# Patient Record
Sex: MALE | Race: WHITE | NOT HISPANIC OR LATINO
[De-identification: names, ages, dates, MRNs, and addresses within clinical notes are randomized per-mention and may not be internally consistent; named-entity substitution may affect disease eponyms.]

---

## 2017-03-09 ENCOUNTER — APPOINTMENT (OUTPATIENT)
Dept: UROLOGY | Facility: CLINIC | Age: 62
End: 2017-03-09

## 2017-03-27 ENCOUNTER — APPOINTMENT (OUTPATIENT)
Dept: UROLOGY | Facility: HOSPITAL | Age: 62
End: 2017-03-27

## 2017-03-30 ENCOUNTER — APPOINTMENT (OUTPATIENT)
Dept: UROLOGY | Facility: CLINIC | Age: 62
End: 2017-03-30

## 2017-04-06 ENCOUNTER — APPOINTMENT (OUTPATIENT)
Dept: UROLOGY | Facility: CLINIC | Age: 62
End: 2017-04-06

## 2017-04-24 ENCOUNTER — APPOINTMENT (OUTPATIENT)
Dept: UROLOGY | Facility: HOSPITAL | Age: 62
End: 2017-04-24

## 2017-04-27 ENCOUNTER — APPOINTMENT (OUTPATIENT)
Dept: UROLOGY | Facility: CLINIC | Age: 62
End: 2017-04-27

## 2017-04-27 VITALS
SYSTOLIC BLOOD PRESSURE: 115 MMHG | DIASTOLIC BLOOD PRESSURE: 76 MMHG | HEIGHT: 72 IN | WEIGHT: 210 LBS | HEART RATE: 79 BPM | BODY MASS INDEX: 28.44 KG/M2

## 2017-04-27 DIAGNOSIS — Z86.59 PERSONAL HISTORY OF OTHER MENTAL AND BEHAVIORAL DISORDERS: ICD-10-CM

## 2017-04-27 DIAGNOSIS — Z82.49 FAMILY HISTORY OF ISCHEMIC HEART DISEASE AND OTHER DISEASES OF THE CIRCULATORY SYSTEM: ICD-10-CM

## 2017-04-27 DIAGNOSIS — Z78.9 OTHER SPECIFIED HEALTH STATUS: ICD-10-CM

## 2017-04-27 DIAGNOSIS — Z83.3 FAMILY HISTORY OF DIABETES MELLITUS: ICD-10-CM

## 2017-04-27 DIAGNOSIS — Z87.442 PERSONAL HISTORY OF URINARY CALCULI: ICD-10-CM

## 2017-04-27 DIAGNOSIS — Z82.5 FAMILY HISTORY OF ASTHMA AND OTHER CHRONIC LOWER RESPIRATORY DISEASES: ICD-10-CM

## 2017-04-27 RX ORDER — FLUPHENAZINE HCL 10 MG
10 TABLET ORAL
Refills: 0 | Status: ACTIVE | COMMUNITY

## 2017-04-27 RX ORDER — FENOFIBRATE 145 MG/1
2 TABLET ORAL
Refills: 0 | Status: ACTIVE | COMMUNITY

## 2017-05-11 ENCOUNTER — APPOINTMENT (OUTPATIENT)
Dept: UROLOGY | Facility: CLINIC | Age: 62
End: 2017-05-11

## 2017-05-11 VITALS — DIASTOLIC BLOOD PRESSURE: 72 MMHG | SYSTOLIC BLOOD PRESSURE: 102 MMHG | HEART RATE: 70 BPM

## 2017-05-22 ENCOUNTER — APPOINTMENT (OUTPATIENT)
Dept: UROLOGY | Facility: HOSPITAL | Age: 62
End: 2017-05-22

## 2017-06-12 ENCOUNTER — APPOINTMENT (OUTPATIENT)
Dept: UROLOGY | Facility: HOSPITAL | Age: 62
End: 2017-06-12

## 2017-06-13 ENCOUNTER — APPOINTMENT (OUTPATIENT)
Dept: UROLOGY | Facility: CLINIC | Age: 62
End: 2017-06-13

## 2017-06-13 ENCOUNTER — OUTPATIENT (OUTPATIENT)
Dept: OUTPATIENT SERVICES | Facility: HOSPITAL | Age: 62
LOS: 1 days | Discharge: HOME | End: 2017-06-13

## 2017-06-13 VITALS — SYSTOLIC BLOOD PRESSURE: 113 MMHG | DIASTOLIC BLOOD PRESSURE: 69 MMHG | HEART RATE: 77 BPM

## 2017-06-13 DIAGNOSIS — N39.0 URINARY TRACT INFECTION, SITE NOT SPECIFIED: ICD-10-CM

## 2017-06-13 DIAGNOSIS — N23 UNSPECIFIED RENAL COLIC: ICD-10-CM

## 2017-06-13 DIAGNOSIS — F20.9 SCHIZOPHRENIA, UNSPECIFIED: ICD-10-CM

## 2017-06-13 DIAGNOSIS — N20.0 CALCULUS OF KIDNEY: ICD-10-CM

## 2017-06-13 DIAGNOSIS — N20.1 CALCULUS OF URETER: ICD-10-CM

## 2017-06-13 LAB
BILIRUB UR QL STRIP: NORMAL
CLARITY UR: CLEAR
COLLECTION METHOD: NORMAL
GLUCOSE UR-MCNC: NORMAL
HCG UR QL: NORMAL EU/DL
HGB UR QL STRIP.AUTO: NORMAL
KETONES UR-MCNC: NORMAL
LEUKOCYTE ESTERASE UR QL STRIP: 500
NITRITE UR QL STRIP: NORMAL
PH UR STRIP: 5
PROT UR STRIP-MCNC: 100
SP GR UR STRIP: 1.01

## 2017-06-13 RX ORDER — FLUPHENAZINE HYDROCHLORIDE 5 MG/1
5 TABLET, FILM COATED ORAL
Qty: 30 | Refills: 0 | Status: ACTIVE | COMMUNITY
Start: 2016-12-23

## 2017-06-22 ENCOUNTER — INPATIENT (INPATIENT)
Facility: HOSPITAL | Age: 62
LOS: 5 days | Discharge: HOME | End: 2017-06-28
Attending: UROLOGY | Admitting: INTERNAL MEDICINE

## 2017-06-22 DIAGNOSIS — N20.0 CALCULUS OF KIDNEY: ICD-10-CM

## 2017-06-22 DIAGNOSIS — F20.9 SCHIZOPHRENIA, UNSPECIFIED: ICD-10-CM

## 2017-06-22 DIAGNOSIS — N23 UNSPECIFIED RENAL COLIC: ICD-10-CM

## 2017-06-22 DIAGNOSIS — N39.0 URINARY TRACT INFECTION, SITE NOT SPECIFIED: ICD-10-CM

## 2017-06-27 ENCOUNTER — APPOINTMENT (OUTPATIENT)
Dept: UROLOGY | Facility: CLINIC | Age: 62
End: 2017-06-27

## 2017-06-28 DIAGNOSIS — N20.0 CALCULUS OF KIDNEY: ICD-10-CM

## 2017-07-03 DIAGNOSIS — I25.10 ATHEROSCLEROTIC HEART DISEASE OF NATIVE CORONARY ARTERY WITHOUT ANGINA PECTORIS: ICD-10-CM

## 2017-07-03 DIAGNOSIS — Z87.442 PERSONAL HISTORY OF URINARY CALCULI: ICD-10-CM

## 2017-07-03 DIAGNOSIS — F20.0 PARANOID SCHIZOPHRENIA: ICD-10-CM

## 2017-07-03 DIAGNOSIS — N13.2 HYDRONEPHROSIS WITH RENAL AND URETERAL CALCULOUS OBSTRUCTION: ICD-10-CM

## 2017-07-03 DIAGNOSIS — N17.9 ACUTE KIDNEY FAILURE, UNSPECIFIED: ICD-10-CM

## 2017-07-03 DIAGNOSIS — N18.9 CHRONIC KIDNEY DISEASE, UNSPECIFIED: ICD-10-CM

## 2017-07-03 DIAGNOSIS — I12.9 HYPERTENSIVE CHRONIC KIDNEY DISEASE WITH STAGE 1 THROUGH STAGE 4 CHRONIC KIDNEY DISEASE, OR UNSPECIFIED CHRONIC KIDNEY DISEASE: ICD-10-CM

## 2017-07-03 DIAGNOSIS — C85.90 NON-HODGKIN LYMPHOMA, UNSPECIFIED, UNSPECIFIED SITE: ICD-10-CM

## 2017-07-05 DIAGNOSIS — R79.89 OTHER SPECIFIED ABNORMAL FINDINGS OF BLOOD CHEMISTRY: ICD-10-CM

## 2017-07-05 DIAGNOSIS — Z85.72 PERSONAL HISTORY OF NON-HODGKIN LYMPHOMAS: ICD-10-CM

## 2017-07-26 ENCOUNTER — EMERGENCY (EMERGENCY)
Facility: HOSPITAL | Age: 62
LOS: 0 days | Discharge: HOME | End: 2017-07-26

## 2017-07-26 DIAGNOSIS — R33.9 RETENTION OF URINE, UNSPECIFIED: ICD-10-CM

## 2017-07-26 DIAGNOSIS — N23 UNSPECIFIED RENAL COLIC: ICD-10-CM

## 2017-07-26 DIAGNOSIS — R34 ANURIA AND OLIGURIA: ICD-10-CM

## 2017-07-26 DIAGNOSIS — N39.0 URINARY TRACT INFECTION, SITE NOT SPECIFIED: ICD-10-CM

## 2017-07-26 DIAGNOSIS — F20.9 SCHIZOPHRENIA, UNSPECIFIED: ICD-10-CM

## 2017-07-26 DIAGNOSIS — N17.9 ACUTE KIDNEY FAILURE, UNSPECIFIED: ICD-10-CM

## 2017-07-26 DIAGNOSIS — N20.0 CALCULUS OF KIDNEY: ICD-10-CM

## 2017-07-26 DIAGNOSIS — L53.8 OTHER SPECIFIED ERYTHEMATOUS CONDITIONS: ICD-10-CM

## 2017-07-26 DIAGNOSIS — Z79.899 OTHER LONG TERM (CURRENT) DRUG THERAPY: ICD-10-CM

## 2017-07-26 DIAGNOSIS — Z87.442 PERSONAL HISTORY OF URINARY CALCULI: ICD-10-CM

## 2017-07-26 DIAGNOSIS — Z90.5 ACQUIRED ABSENCE OF KIDNEY: ICD-10-CM

## 2017-07-28 ENCOUNTER — APPOINTMENT (OUTPATIENT)
Dept: UROLOGY | Facility: CLINIC | Age: 62
End: 2017-07-28
Payer: MEDICAID

## 2017-07-28 VITALS
DIASTOLIC BLOOD PRESSURE: 71 MMHG | WEIGHT: 210 LBS | SYSTOLIC BLOOD PRESSURE: 118 MMHG | HEIGHT: 72 IN | BODY MASS INDEX: 28.44 KG/M2 | HEART RATE: 81 BPM

## 2017-07-28 DIAGNOSIS — R79.89 OTHER SPECIFIED ABNORMAL FINDINGS OF BLOOD CHEMISTRY: ICD-10-CM

## 2017-07-28 PROCEDURE — 99214 OFFICE O/P EST MOD 30 MIN: CPT

## 2017-08-22 ENCOUNTER — APPOINTMENT (OUTPATIENT)
Dept: UROLOGY | Facility: CLINIC | Age: 62
End: 2017-08-22
Payer: MEDICAID

## 2017-08-22 VITALS
DIASTOLIC BLOOD PRESSURE: 75 MMHG | BODY MASS INDEX: 27.09 KG/M2 | WEIGHT: 200 LBS | HEIGHT: 72 IN | SYSTOLIC BLOOD PRESSURE: 108 MMHG | HEART RATE: 62 BPM

## 2017-08-22 DIAGNOSIS — N13.5 CROSSING VESSEL AND STRICTURE OF URETER W/OUT HYDRONEPHROSIS: ICD-10-CM

## 2017-08-22 DIAGNOSIS — N23 UNSPECIFIED RENAL COLIC: ICD-10-CM

## 2017-08-22 PROCEDURE — 99212 OFFICE O/P EST SF 10 MIN: CPT

## 2017-09-27 ENCOUNTER — INPATIENT (INPATIENT)
Facility: HOSPITAL | Age: 62
LOS: 5 days | Discharge: HOME | End: 2017-10-03
Attending: INTERNAL MEDICINE | Admitting: INTERNAL MEDICINE

## 2017-09-27 DIAGNOSIS — N39.0 URINARY TRACT INFECTION, SITE NOT SPECIFIED: ICD-10-CM

## 2017-09-27 DIAGNOSIS — F20.9 SCHIZOPHRENIA, UNSPECIFIED: ICD-10-CM

## 2017-09-27 DIAGNOSIS — N17.9 ACUTE KIDNEY FAILURE, UNSPECIFIED: ICD-10-CM

## 2017-09-27 DIAGNOSIS — N20.0 CALCULUS OF KIDNEY: ICD-10-CM

## 2017-09-27 DIAGNOSIS — N23 UNSPECIFIED RENAL COLIC: ICD-10-CM

## 2017-10-05 DIAGNOSIS — C85.10 UNSPECIFIED B-CELL LYMPHOMA, UNSPECIFIED SITE: ICD-10-CM

## 2017-10-05 DIAGNOSIS — N17.9 ACUTE KIDNEY FAILURE, UNSPECIFIED: ICD-10-CM

## 2017-10-05 DIAGNOSIS — E87.2 ACIDOSIS: ICD-10-CM

## 2017-10-05 DIAGNOSIS — N13.30 UNSPECIFIED HYDRONEPHROSIS: ICD-10-CM

## 2017-10-05 DIAGNOSIS — F20.9 SCHIZOPHRENIA, UNSPECIFIED: ICD-10-CM

## 2017-10-05 DIAGNOSIS — R11.2 NAUSEA WITH VOMITING, UNSPECIFIED: ICD-10-CM

## 2017-10-05 DIAGNOSIS — N26.1 ATROPHY OF KIDNEY (TERMINAL): ICD-10-CM

## 2017-10-05 DIAGNOSIS — N18.9 CHRONIC KIDNEY DISEASE, UNSPECIFIED: ICD-10-CM

## 2017-10-05 DIAGNOSIS — R35.8 OTHER POLYURIA: ICD-10-CM

## 2017-10-05 DIAGNOSIS — N18.4 CHRONIC KIDNEY DISEASE, STAGE 4 (SEVERE): ICD-10-CM

## 2017-10-05 DIAGNOSIS — Z98.890 OTHER SPECIFIED POSTPROCEDURAL STATES: ICD-10-CM

## 2017-10-05 DIAGNOSIS — N13.1 HYDRONEPHROSIS WITH URETERAL STRICTURE, NOT ELSEWHERE CLASSIFIED: ICD-10-CM

## 2017-10-06 ENCOUNTER — INPATIENT (INPATIENT)
Facility: HOSPITAL | Age: 62
LOS: 3 days | Discharge: HOME | End: 2017-10-10
Attending: INTERNAL MEDICINE | Admitting: INTERNAL MEDICINE

## 2017-10-06 DIAGNOSIS — N23 UNSPECIFIED RENAL COLIC: ICD-10-CM

## 2017-10-06 DIAGNOSIS — N39.0 URINARY TRACT INFECTION, SITE NOT SPECIFIED: ICD-10-CM

## 2017-10-06 DIAGNOSIS — N17.9 ACUTE KIDNEY FAILURE, UNSPECIFIED: ICD-10-CM

## 2017-10-06 DIAGNOSIS — N20.0 CALCULUS OF KIDNEY: ICD-10-CM

## 2017-10-06 DIAGNOSIS — F20.9 SCHIZOPHRENIA, UNSPECIFIED: ICD-10-CM

## 2017-10-12 DIAGNOSIS — D72.829 ELEVATED WHITE BLOOD CELL COUNT, UNSPECIFIED: ICD-10-CM

## 2017-10-12 DIAGNOSIS — N17.9 ACUTE KIDNEY FAILURE, UNSPECIFIED: ICD-10-CM

## 2017-10-12 DIAGNOSIS — B96.5 PSEUDOMONAS (AERUGINOSA) (MALLEI) (PSEUDOMALLEI) AS THE CAUSE OF DISEASES CLASSIFIED ELSEWHERE: ICD-10-CM

## 2017-10-12 DIAGNOSIS — N26.1 ATROPHY OF KIDNEY (TERMINAL): ICD-10-CM

## 2017-10-12 DIAGNOSIS — F20.0 PARANOID SCHIZOPHRENIA: ICD-10-CM

## 2017-10-12 DIAGNOSIS — E83.42 HYPOMAGNESEMIA: ICD-10-CM

## 2017-10-12 DIAGNOSIS — N41.9 INFLAMMATORY DISEASE OF PROSTATE, UNSPECIFIED: ICD-10-CM

## 2017-10-12 DIAGNOSIS — N18.9 CHRONIC KIDNEY DISEASE, UNSPECIFIED: ICD-10-CM

## 2017-10-12 DIAGNOSIS — Z93.6 OTHER ARTIFICIAL OPENINGS OF URINARY TRACT STATUS: ICD-10-CM

## 2017-10-12 DIAGNOSIS — D64.9 ANEMIA, UNSPECIFIED: ICD-10-CM

## 2017-10-12 DIAGNOSIS — Z16.23 RESISTANCE TO QUINOLONES AND FLUOROQUINOLONES: ICD-10-CM

## 2017-10-12 DIAGNOSIS — N13.9 OBSTRUCTIVE AND REFLUX UROPATHY, UNSPECIFIED: ICD-10-CM

## 2017-10-12 DIAGNOSIS — C85.10 UNSPECIFIED B-CELL LYMPHOMA, UNSPECIFIED SITE: ICD-10-CM

## 2017-10-12 DIAGNOSIS — A41.9 SEPSIS, UNSPECIFIED ORGANISM: ICD-10-CM

## 2017-10-19 DIAGNOSIS — N41.0 ACUTE PROSTATITIS: ICD-10-CM

## 2017-10-19 DIAGNOSIS — A41.9 SEPSIS, UNSPECIFIED ORGANISM: ICD-10-CM

## 2017-10-23 DIAGNOSIS — D50.0 IRON DEFICIENCY ANEMIA SECONDARY TO BLOOD LOSS (CHRONIC): ICD-10-CM

## 2017-10-23 DIAGNOSIS — N30.80 OTHER CYSTITIS WITHOUT HEMATURIA: ICD-10-CM

## 2017-10-23 DIAGNOSIS — N39.0 URINARY TRACT INFECTION, SITE NOT SPECIFIED: ICD-10-CM

## 2017-11-23 ENCOUNTER — INPATIENT (INPATIENT)
Facility: HOSPITAL | Age: 62
LOS: 5 days | Discharge: HOME | End: 2017-11-29
Attending: INTERNAL MEDICINE | Admitting: INTERNAL MEDICINE

## 2017-11-23 DIAGNOSIS — N20.0 CALCULUS OF KIDNEY: ICD-10-CM

## 2017-11-23 DIAGNOSIS — N17.9 ACUTE KIDNEY FAILURE, UNSPECIFIED: ICD-10-CM

## 2017-11-23 DIAGNOSIS — N23 UNSPECIFIED RENAL COLIC: ICD-10-CM

## 2017-11-23 DIAGNOSIS — F20.9 SCHIZOPHRENIA, UNSPECIFIED: ICD-10-CM

## 2017-11-23 DIAGNOSIS — N39.0 URINARY TRACT INFECTION, SITE NOT SPECIFIED: ICD-10-CM

## 2017-11-30 DIAGNOSIS — N17.9 ACUTE KIDNEY FAILURE, UNSPECIFIED: ICD-10-CM

## 2017-11-30 DIAGNOSIS — N26.1 ATROPHY OF KIDNEY (TERMINAL): ICD-10-CM

## 2017-11-30 DIAGNOSIS — B96.20 UNSPECIFIED ESCHERICHIA COLI [E. COLI] AS THE CAUSE OF DISEASES CLASSIFIED ELSEWHERE: ICD-10-CM

## 2017-11-30 DIAGNOSIS — A41.9 SEPSIS, UNSPECIFIED ORGANISM: ICD-10-CM

## 2017-11-30 DIAGNOSIS — T83.512A INFECTION AND INFLAMMATORY REACTION DUE TO NEPHROSTOMY CATHETER, INITIAL ENCOUNTER: ICD-10-CM

## 2017-11-30 DIAGNOSIS — T83.092A OTHER MECHANICAL COMPLICATION OF NEPHROSTOMY CATHETER, INITIAL ENCOUNTER: ICD-10-CM
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
## 2017-11-30 DIAGNOSIS — N39.0 URINARY TRACT INFECTION, SITE NOT SPECIFIED: ICD-10-CM

## 2017-11-30 DIAGNOSIS — N13.6 PYONEPHROSIS: ICD-10-CM

## 2017-11-30 DIAGNOSIS — F20.9 SCHIZOPHRENIA, UNSPECIFIED: ICD-10-CM

## 2017-11-30 DIAGNOSIS — E87.5 HYPERKALEMIA: ICD-10-CM

## 2017-11-30 DIAGNOSIS — A41.51 SEPSIS DUE TO ESCHERICHIA COLI [E. COLI]: ICD-10-CM

## 2017-11-30 DIAGNOSIS — D63.8 ANEMIA IN OTHER CHRONIC DISEASES CLASSIFIED ELSEWHERE: ICD-10-CM

## 2017-11-30 DIAGNOSIS — Z87.442 PERSONAL HISTORY OF URINARY CALCULI: ICD-10-CM

## 2017-11-30 DIAGNOSIS — N18.4 CHRONIC KIDNEY DISEASE, STAGE 4 (SEVERE): ICD-10-CM

## 2017-11-30 DIAGNOSIS — Z85.72 PERSONAL HISTORY OF NON-HODGKIN LYMPHOMAS: ICD-10-CM

## 2018-02-01 ENCOUNTER — OUTPATIENT (OUTPATIENT)
Dept: OUTPATIENT SERVICES | Facility: HOSPITAL | Age: 63
LOS: 1 days | Discharge: HOME | End: 2018-02-01

## 2018-02-01 ENCOUNTER — RESULT REVIEW (OUTPATIENT)
Age: 63
End: 2018-02-01

## 2018-02-01 ENCOUNTER — APPOINTMENT (OUTPATIENT)
Dept: HEMATOLOGY ONCOLOGY | Facility: CLINIC | Age: 63
End: 2018-02-01

## 2018-02-01 VITALS
HEIGHT: 72 IN | HEART RATE: 72 BPM | BODY MASS INDEX: 28.71 KG/M2 | TEMPERATURE: 96.3 F | SYSTOLIC BLOOD PRESSURE: 113 MMHG | RESPIRATION RATE: 12 BRPM | DIASTOLIC BLOOD PRESSURE: 55 MMHG | WEIGHT: 212 LBS

## 2018-02-02 DIAGNOSIS — C85.90 NON-HODGKIN LYMPHOMA, UNSPECIFIED, UNSPECIFIED SITE: ICD-10-CM

## 2018-02-02 LAB
ALBUMIN SERPL-MCNC: 4.2 G/DL
ALBUMIN/GLOB SERPL: 2
ALP SERPL-CCNC: 139 IU/L
ALT SERPL-CCNC: 12 IU/L
ANION GAP SERPL CALC-SCNC: 13 MEQ/L
AST SERPL-CCNC: 16 IU/L
BILIRUB SERPL-MCNC: 0.6 MG/DL
BUN SERPL-MCNC: 30 MG/DL
BUN/CREAT SERPL: 12.3 %
CALCIUM SERPL-MCNC: 9.2 MG/DL
CHLORIDE SERPL-SCNC: 112 MEQ/L
CO2 SERPL-SCNC: 18 MEQ/L
CREAT SERPL-MCNC: 2.43 MG/DL
GFR SERPL CREATININE-BSD FRML MDRD: 27
GLUCOSE SERPL-MCNC: 68 MG/DL
IGG FLD-MCNC: 910 MG/DL
LACTATE DEHYDROGENASE (NORTH): 256 IU/L
POTASSIUM SERPL-SCNC: 4.7 MMOL/L
PROT SERPL-MCNC: 6.3 G/DL
SODIUM SERPL-SCNC: 143 MEQ/L

## 2019-01-21 ENCOUNTER — EMERGENCY (EMERGENCY)
Facility: HOSPITAL | Age: 64
LOS: 0 days | Discharge: HOME | End: 2019-01-21
Attending: EMERGENCY MEDICINE | Admitting: EMERGENCY MEDICINE

## 2019-01-21 VITALS
DIASTOLIC BLOOD PRESSURE: 86 MMHG | OXYGEN SATURATION: 100 % | RESPIRATION RATE: 18 BRPM | SYSTOLIC BLOOD PRESSURE: 141 MMHG | TEMPERATURE: 98 F | HEART RATE: 97 BPM

## 2019-01-21 DIAGNOSIS — M25.461 EFFUSION, RIGHT KNEE: ICD-10-CM

## 2019-01-21 DIAGNOSIS — I10 ESSENTIAL (PRIMARY) HYPERTENSION: ICD-10-CM

## 2019-01-21 DIAGNOSIS — M25.561 PAIN IN RIGHT KNEE: ICD-10-CM

## 2019-01-21 LAB
B PERT IGG+IGM PNL SER: ABNORMAL
COLOR FLD: YELLOW — SIGNIFICANT CHANGE UP
FLUID INTAKE SUBSTANCE CLASS: SIGNIFICANT CHANGE UP
FLUID SEGMENTED GRANULOCYTES: SIGNIFICANT CHANGE UP %
LYMPHOCYTES # FLD: SIGNIFICANT CHANGE UP
RCV VOL RI: 8000 /UL — HIGH (ref 0–5)
TOTAL NUCLEATED CELL COUNT, BODY FLUID: HIGH /UL (ref 0–5)
TUBE TYPE: SIGNIFICANT CHANGE UP

## 2019-01-21 NOTE — ED PROVIDER NOTE - PROGRESS NOTE DETAILS
Patient requesting joint aspiration for crystals. Lengthy discussion with patient concerning test best done by ortho as they can f/u the results for continuity of care, as our aspirate will be a send out and does not come back today. Patient also does not want to take meds for symptomatic relief of possible gout, due to comorbidities. Patient was provided ortho f/u but he states he has medicaid and is unsure if he will be able to be seen. Large effusion on XR and patient in considerable pain, without reasonable alternatives for pain control.

## 2019-01-21 NOTE — ED PROVIDER NOTE - CARE PROVIDERS DIRECT ADDRESSES
,[email or web address]

## 2019-01-21 NOTE — ED PROVIDER NOTE - NS ED ROS FT
CONST: No fever, chills or bodyaches  CARD: No chest pain, palpitations  RESP: No SOB, cough  MS: see HPI  SKIN: No rashes  NEURO: No paresthesias

## 2019-01-21 NOTE — ED PROVIDER NOTE - ATTENDING CONTRIBUTION TO CARE
64 yo M hx of htn, ckd, schizophrenia, now with 2wks R knee swelling. hx of swelling to multiple joints. f/u with PMD dr Escudero. elev uric acid. can't take meds for gout due to ckd. has not followed with rheum as instructed. no fever. no redness.   nontoxic, well appearing. nl gait, + swelling right knee, no erythema, no warmth, no LAD.dec ROM due to swelling.  xray + effusion. will tap

## 2019-01-21 NOTE — ED PROVIDER NOTE - OBJECTIVE STATEMENT
62yo male with PMHx paranoid schizophrenia, CKD, multiple renal stones with partial ureteralectomy, migratory joint pain and swelling, seen by Rheum previously but did not follow up, was told by PMD he has "elevated uric acid," although did not f/u as well presents c/o R knee pain with moderate pain/swelling  x 2 weeks, pain worse with flexion and weight bearing, better with elevation. Patient has not tried any OTCs due to his renal issues and has not tried RICE. He denies trauma, twisting injury.

## 2019-01-21 NOTE — ED PROVIDER NOTE - PHYSICAL EXAMINATION
CONST: Well appearing in NAD  CARD: Normal S1 S2; Normal rate and rhythm  MS: Suprapatellar R effusion, ballotment of patella with moderate effusion. Pain elicited upon flexion. Can fully extend  SKIN: Warm, dry, no acute rashes. Good turgor  NEURO: A&Ox3, No focal deficits. Strength 5/5 with no sensory deficits. Steady gait

## 2019-01-21 NOTE — ED PROVIDER NOTE - CARE PROVIDER_API CALL
Nathen Escudero), Internal Medicine  [address] [place] [address]  Phone: [phone]  Fax: [phone]    Malachi Ornelas), Orthopaedic Surgery  [address] [place] [address]  Phone: [phone]  Fax: [phone]

## 2019-01-22 LAB
GLUCOSE FLD-MCNC: 80 MG/DL — SIGNIFICANT CHANGE UP
GRAM STN FLD: SIGNIFICANT CHANGE UP
PROT FLD-MCNC: 4.4 G/DL — SIGNIFICANT CHANGE UP
SPECIMEN SOURCE: SIGNIFICANT CHANGE UP
SYNOVIAL CRYSTALS CLARITY: ABNORMAL
SYNOVIAL CRYSTALS COLOR: YELLOW
SYNOVIAL CRYSTALS ID: ABNORMAL
SYNOVIAL CRYSTALS TUBE: SIGNIFICANT CHANGE UP

## 2019-02-04 LAB
CULTURE RESULTS: SIGNIFICANT CHANGE UP
SPECIMEN SOURCE: SIGNIFICANT CHANGE UP

## 2019-03-01 ENCOUNTER — OUTPATIENT (OUTPATIENT)
Dept: OUTPATIENT SERVICES | Facility: HOSPITAL | Age: 64
LOS: 1 days | End: 2019-03-01
Payer: MEDICAID

## 2019-03-01 PROCEDURE — G9001: CPT

## 2019-03-18 DIAGNOSIS — Z71.89 OTHER SPECIFIED COUNSELING: ICD-10-CM

## 2019-06-17 ENCOUNTER — APPOINTMENT (OUTPATIENT)
Dept: HEMATOLOGY ONCOLOGY | Facility: CLINIC | Age: 64
End: 2019-06-17

## 2019-07-08 ENCOUNTER — APPOINTMENT (OUTPATIENT)
Dept: HEMATOLOGY ONCOLOGY | Facility: CLINIC | Age: 64
End: 2019-07-08
Payer: MEDICAID

## 2019-07-08 ENCOUNTER — LABORATORY RESULT (OUTPATIENT)
Age: 64
End: 2019-07-08

## 2019-07-08 VITALS
RESPIRATION RATE: 13 BRPM | TEMPERATURE: 96.3 F | HEART RATE: 87 BPM | WEIGHT: 210 LBS | BODY MASS INDEX: 28.44 KG/M2 | HEIGHT: 72 IN | SYSTOLIC BLOOD PRESSURE: 121 MMHG | DIASTOLIC BLOOD PRESSURE: 68 MMHG

## 2019-07-08 PROCEDURE — 99214 OFFICE O/P EST MOD 30 MIN: CPT

## 2019-07-09 LAB
ALBUMIN SERPL ELPH-MCNC: 3.9 G/DL
ALP BLD-CCNC: 166 U/L
ALT SERPL-CCNC: 9 U/L
ANION GAP SERPL CALC-SCNC: 15 MMOL/L
AST SERPL-CCNC: 9 U/L
BILIRUB SERPL-MCNC: 0.3 MG/DL
BUN SERPL-MCNC: 42 MG/DL
CALCIUM SERPL-MCNC: 11.7 MG/DL
CHLORIDE SERPL-SCNC: 107 MMOL/L
CO2 SERPL-SCNC: 20 MMOL/L
CREAT SERPL-MCNC: 2.9 MG/DL
GLUCOSE SERPL-MCNC: 99 MG/DL
HCT VFR BLD CALC: 34.5 %
HGB BLD-MCNC: 11.4 G/DL
LDH SERPL-CCNC: 129 U/L
MCHC RBC-ENTMCNC: 28.6 PG
MCHC RBC-ENTMCNC: 33 G/DL
MCV RBC AUTO: 86.7 FL
PLATELET # BLD AUTO: 221 K/UL
PMV BLD: 9.4 FL
POTASSIUM SERPL-SCNC: 4.2 MMOL/L
PROT SERPL-MCNC: 6.5 G/DL
RBC # BLD: 3.98 M/UL
RBC # FLD: 14.8 %
SODIUM SERPL-SCNC: 142 MMOL/L
WBC # FLD AUTO: 8.29 K/UL

## 2019-07-09 NOTE — ASSESSMENT
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 However, his last radiological evaluation was about 2 years ago, as far as the lymphoma is concerned.\par \par We will repeat the CBC, CMP, LDH.\par Further recommendations after those results are available.\par Apparently the patient is due already for another radiological study of the abdomen and pelvis next month as recommended by his urologist. If only a sonogram, we will try to get a PET scan for reevaluation of his lymphoma status. The patient should not be given CT scan IV dye given his limited kidney function.\par \par Further recommendations after the above results are available.\par The patient should continue to be seen on a regular basis. Next follow up should be in 6 months.\par \par All questions answered.

## 2019-07-09 NOTE — HISTORY OF PRESENT ILLNESS
[de-identified] : The patient is coming for his regularly scheduled follow up for his lymphoma after a hiatus of over a year.\par During that period of time, his major issues have been related to his kidney stones. His most recent US of the kidneys has shown cortical cysts, bilateral, diffuse left renal cortical thinning with diffuse cortical echotexture unchanged from previous. Splenomegaly and periportal/peripancreatic adenopathy were also present with the spleen measuring 15 cm (in 2017 the report stated 18 cm) and the lymph node measuring 4.5 cm.\par The patient is denying any fever. No new pains or aches. No cough or shortness of breath. The appetite has been stable. No significant abnormal weight loss. No major problems with bowel movements. He has been treated for several months for UTI secondary to his nephrolithiases.\par  [Disease:__________________________] : Disease: [unfilled] [de-identified] : IV [de-identified] : Marginal zone

## 2019-07-09 NOTE — PHYSICAL EXAM
[Restricted in physically strenuous activity but ambulatory and able to carry out work of a light or sedentary nature] : Status 1- Restricted in physically strenuous activity but ambulatory and able to carry out work of a light or sedentary nature, e.g., light house work, office work [Normal] : grossly intact [de-identified] : Questionable, small, movable axillary lymph nodes present. [de-identified] : Despite knowing that the patient's spleen is enlarged at 15 cm, I did not feel it.

## 2019-09-01 ENCOUNTER — OUTPATIENT (OUTPATIENT)
Dept: OUTPATIENT SERVICES | Facility: HOSPITAL | Age: 64
LOS: 1 days | End: 2019-09-01

## 2019-09-06 ENCOUNTER — OUTPATIENT (OUTPATIENT)
Dept: OUTPATIENT SERVICES | Facility: HOSPITAL | Age: 64
LOS: 1 days | Discharge: HOME | End: 2019-09-06

## 2019-09-09 DIAGNOSIS — Z13.820 ENCOUNTER FOR SCREENING FOR OSTEOPOROSIS: ICD-10-CM

## 2019-09-09 DIAGNOSIS — M89.9 DISORDER OF BONE, UNSPECIFIED: ICD-10-CM

## 2019-09-09 DIAGNOSIS — E21.0 PRIMARY HYPERPARATHYROIDISM: ICD-10-CM

## 2019-09-26 ENCOUNTER — INPATIENT (INPATIENT)
Facility: HOSPITAL | Age: 64
LOS: 3 days | Discharge: HOME | End: 2019-09-30
Attending: INTERNAL MEDICINE | Admitting: INTERNAL MEDICINE
Payer: MEDICAID

## 2019-09-26 VITALS
TEMPERATURE: 96 F | HEART RATE: 88 BPM | OXYGEN SATURATION: 98 % | RESPIRATION RATE: 18 BRPM | DIASTOLIC BLOOD PRESSURE: 60 MMHG | SYSTOLIC BLOOD PRESSURE: 108 MMHG

## 2019-09-26 LAB
ALBUMIN SERPL ELPH-MCNC: 4 G/DL — SIGNIFICANT CHANGE UP (ref 3.5–5.2)
ALP SERPL-CCNC: 146 U/L — HIGH (ref 30–115)
ALT FLD-CCNC: 11 U/L — SIGNIFICANT CHANGE UP (ref 0–41)
ANION GAP SERPL CALC-SCNC: 12 MMOL/L — SIGNIFICANT CHANGE UP (ref 7–14)
APPEARANCE UR: CLEAR — SIGNIFICANT CHANGE UP
AST SERPL-CCNC: 26 U/L — SIGNIFICANT CHANGE UP (ref 0–41)
BACTERIA # UR AUTO: ABNORMAL
BASOPHILS # BLD AUTO: 0.04 K/UL — SIGNIFICANT CHANGE UP (ref 0–0.2)
BASOPHILS NFR BLD AUTO: 0.5 % — SIGNIFICANT CHANGE UP (ref 0–1)
BILIRUB SERPL-MCNC: 0.3 MG/DL — SIGNIFICANT CHANGE UP (ref 0.2–1.2)
BILIRUB UR-MCNC: NEGATIVE — SIGNIFICANT CHANGE UP
BUN SERPL-MCNC: 47 MG/DL — HIGH (ref 10–20)
CALCIUM SERPL-MCNC: 12.4 MG/DL — HIGH (ref 8.5–10.1)
CHLORIDE SERPL-SCNC: 115 MMOL/L — HIGH (ref 98–110)
CO2 SERPL-SCNC: 18 MMOL/L — SIGNIFICANT CHANGE UP (ref 17–32)
COLOR SPEC: SIGNIFICANT CHANGE UP
CREAT SERPL-MCNC: 3.4 MG/DL — HIGH (ref 0.7–1.5)
DIFF PNL FLD: SIGNIFICANT CHANGE UP
EOSINOPHIL # BLD AUTO: 1.57 K/UL — HIGH (ref 0–0.7)
EOSINOPHIL NFR BLD AUTO: 19 % — HIGH (ref 0–8)
EPI CELLS # UR: 0 /HPF — SIGNIFICANT CHANGE UP (ref 0–5)
GLUCOSE SERPL-MCNC: 105 MG/DL — HIGH (ref 70–99)
GLUCOSE UR QL: NEGATIVE — SIGNIFICANT CHANGE UP
HCT VFR BLD CALC: 32.9 % — LOW (ref 42–52)
HGB BLD-MCNC: 10.8 G/DL — LOW (ref 14–18)
HYALINE CASTS # UR AUTO: 2 /LPF — SIGNIFICANT CHANGE UP (ref 0–7)
IMM GRANULOCYTES NFR BLD AUTO: 0.4 % — HIGH (ref 0.1–0.3)
KETONES UR-MCNC: NEGATIVE — SIGNIFICANT CHANGE UP
LACTATE SERPL-SCNC: 1.3 MMOL/L — SIGNIFICANT CHANGE UP (ref 0.5–2.2)
LEUKOCYTE ESTERASE UR-ACNC: ABNORMAL
LYMPHOCYTES # BLD AUTO: 1.05 K/UL — LOW (ref 1.2–3.4)
LYMPHOCYTES # BLD AUTO: 12.7 % — LOW (ref 20.5–51.1)
MCHC RBC-ENTMCNC: 28.2 PG — SIGNIFICANT CHANGE UP (ref 27–31)
MCHC RBC-ENTMCNC: 32.8 G/DL — SIGNIFICANT CHANGE UP (ref 32–37)
MCV RBC AUTO: 85.9 FL — SIGNIFICANT CHANGE UP (ref 80–94)
MONOCYTES # BLD AUTO: 1.01 K/UL — HIGH (ref 0.1–0.6)
MONOCYTES NFR BLD AUTO: 12.2 % — HIGH (ref 1.7–9.3)
NEUTROPHILS # BLD AUTO: 4.57 K/UL — SIGNIFICANT CHANGE UP (ref 1.4–6.5)
NEUTROPHILS NFR BLD AUTO: 55.2 % — SIGNIFICANT CHANGE UP (ref 42.2–75.2)
NITRITE UR-MCNC: POSITIVE
NRBC # BLD: 0 /100 WBCS — SIGNIFICANT CHANGE UP (ref 0–0)
PH UR: 6 — SIGNIFICANT CHANGE UP (ref 5–8)
PLATELET # BLD AUTO: 212 K/UL — SIGNIFICANT CHANGE UP (ref 130–400)
POTASSIUM SERPL-MCNC: 5.2 MMOL/L — HIGH (ref 3.5–5)
POTASSIUM SERPL-SCNC: 5.2 MMOL/L — HIGH (ref 3.5–5)
PROT SERPL-MCNC: 6.3 G/DL — SIGNIFICANT CHANGE UP (ref 6–8)
PROT UR-MCNC: ABNORMAL
RBC # BLD: 3.83 M/UL — LOW (ref 4.7–6.1)
RBC # FLD: 14.8 % — HIGH (ref 11.5–14.5)
RBC CASTS # UR COMP ASSIST: 2 /HPF — SIGNIFICANT CHANGE UP (ref 0–4)
SODIUM SERPL-SCNC: 145 MMOL/L — SIGNIFICANT CHANGE UP (ref 135–146)
SP GR SPEC: 1.01 — SIGNIFICANT CHANGE UP (ref 1.01–1.02)
UROBILINOGEN FLD QL: SIGNIFICANT CHANGE UP
WBC # BLD: 8.27 K/UL — SIGNIFICANT CHANGE UP (ref 4.8–10.8)
WBC # FLD AUTO: 8.27 K/UL — SIGNIFICANT CHANGE UP (ref 4.8–10.8)
WBC UR QL: 114 /HPF — HIGH (ref 0–5)

## 2019-09-26 PROCEDURE — 99285 EMERGENCY DEPT VISIT HI MDM: CPT

## 2019-09-26 RX ORDER — SODIUM CHLORIDE 9 MG/ML
1000 INJECTION INTRAMUSCULAR; INTRAVENOUS; SUBCUTANEOUS ONCE
Refills: 0 | Status: DISCONTINUED | OUTPATIENT
Start: 2019-09-26 | End: 2019-09-27

## 2019-09-26 RX ORDER — CEFTRIAXONE 500 MG/1
1000 INJECTION, POWDER, FOR SOLUTION INTRAMUSCULAR; INTRAVENOUS ONCE
Refills: 0 | Status: COMPLETED | OUTPATIENT
Start: 2019-09-26 | End: 2019-09-26

## 2019-09-26 RX ORDER — SODIUM CHLORIDE 9 MG/ML
1000 INJECTION INTRAMUSCULAR; INTRAVENOUS; SUBCUTANEOUS ONCE
Refills: 0 | Status: COMPLETED | OUTPATIENT
Start: 2019-09-26 | End: 2019-09-26

## 2019-09-26 RX ADMIN — SODIUM CHLORIDE 1000 MILLILITER(S): 9 INJECTION INTRAMUSCULAR; INTRAVENOUS; SUBCUTANEOUS at 22:15

## 2019-09-26 RX ADMIN — CEFTRIAXONE 100 MILLIGRAM(S): 500 INJECTION, POWDER, FOR SOLUTION INTRAMUSCULAR; INTRAVENOUS at 22:15

## 2019-09-26 NOTE — ED PROVIDER NOTE - CLINICAL SUMMARY MEDICAL DECISION MAKING FREE TEXT BOX
63 y.o. male, PMH of B-cell lymphoma not on chemo- being followed by oncologist (Dr. Granger), CKD, sent by his nephrologist Dr. Jones for Ca of 12.8 and Cr of 3.3 (from baseline of 2.5). Pt denies any complains except for being tired. No CP/SOB, abdominal pain, n/v/c/d, urinary symptoms or back pain. On exam, pt in NAD, AAOx3, head NC/AT, CN II-XII intact, lungs CTA B/L, CV S1S2 regular, abdomen soft/NT/ND/(+)BS, ext (-) edema, ambulating with steady gait. Labs repeated. Will admit pt for hypercalcemia, ASHLEY, dehydration.

## 2019-09-26 NOTE — ED PROVIDER NOTE - CARE PLAN
Principal Discharge DX:	Hypercalcemia  Secondary Diagnosis:	ASHLEY (acute kidney injury)  Secondary Diagnosis:	History of lymphoma

## 2019-09-26 NOTE — ED PROVIDER NOTE - PROGRESS NOTE DETAILS
Spoke with Dr. Jones. Recommend IV hydration, admission, and will see patient. Signed out to MAR. Will admit patient 2/2 hypercalcemia and ASHLEY. Given IVF. Hemodynamically stable.

## 2019-09-26 NOTE — ED PROVIDER NOTE - PHYSICAL EXAMINATION
PHYSICAL EXAM: I have reviewed current vital signs.  GENERAL: NAD, well-nourished; well-developed.  HEAD:  Normocephalic, atraumatic.  EYES: Conjunctiva and sclera clear.  ENT: MMM, no erythema/exudates.  NECK: Supple, full ROM.  CHEST/LUNG: Clear to auscultation bilaterally; no wheezes, rales, or rhonchi.  HEART: Regular rate and rhythm, normal S1 and S2; no murmurs, rubs, or gallops.  ABDOMEN: Soft, nontender, nondistended.  EXTREMITIES:  2+ peripheral pulses; FROM.  PSYCH: Cooperative, appropriate, normal mood and affect.  NEUROLOGY: A&O x 3. Motor 5/5. No focal neurological deficits.  SKIN: Warm and dry.

## 2019-09-26 NOTE — ED PROVIDER NOTE - NS ED ROS FT
Constitutional:  No fevers or chills.  Eyes:  No visual changes, eye pain, or discharge.  ENT:  No hearing changes. No sore throat.  Neck:  No neck pain or stiffness.  Cardiac:  No CP or edema.  Resp:  No cough or SOB.   GI:  No nausea, vomiting, diarrhea, or abdominal pain.  :  No dysuria, frequency, or hematuria.  MSK:  No myalgias or joint pain/swelling.  Neuro:  No headache or dizziness.  Skin:  No skin rash.

## 2019-09-26 NOTE — ED PROVIDER NOTE - OBJECTIVE STATEMENT
62yo M with PMH of B cell lymphoma (not on any current tx/chemo, oncologist- Dr. Granger), CKD, paranoid schizophrenia, pseudogout, and multiple kidney stones sent to ED by nephro Dr. Jones due to elevated Ca on outpatient labs (12.8) and ASHLEY (Cr 3.3, baseline around 2.5). Patient denies any f/c, CP, SOB, cough, abd pain, n/v/d, back pain,  complaints, urinary sxs, or injuries/traumas/falls. Nonsmoker, no alcohol/drug use.

## 2019-09-26 NOTE — ED PROVIDER NOTE - ATTENDING CONTRIBUTION TO CARE
63 y.o. male, PMH of B-cell lymphoma not on chemo- being followed by oncologist (Dr. Granger), CKD, sent by his nephrologist Dr. Jones for Ca of 12.8 and Cr of 3.3 (from baseline of 2.5). Pt denies any complains except for being tired. No CP/SOB, abdominal pain, n/v/c/d, urinary symptoms or back pain. On exam, pt in NAD, AAOx3, head NC/AT, CN II-XII intact, lungs CTA B/L, CV S1S2 regular, abdomen soft/NT/ND/(+)BS, ext (-) edema, ambulating with steady gait. Will repeat labs and reevaluate.

## 2019-09-26 NOTE — ED ADULT NURSE NOTE - OBJECTIVE STATEMENT
pt presents to ed with c/o abnormal labs. increased calcium and creatine. pt sent in by Dr. Jones for evaluation. Patient denies any f/c, CP, SOB, cough, abd pain, n/v/d, back pain,  complaints, urinary sxs, or injuries/traumas/falls.

## 2019-09-27 DIAGNOSIS — Z96.0 PRESENCE OF UROGENITAL IMPLANTS: Chronic | ICD-10-CM

## 2019-09-27 LAB
24R-OH-CALCIDIOL SERPL-MCNC: 32 NG/ML — SIGNIFICANT CHANGE UP (ref 30–80)
ANION GAP SERPL CALC-SCNC: 8 MMOL/L — SIGNIFICANT CHANGE UP (ref 7–14)
ANION GAP SERPL CALC-SCNC: 9 MMOL/L — SIGNIFICANT CHANGE UP (ref 7–14)
BUN SERPL-MCNC: 40 MG/DL — HIGH (ref 10–20)
BUN SERPL-MCNC: 45 MG/DL — HIGH (ref 10–20)
CALCIUM SERPL-MCNC: 11 MG/DL — HIGH (ref 8.5–10.1)
CALCIUM SERPL-MCNC: 11.2 MG/DL — HIGH (ref 8.5–10.1)
CALCIUM UR-MCNC: 21 MG/DL — SIGNIFICANT CHANGE UP
CHLORIDE SERPL-SCNC: 111 MMOL/L — HIGH (ref 98–110)
CHLORIDE SERPL-SCNC: 115 MMOL/L — HIGH (ref 98–110)
CO2 SERPL-SCNC: 18 MMOL/L — SIGNIFICANT CHANGE UP (ref 17–32)
CO2 SERPL-SCNC: 20 MMOL/L — SIGNIFICANT CHANGE UP (ref 17–32)
CREAT ?TM UR-MCNC: 66 MG/DL — SIGNIFICANT CHANGE UP
CREAT SERPL-MCNC: 3.2 MG/DL — HIGH (ref 0.7–1.5)
CREAT SERPL-MCNC: 3.2 MG/DL — HIGH (ref 0.7–1.5)
CREATININE, URINE RESULT: 65 MG/DL — SIGNIFICANT CHANGE UP
GLUCOSE SERPL-MCNC: 117 MG/DL — HIGH (ref 70–99)
GLUCOSE SERPL-MCNC: 117 MG/DL — HIGH (ref 70–99)
HCT VFR BLD CALC: 31 % — LOW (ref 42–52)
HGB BLD-MCNC: 9.8 G/DL — LOW (ref 14–18)
MAGNESIUM SERPL-MCNC: 2.3 MG/DL — SIGNIFICANT CHANGE UP (ref 1.8–2.4)
MCHC RBC-ENTMCNC: 27.8 PG — SIGNIFICANT CHANGE UP (ref 27–31)
MCHC RBC-ENTMCNC: 31.6 G/DL — LOW (ref 32–37)
MCV RBC AUTO: 87.8 FL — SIGNIFICANT CHANGE UP (ref 80–94)
NRBC # BLD: 0 /100 WBCS — SIGNIFICANT CHANGE UP (ref 0–0)
OSMOLALITY UR: 414 MOS/KG — SIGNIFICANT CHANGE UP (ref 50–1400)
PH UR STRIP.AUTO: 5.5 — SIGNIFICANT CHANGE UP (ref 5–8)
PHOSPHATE SERPL-MCNC: 4.5 MG/DL — SIGNIFICANT CHANGE UP (ref 2.1–4.9)
PLATELET # BLD AUTO: 180 K/UL — SIGNIFICANT CHANGE UP (ref 130–400)
POTASSIUM SERPL-MCNC: 4.1 MMOL/L — SIGNIFICANT CHANGE UP (ref 3.5–5)
POTASSIUM SERPL-MCNC: 4.1 MMOL/L — SIGNIFICANT CHANGE UP (ref 3.5–5)
POTASSIUM SERPL-SCNC: 4.1 MMOL/L — SIGNIFICANT CHANGE UP (ref 3.5–5)
POTASSIUM SERPL-SCNC: 4.1 MMOL/L — SIGNIFICANT CHANGE UP (ref 3.5–5)
POTASSIUM UR-SCNC: 16 MMOL/L — SIGNIFICANT CHANGE UP
PROT ?TM UR-MCNC: 37 MG/DLG/24H — SIGNIFICANT CHANGE UP
PROT ?TM UR-MCNC: 44 MG/DL — HIGH (ref 0–12)
PROT SERPL-MCNC: 5.2 G/DL — LOW (ref 6–8.3)
PROT SERPL-MCNC: 5.2 G/DL — LOW (ref 6–8.3)
PROT/CREAT UR-RTO: 0.6 RATIO — HIGH (ref 0–0.2)
RBC # BLD: 3.53 M/UL — LOW (ref 4.7–6.1)
RBC # FLD: 15 % — HIGH (ref 11.5–14.5)
SODIUM SERPL-SCNC: 139 MMOL/L — SIGNIFICANT CHANGE UP (ref 135–146)
SODIUM SERPL-SCNC: 142 MMOL/L — SIGNIFICANT CHANGE UP (ref 135–146)
SODIUM UR-SCNC: 106 MMOL/L — SIGNIFICANT CHANGE UP
WBC # BLD: 7.99 K/UL — SIGNIFICANT CHANGE UP (ref 4.8–10.8)
WBC # FLD AUTO: 7.99 K/UL — SIGNIFICANT CHANGE UP (ref 4.8–10.8)

## 2019-09-27 PROCEDURE — 76775 US EXAM ABDO BACK WALL LIM: CPT | Mod: 26

## 2019-09-27 PROCEDURE — 93010 ELECTROCARDIOGRAM REPORT: CPT

## 2019-09-27 PROCEDURE — 99223 1ST HOSP IP/OBS HIGH 75: CPT

## 2019-09-27 PROCEDURE — 76536 US EXAM OF HEAD AND NECK: CPT | Mod: 26

## 2019-09-27 PROCEDURE — 74176 CT ABD & PELVIS W/O CONTRAST: CPT | Mod: 26

## 2019-09-27 RX ORDER — CHLORHEXIDINE GLUCONATE 213 G/1000ML
1 SOLUTION TOPICAL
Refills: 0 | Status: DISCONTINUED | OUTPATIENT
Start: 2019-09-27 | End: 2019-09-30

## 2019-09-27 RX ORDER — IOHEXOL 300 MG/ML
30 INJECTION, SOLUTION INTRAVENOUS ONCE
Refills: 0 | Status: COMPLETED | OUTPATIENT
Start: 2019-09-27 | End: 2019-09-27

## 2019-09-27 RX ORDER — FLUPHENAZINE HYDROCHLORIDE 1 MG/1
10 TABLET, FILM COATED ORAL DAILY
Refills: 0 | Status: DISCONTINUED | OUTPATIENT
Start: 2019-09-27 | End: 2019-09-30

## 2019-09-27 RX ORDER — HEPARIN SODIUM 5000 [USP'U]/ML
5000 INJECTION INTRAVENOUS; SUBCUTANEOUS EVERY 8 HOURS
Refills: 0 | Status: DISCONTINUED | OUTPATIENT
Start: 2019-09-27 | End: 2019-09-30

## 2019-09-27 RX ORDER — INFLUENZA VIRUS VACCINE 15; 15; 15; 15 UG/.5ML; UG/.5ML; UG/.5ML; UG/.5ML
0.5 SUSPENSION INTRAMUSCULAR ONCE
Refills: 0 | Status: COMPLETED | OUTPATIENT
Start: 2019-09-27 | End: 2019-09-27

## 2019-09-27 RX ORDER — SODIUM CHLORIDE 9 MG/ML
1000 INJECTION INTRAMUSCULAR; INTRAVENOUS; SUBCUTANEOUS
Refills: 0 | Status: DISCONTINUED | OUTPATIENT
Start: 2019-09-27 | End: 2019-09-28

## 2019-09-27 RX ORDER — PAMIDRONATE DISODIUM 9 MG/ML
60 INJECTION, SOLUTION INTRAVENOUS ONCE
Refills: 0 | Status: COMPLETED | OUTPATIENT
Start: 2019-09-27 | End: 2019-09-27

## 2019-09-27 RX ORDER — BENZTROPINE MESYLATE 1 MG
2 TABLET ORAL DAILY
Refills: 0 | Status: DISCONTINUED | OUTPATIENT
Start: 2019-09-27 | End: 2019-09-30

## 2019-09-27 RX ADMIN — Medication 2 MILLIGRAM(S): at 17:18

## 2019-09-27 RX ADMIN — PAMIDRONATE DISODIUM 128.34 MILLIGRAM(S): 9 INJECTION, SOLUTION INTRAVENOUS at 15:11

## 2019-09-27 RX ADMIN — SODIUM CHLORIDE 200 MILLILITER(S): 9 INJECTION INTRAMUSCULAR; INTRAVENOUS; SUBCUTANEOUS at 08:53

## 2019-09-27 RX ADMIN — HEPARIN SODIUM 5000 UNIT(S): 5000 INJECTION INTRAVENOUS; SUBCUTANEOUS at 13:38

## 2019-09-27 RX ADMIN — SODIUM CHLORIDE 200 MILLILITER(S): 9 INJECTION INTRAMUSCULAR; INTRAVENOUS; SUBCUTANEOUS at 13:37

## 2019-09-27 RX ADMIN — FLUPHENAZINE HYDROCHLORIDE 10 MILLIGRAM(S): 1 TABLET, FILM COATED ORAL at 17:18

## 2019-09-27 RX ADMIN — IOHEXOL 30 MILLILITER(S): 300 INJECTION, SOLUTION INTRAVENOUS at 17:19

## 2019-09-27 RX ADMIN — HEPARIN SODIUM 5000 UNIT(S): 5000 INJECTION INTRAVENOUS; SUBCUTANEOUS at 06:50

## 2019-09-27 RX ADMIN — SODIUM CHLORIDE 200 MILLILITER(S): 9 INJECTION INTRAMUSCULAR; INTRAVENOUS; SUBCUTANEOUS at 04:43

## 2019-09-27 RX ADMIN — HEPARIN SODIUM 5000 UNIT(S): 5000 INJECTION INTRAVENOUS; SUBCUTANEOUS at 21:17

## 2019-09-27 NOTE — H&P ADULT - NSHPPHYSICALEXAM_GEN_ALL_CORE
GENERAL: NAD, lying in bed comfortably  CHEST/LUNG: Clear to auscultation bilaterally; No rales, rhonchi, wheezing, or rubs. Unlabored respirations  HEART: Regular rate and rhythm; No murmurs, rubs, or gallops  ABDOMEN: Bowel sounds present; Soft, Nontender, Nondistended. No hepatomegaly  EXTREMITIES:  2+ Peripheral Pulses, brisk capillary refill. No clubbing, cyanosis, or edema  NERVOUS SYSTEM:  Alert & Oriented X3, speech clear. No deficits   MSK: FROM all 4 extremities, full and equal strength

## 2019-09-27 NOTE — H&P ADULT - NSHPLABSRESULTS_GEN_ALL_CORE
Complete Blood Count + Automated Diff (09.26.19 @ 21:00)    WBC Count: 8.27 K/uL    RBC Count: 3.83 M/uL    Hemoglobin: 10.8 g/dL    Hematocrit: 32.9 %    Mean Cell Volume: 85.9 fL    Mean Cell Hemoglobin: 28.2 pg    Mean Cell Hemoglobin Conc: 32.8 g/dL    Red Cell Distrib Width: 14.8 %    Platelet Count - Automated: 212 K/uL    Auto Neutrophil #: 4.57 K/uL    Auto Lymphocyte #: 1.05 K/uL    Auto Monocyte #: 1.01 K/uL    Auto Eosinophil #: 1.57 K/uL    Auto Basophil #: 0.04 K/uL    Auto Neutrophil %: 55.2: Differential percentages must be correlated with absolute numbers for  clinical significance. %    Auto Lymphocyte %: 12.7 %    Auto Monocyte %: 12.2 %    Auto Eosinophil %: 19.0 %    Auto Basophil %: 0.5 %    Auto Immature Granulocyte %: 0.4 %    Nucleated RBC: 0 /100 WBCs    Comprehensive Metabolic Panel (09.26.19 @ 21:00)    Sodium, Serum: 145 mmol/L    Potassium, Serum: 5.2: Hemolyzed. Interpret with caution mmol/L    Chloride, Serum: 115 mmol/L    Carbon Dioxide, Serum: 18 mmol/L    Anion Gap, Serum: 12 mmol/L    Blood Urea Nitrogen, Serum: 47 mg/dL    Creatinine, Serum: 3.4 mg/dL    Glucose, Serum: 105 mg/dL    Calcium, Total Serum: 12.4 mg/dL    Protein Total, Serum: 6.3 g/dL    Albumin, Serum: 4.0 g/dL    Bilirubin Total, Serum: 0.3 mg/dL    Alkaline Phosphatase, Serum: 146: Hemolyzed. Interpret with caution U/L    Aspartate Aminotransferase (AST/SGOT): 26: Hemolyzed. Interpret with caution U/L    Alanine Aminotransferase (ALT/SGPT): 11: Hemolyzed. Interpret with caution U/L    eGFR if Non : 18: Interpretative comment  The units for eGFR are mL/min/1.73M2 (normalized body surface area). The  eGFR is calculated from a serum creatinine using the CKD-EPI equation.  Other variables required for calculation are race, age and sex. Among  patients with chronic kidney disease (CKD), the eGFR is useful in  determining the stage of disease according to KDOQI CKD classification.  All eGFR results are reported numerically with the following  interpretation.          GFR                    With                 Without     (ml/min/1.73 m2)    Kidney Damage       Kidney Damage        >= 90                    Stage 1                     Normal        60-89                    Stage 2                     Decreased GFR        30-59     Stage 3                     Stage 3        15-29                    Stage 4                     Stage 4        < 15                      Stage 5                     Stage 5  Each stage of CKD assumes that the associated GFR level has been in  effect for at least 3 months. Determination of stages one and two (with  eGFR > 59 ml/min/m2) requires estimation of kidney damage for at least 3  months as defined by structural or functional abnormalities.  Limitations: All estimates of GFR will be less accurate for patients at  extremes of muscle mass (including but not limited to frail elderly,  critically ill, or cancer patients), those with unusual diets, and those  with conditions associated with reduced secretion or extrarenal  elimination of creatinine. The eGFR equation is not recommended for use  in patients with unstable creatinine levels. mL/min/1.73M2    eGFR if African American: 21 mL/min/1.73M2    Urine Microscopic-Add On (NC) (09.26.19 @ 21:00)    Red Blood Cell - Urine: 2 /HPF    White Blood Cell - Urine: 114 /HPF    Hyaline Casts: 2 /LPF    Bacteria: Many    Epithelial Cells: 0 /HPF

## 2019-09-27 NOTE — CONSULT NOTE ADULT - ASSESSMENT
62 yo M with PMH of B cell lymphoma (not on any current treatment, oncologist- Dr. Granger), CKD, paranoid schizophrenia and history of rt urethral stent with multiple kidney stones sent to ED by nephro Dr. Jones due to elevated Ca on outpatient labs (12.8) and ASHLEY.    #Acute hypercalcemia  -Ca 12.8 outpt; On admission 12.4-->11.0; Albumin 4.0  -DDx include primary hyperparathyroidism, hypercalcemia of malignancy, tertiary hyperparathyroidism due to CKD, Vit D intoxication, medication induced.  -C/w IVF NS @200cc/hr 62 yo M with PMH of B cell lymphoma (not on any current treatment, oncologist- Dr. Granger), CKD, paranoid schizophrenia and history of rt urethral stent with multiple kidney stones sent to ED by nephro Dr. Jones due to elevated Ca on outpatient labs (12.8) and ASHLEY.    *ATTENDING RECS TO FOLLOW**    #Acute moderate hypercalcemia with hx of marginal zone lymphoma Stage IV  -Ca 12.8 outpt; On admission 12.4-->11.0; Albumin 4.0; Baseline Ca around 8.7  -DDx include primary hyperparathyroidism, hypercalcemia of malignancy (could be related to PTHrP vs osteolytic mets vs increased calcitriol given hx of lymphoma) , tertiary hyperparathyroidism due to CKD, Vit D intoxication, medication induced.  C/w volume expansion with isotonic saline at initial rate of 200-300cc/hr then adjusted to maintain urine output of 100-150cc/hr  -Give 1 dose of IV pamidronate 60mg over 2 hours(Would prefer ZA but not available)  -If Ca levels continue to trend up, start calcitonin.   -Monitor lytes and kidney function daily and Ca levels q6hrly  -Check PTH, PTHrP, Urine Ca, phosphorus, 1,25 hydroxy vit D, 25 hydroxy Vit D, TSH  -Can consider US parathyroid +thyroid  -Consider CT scan A/P w/o contrast. Will need outpatient PET scan as last imaging ( CT A/P) was in 2017  -Avoid Ca containing food and Vit D supplements 64 yo M with PMH of B cell lymphoma (not on any current treatment, oncologist- Dr. Granger), CKD, paranoid schizophrenia and history of rt urethral stent with multiple kidney stones sent to ED by nephro Dr. Jones due to elevated Ca on outpatient labs (12.8) and ASHLEY.    *ATTENDING RECS TO FOLLOW**    #Acute symptomatic moderate hypercalcemia with hx of marginal zone lymphoma Stage IV  -Ca 12.8 outpt; On admission 12.4-->11.0; Albumin 4.0; Baseline Ca around 8.7  -DDx include primary hyperparathyroidism, hypercalcemia of malignancy (could be related to PTHrP vs osteolytic mets vs increased calcitriol given hx of lymphoma) , tertiary hyperparathyroidism due to CKD, Vit D intoxication, medication induced.  C/w volume expansion with isotonic saline at initial rate of 200-300cc/hr then adjusted to maintain urine output of 100-150cc/hr  -Give 1 dose of IV pamidronate 60mg over 2 hours(Would prefer ZA but not available)  -If Ca levels continue to trend up, start calcitonin.   -Monitor lytes and kidney function daily and Ca levels q6hrly  -Check PTH, PTHrP, Urine Ca, phosphorus, 1,25 hydroxy vit D, 25 hydroxy Vit D, TSH (As per the pt, 2 weeks ago , PTH was normal checked outpt -obtain records)  -Can consider US parathyroid +thyroid (Pt had outpt imaging- please obtain results)  -Consider CT scan A/P w/o contrast. Will need outpatient PET scan as last imaging ( CT A/P) was in 2017  -Avoid Ca containing food and Vit D supplements 64 yo M with PMH of B cell lymphoma (not on any current treatment, oncologist- Dr. Granger), CKD, paranoid schizophrenia and history of rt urethral stent with multiple kidney stones sent to ED by nephro Dr. Jones due to elevated Ca on outpatient labs (12.8) and ASHLEY.    #Chronic asymptomatic moderate hypercalcemia with hx of marginal zone lymphoma Stage IV  -Ca 12.8 outpt; On admission 12.4-->11.0; Ca in 7/8/2019 was 11.7 with albumin of 3.9; Baseline Ca around 8.7  -DDx include primary hyperparathyroidism, hypercalcemia of malignancy (could be related to PTHrP vs osteolytic mets vs increased calcitriol given hx of lymphoma) , tertiary hyperparathyroidism due to CKD, Vit D intoxication, medication induced.  C/w volume expansion with isotonic saline at initial rate of 200-300cc/hr then adjusted to maintain urine output of 100-150cc/hr  -If Ca does not downtrend, can give 1 dose of IV pamidronate (Dose renally- can do 30-45mg IV pamidronate over 4 hours)  -Check PTH, PTHrP, Urine Ca, phosphorus, 1,25 hydroxy vit D, 25 hydroxy Vit D, TSH (As per the pt, 2 weeks ago , PTH was normal checked outpt -obtain records)  -Check CT scan Chest/Abdomen/Pelvis w/o contrast  -Pt had outpatient US thyroid+ parathyroid 2 weeks ago- please obtain results.   -Avoid Ca containing food and Vit D supplements  -If no obvious cause of hypercalcemia found, would treat for lymphoma with 4 weekly doses of Rituximab. Will need to check Hep B, C and HIV prior to starting treatment. 62 yo M with PMH of B cell lymphoma (not on any current treatment, oncologist- Dr. Granger), CKD, paranoid schizophrenia and history of rt urethral stent with multiple kidney stones sent to ED by nephro Dr. Jones due to elevated Ca on outpatient labs (12.8) and ASHLEY.    #Chronic asymptomatic moderate hypercalcemia with hx of marginal zone lymphoma Stage IV  -Ca 12.8 outpt; On admission 12.4-->11.0; Ca in 7/8/2019 was 11.7 with albumin of 3.9; Baseline Ca around 8.7  -DDx include primary hyperparathyroidism, hypercalcemia of malignancy (could be related to PTHrP vs osteolytic mets vs increased calcitriol given hx of lymphoma) , tertiary hyperparathyroidism due to CKD, Vit D intoxication, medication induced.  C/w volume expansion with isotonic saline at initial rate of 200-300cc/hr then adjusted to maintain urine output of 100-150cc/hr  -If Ca does not downtrend, can give 1 dose of IV pamidronate (Dose renally- can do 30-45mg IV pamidronate over 4 hours)  -Check PTH, PTHrP, Urine Ca, phosphorus, 1,25 hydroxy vit D, 25 hydroxy Vit D, TSH (As per the pt, 2 weeks ago , PTH was normal checked outpt -obtain records)  -Check CT scan Chest/Abdomen/Pelvis w/o contrast  -Pt had outpatient US thyroid+ parathyroid 2 weeks ago- please obtain results.   -Avoid Ca containing food and Vit D supplements  -If no obvious cause of hypercalcemia found, would treat for lymphoma with 4 weekly doses of Rituximab. Will need to check Hep B, C and HIV prior to starting treatment.     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   -Monitor CBC daily.

## 2019-09-27 NOTE — H&P ADULT - ASSESSMENT
64 yo M with PMH of B cell lymphoma (not on any current treatment, oncologist- Dr. Granger), CKD, paranoid schizophrenia and history of rt urethral stent with multiple kidney stones sent to ED by nephro Dr. Jones due to elevated Ca on outpatient labs (12.8) and ASHLEY     # Acute mild hypercalcemia:    # ASHLEY on CKD:  - Cr 3.3, baseline around 2.5  - c/w with NS at 100 cc / hr 62 yo M with PMH of B cell lymphoma (not on any current treatment, oncologist- Dr. Granger), CKD, paranoid schizophrenia and history of rt urethral stent with multiple kidney stones sent to ED by nephro Dr. Jones due to elevated Ca on outpatient labs (12.8) and ASHLEY     # Acute hypercalcemia most likely secondary to malignancy:   - total calcium is 12.4 with albumin of 4  - nephrology (Dr. Jones) and hem/onc consult (Dr. Granger)   - start NS at 200 cc / hr  - insert [place] with target urine output 100-150 cc / hr  - will get PTH, PTHrP, Vit D, TSH    # ASHLEY on CKD:  - Cr 3.3, baseline around 2.5  - c/w with NS at 100 cc / hr   - will get urine studies, and renal US     # Asymptomatic bacteuria - no need for ABx  # Hyperkalemia 5.2: - sample is hemolyzed , repeat in the AM     # DVT ppx: heparin SC  # GI ppx: not indicated  # Regular diet  # from home 62 yo M with PMH of B cell lymphoma (not on any current treatment, oncologist- Dr. Granger), CKD, paranoid schizophrenia and history of rt urethral stent with multiple kidney stones sent to ED by nephro Dr. Jones due to elevated Ca on outpatient labs (12.8) and ASHLEY     # Acute hypercalcemia most likely secondary to malignancy:   - total calcium is 12.4 with albumin of 4  - nephrology (Dr. Jones) and hem/onc consult (Dr. Granger)   - start NS at 200 cc / hr  - insert Park with target urine output 100-150 cc / hr  - will get PTH, PTHrP, Vit D, TSH, Po4, serum and urine electrophoresis     # ASHLEY on CKD:  - Cr 3.3, baseline around 2.5  - c/w with NS at 100 cc / hr   - will get urine studies, and renal US     # Schizophrenia: - c/w home meds   # Asymptomatic bacteuria - no need for ABx  # Hyperkalemia 5.2: - sample is hemolyzed , repeat in the AM     # DVT ppx: heparin SC  # GI ppx: not indicated  # Regular diet  # from home

## 2019-09-27 NOTE — CONSULT NOTE ADULT - ATTENDING COMMENTS
#Chronic Hypercalcemia his calcium was 11.8 in July  -Check PTH, PTHrP, Urine Ca, phosphorus, 1,25 hydroxy vit D, 25 hydroxy Vit D, TSH   - may be due to his marginal zone lymphoma but other cause need to be excluded  -C/w IV fluids calcium is coming down. Would hold off Pamidronate for now unless calcium goes up  -if all cause of hypercalcemia are excluded than we can consider treatment for his Lymphoma    #Stage IV marginal zone lymphoma on observation  -would check repeat CT C/A/P  oral contrast only if progression and since has high calcium would consider treatment  -check LDH was normal in July  -no peripheral adenopathy on exam  -may need outpatient PET/CT  depending on LDH and CT results   -treatment may be as outpatient depending on calcium and CT scans  -would check HIV, Hep C and B serology    #Anemia is chronic and stable possible due to CKD and or lymphoma    Dispo: Depending on calcium levels and CT results

## 2019-09-27 NOTE — CONSULT NOTE ADULT - SUBJECTIVE AND OBJECTIVE BOX
Patient is a 63y old  Male who presents with a chief complaint of hypercalcemia / ASHLEY (27 Sep 2019 11:27)      HPI:  62 yo M with PMH of B cell lymphoma (not on any current treatment, oncologist- Dr. Granger), CKD, paranoid schizophrenia and history of rt urethral stent with multiple kidney stones sent to ED by nephro Dr. Jones due to elevated Ca on outpatient labs (12.8) and ASHLEY (Cr 3.3, baseline around 2.5). pt reports he has been feeling fatigued and endorses intentional weight loss of 10 lbs over the past month. he denies fever, chills, SOB, chest pain, palpitations, constipation abdominal pain or change in bowel and urinary bowels.     in the ED, pt is hemodynamically stable.   initial blood work with calcium of 12.4 and creatinine of 3.4  pt received 2 L of LR (27 Sep 2019 00:22)    ONCOLOGY HISTORY:        PAST MEDICAL & SURGICAL HISTORY:  Lymphoma  Schizophrenia  Kidney stones  Retained urethral stent    SOCIAL HISTORY:    FAMILY HISTORY:    Allergies  No Known Allergies    ROS:  Negative except for:    Height (cm): 182.9 (19 @ 05:42)  Weight (kg): 96.9 (19 @ 05:42)  BMI (kg/m2): 29 (19 @ 05:42)  BSA (m2): 2.19 (19 @ 05:42)      HOME MEDICATIONS:  benztropine 2 mg oral tablet: 1 tab(s) orally once a day (at bedtime) (27 Sep 2019 00:54)  fluPHENAZine 10 mg oral tablet: 1 tab(s) orally once a day (27 Sep 2019 00:54)    MEDICATIONS  (STANDING):  benztropine 2 milliGRAM(s) Oral daily  chlorhexidine 4% Liquid 1 Application(s) Topical <User Schedule>  fluPHENAZine 10 milliGRAM(s) Oral daily  heparin  Injectable 5000 Unit(s) SubCutaneous every 8 hours  influenza   Vaccine 0.5 milliLiter(s) IntraMuscular once  sodium chloride 0.9%. 1000 milliLiter(s) (200 mL/Hr) IV Continuous <Continuous>      Vital Signs Last 24 Hrs  T(C): 36.4 (27 Sep 2019 05:42), Max: 36.4 (27 Sep 2019 05:42)  T(F): 97.5 (27 Sep 2019 05:42), Max: 97.5 (27 Sep 2019 05:42)  HR: 66 (27 Sep 2019 05:42) (66 - 88)  BP: 104/59 (27 Sep 2019 05:42) (104/59 - 113/64)  BP(mean): --  RR: 18 (27 Sep 2019 05:42) (18 - 18)  SpO2: 98% (27 Sep 2019 11:37) (98% - 100%)    PHYSICAL EXAM  General: adult in NAD  HEENT: clear oropharynx, anicteric sclera, pink conjunctiva  Neck: supple  CV: normal S1/S2 with no murmur rubs or gallops  Lungs: positive air movement b/l ant lungs,clear to auscultation, no wheezes, no rales  Abdomen: soft non-tender non-distended, no hepatosplenomegaly  Ext: no clubbing cyanosis or edema  Skin: no rashes and no petechiae  Neuro: alert and oriented X 4, no focal deficits      LABS:                          9.8    7.99  )-----------( 180      ( 27 Sep 2019 08:35 )             31.0     Mean Cell Volume : 87.8 fL  Mean Cell Hemoglobin : 27.8 pg  Mean Cell Hemoglobin Concentration : 31.6 g/dL  Auto Neutrophil # : x  Auto Lymphocyte # : x  Auto Monocyte # : x  Auto Eosinophil # : x  Auto Basophil # : x  Auto Neutrophil % : x  Auto Lymphocyte % : x  Auto Monocyte % : x  Auto Eosinophil % : x  Auto Basophil % : x      Serial CBC's   @ 08:35  Hct-31.0 / Hgb-9.8 / Plat-180 / RBC-3.53 / WBC-7.99  Serial CBC's   @ 21:00  Hct-32.9 / Hgb-10.8 / Plat-212 / RBC-3.83 / WBC-8.27        142  |  115<H>  |  45<H>  ----------------------------<  117<H>  4.1   |  18  |  3.2<H>    Ca    11.0<H>      27 Sep 2019 08:35  Phos  4.5       Mg     2.3         TPro  6.3  /  Alb  4.0  /  TBili  0.3  /  DBili  x   /  AST  26  /  ALT  11  /  AlkPhos  146<H>      Urinalysis Basic - ( 26 Sep 2019 21:00 )  Color: Light Yellow / Appearance: Clear / S.015 / pH: x  Gluc: x / Ketone: Negative  / Bili: Negative / Urobili: <2 mg/dL   Blood: x / Protein: 30 mg/dL / Nitrite: Positive   Leuk Esterase: Large / RBC: 2 /HPF /  /HPF   Sq Epi: x / Non Sq Epi: 0 /HPF / Bacteria: Many    Calcium, Random Urine: 21      RADIOLOGY & ADDITIONAL STUDIES:  < from: Xray Dexa Axial Study (19 @ 08:27) >  Impression: The patient has low bone mass, with the lowest   measured bone density in the Left Femoral Neck. The patient has   an estimated ten-year risk of hip fracture of 0.6% and an   estimated ten-year risk of major fracture of 5.7%, based on the   WHO FRAX algorithm. Patient is a 63y old  Male who presents with a chief complaint of hypercalcemia / ASHLEY (27 Sep 2019 11:27)      HPI:  62 yo M with PMH of B cell lymphoma (not on any current treatment, oncologist- Dr. Granger), CKD, paranoid schizophrenia and history of rt urethral stent with multiple kidney stones sent to ED by nephro Dr. Jones due to elevated Ca on outpatient labs (12.8) and ASHLEY (Cr 3.3, baseline around 2.5). pt reports he has been feeling fatigued and endorses intentional weight loss of 10 lbs over the past month. he denies fever, chills, SOB, chest pain, palpitations, constipation abdominal pain or change in bowel and urinary bowels.     In the ED, pt is hemodynamically stable.   initial blood work with calcium of 12.4 and creatinine of 3.4  pt received 2 L of LR (27 Sep 2019 00:22)    ONCOLOGY HISTORY: Pt has known history of Marginal zone lymphoma (AJCC stage IV) s/p right axillary lymph node biopsy and left chest lesion and BM biopsy also positive in the past, with last radiological evaluation done in .   Last CT abdomen pelvis wo cont was done in 2017 which showed enlarged spleen at 18cm with prominent mesenteric and retroperitoneal lymph nodes. Also seen stable left atrophic kidney and right nephrostomy tube placement.   No repeat imaging available.     PAST MEDICAL & SURGICAL HISTORY:  Lymphoma  Schizophrenia  Kidney stones  Retained urethral stent    SOCIAL HISTORY: No smoking, alcohol or IVDU history    FAMILY HISTORY: No significant family hx.    Allergies  No Known Allergies    ROS:  Negative except for:    Height (cm): 182.9 (19 @ 05:42)  Weight (kg): 96.9 (19 @ 05:42)  BMI (kg/m2): 29 (19 @ 05:42)  BSA (m2): 2.19 (19 @ 05:42)      HOME MEDICATIONS:  benztropine 2 mg oral tablet: 1 tab(s) orally once a day (at bedtime) (27 Sep 2019 00:54)  fluPHENAZine 10 mg oral tablet: 1 tab(s) orally once a day (27 Sep 2019 00:54)    MEDICATIONS  (STANDING):  benztropine 2 milliGRAM(s) Oral daily  chlorhexidine 4% Liquid 1 Application(s) Topical <User Schedule>  fluPHENAZine 10 milliGRAM(s) Oral daily  heparin  Injectable 5000 Unit(s) SubCutaneous every 8 hours  influenza   Vaccine 0.5 milliLiter(s) IntraMuscular once  sodium chloride 0.9%. 1000 milliLiter(s) (200 mL/Hr) IV Continuous <Continuous>      Vital Signs Last 24 Hrs  T(C): 36.4 (27 Sep 2019 05:42), Max: 36.4 (27 Sep 2019 05:42)  T(F): 97.5 (27 Sep 2019 05:42), Max: 97.5 (27 Sep 2019 05:42)  HR: 66 (27 Sep 2019 05:42) (66 - 88)  BP: 104/59 (27 Sep 2019 05:42) (104/59 - 113/64)  BP(mean): --  RR: 18 (27 Sep 2019 05:42) (18 - 18)  SpO2: 98% (27 Sep 2019 11:37) (98% - 100%)    PHYSICAL EXAM  General: adult in NAD  HEENT: clear oropharynx, anicteric sclera, pink conjunctiva  Neck: supple  CV: normal S1/S2 with no murmur rubs or gallops  Lungs: positive air movement b/l ant lungs,clear to auscultation, no wheezes, no rales  Abdomen: soft non-tender non-distended, no hepatosplenomegaly  Ext: no clubbing cyanosis or edema  Skin: no rashes and no petechiae  Neuro: alert and oriented X 4, no focal deficits      LABS:                          9.8    7.99  )-----------( 180      ( 27 Sep 2019 08:35 )             31.0     Mean Cell Volume : 87.8 fL  Mean Cell Hemoglobin : 27.8 pg  Mean Cell Hemoglobin Concentration : 31.6 g/dL  Auto Neutrophil # : x  Auto Lymphocyte # : x  Auto Monocyte # : x  Auto Eosinophil # : x  Auto Basophil # : x  Auto Neutrophil % : x  Auto Lymphocyte % : x  Auto Monocyte % : x  Auto Eosinophil % : x  Auto Basophil % : x      Serial CBC's   @ 08:35  Hct-31.0 / Hgb-9.8 / Plat-180 / RBC-3.53 / WBC-7.99  Serial CBC's   @ 21:00  Hct-32.9 / Hgb-10.8 / Plat-212 / RBC-3.83 / WBC-8.27        142  |  115<H>  |  45<H>  ----------------------------<  117<H>  4.1   |  18  |  3.2<H>    Ca    11.0<H>      27 Sep 2019 08:35  Phos  4.5       Mg     2.3         TPro  6.3  /  Alb  4.0  /  TBili  0.3  /  DBili  x   /  AST  26  /  ALT  11  /  AlkPhos  146<H>      Urinalysis Basic - ( 26 Sep 2019 21:00 )  Color: Light Yellow / Appearance: Clear / S.015 / pH: x  Gluc: x / Ketone: Negative  / Bili: Negative / Urobili: <2 mg/dL   Blood: x / Protein: 30 mg/dL / Nitrite: Positive   Leuk Esterase: Large / RBC: 2 /HPF /  /HPF   Sq Epi: x / Non Sq Epi: 0 /HPF / Bacteria: Many    Calcium, Random Urine: 21      RADIOLOGY & ADDITIONAL STUDIES:  < from: Xray Dexa Axial Study (19 @ 08:27) >  Impression: The patient has low bone mass, with the lowest   measured bone density in the Left Femoral Neck. The patient has   an estimated ten-year risk of hip fracture of 0.6% and an   estimated ten-year risk of major fracture of 5.7%, based on the   WHO FRAX algorithm. Patient is a 63y old  Male who presents with a chief complaint of hypercalcemia / ASHLEY (27 Sep 2019 11:27)      HPI:  62 yo M with PMH of B cell lymphoma (not on any current treatment, oncologist- Dr. Granger), CKD, paranoid schizophrenia and history of rt urethral stent with multiple kidney stones sent to ED by nephro Dr. Jones due to elevated Ca on outpatient labs (12.8) and ASHLEY (Cr 3.3, baseline around 2.5). pt reports he has been feeling fatigued and endorses intentional weight loss of 10 lbs over the past month. he denies fever, chills, SOB, chest pain, palpitations, constipation abdominal pain or change in bowel and urinary bowels.     In the ED, pt is hemodynamically stable.   initial blood work with calcium of 12.4 and creatinine of 3.4  pt received 2 L of LR (27 Sep 2019 00:22)    ONCOLOGY HISTORY: Pt has known history of Marginal zone lymphoma (AJCC stage IV) s/p right axillary lymph node biopsy and left chest lesion and BM biopsy also positive in the past, with last radiological evaluation done in .   Last CT abdomen pelvis wo cont was done in 2017 which showed enlarged spleen at 18cm with prominent mesenteric and retroperitoneal lymph nodes. Also seen stable left atrophic kidney and right nephrostomy tube placement.   No repeat imaging available.     PAST MEDICAL & SURGICAL HISTORY:  Lymphoma  Schizophrenia  Kidney stones  Retained urethral stent    SOCIAL HISTORY: No smoking, alcohol or IVDU history    FAMILY HISTORY: No significant family hx.    Allergies  No Known Allergies    ROS:  Polyuria, polydipsia [ ]  Chronic renal insufficiency [x]  Nephrolithiasis [x]  Anorexia, nausea, vomiting [ ]  Muscle weakness [ ]  Bone pain [ ]  Decreased concentration [ ]  Confusion [ ]  Fatigue [ ]  Shortening of QT interval [ ]  Bradycardia [ ]  HTN [ ]    Height (cm): 182.9 (19 @ 05:42)  Weight (kg): 96.9 (19 @ 05:42)  BMI (kg/m2): 29 (19 @ 05:42)  BSA (m2): 2.19 (19 @ 05:42)      HOME MEDICATIONS:  benztropine 2 mg oral tablet: 1 tab(s) orally once a day (at bedtime) (27 Sep 2019 00:54)  fluPHENAZine 10 mg oral tablet: 1 tab(s) orally once a day (27 Sep 2019 00:54)    MEDICATIONS  (STANDING):  benztropine 2 milliGRAM(s) Oral daily  chlorhexidine 4% Liquid 1 Application(s) Topical <User Schedule>  fluPHENAZine 10 milliGRAM(s) Oral daily  heparin  Injectable 5000 Unit(s) SubCutaneous every 8 hours  influenza   Vaccine 0.5 milliLiter(s) IntraMuscular once  sodium chloride 0.9%. 1000 milliLiter(s) (200 mL/Hr) IV Continuous <Continuous>      Vital Signs Last 24 Hrs  T(C): 36.4 (27 Sep 2019 05:42), Max: 36.4 (27 Sep 2019 05:42)  T(F): 97.5 (27 Sep 2019 05:42), Max: 97.5 (27 Sep 2019 05:42)  HR: 66 (27 Sep 2019 05:42) (66 - 88)  BP: 104/59 (27 Sep 2019 05:42) (104/59 - 113/64)  BP(mean): --  RR: 18 (27 Sep 2019 05:42) (18 - 18)  SpO2: 98% (27 Sep 2019 11:37) (98% - 100%)    PHYSICAL EXAM  General: adult in NAD  HEENT: clear oropharynx, anicteric sclera, pink conjunctiva  Neck: supple  CV: normal S1/S2 with no murmur rubs or gallops  Lungs: positive air movement b/l ant lungs,clear to auscultation, no wheezes, no rales  Abdomen: soft non-tender non-distended, no hepatosplenomegaly  Ext: no clubbing cyanosis or edema  Skin: no rashes and no petechiae  Neuro: alert and oriented X 4, no focal deficits      LABS:                          9.8    7.99  )-----------( 180      ( 27 Sep 2019 08:35 )             31.0     Mean Cell Volume : 87.8 fL  Mean Cell Hemoglobin : 27.8 pg  Mean Cell Hemoglobin Concentration : 31.6 g/dL  Auto Neutrophil # : x  Auto Lymphocyte # : x  Auto Monocyte # : x  Auto Eosinophil # : x  Auto Basophil # : x  Auto Neutrophil % : x  Auto Lymphocyte % : x  Auto Monocyte % : x  Auto Eosinophil % : x  Auto Basophil % : x      Serial CBC's   @ 08:35  Hct-31.0 / Hgb-9.8 / Plat-180 / RBC-3.53 / WBC-7.99  Serial CBC's   @ 21:00  Hct-32.9 / Hgb-10.8 / Plat-212 / RBC-3.83 / WBC-8.27        142  |  115<H>  |  45<H>  ----------------------------<  117<H>  4.1   |  18  |  3.2<H>    Ca    11.0<H>      27 Sep 2019 08:35  Phos  4.5       Mg     2.3         TPro  6.3  /  Alb  4.0  /  TBili  0.3  /  DBili  x   /  AST  26  /  ALT  11  /  AlkPhos  146<H>      Urinalysis Basic - ( 26 Sep 2019 21:00 )  Color: Light Yellow / Appearance: Clear / S.015 / pH: x  Gluc: x / Ketone: Negative  / Bili: Negative / Urobili: <2 mg/dL   Blood: x / Protein: 30 mg/dL / Nitrite: Positive   Leuk Esterase: Large / RBC: 2 /HPF /  /HPF   Sq Epi: x / Non Sq Epi: 0 /HPF / Bacteria: Many    Calcium, Random Urine: 21      RADIOLOGY & ADDITIONAL STUDIES:  < from: Xray Dexa Axial Study (19 @ 08:27) >  Impression: The patient has low bone mass, with the lowest   measured bone density in the Left Femoral Neck. The patient has   an estimated ten-year risk of hip fracture of 0.6% and an   estimated ten-year risk of major fracture of 5.7%, based on the   WHO FRAX algorithm. Patient is a 63y old  Male who presents with a chief complaint of hypercalcemia / ASHLEY (27 Sep 2019 11:27)      HPI:  62 yo M with PMH of B cell lymphoma (not on any current treatment, oncologist- Dr. Granger), CKD, paranoid schizophrenia and history of rt urethral stent with multiple kidney stones sent to ED by nephro Dr. Jones due to elevated Ca on outpatient labs (12.8) and ASHLEY (Cr 3.3, baseline around 2.5). pt reports he has been feeling fatigued and endorses intentional weight loss of 10 lbs over the past month. he denies fever, chills, SOB, chest pain, palpitations, constipation abdominal pain or change in bowel and urinary bowels.     In the ED, pt is hemodynamically stable.   initial blood work with calcium of 12.4 and creatinine of 3.4  pt received 2 L of LR (27 Sep 2019 00:22)    ONCOLOGY HISTORY: Pt has known history of Marginal zone lymphoma (AJCC stage IV) s/p right axillary lymph node biopsy and left chest lesion and BM biopsy also positive in the past, with last radiological evaluation done in .   Last CT abdomen pelvis wo cont was done in 2017 which showed enlarged spleen at 18cm with prominent mesenteric and retroperitoneal lymph nodes. Also seen stable left atrophic kidney and right nephrostomy tube placement.   No repeat imaging available.     PAST MEDICAL & SURGICAL HISTORY:  Lymphoma  Schizophrenia  Kidney stones  Retained urethral stent    SOCIAL HISTORY: No smoking, alcohol or IVDU history    FAMILY HISTORY: No significant family hx.    Allergies  No Known Allergies    ROS:  Polyuria, polydipsia [x ]  Chronic renal insufficiency [x]  Nephrolithiasis [x]  Anorexia, nausea, vomiting [ ]  Muscle weakness [x ]  Bone pain [ ]  Decreased concentration [ ]  Confusion [ ]  Fatigue [x ]  Shortening of QT interval [ ]  Bradycardia [ ]  HTN [ ]    Height (cm): 182.9 (19 @ 05:42)  Weight (kg): 96.9 (19 @ 05:42)  BMI (kg/m2): 29 (19 @ 05:42)  BSA (m2): 2.19 (19 @ 05:42)      HOME MEDICATIONS:  benztropine 2 mg oral tablet: 1 tab(s) orally once a day (at bedtime) (27 Sep 2019 00:54)  fluPHENAZine 10 mg oral tablet: 1 tab(s) orally once a day (27 Sep 2019 00:54)    MEDICATIONS  (STANDING):  benztropine 2 milliGRAM(s) Oral daily  chlorhexidine 4% Liquid 1 Application(s) Topical <User Schedule>  fluPHENAZine 10 milliGRAM(s) Oral daily  heparin  Injectable 5000 Unit(s) SubCutaneous every 8 hours  influenza   Vaccine 0.5 milliLiter(s) IntraMuscular once  sodium chloride 0.9%. 1000 milliLiter(s) (200 mL/Hr) IV Continuous <Continuous>      Vital Signs Last 24 Hrs  T(C): 36.4 (27 Sep 2019 05:42), Max: 36.4 (27 Sep 2019 05:42)  T(F): 97.5 (27 Sep 2019 05:42), Max: 97.5 (27 Sep 2019 05:42)  HR: 66 (27 Sep 2019 05:42) (66 - 88)  BP: 104/59 (27 Sep 2019 05:42) (104/59 - 113/64)  BP(mean): --  RR: 18 (27 Sep 2019 05:42) (18 - 18)  SpO2: 98% (27 Sep 2019 11:37) (98% - 100%)    PHYSICAL EXAM  General: adult in NAD  HEENT: clear oropharynx, anicteric sclera, pink conjunctiva  Neck: supple  CV: normal S1/S2 with no murmur rubs or gallops  Lungs: positive air movement b/l ant lungs,clear to auscultation, no wheezes, no rales  Abdomen: soft non-tender non-distended, no hepatosplenomegaly  Ext: no clubbing cyanosis or edema  Skin: no rashes and no petechiae  Neuro: alert and oriented X 4, no focal deficits      LABS:                          9.8    7.99  )-----------( 180      ( 27 Sep 2019 08:35 )             31.0     Mean Cell Volume : 87.8 fL  Mean Cell Hemoglobin : 27.8 pg  Mean Cell Hemoglobin Concentration : 31.6 g/dL  Auto Neutrophil # : x  Auto Lymphocyte # : x  Auto Monocyte # : x  Auto Eosinophil # : x  Auto Basophil # : x  Auto Neutrophil % : x  Auto Lymphocyte % : x  Auto Monocyte % : x  Auto Eosinophil % : x  Auto Basophil % : x      Serial CBC's   @ 08:35  Hct-31.0 / Hgb-9.8 / Plat-180 / RBC-3.53 / WBC-7.99  Serial CBC's   @ 21:00  Hct-32.9 / Hgb-10.8 / Plat-212 / RBC-3.83 / WBC-8.27        142  |  115<H>  |  45<H>  ----------------------------<  117<H>  4.1   |  18  |  3.2<H>    Ca    11.0<H>      27 Sep 2019 08:35  Phos  4.5       Mg     2.3         TPro  6.3  /  Alb  4.0  /  TBili  0.3  /  DBili  x   /  AST  26  /  ALT  11  /  AlkPhos  146<H>      Urinalysis Basic - ( 26 Sep 2019 21:00 )  Color: Light Yellow / Appearance: Clear / S.015 / pH: x  Gluc: x / Ketone: Negative  / Bili: Negative / Urobili: <2 mg/dL   Blood: x / Protein: 30 mg/dL / Nitrite: Positive   Leuk Esterase: Large / RBC: 2 /HPF /  /HPF   Sq Epi: x / Non Sq Epi: 0 /HPF / Bacteria: Many    Calcium, Random Urine: 21      RADIOLOGY & ADDITIONAL STUDIES:  < from: Xray Dexa Axial Study (19 @ 08:27) >  Impression: The patient has low bone mass, with the lowest   measured bone density in the Left Femoral Neck. The patient has   an estimated ten-year risk of hip fracture of 0.6% and an   estimated ten-year risk of major fracture of 5.7%, based on the   WHO FRAX algorithm. Patient is a 63y old  Male who presents with a chief complaint of hypercalcemia / ASHLEY (27 Sep 2019 11:27)      HPI:  62 yo M with PMH of B cell lymphoma (not on any current treatment, oncologist- Dr. Granger), CKD, paranoid schizophrenia and history of rt urethral stent with multiple kidney stones sent to ED by nephro Dr. Jones due to elevated Ca on outpatient labs (12.8) and ASHLEY (Cr 3.3, baseline around 2.5). pt reports he has been feeling fatigued and endorses intentional weight loss of 10 lbs over the past month. he denies fever, chills, SOB, chest pain, palpitations, constipation abdominal pain or change in bowel and urinary bowels.     In the ED, pt is hemodynamically stable.   initial blood work with calcium of 12.4 and creatinine of 3.4  pt received 2 L of LR (27 Sep 2019 00:22)    ONCOLOGY HISTORY: Pt has known history of Marginal zone lymphoma (AJCC stage IV) s/p right axillary lymph node biopsy and left chest lesion and BM biopsy also positive in the past, with last radiological evaluation done in .   Last CT abdomen pelvis wo cont was done in 2017 which showed enlarged spleen at 18cm with prominent mesenteric and retroperitoneal lymph nodes. Also seen stable left atrophic kidney and right nephrostomy tube placement.   No repeat imaging available.     PAST MEDICAL & SURGICAL HISTORY:  Lymphoma  Schizophrenia  Kidney stones  Retained urethral stent    SOCIAL HISTORY: No smoking, alcohol or IVDU history    FAMILY HISTORY: No significant family hx.    Allergies  No Known Allergies    ROS:  Polyuria, polydipsia [x ]  Chronic renal insufficiency [x]  Nephrolithiasis [x]  Anorexia, nausea, vomiting [ ]  Muscle weakness [x ]  Bone pain [ ]  Decreased concentration [ ]  Confusion [ ]  Fatigue [x ]  Shortening of QT interval [ ]  Bradycardia [ ]  HTN [ ]    Height (cm): 182.9 (19 @ 05:42)  Weight (kg): 96.9 (19 @ 05:42)  BMI (kg/m2): 29 (19 @ 05:42)  BSA (m2): 2.19 (19 @ 05:42)      HOME MEDICATIONS:  benztropine 2 mg oral tablet: 1 tab(s) orally once a day (at bedtime) (27 Sep 2019 00:54)  fluPHENAZine 10 mg oral tablet: 1 tab(s) orally once a day (27 Sep 2019 00:54)    MEDICATIONS  (STANDING):  benztropine 2 milliGRAM(s) Oral daily  chlorhexidine 4% Liquid 1 Application(s) Topical <User Schedule>  fluPHENAZine 10 milliGRAM(s) Oral daily  heparin  Injectable 5000 Unit(s) SubCutaneous every 8 hours  influenza   Vaccine 0.5 milliLiter(s) IntraMuscular once  sodium chloride 0.9%. 1000 milliLiter(s) (200 mL/Hr) IV Continuous <Continuous>      Vital Signs Last 24 Hrs  T(C): 36.4 (27 Sep 2019 05:42), Max: 36.4 (27 Sep 2019 05:42)  T(F): 97.5 (27 Sep 2019 05:42), Max: 97.5 (27 Sep 2019 05:42)  HR: 66 (27 Sep 2019 05:42) (66 - 88)  BP: 104/59 (27 Sep 2019 05:42) (104/59 - 113/64)  RR: 18 (27 Sep 2019 05:42) (18 - 18)  SpO2: 98% (27 Sep 2019 11:37) (98% - 100%)    PHYSICAL EXAM  General: adult in NAD  HEENT: clear oropharynx, anicteric sclera, pink conjunctiva  Neck: supple  CV: normal S1/S2 with no murmur rubs or gallops  Lungs: positive air movement b/l ant lungs, no wheezes, no rales  Abdomen: soft non-tender non-distended, +splenomegaly  Ext: no clubbing cyanosis or edema  Skin: no rashes and no petechiae  Neuro: alert and oriented X 3, no focal deficits      LABS:                          9.8    7.99  )-----------( 180      ( 27 Sep 2019 08:35 )             31.0     Mean Cell Volume : 87.8 fL  Mean Cell Hemoglobin : 27.8 pg  Mean Cell Hemoglobin Concentration : 31.6 g/dL  Auto Neutrophil # : x  Auto Lymphocyte # : x  Auto Monocyte # : x  Auto Eosinophil # : x  Auto Basophil # : x  Auto Neutrophil % : x  Auto Lymphocyte % : x  Auto Monocyte % : x  Auto Eosinophil % : x  Auto Basophil % : x      Serial CBC's   @ 08:35  Hct-31.0 / Hgb-9.8 / Plat-180 / RBC-3.53 / WBC-7.99  Serial CBC's   @ 21:00  Hct-32.9 / Hgb-10.8 / Plat-212 / RBC-3.83 / WBC-8.27        142  |  115<H>  |  45<H>  ----------------------------<  117<H>  4.1   |  18  |  3.2<H>    Ca    11.0<H>      27 Sep 2019 08:35  Phos  4.5       Mg     2.3         TPro  6.3  /  Alb  4.0  /  TBili  0.3  /  DBili  x   /  AST  26  /  ALT  11  /  AlkPhos  146<H>      Urinalysis Basic - ( 26 Sep 2019 21:00 )  Color: Light Yellow / Appearance: Clear / S.015 / pH: x  Gluc: x / Ketone: Negative  / Bili: Negative / Urobili: <2 mg/dL   Blood: x / Protein: 30 mg/dL / Nitrite: Positive   Leuk Esterase: Large / RBC: 2 /HPF /  /HPF   Sq Epi: x / Non Sq Epi: 0 /HPF / Bacteria: Many    Calcium, Random Urine: 21      RADIOLOGY & ADDITIONAL STUDIES:  < from: Xray Dexa Axial Study (19 @ 08:27) >  Impression: The patient has low bone mass, with the lowest   measured bone density in the Left Femoral Neck. The patient has   an estimated ten-year risk of hip fracture of 0.6% and an   estimated ten-year risk of major fracture of 5.7%, based on the   WHO FRAX algorithm. Patient is a 63y old  Male who presents with a chief complaint of hypercalcemia / ASHLEY (27 Sep 2019 11:27)      HPI:  64 yo M with PMH of B cell lymphoma (not on any current treatment, oncologist- Dr. Gong), CKD, paranoid schizophrenia and history of rt urethral stent with multiple kidney stones sent to ED by nephro Dr. Jones due to elevated Ca on outpatient labs (12.8) and ASHLEY (Cr 3.3, baseline around 2.5). pt reports he has been feeling fatigued and endorses intentional weight loss of 10 lbs over the past month. he denies fever, chills, SOB, chest pain, palpitations, constipation abdominal pain or change in bowel and urinary bowels.     In the ED, pt is hemodynamically stable.   initial blood work with calcium of 12.4 and creatinine of 3.4  pt received 2 L of LR (27 Sep 2019 00:22)    ONCOLOGY HISTORY: Pt has known history of Marginal zone lymphoma (AJCC stage IV) s/p right axillary lymph node biopsy and left chest lesion and BM biopsy also positive in the past, with last radiological evaluation done in .   Last CT abdomen pelvis wo cont was done in 2017 which showed enlarged spleen at 18cm with prominent mesenteric and retroperitoneal lymph nodes. Also seen stable left atrophic kidney and right nephrostomy tube placement.   No repeat imaging available.     PAST MEDICAL & SURGICAL HISTORY:  Lymphoma  Schizophrenia  Kidney stones  Retained urethral stent    SOCIAL HISTORY: No smoking, alcohol or IVDU history    FAMILY HISTORY: No significant family hx.    Allergies  No Known Allergies    ROS:  Polyuria, polydipsia [x ]  Chronic renal insufficiency [x]  Nephrolithiasis [x]  Anorexia, nausea, vomiting [ ]  Muscle weakness [x ]  Bone pain [ ]  Decreased concentration [ ]  Confusion [ ]  Fatigue [x ]  Shortening of QT interval [ ]  Bradycardia [ ]  HTN [ ]    Height (cm): 182.9 (19 @ 05:42)  Weight (kg): 96.9 (19 @ 05:42)  BMI (kg/m2): 29 (19 @ 05:42)  BSA (m2): 2.19 (19 @ 05:42)      HOME MEDICATIONS:  benztropine 2 mg oral tablet: 1 tab(s) orally once a day (at bedtime) (27 Sep 2019 00:54)  fluPHENAZine 10 mg oral tablet: 1 tab(s) orally once a day (27 Sep 2019 00:54)    MEDICATIONS  (STANDING):  benztropine 2 milliGRAM(s) Oral daily  chlorhexidine 4% Liquid 1 Application(s) Topical <User Schedule>  fluPHENAZine 10 milliGRAM(s) Oral daily  heparin  Injectable 5000 Unit(s) SubCutaneous every 8 hours  influenza   Vaccine 0.5 milliLiter(s) IntraMuscular once  sodium chloride 0.9%. 1000 milliLiter(s) (200 mL/Hr) IV Continuous <Continuous>      Vital Signs Last 24 Hrs  T(C): 36.4 (27 Sep 2019 05:42), Max: 36.4 (27 Sep 2019 05:42)  T(F): 97.5 (27 Sep 2019 05:42), Max: 97.5 (27 Sep 2019 05:42)  HR: 66 (27 Sep 2019 05:42) (66 - 88)  BP: 104/59 (27 Sep 2019 05:42) (104/59 - 113/64)  RR: 18 (27 Sep 2019 05:42) (18 - 18)  SpO2: 98% (27 Sep 2019 11:37) (98% - 100%)    PHYSICAL EXAM  General: adult in NAD  HEENT: clear oropharynx, anicteric sclera, pink conjunctiva  Neck: supple  CV: normal S1/S2 with no murmur rubs or gallops  Lungs: positive air movement b/l ant lungs, no wheezes, no rales  Abdomen: soft non-tender non-distended, +splenomegaly  Ext: no clubbing cyanosis or edema  Skin: no rashes and no petechiae  Neuro: alert and oriented X 3, no focal deficits      LABS:                          9.8    7.99  )-----------( 180      ( 27 Sep 2019 08:35 )             31.0     Mean Cell Volume : 87.8 fL  Mean Cell Hemoglobin : 27.8 pg  Mean Cell Hemoglobin Concentration : 31.6 g/dL  Auto Neutrophil # : x  Auto Lymphocyte # : x  Auto Monocyte # : x  Auto Eosinophil # : x  Auto Basophil # : x  Auto Neutrophil % : x  Auto Lymphocyte % : x  Auto Monocyte % : x  Auto Eosinophil % : x  Auto Basophil % : x      Serial CBC's   @ 08:35  Hct-31.0 / Hgb-9.8 / Plat-180 / RBC-3.53 / WBC-7.99  Serial CBC's   @ 21:00  Hct-32.9 / Hgb-10.8 / Plat-212 / RBC-3.83 / WBC-8.27        142  |  115<H>  |  45<H>  ----------------------------<  117<H>  4.1   |  18  |  3.2<H>    Ca    11.0<H>      27 Sep 2019 08:35  Phos  4.5       Mg     2.3         TPro  6.3  /  Alb  4.0  /  TBili  0.3  /  DBili  x   /  AST  26  /  ALT  11  /  AlkPhos  146<H>      Urinalysis Basic - ( 26 Sep 2019 21:00 )  Color: Light Yellow / Appearance: Clear / S.015 / pH: x  Gluc: x / Ketone: Negative  / Bili: Negative / Urobili: <2 mg/dL   Blood: x / Protein: 30 mg/dL / Nitrite: Positive   Leuk Esterase: Large / RBC: 2 /HPF /  /HPF   Sq Epi: x / Non Sq Epi: 0 /HPF / Bacteria: Many    Calcium, Random Urine: 21      RADIOLOGY & ADDITIONAL STUDIES:  < from: Xray Dexa Axial Study (19 @ 08:27) >  Impression: The patient has low bone mass, with the lowest   measured bone density in the Left Femoral Neck. The patient has   an estimated ten-year risk of hip fracture of 0.6% and an   estimated ten-year risk of major fracture of 5.7%, based on the   WHO FRAX algorithm. Patient is a 63y old  Male who presents with a chief complaint of hypercalcemia / ASHLEY (27 Sep 2019 11:27)      HPI:  62 yo M with PMH of B cell lymphoma (not on any current treatment, oncologist- Dr. Gong), CKD, paranoid schizophrenia and history of rt urethral stent with multiple kidney stones sent to ED by nephro Dr. Jones due to elevated Ca on outpatient labs (12.8) and ASHLEY (Cr 3.3, baseline around 2.5). pt reports he has been feeling fatigued and endorses intentional weight loss of 10 lbs over the past month. he denies fever, chills, SOB, chest pain, palpitations, constipation abdominal pain or change in bowel and urinary bowels.     In the ED, pt is hemodynamically stable.   initial blood work with calcium of 12.4 and creatinine of 3.4  pt received 2 L of LR (27 Sep 2019 00:22)    ONCOLOGY HISTORY: Pt has known history of Marginal zone lymphoma (AJCC stage IV) s/p right axillary lymph node biopsy and left chest lesion and BM biopsy also positive in the past, with last radiological evaluation done in .   Last CT abdomen pelvis wo cont was done in 2017 which showed enlarged spleen at 18cm with prominent mesenteric and retroperitoneal lymph nodes. Also seen stable left atrophic kidney and right nephrostomy tube placement.   No repeat imaging available.     PAST MEDICAL & SURGICAL HISTORY:  Lymphoma  Schizophrenia  Kidney stones  Retained urethral stent    SOCIAL HISTORY: No smoking, alcohol or IVDU history    FAMILY HISTORY: No significant family hx.    Allergies  No Known Allergies    ROS:  Polyuria, polydipsia [x ]  Chronic renal insufficiency [x]  Nephrolithiasis [x]  Anorexia, nausea, vomiting [ ]  Muscle weakness [x ]  Bone pain [ ]  Decreased concentration [ ]  Confusion [ ]  Fatigue [x ]  Shortening of QT interval [ ]  Bradycardia [ ]  HTN [ ]    Height (cm): 182.9 (19 @ 05:42)  Weight (kg): 96.9 (19 @ 05:42)  BMI (kg/m2): 29 (19 @ 05:42)  BSA (m2): 2.19 (19 @ 05:42)      HOME MEDICATIONS:  benztropine 2 mg oral tablet: 1 tab(s) orally once a day (at bedtime) (27 Sep 2019 00:54)  fluPHENAZine 10 mg oral tablet: 1 tab(s) orally once a day (27 Sep 2019 00:54)    MEDICATIONS  (STANDING):  benztropine 2 milliGRAM(s) Oral daily  chlorhexidine 4% Liquid 1 Application(s) Topical <User Schedule>  fluPHENAZine 10 milliGRAM(s) Oral daily  heparin  Injectable 5000 Unit(s) SubCutaneous every 8 hours  influenza   Vaccine 0.5 milliLiter(s) IntraMuscular once  sodium chloride 0.9%. 1000 milliLiter(s) (200 mL/Hr) IV Continuous <Continuous>      Vital Signs Last 24 Hrs  T(C): 36.4 (27 Sep 2019 05:42), Max: 36.4 (27 Sep 2019 05:42)  T(F): 97.5 (27 Sep 2019 05:42), Max: 97.5 (27 Sep 2019 05:42)  HR: 66 (27 Sep 2019 05:42) (66 - 88)  BP: 104/59 (27 Sep 2019 05:42) (104/59 - 113/64)  RR: 18 (27 Sep 2019 05:42) (18 - 18)  SpO2: 98% (27 Sep 2019 11:37) (98% - 100%)    PHYSICAL EXAM  General: adult in NAD  HEENT: clear oropharynx, anicteric sclera, pink conjunctiva  Neck: supple  CV: normal S1/S2 with no murmur rubs or gallops  Lungs: positive air movement b/l ant lungs, no wheezes, no rales  Abdomen: soft non-tender non-distended, +splenomegaly  Ext: no clubbing cyanosis or edema  Skin: no rashes and no petechiae  Neuro: alert and oriented X 3, no focal deficits      LABS:                          9.8    7.99  )-----------( 180      ( 27 Sep 2019 08:35 )             31.0     Mean Cell Volume : 87.8 fL  Mean Cell Hemoglobin : 27.8 pg  Mean Cell Hemoglobin Concentration : 31.6 g/dL  Auto Neutrophil # : x  Auto Lymphocyte # : x  Auto Monocyte # : x  Auto Eosinophil # : x  Auto Basophil # : x  Auto Neutrophil % : x  Auto Lymphocyte % : x  Auto Monocyte % : x  Auto Eosinophil % : x  Auto Basophil % : x      Serial CBC's   @ 08:35  Hct-31.0 / Hgb-9.8 / Plat-180 / RBC-3.53 / WBC-7.99  Serial CBC's   @ 21:00  Hct-32.9 / Hgb-10.8 / Plat-212 / RBC-3.83 / WBC-8.27        142  |  115<H>  |  45<H>  ----------------------------<  117<H>  4.1   |  18  |  3.2<H>    Ca    11.0<H>      27 Sep 2019 08:35  Phos  4.5       Mg     2.3         TPro  6.3  /  Alb  4.0  /  TBili  0.3  /  DBili  x   /  AST  26  /  ALT  11  /  AlkPhos  146<H>      Urinalysis Basic - ( 26 Sep 2019 21:00 )  Color: Light Yellow / Appearance: Clear / S.015 / pH: x  Gluc: x / Ketone: Negative  / Bili: Negative / Urobili: <2 mg/dL   Blood: x / Protein: 30 mg/dL / Nitrite: Positive   Leuk Esterase: Large / RBC: 2 /HPF /  /HPF   Sq Epi: x / Non Sq Epi: 0 /HPF / Bacteria: Many    Calcium, Random Urine: 21      RADIOLOGY & ADDITIONAL STUDIES:  < from: Xray Dexa Axial Study (19 @ 08:27) >  Impression: The patient has low bone mass, with the lowest   measured bone density in the Left Femoral Neck. The patient has   an estimated ten-year risk of hip fracture of 0.6% and an   estimated ten-year risk of major fracture of 5.7%, based on the   WHO FRAX algorithm.

## 2019-09-27 NOTE — H&P ADULT - ATTENDING COMMENTS
64 yo M with PMH of B cell lymphoma (not on any current treatment, oncologist- Dr. Granger), CKD, paranoid schizophrenia and history of rt urethral stent with multiple kidney stones sent to ED by nephro Dr. Jones due to elevated Ca on outpatient labs (12.8) and ASHLEY   Acute hypercalcemia    ASHLEY on CKD    hypercalcemia work up   hem/onc eval  renal eval   IVF   monitor kidney function   dvt ppx  decub prevention as per nursing protocol

## 2019-09-27 NOTE — H&P ADULT - HISTORY OF PRESENT ILLNESS
64 yo M with PMH of B cell lymphoma (not on any current treatment, oncologist- Dr. Granger), CKD, paranoid schizophrenia and history of rt urethral stent with multiple kidney stones sent to ED by nephro Dr. Jones due to elevated Ca on outpatient labs (12.8) and ASHLEY (Cr 3.3, baseline around 2.5). pt reports he has been feeling fatigued and endorses intentional weight loss of 10 lbs over the past month. he denies fever, chills, SOB, chest pain, palpitations, constipation abdominal pain or change in bowel and urinary bowels.     in the ED, pt is hemodynamically stable.   initial blood work with calcium of 12.4 and creatinine of 3.4  pt received 2 L of LR

## 2019-09-27 NOTE — PROGRESS NOTE ADULT - ASSESSMENT
64 yo M with PMH of B cell lymphoma (not on any current treatment, oncologist- Dr. Granger), CKD, paranoid schizophrenia and history of rt urethral stent with multiple kidney stones sent to ED by nephro Dr. Jones due to elevated Ca on outpatient labs (12.8) and ASHLEY     # Acute hypercalcemia most likely secondary to malignancy:   - total calcium is 12.4 with albumin of 4  - Nephrology (Dr. Jones):   and hem/onc consult (Dr. Granger)   - start NS at 200 cc / hr  - insert Park with target urine output 100-150 cc / hr  - will get PTH, PTHrP, Vit D, TSH, Po4, serum and urine electrophoresis     # ASHLEY on CKD:  - Cr 3.3 (baseline ~ 2.5)  - c/w with NS at 100 cc / hr   - will get urine studies, and renal US     # Schizophrenia: - c/w home meds   # Asymptomatic bacteuria - no need for ABx  # Hyperkalemia 5.2: - sample is hemolyzed , repeat in the AM     # DVT ppx: heparin SC  # GI ppx: not indicated  # Regular diet  # from home             ASHLEY/CKD - baseline creatinine 2.5 mg/dL  atrophic left kidney and right ureteral surgical repair  hypercalcemia - out pt labs with vit D 1,25 of 89 in setting of known lymphoma - this is likely source - calcium improved with ivf  anemia    1) suggest to repeat hypercalcemia evaluation: ionized calcium, PTH, PTH related protein, ACE level, vit D 25, vit D 1,25  2) suggest hemonc consult - know to Dr Granger of [facility]  3) ok with IVF - after 3rd liter can observe with am labs  4) am labs  5) renal sono 64 yo M with PMH of B cell lymphoma (not on any current treatment, oncologist- Dr. Granger), CKD, paranoid schizophrenia and history of rt urethral stent with multiple kidney stones sent to ED by nephro Dr. Jones due to elevated Ca on outpatient labs (12.8) and ASHLEY     # Acute hypercalcemia most likely secondary to malignancy:   - total calcium is 12.4 with albumin of 4  - Nephrology (Dr. Jones):  - hem/onc consult (Dr. Granger) c/s with service:    - C/w NS at 200 cc / hr  - Park with target urine output 100-150 cc / hr (350 in bag this am)  - F/u PTH, PTHrP, Vit D 25, vit D 1,25, TSH, Po4, serum and urine electrophoresis  ACE level,     # ASHLEY on CKD:  - Cr 3.3 (baseline ~ 2.5)  - c/w with NS at 200 cc / hr   - will get urine studies, and renal US     # Schizophrenia: - c/w home meds   # Asymptomatic bacteuria - no need for ABx  # Hyperkalemia 5.2: - sample is hemolyzed , repeat in the AM     # DVT ppx: heparin SC  # GI ppx: not indicated  # Regular diet  # from home             ASHLEY/CKD - baseline creatinine 2.5 mg/dL  atrophic left kidney and right ureteral surgical repair  hypercalcemia - out pt labs with vit D 1,25 of 89 in setting of known lymphoma - this is likely source - calcium improved with ivf  anemia    1) suggest to repeat hypercalcemia evaluation: ionized calcium, 2) suggest hemonc consult - know to Dr Granger of [facility]  3) ok with IVF - after 3rd liter can observe with am labs  4) am labs  5) renal sono 62 yo M with PMH of B cell lymphoma (not on any current treatment, oncologist- Dr. Granger), CKD, paranoid schizophrenia and history of rt urethral stent with multiple kidney stones sent to ED by nephro Dr. Jones due to elevated Ca on outpatient labs (12.8) and ASHLEY     # Acute hypercalcemia most likely secondary to malignancy  - Outpt labs: vit D 1,25 of 89 in setting of known lymphoma - this is likely source of hypercalcemia  - Ca 11. <- 12.4 with albumin of 4 on admission  - EKG: NSR  - Noted Nephrology (Dr. Jones) rec  - Hem/onc consult (Dr. Granger) c/s with service:    - C/w NS at 200 cc / hr  - Park with target urine output 100-150 cc / hr (350 in bag this am)  - F/u PTH, PTHrP, Vit D 25, vit D 1,25, TSH, Po4, serum and urine electrophoresis  ACE level,   - F/u CT abd/pelvis w/o contrast    - F/u Thyroid US  - F/u Renal US    # ASHLEY on CKD:  - Cr 3.2 (baseline ~ 2.5)  - C/w with NS at 200 cc / hr   - Urine studies: no significant proteinuria    # Schizophrenia: - c/w home meds   # Asymptomatic bacteuria - no need for Abx    # DVT ppx: heparin SC  # GI ppx: not indicated  # Regular diet  # from [facility] 62 yo M with PMH of B cell lymphoma (not on any current treatment, oncologist- Dr. Granger), CKD, paranoid schizophrenia and history of rt urethral stent with multiple kidney stones sent to ED by nephro Dr. Jones due to elevated Ca on outpatient labs (12.8) and ASHLEY     # Acute hypercalcemia most likely secondary to malignancy  - Outpt labs: vit D 1,25 of 89 in setting of known lymphoma - this is likely source of hypercalcemia  - Ca 11. <- 12.4 with albumin of 4 on admission  - EKG: NSR  - Noted Nephrology (Dr. Jones) rec  - Noted Hem/onc rec: will obtain rec labs and records     - C/w NS at 200 cc / hr  - Park with target urine output 100-150 cc / hr (350 in bag this am)  - F/u PTH, PTHrP, Vit D 25, vit D 1,25, TSH, Po4, serum and urine electrophoresis  ACE level  - F/u CT abd/pelvis w/o contrast    - F/u Thyroid US  - F/u Renal US    # ASHLEY on CKD:  - Cr 3.2 (baseline ~ 2.5)  - C/w with NS at 200 cc / hr   - Urine studies: no significant proteinuria    # Schizophrenia: - c/w home meds   # Asymptomatic bacteuria - no need for Abx    # DVT ppx: heparin SC  # GI ppx: not indicated  # Regular diet  # from [facility]

## 2019-09-27 NOTE — CONSULT NOTE ADULT - SUBJECTIVE AND OBJECTIVE BOX
NEPHROLOGY CONSULTATION NOTE    Patient is a 64 yo M with PMH of B cell lymphoma (not on any current treatment, oncologist- Dr. Granger), CKD (baseline about 2.5 mg/dL), paranoid schizophrenia, left atrophic kidney and right uretehral revision surgery and kidney stones.  He sent to ED due to elevated Ca on outpatient labs (12.8) and ASHLEY (Cr 3.3, baseline around 2.5). pt reports he has been feeling fatigued and endorses intentional weight loss of 10 lbs over the past month. he denies fever, chills, SOB, chest pain, palpitations, constipation abdominal pain or change in bowel and urinary bowels.     in the ED, pt is hemodynamically stable.   initial blood work with calcium of 12.4 and creatinine of 3.4  pt received 2 L of LR    PAST MEDICAL & SURGICAL HISTORY:  Lymphoma  Schizophrenia  Kidney stones  Retained urethral stent    Allergies:  No Known Allergies    Home Medications Reviewed  Hospital Medications:   MEDICATIONS  (STANDING):  benztropine 2 milliGRAM(s) Oral daily  chlorhexidine 4% Liquid 1 Application(s) Topical <User Schedule>  fluPHENAZine 10 milliGRAM(s) Oral daily  heparin  Injectable 5000 Unit(s) SubCutaneous every 8 hours  influenza   Vaccine 0.5 milliLiter(s) IntraMuscular once  sodium chloride 0.9%. 1000 milliLiter(s) (200 mL/Hr) IV Continuous <Continuous>      SOCIAL HISTORY:  Denies ETOH,Smoking,   FAMILY HISTORY:        REVIEW OF SYSTEMS:  CONSTITUTIONAL: No weakness, fevers or chills  EYES/ENT: No visual changes;  No vertigo or throat pain   NECK: No pain or stiffness  RESPIRATORY: No cough, wheezing, hemoptysis; No shortness of breath  CARDIOVASCULAR: No chest pain or palpitations.  GASTROINTESTINAL: No abdominal or epigastric pain. No nausea, vomiting, or hematemesis; No diarrhea or constipation. No melena or hematochezia.  GENITOURINARY: No dysuria, frequency, foamy urine, urinary urgency, incontinence or hematuria  NEUROLOGICAL: No numbness or weakness  SKIN: No itching, burning, rashes, or lesions   VASCULAR: No bilateral lower extremity edema.   All other review of systems is negative unless indicated above.    VITALS:  T(F): 97.5 (19 @ 05:42), Max: 97.5 (19 @ 05:42)  HR: 66 (19 05:42)  BP: 104/59 (19 05:42)  RR: 18 (19 05:42)  SpO2: 98% (19 05:42)     07:01  -   07:00  --------------------------------------------------------  IN: 0 mL / OUT: 1300 mL / NET: -1300 mL      Height (cm): 182.9 (:42)  Weight (kg): 96.9 (:42)  BMI (kg/m2): 29 (:42)  BSA (m2): 2.19 (:42)    19 @ 07:01  -  19 @ 07:00  --------------------------------------------------------  IN: 0 mL / OUT: 1300 mL / NET: -1300 mL      I&O's Detail    26 Sep 2019 07:01  -  27 Sep 2019 07:00  --------------------------------------------------------  IN:  Total IN: 0 mL    OUT:    Indwelling Catheter - Urethral: 1300 mL  Total OUT: 1300 mL    Total NET: -1300 mL            PHYSICAL EXAM:  Constitutional: NAD  HEENT: anicteric sclera, oropharynx clear, MMM  Neck: No JVD  Respiratory: CTAB, no wheezes, rales or rhonchi  Cardiovascular: S1, S2, RRR  Gastrointestinal: BS+, soft, NT/ND  Extremities: No cyanosis or clubbing. No peripheral edema  Neurological: A/O x 3, no focal deficits  Psychiatric: Normal mood, normal affect  : No CVA tenderness. No zafar.   Skin: No rashes  Vascular Access:    LABS:      142  |  115<H>  |  45<H>  ----------------------------<  117<H>  4.1   |  18  |  3.2<H>    Ca    11.0<H>      27 Sep 2019 08:35  Phos  4.5       Mg     2.3         TPro  6.3  /  Alb  4.0  /  TBili  0.3  /  DBili      /  AST  26  /  ALT  11  /  AlkPhos  146<H>      Creatinine Trend: 3.2 <--, 3.4 <--                        9.8    7.99  )-----------( 180      ( 27 Sep 2019 08:35 )             31.0     Urine Studies:  Urinalysis Basic - ( 26 Sep 2019 21:00 )    Color: Light Yellow / Appearance: Clear / S.015 / pH:   Gluc:  / Ketone: Negative  / Bili: Negative / Urobili: <2 mg/dL   Blood:  / Protein: 30 mg/dL / Nitrite: Positive   Leuk Esterase: Large / RBC: 2 /HPF /  /HPF   Sq Epi:  / Non Sq Epi: 0 /HPF / Bacteria: Many      Protein/Creatinine Ratio Calculation: 0.6 Ratio ( @ 01:35)  Creatinine, Random Urine: 66 mg/dL ( @ 01:35)  Calcium, Random Urine: 21 mg/dL ( @ 01:35)  Sodium, Random Urine: 106.0 mmoL/L ( @ 01:35)  Osmolality, Random Urine: 414 mos/kg ( @ 01:35)  Potassium, Random Urine: 16 mmol/L ( @ 01:35)            RADIOLOGY & ADDITIONAL STUDIES: NEPHROLOGY CONSULTATION NOTE    Patient is a 62 yo M with PMH of B cell lymphoma (not on any current treatment, oncologist- Dr. Granger), CKD (baseline about 2.5 mg/dL), paranoid schizophrenia, left atrophic kidney and right uretehral revision surgery and kidney stones.  He sent to ED due to elevated Ca on outpatient labs (12.8) and ASHLEY (Cr 3.3, baseline around 2.5). pt reports he has been feeling fatigued and endorses intentional weight loss of 10 lbs over the past month. he denies fever, chills, SOB, chest pain, palpitations, constipation abdominal pain or change in bowel and urinary bowels.     in the ED, pt is hemodynamically stable.   initial blood work with calcium of 12.4 and creatinine of 3.4  pt received 2 L of LR    PAST MEDICAL & SURGICAL HISTORY:  Lymphoma  Schizophrenia  Kidney stones  Retained urethral stent    Allergies:  No Known Allergies    Home Medications Reviewed  Hospital Medications:   MEDICATIONS  (STANDING):  benztropine 2 milliGRAM(s) Oral daily  chlorhexidine 4% Liquid 1 Application(s) Topical <User Schedule>  fluPHENAZine 10 milliGRAM(s) Oral daily  heparin  Injectable 5000 Unit(s) SubCutaneous every 8 hours  influenza   Vaccine 0.5 milliLiter(s) IntraMuscular once  sodium chloride 0.9%. 1000 milliLiter(s) (200 mL/Hr) IV Continuous <Continuous>      SOCIAL HISTORY:  Denies ETOH,Smoking,   FAMILY HISTORY:        REVIEW OF SYSTEMS:  CONSTITUTIONAL: No weakness, fevers or chills  EYES/ENT: No visual changes;  No vertigo or throat pain   NECK: No pain or stiffness  RESPIRATORY: No cough, wheezing, hemoptysis; No shortness of breath  CARDIOVASCULAR: No chest pain or palpitations.  GASTROINTESTINAL: No abdominal or epigastric pain. No nausea, vomiting, or hematemesis; No diarrhea or constipation. No melena or hematochezia.  GENITOURINARY: No dysuria, frequency, foamy urine, urinary urgency, incontinence or hematuria  NEUROLOGICAL: No numbness or weakness  SKIN: No itching, burning, rashes, or lesions   VASCULAR: No bilateral lower extremity edema.   All other review of systems is negative unless indicated above.    VITALS:  T(F): 97.5 (19 @ 05:42), Max: 97.5 (19 @ 05:42)  HR: 66 (19 05:42)  BP: 104/59 (19 05:42)  RR: 18 (19 05:42)  SpO2: 98% (19 05:42)     07:01  -   07:00  --------------------------------------------------------  IN: 0 mL / OUT: 1300 mL / NET: -1300 mL      Height (cm): 182.9 (:42)  Weight (kg): 96.9 (:42)  BMI (kg/m2): 29 (:42)  BSA (m2): 2.19 (:42)    19 @ 07:01  -  19 @ 07:00  --------------------------------------------------------  IN: 0 mL / OUT: 1300 mL / NET: -1300 mL      I&O's Detail    26 Sep 2019 07:01  -  27 Sep 2019 07:00  --------------------------------------------------------  IN:  Total IN: 0 mL    OUT:    Indwelling Catheter - Urethral: 1300 mL  Total OUT: 1300 mL    Total NET: -1300 mL            PHYSICAL EXAM:  Constitutional: NAD  HEENT: anicteric sclera, oropharynx clear, MMM  Neck: No JVD  Respiratory: CTAB, no wheezes, rales or rhonchi  Cardiovascular: S1, S2, RRR  Gastrointestinal: BS+, soft, NT/ND  Extremities: No cyanosis or clubbing. No peripheral edema  Neurological: A/O x 3, no focal deficits  Psychiatric: Normal mood, normal affect  : No CVA tenderness. No azfar.   Skin: No rashes  Vascular Access:    LABS:      142  |  115<H>  |  45<H>  ----------------------------<  117<H>  4.1   |  18  |  3.2<H>    Ca    11.0<H>      27 Sep 2019 08:35  Phos  4.5       Mg     2.3         TPro  6.3  /  Alb  4.0  /  TBili  0.3  /  DBili      /  AST  26  /  ALT  11  /  AlkPhos  146<H>      Creatinine Trend: 3.2 <--, 3.4 <--                        9.8    7.99  )-----------( 180      ( 27 Sep 2019 08:35 )             31.0     Urine Studies:  Urinalysis Basic - ( 26 Sep 2019 21:00 )    Color: Light Yellow / Appearance: Clear / S.015 / pH:   Gluc:  / Ketone: Negative  / Bili: Negative / Urobili: <2 mg/dL   Blood:  / Protein: 30 mg/dL / Nitrite: Positive   Leuk Esterase: Large / RBC: 2 /HPF /  /HPF   Sq Epi:  / Non Sq Epi: 0 /HPF / Bacteria: Many      Protein/Creatinine Ratio Calculation: 0.6 Ratio ( @ 01:35)  Creatinine, Random Urine: 66 mg/dL ( @ 01:35)  Calcium, Random Urine: 21 mg/dL ( @ 01:35)  Sodium, Random Urine: 106.0 mmoL/L ( @ 01:35)  Osmolality, Random Urine: 414 mos/kg ( @ 01:35)  Potassium, Random Urine: 16 mmol/L ( @ 01:35)        ·	ASHLEY/CKD - baseline creatinine 2.5 mg/dL  ·	atrophic left kidney and right ureteral surgical repair  ·	hypercalcemia - out pt labs with vit D 1,25 of 89 in setting of known lymphoma - this is likely source - calcium improved with ivf  ·	anemia    1) suggest to repeat hypercalcemia evaluation: ionized calcium, PTH, PTH related protein, ACE level, vit D 25, vit D 1,25  2) suggest hemonc consult - know to Dr Granger of [facility]  3) ok with IVF - after 3rd liter can observe with am labs  4) am labs  5) renal sono

## 2019-09-27 NOTE — PROGRESS NOTE ADULT - SUBJECTIVE AND OBJECTIVE BOX
Hospital Day:  1d    Subjective:    Patient is a 63y old  Male who presents with a chief complaint of hypercalcemia / ASHLEY (27 Sep 2019 11:48)      Past Medical Hx:   Lymphoma  Schizophrenia  Kidney stones    Past Sx:  Retained urethral stent    Allergies:  No Known Allergies    Current Meds:   Standng Meds:  benztropine 2 milliGRAM(s) Oral daily  chlorhexidine 4% Liquid 1 Application(s) Topical <User Schedule>  fluPHENAZine 10 milliGRAM(s) Oral daily  heparin  Injectable 5000 Unit(s) SubCutaneous every 8 hours  pamidronate IVPB 60 milliGRAM(s) IV Intermittent once  sodium chloride 0.9%. 1000 milliLiter(s) (200 mL/Hr) IV Continuous <Continuous>    PRN Meds:    HOME MEDICATIONS:  benztropine 2 mg oral tablet: 1 tab(s) orally once a day (at bedtime)  fluPHENAZine 10 mg oral tablet: 1 tab(s) orally once a day      Vital Signs:   T(F): 97.3 (19 @ 12:31), Max: 97.5 (19 @ 05:42)  HR: 73 (19 @ 12:31) (66 - 88)  BP: 101/58 (19 @ 12:31) (101/58 - 113/64)  RR: 16 (19 @ 12:31) (16 - 18)  SpO2: 98% (19 @ 11:37) (98% - 100%)      19 @ 07:01  -  19 @ 07:00  --------------------------------------------------------  IN: 0 mL / OUT: 1300 mL / NET: -1300 mL        Physical Exam:   GENERAL: NAD  HEENT: NCAT  CHEST/LUNG: CTAB  HEART: Regular rate and rhythm; s1 s2 appreciated, No murmurs, rubs, or gallops  ABDOMEN: Soft, Nontender, Nondistended; Bowel sounds present  EXTREMITIES: No LE edema b/l  NERVOUS SYSTEM:  Alert & Oriented X3        Labs:                         9.8    7.99  )-----------( 180      ( 27 Sep 2019 08:35 )             31.0     Neutophil% 55.2, Lymphocyte% 12.7, Monocyte% 12.2, Bands% 0.4 19 @ 21:00    27 Sep 2019 08:35    142    |  115    |  45     ----------------------------<  117    4.1     |  18     |  3.2      Ca    11.0       27 Sep 2019 08:35  Phos  4.5       27 Sep 2019 08:35  Mg     2.3       27 Sep 2019 08:35    TPro  6.3    /  Alb  4.0    /  TBili  0.3    /  DBili  x      /  AST  26     /  ALT  11     /  AlkPhos  146    26 Sep 2019 21:00                    Urinalysis Basic - ( 26 Sep 2019 21:00 )    Color: Light Yellow / Appearance: Clear / S.015 / pH: x  Gluc: x / Ketone: Negative  / Bili: Negative / Urobili: <2 mg/dL   Blood: x / Protein: 30 mg/dL / Nitrite: Positive   Leuk Esterase: Large / RBC: 2 /HPF /  /HPF   Sq Epi: x / Non Sq Epi: 0 /HPF / Bacteria: Many Hospital Day:  1d    Subjective:    Patient is a 63y old  Male who presents with a chief complaint of hypercalcemia / ASHLEY. No acute events since admission to floors, feeling much better.       Past Medical Hx:   Lymphoma  Schizophrenia  Kidney stones    Past Sx:  Retained urethral stent    Allergies:  No Known Allergies    Current Meds:   Standng Meds:  benztropine 2 milliGRAM(s) Oral daily  chlorhexidine 4% Liquid 1 Application(s) Topical <User Schedule>  fluPHENAZine 10 milliGRAM(s) Oral daily  heparin  Injectable 5000 Unit(s) SubCutaneous every 8 hours  pamidronate IVPB 60 milliGRAM(s) IV Intermittent once  sodium chloride 0.9%. 1000 milliLiter(s) (200 mL/Hr) IV Continuous <Continuous>    PRN Meds:    HOME MEDICATIONS:  benztropine 2 mg oral tablet: 1 tab(s) orally once a day (at bedtime)  fluPHENAZine 10 mg oral tablet: 1 tab(s) orally once a day      Vital Signs:   T(F): 97.3 (19 @ 12:31), Max: 97.5 (19 @ 05:42)  HR: 73 (19 @ 12:31) (66 - 88)  BP: 101/58 (19 @ 12:31) (101/58 - 113/64)  RR: 16 (19 @ 12:31) (16 - 18)  SpO2: 98% (19 @ 11:37) (98% - 100%)      19 @ 07:01  -  19 @ 07:00  --------------------------------------------------------  IN: 0 mL / OUT: 1300 mL / NET: -1300 mL        Physical Exam:   GENERAL: NAD  HEENT: NCAT  CHEST/LUNG: CTAB  HEART: Regular rate and rhythm; s1 s2 appreciated, No murmurs, rubs, or gallops  ABDOMEN: Soft, Nontender, Nondistended; Bowel sounds present  EXTREMITIES: No LE edema b/l  NERVOUS SYSTEM:  Alert & Oriented X3        Labs:                         9.8    7.99  )-----------( 180      ( 27 Sep 2019 08:35 )             31.0     Neutophil% 55.2, Lymphocyte% 12.7, Monocyte% 12.2, Bands% 0.4 19 @ 21:00    27 Sep 2019 08:35    142    |  115    |  45     ----------------------------<  117    4.1     |  18     |  3.2      Ca    11.0       27 Sep 2019 08:35  Phos  4.5       27 Sep 2019 08:35  Mg     2.3       27 Sep 2019 08:35    TPro  6.3    /  Alb  4.0    /  TBili  0.3    /  DBili  x      /  AST       /  ALT  11     /  AlkPhos  146    26 Sep 2019 21:00                    Urinalysis Basic - ( 26 Sep 2019 21:00 )    Color: Light Yellow / Appearance: Clear / S.015 / pH: x  Gluc: x / Ketone: Negative  / Bili: Negative / Urobili: <2 mg/dL   Blood: x / Protein: 30 mg/dL / Nitrite: Positive   Leuk Esterase: Large / RBC: 2 /HPF /  /HPF   Sq Epi: x / Non Sq Epi: 0 /HPF / Bacteria: Many Hospital Day:  1d    Subjective:    Patient is a 63y old  Male who presents with a chief complaint of hypercalcemia / ASHLEY. No acute events since admission to floors, "feeling much better'.       Past Medical Hx:   Lymphoma  Schizophrenia  Kidney stones    Past Sx:  Retained urethral stent    Allergies:  No Known Allergies    Current Meds:   Standng Meds:  benztropine 2 milliGRAM(s) Oral daily  chlorhexidine 4% Liquid 1 Application(s) Topical <User Schedule>  fluPHENAZine 10 milliGRAM(s) Oral daily  heparin  Injectable 5000 Unit(s) SubCutaneous every 8 hours  pamidronate IVPB 60 milliGRAM(s) IV Intermittent once  sodium chloride 0.9%. 1000 milliLiter(s) (200 mL/Hr) IV Continuous <Continuous>    PRN Meds:    HOME MEDICATIONS:  benztropine 2 mg oral tablet: 1 tab(s) orally once a day (at bedtime)  fluPHENAZine 10 mg oral tablet: 1 tab(s) orally once a day      Vital Signs:   T(F): 97.3 (19 @ 12:31), Max: 97.5 (19 @ 05:42)  HR: 73 (19 @ 12:31) (66 - 88)  BP: 101/58 (19 @ 12:31) (101/58 - 113/64)  RR: 16 (19 @ 12:31) (16 - 18)  SpO2: 98% (19 @ 11:37) (98% - 100%)      19 @ 07:01  -  19 @ 07:00  --------------------------------------------------------  IN: 0 mL / OUT: 1300 mL / NET: -1300 mL        Physical Exam:   GENERAL: NAD  HEENT: NCAT  CHEST/LUNG: CTAB  HEART: Regular rate and rhythm; s1 s2 appreciated, No murmurs, rubs, or gallops  ABDOMEN: Soft, Nontender, Nondistended; Bowel sounds present  EXTREMITIES: No LE edema b/l  NERVOUS SYSTEM:  Alert & Oriented X3        Labs:                         9.8    7.99  )-----------( 180      ( 27 Sep 2019 08:35 )             31.0     Neutophil% 55.2, Lymphocyte% 12.7, Monocyte% 12.2, Bands% 0.4 19 @ 21:00    27 Sep 2019 08:35    142    |  115    |  45     ----------------------------<  117    4.1     |  18     |  3.2      Ca    11.0       27 Sep 2019 08:35  Phos  4.5       27 Sep 2019 08:35  Mg     2.3       27 Sep 2019 08:35    TPro  6.3    /  Alb  4.0    /  TBili  0.3    /  DBili  x      /  AST       /  ALT  11     /  AlkPhos  146    26 Sep 2019 21:00                    Urinalysis Basic - ( 26 Sep 2019 21:00 )    Color: Light Yellow / Appearance: Clear / S.015 / pH: x  Gluc: x / Ketone: Negative  / Bili: Negative / Urobili: <2 mg/dL   Blood: x / Protein: 30 mg/dL / Nitrite: Positive   Leuk Esterase: Large / RBC: 2 /HPF /  /HPF   Sq Epi: x / Non Sq Epi: 0 /HPF / Bacteria: Many

## 2019-09-28 LAB
ANION GAP SERPL CALC-SCNC: 6 MMOL/L — LOW (ref 7–14)
BASOPHILS # BLD AUTO: 0.03 K/UL — SIGNIFICANT CHANGE UP (ref 0–0.2)
BASOPHILS NFR BLD AUTO: 0.5 % — SIGNIFICANT CHANGE UP (ref 0–1)
BUN SERPL-MCNC: 38 MG/DL — HIGH (ref 10–20)
CALCIUM SERPL-MCNC: 10.8 MG/DL — HIGH (ref 8.5–10.1)
CALCIUM UR-MCNC: 20.7 MG/DL — SIGNIFICANT CHANGE UP
CHLORIDE SERPL-SCNC: 117 MMOL/L — HIGH (ref 98–110)
CO2 SERPL-SCNC: 18 MMOL/L — SIGNIFICANT CHANGE UP (ref 17–32)
CREAT SERPL-MCNC: 3.1 MG/DL — HIGH (ref 0.7–1.5)
EOSINOPHIL # BLD AUTO: 1.13 K/UL — HIGH (ref 0–0.7)
EOSINOPHIL NFR BLD AUTO: 19.7 % — HIGH (ref 0–8)
GLUCOSE SERPL-MCNC: 90 MG/DL — SIGNIFICANT CHANGE UP (ref 70–99)
HCT VFR BLD CALC: 29.3 % — LOW (ref 42–52)
HCV AB S/CO SERPL IA: 0.11 S/CO — SIGNIFICANT CHANGE UP (ref 0–0.99)
HCV AB SERPL-IMP: SIGNIFICANT CHANGE UP
HGB BLD-MCNC: 9.3 G/DL — LOW (ref 14–18)
IMM GRANULOCYTES NFR BLD AUTO: 0.3 % — SIGNIFICANT CHANGE UP (ref 0.1–0.3)
LYMPHOCYTES # BLD AUTO: 0.75 K/UL — LOW (ref 1.2–3.4)
LYMPHOCYTES # BLD AUTO: 13.1 % — LOW (ref 20.5–51.1)
MAGNESIUM SERPL-MCNC: 2.1 MG/DL — SIGNIFICANT CHANGE UP (ref 1.8–2.4)
MCHC RBC-ENTMCNC: 28.3 PG — SIGNIFICANT CHANGE UP (ref 27–31)
MCHC RBC-ENTMCNC: 31.7 G/DL — LOW (ref 32–37)
MCV RBC AUTO: 89.1 FL — SIGNIFICANT CHANGE UP (ref 80–94)
MONOCYTES # BLD AUTO: 0.61 K/UL — HIGH (ref 0.1–0.6)
MONOCYTES NFR BLD AUTO: 10.6 % — HIGH (ref 1.7–9.3)
NEUTROPHILS # BLD AUTO: 3.2 K/UL — SIGNIFICANT CHANGE UP (ref 1.4–6.5)
NEUTROPHILS NFR BLD AUTO: 55.8 % — SIGNIFICANT CHANGE UP (ref 42.2–75.2)
NRBC # BLD: 0 /100 WBCS — SIGNIFICANT CHANGE UP (ref 0–0)
PHOSPHATE 24H UR-MCNC: 55.4 MG/DL — SIGNIFICANT CHANGE UP
PLATELET # BLD AUTO: 151 K/UL — SIGNIFICANT CHANGE UP (ref 130–400)
POTASSIUM SERPL-MCNC: 4.5 MMOL/L — SIGNIFICANT CHANGE UP (ref 3.5–5)
POTASSIUM SERPL-SCNC: 4.5 MMOL/L — SIGNIFICANT CHANGE UP (ref 3.5–5)
RBC # BLD: 3.29 M/UL — LOW (ref 4.7–6.1)
RBC # FLD: 15 % — HIGH (ref 11.5–14.5)
SODIUM SERPL-SCNC: 141 MMOL/L — SIGNIFICANT CHANGE UP (ref 135–146)
TSH SERPL-MCNC: 3.49 UIU/ML — SIGNIFICANT CHANGE UP (ref 0.27–4.2)
VIT D25+D1,25 OH+D1,25 PNL SERPL-MCNC: 67.5 PG/ML — SIGNIFICANT CHANGE UP (ref 19.9–79.3)
WBC # BLD: 5.74 K/UL — SIGNIFICANT CHANGE UP (ref 4.8–10.8)
WBC # FLD AUTO: 5.74 K/UL — SIGNIFICANT CHANGE UP (ref 4.8–10.8)

## 2019-09-28 RX ORDER — SODIUM CHLORIDE 9 MG/ML
1000 INJECTION INTRAMUSCULAR; INTRAVENOUS; SUBCUTANEOUS
Refills: 0 | Status: DISCONTINUED | OUTPATIENT
Start: 2019-09-28 | End: 2019-09-29

## 2019-09-28 RX ADMIN — Medication 2 MILLIGRAM(S): at 11:23

## 2019-09-28 RX ADMIN — SODIUM CHLORIDE 150 MILLILITER(S): 9 INJECTION INTRAMUSCULAR; INTRAVENOUS; SUBCUTANEOUS at 22:28

## 2019-09-28 RX ADMIN — HEPARIN SODIUM 5000 UNIT(S): 5000 INJECTION INTRAVENOUS; SUBCUTANEOUS at 14:56

## 2019-09-28 RX ADMIN — HEPARIN SODIUM 5000 UNIT(S): 5000 INJECTION INTRAVENOUS; SUBCUTANEOUS at 05:02

## 2019-09-28 RX ADMIN — HEPARIN SODIUM 5000 UNIT(S): 5000 INJECTION INTRAVENOUS; SUBCUTANEOUS at 21:11

## 2019-09-28 RX ADMIN — FLUPHENAZINE HYDROCHLORIDE 10 MILLIGRAM(S): 1 TABLET, FILM COATED ORAL at 11:23

## 2019-09-28 RX ADMIN — SODIUM CHLORIDE 150 MILLILITER(S): 9 INJECTION INTRAMUSCULAR; INTRAVENOUS; SUBCUTANEOUS at 11:25

## 2019-09-28 RX ADMIN — SODIUM CHLORIDE 150 MILLILITER(S): 9 INJECTION INTRAMUSCULAR; INTRAVENOUS; SUBCUTANEOUS at 15:45

## 2019-09-28 RX ADMIN — SODIUM CHLORIDE 200 MILLILITER(S): 9 INJECTION INTRAMUSCULAR; INTRAVENOUS; SUBCUTANEOUS at 09:55

## 2019-09-28 NOTE — PROGRESS NOTE ADULT - ASSESSMENT
62 yo M with PMH of B cell lymphoma (not on any current treatment, oncologist- Dr. Granger), CKD, paranoid schizophrenia and history of rt urethral stent with multiple kidney stones sent to ED by nephro Dr. Jones due to elevated Ca on outpatient labs (12.8) and ASHLEY     # Acute hypercalcemia most likely secondary to malignancy  - Outpt labs: vit D 1,25 of 89 in setting of known lymphoma - this is likely source of hypercalcemia  - Ca 11.2 <-11 <- 12.4 with albumin of 4 on admission  - S/p Pamidronate 60mg IV stat dose  - EKG: NSR  - Noted Nephrology (Dr. Jones) rec  - Noted Hem/onc rec: will obtain rec labs and records     - C/w NS at 200 cc / hr  - Park with target urine output 100-150 cc / hr (350 in bag this am)  - Vit D 25 and Vit D 1,25 wnl  - F/u PTH, PTHrP, TSH, Po4, serum and urine electrophoresis  ACE level  - CT abd/pelvis w/o contrast: Extensive intra-abdominal lymphadenopathy, in keeping with relapse of B-cell lymphoma;  Hepatosplenomegaly; Small pleural effusions.  - Thyroid US: No suspicious thyroid nodules; B/l lymphadenopathy in the neck compatible w/ known h/o lymphoma.  - F/u Renal US reading    # ASHLEY on CKD:  - Cr ____<- 3.2 (baseline ~ 2.5)  - C/w with NS at 200 cc / hr   - Urine studies: no significant proteinuria    # Schizophrenia: - c/w home meds   # Asymptomatic bacteuria - no need for Abx    # DVT ppx: heparin SC  # GI ppx: not indicated  # Regular diet  # from [place] 62 yo M with PMH of B cell lymphoma (not on any current treatment, oncologist- Dr. Granger), CKD, paranoid schizophrenia and history of rt urethral stent with multiple kidney stones sent to ED by nephro Dr. Jones due to elevated Ca on outpatient labs (12.8) and ASHLEY     # Acute hypercalcemia most likely secondary to malignancy  - Outpt labs: vit D 1,25 of 89 in setting of known lymphoma - this is likely source of hypercalcemia  - Ca trend: 10.8 <-11.2 <-11 <- 12.4  - S/p Pamidronate 60mg IV stat dose  - EKG: NSR  - Noted Nephrology (Dr. Jones) rec  - Noted Hem/onc rec: will obtain rec labs and records     - C/w NS at 200 cc / hr  - Park with target urine output 100-150 cc / hr (350 in bag this am)  - Vit D 25 and Vit D 1,25 wnl  - F/u PTH, PTHrP, TSH, Po4, serum and urine electrophoresis  ACE level  - CT abd/pelvis w/o contrast: Extensive intra-abdominal lymphadenopathy, in keeping with relapse of B-cell lymphoma;  Hepatosplenomegaly; Small pleural effusions.  - Thyroid US: No suspicious thyroid nodules; B/l lymphadenopathy in the neck compatible w/ known h/o lymphoma.  - F/u Renal US reading    # ASHLEY on CKD:  - Cr ____<- 3.2 (baseline ~ 2.5)  - C/w with NS at 200 cc / hr   - Urine studies: no significant proteinuria    # Schizophrenia: - c/w home meds   # Asymptomatic bacteuria - no need for Abx    # DVT ppx: heparin SC  # GI ppx: not indicated  # Regular diet  # from [place] 62 yo M with PMH of B cell lymphoma (not on any current treatment, oncologist- Dr. Granger), CKD, paranoid schizophrenia and history of rt urethral stent with multiple kidney stones sent to ED by nephro Dr. Jones due to elevated Ca on outpatient labs (12.8) and ASHLEY     # Acute hypercalcemia most likely secondary to malignancy  - Outpt labs: vit D 1,25 of 89 in setting of known lymphoma - this is likely source of hypercalcemia  - Ca trend: 10.8 <-11.2 <-11 <- 12.4  - S/p Pamidronate 60mg IV stat dose  - EKG: NSR  - Noted Nephrology (Dr. Jones) rec  - Noted Hem/onc rec: will obtain rec labs and records     - C/w NS at 200 cc / hr  - Park with target urine output 100-150 cc / hr (350 in bag this am)  - Vit D 25, Vit D 1,25, Phosphorous wnl  - F/u PTH, PTHrP, TSH, serum and urine electrophoresis  ACE level  - CT abd/pelvis w/o contrast: Extensive intra-abdominal lymphadenopathy, in keeping with relapse of B-cell lymphoma; Hepatosplenomegaly; Small pleural effusions.  - Thyroid US: No suspicious thyroid nodules; B/l lymphadenopathy in the neck compatible w/ known h/o lymphoma.  - F/u Renal US reading    # ASHLEY on CKD:  - Cr 3.1 <- 3.2 (baseline ~ 2.5)  - C/w with NS at 200 cc / hr   - Urine studies: no significant proteinuria    # Schizophrenia: - c/w home meds   # Asymptomatic bacteuria - no need for Abx    # DVT ppx: heparin SC  # GI ppx: not indicated  # Regular diet  # from [place] 64 yo M with PMH of B cell lymphoma (not on any current treatment, oncologist- Dr. Granger), CKD, paranoid schizophrenia and history of rt urethral stent with multiple kidney stones sent to ED by nephro Dr. Jones due to elevated Ca on outpatient labs (12.8) and ASHLEY     # Acute hypercalcemia most likely secondary to malignancy  - Outpt labs: vit D 1,25 of 89 in setting of known lymphoma - this is likely source of hypercalcemia  - Ca trend: 10.8 <-11.2 <-11 <- 12.4  - S/p Pamidronate 60mg IV stat dose  - EKG: NSR    - C/w NS at 150 cc / hr  - Monitor I&O  - Vit D 25, Vit D 1,25, Phosphorous wnl  - CT abd/pelvis w/o contrast: Extensive intra-abdominal lymphadenopathy, in keeping with relapse of B-cell lymphoma; Hepatosplenomegaly; Small pleural effusions.  - Thyroid US: No suspicious thyroid nodules; B/l lymphadenopathy in the neck compatible w/ known h/o lymphoma.  - Nephrology following: Dr. Jones knows the pt very well  - Hem/onc following  - F/u Renal US reading  - F/u PTH, PTHrP, TSH, serum and urine electrophoresis  ACE level    # ASHLEY on CKD:  - Cr 3.1 <- 3.2 (baseline ~ 2.5)  - C/w with NS at 150 cc / hr   - Urine studies: no significant proteinuria  - D/c andrade    # Schizophrenia: - c/w home meds   # Asymptomatic bacteuria - no need for Abx    # DVT ppx: heparin SC  # GI ppx: not indicated  # Regular diet  # from home

## 2019-09-28 NOTE — PROGRESS NOTE ADULT - SUBJECTIVE AND OBJECTIVE BOX
[facility] FOLLOW UP NOTE  --------------------------------------------------------------------------------  Chief Complaint/24 hour events/subjective:    feel better    PAST HISTORY  --------------------------------------------------------------------------------  No significant changes to PMH, PSH, FHx, SHx, unless otherwise noted    ALLERGIES & MEDICATIONS  --------------------------------------------------------------------------------  Allergies    No Known Allergies    Intolerances      Standing Inpatient Medications  benztropine 2 milliGRAM(s) Oral daily  chlorhexidine 4% Liquid 1 Application(s) Topical <User Schedule>  fluPHENAZine 10 milliGRAM(s) Oral daily  heparin  Injectable 5000 Unit(s) SubCutaneous every 8 hours  sodium chloride 0.9%. 1000 milliLiter(s) IV Continuous <Continuous>    PRN Inpatient Medications      REVIEW OF SYSTEMS  --------------------------------------------------------------------------------    All other systems were reviewed and are negative, except as noted.    VITALS/PHYSICAL EXAM  --------------------------------------------------------------------------------  T(C): 36.7 (09-28-19 @ 05:52), Max: 36.7 (09-27-19 @ 21:22)  HR: 72 (09-28-19 @ 05:52) (72 - 86)  BP: 101/59 (09-28-19 @ 05:52) (101/59 - 108/64)  RR: 18 (09-28-19 @ 05:52) (18 - 18)  SpO2: --  Wt(kg): --  Height (cm): 182.9 (09-27-19 @ 05:42)  Weight (kg): 96.9 (09-27-19 @ 05:42)  BMI (kg/m2): 29 (09-27-19 @ 05:42)  BSA (m2): 2.19 (09-27-19 @ 05:42)      09-27-19 @ 07:01  -  09-28-19 @ 07:00  --------------------------------------------------------  IN: 2450 mL / OUT: 900 mL / NET: 1550 mL    09-28-19 @ 07:01  -  09-28-19 @ 16:05  --------------------------------------------------------  IN: 200 mL / OUT: 0 mL / NET: 200 mL      Physical Exam:    	Gen: no distress   	Pulm: CTA B/L  	CV: S1S2; no rub  	Abd: +BS, soft, nontender/nondistended  	LE:  no  edema      LABS/STUDIES  --------------------------------------------------------------------------------              9.3    5.74  >-----------<  151      [09-28-19 @ 08:03]              29.3     141  |  117  |  38  ----------------------------<  90      [09-28-19 @ 08:03]  4.5   |  18  |  3.1        Ca     10.8     [09-28-19 @ 08:03]      Mg     2.1     [09-28-19 @ 08:03]      Phos  4.5     [09-27-19 @ 08:35]    TPro  5.2  /  Alb  x   /  TBili  x   /  DBili  x   /  AST  x   /  ALT  x   /  AlkPhos  x   [09-27-19 @ 08:35]          Creatinine Trend:  SCr 3.1 [09-28 @ 08:03]  SCr 3.2 [09-27 @ 22:31]  SCr 3.2 [09-27 @ 08:35]  SCr 3.4 [09-26 @ 21:00]    Urinalysis - [09-26-19 @ 21:00]      Color Light Yellow / Appearance Clear / SG 1.015 / pH 6.0      Gluc Negative / Ketone Negative  / Bili Negative / Urobili <2 mg/dL       Blood Trace / Protein 30 mg/dL / Leuk Est Large / Nitrite Positive      RBC 2 /  / Hyaline 2 / Gran  / Sq Epi  / Non Sq Epi 0 / Bacteria Many    Urine Creatinine 66      [09-27-19 @ 01:35]  Urine Protein 44      [09-27-19 @ 01:35]  Urine Sodium 106.0      [09-27-19 @ 01:35]  Urine Potassium 16      [09-27-19 @ 01:35]  Urine Phosphorus 55.4      [09-27-19 @ 14:01]  Urine Osmolality 414      [09-27-19 @ 01:35]    Vitamin D (25OH) 32      [09-27-19 @ 08:35]    HCV 0.11, Nonreact      [09-27-19 @ 08:35]

## 2019-09-28 NOTE — PROGRESS NOTE ADULT - ASSESSMENT
·	ASHLEY/CKD - baseline creatinine 2.5 mg/dL, slowly improving cr  ·	atrophic left kidney and right ureteral surgical repair  ·	hypercalcemia - out pt labs with vit D 1,25 of 89 in setting of known lymphoma - this is likely source - calcium improved with ivf  ·	anemia    1) f/u ionized calcium, PTH, PTH related protein, ACE level, vit D 25, vit D 1,25  2)  hemonc consult - know to Dr Granger of [facility]  3)  recommend to decrease  cc per hour  4) f/u daily BMP

## 2019-09-28 NOTE — PROGRESS NOTE ADULT - SUBJECTIVE AND OBJECTIVE BOX
Hospital Day:  2d    Subjective:    Patient is a 63y old  Male who presents with a chief complaint of hypercalcemia / ASHLEY (27 Sep 2019 14:31)      Past Medical Hx:   Lymphoma  Schizophrenia  Kidney stones    Past Sx:  Retained urethral stent    Allergies:  No Known Allergies    Current Meds:   Standng Meds:  benztropine 2 milliGRAM(s) Oral daily  chlorhexidine 4% Liquid 1 Application(s) Topical <User Schedule>  fluPHENAZine 10 milliGRAM(s) Oral daily  heparin  Injectable 5000 Unit(s) SubCutaneous every 8 hours  sodium chloride 0.9%. 1000 milliLiter(s) (200 mL/Hr) IV Continuous <Continuous>    PRN Meds:    HOME MEDICATIONS:  benztropine 2 mg oral tablet: 1 tab(s) orally once a day (at bedtime)  fluPHENAZine 10 mg oral tablet: 1 tab(s) orally once a day      Vital Signs:   T(F): 98.1 (19 @ 05:52), Max: 98.1 (19 @ 05:52)  HR: 72 (19 @ 05:52) (72 - 86)  BP: 101/59 (19 @ 05:52) (101/58 - 108/64)  RR: 18 (19 @ 05:52) (16 - 18)  SpO2: 98% (19 @ 09:00) (98% - 98%)      19 @ 07:01  -  19 @ 07:00  --------------------------------------------------------  IN: 2450 mL / OUT: 900 mL / NET: 1550 mL        Physical Exam:   GENERAL: NAD  HEENT: NCAT  CHEST/LUNG: CTAB  HEART: Regular rate and rhythm; s1 s2 appreciated, No murmurs, rubs, or gallops  ABDOMEN: Soft, Nontender, Nondistended; Bowel sounds present  EXTREMITIES: No LE edema b/l  NERVOUS SYSTEM:  Alert & Oriented X3        Labs:                         9.8    7.99  )-----------( 180      ( 27 Sep 2019 08:35 )             31.0       27 Sep 2019 22:31    139    |  111    |  40     ----------------------------<  117    4.1     |  20     |  3.2      Ca    11.2       27 Sep 2019 22:31  Phos  4.5       27 Sep 2019 08:35  Mg     2.3       27 Sep 2019 08:35    TPro  5.2    /  Alb  x      /  TBili  x      /  DBili  x      /  AST  x      /  ALT  x      /  AlkPhos  x      27 Sep 2019 08:35                    Urinalysis Basic - ( 26 Sep 2019 21:00 )    Color: Light Yellow / Appearance: Clear / S.015 / pH: x  Gluc: x / Ketone: Negative  / Bili: Negative / Urobili: <2 mg/dL   Blood: x / Protein: 30 mg/dL / Nitrite: Positive   Leuk Esterase: Large / RBC: 2 /HPF /  /HPF   Sq Epi: x / Non Sq Epi: 0 /HPF / Bacteria: Many Hospital Day:  2d    Subjective:    Patient is a 63y old  Male who presents with a chief complaint of hypercalcemia / ASHLEY. No acute events overnight and in no distress this am, eating breakfast this am.       Past Medical Hx:   Lymphoma  Schizophrenia  Kidney stones    Past Sx:  Retained urethral stent    Allergies:  No Known Allergies    Current Meds:   Standng Meds:  benztropine 2 milliGRAM(s) Oral daily  chlorhexidine 4% Liquid 1 Application(s) Topical <User Schedule>  fluPHENAZine 10 milliGRAM(s) Oral daily  heparin  Injectable 5000 Unit(s) SubCutaneous every 8 hours  sodium chloride 0.9%. 1000 milliLiter(s) (200 mL/Hr) IV Continuous <Continuous>    PRN Meds:    HOME MEDICATIONS:  benztropine 2 mg oral tablet: 1 tab(s) orally once a day (at bedtime)  fluPHENAZine 10 mg oral tablet: 1 tab(s) orally once a day      Vital Signs:   T(F): 98.1 (19 @ 05:52), Max: 98.1 (19 @ 05:52)  HR: 72 (19 @ 05:52) (72 - 86)  BP: 101/59 (19 @ 05:52) (101/58 - 108/64)  RR: 18 (19 @ 05:52) (16 - 18)  SpO2: 98% (19 @ 09:00) (98% - 98%)      19 @ 07:01  -  19 @ 07:00  --------------------------------------------------------  IN: 2450 mL / OUT: 900 mL / NET: 1550 mL        Physical Exam:   GENERAL: NAD  HEENT: NCAT  CHEST/LUNG: CTAB  HEART: Regular rate and rhythm; s1 s2 appreciated, No murmurs, rubs, or gallops  ABDOMEN: Soft, Nontender, Nondistended; Bowel sounds present  EXTREMITIES: No LE edema b/l  NERVOUS SYSTEM:  Alert & Oriented X3        Labs:                         9.8    7.99  )-----------( 180      ( 27 Sep 2019 08:35 )             31.0       27 Sep 2019 22:31    139    |  111    |  40     ----------------------------<  117    4.1     |  20     |  3.2      Ca    11.2       27 Sep 2019 22:31  Phos  4.5       27 Sep 2019 08:35  Mg     2.3       27 Sep 2019 08:35    TPro  5.2    /  Alb  x      /  TBili  x      /  DBili  x      /  AST  x      /  ALT  x      /  AlkPhos  x      27 Sep 2019 08:35                    Urinalysis Basic - ( 26 Sep 2019 21:00 )    Color: Light Yellow / Appearance: Clear / S.015 / pH: x  Gluc: x / Ketone: Negative  / Bili: Negative / Urobili: <2 mg/dL   Blood: x / Protein: 30 mg/dL / Nitrite: Positive   Leuk Esterase: Large / RBC: 2 /HPF /  /HPF   Sq Epi: x / Non Sq Epi: 0 /HPF / Bacteria: Many Hospital Day:  2d    Subjective:    Patient is a 63y old  Male who presents with a chief complaint of hypercalcemia / ASHLEY. No acute events overnight and in no distress this am, eating breakfast       Past Medical Hx:   Lymphoma  Schizophrenia  Kidney stones    Past Sx:  Retained urethral stent    Allergies:  No Known Allergies    Current Meds:   Standng Meds:  benztropine 2 milliGRAM(s) Oral daily  chlorhexidine 4% Liquid 1 Application(s) Topical <User Schedule>  fluPHENAZine 10 milliGRAM(s) Oral daily  heparin  Injectable 5000 Unit(s) SubCutaneous every 8 hours  sodium chloride 0.9%. 1000 milliLiter(s) (200 mL/Hr) IV Continuous <Continuous>    PRN Meds:    HOME MEDICATIONS:  benztropine 2 mg oral tablet: 1 tab(s) orally once a day (at bedtime)  fluPHENAZine 10 mg oral tablet: 1 tab(s) orally once a day      Vital Signs:   T(F): 98.1 (19 @ 05:52), Max: 98.1 (19 @ 05:52)  HR: 72 (19 @ 05:52) (72 - 86)  BP: 101/59 (19 @ 05:52) (101/58 - 108/64)  RR: 18 (19 @ 05:52) (16 - 18)  SpO2: 98% (19 @ 09:00) (98% - 98%)      19 @ 07:01  -  19 @ 07:00  --------------------------------------------------------  IN: 2450 mL / OUT: 900 mL / NET: 1550 mL        Physical Exam:   GENERAL: NAD  HEENT: NCAT  CHEST/LUNG: CTAB  HEART: Regular rate and rhythm; s1 s2 appreciated, No murmurs, rubs, or gallops  ABDOMEN: Soft, Nontender, Nondistended; Bowel sounds present  EXTREMITIES: No LE edema b/l  NERVOUS SYSTEM:  Alert & Oriented X3        Labs:                         9.8    7.99  )-----------( 180      ( 27 Sep 2019 08:35 )             31.0       27 Sep 2019 22:31    139    |  111    |  40     ----------------------------<  117    4.1     |  20     |  3.2      Ca    11.2       27 Sep 2019 22:31  Phos  4.5       27 Sep 2019 08:35  Mg     2.3       27 Sep 2019 08:35    TPro  5.2    /  Alb  x      /  TBili  x      /  DBili  x      /  AST  x      /  ALT  x      /  AlkPhos  x      27 Sep 2019 08:35                    Urinalysis Basic - ( 26 Sep 2019 21:00 )    Color: Light Yellow / Appearance: Clear / S.015 / pH: x  Gluc: x / Ketone: Negative  / Bili: Negative / Urobili: <2 mg/dL   Blood: x / Protein: 30 mg/dL / Nitrite: Positive   Leuk Esterase: Large / RBC: 2 /HPF /  /HPF   Sq Epi: x / Non Sq Epi: 0 /HPF / Bacteria: Many

## 2019-09-28 NOTE — PROGRESS NOTE ADULT - SUBJECTIVE AND OBJECTIVE BOX
Patient was seen and examined. Spoke with RN. Chart reviewed.  No events overnight.  Vital Signs Last 24 Hrs  T(F): 98.1 (28 Sep 2019 05:52), Max: 98.1 (28 Sep 2019 05:52)  HR: 72 (28 Sep 2019 05:52) (72 - 86)  BP: 101/59 (28 Sep 2019 05:52) (101/58 - 108/64)  SpO2: --  MEDICATIONS  (STANDING):  benztropine 2 milliGRAM(s) Oral daily  chlorhexidine 4% Liquid 1 Application(s) Topical <User Schedule>  fluPHENAZine 10 milliGRAM(s) Oral daily  heparin  Injectable 5000 Unit(s) SubCutaneous every 8 hours  sodium chloride 0.9%. 1000 milliLiter(s) (200 mL/Hr) IV Continuous <Continuous>    MEDICATIONS  (PRN):    Labs:                        9.3    5.74  )-----------( 151      ( 28 Sep 2019 08:03 )             29.3                         9.8    7.99  )-----------( 180      ( 27 Sep 2019 08:35 )             31.0     28 Sep 2019 08:03    141    |  117    |  38     ----------------------------<  90     4.5     |  18     |  3.1    27 Sep 2019 22:31    139    |  111    |  40     ----------------------------<  117    4.1     |  20     |  3.2      Ca    10.8       28 Sep 2019 08:03  Ca    11.2       27 Sep 2019 22:31  Phos  4.5       27 Sep 2019 08:35  Mg     2.1       28 Sep 2019 08:03  Mg     2.3       27 Sep 2019 08:35    TPro  5.2    /  Alb  x      /  TBili  x      /  DBili  x      /  AST  x      /  ALT  x      /  AlkPhos  x      27 Sep 2019 08:35  TPro  6.3    /  Alb  4.0    /  TBili  0.3    /  DBili  x      /  AST  26     /  ALT  11     /  AlkPhos  146    26 Sep 2019 21:00      Urinalysis Basic - ( 26 Sep 2019 21:00 )    Color: Light Yellow / Appearance: Clear / S.015 / pH: x  Gluc: x / Ketone: Negative  / Bili: Negative / Urobili: <2 mg/dL   Blood: x / Protein: 30 mg/dL / Nitrite: Positive   Leuk Esterase: Large / RBC: 2 /HPF /  /HPF   Sq Epi: x / Non Sq Epi: 0 /HPF / Bacteria: Many        General: comfortable, NAD  Neurology: A&Ox3, nonfocal  Head:  Normocephalic, atraumatic  ENT:  Mucosa moist, no ulcerations  Neck:  Supple, no JVD,   Skin: no breakdowns (as per RN)  Resp: CTA B/L  CV: RRR, S1S2,   GI: Soft, NT, bowel sounds  MS: No edema, + peripheral pulses, FROM all 4 extremity      A/P:  62 yo M with PMH of B cell lymphoma (not on any current treatment, oncologist- Dr. Granger), CKD, paranoid schizophrenia and history of rt urethral stent with multiple kidney stones sent to ED by nephro Dr. Jones due to elevated Ca on outpatient labs (12.8) and ASHLEY   Acute hypercalcemia    ASHLEY on CKD    - daily labs   - monitor renal function  - IVf as per renal    - renal f/u   - heme/onc f/u   - DVT prophylaxis  - Decubitus prevention- all measures as per RN protocol  - Please call or text me with any questions or updates

## 2019-09-29 LAB
-  AMIKACIN: SIGNIFICANT CHANGE UP
-  AMPICILLIN/SULBACTAM: SIGNIFICANT CHANGE UP
-  AMPICILLIN: SIGNIFICANT CHANGE UP
-  AZTREONAM: SIGNIFICANT CHANGE UP
-  CEFAZOLIN: SIGNIFICANT CHANGE UP
-  CEFEPIME: SIGNIFICANT CHANGE UP
-  CEFOXITIN: SIGNIFICANT CHANGE UP
-  CEFTRIAXONE: SIGNIFICANT CHANGE UP
-  CIPROFLOXACIN: SIGNIFICANT CHANGE UP
-  GENTAMICIN: SIGNIFICANT CHANGE UP
-  IMIPENEM: SIGNIFICANT CHANGE UP
-  LEVOFLOXACIN: SIGNIFICANT CHANGE UP
-  MEROPENEM: SIGNIFICANT CHANGE UP
-  NITROFURANTOIN: SIGNIFICANT CHANGE UP
-  PIPERACILLIN/TAZOBACTAM: SIGNIFICANT CHANGE UP
-  TIGECYCLINE: SIGNIFICANT CHANGE UP
-  TOBRAMYCIN: SIGNIFICANT CHANGE UP
-  TRIMETHOPRIM/SULFAMETHOXAZOLE: SIGNIFICANT CHANGE UP
ALBUMIN SERPL ELPH-MCNC: 3.3 G/DL — LOW (ref 3.5–5.2)
ANION GAP SERPL CALC-SCNC: 7 MMOL/L — SIGNIFICANT CHANGE UP (ref 7–14)
BASOPHILS # BLD AUTO: 0.03 K/UL — SIGNIFICANT CHANGE UP (ref 0–0.2)
BASOPHILS NFR BLD AUTO: 0.5 % — SIGNIFICANT CHANGE UP (ref 0–1)
BUN SERPL-MCNC: 35 MG/DL — HIGH (ref 10–20)
CALCIUM SERPL-MCNC: 11 MG/DL — HIGH (ref 8.4–10.5)
CALCIUM SERPL-MCNC: 9.9 MG/DL — SIGNIFICANT CHANGE UP (ref 8.5–10.1)
CHLORIDE SERPL-SCNC: 116 MMOL/L — HIGH (ref 98–110)
CO2 SERPL-SCNC: 18 MMOL/L — SIGNIFICANT CHANGE UP (ref 17–32)
CREAT SERPL-MCNC: 2.8 MG/DL — HIGH (ref 0.7–1.5)
CULTURE RESULTS: SIGNIFICANT CHANGE UP
EOSINOPHIL # BLD AUTO: 0.99 K/UL — HIGH (ref 0–0.7)
EOSINOPHIL NFR BLD AUTO: 15 % — HIGH (ref 0–8)
GLUCOSE SERPL-MCNC: 90 MG/DL — SIGNIFICANT CHANGE UP (ref 70–99)
HCT VFR BLD CALC: 30.3 % — LOW (ref 42–52)
HGB BLD-MCNC: 9.6 G/DL — LOW (ref 14–18)
IMM GRANULOCYTES NFR BLD AUTO: 0.2 % — SIGNIFICANT CHANGE UP (ref 0.1–0.3)
LDH SERPL L TO P-CCNC: 226 U/L — SIGNIFICANT CHANGE UP (ref 50–242)
LYMPHOCYTES # BLD AUTO: 0.79 K/UL — LOW (ref 1.2–3.4)
LYMPHOCYTES # BLD AUTO: 12 % — LOW (ref 20.5–51.1)
MAGNESIUM SERPL-MCNC: 2 MG/DL — SIGNIFICANT CHANGE UP (ref 1.8–2.4)
MCHC RBC-ENTMCNC: 28.1 PG — SIGNIFICANT CHANGE UP (ref 27–31)
MCHC RBC-ENTMCNC: 31.7 G/DL — LOW (ref 32–37)
MCV RBC AUTO: 88.6 FL — SIGNIFICANT CHANGE UP (ref 80–94)
METHOD TYPE: SIGNIFICANT CHANGE UP
MONOCYTES # BLD AUTO: 0.83 K/UL — HIGH (ref 0.1–0.6)
MONOCYTES NFR BLD AUTO: 12.6 % — HIGH (ref 1.7–9.3)
NEUTROPHILS # BLD AUTO: 3.95 K/UL — SIGNIFICANT CHANGE UP (ref 1.4–6.5)
NEUTROPHILS NFR BLD AUTO: 59.7 % — SIGNIFICANT CHANGE UP (ref 42.2–75.2)
NRBC # BLD: 0 /100 WBCS — SIGNIFICANT CHANGE UP (ref 0–0)
ORGANISM # SPEC MICROSCOPIC CNT: SIGNIFICANT CHANGE UP
ORGANISM # SPEC MICROSCOPIC CNT: SIGNIFICANT CHANGE UP
PLATELET # BLD AUTO: 157 K/UL — SIGNIFICANT CHANGE UP (ref 130–400)
POTASSIUM SERPL-MCNC: 4.3 MMOL/L — SIGNIFICANT CHANGE UP (ref 3.5–5)
POTASSIUM SERPL-SCNC: 4.3 MMOL/L — SIGNIFICANT CHANGE UP (ref 3.5–5)
PTH-INTACT FLD-MCNC: 7 PG/ML — LOW (ref 15–65)
RBC # BLD: 3.42 M/UL — LOW (ref 4.7–6.1)
RBC # FLD: 14.8 % — HIGH (ref 11.5–14.5)
SODIUM SERPL-SCNC: 141 MMOL/L — SIGNIFICANT CHANGE UP (ref 135–146)
SPECIMEN SOURCE: SIGNIFICANT CHANGE UP
WBC # BLD: 6.6 K/UL — SIGNIFICANT CHANGE UP (ref 4.8–10.8)
WBC # FLD AUTO: 6.6 K/UL — SIGNIFICANT CHANGE UP (ref 4.8–10.8)

## 2019-09-29 RX ORDER — SODIUM CHLORIDE 9 MG/ML
1000 INJECTION INTRAMUSCULAR; INTRAVENOUS; SUBCUTANEOUS
Refills: 0 | Status: DISCONTINUED | OUTPATIENT
Start: 2019-09-29 | End: 2019-09-30

## 2019-09-29 RX ADMIN — HEPARIN SODIUM 5000 UNIT(S): 5000 INJECTION INTRAVENOUS; SUBCUTANEOUS at 21:09

## 2019-09-29 RX ADMIN — HEPARIN SODIUM 5000 UNIT(S): 5000 INJECTION INTRAVENOUS; SUBCUTANEOUS at 05:19

## 2019-09-29 RX ADMIN — SODIUM CHLORIDE 150 MILLILITER(S): 9 INJECTION INTRAMUSCULAR; INTRAVENOUS; SUBCUTANEOUS at 05:19

## 2019-09-29 RX ADMIN — Medication 2 MILLIGRAM(S): at 11:47

## 2019-09-29 RX ADMIN — CHLORHEXIDINE GLUCONATE 1 APPLICATION(S): 213 SOLUTION TOPICAL at 05:19

## 2019-09-29 RX ADMIN — FLUPHENAZINE HYDROCHLORIDE 10 MILLIGRAM(S): 1 TABLET, FILM COATED ORAL at 11:47

## 2019-09-29 RX ADMIN — SODIUM CHLORIDE 100 MILLILITER(S): 9 INJECTION INTRAMUSCULAR; INTRAVENOUS; SUBCUTANEOUS at 17:11

## 2019-09-29 NOTE — PROGRESS NOTE ADULT - ASSESSMENT
62 yo M with PMH of B cell lymphoma (not on any current treatment, oncologist- Dr. Granger), CKD, paranoid schizophrenia and history of rt urethral stent with multiple kidney stones sent to ED by nephro Dr. Jones due to elevated Ca on outpatient labs (12.8) and ASHLEY     # Acute hypercalcemia most likely secondary to malignancy  - Outpt labs: vit D 1,25 of 89 in setting of known lymphoma - this is likely source of hypercalcemia  - Ca trend: 9.9<-10.8 <-11.2 <-11 <- 12.4  - S/p Pamidronate 60mg IV stat dose  - EKG: NSR    -decrease NS to 100 cc / hr  - Monitor I&O  - Vit D 25, Vit D 1,25, Phosphorous wnl  - CT abd/pelvis w/o contrast: Extensive intra-abdominal lymphadenopathy, in keeping with relapse of B-cell lymphoma; Hepatosplenomegaly; Small pleural effusions.  - Thyroid US: No suspicious thyroid nodules; B/l lymphadenopathy in the neck compatible w/ known h/o lymphoma.  - Nephrology following: Dr. Jones knows the pt very well  - Hem/onc following, awaiting f/u per medical attending prior to discharge  - F/u Renal US -Mild right hydronephrosis, decreased compared with remote prior, Small bilateral renal cysts, Left renal atrophy.  - F/u PTH- 7, PTHrP pending, TSH WNL, serum -low 5.2 and urine elevated 44 protein electrophoresis ,  ACE level pending, hep c and hep b negative.    # ASHLEY on CKD :  - Cr 3.1 <- 3.2 (baseline ~ 2.5)  - decrease NS to 100 cc / hr -calcum improved with Iv fluids  - Urine studies: no significant proteinuria  - D/c zafar    # Schizophrenia: - c/w home meds     # Asymptomatic bacteruria - no need for Abx    # DVT ppx: heparin SC  # GI ppx: not indicated  # Regular diet  # from home  Dispo: pending hemonc f/u, IV fluids at 100 cc per medical attending pending nephrology instructions to change.

## 2019-09-29 NOTE — PROGRESS NOTE ADULT - ASSESSMENT
·	ASHLEY/CKD - baseline creatinine 2.5 mg/dL, Cr continue to improve  ·	atrophic left kidney and right ureteral surgical repair  ·	hypercalcemia - resolved  ·	anemia      1)  recommend to decrease  cc per hour  2) f/u daily BMP  3) from renal point of view, can DC home with outpatient f/u

## 2019-09-29 NOTE — PROGRESS NOTE ADULT - SUBJECTIVE AND OBJECTIVE BOX
Patient was seen and examined. Spoke with RN. Chart reviewed.  No events overnight.  Vital Signs Last 24 Hrs  T(F): 98 (29 Sep 2019 05:00), Max: 98.5 (28 Sep 2019 21:20)  HR: 84 (29 Sep 2019 05:00) (78 - 84)  BP: 97/55 (29 Sep 2019 05:00) (97/55 - 120/57)  SpO2: --  MEDICATIONS  (STANDING):  benztropine 2 milliGRAM(s) Oral daily  chlorhexidine 4% Liquid 1 Application(s) Topical <User Schedule>  fluPHENAZine 10 milliGRAM(s) Oral daily  heparin  Injectable 5000 Unit(s) SubCutaneous every 8 hours  sodium chloride 0.9%. 1000 milliLiter(s) (150 mL/Hr) IV Continuous <Continuous>    MEDICATIONS  (PRN):    Labs:                        9.6    6.60  )-----------( 157      ( 29 Sep 2019 07:41 )             30.3                         9.3    5.74  )-----------( 151      ( 28 Sep 2019 08:03 )             29.3     29 Sep 2019 07:41    141    |  116    |  35     ----------------------------<  90     4.3     |  18     |  2.8    28 Sep 2019 08:03    141    |  117    |  38     ----------------------------<  90     4.5     |  18     |  3.1      Ca    9.9        29 Sep 2019 07:41  Ca    10.8       28 Sep 2019 08:03  Mg     2.0       29 Sep 2019 07:41  Mg     2.1       28 Sep 2019 08:03    TPro  x      /  Alb  3.3    /  TBili  x      /  DBili  x      /  AST  x      /  ALT  x      /  AlkPhos  x      29 Sep 2019 07:41          Culture - Urine (collected 26 Sep 2019 21:00)  Source: .Urine Clean Catch (Midstream)  Preliminary Report (28 Sep 2019 15:45):    >100,000 CFU/ml Gram Negative Rods      General: comfortable, NAD  Neurology: A&Ox3, nonfocal  Head:  Normocephalic, atraumatic  ENT:  Mucosa moist, no ulcerations  Neck:  Supple, no JVD,   Skin: no breakdowns (as per RN)  Resp: CTA B/L  CV: RRR, S1S2,   GI: Soft, NT, bowel sounds  MS: No edema, + peripheral pulses, FROM all 4 extremity      A/P:  62 yo M with PMH of B cell lymphoma (not on any current treatment, oncologist- Dr. Granger), CKD, paranoid schizophrenia and history of rt urethral stent with multiple kidney stones sent to ED by nephro Dr. Jones due to elevated Ca on outpatient labs (12.8) and ASHLEY   Acute hypercalcemia    ASHLEY on CKD    - Ca improved, renal improved   - daily labs   - monitor renal function  - IVf as per renal reduce to 100cc/h     - renal f/u   - heme/onc f/u   - DVT prophylaxis  - Decubitus prevention- all measures as per RN protocol  - Please call or text me with any questions or updates

## 2019-09-29 NOTE — PROGRESS NOTE ADULT - SUBJECTIVE AND OBJECTIVE BOX
[facility] FOLLOW UP NOTE  --------------------------------------------------------------------------------  Chief Complaint/24 hour events/subjective:    no c/o    PAST HISTORY  --------------------------------------------------------------------------------  No significant changes to PMH, PSH, FHx, SHx, unless otherwise noted    ALLERGIES & MEDICATIONS  --------------------------------------------------------------------------------  Allergies    No Known Allergies    Intolerances      Standing Inpatient Medications  benztropine 2 milliGRAM(s) Oral daily  chlorhexidine 4% Liquid 1 Application(s) Topical <User Schedule>  fluPHENAZine 10 milliGRAM(s) Oral daily  heparin  Injectable 5000 Unit(s) SubCutaneous every 8 hours  sodium chloride 0.9%. 1000 milliLiter(s) IV Continuous <Continuous>    PRN Inpatient Medications      REVIEW OF SYSTEMS  --------------------------------------------------------------------------------    All other systems were reviewed and are negative, except as noted.    VITALS/PHYSICAL EXAM  --------------------------------------------------------------------------------  T(C): 36.7 (09-29-19 @ 05:00), Max: 36.9 (09-28-19 @ 21:20)  HR: 84 (09-29-19 @ 05:00) (78 - 84)  BP: 97/55 (09-29-19 @ 05:00) (97/55 - 120/57)  RR: 18 (09-29-19 @ 05:00) (18 - 18)  SpO2: --  Wt(kg): --        09-28-19 @ 07:01  -  09-29-19 @ 07:00  --------------------------------------------------------  IN: 2710 mL / OUT: 3075 mL / NET: -365 mL      Physical Exam:    	Gen: no distress   	Pulm: CTA B/L  	CV: S1S2; no rub  	Abd: +BS, soft, nontender/nondistended  	LE:  no  edema      LABS/STUDIES  --------------------------------------------------------------------------------              9.6    6.60  >-----------<  157      [09-29-19 @ 07:41]              30.3     141  |  116  |  35  ----------------------------<  90      [09-29-19 @ 07:41]  4.3   |  18  |  2.8        Ca     9.9     [09-29-19 @ 07:41]      Mg     2.0     [09-29-19 @ 07:41]    TPro  x   /  Alb  3.3  /  TBili  x   /  DBili  x   /  AST  x   /  ALT  x   /  AlkPhos  x   [09-29-19 @ 07:41]          Creatinine Trend:  SCr 2.8 [09-29 @ 07:41]  SCr 3.1 [09-28 @ 08:03]  SCr 3.2 [09-27 @ 22:31]  SCr 3.2 [09-27 @ 08:35]  SCr 3.4 [09-26 @ 21:00]    Urinalysis - [09-26-19 @ 21:00]      Color Light Yellow / Appearance Clear / SG 1.015 / pH 6.0      Gluc Negative / Ketone Negative  / Bili Negative / Urobili <2 mg/dL       Blood Trace / Protein 30 mg/dL / Leuk Est Large / Nitrite Positive      RBC 2 /  / Hyaline 2 / Gran  / Sq Epi  / Non Sq Epi 0 / Bacteria Many    Urine Creatinine 66      [09-27-19 @ 01:35]  Urine Protein 44      [09-27-19 @ 01:35]  Urine Sodium 106.0      [09-27-19 @ 01:35]  Urine Potassium 16      [09-27-19 @ 01:35]  Urine Phosphorus 55.4      [09-27-19 @ 14:01]  Urine Osmolality 414      [09-27-19 @ 01:35]    PTH -- (Ca 11.0)      [09-27-19 @ 08:35]   7  Vitamin D (25OH) 32      [09-27-19 @ 08:35]  TSH 3.49      [09-28-19 @ 08:03]    HCV 0.11, Nonreact      [09-27-19 @ 08:35]

## 2019-09-29 NOTE — PROGRESS NOTE ADULT - SUBJECTIVE AND OBJECTIVE BOX
SUBJECTIVE:    there were no acute events overnight. the patient has no new complaints this am and wanted to know when he could go home. explained that attending wants patient to be seen by hematology/oncology again prior to discharge.     PAST MEDICAL & SURGICAL HISTORY  Lymphoma  Schizophrenia  Kidney stones  Retained urethral stent    SOCIAL HISTORY:  Negative for smoking/alcohol/drug use.     ALLERGIES:  No Known Allergies    MEDICATIONS:  STANDING MEDICATIONS  benztropine 2 milliGRAM(s) Oral daily  chlorhexidine 4% Liquid 1 Application(s) Topical <User Schedule>  fluPHENAZine 10 milliGRAM(s) Oral daily  heparin  Injectable 5000 Unit(s) SubCutaneous every 8 hours  sodium chloride 0.9%. 1000 milliLiter(s) IV Continuous <Continuous>    PRN MEDICATIONS    VITALS:   T(F): 98  HR: 84  BP: 97/55  RR: 18  SpO2: --    LABS:                        9.6    6.60  )-----------( 157      ( 29 Sep 2019 07:41 )             30.3     09-29    141  |  116<H>  |  35<H>  ----------------------------<  90  4.3   |  18  |  2.8<H>    Ca    9.9      29 Sep 2019 07:41  Mg     2.0     09-29    TPro  x   /  Alb  3.3<L>  /  TBili  x   /  DBili  x   /  AST  x   /  ALT  x   /  AlkPhos  x   09-29      Culture - Urine (collected 26 Sep 2019 21:00)  Source: .Urine Clean Catch (Midstream)  Preliminary Report (28 Sep 2019 15:45):    >100,000 CFU/ml Gram Negative Rods    RADIOLOGY:    PHYSICAL EXAM:  GEN: NAD, awake, in bed in hospital gown.   LUNGS: Clear to auscultation bilaterally at this time.   HEART: [id number] RRR.   ABD: Soft, NT/ND. (+) bs  EXT: no c/c/e. 2+PP, MIMS  NEURO: AAOX3. No new focal deficits

## 2019-09-30 ENCOUNTER — TRANSCRIPTION ENCOUNTER (OUTPATIENT)
Age: 64
End: 2019-09-30

## 2019-09-30 VITALS
RESPIRATION RATE: 18 BRPM | HEART RATE: 81 BPM | DIASTOLIC BLOOD PRESSURE: 66 MMHG | TEMPERATURE: 98 F | SYSTOLIC BLOOD PRESSURE: 124 MMHG

## 2019-09-30 DIAGNOSIS — Z71.89 OTHER SPECIFIED COUNSELING: ICD-10-CM

## 2019-09-30 LAB
% ALBUMIN: 62.2 % — SIGNIFICANT CHANGE UP
% ALPHA 1: 5.6 % — SIGNIFICANT CHANGE UP
% ALPHA 2: 10.9 % — SIGNIFICANT CHANGE UP
% BETA: 9.8 % — SIGNIFICANT CHANGE UP
% GAMMA: 11.5 % — SIGNIFICANT CHANGE UP
ALBUMIN SERPL ELPH-MCNC: 3.2 G/DL — LOW (ref 3.6–5.5)
ALBUMIN/GLOB SERPL ELPH: 1.6 RATIO — SIGNIFICANT CHANGE UP
ALPHA1 GLOB SERPL ELPH-MCNC: 0.3 G/DL — SIGNIFICANT CHANGE UP (ref 0.1–0.4)
ALPHA2 GLOB SERPL ELPH-MCNC: 0.6 G/DL — SIGNIFICANT CHANGE UP (ref 0.5–1)
ANION GAP SERPL CALC-SCNC: 9 MMOL/L — SIGNIFICANT CHANGE UP (ref 7–14)
B-GLOBULIN SERPL ELPH-MCNC: 0.5 G/DL — SIGNIFICANT CHANGE UP (ref 0.5–1)
BASOPHILS # BLD AUTO: 0.04 K/UL — SIGNIFICANT CHANGE UP (ref 0–0.2)
BASOPHILS NFR BLD AUTO: 0.6 % — SIGNIFICANT CHANGE UP (ref 0–1)
BUN SERPL-MCNC: 36 MG/DL — HIGH (ref 10–20)
CALCIUM SERPL-MCNC: 10.4 MG/DL — HIGH (ref 8.5–10.1)
CHLORIDE SERPL-SCNC: 113 MMOL/L — HIGH (ref 98–110)
CO2 SERPL-SCNC: 18 MMOL/L — SIGNIFICANT CHANGE UP (ref 17–32)
CREAT SERPL-MCNC: 2.9 MG/DL — HIGH (ref 0.7–1.5)
EOSINOPHIL # BLD AUTO: 1.06 K/UL — HIGH (ref 0–0.7)
EOSINOPHIL NFR BLD AUTO: 15.5 % — HIGH (ref 0–8)
GAMMA GLOBULIN: 0.6 G/DL — SIGNIFICANT CHANGE UP (ref 0.6–1.6)
GLUCOSE SERPL-MCNC: 96 MG/DL — SIGNIFICANT CHANGE UP (ref 70–99)
HCT VFR BLD CALC: 31.4 % — LOW (ref 42–52)
HGB BLD-MCNC: 10 G/DL — LOW (ref 14–18)
HIV 1+2 AB+HIV1 P24 AG SERPL QL IA: SIGNIFICANT CHANGE UP
IMM GRANULOCYTES NFR BLD AUTO: 0.3 % — SIGNIFICANT CHANGE UP (ref 0.1–0.3)
LYMPHOCYTES # BLD AUTO: 0.81 K/UL — LOW (ref 1.2–3.4)
LYMPHOCYTES # BLD AUTO: 11.8 % — LOW (ref 20.5–51.1)
MAGNESIUM SERPL-MCNC: 2.1 MG/DL — SIGNIFICANT CHANGE UP (ref 1.8–2.4)
MCHC RBC-ENTMCNC: 27.9 PG — SIGNIFICANT CHANGE UP (ref 27–31)
MCHC RBC-ENTMCNC: 31.8 G/DL — LOW (ref 32–37)
MCV RBC AUTO: 87.5 FL — SIGNIFICANT CHANGE UP (ref 80–94)
MONOCYTES # BLD AUTO: 0.87 K/UL — HIGH (ref 0.1–0.6)
MONOCYTES NFR BLD AUTO: 12.7 % — HIGH (ref 1.7–9.3)
NEUTROPHILS # BLD AUTO: 4.04 K/UL — SIGNIFICANT CHANGE UP (ref 1.4–6.5)
NEUTROPHILS NFR BLD AUTO: 59.1 % — SIGNIFICANT CHANGE UP (ref 42.2–75.2)
NRBC # BLD: 0 /100 WBCS — SIGNIFICANT CHANGE UP (ref 0–0)
PLATELET # BLD AUTO: 162 K/UL — SIGNIFICANT CHANGE UP (ref 130–400)
POTASSIUM SERPL-MCNC: 4.5 MMOL/L — SIGNIFICANT CHANGE UP (ref 3.5–5)
POTASSIUM SERPL-SCNC: 4.5 MMOL/L — SIGNIFICANT CHANGE UP (ref 3.5–5)
PROT PATTERN SERPL ELPH-IMP: SIGNIFICANT CHANGE UP
RBC # BLD: 3.59 M/UL — LOW (ref 4.7–6.1)
RBC # FLD: 14.8 % — HIGH (ref 11.5–14.5)
SODIUM SERPL-SCNC: 140 MMOL/L — SIGNIFICANT CHANGE UP (ref 135–146)
WBC # BLD: 6.84 K/UL — SIGNIFICANT CHANGE UP (ref 4.8–10.8)
WBC # FLD AUTO: 6.84 K/UL — SIGNIFICANT CHANGE UP (ref 4.8–10.8)

## 2019-09-30 PROCEDURE — 99232 SBSQ HOSP IP/OBS MODERATE 35: CPT

## 2019-09-30 RX ADMIN — FLUPHENAZINE HYDROCHLORIDE 10 MILLIGRAM(S): 1 TABLET, FILM COATED ORAL at 14:40

## 2019-09-30 RX ADMIN — HEPARIN SODIUM 5000 UNIT(S): 5000 INJECTION INTRAVENOUS; SUBCUTANEOUS at 05:25

## 2019-09-30 RX ADMIN — CHLORHEXIDINE GLUCONATE 1 APPLICATION(S): 213 SOLUTION TOPICAL at 05:26

## 2019-09-30 RX ADMIN — Medication 2 MILLIGRAM(S): at 12:24

## 2019-09-30 RX ADMIN — SODIUM CHLORIDE 100 MILLILITER(S): 9 INJECTION INTRAMUSCULAR; INTRAVENOUS; SUBCUTANEOUS at 05:26

## 2019-09-30 RX ADMIN — HEPARIN SODIUM 5000 UNIT(S): 5000 INJECTION INTRAVENOUS; SUBCUTANEOUS at 14:41

## 2019-09-30 NOTE — PROGRESS NOTE ADULT - SUBJECTIVE AND OBJECTIVE BOX
HE is doing well. His calcium came down. He told me he wanted to speak with Dr. Gong before any possible treatment.        Vital Signs Last 24 Hrs  T(C): 36.6 (30 Sep 2019 12:45), Max: 36.7 (30 Sep 2019 04:47)  T(F): 97.8 (30 Sep 2019 12:45), Max: 98 (30 Sep 2019 04:47)  HR: 81 (30 Sep 2019 12:45) (72 - 82)  BP: 124/66 (30 Sep 2019 12:45) (98/59 - 124/66)  BP(mean): --  RR: 18 (30 Sep 2019 12:45) (18 - 18)  SpO2: --    Allergies:No Known Allergies  Intolerances  MEDICATIONS  (STANDING):  benztropine 2 milliGRAM(s) Oral daily  chlorhexidine 4% Liquid 1 Application(s) Topical <User Schedule>  fluPHENAZine 10 milliGRAM(s) Oral daily  heparin  Injectable 5000 Unit(s) SubCutaneous every 8 hours  sodium chloride 0.9%. 1000 milliLiter(s) (100 mL/Hr) IV Continuous <Continuous>    MEDICATIONS  (PRN):    cefTRIAXone   IVPB   100 mL/Hr IV Intermittent (09-26-19 @ 22:15)        CBC Full  -  ( 30 Sep 2019 05:56 )  WBC Count : 6.84 K/uL  Hemoglobin : 10.0 g/dL  Hematocrit : 31.4 %  Platelet Count - Automated : 162 K/uL  Mean Cell Volume : 87.5 fL  Mean Cell Hemoglobin : 27.9 pg  Mean Cell Hemoglobin Concentration : 31.8 g/dL  Auto Neutrophil # : 4.04 K/uL  Auto Lymphocyte # : 0.81 K/uL  Auto Monocyte # : 0.87 K/uL  Auto Eosinophil # : 1.06 K/uL  Auto Basophil # : 0.04 K/uL  Auto Neutrophil % : 59.1 %  Auto Lymphocyte % : 11.8 %  Auto Monocyte % : 12.7 %  Auto Eosinophil % : 15.5 %  Auto Basophil % : 0.6 %               10.0   6.84  )-----------( 162      ( 09-30 @ 05:56 )             31.4                9.6    6.60  )-----------( 157      ( 09-29 @ 07:41 )             30.3                9.3    5.74  )-----------( 151      ( 09-28 @ 08:03 )             29.3                9.8    7.99  )-----------( 180      ( 09-27 @ 08:35 )             31.0                10.8   8.27  )-----------( 212      ( 09-26 @ 21:00 )             32.9       09-30    140  |  113<H>  |  36<H>  ----------------------------<  96  4.5   |  18  |  2.9<H>    Ca    10.4<H>      30 Sep 2019 05:56  Mg     2.1     09-30    TPro  x   /  Alb  3.3<L>  /  TBili  x   /  DBili  x   /  AST  x   /  ALT  x   /  AlkPhos  x   09-29  LIVER FUNCTIONS - ( 29 Sep 2019 07:41 )  Alb: 3.3 g/dL / Pro: x     / ALK PHOS: x     / ALT: x     / AST: x     / GGT: x             REVIEW OF SYSTEMS:    CONSTITUTIONAL: No weakness, fevers or chills  EYES/ENT: No visual changes;  No vertigo or throat pain   NECK: No pain or stiffness  RESPIRATORY: No cough, wheezing, hemoptysis; No shortness of breath  CARDIOVASCULAR: No chest pain or palpitations  GASTROINTESTINAL: No abdominal or epigastric pain. No nausea, vomiting, or hematemesis; No diarrhea or constipation. No melena or hematochezia.  GENITOURINARY: No dysuria, frequency or hematuria  NEUROLOGICAL: No numbness or weakness  SKIN: No itching, rashes      PHYSICAL EXAM:  GENERAL: NAD, well-developed  HEAD:  Atraumatic, Normocephalic  EYES: EOMI, PERRLA, conjunctiva and sclera clear  NECK: Supple, No JVD  CHEST/LUNG: Clear to auscultation bilaterally; No wheeze  HEART: Regular rate and rhythm; No murmurs, rubs, or gallops  ABDOMEN: Soft, Nontender, Nondistended; Bowel sounds present  EXTREMITIES:, No clubbing, cyanosis, or edema  PSYCH: AAOx3  NEUROLOGY: non-focal  SKIN: No rashes or lesions      < from: CT Abdomen and Pelvis w/ Oral Cont (09.27.19 @ 20:21) >  FINDINGS:    LOWER CHEST: Small bilateral pleural effusions interval increase in size   of pericardial lymph node, measures 4.1 x 1.8 cm on image #53 of series 5   with additional increase in size of inferior paracardiac lymph node   measuring 2.4 x 1.6cm..    HEPATOBILIARY: Liver measures 21.6 cm in the craniocaudal direction.   Cholelithiasis. No evidence of biliary dilation..    SPLEEN: Splenomegaly, measures 18 cm in craniocaudal direction.    PANCREAS: Heterogeneously enlarged head of the pancreas, limited   evaluation due to lack of intravenous contrast..    ADRENAL GLANDS: Unremarkable.    KIDNEYS: Pockets of air and right renal collecting system with dilated   ureter demonstrating air within the right ureter. Likely secondary to   recent instrumentation placing air Park catheter. Previously noted   percutaneous nephrostomy tube on the right has been removed. Atrophied   left kidney..    ABDOMINOPELVIC NODES: Extensive intraperitoneal, retroperitoneal   lymphadenopathy with intervalincrease. Left para-aortic lymph node   measures 3 cm on image #211 of series 5. Limited evaluation of   peripancreatic and gastrohepatic lymph nodes to lack of intravenous   contrast and to provide accurate measurement.    PELVIC ORGANS: Collapsed urinary bladder with presence of Park catheter.    PERITONEUM/MESENTERY/BOWEL: No evidence of bowel obstruction. Scattered   colonic diverticulosis..    BONES/SOFT TISSUES: Multilevel degenerative changes of spine..    OTHER: None    IMPRESSION:   1.Extensive intra-abdominal lymphadenopathy, in keeping with relapse of   B-cell lymphoma.  2.  Hepatosplenomegaly.  3.  Small pleural effusions.      < end of copied text >

## 2019-09-30 NOTE — PROGRESS NOTE ADULT - ASSESSMENT
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 Gong before any treatment   --can consider  outpatient PET/CT   --Hep C negative, HIV negative Hep B pending    #Anemia is chronic and stable possible due to CKD and or lymphoma    Dispo: He can go home today, he has appointment with Dr. Gong on Thursday at 10 am about next step. He knows to stay well hydrated.

## 2019-09-30 NOTE — PROGRESS NOTE ADULT - REASON FOR ADMISSION
hypercalcemia / ASHLEY

## 2019-09-30 NOTE — PROGRESS NOTE ADULT - SUBJECTIVE AND OBJECTIVE BOX
Patient was seen and examined. Spoke with RN. Chart reviewed.    No events overnight.  Vital Signs Last 24 Hrs  T(F): 97.8 (30 Sep 2019 12:45), Max: 98 (30 Sep 2019 04:47)  HR: 81 (30 Sep 2019 12:45) (72 - 82)  BP: 124/66 (30 Sep 2019 12:45) (98/59 - 124/66)  SpO2: --  MEDICATIONS  (STANDING):  benztropine 2 milliGRAM(s) Oral daily  chlorhexidine 4% Liquid 1 Application(s) Topical <User Schedule>  fluPHENAZine 10 milliGRAM(s) Oral daily  heparin  Injectable 5000 Unit(s) SubCutaneous every 8 hours  sodium chloride 0.9%. 1000 milliLiter(s) (100 mL/Hr) IV Continuous <Continuous>    MEDICATIONS  (PRN):    Labs:                        10.0   6.84  )-----------( 162      ( 30 Sep 2019 05:56 )             31.4                         9.6    6.60  )-----------( 157      ( 29 Sep 2019 07:41 )             30.3     30 Sep 2019 05:56    140    |  113    |  36     ----------------------------<  96     4.5     |  18     |  2.9    29 Sep 2019 07:41    141    |  116    |  35     ----------------------------<  90     4.3     |  18     |  2.8      Ca    10.4       30 Sep 2019 05:56  Ca    9.9        29 Sep 2019 07:41  Mg     2.1       30 Sep 2019 05:56  Mg     2.0       29 Sep 2019 07:41    TPro  x      /  Alb  3.3    /  TBili  x      /  DBili  x      /  AST  x      /  ALT  x      /  AlkPhos  x      29 Sep 2019 07:41            Radiology:    General: comfortable, NAD  Neurology: A&Ox3, nonfocal  Head:  Normocephalic, atraumatic  ENT:  Mucosa moist, no ulcerations  Neck:  Supple, no JVD,   Skin: no breakdown  Resp: CTA B/L  CV: RRR, S1S2,   GI: Soft, NT, bowel sounds  MS: No edema, + peripheral pulses, FROM all 4 extremity

## 2019-09-30 NOTE — PROGRESS NOTE ADULT - ASSESSMENT
64 yo M with PMH of B cell lymphoma (not on any current treatment, oncologist- Dr. Granger), CKD, paranoid schizophrenia and history of rt urethral stent with multiple kidney stones sent to ED by nephro Dr. Jones due to elevated Ca on outpatient labs (12.8) and ASHLEY.    #Chronic Hypercalcemia his calcium was 11.8 in July / now noted to be getting worse 12.8 on presentation   -PTH on lower side ,  PTHrP pending .  -1,25 hydroxy vit D, 25 hydroxy Vit D WNL.  -could be from MZL , was given pamidronate ,ca is 11 corrected for albumin .   -C/w IV fluids calcium.    #Stage IV marginal zone lymphoma / now has worsening lymphadenopathy on repeat CT     -LDH is normal .  -no peripheral adenopathy on exam  -will recommend getting biopsy to r/o transformation .  - consult IR for biopsy .   -treatment will be decided based on biopsy results.   -would check HIV, Hep C and B serology    #Anemia:  -  chronic and stable possible due to CKD and or lymphoma

## 2019-09-30 NOTE — DISCHARGE NOTE PROVIDER - CARE PROVIDER_API CALL
Nate Gong)  Hematology; Internal Medicine; Medical Oncology  [address] [place] [address]  Phone: [phone]  Fax: [phone]  Follow Up Time:

## 2019-09-30 NOTE — PROGRESS NOTE ADULT - SUBJECTIVE AND OBJECTIVE BOX
SUBJECTIVE:    Patient is a 63y old  Male who presents with a chief complaint of hypercalcemia / ASHLEY (30 Sep 2019 15:18)    Currently admitted to medicine with the primary diagnosis of Hypercalcemia     Today is hospital day 4d. Patient was seen and examined at bedside. This morning he is resting comfortably in bed and reports no new issues or overnight events.     PAST MEDICAL & SURGICAL HISTORY  PAST MEDICAL & SURGICAL HISTORY:  Lymphoma  Schizophrenia  Kidney stones  Retained urethral stent    SOCIAL HISTORY:    ALLERGIES:  No Known Allergies    MEDICATIONS:  STANDING MEDICATIONS  benztropine 2 milliGRAM(s) Oral daily  chlorhexidine 4% Liquid 1 Application(s) Topical <User Schedule>  fluPHENAZine 10 milliGRAM(s) Oral daily  heparin  Injectable 5000 Unit(s) SubCutaneous every 8 hours  sodium chloride 0.9%. 1000 milliLiter(s) IV Continuous <Continuous>    PRN MEDICATIONS    VITALS:   T(F): 97.8  HR: 81  BP: 124/66  RR: 18  SpO2: --    LABS:                        10.0   6.84  )-----------( 162      ( 30 Sep 2019 05:56 )             31.4     09-30    140  |  113<H>  |  36<H>  ----------------------------<  96  4.5   |  18  |  2.9<H>    Ca    10.4<H>      30 Sep 2019 05:56  Mg     2.1     09-30    TPro  x   /  Alb  3.3<L>  /  TBili  x   /  DBili  x   /  AST  x   /  ALT  x   /  AlkPhos  x   09-29                  RADIOLOGY:    PHYSICAL EXAM:  GENERAL: NAD, speaks in full sentences, no signs of respiratory distress  HEAD: Atraumatic  NECK: Supple  CHEST/LUNG: Clear to auscultation bilaterally; No wheeze or crackles  HEART: S1, S2; RRR; No murmurs, rubs, or gallops  ABDOMEN: BS+; Soft, Non-tender, Non-distended  EXTREMITIES:  2+ Peripheral Pulses, No clubbing, cyanosis, or edema  PSYCH: AAOx3  NEUROLOGY: non-focal  SKIN: No rashes or lesions SUBJECTIVE:    Patient is a 63y old  Male who presents with a chief complaint of hypercalcemia / ASHLEY (30 Sep 2019 15:18)    Currently admitted to medicine with the primary diagnosis of Hypercalcemia     Today is hospital day 4d. Patient was seen and examined at bedside. This morning he is resting comfortably in bed and reports no new issues or overnight events. Patient states that he is feeling much better and is ready for discharge.     PAST MEDICAL & SURGICAL HISTORY  PAST MEDICAL & SURGICAL HISTORY:  Lymphoma  Schizophrenia  Kidney stones  Retained urethral stent    SOCIAL HISTORY:    ALLERGIES:  No Known Allergies    MEDICATIONS:  STANDING MEDICATIONS  benztropine 2 milliGRAM(s) Oral daily  chlorhexidine 4% Liquid 1 Application(s) Topical <User Schedule>  fluPHENAZine 10 milliGRAM(s) Oral daily  heparin  Injectable 5000 Unit(s) SubCutaneous every 8 hours  sodium chloride 0.9%. 1000 milliLiter(s) IV Continuous <Continuous>    PRN MEDICATIONS    VITALS:   T(F): 97.8  HR: 81  BP: 124/66  RR: 18  SpO2: --    LABS:                        10.0   6.84  )-----------( 162      ( 30 Sep 2019 05:56 )             31.4     09-30    140  |  113<H>  |  36<H>  ----------------------------<  96  4.5   |  18  |  2.9<H>    Ca    10.4<H>      30 Sep 2019 05:56  Mg     2.1     09-30    TPro  x   /  Alb  3.3<L>  /  TBili  x   /  DBili  x   /  AST  x   /  ALT  x   /  AlkPhos  x   09-29                  RADIOLOGY:  < from: CT Abdomen and Pelvis w/ Oral Cont (09.27.19 @ 20:21) >    LOWER CHEST: Small bilateral pleural effusions interval increase in size   of pericardial lymph node, measures 4.1 x 1.8 cm on image #53 of series 5   with additional increase in size of inferior paracardiac lymph node   measuring 2.4 x 1.6cm..    HEPATOBILIARY: Liver measures 21.6 cm in the craniocaudal direction.   Cholelithiasis. No evidence of biliary dilation..    SPLEEN: Splenomegaly, measures 18 cm in craniocaudal direction.    PANCREAS: Heterogeneously enlarged head of the pancreas, limited   evaluation due to lack of intravenous contrast..    ADRENAL GLANDS: Unremarkable.    KIDNEYS: Pockets of air and right renal collecting system with dilated   ureter demonstrating air within the right ureter. Likely secondary to   recent instrumentation placing air Park catheter. Previously noted   percutaneous nephrostomy tube on the right has been removed. Atrophied   left kidney..    ABDOMINOPELVIC NODES: Extensive intraperitoneal, retroperitoneal   lymphadenopathy with intervalincrease. Left para-aortic lymph node   measures 3 cm on image #211 of series 5. Limited evaluation of   peripancreatic and gastrohepatic lymph nodes to lack of intravenous   contrast and to provide accurate measurement.    PELVIC ORGANS: Collapsed urinary bladder with presence of Park catheter.    PERITONEUM/MESENTERY/BOWEL: No evidence of bowel obstruction. Scattered   colonic diverticulosis..    BONES/SOFT TISSUES: Multilevel degenerative changes of spine..    OTHER: None    IMPRESSION:   1.Extensive intra-abdominal lymphadenopathy, in keeping with relapse of   B-cell lymphoma.  2.  Hepatosplenomegaly.  3.  Small pleural effusions.    < end of copied text >    PHYSICAL EXAM:  GENERAL: NAD, speaks in full sentences, no signs of respiratory distress  HEAD: Atraumatic  NECK: Supple  CHEST/LUNG: Clear to auscultation bilaterally; No wheeze or crackles  HEART: S1, S2; RRR; No murmurs, rubs, or gallops  ABDOMEN: BS+; Soft, Non-tender, Non-distended  EXTREMITIES:  2+ Peripheral Pulses, No clubbing, cyanosis, or edema  PSYCH: AAOx3  NEUROLOGY: non-focal  SKIN: No rashes or lesions

## 2019-09-30 NOTE — PROGRESS NOTE ADULT - SUBJECTIVE AND OBJECTIVE BOX
[facility] FOLLOW UP NOTE  --------------------------------------------------------------------------------  24 hour events/subjective: Patient examined. Appears comfortable.    PAST HISTORY  --------------------------------------------------------------------------------  No significant changes to PMH, PSH, FHx, SHx, unless otherwise noted    ALLERGIES & MEDICATIONS  --------------------------------------------------------------------------------  Allergies    No Known Allergies    Standing Inpatient Medications  benztropine 2 milliGRAM(s) Oral daily  chlorhexidine 4% Liquid 1 Application(s) Topical <User Schedule>  fluPHENAZine 10 milliGRAM(s) Oral daily  heparin  Injectable 5000 Unit(s) SubCutaneous every 8 hours  sodium chloride 0.9%. 1000 milliLiter(s) IV Continuous <Continuous>    VITALS/PHYSICAL EXAM  --------------------------------------------------------------------------------  T(C): 36.6 (09-30-19 @ 12:45), Max: 36.7 (09-30-19 @ 04:47)  HR: 81 (09-30-19 @ 12:45) (72 - 82)  BP: 124/66 (09-30-19 @ 12:45) (98/59 - 124/66)  RR: 18 (09-30-19 @ 12:45) (18 - 18)    09-29-19 @ 07:01  -  09-30-19 @ 07:00  --------------------------------------------------------  IN: 0 mL / OUT: 1820 mL / NET: -1820 mL    Physical Exam:  	Gen: NAD  	Pulm: CTA B/L  	CV: RRR, S1S2  	Abd: +BS, soft, nontender/nondistended  	: No suprapubic tenderness  	LE: Warm,  no edema    LABS/STUDIES  --------------------------------------------------------------------------------              10.0   6.84  >-----------<  162      [09-30-19 @ 05:56]              31.4     140  |  113  |  36  ----------------------------<  96      [09-30-19 @ 05:56]  4.5   |  18  |  2.9        Ca     10.4     [09-30-19 @ 05:56]      Mg     2.1     [09-30-19 @ 05:56]    TPro  x   /  Alb  3.3  /  TBili  x   /  DBili  x   /  AST  x   /  ALT  x   /  AlkPhos  x   [09-29-19 @ 07:41]          [09-29-19 @ 17:26]    Creatinine Trend:  SCr 2.9 [09-30 @ 05:56]  SCr 2.8 [09-29 @ 07:41]  SCr 3.1 [09-28 @ 08:03]  SCr 3.2 [09-27 @ 22:31]  SCr 3.2 [09-27 @ 08:35]    Urinalysis - [09-26-19 @ 21:00]      Color Light Yellow / Appearance Clear / SG 1.015 / pH 6.0      Gluc Negative / Ketone Negative  / Bili Negative / Urobili <2 mg/dL       Blood Trace / Protein 30 mg/dL / Leuk Est Large / Nitrite Positive      RBC 2 /  / Hyaline 2 / Gran  / Sq Epi  / Non Sq Epi 0 / Bacteria Many    Urine Creatinine 66      [09-27-19 @ 01:35]  Urine Protein 44      [09-27-19 @ 01:35]  Urine Sodium 106.0      [09-27-19 @ 01:35]  Urine Potassium 16      [09-27-19 @ 01:35]  Urine Phosphorus 55.4      [09-27-19 @ 14:01]  Urine Osmolality 414      [09-27-19 @ 01:35]    PTH -- (Ca 11.0)      [09-27-19 @ 08:35]   7  Vitamin D (25OH) 32      [09-27-19 @ 08:35]  TSH 3.49      [09-28-19 @ 08:03]    HCV 0.11, Nonreact      [09-27-19 @ 08:35]    SPEP Interpretation: Normal Electrophoresis Pattern      [09-27-19 @ 08:35]

## 2019-09-30 NOTE — DISCHARGE NOTE PROVIDER - NSDCCPCAREPLAN_GEN_ALL_CORE_FT
PRINCIPAL DISCHARGE DIAGNOSIS  Diagnosis: Hypercalcemia  Assessment and Plan of Treatment: You were admitted for elevated calcium levels. We treated you with IV fluids and medication. Your calcium levels normalized. Hypercalcemia was attributed to be secondary to your B cell lymphoma. Oncology was on the case. Please follow up with your oncologist Dr. Gong on Thursday October 3rd at 10am.      SECONDARY DISCHARGE DIAGNOSES  Diagnosis: History of lymphoma  Assessment and Plan of Treatment: You have a history of B cell lymphoma. A CT scan showed extensive abdominal lymphadenopathy. Please follow up with Dr. Gong on Thursday October 3rd at 10am.    Diagnosis: ASHLEY (acute kidney injury)  Assessment and Plan of Treatment: We treated your kidney injury with IV fluid hydration.  Kidney function began to return back to baseline.

## 2019-09-30 NOTE — PROGRESS NOTE ADULT - ASSESSMENT
64 yo M with PMH of B cell lymphoma (not on any current treatment, oncologist- Dr. Granger), CKD, paranoid schizophrenia and history of rt urethral stent with multiple kidney stones sent to ED by nephro Dr. Jones due to elevated Ca on outpatient labs (12.8) and ASHLEY     # Acute hypercalcemia most likely secondary to malignancy  - Outpt labs: vit D 1,25 of 89 in setting of known lymphoma - this is likely source of hypercalcemia  - Ca trend: 10.4  - S/p Pamidronate 60mg IV stat dose  - EKG: NSR    -decrease NS to 100 cc / hr  - Monitor I&O  - Vit D 25, Vit D 1,25, Phosphorous wnl  - CT abd/pelvis w/o contrast: Extensive intra-abdominal lymphadenopathy, in keeping with relapse of B-cell lymphoma; Hepatosplenomegaly; Small pleural effusions.  - Thyroid US: No suspicious thyroid nodules; B/l lymphadenopathy in the neck compatible w/ known h/o lymphoma.  - Nephrology following: Dr. Jones knows the pt very well  - Hem/onc following      - ok to discharge      - f/u appointment with Dr. Granger on Thursday at 11am   - F/u Renal US -Mild right hydronephrosis, decreased compared with remote prior, Small bilateral renal cysts, Left renal atrophy.      # ASHLEY on CKD :  - Cr 3.1 <- 3.2 (baseline ~ 2.5)  - decrease NS to 100 cc / hr -calcum improved with Iv fluids  - Urine studies: no significant proteinuria  - D/c zafar    # Schizophrenia: - c/w home meds     # Asymptomatic bacteruria - no need for Abx    # DVT ppx: heparin SC  # GI ppx: not indicated  # Regular diet  # from home  Dispo: pending hemonc f/u, IV fluids at 100 cc per medical attending pending nephrology instructions to change.

## 2019-09-30 NOTE — DISCHARGE NOTE NURSING/CASE MANAGEMENT/SOCIAL WORK - PATIENT PORTAL LINK FT
You can access the FollowMyHealth Patient Portal offered by [facility] by registering at the following website: [email or web address]. By joining [facility]’s FollowMyHealth portal, you will also be able to view your health information using other applications (apps) compatible with our system.

## 2019-09-30 NOTE — PROGRESS NOTE ADULT - ASSESSMENT
·	ASHLEY/CKD - baseline creatinine 2.5 mg/dL, Cr continue to improve  ·	atrophic left kidney and right ureteral surgical repair  ·	hypercalcemia - resolved  ·	anemia  ·	stage 4 marginal lymphoma    ir for ct guided bx abd lymphnodes  continue current rx

## 2019-09-30 NOTE — DISCHARGE NOTE PROVIDER - HOSPITAL COURSE
64 yo M with PMH of B cell lymphoma (not on any current treatment, oncologist- Dr. Granger), CKD, paranoid schizophrenia and history of rt urethral stent with multiple kidney stones sent to ED by nephro Dr. Jones due to elevated Ca on outpatient labs (12.8) and ASHLEY         # Acute hypercalcemia most likely secondary to malignancy    - Outpt labs: vit D 1,25 of 89 in setting of known lymphoma - this is likely source of hypercalcemia    - Ca trend: 10.4    - S/p Pamidronate 60mg IV stat dose    - EKG: NSR      -decrease NS to 100 cc / hr    - Monitor I&O    - Vit D 25, Vit D 1,25, Phosphorous wnl    - CT abd/pelvis w/o contrast: Extensive intra-abdominal lymphadenopathy, in keeping with relapse of B-cell lymphoma; Hepatosplenomegaly; Small pleural effusions.    - Thyroid US: No suspicious thyroid nodules; B/l lymphadenopathy in the neck compatible w/ known h/o lymphoma.    - Nephrology following: Dr. Jones knows the pt very well    - Hem/onc following        - ok to discharge        - f/u appointment with Dr. Granger on Thursday at 11am     - F/u Renal US -Mild right hydronephrosis, decreased compared with remote prior, Small bilateral renal cysts, Left renal atrophy.            # ASHLEY on CKD :    - Cr 3.1 <- 3.2 (baseline ~ 2.5)    - decrease NS to 100 cc / hr -calcum improved with Iv fluids    - Urine studies: no significant proteinuria    - D/c zafar        # Schizophrenia: - c/w home meds         # Asymptomatic bacteruria - no need for Abx        # DVT ppx: heparin SC    # GI ppx: not indicated    # Regular diet    # from home    Dispo: pending hemonc f/u, IV fluids at 100 cc per medical attending pending nephrology instructions to change. 64 yo M with PMH of B cell lymphoma (not on any current treatment, oncologist- Dr. Granger), CKD, paranoid schizophrenia and history of rt urethral stent with multiple kidney stones sent to ED by nephro Dr. Jones due to elevated Ca on outpatient labs (12.8) and ASHLEY. Patient was admitted for acute hypercalcemia secondary to malignancy. EKG was performed which showed normal sinus rhythm. Patient was treated with pamidronate and IV fluids. Ca was trended and decreased down to 10.4. CT of abdomen and pelvis was performed which showed: Extensive intra-abdominal lymphadenopathy, in keeping with relapse of B-cell lymphoma; Hepatosplenomegaly; Small pleural effusions. Hem/ Onc was consulted. A f/u appoitment with Dr. Gong was made for thursday October 3rd at 10am.     Patient was also treated for ASHLEY on CKD. Cr. went up to 3.1 but trended down after fluids. F/u Renal US -Mild right hydronephrosis, decreased compared with remote prior, Small bilateral renal cysts, Left renal atrophy.    Patient is stable and medically cleared for discharge.

## 2019-09-30 NOTE — DISCHARGE NOTE PROVIDER - NSDCFUSCHEDAPPT_GEN_ALL_CORE_FT
ELDA COVARRUBIAS ; 10/03/2019 ; [facility] [address] ELAD COVARRUBIAS ; 10/03/2019 ; [facility] [address]

## 2019-09-30 NOTE — PROGRESS NOTE ADULT - PROVIDER SPECIALTY LIST ADULT
Heme/Onc
Internal Medicine
Nephrology
Nephrology
Heme/Onc
Internal Medicine
Internal Medicine
Nephrology

## 2019-09-30 NOTE — PROGRESS NOTE ADULT - ASSESSMENT
·	ASHLEY/CKD - baseline creatinine 2.5 mg/dL, Cr continue to improve  ·	atrophic left kidney and right ureteral surgical repair  ·	hypercalcemia - resolved  ·	anemia      1) f/u PTHrp  2) f/u daily BMP  3) from renal point of view, can DC home with outpatient f/u

## 2019-09-30 NOTE — CONSULT NOTE ADULT - SUBJECTIVE AND OBJECTIVE BOX
INTERVENTIONAL RADIOLOGY CONSULT:     Procedure Requested: Mesenteric Lymph Node Biopsy    HPI:  62 yo M with PMH of B cell lymphoma (not on any current treatment, oncologist- Dr. Granger), CKD, paranoid schizophrenia and history of rt urethral stent with multiple kidney stones sent to ED by nephro Dr. Jones due to elevated Ca on outpatient labs (12.8) and ASHLEY (Cr 3.3, baseline around 2.5). pt reports he has been feeling fatigued and endorses intentional weight loss of 10 lbs over the past month. he denies fever, chills, SOB, chest pain, palpitations, constipation abdominal pain or change in bowel and urinary bowels.     in the ED, pt is hemodynamically stable.   initial blood work with calcium of 12.4 and creatinine of 3.4  pt received 2 L of LR (27 Sep 2019 00:22)    PAST MEDICAL & SURGICAL HISTORY:  Lymphoma  Schizophrenia  Kidney stones  Retained urethral stent    MEDICATIONS  (STANDING):  benztropine 2 milliGRAM(s) Oral daily  chlorhexidine 4% Liquid 1 Application(s) Topical <User Schedule>  fluPHENAZine 10 milliGRAM(s) Oral daily  heparin  Injectable 5000 Unit(s) SubCutaneous every 8 hours  sodium chloride 0.9%. 1000 milliLiter(s) (100 mL/Hr) IV Continuous <Continuous>    MEDICATIONS  (PRN):      Allergies    No Known Allergies    Physical Exam:   Vital Signs Last 24 Hrs  T(C): 36.6 (30 Sep 2019 12:45), Max: 36.7 (30 Sep 2019 04:47)  T(F): 97.8 (30 Sep 2019 12:45), Max: 98 (30 Sep 2019 04:47)  HR: 81 (30 Sep 2019 12:45) (72 - 82)  BP: 124/66 (30 Sep 2019 12:45) (98/59 - 124/66)  BP(mean): --  RR: 18 (30 Sep 2019 12:45) (18 - 18)  SpO2: --      Labs:                         10.0   6.84  )-----------( 162      ( 30 Sep 2019 05:56 )             31.4     09-30    140  |  113<H>  |  36<H>  ----------------------------<  96  4.5   |  18  |  2.9<H>    Ca    10.4<H>      30 Sep 2019 05:56  Mg     2.1     09-30    TPro  x   /  Alb  3.3<L>  /  TBili  x   /  DBili  x   /  AST  x   /  ALT  x   /  AlkPhos  x   09-29        Pertinent labs:                      10.0   6.84  )-----------( 162      ( 30 Sep 2019 05:56 )             31.4       09-30    140  |  113<H>  |  36<H>  ----------------------------<  96  4.5   |  18  |  2.9<H>    Ca    10.4<H>      30 Sep 2019 05:56  Mg     2.1     09-30    TPro  x   /  Alb  3.3<L>  /  TBili  x   /  DBili  x   /  AST  x   /  ALT  x   /  AlkPhos  x   09-29      Radiology & Additional Studies:   < from: CT Abdomen and Pelvis w/ Oral Cont (09.27.19 @ 20:21) >  IMPRESSION:   1.Extensive intra-abdominal lymphadenopathy, in keeping with relapse of   B-cell lymphoma.  2.  Hepatosplenomegaly.  3.  Small pleural effusions.    < end of copied text >    Radiology imaging reviewed.       ASSESSMENT/ PLAN:     62 yo M with PMH of B cell lymphoma (not on any current treatment, oncologist- Dr. Granger), CKD, paranoid schizophrenia and history of rt urethral stent with multiple kidney stones sent to ED by nephro Dr. Jones due to elevated Ca on outpatient labs (12.8) and ASHLEY (Cr 3.3, baseline around 2.5). pt reports he has been feeling fatigued and endorses intentional weight loss of 10 lbs over the past month. On CT A/P, patient was found to have extensive intra-abdominal lymphadenopathy.  - IR was consulted for mesenteric lymph node biopsy  - CT A/P images reviewed - enlarged lymph nodes are central, with no ideal percutaneous window   - Recommend GI evaluation for possible EUS-guided biopsy  - No IR procedure planned at this time.  - IR to sign off  - Discussed with the medical team at [phone]    Risks, benefits, and alternatives to treatment discussed. All questions answered with understanding.    Thank you for the courtesy of this consult, please call [phone] with any further questions.

## 2019-09-30 NOTE — PROGRESS NOTE ADULT - SUBJECTIVE AND OBJECTIVE BOX
Patient is a 63y old  Male who presents with a chief complaint of hypercalcemia / ASHLEY (29 Sep 2019 11:49)      Subjective: Pt seen and examined , reports feeling fine . Has no new complaints .       Vital Signs Last 24 Hrs  T(C): 36.7 (30 Sep 2019 04:47), Max: 36.7 (30 Sep 2019 04:47)  T(F): 98 (30 Sep 2019 04:47), Max: 98 (30 Sep 2019 04:47)  HR: 82 (30 Sep 2019 04:47) (69 - 82)  BP: 98/59 (30 Sep 2019 04:47) (98/59 - 111/63)  BP(mean): --  RR: 18 (30 Sep 2019 04:47) (18 - 19)  SpO2: --    PHYSICAL EXAM  General: nad   HEENT: clear oropharynx, anicteric scleraCV: normal S1/S2 with no murmur rubs or gallops  Lungs: positive air movement b/l ant lungs,clear to auscultation, no wheezes, no rales  Abdomen: soft non-tender non-distended, no hepatosplenomegaly  Ext: no clubbing cyanosis or edema  Skin: no rashes and no petechiae  Neuro: alert and oriented X 4, no focal deficits    MEDICATIONS  (STANDING):  benztropine 2 milliGRAM(s) Oral daily  chlorhexidine 4% Liquid 1 Application(s) Topical <User Schedule>  fluPHENAZine 10 milliGRAM(s) Oral daily  heparin  Injectable 5000 Unit(s) SubCutaneous every 8 hours  sodium chloride 0.9%. 1000 milliLiter(s) (100 mL/Hr) IV Continuous <Continuous>    MEDICATIONS  (PRN):      LABS:                          10.0   6.84  )-----------( 162      ( 30 Sep 2019 05:56 )             31.4         Mean Cell Volume : 87.5 fL  Mean Cell Hemoglobin : 27.9 pg  Mean Cell Hemoglobin Concentration : 31.8 g/dL  Auto Neutrophil # : 4.04 K/uL  Auto Lymphocyte # : 0.81 K/uL  Auto Monocyte # : 0.87 K/uL  Auto Eosinophil # : 1.06 K/uL  Auto Basophil # : 0.04 K/uL  Auto Neutrophil % : 59.1 %  Auto Lymphocyte % : 11.8 %  Auto Monocyte % : 12.7 %  Auto Eosinophil % : 15.5 %  Auto Basophil % : 0.6 %      Serial CBC's  09-30 @ 05:56  Hct-31.4 / Hgb-10.0 / Plat-162 / RBC-3.59 / WBC-6.84  Serial CBC's  09-29 @ 07:41  Hct-30.3 / Hgb-9.6 / Plat-157 / RBC-3.42 / WBC-6.60  Serial CBC's  09-28 @ 08:03  Hct-29.3 / Hgb-9.3 / Plat-151 / RBC-3.29 / WBC-5.74  Serial CBC's  09-27 @ 08:35  Hct-31.0 / Hgb-9.8 / Plat-180 / RBC-3.53 / WBC-7.99  Serial CBC's  09-26 @ 21:00  Hct-32.9 / Hgb-10.8 / Plat-212 / RBC-3.83 / WBC-8.27      09-30    140  |  113<H>  |  36<H>  ----------------------------<  96  4.5   |  18  |  2.9<H>    Ca    10.4<H>      30 Sep 2019 05:56  Mg     2.1     09-30    TPro  x   /  Alb  3.3<L>  /  TBili  x   /  DBili  x   /  AST  x   /  ALT  x   /  AlkPhos  x   09-29                                  BLOOD SMEAR INTERPRETATION:       RADIOLOGY & ADDITIONAL STUDIES:

## 2019-10-01 ENCOUNTER — INBOUND DOCUMENT (OUTPATIENT)
Age: 64
End: 2019-10-01

## 2019-10-01 ENCOUNTER — OUTPATIENT (OUTPATIENT)
Dept: OUTPATIENT SERVICES | Facility: HOSPITAL | Age: 64
LOS: 1 days | End: 2019-10-01

## 2019-10-01 DIAGNOSIS — Z96.0 PRESENCE OF UROGENITAL IMPLANTS: Chronic | ICD-10-CM

## 2019-10-01 PROBLEM — F20.9 SCHIZOPHRENIA, UNSPECIFIED: Chronic | Status: ACTIVE | Noted: 2019-09-26

## 2019-10-01 PROBLEM — C85.90 NON-HODGKIN LYMPHOMA, UNSPECIFIED, UNSPECIFIED SITE: Chronic | Status: ACTIVE | Noted: 2019-09-26

## 2019-10-01 PROBLEM — N20.0 CALCULUS OF KIDNEY: Chronic | Status: ACTIVE | Noted: 2019-09-26

## 2019-10-01 LAB
% GAMMA, URINE: 12.7 % — SIGNIFICANT CHANGE UP
ALBUMIN 24H MFR UR ELPH: 37.1 % — SIGNIFICANT CHANGE UP
ALPHA1 GLOB 24H MFR UR ELPH: 21 % — SIGNIFICANT CHANGE UP
ALPHA2 GLOB 24H MFR UR ELPH: 11.6 % — SIGNIFICANT CHANGE UP
B-GLOBULIN 24H MFR UR ELPH: 17.6 % — SIGNIFICANT CHANGE UP
INTERPRETATION 24H UR IFE-IMP: SIGNIFICANT CHANGE UP
INTERPRETATION 24H UR IFE-IMP: SIGNIFICANT CHANGE UP
M PROTEIN 24H UR ELPH-MRATE: SIGNIFICANT CHANGE UP
PROT ?TM UR-MCNC: 44 MG/DL — HIGH (ref 0–12)
PROT PATTERN 24H UR ELPH-IMP: SIGNIFICANT CHANGE UP
TOTAL VOLUME - 24 HOUR: SIGNIFICANT CHANGE UP ML
URINE CREATININE CALCULATION: SIGNIFICANT CHANGE UP G/24 H (ref 1–2)

## 2019-10-02 LAB — ACE SERPL-CCNC: 53 U/L — SIGNIFICANT CHANGE UP (ref 14–82)

## 2019-10-03 ENCOUNTER — OUTPATIENT (OUTPATIENT)
Dept: OUTPATIENT SERVICES | Facility: HOSPITAL | Age: 64
LOS: 1 days | Discharge: HOME | End: 2019-10-03

## 2019-10-03 ENCOUNTER — LABORATORY RESULT (OUTPATIENT)
Age: 64
End: 2019-10-03

## 2019-10-03 ENCOUNTER — APPOINTMENT (OUTPATIENT)
Dept: HEMATOLOGY ONCOLOGY | Facility: CLINIC | Age: 64
End: 2019-10-03
Payer: MEDICAID

## 2019-10-03 VITALS
BODY MASS INDEX: 28.44 KG/M2 | RESPIRATION RATE: 16 BRPM | WEIGHT: 210 LBS | HEART RATE: 77 BPM | DIASTOLIC BLOOD PRESSURE: 66 MMHG | TEMPERATURE: 97.9 F | HEIGHT: 72 IN | SYSTOLIC BLOOD PRESSURE: 90 MMHG

## 2019-10-03 DIAGNOSIS — N13.30 UNSPECIFIED HYDRONEPHROSIS: ICD-10-CM

## 2019-10-03 DIAGNOSIS — E83.52 HYPERCALCEMIA: ICD-10-CM

## 2019-10-03 DIAGNOSIS — D63.0 ANEMIA IN NEOPLASTIC DISEASE: ICD-10-CM

## 2019-10-03 DIAGNOSIS — C85.80 OTHER SPECIFIED TYPES OF NON-HODGKIN LYMPHOMA, UNSPECIFIED SITE: ICD-10-CM

## 2019-10-03 DIAGNOSIS — D63.1 ANEMIA IN CHRONIC KIDNEY DISEASE: ICD-10-CM

## 2019-10-03 DIAGNOSIS — M11.20 OTHER CHONDROCALCINOSIS, UNSPECIFIED SITE: ICD-10-CM

## 2019-10-03 DIAGNOSIS — E86.0 DEHYDRATION: ICD-10-CM

## 2019-10-03 DIAGNOSIS — N28.1 CYST OF KIDNEY, ACQUIRED: ICD-10-CM

## 2019-10-03 DIAGNOSIS — N18.9 CHRONIC KIDNEY DISEASE, UNSPECIFIED: ICD-10-CM

## 2019-10-03 DIAGNOSIS — N17.9 ACUTE KIDNEY FAILURE, UNSPECIFIED: ICD-10-CM

## 2019-10-03 DIAGNOSIS — Z85.72 PERSONAL HISTORY OF NON-HODGKIN LYMPHOMAS: ICD-10-CM

## 2019-10-03 DIAGNOSIS — R16.2 HEPATOMEGALY WITH SPLENOMEGALY, NOT ELSEWHERE CLASSIFIED: ICD-10-CM

## 2019-10-03 DIAGNOSIS — R59.9 ENLARGED LYMPH NODES, UNSPECIFIED: ICD-10-CM

## 2019-10-03 DIAGNOSIS — N26.1 ATROPHY OF KIDNEY (TERMINAL): ICD-10-CM

## 2019-10-03 DIAGNOSIS — F20.0 PARANOID SCHIZOPHRENIA: ICD-10-CM

## 2019-10-03 DIAGNOSIS — E87.5 HYPERKALEMIA: ICD-10-CM

## 2019-10-03 DIAGNOSIS — C85.10 UNSPECIFIED B-CELL LYMPHOMA, UNSPECIFIED SITE: ICD-10-CM

## 2019-10-03 DIAGNOSIS — Z71.89 OTHER SPECIFIED COUNSELING: ICD-10-CM

## 2019-10-03 DIAGNOSIS — Z96.0 PRESENCE OF UROGENITAL IMPLANTS: Chronic | ICD-10-CM

## 2019-10-03 LAB
ALBUMIN SERPL ELPH-MCNC: 4.3 G/DL
ALP BLD-CCNC: 164 U/L
ALT SERPL-CCNC: 10 U/L
ANION GAP SERPL CALC-SCNC: 11 MMOL/L
APTT BLD: 34.4 SEC
AST SERPL-CCNC: 10 U/L
BILIRUB SERPL-MCNC: 0.3 MG/DL
BUN SERPL-MCNC: 38 MG/DL
CALCIUM SERPL-MCNC: 10.5 MG/DL
CHLORIDE SERPL-SCNC: 109 MMOL/L
CO2 SERPL-SCNC: 20 MMOL/L
CREAT SERPL-MCNC: 3 MG/DL
GLUCOSE SERPL-MCNC: 95 MG/DL
HCT VFR BLD CALC: 34.5 %
HGB BLD-MCNC: 11.1 G/DL
INR PPP: 1.12 RATIO
LDH SERPL-CCNC: 140 U/L
MCHC RBC-ENTMCNC: 28.1 PG
MCHC RBC-ENTMCNC: 32.2 G/DL
MCV RBC AUTO: 87.3 FL
PLATELET # BLD AUTO: 162 K/UL
PMV BLD: 8.9 FL
POTASSIUM SERPL-SCNC: 4.5 MMOL/L
PROT SERPL-MCNC: 6.2 G/DL
PT BLD: 12.9 SEC
RBC # BLD: 3.95 M/UL
RBC # FLD: 15.4 %
SODIUM SERPL-SCNC: 140 MMOL/L
WBC # FLD AUTO: 7.5 K/UL

## 2019-10-03 PROCEDURE — 99214 OFFICE O/P EST MOD 30 MIN: CPT

## 2019-10-04 LAB — PTH RELATED PROT SERPL-MCNC: <2 PMOL/L — SIGNIFICANT CHANGE UP

## 2019-10-04 NOTE — HISTORY OF PRESENT ILLNESS
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 At that time, reevaluation of his disease status showed evidence of further progression of his lymphadenopathy which was stable for several years.\par At present, the patient is denying any fever or night sweats. The appetite is stable. He is not feeling inordinately thirsty and has not noticed any polyuria.\par He has no particular problems with urine or bowel movements.\par He is on no new medications. [de-identified] : IV until proven otherwise. [de-identified] : Marginal zone

## 2019-10-04 NOTE — PHYSICAL EXAM
[Restricted in physically strenuous activity but ambulatory and able to carry out work of a light or sedentary nature] : Status 1- Restricted in physically strenuous activity but ambulatory and able to carry out work of a light or sedentary nature, e.g., light house work, office work [Normal] : normal appearance, no rash, nodules, vesicles, ulcers, erythema [de-identified] : Left lower neck lymphadenopathy felt [de-identified] : Soft. Even knowing that he has some degree of splenomegaly, I could not feel any significant enlargement of the spleen. Questionable inguinal adenopathy. [de-identified] : See above.

## 2019-10-04 NOTE — ASSESSMENT
[FreeTextEntry1] : Marginal zone lymphoma, clinically progressing given the recent episode of hypercalcemia and the slight progression of the lymphadenopathy noted on the CT scan.\par The situation was discussed with the patient. At this time, it is important to rule out transformation of his lymphoma into a more aggressive type. For that reason, will try to arrange a biopsy by IR as the least aggressive way for the moment to establish a firm diagnosis.\par We will also draw CBC, CMP, LDH, INR/PTT. Further recommendations after the above completed.\par Whether his lymphoma is transformed or not, he will require treatment at this point given his hypercalcemia and, to a lesser degree, the progression of his disease.\par All questions answered.\par Further recommendations after the above completed.

## 2019-10-04 NOTE — REVIEW OF SYSTEMS
[Fatigue] : fatigue [Joint Pain] : joint pain [Negative] : Heme/Lymph [de-identified] : History of paranoid schizophrenia

## 2019-10-07 DIAGNOSIS — E83.52 HYPERCALCEMIA: ICD-10-CM

## 2019-10-08 ENCOUNTER — FORM ENCOUNTER (OUTPATIENT)
Age: 64
End: 2019-10-08

## 2019-10-09 ENCOUNTER — RESULT REVIEW (OUTPATIENT)
Age: 64
End: 2019-10-09

## 2019-10-09 ENCOUNTER — OUTPATIENT (OUTPATIENT)
Dept: OUTPATIENT SERVICES | Facility: HOSPITAL | Age: 64
LOS: 1 days | Discharge: HOME | End: 2019-10-09
Payer: MEDICAID

## 2019-10-09 DIAGNOSIS — Z96.0 PRESENCE OF UROGENITAL IMPLANTS: Chronic | ICD-10-CM

## 2019-10-09 PROCEDURE — 88341 IMHCHEM/IMCYTCHM EA ADD ANTB: CPT | Mod: 26,59

## 2019-10-09 PROCEDURE — 88360 TUMOR IMMUNOHISTOCHEM/MANUAL: CPT | Mod: 26

## 2019-10-09 PROCEDURE — 88307 TISSUE EXAM BY PATHOLOGIST: CPT | Mod: 26

## 2019-10-09 PROCEDURE — 76942 ECHO GUIDE FOR BIOPSY: CPT | Mod: 26

## 2019-10-09 PROCEDURE — 88173 CYTOPATH EVAL FNA REPORT: CPT | Mod: 26

## 2019-10-09 PROCEDURE — 88172 CYTP DX EVAL FNA 1ST EA SITE: CPT | Mod: 26

## 2019-10-09 PROCEDURE — 38505 NEEDLE BIOPSY LYMPH NODES: CPT | Mod: LT

## 2019-10-09 PROCEDURE — 88188 FLOWCYTOMETRY/READ 9-15: CPT

## 2019-10-09 PROCEDURE — 88342 IMHCHEM/IMCYTCHM 1ST ANTB: CPT | Mod: 26,59

## 2019-10-09 NOTE — PROGRESS NOTE ADULT - SUBJECTIVE AND OBJECTIVE BOX
Interventional Radiology Brief- Operative Note    Procedure: left groin lymph node biopsy with ultrasound guidance    Pre-Op Diagnosis: B-cell lymphoma- possible relapse    Post-Op Diagnosis: same    Attending:   Dae    Resident:   none    Anesthesia (type):  [ ] General Anesthesia  [ ] Sedation  [ ] Spinal Anesthesia  [ x] Local/Regional    Contrast: none    Estimated Blood Loss:  < 5ml    Condition:   [ ] Critical  [ ] Serious  [ ] Fair   [x ] Good    Findings/Follow up Plan of Care:  -f/u results in five days  Specimens Removed:  7 18G cores  Implants:  none  Complications:  none  Condition/Disposition:  Dc home    Please call Interventional Radiology [phone] with any questions, concerns, or issues.

## 2019-10-11 LAB
NON-GYNECOLOGICAL CYTOLOGY STUDY: SIGNIFICANT CHANGE UP
TM INTERPRETATION: SIGNIFICANT CHANGE UP

## 2019-10-15 DIAGNOSIS — C85.10 UNSPECIFIED B-CELL LYMPHOMA, UNSPECIFIED SITE: ICD-10-CM

## 2019-10-15 DIAGNOSIS — C85.15 UNSPECIFIED B-CELL LYMPHOMA, LYMPH NODES OF INGUINAL REGION AND LOWER LIMB: ICD-10-CM

## 2019-10-15 LAB
CHROM ANALY OVERALL INTERP SPEC-IMP: SIGNIFICANT CHANGE UP
SURGICAL PATHOLOGY STUDY: SIGNIFICANT CHANGE UP

## 2019-10-16 ENCOUNTER — FORM ENCOUNTER (OUTPATIENT)
Age: 64
End: 2019-10-16

## 2019-10-17 ENCOUNTER — OUTPATIENT (OUTPATIENT)
Dept: OUTPATIENT SERVICES | Facility: HOSPITAL | Age: 64
LOS: 1 days | Discharge: HOME | End: 2019-10-17
Payer: MEDICAID

## 2019-10-17 DIAGNOSIS — C85.80 OTHER SPECIFIED TYPES OF NON-HODGKIN LYMPHOMA, UNSPECIFIED SITE: ICD-10-CM

## 2019-10-17 DIAGNOSIS — Z96.0 PRESENCE OF UROGENITAL IMPLANTS: Chronic | ICD-10-CM

## 2019-10-17 LAB — GLUCOSE BLDC GLUCOMTR-MCNC: 91 MG/DL — SIGNIFICANT CHANGE UP (ref 70–99)

## 2019-10-17 PROCEDURE — 78815 PET IMAGE W/CT SKULL-THIGH: CPT | Mod: 26,PS

## 2019-11-04 ENCOUNTER — LABORATORY RESULT (OUTPATIENT)
Age: 64
End: 2019-11-04

## 2019-11-04 ENCOUNTER — APPOINTMENT (OUTPATIENT)
Dept: HEMATOLOGY ONCOLOGY | Facility: CLINIC | Age: 64
End: 2019-11-04
Payer: MEDICAID

## 2019-11-04 VITALS
RESPIRATION RATE: 14 BRPM | HEIGHT: 72 IN | DIASTOLIC BLOOD PRESSURE: 60 MMHG | BODY MASS INDEX: 28.17 KG/M2 | TEMPERATURE: 96.8 F | SYSTOLIC BLOOD PRESSURE: 113 MMHG | WEIGHT: 208 LBS

## 2019-11-04 PROCEDURE — 99214 OFFICE O/P EST MOD 30 MIN: CPT

## 2019-11-05 LAB
ALBUMIN SERPL ELPH-MCNC: 4.4 G/DL
ALP BLD-CCNC: 165 U/L
ALT SERPL-CCNC: 9 U/L
ANION GAP SERPL CALC-SCNC: 13 MMOL/L
AST SERPL-CCNC: 11 U/L
BILIRUB SERPL-MCNC: 0.2 MG/DL
BUN SERPL-MCNC: 42 MG/DL
CALCIUM SERPL-MCNC: 10.3 MG/DL
CALCIUM SERPL-MCNC: 10.5 MG/DL
CHLORIDE SERPL-SCNC: 112 MMOL/L
CO2 SERPL-SCNC: 22 MMOL/L
CREAT SERPL-MCNC: 2.6 MG/DL
GLUCOSE SERPL-MCNC: 85 MG/DL
HCT VFR BLD CALC: 35.6 %
HGB BLD-MCNC: 11.7 G/DL
MCHC RBC-ENTMCNC: 28.5 PG
MCHC RBC-ENTMCNC: 32.9 G/DL
MCV RBC AUTO: 86.8 FL
PARATHYROID HORMONE INTACT: 8 PG/ML
PLATELET # BLD AUTO: 176 K/UL
PMV BLD: 9.2 FL
POTASSIUM SERPL-SCNC: 4.2 MMOL/L
PROT SERPL-MCNC: 6.6 G/DL
RBC # BLD: 4.1 M/UL
RBC # FLD: 15.5 %
SODIUM SERPL-SCNC: 147 MMOL/L
URATE SERPL-MCNC: 8.6 MG/DL
WBC # FLD AUTO: 7.48 K/UL

## 2019-11-12 NOTE — HISTORY OF PRESENT ILLNESS
[de-identified] : T [de-identified] : The patient is coming for his regularly scheduled follow up for his marginal zone lymphoma and discuss his recent PET scan results.\par The scan has shown significant progression in multiple areas (please, see report separately).\par The results were communicated to the patient.\par He was told that at this time, given the significant progression of his disease, he would definitely be candidate for chemotherapy.\par I presented the combination of Rituxan with bendamustine. Risks, benefits, side effects, schedule described. A printout about the drugs was given. The need of Neulasta was also discussed.\par In addition, he was told that he should start allopurinol 300 mg PO daily this week to be ready for chemotherapy next week. The risk of tumor lysis explained especially that he has history of kidney stones and kidney disease with creatinine between 2 and 3.\par We will draw a baseline CBC, CMP, LDH and intact parathyroid hormone level (has history of hypercalcemia). \par Further recommendations after the above results are available.\par All questions answered.

## 2019-11-25 ENCOUNTER — APPOINTMENT (OUTPATIENT)
Dept: INFUSION THERAPY | Facility: CLINIC | Age: 64
End: 2019-11-25

## 2019-11-25 ENCOUNTER — RX RENEWAL (OUTPATIENT)
Age: 64
End: 2019-11-25

## 2019-11-25 ENCOUNTER — LABORATORY RESULT (OUTPATIENT)
Age: 64
End: 2019-11-25

## 2019-11-25 LAB
ALBUMIN SERPL ELPH-MCNC: 4.3 G/DL
ALP BLD-CCNC: 155 U/L
ALT SERPL-CCNC: 7 U/L
ANION GAP SERPL CALC-SCNC: 15 MMOL/L
AST SERPL-CCNC: 10 U/L
BILIRUB SERPL-MCNC: 0.3 MG/DL
BUN SERPL-MCNC: 42 MG/DL
CALCIUM SERPL-MCNC: 10 MG/DL
CHLORIDE SERPL-SCNC: 107 MMOL/L
CO2 SERPL-SCNC: 19 MMOL/L
CREAT SERPL-MCNC: 2.8 MG/DL
GLUCOSE SERPL-MCNC: 91 MG/DL
HCT VFR BLD CALC: 35 %
HGB BLD-MCNC: 11.8 G/DL
MCHC RBC-ENTMCNC: 29.7 PG
MCHC RBC-ENTMCNC: 33.7 G/DL
MCV RBC AUTO: 88.2 FL
PLATELET # BLD AUTO: 164 K/UL
PMV BLD: 9.4 FL
POTASSIUM SERPL-SCNC: 4.5 MMOL/L
PROT SERPL-MCNC: 6.3 G/DL
RBC # BLD: 3.97 M/UL
RBC # FLD: 15.1 %
SODIUM SERPL-SCNC: 141 MMOL/L
WBC # FLD AUTO: 7.96 K/UL

## 2019-11-25 RX ORDER — BENDAMUSTINE HYDROCHLORIDE 100 MG/20ML
195 INJECTION, POWDER, LYOPHILIZED, FOR SOLUTION INTRAVENOUS ONCE
Refills: 0 | Status: COMPLETED | OUTPATIENT
Start: 2019-11-25 | End: 2019-11-25

## 2019-11-25 RX ORDER — HYDROCORTISONE 20 MG
100 TABLET ORAL ONCE
Refills: 0 | Status: COMPLETED | OUTPATIENT
Start: 2019-11-25 | End: 2019-11-25

## 2019-11-25 RX ORDER — DEXAMETHASONE 0.5 MG/5ML
12 ELIXIR ORAL ONCE
Refills: 0 | Status: COMPLETED | OUTPATIENT
Start: 2019-11-25 | End: 2019-11-25

## 2019-11-25 RX ORDER — ACETAMINOPHEN 500 MG
650 TABLET ORAL ONCE
Refills: 0 | Status: COMPLETED | OUTPATIENT
Start: 2019-11-25 | End: 2019-11-25

## 2019-11-25 RX ORDER — DIPHENHYDRAMINE HCL 50 MG
50 CAPSULE ORAL ONCE
Refills: 0 | Status: COMPLETED | OUTPATIENT
Start: 2019-11-25 | End: 2019-11-25

## 2019-11-25 RX ORDER — RITUXIMAB 10 MG/ML
810 INJECTION, SOLUTION INTRAVENOUS ONCE
Refills: 0 | Status: COMPLETED | OUTPATIENT
Start: 2019-11-25 | End: 2019-11-25

## 2019-11-25 RX ORDER — DIPHENHYDRAMINE HCL 50 MG
25 CAPSULE ORAL ONCE
Refills: 0 | Status: COMPLETED | OUTPATIENT
Start: 2019-11-25 | End: 2019-11-25

## 2019-11-25 RX ADMIN — Medication 50 MILLIGRAM(S): at 10:16

## 2019-11-25 RX ADMIN — BENDAMUSTINE HYDROCHLORIDE 346.8 MILLIGRAM(S): 100 INJECTION, POWDER, LYOPHILIZED, FOR SOLUTION INTRAVENOUS at 11:17

## 2019-11-25 RX ADMIN — Medication 650 MILLIGRAM(S): at 10:16

## 2019-11-25 RX ADMIN — Medication 122 MILLIGRAM(S): at 10:16

## 2019-11-25 RX ADMIN — Medication 102 MILLIGRAM(S): at 10:16

## 2019-11-25 RX ADMIN — Medication 650 MILLIGRAM(S): at 10:15

## 2019-11-25 RX ADMIN — Medication 100 MILLIGRAM(S): at 12:15

## 2019-11-25 RX ADMIN — RITUXIMAB 202.5 MILLIGRAM(S): 10 INJECTION, SOLUTION INTRAVENOUS at 11:18

## 2019-11-25 RX ADMIN — Medication 25 MILLIGRAM(S): at 10:30

## 2019-11-26 ENCOUNTER — APPOINTMENT (OUTPATIENT)
Dept: INFUSION THERAPY | Facility: CLINIC | Age: 64
End: 2019-11-26

## 2019-11-26 LAB
HAV IGM SER QL: NONREACTIVE
HBV SURFACE AB SER QL: NONREACTIVE
HBV SURFACE AG SER QL: NONREACTIVE
HEPATITIS A IGG ANTIBODY: NONREACTIVE

## 2019-11-26 RX ORDER — DEXAMETHASONE 0.5 MG/5ML
12 ELIXIR ORAL ONCE
Refills: 0 | Status: COMPLETED | OUTPATIENT
Start: 2019-11-26 | End: 2019-11-26

## 2019-11-26 RX ORDER — PEGFILGRASTIM-CBQV 6 MG/.6ML
6 INJECTION, SOLUTION SUBCUTANEOUS ONCE
Refills: 0 | Status: COMPLETED | OUTPATIENT
Start: 2019-11-26 | End: 2019-11-26

## 2019-11-26 RX ORDER — BENDAMUSTINE HYDROCHLORIDE 100 MG/20ML
195 INJECTION, POWDER, LYOPHILIZED, FOR SOLUTION INTRAVENOUS ONCE
Refills: 0 | Status: COMPLETED | OUTPATIENT
Start: 2019-11-26 | End: 2019-11-26

## 2019-11-26 RX ORDER — DIPHENHYDRAMINE HCL 50 MG
50 CAPSULE ORAL ONCE
Refills: 0 | Status: COMPLETED | OUTPATIENT
Start: 2019-11-26 | End: 2019-11-26

## 2019-11-26 RX ORDER — ACETAMINOPHEN 500 MG
650 TABLET ORAL ONCE
Refills: 0 | Status: COMPLETED | OUTPATIENT
Start: 2019-11-26 | End: 2019-11-26

## 2019-11-26 RX ADMIN — Medication 12 MILLIGRAM(S): at 09:15

## 2019-11-26 RX ADMIN — Medication 650 MILLIGRAM(S): at 08:54

## 2019-11-26 RX ADMIN — Medication 650 MILLIGRAM(S): at 08:55

## 2019-11-26 RX ADMIN — BENDAMUSTINE HYDROCHLORIDE 231.2 MILLIGRAM(S): 100 INJECTION, POWDER, LYOPHILIZED, FOR SOLUTION INTRAVENOUS at 09:51

## 2019-11-26 RX ADMIN — PEGFILGRASTIM-CBQV 6 MILLIGRAM(S): 6 INJECTION, SOLUTION SUBCUTANEOUS at 10:34

## 2019-11-26 RX ADMIN — Medication 102 MILLIGRAM(S): at 08:54

## 2019-11-26 RX ADMIN — Medication 122 MILLIGRAM(S): at 08:54

## 2019-11-26 RX ADMIN — Medication 50 MILLIGRAM(S): at 09:30

## 2019-11-26 RX ADMIN — BENDAMUSTINE HYDROCHLORIDE 195 MILLIGRAM(S): 100 INJECTION, POWDER, LYOPHILIZED, FOR SOLUTION INTRAVENOUS at 10:25

## 2019-12-23 ENCOUNTER — APPOINTMENT (OUTPATIENT)
Dept: HEMATOLOGY ONCOLOGY | Facility: CLINIC | Age: 64
End: 2019-12-23
Payer: MEDICAID

## 2019-12-23 ENCOUNTER — LABORATORY RESULT (OUTPATIENT)
Age: 64
End: 2019-12-23

## 2019-12-23 VITALS
DIASTOLIC BLOOD PRESSURE: 75 MMHG | HEART RATE: 100 BPM | TEMPERATURE: 96.7 F | SYSTOLIC BLOOD PRESSURE: 95 MMHG | HEIGHT: 72 IN | RESPIRATION RATE: 14 BRPM | WEIGHT: 200 LBS | BODY MASS INDEX: 27.09 KG/M2

## 2019-12-23 DIAGNOSIS — M10.9 GOUT, UNSPECIFIED: ICD-10-CM

## 2019-12-23 DIAGNOSIS — R21 RASH AND OTHER NONSPECIFIC SKIN ERUPTION: ICD-10-CM

## 2019-12-23 DIAGNOSIS — Z00.00 ENCOUNTER FOR GENERAL ADULT MEDICAL EXAMINATION W/OUT ABNORMAL FINDINGS: ICD-10-CM

## 2019-12-23 LAB
HCT VFR BLD CALC: 39.1 %
HGB BLD-MCNC: 13.3 G/DL
MCHC RBC-ENTMCNC: 29.9 PG
MCHC RBC-ENTMCNC: 34 G/DL
MCV RBC AUTO: 87.9 FL
PLATELET # BLD AUTO: 207 K/UL
PMV BLD: 9.3 FL
RBC # BLD: 4.45 M/UL
RBC # FLD: 15.1 %
WBC # FLD AUTO: 9.9 K/UL

## 2019-12-23 PROCEDURE — 99214 OFFICE O/P EST MOD 30 MIN: CPT

## 2019-12-23 NOTE — PHYSICAL EXAM
[Restricted in physically strenuous activity but ambulatory and able to carry out work of a light or sedentary nature] : Status 1- Restricted in physically strenuous activity but ambulatory and able to carry out work of a light or sedentary nature, e.g., light house work, office work [Normal] : grossly intact [de-identified] : M

## 2019-12-23 NOTE — HISTORY OF PRESENT ILLNESS
[Disease: _____________________] : Disease: [unfilled] [AJCC Stage: ____] : AJCC Stage: [unfilled] [de-identified] : The patient is coming for his regularly scheduled follow up for his marginal zone lymphoma and continuation of his chemotherapy with bendamustine-Rituxan.\par He has tolerated the chemotherapy rather well without any significant toxicity except for a rash, mildly pruritic, erythematous, macular, almost diffuse which apparently comes and goes and usually responds to Benadryl. He has recently changed his laundry detergent. It's not clear what the exact precipitating factor may be.\par In addition, after the first cycle of the chemotherapy, he had gout attacks despite being on allopurinol. Also, he had diarrhea for a couple of days, self-limited, unclear as far as the etiology was concerned.\par He is on no new medications. He did not have any fever, N/V, cough, urinary problems after the first cycle. Overall, he is feeling better, if it weren't for the rash, with more energy and better appetite.\par  [de-identified] : Marginal zone

## 2019-12-23 NOTE — ASSESSMENT
[FreeTextEntry1] : 1. Marginal zone lymphoma, stage IV, S/P 1 cycle of bendamustine and Rituxan with most likely good response since his Hgb has improved from 11.1 to 13.3 today and symptomatically feeling better.\par 2. Rash, looks allergic. Not clear if secondary to environmental factors (he also has a dog that he walks), food or medications (such as allopurinol).The rash has been intermittent.\par \par The CBC being well within acceptable limits will proceed with a second cycle of chemotherapy with the same agents (bendamustine and Rituxan). He was also recommended to continue with Benadryl PRN since it seems to be working.\par After 3 cycles, the patient's disease status will be reevaluated.\par \par He will be seen again in a month for follow up.

## 2019-12-24 LAB
ALBUMIN SERPL ELPH-MCNC: 4 G/DL
ALP BLD-CCNC: 123 U/L
ALT SERPL-CCNC: 13 U/L
ANION GAP SERPL CALC-SCNC: 19 MMOL/L
AST SERPL-CCNC: 15 U/L
BILIRUB SERPL-MCNC: 0.4 MG/DL
BUN SERPL-MCNC: 32 MG/DL
CALCIUM SERPL-MCNC: 9.2 MG/DL
CHLORIDE SERPL-SCNC: 108 MMOL/L
CO2 SERPL-SCNC: 12 MMOL/L
CREAT SERPL-MCNC: 2.4 MG/DL
GLUCOSE SERPL-MCNC: 118 MG/DL
LDH SERPL-CCNC: 418 U/L
POTASSIUM SERPL-SCNC: 4.8 MMOL/L
PROT SERPL-MCNC: 6.4 G/DL
SODIUM SERPL-SCNC: 139 MMOL/L

## 2019-12-26 ENCOUNTER — APPOINTMENT (OUTPATIENT)
Dept: INFUSION THERAPY | Facility: CLINIC | Age: 64
End: 2019-12-26

## 2019-12-26 LAB — MAGNESIUM SERPL-MCNC: 2 MG/DL

## 2019-12-26 RX ORDER — DEXAMETHASONE 0.5 MG/5ML
12 ELIXIR ORAL ONCE
Refills: 0 | Status: COMPLETED | OUTPATIENT
Start: 2019-12-26 | End: 2019-12-26

## 2019-12-26 RX ORDER — DEXAMETHASONE 0.5 MG/5ML
12 ELIXIR ORAL ONCE
Refills: 0 | Status: DISCONTINUED | OUTPATIENT
Start: 2019-12-26 | End: 2019-12-26

## 2019-12-26 RX ORDER — ACETAMINOPHEN 500 MG
650 TABLET ORAL ONCE
Refills: 0 | Status: COMPLETED | OUTPATIENT
Start: 2019-12-26 | End: 2019-12-26

## 2019-12-26 RX ORDER — RITUXIMAB 10 MG/ML
810 INJECTION, SOLUTION INTRAVENOUS ONCE
Refills: 0 | Status: COMPLETED | OUTPATIENT
Start: 2019-12-26 | End: 2019-12-26

## 2019-12-26 RX ORDER — DIPHENHYDRAMINE HCL 50 MG
50 CAPSULE ORAL ONCE
Refills: 0 | Status: COMPLETED | OUTPATIENT
Start: 2019-12-26 | End: 2019-12-26

## 2019-12-26 RX ORDER — BENDAMUSTINE HYDROCHLORIDE 100 MG/20ML
195 INJECTION, POWDER, LYOPHILIZED, FOR SOLUTION INTRAVENOUS ONCE
Refills: 0 | Status: COMPLETED | OUTPATIENT
Start: 2019-12-26 | End: 2019-12-26

## 2019-12-26 RX ADMIN — BENDAMUSTINE HYDROCHLORIDE 195 MILLIGRAM(S): 100 INJECTION, POWDER, LYOPHILIZED, FOR SOLUTION INTRAVENOUS at 12:25

## 2019-12-26 RX ADMIN — Medication 102 MILLIGRAM(S): at 11:36

## 2019-12-26 RX ADMIN — RITUXIMAB 202.5 MILLIGRAM(S): 10 INJECTION, SOLUTION INTRAVENOUS at 12:30

## 2019-12-26 RX ADMIN — Medication 12 MILLIGRAM(S): at 11:36

## 2019-12-26 RX ADMIN — Medication 183 MILLIGRAM(S): at 11:06

## 2019-12-26 RX ADMIN — Medication 650 MILLIGRAM(S): at 11:10

## 2019-12-26 RX ADMIN — BENDAMUSTINE HYDROCHLORIDE 231.2 MILLIGRAM(S): 100 INJECTION, POWDER, LYOPHILIZED, FOR SOLUTION INTRAVENOUS at 12:10

## 2019-12-26 RX ADMIN — Medication 650 MILLIGRAM(S): at 11:07

## 2019-12-26 RX ADMIN — Medication 50 MILLIGRAM(S): at 11:56

## 2019-12-27 ENCOUNTER — APPOINTMENT (OUTPATIENT)
Dept: INFUSION THERAPY | Facility: CLINIC | Age: 64
End: 2019-12-27

## 2019-12-27 RX ORDER — BENDAMUSTINE HYDROCHLORIDE 100 MG/20ML
195 INJECTION, POWDER, LYOPHILIZED, FOR SOLUTION INTRAVENOUS ONCE
Refills: 0 | Status: COMPLETED | OUTPATIENT
Start: 2019-12-27 | End: 2019-12-27

## 2019-12-27 RX ORDER — PEGFILGRASTIM-CBQV 6 MG/.6ML
6 INJECTION, SOLUTION SUBCUTANEOUS ONCE
Refills: 0 | Status: COMPLETED | OUTPATIENT
Start: 2019-12-27 | End: 2019-12-27

## 2019-12-27 RX ORDER — DEXAMETHASONE 0.5 MG/5ML
12 ELIXIR ORAL ONCE
Refills: 0 | Status: COMPLETED | OUTPATIENT
Start: 2019-12-27 | End: 2019-12-27

## 2019-12-27 RX ORDER — DIPHENHYDRAMINE HCL 50 MG
50 CAPSULE ORAL ONCE
Refills: 0 | Status: COMPLETED | OUTPATIENT
Start: 2019-12-27 | End: 2019-12-27

## 2019-12-27 RX ORDER — ACETAMINOPHEN 500 MG
650 TABLET ORAL ONCE
Refills: 0 | Status: COMPLETED | OUTPATIENT
Start: 2019-12-27 | End: 2019-12-27

## 2019-12-27 RX ADMIN — Medication 122 MILLIGRAM(S): at 10:30

## 2019-12-27 RX ADMIN — Medication 650 MILLIGRAM(S): at 10:25

## 2019-12-27 RX ADMIN — Medication 12 MILLIGRAM(S): at 10:45

## 2019-12-27 RX ADMIN — BENDAMUSTINE HYDROCHLORIDE 195 MILLIGRAM(S): 100 INJECTION, POWDER, LYOPHILIZED, FOR SOLUTION INTRAVENOUS at 11:30

## 2019-12-27 RX ADMIN — PEGFILGRASTIM-CBQV 6 MILLIGRAM(S): 6 INJECTION, SOLUTION SUBCUTANEOUS at 11:15

## 2019-12-27 RX ADMIN — Medication 50 MILLIGRAM(S): at 11:10

## 2019-12-27 RX ADMIN — Medication 102 MILLIGRAM(S): at 10:55

## 2019-12-27 RX ADMIN — Medication 650 MILLIGRAM(S): at 10:24

## 2019-12-27 RX ADMIN — BENDAMUSTINE HYDROCHLORIDE 231.2 MILLIGRAM(S): 100 INJECTION, POWDER, LYOPHILIZED, FOR SOLUTION INTRAVENOUS at 11:15

## 2020-01-14 ENCOUNTER — INPATIENT (INPATIENT)
Facility: HOSPITAL | Age: 65
LOS: 8 days | Discharge: HOME | End: 2020-01-23
Attending: INTERNAL MEDICINE | Admitting: INTERNAL MEDICINE
Payer: MEDICAID

## 2020-01-14 VITALS
DIASTOLIC BLOOD PRESSURE: 72 MMHG | SYSTOLIC BLOOD PRESSURE: 105 MMHG | TEMPERATURE: 98 F | RESPIRATION RATE: 19 BRPM | HEART RATE: 119 BPM | OXYGEN SATURATION: 98 %

## 2020-01-14 DIAGNOSIS — Z96.0 PRESENCE OF UROGENITAL IMPLANTS: Chronic | ICD-10-CM

## 2020-01-14 LAB
ALBUMIN SERPL ELPH-MCNC: 4.1 G/DL — SIGNIFICANT CHANGE UP (ref 3.5–5.2)
ALP SERPL-CCNC: 110 U/L — SIGNIFICANT CHANGE UP (ref 30–115)
ALT FLD-CCNC: 11 U/L — SIGNIFICANT CHANGE UP (ref 0–41)
ANION GAP SERPL CALC-SCNC: 16 MMOL/L — HIGH (ref 7–14)
AST SERPL-CCNC: 13 U/L — SIGNIFICANT CHANGE UP (ref 0–41)
BASOPHILS # BLD AUTO: 0.03 K/UL — SIGNIFICANT CHANGE UP (ref 0–0.2)
BASOPHILS NFR BLD AUTO: 0.4 % — SIGNIFICANT CHANGE UP (ref 0–1)
BILIRUB SERPL-MCNC: 0.6 MG/DL — SIGNIFICANT CHANGE UP (ref 0.2–1.2)
BUN SERPL-MCNC: 46 MG/DL — HIGH (ref 10–20)
CALCIUM SERPL-MCNC: 9.4 MG/DL — SIGNIFICANT CHANGE UP (ref 8.5–10.1)
CHLORIDE SERPL-SCNC: 99 MMOL/L — SIGNIFICANT CHANGE UP (ref 98–110)
CO2 SERPL-SCNC: 15 MMOL/L — LOW (ref 17–32)
CREAT SERPL-MCNC: 2.3 MG/DL — HIGH (ref 0.7–1.5)
EOSINOPHIL # BLD AUTO: 0.01 K/UL — SIGNIFICANT CHANGE UP (ref 0–0.7)
EOSINOPHIL NFR BLD AUTO: 0.1 % — SIGNIFICANT CHANGE UP (ref 0–8)
GLUCOSE SERPL-MCNC: 127 MG/DL — HIGH (ref 70–99)
HCT VFR BLD CALC: 39.5 % — LOW (ref 42–52)
HGB BLD-MCNC: 13.5 G/DL — LOW (ref 14–18)
IMM GRANULOCYTES NFR BLD AUTO: 0.8 % — HIGH (ref 0.1–0.3)
LACTATE SERPL-SCNC: 1.8 MMOL/L — SIGNIFICANT CHANGE UP (ref 0.7–2)
LIDOCAIN IGE QN: 28 U/L — SIGNIFICANT CHANGE UP (ref 7–60)
LYMPHOCYTES # BLD AUTO: 0.6 K/UL — LOW (ref 1.2–3.4)
LYMPHOCYTES # BLD AUTO: 8.2 % — LOW (ref 20.5–51.1)
MAGNESIUM SERPL-MCNC: 2.1 MG/DL — SIGNIFICANT CHANGE UP (ref 1.8–2.4)
MCHC RBC-ENTMCNC: 29.7 PG — SIGNIFICANT CHANGE UP (ref 27–31)
MCHC RBC-ENTMCNC: 34.2 G/DL — SIGNIFICANT CHANGE UP (ref 32–37)
MCV RBC AUTO: 87 FL — SIGNIFICANT CHANGE UP (ref 80–94)
MONOCYTES # BLD AUTO: 1.3 K/UL — HIGH (ref 0.1–0.6)
MONOCYTES NFR BLD AUTO: 17.8 % — HIGH (ref 1.7–9.3)
NEUTROPHILS # BLD AUTO: 5.3 K/UL — SIGNIFICANT CHANGE UP (ref 1.4–6.5)
NEUTROPHILS NFR BLD AUTO: 72.7 % — SIGNIFICANT CHANGE UP (ref 42.2–75.2)
NRBC # BLD: 0 /100 WBCS — SIGNIFICANT CHANGE UP (ref 0–0)
PLATELET # BLD AUTO: 148 K/UL — SIGNIFICANT CHANGE UP (ref 130–400)
POTASSIUM SERPL-MCNC: 4.6 MMOL/L — SIGNIFICANT CHANGE UP (ref 3.5–5)
POTASSIUM SERPL-SCNC: 4.6 MMOL/L — SIGNIFICANT CHANGE UP (ref 3.5–5)
PROT SERPL-MCNC: 5.8 G/DL — LOW (ref 6–8)
RBC # BLD: 4.54 M/UL — LOW (ref 4.7–6.1)
RBC # FLD: 16.5 % — HIGH (ref 11.5–14.5)
SODIUM SERPL-SCNC: 130 MMOL/L — LOW (ref 135–146)
WBC # BLD: 7.3 K/UL — SIGNIFICANT CHANGE UP (ref 4.8–10.8)
WBC # FLD AUTO: 7.3 K/UL — SIGNIFICANT CHANGE UP (ref 4.8–10.8)

## 2020-01-14 PROCEDURE — 99285 EMERGENCY DEPT VISIT HI MDM: CPT

## 2020-01-14 RX ORDER — CEPHALEXIN 500 MG
500 CAPSULE ORAL ONCE
Refills: 0 | Status: COMPLETED | OUTPATIENT
Start: 2020-01-14 | End: 2020-01-14

## 2020-01-14 RX ORDER — ONDANSETRON 8 MG/1
4 TABLET, FILM COATED ORAL ONCE
Refills: 0 | Status: COMPLETED | OUTPATIENT
Start: 2020-01-14 | End: 2020-01-14

## 2020-01-14 RX ORDER — SODIUM CHLORIDE 9 MG/ML
1000 INJECTION, SOLUTION INTRAVENOUS ONCE
Refills: 0 | Status: COMPLETED | OUTPATIENT
Start: 2020-01-14 | End: 2020-01-14

## 2020-01-14 RX ORDER — ONDANSETRON 8 MG/1
1 TABLET, FILM COATED ORAL
Qty: 12 | Refills: 0
Start: 2020-01-14 | End: 2020-01-17

## 2020-01-14 RX ORDER — CEPHALEXIN 500 MG
1 CAPSULE ORAL
Qty: 40 | Refills: 0
Start: 2020-01-14 | End: 2020-01-23

## 2020-01-14 RX ORDER — AZTREONAM 2 G
1 VIAL (EA) INJECTION
Qty: 20 | Refills: 0
Start: 2020-01-14 | End: 2020-01-23

## 2020-01-14 RX ADMIN — SODIUM CHLORIDE 1000 MILLILITER(S): 9 INJECTION, SOLUTION INTRAVENOUS at 20:05

## 2020-01-14 RX ADMIN — Medication 500 MILLIGRAM(S): at 23:01

## 2020-01-14 RX ADMIN — ONDANSETRON 4 MILLIGRAM(S): 8 TABLET, FILM COATED ORAL at 23:05

## 2020-01-14 RX ADMIN — Medication 1 TABLET(S): at 23:01

## 2020-01-14 RX ADMIN — SODIUM CHLORIDE 1000 MILLILITER(S): 9 INJECTION, SOLUTION INTRAVENOUS at 23:05

## 2020-01-14 NOTE — ED PROVIDER NOTE - PROGRESS NOTE DETAILS
pt now profusely vomiting, unable to tolerate po - will admit for further mgmt s/w hem/onc fellow Dr. Griffin, rec admission to medicine and will follow received cultures results from [facility] - gram + cocci in pairs; medical admitting resident and intern [phone] notified; pt still admitted;

## 2020-01-14 NOTE — ED PROVIDER NOTE - CARE PROVIDER_API CALL
Nathen Escudero)  Internal Medicine  [address] [place] [address]  Phone: [phone]  Fax: [phone]  Established Patient  Follow Up Time: 1-3 Days    Nate Gong)  Hematology; Internal Medicine; Medical Oncology  [address] [place] [address]  Phone: [phone]  Fax: [phone]  Established Patient  Follow Up Time: 1-3 Days

## 2020-01-14 NOTE — ED PROVIDER NOTE - PROVIDER TOKENS
PROVIDER:[TOKEN:[[id number]],FOLLOWUP:[1-3 Days],ESTABLISHEDPATIENT:[T]],PROVIDER:[TOKEN:[[id number]],FOLLOWUP:[1-3 Days],ESTABLISHEDPATIENT:[T]]

## 2020-01-14 NOTE — ED PROVIDER NOTE - CARE PROVIDERS DIRECT ADDRESSES
[email or web address]

## 2020-01-14 NOTE — ED PROVIDER NOTE - PHYSICAL EXAMINATION
VITAL SIGNS: I have reviewed nursing notes and confirm.  CONSTITUTIONAL: nad  SKIN: diffuse erythematous dry rash to face & trunk, extensive crusted shingles rash to R thoracic flank with surrounding erythema & extensive weeping  HEAD: Normocephalic; atraumatic.  EYES: PERRL, EOM intact; conjunctiva and sclera clear.  ENT: No nasal discharge; airway clear.  NECK: Supple; non tender.  CARD: S1, S2 normal; no murmurs, gallops, or rubs. tachy 110s reg rhythm.  RESP: No wheezes, rales or rhonchi.  ABD: Normal bowel sounds; soft; non-distended; non-tender; no hepatosplenomegaly; no rgr.  BACK: non-tender, no CVAT  EXT: Normal ROM. No clubbing, cyanosis or edema. DPI.  NEURO: Alert, oriented. Grossly unremarkable. No focal deficits.  PSYCH: Cooperative, appropriate.

## 2020-01-14 NOTE — ED PROVIDER NOTE - OBJECTIVE STATEMENT
64y m h/o lymphoma on chemo last 3 wks ago, ckd w/atrophic L kidney, schizophrenia p/w nvd. Rpts 1 week of loose stools and 3 days of nbnb vomiting, unable to tolerate po. Also rpts recent R thoracic shingles rash 5d ago, started on valtrex 1g ONCE daily due to his ckd. No f/c, cp/sob, abd pain, flank pain, urinary sx. Has had diffuse erythematous, dry, pruritic rash to face & trunk x sev weeks. PMD Kena - Renal Blush - HemOnc Farideh.

## 2020-01-14 NOTE — ED ADULT NURSE NOTE - PMH
Kidney stones    Lymphoma    Schizophrenia    Shingles Atrophic kidney    Kidney stones    Lymphoma    Schizophrenia    Shingles

## 2020-01-14 NOTE — ED PROVIDER NOTE - PATIENT PORTAL LINK FT
You can access the FollowMyHealth Patient Portal offered by [facility] by registering at the following website: [email or web address]. By joining [facility]’s FollowMyHealth portal, you will also be able to view your health information using other applications (apps) compatible with our system.

## 2020-01-14 NOTE — ED PROVIDER NOTE - NSFOLLOWUPINSTRUCTIONS_ED_ALL_ED_FT
Nausea / Vomiting    Nausea is the feeling that you have an upset stomach or have to vomit. As nausea gets worse, it can lead to vomiting. Vomiting occurs when stomach contents are thrown up and out of the mouth which puts you at an increased risk for dehydration. Older adults and people with other diseases or a weak immune system are at higher risk for dehydration. Drink clear fluids in small but frequent amounts as tolerated. Eat bland, easy-to-digest foods in small amounts as tolerated.     SEEK IMMEDIATE MEDICAL CARE IF YOU HAVE THE FOLLOWING SYMPTOMS: fever, inability to keep fluids down, black or bloody vomitus, black or bloody stools, lightheadedness/dizziness, chest pain, severe headache, rash, shortness of breath, cold or clammy skin, confusion, pain with urination, or any signs of dehydration.     Diarrhea    Diarrhea is frequent loose and watery bowel movements that has many causes. Diarrhea can make you feel weak and cause you to become dehydrated. Diarrhea typically lasts 3-5 days. However, it can last longer if it is a sign of something more serious. Drink clear fluids to prevent dehydration. Eat bland, easy-to-digest foods as tolerated.     SEEK IMMEDIATE MEDICAL CARE IF YOU HAVE THE FOLLOWING SYMPTOMS: fever, lightheadedness/dizziness, chest pain, black or bloody stools, shortness of breath, severe abdominal or back pain, or any signs of dehydration.    SHINGLES    Shingles, which is also known as herpes zoster, is an infection that causes a painful skin rash and fluid-filled blisters. Shingles is not related to genital herpes, which is a sexually transmitted infection.     Shingles only develops in people who:    Have had chickenpox.  Have received the chickenpox vaccine. (This is rare.)    CAUSES  Shingles is caused by varicella-zoster virus (VZV). This is the same virus that causes chickenpox. After exposure to VZV, the virus stays in the body in an inactive (dormant) state. Shingles develops if the virus reactivates. This can happen many years after the initial exposure to VZV. It is not known what causes this virus to reactivate.    RISK FACTORS  People who have had chickenpox or received the chickenpox vaccine are at risk for shingles. Infection is more common in people who:    Are older than age 50.  Have a weakened defense (immune) system, such as those with HIV, AIDS, or cancer.  Are taking medicines that weaken the immune system, such as transplant medicines.  Are under great stress.    SYMPTOMS  Early symptoms of this condition include itching, tingling, and pain in an area on your skin. Pain may be described as burning, stabbing, or throbbing.    A few days or weeks after symptoms start, a painful red rash appears, usually on one side of the body in a bandlike or beltlike pattern. The rash eventually turns into fluid-filled blisters that break open, scab over, and dry up in about 2–3 weeks.    At any time during the infection, you may also develop:    A fever.  Chills.  A headache.  An upset stomach.    DIAGNOSIS  This condition is diagnosed with a skin exam. Sometimes, skin or fluid samples are taken from the blisters before a diagnosis is made. These samples are examined under a microscope or sent to a lab for testing.    TREATMENT  There is no specific cure for this condition. Your health care provider will probably prescribe medicines to help you manage pain, recover more quickly, and avoid long-term problems. Medicines may include:    Antiviral drugs.  Anti-inflammatory drugs.  Pain medicines.    If the area involved is on your face, you may be referred to a specialist, such as an eye doctor (ophthalmologist) or an ear, nose, and throat (ENT) doctor to help you avoid eye problems, chronic pain, or disability.    HOME CARE INSTRUCTIONS  Medicines    Take medicines only as directed by your health care provider.  Apply an anti-itch or numbing cream to the affected area as directed by your health care provider.    Blister and Rash Care    Take a cool bath or apply cool compresses to the area of the rash or blisters as directed by your health care provider. This may help with pain and itching.  Keep your rash covered with a loose bandage (dressing). Wear loose-fitting clothing to help ease the pain of material rubbing against the rash.  Keep your rash and blisters clean with mild soap and cool water or as directed by your health care provider.  Check your rash every day for signs of infection. These include redness, swelling, and pain that lasts or increases.  Do not pick your blisters.  Do not scratch your rash.    General Instructions    Rest as directed by your health care provider.  Keep all follow-up visits as directed by your health care provider. This is important.  Until your blisters scab over, your infection can cause chickenpox in people who have never had it or been vaccinated against it. To prevent this from happening, avoid contact with other people, especially:  Babies.  Pregnant women.  Children who have eczema.  Elderly people who have transplants.  People who have chronic illnesses, such as leukemia or AIDS.    SEEK MEDICAL CARE IF:  Your pain is not relieved with prescribed medicines.  Your pain does not get better after the rash heals.  Your rash looks infected. Signs of infection include redness, swelling, and pain that lasts or increases.    SEEK IMMEDIATE MEDICAL CARE IF:  The rash is on your face or nose.  You have facial pain, pain around your eye area, or loss of feeling on one side of your face.  You have ear pain or you have ringing in your ear.  You have loss of taste.  Your condition gets worse.    ADDITIONAL NOTES AND INSTRUCTIONS    Please follow up with your Primary MD in 24-48 hr.  Seek immediate medical care for any new/worsening signs or symptoms.     Cellulitis    Cellulitis is a skin infection caused by bacteria. This condition occurs most often in the arms and lower legs but can occur anywhere over the body. Symptoms include redness, swelling, warm skin, tenderness, and chills/fever. If you were prescribed an antibiotic medicine, take it as told by your health care provider. Do not stop taking the antibiotic even if you start to feel better.    SEEK IMMEDIATE MEDICAL CARE IF YOU HAVE THE FOLLOWING SYMPTOMS: worsening fever, red streaks coming from affected area, vomiting or diarrhea, or dizziness/lightheadedness.

## 2020-01-14 NOTE — ED PROVIDER NOTE - CARE PLAN
Principal Discharge DX:	Vomiting and diarrhea  Secondary Diagnosis:	Lymphoma  Secondary Diagnosis:	Shingles  Secondary Diagnosis:	Cellulitis

## 2020-01-14 NOTE — ED PROVIDER NOTE - CLINICAL SUMMARY MEDICAL DECISION MAKING FREE TEXT BOX
nvd, on chemo, activel shingles w/poss surrounding cellulitis - labs wnl, pt initially tolerated po however upon attempt to discharge vomited profusely - ivf, anti-emetics given, abx for poss superimosed cellulitis of shingles - d/w hem/onc fellow who rec admission to medicine and will follow

## 2020-01-14 NOTE — ED ADULT TRIAGE NOTE - CHIEF COMPLAINT QUOTE
pt c/o of diarrhea x1 week, n/v x3 days, poor appetite, unable to keep fluids down, 10 lb weight loss over 2 weeks,  hx lymphoma, last chemo 3 weeks ago.

## 2020-01-14 NOTE — ED PROVIDER NOTE - NS ED ROS FT
Constitutional: No fever, chills, unintended weight loss.  Eyes:  No visual changes, eye pain or discharge.  ENMT:  No hearing changes, pain, no sore throat or runny nose, no difficulty swallowing  Cardiac:  No chest pain, SOB or edema. No chest pain with exertion.  Respiratory:  No cough or respiratory distress. No hemoptysis. No history of asthma or RAD.  GI:  see hpi  :  No dysuria, frequency or burning.  MS:  No myalgia, muscle weakness, joint pain or back pain.  Neuro:  No headache or weakness.  No LOC.  Skin:  see hpi  Endocrine: No history of thyroid disease or diabetes.

## 2020-01-15 ENCOUNTER — TRANSCRIPTION ENCOUNTER (OUTPATIENT)
Age: 65
End: 2020-01-15

## 2020-01-15 LAB
ALBUMIN SERPL ELPH-MCNC: 3.8 G/DL — SIGNIFICANT CHANGE UP (ref 3.5–5.2)
ALP SERPL-CCNC: 97 U/L — SIGNIFICANT CHANGE UP (ref 30–115)
ALT FLD-CCNC: 11 U/L — SIGNIFICANT CHANGE UP (ref 0–41)
ANION GAP SERPL CALC-SCNC: 14 MMOL/L — SIGNIFICANT CHANGE UP (ref 7–14)
AST SERPL-CCNC: 15 U/L — SIGNIFICANT CHANGE UP (ref 0–41)
BASOPHILS # BLD AUTO: 0.03 K/UL — SIGNIFICANT CHANGE UP (ref 0–0.2)
BASOPHILS NFR BLD AUTO: 0.4 % — SIGNIFICANT CHANGE UP (ref 0–1)
BILIRUB SERPL-MCNC: 0.5 MG/DL — SIGNIFICANT CHANGE UP (ref 0.2–1.2)
BUN SERPL-MCNC: 50 MG/DL — HIGH (ref 10–20)
C DIFF BY PCR RESULT: NEGATIVE — SIGNIFICANT CHANGE UP
C DIFF TOX GENS STL QL NAA+PROBE: SIGNIFICANT CHANGE UP
CALCIUM SERPL-MCNC: 8.7 MG/DL — SIGNIFICANT CHANGE UP (ref 8.5–10.1)
CHLORIDE SERPL-SCNC: 97 MMOL/L — LOW (ref 98–110)
CO2 SERPL-SCNC: 18 MMOL/L — SIGNIFICANT CHANGE UP (ref 17–32)
CREAT SERPL-MCNC: 2.4 MG/DL — HIGH (ref 0.7–1.5)
EOSINOPHIL # BLD AUTO: 0.03 K/UL — SIGNIFICANT CHANGE UP (ref 0–0.7)
EOSINOPHIL NFR BLD AUTO: 0.4 % — SIGNIFICANT CHANGE UP (ref 0–8)
GLUCOSE SERPL-MCNC: 111 MG/DL — HIGH (ref 70–99)
HCT VFR BLD CALC: 37.8 % — LOW (ref 42–52)
HGB BLD-MCNC: 12.7 G/DL — LOW (ref 14–18)
IMM GRANULOCYTES NFR BLD AUTO: 0.6 % — HIGH (ref 0.1–0.3)
LACTATE SERPL-SCNC: 1.6 MMOL/L — SIGNIFICANT CHANGE UP (ref 0.7–2)
LYMPHOCYTES # BLD AUTO: 0.67 K/UL — LOW (ref 1.2–3.4)
LYMPHOCYTES # BLD AUTO: 9.9 % — LOW (ref 20.5–51.1)
MAGNESIUM SERPL-MCNC: 2 MG/DL — SIGNIFICANT CHANGE UP (ref 1.8–2.4)
MCHC RBC-ENTMCNC: 29.7 PG — SIGNIFICANT CHANGE UP (ref 27–31)
MCHC RBC-ENTMCNC: 33.6 G/DL — SIGNIFICANT CHANGE UP (ref 32–37)
MCV RBC AUTO: 88.5 FL — SIGNIFICANT CHANGE UP (ref 80–94)
MONOCYTES # BLD AUTO: 1.05 K/UL — HIGH (ref 0.1–0.6)
MONOCYTES NFR BLD AUTO: 15.5 % — HIGH (ref 1.7–9.3)
NEUTROPHILS # BLD AUTO: 4.97 K/UL — SIGNIFICANT CHANGE UP (ref 1.4–6.5)
NEUTROPHILS NFR BLD AUTO: 73.2 % — SIGNIFICANT CHANGE UP (ref 42.2–75.2)
NRBC # BLD: 0 /100 WBCS — SIGNIFICANT CHANGE UP (ref 0–0)
PHOSPHATE SERPL-MCNC: 3.4 MG/DL — SIGNIFICANT CHANGE UP (ref 2.1–4.9)
PLATELET # BLD AUTO: 163 K/UL — SIGNIFICANT CHANGE UP (ref 130–400)
POTASSIUM SERPL-MCNC: 4.3 MMOL/L — SIGNIFICANT CHANGE UP (ref 3.5–5)
POTASSIUM SERPL-SCNC: 4.3 MMOL/L — SIGNIFICANT CHANGE UP (ref 3.5–5)
PROT SERPL-MCNC: 5.3 G/DL — LOW (ref 6–8)
RBC # BLD: 4.27 M/UL — LOW (ref 4.7–6.1)
RBC # FLD: 16.8 % — HIGH (ref 11.5–14.5)
SODIUM SERPL-SCNC: 129 MMOL/L — LOW (ref 135–146)
WBC # BLD: 6.79 K/UL — SIGNIFICANT CHANGE UP (ref 4.8–10.8)
WBC # FLD AUTO: 6.79 K/UL — SIGNIFICANT CHANGE UP (ref 4.8–10.8)

## 2020-01-15 RX ORDER — CEFTRIAXONE 500 MG/1
INJECTION, POWDER, FOR SOLUTION INTRAMUSCULAR; INTRAVENOUS
Refills: 0 | Status: DISCONTINUED | OUTPATIENT
Start: 2020-01-15 | End: 2020-01-15

## 2020-01-15 RX ORDER — BENZTROPINE MESYLATE 1 MG
2 TABLET ORAL AT BEDTIME
Refills: 0 | Status: DISCONTINUED | OUTPATIENT
Start: 2020-01-15 | End: 2020-01-23

## 2020-01-15 RX ORDER — VALACYCLOVIR 500 MG/1
1 TABLET, FILM COATED ORAL
Qty: 12 | Refills: 0
Start: 2020-01-15 | End: 2020-01-20

## 2020-01-15 RX ORDER — FLUPHENAZINE HYDROCHLORIDE 1 MG/1
1 TABLET, FILM COATED ORAL
Qty: 0 | Refills: 0 | DISCHARGE

## 2020-01-15 RX ORDER — VALACYCLOVIR 500 MG/1
1000 TABLET, FILM COATED ORAL EVERY 12 HOURS
Refills: 0 | Status: DISCONTINUED | OUTPATIENT
Start: 2020-01-15 | End: 2020-01-16

## 2020-01-15 RX ORDER — ALLOPURINOL 300 MG
300 TABLET ORAL DAILY
Refills: 0 | Status: DISCONTINUED | OUTPATIENT
Start: 2020-01-15 | End: 2020-01-23

## 2020-01-15 RX ORDER — CEPHALEXIN 500 MG
500 CAPSULE ORAL
Refills: 0 | Status: DISCONTINUED | OUTPATIENT
Start: 2020-01-15 | End: 2020-01-15

## 2020-01-15 RX ORDER — HEPARIN SODIUM 5000 [USP'U]/ML
5000 INJECTION INTRAVENOUS; SUBCUTANEOUS EVERY 12 HOURS
Refills: 0 | Status: DISCONTINUED | OUTPATIENT
Start: 2020-01-15 | End: 2020-01-23

## 2020-01-15 RX ORDER — FLUPHENAZINE HYDROCHLORIDE 1 MG/1
10 TABLET, FILM COATED ORAL DAILY
Refills: 0 | Status: DISCONTINUED | OUTPATIENT
Start: 2020-01-15 | End: 2020-01-23

## 2020-01-15 RX ORDER — CEFTRIAXONE 500 MG/1
1000 INJECTION, POWDER, FOR SOLUTION INTRAMUSCULAR; INTRAVENOUS ONCE
Refills: 0 | Status: COMPLETED | OUTPATIENT
Start: 2020-01-15 | End: 2020-01-15

## 2020-01-15 RX ORDER — ONDANSETRON 8 MG/1
4 TABLET, FILM COATED ORAL EVERY 6 HOURS
Refills: 0 | Status: DISCONTINUED | OUTPATIENT
Start: 2020-01-15 | End: 2020-01-23

## 2020-01-15 RX ORDER — TAMSULOSIN HYDROCHLORIDE 0.4 MG/1
0.4 CAPSULE ORAL AT BEDTIME
Refills: 0 | Status: DISCONTINUED | OUTPATIENT
Start: 2020-01-15 | End: 2020-01-23

## 2020-01-15 RX ORDER — SODIUM CHLORIDE 9 MG/ML
1000 INJECTION, SOLUTION INTRAVENOUS
Refills: 0 | Status: DISCONTINUED | OUTPATIENT
Start: 2020-01-15 | End: 2020-01-16

## 2020-01-15 RX ORDER — ONDANSETRON 8 MG/1
1 TABLET, FILM COATED ORAL
Qty: 10 | Refills: 0
Start: 2020-01-15 | End: 2020-01-19

## 2020-01-15 RX ADMIN — CEFTRIAXONE 100 MILLIGRAM(S): 500 INJECTION, POWDER, FOR SOLUTION INTRAMUSCULAR; INTRAVENOUS at 03:10

## 2020-01-15 RX ADMIN — Medication 300 MILLIGRAM(S): at 11:20

## 2020-01-15 RX ADMIN — FLUPHENAZINE HYDROCHLORIDE 10 MILLIGRAM(S): 1 TABLET, FILM COATED ORAL at 11:20

## 2020-01-15 RX ADMIN — Medication 500 MILLIGRAM(S): at 11:20

## 2020-01-15 RX ADMIN — SODIUM CHLORIDE 100 MILLILITER(S): 9 INJECTION, SOLUTION INTRAVENOUS at 06:41

## 2020-01-15 RX ADMIN — VALACYCLOVIR 1000 MILLIGRAM(S): 500 TABLET, FILM COATED ORAL at 06:42

## 2020-01-15 RX ADMIN — VALACYCLOVIR 1000 MILLIGRAM(S): 500 TABLET, FILM COATED ORAL at 17:27

## 2020-01-15 NOTE — H&P ADULT - NSICDXPASTMEDICALHX_GEN_ALL_CORE_FT
PAST MEDICAL HISTORY:  Atrophic kidney     Kidney stones     Lymphoma     Schizophrenia     Shingles

## 2020-01-15 NOTE — CONSULT NOTE ADULT - ASSESSMENT
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 All final recommendations to follow pending discussion with ID Attending ASSESSMENT  64yMale with a PMH of marginal zone lymphoma s/p chemotherapy 3 weeks ago presenting with 1.5 weeks n/v/d + rash consistent with herpes zoster    PROBLEM  Marginal Zone Lymphoma  N/V/D  Herpes Zoster  Cellulitis  Schizophrenia  Atrophic Kidney/Kidney stones  CKD      RECOMMENDATIONS  - C/W Valtrex 1g q12h for total 10 days  - D/C Keflex  - N/V/D resolving    This is a pended note. All final recommendations to follow pending discussion with ID Attending ASSESSMENT  64yMale with a PMH of marginal zone lymphoma s/p chemotherapy 3 weeks ago presenting with 1.5 weeks n/v/d + rash consistent with herpes zoster    PROBLEM  Marginal Zone Lymphoma  N/V/D  Herpes Zoster  Cellulitis  Schizophrenia  Atrophic Kidney/Kidney stones  CKD      RECOMMENDATIONS  - C/W Valtrex 1g q12h for total 10 days  - D/C Keflex- cellulitis unlikely   - N/V/D resolving    This is a pended note. All final recommendations to follow pending discussion with ID Attending ASSESSMENT  64yMale with a PMH of marginal zone lymphoma s/p chemotherapy 3 weeks ago presenting with 1.5 weeks n/v/d + rash consistent with herpes zoster    PROBLEM  Marginal Zone Lymphoma  N/V/D  Herpes Zoster  Cellulitis  Schizophrenia  Atrophic Kidney/Kidney stones  CKD      RECOMMENDATIONS  - Valtrex 1g q12h PO for CrCl >30 for total 10 days including home dosing. If CrCl <29 1g daily   - D/C Keflex- cellulitis unlikely   - N/V/D resolving  - if admitted contact/airborne as immunocompromised until all lesions crusted

## 2020-01-15 NOTE — H&P ADULT - NSHPREVIEWOFSYSTEMS_GEN_ALL_CORE
REVIEW OF SYSTEMS:  CONSTITUTIONAL: No weakness, fevers or chills  EYES/ENT: No visual changes;  No vertigo or throat pain   NECK: No pain or stiffness  RESPIRATORY: No cough, wheezing, hemoptysis; No shortness of breath  CARDIOVASCULAR: No chest pain or palpitations  GASTROINTESTINAL: No abdominal or epigastric pain. Admits to nausea, vomiting; Admits to diarrhea. No melena or hematochezia.  GENITOURINARY: No dysuria, frequency or hematuria  NEUROLOGICAL: No numbness or weakness  SKIN: Rash in right upper chest

## 2020-01-15 NOTE — DISCHARGE NOTE PROVIDER - NSDCMRMEDTOKEN_GEN_ALL_CORE_FT
allopurinol 300 mg oral tablet: 1 tab(s) orally once a day  benztropine 2 mg oral tablet: 1 tab(s) orally once a day (at bedtime)  fluPHENAZine 10 mg oral tablet: 1 tab(s) orally once a day  phenazopyridine 100 mg oral tablet: 1 tab(s) orally once a day (at bedtime), As Needed  tamsulosin 0.4 mg oral capsule: 1 cap(s) orally once a day allopurinol 300 mg oral tablet: 1 tab(s) orally once a day  benztropine 2 mg oral tablet: 1 tab(s) orally once a day (at bedtime)  fluPHENAZine 10 mg oral tablet: 1 tab(s) orally once a day  ondansetron 4 mg oral tablet: 1 tab(s) orally 2 times a day, As Needed   phenazopyridine 100 mg oral tablet: 1 tab(s) orally once a day (at bedtime), As Needed  tamsulosin 0.4 mg oral capsule: 1 cap(s) orally once a day  valACYclovir 1 g oral tablet: 1 tab(s) orally every 12 hours allopurinol 300 mg oral tablet: 1 tab(s) orally once a day  benztropine 2 mg oral tablet: 1 tab(s) orally once a day (at bedtime)  fluPHENAZine 10 mg oral tablet: 1 tab(s) orally once a day  ondansetron 4 mg oral tablet: 1 tab(s) orally 2 times a day, As Needed   phenazopyridine 100 mg oral tablet: 1 tab(s) orally once a day (at bedtime), As Needed  tamsulosin 0.4 mg oral capsule: 1 cap(s) orally once a day  valACYclovir 1 g oral tablet: 1 tab(s) orally 3 times a day , End date will be 1/25/2020

## 2020-01-15 NOTE — H&P ADULT - HISTORY OF PRESENT ILLNESS
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 As per pt, he has been feeling lethargic since 3 weeks ago s/p chemotherapy. His sxs has progress this week and therefore he presents to the hospital. Pt states vomit and diarrhea is non bloody. He denies fever or chill. Pt also started developing blisters and sharp pain in upper chest. Pt presented to his PCP and was diagnosed w/ herpes zoster. Pt states his rash has improved. Otherwise, pt denies SOB, CP, palpitation, abdominal pain, LE edema, sick contacts or recent travel. 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 As per pt, he has been feeling lethargic since 3 weeks ago s/p chemotherapy. His sxs has progress this week and therefore he presents to the hospital. Pt states vomit and diarrhea is non bloody. He denies fever or chill. Pt also started developing blisters and sharp pain in upper chest. Pt presented to his PCP and was diagnosed w/ herpes zoster. Pt states his rash has improved. Otherwise, pt denies SOB, CP, palpitation, abdominal pain, LE edema, sick contacts or recent travel.     Vital Signs Last 24 Hrs  T(C): 37.2 (15 Ernesto 2020 01:05), Max: 37.2 (15 Ernesto 2020 01:05)  T(F): 98.9 (15 Ernesto 2020 01:05), Max: 98.9 (15 Ernesto 2020 01:05)  HR: 64 (15 Ernesto 2020 01:05) (64 - 119)  BP: 106/56 (15 Ernesto 2020 01:05) (105/72 - 110/57)  BP(mean): --  RR: 18 (15 Ernesto 2020 01:05) (18 - 19)  SpO2: 96% (15 Ernesto 2020 01:05) (96% - 98%)

## 2020-01-15 NOTE — PROGRESS NOTE ADULT - SUBJECTIVE AND OBJECTIVE BOX
Patient was seen and examined in ED Chart reviewed.  No new events overnight.  Vital Signs Last 24 Hrs  T(F): 97 (15 Ernesto 2020 08:14), Max: 98.9 (15 Ernesto 2020 01:05)  HR: 66 (15 Ernesto 2020 08:14) (64 - 119)  BP: 108/57 (15 Ernesto 2020 08:14) (105/72 - 110/57)  SpO2: 96% (15 Ernesto 2020 08:14) (96% - 98%)  MEDICATIONS  (STANDING):  allopurinol 300 milliGRAM(s) Oral daily  benztropine 2 milliGRAM(s) Oral at bedtime  cefTRIAXone   IVPB      fluPHENAZine 10 milliGRAM(s) Oral daily  heparin  Injectable 5000 Unit(s) SubCutaneous every 12 hours  lactated ringers. 1000 milliLiter(s) (100 mL/Hr) IV Continuous <Continuous>  tamsulosin 0.4 milliGRAM(s) Oral at bedtime  valACYclovir 1000 milliGRAM(s) Oral every 12 hours    MEDICATIONS  (PRN):  ondansetron Injectable 4 milliGRAM(s) IV Push every 6 hours PRN Nausea    Labs:                        13.5   7.30  )-----------( 148      ( 14 Jan 2020 19:50 )             39.5     14 Jan 2020 19:50    130    |  99     |  46     ----------------------------<  127    4.6     |  15     |  2.3      Ca    9.4        14 Jan 2020 19:50  Mg     2.1       14 Jan 2020 19:50    TPro  5.8    /  Alb  4.1    /  TBili  0.6    /  DBili  x      /  AST  13     /  ALT  11     /  AlkPhos  110    14 Jan 2020 19:50          General: comfortable, NAD  Neurology: A&Ox3, nonfocal  Head:  Normocephalic, atraumatic  ENT:  Mucosa moist, no ulcerations  Neck:  Supple, no JVD,   Skin: no breakdowns (as per RN)  Resp: CTA B/L  CV: RRR, S1S2,   GI: Soft, NT, bowel sounds  MS: No edema, + peripheral pulses, FROM all 4 extremity      A/P:  64 yo M w/ PMH of Marginal zone lymphoma s/p 1 cycle chemotherapy w/ Bendamustine and rituxan 3 weeks ago, CKD w/ history of rt urethral stent with multiple kidney stones, paranoid and schizophrenia; with recent zoster- presents to ED for nausea, vomiting and diarrhea, but now feeling much better      IV fluids    zofran    PO keflex for 10 days if not allergic to PCN    diet as tolerated    ambulate    if tolerating PO intake can discharge later today .   DVT prophylaxis  Decubitus prevention- all measures as per RN protocol  Please call or text me with any questions or updates

## 2020-01-15 NOTE — H&P ADULT - ATTENDING COMMENTS
64 yo M w/ PMH of Marginal zone lymphoma s/p 1 cycle chemotherapy w/ Bendamustine and rituxan 3 weeks ago, CKD w/ history of rt urethral stent with multiple kidney stones, paranoid and schizophrenia; with recent zoster- presents to ED for nausea, vomiting and diarrhea, but now feeling much better    Pt seen and examined in ED- comfortable; says he is feeling better with no vomiting or diarrhea    chart reviewed- agree with above    IV fluids    repeat labs    hem/onc and ID consults called    diet as tolerated    ambulate    if tolerating PO intake can discharge later today

## 2020-01-15 NOTE — DISCHARGE NOTE PROVIDER - NSDCFUSCHEDAPPT_GEN_ALL_CORE_FT
ELDA COVARRUBIAS ; 01/20/2020 ; [facility] HemOnc [facility] ELDA Arredondo ; 01/27/2020 ; [facility] Chemo & Infus [facility] ELDA Zurita ; 01/28/2020 ; [facility] Chemo [facility] Inf[facility] [address] [facility] ELDA COVARRUBIAS ; 01/27/2020 ; [facility] Chemo [facility] Inf[facility] ELDA Zurita ; 01/28/2020 ; [facility] Chemo [facility] Infus [facility]

## 2020-01-15 NOTE — H&P ADULT - NSHPPHYSICALEXAM_GEN_ALL_CORE
PHYSICAL EXAM:  GENERAL: NAD, AAO x 4, 64y M  HEAD:  Atraumatic, Normocephalic  EYES: EOMI, conjunctiva clear and sclera white  NECK: Supple, No JVD  CHEST/LUNG: Clear to auscultation bilaterally; No wheeze; No crackles; No accessory muscles used  HEART: Regular rate and rhythm; No murmurs;   ABDOMEN: Soft, Nontender, Nondistended; Bowel sounds present; No guarding  EXTREMITIES:  2+ Peripheral Pulses, No cyanosis or edema  NEUROLOGY: non-focal  Skin: Rt upper chest shingles noted w/ surrounding erythema; no purulent fluid noted

## 2020-01-15 NOTE — DISCHARGE NOTE NURSING/CASE MANAGEMENT/SOCIAL WORK - PATIENT PORTAL LINK FT
You can access the FollowMyHealth Patient Portal offered by [facility] by registering at the following website: [email or web address]. By joining [facility]’s FollowMyHealth portal, you will also be able to view your health information using other applications (apps) compatible with our system.

## 2020-01-15 NOTE — DISCHARGE NOTE PROVIDER - NSDCCPCAREPLAN_GEN_ALL_CORE_FT
PRINCIPAL DISCHARGE DIAGNOSIS  Diagnosis: Vomiting and diarrhea  Assessment and Plan of Treatment: You were admitted for vomiting and diarrhea that resolved in the hospital. You were given fluids and you tolerated diet. You will need to take valtrex to complete the course and if you have nausea you can take ondansetron as needed.      SECONDARY DISCHARGE DIAGNOSES  Diagnosis: Cellulitis  Assessment and Plan of Treatment:     Diagnosis: Shingles  Assessment and Plan of Treatment: You will need to take valtrex for 6 more days to complete the course of 10 days    Diagnosis: Lymphoma  Assessment and Plan of Treatment: PRINCIPAL DISCHARGE DIAGNOSIS  Diagnosis: Vomiting and diarrhea  Assessment and Plan of Treatment: You were admitted for vomiting and diarrhea that resolved in the hospital. You were given fluids and you tolerated diet. You will need to take valtrex to complete the course and if you have nausea you can take ondansetron as needed.      SECONDARY DISCHARGE DIAGNOSES  Diagnosis: Shingles  Assessment and Plan of Treatment: You will need to take valtrex for 3-4 more days to complete the course of 10 days    Diagnosis: Lymphoma  Assessment and Plan of Treatment: Follow up with Dr. Gong for next chemo round PRINCIPAL DISCHARGE DIAGNOSIS  Diagnosis: Vomiting and diarrhea  Assessment and Plan of Treatment: You were admitted for vomiting and diarrhea that resolved in the hospital. You were given fluids and you tolerated diet. You will need to take valtrex to complete the course and if you have nausea you can take ondansetron as needed. Please follow up with Dr. Keller for sigmoidoscopic surgical biopsy results.      SECONDARY DISCHARGE DIAGNOSES  Diagnosis: Shingles  Assessment and Plan of Treatment: You will need to take valtrex for 3-4 more days to complete the course of 10 days    Diagnosis: Lymphoma  Assessment and Plan of Treatment: Follow up with Dr. Gong for next chemo round

## 2020-01-15 NOTE — DISCHARGE NOTE PROVIDER - NSDCFUADDAPPT_GEN_ALL_CORE_FT
Please visit PCP and get recheck your electrolytes in 1 week. Please visit PCP and get recheck your electrolytes in 1 week and follow up with Dr. Keller for your sigmoidoscopy biopsy

## 2020-01-15 NOTE — H&P ADULT - ASSESSMENT
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     # Vomiting, nausea and diarrhea  - likely secondary to chemotherapy  - c/w IVF  - PO diet as tolerated  - give zofran PRN    # Shingles w/ surrounding erythema   - r/o cellulitis  - ID consult  - treat w/ rocephin 1g q24h for now  - blood culture  - trend WBC    # CKD   - at baseline creatinine   - trend BMP  - c/w IVF     # Schizophrenia and paranoia  - c/w home meds    # Marginal zone lymphoma s/p 1 cycle chemotherapy  - f/u oncology OP    Diet: Regular diet  GI ppx: N/A  DVT ppx: Heparin 5000 q12h  Activity: Increase as tolerated  Code status: Full Code ( X ) / DNR (  ) / DNI (  )  Disposition: from home 62 yo M w/ PMH of Marginal zone lymphoma s/p 1 cycle chemotherapy w/ Bendamustine and rituxan 3 weeks ago, CKD w/ history of rt urethral stent with multiple kidney stones, paranoid and schizophrenia presents to ED for nausea, vomiting and diarrhea.     # Vomiting, nausea and diarrhea  - likely secondary to chemotherapy  - c/w IVF  - PO diet as tolerated  - give zofran PRN    # Shingles w/ surrounding erythema   - r/o cellulitis  - ID consult  - c/w valtrex 1 g q12h   - treat w/ rocephin 1g q24h for now  - blood culture  - trend WBC    # CKD   - at baseline creatinine   - trend BMP  - c/w IVF     # Schizophrenia and paranoia  - c/w home meds    # Marginal zone lymphoma s/p 1 cycle chemotherapy  - f/u oncology OP    Diet: Regular diet  GI ppx: N/A  DVT ppx: Heparin 5000 q12h  Activity: Increase as tolerated  Code status: Full Code ( X ) / DNR (  ) / DNI (  )  Disposition: from home

## 2020-01-15 NOTE — DISCHARGE NOTE PROVIDER - CARE PROVIDER_API CALL
Nathen Escudero (MD)  Internal Medicine  [address] [place] [address]  Phone: [phone]  Fax: [phone]  Follow Up Time: Nathen Escudero)  Internal Medicine  [address] [place] [address]  Phone: [phone]  Fax: [phone]  Follow Up Time:     Nate Gong)  Hematology; Internal Medicine; Medical Oncology  [address] [place] [id number]  Phone: [phone]  Fax: [phone]  Follow Up Time: 1 week Nathen Escudero)  Internal Medicine  [address] [place] [address]  Phone: [phone]  Fax: [phone]  Follow Up Time:     Nate Gong)  Hematology; Internal Medicine; Medical Oncology  [address] [place] [address]  Phone: [phone]  Fax: [phone]  Follow Up Time: 1 week    Dennys Keller)  Gastroenterology; Internal Medicine  [address] [place] [address]  Phone: [phone]  Fax: [phone]  Follow Up Time: 1 week

## 2020-01-15 NOTE — CONSULT NOTE ADULT - SUBJECTIVE AND OBJECTIVE BOX
ELDA COVARRUBIAS  64y, Male  Allergy: No Known Allergies      CHIEF COMPLAINT: nausea, vomiting and diarrhea (15 Ernesto 2020 08:34)      HPI:  64 yo M w/ PMH of Marginal zone lymphoma s/p 1 cycle chemotherapy w/ Bendamustine and rituxan 3 weeks ago, CKD w/ history of rt urethral stent with multiple kidney stones, paranoid and schizophrenia presents to ED for nausea, vomiting and diarrhea. As per pt, he has been feeling lethargic since 3 weeks ago s/p chemotherapy. PT states nbnb vomiting and diarrhea began a week and a half ago. He denies fever or chill. Pt also started developing blisters and sharp pain in upper chest 4-5 days ago. Pt presented to his PCP and was diagnosed w/ herpes zoster. Pt states his rash has improved. Otherwise, pt denies SOB, CP, palpitation, abdominal pain, LE edema, sick contacts or recent travel.     FAMILY HISTORY: No relevant history    PAST MEDICAL & SURGICAL HISTORY:  Atrophic kidney  Shingles  Lymphoma  Schizophrenia  Kidney stones  Retained urethral stent      SOCIAL HISTORY    Substance Use (  ) never used  (  ) IVDU (  ) Other:  Tobacco Usage:  (   ) never smoked   (   ) former smoker   (   ) current smoker   Alcohol Usage: (   ) social  (   ) daily use (   ) denies  Sexual History:       ROS  General: Denies fever/chills/nightsweats  HEENT: Denies headache, rhinorrhea, sore throat, eye pain  CV: Denies CP, palpitations  PULM: Denies wheezing, hemoptysis/cough  GI: NBNB vomiting, non-bloody diarrhea  : Denies discharge, hematuria  MSK: Denies arthralgias, myalgias  SKIN: Rash upper back and chest  NEURO: Denies paresthesias, weakness      VITALS:  T(F): 97, Max: 98.9 (01-15-20 @ 01:05)  HR: 66  BP: 108/57  RR: 18Vital Signs Last 24 Hrs  T(C): 36.1 (15 Ernesto 2020 08:14), Max: 37.2 (15 Ernesto 2020 01:05)  T(F): 97 (15 Ernesto 2020 08:14), Max: 98.9 (15 Ernesto 2020 01:05)  HR: 66 (15 Ernesto 2020 08:14) (64 - 119)  BP: 108/57 (15 Ernesto 2020 08:14) (105/72 - 110/57)  BP(mean): --  RR: 18 (15 Ernesto 2020 08:14) (18 - 19)  SpO2: 96% (15 Ernesto 2020 08:14) (96% - 98%)    PHYSICAL EXAM:  Gen: NAD, resting in bed  HEENT: Normocephalic, atraumatic  Neck: supple, no lymphadenopathy  CV: Regular rate & regular rhythm  Lungs: CTAB  Abdomen: Soft, BS present  Ext: Warm, well perfused  Neuro: non focal, awake  Skin: erythematous macular rash with superficial ulceration R. thoracic area approx T5 dermatome     TESTS & MEASUREMENTS:                        12.7   6.79  )-----------( 163      ( 15 Ernesto 2020 09:00 )             37.8     01-15    129<L>  |  97<L>  |  50<H>  ----------------------------<  111<H>  4.3   |  18  |  2.4<H>    Ca    8.7      15 Ernesto 2020 09:00  Phos  3.4     01-15  Mg     2.0     01-15    TPro  5.3<L>  /  Alb  3.8  /  TBili  0.5  /  DBili  x   /  AST  15  /  ALT  11  /  AlkPhos  97  01-15    eGFR if Non African American: 28 mL/min/1.73M2 (01-15-20 @ 09:00)  eGFR if : 32 mL/min/1.73M2 (01-15-20 @ 09:00)  eGFR if Non African American: 29 mL/min/1.73M2 (01-14-20 @ 19:50)  eGFR if African American: 34 mL/min/1.73M2 (01-14-20 @ 19:50)    LIVER FUNCTIONS - ( 15 Ernesto 2020 09:00 )  Alb: 3.8 g/dL / Pro: 5.3 g/dL / ALK PHOS: 97 U/L / ALT: 11 U/L / AST: 15 U/L / GGT: x                 Lactate, Blood: 1.6 mmol/L (01-15-20 @ 09:00)  Lactate, Blood: 1.8 mmol/L (01-14-20 @ 19:50)      INFECTIOUS DISEASES TESTING  HIV-1/2 Combo Result: Nonreact (09-29-19 @ 17:24)              All available historical records have been reviewed    MEDICATIONS  allopurinol 300  benztropine 2  cefTRIAXone   IVPB   fluPHENAZine 10  heparin  Injectable 5000  lactated ringers. 1000  tamsulosin 0.4  valACYclovir 1000      ANTIBIOTICS:  cefTRIAXone   IVPB      valACYclovir 1000 milliGRAM(s) Oral every 12 hours      All available historical data has been reviewed ELDA COVARRUBIAS  64y, Male  Allergy: No Known Allergies      CHIEF COMPLAINT: nausea, vomiting and diarrhea (15 Ernesto 2020 08:34)      HPI:  62 yo M w/ PMH of Marginal zone lymphoma s/p 1 cycle chemotherapy w/ Bendamustine and rituxan 3 weeks ago, CKD w/ history of rt urethral stent with multiple kidney stones, paranoid and schizophrenia presents to ED for nausea, vomiting and diarrhea. As per pt, he has been feeling lethargic since 3 weeks ago s/p chemotherapy. PT states nbnb vomiting and diarrhea began a week and a half ago. He denies fever or chill. Pt also started developing blisters and sharp pain in upper chest 4-5 days ago. Pt presented to his PCP and was diagnosed w/ herpes zoster. Pt states his rash has improved. Otherwise, pt denies SOB, CP, palpitation, abdominal pain, LE edema, sick contacts or recent travel.     FAMILY HISTORY: No relevant history    PAST MEDICAL & SURGICAL HISTORY:  Atrophic kidney  Shingles  Lymphoma  Schizophrenia  Kidney stones  Retained urethral stent      SOCIAL HISTORY  Pt denies any current heavy ETOH use, IVDU. No recent travel outside the [place].     ROS  General: Denies fever/chills/nightsweats  HEENT: Denies headache, rhinorrhea, sore throat, eye pain  CV: Denies CP, palpitations  PULM: Denies wheezing, hemoptysis/cough  GI: NBNB vomiting, non-bloody diarrhea  : Denies discharge, hematuria  MSK: Denies arthralgias, myalgias  SKIN: Rash upper back and chest  NEURO: Denies paresthesias, weakness      VITALS:  T(F): 97, Max: 98.9 (01-15-20 @ 01:05)  HR: 66  BP: 108/57  RR: 18Vital Signs Last 24 Hrs  T(C): 36.1 (15 Ernesto 2020 08:14), Max: 37.2 (15 Ernesto 2020 01:05)  T(F): 97 (15 Ernesto 2020 08:14), Max: 98.9 (15 Ernesto 2020 01:05)  HR: 66 (15 Ernesto 2020 08:14) (64 - 119)  BP: 108/57 (15 Ernesto 2020 08:14) (105/72 - 110/57)  BP(mean): --  RR: 18 (15 Ernesto 2020 08:14) (18 - 19)  SpO2: 96% (15 Ernesto 2020 08:14) (96% - 98%)    PHYSICAL EXAM:  Gen: NAD, resting in bed  HEENT: Normocephalic, atraumatic  Neck: supple, no lymphadenopathy  CV: Regular rate & regular rhythm  Lungs: CTAB  Abdomen: Soft, BS present  Ext: Warm, well perfused  Neuro: non focal, awake  Skin: erythematous crusting vesicular rash with superficial ulceration R. thoracic area approx T5 dermatome     TESTS & MEASUREMENTS:                        12.7   6.79  )-----------( 163      ( 15 Ernesto 2020 09:00 )             37.8     01-15    129<L>  |  97<L>  |  50<H>  ----------------------------<  111<H>  4.3   |  18  |  2.4<H>    Ca    8.7      15 Ernesto 2020 09:00  Phos  3.4     01-15  Mg     2.0     01-15    TPro  5.3<L>  /  Alb  3.8  /  TBili  0.5  /  DBili  x   /  AST  15  /  ALT  11  /  AlkPhos  97  01-15    eGFR if Non African American: 28 mL/min/1.73M2 (01-15-20 @ 09:00)  eGFR if : 32 mL/min/1.73M2 (01-15-20 @ 09:00)  eGFR if Non African American: 29 mL/min/1.73M2 (01-14-20 @ 19:50)  eGFR if African American: 34 mL/min/1.73M2 (01-14-20 @ 19:50)    LIVER FUNCTIONS - ( 15 Ernesto 2020 09:00 )  Alb: 3.8 g/dL / Pro: 5.3 g/dL / ALK PHOS: 97 U/L / ALT: 11 U/L / AST: 15 U/L / GGT: x                 Lactate, Blood: 1.6 mmol/L (01-15-20 @ 09:00)  Lactate, Blood: 1.8 mmol/L (01-14-20 @ 19:50)      INFECTIOUS DISEASES TESTING  HIV-1/2 Combo Result: Nonreact (09-29-19 @ 17:24)              All available historical records have been reviewed    MEDICATIONS  allopurinol 300  benztropine 2  cefTRIAXone   IVPB   fluPHENAZine 10  heparin  Injectable 5000  lactated ringers. 1000  tamsulosin 0.4  valACYclovir 1000      ANTIBIOTICS:  cefTRIAXone   IVPB      valACYclovir 1000 milliGRAM(s) Oral every 12 hours      All available historical data has been reviewed

## 2020-01-15 NOTE — DISCHARGE NOTE PROVIDER - PROVIDER TOKENS
PROVIDER:[TOKEN:[[id number]]] PROVIDER:[TOKEN:[[id number]]],PROVIDER:[TOKEN:[[id number]],FOLLOWUP:[1 week]] PROVIDER:[TOKEN:[[id number]]],PROVIDER:[TOKEN:[[id number]],FOLLOWUP:[1 week]],PROVIDER:[TOKEN:[[id number]],FOLLOWUP:[1 week]]

## 2020-01-15 NOTE — H&P ADULT - NSHPLABSRESULTS_GEN_ALL_CORE
LABS:                          13.5   7.30  )-----------( 148      ( 14 Jan 2020 19:50 )             39.5     01-14    130<L>  |  99  |  46<H>  ----------------------------<  127<H>  4.6   |  15<L>  |  2.3<H>    Ca    9.4      14 Jan 2020 19:50  Mg     2.1     01-14    TPro  5.8<L>  /  Alb  4.1  /  TBili  0.6  /  DBili  x   /  AST  13  /  ALT  11  /  AlkPhos  110  01-14          Lactate Trend  01-14 @ 19:50 Lactate:1.8         CAPILLARY BLOOD GLUCOSE            RADIOLOGY:          EKGS:

## 2020-01-15 NOTE — DISCHARGE NOTE PROVIDER - CARE PROVIDERS DIRECT ADDRESSES
,[email or web address]

## 2020-01-15 NOTE — DISCHARGE NOTE PROVIDER - HOSPITAL COURSE
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 As per pt, he has been feeling lethargic since 3 weeks ago s/p chemotherapy. His sxs has progress this week and therefore he presents to the hospital. Pt states vomit and diarrhea is non bloody. He denies fever or chill. Pt also started developing blisters and sharp pain in upper chest. Pt presented to his PCP and was diagnosed w/ herpes zoster. Pt states his rash has improved. Otherwise, pt denies SOB, CP, palpitation, abdominal pain, LE edema, sick contacts or recent travel. 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 As per pt, he has been feeling lethargic since 3 weeks ago s/p chemotherapy. His sxs has progress this week and therefore he presents to the hospital. Pt states vomit and diarrhea is non bloody. He denies fever or chill. Pt also started developing blisters and sharp pain in upper chest. Pt presented to his PCP and was diagnosed w/ herpes zoster. Pt states his rash has improved. Otherwise, pt denies SOB, CP, palpitation, abdominal pain, LE edema, sick contacts or recent travel.         Hospital course:    Patient was not complaining of any abdominal pain, nausea or vomiting. He tolerated diet with no issues. He will be discharged on Valtrex, no need for keflex per ID

## 2020-01-15 NOTE — DISCHARGE NOTE NURSING/CASE MANAGEMENT/SOCIAL WORK - NSDCFUADDAPPT_GEN_ALL_CORE_FT
Please visit PCP and get recheck your electrolytes in 1 week and follow up with Dr. Keller for your sigmoidoscopy biopsy

## 2020-01-16 LAB
-  COAGULASE NEGATIVE STAPHYLOCOCCUS: SIGNIFICANT CHANGE UP
GRAM STN FLD: SIGNIFICANT CHANGE UP
METHOD TYPE: SIGNIFICANT CHANGE UP

## 2020-01-16 RX ORDER — SODIUM CHLORIDE 9 MG/ML
500 INJECTION INTRAMUSCULAR; INTRAVENOUS; SUBCUTANEOUS ONCE
Refills: 0 | Status: COMPLETED | OUTPATIENT
Start: 2020-01-16 | End: 2020-01-16

## 2020-01-16 RX ORDER — SODIUM CHLORIDE 9 MG/ML
1000 INJECTION INTRAMUSCULAR; INTRAVENOUS; SUBCUTANEOUS ONCE
Refills: 0 | Status: COMPLETED | OUTPATIENT
Start: 2020-01-16 | End: 2020-01-16

## 2020-01-16 RX ORDER — SODIUM CHLORIDE 9 MG/ML
1000 INJECTION INTRAMUSCULAR; INTRAVENOUS; SUBCUTANEOUS
Refills: 0 | Status: DISCONTINUED | OUTPATIENT
Start: 2020-01-16 | End: 2020-01-22

## 2020-01-16 RX ORDER — ACYCLOVIR SODIUM 500 MG
1000 VIAL (EA) INTRAVENOUS EVERY 8 HOURS
Refills: 0 | Status: DISCONTINUED | OUTPATIENT
Start: 2020-01-16 | End: 2020-01-17

## 2020-01-16 RX ADMIN — Medication 2 MILLIGRAM(S): at 00:16

## 2020-01-16 RX ADMIN — SODIUM CHLORIDE 1000 MILLILITER(S): 9 INJECTION INTRAMUSCULAR; INTRAVENOUS; SUBCUTANEOUS at 09:49

## 2020-01-16 RX ADMIN — Medication 120 MILLIGRAM(S): at 15:06

## 2020-01-16 RX ADMIN — HEPARIN SODIUM 5000 UNIT(S): 5000 INJECTION INTRAVENOUS; SUBCUTANEOUS at 05:31

## 2020-01-16 RX ADMIN — TAMSULOSIN HYDROCHLORIDE 0.4 MILLIGRAM(S): 0.4 CAPSULE ORAL at 22:42

## 2020-01-16 RX ADMIN — SODIUM CHLORIDE 2000 MILLILITER(S): 9 INJECTION INTRAMUSCULAR; INTRAVENOUS; SUBCUTANEOUS at 11:29

## 2020-01-16 RX ADMIN — Medication 300 MILLIGRAM(S): at 11:36

## 2020-01-16 RX ADMIN — SODIUM CHLORIDE 1000 MILLILITER(S): 9 INJECTION INTRAMUSCULAR; INTRAVENOUS; SUBCUTANEOUS at 07:43

## 2020-01-16 RX ADMIN — TAMSULOSIN HYDROCHLORIDE 0.4 MILLIGRAM(S): 0.4 CAPSULE ORAL at 00:17

## 2020-01-16 RX ADMIN — Medication 270 MILLIGRAM(S): at 23:19

## 2020-01-16 RX ADMIN — SODIUM CHLORIDE 100 MILLILITER(S): 9 INJECTION, SOLUTION INTRAVENOUS at 00:17

## 2020-01-16 RX ADMIN — Medication 2 MILLIGRAM(S): at 23:20

## 2020-01-16 RX ADMIN — FLUPHENAZINE HYDROCHLORIDE 10 MILLIGRAM(S): 1 TABLET, FILM COATED ORAL at 11:36

## 2020-01-16 RX ADMIN — VALACYCLOVIR 1000 MILLIGRAM(S): 500 TABLET, FILM COATED ORAL at 05:31

## 2020-01-16 NOTE — PROGRESS NOTE ADULT - ASSESSMENT
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     # Continuous diarrhea causing hypovolemia and hypotension:  - THis morning SBP in the 70s, perked up to 100 after 1.5 L NS, will keep bolusing  - DDx includes zoster induced diarrhea or CMV colitis ( patient immunosuppressed and had zoster, probably has something else too causing diarrhea)  - c/w NS at 100 cc/hr as maintenance + boluses as needed  - PO diet as tolerated, downgraded to clear.  - give zofran PRN    # Shingles - disseminated per ID:  - Per ID consult, will switch to acyclovir at 10 mg /kg per dose every 8 hours IV ( 1000 mg q8h IV)  - C diff negative. 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

## 2020-01-16 NOTE — PROGRESS NOTE ADULT - SUBJECTIVE AND OBJECTIVE BOX
SUBJECTIVE:    Patient is a 64y old Male who presents with a chief complaint of nausea, vomiting and diarrhea (16 Jan 2020 09:01)    Currently admitted to medicine with the primary diagnosis of Vomiting and diarrhea     Today is hospital day 2d. HE said that he had 4 L of diarrhea overnight. He is hypotensive this morning    PAST MEDICAL & SURGICAL HISTORY  Atrophic kidney  Shingles  Lymphoma  Schizophrenia  Kidney stones  Retained urethral stent    SOCIAL HISTORY:  Negative for smoking/alcohol/drug use.     ALLERGIES:  No Known Allergies    MEDICATIONS:  STANDING MEDICATIONS  allopurinol 300 milliGRAM(s) Oral daily  benztropine 2 milliGRAM(s) Oral at bedtime  fluPHENAZine 10 milliGRAM(s) Oral daily  heparin  Injectable 5000 Unit(s) SubCutaneous every 12 hours  sodium chloride 0.9% Bolus 1000 milliLiter(s) IV Bolus once  sodium chloride 0.9%. 1000 milliLiter(s) IV Continuous <Continuous>  tamsulosin 0.4 milliGRAM(s) Oral at bedtime  valACYclovir 1000 milliGRAM(s) Oral every 12 hours    PRN MEDICATIONS  ondansetron Injectable 4 milliGRAM(s) IV Push every 6 hours PRN    VITALS:   T(F): 97.8  HR: 82  BP: 83/49  RR: 18  SpO2: 98%    LABS:                        12.7   6.79  )-----------( 163      ( 15 Ernesto 2020 09:00 )             37.8     01-15    129<L>  |  97<L>  |  50<H>  ----------------------------<  111<H>  4.3   |  18  |  2.4<H>    Ca    8.7      15 Ernesto 2020 09:00  Phos  3.4     01-15  Mg     2.0     01-15    TPro  5.3<L>  /  Alb  3.8  /  TBili  0.5  /  DBili  x   /  AST  15  /  ALT  11  /  AlkPhos  97  01-15                  RADIOLOGY:    PHYSICAL EXAM:  GEN: No acute distress  LUNGS: Clear to auscultation bilaterally   HEART: S1/S2 present. RRR.   ABD: Soft, non-tender, non-distended. Bowel sounds present  EXT: NC/NC/NE/2+PP/MIMS/Skin Intact.   NEURO: AAOX3    Intravenous access:   NG tube:   Park Catheter:

## 2020-01-16 NOTE — PROGRESS NOTE ADULT - SUBJECTIVE AND OBJECTIVE BOX
ELDA COVARRUBIAS  64y, Male  Allergy: No Known Allergies      CHIEF COMPLAINT: nausea, vomiting and diarrhea (15 Ernesto 2020 15:59)      INTERVAL EVENTS/HPI  - No acute events overnight, vesicular lesions also noted L1, S3, T5/6  - continued copious diarrhea per pt, CDiff neg  - T(F): , Max: 98 (01-15-20 @ 22:30)  - Denies any worsening symptoms  - Tolerating medication  - WBC Count: 6.79 (01-15-20 @ 09:00)  WBC Count: 7.30 (01-14-20 @ 19:50)  - Creatinine, Serum: 2.4 (01-15-20 @ 09:00)  Creatinine, Serum: 2.3 (01-14-20 @ 19:50)       ROS  General: Denies rigors, nightsweats  HEENT: Denies headache, rhinorrhea, sore throat, eye pain  CV: Denies CP, palpitations  PULM: Denies wheezing, hemoptysis  GI: Denies hematemesis, hematochezia, melena  : Denies discharge, hematuria  MSK: Denies arthralgias, myalgias  SKIN: +RASH  NEURO: Denies paresthesias, weakness  PSYCH: Denies depression, anxiety    VITALS:  T(F): 97.8, Max: 98 (01-15-20 @ 22:30)  HR: 84  BP: 108/56  RR: 18Vital Signs Last 24 Hrs  T(C): 36.6 (16 Jan 2020 08:24), Max: 36.7 (15 Ernesto 2020 22:30)  T(F): 97.8 (16 Jan 2020 08:24), Max: 98 (15 Ernesto 2020 22:30)  HR: 84 (16 Jan 2020 08:24) (66 - 84)  BP: 108/56 (16 Jan 2020 08:24) (80/49 - 112/62)  BP(mean): --  RR: 18 (16 Jan 2020 07:59) (16 - 18)  SpO2: 98% (16 Jan 2020 07:59) (97% - 99%)    PHYSICAL EXAM:  Gen: NAD, resting in bed  HEENT: Normocephalic, atraumatic  Neck: supple, no lymphadenopathy  CV: Regular rate & regular rhythm  Lungs: decreased BS at bases, no fremitus  Abdomen: Soft, BS present  Ext: Warm, well perfused  Neuro: non focal, awake  Skin: Crusted vesicular lesions T5-6, also vesicular lesions noted L1, S3, crusted lesions LE and UE as well  Lines: no phlebitis    FH: Non-contributory  Social Hx: Non-contributory    TESTS & MEASUREMENTS:                        12.7   6.79  )-----------( 163      ( 15 Ernesto 2020 09:00 )             37.8     01-15    129<L>  |  97<L>  |  50<H>  ----------------------------<  111<H>  4.3   |  18  |  2.4<H>    Ca    8.7      15 Ernesto 2020 09:00  Phos  3.4     01-15  Mg     2.0     01-15    TPro  5.3<L>  /  Alb  3.8  /  TBili  0.5  /  DBili  x   /  AST  15  /  ALT  11  /  AlkPhos  97  01-15      LIVER FUNCTIONS - ( 15 Ernesto 2020 09:00 )  Alb: 3.8 g/dL / Pro: 5.3 g/dL / ALK PHOS: 97 U/L / ALT: 11 U/L / AST: 15 U/L / GGT: x                 Lactate, Blood: 1.6 mmol/L (01-15-20 @ 09:00)  Lactate, Blood: 1.8 mmol/L (01-14-20 @ 19:50)      INFECTIOUS DISEASES TESTING  HIV-1/2 Combo Result: Nonreact (09-29-19 @ 17:24)      RADIOLOGY & ADDITIONAL TESTS:  I have personally reviewed the last Chest xray  CXR      CT      CARDIOLOGY TESTING      MEDICATIONS  allopurinol 300  benztropine 2  fluPHENAZine 10  heparin  Injectable 5000  lactated ringers. 1000  tamsulosin 0.4  valACYclovir 1000      ANTIBIOTICS:  valACYclovir 1000 milliGRAM(s) Oral every 12 hours      All available historical records have been reviewed

## 2020-01-16 NOTE — PROGRESS NOTE ADULT - SUBJECTIVE AND OBJECTIVE BOX
reviewed chart,   this patient has disseminated varicella, i cannot visit pt as i have no titres despite vaccination  d/w id/ho  dr rincon to see pt in am

## 2020-01-16 NOTE — PROGRESS NOTE ADULT - ASSESSMENT
ASSESSMENT  64yM with a PMH of marginal zone lymphoma s/p 1 cycle chemotherapy w/ Bendamustine and rituxan 3 weeks ago presenting with 1.5 weeks n/v/d + rash consistent with herpes zoster    PROBLEM  #Disseminated Zoster in an immunocompromised host  #ASHLEY  #Diarrhea    Cdiff neg    RECOMMENDATIONS  - Acyclovir 10mg/kg q12h IV, IVF, monitor Cr  - Check GI PCR  - Cannot r/o zoster-related colitis, rare case reports. If diarrhea continues, would consult GI for flex sig  - Contact/airborne     Spectra [phone]

## 2020-01-17 LAB
ANION GAP SERPL CALC-SCNC: 10 MMOL/L — SIGNIFICANT CHANGE UP (ref 7–14)
BASOPHILS # BLD AUTO: 0.01 K/UL — SIGNIFICANT CHANGE UP (ref 0–0.2)
BASOPHILS # BLD AUTO: 0.02 K/UL — SIGNIFICANT CHANGE UP (ref 0–0.2)
BASOPHILS NFR BLD AUTO: 0.2 % — SIGNIFICANT CHANGE UP (ref 0–1)
BASOPHILS NFR BLD AUTO: 0.4 % — SIGNIFICANT CHANGE UP (ref 0–1)
BUN SERPL-MCNC: 47 MG/DL — HIGH (ref 10–20)
CALCIUM SERPL-MCNC: 7.7 MG/DL — LOW (ref 8.5–10.1)
CHLORIDE SERPL-SCNC: 109 MMOL/L — SIGNIFICANT CHANGE UP (ref 98–110)
CO2 SERPL-SCNC: 14 MMOL/L — LOW (ref 17–32)
CREAT SERPL-MCNC: 2.4 MG/DL — HIGH (ref 0.7–1.5)
CULTURE RESULTS: SIGNIFICANT CHANGE UP
EOSINOPHIL # BLD AUTO: 0.05 K/UL — SIGNIFICANT CHANGE UP (ref 0–0.7)
EOSINOPHIL # BLD AUTO: 0.07 K/UL — SIGNIFICANT CHANGE UP (ref 0–0.7)
EOSINOPHIL NFR BLD AUTO: 1 % — SIGNIFICANT CHANGE UP (ref 0–8)
EOSINOPHIL NFR BLD AUTO: 1.4 % — SIGNIFICANT CHANGE UP (ref 0–8)
GLUCOSE SERPL-MCNC: 111 MG/DL — HIGH (ref 70–99)
HCT VFR BLD CALC: 27.3 % — LOW (ref 42–52)
HCT VFR BLD CALC: 28.4 % — LOW (ref 42–52)
HGB BLD-MCNC: 9.5 G/DL — LOW (ref 14–18)
HGB BLD-MCNC: 9.7 G/DL — LOW (ref 14–18)
IMM GRANULOCYTES NFR BLD AUTO: 0.4 % — HIGH (ref 0.1–0.3)
IMM GRANULOCYTES NFR BLD AUTO: 0.6 % — HIGH (ref 0.1–0.3)
LYMPHOCYTES # BLD AUTO: 0.4 K/UL — LOW (ref 1.2–3.4)
LYMPHOCYTES # BLD AUTO: 0.42 K/UL — LOW (ref 1.2–3.4)
LYMPHOCYTES # BLD AUTO: 8 % — LOW (ref 20.5–51.1)
LYMPHOCYTES # BLD AUTO: 8 % — LOW (ref 20.5–51.1)
MAGNESIUM SERPL-MCNC: 2 MG/DL — SIGNIFICANT CHANGE UP (ref 1.8–2.4)
MCHC RBC-ENTMCNC: 30.5 PG — SIGNIFICANT CHANGE UP (ref 27–31)
MCHC RBC-ENTMCNC: 31.1 PG — HIGH (ref 27–31)
MCHC RBC-ENTMCNC: 34.2 G/DL — SIGNIFICANT CHANGE UP (ref 32–37)
MCHC RBC-ENTMCNC: 34.8 G/DL — SIGNIFICANT CHANGE UP (ref 32–37)
MCV RBC AUTO: 89.3 FL — SIGNIFICANT CHANGE UP (ref 80–94)
MCV RBC AUTO: 89.5 FL — SIGNIFICANT CHANGE UP (ref 80–94)
MONOCYTES # BLD AUTO: 0.51 K/UL — SIGNIFICANT CHANGE UP (ref 0.1–0.6)
MONOCYTES # BLD AUTO: 0.65 K/UL — HIGH (ref 0.1–0.6)
MONOCYTES NFR BLD AUTO: 10.2 % — HIGH (ref 1.7–9.3)
MONOCYTES NFR BLD AUTO: 12.4 % — HIGH (ref 1.7–9.3)
NEUTROPHILS # BLD AUTO: 3.96 K/UL — SIGNIFICANT CHANGE UP (ref 1.4–6.5)
NEUTROPHILS # BLD AUTO: 4.1 K/UL — SIGNIFICANT CHANGE UP (ref 1.4–6.5)
NEUTROPHILS NFR BLD AUTO: 77.8 % — HIGH (ref 42.2–75.2)
NEUTROPHILS NFR BLD AUTO: 79.6 % — HIGH (ref 42.2–75.2)
NRBC # BLD: 0 /100 WBCS — SIGNIFICANT CHANGE UP (ref 0–0)
NRBC # BLD: 0 /100 WBCS — SIGNIFICANT CHANGE UP (ref 0–0)
ORGANISM # SPEC MICROSCOPIC CNT: SIGNIFICANT CHANGE UP
ORGANISM # SPEC MICROSCOPIC CNT: SIGNIFICANT CHANGE UP
PHOSPHATE SERPL-MCNC: 3.3 MG/DL — SIGNIFICANT CHANGE UP (ref 2.1–4.9)
PLATELET # BLD AUTO: 92 K/UL — LOW (ref 130–400)
PLATELET # BLD AUTO: 96 K/UL — LOW (ref 130–400)
POTASSIUM SERPL-MCNC: 3.9 MMOL/L — SIGNIFICANT CHANGE UP (ref 3.5–5)
POTASSIUM SERPL-SCNC: 3.9 MMOL/L — SIGNIFICANT CHANGE UP (ref 3.5–5)
RBC # BLD: 3.05 M/UL — LOW (ref 4.7–6.1)
RBC # BLD: 3.18 M/UL — LOW (ref 4.7–6.1)
RBC # FLD: 16.4 % — HIGH (ref 11.5–14.5)
RBC # FLD: 16.4 % — HIGH (ref 11.5–14.5)
SODIUM SERPL-SCNC: 133 MMOL/L — LOW (ref 135–146)
SPECIMEN SOURCE: SIGNIFICANT CHANGE UP
WBC # BLD: 4.98 K/UL — SIGNIFICANT CHANGE UP (ref 4.8–10.8)
WBC # BLD: 5.26 K/UL — SIGNIFICANT CHANGE UP (ref 4.8–10.8)
WBC # FLD AUTO: 4.98 K/UL — SIGNIFICANT CHANGE UP (ref 4.8–10.8)
WBC # FLD AUTO: 5.26 K/UL — SIGNIFICANT CHANGE UP (ref 4.8–10.8)

## 2020-01-17 RX ORDER — VANCOMYCIN HCL 1 G
1000 VIAL (EA) INTRAVENOUS EVERY 24 HOURS
Refills: 0 | Status: DISCONTINUED | OUTPATIENT
Start: 2020-01-17 | End: 2020-01-17

## 2020-01-17 RX ORDER — ACYCLOVIR SODIUM 500 MG
1000 VIAL (EA) INTRAVENOUS EVERY 12 HOURS
Refills: 0 | Status: DISCONTINUED | OUTPATIENT
Start: 2020-01-17 | End: 2020-01-21

## 2020-01-17 RX ORDER — CEFEPIME 1 G/1
1000 INJECTION, POWDER, FOR SOLUTION INTRAMUSCULAR; INTRAVENOUS EVERY 24 HOURS
Refills: 0 | Status: DISCONTINUED | OUTPATIENT
Start: 2020-01-17 | End: 2020-01-17

## 2020-01-17 RX ADMIN — CEFEPIME 100 MILLIGRAM(S): 1 INJECTION, POWDER, FOR SOLUTION INTRAMUSCULAR; INTRAVENOUS at 06:51

## 2020-01-17 RX ADMIN — SODIUM CHLORIDE 100 MILLILITER(S): 9 INJECTION INTRAMUSCULAR; INTRAVENOUS; SUBCUTANEOUS at 01:49

## 2020-01-17 RX ADMIN — Medication 300 MILLIGRAM(S): at 13:13

## 2020-01-17 RX ADMIN — Medication 270 MILLIGRAM(S): at 18:17

## 2020-01-17 RX ADMIN — Medication 270 MILLIGRAM(S): at 06:10

## 2020-01-17 RX ADMIN — FLUPHENAZINE HYDROCHLORIDE 10 MILLIGRAM(S): 1 TABLET, FILM COATED ORAL at 13:13

## 2020-01-17 RX ADMIN — Medication 250 MILLIGRAM(S): at 06:51

## 2020-01-17 NOTE — PROGRESS NOTE ADULT - ASSESSMENT
ASSESSMENT  64yM with a PMH of marginal zone lymphoma s/p 1 cycle chemotherapy w/ Bendamustine and rituxan 3 weeks ago presenting with 1.5 weeks n/v/d + rash consistent with herpes zoster    PROBLEM  #Disseminated Zoster in an immunocompromised host  #ASHLEY, crcl 45  #Diarrhea    Cdiff neg, GI PCR neg  #CoNS in bcx is a contaminant   #Immunocompromised on chemo    RECOMMENDATIONS  - DECREASE to Acyclovir 10mg/kg q12h IV, IVF, monitor Cr. crcl 45, if increases to >50 can increase to q8h  - Cannot r/o zoster-related colitis, rare case reports. Recommend Consult GI for flex sig  - repeat BCX with next labs  - Contact/airborne as immunocompromised and disseminated    Spectra [phone]

## 2020-01-17 NOTE — PROGRESS NOTE ADULT - SUBJECTIVE AND OBJECTIVE BOX
ELDA COVARRUBIAS  64y, Male  Allergy: No Known Allergies      CHIEF COMPLAINT: nausea, vomiting and diarrhea (16 Jan 2020 14:08)      INTERVAL EVENTS/HPI  - No acute events overnight, reports continued diarrhea- multiple episodes overnight, no change since yesterday  - T(F): , Max: 97.9 (01-16-20 @ 11:45)  - Denies any worsening symptoms  - Tolerating medication  - WBC Count: 6.79 (01-15-20 @ 09:00)  WBC Count: 7.30 (01-14-20 @ 19:50)  - Creatinine, Serum: 2.4 (01-17-20 @ 06:35)  Creatinine, Serum: 2.4 (01-15-20 @ 09:00)       ROS  General: Denies rigors, nightsweats  HEENT: Denies headache, rhinorrhea, sore throat, eye pain  CV: Denies CP, palpitations  PULM: Denies wheezing, hemoptysis  GI: as noted above   : Denies discharge, hematuria  MSK: Denies arthralgias, myalgias  SKIN: Denies rash, lesions  NEURO: Denies paresthesias, weakness  PSYCH: Denies depression, anxiety    VITALS:  T(F): 97.2, Max: 97.9 (01-16-20 @ 11:45)  HR: 96  BP: 95/60  RR: 18Vital Signs Last 24 Hrs  T(C): 36.2 (17 Jan 2020 07:30), Max: 36.6 (16 Jan 2020 08:24)  T(F): 97.2 (17 Jan 2020 07:30), Max: 97.9 (16 Jan 2020 11:45)  HR: 96 (17 Jan 2020 07:30) (74 - 96)  BP: 95/60 (17 Jan 2020 07:30) (83/49 - 108/56)  BP(mean): --  RR: 18 (17 Jan 2020 07:30) (16 - 18)  SpO2: 98% (17 Jan 2020 07:30) (98% - 98%)    PHYSICAL EXAM:  Gen: NAD, resting in bed  HEENT: Normocephalic, atraumatic  Neck: supple, no lymphadenopathy  CV: Regular rate & regular rhythm  Lungs: decreased BS at bases, no fremitus  Abdomen: Soft, BS present  Ext: Warm, well perfused  Neuro: non focal, awake  Skin: Crusted vesicular lesions T5-6 however still non-crusted lesions, also vesicular lesions noted L1, S3 (in groin area and on tip of penis), crusted lesions LE and UE as well  Lines: no phlebitis  FH: Non-contributory  Social Hx: Non-contributory    TESTS & MEASUREMENTS:                        12.7   6.79  )-----------( 163      ( 15 Ernesto 2020 09:00 )             37.8     01-17    133<L>  |  109  |  47<H>  ----------------------------<  111<H>  3.9   |  14<L>  |  2.4<H>    Ca    7.7<L>      17 Jan 2020 06:35  Phos  3.3     01-17  Mg     2.0     01-17    TPro  5.3<L>  /  Alb  3.8  /  TBili  0.5  /  DBili  x   /  AST  15  /  ALT  11  /  AlkPhos  97  01-15    eGFR if Non African American: 28 mL/min/1.73M2 (01-17-20 @ 06:35)  eGFR if African American: 32 mL/min/1.73M2 (01-17-20 @ 06:35)    LIVER FUNCTIONS - ( 15 Ernesto 2020 09:00 )  Alb: 3.8 g/dL / Pro: 5.3 g/dL / ALK PHOS: 97 U/L / ALT: 11 U/L / AST: 15 U/L / GGT: x               GI PCR Panel, Stool (collected 01-16-20 @ 12:58)  Source: .Stool Feces  Final Report (01-17-20 @ 03:55):    GI PCR Results: NOT detected    *******Please Note:*******    GI panel PCR evaluates for:    Campylobacter, Plesiomonas shigelloides, Salmonella,    Vibrio, Yersinia enterocolitica, Enteroaggregative    Escherichia coli (EAEC), Enteropathogenic E.coli (EPEC),    Enterotoxigenic E. coli (ETEC) lt/st, Shiga-like    toxin-producing E. coli (STEC) stx1/stx2,    Shigella/ Enteroinvasive E. coli (EIEC), Cryptosporidium,    Cyclospora cayetanensis, Entamoeba histolytica,    Giardia lamblia, Adenovirus F 40/41, Astrovirus,    Norovirus GI/GII, Rotavirus A, Sapovirus    GI PCR Panel, Stool (collected 01-16-20 @ 11:59)  Source: .Stool Feces  Final Report (01-17-20 @ 02:47):    GI PCR Results: NOT detected    *******Please Note:*******    GI panel PCR evaluates for:    Campylobacter, Plesiomonas shigelloides, Salmonella,    Vibrio, Yersinia enterocolitica, Enteroaggregative    Escherichia coli (EAEC), Enteropathogenic E.coli (EPEC),    Enterotoxigenic E. coli (ETEC) lt/st, Shiga-like    toxin-producing E. coli (STEC) stx1/stx2,    Shigella/ Enteroinvasive E. coli (EIEC), Cryptosporidium,    Cyclospora cayetanensis, Entamoeba histolytica,    Giardia lamblia, Adenovirus F 40/41, Astrovirus,    Norovirus GI/GII, Rotavirus A, Sapovirus    Culture - Blood (collected 01-15-20 @ 09:00)  Source: .Blood None  Gram Stain (01-16-20 @ 21:16):    Growth in aerobic bottle: Gram positive cocci in pairs  Preliminary Report (01-16-20 @ 21:16):    Growth in aerobic bottle: Gram positive cocci in pairs    ***Blood Panel PCR results on this specimen are available    approximately 3 hours after the Gram stain result.***    Gram stain, PCR, and/or culture results may not always    correspond due to difference in methodologies.    ************************************************************    This PCR assay was performed using [employer].    The following targets are tested for: Enterococcus,    vancomycin resistant enterococci, Listeria monocytogenes,    coagulase negative staphylococci, S. aureus,    methicillin resistant S. aureus, Streptococcus agalactiae    (Group B), S. pneumoniae, S. pyogenes (Group A),    Acinetobacter baumannii, Enterobacter cloacae, E. coli,    Klebsiella oxytoca, K. pneumoniae, Proteus sp.,    Serratia marcescens, Haemophilus influenzae,    Neisseria meningitidis, Pseudomonas aeruginosa, Candida    albicans, C. glabrata, C krusei, C parapsilosis,    C. tropicalis and the KPC resistance gene.  Organism: Blood Culture PCR (01-16-20 @ 22:23)  Organism: Blood Culture PCR (01-16-20 @ 22:23)      -  Coagulase negative Staphylococcus: Detec      Method Type: PCR        Lactate, Blood: 1.6 mmol/L (01-15-20 @ 09:00)  Lactate, Blood: 1.8 mmol/L (01-14-20 @ 19:50)      INFECTIOUS DISEASES TESTING  HIV-1/2 Combo Result: Nonreact (09-29-19 @ 17:24)      RADIOLOGY & ADDITIONAL TESTS:  I have personally reviewed the last Chest xray  CXR      CT      CARDIOLOGY TESTING      MEDICATIONS  acyclovir IVPB 1000  allopurinol 300  benztropine 2  fluPHENAZine 10  heparin  Injectable 5000  sodium chloride 0.9%. 1000  tamsulosin 0.4      ANTIBIOTICS:  acyclovir IVPB 1000 milliGRAM(s) IV Intermittent every 8 hours      All available historical records have been reviewed

## 2020-01-17 NOTE — CONSULT NOTE ADULT - ATTENDING COMMENTS
marginal zone lymphoma on Chemo  n/v/d  GI PCR and Cdiff negative  - if no  improvement with conservative managemant, flex sig on Monday marginal zone lymphoma on Chemo  n/v/d  GI PCR and Cdiff negative  - if no  improvement with conservative managemant, flex sig on Monday    Patient seen by attending on 1/18/2020

## 2020-01-17 NOTE — CONSULT NOTE ADULT - ASSESSMENT
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 As per pt, he has been feeling lethargic since 3 weeks ago s/p chemotherapy. Patient have non bloody diarrhea , watery and non bloody vomiting. 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 As per pt, he has been feeling lethargic since 3 weeks ago s/p chemotherapy. Patient have non bloody diarrhea , watery and non bloody vomiting. 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 As per pt, he has been feeling lethargic since 3 weeks ago s/p chemotherapy. Patient have non bloody diarrhea , watery and non bloody vomiting. Patient was diagnosed with disseminated herpes     #non bloody diarrhea with vomiting and nausea   c.diff negative , GI PCR is negative   likely chemotherapy induced diarrhea ( up to 40 % with Bendamustine) and 20 % with  Rituxan , less likely other differential ( herpes colitis )   REC:   IV hydration  Zofran PRN   lactose free, low residue diet   if patient does not improve with conservative measures , will aim for flexible sigmoidoscopy on Monday to r/o possible other causes in immunosuppressed patient

## 2020-01-17 NOTE — CONSULT NOTE ADULT - SUBJECTIVE AND OBJECTIVE BOX
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 As per pt, he has been feeling lethargic since 3 weeks ago s/p chemotherapy. His sxs has progress this week and therefore he presents to the hospital. Pt states vomit and diarrhea is non bloody. He denies fever or chill. Pt also started developing blisters and sharp pain in upper chest. Pt presented to his PCP and was diagnosed w/ herpes zoster. Pt states his rash has improved. Otherwise, pt denies SOB, CP, palpitation, abdominal pain, LE edema, sick contacts or recent travel.     Vital Signs Last 24 Hrs  T(C): 37.2 (15 Ernesto 2020 01:05), Max: 37.2 (15 Ernesto 2020 01:05)  T(F): 98.9 (15 Ernesto 2020 01:05), Max: 98.9 (15 Ernesto 2020 01:05)  HR: 64 (15 Ernesto 2020 01:05) (64 - 119)  BP: 106/56 (15 Ernesto 2020 01:05) (105/72 - 110/57)  BP(mean): --  RR: 18 (15 Ernesto 2020 01:05) (18 - 19)  SpO2: 96% (15 Ernesto 2020 01:05) (96% - 98%) (15 Ernesto 2020 01:24)      Prior records Reviewed (Y/N):  History obtained from person other than patient (Y/N):    Prior EGD: none in chart   Prior Colonoscopy: none in chart       PAST MEDICAL & SURGICAL HISTORY:  Atrophic kidney  Shingles  Lymphoma  Schizophrenia  Kidney stones  Retained urethral stent      FAMILY HISTORY: No pertinent family history       Social History:  Tobacco: negative   Alcohol: negative   Drugs: negative     Home Medications:  allopurinol 300 mg oral tablet: 1 tab(s) orally once a day (15 Ernesto 2020 00:56)  benztropine 2 mg oral tablet: 1 tab(s) orally once a day (at bedtime) (15 Ernesto 2020 00:56)  fluPHENAZine 10 mg oral tablet: 1 tab(s) orally once a day (15 Enresto 2020 00:56)  phenazopyridine 100 mg oral tablet: 1 tab(s) orally once a day (at bedtime), As Needed (15 Ernesto 2020 00:56)  tamsulosin 0.4 mg oral capsule: 1 cap(s) orally once a day (15 Ernesto 2020 00:56)    MEDICATIONS  (STANDING):  acyclovir IVPB 1000 milliGRAM(s) IV Intermittent every 12 hours  allopurinol 300 milliGRAM(s) Oral daily  benztropine 2 milliGRAM(s) Oral at bedtime  fluPHENAZine 10 milliGRAM(s) Oral daily  heparin  Injectable 5000 Unit(s) SubCutaneous every 12 hours  sodium chloride 0.9%. 1000 milliLiter(s) (100 mL/Hr) IV Continuous <Continuous>  tamsulosin 0.4 milliGRAM(s) Oral at bedtime    MEDICATIONS  (PRN):  ondansetron Injectable 4 milliGRAM(s) IV Push every 6 hours PRN Nausea      Allergies  No Known Allergies      Review of Systems:   Constitutional:  No Fever, No Chills  ENT/Mouth:  No Hearing Changes,  No Difficulty Swallowing  Eyes:  No Eye Pain, No Vision Changes  Cardiovascular:  + Chest Pain, No Palpitations  Respiratory:  No Cough, No Dyspnea  Gastrointestinal:  As described in HPI  Musculoskeletal:  No Joint Swelling, No Back Pain  Skin:  No Skin Lesions, No Jaundice  Neuro:  No Syncope, No Dizziness  Heme/Lymph:  No Bruising, No Bleeding.          Physical Examination:  T(C): 36.4 (01-17-20 @ 15:47), Max: 36.6 (01-17-20 @ 00:09)  HR: 79 (01-17-20 @ 15:47) (78 - 96)  BP: 89/55 (01-17-20 @ 15:47) (89/55 - 95/60)  RR: 18 (01-17-20 @ 15:47) (17 - 18)  SpO2: 96% (01-17-20 @ 15:47) (96% - 98%)        Constitutional: No acute distress.  Eyes:. Conjunctivae are clear, Sclera is non-icteric.  Ears Nose and Throat: The external ears are normal appearing,  Oral mucosa is pink and moist.  Respiratory:  No signs of respiratory distress. Lung sounds are clear bilaterally.  Cardiovascular:  S1 S2, Regular rate and rhythm.  GI: Abdomen is soft, symmetric, and non-tender without distention.  Bowel sounds are present and normoactive in all four quadrants. No masses, hepatomegaly, or splenomegaly are noted.   Neuro: No Tremor, No involuntary movements  Skin: No rashes, No Jaundice.          Data: (reviewed by attending)                        9.7    4.98  )-----------( 92       ( 17 Jan 2020 16:00 )             28.4     Hgb Trend:  9.7  01-17-20 @ 16:00  9.5  01-17-20 @ 06:35  12.7  01-15-20 @ 09:00  13.5  01-14-20 @ 19:50      01-17    133<L>  |  109  |  47<H>  ----------------------------<  111<H>  3.9   |  14<L>  |  2.4<H>    Ca    7.7<L>      17 Jan 2020 06:35  Phos  3.3     01-17  Mg     2.0     01-17      Liver panel trend:  TBili 0.5   /   AST 15   /   ALT 11   /   AlkP 97   /   Tptn 5.3   /   Alb 3.8    /   DBili --      01-15  TBili 0.6   /   AST 13   /   ALT 11   /   AlkP 110   /   Tptn 5.8   /   Alb 4.1    /   DBili --      01-14          GI PCR Panel, Stool (collected 16 Jan 2020 12:58)  Source: .Stool Feces  Final Report (17 Jan 2020 03:55):    GI PCR Results: NOT detected    *******Please Note:*******    GI panel PCR evaluates for:    Campylobacter, Plesiomonas shigelloides, Salmonella,    Vibrio, Yersinia enterocolitica, Enteroaggregative    Escherichia coli (EAEC), Enteropathogenic E.coli (EPEC),    Enterotoxigenic E. coli (ETEC) lt/st, Shiga-like    toxin-producing E. coli (STEC) stx1/stx2,    Shigella/ Enteroinvasive E. coli (EIEC), Cryptosporidium,    Cyclospora cayetanensis, Entamoeba histolytica,    Giardia lamblia, Adenovirus F 40/41, Astrovirus,    Norovirus GI/GII, Rotavirus A, Sapovirus    GI PCR Panel, Stool (collected 16 Jan 2020 11:59)  Source: .Stool Feces  Final Report (17 Jan 2020 02:47):    GI PCR Results: NOT detected    *******Please Note:*******    GI panel PCR evaluates for:    Campylobacter, Plesiomonas shigelloides, Salmonella,    Vibrio, Yersinia enterocolitica, Enteroaggregative    Escherichia coli (EAEC), Enteropathogenic E.coli (EPEC),    Enterotoxigenic E. coli (ETEC) lt/st, Shiga-like    toxin-producing E. coli (STEC) stx1/stx2,    Shigella/ Enteroinvasive E. coli (EIEC), Cryptosporidium,    Cyclospora cayetanensis, Entamoeba histolytica,    Giardia lamblia, Adenovirus F 40/41, Astrovirus,    Norovirus GI/GII, Rotavirus A, Sapovirus    Culture - Blood (collected 15 Ernesto 2020 09:00)  Source: .Blood None  Gram Stain (16 Jan 2020 21:16):    Growth in aerobic bottle: Gram positive cocci in pairs  Final Report (17 Jan 2020 17:59):    Growth in aerobic bottle: Coag Negative Staphylococcus    Single set isolate, possible contaminant. Contact    Microbiology if susceptibility testing clinically    indicated.    ***Blood Panel PCR results on this specimen are available    approximately 3 hours after the Gram stain result.***    Gram stain, PCR, and/or culture results may not always    correspond due to difference in methodologies.    ************************************************************    This PCR assay was performed using [employer].    The following targets are tested for: Enterococcus,    vancomycin resistant enterococci, Listeria monocytogenes,    coagulase negative staphylococci, S. aureus,    methicillin resistant S. aureus, Streptococcus agalactiae    (Group B), S. pneumoniae, S.pyogenes (Group A),    Acinetobacter baumannii, Enterobacter cloacae, E. coli,    Klebsiella oxytoca, K. pneumoniae, Proteus sp.,    Serratia marcescens, Haemophilus influenzae,    Neisseria meningitidis, Pseudomonas aeruginosa, Candida    albicans, C. glabrata, C krusei, C parapsilosis,    C. tropicalis and the KPC resistance gene.  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 As per pt, he has been feeling lethargic since 3 weeks ago s/p chemotherapy. His sxs has progress this week and therefore he presents to the hospital. Pt states vomit and diarrhea is non bloody. He denies fever or chill. Pt also started developing blisters and sharp pain in upper chest. Pt presented to his PCP and was diagnosed w/ herpes zoster. Pt states his rash has improved. Otherwise, pt denies SOB, CP, palpitation, abdominal pain, LE edema, sick contacts or recent travel.     Patient still has mild nausea and can tolerate some food  No further vomiting  He reports as many as 20 loose stools since his symptoms began.      Vital Signs Last 24 Hrs  T(C): 37.2 (15 Ernesto 2020 01:05), Max: 37.2 (15 Ernesto 2020 01:05)  T(F): 98.9 (15 Ernesto 2020 01:05), Max: 98.9 (15 Ernesto 2020 01:05)  HR: 64 (15 Ernesto 2020 01:05) (64 - 119)  BP: 106/56 (15 Ernesto 2020 01:05) (105/72 - 110/57)  BP(mean): --  RR: 18 (15 Ernesto 2020 01:05) (18 - 19)  SpO2: 96% (15 Ernesto 2020 01:05) (96% - 98%) (15 Ernesto 2020 01:24)      Prior records Reviewed (Y/N): N  History obtained from person other than patient (Y/N): Sister at bedside    Prior EGD: none in chart   Prior Colonoscopy: none in chart       PAST MEDICAL & SURGICAL HISTORY:  Atrophic kidney  Shingles  Lymphoma  Schizophrenia  Kidney stones  Retained urethral stent      FAMILY HISTORY: No pertinent family history       Social History:  Tobacco: negative   Alcohol: negative   Drugs: negative     Home Medications:  allopurinol 300 mg oral tablet: 1 tab(s) orally once a day (15 Ernesto 2020 00:56)  benztropine 2 mg oral tablet: 1 tab(s) orally once a day (at bedtime) (15 Ernesto 2020 00:56)  fluPHENAZine 10 mg oral tablet: 1 tab(s) orally once a day (15 Ernesto 2020 00:56)  phenazopyridine 100 mg oral tablet: 1 tab(s) orally once a day (at bedtime), As Needed (15 Ernesto 2020 00:56)  tamsulosin 0.4 mg oral capsule: 1 cap(s) orally once a day (15 Ernesto 2020 00:56)    MEDICATIONS  (STANDING):  acyclovir IVPB 1000 milliGRAM(s) IV Intermittent every 12 hours  allopurinol 300 milliGRAM(s) Oral daily  benztropine 2 milliGRAM(s) Oral at bedtime  fluPHENAZine 10 milliGRAM(s) Oral daily  heparin  Injectable 5000 Unit(s) SubCutaneous every 12 hours  sodium chloride 0.9%. 1000 milliLiter(s) (100 mL/Hr) IV Continuous <Continuous>  tamsulosin 0.4 milliGRAM(s) Oral at bedtime    MEDICATIONS  (PRN):  ondansetron Injectable 4 milliGRAM(s) IV Push every 6 hours PRN Nausea      Allergies  No Known Allergies      Review of Systems:   Constitutional:  No Fever, No Chills  ENT/Mouth:  No Hearing Changes,  No Difficulty Swallowing  Eyes:  No Eye Pain, No Vision Changes  Cardiovascular:  + Chest Pain, No Palpitations  Respiratory:  No Cough, No Dyspnea  Gastrointestinal:  As described in HPI  Musculoskeletal:  No Joint Swelling, No Back Pain  Skin:  No Skin Lesions, No Jaundice  Neuro:  No Syncope, No Dizziness  Heme/Lymph:  No Bruising, No Bleeding.          Physical Examination:  T(C): 36.4 (01-17-20 @ 15:47), Max: 36.6 (01-17-20 @ 00:09)  HR: 79 (01-17-20 @ 15:47) (78 - 96)  BP: 89/55 (01-17-20 @ 15:47) (89/55 - 95/60)  RR: 18 (01-17-20 @ 15:47) (17 - 18)  SpO2: 96% (01-17-20 @ 15:47) (96% - 98%)        Constitutional: No acute distress.  Eyes:. Conjunctivae are clear, Sclera is non-icteric.  Ears Nose and Throat: The external ears are normal appearing,  Oral mucosa is pink and moist.  Respiratory:  No signs of respiratory distress. Lung sounds are clear bilaterally.  Cardiovascular:  S1 S2, Regular rate and rhythm.  GI: Abdomen is soft, symmetric, and non-tender without distention.  Bowel sounds are present and normoactive in all four quadrants. No masses, hepatomegaly, or splenomegaly are noted.   Neuro: No Tremor, No involuntary movements  Skin: No rashes, No Jaundice.          Data: (reviewed by attending)                        9.7    4.98  )-----------( 92       ( 17 Jan 2020 16:00 )             28.4     Hgb Trend:  9.7  01-17-20 @ 16:00  9.5  01-17-20 @ 06:35  12.7  01-15-20 @ 09:00  13.5  01-14-20 @ 19:50      01-17    133<L>  |  109  |  47<H>  ----------------------------<  111<H>  3.9   |  14<L>  |  2.4<H>    Ca    7.7<L>      17 Jan 2020 06:35  Phos  3.3     01-17  Mg     2.0     01-17      Liver panel trend:  TBili 0.5   /   AST 15   /   ALT 11   /   AlkP 97   /   Tptn 5.3   /   Alb 3.8    /   DBili --      01-15  TBili 0.6   /   AST 13   /   ALT 11   /   AlkP 110   /   Tptn 5.8   /   Alb 4.1    /   DBili --      01-14          GI PCR Panel, Stool (collected 16 Jan 2020 12:58)  Source: .Stool Feces  Final Report (17 Jan 2020 03:55):    GI PCR Results: NOT detected    *******Please Note:*******    GI panel PCR evaluates for:    Campylobacter, Plesiomonas shigelloides, Salmonella,    Vibrio, Yersinia enterocolitica, Enteroaggregative    Escherichia coli (EAEC), Enteropathogenic E.coli (EPEC),    Enterotoxigenic E. coli (ETEC) lt/st, Shiga-like    toxin-producing E. coli (STEC) stx1/stx2,    Shigella/ Enteroinvasive E. coli (EIEC), Cryptosporidium,    Cyclospora cayetanensis, Entamoeba histolytica,    Giardia lamblia, Adenovirus F 40/41, Astrovirus,    Norovirus GI/GII, Rotavirus A, Sapovirus    GI PCR Panel, Stool (collected 16 Jan 2020 11:59)  Source: .Stool Feces  Final Report (17 Jan 2020 02:47):    GI PCR Results: NOT detected    *******Please Note:*******    GI panel PCR evaluates for:    Campylobacter, Plesiomonas shigelloides, Salmonella,    Vibrio, Yersinia enterocolitica, Enteroaggregative    Escherichia coli (EAEC), Enteropathogenic E.coli (EPEC),    Enterotoxigenic E. coli (ETEC) lt/st, Shiga-like    toxin-producing E. coli (STEC) stx1/stx2,    Shigella/ Enteroinvasive E. coli (EIEC), Cryptosporidium,    Cyclospora cayetanensis, Entamoeba histolytica,    Giardia lamblia, Adenovirus F 40/41, Astrovirus,    Norovirus GI/GII, Rotavirus A, Sapovirus    Culture - Blood (collected 15 Ernesto 2020 09:00)  Source: .Blood None  Gram Stain (16 Jan 2020 21:16):    Growth in aerobic bottle: Gram positive cocci in pairs  Final Report (17 Jan 2020 17:59):    Growth in aerobic bottle: Coag Negative Staphylococcus    Single set isolate, possible contaminant. Contact    Microbiology if susceptibility testing clinically    indicated.    ***Blood Panel PCR results on this specimen are available    approximately 3 hours after the Gram stain result.***    Gram stain, PCR, and/or culture results may not always    correspond due to difference in methodologies.    ************************************************************    This PCR assay was performed using [employer].    The following targets are tested for: Enterococcus,    vancomycin resistant enterococci, Listeria monocytogenes,    coagulase negative staphylococci, S. aureus,    methicillin resistant S. aureus, Streptococcus agalactiae    (Group B), S. pneumoniae, S.pyogenes (Group A),    Acinetobacter baumannii, Enterobacter cloacae, E. coli,    Klebsiella oxytoca, K. pneumoniae, Proteus sp.,    Serratia marcescens, Haemophilus influenzae,    Neisseria meningitidis, Pseudomonas aeruginosa, Candida    albicans, C. glabrata, C krusei, C parapsilosis,    C. tropicalis and the KPC resistance gene.  Organism: Blood Culture PCR (17 Jan 2020 17:59)  Organism: Blood Culture PCR (17 Jan 2020 17:59)

## 2020-01-17 NOTE — PROGRESS NOTE ADULT - SUBJECTIVE AND OBJECTIVE BOX
SUBJECTIVE:    Patient is a 64y old Male who presents with a chief complaint of nausea, vomiting and diarrhea (17 Jan 2020 08:56)    Currently admitted to medicine with the primary diagnosis of Vomiting and diarrhea     Today is hospital day 3d. This morning he is resting comfortably in bed and reports no new issues or overnight events.   Patient still having diarrhea    PAST MEDICAL & SURGICAL HISTORY  Atrophic kidney  Shingles  Lymphoma  Schizophrenia  Kidney stones  Retained urethral stent    SOCIAL HISTORY:  Negative for smoking/alcohol/drug use.     ALLERGIES:  No Known Allergies    MEDICATIONS:  STANDING MEDICATIONS  acyclovir IVPB 1000 milliGRAM(s) IV Intermittent every 12 hours  allopurinol 300 milliGRAM(s) Oral daily  benztropine 2 milliGRAM(s) Oral at bedtime  fluPHENAZine 10 milliGRAM(s) Oral daily  heparin  Injectable 5000 Unit(s) SubCutaneous every 12 hours  sodium chloride 0.9%. 1000 milliLiter(s) IV Continuous <Continuous>  tamsulosin 0.4 milliGRAM(s) Oral at bedtime    PRN MEDICATIONS  ondansetron Injectable 4 milliGRAM(s) IV Push every 6 hours PRN    VITALS:   T(F): 97.2  HR: 96  BP: 95/60  RR: 18  SpO2: 98%    LABS:                        9.5    5.26  )-----------( 96       ( 17 Jan 2020 06:35 )             27.3     01-17    133<L>  |  109  |  47<H>  ----------------------------<  111<H>  3.9   |  14<L>  |  2.4<H>    Ca    7.7<L>      17 Jan 2020 06:35  Phos  3.3     01-17  Mg     2.0     01-17                GI PCR Panel, Stool (collected 16 Jan 2020 12:58)  Source: .Stool Feces  Final Report (17 Jan 2020 03:55):    GI PCR Results: NOT detected    *******Please Note:*******    GI panel PCR evaluates for:    Campylobacter, Plesiomonas shigelloides, Salmonella,    Vibrio, Yersinia enterocolitica, Enteroaggregative    Escherichia coli (EAEC), Enteropathogenic E.coli (EPEC),    Enterotoxigenic E. coli (ETEC) lt/st, Shiga-like    toxin-producing E. coli (STEC) stx1/stx2,    Shigella/ Enteroinvasive E. coli (EIEC), Cryptosporidium,    Cyclospora cayetanensis, Entamoeba histolytica,    Giardia lamblia, Adenovirus F 40/41, Astrovirus,    Norovirus GI/GII, Rotavirus A, Sapovirus    GI PCR Panel, Stool (collected 16 Jan 2020 11:59)  Source: .Stool Feces  Final Report (17 Jan 2020 02:47):    GI PCR Results: NOT detected    *******Please Note:*******    GI panel PCR evaluates for:    Campylobacter, Plesiomonas shigelloides, Salmonella,    Vibrio, Yersinia enterocolitica, Enteroaggregative    Escherichia coli (EAEC), Enteropathogenic E.coli (EPEC),    Enterotoxigenic E. coli (ETEC) lt/st, Shiga-like    toxin-producing E. coli (STEC) stx1/stx2,    Shigella/ Enteroinvasive E. coli (EIEC), Cryptosporidium,    Cyclospora cayetanensis, Entamoeba histolytica,    Giardia lamblia, Adenovirus F 40/41, Astrovirus,    Norovirus GI/GII, Rotavirus A, Sapovirus    Culture - Blood (collected 15 Ernesto 2020 09:00)  Source: .Blood None  Gram Stain (16 Jan 2020 21:16):    Growth in aerobic bottle: Gram positive cocci in pairs  Preliminary Report (16 Jan 2020 21:16):    Growth in aerobic bottle: Gram positive cocci in pairs    ***Blood Panel PCR results on this specimen are available    approximately 3 hours after the Gram stain result.***    Gram stain, PCR, and/or culture results may not always    correspond due to difference in methodologies.    ************************************************************    This PCR assay was performed using [employer].    The following targets are tested for: Enterococcus,    vancomycin resistant enterococci, Listeria monocytogenes,    coagulase negative staphylococci, S. aureus,    methicillin resistant S. aureus, Streptococcus agalactiae    (Group B), S. pneumoniae, S. pyogenes (Group A),    Acinetobacter baumannii, Enterobacter cloacae, E. coli,    Klebsiella oxytoca, K. pneumoniae, Proteus sp.,    Serratia marcescens, Haemophilus influenzae,    Neisseria meningitidis, Pseudomonas aeruginosa, Candida    albicans, C. glabrata, C krusei, C parapsilosis,    C. tropicalis and the KPC resistance gene.  Organism: Blood Culture PCR (16 Jan 2020 22:23)  Organism: Blood Culture PCR (16 Jan 2020 22:23)    PHYSICAL EXAM:  GEN: No acute distress, NC/AT, anicteric  LUNGS: Clear to auscultation bilaterally, no wheezes  HEART: S1/S2 present. RRR.   ABD: Soft, non-tender, non-distended. Bowel sounds present  SKIN: Vesicles on chest and groin  NEURO: AAOX3, moves all extremities, no focal deficits    Intravenous access: Peripheral access  NG tube: None  Park Catheter: None

## 2020-01-17 NOTE — PROGRESS NOTE ADULT - ASSESSMENT
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

## 2020-01-17 NOTE — PROGRESS NOTE ADULT - SUBJECTIVE AND OBJECTIVE BOX
Patient was seen and examined in ER CC7. Spoke with RN. Chart reviewed.  No new events overnight- pt states he is feeling better than yesterday  Vital Signs Last 24 Hrs  T(F): 97.2 (17 Jan 2020 07:30), Max: 97.9 (16 Jan 2020 11:45)  HR: 96 (17 Jan 2020 07:30) (74 - 96)  BP: 95/60 (17 Jan 2020 07:30) (83/49 - 103/55)  SpO2: 98% (17 Jan 2020 07:30) (98% - 98%)  MEDICATIONS  (STANDING):  acyclovir IVPB 1000 milliGRAM(s) IV Intermittent every 12 hours  allopurinol 300 milliGRAM(s) Oral daily  benztropine 2 milliGRAM(s) Oral at bedtime  fluPHENAZine 10 milliGRAM(s) Oral daily  heparin  Injectable 5000 Unit(s) SubCutaneous every 12 hours  sodium chloride 0.9%. 1000 milliLiter(s) (100 mL/Hr) IV Continuous <Continuous>  tamsulosin 0.4 milliGRAM(s) Oral at bedtime    MEDICATIONS  (PRN):  ondansetron Injectable 4 milliGRAM(s) IV Push every 6 hours PRN Nausea    Labs:                        12.7   6.79  )-----------( 163      ( 15 Ernesto 2020 09:00 )             37.8     17 Jan 2020 06:35    133    |  109    |  47     ----------------------------<  111    3.9     |  14     |  2.4    15 Ernesto 2020 09:00    129    |  97     |  50     ----------------------------<  111    4.3     |  18     |  2.4      Ca    7.7        17 Jan 2020 06:35  Ca    8.7        15 Ernesto 2020 09:00  Phos  3.3       17 Jan 2020 06:35  Phos  3.4       15 Ernesto 2020 09:00  Mg     2.0       17 Jan 2020 06:35  Mg     2.0       15 Ernesto 2020 09:00    TPro  5.3    /  Alb  3.8    /  TBili  0.5    /  DBili  x      /  AST  15     /  ALT  11     /  AlkPhos  97     15 Ernesto 2020 09:00          GI PCR Panel, Stool (collected 16 Jan 2020 12:58)  Source: .Stool Feces  Final Report (17 Jan 2020 03:55):    GI PCR Results: NOT detected    *******Please Note:*******    GI panel PCR evaluates for:    Campylobacter, Plesiomonas shigelloides, Salmonella,    Vibrio, Yersinia enterocolitica, Enteroaggregative    Escherichia coli (EAEC), Enteropathogenic E.coli (EPEC),    Enterotoxigenic E. coli (ETEC) lt/st, Shiga-like    toxin-producing E. coli (STEC) stx1/stx2,    Shigella/ Enteroinvasive E. coli (EIEC), Cryptosporidium,    Cyclospora cayetanensis, Entamoeba histolytica,    Giardia lamblia, Adenovirus F 40/41, Astrovirus,    Norovirus GI/GII, Rotavirus A, Sapovirus    GI PCR Panel, Stool (collected 16 Jan 2020 11:59)  Source: .Stool Feces  Final Report (17 Jan 2020 02:47):    GI PCR Results: NOT detected    *******Please Note:*******    GI panel PCR evaluates for:    Campylobacter, Plesiomonas shigelloides, Salmonella,    Vibrio, Yersinia enterocolitica, Enteroaggregative    Escherichia coli (EAEC), Enteropathogenic E.coli (EPEC),    Enterotoxigenic E. coli (ETEC) lt/st, Shiga-like    toxin-producing E. coli (STEC) stx1/stx2,    Shigella/ Enteroinvasive E. coli (EIEC), Cryptosporidium,    Cyclospora cayetanensis, Entamoeba histolytica,    Giardia lamblia, Adenovirus F 40/41, Astrovirus,    Norovirus GI/GII, Rotavirus A, Sapovirus    Culture - Blood (collected 15 Ernesto 2020 09:00)  Source: .Blood None  Gram Stain (16 Jan 2020 21:16):    Growth in aerobic bottle: Gram positive cocci in pairs  Preliminary Report (16 Jan 2020 21:16):    Growth in aerobic bottle: Gram positive cocci in pairs    ***Blood Panel PCR results on this specimen are available    approximately 3 hours after the Gram stain result.***    Gram stain, PCR, and/or culture results may not always    correspond due to difference in methodologies.    ************************************************************    This PCR assay was performed using [employer].    The following targets are tested for: Enterococcus,    vancomycin resistant enterococci, Listeria monocytogenes,    coagulase negative staphylococci, S. aureus,    methicillin resistant S. aureus, Streptococcus agalactiae    (Group B), S. pneumoniae, S. pyogenes (Group A),    Acinetobacter baumannii, Enterobacter cloacae, E. coli,    Klebsiella oxytoca, K. pneumoniae, Proteus sp.,    Serratia marcescens, Haemophilus influenzae,    Neisseria meningitidis, Pseudomonas aeruginosa, Candida    albicans, C. glabrata, C krusei, C parapsilosis,    C. tropicalis and the KPC resistance gene.  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 crcl 45, if increases to >50 can increase to q8h  - Cannot r/o zoster-related colitis, consult GI for flex sig as per ID   - f/u repeat BCX ; received vanco already  - Contact/airborne precautions as immunocompromised and disseminated  -monitor Cr- stable     OOB, ambulate    DVT prophylaxis  Decubitus prevention- all measures as per RN protocol  Please call or text me with any questions or updates

## 2020-01-18 LAB
ALBUMIN SERPL ELPH-MCNC: 2.8 G/DL — LOW (ref 3.5–5.2)
ALP SERPL-CCNC: 64 U/L — SIGNIFICANT CHANGE UP (ref 30–115)
ALT FLD-CCNC: 28 U/L — SIGNIFICANT CHANGE UP (ref 0–41)
ANION GAP SERPL CALC-SCNC: 10 MMOL/L — SIGNIFICANT CHANGE UP (ref 7–14)
AST SERPL-CCNC: 61 U/L — HIGH (ref 0–41)
BASOPHILS # BLD AUTO: 0.01 K/UL — SIGNIFICANT CHANGE UP (ref 0–0.2)
BASOPHILS NFR BLD AUTO: 0.2 % — SIGNIFICANT CHANGE UP (ref 0–1)
BILIRUB SERPL-MCNC: 0.3 MG/DL — SIGNIFICANT CHANGE UP (ref 0.2–1.2)
BUN SERPL-MCNC: 33 MG/DL — HIGH (ref 10–20)
CALCIUM SERPL-MCNC: 7.9 MG/DL — LOW (ref 8.5–10.1)
CHLORIDE SERPL-SCNC: 112 MMOL/L — HIGH (ref 98–110)
CO2 SERPL-SCNC: 14 MMOL/L — LOW (ref 17–32)
CREAT SERPL-MCNC: 2 MG/DL — HIGH (ref 0.7–1.5)
EOSINOPHIL # BLD AUTO: 0.11 K/UL — SIGNIFICANT CHANGE UP (ref 0–0.7)
EOSINOPHIL NFR BLD AUTO: 2.7 % — SIGNIFICANT CHANGE UP (ref 0–8)
GLUCOSE SERPL-MCNC: 108 MG/DL — HIGH (ref 70–99)
HCT VFR BLD CALC: 27.6 % — LOW (ref 42–52)
HGB BLD-MCNC: 9.6 G/DL — LOW (ref 14–18)
IMM GRANULOCYTES NFR BLD AUTO: 0.7 % — HIGH (ref 0.1–0.3)
LYMPHOCYTES # BLD AUTO: 0.27 K/UL — LOW (ref 1.2–3.4)
LYMPHOCYTES # BLD AUTO: 6.5 % — LOW (ref 20.5–51.1)
MAGNESIUM SERPL-MCNC: 2 MG/DL — SIGNIFICANT CHANGE UP (ref 1.8–2.4)
MCHC RBC-ENTMCNC: 31.4 PG — HIGH (ref 27–31)
MCHC RBC-ENTMCNC: 34.8 G/DL — SIGNIFICANT CHANGE UP (ref 32–37)
MCV RBC AUTO: 90.2 FL — SIGNIFICANT CHANGE UP (ref 80–94)
MONOCYTES # BLD AUTO: 0.48 K/UL — SIGNIFICANT CHANGE UP (ref 0.1–0.6)
MONOCYTES NFR BLD AUTO: 11.6 % — HIGH (ref 1.7–9.3)
NEUTROPHILS # BLD AUTO: 3.24 K/UL — SIGNIFICANT CHANGE UP (ref 1.4–6.5)
NEUTROPHILS NFR BLD AUTO: 78.3 % — HIGH (ref 42.2–75.2)
NRBC # BLD: 0 /100 WBCS — SIGNIFICANT CHANGE UP (ref 0–0)
PLATELET # BLD AUTO: 92 K/UL — LOW (ref 130–400)
POTASSIUM SERPL-MCNC: 3.3 MMOL/L — LOW (ref 3.5–5)
POTASSIUM SERPL-SCNC: 3.3 MMOL/L — LOW (ref 3.5–5)
PROT SERPL-MCNC: 4.1 G/DL — LOW (ref 6–8)
RBC # BLD: 3.06 M/UL — LOW (ref 4.7–6.1)
RBC # FLD: 16.3 % — HIGH (ref 11.5–14.5)
SODIUM SERPL-SCNC: 136 MMOL/L — SIGNIFICANT CHANGE UP (ref 135–146)
WBC # BLD: 4.14 K/UL — LOW (ref 4.8–10.8)
WBC # FLD AUTO: 4.14 K/UL — LOW (ref 4.8–10.8)

## 2020-01-18 PROCEDURE — 99222 1ST HOSP IP/OBS MODERATE 55: CPT

## 2020-01-18 RX ORDER — POTASSIUM CHLORIDE 20 MEQ
40 PACKET (EA) ORAL ONCE
Refills: 0 | Status: COMPLETED | OUTPATIENT
Start: 2020-01-18 | End: 2020-01-18

## 2020-01-18 RX ADMIN — Medication 270 MILLIGRAM(S): at 18:47

## 2020-01-18 RX ADMIN — Medication 270 MILLIGRAM(S): at 06:26

## 2020-01-18 RX ADMIN — Medication 2 MILLIGRAM(S): at 22:30

## 2020-01-18 RX ADMIN — FLUPHENAZINE HYDROCHLORIDE 10 MILLIGRAM(S): 1 TABLET, FILM COATED ORAL at 12:06

## 2020-01-18 RX ADMIN — TAMSULOSIN HYDROCHLORIDE 0.4 MILLIGRAM(S): 0.4 CAPSULE ORAL at 22:30

## 2020-01-18 RX ADMIN — Medication 40 MILLIEQUIVALENT(S): at 12:06

## 2020-01-18 RX ADMIN — Medication 300 MILLIGRAM(S): at 12:06

## 2020-01-18 NOTE — PROGRESS NOTE ADULT - PROVIDER SPECIALTY LIST ADULT
Internal Medicine Telephone Encounter by Liset Moss at 07/10/18 12:27 PM     Author:  Liset Moss Service:  (none) Author Type:  Patient      Filed:  07/10/18 12:27 PM Encounter Date:  7/9/2018 Status:  Signed     :  Liset Moss (Patient )            Mom picked up forms[DL1.1M]       Revision History        User Key Date/Time User Provider Type Action    > DL1.1 07/10/18 12:27 PM Liset Moss Patient  Sign    M - Manual

## 2020-01-18 NOTE — PROGRESS NOTE ADULT - SUBJECTIVE AND OBJECTIVE BOX
SUBJECTIVE:    Patient is a 64y old Male who presents with a chief complaint of nausea, vomiting and diarrhea (17 Jan 2020 19:04)    Currently admitted to medicine with the primary diagnosis of Vomiting and diarrhea     Today is hospital day 4d.   feeling well  diarrhea improved   vomiting resolved     PAST MEDICAL & SURGICAL HISTORY  Atrophic kidney  Shingles  Lymphoma  Schizophrenia  Kidney stones  Retained urethral stent    SOCIAL HISTORY:  Negative for smoking/alcohol/drug use.     ALLERGIES:  No Known Allergies    MEDICATIONS:  STANDING MEDICATIONS  acyclovir IVPB 1000 milliGRAM(s) IV Intermittent every 12 hours  allopurinol 300 milliGRAM(s) Oral daily  benztropine 2 milliGRAM(s) Oral at bedtime  fluPHENAZine 10 milliGRAM(s) Oral daily  heparin  Injectable 5000 Unit(s) SubCutaneous every 12 hours  potassium chloride   Powder 40 milliEquivalent(s) Oral once  sodium chloride 0.9%. 1000 milliLiter(s) IV Continuous <Continuous>  tamsulosin 0.4 milliGRAM(s) Oral at bedtime    PRN MEDICATIONS  ondansetron Injectable 4 milliGRAM(s) IV Push every 6 hours PRN    VITALS:   T(F): 97.5  HR: 71  BP: 101/57  RR: 18  SpO2: 100%    LABS:                        9.6    4.14  )-----------( 92       ( 18 Jan 2020 05:34 )             27.6     01-18    136  |  112<H>  |  33<H>  ----------------------------<  108<H>  3.3<L>   |  14<L>  |  2.0<H>    Ca    7.9<L>      18 Jan 2020 05:34  Phos  3.3     01-17  Mg     2.0     01-18    TPro  4.1<L>  /  Alb  2.8<L>  /  TBili  0.3  /  DBili  x   /  AST  61<H>  /  ALT  28  /  AlkPhos  64  01-18              GI PCR Panel, Stool (collected 16 Jan 2020 12:58)  Source: .Stool Feces  Final Report (17 Jan 2020 03:55):    GI PCR Results: NOT detected    *******Please Note:*******    GI panel PCR evaluates for:    Campylobacter, Plesiomonas shigelloides, Salmonella,    Vibrio, Yersinia enterocolitica, Enteroaggregative    Escherichia coli (EAEC), Enteropathogenic E.coli (EPEC),    Enterotoxigenic E. coli (ETEC) lt/st, Shiga-like    toxin-producing E. coli (STEC) stx1/stx2,    Shigella/ Enteroinvasive E. coli (EIEC), Cryptosporidium,    Cyclospora cayetanensis, Entamoeba histolytica,    Giardia lamblia, Adenovirus F 40/41, Astrovirus,    Norovirus GI/GII, Rotavirus A, Sapovirus    GI PCR Panel, Stool (collected 16 Jan 2020 11:59)  Source: .Stool Feces  Final Report (17 Jan 2020 02:47):    GI PCR Results: NOT detected    *******Please Note:*******    GI panel PCR evaluates for:    Campylobacter, Plesiomonas shigelloides, Salmonella,    Vibrio, Yersinia enterocolitica, Enteroaggregative    Escherichia coli (EAEC), Enteropathogenic E.coli (EPEC),    Enterotoxigenic E. coli (ETEC) lt/st, Shiga-like    toxin-producing E. coli (STEC) stx1/stx2,    Shigella/ Enteroinvasive E. coli (EIEC), Cryptosporidium,    Cyclospora cayetanensis, Entamoeba histolytica,    Giardia lamblia, Adenovirus F 40/41, Astrovirus,    Norovirus GI/GII, Rotavirus A, Sapovirus    Culture - Blood (collected 15 Ernesto 2020 09:00)  Source: .Blood None  Gram Stain (16 Jan 2020 21:16):    Growth in aerobic bottle: Gram positive cocci in pairs  Final Report (17 Jan 2020 17:59):    Growth in aerobic bottle: Coag Negative Staphylococcus    Single set isolate, possible contaminant. Contact    Microbiology if susceptibility testing clinically    indicated.    ***Blood Panel PCR results on this specimen are available    approximately 3 hours after the Gram stain result.***    Gram stain, PCR, and/or culture results may not always    correspond due to difference in methodologies.    ************************************************************    This PCR assay was performed using [employer].    The following targets are tested for: Enterococcus,    vancomycin resistant enterococci, Listeria monocytogenes,    coagulase negative staphylococci, S. aureus,    methicillin resistant S. aureus, Streptococcus agalactiae    (Group B), S. pneumoniae, S.pyogenes (Group A),    Acinetobacter baumannii, Enterobacter cloacae, E. coli,    Klebsiella oxytoca, K. pneumoniae, Proteus sp.,    Serratia marcescens, Haemophilus influenzae,    Neisseria meningitidis, Pseudomonas aeruginosa, Candida    albicans, C. glabrata, C krusei, C parapsilosis,    C. tropicalis and the KPC resistance gene.  Organism: Blood Culture PCR (17 Jan 2020 17:59)  Organism: Blood Culture PCR (17 Jan 2020 17:59)          RADIOLOGY:    PHYSICAL EXAM:  GEN: No acute distress  LUNGS: Clear to auscultation bilaterally   HEART: Regular  ABD: Soft, non-tender, non-distended.  EXT: NC/NC/NE/2+PP/MIMS/Skin Intact.   NEURO: AAOX3    Intravenous access:   NG tube:   Park Catheter:

## 2020-01-18 NOTE — PROGRESS NOTE ADULT - ASSESSMENT
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     non bloody diarrhea w vomiting improving--- chemo induced less likely herpes colitis   c diff negative  GI inputs noted and appreciated  continue IVF  advanced diet -lactulose free diet  if patient not improved GI will do Flex sigmoidoscopy     lymphoma / herpes zoster on Iv acyclovir  hypokalemia -- replace K  increase creatinine continue IVF  chronic kidney history      schizophrenia continue psych meds stable now       progress hands off    other  test result  if no improvement GI will do flex sigmoid   disposition still on IV acyclovir and have diarrhea   d/w patient           [id number]  [id number]

## 2020-01-18 NOTE — PROGRESS NOTE ADULT - SUBJECTIVE AND OBJECTIVE BOX
ELDA COVARRUBIAS 64y Male  MRN#: [id number]       SUBJECTIVE  63 year old male with PMH of Marginal zone lymphoma s/p 1 cycle chemotherapy w/ Bendamustine and rituxan 3 weeks ago, CKD w/ history of rt urethral stent with multiple kidney stones, paranoid and schizophrenia presents to ED for nausea, vomiting and diarrhea.   Today, he says he feels better, would like to eat something solid.     OBJECTIVE  PAST MEDICAL & SURGICAL HISTORY  Atrophic kidney  Shingles  Lymphoma  Schizophrenia  Kidney stones  Retained urethral stent    ALLERGIES:  No Known Allergies    MEDICATIONS:  STANDING MEDICATIONS  acyclovir IVPB 1000 milliGRAM(s) IV Intermittent every 12 hours  allopurinol 300 milliGRAM(s) Oral daily  benztropine 2 milliGRAM(s) Oral at bedtime  fluPHENAZine 10 milliGRAM(s) Oral daily  heparin  Injectable 5000 Unit(s) SubCutaneous every 12 hours  potassium chloride   Powder 40 milliEquivalent(s) Oral once  sodium chloride 0.9%. 1000 milliLiter(s) IV Continuous <Continuous>  tamsulosin 0.4 milliGRAM(s) Oral at bedtime    PRN MEDICATIONS  ondansetron Injectable 4 milliGRAM(s) IV Push every 6 hours PRN      VITAL SIGNS: Last 24 Hours  T(C): 36.4 (18 Jan 2020 08:00), Max: 36.4 (17 Jan 2020 15:47)  T(F): 97.5 (18 Jan 2020 08:00), Max: 97.5 (17 Jan 2020 15:47)  HR: 71 (18 Jan 2020 08:00) (71 - 79)  BP: 101/57 (18 Jan 2020 08:00) (89/55 - 101/57)  BP(mean): --  RR: 18 (18 Jan 2020 08:00) (18 - 18)  SpO2: 100% (18 Jan 2020 08:00) (96% - 100%)    LABS:                        9.6    4.14  )-----------( 92       ( 18 Jan 2020 05:34 )             27.6     01-18    136  |  112<H>  |  33<H>  ----------------------------<  108<H>  3.3<L>   |  14<L>  |  2.0<H>    Ca    7.9<L>      18 Jan 2020 05:34  Phos  3.3     01-17  Mg     2.0     01-18    TPro  4.1<L>  /  Alb  2.8<L>  /  TBili  0.3  /  DBili  x   /  AST  61<H>  /  ALT  28  /  AlkPhos  64  01-18              GI PCR Panel, Stool (collected 16 Jan 2020 12:58)  Source: .Stool Feces  Final Report (17 Jan 2020 03:55):    GI PCR Results: NOT detected    *******Please Note:*******    GI panel PCR evaluates for:    Campylobacter, Plesiomonas shigelloides, Salmonella,    Vibrio, Yersinia enterocolitica, Enteroaggregative    Escherichia coli (EAEC), Enteropathogenic E.coli (EPEC),    Enterotoxigenic E. coli (ETEC) lt/st, Shiga-like    toxin-producing E. coli (STEC) stx1/stx2,    Shigella/ Enteroinvasive E. coli (EIEC), Cryptosporidium,    Cyclospora cayetanensis, Entamoeba histolytica,    Giardia lamblia, Adenovirus F 40/41, Astrovirus,    Norovirus GI/GII, Rotavirus A, Sapovirus    GI PCR Panel, Stool (collected 16 Jan 2020 11:59)  Source: .Stool Feces  Final Report (17 Jan 2020 02:47):    GI PCR Results: NOT detected    *******Please Note:*******    GI panel PCR evaluates for:    Campylobacter, Plesiomonas shigelloides, Salmonella,    Vibrio, Yersinia enterocolitica, Enteroaggregative    Escherichia coli (EAEC), Enteropathogenic E.coli (EPEC),    Enterotoxigenic E. coli (ETEC) lt/st, Shiga-like    toxin-producing E. coli (STEC) stx1/stx2,    Shigella/ Enteroinvasive E. coli (EIEC), Cryptosporidium,    Cyclospora cayetanensis, Entamoeba histolytica,    Giardia lamblia, Adenovirus F 40/41, Astrovirus,    Norovirus GI/GII, Rotavirus A, Sapovirus      PHYSICAL EXAM:  GENERAL: NAD, AAOx3  HEENT:  Atraumatic, Normocephalic.  PULMONARY: Clear to auscultation bilaterally  CARDIOVASCULAR: Regular rate and rhythm  GASTROINTESTINAL: Soft, Nontender  MUSCULOSKELETAL:  2+ Peripheral Pulses, No edema  NEUROLOGY: non-focal  SKIN: No rashes or lesions      ADMISSION SUMMARY  Patient is a 64y old Male who presents with a chief complaint of nausea, vomiting and diarrhea (18 Jan 2020 08:34)    ASSESSMENT & PLAN    # Continuous diarrhea causing hypovolemia and hypotension:  - BP Slightly improved- c/w IVF at 100 cc/hr  - Some case reports showed zoster induced colitis  - c/w NS at 100 cc/hr as maintenance + boluses as needed  - Advanced to pureed diet today. Monitor  - give zofran PRN    # Shingles - disseminated per ID:  - C/w acyclovir at 10 mg /kg per dose every 12 hours IV ( 1000 mg q12h IV) renally dosed  - C diff and GI PCR negative. GI following-Might go for flex sig next week.   - Off antibiotics    # CKD IV:  - at baseline creatinine   - trend BMP    # Schizophrenia and paranoia  - c/w home meds    # Marginal zone lymphoma s/p 1 cycle chemotherapy  - f/u oncology OP    # DVT prophylaxis  -On heparin SQ  -Monitor Platelets.

## 2020-01-19 LAB
ALBUMIN SERPL ELPH-MCNC: 3.1 G/DL — LOW (ref 3.5–5.2)
ALP SERPL-CCNC: 71 U/L — SIGNIFICANT CHANGE UP (ref 30–115)
ALT FLD-CCNC: 28 U/L — SIGNIFICANT CHANGE UP (ref 0–41)
ANION GAP SERPL CALC-SCNC: 11 MMOL/L — SIGNIFICANT CHANGE UP (ref 7–14)
AST SERPL-CCNC: 48 U/L — HIGH (ref 0–41)
BASOPHILS # BLD AUTO: 0.01 K/UL — SIGNIFICANT CHANGE UP (ref 0–0.2)
BASOPHILS NFR BLD AUTO: 0.2 % — SIGNIFICANT CHANGE UP (ref 0–1)
BILIRUB SERPL-MCNC: 0.3 MG/DL — SIGNIFICANT CHANGE UP (ref 0.2–1.2)
BUN SERPL-MCNC: 23 MG/DL — HIGH (ref 10–20)
CALCIUM SERPL-MCNC: 8 MG/DL — LOW (ref 8.5–10.1)
CHLORIDE SERPL-SCNC: 117 MMOL/L — HIGH (ref 98–110)
CO2 SERPL-SCNC: 13 MMOL/L — LOW (ref 17–32)
CREAT SERPL-MCNC: 1.7 MG/DL — HIGH (ref 0.7–1.5)
EOSINOPHIL # BLD AUTO: 0.2 K/UL — SIGNIFICANT CHANGE UP (ref 0–0.7)
EOSINOPHIL NFR BLD AUTO: 3.9 % — SIGNIFICANT CHANGE UP (ref 0–8)
GLUCOSE SERPL-MCNC: 94 MG/DL — SIGNIFICANT CHANGE UP (ref 70–99)
HCT VFR BLD CALC: 28.3 % — LOW (ref 42–52)
HGB BLD-MCNC: 9.5 G/DL — LOW (ref 14–18)
IMM GRANULOCYTES NFR BLD AUTO: 0.4 % — HIGH (ref 0.1–0.3)
LYMPHOCYTES # BLD AUTO: 0.3 K/UL — LOW (ref 1.2–3.4)
LYMPHOCYTES # BLD AUTO: 5.8 % — LOW (ref 20.5–51.1)
MCHC RBC-ENTMCNC: 30.1 PG — SIGNIFICANT CHANGE UP (ref 27–31)
MCHC RBC-ENTMCNC: 33.6 G/DL — SIGNIFICANT CHANGE UP (ref 32–37)
MCV RBC AUTO: 89.6 FL — SIGNIFICANT CHANGE UP (ref 80–94)
MONOCYTES # BLD AUTO: 0.64 K/UL — HIGH (ref 0.1–0.6)
MONOCYTES NFR BLD AUTO: 12.4 % — HIGH (ref 1.7–9.3)
NEUTROPHILS # BLD AUTO: 4.01 K/UL — SIGNIFICANT CHANGE UP (ref 1.4–6.5)
NEUTROPHILS NFR BLD AUTO: 77.3 % — HIGH (ref 42.2–75.2)
NRBC # BLD: 0 /100 WBCS — SIGNIFICANT CHANGE UP (ref 0–0)
PLATELET # BLD AUTO: 116 K/UL — LOW (ref 130–400)
POTASSIUM SERPL-MCNC: 3.9 MMOL/L — SIGNIFICANT CHANGE UP (ref 3.5–5)
POTASSIUM SERPL-SCNC: 3.9 MMOL/L — SIGNIFICANT CHANGE UP (ref 3.5–5)
PROT SERPL-MCNC: 4.4 G/DL — LOW (ref 6–8)
RBC # BLD: 3.16 M/UL — LOW (ref 4.7–6.1)
RBC # FLD: 16.7 % — HIGH (ref 11.5–14.5)
SODIUM SERPL-SCNC: 141 MMOL/L — SIGNIFICANT CHANGE UP (ref 135–146)
WBC # BLD: 5.18 K/UL — SIGNIFICANT CHANGE UP (ref 4.8–10.8)
WBC # FLD AUTO: 5.18 K/UL — SIGNIFICANT CHANGE UP (ref 4.8–10.8)

## 2020-01-19 PROCEDURE — 99232 SBSQ HOSP IP/OBS MODERATE 35: CPT

## 2020-01-19 RX ADMIN — Medication 2 MILLIGRAM(S): at 21:33

## 2020-01-19 RX ADMIN — FLUPHENAZINE HYDROCHLORIDE 10 MILLIGRAM(S): 1 TABLET, FILM COATED ORAL at 13:44

## 2020-01-19 RX ADMIN — Medication 300 MILLIGRAM(S): at 13:44

## 2020-01-19 RX ADMIN — HEPARIN SODIUM 5000 UNIT(S): 5000 INJECTION INTRAVENOUS; SUBCUTANEOUS at 06:17

## 2020-01-19 RX ADMIN — TAMSULOSIN HYDROCHLORIDE 0.4 MILLIGRAM(S): 0.4 CAPSULE ORAL at 21:33

## 2020-01-19 RX ADMIN — Medication 270 MILLIGRAM(S): at 18:14

## 2020-01-19 RX ADMIN — Medication 270 MILLIGRAM(S): at 06:18

## 2020-01-19 NOTE — PROGRESS NOTE ADULT - SUBJECTIVE AND OBJECTIVE BOX
Patient was seen and examined- still in ER CC7. Spoke with RN. Chart reviewed.  No new events overnight- still with loose stools  Vital Signs Last 24 Hrs  T(F): 97.3 (18 Jan 2020 16:54), Max: 97.4 (18 Jan 2020 15:24)  HR: 78 (19 Jan 2020 06:14) (72 - 82)  BP: 118/71 (19 Jan 2020 06:14) (101/59 - 133/68)  SpO2: 98% (19 Jan 2020 06:14) (98% - 99%)  MEDICATIONS  (STANDING):  acyclovir IVPB 1000 milliGRAM(s) IV Intermittent every 12 hours  allopurinol 300 milliGRAM(s) Oral daily  benztropine 2 milliGRAM(s) Oral at bedtime  fluPHENAZine 10 milliGRAM(s) Oral daily  heparin  Injectable 5000 Unit(s) SubCutaneous every 12 hours  sodium chloride 0.9%. 1000 milliLiter(s) (100 mL/Hr) IV Continuous <Continuous>  tamsulosin 0.4 milliGRAM(s) Oral at bedtime    MEDICATIONS  (PRN):  ondansetron Injectable 4 milliGRAM(s) IV Push every 6 hours PRN Nausea    Labs:                        9.5    5.18  )-----------( 116      ( 19 Jan 2020 06:44 )             28.3                         9.6    4.14  )-----------( 92       ( 18 Jan 2020 05:34 )             27.6     19 Jan 2020 06:44    141    |  117    |  23     ----------------------------<  94     3.9     |  13     |  1.7    18 Jan 2020 05:34    136    |  112    |  33     ----------------------------<  108    3.3     |  14     |  2.0      Ca    8.0        19 Jan 2020 06:44  Ca    7.9        18 Jan 2020 05:34  Mg     2.0       18 Jan 2020 05:34    TPro  4.4    /  Alb  3.1    /  TBili  0.3    /  DBili  x      /  AST  48     /  ALT  28     /  AlkPhos  71     19 Jan 2020 06:44  TPro  4.1    /  Alb  2.8    /  TBili  0.3    /  DBili  x      /  AST  61     /  ALT  28     /  AlkPhos  64     18 Jan 2020 05:34          GI PCR Panel, Stool (collected 16 Jan 2020 12:58)  Source: .Stool Feces  Final Report (17 Jan 2020 03:55):    GI PCR Results: NOT detected    *******Please Note:*******    GI panel PCR evaluates for:    Campylobacter, Plesiomonas shigelloides, Salmonella,    Vibrio, Yersinia enterocolitica, Enteroaggregative    Escherichia coli (EAEC), Enteropathogenic E.coli (EPEC),    Enterotoxigenic E. coli (ETEC) lt/st, Shiga-like    toxin-producing E. coli (STEC) stx1/stx2,    Shigella/ Enteroinvasive E. coli (EIEC), Cryptosporidium,    Cyclospora cayetanensis, Entamoeba histolytica,    Giardia lamblia, Adenovirus F 40/41, Astrovirus,    Norovirus GI/GII, Rotavirus A, Sapovirus    GI PCR Panel, Stool (collected 16 Jan 2020 11:59)  Source: .Stool Feces  Final Report (17 Jan 2020 02:47):    GI PCR Results: NOT detected    *******Please Note:*******    GI panel PCR evaluates for:    Campylobacter, Plesiomonas shigelloides, Salmonella,    Vibrio, Yersinia enterocolitica, Enteroaggregative    Escherichia coli (EAEC), Enteropathogenic E.coli (EPEC),    Enterotoxigenic E. coli (ETEC) lt/st, Shiga-like    toxin-producing E. coli (STEC) stx1/stx2,    Shigella/ Enteroinvasive E. 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

## 2020-01-19 NOTE — PROGRESS NOTE ADULT - ASSESSMENT
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     # Continuous diarrhea causing hypovolemia and hypotension - now patient's BP is wnl:  - c/w IVF at 100 cc/hr  - Some case reports showed zoster induced colitis. NPO after MN for flex sig tomorrow with GI  - give zofran PRN  - Diarrhea improved    # Shingles - disseminated per ID:  - C/w acyclovir at 10 mg /kg per dose every 12 hours IV ( 1000 mg q12h IV) renally dosed  - C diff and GI PCR negative   - Off antibiotics    # ASHLEY on CKD - improving:  - Creatinine is 1.7 today, trending down. 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

## 2020-01-19 NOTE — PROGRESS NOTE ADULT - SUBJECTIVE AND OBJECTIVE BOX
Gastroenterology progress note:     Patient is a 64y old  Male who presents with a chief complaint of nausea, vomiting and diarrhea (19 Jan 2020 10:07)       Admitted on: 01-14-20    We are following the patient for: diarrhea      Interval History: Patient is still complaining of watery , non bloody , non mucoid diarrhea , no vomiting , patient denies abdominal pain , fever or chills.     Patient's medical problems are not improving    Prior records reviewed (Y/N): Y   History obtained from someone other than patient (Y/N): N      PAST MEDICAL & SURGICAL HISTORY:  Atrophic kidney  Shingles  Lymphoma  Schizophrenia  Kidney stones  Retained urethral stent      MEDICATIONS  (STANDING):  acyclovir IVPB 1000 milliGRAM(s) IV Intermittent every 12 hours  allopurinol 300 milliGRAM(s) Oral daily  benztropine 2 milliGRAM(s) Oral at bedtime  fluPHENAZine 10 milliGRAM(s) Oral daily  heparin  Injectable 5000 Unit(s) SubCutaneous every 12 hours  sodium chloride 0.9%. 1000 milliLiter(s) (100 mL/Hr) IV Continuous <Continuous>  tamsulosin 0.4 milliGRAM(s) Oral at bedtime    MEDICATIONS  (PRN):  ondansetron Injectable 4 milliGRAM(s) IV Push every 6 hours PRN Nausea      Allergies  No Known Allergies      Review of Systems:   Cardiovascular:  No Chest Pain, No Palpitations  Respiratory:  No Cough, No Dyspnea  Gastrointestinal:  As described in HPI    Physical Examination:  T(C): 36.4 (01-19-20 @ 09:37), Max: 36.4 (01-19-20 @ 09:37)  HR: 80 (01-19-20 @ 09:37) (72 - 82)  BP: 117/68 (01-19-20 @ 09:37) (101/59 - 133/68)  RR: 18 (01-19-20 @ 09:37) (17 - 18)  SpO2: 98% (01-19-20 @ 09:37) (98% - 99%)      Constitutional: No acute distress.  Respiratory:  No signs of respiratory distress. Lung sounds are clear bilaterally.  Cardiovascular:  S1 S2, Regular rate and rhythm.  Abdominal: Abdomen is soft, symmetric, and non-tender without distention. Bowel sounds are present and normoactive in all four quadrants. No masses, hepatomegaly, or splenomegaly are noted.   Skin: No rashes, No Jaundice.        Data: (reviewed by attending)                        9.5    5.18  )-----------( 116      ( 19 Jan 2020 06:44 )             28.3     Hgb trend:  9.5  01-19-20 @ 06:44  9.6  01-18-20 @ 05:34  9.7  01-17-20 @ 16:00  9.5  01-17-20 @ 06:35      01-19    141  |  117<H>  |  23<H>  ----------------------------<  94  3.9   |  13<L>  |  1.7<H>    Ca    8.0<L>      19 Jan 2020 06:44  Mg     2.0     01-18    TPro  4.4<L>  /  Alb  3.1<L>  /  TBili  0.3  /  DBili  x   /  AST  48<H>  /  ALT  28  /  AlkPhos  71  01-19    Liver panel trend:  TBili 0.3   /   AST 48   /   ALT 28   /   AlkP 71   /   Tptn 4.4   /   Alb 3.1    /   DBili --      01-19  TBili 0.3   /   AST 61   /   ALT 28   /   AlkP 64   /   Tptn 4.1   /   Alb 2.8    /   DBili --      01-18  TBili 0.5   /   AST 15   /   ALT 11   /   AlkP 97   /   Tptn 5.3   /   Alb 3.8    /   DBili --      01-15  TBili 0.6   /   AST 13   /   ALT 11   /   AlkP 110   /   Tptn 5.8   /   Alb 4.1    /   DBili --      01-14          GI PCR Panel, Stool (collected 16 Jan 2020 12:58)  Source: .Stool Feces  Final Report (17 Jan 2020 03:55):    GI PCR Results: NOT detected    *******Please Note:*******    GI panel PCR evaluates for:    Campylobacter, Plesiomonas shigelloides, Salmonella,    Vibrio, Yersinia enterocolitica, Enteroaggregative    Escherichia coli (EAEC), Enteropathogenic E.coli (EPEC),    Enterotoxigenic E. coli (ETEC) lt/st, Shiga-like    toxin-producing E. coli (STEC) stx1/stx2,    Shigella/ Enteroinvasive E. coli (EIEC), Cryptosporidium,    Cyclospora cayetanensis, Entamoeba histolytica,    Giardia lamblia, Adenovirus F 40/41, Astrovirus,    Norovirus GI/GII, Rotavirus A, Sapovirus    GI PCR Panel, Stool (collected 16 Jan 2020 11:59)  Source: .Stool Feces  Final Report (17 Jan 2020 02:47):    GI PCR Results: NOT detected    *******Please Note:*******    GI panel PCR evaluates for:    Campylobacter, Plesiomonas shigelloides, Salmonella,    Vibrio, Yersinia enterocolitica, Enteroaggregative    Escherichia coli (EAEC), Enteropathogenic E.coli (EPEC),    Enterotoxigenic E. coli (ETEC) lt/st, Shiga-like    toxin-producing E. coli (STEC) stx1/stx2,    Shigella/ Enteroinvasive E. coli (EIEC), Cryptosporidium,    Cyclospora cayetanensis, Entamoeba histolytica,    Giardia lamblia, Adenovirus F 40/41, Astrovirus,    Norovirus GI/GII, Rotavirus A, Sapovirus         Radiology: (reviewed by attending) Gastroenterology progress note:     Patient is a 64y old  Male who presents with a chief complaint of nausea, vomiting and diarrhea (19 Jan 2020 10:07)       Admitted on: 01-14-20    We are following the patient for: diarrhea      Interval History: Patient is still complaining of watery , non bloody , non mucoid diarrhea , no vomiting , patient denies abdominal pain , fever or chills , tolerating puree diet    Patient's medical problems are not improving    Prior records reviewed (Y/N): Y   History obtained from someone other than patient (Y/N): N      PAST MEDICAL & SURGICAL HISTORY:  Atrophic kidney  Shingles  Lymphoma  Schizophrenia  Kidney stones  Retained urethral stent      MEDICATIONS  (STANDING):  acyclovir IVPB 1000 milliGRAM(s) IV Intermittent every 12 hours  allopurinol 300 milliGRAM(s) Oral daily  benztropine 2 milliGRAM(s) Oral at bedtime  fluPHENAZine 10 milliGRAM(s) Oral daily  heparin  Injectable 5000 Unit(s) SubCutaneous every 12 hours  sodium chloride 0.9%. 1000 milliLiter(s) (100 mL/Hr) IV Continuous <Continuous>  tamsulosin 0.4 milliGRAM(s) Oral at bedtime    MEDICATIONS  (PRN):  ondansetron Injectable 4 milliGRAM(s) IV Push every 6 hours PRN Nausea      Allergies  No Known Allergies      Review of Systems:   Cardiovascular:  No Chest Pain, No Palpitations  Respiratory:  No Cough, No Dyspnea  Gastrointestinal:  As described in HPI    Physical Examination:  T(C): 36.4 (01-19-20 @ 09:37), Max: 36.4 (01-19-20 @ 09:37)  HR: 80 (01-19-20 @ 09:37) (72 - 82)  BP: 117/68 (01-19-20 @ 09:37) (101/59 - 133/68)  RR: 18 (01-19-20 @ 09:37) (17 - 18)  SpO2: 98% (01-19-20 @ 09:37) (98% - 99%)      Constitutional: No acute distress.  Respiratory:  No signs of respiratory distress. Lung sounds are clear bilaterally.  Cardiovascular:  S1 S2, Regular rate and rhythm.  Abdominal: Abdomen is soft, symmetric, and non-tender without distention. Bowel sounds are present and normoactive in all four quadrants. No masses, hepatomegaly, or splenomegaly are noted.   Skin: No rashes, No Jaundice.        Data: (reviewed by attending)                        9.5    5.18  )-----------( 116      ( 19 Jan 2020 06:44 )             28.3     Hgb trend:  9.5  01-19-20 @ 06:44  9.6  01-18-20 @ 05:34  9.7  01-17-20 @ 16:00  9.5  01-17-20 @ 06:35      01-19    141  |  117<H>  |  23<H>  ----------------------------<  94  3.9   |  13<L>  |  1.7<H>    Ca    8.0<L>      19 Jan 2020 06:44  Mg     2.0     01-18    TPro  4.4<L>  /  Alb  3.1<L>  /  TBili  0.3  /  DBili  x   /  AST  48<H>  /  ALT  28  /  AlkPhos  71  01-19    Liver panel trend:  TBili 0.3   /   AST 48   /   ALT 28   /   AlkP 71   /   Tptn 4.4   /   Alb 3.1    /   DBili --      01-19  TBili 0.3   /   AST 61   /   ALT 28   /   AlkP 64   /   Tptn 4.1   /   Alb 2.8    /   DBili --      01-18  TBili 0.5   /   AST 15   /   ALT 11   /   AlkP 97   /   Tptn 5.3   /   Alb 3.8    /   DBili --      01-15  TBili 0.6   /   AST 13   /   ALT 11   /   AlkP 110   /   Tptn 5.8   /   Alb 4.1    /   DBili --      01-14          GI PCR Panel, Stool (collected 16 Jan 2020 12:58)  Source: .Stool Feces  Final Report (17 Jan 2020 03:55):    GI PCR Results: NOT detected    *******Please Note:*******    GI panel PCR evaluates for:    Campylobacter, Plesiomonas shigelloides, Salmonella,    Vibrio, Yersinia enterocolitica, Enteroaggregative    Escherichia coli (EAEC), Enteropathogenic E.coli (EPEC),    Enterotoxigenic E. coli (ETEC) lt/st, Shiga-like    toxin-producing E. coli (STEC) stx1/stx2,    Shigella/ Enteroinvasive E. coli (EIEC), Cryptosporidium,    Cyclospora cayetanensis, Entamoeba histolytica,    Giardia lamblia, Adenovirus F 40/41, Astrovirus,    Norovirus GI/GII, Rotavirus A, Sapovirus    GI PCR Panel, Stool (collected 16 Jan 2020 11:59)  Source: .Stool Feces  Final Report (17 Jan 2020 02:47):    GI PCR Results: NOT detected    *******Please Note:*******    GI panel PCR evaluates for:    Campylobacter, Plesiomonas shigelloides, Salmonella,    Vibrio, Yersinia enterocolitica, Enteroaggregative    Escherichia coli (EAEC), Enteropathogenic E.coli (EPEC),    Enterotoxigenic E. coli (ETEC) lt/st, Shiga-like    toxin-producing E. coli (STEC) stx1/stx2,    Shigella/ Enteroinvasive E. coli (EIEC), Cryptosporidium,    Cyclospora cayetanensis, Entamoeba histolytica,    Giardia lamblia, Adenovirus F 40/41, Astrovirus,    Norovirus GI/GII, Rotavirus A, Sapovirus         Radiology: (reviewed by attending) Gastroenterology progress note:     Patient is a 64y old  Male who presents with a chief complaint of nausea, vomiting and diarrhea (19 Jan 2020 10:07)       Admitted on: 01-14-20    We are following the patient for: diarrhea      Interval History: Patient is still complaining of watery , non bloody , non mucoid diarrhea , no vomiting , patient denies abdominal pain , fever or chills , tolerating puree diet    Patient's medical problems are not improving    Prior records reviewed (Y/N): Y   History obtained from someone other than patient (Y/N): N      PAST MEDICAL & SURGICAL HISTORY:  Atrophic kidney  Shingles  Lymphoma  Schizophrenia  Kidney stones  Retained urethral stent      MEDICATIONS  (STANDING):  acyclovir IVPB 1000 milliGRAM(s) IV Intermittent every 12 hours  allopurinol 300 milliGRAM(s) Oral daily  benztropine 2 milliGRAM(s) Oral at bedtime  fluPHENAZine 10 milliGRAM(s) Oral daily  heparin  Injectable 5000 Unit(s) SubCutaneous every 12 hours  sodium chloride 0.9%. 1000 milliLiter(s) (100 mL/Hr) IV Continuous <Continuous>  tamsulosin 0.4 milliGRAM(s) Oral at bedtime    MEDICATIONS  (PRN):  ondansetron Injectable 4 milliGRAM(s) IV Push every 6 hours PRN Nausea      Allergies  No Known Allergies      Review of Systems:   Cardiovascular:  No Chest Pain, No Palpitations  Respiratory:  No Cough, No Dyspnea  Gastrointestinal:  As described in HPI    Physical Examination:  T(C): 36.4 (01-19-20 @ 09:37), Max: 36.4 (01-19-20 @ 09:37)  HR: 80 (01-19-20 @ 09:37) (72 - 82)  BP: 117/68 (01-19-20 @ 09:37) (101/59 - 133/68)  RR: 18 (01-19-20 @ 09:37) (17 - 18)  SpO2: 98% (01-19-20 @ 09:37) (98% - 99%)      Constitutional: No acute distress.  Respiratory:  No signs of respiratory distress. Lung sounds are clear bilaterally.  Cardiovascular:  S1 S2, Regular rate and rhythm.  Abdominal: Abdomen is soft, symmetric, and non-tender without distention. Bowel sounds are present and normoactive in all four quadrants. No masses, hepatomegaly, or splenomegaly are noted.   Skin: No rashes, No Jaundice.        Data: (reviewed by attending)                        9.5    5.18  )-----------( 116      ( 19 Jan 2020 06:44 )             28.3     Hgb trend:  9.5  01-19-20 @ 06:44  9.6  01-18-20 @ 05:34  9.7  01-17-20 @ 16:00  9.5  01-17-20 @ 06:35      01-19    141  |  117<H>  |  23<H>  ----------------------------<  94  3.9   |  13<L>  |  1.7<H>    Ca    8.0<L>      19 Jan 2020 06:44  Mg     2.0     01-18    TPro  4.4<L>  /  Alb  3.1<L>  /  TBili  0.3  /  DBili  x   /  AST  48<H>  /  ALT  28  /  AlkPhos  71  01-19    Liver panel trend:  TBili 0.3   /   AST 48   /   ALT 28   /   AlkP 71   /   Tptn 4.4   /   Alb 3.1    /   DBili --      01-19  TBili 0.3   /   AST 61   /   ALT 28   /   AlkP 64   /   Tptn 4.1   /   Alb 2.8    /   DBili --      01-18  TBili 0.5   /   AST 15   /   ALT 11   /   AlkP 97   /   Tptn 5.3   /   Alb 3.8    /   DBili --      01-15  TBili 0.6   /   AST 13   /   ALT 11   /   AlkP 110   /   Tptn 5.8   /   Alb 4.1    /   DBili --      01-14          GI PCR Panel, Stool (collected 16 Jan 2020 12:58)  Source: .Stool Feces  Final Report (17 Jan 2020 03:55):    GI PCR Results: NOT detected    *******Please Note:*******    GI panel PCR evaluates for:    Campylobacter, Plesiomonas shigelloides, Salmonella,    Vibrio, Yersinia enterocolitica, Enteroaggregative    Escherichia coli (EAEC), Enteropathogenic E.coli (EPEC),    Enterotoxigenic E. coli (ETEC) lt/st, Shiga-like    toxin-producing E. coli (STEC) stx1/stx2,    Shigella/ Enteroinvasive E. coli (EIEC), Cryptosporidium,    Cyclospora cayetanensis, Entamoeba histolytica,    Giardia lamblia, Adenovirus F 40/41, Astrovirus,    Norovirus GI/GII, Rotavirus A, Sapovirus    GI PCR Panel, Stool (collected 16 Jan 2020 11:59)  Source: .Stool Feces  Final Report (17 Jan 2020 02:47):    GI PCR Results: NOT detected    *******Please Note:*******    GI panel PCR evaluates for:    Campylobacter, Plesiomonas shigelloides, Salmonella,    Vibrio, Yersinia enterocolitica, Enteroaggregative    Escherichia coli (EAEC), Enteropathogenic E.coli (EPEC),    Enterotoxigenic E. coli (ETEC) lt/st, Shiga-like    toxin-producing E. coli (STEC) stx1/stx2,    Shigella/ Enteroinvasive E. coli (EIEC), Cryptosporidium,    Cyclospora cayetanensis, Entamoeba histolytica,    Giardia lamblia, Adenovirus F 40/41, Astrovirus,    Norovirus GI/GII, Rotavirus A, Sapovirus

## 2020-01-19 NOTE — PROGRESS NOTE ADULT - SUBJECTIVE AND OBJECTIVE BOX
SUBJECTIVE:    Patient is a 64y old Male who presents with a chief complaint of nausea, vomiting and diarrhea (19 Jan 2020 09:28)    Currently admitted to medicine with the primary diagnosis of Vomiting and diarrhea     Today is hospital day 5d. This morning he is resting comfortably in bed and reports no new issues or overnight events.     PAST MEDICAL & SURGICAL HISTORY  Atrophic kidney  Shingles  Lymphoma  Schizophrenia  Kidney stones  Retained urethral stent    SOCIAL HISTORY:  Negative for smoking/alcohol/drug use.     ALLERGIES:  No Known Allergies    MEDICATIONS:  STANDING MEDICATIONS  acyclovir IVPB 1000 milliGRAM(s) IV Intermittent every 12 hours  allopurinol 300 milliGRAM(s) Oral daily  benztropine 2 milliGRAM(s) Oral at bedtime  fluPHENAZine 10 milliGRAM(s) Oral daily  heparin  Injectable 5000 Unit(s) SubCutaneous every 12 hours  sodium chloride 0.9%. 1000 milliLiter(s) IV Continuous <Continuous>  tamsulosin 0.4 milliGRAM(s) Oral at bedtime    PRN MEDICATIONS  ondansetron Injectable 4 milliGRAM(s) IV Push every 6 hours PRN    VITALS:   T(F): 97.5  HR: 80  BP: 117/68  RR: 18  SpO2: 98%    LABS:                        9.5    5.18  )-----------( 116      ( 19 Jan 2020 06:44 )             28.3     01-19    141  |  117<H>  |  23<H>  ----------------------------<  94  3.9   |  13<L>  |  1.7<H>    Ca    8.0<L>      19 Jan 2020 06:44  Mg     2.0     01-18    TPro  4.4<L>  /  Alb  3.1<L>  /  TBili  0.3  /  DBili  x   /  AST  48<H>  /  ALT  28  /  AlkPhos  71  01-19              GI PCR Panel, Stool (collected 16 Jan 2020 12:58)  Source: .Stool Feces  Final Report (17 Jan 2020 03:55):    GI PCR Results: NOT detected    *******Please Note:*******    GI panel PCR evaluates for:    Campylobacter, Plesiomonas shigelloides, Salmonella,    Vibrio, Yersinia enterocolitica, Enteroaggregative    Escherichia coli (EAEC), Enteropathogenic E.coli (EPEC),    Enterotoxigenic E. coli (ETEC) lt/st, Shiga-like    toxin-producing E. coli (STEC) stx1/stx2,    Shigella/ Enteroinvasive E. coli (EIEC), Cryptosporidium,    Cyclospora cayetanensis, Entamoeba histolytica,    Giardia lamblia, Adenovirus F 40/41, Astrovirus,    Norovirus GI/GII, Rotavirus A, Sapovirus    GI PCR Panel, Stool (collected 16 Jan 2020 11:59)  Source: .Stool Feces  Final Report (17 Jan 2020 02:47):    GI PCR Results: NOT detected    *******Please Note:*******    GI panel PCR evaluates for:    Campylobacter, Plesiomonas shigelloides, Salmonella,    Vibrio, Yersinia enterocolitica, Enteroaggregative    Escherichia coli (EAEC), Enteropathogenic E.coli (EPEC),    Enterotoxigenic E. coli (ETEC) lt/st, Shiga-like    toxin-producing E. coli (STEC) stx1/stx2,    Shigella/ Enteroinvasive E. coli (EIEC), Cryptosporidium,    Cyclospora cayetanensis, Entamoeba histolytica,    Giardia lamblia, Adenovirus F 40/41, Astrovirus,    Norovirus GI/GII, Rotavirus A, Sapovirus    PHYSICAL EXAM:  GEN: No acute distress, NC/AT, anicteric  LUNGS: Clear to auscultation bilaterally, no wheezes  HEART: S1/S2 present. RRR.   ABD: Soft, non-tender, non-distended. Bowel sounds present  SKIN: Vesicles on chest and groin  NEURO: AAOX3, moves all extremities, no focal deficits    Intravenous access: Peripheral access  NG tube: None  Park Catheter: None

## 2020-01-20 ENCOUNTER — TRANSCRIPTION ENCOUNTER (OUTPATIENT)
Age: 65
End: 2020-01-20

## 2020-01-20 ENCOUNTER — RESULT REVIEW (OUTPATIENT)
Age: 65
End: 2020-01-20

## 2020-01-20 ENCOUNTER — APPOINTMENT (OUTPATIENT)
Dept: HEMATOLOGY ONCOLOGY | Facility: CLINIC | Age: 65
End: 2020-01-20

## 2020-01-20 LAB
ALBUMIN SERPL ELPH-MCNC: 3.5 G/DL — SIGNIFICANT CHANGE UP (ref 3.5–5.2)
ALP SERPL-CCNC: 88 U/L — SIGNIFICANT CHANGE UP (ref 30–115)
ALT FLD-CCNC: 29 U/L — SIGNIFICANT CHANGE UP (ref 0–41)
ANION GAP SERPL CALC-SCNC: 12 MMOL/L — SIGNIFICANT CHANGE UP (ref 7–14)
AST SERPL-CCNC: 34 U/L — SIGNIFICANT CHANGE UP (ref 0–41)
BASOPHILS # BLD AUTO: 0.02 K/UL — SIGNIFICANT CHANGE UP (ref 0–0.2)
BASOPHILS NFR BLD AUTO: 0.4 % — SIGNIFICANT CHANGE UP (ref 0–1)
BILIRUB SERPL-MCNC: 0.3 MG/DL — SIGNIFICANT CHANGE UP (ref 0.2–1.2)
BUN SERPL-MCNC: 15 MG/DL — SIGNIFICANT CHANGE UP (ref 10–20)
CALCIUM SERPL-MCNC: 8.5 MG/DL — SIGNIFICANT CHANGE UP (ref 8.5–10.1)
CHLORIDE SERPL-SCNC: 112 MMOL/L — HIGH (ref 98–110)
CO2 SERPL-SCNC: 16 MMOL/L — LOW (ref 17–32)
CREAT SERPL-MCNC: 1.6 MG/DL — HIGH (ref 0.7–1.5)
EOSINOPHIL # BLD AUTO: 0.14 K/UL — SIGNIFICANT CHANGE UP (ref 0–0.7)
EOSINOPHIL NFR BLD AUTO: 2.5 % — SIGNIFICANT CHANGE UP (ref 0–8)
GLUCOSE SERPL-MCNC: 113 MG/DL — HIGH (ref 70–99)
HCT VFR BLD CALC: 31.8 % — LOW (ref 42–52)
HGB BLD-MCNC: 10.6 G/DL — LOW (ref 14–18)
IMM GRANULOCYTES NFR BLD AUTO: 0.5 % — HIGH (ref 0.1–0.3)
LYMPHOCYTES # BLD AUTO: 0.27 K/UL — LOW (ref 1.2–3.4)
LYMPHOCYTES # BLD AUTO: 4.9 % — LOW (ref 20.5–51.1)
MCHC RBC-ENTMCNC: 30.7 PG — SIGNIFICANT CHANGE UP (ref 27–31)
MCHC RBC-ENTMCNC: 33.3 G/DL — SIGNIFICANT CHANGE UP (ref 32–37)
MCV RBC AUTO: 92.2 FL — SIGNIFICANT CHANGE UP (ref 80–94)
MONOCYTES # BLD AUTO: 0.51 K/UL — SIGNIFICANT CHANGE UP (ref 0.1–0.6)
MONOCYTES NFR BLD AUTO: 9.2 % — SIGNIFICANT CHANGE UP (ref 1.7–9.3)
NEUTROPHILS # BLD AUTO: 4.57 K/UL — SIGNIFICANT CHANGE UP (ref 1.4–6.5)
NEUTROPHILS NFR BLD AUTO: 82.5 % — HIGH (ref 42.2–75.2)
NRBC # BLD: 0 /100 WBCS — SIGNIFICANT CHANGE UP (ref 0–0)
PLATELET # BLD AUTO: 154 K/UL — SIGNIFICANT CHANGE UP (ref 130–400)
POTASSIUM SERPL-MCNC: 4.3 MMOL/L — SIGNIFICANT CHANGE UP (ref 3.5–5)
POTASSIUM SERPL-SCNC: 4.3 MMOL/L — SIGNIFICANT CHANGE UP (ref 3.5–5)
PROT SERPL-MCNC: 5 G/DL — LOW (ref 6–8)
RBC # BLD: 3.45 M/UL — LOW (ref 4.7–6.1)
RBC # FLD: 16.4 % — HIGH (ref 11.5–14.5)
SODIUM SERPL-SCNC: 140 MMOL/L — SIGNIFICANT CHANGE UP (ref 135–146)
WBC # BLD: 5.54 K/UL — SIGNIFICANT CHANGE UP (ref 4.8–10.8)
WBC # FLD AUTO: 5.54 K/UL — SIGNIFICANT CHANGE UP (ref 4.8–10.8)

## 2020-01-20 PROCEDURE — 88305 TISSUE EXAM BY PATHOLOGIST: CPT | Mod: 26

## 2020-01-20 PROCEDURE — 45331 SIGMOIDOSCOPY AND BIOPSY: CPT

## 2020-01-20 RX ADMIN — Medication 270 MILLIGRAM(S): at 17:46

## 2020-01-20 RX ADMIN — Medication 300 MILLIGRAM(S): at 14:09

## 2020-01-20 RX ADMIN — Medication 270 MILLIGRAM(S): at 05:38

## 2020-01-20 RX ADMIN — Medication 2 MILLIGRAM(S): at 20:59

## 2020-01-20 RX ADMIN — SODIUM CHLORIDE 100 MILLILITER(S): 9 INJECTION INTRAMUSCULAR; INTRAVENOUS; SUBCUTANEOUS at 17:47

## 2020-01-20 RX ADMIN — FLUPHENAZINE HYDROCHLORIDE 10 MILLIGRAM(S): 1 TABLET, FILM COATED ORAL at 14:09

## 2020-01-20 RX ADMIN — TAMSULOSIN HYDROCHLORIDE 0.4 MILLIGRAM(S): 0.4 CAPSULE ORAL at 20:59

## 2020-01-20 RX ADMIN — SODIUM CHLORIDE 100 MILLILITER(S): 9 INJECTION INTRAMUSCULAR; INTRAVENOUS; SUBCUTANEOUS at 04:37

## 2020-01-20 NOTE — PROGRESS NOTE ADULT - SUBJECTIVE AND OBJECTIVE BOX
ELDA COVARRUBIAS  64y, Male  Allergy: No Known Allergies      CHIEF COMPLAINT: nausea, vomiting and diarrhea (20 Jan 2020 10:31)      INTERVAL EVENTS/HPI  - No acute events overnight, still diarrhea  - T(F): , Max: 98.6 (01-20-20 @ 11:03)  - Denies any worsening symptoms  - Tolerating medication  - WBC Count: 5.18 (01-19-20 @ 06:44)  WBC Count: 4.14 (01-18-20 @ 05:34)  - Creatinine, Serum: 1.7 (01-19-20 @ 06:44)       ROS  General: Denies rigors, nightsweats  HEENT: Denies headache, rhinorrhea, sore throat, eye pain  CV: Denies CP, palpitations  PULM: Denies wheezing, hemoptysis  GI: Denies hematemesis, hematochezia, melena  : Denies discharge, hematuria  MSK: Denies arthralgias, myalgias  SKIN: Denies rash, lesions  NEURO: Denies paresthesias, weakness  PSYCH: Denies depression, anxiety    VITALS:  T(F): 98.6, Max: 98.6 (01-20-20 @ 11:03)  HR: 70  BP: 123/68  RR: 18Vital Signs Last 24 Hrs  T(C): 37 (20 Jan 2020 11:14), Max: 37 (20 Jan 2020 11:03)  T(F): 98.6 (20 Jan 2020 11:03), Max: 98.6 (20 Jan 2020 11:03)  HR: 70 (20 Jan 2020 11:14) (70 - 83)  BP: 123/68 (20 Jan 2020 11:14) (111/64 - 133/70)  BP(mean): --  RR: 18 (20 Jan 2020 11:14) (17 - 20)  SpO2: 100% (20 Jan 2020 11:14) (100% - 100%)    PHYSICAL EXAM:  Gen: NAD, resting in bed  HEENT: Normocephalic, atraumatic  Neck: supple, no lymphadenopathy  CV: Regular rate & regular rhythm  Lungs: decreased BS at bases, no fremitus  Abdomen: Soft, BS present  Ext: Warm, well perfused  Neuro: non focal, awake  Skin: Crusted vesicular lesions T5-6 however still non-crusted lesions, also vesicular lesions noted L1, S3 (in groin area and on tip of penis), crusted lesions LE and UE as well  Lines: no phlebitis    FH: Non-contributory  Social Hx: Non-contributory    TESTS & MEASUREMENTS:                        9.5    5.18  )-----------( 116      ( 19 Jan 2020 06:44 )             28.3     01-19    141  |  117<H>  |  23<H>  ----------------------------<  94  3.9   |  13<L>  |  1.7<H>    Ca    8.0<L>      19 Jan 2020 06:44    TPro  4.4<L>  /  Alb  3.1<L>  /  TBili  0.3  /  DBili  x   /  AST  48<H>  /  ALT  28  /  AlkPhos  71  01-19      LIVER FUNCTIONS - ( 19 Jan 2020 06:44 )  Alb: 3.1 g/dL / Pro: 4.4 g/dL / ALK PHOS: 71 U/L / ALT: 28 U/L / AST: 48 U/L / GGT: x               GI PCR Panel, Stool (collected 01-16-20 @ 12:58)  Source: .Stool Feces  Final Report (01-17-20 @ 03:55):    GI PCR Results: NOT detected    *******Please Note:*******    GI panel PCR evaluates for:    Campylobacter, Plesiomonas shigelloides, Salmonella,    Vibrio, Yersinia enterocolitica, Enteroaggregative    Escherichia coli (EAEC), Enteropathogenic E.coli (EPEC),    Enterotoxigenic E. coli (ETEC) lt/st, Shiga-like    toxin-producing E. coli (STEC) stx1/stx2,    Shigella/ Enteroinvasive E. coli (EIEC), Cryptosporidium,    Cyclospora cayetanensis, Entamoeba histolytica,    Giardia lamblia, Adenovirus F 40/41, Astrovirus,    Norovirus GI/GII, Rotavirus A, Sapovirus    GI PCR Panel, Stool (collected 01-16-20 @ 11:59)  Source: .Stool Feces  Final Report (01-17-20 @ 02:47):    GI PCR Results: NOT detected    *******Please Note:*******    GI panel PCR evaluates for:    Campylobacter, Plesiomonas shigelloides, Salmonella,    Vibrio, Yersinia enterocolitica, Enteroaggregative    Escherichia coli (EAEC), Enteropathogenic E.coli (EPEC),    Enterotoxigenic E. coli (ETEC) lt/st, Shiga-like    toxin-producing E. coli (STEC) stx1/stx2,    Shigella/ Enteroinvasive E. coli (EIEC), Cryptosporidium,    Cyclospora cayetanensis, Entamoeba histolytica,    Giardia lamblia, Adenovirus F 40/41, Astrovirus,    Norovirus GI/GII, Rotavirus A, Sapovirus    Culture - Blood (collected 01-15-20 @ 09:00)  Source: .Blood None  Gram Stain (01-16-20 @ 21:16):    Growth in aerobic bottle: Gram positive cocci in pairs  Final Report (01-17-20 @ 17:59):    Growth in aerobic bottle: Coag Negative Staphylococcus    Single set isolate, possible contaminant. Contact    Microbiology if susceptibility testing clinically    indicated.    ***Blood Panel PCR results on this specimen are available    approximately 3 hours after the Gram stain result.***    Gram stain, PCR, and/or culture results may not always    correspond due to difference in methodologies.    ************************************************************    This PCR assay was performed using [employer].    The following targets are tested for: Enterococcus,    vancomycin resistant enterococci, Listeria monocytogenes,    coagulase negative staphylococci, S. aureus,    methicillin resistant S. aureus, Streptococcus agalactiae    (Group B), S. pneumoniae, S.pyogenes (Group A),    Acinetobacter baumannii, Enterobacter cloacae, E. coli,    Klebsiella oxytoca, K. pneumoniae, Proteus sp.,    Serratia marcescens, Haemophilus influenzae,    Neisseria meningitidis, Pseudomonas aeruginosa, Candida    albicans, C. glabrata, C krusei, C parapsilosis,    C. tropicalis and the KPC resistance gene.  Organism: Blood Culture PCR (01-17-20 @ 17:59)  Organism: Blood Culture PCR (01-17-20 @ 17:59)      -  Coagulase negative Staphylococcus: Detec      Method Type: PCR            INFECTIOUS DISEASES TESTING  HIV-1/2 Combo Result: Nonreact (09-29-19 @ 17:24)      RADIOLOGY & ADDITIONAL TESTS:  I have personally reviewed the last Chest xray  CXR      CT      CARDIOLOGY TESTING      MEDICATIONS  acyclovir IVPB 1000  allopurinol 300  benztropine 2  fluPHENAZine 10  heparin  Injectable 5000  sodium chloride 0.9%. 1000  tamsulosin 0.4      ANTIBIOTICS:  acyclovir IVPB 1000 milliGRAM(s) IV Intermittent every 12 hours      All available historical records have been reviewed

## 2020-01-20 NOTE — PROGRESS NOTE ADULT - ASSESSMENT
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 crcl 45, if increases to >50 can increase to q8h  - Cannot r/o zoster-related colitis, rare case reports. Recommend  flex sig, GI following  - Contact/airborne as immunocompromised and disseminated    Spectra [id number]

## 2020-01-20 NOTE — PROGRESS NOTE ADULT - ASSESSMENT
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     # Continuous diarrhea causing hypovolemia and hypotension - now patient's BP is wnl:  - C.diff negative and GI PCR is negative   - likely chemotherapy induced diarrhea ( up to 40 % with Bendamustine) and 20 % with Rituxan  - c/w IVF at 100 cc/hr  - give zofran PRN  - Diarrhea improved    # Shingles - disseminated:  - Some case reports showed zoster induced colitis. Flex sig today with GI, was NPO and had 2 tap water enemas  - C/w acyclovir at 10 mg /kg per dose every 12 hours IV ( 1000 mg q12h IV) renally dosed for the whole hospital course and will change to PO when discharge for total 14 days of antivirals.  - C diff and GI PCR negative   - Please calculate CrCl today and if >50 change it to q8    # ASHLEY on CKD - improving:  - Creatinine was 1.7, trending down. 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

## 2020-01-20 NOTE — PRE-OP CHECKLIST - ISOLATION PRECAUTIONS
[address]  (office) [phone]  (fax) [phone]        NAME: Stanford Ventura is a 8 y o  female  : 2009    MRN: [id number]  DATE: 2019  TIME: 10:17 AM    Assessment and Plan   Strep pharyngitis [J02 0]  1  Strep pharyngitis  POCT rapid strepA    amoxicillin (AMOXIL) 400 MG/5ML suspension       Patient Instructions   Rapid strep positive  I have prescribed an antibiotic for the infection  Please take the antibiotic as prescribed and finish the entire prescription  I recommend that the patient takes an over the counter probiotic or eats yogurt with live cultures in it [place]) to keep good bacteria in the gut and help prevent diarrhea  Wash hands frequently to prevent the spread of infection  Can use over the counter cough and cold medications to help with symptoms  Ibuprofen and/or tylenol as needed for pain or fever  If not improving over the next 3-5 days, follow up with PCP  To present to the ER if symptoms worsen  Chief Complaint     Chief Complaint   Patient presents with    Sore Throat     x 2 days    Vomiting    Fever         History of Present Illness   Lorena Burris presents to the clinic with father c/o    Sore Throat   This is a new problem  The current episode started yesterday  The problem occurs constantly  The problem has been unchanged  Associated symptoms include a fever and a sore throat  Pertinent negatives include no abdominal pain, arthralgias, chest pain, chills, congestion, coughing, diaphoresis, fatigue, headaches, joint swelling, myalgias, nausea, neck pain, numbness, rash, vomiting or weakness  Nothing aggravates the symptoms  She has tried NSAIDs for the symptoms  The treatment provided mild relief  Fever   Associated symptoms include a fever and a sore throat   Pertinent negatives include no abdominal pain, arthralgias, chest pain, chills, congestion, coughing, diaphoresis, fatigue, headaches, joint swelling, myalgias, nausea, neck pain, numbness, rash, vomiting or weakness  Review of Systems   Review of Systems   Constitutional: Positive for fever  Negative for chills, diaphoresis, fatigue and irritability  HENT: Positive for sore throat  Negative for congestion, ear discharge, ear pain, facial swelling, hearing loss, nosebleeds, postnasal drip, rhinorrhea, sinus pressure, sinus pain and sneezing  Eyes: Negative for photophobia, pain, discharge, redness, itching and visual disturbance  Respiratory: Negative for apnea, cough, shortness of breath, wheezing and stridor  Cardiovascular: Negative for chest pain and palpitations  Gastrointestinal: Negative for abdominal distention, abdominal pain, anal bleeding, blood in stool, diarrhea, nausea and vomiting  Endocrine: Negative for cold intolerance and heat intolerance  Genitourinary: Negative for dysuria, flank pain, frequency, hematuria and urgency  Musculoskeletal: Negative for arthralgias, back pain, gait problem, joint swelling, myalgias, neck pain and neck stiffness  Skin: Negative for color change, pallor, rash and wound  Allergic/Immunologic: Negative for immunocompromised state  Neurological: Negative for dizziness, tremors, seizures, syncope, weakness, numbness and headaches  Hematological: Negative for adenopathy  Does not bruise/bleed easily  Psychiatric/Behavioral: Negative for agitation, confusion and decreased concentration  Current Medications     Long-Term Medications   Medication Sig Dispense Refill    amphetamine-dextroamphetamine (ADDERALL XR) 10 MG 24 hr capsule TAKE ONE CAPSULE BY MOUTH EVERY MORNING      OPEN CAPSULES AND SPRINKLE ON APPLESAUCE OR YOGURT   0       Current Allergies     Allergies as of 11/08/2019    (No Known Allergies)            The following portions of the patient's history were reviewed and updated as appropriate: allergies, current medications, past family history, past medical history, past social history, past surgical history and problem list   Past Medical History:   Diagnosis Date    ADHD (attention deficit hyperactivity disorder)      History reviewed  No pertinent surgical history  Social History     Socioeconomic History    Marital status: Single     Spouse name: Not on file    Number of children: Not on file    Years of education: Not on file    Highest education level: Not on file   Occupational History    Not on file   Social Needs    Financial resource strain: Not on file    Food insecurity:     Worry: Not on file     Inability: Not on file    Transportation needs:     Medical: Not on file     Non-medical: Not on file   Tobacco Use    Smoking status: Not on file   Substance and Sexual Activity    Alcohol use: Not on file    Drug use: Not on file    Sexual activity: Not on file   Lifestyle    Physical activity:     Days per week: Not on file     Minutes per session: Not on file    Stress: Not on file   Relationships    Social connections:     Talks on phone: Not on file     Gets together: Not on file     Attends [employer] service: Not on file     Active member of club or organization: Not on file     Attends meetings of clubs or organizations: Not on file     Relationship status: Not on file    Intimate partner violence:     Fear of current or ex partner: Not on file     Emotionally abused: Not on file     Physically abused: Not on file     Forced sexual activity: Not on file   Other Topics Concern    Not on file   Social History Narrative    Not on file       Objective   /66 (BP Location: Right arm, Patient Position: Sitting, Cuff Size: Child)   Pulse (!) 105   Temp 97 6 °F (36 4 °C) (Tympanic)   Resp 20   Ht 4' 3 5" (1 308 m)   Wt 27 2 kg (60 lb)   SpO2 99%   BMI 15 91 kg/m²      Physical Exam     Physical Exam   Constitutional: She appears well-developed and well-nourished  No distress  HENT:   Head: Atraumatic     Right Ear: Tympanic membrane and external ear normal    Left Ear: Tympanic membrane and external ear normal    Nose: No nasal discharge or congestion  Mouth/Throat: Mucous membranes are moist  Pharynx erythema present  No oropharyngeal exudate  No tonsillar exudate  Pharynx is normal    Eyes: Pupils are equal, round, and reactive to light  Conjunctivae are normal  Right eye exhibits no discharge  Left eye exhibits no discharge  Neck: Normal range of motion  Neck supple  Neck adenopathy present  No neck rigidity  Cardiovascular: Normal rate, regular rhythm, S1 normal and S2 normal  Pulses are palpable  No murmur heard  Pulmonary/Chest: Effort normal and breath sounds normal  There is normal air entry  No stridor  No respiratory distress  Air movement is not decreased  No transmitted upper airway sounds  She has no decreased breath sounds  She has no wheezes  She has no rhonchi  She has no rales  She exhibits no retraction  Abdominal: Soft  Bowel sounds are normal  She exhibits no distension and no mass  There is no hepatosplenomegaly  There is no tenderness  There is no rebound and no guarding  No hernia  Musculoskeletal: Normal range of motion  She exhibits no tenderness, deformity or signs of injury  Lymphadenopathy: Anterior cervical adenopathy present  She has cervical adenopathy  Neurological: She is alert  Coordination normal    Skin: Skin is warm  No purpura and no rash noted  She is not diaphoretic  No cyanosis  No jaundice  Nursing note and vitals reviewed        Grant Mckinney PA-C [employer]

## 2020-01-20 NOTE — PROGRESS NOTE ADULT - SUBJECTIVE AND OBJECTIVE BOX
LENGTH OF HOSPITAL STAY: 6d    CHIEF COMPLAINT:   Patient is a 64y old  Male who presents with a chief complaint of nausea, vomiting and diarrhea (20 Jan 2020 07:47)      Overnight events:    No acute events overnight    ALLERGIES:  No Known Allergies    MEDICATIONS:  STANDING MEDICATIONS  acyclovir IVPB 1000 milliGRAM(s) IV Intermittent every 12 hours  allopurinol 300 milliGRAM(s) Oral daily  benztropine 2 milliGRAM(s) Oral at bedtime  fluPHENAZine 10 milliGRAM(s) Oral daily  heparin  Injectable 5000 Unit(s) SubCutaneous every 12 hours  sodium chloride 0.9%. 1000 milliLiter(s) IV Continuous <Continuous>  tamsulosin 0.4 milliGRAM(s) Oral at bedtime    PRN MEDICATIONS  ondansetron Injectable 4 milliGRAM(s) IV Push every 6 hours PRN    VITALS:   T(F): 96  HR: 76  BP: 120/65  RR: 20  SpO2: 100%    LABS:                        9.5    5.18  )-----------( 116      ( 19 Jan 2020 06:44 )             28.3     01-19    141  |  117<H>  |  23<H>  ----------------------------<  94  3.9   |  13<L>  |  1.7<H>    Ca    8.0<L>      19 Jan 2020 06:44    TPro  4.4<L>  /  Alb  3.1<L>  /  TBili  0.3  /  DBili  x   /  AST  48<H>  /  ALT  28  /  AlkPhos  71  01-19                    Cultures:      RADIOLOGY:    PHYSICAL EXAM:  GEN: No acute distress, NC/AT, anicteric  LUNGS: Clear to auscultation bilaterally, no wheezes  HEART: S1/S2 present. RRR.   ABD: Soft, non-tender, non-distended. Bowel sounds present  SKIN: Vesicles on chest and groin  NEURO: AAOX3, moves all extremities, no focal deficits    Intravenous access: Peripheral access  NG tube: None  Park Catheter: None

## 2020-01-20 NOTE — PROGRESS NOTE ADULT - SUBJECTIVE AND OBJECTIVE BOX
Patient was seen and examined in CEU. Spoke with RN. Chart reviewed.  No events overnight. BM's improved as per pt  Vital Signs Last 24 Hrs  T(F): 96 (20 Jan 2020 05:55), Max: 98.1 (19 Jan 2020 15:47)  HR: 76 (20 Jan 2020 04:37) (76 - 83)  BP: 120/65 (20 Jan 2020 05:55) (111/64 - 133/70)  SpO2: 100% (20 Jan 2020 05:55) (98% - 100%)  MEDICATIONS  (STANDING):  acyclovir IVPB 1000 milliGRAM(s) IV Intermittent every 12 hours  allopurinol 300 milliGRAM(s) Oral daily  benztropine 2 milliGRAM(s) Oral at bedtime  fluPHENAZine 10 milliGRAM(s) Oral daily  heparin  Injectable 5000 Unit(s) SubCutaneous every 12 hours  sodium chloride 0.9%. 1000 milliLiter(s) (100 mL/Hr) IV Continuous <Continuous>  tamsulosin 0.4 milliGRAM(s) Oral at bedtime    MEDICATIONS  (PRN):  ondansetron Injectable 4 milliGRAM(s) IV Push every 6 hours PRN Nausea    Labs:                        9.5    5.18  )-----------( 116      ( 19 Jan 2020 06:44 )             28.3     19 Jan 2020 06:44    141    |  117    |  23     ----------------------------<  94     3.9     |  13     |  1.7      Ca    8.0        19 Jan 2020 06:44    TPro  4.4    /  Alb  3.1    /  TBili  0.3    /  DBili  x      /  AST  48     /  ALT  28     /  AlkPhos  71     19 Jan 2020 06:44          General: comfortable, NAD  Neurology: A&Ox3, nonfocal  Head:  Normocephalic, atraumatic  ENT:  Mucosa moist, no ulcerations  Neck:  Supple, no JVD,   Resp: CTA B/L  CV: RRR, S1S2,   GI: Soft, NT, bowel sounds        A/P:  64yM with a PMH of marginal zone lymphoma s/p 1 cycle chemotherapy w/ Bendamustine and rituxan 3 weeks ago presenting with 1.5 weeks n/v/d + rash consistent with herpes zoster  admitted with disseminated Zoster in an immunocompromised host    - Acyclovir as per ID  - GI for flex sig today - r/o zoster-related colitis  - f/u repeat BCX  - Contact/airborne precautions as immunocompromised and disseminated  - monitor Cr- stable   -monitor CBC; Hg has been stable for several days    ID f/u after flexsig    OOB, ambulate  PT/rehab eval    DVT prophylaxis  Decubitus prevention- all measures as per RN protocol  Please call or text me with any questions or updates

## 2020-01-21 LAB
ANION GAP SERPL CALC-SCNC: 9 MMOL/L — SIGNIFICANT CHANGE UP (ref 7–14)
ANION GAP SERPL CALC-SCNC: 9 MMOL/L — SIGNIFICANT CHANGE UP (ref 7–14)
BASOPHILS # BLD AUTO: 0.01 K/UL — SIGNIFICANT CHANGE UP (ref 0–0.2)
BASOPHILS # BLD AUTO: 0.02 K/UL — SIGNIFICANT CHANGE UP (ref 0–0.2)
BASOPHILS NFR BLD AUTO: 0.2 % — SIGNIFICANT CHANGE UP (ref 0–1)
BASOPHILS NFR BLD AUTO: 0.4 % — SIGNIFICANT CHANGE UP (ref 0–1)
BUN SERPL-MCNC: 14 MG/DL — SIGNIFICANT CHANGE UP (ref 10–20)
BUN SERPL-MCNC: 15 MG/DL — SIGNIFICANT CHANGE UP (ref 10–20)
CALCIUM SERPL-MCNC: 8.3 MG/DL — LOW (ref 8.5–10.1)
CALCIUM SERPL-MCNC: 8.4 MG/DL — LOW (ref 8.5–10.1)
CHLORIDE SERPL-SCNC: 112 MMOL/L — HIGH (ref 98–110)
CHLORIDE SERPL-SCNC: 112 MMOL/L — HIGH (ref 98–110)
CO2 SERPL-SCNC: 18 MMOL/L — SIGNIFICANT CHANGE UP (ref 17–32)
CO2 SERPL-SCNC: 18 MMOL/L — SIGNIFICANT CHANGE UP (ref 17–32)
CREAT SERPL-MCNC: 1.6 MG/DL — HIGH (ref 0.7–1.5)
CREAT SERPL-MCNC: 1.6 MG/DL — HIGH (ref 0.7–1.5)
EOSINOPHIL # BLD AUTO: 0.17 K/UL — SIGNIFICANT CHANGE UP (ref 0–0.7)
EOSINOPHIL # BLD AUTO: 0.23 K/UL — SIGNIFICANT CHANGE UP (ref 0–0.7)
EOSINOPHIL NFR BLD AUTO: 4 % — SIGNIFICANT CHANGE UP (ref 0–8)
EOSINOPHIL NFR BLD AUTO: 4.2 % — SIGNIFICANT CHANGE UP (ref 0–8)
GLUCOSE SERPL-MCNC: 103 MG/DL — HIGH (ref 70–99)
GLUCOSE SERPL-MCNC: 97 MG/DL — SIGNIFICANT CHANGE UP (ref 70–99)
HCT VFR BLD CALC: 27.6 % — LOW (ref 42–52)
HCT VFR BLD CALC: 28.9 % — LOW (ref 42–52)
HGB BLD-MCNC: 9.4 G/DL — LOW (ref 14–18)
HGB BLD-MCNC: 9.8 G/DL — LOW (ref 14–18)
IMM GRANULOCYTES NFR BLD AUTO: 0.4 % — HIGH (ref 0.1–0.3)
IMM GRANULOCYTES NFR BLD AUTO: 0.5 % — HIGH (ref 0.1–0.3)
LYMPHOCYTES # BLD AUTO: 0.2 K/UL — LOW (ref 1.2–3.4)
LYMPHOCYTES # BLD AUTO: 0.24 K/UL — LOW (ref 1.2–3.4)
LYMPHOCYTES # BLD AUTO: 4.4 % — LOW (ref 20.5–51.1)
LYMPHOCYTES # BLD AUTO: 4.7 % — LOW (ref 20.5–51.1)
MAGNESIUM SERPL-MCNC: 1.8 MG/DL — SIGNIFICANT CHANGE UP (ref 1.8–2.4)
MCHC RBC-ENTMCNC: 30.3 PG — SIGNIFICANT CHANGE UP (ref 27–31)
MCHC RBC-ENTMCNC: 30.8 PG — SIGNIFICANT CHANGE UP (ref 27–31)
MCHC RBC-ENTMCNC: 33.9 G/DL — SIGNIFICANT CHANGE UP (ref 32–37)
MCHC RBC-ENTMCNC: 34.1 G/DL — SIGNIFICANT CHANGE UP (ref 32–37)
MCV RBC AUTO: 89 FL — SIGNIFICANT CHANGE UP (ref 80–94)
MCV RBC AUTO: 90.9 FL — SIGNIFICANT CHANGE UP (ref 80–94)
MONOCYTES # BLD AUTO: 0.53 K/UL — SIGNIFICANT CHANGE UP (ref 0.1–0.6)
MONOCYTES # BLD AUTO: 0.59 K/UL — SIGNIFICANT CHANGE UP (ref 0.1–0.6)
MONOCYTES NFR BLD AUTO: 10.8 % — HIGH (ref 1.7–9.3)
MONOCYTES NFR BLD AUTO: 12.4 % — HIGH (ref 1.7–9.3)
NEUTROPHILS # BLD AUTO: 3.36 K/UL — SIGNIFICANT CHANGE UP (ref 1.4–6.5)
NEUTROPHILS # BLD AUTO: 4.35 K/UL — SIGNIFICANT CHANGE UP (ref 1.4–6.5)
NEUTROPHILS NFR BLD AUTO: 78.2 % — HIGH (ref 42.2–75.2)
NEUTROPHILS NFR BLD AUTO: 79.8 % — HIGH (ref 42.2–75.2)
NRBC # BLD: 0 /100 WBCS — SIGNIFICANT CHANGE UP (ref 0–0)
NRBC # BLD: 0 /100 WBCS — SIGNIFICANT CHANGE UP (ref 0–0)
PHOSPHATE SERPL-MCNC: 1.9 MG/DL — LOW (ref 2.1–4.9)
PLATELET # BLD AUTO: 150 K/UL — SIGNIFICANT CHANGE UP (ref 130–400)
PLATELET # BLD AUTO: 151 K/UL — SIGNIFICANT CHANGE UP (ref 130–400)
POTASSIUM SERPL-MCNC: 3.9 MMOL/L — SIGNIFICANT CHANGE UP (ref 3.5–5)
POTASSIUM SERPL-MCNC: 4.3 MMOL/L — SIGNIFICANT CHANGE UP (ref 3.5–5)
POTASSIUM SERPL-SCNC: 3.9 MMOL/L — SIGNIFICANT CHANGE UP (ref 3.5–5)
POTASSIUM SERPL-SCNC: 4.3 MMOL/L — SIGNIFICANT CHANGE UP (ref 3.5–5)
RBC # BLD: 3.1 M/UL — LOW (ref 4.7–6.1)
RBC # BLD: 3.18 M/UL — LOW (ref 4.7–6.1)
RBC # FLD: 15.9 % — HIGH (ref 11.5–14.5)
RBC # FLD: 16.2 % — HIGH (ref 11.5–14.5)
SODIUM SERPL-SCNC: 139 MMOL/L — SIGNIFICANT CHANGE UP (ref 135–146)
SODIUM SERPL-SCNC: 139 MMOL/L — SIGNIFICANT CHANGE UP (ref 135–146)
WBC # BLD: 4.29 K/UL — LOW (ref 4.8–10.8)
WBC # BLD: 5.45 K/UL — SIGNIFICANT CHANGE UP (ref 4.8–10.8)
WBC # FLD AUTO: 4.29 K/UL — LOW (ref 4.8–10.8)
WBC # FLD AUTO: 5.45 K/UL — SIGNIFICANT CHANGE UP (ref 4.8–10.8)

## 2020-01-21 RX ORDER — POTASSIUM PHOSPHATE, MONOBASIC POTASSIUM PHOSPHATE, DIBASIC 236; 224 MG/ML; MG/ML
30 INJECTION, SOLUTION INTRAVENOUS ONCE
Refills: 0 | Status: COMPLETED | OUTPATIENT
Start: 2020-01-21 | End: 2020-01-21

## 2020-01-21 RX ORDER — ACYCLOVIR SODIUM 500 MG
900 VIAL (EA) INTRAVENOUS EVERY 8 HOURS
Refills: 0 | Status: DISCONTINUED | OUTPATIENT
Start: 2020-01-21 | End: 2020-01-23

## 2020-01-21 RX ADMIN — Medication 268 MILLIGRAM(S): at 21:17

## 2020-01-21 RX ADMIN — POTASSIUM PHOSPHATE, MONOBASIC POTASSIUM PHOSPHATE, DIBASIC 83.33 MILLIMOLE(S): 236; 224 INJECTION, SOLUTION INTRAVENOUS at 13:34

## 2020-01-21 RX ADMIN — Medication 300 MILLIGRAM(S): at 11:29

## 2020-01-21 RX ADMIN — Medication 2 MILLIGRAM(S): at 21:17

## 2020-01-21 RX ADMIN — Medication 270 MILLIGRAM(S): at 06:56

## 2020-01-21 RX ADMIN — SODIUM CHLORIDE 100 MILLILITER(S): 9 INJECTION INTRAMUSCULAR; INTRAVENOUS; SUBCUTANEOUS at 06:56

## 2020-01-21 RX ADMIN — HEPARIN SODIUM 5000 UNIT(S): 5000 INJECTION INTRAVENOUS; SUBCUTANEOUS at 19:06

## 2020-01-21 RX ADMIN — TAMSULOSIN HYDROCHLORIDE 0.4 MILLIGRAM(S): 0.4 CAPSULE ORAL at 21:17

## 2020-01-21 RX ADMIN — FLUPHENAZINE HYDROCHLORIDE 10 MILLIGRAM(S): 1 TABLET, FILM COATED ORAL at 11:29

## 2020-01-21 RX ADMIN — Medication 268 MILLIGRAM(S): at 16:13

## 2020-01-21 RX ADMIN — SODIUM CHLORIDE 100 MILLILITER(S): 9 INJECTION INTRAMUSCULAR; INTRAVENOUS; SUBCUTANEOUS at 21:17

## 2020-01-21 NOTE — PROGRESS NOTE ADULT - SUBJECTIVE AND OBJECTIVE BOX
LENGTH OF HOSPITAL STAY: 7d    CHIEF COMPLAINT:   Patient is a 64y old  Male who presents with a chief complaint of nausea, vomiting and diarrhea (20 Jan 2020 11:57)      Overnight events:    No acute events overnight    ALLERGIES:  No Known Allergies    MEDICATIONS:  STANDING MEDICATIONS  acyclovir IVPB 1000 milliGRAM(s) IV Intermittent every 12 hours  allopurinol 300 milliGRAM(s) Oral daily  benztropine 2 milliGRAM(s) Oral at bedtime  fluPHENAZine 10 milliGRAM(s) Oral daily  heparin  Injectable 5000 Unit(s) SubCutaneous every 12 hours  sodium chloride 0.9%. 1000 milliLiter(s) IV Continuous <Continuous>  tamsulosin 0.4 milliGRAM(s) Oral at bedtime    PRN MEDICATIONS  ondansetron Injectable 4 milliGRAM(s) IV Push every 6 hours PRN    VITALS:   T(F): 98.3  HR: 75  BP: 110/62  RR: 18  SpO2: 99%    LABS:                        9.8    5.45  )-----------( 151      ( 21 Jan 2020 01:17 )             28.9     01-21    139  |  112<H>  |  15  ----------------------------<  97  4.3   |  18  |  1.6<H>    Ca    8.4<L>      21 Jan 2020 01:17    TPro  5.0<L>  /  Alb  3.5  /  TBili  0.3  /  DBili  x   /  AST  34  /  ALT  29  /  AlkPhos  88  01-20                    Cultures:      RADIOLOGY:    PHYSICAL EXAM:  GEN: No acute distress  HEENT:   LUNGS: Clear to auscultation bilaterally   HEART: S1/S2 present. RRR.   ABD: Soft, non-tender, non-distended. Bowel sounds present  EXT:  NEURO: AAOX3 LENGTH OF HOSPITAL STAY: 7d    CHIEF COMPLAINT:   Patient is a 64y old  Male who presents with a chief complaint of nausea, vomiting and diarrhea (20 Jan 2020 11:57)      Overnight events:    No acute events overnight, 4 episodes of diarrhea.    ALLERGIES:  No Known Allergies    MEDICATIONS:  STANDING MEDICATIONS  acyclovir IVPB 1000 milliGRAM(s) IV Intermittent every 12 hours  allopurinol 300 milliGRAM(s) Oral daily  benztropine 2 milliGRAM(s) Oral at bedtime  fluPHENAZine 10 milliGRAM(s) Oral daily  heparin  Injectable 5000 Unit(s) SubCutaneous every 12 hours  sodium chloride 0.9%. 1000 milliLiter(s) IV Continuous <Continuous>  tamsulosin 0.4 milliGRAM(s) Oral at bedtime    PRN MEDICATIONS  ondansetron Injectable 4 milliGRAM(s) IV Push every 6 hours PRN    VITALS:   T(F): 98.3  HR: 75  BP: 110/62  RR: 18  SpO2: 99%    LABS:                        9.8    5.45  )-----------( 151      ( 21 Jan 2020 01:17 )             28.9     01-21    139  |  112<H>  |  15  ----------------------------<  97  4.3   |  18  |  1.6<H>    Ca    8.4<L>      21 Jan 2020 01:17    TPro  5.0<L>  /  Alb  3.5  /  TBili  0.3  /  DBili  x   /  AST  34  /  ALT  29  /  AlkPhos  88  01-20                    Cultures:      RADIOLOGY:    PHYSICAL EXAM:  GEN: No acute distress  HEENT: JERSON  LUNGS: Clear to auscultation bilaterally   HEART: S1/S2 present. RRR.   ABD: Soft, non-tender, non-distended. Bowel sounds present  EXT: non edematous, non erythematous, some diffuse blisters/rash  NEURO: AAOX3 LENGTH OF HOSPITAL STAY: 7d    CHIEF COMPLAINT:   Patient is a 64y old  Male who presents with a chief complaint of nausea, vomiting and diarrhea (20 Jan 2020 11:57)      Overnight events:    No acute events overnight, 4 episodes of diarrhea.    ALLERGIES:  No Known Allergies    MEDICATIONS:  STANDING MEDICATIONS  acyclovir IVPB 1000 milliGRAM(s) IV Intermittent every 12 hours  allopurinol 300 milliGRAM(s) Oral daily  benztropine 2 milliGRAM(s) Oral at bedtime  fluPHENAZine 10 milliGRAM(s) Oral daily  heparin  Injectable 5000 Unit(s) SubCutaneous every 12 hours  sodium chloride 0.9%. 1000 milliLiter(s) IV Continuous <Continuous>  tamsulosin 0.4 milliGRAM(s) Oral at bedtime    PRN MEDICATIONS  ondansetron Injectable 4 milliGRAM(s) IV Push every 6 hours PRN    VITALS:   T(F): 98.3  HR: 75  BP: 110/62  RR: 18  SpO2: 99%    LABS:                        9.8    5.45  )-----------( 151      ( 21 Jan 2020 01:17 )             28.9     01-21    139  |  112<H>  |  15  ----------------------------<  97  4.3   |  18  |  1.6<H>    Ca    8.4<L>      21 Jan 2020 01:17    TPro  5.0<L>  /  Alb  3.5  /  TBili  0.3  /  DBili  x   /  AST  34  /  ALT  29  /  AlkPhos  88  01-20                    Cultures:      RADIOLOGY:    PHYSICAL EXAM:  GEN: No acute distress  HEENT: JERSON  LUNGS: Clear to auscultation bilaterally   HEART: S1/S2 present. RRR.   ABD: Soft, non-tender, non-distended. Bowel sounds present  EXT: non edematous, non erythematous, some diffuse blisters/rash  NEURO: AAOX3  SKIN: diffuse rash all over body + 1 Zoster rash on R upper trunk in deramtomal range

## 2020-01-21 NOTE — DIETITIAN INITIAL EVALUATION ADULT. - CONTINUE CURRENT NUTRITION CARE PLAN
1. Advance diet as tolerated to Low fiber, low fat diet to optimize tolerance. 2. Monitor serum electrolytes daily and replete PRN. 3. Continue imodium daily. 4. Consider daily multivitamin.

## 2020-01-21 NOTE — PROGRESS NOTE ADULT - SUBJECTIVE AND OBJECTIVE BOX
ELDA COVARRUBIAS  64y, Male  Allergy: No Known Allergies      CHIEF COMPLAINT: nausea, vomiting and diarrhea (21 Jan 2020 08:54)      INTERVAL EVENTS/HPI  - No acute events overnight, s/p scope + colitis, no ulceration  - T(F): , Max: 98.3 (01-21-20 @ 06:43)  - Denies any worsening symptoms  - Tolerating medication  - WBC Count: 4.29 (01-21-20 @ 07:30)  WBC Count: 5.45 (01-21-20 @ 01:17)  - Creatinine, Serum: 1.6 (01-21-20 @ 07:30)  Creatinine, Serum: 1.6 (01-21-20 @ 01:17)       ROS  General: Denies rigors, nightsweats  HEENT: Denies headache, rhinorrhea, sore throat, eye pain  CV: Denies CP, palpitations  PULM: Denies wheezing, hemoptysis  GI: Denies hematemesis, hematochezia, melena  : Denies discharge, hematuria  MSK: Denies arthralgias, myalgias  SKIN: Denies rash, lesions  NEURO: Denies paresthesias, weakness  PSYCH: Denies depression, anxiety    VITALS:  T(F): 98.3, Max: 98.3 (01-21-20 @ 06:43)  HR: 75  BP: 110/62  RR: 18Vital Signs Last 24 Hrs  T(C): 36.8 (21 Jan 2020 06:43), Max: 36.8 (21 Jan 2020 06:43)  T(F): 98.3 (21 Jan 2020 06:43), Max: 98.3 (21 Jan 2020 06:43)  HR: 75 (21 Jan 2020 06:43) (67 - 84)  BP: 110/62 (21 Jan 2020 06:43) (103/52 - 123/69)  BP(mean): --  RR: 18 (21 Jan 2020 06:43) (16 - 20)  SpO2: 99% (21 Jan 2020 06:43) (95% - 99%)    PHYSICAL EXAM:  Gen: NAD, resting in bed  HEENT: Normocephalic, atraumatic  Neck: supple, no lymphadenopathy  CV: Regular rate & regular rhythm  Lungs: decreased BS at bases, no fremitus  Abdomen: Soft, BS present  Ext: Warm, well perfused  Neuro: non focal, awake  Skin: Crusted vesicular lesions T5-6 however still non-crusted lesions, also vesicular lesions noted L1, S3 (in groin area and on tip of penis), crusted lesions LE and UE as well  Lines: no phlebitis      FH: Non-contributory  Social Hx: Non-contributory    TESTS & MEASUREMENTS:                        9.4    4.29  )-----------( 150      ( 21 Jan 2020 07:30 )             27.6     01-21    139  |  112<H>  |  14  ----------------------------<  103<H>  3.9   |  18  |  1.6<H>    Ca    8.3<L>      21 Jan 2020 07:30  Phos  1.9     01-21  Mg     1.8     01-21    TPro  5.0<L>  /  Alb  3.5  /  TBili  0.3  /  DBili  x   /  AST  34  /  ALT  29  /  AlkPhos  88  01-20    eGFR if Non African American: 46 mL/min/1.73M2 (01-21-20 @ 07:30)  eGFR if African American: 53 mL/min/1.73M2 (01-21-20 @ 07:30)  eGFR if Non African American: 46 mL/min/1.73M2 (01-21-20 @ 01:17)  eGFR if African American: 53 mL/min/1.73M2 (01-21-20 @ 01:17)    LIVER FUNCTIONS - ( 20 Jan 2020 11:00 )  Alb: 3.5 g/dL / Pro: 5.0 g/dL / ALK PHOS: 88 U/L / ALT: 29 U/L / AST: 34 U/L / GGT: x               GI PCR Panel, Stool (collected 01-16-20 @ 12:58)  Source: .Stool Feces  Final Report (01-17-20 @ 03:55):    GI PCR Results: NOT detected    *******Please Note:*******    GI panel PCR evaluates for:    Campylobacter, Plesiomonas shigelloides, Salmonella,    Vibrio, Yersinia enterocolitica, Enteroaggregative    Escherichia coli (EAEC), Enteropathogenic E.coli (EPEC),    Enterotoxigenic E. coli (ETEC) lt/st, Shiga-like    toxin-producing E. coli (STEC) stx1/stx2,    Shigella/ Enteroinvasive E. coli (EIEC), Cryptosporidium,    Cyclospora cayetanensis, Entamoeba histolytica,    Giardia lamblia, Adenovirus F 40/41, Astrovirus,    Norovirus GI/GII, Rotavirus A, Sapovirus    GI PCR Panel, Stool (collected 01-16-20 @ 11:59)  Source: .Stool Feces  Final Report (01-17-20 @ 02:47):    GI PCR Results: NOT detected    *******Please Note:*******    GI panel PCR evaluates for:    Campylobacter, Plesiomonas shigelloides, Salmonella,    Vibrio, Yersinia enterocolitica, Enteroaggregative    Escherichia coli (EAEC), Enteropathogenic E.coli (EPEC),    Enterotoxigenic E. coli (ETEC) lt/st, Shiga-like    toxin-producing E. coli (STEC) stx1/stx2,    Shigella/ Enteroinvasive E. coli (EIEC), Cryptosporidium,    Cyclospora cayetanensis, Entamoeba histolytica,    Giardia lamblia, Adenovirus F 40/41, Astrovirus,    Norovirus GI/GII, Rotavirus A, Sapovirus    Culture - Blood (collected 01-15-20 @ 09:00)  Source: .Blood None  Gram Stain (01-16-20 @ 21:16):    Growth in aerobic bottle: Gram positive cocci in pairs  Final Report (01-17-20 @ 17:59):    Growth in aerobic bottle: Coag Negative Staphylococcus    Single set isolate, possible contaminant. Contact    Microbiology if susceptibility testing clinically    indicated.    ***Blood Panel PCR results on this specimen are available    approximately 3 hours after the Gram stain result.***    Gram stain, PCR, and/or culture results may not always    correspond due to difference in methodologies.    ************************************************************    This PCR assay was performed using [employer].    The following targets are tested for: Enterococcus,    vancomycin resistant enterococci, Listeria monocytogenes,    coagulase negative staphylococci, S. aureus,    methicillin resistant S. aureus, Streptococcus agalactiae    (Group B), S. pneumoniae, S.pyogenes (Group A),    Acinetobacter baumannii, Enterobacter cloacae, E. coli,    Klebsiella oxytoca, K. pneumoniae, Proteus sp.,    Serratia marcescens, Haemophilus influenzae,    Neisseria meningitidis, Pseudomonas aeruginosa, Candida    albicans, C. glabrata, C krusei, C parapsilosis,    C. tropicalis and the KPC resistance gene.  Organism: Blood Culture PCR (01-17-20 @ 17:59)  Organism: Blood Culture PCR (01-17-20 @ 17:59)      -  Coagulase negative Staphylococcus: Detec      Method Type: PCR            INFECTIOUS DISEASES TESTING  HIV-1/2 Combo Result: Nonreact (09-29-19 @ 17:24)      RADIOLOGY & ADDITIONAL TESTS:  I have personally reviewed the last Chest xray  CXR      CT      CARDIOLOGY TESTING      MEDICATIONS  acyclovir IVPB 1000  allopurinol 300  benztropine 2  fluPHENAZine 10  heparin  Injectable 5000  potassium phosphate IVPB 30  sodium chloride 0.9%. 1000  tamsulosin 0.4      ANTIBIOTICS:  acyclovir IVPB 1000 milliGRAM(s) IV Intermittent every 12 hours      All available historical records have been reviewed

## 2020-01-21 NOTE — DIETITIAN INITIAL EVALUATION ADULT. - PHYSICAL APPEARANCE
BMI 27.1 (200#, 72in). denies recent wt changes. no significant muscle wasting or fat loss observed. skin intact, no edema.

## 2020-01-21 NOTE — DIETITIAN INITIAL EVALUATION ADULT. - FEEDING SKILL
Pt providing limited nutrition hx. Reports appetite was fair and adequate intake PTA but did not specify extent of intake. Denies taste changes a/w chemo. NKFA noted. Denies use of nutrition supplements stating he does not like Ensure.

## 2020-01-21 NOTE — PROGRESS NOTE ADULT - SUBJECTIVE AND OBJECTIVE BOX
Patient was seen and examined. Spoke with RN. Chart reviewed.  No events overnight.  Vital Signs Last 24 Hrs  T(F): 98.3 (21 Jan 2020 06:43), Max: 98.3 (21 Jan 2020 06:43)  HR: 75 (21 Jan 2020 06:43) (72 - 75)  BP: 110/62 (21 Jan 2020 06:43) (110/62 - 123/69)  SpO2: 99% (21 Jan 2020 06:43) (99% - 99%)  MEDICATIONS  (STANDING):  acyclovir IVPB 900 milliGRAM(s) IV Intermittent every 8 hours  allopurinol 300 milliGRAM(s) Oral daily  benztropine 2 milliGRAM(s) Oral at bedtime  fluPHENAZine 10 milliGRAM(s) Oral daily  heparin  Injectable 5000 Unit(s) SubCutaneous every 12 hours  sodium chloride 0.9%. 1000 milliLiter(s) (100 mL/Hr) IV Continuous <Continuous>  tamsulosin 0.4 milliGRAM(s) Oral at bedtime    MEDICATIONS  (PRN):  ondansetron Injectable 4 milliGRAM(s) IV Push every 6 hours PRN Nausea    Labs:                        9.4    4.29  )-----------( 150      ( 21 Jan 2020 07:30 )             27.6                         9.8    5.45  )-----------( 151      ( 21 Jan 2020 01:17 )             28.9     21 Jan 2020 07:30    139    |  112    |  14     ----------------------------<  103    3.9     |  18     |  1.6    21 Jan 2020 01:17    139    |  112    |  15     ----------------------------<  97     4.3     |  18     |  1.6      Ca    8.3        21 Jan 2020 07:30  Ca    8.4        21 Jan 2020 01:17  Phos  1.9       21 Jan 2020 07:30  Mg     1.8       21 Jan 2020 07:30    TPro  5.0    /  Alb  3.5    /  TBili  0.3    /  DBili  x      /  AST  34     /  ALT  29     /  AlkPhos  88     20 Jan 2020 11:00          General: comfortable, NAD  Neurology: A&Ox3, nonfocal  Head:  Normocephalic, atraumatic  ENT:  Mucosa moist, no ulcerations  Neck:  Supple, no JVD,   Resp: CTA B/L  CV: RRR, S1S2,   GI: Soft, NT, bowel sounds  skin: crusted vesicular lesions T5-6 vesicular lesion lower back and sacrum as well us le/ue extremities       A/P:  64yM with a 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

## 2020-01-21 NOTE — CHART NOTE - NSCHARTNOTEFT_GEN_A_CORE
patient is s/p flexible sigmoidoscopy showing erythematous/ edematous mucosa with no ulcers . Biopsies taken   patient diarrhea is better in terms of frequency , denies abdominal pain , no nausea or vomiting   REC:   supportive therapy for now   await pathology results

## 2020-01-21 NOTE — DIETITIAN INITIAL EVALUATION ADULT. - ENERGY INTAKE
Pt has been on/off PO diet vs NPO/clear since admit. Today diet adv to full liquids. Pt reports fair tolerance but suboptimal appetite. MD reports pt can adv diet today, recs d/w LIP. Pt denied nutrition supplements at this time.

## 2020-01-21 NOTE — PROGRESS NOTE ADULT - ASSESSMENT
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     # Continuous diarrhea causing hypovolemia and hypotension - now patient's BP is wnl:  - C.diff negative and GI PCR is negative   - likely chemotherapy induced diarrhea ( up to 40 % with Bendamustine) and 20 % with Rituxan  - c/w IVF at 100 cc/hr  - give zofran PRN  - Diarrhea improved    # Shingles - disseminated:  - Some case reports showed zoster induced colitis. Flex sig today with GI, was NPO and had 2 tap water enemas  - C/w acyclovir at 10 mg /kg per dose every 12 hours IV ( 1000 mg q12h IV) renally dosed for the whole hospital course and will change to PO when discharge for total 14 days of antivirals.  - C diff and GI PCR negative   - Please calculate CrCl today and if >50 change it to q8    # ASHLEY on CKD - improving:  - Creatinine was 1.7, trending down. 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 64 yo M w/ PMH of Marginal zone lymphoma s/p 2 cycle chemotherapy w/ Bendamustine and rituxan last one was 3 weeks ago, CKD w/ history of rt urethral stent with multiple kidney stones, paranoid and schizophrenia presents to ED for nausea, vomiting and diarrhea.     # Continuous diarrhea causing hypovolemia and hypotension - 4 episodes of watery diarrhea in last 24 hours  - C.diff negative and GI PCR is negative   - likely chemotherapy induced diarrhea ( up to 40 % with Bendamustine) and 20 % with Rituxan Vs disseminated Zoster colitis in immunocompromised case  - S/p flexible sigmoidoscopy (1/20/2020): no ulcerations was seen but congested colon, took multiple biopsies   - c/w c/w IVF at 100 cc/hr and zofran PRN  - Electrolytes: Na/K/Mag are WNL, Phosphorus is low - repleted with potassium phosphate 30 mmol IV once    # Shingles - disseminated Zoster:  - Some case reports showed zoster induced colitis.  - C/w acyclovir at 10 mg /kg per dose every 12 hours IV ( 1000 mg q12h IV) renally dosed for the whole hospital course and will change to PO when discharge for total 14 days of antivirals meds.  - Please calculate CrCl today and if >50 change it to q8    # ASHLEY on CKD - improved  - Creatinine was 1.7 (today 1.6), trending down. 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 64 yo M w/ PMH of Marginal zone lymphoma s/p 2 cycle chemotherapy w/ Bendamustine and rituxan last one was 3 weeks ago, CKD w/ history of rt urethral stent with multiple kidney stones, paranoid and schizophrenia presents to ED for nausea, vomiting and diarrhea.     # Continuous diarrhea causing hypovolemia and hypotension - 4 episodes of watery diarrhea in last 24 hours  - C.diff negative and GI PCR is negative   - likely chemotherapy induced diarrhea ( up to 40 % with Bendamustine) and 20 % with Rituxan Vs disseminated Zoster colitis in immunocompromised case  - S/p flexible sigmoidoscopy (1/20/2020): no ulcerations was seen but congested colon, took multiple biopsies   - c/w c/w IVF at 100 cc/hr and zofran PRN  - Electrolytes: Na/K/Mag are WNL, Phosphorus is low - repleted with potassium phosphate 30 mmol IV once    # Shingles - disseminated Zoster:  - Some case reports showed zoster induced colitis.  - C/w acyclovir at 10 mg /kg per dose every 12 hours IV ( 1000 mg q12h IV) renally dosed for the whole hospital course and will change to PO when discharge for total 14 days of antivirals meds.  - Please calculate CrCl today and if >50 change it to q8    # ASHLEY on CKD - improved  - Creatinine was 1.7 (today 1.6), trending down. 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     # Continuous diarrhea causing hypovolemia and hypotension - 4 episodes of watery diarrhea in last 24 hours  - C.diff negative and GI PCR is negative   - likely chemotherapy induced diarrhea ( up to 40 % with Bendamustine) and 20 % with Rituxan Vs disseminated Zoster colitis in immunocompromised case  - S/p flexible sigmoidoscopy (1/20/2020): no ulcerations was seen but erythematous and edematous colon, took multiple biopsies   - c/w c/w IVF at 100 cc/hr and zofran PRN  - Electrolytes: Na/K/Mag are WNL, Phosphorus is low - repleted with potassium phosphate 30 mmol IV once    # Shingles - disseminated Zoster:  - Some case reports showed zoster induced colitis.  - C/w acyclovir at 10 mg /kg per dose every 12 hours IV ( 1000 mg q12h IV) renally dosed for the whole hospital course and will change to PO when discharge for total 14 days of antivirals meds.  - Please calculate CrCl today and if >50 change it to q8    # ASHLEY on CKD - improved  - Creatinine was 1.7 (today 1.6), trending down. 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

## 2020-01-21 NOTE — DIETITIAN INITIAL EVALUATION ADULT. - OTHER INFO
Pt p/w worsening weakness and vomiting/diarrhea s/p chemo. Continuous diarrhea likely chemotherapy induced vs disseminated Zoster colitis in immunocompromised case. C.diff and GI PCR negative. s/p flex sigmoidoscopy 1/20: congested colon, multiple biopsies taken. Low phosphorus, repleted with potassium phosphate. Shingles, disseminated Zoster, ID following. ASHLEY on CKD, improved. New onset thrombocytopenia, improved.  Marginal zone lymphoma s/p 1 cycle chemotherapy x3 weeks PTA, oncology f/u outpt. h/o paranoid schizophrenia.

## 2020-01-21 NOTE — PROGRESS NOTE ADULT - ASSESSMENT
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 crcl 45, if increases to >50 can increase to q8h. Today is D6, when stable d/c on PO valtrex 1g TID to complete 10 days end 1/25/20  - f/u GI path   - Contact/airborne as immunocompromised and disseminated    Spectra [id number]

## 2020-01-21 NOTE — DIETITIAN INITIAL EVALUATION ADULT. - FACTORS AFF FOOD INTAKE
chemo induced diarrhea, frequency decreasing since admit. x4 BMs today as per MD. pt denies n/v or abd discomfort. no difficulty chewing/swallowing noted.

## 2020-01-22 LAB
ANION GAP SERPL CALC-SCNC: 10 MMOL/L — SIGNIFICANT CHANGE UP (ref 7–14)
BASOPHILS # BLD AUTO: 0.01 K/UL — SIGNIFICANT CHANGE UP (ref 0–0.2)
BASOPHILS NFR BLD AUTO: 0.2 % — SIGNIFICANT CHANGE UP (ref 0–1)
BUN SERPL-MCNC: 15 MG/DL — SIGNIFICANT CHANGE UP (ref 10–20)
CALCIUM SERPL-MCNC: 8.4 MG/DL — LOW (ref 8.5–10.1)
CHLORIDE SERPL-SCNC: 111 MMOL/L — HIGH (ref 98–110)
CO2 SERPL-SCNC: 18 MMOL/L — SIGNIFICANT CHANGE UP (ref 17–32)
CREAT SERPL-MCNC: 1.6 MG/DL — HIGH (ref 0.7–1.5)
EOSINOPHIL # BLD AUTO: 0.22 K/UL — SIGNIFICANT CHANGE UP (ref 0–0.7)
EOSINOPHIL NFR BLD AUTO: 3.6 % — SIGNIFICANT CHANGE UP (ref 0–8)
GLUCOSE SERPL-MCNC: 114 MG/DL — HIGH (ref 70–99)
HCT VFR BLD CALC: 27.1 % — LOW (ref 42–52)
HGB BLD-MCNC: 9.3 G/DL — LOW (ref 14–18)
IMM GRANULOCYTES NFR BLD AUTO: 0.7 % — HIGH (ref 0.1–0.3)
LYMPHOCYTES # BLD AUTO: 0.26 K/UL — LOW (ref 1.2–3.4)
LYMPHOCYTES # BLD AUTO: 4.3 % — LOW (ref 20.5–51.1)
MAGNESIUM SERPL-MCNC: 1.6 MG/DL — LOW (ref 1.8–2.4)
MCHC RBC-ENTMCNC: 30.8 PG — SIGNIFICANT CHANGE UP (ref 27–31)
MCHC RBC-ENTMCNC: 34.3 G/DL — SIGNIFICANT CHANGE UP (ref 32–37)
MCV RBC AUTO: 89.7 FL — SIGNIFICANT CHANGE UP (ref 80–94)
MONOCYTES # BLD AUTO: 0.71 K/UL — HIGH (ref 0.1–0.6)
MONOCYTES NFR BLD AUTO: 11.7 % — HIGH (ref 1.7–9.3)
NEUTROPHILS # BLD AUTO: 4.81 K/UL — SIGNIFICANT CHANGE UP (ref 1.4–6.5)
NEUTROPHILS NFR BLD AUTO: 79.5 % — HIGH (ref 42.2–75.2)
NRBC # BLD: 0 /100 WBCS — SIGNIFICANT CHANGE UP (ref 0–0)
PHOSPHATE SERPL-MCNC: 2.4 MG/DL — SIGNIFICANT CHANGE UP (ref 2.1–4.9)
PLATELET # BLD AUTO: 172 K/UL — SIGNIFICANT CHANGE UP (ref 130–400)
POTASSIUM SERPL-MCNC: 4.1 MMOL/L — SIGNIFICANT CHANGE UP (ref 3.5–5)
POTASSIUM SERPL-SCNC: 4.1 MMOL/L — SIGNIFICANT CHANGE UP (ref 3.5–5)
RBC # BLD: 3.02 M/UL — LOW (ref 4.7–6.1)
RBC # FLD: 15.9 % — HIGH (ref 11.5–14.5)
SODIUM SERPL-SCNC: 139 MMOL/L — SIGNIFICANT CHANGE UP (ref 135–146)
WBC # BLD: 6.05 K/UL — SIGNIFICANT CHANGE UP (ref 4.8–10.8)
WBC # FLD AUTO: 6.05 K/UL — SIGNIFICANT CHANGE UP (ref 4.8–10.8)

## 2020-01-22 PROCEDURE — 99232 SBSQ HOSP IP/OBS MODERATE 35: CPT

## 2020-01-22 RX ORDER — MAGNESIUM SULFATE 500 MG/ML
2 VIAL (ML) INJECTION ONCE
Refills: 0 | Status: COMPLETED | OUTPATIENT
Start: 2020-01-22 | End: 2020-01-22

## 2020-01-22 RX ORDER — ACETAMINOPHEN 500 MG
650 TABLET ORAL ONCE
Refills: 0 | Status: COMPLETED | OUTPATIENT
Start: 2020-01-22 | End: 2020-01-22

## 2020-01-22 RX ORDER — VALACYCLOVIR 500 MG/1
1 TABLET, FILM COATED ORAL
Qty: 12 | Refills: 0
Start: 2020-01-22 | End: 2020-01-25

## 2020-01-22 RX ADMIN — SODIUM CHLORIDE 100 MILLILITER(S): 9 INJECTION INTRAMUSCULAR; INTRAVENOUS; SUBCUTANEOUS at 06:18

## 2020-01-22 RX ADMIN — Medication 268 MILLIGRAM(S): at 21:13

## 2020-01-22 RX ADMIN — Medication 650 MILLIGRAM(S): at 11:29

## 2020-01-22 RX ADMIN — Medication 268 MILLIGRAM(S): at 06:18

## 2020-01-22 RX ADMIN — FLUPHENAZINE HYDROCHLORIDE 10 MILLIGRAM(S): 1 TABLET, FILM COATED ORAL at 11:29

## 2020-01-22 RX ADMIN — Medication 268 MILLIGRAM(S): at 15:13

## 2020-01-22 RX ADMIN — Medication 300 MILLIGRAM(S): at 11:29

## 2020-01-22 RX ADMIN — TAMSULOSIN HYDROCHLORIDE 0.4 MILLIGRAM(S): 0.4 CAPSULE ORAL at 21:14

## 2020-01-22 RX ADMIN — Medication 2 MILLIGRAM(S): at 21:14

## 2020-01-22 RX ADMIN — Medication 25 GRAM(S): at 11:25

## 2020-01-22 NOTE — PROGRESS NOTE ADULT - SUBJECTIVE AND OBJECTIVE BOX
LENGTH OF HOSPITAL STAY: 8d    CHIEF COMPLAINT:   Patient is a 64y old  Male who presents with a chief complaint of nausea, vomiting and diarrhea (21 Jan 2020 14:15)      Overnight events:    No acute events overnight    ALLERGIES:  heparin (Urticaria)    MEDICATIONS:  STANDING MEDICATIONS  acyclovir IVPB 900 milliGRAM(s) IV Intermittent every 8 hours  allopurinol 300 milliGRAM(s) Oral daily  benztropine 2 milliGRAM(s) Oral at bedtime  fluPHENAZine 10 milliGRAM(s) Oral daily  heparin  Injectable 5000 Unit(s) SubCutaneous every 12 hours  sodium chloride 0.9%. 1000 milliLiter(s) IV Continuous <Continuous>  tamsulosin 0.4 milliGRAM(s) Oral at bedtime    PRN MEDICATIONS  ondansetron Injectable 4 milliGRAM(s) IV Push every 6 hours PRN    VITALS:   T(F): 97.9  HR: 69  BP: 100/50  RR: 18  SpO2: 97%    LABS:                        9.3    6.05  )-----------( 172      ( 22 Jan 2020 07:34 )             27.1     01-22    139  |  111<H>  |  15  ----------------------------<  114<H>  4.1   |  18  |  1.6<H>    Ca    8.4<L>      22 Jan 2020 07:34  Phos  2.4     01-22  Mg     1.6     01-22                Culture - Blood (collected 20 Jan 2020 11:00)  Source: .Blood Blood-Peripheral  Preliminary Report (22 Jan 2020 01:02):    No growth to date.            Cultures:    Culture - Blood (collected 20 Jan 2020 11:00)  Source: .Blood Blood-Peripheral  Preliminary Report (22 Jan 2020 01:02):    No growth to date.        RADIOLOGY:    PHYSICAL EXAM:  GEN: No acute distress, NC/AT, anicteric  LUNGS: Clear to auscultation bilaterally, no wheezes  HEART: S1/S2 present. RRR.   ABD: Soft, non-tender, non-distended. Bowel sounds present  SKIN: Vesicles on chest and groin  NEURO: AAOX3, moves all extremities, no focal deficits    Intravenous access: Peripheral access  NG tube: None  Park Catheter: None

## 2020-01-22 NOTE — PROGRESS NOTE ADULT - ATTENDING COMMENTS
I personally interviewed and examined the patient.  Patient still has diarrhea. To try sandostatin
diarrhea on chemotherapy  not improving  - flex sig tomorrow

## 2020-01-22 NOTE — PROGRESS NOTE ADULT - SUBJECTIVE AND OBJECTIVE BOX
Patient was seen and examined. Spoke with RN. Chart reviewed.  No events overnight.  Vital Signs Last 24 Hrs  T(F): 97.9 (22 Jan 2020 06:00), Max: 98.6 (21 Jan 2020 15:20)  HR: 69 (22 Jan 2020 06:00) (67 - 96)  BP: 100/50 (22 Jan 2020 06:00) (88/53 - 107/55)  SpO2: 97% (22 Jan 2020 06:00) (97% - 100%)  MEDICATIONS  (STANDING):  acyclovir IVPB 900 milliGRAM(s) IV Intermittent every 8 hours  allopurinol 300 milliGRAM(s) Oral daily  benztropine 2 milliGRAM(s) Oral at bedtime  fluPHENAZine 10 milliGRAM(s) Oral daily  heparin  Injectable 5000 Unit(s) SubCutaneous every 12 hours  sodium chloride 0.9%. 1000 milliLiter(s) (100 mL/Hr) IV Continuous <Continuous>  tamsulosin 0.4 milliGRAM(s) Oral at bedtime    MEDICATIONS  (PRN):  ondansetron Injectable 4 milliGRAM(s) IV Push every 6 hours PRN Nausea    Labs:                        9.3    6.05  )-----------( 172      ( 22 Jan 2020 07:34 )             27.1                         9.4    4.29  )-----------( 150      ( 21 Jan 2020 07:30 )             27.6     22 Jan 2020 07:34    139    |  111    |  15     ----------------------------<  114    4.1     |  18     |  1.6    21 Jan 2020 07:30    139    |  112    |  14     ----------------------------<  103    3.9     |  18     |  1.6      Ca    8.4        22 Jan 2020 07:34  Ca    8.3        21 Jan 2020 07:30  Phos  2.4       22 Jan 2020 07:34  Phos  1.9       21 Jan 2020 07:30  Mg     1.6       22 Jan 2020 07:34  Mg     1.8       21 Jan 2020 07:30            Culture - Blood (collected 20 Jan 2020 11:00)  Source: .Blood Blood-Peripheral  Preliminary Report (22 Jan 2020 01:02):    No growth to date.      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   ID f/u   OOB, ambulate  PT/rehab eval  d/c when cleared by ID and diarrhea resolved   d/w resident physician   DVT prophylaxis  Decubitus prevention- all measures as per RN protocol  Please call or text me with any questions or updates

## 2020-01-22 NOTE — PROGRESS NOTE ADULT - ASSESSMENT
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 As per pt, he has been feeling lethargic since 3 weeks ago s/p chemotherapy. Patient have non bloody diarrhea , watery and non bloody vomiting. Patient was diagnosed with disseminated herpes     #non bloody diarrhea  ( resolving)   c.diff negative , GI PCR is negative   s/p Flexible sigmoidoscopy showing edematous/ erythematous colon without ulcers, S/P biopsies   likely chemotherapy induced diarrhea ( up to 40 % with Bendamustine) and 20 % with  Rituxan   REC:   IV hydration  Zofran PRN   lactose free, low residue diet   await pathology results 64 yo M w/ PMH of Marginal zone lymphoma s/p 1 cycle chemotherapy w/ Bendamustine and rituxan 3 weeks ago, CKD w/ history of rt urethral stent with multiple kidney stones, paranoid and schizophrenia presents to ED for nausea, vomiting and diarrhea. As per pt, he has been feeling lethargic since 3 weeks ago s/p chemotherapy. Patient have non bloody diarrhea , watery and non bloody vomiting. Patient was diagnosed with disseminated herpes     #non bloody diarrhea  ( resolving)   c.diff negative , GI PCR is negative   s/p Flexible sigmoidoscopy showing edematous/ erythematous colon without ulcers, S/P biopsies   likely chemotherapy induced diarrhea ( up to 40 % with Bendamustine) and 20 % with  Rituxan   REC:   IV hydration  Zofran PRN   lactose free, low residue diet   await pathology results   somatostatin 100 mcg s/c q 8 hrs

## 2020-01-22 NOTE — PROGRESS NOTE ADULT - ASSESSMENT
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     # Diarrhea causing hypovolemia and hypotension - improved (no episode from last night)  - C.diff negative and GI PCR is negative - Repeat blood cultures are negative  - likely chemotherapy induced diarrhea ( up to 40 % with Bendamustine) and 20 % with Rituxan Vs disseminated Zoster colitis in immunocompromised case  - S/p flexible sigmoidoscopy (1/20/2020): no ulcerations was seen but erythematous and edematous colon, took multiple biopsies, will follow surgical report  - c/w IVF at 100 cc/hr and zofran PRN  - Electrolytes: Na/K/phos are WNL, Mag repleted     # Shingles - disseminated Zoster:  - Some case reports showed zoster induced colitis.  - C/w acyclovir at 10 mg /kg per dose every 8 hours IV ( 1000 mg q12h IV) renally dosed for the whole hospital course and will change to PO when discharge for total 14 days of antivirals meds.  - Today is D7, when stable d/c on PO valtrex 1g TID to complete 10 days end 1/25/20  - f/u GI path for surgical pathology with Dr. Keller  - Contact/airborne as immunocompromised and disseminated    # ASHLEY on CKD - improved  - Creatinine was 1.7 (today 1.6), trending down. 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

## 2020-01-22 NOTE — PROGRESS NOTE ADULT - SUBJECTIVE AND OBJECTIVE BOX
Gastroenterology progress note:     Patient is a 64y old  Male who presents with a chief complaint of nausea, vomiting and diarrhea (22 Jan 2020 12:43)       Admitted on: 01-14-20    We are following the patient for: diarrhea      Interval History: patient generally feels better , denies any diarrhea since last night , patient denies abdominal pain , nausea , or vomiting. Patient is tolerating diet. No fever or chills    Patient's medical problems are improving      PAST MEDICAL & SURGICAL HISTORY:  Atrophic kidney  Shingles  Lymphoma  Schizophrenia  Kidney stones  Retained urethral stent      MEDICATIONS  (STANDING):  acyclovir IVPB 900 milliGRAM(s) IV Intermittent every 8 hours  allopurinol 300 milliGRAM(s) Oral daily  benztropine 2 milliGRAM(s) Oral at bedtime  fluPHENAZine 10 milliGRAM(s) Oral daily  heparin  Injectable 5000 Unit(s) SubCutaneous every 12 hours  sodium chloride 0.9%. 1000 milliLiter(s) (100 mL/Hr) IV Continuous <Continuous>  tamsulosin 0.4 milliGRAM(s) Oral at bedtime    MEDICATIONS  (PRN):  ondansetron Injectable 4 milliGRAM(s) IV Push every 6 hours PRN Nausea      Allergies  heparin (Urticaria)      Review of Systems:   Cardiovascular:  No Chest Pain, No Palpitations  Respiratory:  No Cough, No Dyspnea  Gastrointestinal:  As described in HPI    Physical Examination:  T(C): 36.6 (01-22-20 @ 06:00), Max: 37 (01-21-20 @ 15:20)  HR: 69 (01-22-20 @ 06:00) (67 - 96)  BP: 100/50 (01-22-20 @ 06:00) (88/53 - 107/55)  RR: 18 (01-22-20 @ 06:00) (18 - 20)  SpO2: 97% (01-22-20 @ 06:00) (97% - 100%)      01-21-20 @ 07:01  -  01-22-20 @ 07:00  --------------------------------------------------------  IN: 1100 mL / OUT: 0 mL / NET: 1100 mL      Constitutional: No acute distress.  Respiratory:  No signs of respiratory distress. Lung sounds are clear bilaterally.  Cardiovascular:  S1 S2, Regular rate and rhythm.  Abdominal: Abdomen is soft, symmetric, and non-tender without distention.  Bowel sounds are present and normoactive in all four quadrants. No masses, hepatomegaly, or splenomegaly are noted.   Skin: No rashes, No Jaundice.        Data: (reviewed by attending)                        9.3    6.05  )-----------( 172      ( 22 Jan 2020 07:34 )             27.1     Hgb trend:  9.3  01-22-20 @ 07:34  9.4  01-21-20 @ 07:30  9.8  01-21-20 @ 01:17  10.6  01-20-20 @ 11:00        01-22    139  |  111<H>  |  15  ----------------------------<  114<H>  4.1   |  18  |  1.6<H>    Ca    8.4<L>      22 Jan 2020 07:34  Phos  2.4     01-22  Mg     1.6     01-22      Liver panel trend:  TBili 0.3   /   AST 34   /   ALT 29   /   AlkP 88   /   Tptn 5.0   /   Alb 3.5    /   DBili --      01-20  TBili 0.3   /   AST 48   /   ALT 28   /   AlkP 71   /   Tptn 4.4   /   Alb 3.1    /   DBili --      01-19  TBili 0.3   /   AST 61   /   ALT 28   /   AlkP 64   /   Tptn 4.1   /   Alb 2.8    /   DBili --      01-18  TBili 0.5   /   AST 15   /   ALT 11   /   AlkP 97   /   Tptn 5.3   /   Alb 3.8    /   DBili --      01-15  TBili 0.6   /   AST 13   /   ALT 11   /   AlkP 110   /   Tptn 5.8   /   Alb 4.1    /   DBili --      01-14          Culture - Blood (collected 20 Jan 2020 11:00)  Source: .Blood Blood-Peripheral  Preliminary Report (22 Jan 2020 01:02):    No growth to date.         Radiology: (reviewed by attending)

## 2020-01-23 VITALS
DIASTOLIC BLOOD PRESSURE: 62 MMHG | HEART RATE: 87 BPM | RESPIRATION RATE: 18 BRPM | TEMPERATURE: 97 F | SYSTOLIC BLOOD PRESSURE: 103 MMHG

## 2020-01-23 RX ORDER — DEXTROSE 10 % IN WATER 10 %
1000 INTRAVENOUS SOLUTION INTRAVENOUS
Refills: 0 | Status: DISCONTINUED | OUTPATIENT
Start: 2020-01-23 | End: 2020-01-23

## 2020-01-23 RX ORDER — GLUCAGON INJECTION, SOLUTION 0.5 MG/.1ML
1 INJECTION, SOLUTION SUBCUTANEOUS ONCE
Refills: 0 | Status: DISCONTINUED | OUTPATIENT
Start: 2020-01-23 | End: 2020-01-23

## 2020-01-23 RX ADMIN — Medication 268 MILLIGRAM(S): at 06:15

## 2020-01-23 RX ADMIN — Medication 268 MILLIGRAM(S): at 13:24

## 2020-01-23 RX ADMIN — FLUPHENAZINE HYDROCHLORIDE 10 MILLIGRAM(S): 1 TABLET, FILM COATED ORAL at 11:18

## 2020-01-23 RX ADMIN — Medication 300 MILLIGRAM(S): at 11:18

## 2020-01-23 RX ADMIN — Medication 650 MILLIGRAM(S): at 00:07

## 2020-01-23 RX ADMIN — Medication 650 MILLIGRAM(S): at 01:11

## 2020-01-23 NOTE — PROGRESS NOTE ADULT - SUBJECTIVE AND OBJECTIVE BOX
LENGTH OF HOSPITAL STAY: 9d    CHIEF COMPLAINT:   Patient is a 64y old  Male who presents with a chief complaint of nausea, vomiting and diarrhea (22 Jan 2020 13:33)      Overnight events:    No acute events overnight    ALLERGIES:  heparin (Urticaria)    MEDICATIONS:  STANDING MEDICATIONS  acyclovir IVPB 900 milliGRAM(s) IV Intermittent every 8 hours  allopurinol 300 milliGRAM(s) Oral daily  benztropine 2 milliGRAM(s) Oral at bedtime  fluPHENAZine 10 milliGRAM(s) Oral daily  heparin  Injectable 5000 Unit(s) SubCutaneous every 12 hours  tamsulosin 0.4 milliGRAM(s) Oral at bedtime    PRN MEDICATIONS  ondansetron Injectable 4 milliGRAM(s) IV Push every 6 hours PRN    VITALS:   T(F): 97.6  HR: 80  BP: 107/59  RR: 18  SpO2: --    LABS:                        9.3    6.05  )-----------( 172      ( 22 Jan 2020 07:34 )             27.1     01-22    139  |  111<H>  |  15  ----------------------------<  114<H>  4.1   |  18  |  1.6<H>    Ca    8.4<L>      22 Jan 2020 07:34  Phos  2.4     01-22  Mg     1.6     01-22                Culture - Blood (collected 20 Jan 2020 11:00)  Source: .Blood Blood-Peripheral  Preliminary Report (22 Jan 2020 01:02):    No growth to date.            Cultures:    Culture - Blood (collected 20 Jan 2020 11:00)  Source: .Blood Blood-Peripheral  Preliminary Report (22 Jan 2020 01:02):    No growth to date.        RADIOLOGY:    PHYSICAL EXAM:  GEN: No acute distress  HEENT: JERSON  LUNGS: Clear to auscultation bilaterally   HEART: S1/S2 present. RRR.   ABD: Soft, non-tender, non-distended. Bowel sounds present  EXT:   NEURO: AAOX3

## 2020-01-23 NOTE — PROGRESS NOTE ADULT - REASON FOR ADMISSION
nausea, vomiting and diarrhea

## 2020-01-23 NOTE — PROGRESS NOTE ADULT - ASSESSMENT
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     # Diarrhea causing hypovolemia and hypotension - improved (no episode from last night)  - C.diff negative and GI PCR is negative - Repeat blood cultures are negative  - likely chemotherapy induced diarrhea ( up to 40 % with Bendamustine) and 20 % with Rituxan Vs disseminated Zoster colitis in immunocompromised case  - S/p flexible sigmoidoscopy (1/20/2020): no ulcerations was seen but erythematous and edematous colon, took multiple biopsies, will follow surgical report  - c/w IVF at 100 cc/hr and zofran PRN  - Electrolytes: Na/K/phos are WNL,     # Shingles - disseminated Zoster:  - Some case reports showed zoster induced colitis.  - C/w acyclovir at 10 mg /kg per dose every 8 hours IV ( 1000 mg q12h IV) renally dosed for the whole hospital course and will change to PO when discharge for total 14 days of antivirals meds.  - Today is D7, when stable d/c on PO valtrex 1g TID to complete 10 days end 1/25/20  - f/u GI path for surgical pathology with Dr. Keller    # ASHLEY on CKD - improved  - Creatinine was 1.7 (today 1.6), trending down. 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

## 2020-01-23 NOTE — PROGRESS NOTE ADULT - SUBJECTIVE AND OBJECTIVE BOX
Patient was seen and examined. Spoke with RN. Chart reviewed.  No events overnight.  Vital Signs Last 24 Hrs  T(F): 97.6 (23 Jan 2020 05:39), Max: 97.6 (23 Jan 2020 05:39)  HR: 80 (23 Jan 2020 05:39) (74 - 80)  BP: 107/59 (23 Jan 2020 05:39) (94/50 - 107/59)  SpO2: --  MEDICATIONS  (STANDING):  acyclovir IVPB 900 milliGRAM(s) IV Intermittent every 8 hours  allopurinol 300 milliGRAM(s) Oral daily  benztropine 2 milliGRAM(s) Oral at bedtime  fluPHENAZine 10 milliGRAM(s) Oral daily  heparin  Injectable 5000 Unit(s) SubCutaneous every 12 hours  tamsulosin 0.4 milliGRAM(s) Oral at bedtime    MEDICATIONS  (PRN):  ondansetron Injectable 4 milliGRAM(s) IV Push every 6 hours PRN Nausea    Labs:                        9.3    6.05  )-----------( 172      ( 22 Jan 2020 07:34 )             27.1     22 Jan 2020 07:34    139    |  111    |  15     ----------------------------<  114    4.1     |  18     |  1.6      Ca    8.4        22 Jan 2020 07:34  Phos  2.4       22 Jan 2020 07:34  Mg     1.6       22 Jan 2020 07:34            Culture - Blood (collected 20 Jan 2020 11:00)  Source: .Blood Blood-Peripheral  Preliminary Report (22 Jan 2020 01:02):    No growth to date.      General: comfortable, NAD  Neurology: A&Ox3, nonfocal  Head:  Normocephalic, atraumatic  ENT:  Mucosa moist, no ulcerations  Neck:  Supple, no JVD,   Resp: CTA B/L  CV: RRR, S1S2,   GI: Soft, NT, bowel sounds  skin: lesions thoracic, lower back and sacrum as well us le/ue extremities       A/P:  64yM with a PMH of marginal zone lymphoma s/p 1 cycle chemotherapy w/ Bendamustine and rituxan 3 weeks ago presenting with 1.5 weeks n/v/d + rash consistent with herpes zoster  admitted with disseminated Zoster in an immunocompromised host    diarrhea resoled   Acyclovir as per ID  s/p GI for flex sig f/u biopsy +colitis, no ulcerations   GI o/p f/u for biopsy   monitor electrolytes correct as needed  ID f/u   OOB, ambulate  PT/rehab eval  d/c when cleared by ID   DVT prophylaxis  Decubitus prevention- all measures as per RN protocol  Please call or text me with any questions or updates

## 2020-01-24 LAB — SURGICAL PATHOLOGY STUDY: SIGNIFICANT CHANGE UP

## 2020-01-26 LAB
CULTURE RESULTS: SIGNIFICANT CHANGE UP
SPECIMEN SOURCE: SIGNIFICANT CHANGE UP

## 2020-01-27 ENCOUNTER — APPOINTMENT (OUTPATIENT)
Dept: INFUSION THERAPY | Facility: CLINIC | Age: 65
End: 2020-01-27

## 2020-01-27 ENCOUNTER — LABORATORY RESULT (OUTPATIENT)
Age: 65
End: 2020-01-27

## 2020-01-27 PROBLEM — B02.9 ZOSTER WITHOUT COMPLICATIONS: Chronic | Status: ACTIVE | Noted: 2020-01-14

## 2020-01-27 PROBLEM — N26.1 ATROPHY OF KIDNEY (TERMINAL): Chronic | Status: ACTIVE | Noted: 2020-01-14

## 2020-01-27 RX ORDER — DEXAMETHASONE 0.5 MG/5ML
12 ELIXIR ORAL ONCE
Refills: 0 | Status: DISCONTINUED | OUTPATIENT
Start: 2020-01-27 | End: 2020-01-27

## 2020-01-27 RX ORDER — RITUXIMAB 10 MG/ML
810 INJECTION, SOLUTION INTRAVENOUS ONCE
Refills: 0 | Status: COMPLETED | OUTPATIENT
Start: 2020-01-27 | End: 2020-01-27

## 2020-01-27 RX ORDER — DEXAMETHASONE 0.5 MG/5ML
12 ELIXIR ORAL ONCE
Refills: 0 | Status: COMPLETED | OUTPATIENT
Start: 2020-01-27 | End: 2020-01-27

## 2020-01-27 RX ORDER — BENDAMUSTINE HYDROCHLORIDE 100 MG/20ML
195 INJECTION, POWDER, LYOPHILIZED, FOR SOLUTION INTRAVENOUS ONCE
Refills: 0 | Status: COMPLETED | OUTPATIENT
Start: 2020-01-27 | End: 2020-01-27

## 2020-01-27 RX ORDER — DIPHENHYDRAMINE HCL 50 MG
50 CAPSULE ORAL ONCE
Refills: 0 | Status: COMPLETED | OUTPATIENT
Start: 2020-01-27 | End: 2020-01-27

## 2020-01-27 RX ORDER — ACETAMINOPHEN 500 MG
650 TABLET ORAL ONCE
Refills: 0 | Status: COMPLETED | OUTPATIENT
Start: 2020-01-27 | End: 2020-01-27

## 2020-01-27 RX ADMIN — Medication 122 MILLIGRAM(S): at 09:37

## 2020-01-27 RX ADMIN — Medication 50 MILLIGRAM(S): at 10:20

## 2020-01-27 RX ADMIN — Medication 650 MILLIGRAM(S): at 09:37

## 2020-01-27 RX ADMIN — BENDAMUSTINE HYDROCHLORIDE 346.8 MILLIGRAM(S): 100 INJECTION, POWDER, LYOPHILIZED, FOR SOLUTION INTRAVENOUS at 10:36

## 2020-01-27 RX ADMIN — Medication 102 MILLIGRAM(S): at 10:00

## 2020-01-27 RX ADMIN — BENDAMUSTINE HYDROCHLORIDE 195 MILLIGRAM(S): 100 INJECTION, POWDER, LYOPHILIZED, FOR SOLUTION INTRAVENOUS at 10:50

## 2020-01-27 RX ADMIN — Medication 12 MILLIGRAM(S): at 09:57

## 2020-01-27 RX ADMIN — RITUXIMAB 202.5 MILLIGRAM(S): 10 INJECTION, SOLUTION INTRAVENOUS at 11:00

## 2020-01-27 RX ADMIN — Medication 650 MILLIGRAM(S): at 11:51

## 2020-01-27 RX ADMIN — RITUXIMAB 810 MILLIGRAM(S): 10 INJECTION, SOLUTION INTRAVENOUS at 14:15

## 2020-01-28 ENCOUNTER — OUTPATIENT (OUTPATIENT)
Dept: OUTPATIENT SERVICES | Facility: HOSPITAL | Age: 65
LOS: 1 days | Discharge: HOME | End: 2020-01-28

## 2020-01-28 ENCOUNTER — APPOINTMENT (OUTPATIENT)
Dept: INFUSION THERAPY | Facility: CLINIC | Age: 65
End: 2020-01-28

## 2020-01-28 DIAGNOSIS — Z96.0 PRESENCE OF UROGENITAL IMPLANTS: Chronic | ICD-10-CM

## 2020-01-28 DIAGNOSIS — Z51.11 ENCOUNTER FOR ANTINEOPLASTIC CHEMOTHERAPY: ICD-10-CM

## 2020-01-28 DIAGNOSIS — N18.9 CHRONIC KIDNEY DISEASE, UNSPECIFIED: ICD-10-CM

## 2020-01-28 DIAGNOSIS — C85.80 OTHER SPECIFIED TYPES OF NON-HODGKIN LYMPHOMA, UNSPECIFIED SITE: ICD-10-CM

## 2020-01-28 DIAGNOSIS — N20.0 CALCULUS OF KIDNEY: ICD-10-CM

## 2020-01-28 RX ORDER — BENDAMUSTINE HYDROCHLORIDE 100 MG/20ML
195 INJECTION, POWDER, LYOPHILIZED, FOR SOLUTION INTRAVENOUS ONCE
Refills: 0 | Status: DISCONTINUED | OUTPATIENT
Start: 2020-01-28 | End: 2020-05-03

## 2020-01-28 RX ORDER — PEGFILGRASTIM-CBQV 6 MG/.6ML
6 INJECTION, SOLUTION SUBCUTANEOUS ONCE
Refills: 0 | Status: DISCONTINUED | OUTPATIENT
Start: 2020-01-28 | End: 2020-05-03

## 2020-01-28 RX ORDER — DEXAMETHASONE 0.5 MG/5ML
12 ELIXIR ORAL ONCE
Refills: 0 | Status: DISCONTINUED | OUTPATIENT
Start: 2020-01-28 | End: 2020-05-03

## 2020-01-28 RX ORDER — DIPHENHYDRAMINE HCL 50 MG
50 CAPSULE ORAL ONCE
Refills: 0 | Status: DISCONTINUED | OUTPATIENT
Start: 2020-01-28 | End: 2020-05-03

## 2020-01-28 RX ORDER — ACETAMINOPHEN 500 MG
650 TABLET ORAL ONCE
Refills: 0 | Status: DISCONTINUED | OUTPATIENT
Start: 2020-01-28 | End: 2020-05-03

## 2020-01-28 RX ORDER — HYDROCORTISONE 20 MG
100 TABLET ORAL ONCE
Refills: 0 | Status: DISCONTINUED | OUTPATIENT
Start: 2020-01-28 | End: 2020-05-03

## 2020-01-29 ENCOUNTER — APPOINTMENT (OUTPATIENT)
Dept: INFUSION THERAPY | Facility: CLINIC | Age: 65
End: 2020-01-29

## 2020-01-30 LAB
HCT VFR BLD CALC: 32 %
HGB BLD-MCNC: 11 G/DL
MCHC RBC-ENTMCNC: 31.2 PG
MCHC RBC-ENTMCNC: 34.4 G/DL
MCV RBC AUTO: 90.7 FL
PLATELET # BLD AUTO: 280 K/UL
PMV BLD: 9.2 FL
RBC # BLD: 3.53 M/UL
RBC # FLD: 17.4 %
WBC # FLD AUTO: 9.98 K/UL
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
## 2020-02-01 DIAGNOSIS — F20.0 PARANOID SCHIZOPHRENIA: ICD-10-CM

## 2020-02-01 DIAGNOSIS — D89.9 DISORDER INVOLVING THE IMMUNE MECHANISM, UNSPECIFIED: ICD-10-CM

## 2020-02-01 DIAGNOSIS — B02.7 DISSEMINATED ZOSTER: ICD-10-CM

## 2020-02-01 DIAGNOSIS — K52.1 TOXIC GASTROENTERITIS AND COLITIS: ICD-10-CM

## 2020-02-01 DIAGNOSIS — E86.1 HYPOVOLEMIA: ICD-10-CM

## 2020-02-01 DIAGNOSIS — C85.80 OTHER SPECIFIED TYPES OF NON-HODGKIN LYMPHOMA, UNSPECIFIED SITE: ICD-10-CM

## 2020-02-01 DIAGNOSIS — I95.89 OTHER HYPOTENSION: ICD-10-CM

## 2020-02-01 DIAGNOSIS — N26.1 ATROPHY OF KIDNEY (TERMINAL): ICD-10-CM

## 2020-02-01 DIAGNOSIS — E87.6 HYPOKALEMIA: ICD-10-CM

## 2020-02-01 DIAGNOSIS — T45.1X5A ADVERSE EFFECT OF ANTINEOPLASTIC AND IMMUNOSUPPRESSIVE DRUGS, INITIAL ENCOUNTER: ICD-10-CM

## 2020-02-01 DIAGNOSIS — D75.82 HEPARIN INDUCED THROMBOCYTOPENIA (HIT): ICD-10-CM

## 2020-02-01 DIAGNOSIS — N18.4 CHRONIC KIDNEY DISEASE, STAGE 4 (SEVERE): ICD-10-CM

## 2020-02-01 DIAGNOSIS — N17.9 ACUTE KIDNEY FAILURE, UNSPECIFIED: ICD-10-CM

## 2020-02-10 ENCOUNTER — INPATIENT (INPATIENT)
Facility: HOSPITAL | Age: 65
LOS: 3 days | Discharge: HOME | End: 2020-02-14
Attending: INTERNAL MEDICINE | Admitting: INTERNAL MEDICINE
Payer: MEDICAID

## 2020-02-10 VITALS
DIASTOLIC BLOOD PRESSURE: 59 MMHG | OXYGEN SATURATION: 100 % | TEMPERATURE: 96 F | HEART RATE: 67 BPM | RESPIRATION RATE: 17 BRPM | SYSTOLIC BLOOD PRESSURE: 120 MMHG

## 2020-02-10 DIAGNOSIS — Z98.890 OTHER SPECIFIED POSTPROCEDURAL STATES: Chronic | ICD-10-CM

## 2020-02-10 DIAGNOSIS — S42.409A UNSPECIFIED FRACTURE OF LOWER END OF UNSPECIFIED HUMERUS, INITIAL ENCOUNTER FOR CLOSED FRACTURE: Chronic | ICD-10-CM

## 2020-02-10 DIAGNOSIS — Z96.0 PRESENCE OF UROGENITAL IMPLANTS: Chronic | ICD-10-CM

## 2020-02-10 LAB
ALBUMIN SERPL ELPH-MCNC: 3.9 G/DL — SIGNIFICANT CHANGE UP (ref 3.5–5.2)
ALP SERPL-CCNC: 132 U/L — HIGH (ref 30–115)
ALT FLD-CCNC: 12 U/L — SIGNIFICANT CHANGE UP (ref 0–41)
ANION GAP SERPL CALC-SCNC: 14 MMOL/L — SIGNIFICANT CHANGE UP (ref 7–14)
AST SERPL-CCNC: 14 U/L — SIGNIFICANT CHANGE UP (ref 0–41)
BASOPHILS # BLD AUTO: 0.06 K/UL — SIGNIFICANT CHANGE UP (ref 0–0.2)
BASOPHILS NFR BLD AUTO: 0.7 % — SIGNIFICANT CHANGE UP (ref 0–1)
BILIRUB SERPL-MCNC: 0.5 MG/DL — SIGNIFICANT CHANGE UP (ref 0.2–1.2)
BUN SERPL-MCNC: 25 MG/DL — HIGH (ref 10–20)
CALCIUM SERPL-MCNC: 9.6 MG/DL — SIGNIFICANT CHANGE UP (ref 8.5–10.1)
CHLORIDE SERPL-SCNC: 107 MMOL/L — SIGNIFICANT CHANGE UP (ref 98–110)
CO2 SERPL-SCNC: 18 MMOL/L — SIGNIFICANT CHANGE UP (ref 17–32)
CREAT SERPL-MCNC: 2.3 MG/DL — HIGH (ref 0.7–1.5)
EOSINOPHIL # BLD AUTO: 0.45 K/UL — SIGNIFICANT CHANGE UP (ref 0–0.7)
EOSINOPHIL NFR BLD AUTO: 5.2 % — SIGNIFICANT CHANGE UP (ref 0–8)
GLUCOSE SERPL-MCNC: 111 MG/DL — HIGH (ref 70–99)
HCT VFR BLD CALC: 31.9 % — LOW (ref 42–52)
HGB BLD-MCNC: 11.1 G/DL — LOW (ref 14–18)
IMM GRANULOCYTES NFR BLD AUTO: 1.4 % — HIGH (ref 0.1–0.3)
LYMPHOCYTES # BLD AUTO: 0.29 K/UL — LOW (ref 1.2–3.4)
LYMPHOCYTES # BLD AUTO: 3.3 % — LOW (ref 20.5–51.1)
MCHC RBC-ENTMCNC: 33.5 PG — HIGH (ref 27–31)
MCHC RBC-ENTMCNC: 34.8 G/DL — SIGNIFICANT CHANGE UP (ref 32–37)
MCV RBC AUTO: 96.4 FL — HIGH (ref 80–94)
MONOCYTES # BLD AUTO: 0.89 K/UL — HIGH (ref 0.1–0.6)
MONOCYTES NFR BLD AUTO: 10.3 % — HIGH (ref 1.7–9.3)
NEUTROPHILS # BLD AUTO: 6.85 K/UL — HIGH (ref 1.4–6.5)
NEUTROPHILS NFR BLD AUTO: 79.1 % — HIGH (ref 42.2–75.2)
NRBC # BLD: 0 /100 WBCS — SIGNIFICANT CHANGE UP (ref 0–0)
PLATELET # BLD AUTO: 296 K/UL — SIGNIFICANT CHANGE UP (ref 130–400)
POTASSIUM SERPL-MCNC: 4.3 MMOL/L — SIGNIFICANT CHANGE UP (ref 3.5–5)
POTASSIUM SERPL-SCNC: 4.3 MMOL/L — SIGNIFICANT CHANGE UP (ref 3.5–5)
PROT SERPL-MCNC: 5.5 G/DL — LOW (ref 6–8)
RBC # BLD: 3.31 M/UL — LOW (ref 4.7–6.1)
RBC # FLD: 19.1 % — HIGH (ref 11.5–14.5)
SODIUM SERPL-SCNC: 139 MMOL/L — SIGNIFICANT CHANGE UP (ref 135–146)
WBC # BLD: 8.66 K/UL — SIGNIFICANT CHANGE UP (ref 4.8–10.8)
WBC # FLD AUTO: 8.66 K/UL — SIGNIFICANT CHANGE UP (ref 4.8–10.8)

## 2020-02-10 PROCEDURE — 71046 X-RAY EXAM CHEST 2 VIEWS: CPT | Mod: 26

## 2020-02-10 PROCEDURE — 99285 EMERGENCY DEPT VISIT HI MDM: CPT

## 2020-02-10 PROCEDURE — 93010 ELECTROCARDIOGRAM REPORT: CPT

## 2020-02-10 RX ORDER — PHENAZOPYRIDINE HCL 100 MG
1 TABLET ORAL
Qty: 0 | Refills: 0 | DISCHARGE

## 2020-02-10 RX ORDER — TAMSULOSIN HYDROCHLORIDE 0.4 MG/1
1 CAPSULE ORAL
Qty: 0 | Refills: 0 | DISCHARGE

## 2020-02-10 RX ORDER — BENZTROPINE MESYLATE 1 MG
2 TABLET ORAL AT BEDTIME
Refills: 0 | Status: DISCONTINUED | OUTPATIENT
Start: 2020-02-10 | End: 2020-02-14

## 2020-02-10 RX ORDER — ALLOPURINOL 300 MG
50 TABLET ORAL DAILY
Refills: 0 | Status: DISCONTINUED | OUTPATIENT
Start: 2020-02-10 | End: 2020-02-11

## 2020-02-10 RX ORDER — PANTOPRAZOLE SODIUM 20 MG/1
40 TABLET, DELAYED RELEASE ORAL
Refills: 0 | Status: DISCONTINUED | OUTPATIENT
Start: 2020-02-10 | End: 2020-02-14

## 2020-02-10 RX ORDER — HYDROCORTISONE 20 MG
100 TABLET ORAL EVERY 6 HOURS
Refills: 0 | Status: DISCONTINUED | OUTPATIENT
Start: 2020-02-10 | End: 2020-02-13

## 2020-02-10 RX ORDER — DIPHENHYDRAMINE HCL 50 MG
50 CAPSULE ORAL ONCE
Refills: 0 | Status: COMPLETED | OUTPATIENT
Start: 2020-02-10 | End: 2020-02-10

## 2020-02-10 RX ORDER — FLUPHENAZINE HYDROCHLORIDE 1 MG/1
10 TABLET, FILM COATED ORAL DAILY
Refills: 0 | Status: DISCONTINUED | OUTPATIENT
Start: 2020-02-10 | End: 2020-02-14

## 2020-02-10 RX ORDER — DIPHENHYDRAMINE HCL 50 MG
50 CAPSULE ORAL EVERY 8 HOURS
Refills: 0 | Status: DISCONTINUED | OUTPATIENT
Start: 2020-02-10 | End: 2020-02-14

## 2020-02-10 RX ADMIN — Medication 50 MILLIGRAM(S): at 20:00

## 2020-02-10 RX ADMIN — Medication 125 MILLIGRAM(S): at 20:00

## 2020-02-10 RX ADMIN — Medication 2 MILLIGRAM(S): at 22:08

## 2020-02-10 NOTE — ED PROVIDER NOTE - PHYSICAL EXAMINATION
CONST: Well appearing in NAD  EYES: Sclera and conjunctiva clear.  CARD: Normal S1 S2; Normal rate and rhythm  RESP: Equal BS B/L, No wheezes, rhonchi or rales. No distress  GI: Soft, non-tender, non-distended.  MS: Normal ROM in all extremities. No edema of lower extremities, no calf pain, radial pulses 2+ bilaterally  SKIN: erythematous rash bilateral upper extremities, warm, blanching, superficial necrotic plaque R upper back with erythematous granulation tissue underneath, no vesicles, no rash crossing midline.   NEURO: A&Ox3, No focal deficits. Strength 5/5 with no sensory deficits, steady gait

## 2020-02-10 NOTE — H&P ADULT - NSHPPHYSICALEXAM_GEN_ALL_CORE
GENERAL: Moderate distress as he is itching persistently   HEAD:  Atraumatic, Normocephalic  EYES: conjunctiva and sclera clear  NECK: No LAD   CHEST/LUNG: Clear to auscultation bilaterally; No rales, rhonchi, wheezing, or rubs. Unlabored respirations  HEART: Regular rate and rhythm, + S1S2   ABDOMEN: Bowel sounds present; Soft, Nontender, Nondistended.  EXTREMITIES:  + Peripheral Pulses, no edema   NERVOUS SYSTEM:  Alert & Oriented X3, speech clear.   SKIN: Diffuse erythema with multiple excoriations. No urticaria noted

## 2020-02-10 NOTE — ED PROVIDER NOTE - OBJECTIVE STATEMENT
64 y.o male w/ hx of lymphoma, CKD, renal calculi, paranoid, schizophrenia presents to the ED for evaluation of multiple complaints.  Dx w/ herpes zoster about 1 month ago, started on acyclovir and finished course. Has been following with ID for Zoster and last saw Dr. Luke 1/21 and told to finish last marlene of valtrex on 1/25.  Since then noted that rash is healing with necrotic plaque.  Also reports after last chemotherapy tx 1.5 wks developed erythematous rash of bilateral upper extremities, pruritic.  Called his hem/onc MD, was told to come to ED for admission of IV steroids.  Denies new meds, detergents, lotions, clothes.  No tongue/lip swelling, chest pain, dyspnea, wheezing, fever, chills.

## 2020-02-10 NOTE — ED ADULT NURSE NOTE - NSIMPLEMENTINTERV_GEN_ALL_ED
Implemented All Fall Risk Interventions:  [place] to call system. Call bell, personal items and telephone within reach. Instruct patient to call for assistance. Room bathroom lighting operational. Non-slip footwear when patient is off stretcher. Physically safe environment: no spills, clutter or unnecessary equipment. Stretcher in lowest position, wheels locked, appropriate side rails in place. Provide visual cue, wrist band, yellow gown, etc. Monitor gait and stability. Monitor for mental status changes and reorient to person, place, and time. Review medications for side effects contributing to fall risk. Reinforce activity limits and safety measures with patient and family.

## 2020-02-10 NOTE — ED PROVIDER NOTE - ATTENDING CONTRIBUTION TO CARE
65 y/o male h/o CKD, renal calculi, schizophrenia, lymphoma on chemotherapy, now presets with pruritic rash to b/l arms and trunk x  approx 3 weeks and zoster x approx 4 weeks on acyclovir, sx are peristent, denies modifying factors, denies other associated complaints, denies new medications other than chemo, environmental exposures.     Old chart reviewed.  I have reviewed and agree with the nursing note, except as documented in my note.    VSS, awake, alert, in NAD, normal conjunctivae, oropharynx clear, no stridor, drooling, swelling, mobilizing secretions, chest CTAB, +S1/S2, RRR, abdomen soft, NT, erythematous maculopapular, coalescing rash bilateral upper extremities and trunk with some minor desquamation, no warmth, induration, fluctuance, lymphangitis.

## 2020-02-10 NOTE — H&P ADULT - HISTORY OF PRESENT ILLNESS
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 His last chemo session was a week and a half ago, and the rash started about a week ago and got worse 4 days ago. The rash is mainly located at the upper extremities, face and lower extremities, continuous, extremely pruritic with resultant excoriations. He reports mild dyspnea as well as mild swelling of the face and lower extremities, but denies stridor or dysphonia. he was prescribed Benadryl a week ago with minimal improvement. He states that he changed his detergent recently however he states that it is unlikely the culprit, he wasn't  started on any new medication, he has a pet at home. He states that Allopurinol might be a contributing factor.   In the ED : Labs revealing known anemia, as well as ASHLEY. 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  The patient has a history of recurrent dermatitis that is known to be responsive to Benadryl. His last chemo session was a week and a half ago, and the rash started about a week ago and got worse 4 days ago. The rash is mainly located at the upper extremities, face and lower extremities, continuous, extremely pruritic with resultant excoriations. He reports mild dyspnea as well as mild swelling of the face and lower extremities, but denies stridor or dysphonia. He was prescribed Benadryl a week ago with minimal improvement. He states that he changed his detergent recently, however, he states that it is unlikely the culprit, he wasn't  started on any new medication, he has a pet at home. He states that allopurinol might be a contributing factor.   In the ED : Labs revealing known anemia, as well as ASHLEY. Patient received Solumedrol as well as Benadryl and admitted for monitoring

## 2020-02-10 NOTE — ED ADULT TRIAGE NOTE - CHIEF COMPLAINT QUOTE
rash to arms and body x 3 days s/p taking Rituximab. pt was dx with herpes zoster x 3 weeks ago, has necrotic area to right side of back. does not cross midline

## 2020-02-10 NOTE — H&P ADULT - NSICDXPASTSURGICALHX_GEN_ALL_CORE_FT
PAST SURGICAL HISTORY:  Elbow fracture     H/O cystoscopy     H/O umbilical hernia repair     Retained urethral stent

## 2020-02-10 NOTE — ED PROVIDER NOTE - NS ED ROS FT
Constitutional: See HPI.  Eyes: No visual changes, eye pain or discharge.   ENMT: No hearing changes, pain, discharge or infections.   Cardiac: No SOB or edema. No chest pain with exertion.  Respiratory: No cough or respiratory distress.   GI: No nausea, vomiting, diarrhea or abdominal pain.  : No dysuria, frequency or burning. No Discharge  MS: No myalgia, muscle weakness, joint pain or back pain.  Neuro: No headache or weakness.   Skin: + rash.   Except as documented in the HPI, all other systems are negative.

## 2020-02-10 NOTE — H&P ADULT - ASSESSMENT
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, Recurrent nephrolithiases s/p right ureteral stent, CKD Stage IV, Schizophrenia, gout, recent admission for diarrhea due to suspected disseminated zoster infection and induced colitis, treated with Acyclovir, underwent flexible sigmoidoscopy showing no ulcerations presenting for rash     # Rash likely drug induced dermatitis   - Per chart patient has history of rash that responds well to Benadryl   - Will Continue Prednisone 40 mg daily as well as Benadryl 50 mg q8h PRN  - If no improvement consider Dermatology consult     # Stage IV Marginal zone lymphoma   - On Chemotherapy Bendamustine / Rituxan   - Outpatient follow up with Dr. 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, Recurrent nephrolithiases s/p right ureteral stent, CKD Stage IV, Schizophrenia, gout, recent admission for diarrhea due to suspected disseminated zoster infection and induced colitis, treated with Acyclovir, underwent flexible sigmoidoscopy showing no ulcerations presenting for rash     # Rash likely drug induced dermatitis   - Per chart patient has history of rash that responds well to Benadryl   - Will Continue Hydrocortisone 100 mg IV q6h as well as Benadryl 50 mg q8h PRN  - If no improvement consider Dermatology consult     # Stage IV Marginal zone lymphoma   - On Chemotherapy Bendamustine / Rituxan   - Outpatient follow up with Dr. Gong     # ASHLEY on CKD   - Possibly related to recent Acyclovir use   - Will start on NS 0.9 % @ 75 mL/h   - Follow up urine lytes and US of the kidneys and bladder   - Monitor BMP and electrolytes   - Avoid nephrotoxic medication     # Schizophrenia   - Continue home medication of Benztropine and Fluphenazine     # History of Gout  - Allopurinol changed to 50 mg daily given ASHLEY     # Miscellaneous   - DVT prophylaxis : SCDs given past allergy to Heparin, suspicion of HIT  - GI prophylaxis : Protonix 40 mg daily   - Diet : DASH TLC   - Activity : Ambulate as tolerated   - Full code 64 year old male patient with past medical history of Stage IV marginal zone lymphoma on bendamustine / Rituxan (S/P 3 cycles), recurrent nephrolithiases s/p right ureteral stent, CKD Stage IV, schizophrenia, gout, recent admission for diarrhea due to suspected disseminated zoster infection induced colitis, treated with acyclovir; underwent flexible sigmoidoscopy showing no ulcerations presenting for rash.     # Rash likely drug induced dermatitis:   - Per chart, the. patient has history of rash that responds well to Benadryl   - Will Continue Hydrocortisone 100 mg IV q6h as well as Benadryl 50 mg q8h PRN  - If no improvement consider Dermatology consult     # Stage IV Marginal zone lymphoma:  - On chemotherapy with bendamustine / Rituxan, S/P 3 cycles.  - Outpatient follow up with Dr. Gong.     # ASHLEY on CKD:  - Possibly related to recent Acyclovir use   - Will start on NS 0.9 % @ 75 mL/h   - Follow up urine lytes and US of the kidneys and bladder   - Monitor BMP and electrolytes   - Avoid nephrotoxic medication     # Schizophrenia:  - Continue home medication of Benztropine and Fluphenazine     # History of Gout:  - Allopurinol changed to 50 mg daily given ASHLEY     # Miscellaneous:   - DVT prophylaxis : SCDs given past allergy to Heparin, suspicion of HIT.  - GI prophylaxis : Protonix 40 mg daily.   - Diet : DASH TLC   - Activity : Ambulate as tolerated   - Full code

## 2020-02-11 LAB
ALBUMIN SERPL ELPH-MCNC: 3.6 G/DL — SIGNIFICANT CHANGE UP (ref 3.5–5.2)
ALP SERPL-CCNC: 119 U/L — HIGH (ref 30–115)
ALT FLD-CCNC: 11 U/L — SIGNIFICANT CHANGE UP (ref 0–41)
ANION GAP SERPL CALC-SCNC: 11 MMOL/L — SIGNIFICANT CHANGE UP (ref 7–14)
AST SERPL-CCNC: 12 U/L — SIGNIFICANT CHANGE UP (ref 0–41)
BASOPHILS # BLD AUTO: 0.01 K/UL — SIGNIFICANT CHANGE UP (ref 0–0.2)
BASOPHILS NFR BLD AUTO: 0.2 % — SIGNIFICANT CHANGE UP (ref 0–1)
BILIRUB SERPL-MCNC: 0.3 MG/DL — SIGNIFICANT CHANGE UP (ref 0.2–1.2)
BUN SERPL-MCNC: 31 MG/DL — HIGH (ref 10–20)
CALCIUM SERPL-MCNC: 9.3 MG/DL — SIGNIFICANT CHANGE UP (ref 8.5–10.1)
CHLORIDE SERPL-SCNC: 110 MMOL/L — SIGNIFICANT CHANGE UP (ref 98–110)
CO2 SERPL-SCNC: 20 MMOL/L — SIGNIFICANT CHANGE UP (ref 17–32)
CREAT SERPL-MCNC: 2.4 MG/DL — HIGH (ref 0.7–1.5)
EOSINOPHIL # BLD AUTO: 0 K/UL — SIGNIFICANT CHANGE UP (ref 0–0.7)
EOSINOPHIL NFR BLD AUTO: 0 % — SIGNIFICANT CHANGE UP (ref 0–8)
GLUCOSE SERPL-MCNC: 158 MG/DL — HIGH (ref 70–99)
HCT VFR BLD CALC: 28.2 % — LOW (ref 42–52)
HGB BLD-MCNC: 9.4 G/DL — LOW (ref 14–18)
IMM GRANULOCYTES NFR BLD AUTO: 2.8 % — HIGH (ref 0.1–0.3)
LYMPHOCYTES # BLD AUTO: 0.14 K/UL — LOW (ref 1.2–3.4)
LYMPHOCYTES # BLD AUTO: 3.2 % — LOW (ref 20.5–51.1)
MAGNESIUM SERPL-MCNC: 2.3 MG/DL — SIGNIFICANT CHANGE UP (ref 1.8–2.4)
MCHC RBC-ENTMCNC: 31.6 PG — HIGH (ref 27–31)
MCHC RBC-ENTMCNC: 33.3 G/DL — SIGNIFICANT CHANGE UP (ref 32–37)
MCV RBC AUTO: 94.9 FL — HIGH (ref 80–94)
MONOCYTES # BLD AUTO: 0.1 K/UL — SIGNIFICANT CHANGE UP (ref 0.1–0.6)
MONOCYTES NFR BLD AUTO: 2.3 % — SIGNIFICANT CHANGE UP (ref 1.7–9.3)
NEUTROPHILS # BLD AUTO: 3.96 K/UL — SIGNIFICANT CHANGE UP (ref 1.4–6.5)
NEUTROPHILS NFR BLD AUTO: 91.5 % — HIGH (ref 42.2–75.2)
NRBC # BLD: 0 /100 WBCS — SIGNIFICANT CHANGE UP (ref 0–0)
PLATELET # BLD AUTO: 277 K/UL — SIGNIFICANT CHANGE UP (ref 130–400)
POTASSIUM SERPL-MCNC: 5.1 MMOL/L — HIGH (ref 3.5–5)
POTASSIUM SERPL-SCNC: 5.1 MMOL/L — HIGH (ref 3.5–5)
PROT SERPL-MCNC: 5 G/DL — LOW (ref 6–8)
RBC # BLD: 2.97 M/UL — LOW (ref 4.7–6.1)
RBC # FLD: 19.1 % — HIGH (ref 11.5–14.5)
SODIUM SERPL-SCNC: 141 MMOL/L — SIGNIFICANT CHANGE UP (ref 135–146)
WBC # BLD: 4.33 K/UL — LOW (ref 4.8–10.8)
WBC # FLD AUTO: 4.33 K/UL — LOW (ref 4.8–10.8)

## 2020-02-11 PROCEDURE — 99231 SBSQ HOSP IP/OBS SF/LOW 25: CPT

## 2020-02-11 PROCEDURE — 99223 1ST HOSP IP/OBS HIGH 75: CPT

## 2020-02-11 PROCEDURE — 76770 US EXAM ABDO BACK WALL COMP: CPT | Mod: 26

## 2020-02-11 RX ORDER — VANCOMYCIN HCL 1 G
VIAL (EA) INTRAVENOUS
Refills: 0 | Status: DISCONTINUED | OUTPATIENT
Start: 2020-02-11 | End: 2020-02-11

## 2020-02-11 RX ORDER — SODIUM CHLORIDE 9 MG/ML
1000 INJECTION INTRAMUSCULAR; INTRAVENOUS; SUBCUTANEOUS
Refills: 0 | Status: DISCONTINUED | OUTPATIENT
Start: 2020-02-11 | End: 2020-02-12

## 2020-02-11 RX ORDER — VANCOMYCIN HCL 1 G
1250 VIAL (EA) INTRAVENOUS ONCE
Refills: 0 | Status: COMPLETED | OUTPATIENT
Start: 2020-02-11 | End: 2020-02-11

## 2020-02-11 RX ORDER — ALLOPURINOL 300 MG
300 TABLET ORAL DAILY
Refills: 0 | Status: DISCONTINUED | OUTPATIENT
Start: 2020-02-11 | End: 2020-02-14

## 2020-02-11 RX ADMIN — Medication 100 MILLIGRAM(S): at 11:47

## 2020-02-11 RX ADMIN — Medication 250 MILLIGRAM(S): at 11:42

## 2020-02-11 RX ADMIN — Medication 100 MILLIGRAM(S): at 05:14

## 2020-02-11 RX ADMIN — PANTOPRAZOLE SODIUM 40 MILLIGRAM(S): 20 TABLET, DELAYED RELEASE ORAL at 08:56

## 2020-02-11 RX ADMIN — FLUPHENAZINE HYDROCHLORIDE 10 MILLIGRAM(S): 1 TABLET, FILM COATED ORAL at 11:43

## 2020-02-11 RX ADMIN — Medication 100 MILLIGRAM(S): at 18:44

## 2020-02-11 RX ADMIN — SODIUM CHLORIDE 75 MILLILITER(S): 9 INJECTION INTRAMUSCULAR; INTRAVENOUS; SUBCUTANEOUS at 21:53

## 2020-02-11 RX ADMIN — Medication 2 MILLIGRAM(S): at 21:53

## 2020-02-11 RX ADMIN — Medication 300 MILLIGRAM(S): at 11:43

## 2020-02-11 RX ADMIN — SODIUM CHLORIDE 75 MILLILITER(S): 9 INJECTION INTRAMUSCULAR; INTRAVENOUS; SUBCUTANEOUS at 16:39

## 2020-02-11 NOTE — CONSULT NOTE ADULT - ASSESSMENT
IMPRESSION:  Partial thickness wound to right back and chest  - Local wound care of silvadene, adaptic, and cover with abd pad twice a day  - Likely need bedside debridement to remove eschar on back wound  - Right chest wound healing  - Consult was placed earlier today. After the consult was completed with the patient, team saw consult order was canceled. Based on intervention change, consult note written.     - Remainder of care per primary team IMPRESSION:  Partial thickness wound to right back and chest  - Local wound care of silvadene, adaptic, and cover with abd pad twice a day  Likely can perform  bedside debridement to remove eschar on back wound  - Right chest wound healing  - Consult was placed earlier today. After the consult was completed with the patient, team saw consult order was canceled. Based on intervention change, consult note written.     - Remainder of care per primary team

## 2020-02-11 NOTE — PROGRESS NOTE ADULT - SUBJECTIVE AND OBJECTIVE BOX
ELDA COVARRUBIAS 64y Male  MRN#: [id number]   SUBJECTIVE  Patient is a 64y old Male who presents with a chief complaint of Rash   Currently admitted to medicine with the primary diagnosis of Rash, skin  Today is hospital day 1d, and this morning he reports no overnight events.     OBJECTIVE  PAST MEDICAL & SURGICAL HISTORY  Atrophic kidney  Shingles  Lymphoma  Schizophrenia  Kidney stones  H/O cystoscopy  Elbow fracture  H/O umbilical hernia repair  Retained urethral stent    ALLERGIES:  heparin (Urticaria)    MEDICATIONS:  STANDING MEDICATIONS  allopurinol 300 milliGRAM(s) Oral daily  benztropine 2 milliGRAM(s) Oral at bedtime  fluPHENAZine 10 milliGRAM(s) Oral daily  hydrocortisone sodium succinate Injectable 100 milliGRAM(s) IV Push every 6 hours  pantoprazole    Tablet 40 milliGRAM(s) Oral before breakfast  vancomycin  IVPB 1250 milliGRAM(s) IV Intermittent once  vancomycin  IVPB        PRN MEDICATIONS  diphenhydrAMINE 50 milliGRAM(s) Oral every 8 hours PRN      VITAL SIGNS: Last 24 Hours  T(C): 36.1 (11 Feb 2020 08:17), Max: 36.4 (10 Feb 2020 20:02)  T(F): 97 (11 Feb 2020 08:17), Max: 97.6 (10 Feb 2020 20:02)  HR: 81 (11 Feb 2020 08:17) (67 - 102)  BP: 98/59 (11 Feb 2020 08:17) (98/59 - 137/85)  BP(mean): --  RR: 18 (11 Feb 2020 08:17) (17 - 18)  SpO2: 98% (11 Feb 2020 08:17) (98% - 100%)    LABS:                        9.4    4.33  )-----------( 277      ( 11 Feb 2020 06:22 )             28.2     02-11    141  |  110  |  31<H>  ----------------------------<  158<H>  5.1<H>   |  20  |  2.4<H>    Ca    9.3      11 Feb 2020 06:22  Mg     2.3     02-11    TPro  5.0<L>  /  Alb  3.6  /  TBili  0.3  /  DBili  x   /  AST  12  /  ALT  11  /  AlkPhos  119<H>  02-11      RADIOLOGY:  < from: Xray Chest 2 Views PA/Lat (02.10.20 @ 18:10) >  Impression:      No radiographic evidence of acute cardiopulmonary disease.      < end of copied text >    PHYSICAL EXAM:    GENERAL: NAD, well-developed, AAOx3  HEENT:  Atraumatic, Normocephalic. EOMI, PERRLA, conjunctiva and sclera clear, No JVD  PULMONARY: Clear to auscultation bilaterally; No wheeze  CARDIOVASCULAR: Regular rate and rhythm; No murmurs, rubs, or gallops  GASTROINTESTINAL: Soft, Nontender, Nondistended; Bowel sounds present  MUSCULOSKELETAL:  2+ Peripheral Pulses, No clubbing, cyanosis, or edema  NEUROLOGY: non-focal  SKIN: Dark scab on back of chest on left side, front of chest with evidence of pus. 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    #Disseminated herpes zoster  -Patient completed acyclovir course as per ID on 1/25/20   -He currently has dark scabbing wound on the back of his chest about [id number] on right side, another one on the front of chest has open wound with evidence of pus.   -No evidence of sepsis   -will consult burn and ID for the wounds  -started on vancomycin renally dosed as wound on front of the chest looks infected    #Rash likely drug induced dermatitis   -Excoriating rash present on abdomen, upper and lower extremities which are pruritic  -Unlikely if it is due to chemo medications or detergent he is using at home  - Per chart patient has history of rash that responds well to Benadryl   - Will Continue Hydrocortisone 100 mg IV q6h as well as Benadryl 50 mg q8h PRN    # Stage IV Marginal zone lymphoma   - On Chemotherapy Bendamustine / Rituxan   - Outpatient follow up with Dr. Gong     # ASHLEY on CKD   - Possibly related to recent Acyclovir use   - Will start on NS 0.9 % @ 75 mL/h   - Follow up urine lytes and US of the kidneys and bladder   - Monitor BMP and electrolytes   - Avoid nephrotoxic medication     # Schizophrenia   - Continue home medication of Benztropine and Fluphenazine     # History of Gout  - Allopurinol changed to 50 mg daily given ASHLEY     # Miscellaneous   - DVT prophylaxis : SCDs given past allergy to Heparin, suspicion of HIT  - GI prophylaxis : Protonix 40 mg daily   - Diet : DASH TLC   - Activity : Ambulate as tolerated   - Full code ELDA COVARRUBIAS 64y Male  MRN#: [id number]   SUBJECTIVE  Patient is a 64y old Male who presents with a chief complaint of Rash   Currently admitted to medicine with the primary diagnosis of Rash, skin  Today is hospital day 1d, and this morning he reports no overnight events.     OBJECTIVE  PAST MEDICAL & SURGICAL HISTORY  Atrophic kidney  Shingles  Lymphoma  Schizophrenia  Kidney stones  H/O cystoscopy  Elbow fracture  H/O umbilical hernia repair  Retained urethral stent    ALLERGIES:  heparin (Urticaria)    MEDICATIONS:  STANDING MEDICATIONS  allopurinol 300 milliGRAM(s) Oral daily  benztropine 2 milliGRAM(s) Oral at bedtime  fluPHENAZine 10 milliGRAM(s) Oral daily  hydrocortisone sodium succinate Injectable 100 milliGRAM(s) IV Push every 6 hours  pantoprazole    Tablet 40 milliGRAM(s) Oral before breakfast  vancomycin  IVPB 1250 milliGRAM(s) IV Intermittent once  vancomycin  IVPB        PRN MEDICATIONS  diphenhydrAMINE 50 milliGRAM(s) Oral every 8 hours PRN      VITAL SIGNS: Last 24 Hours  T(C): 36.1 (11 Feb 2020 08:17), Max: 36.4 (10 Feb 2020 20:02)  T(F): 97 (11 Feb 2020 08:17), Max: 97.6 (10 Feb 2020 20:02)  HR: 81 (11 Feb 2020 08:17) (67 - 102)  BP: 98/59 (11 Feb 2020 08:17) (98/59 - 137/85)  BP(mean): --  RR: 18 (11 Feb 2020 08:17) (17 - 18)  SpO2: 98% (11 Feb 2020 08:17) (98% - 100%)    LABS:                        9.4    4.33  )-----------( 277      ( 11 Feb 2020 06:22 )             28.2     02-11    141  |  110  |  31<H>  ----------------------------<  158<H>  5.1<H>   |  20  |  2.4<H>    Ca    9.3      11 Feb 2020 06:22  Mg     2.3     02-11    TPro  5.0<L>  /  Alb  3.6  /  TBili  0.3  /  DBili  x   /  AST  12  /  ALT  11  /  AlkPhos  119<H>  02-11      RADIOLOGY:  < from: Xray Chest 2 Views PA/Lat (02.10.20 @ 18:10) >  Impression:      No radiographic evidence of acute cardiopulmonary disease.      < end of copied text >    PHYSICAL EXAM:    GENERAL: NAD, well-developed, AAOx3  HEENT:  Atraumatic, Normocephalic. EOMI, PERRLA, conjunctiva and sclera clear, No JVD  PULMONARY: Clear to auscultation bilaterally; No wheeze  CARDIOVASCULAR: Regular rate and rhythm; No murmurs, rubs, or gallops  GASTROINTESTINAL: Soft, Nontender, Nondistended; Bowel sounds present  MUSCULOSKELETAL:  2+ Peripheral Pulses, No clubbing, cyanosis, or edema  NEUROLOGY: non-focal  SKIN: Dark scab on back of chest on left side, front of chest with evidence of pus. 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    #Disseminated herpes zoster  -Patient completed acyclovir course as per ID on 1/25/20   -He currently has dark scabbing wound on the back of his chest about [id number] on right side, another one on the front of chest is healing.   -No evidence of sepsis   -will hold off on burn consult for now as wounds appear to be healing    #Rash likely drug induced dermatitis   -Excoriating rash present on abdomen, upper and lower extremities which are pruritic  -Unlikely if it is due to chemo medications rituximab and bendamustine as patient was started on them in Oct 2019  -Per chart patient has history of rash that responds well to Benadryl   -Will Continue Hydrocortisone 100 mg IV q6h as well as Benadryl 50 mg q8h PRN    # Stage IV Marginal zone lymphoma   - On Chemotherapy Bendamustine / Rituxan   - Outpatient follow up with Dr. Gong     # ASHLEY on CKD   - Possibly related to recent Acyclovir use   - Will start on NS 0.9 % @ 75 mL/h   - Follow up urine lytes and US of the kidneys and bladder   - Avoid nephrotoxic medication     # Schizophrenia   - Continue home medication of Benztropine and Fluphenazine     # History of Gout  - Allopurinol changed to 50 mg daily given ASHLEY     # Miscellaneous   - DVT prophylaxis : SCDs given past allergy to Heparin, suspicion of HIT  - GI prophylaxis : Protonix 40 mg daily   - Diet : DASH TLC   - Activity : Ambulate as tolerated   - Full code

## 2020-02-11 NOTE — PROGRESS NOTE ADULT - ATTENDING COMMENTS
Patient was seen and examined by myself too. I agree with Dr. Claire's (Hem-Onc fellow) note above. Modifications made. Situation discussed with her and the patient.  All questions answered.

## 2020-02-11 NOTE — CONSULT NOTE ADULT - SUBJECTIVE AND OBJECTIVE BOX
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  Also with ulcerations to right back and right lateral chest, for which Burn team was consulted.       PAST MEDICAL & SURGICAL HISTORY:  Atrophic kidney  Shingles  Lymphoma  Schizophrenia  Kidney stones  H/O cystoscopy  Elbow fracture  H/O umbilical hernia repair  Retained urethral stent      PHYSICAL EXAM:  GENERAL: NAD, laying comfortably in bed  Oral- crusting to upper lip  SKIN: 16x6 cm partial thickness wound to right mid-back, central black eschar overlying wound, loose at edges but middle is adherent.   5x1 cm partial thickness wound mid-chest, along mid-axillary line, pink tissue with area of white slough overlying, healing. Both wounds with pink wound margins.   No surrounding edema, redness, purulent fluid, or malodor. No vesicles present. 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  Also with ulcerations to right back and right lateral chest, for which Burn team was consulted.       PAST MEDICAL & SURGICAL HISTORY:  Atrophic kidney  Shingles  Lymphoma  Schizophrenia  Kidney stones  H/O cystoscopy  Elbow fracture  H/O umbilical hernia repair  Retained urethral stent    PAST SURGICAL HISTORY:  Elbow fracture   H/O cystoscopy   H/O umbilical hernia repair   Retained urethral stent.     Allergies  Heparin    FAMILY HISTORY:  FH: diabetes mellitus  FH: hypertension.    PHYSICAL EXAM:  GENERAL: NAD, laying comfortably in bed  Oral- crusting to upper lip  SKIN: 16x6 cm partial thickness wound to right mid-back, central black eschar overlying wound, loose at edges but middle is adherent.   5x1 cm partial thickness wound mid-chest, along mid-axillary line, pink tissue with area of white slough overlying, healing. Both wounds with pink wound margins.   No surrounding edema, redness, purulent fluid, or malodor. No vesicles present. 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  Also with ulcerations to right back and right lateral chest, for which Burn team was consulted.       PAST MEDICAL & SURGICAL HISTORY:  Atrophic kidney  Shingles  Lymphoma  Schizophrenia  Kidney stones  H/O cystoscopy  Elbow fracture  H/O umbilical hernia repair  Retained urethral stent    PAST SURGICAL HISTORY:  Elbow fracture   H/O cystoscopy   H/O umbilical hernia repair   Retained urethral stent.     Allergies  Heparin    FAMILY HISTORY:  FH: diabetes mellitus  FH: hypertension.    PHYSICAL EXAM:  GENERAL: NAD, laying comfortably in bed  Oral- crusting to upper lip  SKIN: 16x6 cm wound to right mid-back, thick adherent dry central black eschar overlying wound, loose at edges but middle is adherent.   5x1 cm partial thickness wound mid-chest, along mid-axillary line, pink tissue with area of white slough overlying, healing. Both wounds with pink wound margins.   No surrounding edema, redness, purulent fluid, or malodor. No vesicles present.

## 2020-02-11 NOTE — PROGRESS NOTE ADULT - SUBJECTIVE AND OBJECTIVE BOX
Patient is a 64y old  Male who presents with a chief complaint of Rash (10 Feb 2020 20:28)      Subjective: No fever/chills  He has diffuse redness/ rash all over the body with scaling with pruritis       Vital Signs Last 24 Hrs  T(C): 36.1 (11 Feb 2020 08:17), Max: 36.4 (10 Feb 2020 20:02)  T(F): 97 (11 Feb 2020 08:17), Max: 97.6 (10 Feb 2020 20:02)  HR: 81 (11 Feb 2020 08:17) (67 - 102)  BP: 98/59 (11 Feb 2020 08:17) (98/59 - 137/85)  BP(mean): --  RR: 18 (11 Feb 2020 08:17) (17 - 18)  SpO2: 98% (11 Feb 2020 08:17) (98% - 100%)    PHYSICAL EXAM  General: adult in NAD  HEENT: clear oropharynx  Neck: supple  CV: normal S1/S2   Lungs: positive air movement b/l ant lungs  Abdomen: soft non-tender  Ext: no clubbing cyanosis or edema  Skin: Diffuse erythema with multiple excoriations and skin scaling+  Pervious zoster area excoriation /crusting noted in the back and abdomen area   Neuro: alert and oriented X 4, no focal deficits    MEDICATIONS  (STANDING):  allopurinol 50 milliGRAM(s) Oral daily  benztropine 2 milliGRAM(s) Oral at bedtime  fluPHENAZine 10 milliGRAM(s) Oral daily  hydrocortisone sodium succinate Injectable 100 milliGRAM(s) IV Push every 6 hours  pantoprazole    Tablet 40 milliGRAM(s) Oral before breakfast    MEDICATIONS  (PRN):  diphenhydrAMINE 50 milliGRAM(s) Oral every 8 hours PRN Rash and/or Itching      LABS:                          9.4    4.33  )-----------( 277      ( 11 Feb 2020 06:22 )             28.2         Mean Cell Volume : 94.9 fL  Mean Cell Hemoglobin : 31.6 pg  Mean Cell Hemoglobin Concentration : 33.3 g/dL  Auto Neutrophil # : 3.96 K/uL  Auto Lymphocyte # : 0.14 K/uL  Auto Monocyte # : 0.10 K/uL  Auto Eosinophil # : 0.00 K/uL  Auto Basophil # : 0.01 K/uL  Auto Neutrophil % : 91.5 %  Auto Lymphocyte % : 3.2 %  Auto Monocyte % : 2.3 %  Auto Eosinophil % : 0.0 %  Auto Basophil % : 0.2 %      Serial CBC's  02-11 @ 06:22  Hct-28.2 / Hgb-9.4 / Plat-277 / RBC-2.97 / WBC-4.33  Serial CBC's  02-10 @ 17:38  Hct-31.9 / Hgb-11.1 / Plat-296 / RBC-3.31 / WBC-8.66      02-11    141  |  110  |  31<H>  ----------------------------<  158<H>  5.1<H>   |  20  |  2.4<H>    Ca    9.3      11 Feb 2020 06:22  Mg     2.3     02-11    TPro  5.0<L>  /  Alb  3.6  /  TBili  0.3  /  DBili  x   /  AST  12  /  ALT  11  /  AlkPhos  119<H>  02-11                                  BLOOD SMEAR INTERPRETATION:       RADIOLOGY & ADDITIONAL STUDIES: Patient is a 64y old  Male who presents with a chief complaint of Rash (10 Feb 2020 20:28).  The patient has been experiencing a more or less diffuse mild rash following his chemotherapy with bendamustine and Rituxan. After the first 2 cycles, the rash was minor, self limited and very transient. However, after the 3rd cycle it was worse and, after initial improvement, recurred a few days later and was admitted through the ER for further management.  He was started on steroids and now he feels better. The rash has not progressed; if anything it may be slightly better. The rash is erythematous, with mild pruritus and mild desquamation.      Subjective: No fever/chills.  He has diffuse redness/ rash all over the body with scaling with pruritis (see above).      Vital Signs Last 24 Hrs  T(C): 36.1 (11 Feb 2020 08:17), Max: 36.4 (10 Feb 2020 20:02)  T(F): 97 (11 Feb 2020 08:17), Max: 97.6 (10 Feb 2020 20:02)  HR: 81 (11 Feb 2020 08:17) (67 - 102)  BP: 98/59 (11 Feb 2020 08:17) (98/59 - 137/85)  BP(mean): --  RR: 18 (11 Feb 2020 08:17) (17 - 18)  SpO2: 98% (11 Feb 2020 08:17) (98% - 100%)    PHYSICAL EXAM  General: adult in NAD  HEENT: clear oropharynx  Neck: supple  CV: normal S1/S2   Lungs: positive air movement b/l ant lungs  Abdomen: soft non-tender  Ext: no clubbing cyanosis or edema  Skin: Diffuse erythema with multiple excoriations and skin scaling+  Pervious zoster area excoriation /crusting noted in the back and abdomen area   Neuro: alert and oriented X 4, no focal deficits    MEDICATIONS  (STANDING):  allopurinol 50 milliGRAM(s) Oral daily  benztropine 2 milliGRAM(s) Oral at bedtime  fluPHENAZine 10 milliGRAM(s) Oral daily  hydrocortisone sodium succinate Injectable 100 milliGRAM(s) IV Push every 6 hours  pantoprazole    Tablet 40 milliGRAM(s) Oral before breakfast    MEDICATIONS  (PRN):  diphenhydrAMINE 50 milliGRAM(s) Oral every 8 hours PRN Rash and/or Itching      LABS:                          9.4    4.33  )-----------( 277      ( 11 Feb 2020 06:22 )             28.2         Mean Cell Volume : 94.9 fL  Mean Cell Hemoglobin : 31.6 pg  Mean Cell Hemoglobin Concentration : 33.3 g/dL  Auto Neutrophil # : 3.96 K/uL  Auto Lymphocyte # : 0.14 K/uL  Auto Monocyte # : 0.10 K/uL  Auto Eosinophil # : 0.00 K/uL  Auto Basophil # : 0.01 K/uL  Auto Neutrophil % : 91.5 %  Auto Lymphocyte % : 3.2 %  Auto Monocyte % : 2.3 %  Auto Eosinophil % : 0.0 %  Auto Basophil % : 0.2 %      Serial CBC's  02-11 @ 06:22  Hct-28.2 / Hgb-9.4 / Plat-277 / RBC-2.97 / WBC-4.33  Serial CBC's  02-10 @ 17:38  Hct-31.9 / Hgb-11.1 / Plat-296 / RBC-3.31 / WBC-8.66      02-11    141  |  110  |  31<H>  ----------------------------<  158<H>  5.1<H>   |  20  |  2.4<H>    Ca    9.3      11 Feb 2020 06:22  Mg     2.3     02-11    TPro  5.0<L>  /  Alb  3.6  /  TBili  0.3  /  DBili  x   /  AST  12  /  ALT  11  /  AlkPhos  119<H>  02-11                                  BLOOD SMEAR INTERPRETATION:       RADIOLOGY & ADDITIONAL STUDIES:

## 2020-02-11 NOTE — PROGRESS NOTE ADULT - ASSESSMENT
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     1) Diffuse Rash- unclear etiology-   His symptoms started in Dec 2019 that responded to benadryl and steroids   Rituxan and bendamustine could also cause rash/skin reactions   ?zoster/acyclovir induced   c/w hydrocortisone and benadryl   If no improvement , will need derm evaluation       2) Stage 4 Marginal zone lymphoma on R-Tatyana  Last dose of Rituxan and Bendamustine was on Jan 27, followed by Neulasta   Completed 3 cycles so far    3) mild ASHLEY on CKD or progression of CKD  Baseline creatinine 1.8 to 2.5   Monitor BMP    4) Schizophrenia : Continue home medication of Benztropine and Fluphenazine     5)  History of Gout- c/w allopurinol     6) Normocytic anemia related to chemotherapy- monitor     - DVT prophylaxis : SCDs given past allergy to Heparin  -- Activity : Ambulate as tolerated   - Full code 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     1) Diffuse Rash- most likely related to bendamustine more than Rituxan.  His symptoms started in Dec 2019 that responded to benadryl and steroids.   Continue present management with hydrocortisone and Benadryl.  If no improvement , will need derm evaluation (however, seems to have improved slightly already since yesterday).       2) Stage 4 Marginal zone lymphoma on R-Tatyana  Last dose of Rituxan and Bendamustine was on Jan 27, followed by Neulasta   Completed 3 cycles so far. Due for reevaluation of his disease status    3) mild ASHLEY on CKD or progression of CKD  Baseline creatinine 1.8 to 2.5   Monitor BMP.    4) Schizophrenia : Continue home medication of Benztropine and Fluphenazine.     5)  History of Gout- c/w allopurinol.     6) Normocytic anemia related to chemotherapy- monitor     7) DVT prophylaxis : SCDs given past allergy to Heparin.    -- Activity : Ambulate as tolerated   -- Full code

## 2020-02-12 LAB
ALBUMIN SERPL ELPH-MCNC: 3.5 G/DL — SIGNIFICANT CHANGE UP (ref 3.5–5.2)
ALP SERPL-CCNC: 97 U/L — SIGNIFICANT CHANGE UP (ref 30–115)
ALT FLD-CCNC: 12 U/L — SIGNIFICANT CHANGE UP (ref 0–41)
ANION GAP SERPL CALC-SCNC: 11 MMOL/L — SIGNIFICANT CHANGE UP (ref 7–14)
AST SERPL-CCNC: 16 U/L — SIGNIFICANT CHANGE UP (ref 0–41)
BILIRUB SERPL-MCNC: <0.2 MG/DL — SIGNIFICANT CHANGE UP (ref 0.2–1.2)
BUN SERPL-MCNC: 34 MG/DL — HIGH (ref 10–20)
CALCIUM SERPL-MCNC: 8.9 MG/DL — SIGNIFICANT CHANGE UP (ref 8.5–10.1)
CHLORIDE SERPL-SCNC: 113 MMOL/L — HIGH (ref 98–110)
CO2 SERPL-SCNC: 19 MMOL/L — SIGNIFICANT CHANGE UP (ref 17–32)
CREAT SERPL-MCNC: 1.9 MG/DL — HIGH (ref 0.7–1.5)
GLUCOSE SERPL-MCNC: 126 MG/DL — HIGH (ref 70–99)
HCT VFR BLD CALC: 27.9 % — LOW (ref 42–52)
HGB BLD-MCNC: 9.4 G/DL — LOW (ref 14–18)
MAGNESIUM SERPL-MCNC: 2.2 MG/DL — SIGNIFICANT CHANGE UP (ref 1.8–2.4)
MCHC RBC-ENTMCNC: 32.8 PG — HIGH (ref 27–31)
MCHC RBC-ENTMCNC: 33.7 G/DL — SIGNIFICANT CHANGE UP (ref 32–37)
MCV RBC AUTO: 97.2 FL — HIGH (ref 80–94)
NRBC # BLD: 0 /100 WBCS — SIGNIFICANT CHANGE UP (ref 0–0)
PLATELET # BLD AUTO: 239 K/UL — SIGNIFICANT CHANGE UP (ref 130–400)
POTASSIUM SERPL-MCNC: 4.1 MMOL/L — SIGNIFICANT CHANGE UP (ref 3.5–5)
POTASSIUM SERPL-SCNC: 4.1 MMOL/L — SIGNIFICANT CHANGE UP (ref 3.5–5)
PROT SERPL-MCNC: 4.7 G/DL — LOW (ref 6–8)
RBC # BLD: 2.87 M/UL — LOW (ref 4.7–6.1)
RBC # FLD: 18.9 % — HIGH (ref 11.5–14.5)
SODIUM SERPL-SCNC: 143 MMOL/L — SIGNIFICANT CHANGE UP (ref 135–146)
WBC # BLD: 9.66 K/UL — SIGNIFICANT CHANGE UP (ref 4.8–10.8)
WBC # FLD AUTO: 9.66 K/UL — SIGNIFICANT CHANGE UP (ref 4.8–10.8)

## 2020-02-12 PROCEDURE — 99232 SBSQ HOSP IP/OBS MODERATE 35: CPT

## 2020-02-12 RX ORDER — SODIUM CHLORIDE 9 MG/ML
1000 INJECTION INTRAMUSCULAR; INTRAVENOUS; SUBCUTANEOUS
Refills: 0 | Status: DISCONTINUED | OUTPATIENT
Start: 2020-02-12 | End: 2020-02-14

## 2020-02-12 RX ADMIN — Medication 2 MILLIGRAM(S): at 21:06

## 2020-02-12 RX ADMIN — Medication 1 APPLICATION(S): at 18:18

## 2020-02-12 RX ADMIN — FLUPHENAZINE HYDROCHLORIDE 10 MILLIGRAM(S): 1 TABLET, FILM COATED ORAL at 11:31

## 2020-02-12 RX ADMIN — Medication 100 MILLIGRAM(S): at 00:30

## 2020-02-12 RX ADMIN — Medication 1 APPLICATION(S): at 05:00

## 2020-02-12 RX ADMIN — SODIUM CHLORIDE 75 MILLILITER(S): 9 INJECTION INTRAMUSCULAR; INTRAVENOUS; SUBCUTANEOUS at 21:06

## 2020-02-12 RX ADMIN — Medication 100 MILLIGRAM(S): at 05:00

## 2020-02-12 RX ADMIN — Medication 100 MILLIGRAM(S): at 18:15

## 2020-02-12 NOTE — PROGRESS NOTE ADULT - ASSESSMENT
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     1) Diffuse Rash- most likely related to bendamustine more than Rituxan.  His symptoms started in Dec 2019 that responded to benadryl and steroids.   Continue present management with hydrocortisone and Benadryl.  Rash seems to have improved   Burn eval noted- local care and stated that he likely needs bedside debridement- will discuss with attending     2) Stage 4 Marginal zone lymphoma on R-Tatyana  Last dose of Rituxan and Bendamustine was on Jan 27, followed by Neulasta   Completed 3 cycles so far. Due for reevaluation of his disease status    3) mild ASHLEY on CKD or progression of CKD  Baseline creatinine 1.8 to 2.5   Monitor BMP today     4) Schizophrenia : Continue home medication of Benztropine and Fluphenazine.     5)  History of Gout- c/w allopurinol.     6) Normocytic anemia related to chemotherapy- monitor     7) DVT prophylaxis : SCDs given past allergy to Heparin.    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     1) Diffuse Rash- most likely related to bendamustine more than Rituxan.  His symptoms started in Dec 2019 that responded to benadryl and steroids.   Continue present management with hydrocortisone and Benadryl.  Rash seems to have improved   Burn eval noted- local care and stated that he likely needs bedside debridement- will discuss with attending     2) Stage 4 Marginal zone lymphoma on R-Tatyana  Last dose of Rituxan and Bendamustine was on Jan 27, followed by Neulasta   Completed 3 cycles so far. Due for reevaluation of his disease status. Needs CT scan of the chest, abdomen and pelvis.    3) mild ASHLEY on CKD or progression of CKD  Baseline creatinine 1.8 to 2.5   Monitor BMP today     4) Schizophrenia : Continue home medication of Benztropine and Fluphenazine.     5)  History of Gout- c/w allopurinol.    6) Normocytic anemia related to chemotherapy- monitor     7) DVT prophylaxis : SCDs given past allergy to Heparin.    -- Activity : Ambulate as tolerated   -- Full code

## 2020-02-12 NOTE — PROGRESS NOTE ADULT - SUBJECTIVE AND OBJECTIVE BOX
Patient is a 64y old  Male who presents with a chief complaint of Rash (11 Feb 2020 18:47)      Subjective: His rash is significantly better      Vital Signs Last 24 Hrs  T(C): 35.6 (12 Feb 2020 08:09), Max: 36 (12 Feb 2020 00:10)  T(F): 96.1 (12 Feb 2020 08:09), Max: 96.8 (12 Feb 2020 00:10)  HR: 62 (12 Feb 2020 08:09) (62 - 74)  BP: 109/62 (12 Feb 2020 08:09) (102/56 - 109/62)  BP(mean): --  RR: 18 (12 Feb 2020 08:09) (18 - 18)  SpO2: 98% (12 Feb 2020 08:09) (98% - 99%)      PHYSICAL EXAM  General: adult in NAD  HEENT: clear oropharynx  Neck: supple  CV: normal S1/S2   Lungs: positive air movement b/l ant lungs  Abdomen: soft non-tender  Ext: no clubbing cyanosis or edema  Skin: Diffuse erythema with multiple excoriations and skin scaling+: better today   Pervious zoster area excoriation /crusting noted in the back and abdomen/lower chest area   Neuro: alert and oriented X 4, no focal deficits        MEDICATIONS  (STANDING):  allopurinol 300 milliGRAM(s) Oral daily  benztropine 2 milliGRAM(s) Oral at bedtime  fluPHENAZine 10 milliGRAM(s) Oral daily  hydrocortisone sodium succinate Injectable 100 milliGRAM(s) IV Push every 6 hours  pantoprazole    Tablet 40 milliGRAM(s) Oral before breakfast  silver sulfADIAZINE 1% Cream 1 Application(s) Topical two times a day  sodium chloride 0.9%. 1000 milliLiter(s) (75 mL/Hr) IV Continuous <Continuous>    MEDICATIONS  (PRN):  diphenhydrAMINE 50 milliGRAM(s) Oral every 8 hours PRN Rash and/or Itching      LABS:                          9.4    4.33  )-----------( 277      ( 11 Feb 2020 06:22 )             28.2         Mean Cell Volume : 94.9 fL  Mean Cell Hemoglobin : 31.6 pg  Mean Cell Hemoglobin Concentration : 33.3 g/dL  Auto Neutrophil # : 3.96 K/uL  Auto Lymphocyte # : 0.14 K/uL  Auto Monocyte # : 0.10 K/uL  Auto Eosinophil # : 0.00 K/uL  Auto Basophil # : 0.01 K/uL  Auto Neutrophil % : 91.5 %  Auto Lymphocyte % : 3.2 %  Auto Monocyte % : 2.3 %  Auto Eosinophil % : 0.0 %  Auto Basophil % : 0.2 %      Serial CBC's  02-11 @ 06:22  Hct-28.2 / Hgb-9.4 / Plat-277 / RBC-2.97 / WBC-4.33  Serial CBC's  02-10 @ 17:38  Hct-31.9 / Hgb-11.1 / Plat-296 / RBC-3.31 / WBC-8.66      02-11    141  |  110  |  31<H>  ----------------------------<  158<H>  5.1<H>   |  20  |  2.4<H>    Ca    9.3      11 Feb 2020 06:22  Mg     2.3     02-11    TPro  5.0<L>  /  Alb  3.6  /  TBili  0.3  /  DBili  x   /  AST  12  /  ALT  11  /  AlkPhos  119<H>  02-11                                  BLOOD SMEAR INTERPRETATION:       RADIOLOGY & ADDITIONAL STUDIES: Patient is a 64y old  Male who presents with a chief complaint of Rash (11 Feb 2020 18:47) (See also note from yesterday).      Subjective: His rash is significantly better. However the back wound may need wound care by wound specialist.      Vital Signs Last 24 Hrs  T(C): 35.6 (12 Feb 2020 08:09), Max: 36 (12 Feb 2020 00:10)  T(F): 96.1 (12 Feb 2020 08:09), Max: 96.8 (12 Feb 2020 00:10)  HR: 62 (12 Feb 2020 08:09) (62 - 74)  BP: 109/62 (12 Feb 2020 08:09) (102/56 - 109/62)  BP(mean): --  RR: 18 (12 Feb 2020 08:09) (18 - 18)  SpO2: 98% (12 Feb 2020 08:09) (98% - 99%)      PHYSICAL EXAM  General: adult in NAD  HEENT: clear oropharynx  Neck: supple  CV: normal S1/S2   Lungs: positive air movement b/l ant lungs  Abdomen: soft non-tender  Ext: no clubbing cyanosis or edema  Skin: Diffuse erythema with multiple excoriations and skin scaling+: better today   Pervious zoster area excoriation /crusting noted in the back and abdomen/lower chest area   Neuro: alert and oriented X 4, no focal deficits.  Back: Wound covered with dressing.        MEDICATIONS  (STANDING):  allopurinol 300 milliGRAM(s) Oral daily  benztropine 2 milliGRAM(s) Oral at bedtime  fluPHENAZine 10 milliGRAM(s) Oral daily  hydrocortisone sodium succinate Injectable 100 milliGRAM(s) IV Push every 6 hours  pantoprazole    Tablet 40 milliGRAM(s) Oral before breakfast  silver sulfADIAZINE 1% Cream 1 Application(s) Topical two times a day  sodium chloride 0.9%. 1000 milliLiter(s) (75 mL/Hr) IV Continuous <Continuous>    MEDICATIONS  (PRN):  diphenhydrAMINE 50 milliGRAM(s) Oral every 8 hours PRN Rash and/or Itching      LABS:                          9.4    4.33  )-----------( 277      ( 11 Feb 2020 06:22 )             28.2         Mean Cell Volume : 94.9 fL  Mean Cell Hemoglobin : 31.6 pg  Mean Cell Hemoglobin Concentration : 33.3 g/dL  Auto Neutrophil # : 3.96 K/uL  Auto Lymphocyte # : 0.14 K/uL  Auto Monocyte # : 0.10 K/uL  Auto Eosinophil # : 0.00 K/uL  Auto Basophil # : 0.01 K/uL  Auto Neutrophil % : 91.5 %  Auto Lymphocyte % : 3.2 %  Auto Monocyte % : 2.3 %  Auto Eosinophil % : 0.0 %  Auto Basophil % : 0.2 %      Serial CBC's  02-11 @ 06:22  Hct-28.2 / Hgb-9.4 / Plat-277 / RBC-2.97 / WBC-4.33  Serial CBC's  02-10 @ 17:38  Hct-31.9 / Hgb-11.1 / Plat-296 / RBC-3.31 / WBC-8.66      02-11    141  |  110  |  31<H>  ----------------------------<  158<H>  5.1<H>   |  20  |  2.4<H>    Ca    9.3      11 Feb 2020 06:22  Mg     2.3     02-11    TPro  5.0<L>  /  Alb  3.6  /  TBili  0.3  /  DBili  x   /  AST  12  /  ALT  11  /  AlkPhos  119<H>  02-11                                  BLOOD SMEAR INTERPRETATION:       RADIOLOGY & ADDITIONAL STUDIES:

## 2020-02-12 NOTE — PROGRESS NOTE ADULT - SUBJECTIVE AND OBJECTIVE BOX
ELDA COVARRUBIAS 64y Male  MRN#: [id number]     SUBJECTIVE  Patient is a 64y old Male who presents with a chief complaint of Rash   Currently admitted to medicine with the primary diagnosis of Rash, skin   Today is hospital day 2d, and this morning he reports no overnight events.       OBJECTIVE  PAST MEDICAL & SURGICAL HISTORY  Atrophic kidney  Shingles  Lymphoma  Schizophrenia  Kidney stones  H/O cystoscopy  Elbow fracture  H/O umbilical hernia repair  Retained urethral stent    ALLERGIES:  heparin (Urticaria)    MEDICATIONS:  STANDING MEDICATIONS  allopurinol 300 milliGRAM(s) Oral daily  benztropine 2 milliGRAM(s) Oral at bedtime  fluPHENAZine 10 milliGRAM(s) Oral daily  hydrocortisone sodium succinate Injectable 100 milliGRAM(s) IV Push every 6 hours  pantoprazole    Tablet 40 milliGRAM(s) Oral before breakfast  silver sulfADIAZINE 1% Cream 1 Application(s) Topical two times a day    PRN MEDICATIONS  diphenhydrAMINE 50 milliGRAM(s) Oral every 8 hours PRN      VITAL SIGNS: Last 24 Hours  T(C): 35.6 (12 Feb 2020 16:22), Max: 36 (12 Feb 2020 00:10)  T(F): 96.1 (12 Feb 2020 16:22), Max: 96.8 (12 Feb 2020 00:10)  HR: 67 (12 Feb 2020 16:22) (62 - 67)  BP: 95/53 (12 Feb 2020 16:22) (95/53 - 109/62)  BP(mean): --  RR: 18 (12 Feb 2020 16:22) (18 - 18)  SpO2: 100% (12 Feb 2020 16:22) (98% - 100%)    LABS:                        9.4    9.66  )-----------( 239      ( 12 Feb 2020 08:06 )             27.9     02-12    143  |  113<H>  |  34<H>  ----------------------------<  126<H>  4.1   |  19  |  1.9<H>    Ca    8.9      12 Feb 2020 08:06  Mg     2.2     02-12    TPro  4.7<L>  /  Alb  3.5  /  TBili  <0.2  /  DBili  x   /  AST  16  /  ALT  12  /  AlkPhos  97  02-12    RADIOLOGY:  < from: Xray Chest 2 Views PA/Lat (02.10.20 @ 18:10) >  Impression:      No radiographic evidence of acute cardiopulmonary disease.      < end of copied text >    PHYSICAL EXAM:    GENERAL: NAD, well-developed, AAOx3  HEENT:  Atraumatic, Normocephalic. EOMI, PERRLA, conjunctiva and sclera clear, No JVD  PULMONARY: Clear to auscultation bilaterally; No wheeze  CARDIOVASCULAR: Regular rate and rhythm; No murmurs, rubs, or gallops  GASTROINTESTINAL: Soft, Nontender, Nondistended; Bowel sounds present  MUSCULOSKELETAL:  2+ Peripheral Pulses, No clubbing, cyanosis, or edema  NEUROLOGY: non-focal  SKIN: No rashes or lesions      ADMISSION SUMMARY  Patient is a 64y old Male who presents with a chief complaint of Rash  Currently admitted to medicine with the primary diagnosis of Rash, skin      ASSESSMENT & PLAN    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    #Disseminated herpes zoster  -Patient completed acyclovir course as per ID on 1/25/20   -He currently has dark scabbing wound on the back of his chest about [id number] on right side, another one on the front of chest is healing.   -No evidence of sepsis   -burn consult for removal of eschar    #Rash likely drug induced dermatitis   -Excoriating rash present on abdomen, upper and lower extremities which are pruritic  -Unlikely if it is due to chemo medications rituximab and bendamustine as patient was started on them in Oct 2019  -Per chart patient has history of rash that responds well to Benadryl   -Rash almost resolved  -Will Continue Hydrocortisone 100 mg IV q6h as well as Benadryl 50 mg q8h PRN    # Stage IV Marginal zone lymphoma   - On Chemotherapy Bendamustine / Rituxan   - Outpatient follow up with Dr. Gong     # ASHLEY on CKD   - Possibly related to recent Acyclovir use   - Will start on NS 0.9 % @ 75 mL/h   - US of the kidneys and bladder showed atrophic left kidney, resolved right hydronephrosis  - Avoid nephrotoxic medication     # Schizophrenia   - Continue home medication of Benztropine and Fluphenazine     # History of Gout  - Allopurinol changed to 50 mg daily given ASHLEY     # Miscellaneous   - DVT prophylaxis : SCDs given past allergy to Heparin, suspicion of HIT  - GI prophylaxis : Protonix 40 mg daily   - Diet : DASH TLC   - Activity : Ambulate as tolerated   - Full code

## 2020-02-13 ENCOUNTER — TRANSCRIPTION ENCOUNTER (OUTPATIENT)
Age: 65
End: 2020-02-13

## 2020-02-13 LAB
ALBUMIN SERPL ELPH-MCNC: 3.1 G/DL — LOW (ref 3.5–5.2)
ALP SERPL-CCNC: 86 U/L — SIGNIFICANT CHANGE UP (ref 30–115)
ALT FLD-CCNC: 13 U/L — SIGNIFICANT CHANGE UP (ref 0–41)
ANION GAP SERPL CALC-SCNC: 12 MMOL/L — SIGNIFICANT CHANGE UP (ref 7–14)
AST SERPL-CCNC: 14 U/L — SIGNIFICANT CHANGE UP (ref 0–41)
BILIRUB SERPL-MCNC: <0.2 MG/DL — SIGNIFICANT CHANGE UP (ref 0.2–1.2)
BUN SERPL-MCNC: 31 MG/DL — HIGH (ref 10–20)
CALCIUM SERPL-MCNC: 8.6 MG/DL — SIGNIFICANT CHANGE UP (ref 8.5–10.1)
CHLORIDE SERPL-SCNC: 114 MMOL/L — HIGH (ref 98–110)
CO2 SERPL-SCNC: 18 MMOL/L — SIGNIFICANT CHANGE UP (ref 17–32)
CREAT SERPL-MCNC: 1.8 MG/DL — HIGH (ref 0.7–1.5)
GLUCOSE SERPL-MCNC: 129 MG/DL — HIGH (ref 70–99)
HCT VFR BLD CALC: 26.5 % — LOW (ref 42–52)
HGB BLD-MCNC: 8.9 G/DL — LOW (ref 14–18)
MAGNESIUM SERPL-MCNC: 2.1 MG/DL — SIGNIFICANT CHANGE UP (ref 1.8–2.4)
MCHC RBC-ENTMCNC: 33.2 PG — HIGH (ref 27–31)
MCHC RBC-ENTMCNC: 33.6 G/DL — SIGNIFICANT CHANGE UP (ref 32–37)
MCV RBC AUTO: 98.9 FL — HIGH (ref 80–94)
NRBC # BLD: 0 /100 WBCS — SIGNIFICANT CHANGE UP (ref 0–0)
PLATELET # BLD AUTO: 198 K/UL — SIGNIFICANT CHANGE UP (ref 130–400)
POTASSIUM SERPL-MCNC: 4.1 MMOL/L — SIGNIFICANT CHANGE UP (ref 3.5–5)
POTASSIUM SERPL-SCNC: 4.1 MMOL/L — SIGNIFICANT CHANGE UP (ref 3.5–5)
PROT SERPL-MCNC: 4.2 G/DL — LOW (ref 6–8)
RBC # BLD: 2.68 M/UL — LOW (ref 4.7–6.1)
RBC # FLD: 18.6 % — HIGH (ref 11.5–14.5)
SODIUM SERPL-SCNC: 144 MMOL/L — SIGNIFICANT CHANGE UP (ref 135–146)
WBC # BLD: 6.58 K/UL — SIGNIFICANT CHANGE UP (ref 4.8–10.8)
WBC # FLD AUTO: 6.58 K/UL — SIGNIFICANT CHANGE UP (ref 4.8–10.8)

## 2020-02-13 PROCEDURE — 11042 DBRDMT SUBQ TIS 1ST 20SQCM/<: CPT

## 2020-02-13 PROCEDURE — 11045 DBRDMT SUBQ TISS EACH ADDL: CPT

## 2020-02-13 PROCEDURE — 99232 SBSQ HOSP IP/OBS MODERATE 35: CPT

## 2020-02-13 RX ADMIN — Medication 2 MILLIGRAM(S): at 21:35

## 2020-02-13 RX ADMIN — SODIUM CHLORIDE 75 MILLILITER(S): 9 INJECTION INTRAMUSCULAR; INTRAVENOUS; SUBCUTANEOUS at 21:42

## 2020-02-13 RX ADMIN — PANTOPRAZOLE SODIUM 40 MILLIGRAM(S): 20 TABLET, DELAYED RELEASE ORAL at 07:05

## 2020-02-13 RX ADMIN — FLUPHENAZINE HYDROCHLORIDE 10 MILLIGRAM(S): 1 TABLET, FILM COATED ORAL at 11:30

## 2020-02-13 RX ADMIN — Medication 100 MILLIGRAM(S): at 11:30

## 2020-02-13 RX ADMIN — Medication 100 MILLIGRAM(S): at 07:05

## 2020-02-13 RX ADMIN — Medication 1 APPLICATION(S): at 07:05

## 2020-02-13 RX ADMIN — Medication 100 MILLIGRAM(S): at 00:27

## 2020-02-13 NOTE — PROCEDURE NOTE - NSEQUIPUSED_SKIN_A_CORE
gauze pads/normal saline solution/antiseptic cleaning solution/forceps/suture scissors/gloves/cotton-tipped applications

## 2020-02-13 NOTE — DISCHARGE NOTE PROVIDER - CARE PROVIDERS DIRECT ADDRESSES
,[email or web address]

## 2020-02-13 NOTE — DISCHARGE NOTE PROVIDER - PROVIDER TOKENS
PROVIDER:[TOKEN:[[id number]]] PROVIDER:[TOKEN:[[id number]],FOLLOWUP:[2 weeks]],PROVIDER:[TOKEN:[[id number]],FOLLOWUP:[2 weeks]]

## 2020-02-13 NOTE — DISCHARGE NOTE PROVIDER - CARE PROVIDER_API CALL
Nate Gong)  Hematology; Internal Medicine; Medical Oncology  [address] [place] [address]  Phone: [phone]  Fax: [phone]  Follow Up Time: Nate Gong)  Hematology; Internal Medicine; Medical Oncology  [address] [place] [address]  Phone: [phone]  Fax: [phone]  Follow Up Time: 2 weeks    Sharlene Carvalho)  Plastic Surgery  [address] [place] [address]  Phone: [phone]  Fax: [phone]  Follow Up Time: 2 weeks

## 2020-02-13 NOTE — DISCHARGE NOTE PROVIDER - NSDCCPCAREPLAN_GEN_ALL_CORE_FT
PRINCIPAL DISCHARGE DIAGNOSIS  Diagnosis: Rash, skin  Assessment and Plan of Treatment: Your rash is a drug induced dermatitis due to your chemotherapy. Please take your prednisone taper as prescribed and follow up with your oncologist.      SECONDARY DISCHARGE DIAGNOSES  Diagnosis: Herpes zoster  Assessment and Plan of Treatment: PRINCIPAL DISCHARGE DIAGNOSIS  Diagnosis: Rash, skin  Assessment and Plan of Treatment: Your rash is a drug induced dermatitis due to your chemotherapy. Please take your prednisone taper as prescribed and follow up with your oncologist within 2 weeks of discharge.  Please take prednisone as below:  Prednisone 60 mg 2/15/2020, 2/16/2020  Prednisone 40 mg 2/17/2020, 2/18/2020, 2/19/2020  Prednisone 20 mg 2/20/2020, 2/21/2020, 2/22/2020  Prednisone 10 mg 2/23/2020, 2/24/2020, 2/25/020      SECONDARY DISCHARGE DIAGNOSES  Diagnosis: Herpes zoster  Assessment and Plan of Treatment: PRINCIPAL DISCHARGE DIAGNOSIS  Diagnosis: Rash, skin  Assessment and Plan of Treatment: Your rash is a drug induced dermatitis due to your chemotherapy. Please take your prednisone taper as prescribed and follow up with your oncologist within 2 weeks of discharge.  Please take prednisone as below:  Prednisone 60 mg 2/15/2020, 2/16/2020  Prednisone 40 mg 2/17/2020, 2/18/2020, 2/19/2020  Prednisone 20 mg 2/20/2020, 2/21/2020, 2/22/2020  Prednisone 10 mg 2/23/2020, 2/24/2020, 2/25/020      SECONDARY DISCHARGE DIAGNOSES  Diagnosis: S/P debridement  Assessment and Plan of Treatment: Please change the dressing twice as instructed by your nurse. Please follow up with Dr Carvalho 2 weeks after discharge.

## 2020-02-13 NOTE — PROGRESS NOTE ADULT - SUBJECTIVE AND OBJECTIVE BOX
Hospital Day:  3d    Subjective:    Patient is a 64y old Male who presents with a chief complaint of Rash (12 Feb 2020 19:11)  This morning patient is lying in bed comfortably. He reports his rash is doing much better, with selling almost gone. He reports no new complaints.    Past Medical Hx:   Atrophic kidney  Shingles  Lymphoma  Schizophrenia  Kidney stones    Past Sx:  H/O cystoscopy  Elbow fracture  H/O umbilical hernia repair  Retained urethral stent    Allergies:  heparin (Urticaria)    Current Meds:   Standng Meds:  allopurinol 300 milliGRAM(s) Oral daily  benztropine 2 milliGRAM(s) Oral at bedtime  fluPHENAZine 10 milliGRAM(s) Oral daily  hydrocortisone sodium succinate Injectable 100 milliGRAM(s) IV Push every 6 hours  pantoprazole    Tablet 40 milliGRAM(s) Oral before breakfast  silver sulfADIAZINE 1% Cream 1 Application(s) Topical two times a day  sodium chloride 0.9%. 1000 milliLiter(s) (75 mL/Hr) IV Continuous <Continuous>    PRN Meds:  diphenhydrAMINE 50 milliGRAM(s) Oral every 8 hours PRN Rash and/or Itching    HOME MEDICATIONS:  allopurinol 300 mg oral tablet: 1 tab(s) orally once a day  benztropine 2 mg oral tablet: 1 tab(s) orally once a day (at bedtime)  fluPHENAZine 10 mg oral tablet: 1 tab(s) orally once a day      Vital Signs:   T(F): 96.9 (02-13-20 @ 07:59), Max: 96.9 (02-13-20 @ 07:59)  HR: 64 (02-13-20 @ 07:59) (64 - 71)  BP: 93/55 (02-13-20 @ 07:59) (93/55 - 104/55)  RR: 18 (02-13-20 @ 07:59) (18 - 18)  SpO2: 100% (02-12-20 @ 20:39) (100% - 100%)      02-12-20 @ 07:01  -  02-13-20 @ 07:00  --------------------------------------------------------  IN: 825 mL / OUT: 800 mL / NET: 25 mL        Physical Exam:   GENERAL: NAD  HEENT: NCAT  CHEST/LUNG: CTAB  HEART: Regular rate and rhythm; s1 s2 appreciated, No murmurs, rubs, or gallops  ABDOMEN: Soft, Nontender, Nondistended; Bowel sounds present  EXTREMITIES: No LE edema b/l  NERVOUS SYSTEM:  Alert & Oriented X3        Labs:                         8.9    6.58  )-----------( 198      ( 13 Feb 2020 05:30 )             26.5       13 Feb 2020 05:30    144    |  114    |  31     ----------------------------<  129    4.1     |  18     |  1.8      Ca    8.6        13 Feb 2020 05:30  Mg     2.1       13 Feb 2020 05:30    TPro  4.2    /  Alb  3.1    /  TBili  <0.2   /  DBili  x      /  AST  14     /  ALT  13     /  AlkPhos  86     13 Feb 2020 05:30                            Assessment and Plan:     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    #Disseminated herpes zoster  -Patient completed acyclovir course as per ID on 1/25/20   -He currently has dark scabbing wound on the back of his chest about [id number] on right side, another one on the front of chest is healing.   -No evidence of sepsis   -burn consulted for removal of eschar; will perform bedside debridement    #Rash likely drug induced dermatitis   -Excoriating rash present on abdomen, upper and lower extremities which are pruritic  -Unlikely if it is due to chemo medications rituximab and bendamustine as patient was started on them in Oct 2019  -Per chart patient has history of rash that responds well to Benadryl   -Rash almost resolved  -Will Continue Hydrocortisone 100 mg IV q6h as well as Benadryl 50 mg q8h PRN    # Stage IV Marginal zone lymphoma   - On Chemotherapy Bendamustine / Rituxan   - Outpatient follow up with Dr. Gong     # ASHLEY on CKD   - Possibly related to recent Acyclovir use   - Will start on NS 0.9 % @ 75 mL/h   - US of the kidneys and bladder showed atrophic left kidney, resolved right hydronephrosis  - Avoid nephrotoxic medication     # Schizophrenia   - Continue home medication of Benztropine and Fluphenazine     # History of Gout  - Allopurinol changed to 50 mg daily given ASHLEY     # Miscellaneous   - DVT prophylaxis : SCDs given past allergy to Heparin, suspicion of HIT  - GI prophylaxis : Protonix 40 mg daily   - Diet : DASH TLC   - Activity : Ambulate as tolerated   - Full code Hospital Day:  3d    Subjective:    Patient is a 64y old Male who presents with a chief complaint of Rash (12 Feb 2020 19:11)  This morning patient is lying in bed comfortably. He reports his rash is doing much better, with selling almost gone. He reports no new complaints.    Past Medical Hx:   Atrophic kidney  Shingles  Lymphoma  Schizophrenia  Kidney stones    Past Sx:  H/O cystoscopy  Elbow fracture  H/O umbilical hernia repair  Retained urethral stent    Allergies:  heparin (Urticaria)    Current Meds:   Standng Meds:  allopurinol 300 milliGRAM(s) Oral daily  benztropine 2 milliGRAM(s) Oral at bedtime  fluPHENAZine 10 milliGRAM(s) Oral daily  hydrocortisone sodium succinate Injectable 100 milliGRAM(s) IV Push every 6 hours  pantoprazole    Tablet 40 milliGRAM(s) Oral before breakfast  silver sulfADIAZINE 1% Cream 1 Application(s) Topical two times a day  sodium chloride 0.9%. 1000 milliLiter(s) (75 mL/Hr) IV Continuous <Continuous>    PRN Meds:  diphenhydrAMINE 50 milliGRAM(s) Oral every 8 hours PRN Rash and/or Itching    HOME MEDICATIONS:  allopurinol 300 mg oral tablet: 1 tab(s) orally once a day  benztropine 2 mg oral tablet: 1 tab(s) orally once a day (at bedtime)  fluPHENAZine 10 mg oral tablet: 1 tab(s) orally once a day      Vital Signs:   T(F): 96.9 (02-13-20 @ 07:59), Max: 96.9 (02-13-20 @ 07:59)  HR: 64 (02-13-20 @ 07:59) (64 - 71)  BP: 93/55 (02-13-20 @ 07:59) (93/55 - 104/55)  RR: 18 (02-13-20 @ 07:59) (18 - 18)  SpO2: 100% (02-12-20 @ 20:39) (100% - 100%)      02-12-20 @ 07:01  -  02-13-20 @ 07:00  --------------------------------------------------------  IN: 825 mL / OUT: 800 mL / NET: 25 mL        Physical Exam:   GENERAL: NAD  HEENT: NCAT  CHEST/LUNG: CTAB  HEART: Regular rate and rhythm; s1 s2 appreciated, No murmurs, rubs, or gallops  ABDOMEN: Soft, Nontender, Nondistended; Bowel sounds present  EXTREMITIES: No LE edema b/l  NERVOUS SYSTEM:  Alert & Oriented X3        Labs:                         8.9    6.58  )-----------( 198      ( 13 Feb 2020 05:30 )             26.5       13 Feb 2020 05:30    144    |  114    |  31     ----------------------------<  129    4.1     |  18     |  1.8      Ca    8.6        13 Feb 2020 05:30  Mg     2.1       13 Feb 2020 05:30    TPro  4.2    /  Alb  3.1    /  TBili  <0.2   /  DBili  x      /  AST  14     /  ALT  13     /  AlkPhos  86     13 Feb 2020 05:30                            Assessment and Plan:     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    #Disseminated herpes zoster  -Patient completed acyclovir course as per ID on 1/25/20   -He currently has dark scabbing wound on the back of his chest about [id number] on right side, another one on the front of chest is healing.   -No evidence of sepsis   -burn consulted for removal of eschar; will perform bedside debridement    #Rash likely drug induced dermatitis   -Excoriating rash present on abdomen, upper and lower extremities which are pruritic  -Unlikely if it is due to chemo medications rituximab and bendamustine as patient was started on them in Oct 2019  -Per chart patient has history of rash that responds well to Benadryl   -Rash almost resolved  -Will change hydrocortisone to prednisone 60 daily, continue Benadryl 50 mg q8h PRN    # Stage IV Marginal zone lymphoma   - On Chemotherapy Bendamustine / Rituxan   - Outpatient follow up with Dr. Gong     # ASHLEY on CKD   - Possibly related to recent Acyclovir use   - Will start on NS 0.9 % @ 75 mL/h   - US of the kidneys and bladder showed atrophic left kidney, resolved right hydronephrosis  - Avoid nephrotoxic medication     # Schizophrenia   - Continue home medication of Benztropine and Fluphenazine     # History of Gout  - Allopurinol changed to 50 mg daily given ASHLEY     # Miscellaneous   - DVT prophylaxis : SCDs given past allergy to Heparin, suspicion of HIT  - GI prophylaxis : Protonix 40 mg daily   - Diet : DASH TLC   - Activity : Ambulate as tolerated   - Full code

## 2020-02-13 NOTE — PROGRESS NOTE ADULT - SUBJECTIVE AND OBJECTIVE BOX
Patient is a 64y old  Male who presents with a chief complaint of Rash (13 Feb 2020 14:31)      Subjective: His skin rash is much better and improving  No other new complaints       Vital Signs Last 24 Hrs  T(C): 36.1 (13 Feb 2020 07:59), Max: 36.1 (13 Feb 2020 07:59)  T(F): 96.9 (13 Feb 2020 07:59), Max: 96.9 (13 Feb 2020 07:59)  HR: 64 (13 Feb 2020 07:59) (64 - 71)  BP: 93/55 (13 Feb 2020 07:59) (93/55 - 104/55)  BP(mean): 68 (13 Feb 2020 07:59) (68 - 74)  RR: 18 (13 Feb 2020 07:59) (18 - 18)  SpO2: 100% (12 Feb 2020 20:39) (100% - 100%)    PHYSICAL EXAM  General: adult in NAD  HEENT: clear oropharynx  Neck: supple  CV: normal S1/S2   Lungs: positive air movement b/l ant lungs  Abdomen: soft non-tender  Ext: no clubbing cyanosis or edema  Skin: Diffuse erythema with multiple excoriations and skin scaling+: improving   Pervious zoster area excoriation /crusting noted in the back and abdomen/lower chest area   Neuro: alert and oriented X 4, no focal deficits.  Back: Wound covered with dressing.       MEDICATIONS  (STANDING):  allopurinol 300 milliGRAM(s) Oral daily  benztropine 2 milliGRAM(s) Oral at bedtime  fluPHENAZine 10 milliGRAM(s) Oral daily  pantoprazole    Tablet 40 milliGRAM(s) Oral before breakfast  predniSONE   Tablet 60 milliGRAM(s) Oral daily  silver sulfADIAZINE 1% Cream 1 Application(s) Topical two times a day  sodium chloride 0.9%. 1000 milliLiter(s) (75 mL/Hr) IV Continuous <Continuous>    MEDICATIONS  (PRN):  diphenhydrAMINE 50 milliGRAM(s) Oral every 8 hours PRN Rash and/or Itching      LABS:                          8.9    6.58  )-----------( 198      ( 13 Feb 2020 05:30 )             26.5         Mean Cell Volume : 98.9 fL  Mean Cell Hemoglobin : 33.2 pg  Mean Cell Hemoglobin Concentration : 33.6 g/dL  Auto Neutrophil # : x  Auto Lymphocyte # : x  Auto Monocyte # : x  Auto Eosinophil # : x  Auto Basophil # : x  Auto Neutrophil % : x  Auto Lymphocyte % : x  Auto Monocyte % : x  Auto Eosinophil % : x  Auto Basophil % : x      Serial CBC's  02-13 @ 05:30  Hct-26.5 / Hgb-8.9 / Plat-198 / RBC-2.68 / WBC-6.58  Serial CBC's  02-12 @ 08:06  Hct-27.9 / Hgb-9.4 / Plat-239 / RBC-2.87 / WBC-9.66  Serial CBC's  02-11 @ 06:22  Hct-28.2 / Hgb-9.4 / Plat-277 / RBC-2.97 / WBC-4.33  Serial CBC's  02-10 @ 17:38  Hct-31.9 / Hgb-11.1 / Plat-296 / RBC-3.31 / WBC-8.66      02-13    144  |  114<H>  |  31<H>  ----------------------------<  129<H>  4.1   |  18  |  1.8<H>    Ca    8.6      13 Feb 2020 05:30  Mg     2.1     02-13    TPro  4.2<L>  /  Alb  3.1<L>  /  TBili  <0.2  /  DBili  x   /  AST  14  /  ALT  13  /  AlkPhos  86  02-13 Patient is a 64y old  Male who presents with a chief complaint of Rash (13 Feb 2020 14:31)      Subjective: His skin rash is much better and improving day by day.  No other new complaints.  He had his wound debrided earlier today.      Vital Signs Last 24 Hrs  T(C): 36.1 (13 Feb 2020 07:59), Max: 36.1 (13 Feb 2020 07:59)  T(F): 96.9 (13 Feb 2020 07:59), Max: 96.9 (13 Feb 2020 07:59)  HR: 64 (13 Feb 2020 07:59) (64 - 71)  BP: 93/55 (13 Feb 2020 07:59) (93/55 - 104/55)  BP(mean): 68 (13 Feb 2020 07:59) (68 - 74)  RR: 18 (13 Feb 2020 07:59) (18 - 18)  SpO2: 100% (12 Feb 2020 20:39) (100% - 100%)    PHYSICAL EXAM  General: adult in NAD  HEENT: clear oropharynx  Neck: supple  CV: normal S1/S2   Lungs: positive air movement b/l ant lungs  Abdomen: soft non-tender  Ext: no clubbing cyanosis or edema  Skin: Diffuse erythema, much lighter, with multiple mild superficial excoriations and skin scaling+: improving   Pervious zoster area excoriation /crusting noted in the back and abdomen/lower chest area   Neuro: alert and oriented X 4, no focal deficits.  Back: Wound covered with dressing.       MEDICATIONS  (STANDING):  allopurinol 300 milliGRAM(s) Oral daily  benztropine 2 milliGRAM(s) Oral at bedtime  fluPHENAZine 10 milliGRAM(s) Oral daily  pantoprazole    Tablet 40 milliGRAM(s) Oral before breakfast  predniSONE   Tablet 60 milliGRAM(s) Oral daily  silver sulfADIAZINE 1% Cream 1 Application(s) Topical two times a day  sodium chloride 0.9%. 1000 milliLiter(s) (75 mL/Hr) IV Continuous <Continuous>    MEDICATIONS  (PRN):  diphenhydrAMINE 50 milliGRAM(s) Oral every 8 hours PRN Rash and/or Itching      LABS:                          8.9    6.58  )-----------( 198      ( 13 Feb 2020 05:30 )             26.5         Mean Cell Volume : 98.9 fL  Mean Cell Hemoglobin : 33.2 pg  Mean Cell Hemoglobin Concentration : 33.6 g/dL  Auto Neutrophil # : x  Auto Lymphocyte # : x  Auto Monocyte # : x  Auto Eosinophil # : x  Auto Basophil # : x  Auto Neutrophil % : x  Auto Lymphocyte % : x  Auto Monocyte % : x  Auto Eosinophil % : x  Auto Basophil % : x      Serial CBC's  02-13 @ 05:30  Hct-26.5 / Hgb-8.9 / Plat-198 / RBC-2.68 / WBC-6.58  Serial CBC's  02-12 @ 08:06  Hct-27.9 / Hgb-9.4 / Plat-239 / RBC-2.87 / WBC-9.66  Serial CBC's  02-11 @ 06:22  Hct-28.2 / Hgb-9.4 / Plat-277 / RBC-2.97 / WBC-4.33  Serial CBC's  02-10 @ 17:38  Hct-31.9 / Hgb-11.1 / Plat-296 / RBC-3.31 / WBC-8.66      02-13    144  |  114<H>  |  31<H>  ----------------------------<  129<H>  4.1   |  18  |  1.8<H>    Ca    8.6      13 Feb 2020 05:30  Mg     2.1     02-13    TPro  4.2<L>  /  Alb  3.1<L>  /  TBili  <0.2  /  DBili  x   /  AST  14  /  ALT  13  /  AlkPhos  86  02-13

## 2020-02-13 NOTE — DISCHARGE NOTE PROVIDER - HOSPITAL COURSE
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    The patient has a history of recurrent dermatitis that is known to be responsive to Benadryl. His last chemo session was a week and a half ago, and the rash started about a week ago and got worse 4 days ago. The rash is mainly located at the upper extremities, face and lower extremities, continuous, extremely pruritic with resultant excoriations. He reports mild dyspnea as well as mild swelling of the face and lower extremities, but denies stridor or dysphonia. He was prescribed Benadryl a week ago with minimal improvement. He states that he changed his detergent recently, however, he states that it is unlikely the culprit, he wasn't  started on any new medication, he has a pet at home. He states that allopurinol might be a contributing factor.     In the ED : Labs revealing known anemia, as well as ASHLEY. Patient received Solumedrol as well as Benadryl and admitted for monitoring             Burn was consulted for scarring from herpes zoster on back. They debrided the eschar.        The pruritic rash present on abdomen, upper and lower extremities improved on steroids and benadryl. Oncology believes it is a drug induced dermatitis due to chemotherapy. He was put on a steroid taper and discharged with instructions to follow up with oncology. 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    The patient has a history of recurrent dermatitis that is known to be responsive to Benadryl. His last chemo session was a week and a half ago, and the rash started about a week ago and got worse 4 days ago. The rash is mainly located at the upper extremities, face and lower extremities, continuous, extremely pruritic with resultant excoriations. He reports mild dyspnea as well as mild swelling of the face and lower extremities, but denies stridor or dysphonia. He was prescribed Benadryl a week ago with minimal improvement. He states that he changed his detergent recently, however, he states that it is unlikely the culprit, he wasn't  started on any new medication, he has a pet at home. He states that allopurinol might be a contributing factor.     In the ED : Labs revealing known anemia, as well as ASHLEY. Patient received Solumedrol as well as Benadryl and admitted for monitoring             Burn was consulted for scarring from herpes zoster on back. They debrided the eschar.        The pruritic rash present on abdomen, upper and lower extremities improved on steroids and benadryl. Oncology believes it is a drug induced dermatitis due to chemotherapy. He was put on a steroid taper and discharged with instructions to follow up with oncology.        Note: the patient was also seen prior to his discharge on 2/14/2020, and the plan of discharge/outpatient follow up clarified and emphasized. To be seen in the office in 7-10 days. Will reevaluate his disease status and obtain a repeat PET CT . All questions answered. 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    The patient has a history of recurrent dermatitis that is known to be responsive to Benadryl. His last chemo session was a week and a half ago, and the rash started about a week ago and got worse 4 days ago. The rash is mainly located at the upper extremities, face and lower extremities, continuous, extremely pruritic with resultant excoriations. He reports mild dyspnea as well as mild swelling of the face and lower extremities, but denies stridor or dysphonia. He was prescribed Benadryl a week ago with minimal improvement. He states that he changed his detergent recently, however, he states that it is unlikely the culprit, he wasn't  started on any new medication, he has a pet at home. He states that allopurinol might be a contributing factor.     In the ED : Labs revealing known anemia, as well as AHSLEY. Patient received Solumedrol as well as Benadryl and admitted for monitoring             Burn was consulted for scarring from herpes zoster on back. They debrided the eschar.        The pruritic rash present on abdomen, upper and lower extremities improved on steroids and benadryl. Oncology believes it is a drug induced dermatitis due to chemotherapy. He was put on a steroid taper and discharged with instructions to follow up with oncology.        Note: the patient was also seen prior to his discharge on 2/14/2020, and the plan of discharge/outpatient follow up clarified and emphasized. To be seen in the office in 7-10 days. Will reevaluate his disease status and obtain a repeat PET CT . All questions answered.

## 2020-02-13 NOTE — DISCHARGE NOTE PROVIDER - NSDCFUSCHEDAPPT_GEN_ALL_CORE_FT
ELDA COVARRUBIAS ; 02/20/2020 ; [facility]c [facility] EL[facility] Arredondo ; 02/24/2020 ; [facility] Chemo [facility] EL[facility] Zurita ; 02/25/2020 ; [facility] Chem[facility] [address] [facility]

## 2020-02-13 NOTE — DISCHARGE NOTE PROVIDER - NSDCMRMEDTOKEN_GEN_ALL_CORE_FT
allopurinol 300 mg oral tablet: 1 tab(s) orally once a day  benztropine 2 mg oral tablet: 1 tab(s) orally once a day (at bedtime)  fluPHENAZine 10 mg oral tablet: 1 tab(s) orally once a day abdominal pads: Apply topically to affected area 2 times a day   adaptic: Apply topically to affected area 2 times a day   allopurinol 300 mg oral tablet: 1 tab(s) orally once a day  benztropine 2 mg oral tablet: 1 tab(s) orally once a day (at bedtime)  fluPHENAZine 10 mg oral tablet: 1 tab(s) orally once a day  predniSONE 10 mg oral tablet: 1 tab(s) orally once a day on 2/23/2020, 2/24/2020, 2/25/020  predniSONE 20 mg oral tablet: 3 tab(s) orally once a day on 2/15/2020, 2/16/2020    predniSONE 20 mg oral tablet: 2 tab(s) orally once a day on 2/17/2020, 2/18/2020, 2/19/2020    predniSONE 20 mg oral tablet: 1 tab(s) orally once a day on 2/20/2020, 2/21/2020, 2/22/2020  silver sulfADIAZINE 1% topical cream: 1 application topically 2 times a day

## 2020-02-13 NOTE — PROGRESS NOTE ADULT - ATTENDING COMMENTS
Patient also seen by myself. I agree with Dr. Claire's (Hem-Onc fellow) note above. Situation discussed with her and the fellow.  Possible discharge tomorrow.

## 2020-02-13 NOTE — PROGRESS NOTE ADULT - ASSESSMENT
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     1) Diffuse Rash- most likely related to bendamustine more than Rituxan.  His symptoms started in Dec 2019 that responded to benadryl and steroids.   On hydrocortisone and Benadryl- will switch to PO prednisone 60 mg once daily and observe   Rash seems to have improved   Burn eval noted- He is s/p debridement today- c/w local care as per burn team recs       2) Stage 4 Marginal zone lymphoma on R-Tatyana  Last dose of Rituxan and Bendamustine was on Jan 27, followed by Neulasta   Completed 3 cycles so far. Due for reevaluation of his disease status.   Will do CT scan of the chest, abdomen and pelvis non contrast   His Last PET scan was in Oct 2019     3) mild ASHLEY on CKD or progression of CKD- Stable   Baseline creatinine 1.8 to 2.5   Monitor BMP today     4) Schizophrenia : Continue home medication of Benztropine and Fluphenazine.     5)  History of Gout- c/w allopurinol.    6) Normocytic anemia related to chemotherapy- monitor     7) DVT prophylaxis : SCDs given past allergy to Heparin.    -- Activity : Ambulate as tolerated   -- Full code   Possible DC in 24hours 64 year old male patient with past medical history of Stage IV Marginal zone lymphoma on bendamustine / Rituxan, recurrent nephrolithiases s/p right ureteral stent, CKD stage IV, schizophrenia, gout, recent admission for diarrhea due to suspected disseminated zoster infection and induced colitis, treated with acyclovir, underwent flexible sigmoidoscopy showing no ulcerations presenting for rash. The patient did not have the typical herpetic blisters. T    1) Diffuse Rash- most likely related to bendamustine more than Rituxan.  His symptoms started in Dec 2019 that responded to benadryl and steroids.   On hydrocortisone and Benadryl- will switch to PO prednisone 60 mg once daily and observe   Rash seems to have improved   Burn eval noted- He is s/p debridement today- c/w local care as per burn team's recommendations.      2) Stage 4 Marginal zone lymphoma on R-Tatyana  Last dose of Rituxan and Bendamustine was on Jan 27, followed by Neulasta   Completed 3 cycles so far. Due for reevaluation of his disease status.   Will do CT scan of the chest, abdomen and pelvis non contrast.   His Last PET scan was in Oct 2019     3) Mild ASHLEY on CKD or progression of CKD- Stable   Baseline creatinine 1.8 to 2.5   Monitor BMP today     4) Schizophrenia : Continue home medication of Benztropine and Fluphenazine.     5) History of Gout- c/w allopurinol.    6) Normocytic anemia related to chemotherapy- monitor     7) DVT prophylaxis : SCDs given past allergy to Heparin.    -- Activity : Ambulate as tolerated   -- Full code   Possible DC in 24hours

## 2020-02-13 NOTE — PROCEDURE NOTE - NSICDXPROCEDURE_GEN_ALL_CORE_FT
PROCEDURES:  Debridement, graft, skin, torso 13-Feb-2020 14:18:35  [place] PROCEDURES:  Debridement of skin or subcutaneous tissue 13-Feb-2020 15:33:26 excisional debridement back 16 x 9 cm and chest 8 x 4cm Long, Sharlene

## 2020-02-14 ENCOUNTER — TRANSCRIPTION ENCOUNTER (OUTPATIENT)
Age: 65
End: 2020-02-14

## 2020-02-14 VITALS
TEMPERATURE: 96 F | HEART RATE: 91 BPM | SYSTOLIC BLOOD PRESSURE: 125 MMHG | DIASTOLIC BLOOD PRESSURE: 65 MMHG | RESPIRATION RATE: 18 BRPM

## 2020-02-14 PROCEDURE — 71250 CT THORAX DX C-: CPT | Mod: 26

## 2020-02-14 PROCEDURE — 99238 HOSP IP/OBS DSCHRG MGMT 30/<: CPT

## 2020-02-14 PROCEDURE — 74176 CT ABD & PELVIS W/O CONTRAST: CPT | Mod: 26

## 2020-02-14 RX ADMIN — Medication 300 MILLIGRAM(S): at 14:01

## 2020-02-14 RX ADMIN — Medication 60 MILLIGRAM(S): at 06:47

## 2020-02-14 RX ADMIN — PANTOPRAZOLE SODIUM 40 MILLIGRAM(S): 20 TABLET, DELAYED RELEASE ORAL at 06:47

## 2020-02-14 RX ADMIN — FLUPHENAZINE HYDROCHLORIDE 10 MILLIGRAM(S): 1 TABLET, FILM COATED ORAL at 14:00

## 2020-02-14 RX ADMIN — Medication 1 APPLICATION(S): at 06:47

## 2020-02-14 NOTE — PROGRESS NOTE ADULT - SUBJECTIVE AND OBJECTIVE BOX
Patient is a 64y old  Male who presents with a chief complaint of Rash (13 Feb 2020 14:52)      Subjective: Skin rash is better and improved  He had bedside debridement yesterday  Feels well overall       Vital Signs Last 24 Hrs  T(C): 35.8 (14 Feb 2020 08:11), Max: 35.8 (14 Feb 2020 08:11)  T(F): 96.5 (14 Feb 2020 08:11), Max: 96.5 (14 Feb 2020 08:11)  HR: 91 (14 Feb 2020 08:11) (69 - 91)  BP: 125/65 (14 Feb 2020 08:11) (93/52 - 125/65)  BP(mean): 88 (14 Feb 2020 08:11) (68 - 88)  RR: 18 (14 Feb 2020 08:11) (18 - 18)  SpO2: --    PHYSICAL EXAM  General: adult in NAD  HEENT: clear oropharynx  Neck: supple  CV: normal S1/S2   Lungs: positive air movement b/l ant lungs  Abdomen: soft non-tender  Ext: no clubbing cyanosis or edema  Skin: Diffuse erythema, much lighter, with multiple mild superficial excoriations and skin scaling+: improving   Pervious zoster area excoriation /crusting noted in the back and abdomen/lower chest area   Neuro: alert and oriented X 4, no focal deficits.  Back: Wound covered with dressing.    MEDICATIONS  (STANDING):  allopurinol 300 milliGRAM(s) Oral daily  benztropine 2 milliGRAM(s) Oral at bedtime  fluPHENAZine 10 milliGRAM(s) Oral daily  pantoprazole    Tablet 40 milliGRAM(s) Oral before breakfast  predniSONE   Tablet 60 milliGRAM(s) Oral daily  silver sulfADIAZINE 1% Cream 1 Application(s) Topical two times a day  sodium chloride 0.9%. 1000 milliLiter(s) (75 mL/Hr) IV Continuous <Continuous>    MEDICATIONS  (PRN):  diphenhydrAMINE 50 milliGRAM(s) Oral every 8 hours PRN Rash and/or Itching      LABS:                          8.9    6.58  )-----------( 198      ( 13 Feb 2020 05:30 )             26.5         Mean Cell Volume : 98.9 fL  Mean Cell Hemoglobin : 33.2 pg  Mean Cell Hemoglobin Concentration : 33.6 g/dL  Auto Neutrophil # : x  Auto Lymphocyte # : x  Auto Monocyte # : x  Auto Eosinophil # : x  Auto Basophil # : x  Auto Neutrophil % : x  Auto Lymphocyte % : x  Auto Monocyte % : x  Auto Eosinophil % : x  Auto Basophil % : x      Serial CBC's  02-13 @ 05:30  Hct-26.5 / Hgb-8.9 / Plat-198 / RBC-2.68 / WBC-6.58  Serial CBC's  02-12 @ 08:06  Hct-27.9 / Hgb-9.4 / Plat-239 / RBC-2.87 / WBC-9.66  Serial CBC's  02-11 @ 06:22  Hct-28.2 / Hgb-9.4 / Plat-277 / RBC-2.97 / WBC-4.33  Serial CBC's  02-10 @ 17:38  Hct-31.9 / Hgb-11.1 / Plat-296 / RBC-3.31 / WBC-8.66      02-13    144  |  114<H>  |  31<H>  ----------------------------<  129<H>  4.1   |  18  |  1.8<H>    Ca    8.6      13 Feb 2020 05:30  Mg     2.1     02-13    TPro  4.2<L>  /  Alb  3.1<L>  /  TBili  <0.2  /  DBili  x   /  AST  14  /  ALT  13  /  AlkPhos  86  02-13          < from: CT Abdomen and Pelvis No Cont (02.14.20 @ 08:34) >  IMPRESSION:  Decreasing intra-abdominal lymphadenopathy and splenomegaly compared to 9/27/2019.  Refer to separately dictated CT chest report for intrathoracic findings.    < end of copied text >  < from: CT Chest No Cont (02.14.20 @ 08:34) >  MPRESSION:    Small effusions. No suspicious pulmonary nodules.    Mediastinal lymph nodes as described. Nonspecific.      < end of copied text >  < from: CT Chest No Cont (02.14.20 @ 08:34) >  The patient has nonspecific axillary lymph nodes, none of which measure greater than a centimeter. There is left paratracheal lymph node that measures 9 mm. There are pretracheal lymph nodes that measure 9 mm. There is a 6 mm anterior mediastinal lymph node anterior to the pericardium.      < end of copied text > Patient is a 64y old  Male who presents with a chief complaint of Rash (13 Feb 2020 14:52)      Subjective: Skin rash is better and improved  He had bedside debridement yesterday  Feels well overall       Vital Signs Last 24 Hrs  T(C): 35.8 (14 Feb 2020 08:11), Max: 35.8 (14 Feb 2020 08:11)  T(F): 96.5 (14 Feb 2020 08:11), Max: 96.5 (14 Feb 2020 08:11)  HR: 91 (14 Feb 2020 08:11) (69 - 91)  BP: 125/65 (14 Feb 2020 08:11) (93/52 - 125/65)  BP(mean): 88 (14 Feb 2020 08:11) (68 - 88)  RR: 18 (14 Feb 2020 08:11) (18 - 18)  SpO2: --    PHYSICAL EXAM  General: adult in NAD  HEENT: clear oropharynx  Neck: supple  CV: normal S1/S2   Lungs: positive air movement b/l ant lungs  Abdomen: soft non-tender  Ext: no clubbing cyanosis or edema  Skin: Diffuse erythema, much lighter, with multiple mild superficial excoriations and skin scaling+: already improved significantly.   Pervious zoster area excoriation /crusting noted in the back and abdomen/lower chest area   Neuro: alert and oriented X 4, no focal deficits.  Back: Wound debrided,  covered with dressing.    MEDICATIONS  (STANDING):  allopurinol 300 milliGRAM(s) Oral daily  benztropine 2 milliGRAM(s) Oral at bedtime  fluPHENAZine 10 milliGRAM(s) Oral daily  pantoprazole    Tablet 40 milliGRAM(s) Oral before breakfast  predniSONE   Tablet 60 milliGRAM(s) Oral daily  silver sulfADIAZINE 1% Cream 1 Application(s) Topical two times a day  sodium chloride 0.9%. 1000 milliLiter(s) (75 mL/Hr) IV Continuous <Continuous>    MEDICATIONS  (PRN):  diphenhydrAMINE 50 milliGRAM(s) Oral every 8 hours PRN Rash and/or Itching      LABS:                          8.9    6.58  )-----------( 198      ( 13 Feb 2020 05:30 )             26.5         Mean Cell Volume : 98.9 fL  Mean Cell Hemoglobin : 33.2 pg  Mean Cell Hemoglobin Concentration : 33.6 g/dL  Auto Neutrophil # : x  Auto Lymphocyte # : x  Auto Monocyte # : x  Auto Eosinophil # : x  Auto Basophil # : x  Auto Neutrophil % : x  Auto Lymphocyte % : x  Auto Monocyte % : x  Auto Eosinophil % : x  Auto Basophil % : x      Serial CBC's  02-13 @ 05:30  Hct-26.5 / Hgb-8.9 / Plat-198 / RBC-2.68 / WBC-6.58  Serial CBC's  02-12 @ 08:06  Hct-27.9 / Hgb-9.4 / Plat-239 / RBC-2.87 / WBC-9.66  Serial CBC's  02-11 @ 06:22  Hct-28.2 / Hgb-9.4 / Plat-277 / RBC-2.97 / WBC-4.33  Serial CBC's  02-10 @ 17:38  Hct-31.9 / Hgb-11.1 / Plat-296 / RBC-3.31 / WBC-8.66      02-13    144  |  114<H>  |  31<H>  ----------------------------<  129<H>  4.1   |  18  |  1.8<H>    Ca    8.6      13 Feb 2020 05:30  Mg     2.1     02-13    TPro  4.2<L>  /  Alb  3.1<L>  /  TBili  <0.2  /  DBili  x   /  AST  14  /  ALT  13  /  AlkPhos  86  02-13          < from: CT Abdomen and Pelvis No Cont (02.14.20 @ 08:34) >  IMPRESSION:  Decreasing intra-abdominal lymphadenopathy and splenomegaly compared to 9/27/2019.  Refer to separately dictated CT chest report for intrathoracic findings.    < end of copied text >  < from: CT Chest No Cont (02.14.20 @ 08:34) >  MPRESSION:    Small effusions. No suspicious pulmonary nodules.    Mediastinal lymph nodes as described. Nonspecific.      < end of copied text >  < from: CT Chest No Cont (02.14.20 @ 08:34) >  The patient has nonspecific axillary lymph nodes, none of which measure greater than a centimeter. There is left paratracheal lymph node that measures 9 mm. There are pretracheal lymph nodes that measure 9 mm. There is a 6 mm anterior mediastinal lymph node anterior to the pericardium.      < end of copied text >

## 2020-02-14 NOTE — PROGRESS NOTE ADULT - ATTENDING COMMENTS
Patient seen by myself too. I agree with Dr. Claire's (Hem-Onc fellow) note above. Situation discussed with her and the patient. All questions answered.  Modifications made.

## 2020-02-14 NOTE — PROGRESS NOTE ADULT - ASSESSMENT
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 The patient did not have the typical herpetic blisters. T    1) Diffuse Rash- most likely related to bendamustine more than Rituxan.  s/p hydrocortisone and Benadryl- with good response   On PO prednisone 60 mg once daily - will do taper over 10 days (60 mg 3 days, 40 mg 3 days, 20mg 3 days and 10 mg 3days and stop)  Burn eval noted- He is s/p debridement  c/w local care as per burn team's recommendations.  Outpt Burn f/u in 2weeks       2) Stage 4 Marginal zone lymphoma on R-Tatyana  Last dose of Rituxan and Bendamustine was on Jan 27, followed by Neulasta   Completed 3 cycles so far. Due for reevaluation of his disease status.   CT chest and abdomen reviewed- Decrease in abdominal LNs and splenomegaly. Non specific chest LNs  His Last PET scan was in Oct 2019 . Can do PET outpt if needed     3) Mild ASHLEY on CKD or progression of CKD- Stable   Baseline creatinine 1.8 to 2.5       4) Schizophrenia : Continue home medication of Benztropine and Fluphenazine.     5) History of Gout- c/w allopurinol.    6) Normocytic anemia related to chemotherapy- monitor     7) DVT prophylaxis : SCDs given past allergy to Heparin.    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 The patient did not have the typical herpetic blisters. T    1) Diffuse Rash- most likely related to bendamustine more than Rituxan.  s/p hydrocortisone and Benadryl- with good response   On PO prednisone 60 mg once daily - will do taper over 10 days (60 mg 3 days, 40 mg 3 days, 20mg 3 days and 10 mg 3days and stop).  Burn eval noted- He is s/p debridement.  c/w local care as per burn team's recommendations.  Outpatient Burn Management f/u in 2weeks.      2) Stage 4 Marginal zone lymphoma on R-Tatyana  Last dose of Rituxan and Bendamustine was on Jan 27, followed by Neulasta   Completed 3 cycles so far. Due for reevaluation of his disease status.   CT chest and abdomen reviewed- Decrease in abdominal LNs and splenomegaly. Non specific chest LNs  His Last PET scan was in Oct 2019 . Can do PET scan as outpatient for better evaluation and comparison with initial PET.     3) Mild ASHLEY on CKD or progression of CKD- Stable   Baseline creatinine 1.8 to 2.5       4) Schizophrenia : Continue home medication of Benztropine and Fluphenazine.     5) History of Gout- c/w allopurinol.    6) Normocytic anemia related to chemotherapy- monitor     7) DVT prophylaxis : SCDs given past allergy to Heparin.    -- Can be discharged home today  He can f/u with Dr DARLING in 10-14 days [facility].  I spoke to his mother over the phone.   Discussed with Dr Gong who agreed with the above.

## 2020-02-14 NOTE — DISCHARGE NOTE NURSING/CASE MANAGEMENT/SOCIAL WORK - PATIENT PORTAL LINK FT
You can access the FollowMyHealth Patient Portal offered by [facility] by registering at the following website: [email or web address]. By joining [employer]’s FollowMyHealth portal, you will also be able to view your health information using other applications (apps) compatible with our system.

## 2020-02-14 NOTE — CHART NOTE - NSCHARTNOTEFT_GEN_A_CORE
This morning patient is lying in bed comfortably. He reports no new complanits.      Vital Signs Last 24 Hrs  T(C): 35.8 (14 Feb 2020 08:11), Max: 35.8 (14 Feb 2020 08:11)  T(F): 96.5 (14 Feb 2020 08:11), Max: 96.5 (14 Feb 2020 08:11)  HR: 91 (14 Feb 2020 08:11) (69 - 91)  BP: 125/65 (14 Feb 2020 08:11) (93/52 - 125/65)  BP(mean): 88 (14 Feb 2020 08:11) (68 - 88)  RR: 18 (14 Feb 2020 08:11) (18 - 18)  SpO2: --      GENERAL: NAD, lying in bed comfortably  HEAD:  Atraumatic, Normocephaliction bilaterally; No rales, rhonchi, wheezing, or rubs. Unlabored respirations  HEART: Regular rate and rhythm; No murmurs, rubs, or gallops  ABDOMEN: Bowel sounds present; Soft, Nontender, Nondistended. No hepatomegally  EXTREMITIES:  2+ Peripheral Pulses, brisk capillary refill. No clubbing, cyanosis, or edema. Multiple excoriations on b/l UEs  NERVOUS SYSTEM:  Alert & Oriented X3, speech clear. No deficits         LABS:  cret                        8.9    6.58  )-----------( 198      ( 13 Feb 2020 05:30 )             26.5     02-13    144  |  114<H>  |  31<H>  ----------------------------<  129<H>  4.1   |  18  |  1.8<H>    Ca    8.6      13 Feb 2020 05:30  Mg     2.1     02-13    TPro  4.2<L>  /  Alb  3.1<L>  /  TBili  <0.2  /  DBili  x   /  AST  14  /  ALT  13  /  AlkPhos  86  02-13

## 2020-02-20 ENCOUNTER — APPOINTMENT (OUTPATIENT)
Dept: HEMATOLOGY ONCOLOGY | Facility: CLINIC | Age: 65
End: 2020-02-20
Payer: MEDICAID

## 2020-02-20 ENCOUNTER — OUTPATIENT (OUTPATIENT)
Dept: OUTPATIENT SERVICES | Facility: HOSPITAL | Age: 65
LOS: 1 days | Discharge: HOME | End: 2020-02-20

## 2020-02-20 VITALS
HEIGHT: 72 IN | BODY MASS INDEX: 27.36 KG/M2 | WEIGHT: 202 LBS | DIASTOLIC BLOOD PRESSURE: 50 MMHG | TEMPERATURE: 98.5 F | HEART RATE: 68 BPM | SYSTOLIC BLOOD PRESSURE: 99 MMHG | RESPIRATION RATE: 14 BRPM

## 2020-02-20 DIAGNOSIS — Z96.0 PRESENCE OF UROGENITAL IMPLANTS: Chronic | ICD-10-CM

## 2020-02-20 DIAGNOSIS — Z98.890 OTHER SPECIFIED POSTPROCEDURAL STATES: Chronic | ICD-10-CM

## 2020-02-20 DIAGNOSIS — S42.409A UNSPECIFIED FRACTURE OF LOWER END OF UNSPECIFIED HUMERUS, INITIAL ENCOUNTER FOR CLOSED FRACTURE: Chronic | ICD-10-CM

## 2020-02-20 PROCEDURE — 99214 OFFICE O/P EST MOD 30 MIN: CPT

## 2020-02-20 NOTE — PHYSICAL EXAM
[Restricted in physically strenuous activity but ambulatory and able to carry out work of a light or sedentary nature] : Status 1- Restricted in physically strenuous activity but ambulatory and able to carry out work of a light or sedentary nature, e.g., light house work, office work [Normal] : grossly intact [de-identified] : Arthritic  changes noted. [de-identified] : I could not palpate the tip op the spleen. [de-identified] : Superficial wound on the mid-back.

## 2020-02-20 NOTE — HISTORY OF PRESENT ILLNESS
[Disease:__________________________] : Disease: [unfilled] [de-identified] : The patient is coming for his regularly scheduled follow up for his marginal zone lymphoma, S/P 3 cycles of chemotherapy with bendamustine-Rituxan.\par He was recently hospitalized after the 3rd cycle because of severe, diffuse, erythematous rash. He was treated and discharged after improvement. \par At present, the above-mentioned rash has practically disappeared but the back wound (thought to be from post herpetic eruptions) is persisting and requiring local care, although, according to his mother it is improving.\par He was reevaluated with another scan at [facility]. The disease was significantly improved (probably more than 50% reduction) including the size of the spleen.\par The patient is denying any fever or night sweats. The appetite is good. No new problems with urine or bowel movements. Just the back pain from the wound. [de-identified] : IV [de-identified] : Marginal zone

## 2020-02-20 NOTE — ASSESSMENT
[FreeTextEntry1] : 1. Marginal zone lymphoma, stage IV, with good partial response to bendamustine-Rituxan x 3cycles. At the present time, the patient is hesitant to continue with the chemotherapy because of the skin reaction.\par 2. H. Zoster? Not clear whether that was really from H.Z. or something else because the presentation didn't look typical .\par \par The situation was discussed at length with the patient and his mother.\par At this time. we will hold off any chemotherapy.\par He will be seen again in 3 weeks for follow up. At that time, may be we can switch his regimen to single agent Rituxan.\par \par All questions answered.

## 2020-02-20 NOTE — REVIEW OF SYSTEMS
[Fatigue] : fatigue [Joint Pain] : joint pain [Skin Rash] : skin rash [Skin Wound] : skin wound [Depression] : no depression [Negative] : Endocrine [FreeTextEntry9] : Mostly mid-back

## 2020-02-24 ENCOUNTER — APPOINTMENT (OUTPATIENT)
Dept: INFUSION THERAPY | Facility: CLINIC | Age: 65
End: 2020-02-24

## 2020-02-25 ENCOUNTER — APPOINTMENT (OUTPATIENT)
Dept: INFUSION THERAPY | Facility: CLINIC | Age: 65
End: 2020-02-25

## 2020-02-26 ENCOUNTER — APPOINTMENT (OUTPATIENT)
Dept: INFUSION THERAPY | Facility: CLINIC | Age: 65
End: 2020-02-26

## 2020-02-26 DIAGNOSIS — B02.7 DISSEMINATED ZOSTER: ICD-10-CM

## 2020-02-26 DIAGNOSIS — M10.9 GOUT, UNSPECIFIED: ICD-10-CM

## 2020-02-26 DIAGNOSIS — Z91.048 OTHER NONMEDICINAL SUBSTANCE ALLERGY STATUS: ICD-10-CM

## 2020-02-26 DIAGNOSIS — C85.90 NON-HODGKIN LYMPHOMA, UNSPECIFIED, UNSPECIFIED SITE: ICD-10-CM

## 2020-02-26 DIAGNOSIS — T45.1X5A ADVERSE EFFECT OF ANTINEOPLASTIC AND IMMUNOSUPPRESSIVE DRUGS, INITIAL ENCOUNTER: ICD-10-CM

## 2020-02-26 DIAGNOSIS — L27.0 GENERALIZED SKIN ERUPTION DUE TO DRUGS AND MEDICAMENTS TAKEN INTERNALLY: ICD-10-CM

## 2020-02-26 DIAGNOSIS — N17.9 ACUTE KIDNEY FAILURE, UNSPECIFIED: ICD-10-CM

## 2020-02-26 DIAGNOSIS — D64.81 ANEMIA DUE TO ANTINEOPLASTIC CHEMOTHERAPY: ICD-10-CM

## 2020-02-26 DIAGNOSIS — I25.10 ATHEROSCLEROTIC HEART DISEASE OF NATIVE CORONARY ARTERY WITHOUT ANGINA PECTORIS: ICD-10-CM
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
## 2020-02-26 DIAGNOSIS — N26.1 ATROPHY OF KIDNEY (TERMINAL): ICD-10-CM

## 2020-02-26 DIAGNOSIS — F20.9 SCHIZOPHRENIA, UNSPECIFIED: ICD-10-CM

## 2020-02-26 DIAGNOSIS — N18.4 CHRONIC KIDNEY DISEASE, STAGE 4 (SEVERE): ICD-10-CM
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
## 2020-03-02 DIAGNOSIS — C85.80 OTHER SPECIFIED TYPES OF NON-HODGKIN LYMPHOMA, UNSPECIFIED SITE: ICD-10-CM

## 2020-03-17 ENCOUNTER — APPOINTMENT (OUTPATIENT)
Dept: BURN CARE | Facility: CLINIC | Age: 65
End: 2020-03-17

## 2020-03-19 ENCOUNTER — APPOINTMENT (OUTPATIENT)
Dept: HEMATOLOGY ONCOLOGY | Facility: CLINIC | Age: 65
End: 2020-03-19

## 2020-05-01 ENCOUNTER — LABORATORY RESULT (OUTPATIENT)
Age: 65
End: 2020-05-01

## 2020-05-28 ENCOUNTER — LABORATORY RESULT (OUTPATIENT)
Age: 65
End: 2020-05-28

## 2020-05-28 ENCOUNTER — APPOINTMENT (OUTPATIENT)
Dept: HEMATOLOGY ONCOLOGY | Facility: CLINIC | Age: 65
End: 2020-05-28
Payer: MEDICAID

## 2020-05-28 VITALS
BODY MASS INDEX: 28.17 KG/M2 | HEART RATE: 79 BPM | WEIGHT: 208 LBS | DIASTOLIC BLOOD PRESSURE: 59 MMHG | HEIGHT: 72 IN | RESPIRATION RATE: 14 BRPM | SYSTOLIC BLOOD PRESSURE: 99 MMHG | TEMPERATURE: 97.8 F

## 2020-05-28 LAB
ALBUMIN SERPL ELPH-MCNC: 4.4 G/DL
ALP BLD-CCNC: 159 U/L
ALT SERPL-CCNC: 17 U/L
ANION GAP SERPL CALC-SCNC: 13 MMOL/L
AST SERPL-CCNC: 13 U/L
BILIRUB SERPL-MCNC: 0.3 MG/DL
BUN SERPL-MCNC: 36 MG/DL
CALCIUM SERPL-MCNC: 9.6 MG/DL
CHLORIDE SERPL-SCNC: 112 MMOL/L
CO2 SERPL-SCNC: 18 MMOL/L
CREAT SERPL-MCNC: 2.4 MG/DL
GLUCOSE SERPL-MCNC: 107 MG/DL
HCT VFR BLD CALC: 35.2 %
HGB BLD-MCNC: 12.2 G/DL
LDH SERPL-CCNC: 169 U/L
MCHC RBC-ENTMCNC: 31.9 PG
MCHC RBC-ENTMCNC: 34.7 G/DL
MCV RBC AUTO: 92.1 FL
PLATELET # BLD AUTO: 205 K/UL
PMV BLD: 8.6 FL
POTASSIUM SERPL-SCNC: 4.3 MMOL/L
PROT SERPL-MCNC: 5.8 G/DL
RBC # BLD: 3.82 M/UL
RBC # FLD: 13.7 %
SODIUM SERPL-SCNC: 143 MMOL/L
WBC # FLD AUTO: 4.91 K/UL

## 2020-05-28 PROCEDURE — 99213 OFFICE O/P EST LOW 20 MIN: CPT

## 2020-05-28 NOTE — PHYSICAL EXAM
[Restricted in physically strenuous activity but ambulatory and able to carry out work of a light or sedentary nature] : Status 1- Restricted in physically strenuous activity but ambulatory and able to carry out work of a light or sedentary nature, e.g., light house work, office work [Normal] : supple without JVD, no thyromegaly or masses appreciated [de-identified] : Unable to palpate the tip op the spleen. [de-identified] : Arthritic changes noted. [de-identified] : Superficial wound on the left mid-back.healing.

## 2020-05-28 NOTE — ASSESSMENT
[FreeTextEntry1] : 1. Marginal zone lymphoma, stage IV, with good partial response to bendamustine -Rituxan x 3 cycles. \par 2. H. Zoster? Not clear whether that was really from H.Z. or something else because the presentation didn't look typical; resolved besides post herpetic neuralgia where previously active lesion was present.\par \par The situation was discussed at length with the patient and his mother.\par At this time, we will plan to start maintenance Rituxan every 2 months.\par CT C/A/P ordered to re-evaluate disease status before starting the maintenance phase.\par CBC, CMP, LDH today.\par \par Plan to resume treatment with maintenance Rituxan pending lab work.  Pt is agreeable to plan.

## 2020-05-28 NOTE — REVIEW OF SYSTEMS
[Fatigue] : fatigue [Joint Pain] : joint pain [Skin Wound] : skin wound [Negative] : Heme/Lymph [Fever] : no fever [Chills] : no chills [Recent Change In Weight] : ~T no recent weight change [Depression] : no depression [de-identified] : H [FreeTextEntry9] : Mostly left mid-back pain 2/2 herpes zoster infection [de-identified] : Schizophrenia

## 2020-05-28 NOTE — HISTORY OF PRESENT ILLNESS
[Disease:__________________________] : Disease: [unfilled] [de-identified] : The patient is here for follow up for his marginal zone lymphoma, S/P 3 cycles of chemotherapy with bendamustine -Rituxan.\par He was hospitalized after the 3rd cycle because of severe, diffuse, erythematous rash. Because of that, he opted to hold chemo at least until complete resolution of rash.  \par His rash has improved (previously thought to be from post herpetic eruptions) but the subcutaneous erythema is still present on left scapula and causes him mild pain.  \par The patient is denying any fever, chills, early satiety, unintentional weight loss, palpable adenopathy or night sweats. The appetite is good. \par Discussed role of maintenance Rituxan every 2 months for about 2 years with patient and his mother. [de-identified] : IV [de-identified] : Marginal zone

## 2020-06-10 ENCOUNTER — RESULT REVIEW (OUTPATIENT)
Age: 65
End: 2020-06-10

## 2020-06-10 ENCOUNTER — OUTPATIENT (OUTPATIENT)
Dept: OUTPATIENT SERVICES | Facility: HOSPITAL | Age: 65
LOS: 1 days | Discharge: HOME | End: 2020-06-10
Payer: MEDICAID

## 2020-06-10 DIAGNOSIS — Z96.0 PRESENCE OF UROGENITAL IMPLANTS: Chronic | ICD-10-CM

## 2020-06-10 DIAGNOSIS — C85.80 OTHER SPECIFIED TYPES OF NON-HODGKIN LYMPHOMA, UNSPECIFIED SITE: ICD-10-CM

## 2020-06-10 DIAGNOSIS — Z98.890 OTHER SPECIFIED POSTPROCEDURAL STATES: Chronic | ICD-10-CM

## 2020-06-10 DIAGNOSIS — S42.409A UNSPECIFIED FRACTURE OF LOWER END OF UNSPECIFIED HUMERUS, INITIAL ENCOUNTER FOR CLOSED FRACTURE: Chronic | ICD-10-CM

## 2020-06-10 PROCEDURE — 71250 CT THORAX DX C-: CPT | Mod: 26

## 2020-06-10 PROCEDURE — 74176 CT ABD & PELVIS W/O CONTRAST: CPT | Mod: 26

## 2020-08-06 NOTE — ED ADULT NURSE NOTE - NSSEPSISSUSPECTED_ED_A_ED
Prescribed medications/Need for follow up after discharge/Stroke education booklet/Call 911 for stroke/Signs and symptoms of stroke/Stroke support groups for patients, families, and friends/Risk factors for stroke/Stroke warning signs and symptoms
Yes

## 2020-08-20 ENCOUNTER — LABORATORY RESULT (OUTPATIENT)
Age: 65
End: 2020-08-20

## 2020-08-20 ENCOUNTER — OUTPATIENT (OUTPATIENT)
Dept: OUTPATIENT SERVICES | Facility: HOSPITAL | Age: 65
LOS: 1 days | Discharge: HOME | End: 2020-08-20

## 2020-08-20 ENCOUNTER — APPOINTMENT (OUTPATIENT)
Dept: HEMATOLOGY ONCOLOGY | Facility: CLINIC | Age: 65
End: 2020-08-20
Payer: MEDICAID

## 2020-08-20 VITALS
RESPIRATION RATE: 14 BRPM | HEIGHT: 72 IN | DIASTOLIC BLOOD PRESSURE: 60 MMHG | SYSTOLIC BLOOD PRESSURE: 102 MMHG | WEIGHT: 209 LBS | BODY MASS INDEX: 28.31 KG/M2 | TEMPERATURE: 97.3 F

## 2020-08-20 DIAGNOSIS — C85.80 OTHER SPECIFIED TYPES OF NON-HODGKIN LYMPHOMA, UNSPECIFIED SITE: ICD-10-CM

## 2020-08-20 DIAGNOSIS — Z98.890 OTHER SPECIFIED POSTPROCEDURAL STATES: Chronic | ICD-10-CM

## 2020-08-20 DIAGNOSIS — S42.409A UNSPECIFIED FRACTURE OF LOWER END OF UNSPECIFIED HUMERUS, INITIAL ENCOUNTER FOR CLOSED FRACTURE: Chronic | ICD-10-CM

## 2020-08-20 DIAGNOSIS — Z96.0 PRESENCE OF UROGENITAL IMPLANTS: Chronic | ICD-10-CM

## 2020-08-20 LAB
ALBUMIN SERPL ELPH-MCNC: 4.9 G/DL
ALP BLD-CCNC: 191 U/L
ALT SERPL-CCNC: 15 U/L
ANION GAP SERPL CALC-SCNC: 13 MMOL/L
AST SERPL-CCNC: 13 U/L
BILIRUB SERPL-MCNC: 0.6 MG/DL
BUN SERPL-MCNC: 35 MG/DL
CALCIUM SERPL-MCNC: 9.6 MG/DL
CHLORIDE SERPL-SCNC: 112 MMOL/L
CO2 SERPL-SCNC: 19 MMOL/L
CREAT SERPL-MCNC: 2.4 MG/DL
GLUCOSE SERPL-MCNC: 103 MG/DL
HCT VFR BLD CALC: 40.2 %
HGB BLD-MCNC: 13.6 G/DL
LDH SERPL-CCNC: 179 U/L
MCHC RBC-ENTMCNC: 30.7 PG
MCHC RBC-ENTMCNC: 33.8 G/DL
MCV RBC AUTO: 90.7 FL
PLATELET # BLD AUTO: 166 K/UL
PMV BLD: 9 FL
POTASSIUM SERPL-SCNC: 4.5 MMOL/L
PROT SERPL-MCNC: 6.2 G/DL
RBC # BLD: 4.43 M/UL
RBC # FLD: 13.5 %
SODIUM SERPL-SCNC: 144 MMOL/L
WBC # FLD AUTO: 4.54 K/UL

## 2020-08-20 PROCEDURE — 99213 OFFICE O/P EST LOW 20 MIN: CPT

## 2020-08-20 RX ORDER — SULFAMETHOXAZOLE AND TRIMETHOPRIM 800; 160 MG/1; MG/1
800-160 TABLET ORAL
Qty: 14 | Refills: 0 | Status: DISCONTINUED | COMMUNITY
Start: 2017-04-03 | End: 2020-08-20

## 2020-08-20 RX ORDER — ALLOPURINOL 300 MG/1
300 TABLET ORAL DAILY
Qty: 30 | Refills: 1 | Status: DISCONTINUED | COMMUNITY
Start: 2019-12-23 | End: 2020-08-20

## 2020-08-20 NOTE — PHYSICAL EXAM
[Restricted in physically strenuous activity but ambulatory and able to carry out work of a light or sedentary nature] : Status 1- Restricted in physically strenuous activity but ambulatory and able to carry out work of a light or sedentary nature, e.g., light house work, office work [Normal] : affect appropriate [de-identified] : No palpable splenomegaly. [de-identified] : Erythema on mid-back, correlating with area of pain. No pain to light palpation. [de-identified] : Arthritic changes noted.

## 2020-08-20 NOTE — HISTORY OF PRESENT ILLNESS
[Disease:__________________________] : Disease: [unfilled] [Disease: _____________________] : Disease: [unfilled] [de-identified] : The patient presents for follow up of marginal zone lymphoma, s/p bendamustine and rituximab x 3 cycles with hospitalization for severe, diffuse erythematous rash, for which he opted to hold further chemotherapy (last treatment on 1/27/2020). His rash did improve but subcutaneous erythema is still present and causes pain and limited ROM of R shoulder which however is most likely from his H. Zoster infection. \par The patient denies fevers, chills, night sweats, weight loss, or LAD. He stated that he has had some decreased appetite but has been encouraging himself to eat and has actually gained several pounds.\par \par At last visit, role of maintenance rituximab every 2 months for 2 years was discussed. Treatment was planned to be initiated after CBC, CMP, LDH, and CT chest/abdomen/pelvis. Scans showed good response to treatment, but patient did not follow up for additional two months. \par \par  [de-identified] : IV [de-identified] : Marginal zone [de-identified] : Marginal zone

## 2020-08-20 NOTE — REVIEW OF SYSTEMS
[Fatigue] : fatigue [Skin Wound] : skin wound [Joint Pain] : joint pain [Negative] : Heme/Lymph [Fever] : no fever [Chills] : no chills [Recent Change In Weight] : ~T no recent weight change [Depression] : no depression [FreeTextEntry2] : Decreased appetite but adequate intake and some weight gain [de-identified] : Schizophrenia [de-identified] : Persistent erythema over area of R scapula [FreeTextEntry9] : Pain with shoulder ROM secondary to history of zoster infection

## 2020-08-20 NOTE — ASSESSMENT
[FreeTextEntry1] : 1. Marginal zone lymphoma, stage IV, with good partial response to bendamustine-rituximab x 3 cycles. \par 2. History of herpes zoster\par \par - 6/10/2020 CT C/A/P reviewed and demonstrated good response to treatment. At this point, it has been almost 7 months since last treatment with bendamustine and rituximab. Explained to patient that there is no data for initiating treatment with maintenance rituximab so long after initial chemotherapy, but it is reasonable to do so. Patient demonstrated understanding but stated that he had significant side effects with bendamustine/rituximab, including fatigue, rash, and diarrhea. Explained that side effects might be secondary to bendamustine, rather than rituximab, but patient prefers to observe. Will check CBC, CMP, and LDH today and follow up in 3 months. Patient will be due for repeat PET scan in 12/2020.\par \par All questions answered.\par \par The patient will be seen again in follow up in 3 months.

## 2020-08-21 ENCOUNTER — NON-APPOINTMENT (OUTPATIENT)
Age: 65
End: 2020-08-21

## 2020-11-19 ENCOUNTER — APPOINTMENT (OUTPATIENT)
Dept: HEMATOLOGY ONCOLOGY | Facility: CLINIC | Age: 65
End: 2020-11-19

## 2020-12-14 ENCOUNTER — APPOINTMENT (OUTPATIENT)
Dept: HEMATOLOGY ONCOLOGY | Facility: CLINIC | Age: 65
End: 2020-12-14
Payer: MEDICAID

## 2020-12-14 ENCOUNTER — LABORATORY RESULT (OUTPATIENT)
Age: 65
End: 2020-12-14

## 2020-12-14 ENCOUNTER — OUTPATIENT (OUTPATIENT)
Dept: OUTPATIENT SERVICES | Facility: HOSPITAL | Age: 65
LOS: 1 days | Discharge: HOME | End: 2020-12-14

## 2020-12-14 VITALS
HEART RATE: 67 BPM | DIASTOLIC BLOOD PRESSURE: 68 MMHG | SYSTOLIC BLOOD PRESSURE: 101 MMHG | TEMPERATURE: 97.6 F | RESPIRATION RATE: 14 BRPM | HEIGHT: 72 IN | BODY MASS INDEX: 28.99 KG/M2 | WEIGHT: 214 LBS

## 2020-12-14 DIAGNOSIS — Z98.890 OTHER SPECIFIED POSTPROCEDURAL STATES: Chronic | ICD-10-CM

## 2020-12-14 DIAGNOSIS — Z96.0 PRESENCE OF UROGENITAL IMPLANTS: Chronic | ICD-10-CM

## 2020-12-14 DIAGNOSIS — S42.409A UNSPECIFIED FRACTURE OF LOWER END OF UNSPECIFIED HUMERUS, INITIAL ENCOUNTER FOR CLOSED FRACTURE: Chronic | ICD-10-CM

## 2020-12-14 LAB
HCT VFR BLD CALC: 40.3 %
HGB BLD-MCNC: 13.8 G/DL
MCHC RBC-ENTMCNC: 31 PG
MCHC RBC-ENTMCNC: 34.2 G/DL
MCV RBC AUTO: 90.6 FL
PLATELET # BLD AUTO: 159 K/UL
PMV BLD: 9.1 FL
RBC # BLD: 4.45 M/UL
RBC # FLD: 13.2 %
WBC # FLD AUTO: 4.59 K/UL

## 2020-12-14 PROCEDURE — 99213 OFFICE O/P EST LOW 20 MIN: CPT

## 2020-12-15 LAB
ALBUMIN SERPL ELPH-MCNC: 4.7 G/DL
ALP BLD-CCNC: 220 U/L
ALT SERPL-CCNC: 26 U/L
ANION GAP SERPL CALC-SCNC: 12 MMOL/L
AST SERPL-CCNC: 14 U/L
BILIRUB SERPL-MCNC: 0.4 MG/DL
BUN SERPL-MCNC: 34 MG/DL
CALCIUM SERPL-MCNC: 9.5 MG/DL
CHLORIDE SERPL-SCNC: 114 MMOL/L
CO2 SERPL-SCNC: 18 MMOL/L
CREAT SERPL-MCNC: 2.5 MG/DL
GLUCOSE SERPL-MCNC: 87 MG/DL
LDH SERPL-CCNC: 195 U/L
POTASSIUM SERPL-SCNC: 4.2 MMOL/L
PROT SERPL-MCNC: 6.1 G/DL
SODIUM SERPL-SCNC: 144 MMOL/L

## 2020-12-15 NOTE — ASSESSMENT
[FreeTextEntry1] : Marginal zone lymphoma in good partial remission, S/P 3 cycles of Rituxan - Bendamustine, with no further maintenance treatment, clinically stable.\par We will obtain CBC, CMP, LDH and PET CT for reevaluation of his disease status.\par Further recommendations after those results are available.\par \par All questions answered.\par \par He will be seen again in 3-4 months if the PET scan shows stable disease. If progression, depending on the gravity of the progression, further steps will be taken.\par \par

## 2020-12-15 NOTE — REVIEW OF SYSTEMS
[Fatigue] : fatigue [Joint Pain] : joint pain [Joint Stiffness] : joint stiffness [Anxiety] : anxiety [Negative] : Heme/Lymph [de-identified] : History of schizophrenia

## 2020-12-15 NOTE — HISTORY OF PRESENT ILLNESS
[Disease:__________________________] : Disease: [unfilled] [de-identified] : The patient is coming for his regularly scheduled follow up for his regularly scheduled follow up for his marginal zone lymphoma in a good partial remission following 3 cycles of bendamustine and Rituxan. His tolerance to the chemotherapy has not been perfect and he didn't want to continue with maintenance Rituxan.\par At present, he has no new particular complaints. Specifically, no fever or night sweats. The appetite is stable. No problems with urine or bowel movements. No new pains or aches.  [de-identified] : Marginal zone

## 2020-12-15 NOTE — PHYSICAL EXAM
[Restricted in physically strenuous activity but ambulatory and able to carry out work of a light or sedentary nature] : Status 1- Restricted in physically strenuous activity but ambulatory and able to carry out work of a light or sedentary nature, e.g., light house work, office work [Normal] : affect appropriate [de-identified] : Mild arthritic changes

## 2021-01-06 DIAGNOSIS — C85.80 OTHER SPECIFIED TYPES OF NON-HODGKIN LYMPHOMA, UNSPECIFIED SITE: ICD-10-CM

## 2021-01-14 ENCOUNTER — RESULT REVIEW (OUTPATIENT)
Age: 66
End: 2021-01-14

## 2021-01-14 ENCOUNTER — OUTPATIENT (OUTPATIENT)
Dept: OUTPATIENT SERVICES | Facility: HOSPITAL | Age: 66
LOS: 1 days | Discharge: HOME | End: 2021-01-14
Payer: MEDICAID

## 2021-01-14 DIAGNOSIS — Z98.890 OTHER SPECIFIED POSTPROCEDURAL STATES: Chronic | ICD-10-CM

## 2021-01-14 DIAGNOSIS — Z96.0 PRESENCE OF UROGENITAL IMPLANTS: Chronic | ICD-10-CM

## 2021-01-14 DIAGNOSIS — C85.80 OTHER SPECIFIED TYPES OF NON-HODGKIN LYMPHOMA, UNSPECIFIED SITE: ICD-10-CM

## 2021-01-14 DIAGNOSIS — S42.409A UNSPECIFIED FRACTURE OF LOWER END OF UNSPECIFIED HUMERUS, INITIAL ENCOUNTER FOR CLOSED FRACTURE: Chronic | ICD-10-CM

## 2021-01-14 DIAGNOSIS — C79.89 SECONDARY MALIGNANT NEOPLASM OF OTHER SPECIFIED SITES: ICD-10-CM

## 2021-01-14 LAB — GLUCOSE BLDC GLUCOMTR-MCNC: 92 MG/DL — SIGNIFICANT CHANGE UP (ref 70–99)

## 2021-01-14 PROCEDURE — 78815 PET IMAGE W/CT SKULL-THIGH: CPT | Mod: 26,PS

## 2021-02-23 ENCOUNTER — NON-APPOINTMENT (OUTPATIENT)
Age: 66
End: 2021-02-23

## 2021-05-13 NOTE — DISCHARGE NOTE NURSING/CASE MANAGEMENT/SOCIAL WORK - MODE OF TRANSPORTATION
Tender, Red. Reports some discharge. No mass. HM: PNA, COVID vaccine, Varicella titer. Review of Systems   Constitutional: Negative for chills and fever. HENT: Negative for congestion, rhinorrhea and sore throat. Respiratory: Negative for shortness of breath and wheezing. Cardiovascular: Negative for chest pain and leg swelling. Gastrointestinal: Negative for abdominal pain, constipation, diarrhea, nausea and vomiting. Musculoskeletal: Positive for arthralgias and myalgias. Skin: Negative for rash. Neurological: Negative for light-headedness and headaches. Psychiatric/Behavioral: Negative for suicidal ideas. The patient is not nervous/anxious. Past Medical History:   Diagnosis Date    Anxiety     Depression     Hernia of abdominal cavity     Rheumatoid arthritis ([facility] [address]     Rheumatoid arthritis ([facility] [address] 2018       Prior to Visit Medications    Medication Sig Taking? Authorizing Provider   sertraline (ZOLOFT) 50 MG tablet take 1 tablet by mouth once daily Yes Elizabeth Biggs MD   mupirocin (BACTROBAN) 2 % ointment Apply 3 times daily.  Yes Elizabeth Biggs MD   hydroxychloroquine (PLAQUENIL) 200 MG tablet Take 1 tablet by mouth 2 times daily Yes Elizabeth Biggs MD   methotrexate (RHEUMATREX) 2.5 MG chemo tablet Take 8 tablets by mouth once a week Yes Elizabeth Biggs MD   amoxicillin-clavulanate (AUGMENTIN) 875-125 MG per tablet Take 1 tablet by mouth 2 times daily for 10 days Yes Elizabeth Biggs MD   folic acid (FOLVITE) 1 MG tablet take 1 tablet by mouth once daily Yes Elizabeth Biggs MD   meloxicam (MOBIC) 15 MG tablet Take 1 tablet by mouth daily Yes Elizabeth Biggs MD        Allergies   Allergen Reactions    Cefaclor Hives     UNKNOWN REACTION    Codeine Hives     Other reaction(s): Hives    Penicillins     Erythromycin Rash     Other reaction(s): Hives    Tylenol 8 Hour [Acetaminophen] Hives     Tylenol #3 with Codeine       Social History     Tobacco Use    Smoking status: Current Every Day Smoker    Smokeless tobacco: Never Used   Substance Use Topics    Alcohol use: No           Vitals:    05/13/21 0846   BP: 130/84   Pulse: 80   Resp: 20   Temp: 96.9 °F (36.1 °C)   TempSrc: Temporal   SpO2: 99%   Weight: 290 lb (131.5 kg)   Height: 5' 7\" (1.702 m)     Estimated body mass index is 45.42 kg/m² as calculated from the following:    Height as of this encounter: 5' 7\" (1.702 m). Weight as of this encounter: 290 lb (131.5 kg). Physical Exam  Constitutional:       Appearance: She is well-developed. HENT:      Head: Normocephalic. Eyes:      Conjunctiva/sclera: Conjunctivae normal.      Pupils: Pupils are equal, round, and reactive to light. Cardiovascular:      Rate and Rhythm: Normal rate and regular rhythm. Heart sounds: Normal heart sounds. No murmur. Pulmonary:      Effort: Pulmonary effort is normal.      Breath sounds: Normal breath sounds. No wheezing or rales. Abdominal:      General: Bowel sounds are normal.      Palpations: Abdomen is soft. Tenderness: There is no abdominal tenderness. Musculoskeletal:        Arms:       Right lower leg: No edema. Left lower leg: No edema. Comments: Soft, mobile, nontender mass. Skin:            Comments: Linear area approximately 6-7 cm along recent/new tattoo. Erythematous, eschar formation. Mild tenderness. No fluctuance. No induration. Mild puslike discharge. Neurological:      Mental Status: She is alert. Comments: Cranial nerves grossly intact         ASSESSMENT/PLAN:  Getachew Azul was seen today for arthritis and wound check. Diagnoses and all orders for this visit:    Rheumatoid arthritis involving multiple sites with positive rheumatoid factor (HCC)  -     CBC Auto Differential; Future  -     Comprehensive Metabolic Panel; Future  -     Microalbumin / Creatinine Urine Ratio; Future  -     Sedimentation Rate; Future  -     methotrexate (RHEUMATREX) 2.5 MG chemo tablet;  Take 8 tablets by mouth once a week  -     hydroxychloroquine (PLAQUENIL) 200 MG tablet; Take 1 tablet by mouth 2 times daily  Stable. Patient is still in search of a rheumatologist. Patient reports that she'll need to see a specialist out of town due to her insurance. She is tolerating current medications well without a significant issue. Patient is on methotrexate and hydroxychloroquine. Patient is due for repeat blood work regards to these issues. Patient is to have CBC and CMP performed every 3-4 months at a min. Right ovarian cyst  Per recent ObGyn note an ultrasound resolved. Anxiety  -     sertraline (ZOLOFT) 50 MG tablet; take 1 tablet by mouth once daily  Stable. No SI/HI. Tolerating Zoloft well without any side effects. Following with psychiatry. Ventral hernia without obstruction or gangrene  Negative on CT scan. No recent abdominal issues. Mass on back  -     [facility]; Future  Ongoing for greater than one year. Concerns for possible fatty tumor such as lipoma. Check ultrasound. If any abnormal findings refer to general surgery. Gastroesophageal reflux disease, unspecified whether esophagitis present  Stable. Not on any medications. Not taking any OTC. Class 3 severe obesity due to excess calories with serious comorbidity and body mass index (BMI) of 40.0 to 44.9 in adult [facility])  Increased weight since last appointment. Lack of diet and exercise modifications. Lifestyle modifications reviewed and advise. Vitamin D deficiency  -     Vitamin D 25 Hydroxy; Future    Lipid screening  -     Lipid Panel; Future    Cellulitis of left leg  -     Discontinue: sulfamethoxazole-trimethoprim (BACTRIM DS;SEPTRA DS) 800-160 MG per tablet; Take 1 tablet by mouth 2 times daily for 10 days  -     mupirocin (BACTROBAN) 2 % ointment; Apply 3 times daily. -     amoxicillin-clavulanate (AUGMENTIN) 875-125 MG per tablet; Take 1 tablet by mouth 2 times daily for 10 days  New tattoo.  Concerns for infection without abscess formation. Antibiotics as noted above. Patient reports she is able to tolerate these antibiotics without issue. Allergies were reviewed today. If the patient's infection is not improved within 7 days or has any worsening patient should return to the clinic. May consider referral to wound care if needed. HM: PNA, COVID vaccine, Varicella titer. Recent notes from OB/GYN reviewed during today's office apt. Return in about 4 months (around 9/13/2021) for Physical.    An electronicsignature was used to authenticate this note.     --Hakeem Luong MD on 5/13/21 at 8:14 AM EDT Ambulatory

## 2021-09-21 NOTE — ED ADULT NURSE NOTE - NS ED NURSE RECORD ANOTHER HT AND WT
Contacted on call MD for blue  team and explained prednisone script does not have directions and to resend to pharmacy listed.   Yes

## 2021-10-06 ENCOUNTER — INPATIENT (INPATIENT)
Facility: HOSPITAL | Age: 66
LOS: 2 days | Discharge: HOME | End: 2021-10-09
Attending: INTERNAL MEDICINE | Admitting: INTERNAL MEDICINE
Payer: MEDICAID

## 2021-10-06 VITALS
RESPIRATION RATE: 22 BRPM | OXYGEN SATURATION: 98 % | TEMPERATURE: 103 F | HEIGHT: 72 IN | SYSTOLIC BLOOD PRESSURE: 131 MMHG | WEIGHT: 220.02 LBS | DIASTOLIC BLOOD PRESSURE: 88 MMHG | HEART RATE: 113 BPM

## 2021-10-06 DIAGNOSIS — Z96.0 PRESENCE OF UROGENITAL IMPLANTS: Chronic | ICD-10-CM

## 2021-10-06 DIAGNOSIS — Z98.890 OTHER SPECIFIED POSTPROCEDURAL STATES: Chronic | ICD-10-CM

## 2021-10-06 DIAGNOSIS — S42.409A UNSPECIFIED FRACTURE OF LOWER END OF UNSPECIFIED HUMERUS, INITIAL ENCOUNTER FOR CLOSED FRACTURE: Chronic | ICD-10-CM

## 2021-10-06 LAB
ALBUMIN SERPL ELPH-MCNC: 4.3 G/DL — SIGNIFICANT CHANGE UP (ref 3.5–5.2)
ALP SERPL-CCNC: 190 U/L — HIGH (ref 30–115)
ALT FLD-CCNC: 30 U/L — SIGNIFICANT CHANGE UP (ref 0–41)
ANION GAP SERPL CALC-SCNC: 17 MMOL/L — HIGH (ref 7–14)
ANISOCYTOSIS BLD QL: SIGNIFICANT CHANGE UP
APPEARANCE UR: ABNORMAL
APTT BLD: 31 SEC — SIGNIFICANT CHANGE UP (ref 27–39.2)
AST SERPL-CCNC: 177 U/L — HIGH (ref 0–41)
BACTERIA # UR AUTO: NEGATIVE — SIGNIFICANT CHANGE UP
BASE EXCESS BLDV CALC-SCNC: -5.5 MMOL/L — LOW (ref -2–3)
BASOPHILS # BLD AUTO: 0 K/UL — SIGNIFICANT CHANGE UP (ref 0–0.2)
BASOPHILS NFR BLD AUTO: 0 % — SIGNIFICANT CHANGE UP (ref 0–1)
BILIRUB SERPL-MCNC: 0.8 MG/DL — SIGNIFICANT CHANGE UP (ref 0.2–1.2)
BILIRUB UR-MCNC: NEGATIVE — SIGNIFICANT CHANGE UP
BUN SERPL-MCNC: 36 MG/DL — HIGH (ref 10–20)
CA-I SERPL-SCNC: 1.22 MMOL/L — SIGNIFICANT CHANGE UP (ref 1.15–1.33)
CALCIUM SERPL-MCNC: 9 MG/DL — SIGNIFICANT CHANGE UP (ref 8.5–10.1)
CHLORIDE SERPL-SCNC: 102 MMOL/L — SIGNIFICANT CHANGE UP (ref 98–110)
CO2 SERPL-SCNC: 17 MMOL/L — SIGNIFICANT CHANGE UP (ref 17–32)
COLOR SPEC: ABNORMAL
CREAT SERPL-MCNC: 3.1 MG/DL — HIGH (ref 0.7–1.5)
DIFF PNL FLD: ABNORMAL
EOSINOPHIL # BLD AUTO: 0 K/UL — SIGNIFICANT CHANGE UP (ref 0–0.7)
EOSINOPHIL NFR BLD AUTO: 0 % — SIGNIFICANT CHANGE UP (ref 0–8)
EPI CELLS # UR: 0 /HPF — SIGNIFICANT CHANGE UP (ref 0–5)
GAS PNL BLDV: 130 MMOL/L — LOW (ref 136–145)
GAS PNL BLDV: SIGNIFICANT CHANGE UP
GIANT PLATELETS BLD QL SMEAR: PRESENT — SIGNIFICANT CHANGE UP
GLUCOSE SERPL-MCNC: 125 MG/DL — HIGH (ref 70–99)
GLUCOSE UR QL: NEGATIVE — SIGNIFICANT CHANGE UP
HCO3 BLDV-SCNC: 19 MMOL/L — LOW (ref 22–29)
HCT VFR BLD CALC: 39.3 % — LOW (ref 42–52)
HCT VFR BLDA CALC: 42 % — SIGNIFICANT CHANGE UP (ref 39–51)
HGB BLD CALC-MCNC: 14 G/DL — SIGNIFICANT CHANGE UP (ref 12.6–17.4)
HGB BLD-MCNC: 13.4 G/DL — LOW (ref 14–18)
HYALINE CASTS # UR AUTO: 1 /LPF — SIGNIFICANT CHANGE UP (ref 0–7)
INR BLD: 1.23 RATIO — SIGNIFICANT CHANGE UP (ref 0.65–1.3)
KETONES UR-MCNC: NEGATIVE — SIGNIFICANT CHANGE UP
LACTATE BLDV-MCNC: 1.6 MMOL/L — SIGNIFICANT CHANGE UP (ref 0.5–2)
LEUKOCYTE ESTERASE UR-ACNC: ABNORMAL
LYMPHOCYTES # BLD AUTO: 0 % — LOW (ref 20.5–51.1)
LYMPHOCYTES # BLD AUTO: 0 K/UL — LOW (ref 1.2–3.4)
MANUAL SMEAR VERIFICATION: SIGNIFICANT CHANGE UP
MCHC RBC-ENTMCNC: 30.4 PG — SIGNIFICANT CHANGE UP (ref 27–31)
MCHC RBC-ENTMCNC: 34.1 G/DL — SIGNIFICANT CHANGE UP (ref 32–37)
MCV RBC AUTO: 89.1 FL — SIGNIFICANT CHANGE UP (ref 80–94)
MICROCYTES BLD QL: SIGNIFICANT CHANGE UP
MONOCYTES # BLD AUTO: 0.56 K/UL — SIGNIFICANT CHANGE UP (ref 0.1–0.6)
MONOCYTES NFR BLD AUTO: 4.3 % — SIGNIFICANT CHANGE UP (ref 1.7–9.3)
NEUTROPHILS # BLD AUTO: 12.54 K/UL — HIGH (ref 1.4–6.5)
NEUTROPHILS NFR BLD AUTO: 92.2 % — HIGH (ref 42.2–75.2)
NEUTS BAND # BLD: 3.5 % — SIGNIFICANT CHANGE UP (ref 0–6)
NITRITE UR-MCNC: POSITIVE
PCO2 BLDV: 35 MMHG — LOW (ref 42–55)
PH BLDV: 7.35 — SIGNIFICANT CHANGE UP (ref 7.32–7.43)
PH UR: 6 — SIGNIFICANT CHANGE UP (ref 5–8)
PLAT MORPH BLD: NORMAL — SIGNIFICANT CHANGE UP
PLATELET # BLD AUTO: 136 K/UL — SIGNIFICANT CHANGE UP (ref 130–400)
PO2 BLDV: 32 MMHG — SIGNIFICANT CHANGE UP
POLYCHROMASIA BLD QL SMEAR: SLIGHT — SIGNIFICANT CHANGE UP
POTASSIUM BLDV-SCNC: 3.8 MMOL/L — SIGNIFICANT CHANGE UP (ref 3.5–5.1)
POTASSIUM SERPL-MCNC: 3.9 MMOL/L — SIGNIFICANT CHANGE UP (ref 3.5–5)
POTASSIUM SERPL-SCNC: 3.9 MMOL/L — SIGNIFICANT CHANGE UP (ref 3.5–5)
PROT SERPL-MCNC: 6.2 G/DL — SIGNIFICANT CHANGE UP (ref 6–8)
PROT UR-MCNC: ABNORMAL
PROTHROM AB SERPL-ACNC: 14.1 SEC — HIGH (ref 9.95–12.87)
RBC # BLD: 4.41 M/UL — LOW (ref 4.7–6.1)
RBC # FLD: 13.2 % — SIGNIFICANT CHANGE UP (ref 11.5–14.5)
RBC BLD AUTO: ABNORMAL
RBC CASTS # UR COMP ASSIST: 13 /HPF — HIGH (ref 0–4)
SAO2 % BLDV: 57.3 % — SIGNIFICANT CHANGE UP
SARS-COV-2 RNA SPEC QL NAA+PROBE: SIGNIFICANT CHANGE UP
SODIUM SERPL-SCNC: 136 MMOL/L — SIGNIFICANT CHANGE UP (ref 135–146)
SP GR SPEC: 1.01 — SIGNIFICANT CHANGE UP (ref 1.01–1.03)
UROBILINOGEN FLD QL: SIGNIFICANT CHANGE UP
WBC # BLD: 13.1 K/UL — HIGH (ref 4.8–10.8)
WBC # FLD AUTO: 13.1 K/UL — HIGH (ref 4.8–10.8)
WBC UR QL: 614 /HPF — HIGH (ref 0–5)

## 2021-10-06 PROCEDURE — 93010 ELECTROCARDIOGRAM REPORT: CPT | Mod: 76

## 2021-10-06 PROCEDURE — 99285 EMERGENCY DEPT VISIT HI MDM: CPT

## 2021-10-06 PROCEDURE — 71045 X-RAY EXAM CHEST 1 VIEW: CPT | Mod: 26

## 2021-10-06 RX ORDER — VANCOMYCIN HCL 1 G
1000 VIAL (EA) INTRAVENOUS DAILY
Refills: 0 | Status: DISCONTINUED | OUTPATIENT
Start: 2021-10-06 | End: 2021-10-07

## 2021-10-06 RX ORDER — ACETAMINOPHEN 500 MG
975 TABLET ORAL ONCE
Refills: 0 | Status: COMPLETED | OUTPATIENT
Start: 2021-10-06 | End: 2021-10-06

## 2021-10-06 RX ORDER — FLUPHENAZINE HYDROCHLORIDE 1 MG/1
10 TABLET, FILM COATED ORAL AT BEDTIME
Refills: 0 | Status: DISCONTINUED | OUTPATIENT
Start: 2021-10-06 | End: 2021-10-09

## 2021-10-06 RX ORDER — CEFTRIAXONE 500 MG/1
1000 INJECTION, POWDER, FOR SOLUTION INTRAMUSCULAR; INTRAVENOUS ONCE
Refills: 0 | Status: COMPLETED | OUTPATIENT
Start: 2021-10-06 | End: 2021-10-06

## 2021-10-06 RX ORDER — SODIUM CHLORIDE 9 MG/ML
1000 INJECTION, SOLUTION INTRAVENOUS ONCE
Refills: 0 | Status: COMPLETED | OUTPATIENT
Start: 2021-10-06 | End: 2021-10-06

## 2021-10-06 RX ORDER — ALLOPURINOL 300 MG
1 TABLET ORAL
Qty: 0 | Refills: 0 | DISCHARGE

## 2021-10-06 RX ORDER — SODIUM CHLORIDE 9 MG/ML
2000 INJECTION, SOLUTION INTRAVENOUS ONCE
Refills: 0 | Status: COMPLETED | OUTPATIENT
Start: 2021-10-06 | End: 2021-10-06

## 2021-10-06 RX ORDER — CEFTRIAXONE 500 MG/1
1000 INJECTION, POWDER, FOR SOLUTION INTRAMUSCULAR; INTRAVENOUS EVERY 24 HOURS
Refills: 0 | Status: DISCONTINUED | OUTPATIENT
Start: 2021-10-06 | End: 2021-10-07

## 2021-10-06 RX ORDER — ACETAMINOPHEN 500 MG
650 TABLET ORAL EVERY 6 HOURS
Refills: 0 | Status: DISCONTINUED | OUTPATIENT
Start: 2021-10-06 | End: 2021-10-09

## 2021-10-06 RX ORDER — SODIUM CHLORIDE 9 MG/ML
500 INJECTION, SOLUTION INTRAVENOUS
Refills: 0 | Status: DISCONTINUED | OUTPATIENT
Start: 2021-10-06 | End: 2021-10-07

## 2021-10-06 RX ORDER — BENZTROPINE MESYLATE 1 MG
2 TABLET ORAL AT BEDTIME
Refills: 0 | Status: DISCONTINUED | OUTPATIENT
Start: 2021-10-06 | End: 2021-10-09

## 2021-10-06 RX ADMIN — FLUPHENAZINE HYDROCHLORIDE 10 MILLIGRAM(S): 1 TABLET, FILM COATED ORAL at 21:38

## 2021-10-06 RX ADMIN — Medication 250 MILLIGRAM(S): at 21:36

## 2021-10-06 RX ADMIN — Medication 650 MILLIGRAM(S): at 21:38

## 2021-10-06 RX ADMIN — Medication 2 MILLIGRAM(S): at 21:38

## 2021-10-06 RX ADMIN — SODIUM CHLORIDE 50 MILLILITER(S): 9 INJECTION, SOLUTION INTRAVENOUS at 21:35

## 2021-10-06 RX ADMIN — CEFTRIAXONE 100 MILLIGRAM(S): 500 INJECTION, POWDER, FOR SOLUTION INTRAMUSCULAR; INTRAVENOUS at 17:52

## 2021-10-06 RX ADMIN — Medication 975 MILLIGRAM(S): at 16:16

## 2021-10-06 RX ADMIN — SODIUM CHLORIDE 2000 MILLILITER(S): 9 INJECTION, SOLUTION INTRAVENOUS at 17:52

## 2021-10-06 RX ADMIN — SODIUM CHLORIDE 1000 MILLILITER(S): 9 INJECTION, SOLUTION INTRAVENOUS at 16:16

## 2021-10-06 NOTE — H&P ADULT - HISTORY OF PRESENT ILLNESS
65 year old male with PMH Lymphoma, HTN presents to ED with complaints of shortness of breath. Pt states for past few days has been having fever, chills, body aches. Pt states now having sob. Pt state sob is worse with exertion, however occurs at rest. Pt denies COVID in past as well as non vaccinated. Pt also attests to dysuria as well. Pt denies any chest pain, abdominal pain. Pt did have prior nausea with vomiting, NBNB, however no nausea currently. Pt also endorses fall, from standing height 2 days prior landing on L chest. Pt states has slight pain on L side. Denies head injury or LOC, denies neck or back pain.  In ED: Temp = 103; HR = 113; BP = 131/88; RR = 22; SaO2 = 98% on room air.  65 year old male with PMH Lymphoma, HTN presents to ED with complaints of shortness of breath. Pt states for past few days has been having fever, chills, body aches. Pt states now having sob. Pt state sob is worse with exertion, however occurs at rest. Pt denies COVID in past as well as non vaccinated. Pt also attests to dysuria as well. Pt denies any chest pain, abdominal pain. Pt did have prior nausea with vomiting, NBNB, however no nausea currently. Pt also endorses fall, from standing height 2 days prior landing on L chest. Pt states has slight pain on L side. Denies head injury or LOC, denies neck or back pain.  In ED: Temp = 103; HR = 113; BP = 131/88; RR = 22; SaO2 = 98% on room air.   Patient received LR 3L, Ceftriaxone 1000 mg x 1, Tylenol 975 mg x 1. 65 year old male with PMH Lymphoma, HTN presents to ED with complaints of shortness of breath on exertion, fever, chills, body aches, single episode of vomiting for the past few days. Denies any nausea / vomiting now. Pt denies COVID in past as well as non vaccinated. Patient had a left side upper extraction about 3 weeks and report having purulent discharge from site after the procedure. Patient took 10 days of Augmentin and 3 days of Levofloxacin 500 mg. Patient had 2 episodes of mechanical fall in past 2 weeks, denies any HT, LOC. Patient denies any chest pain, abdominal pain, urinary symptoms, diarrhea.  In ED: Temp = 103; HR = 113; BP = 131/88; RR = 22; SaO2 = 98% on room air. Work up noted for WBCs > 13; bicarb 17, BUN 36, Cr. 3.1. UA suggestive of UTI.  Patient received LR 3L, Ceftriaxone 1000 mg x 1, Tylenol 975 mg x 1. 65 year old man with PMH Lymphoma, HTN presents to ED with complaints of shortness of breath on exertion, fever, chills, body aches, single episode of vomiting for the past few days. Denies any nausea / vomiting now. Pt denies COVID in past as well as non vaccinated. Patient had a left side upper extraction about 3 weeks and report having purulent discharge from site after the procedure. Patient took 10 days of Augmentin and 3 days of Levofloxacin 500 mg. Patient had 2 episodes of mechanical fall in past 2 weeks, denies any HT, LOC. Patient denies any chest pain, abdominal pain, urinary symptoms, diarrhea.  In ED: Temp = 103; HR = 113; BP = 131/88; RR = 22; SaO2 = 98% on room air. Work up noted for WBCs > 13; bicarb 17, BUN 36, Cr. 3.1. UA suggestive of UTI.  Patient received LR 3L, Ceftriaxone 1000 mg x 1, Tylenol 975 mg x 1.    Attd: 66 yo pt w/ schizo (since 1994), NHL in remission and HTN had tooth extraction 3 wks ago; there was pus coming from the extracted site; pt was on oral abx as outpt; the pt was visiting his father in [place], [place] (suburb of [facility]) when he was becoming obtunded, thirsty, clamy, tachy and had thready pulse; father drove him to [facility]; in car, pt drank 3 bottles of water and 3 cans of diet cola; pt looks much better today and feels better; he has no signs at all of meningitis (no h/a, no photo/phonophobia, no neck stiffness); pt denies urine symp; denies N/V/D or any abd symp; pt denies sore throat and says mouth feels OK;

## 2021-10-06 NOTE — ED ADULT TRIAGE NOTE - CHIEF COMPLAINT QUOTE
pt came to ED for shortness of breath, nausea, vomiting that began today. pt states he fell 2x yesterday, c/o left sided rib pain. denies head injury, denies blood thinners pt came to ED for shortness of breath, nausea, vomiting that began today. pt states he fell 2x yesterday, c/o left sided rib pain. denies head injury, denies blood thinners. weakness.

## 2021-10-06 NOTE — ED PROVIDER NOTE - MDM PATIENT STATEMENT FOR ADDL TREATMENT
Unable to contact pt regarding lab completion. Left message for patient to call back regarding pre-visit planning. Please transfer call to [phone].       Patient with one or more new problems requiring additional work-up/treatment.

## 2021-10-06 NOTE — H&P ADULT - ASSESSMENT
65 year old male with PMH Lymphoma, HTN presents to ED with complaints of shortness of breath. Pt states for past few days has been having fever, chills, bodyaches. Pt states now having sob. Pt state sob is worse with exertion, however occurs at rest. Pt denies COVID in past as well as non vaccinated. Pt also attests to dysuria as well. Pt denies any chest pain, abdominal pain. Pt did have prior nausea with vomiting, NBNB, however no nausea currently. Pt also endorses fall, from standing height 2 days prior landing on L chest. Pt states has slight pain on L side. Denies head injury or LOC, denies neck or back pain   65 year old male with PMH Lymphoma, HTN presents to ED with complaints of shortness of breath on exertion, fever, chills, body aches, single episode of vomiting for the past few days. Denies any nausea / vomiting now. Pt denies COVID in past as well as non vaccinated. Patient had a left side upper extraction about 3 weeks and report having purulent discharge from site after the procedure.    # sepsis on admission  - ? 2/2 UTI. UA suggestive of UTI, though patient denies any urinary symptoms.  - BCs, UCx sent  - s/p ceftriaxone 1000 mg in ED, and LR 3L  - dental procedure 3 weeks ago followed purulent discharge. concern for IE. no murmur noted on cardiac examination.  - start ceftriaxone 1000 daily and vanco 1000 mg daily  - follow BCx  - check echo  - check ESR, CRP  - COVID pending result.    # SOB  - ? due to  - patient now on 2L NC for tachypnea  - no obvious signs of volume overload  - s/p 3L LR in ED  - gentle hydration with LR at 50 cc/hr for 10 hours for now.  - check trops, EKG noted.  - check echo  - COVID pending result.    # ASHLEY on CKD 4 + elevated AG: baseline creatinine ~ 2.5.   - prerenal vs ATN  - check urine creatinine, urine Na, Urea, Pr/cr ratio  - s/p zafar  - strict i/o  - trend creatinine  - elevated AG noted on BMP. pH 7.35 on VBG  - nephro consult placed.    DVT: SCDs  GI: pantoprazole  Diet: renal  Activity: Ambulate as tolerated  Dispo: Acute for now 65 year old man with PMH Lymphoma, HTN presents to ED with complaints of shortness of breath on exertion, fever, chills, body aches, single episode of vomiting for the past few days.     # sepsis on admission 2/2 unclear source: dental abscess vs bacteremia vs IE vs acute pyelo vs other  ID noted  WBC better 13.1 -> 9.7  UA suggestive of UTI w/ + Nit too, but denies any urinary symptoms and has no back pain, but has ASHLEY  ESR 45; f/u CRP  COVID neg  CXR neg  UCx x1  Bld Cx x3  obtain echo r/o veg  obtain renal/blad U/S r/o abscess  dental eval: consider CT face/sinuses  abx: jorge 500mg iv q12    # acute hypoxic resp failure prob 2/2 sepsis  CXR neg  cont 2L NC O2 - can attempt to taper daily    # ASHLEY (prob ATN or prerenal from sepsis) on CKD 4 + elevated AG  baseline creatinine ~ 2.   FeNa 1.3%  BMp q24  LR 75/hr  f/u U/S  f/u renal: Dr Jones    # abnl LFTs likely 2/2 sepsis  if w/u unyielding and pt getting worse, then RUQ U/S  LFTs q24 til better    # schizophrenia - well controlled  pt drives  c/w meds  has rhythmic tremor in rt hand/arm likely related to EPS of anti-psych meds    # DVT: SCDs    # GI: pantoprazole    # Activity: Ambulate as tolerated    # Contact  Father (Dr Martinez) [phone]: updated him 2x today; gave him my name and tel #'s    Dispo: sepsis w/u; IV abx; f/u ID and renal; RB U/S; echo; IVFs; daily labs; dental eval  will eventually send home, prob on oral abx

## 2021-10-06 NOTE — ED PROVIDER NOTE - NS ED ROS FT
Constitutional: (+) fever  Eyes/ENT: (-) blurry vision, (-) epistaxis  Cardiovascular: (-) chest pain, (-) syncope  Respiratory: (+) cough, (+) shortness of breath  Gastrointestinal: (+) vomiting, (-) diarrhea (+) dysuria (+) nausea   Musculoskeletal: (-) neck pain, (-) back pain, (-) joint pain  Integumentary: (-) rash, (-) edema  Neurological: (-) headache, (-) altered mental status  Psychiatric: (-) hallucinations  Allergic/Immunologic: (-) pruritus

## 2021-10-06 NOTE — H&P ADULT - NSHPLABSRESULTS_GEN_ALL_CORE
.                          13.4   13.10 )-----------( 136      ( 06 Oct 2021 16:41 )             39.3     10-06    136  |  102  |  36<H>  ----------------------------<  125<H>  3.9   |  17  |  3.1<H>    Ca    9.0      06 Oct 2021 16:41    TPro  6.2  /  Alb  4.3  /  TBili  0.8  /  DBili  x   /  AST  177<H>  /  ALT  30  /  AlkPhos  190<H>  10-06    PT/INR - ( 06 Oct 2021 16:41 )   PT: 14.10 sec;   INR: 1.23 ratio         PTT - ( 06 Oct 2021 16:41 )  PTT:31.0 sec      12 Lead ECG:     QTC Calculation(Bazett) 414 ms    Diagnosis Line Sinus tachycardia  Possible Right ventricular hypertrophy  Possible Inferior infarct , age undetermined  Abnormal ECG    Confirmed by Ten Tan ([id number]) on 10/6/2021 5:45:48 PM (10-06-21 @ 16:01)    Urinalysis Basic - ( 06 Oct 2021 16:52 )    Color: Light Orange / Appearance: Slightly Turbid / S.015 / pH: x  Gluc: x / Ketone: Negative  / Bili: Negative / Urobili: <2 mg/dL   Blood: x / Protein: 100 mg/dL / Nitrite: Positive   Leuk Esterase: Large / RBC: 13 /HPF /  /HPF   Sq Epi: x / Non Sq Epi: 0 /HPF / Bacteria: Negative      RADIOLOGY & ADDITIONAL STUDIES: .                          13.4   13.10 )-----------( 136      ( 06 Oct 2021 16:41 )             39.3     10-06    136  |  102  |  36<H>  ----------------------------<  125<H>  3.9   |  17  |  3.1<H>    Ca    9.0      06 Oct 2021 16:41    TPro  6.2  /  Alb  4.3  /  TBili  0.8  /  DBili  x   /  AST  177<H>  /  ALT  30  /  AlkPhos  190<H>  10-06    PT/INR - ( 06 Oct 2021 16:41 )   PT: 14.10 sec;   INR: 1.23 ratio         PTT - ( 06 Oct 2021 16:41 )  PTT:31.0 sec    Blood Gas Venous - Lactate: 1.60 mmol/L (10.06.21 @ 15:56)  Blood Gas Profile - Venous (10.06.21 @ 15:56)    pH, Venous: 7.35    pCO2, Venous: 35 mmHg    pO2, Venous: 32 mmHg    HCO3, Venous: 19 mmol/L    Base Excess, Venous: -5.5 mmol/L    Oxygen Saturation, Venous: 57.3 %      12 Lead ECG:     QTC Calculation(Bazett) 414 ms    Diagnosis Line Sinus tachycardia  Possible Right ventricular hypertrophy  Possible Inferior infarct , age undetermined  Abnormal ECG    Confirmed by Ten Tan ([id number]) on 10/6/2021 5:45:48 PM (10-06-21 @ 16:01)    Urinalysis Basic - ( 06 Oct 2021 16:52 )    Color: Light Orange / Appearance: Slightly Turbid / S.015 / pH: x  Gluc: x / Ketone: Negative  / Bili: Negative / Urobili: <2 mg/dL   Blood: x / Protein: 100 mg/dL / Nitrite: Positive   Leuk Esterase: Large / RBC: 13 /HPF /  /HPF   Sq Epi: x / Non Sq Epi: 0 /HPF / Bacteria: Negative      RADIOLOGY & ADDITIONAL STUDIES:

## 2021-10-06 NOTE — ED ADULT NURSE REASSESSMENT NOTE - NS ED NURSE REASSESS COMMENT FT1
pt assessed, A&O. MD [phone] aware of elevated temp, states will order prn tylenol. Pt denies pain or discomfort at this time. Pt on 2L NC. No distress noted at this time. Safety and [id number] measures maintained. Pt awaiting inpatient medicine bed. Will continue to monitor

## 2021-10-06 NOTE — H&P ADULT - NSHPPHYSICALEXAM_GEN_ALL_CORE
T(F): 104 (10-06-21 @ 16:16), Max: 104 (10-06-21 @ 16:16)  HR: 113 (10-06-21 @ 15:28) (113 - 113)  BP: 131/88 (10-06-21 @ 15:28) (131/88 - 131/88)  RR: 22 (10-06-21 @ 15:28) (22 - 22)  SpO2: 98% (10-06-21 @ 15:28) (98% - 98%)      PHYSICAL EXAM:  GENERAL: NAD, well-developed  CHEST/LUNG: CTAB; No wheeze  HEART: RRR; No murmurs, rubs, or gallops  ABDOMEN: Soft, NT/ND; BS present  EXTREMITIES:  No cyanosis, or edema  NEUROLOGY: AAOx3  SKIN: No rashes or lesions

## 2021-10-06 NOTE — ED PROVIDER NOTE - OBJECTIVE STATEMENT
Pt is a 65 year old male with PMH Lymphoma, HTN presents to ED with complaints of shortness of breath. Pt states for past few days has been having Pt is a 65 year old male with PMH Lymphoma, HTN presents to ED with complaints of shortness of breath. Pt states for past few days has been having fever, chills, bodyaches. Pt states now having sob. Pt state sob is worse with exertion, however occurs at rest. Pt denies COVID in past as well as non vaccinated. Pt also attests to dysuria as well. Pt denies any chest pain, abdominal pain. Pt did have prior nausea with vomiting, NBNB, however no nausea currently. Pt also endorses fall, from standing height 2 days prior landing on L chest. Pt states has slight pain on L side. Denies head injury or LOC, denies neck or back pain

## 2021-10-06 NOTE — ED ADULT NURSE NOTE - CHIEF COMPLAINT QUOTE
pt came to ED for shortness of breath, nausea, vomiting that began today. pt states he fell 2x yesterday, c/o left sided rib pain. denies head injury, denies blood thinners. weakness.

## 2021-10-07 LAB
ALBUMIN SERPL ELPH-MCNC: 3.3 G/DL — LOW (ref 3.5–5.2)
ALP SERPL-CCNC: 152 U/L — HIGH (ref 30–115)
ALT FLD-CCNC: 36 U/L — SIGNIFICANT CHANGE UP (ref 0–41)
ANION GAP SERPL CALC-SCNC: 14 MMOL/L — SIGNIFICANT CHANGE UP (ref 7–14)
AST SERPL-CCNC: 191 U/L — HIGH (ref 0–41)
BASOPHILS # BLD AUTO: 0.02 K/UL — SIGNIFICANT CHANGE UP (ref 0–0.2)
BASOPHILS NFR BLD AUTO: 0.2 % — SIGNIFICANT CHANGE UP (ref 0–1)
BILIRUB SERPL-MCNC: 0.8 MG/DL — SIGNIFICANT CHANGE UP (ref 0.2–1.2)
BUN SERPL-MCNC: 35 MG/DL — HIGH (ref 10–20)
CALCIUM SERPL-MCNC: 8.3 MG/DL — LOW (ref 8.5–10.1)
CHLORIDE SERPL-SCNC: 107 MMOL/L — SIGNIFICANT CHANGE UP (ref 98–110)
CK MB CFR SERPL CALC: 11.5 NG/ML — HIGH (ref 0.6–6.3)
CO2 SERPL-SCNC: 17 MMOL/L — SIGNIFICANT CHANGE UP (ref 17–32)
COVID-19 SPIKE DOMAIN AB INTERP: NEGATIVE — SIGNIFICANT CHANGE UP
COVID-19 SPIKE DOMAIN ANTIBODY RESULT: 0.4 U/ML — SIGNIFICANT CHANGE UP
CREAT ?TM UR-MCNC: 38 MG/DL — SIGNIFICANT CHANGE UP
CREAT SERPL-MCNC: 3.1 MG/DL — HIGH (ref 0.7–1.5)
CRP SERPL-MCNC: 135 MG/L — HIGH
CULTURE RESULTS: SIGNIFICANT CHANGE UP
EOSINOPHIL # BLD AUTO: 0.01 K/UL — SIGNIFICANT CHANGE UP (ref 0–0.7)
EOSINOPHIL NFR BLD AUTO: 0.1 % — SIGNIFICANT CHANGE UP (ref 0–8)
ERYTHROCYTE [SEDIMENTATION RATE] IN BLOOD: 45 MM/HR — HIGH (ref 0–10)
GLUCOSE SERPL-MCNC: 111 MG/DL — HIGH (ref 70–99)
HCT VFR BLD CALC: 33.5 % — LOW (ref 42–52)
HGB BLD-MCNC: 11.5 G/DL — LOW (ref 14–18)
IMM GRANULOCYTES NFR BLD AUTO: 0.4 % — HIGH (ref 0.1–0.3)
LYMPHOCYTES # BLD AUTO: 0.24 K/UL — LOW (ref 1.2–3.4)
LYMPHOCYTES # BLD AUTO: 2.5 % — LOW (ref 20.5–51.1)
MAGNESIUM SERPL-MCNC: 2.2 MG/DL — SIGNIFICANT CHANGE UP (ref 1.8–2.4)
MCHC RBC-ENTMCNC: 31 PG — SIGNIFICANT CHANGE UP (ref 27–31)
MCHC RBC-ENTMCNC: 34.3 G/DL — SIGNIFICANT CHANGE UP (ref 32–37)
MCV RBC AUTO: 90.3 FL — SIGNIFICANT CHANGE UP (ref 80–94)
MONOCYTES # BLD AUTO: 1.15 K/UL — HIGH (ref 0.1–0.6)
MONOCYTES NFR BLD AUTO: 11.9 % — HIGH (ref 1.7–9.3)
NEUTROPHILS # BLD AUTO: 8.23 K/UL — HIGH (ref 1.4–6.5)
NEUTROPHILS NFR BLD AUTO: 84.9 % — HIGH (ref 42.2–75.2)
NRBC # BLD: 0 /100 WBCS — SIGNIFICANT CHANGE UP (ref 0–0)
PLATELET # BLD AUTO: 106 K/UL — LOW (ref 130–400)
POTASSIUM SERPL-MCNC: 3.8 MMOL/L — SIGNIFICANT CHANGE UP (ref 3.5–5)
POTASSIUM SERPL-SCNC: 3.8 MMOL/L — SIGNIFICANT CHANGE UP (ref 3.5–5)
PROCALCITONIN SERPL-MCNC: 4.07 NG/ML — HIGH (ref 0.02–0.1)
PROT ?TM UR-MCNC: 23 MG/DLG/24H — SIGNIFICANT CHANGE UP
PROT SERPL-MCNC: 4.9 G/DL — LOW (ref 6–8)
PROT/CREAT UR-RTO: 0.6 RATIO — HIGH (ref 0–0.2)
RBC # BLD: 3.71 M/UL — LOW (ref 4.7–6.1)
RBC # FLD: 13.6 % — SIGNIFICANT CHANGE UP (ref 11.5–14.5)
SARS-COV-2 IGG+IGM SERPL QL IA: 0.4 U/ML — SIGNIFICANT CHANGE UP
SARS-COV-2 IGG+IGM SERPL QL IA: NEGATIVE — SIGNIFICANT CHANGE UP
SODIUM SERPL-SCNC: 138 MMOL/L — SIGNIFICANT CHANGE UP (ref 135–146)
SODIUM UR-SCNC: 22 MMOL/L — SIGNIFICANT CHANGE UP
SPECIMEN SOURCE: SIGNIFICANT CHANGE UP
TROPONIN T SERPL-MCNC: 0.02 NG/ML — HIGH
TROPONIN T SERPL-MCNC: 0.02 NG/ML — HIGH
UUN UR-MCNC: 248 MG/DL — SIGNIFICANT CHANGE UP
WBC # BLD: 9.69 K/UL — SIGNIFICANT CHANGE UP (ref 4.8–10.8)
WBC # FLD AUTO: 9.69 K/UL — SIGNIFICANT CHANGE UP (ref 4.8–10.8)

## 2021-10-07 PROCEDURE — 76775 US EXAM ABDO BACK WALL LIM: CPT | Mod: 26

## 2021-10-07 PROCEDURE — 99223 1ST HOSP IP/OBS HIGH 75: CPT

## 2021-10-07 RX ORDER — SODIUM CHLORIDE 9 MG/ML
1000 INJECTION, SOLUTION INTRAVENOUS
Refills: 0 | Status: DISCONTINUED | OUTPATIENT
Start: 2021-10-07 | End: 2021-10-08

## 2021-10-07 RX ORDER — MEROPENEM 1 G/30ML
INJECTION INTRAVENOUS
Refills: 0 | Status: DISCONTINUED | OUTPATIENT
Start: 2021-10-07 | End: 2021-10-09

## 2021-10-07 RX ORDER — MEROPENEM 1 G/30ML
500 INJECTION INTRAVENOUS ONCE
Refills: 0 | Status: COMPLETED | OUTPATIENT
Start: 2021-10-07 | End: 2021-10-07

## 2021-10-07 RX ORDER — MEROPENEM 1 G/30ML
500 INJECTION INTRAVENOUS EVERY 12 HOURS
Refills: 0 | Status: DISCONTINUED | OUTPATIENT
Start: 2021-10-07 | End: 2021-10-09

## 2021-10-07 RX ADMIN — Medication 2 MILLIGRAM(S): at 22:09

## 2021-10-07 RX ADMIN — MEROPENEM 100 MILLIGRAM(S): 1 INJECTION INTRAVENOUS at 19:04

## 2021-10-07 RX ADMIN — FLUPHENAZINE HYDROCHLORIDE 10 MILLIGRAM(S): 1 TABLET, FILM COATED ORAL at 22:09

## 2021-10-07 RX ADMIN — MEROPENEM 100 MILLIGRAM(S): 1 INJECTION INTRAVENOUS at 11:24

## 2021-10-07 NOTE — CONSULT NOTE ADULT - ASSESSMENT
· Assessment	  65 year old male with PMH Lymphoma, HTN presents to ED with complaints of shortness of breath on exertion, fever, chills, body aches, single episode of vomiting for the past few days. Denies any nausea / vomiting now. Pt denies COVID in past as well as non vaccinated. Patient had a left side upper extraction about 3 weeks and report having purulent discharge from site after the procedure.    IMPRESSION;  Cryptogenic sepsis ( T 103, /m )  Possible pyelonephritis . Has pyuria/microscopic hematuria but clinically no CVA pain.  Dental pathology. High probability given the recent dental abscess and has drainage/malodour  Endocarditis     RECOMMENDATIONS;  BCx  UCx  Renal sono  Dental evaluation  ECHO  Meropenem 500 mg iv q12h

## 2021-10-07 NOTE — PROGRESS NOTE ADULT - SUBJECTIVE AND OBJECTIVE BOX
ELDA COVARRUBIAS 65y Male  MRN#: [id number]   CODE STATUS: Full code    Hospital Day: 1d    Pt is currently admitted with the primary diagnosis of sepsis    SUBJECTIVE  Hospital Course    65 year old male with PMH Lymphoma, HTN presents to ED with complaints of shortness of breath on exertion, fever, chills, body aches, single episode of vomiting for the past few days. Denies any nausea / vomiting now. Pt denies COVID in past as well as non vaccinated. Patient had a left side upper extraction about 3 weeks and report having purulent discharge from site after the procedure. Patient took 10 days of Augmentin and 3 days of Levofloxacin 500 mg. Patient had 2 episodes of mechanical fall in past 2 weeks, denies any HT, LOC. Patient denies any chest pain, abdominal pain, urinary symptoms, diarrhea. In ED: Temp = 103; HR = 113; BP = 131/88; RR = 22; SaO2 = 98% on room air. Work up noted for WBCs > 13; bicarb 17, BUN 36, Cr. 3.1. UA suggestive of UTI. Patient received LR 3L, Ceftriaxone 1000 mg x 1, Tylenol 975 mg x 1.    Patient was admitted to medicine for further assessment and management  10/7: patient was started on Meropenem 500 q12 per ID. Dental consult placed, possible complications from tooth extraction. TTE ordered, R/O endocarditis, blood cultures and urine cultures ordered. Renal Ultrasound ordered    Overnight events   Patient admitted to medicine  Subjective complaints   None, patient feeling much better today                                          ----------------------------------------------------------  OBJECTIVE  PAST MEDICAL & SURGICAL HISTORY  Kidney stones    Schizophrenia    Lymphoma    Shingles    Atrophic kidney    Retained urethral stent    H/O umbilical hernia repair    Elbow fracture    H/O cystoscopy                                              -----------------------------------------------------------  ALLERGIES:  heparin (Urticaria)                                            ------------------------------------------------------------    HOME MEDICATIONS  Home Medications:  benztropine 2 mg oral tablet: 1 tab(s) orally once a day (at bedtime) (10 Feb 2020 21:10)  fluPHENAZine 10 mg oral tablet: 1 tab(s) orally once a day (10 Feb 2020 21:10)                           MEDICATIONS:  STANDING MEDICATIONS  benztropine 2 milliGRAM(s) Oral at bedtime  fluPHENAZine 10 milliGRAM(s) Oral at bedtime  lactated ringers. 500 milliLiter(s) IV Continuous <Continuous>  meropenem  IVPB      meropenem  IVPB 500 milliGRAM(s) IV Intermittent every 12 hours    PRN MEDICATIONS  acetaminophen   Tablet .. 650 milliGRAM(s) Oral every 6 hours PRN                                            ------------------------------------------------------------  VITAL SIGNS: Last 24 Hours  T(C): 35.5 (07 Oct 2021 05:08), Max: 40 (06 Oct 2021 16:16)  T(F): 95.9 (07 Oct 2021 05:08), Max: 104 (06 Oct 2021 16:16)  HR: 74 (07 Oct 2021 05:08) (74 - 113)  BP: 111/55 (07 Oct 2021 05:08) (83/56 - 131/88)  BP(mean): 76 (07 Oct 2021 05:08) (76 - 76)  RR: 18 (06 Oct 2021 22:59) (18 - 22)  SpO2: 98% (06 Oct 2021 22:45) (98% - 99%)      10-06-21 @ 07:01  -  10-07-21 @ 07:00  --------------------------------------------------------  IN: 0 mL / OUT: 275 mL / NET: -275 mL                                             --------------------------------------------------------------  LABS:                        11.5   9.69  )-----------( 106      ( 07 Oct 2021 05:22 )             33.5     10-07    138  |  107  |  35<H>  ----------------------------<  111<H>  3.8   |  17  |  3.1<H>    Ca    8.3<L>      07 Oct 2021 05:22  Mg     2.2     10-07    TPro  4.9<L>  /  Alb  3.3<L>  /  TBili  0.8  /  DBili  x   /  AST  191<H>  /  ALT  36  /  AlkPhos  152<H>  10-07    PT/INR - ( 06 Oct 2021 16:41 )   PT: 14.10 sec;   INR: 1.23 ratio         PTT - ( 06 Oct 2021 16:41 )  PTT:31.0 sec  Urinalysis Basic - ( 06 Oct 2021 16:52 )    Color: Light Orange / Appearance: Slightly Turbid / S.015 / pH: x  Gluc: x / Ketone: Negative  / Bili: Negative / Urobili: <2 mg/dL   Blood: x / Protein: 100 mg/dL / Nitrite: Positive   Leuk Esterase: Large / RBC: 13 /HPF /  /HPF   Sq Epi: x / Non Sq Epi: 0 /HPF / Bacteria: Negative        Troponin T, Serum: 0.02 ng/mL *H* (10-07-21 @ 05:22)  Troponin T, Serum: 0.02 ng/mL *H* (10-06-21 @ 23:30)  Sedimentation Rate, Erythrocyte: 45 mm/Hr *H* (10-06-21 @ 23:30)          CARDIAC MARKERS ( 07 Oct 2021 05:22 )  x     / 0.02 ng/mL / x     / x     / x      CARDIAC MARKERS ( 06 Oct 2021 23:30 )  x     / 0.02 ng/mL / x     / x     / 11.5 ng/mL                                              -------------------------------------------------------------  RADIOLOGY:  < from: Xray Chest 1 View- PORTABLE-Urgent (10.06.21 @ 17:21) >  No radiographic evidence of acute cardiopulmonary disease.    Without change.    < end of copied text >                                            --------------------------------------------------------------    PHYSICAL EXAM:  GENERAL: NAD, well-developed  CHEST/LUNG: CTAB; No wheeze  HEART: RRR; No murmurs, rubs, or gallops  ABDOMEN: Soft, NT/ND; BS present  EXTREMITIES:  No cyanosis, or edema  NEUROLOGY: AAOx3  SKIN: No rashes or lesions                                           --------------------------------------------------------------    ASSESSMENT & PLAN    65 year old male with PMH Lymphoma, HTN presents to ED with complaints of shortness of breath on exertion, fever, chills, body aches, single episode of vomiting for the past few days. Denies any nausea / vomiting now. Pt denies COVID in past as well as non vaccinated. Patient had a left side upper extraction about 3 weeks and report having purulent discharge from site after the procedure.    # Sepsis on admission  # R/U Pyelonephritis  # Probable Dental Abscess  # R/U Infectious Endocarditis   - UA suggestive of UTI, no urinary symptoms, no CVA tenderness  - F/U BCx, UCx  - F/U Renal sonogram   - s/p ceftriaxone 1000 mg in ED, and LR 3L  - Now on Meropenem 500 mg iv q12h  - Dental procedure 3 weeks ago followed by purulent discharge. Now with drainage and malodor  - Concern for IE. no murmur noted on cardiac examination. F/U TTE    # SOB  - Patient now comfortable on Room Air  - s/p 3L LR in ED  - gentle hydration with LR at 50 cc/hr for 10 hours now completed  - COVID negative    # ASHLEY on CKD 4 + elevated AG  - baseline creatinine ~ 2.5.   - prerenal vs ATN  - check urine creatinine, urine Na, Urea, Pr/cr ratio  - elevated AG noted on BMP. pH 7.35 on VBG  - F/U nephro consult                                                                              ----------------------------------------------------  DVT: SCDs  GI: pantoprazole  Diet: renal  Activity: Ambulate as tolerated  Dispo: Acute for now                                                                           --------------------------------------------------------    # Handoff   - F/U BCx, UCx  - F/U Renal sonogram   - F/U TTE  - F/U nephro consult

## 2021-10-08 ENCOUNTER — TRANSCRIPTION ENCOUNTER (OUTPATIENT)
Age: 66
End: 2021-10-08

## 2021-10-08 LAB
ALBUMIN SERPL ELPH-MCNC: 3.5 G/DL — SIGNIFICANT CHANGE UP (ref 3.5–5.2)
ALP SERPL-CCNC: 160 U/L — HIGH (ref 30–115)
ALT FLD-CCNC: 72 U/L — HIGH (ref 0–41)
ANION GAP SERPL CALC-SCNC: 16 MMOL/L — HIGH (ref 7–14)
AST SERPL-CCNC: 155 U/L — HIGH (ref 0–41)
BASOPHILS # BLD AUTO: 0.01 K/UL — SIGNIFICANT CHANGE UP (ref 0–0.2)
BASOPHILS NFR BLD AUTO: 0.2 % — SIGNIFICANT CHANGE UP (ref 0–1)
BILIRUB SERPL-MCNC: 0.5 MG/DL — SIGNIFICANT CHANGE UP (ref 0.2–1.2)
BUN SERPL-MCNC: 38 MG/DL — HIGH (ref 10–20)
CALCIUM SERPL-MCNC: 8.2 MG/DL — LOW (ref 8.5–10.1)
CHLORIDE SERPL-SCNC: 110 MMOL/L — SIGNIFICANT CHANGE UP (ref 98–110)
CK MB CFR SERPL CALC: 6.4 NG/ML — HIGH (ref 0.6–6.3)
CO2 SERPL-SCNC: 13 MMOL/L — LOW (ref 17–32)
CREAT SERPL-MCNC: 3.2 MG/DL — HIGH (ref 0.7–1.5)
EOSINOPHIL # BLD AUTO: 0.06 K/UL — SIGNIFICANT CHANGE UP (ref 0–0.7)
EOSINOPHIL NFR BLD AUTO: 1.1 % — SIGNIFICANT CHANGE UP (ref 0–8)
GLUCOSE SERPL-MCNC: 101 MG/DL — HIGH (ref 70–99)
HCT VFR BLD CALC: 31.8 % — LOW (ref 42–52)
HCT VFR BLD CALC: 36.2 % — LOW (ref 42–52)
HGB BLD-MCNC: 10.7 G/DL — LOW (ref 14–18)
HGB BLD-MCNC: 12.1 G/DL — LOW (ref 14–18)
IMM GRANULOCYTES NFR BLD AUTO: 0.4 % — HIGH (ref 0.1–0.3)
LYMPHOCYTES # BLD AUTO: 0.18 K/UL — LOW (ref 1.2–3.4)
LYMPHOCYTES # BLD AUTO: 3.3 % — LOW (ref 20.5–51.1)
MAGNESIUM SERPL-MCNC: 2.1 MG/DL — SIGNIFICANT CHANGE UP (ref 1.8–2.4)
MCHC RBC-ENTMCNC: 30.3 PG — SIGNIFICANT CHANGE UP (ref 27–31)
MCHC RBC-ENTMCNC: 30.4 PG — SIGNIFICANT CHANGE UP (ref 27–31)
MCHC RBC-ENTMCNC: 33.4 G/DL — SIGNIFICANT CHANGE UP (ref 32–37)
MCHC RBC-ENTMCNC: 33.6 G/DL — SIGNIFICANT CHANGE UP (ref 32–37)
MCV RBC AUTO: 90.1 FL — SIGNIFICANT CHANGE UP (ref 80–94)
MCV RBC AUTO: 91 FL — SIGNIFICANT CHANGE UP (ref 80–94)
MONOCYTES # BLD AUTO: 0.66 K/UL — HIGH (ref 0.1–0.6)
MONOCYTES NFR BLD AUTO: 12.1 % — HIGH (ref 1.7–9.3)
NEUTROPHILS # BLD AUTO: 4.53 K/UL — SIGNIFICANT CHANGE UP (ref 1.4–6.5)
NEUTROPHILS NFR BLD AUTO: 82.9 % — HIGH (ref 42.2–75.2)
NRBC # BLD: 0 /100 WBCS — SIGNIFICANT CHANGE UP (ref 0–0)
NRBC # BLD: 0 /100 WBCS — SIGNIFICANT CHANGE UP (ref 0–0)
PLATELET # BLD AUTO: 106 K/UL — LOW (ref 130–400)
PLATELET # BLD AUTO: 108 K/UL — LOW (ref 130–400)
POTASSIUM SERPL-MCNC: 4.5 MMOL/L — SIGNIFICANT CHANGE UP (ref 3.5–5)
POTASSIUM SERPL-SCNC: 4.5 MMOL/L — SIGNIFICANT CHANGE UP (ref 3.5–5)
PROT SERPL-MCNC: 5.3 G/DL — LOW (ref 6–8)
RBC # BLD: 3.53 M/UL — LOW (ref 4.7–6.1)
RBC # BLD: 3.98 M/UL — LOW (ref 4.7–6.1)
RBC # FLD: 13.5 % — SIGNIFICANT CHANGE UP (ref 11.5–14.5)
RBC # FLD: 13.6 % — SIGNIFICANT CHANGE UP (ref 11.5–14.5)
SODIUM SERPL-SCNC: 139 MMOL/L — SIGNIFICANT CHANGE UP (ref 135–146)
TROPONIN T SERPL-MCNC: 0.01 NG/ML — SIGNIFICANT CHANGE UP
WBC # BLD: 4.41 K/UL — LOW (ref 4.8–10.8)
WBC # BLD: 5.46 K/UL — SIGNIFICANT CHANGE UP (ref 4.8–10.8)
WBC # FLD AUTO: 4.41 K/UL — LOW (ref 4.8–10.8)
WBC # FLD AUTO: 5.46 K/UL — SIGNIFICANT CHANGE UP (ref 4.8–10.8)

## 2021-10-08 PROCEDURE — 99232 SBSQ HOSP IP/OBS MODERATE 35: CPT

## 2021-10-08 RX ORDER — METRONIDAZOLE 500 MG
1 TABLET ORAL
Qty: 30 | Refills: 0
Start: 2021-10-08 | End: 2021-10-17

## 2021-10-08 RX ORDER — CEFPODOXIME PROXETIL 100 MG
1 TABLET ORAL
Qty: 20 | Refills: 0
Start: 2021-10-08 | End: 2021-10-17

## 2021-10-08 RX ADMIN — FLUPHENAZINE HYDROCHLORIDE 10 MILLIGRAM(S): 1 TABLET, FILM COATED ORAL at 21:08

## 2021-10-08 RX ADMIN — SODIUM CHLORIDE 75 MILLILITER(S): 9 INJECTION, SOLUTION INTRAVENOUS at 00:31

## 2021-10-08 RX ADMIN — Medication 2 MILLIGRAM(S): at 21:08

## 2021-10-08 RX ADMIN — MEROPENEM 100 MILLIGRAM(S): 1 INJECTION INTRAVENOUS at 17:48

## 2021-10-08 RX ADMIN — MEROPENEM 100 MILLIGRAM(S): 1 INJECTION INTRAVENOUS at 05:36

## 2021-10-08 NOTE — DISCHARGE NOTE PROVIDER - NSDCMRMEDTOKEN_GEN_ALL_CORE_FT
benztropine 2 mg oral tablet: 1 tab(s) orally once a day (at bedtime)  cefpodoxime 100 mg oral tablet: 1 tab(s) orally every 12 hours   Flagyl 500 mg oral tablet: 1 tab(s) orally every 8 hours   fluPHENAZine 10 mg oral tablet: 1 tab(s) orally once a day

## 2021-10-08 NOTE — DISCHARGE NOTE PROVIDER - CARE PROVIDER_API CALL
Nathen Escudero   INTERNAL MEDICINE  [address] [place] [address]  Phone: [phone]  Fax: [phone]  Follow Up Time: 2 weeks

## 2021-10-08 NOTE — DISCHARGE NOTE PROVIDER - HOSPITAL COURSE
65 year old male with PMH Lymphoma, HTN presents to ED with complaints of shortness of breath on exertion, fever, chills, body aches, single episode of vomiting for the past few days. Denies any nausea / vomiting now. Of note, patient had a left side upper tooth extraction about 3 weeks and report having purulent discharge from site after the procedure. Patient took 10 days of Augmentin and 3 days of Levofloxacin 500 mg. Patient had 2 episodes of mechanical fall in past 2 weeks, denies any HT, LOC. Patient denies any chest pain, abdominal pain, urinary symptoms, diarrhea. In ED patient was found to be septic: Temp = 103; HR = 113; BP = 131/88; RR = 22; SaO2 = 98% on room air. Work up noted for WBCs > 13; bicarb 17, BUN 36, Cr. 3.1. UA suggestive of UTI, no CVA tenderness, no urinary symptoms. Patient received LR 3L, Ceftriaxone 1000 mg x 1, Tylenol 975 mg x 1. Patient was admitted to medicine for further assessment and management. Patient was started on Meropenem 500 q12 per ID. Sepsis workup was initated. Unclear source of infection. Dental consult placed, for possible complications from tooth extraction. Per dental assessment there was no swelling or infection noted on exam, recommended continuing antibiotics per medical team. TTE ordered to R/O endocarditis which came back negative, blood cultures were negative. Renal Ultrasound was negative for abscess. Symptoms of sepsis resolved, patient will be discharged on PO Vantin and 100mg po q12 and flagyl 500mg po q8 for 10 days more (10/8-10/18). Patient is medically stable for discharge. If any difficulty swallowing/breathing, fever occur, return to ER.

## 2021-10-08 NOTE — PROGRESS NOTE ADULT - SUBJECTIVE AND OBJECTIVE BOX
ELDA COVARRUBIAS  65y, Male    All available historical data reviewed    OVERNIGHT EVENTS:  no fevers  feels well and has no new complaints     ROS:  General: Denies rigors, nightsweats  HEENT: Denies headache, rhinorrhea, sore throat, eye pain  CV: Denies CP, palpitations  PULM: Denies wheezing, hemoptysis  GI: Denies hematemesis, hematochezia, melena  : Denies discharge, hematuria  MSK: Denies arthralgias, myalgias  SKIN: Denies rash, lesions  NEURO: Denies paresthesias, weakness  PSYCH: Denies depression, anxiety    VITALS:  T(F): 99, Max: 99 (10-07-21 @ 20:00)  HR: 80  BP: 108/55  RR: 18Vital Signs Last 24 Hrs  T(C): 37.2 (08 Oct 2021 05:17), Max: 37.2 (07 Oct 2021 20:00)  T(F): 99 (08 Oct 2021 05:17), Max: 99 (07 Oct 2021 20:00)  HR: 80 (08 Oct 2021 05:17) (78 - 80)  BP: 108/55 (08 Oct 2021 05:17) (99/57 - 108/55)  BP(mean): --  RR: 18 (08 Oct 2021 05:17) (18 - 19)  SpO2: 93% (07 Oct 2021 22:10) (93% - 93%)    TESTS & MEASUREMENTS:                        11.5   9.69  )-----------( 106      ( 07 Oct 2021 05:22 )             33.5     10    138  |  107  |  35<H>  ----------------------------<  111<H>  3.8   |  17  |  3.1<H>    Ca    8.3<L>      07 Oct 2021 05:22  Mg     2.2     10-07    TPro  4.9<L>  /  Alb  3.3<L>  /  TBili  0.8  /  DBili  x   /  AST  191<H>  /  ALT  36  /  AlkPhos  152<H>  10    LIVER FUNCTIONS - ( 07 Oct 2021 05:22 )  Alb: 3.3 g/dL / Pro: 4.9 g/dL / ALK PHOS: 152 U/L / ALT: 36 U/L / AST: 191 U/L / GGT: x             Culture - Urine (collected 10-06-21 @ 16:52)  Source: Clean Catch Clean Catch (Midstream)  Final Report (10-07-21 @ 22:12):    <10,000 CFU/mL Normal Urogenital Dolores    Culture - Blood (collected 10-06-21 @ 16:41)  Source: .Blood Blood-Peripheral  Preliminary Report (10-08-21 @ 01:02):    No growth to date.    Culture - Blood (collected 10-06-21 @ 16:41)  Source: .Blood Blood-Peripheral  Preliminary Report (10-08-21 @ 01:02):    No growth to date.      Urinalysis Basic - ( 06 Oct 2021 16:52 )    Color: Light Orange / Appearance: Slightly Turbid / S.015 / pH: x  Gluc: x / Ketone: Negative  / Bili: Negative / Urobili: <2 mg/dL   Blood: x / Protein: 100 mg/dL / Nitrite: Positive   Leuk Esterase: Large / RBC: 13 /HPF /  /HPF   Sq Epi: x / Non Sq Epi: 0 /HPF / Bacteria: Negative          RADIOLOGY & ADDITIONAL TESTS:  Personal review of radiological diagnostics performed  Echo and EKG results noted when applicable.     MEDICATIONS:  acetaminophen   Tablet .. 650 milliGRAM(s) Oral every 6 hours PRN  benztropine 2 milliGRAM(s) Oral at bedtime  fluPHENAZine 10 milliGRAM(s) Oral at bedtime  lactated ringers. 1000 milliLiter(s) IV Continuous <Continuous>  meropenem  IVPB      meropenem  IVPB 500 milliGRAM(s) IV Intermittent every 12 hours      ANTIBIOTICS:  meropenem  IVPB      meropenem  IVPB 500 milliGRAM(s) IV Intermittent every 12 hours

## 2021-10-08 NOTE — DISCHARGE NOTE PROVIDER - NSDCCPCAREPLAN_GEN_ALL_CORE_FT
PRINCIPAL DISCHARGE DIAGNOSIS  Diagnosis: Sepsis  Assessment and Plan of Treatment: You came to the hospital with complaints of shortness of breath, fever, chills, and body aches. Of note, you had a left side upper tooth extraction about 3 weeks which has been draining. On admission you were found to be septic with a fever, elevated heart rate, and elevated white blood cell count. Urinalysis was suggestive of a urinary tract infection. You were treated with IV antibiotics for bacterial infection. You were seen by the dentist who did not believe your tooth had an abscess. You had an echocardiogram which ruled out endocarditis and your blood cultures were negative meaning no bacteria was in your blood. A Renal Ultrasound was negative for abscess. Your symptoms of sepsis resolved, You will take Vantin 100mg twice a day and Flagyl 500mg three times a day until 10/18. If you have any difficulty swallowing or breathing, or have a fever, return to ER.      SECONDARY DISCHARGE DIAGNOSES  Diagnosis: ASHLEY (acute kidney injury)  Assessment and Plan of Treatment:

## 2021-10-08 NOTE — PROGRESS NOTE ADULT - SUBJECTIVE AND OBJECTIVE BOX
ELDA COVARRUBIAS  65y  Male  ***My note supersedes ALL resident notes that I sign.  My corrections for their notes are in my note.***    I can be reached directly on [employer]. My office number is [phone]. My personal cell number is [phone].    INTERVAL EVENTS: Here for f/u of dental abscess. Pt says that he tastes a "toxic" taste in his mouth coming from the right upper molar area. He denies pain. He says he can smell it (malodorous), too. He can open and close his jaw w/out issue or pain.    T(F): 99 (10-08-21 @ 05:17), Max: 99 (10-07-21 @ 20:00)  HR: 80 (10-08-21 @ 05:17) (78 - 80)  BP: 108/55 (10-08-21 @ 05:17) (99/57 - 108/55)  RR: 18 (10-08-21 @ 05:17) (18 - 19)  SpO2: 93% (10-07-21 @ 22:10) (93% - 93%)    Gen: NAD  HEENT: PERRL, EOMI, mouth clr, nose clr; right, rear molar area appears to have some brownish material/fluid in tooth socket (but hard to see) - needs dental eval  Neck: no nodes, no JVD, thyroid nl; no tenderness  lungs: clr, no crackles  hrt: s1 s2 rrr no murmur  abd: soft, NT/ND, no HS megaly  ext: no edema, no c/c  neuro: aa ox3, cn intact, can move all 4 ext    LABS:                      11.5    (    90.3   9.69  )-----------( ---------      106      ( 07 Oct 2021 05:22 )             33.5    (    13.6     PT/INR - ( 06 Oct 2021 16:41 )   PT: 14.10 sec;   INR: 1.23 ratio    PTT - ( 06 Oct 2021 16:41 )  PTT: 31.0 sec    CARDIAC MARKERS ( 08 Oct 2021 04:30 )  x     / 0.01 ng/mL / x     / x     / 6.4 ng/mL  CARDIAC MARKERS ( 07 Oct 2021 05:22 )  x     / 0.02 ng/mL / x     / x     / x      CARDIAC MARKERS ( 06 Oct 2021 23:30 )  x     / 0.02 ng/mL / x     / x     / 11.5 ng/mL    Urinalysis Basic - ( 06 Oct 2021 16:52 )    Color: Light Orange / Appearance: Slightly Turbid / S.015 / pH: x  Gluc: x / Ketone: Negative  / Bili: Negative / Urobili: <2 mg/dL   Blood: x / Protein: 100 mg/dL / Nitrite: Positive   Leuk Esterase: Large / RBC: 13 /HPF /  /HPF   Sq Epi: x / Non Sq Epi: 0 /HPF / Bacteria: Negative    Culture - Urine (collected 10-06-21 @ 16:52)  Source: Clean Catch Clean Catch (Midstream)  Final Report (10-07-21 @ 22:12):    <10,000 CFU/mL Normal Urogenital Dolores    Culture - Blood (collected 10-06-21 @ 16:41)  Source: .Blood Blood-Peripheral  Preliminary Report (10-08-21 @ 01:02):    No growth to date.    Culture - Blood (collected 10-06-21 @ 16:41)  Source: .Blood Blood-Peripheral  Preliminary Report (10-08-21 @ 01:02):    No growth to date.    RADIOLOGY & ADDITIONAL TESTS:  < from: US Renal (10.07.21 @ 14:30) >  IMPRESSION:    Echogenic and atrophic left kidney consistent with renal parenchymal disease.    No hydronephrosis.    Bladder is collapsed    < end of copied text >    < from: TTE Echo Complete w/o Contrast w/ Doppler (10.07.21 @ 16:24) >  Summary:   1. LV Ejection Fraction by Ye's Method with a biplane EF of 59 %.   2. Spectral Doppler shows impaired relaxation pattern of left ventricular myocardial filling (Grade I diastolic dysfunction).   3. Mild mitral annular calcification.   4. No evidence of mitral valve regurgitation.   5. Mild tricuspid regurgitation.    < end of copied text >    MEDICATIONS:  meropenem  IVPB      meropenem  IVPB 500 milliGRAM(s) IV Intermittent every 12 hours    acetaminophen   Tablet .. 650 milliGRAM(s) Oral every 6 hours PRN  benztropine 2 milliGRAM(s) Oral at bedtime  fluPHENAZine 10 milliGRAM(s) Oral at bedtime  lactated ringers. 1000 milliLiter(s) IV Continuous <Continuous>     ELDA COVARRUBIAS  65y  Male  ***My note supersedes ALL resident notes that I sign.  My corrections for their notes are in my note.***    I can be reached directly on [employer]. My office number is [phone]. My personal cell number is [phone].    INTERVAL EVENTS: Here for f/u of dental abscess. Pt says that he tastes a "toxic" taste in his mouth coming from the right upper molar area. He denies pain. He says he can smell it (malodorous), too. He can open and close his jaw w/out issue or pain.    T(F): 99 (10-08-21 @ 05:17), Max: 99 (10-07-21 @ 20:00)  HR: 80 (10-08-21 @ 05:17) (78 - 80)  BP: 108/55 (10-08-21 @ 05:17) (99/57 - 108/55)  RR: 18 (10-08-21 @ 05:17) (18 - 19)  SpO2: 93% (10-07-21 @ 22:10) (93% - 93%) - on RA    Gen: NAD  HEENT: PERRL, EOMI, mouth clr, nose clr; right, rear molar area appears to have some brownish material/fluid in tooth socket (but hard to see) - needs dental eval  Neck: no nodes, no JVD, thyroid nl; no tenderness  lungs: clr, no crackles  hrt: s1 s2 rrr no murmur  abd: soft, NT/ND, no HS megaly  : no zafar  ext: no edema, no c/c  neuro: aa ox3, cn intact, can move all 4 ext    LABS:                      11.5    (    90.3   9.69  )-----------( ---------      106      ( 07 Oct 2021 05:22 )             33.5    (    13.6     Platelet Count - Automated: 106 K/uL (10-07-21 @ 05:22)  Platelet Count - Automated: 136 K/uL (10-06-21 @ 16:41)    PT/INR - ( 06 Oct 2021 16:41 )   PT: 14.10 sec;   INR: 1.23 ratio    PTT - ( 06 Oct 2021 16:41 )  PTT: 31.0 sec    CARDIAC MARKERS ( 08 Oct 2021 04:30 )  x     / 0.01 ng/mL / x     / x     / 6.4 ng/mL  CARDIAC MARKERS ( 07 Oct 2021 05:22 )  x     / 0.02 ng/mL / x     / x     / x      CARDIAC MARKERS ( 06 Oct 2021 23:30 )  x     / 0.02 ng/mL / x     / x     / 11.5 ng/mL    Urinalysis Basic - ( 06 Oct 2021 16:52 )    Color: Light Orange / Appearance: Slightly Turbid / S.015 / pH: x  Gluc: x / Ketone: Negative  / Bili: Negative / Urobili: <2 mg/dL   Blood: x / Protein: 100 mg/dL / Nitrite: Positive   Leuk Esterase: Large / RBC: 13 /HPF /  /HPF   Sq Epi: x / Non Sq Epi: 0 /HPF / Bacteria: Negative    Culture - Urine (collected 10-06-21 @ 16:52)  Source: Clean Catch Clean Catch (Midstream)  Final Report (10-07-21 @ 22:12):    <10,000 CFU/mL Normal Urogenital Dolores    Culture - Blood (collected 10-06-21 @ 16:41)  Source: .Blood Blood-Peripheral  Preliminary Report (10-08-21 @ 01:02):    No growth to date.    Culture - Blood (collected 10-06-21 @ 16:41)  Source: .Blood Blood-Peripheral  Preliminary Report (10-08-21 @ 01:02):    No growth to date.    RADIOLOGY & ADDITIONAL TESTS:  < from: US Renal (10.07.21 @ 14:30) >  IMPRESSION:    Echogenic and atrophic left kidney consistent with renal parenchymal disease.    No hydronephrosis.    Bladder is collapsed    < end of copied text >    < from: TTE Echo Complete w/o Contrast w/ Doppler (10.07.21 @ 16:24) >  Summary:   1. LV Ejection Fraction by Ye's Method with a biplane EF of 59 %.   2. Spectral Doppler shows impaired relaxation pattern of left ventricular myocardial filling (Grade I diastolic dysfunction).   3. Mild mitral annular calcification.   4. No evidence of mitral valve regurgitation.   5. Mild tricuspid regurgitation.    < end of copied text >    MEDICATIONS:  meropenem  IVPB      meropenem  IVPB 500 milliGRAM(s) IV Intermittent every 12 hours    acetaminophen   Tablet .. 650 milliGRAM(s) Oral every 6 hours PRN  benztropine 2 milliGRAM(s) Oral at bedtime  fluPHENAZine 10 milliGRAM(s) Oral at bedtime  lactated ringers. 1000 milliLiter(s) IV Continuous <Continuous>

## 2021-10-08 NOTE — PROGRESS NOTE ADULT - SUBJECTIVE AND OBJECTIVE BOX
ELDA COVARRUBIAS 65y Male  MRN#: [id number]   CODE STATUS: Full code    Hospital Day: 2d    Pt is currently admitted with the primary diagnosis of sepsis    SUBJECTIVE  Hospital Course    65 year old male with PMH Lymphoma, HTN presents to ED with complaints of shortness of breath on exertion, fever, chills, body aches, single episode of vomiting for the past few days. Denies any nausea / vomiting now. Pt denies COVID in past as well as non vaccinated. Patient had a left side upper extraction about 3 weeks and report having purulent discharge from site after the procedure. Patient took 10 days of Augmentin and 3 days of Levofloxacin 500 mg. Patient had 2 episodes of mechanical fall in past 2 weeks, denies any HT, LOC. Patient denies any chest pain, abdominal pain, urinary symptoms, diarrhea. In ED: Temp = 103; HR = 113; BP = 131/88; RR = 22; SaO2 = 98% on room air. Work up noted for WBCs > 13; bicarb 17, BUN 36, Cr. 3.1. UA suggestive of UTI. Patient received LR 3L, Ceftriaxone 1000 mg x 1, Tylenol 975 mg x 1.    Patient was admitted to medicine for further assessment and management  10/7: patient was started on Meropenem 500 q12 per ID. Dental consult placed, possible complications from tooth extraction. TTE ordered, R/O endocarditis, blood cultures and urine cultures ordered. Renal Ultrasound ordered    10/8: Renal US no abscess. Pending Dental eval, likely DC tomorrow. Symptoms of Sepsis have resolved. F/U blood cultures. After dental signs off patient will be discharged on PO Vantin 100mg q12 for ten days.    Overnight events   None  Subjective complaints   None                                          ----------------------------------------------------------  OBJECTIVE  PAST MEDICAL & SURGICAL HISTORY  Kidney stones    Schizophrenia    Lymphoma    Shingles    Atrophic kidney    Retained urethral stent    H/O umbilical hernia repair    Elbow fracture    H/O cystoscopy                                              -----------------------------------------------------------  ALLERGIES:  heparin (Urticaria)                                            ------------------------------------------------------------    HOME MEDICATIONS  Home Medications:  benztropine 2 mg oral tablet: 1 tab(s) orally once a day (at bedtime) (10 Feb 2020 21:10)  fluPHENAZine 10 mg oral tablet: 1 tab(s) orally once a day (10 Feb 2020 21:10)                           MEDICATIONS:  MEDICATIONS  (STANDING):  benztropine 2 milliGRAM(s) Oral at bedtime  fluPHENAZine 10 milliGRAM(s) Oral at bedtime  lactated ringers. 1000 milliLiter(s) (75 mL/Hr) IV Continuous <Continuous>  meropenem  IVPB      meropenem  IVPB 500 milliGRAM(s) IV Intermittent every 12 hours    MEDICATIONS  (PRN):  acetaminophen   Tablet .. 650 milliGRAM(s) Oral every 6 hours PRN Temp greater or equal to 38C (100.4F)                                              ------------------------------------------------------------  VITAL SIGNS: Last 24 Hours  Vital Signs Last 24 Hrs  T(C): 37.2 (08 Oct 2021 05:17), Max: 37.2 (07 Oct 2021 20:00)  T(F): 99 (08 Oct 2021 05:17), Max: 99 (07 Oct 2021 20:00)  HR: 80 (08 Oct 2021 05:17) (78 - 80)  BP: 108/55 (08 Oct 2021 05:17) (99/57 - 108/55)  BP(mean): --  RR: 18 (08 Oct 2021 05:17) (18 - 19)  SpO2: 93% (07 Oct 2021 22:10) (93% - 93%)    10-06-21 @ 07:01  -  10-07-21 @ 07:00  --------------------------------------------------------  IN: 0 mL / OUT: 275 mL / NET: -275 mL                                             --------------------------------------------------------------  LABS:                        11.5   9.69  )-----------( 106      ( 07 Oct 2021 05:22 )             33.5     10-07    138  |  107  |  35<H>  ----------------------------<  111<H>  3.8   |  17  |  3.1<H>    Ca    8.3<L>      07 Oct 2021 05:22  Mg     2.2     10-07    TPro  4.9<L>  /  Alb  3.3<L>  /  TBili  0.8  /  DBili  x   /  AST  191<H>  /  ALT  36  /  AlkPhos  152<H>  10-07    PT/INR - ( 06 Oct 2021 16:41 )   PT: 14.10 sec;   INR: 1.23 ratio         PTT - ( 06 Oct 2021 16:41 )  PTT:31.0 sec      Color: Light Orange / Appearance: Slightly Turbid / S.015 / pH: x  Gluc: x / Ketone: Negative  / Bili: Negative / Urobili: <2 mg/dL   Blood: x / Protein: 100 mg/dL / Nitrite: Positive   Leuk Esterase: Large / RBC: 13 /HPF /  /HPF   Sq Epi: x / Non Sq Epi: 0 /HPF / Bacteria: Negative        Troponin T, Serum: 0.02 ng/mL *H* (10-07-21 @ 05:22)  Troponin T, Serum: 0.02 ng/mL *H* (10-06-21 @ 23:30)  Sedimentation Rate, Erythrocyte: 45 mm/Hr *H* (10-06-21 @ 23:30)          CARDIAC MARKERS ( 07 Oct 2021 05:22 )  x     / 0.02 ng/mL / x     / x     / x      CARDIAC MARKERS ( 06 Oct 2021 23:30 )  x     / 0.02 ng/mL / x     / x     / 11.5 ng/mL                                              -------------------------------------------------------------  RADIOLOGY:  < from: US Renal (10.07.21 @ 14:30) >  Echogenic and atrophic left kidney consistent with renal parenchymal disease.    No hydronephrosis.    Bladder is collapsed    < end of copied text >  < from: Xray Chest 1 View- PORTABLE-Urgent (10.06.21 @ 17:21) >  No radiographic evidence of acute cardiopulmonary disease.    Without change.    < end of copied text >                                            --------------------------------------------------------------    PHYSICAL EXAM:  GENERAL: NAD, well-developed  CHEST/LUNG: CTAB; No wheeze  HEART: RRR; No murmurs, rubs, or gallops  ABDOMEN: Soft, NT/ND; BS present  EXTREMITIES:  No cyanosis, or edema  NEUROLOGY: AAOx3, no deficits  SKIN: No rashes or lesions                                           --------------------------------------------------------------    ASSESSMENT & PLAN    65 year old male with PMH Lymphoma, HTN presents to ED with complaints of shortness of breath on exertion, fever, chills, body aches, single episode of vomiting for the past few days. Denies any nausea / vomiting now. Pt denies COVID in past as well as non vaccinated. Patient had a left side upper extraction about 3 weeks and report having purulent discharge from site after the procedure.    # Sepsis on admission  # R/U Pyelonephritis  # Probable Dental Abscess  # R/U Infectious Endocarditis   - UA suggestive of UTI, no urinary symptoms, no CVA tenderness  - F/U BCx, UCx: still negative as of 10/8  - Renal sonogram: No abscess   - s/p ceftriaxone 1000 mg in ED, and LR 3L  - Now on Meropenem 500 mg iv q12h  - PER ID: PO Vantin 100 q12 for 10 days  - Dental procedure 3 weeks ago followed by purulent discharge. Now with drainage and malodor  - Concern for IE. no murmur noted on cardiac examination.    - TTE: EF 59%, no noticeable vegetations     # SOB  - Patient now comfortable on Room Air  - s/p 3L LR in ED  - gentle hydration with LR at 50 cc/hr for 10 hours now completed  - COVID negative    # ASHLEY on CKD 4 + elevated AG  - baseline creatinine ~ 2.5.   - prerenal vs ATN  - check urine creatinine, urine Na, Urea, Pr/cr ratio  - elevated AG noted on BMP. pH 7.35 on VBG  - F/U nephro consult                                                                              ----------------------------------------------------  DVT: SCDs  GI: pantoprazole  Diet: renal  Activity: Ambulate as tolerated  Dispo: Acute for now                                                                           --------------------------------------------------------    # Handoff   - F/U BCx, UCx  - F/U Renal sonogram   - F/U TTE  - F/U nephro consult ELDA COVARRUBIAS 65y Male  MRN#: [id number]   CODE STATUS: Full code    Hospital Day: 2d    Pt is currently admitted with the primary diagnosis of sepsis    SUBJECTIVE  Hospital Course    65 year old male with PMH Lymphoma, HTN presents to ED with complaints of shortness of breath on exertion, fever, chills, body aches, single episode of vomiting for the past few days. Denies any nausea / vomiting now. Pt denies COVID in past as well as non vaccinated. Patient had a left side upper extraction about 3 weeks and report having purulent discharge from site after the procedure. Patient took 10 days of Augmentin and 3 days of Levofloxacin 500 mg. Patient had 2 episodes of mechanical fall in past 2 weeks, denies any HT, LOC. Patient denies any chest pain, abdominal pain, urinary symptoms, diarrhea. In ED: Temp = 103; HR = 113; BP = 131/88; RR = 22; SaO2 = 98% on room air. Work up noted for WBCs > 13; bicarb 17, BUN 36, Cr. 3.1. UA suggestive of UTI. Patient received LR 3L, Ceftriaxone 1000 mg x 1, Tylenol 975 mg x 1.    Patient was admitted to medicine for further assessment and management  10/7: patient was started on Meropenem 500 q12 per ID. Dental consult placed, possible complications from tooth extraction. TTE ordered, R/O endocarditis, blood cultures and urine cultures ordered. Renal Ultrasound ordered    10/8: Renal US no abscess. Pending Dental eval, likely DC tomorrow. Symptoms of Sepsis have resolved. F/U blood cultures. After dental signs off patient will be discharged on PO Vantin 100mg q12 for ten days.    Overnight events   None  Subjective complaints   None                                          ----------------------------------------------------------  OBJECTIVE  PAST MEDICAL & SURGICAL HISTORY  Kidney stones    Schizophrenia    Lymphoma    Shingles    Atrophic kidney    Retained urethral stent    H/O umbilical hernia repair    Elbow fracture    H/O cystoscopy                                              -----------------------------------------------------------  ALLERGIES:  heparin (Urticaria)                                            ------------------------------------------------------------    HOME MEDICATIONS  Home Medications:  benztropine 2 mg oral tablet: 1 tab(s) orally once a day (at bedtime) (10 Feb 2020 21:10)  fluPHENAZine 10 mg oral tablet: 1 tab(s) orally once a day (10 Feb 2020 21:10)                           MEDICATIONS:  MEDICATIONS  (STANDING):  benztropine 2 milliGRAM(s) Oral at bedtime  fluPHENAZine 10 milliGRAM(s) Oral at bedtime  lactated ringers. 1000 milliLiter(s) (75 mL/Hr) IV Continuous <Continuous>  meropenem  IVPB      meropenem  IVPB 500 milliGRAM(s) IV Intermittent every 12 hours    MEDICATIONS  (PRN):  acetaminophen   Tablet .. 650 milliGRAM(s) Oral every 6 hours PRN Temp greater or equal to 38C (100.4F)                                              ------------------------------------------------------------  VITAL SIGNS: Last 24 Hours  Vital Signs Last 24 Hrs  T(C): 37.2 (08 Oct 2021 05:17), Max: 37.2 (07 Oct 2021 20:00)  T(F): 99 (08 Oct 2021 05:17), Max: 99 (07 Oct 2021 20:00)  HR: 80 (08 Oct 2021 05:17) (78 - 80)  BP: 108/55 (08 Oct 2021 05:17) (99/57 - 108/55)  BP(mean): --  RR: 18 (08 Oct 2021 05:17) (18 - 19)  SpO2: 93% (07 Oct 2021 22:10) (93% - 93%)    10-06-21 @ 07:01  -  10-07-21 @ 07:00  --------------------------------------------------------  IN: 0 mL / OUT: 275 mL / NET: -275 mL                                             --------------------------------------------------------------  LABS:                        11.5   9.69  )-----------( 106      ( 07 Oct 2021 05:22 )             33.5     10-07    138  |  107  |  35<H>  ----------------------------<  111<H>  3.8   |  17  |  3.1<H>    Ca    8.3<L>      07 Oct 2021 05:22  Mg     2.2     10-07    TPro  4.9<L>  /  Alb  3.3<L>  /  TBili  0.8  /  DBili  x   /  AST  191<H>  /  ALT  36  /  AlkPhos  152<H>  10-07    PT/INR - ( 06 Oct 2021 16:41 )   PT: 14.10 sec;   INR: 1.23 ratio         PTT - ( 06 Oct 2021 16:41 )  PTT:31.0 sec      Color: Light Orange / Appearance: Slightly Turbid / S.015 / pH: x  Gluc: x / Ketone: Negative  / Bili: Negative / Urobili: <2 mg/dL   Blood: x / Protein: 100 mg/dL / Nitrite: Positive   Leuk Esterase: Large / RBC: 13 /HPF /  /HPF   Sq Epi: x / Non Sq Epi: 0 /HPF / Bacteria: Negative        Troponin T, Serum: 0.02 ng/mL *H* (10-07-21 @ 05:22)  Troponin T, Serum: 0.02 ng/mL *H* (10-06-21 @ 23:30)  Sedimentation Rate, Erythrocyte: 45 mm/Hr *H* (10-06-21 @ 23:30)          CARDIAC MARKERS ( 07 Oct 2021 05:22 )  x     / 0.02 ng/mL / x     / x     / x      CARDIAC MARKERS ( 06 Oct 2021 23:30 )  x     / 0.02 ng/mL / x     / x     / 11.5 ng/mL                                              -------------------------------------------------------------  RADIOLOGY:  < from: US Renal (10.07.21 @ 14:30) >  Echogenic and atrophic left kidney consistent with renal parenchymal disease.    No hydronephrosis.    Bladder is collapsed    < end of copied text >  < from: Xray Chest 1 View- PORTABLE-Urgent (10.06.21 @ 17:21) >  No radiographic evidence of acute cardiopulmonary disease.    Without change.    < end of copied text >                                            --------------------------------------------------------------    PHYSICAL EXAM:  GENERAL: NAD, well-developed  CHEST/LUNG: CTAB; No wheeze  HEART: RRR; No murmurs, rubs, or gallops  ABDOMEN: Soft, NT/ND; BS present  EXTREMITIES:  No cyanosis, or edema  NEUROLOGY: AAOx3, no deficits  SKIN: No rashes or lesions                                           --------------------------------------------------------------    ASSESSMENT & PLAN    65 year old male with PMH Lymphoma, HTN presents to ED with complaints of shortness of breath on exertion, fever, chills, body aches, single episode of vomiting for the past few days. Denies any nausea / vomiting now. Pt denies COVID in past as well as non vaccinated. Patient had a left side upper extraction about 3 weeks and report having purulent discharge from site after the procedure.    # Sepsis on admission  # R/U Pyelonephritis  # Probable Dental Abscess  # R/U Infectious Endocarditis   - UA suggestive of UTI, no urinary symptoms, no CVA tenderness  - F/U BCx, UCx: still negative as of 10/8  - Renal sonogram: No abscess   - s/p ceftriaxone 1000 mg in ED, and LR 3L  - Now on Meropenem 500 mg iv q12h  - PER ID: PO Vantin 100 q12 for 10 days on discharge after cleared by dental  - Dental procedure 3 weeks ago followed by purulent discharge. Now with drainage and malodor  - Concern for IE. no murmur noted on cardiac examination.    - TTE: EF 59%, no noticeable vegetations     # SOB  - Patient now comfortable on Room Air  - s/p 3L LR in ED  - gentle hydration with LR at 50 cc/hr for 10 hours now completed  - COVID negative    # ASHLEY on CKD 4 + elevated AG  - baseline creatinine ~ 2.5.   - prerenal vs ATN  - check urine creatinine, urine Na, Urea, Pr/cr ratio  - elevated AG noted on BMP. pH 7.35 on VBG  - F/U nephro consult                                                                              ----------------------------------------------------  DVT: SCDs  GI: pantoprazole  Diet: renal  Activity: Ambulate as tolerated  Dispo: Acute for now                                                                           --------------------------------------------------------    # Handoff   - F/U BCx, UCx  - F/U nephro consult and dental  Likely to discharge in 24-48 hours

## 2021-10-08 NOTE — CONSULT NOTE ADULT - SUBJECTIVE AND OBJECTIVE BOX
Patient is a 65y old  Male who presents with a chief complaint of Sepsis (08 Oct 2021 11:05). Presents to dental clinic to rule out dental infection for potential cause of sepsis.       HPI:  65 year old man with PMH Lymphoma, HTN presents to ED with complaints of shortness of breath on exertion, fever, chills, body aches, single episode of vomiting for the past few days. Denies any nausea / vomiting now. Pt denies COVID in past as well as non vaccinated. Patient had a left side upper extraction about 3 weeks and report having purulent discharge from site after the procedure. Patient took 10 days of Augmentin and 3 days of Levofloxacin 500 mg. Patient had 2 episodes of mechanical fall in past 2 weeks, denies any HT, LOC. Patient denies any chest pain, abdominal pain, urinary symptoms, diarrhea.  In ED: Temp = 103; HR = 113; BP = 131/88; RR = 22; SaO2 = 98% on room air. Work up noted for WBCs > 13; bicarb 17, BUN 36, Cr. 3.1. UA suggestive of UTI.  Patient received LR 3L, Ceftriaxone 1000 mg x 1, Tylenol 975 mg x 1.    Attd: 66 yo pt w/ schizo (since ), NHL in remission and HTN had tooth extraction 3 wks ago; there was pus coming from the extracted site; pt was on oral abx as outpt; the pt was visiting his father in [place], [place] (suburb of [facility]) when he was becoming obtunded, thirsty, clamy, tachy and had thready pulse; father drove him to [facility]; in car, pt drank 3 bottles of water and 3 cans of diet cola; pt looks much better today and feels better; he has no signs at all of meningitis (no h/a, no photo/phonophobia, no neck stiffness); pt denies urine symp; denies N/V/D or any abd symp; pt denies sore throat and says mouth feels OK;  (06 Oct 2021 19:35)      PAST MEDICAL & SURGICAL HISTORY:  Kidney stones    Schizophrenia    Lymphoma    Shingles    Atrophic kidney    Retained urethral stent    H/O umbilical hernia repair    Elbow fracture    H/O cystoscopy      (   ) heart valve replacement  (   ) joint replacement  (   ) pregnancy    MEDICATIONS  (STANDING):  benztropine 2 milliGRAM(s) Oral at bedtime  fluPHENAZine 10 milliGRAM(s) Oral at bedtime  meropenem  IVPB      meropenem  IVPB 500 milliGRAM(s) IV Intermittent every 12 hours    MEDICATIONS  (PRN):  acetaminophen   Tablet .. 650 milliGRAM(s) Oral every 6 hours PRN Temp greater or equal to 38C (100.4F)      Allergies    heparin (Urticaria)    Intolerances        FAMILY HISTORY:  FH: hypertension    FH: diabetes mellitus        *SOCIAL HISTORY: ( -  ) Tobacco; (  - ) ETOH    *Last Dental Visit:    Vital Signs Last 24 Hrs  T(C): 37.2 (08 Oct 2021 05:17), Max: 37.2 (07 Oct 2021 20:00)  T(F): 99 (08 Oct 2021 05:17), Max: 99 (07 Oct 2021 20:00)  HR: 80 (08 Oct 2021 05:17) (78 - 80)  BP: 108/55 (08 Oct 2021 05:17) (99/57 - 108/55)  BP(mean): --  RR: 18 (08 Oct 2021 05:17) (18 - 18)  SpO2: 93% (07 Oct 2021 22:10) (93% - 93%)    LABS:                        11.5   9.69  )-----------( 106      ( 07 Oct 2021 05:22 )             33.5     10-07    138  |  107  |  35<H>  ----------------------------<  111<H>  3.8   |  17  |  3.1<H>    Ca    8.3<L>      07 Oct 2021 05:22  Mg     2.2     10-07    TPro  4.9<L>  /  Alb  3.3<L>  /  TBili  0.8  /  DBili  x   /  AST  191<H>  /  ALT  36  /  AlkPhos  152<H>  10-07    WBC Count: 9.69 K/uL [4.80 - 10.80] (10-07 @ 05:22)  Platelet Count - Automated: 106 K/uL *L* [130 - 400] (10-07 @ 05:22)  Culture Results:   <10,000 CFU/mL Normal Urogenital Dolores (10-06 @ 16:52)  WBC Count: 13.10 K/uL *H* [4.80 - 10.80] (10-06 @ 16:41)  Platelet Count - Automated: 136 K/uL [130 - 400] (10-06 @ 16:41)  INR: 1.23 ratio [0.65 - 1.30] (10-06 @ 16:41)  Culture Results:   No growth to date. (10-06 @ 16:41)  Culture Results:   No growth to date. (10-06 @ 16:41)    Urinalysis Basic - ( 06 Oct 2021 16:52 )    Color: Light Orange / Appearance: Slightly Turbid / S.015 / pH: x  Gluc: x / Ketone: Negative  / Bili: Negative / Urobili: <2 mg/dL   Blood: x / Protein: 100 mg/dL / Nitrite: Positive   Leuk Esterase: Large / RBC: 13 /HPF /  /HPF   Sq Epi: x / Non Sq Epi: 0 /HPF / Bacteria: Negative        PT/INR - ( 06 Oct 2021 16:41 )   PT: 14.10 sec;   INR: 1.23 ratio         PTT - ( 06 Oct 2021 16:41 )  PTT:31.0 sec  EOE:  TMJ (  - ) clicks                     ( -  ) pops                     ( -  ) crepitus             Mandible <<FROM>>             Facial bones and MOM <<grossly intact>>             (  - ) trismus             ( -  ) lymphadenopathy             ( -  ) swelling             ( -  ) asymmetry             ( -  ) palpation             ( -  ) dyspnea             ( -  ) dysphagia             ( -  ) loss of consciousness    IOE:  <<permanent/primary/mixed>> dentition: <<grossly intact>> OR <<multiple carious teeth>> OR <<multiple missing teeth>>           hard/soft palate:  (  - ) palatal torus, <<No pathology noted>>           tongue/FOM <<No pathology noted>>           labial/buccal mucosa <<No pathology noted>>           ( -  ) percussion           ( -  ) palpation           ( -  ) swelling            ( -  ) abscess           (  - ) sinus tract    Dentition present: <<   >>  Mobility: <<  >>  Caries: <<   >>         *DENTAL RADIOGRAPHS: 1 diagnostic periapical of post extraction site #2 and 1 panoramic     RADIOLOGY & ADDITIONAL STUDIES:    *ASSESSMENT: Upon clinical evaluation, #2 extraction site looks to be healing within normal limits. No purulence or drainage noted. Radiographic evaluation revealed extraction site in close proximity to maxillary sinus. Based on patient's symptoms after extraction 6-7 weeks ago, it is assumed patient experienced oroantral communication. Communication seems to be resolving within normal limits. No  infection or swelling noted upon exam.       *PLAN: Have patient continue antibiotic treatment per medicine and take sinus precautions.     PROCEDURE: Patient dismissed after evaluation. Will recommend antibiotic treatment and sinus precautions.     RECOMMENDATIONS:  1) Continue antibiotic treatment. Sinus precautions. Warm water rinses.   2) Dental F/U with outpatient dentist for comprehensive dental care.   3) If any difficulty swallowing/breathing, fever occur, return to ER.     Saibno Jorgensen DDS 
  ELDA COVARRUBIAS  65y, Male  Allergy: heparin (Urticaria)      All historical available data reviewed.    HPI:  65 year old male with PMH Lymphoma, HTN presents to ED with complaints of shortness of breath on exertion, fever, chills, body aches, single episode of vomiting for the past few days. Denies any nausea / vomiting now. Pt denies COVID in past as well as non vaccinated. Patient had a left side upper extraction about 3 weeks and report having purulent discharge from site after the procedure. Patient took 10 days of Augmentin and 3 days of Levofloxacin 500 mg. Patient had 2 episodes of mechanical fall in past 2 weeks, denies any HT, LOC. Patient denies any chest pain, abdominal pain, urinary symptoms, diarrhea.  In ED: Temp = 103; HR = 113; BP = 131/88; RR = 22; SaO2 = 98% on room air. Work up noted for WBCs > 13; bicarb 17, BUN 36, Cr. 3.1. UA suggestive of UTI.  Patient received LR 3L, Ceftriaxone 1000 mg x 1, Tylenol 975 mg x 1. (06 Oct 2021 19:35)    FAMILY HISTORY:  FH: hypertension    FH: diabetes mellitus      PAST MEDICAL & SURGICAL HISTORY:  Kidney stones    Schizophrenia    Lymphoma    Shingles    Atrophic kidney    Retained urethral stent    H/O umbilical hernia repair    Elbow fracture    H/O cystoscopy          VITALS:  T(F): 95.9, Max: 104 (10-06-21 @ 16:16)  HR: 74  BP: 111/55  RR: 18Vital Signs Last 24 Hrs  T(C): 35.5 (07 Oct 2021 05:08), Max: 40 (06 Oct 2021 16:16)  T(F): 95.9 (07 Oct 2021 05:08), Max: 104 (06 Oct 2021 16:16)  HR: 74 (07 Oct 2021 05:08) (74 - 113)  BP: 111/55 (07 Oct 2021 05:08) (83/56 - 131/88)  BP(mean): 76 (07 Oct 2021 05:08) (76 - 76)  RR: 18 (06 Oct 2021 22:59) (18 - 22)  SpO2: 98% (06 Oct 2021 22:45) (98% - 99%)    TESTS & MEASUREMENTS:                        11.5   9.69  )-----------( 106      ( 07 Oct 2021 05:22 )             33.5     10    138  |  107  |  35<H>  ----------------------------<  111<H>  3.8   |  17  |  3.1<H>    Ca    8.3<L>      07 Oct 2021 05:22  Mg     2.2     10-07    TPro  4.9<L>  /  Alb  3.3<L>  /  TBili  0.8  /  DBili  x   /  AST  191<H>  /  ALT  36  /  AlkPhos  152<H>  10-07    LIVER FUNCTIONS - ( 07 Oct 2021 05:22 )  Alb: 3.3 g/dL / Pro: 4.9 g/dL / ALK PHOS: 152 U/L / ALT: 36 U/L / AST: 191 U/L / GGT: x             Urinalysis Basic - ( 06 Oct 2021 16:52 )    Color: Light Orange / Appearance: Slightly Turbid / S.015 / pH: x  Gluc: x / Ketone: Negative  / Bili: Negative / Urobili: <2 mg/dL   Blood: x / Protein: 100 mg/dL / Nitrite: Positive   Leuk Esterase: Large / RBC: 13 /HPF /  /HPF   Sq Epi: x / Non Sq Epi: 0 /HPF / Bacteria: Negative          RADIOLOGY & ADDITIONAL TESTS:  Personal review of radiological diagnostics performed  Echo and EKG results noted when applicable.     MEDICATIONS:  acetaminophen   Tablet .. 650 milliGRAM(s) Oral every 6 hours PRN  benztropine 2 milliGRAM(s) Oral at bedtime  cefTRIAXone   IVPB 1000 milliGRAM(s) IV Intermittent every 24 hours  fluPHENAZine 10 milliGRAM(s) Oral at bedtime  lactated ringers. 500 milliLiter(s) IV Continuous <Continuous>  vancomycin  IVPB 1000 milliGRAM(s) IV Intermittent daily      ANTIBIOTICS:  cefTRIAXone   IVPB 1000 milliGRAM(s) IV Intermittent every 24 hours  vancomycin  IVPB 1000 milliGRAM(s) IV Intermittent daily

## 2021-10-09 ENCOUNTER — TRANSCRIPTION ENCOUNTER (OUTPATIENT)
Age: 66
End: 2021-10-09

## 2021-10-09 VITALS
RESPIRATION RATE: 18 BRPM | SYSTOLIC BLOOD PRESSURE: 139 MMHG | TEMPERATURE: 99 F | HEART RATE: 75 BPM | DIASTOLIC BLOOD PRESSURE: 72 MMHG

## 2021-10-09 PROCEDURE — 99239 HOSP IP/OBS DSCHRG MGMT >30: CPT

## 2021-10-09 RX ADMIN — MEROPENEM 100 MILLIGRAM(S): 1 INJECTION INTRAVENOUS at 05:21

## 2021-10-09 NOTE — CHART NOTE - NSCHARTNOTEFT_GEN_A_CORE
<<<RESIDENT DISCHARGE NOTE>>>     ELDA COVARRBUIAS  MRN-[id number]    VITAL SIGNS:  T(F): 99 (10-09-21 @ 06:05), Max: 99 (10-09-21 @ 06:05)  HR: 75 (10-09-21 @ 06:05)  BP: 139/72 (10-09-21 @ 06:05)  SpO2: --      PHYSICAL EXAMINATION:  General: Very pleasant patient seen resting comfortably in hospital bed  Head & Neck: AT NC  Pulmonary: CTA bilaterally  Cardiovascular: RRR no murmur  Gastrointestinal/Abdomen & Pelvis: Normal bowel sounds  Neurologic/Motor: AAOx3, no deficits    TEST RESULTS:                        10.7   4.41  )-----------( 106      ( 08 Oct 2021 22:26 )             31.8       10-08    139  |  110  |  38<H>  ----------------------------<  101<H>  4.5   |  13<L>  |  3.2<H>    Ca    8.2<L>      08 Oct 2021 17:16  Mg     2.1     10-08    TPro  5.3<L>  /  Alb  3.5  /  TBili  0.5  /  DBili  x   /  AST  155<H>  /  ALT  72<H>  /  AlkPhos  160<H>  10-08      FINAL DISCHARGE INTERVIEW:  Resident(s) Present: Dakota Hickey MD    DISCHARGE MEDICATION RECONCILIATION  reviewed with Attending Eduard Aguillon MD    DISPOSITION:   [ X ] Home,    [  ] Home with Visiting Nursing Services,   [    ]  SNF/ NH,    [   ] Acute Rehab (4A),   [   ] Other (Specify:_________)

## 2021-10-09 NOTE — PROGRESS NOTE ADULT - TIME BILLING
d/c planing    update father    lab review
Counseled patient about diagnostic testing and treatment plan. All questions answered.

## 2021-10-09 NOTE — PROGRESS NOTE ADULT - ASSESSMENT
65 year old man with PMH hx Lymphoma, HTN presents to ED with complaints of shortness of breath on exertion, fever, chills, body aches, single episode of vomiting for the past few days.     # sepsis on admission 2/2 dental abscess w/ oroantral contamination of maxillary sinus (now resolving)  CT face/sinuses not needed at this point -> clinically doing quite well  ID noted - we spoke previously  WBC better   UA suggestive of UTI w/ + Nit, but no bacteria in u/a, UCX neg and denies any urinary symptoms or back pain and renal u/s w/out obst)  ESR 45;   COVID neg  CXR neg  UCx neg x1  Bld Cx x4 neg  echo: no veg  renal/blad U/S: atophic Lt kid (old); no hydro   abx: upon d/c, per ID discussion, vantin 100mg po q12 + flagyl 500mg po q8 for 10 days more (10/8-10/18)  needs to f/u w/ his dentist as outpt    # acute hypoxic resp failure prob 2/2 sepsis  CXR neg  off O2    # ASHLEY (2/2 ATN from sepsis) on CKD 4 plus metab acidosis w/ elevated AG  baseline creatinine ~ 2.   FeNa 1.3%  d/c IVFs  U/S: no hydro  should resolve in about 10-14 days  oral bicarb not needed    # abnl LFTs 2/2 sepsis  can rpt LFTs as outpt  can do RUQ U/S as outpt, if needed    # schizophrenia - well controlled  pt drives  c/w meds  has rhythmic tremor in rt hand/arm likely related to EPS of anti-psych meds    # DVT: SCDs    # GI: pantoprazole    # Activity: Ambulate as tolerated    # Contact  Father (Dr Martinez) [phone]: updated him again; reviewed d/c plan and outpt f/u; reviewed cx's and lab findings as above    Dispo: abx; f/u dental outpt; d/c home today 
· Assessment	  65 year old male with PMH Lymphoma, HTN presents to ED with complaints of shortness of breath on exertion, fever, chills, body aches, single episode of vomiting for the past few days. Denies any nausea / vomiting now. Pt denies COVID in past as well as non vaccinated. Patient had a left side upper extraction about 3 weeks and report having purulent discharge from site after the procedure.    IMPRESSION;  Resolved cryptogenic sepsis   Possible pyelonephritis . Has pyuria/microscopic hematuria but clinically no CVA pain.  Blood & Urine cxs NGTD NG 10/6  ECHO no vegetations  renal US no hydro  Dental pathology. High probability given the recent dental abscess and has drainage/malodour    RECOMMENDATIONS;  Dental evaluation  Once cleared by dental change iv to po Vantin 100 mg q12h for 10 more days  recall prn please 
65 year old man with PMH hx Lymphoma, HTN presents to ED with complaints of shortness of breath on exertion, fever, chills, body aches, single episode of vomiting for the past few days.     # sepsis on admission 2/2 unclear source: likely dental abscess  NEEDS TO SEE DENTAL TODAY  consider CT face/sinuses  ID noted - we spoke  WBC better 13.1 -> 9.7  UA suggestive of UTI w/ + Nit, but no bacteria in u/a, UCX neg and denies any urinary symptoms or back pain and renal u/s w/out obst)  ESR 45;   COVID neg  CXR neg  UCx neg  Bld Cx x2 neg; f/u cx's  echo: no veg  renal/blad U/S: atophic Lt kid (old); no hydro   abx: jorge 500mg iv q12; upon d/c, per ID discussion, vantin 100mg po q12 + flagyl 500mg po q8 for 10 days more (10/8-10/18)    # acute hypoxic resp failure prob 2/2 sepsis  CXR neg  off O2    # ASHLEY (prob ATN or prerenal from sepsis) on CKD 4 + elevated AG  NEED NEW BMP  baseline creatinine ~ 2.   FeNa 1.3%  BMp q24  d/c IVFs  U/S: no hydro    # abnl LFTs likely 2/2 sepsis  if w/u unyielding and pt getting worse, then RUQ U/S  LFTs q24 til better - NEED NEW LFTs    # schizophrenia - well controlled  pt drives  c/w meds  has rhythmic tremor in rt hand/arm likely related to EPS of anti-psych meds    # DVT: SCDs    # GI: pantoprazole    # Activity: Ambulate as tolerated    # Contact  Father (Dr Martinez) [phone]: updated him today;     Dispo: IV abx; f/u ID and dental; d/c IVFs; daily labs;   will eventually send home, prob on oral abx tomorrow (anticipate)

## 2021-10-09 NOTE — DISCHARGE NOTE NURSING/CASE MANAGEMENT/SOCIAL WORK - PATIENT PORTAL LINK FT
You can access the FollowMyHealth Patient Portal offered by [facility] by registering at the following website: [email or web address]. By joining [employer]’s FollowMyHealth portal, you will also be able to view your health information using other applications (apps) compatible with our system.

## 2021-10-09 NOTE — PROGRESS NOTE ADULT - SUBJECTIVE AND OBJECTIVE BOX
ELDA COVARRUBIAS  65y  Male  ***My note supersedes ALL resident notes that I sign.  My corrections for their notes are in my note.***    I can be reached directly on "[employer]. My office number is [phone]. My personal cell number is [phone].    INTERVAL EVENTS: Here for f/u of sepsis. Pt looks quite well. Feeling much better. Eating and drinking great. No pain. Able to walk.    T(F): 99 (10-09-21 @ 06:05), Max: 99 (10-09-21 @ 06:05)  HR: 75 (10-09-21 @ 06:05) (75 - 76)  BP: 139/72 (10-09-21 @ 06:05) (110/60 - 139/72)  RR: 18 (10-09-21 @ 06:05) (18 - 18)  SpO2: --    Gen: NAD  HEENT: PERRL, EOMI, mouth clr, nose clr; right, rear molar area appears to have some brownish material/fluid in tooth socket (but hard to see) - needs dental eval  Neck: no nodes, no JVD, thyroid nl; no tenderness  lungs: clr, no crackles  hrt: s1 s2 rrr no murmur  abd: soft, NT/ND, no HS megaly  : no zafar  ext: no edema, no c/c  neuro: aa ox3, cn intact, can move all 4 ext    LABS:                      10.7    (    90.1   4.41  )-----------( ---------      106      ( 08 Oct 2021 22:26 )             31.8    (    13.6     Hemoglobin: 10.7 g/dL (10-08 @ 22:26)  Hemoglobin: 12.1 g/dL (10-08 @ 17:16)  Hemoglobin: 11.5 g/dL (10-07 @ 05:22)  Hemoglobin: 13.4 g/dL (10-06 @ 16:41)    Platelet Count - Automated: 106 K/uL (10-08-21 @ 22:26)  Platelet Count - Automated: 108 K/uL (10-08-21 @ 17:16)  Platelet Count - Automated: 106 K/uL (10-07-21 @ 05:22)  Platelet Count - Automated: 136 K/uL (10-06-21 @ 16:41)    139   (   110   (   101      10-08-21 @ 17:16  ----------------------               4.5   (   13   (   38     AG = 16                        -----                        3.2  Ca  8.2   Mg  2.1    P   --     LFT  5.3  (  0.5  (  155       10-08-21 @ 17:16  -------------------------  3.5  (  160  (  72    CARDIAC MARKERS ( 08 Oct 2021 04:30 )  x     / 0.01 ng/mL / x     / x     / 6.4 ng/mL    Culture - Blood (collected 10-07-21 @ 18:30)  Source: .Blood Blood  Preliminary Report (10-09-21 @ 01:02):    No growth to date.    Culture - Blood (collected 10-07-21 @ 12:52)  Source: .Blood Blood  Preliminary Report (10-08-21 @ 23:01):    No growth to date.    Culture - Urine (collected 10-06-21 @ 16:52)  Source: Clean Catch Clean Catch (Midstream)  Final Report (10-07-21 @ 22:12):    <10,000 CFU/mL Normal Urogenital Dolores    Culture - Blood (collected 10-06-21 @ 16:41)  Source: .Blood Blood-Peripheral  Preliminary Report (10-08-21 @ 01:02):    No growth to date.    Culture - Blood (collected 10-06-21 @ 16:41)  Source: .Blood Blood-Peripheral  Preliminary Report (10-08-21 @ 01:02):    No growth to date.    RADIOLOGY & ADDITIONAL TESTS:      MEDICATIONS:  meropenem  IVPB      meropenem  IVPB 500 milliGRAM(s) IV Intermittent every 12 hours    acetaminophen   Tablet .. 650 milliGRAM(s) Oral every 6 hours PRN  benztropine 2 milliGRAM(s) Oral at bedtime  fluPHENAZine 10 milliGRAM(s) Oral at bedtime

## 2021-10-12 LAB
CULTURE RESULTS: SIGNIFICANT CHANGE UP
SPECIMEN SOURCE: SIGNIFICANT CHANGE UP

## 2021-10-13 LAB
CULTURE RESULTS: SIGNIFICANT CHANGE UP
SPECIMEN SOURCE: SIGNIFICANT CHANGE UP

## 2021-10-14 DIAGNOSIS — R79.89 OTHER SPECIFIED ABNORMAL FINDINGS OF BLOOD CHEMISTRY: ICD-10-CM

## 2021-10-14 DIAGNOSIS — A41.9 SEPSIS, UNSPECIFIED ORGANISM: ICD-10-CM

## 2021-10-14 DIAGNOSIS — F20.9 SCHIZOPHRENIA, UNSPECIFIED: ICD-10-CM

## 2021-10-14 DIAGNOSIS — E87.2 ACIDOSIS: ICD-10-CM

## 2021-10-14 DIAGNOSIS — N39.0 URINARY TRACT INFECTION, SITE NOT SPECIFIED: ICD-10-CM

## 2021-10-14 DIAGNOSIS — I12.9 HYPERTENSIVE CHRONIC KIDNEY DISEASE WITH STAGE 1 THROUGH STAGE 4 CHRONIC KIDNEY DISEASE, OR UNSPECIFIED CHRONIC KIDNEY DISEASE: ICD-10-CM

## 2021-10-14 DIAGNOSIS — N17.0 ACUTE KIDNEY FAILURE WITH TUBULAR NECROSIS: ICD-10-CM

## 2021-10-14 DIAGNOSIS — Z87.442 PERSONAL HISTORY OF URINARY CALCULI: ICD-10-CM

## 2021-10-14 DIAGNOSIS — Z79.52 LONG TERM (CURRENT) USE OF SYSTEMIC STEROIDS: ICD-10-CM

## 2021-10-14 DIAGNOSIS — C85.90 NON-HODGKIN LYMPHOMA, UNSPECIFIED, UNSPECIFIED SITE: ICD-10-CM

## 2021-10-14 DIAGNOSIS — K04.6 PERIAPICAL ABSCESS WITH SINUS: ICD-10-CM

## 2021-10-14 DIAGNOSIS — J96.01 ACUTE RESPIRATORY FAILURE WITH HYPOXIA: ICD-10-CM

## 2021-10-14 DIAGNOSIS — G25.2 OTHER SPECIFIED FORMS OF TREMOR: ICD-10-CM

## 2021-10-14 DIAGNOSIS — Z88.8 ALLERGY STATUS TO OTHER DRUGS, MEDICAMENTS AND BIOLOGICAL SUBSTANCES STATUS: ICD-10-CM

## 2021-10-14 DIAGNOSIS — N18.4 CHRONIC KIDNEY DISEASE, STAGE 4 (SEVERE): ICD-10-CM

## 2022-01-17 NOTE — PATIENT PROFILE ADULT - NSPROEDAABILITYLEARN_GEN_A_NUR
Caller: MARIANA CORTES    Relationship: Emergency Contact    Best call back number: [phone]        Which medication are you concerned about: clopidogrel (PLAVIX) 75 MG tablet    Who prescribed you this medication:  IN THE HOSPITAL WHEN STENTS WERE PUT IN.    What are your concerns: PATIENT'S WIFE STATES THAT THE INSURANCE CONTACTED HER AND SAID THEY WOULD NO LONGER PAY FOR THIS MEDICATION.  SHE STATES THE PATIENT STILL NEEDS SOMETHING AND DOES NOT KNOW WHAT TO DO TO GET THIS COVERED OR ANOTHER MEDICATION.     PLEASE CALL AND ADVISE.       
Informed she would need to call the cardiologist office who is handling this medication.  
none

## 2022-07-19 NOTE — DIETITIAN INITIAL EVALUATION ADULT. - ENERGY NEEDS
Medical Necessity Clause: This procedure was medically necessary because the lesion that was treated was: estimated calorie needs = 4208-3421 kcal/day (MSJ x1.2-1.3).   estimated protein needs = 73-91 g/day (0.8-1.0 g/kg CBW, considering cancer on treatment and ASHLEY. will monitor renal profile and adjust PRN).   estimated fluid needs = 1mL/kcal or per LIP

## 2022-08-14 NOTE — CONSULT NOTE ADULT - RESPIRATORY AND THORAX
Katina here for Invanz infusion today. Patient denies any concerns with previous infusions.  See MAR for infusion details. Patient tolerated procedure well. Patient discharged in stable, ambulatory condition.      
negative

## 2022-10-02 NOTE — ED PROVIDER NOTE - NS_EDPROVIDERDISPOUSERTYPE_ED_A_ED
1. The severity of signs/symptoms.(See ED/admit documents)
Attending Attestation (For Attendings USE Only)...

## 2023-02-14 NOTE — ED ADULT NURSE NOTE - NS ED NURSE LEVEL OF CONSCIOUSNESS AFFECT
CPAP Device Patient Instruction      Indicated below is who you have approved as your Durable Medical Equipment (DME) provider:    [facility]    Phone: [phone] (supplies) and [phone] (scheduling)  [facility]: [phone]  Email: [email or web address]  Address: [address] [place] [address]  Website:  [email or web address]           There is a modem in your machine that tracks specific information:   How much you are using the CPAP machine   If your apnea is controlled  If your mask is leaking      Follow up appointments and download reports are needed to monitor your health and sleep apnea.  Insurance companies also request this information for compliance and to receive replacement CPAP supplies.      If at any time prior to your follow up appointment you are having trouble tolerating the CPAP or not finding it effective; please do not just stop using the CPAP.  Please call our office for further discussion and troubleshooting.  Your home care provider also has respiratory therapists available to assist you in troubleshooting.        Please make sure to keep this DME provider information on hand for any future questions or concerns with your CPAP device.      Please know when your insurance will approve replacement supplies. You will need to call your supplier for new supplies at that time.      If you have any questions or concerns, please call our office:   [facility]: [phone]  [facility]: [phone]     MD Bhumi Hemphill APNP       CPAP / BiPAP Care and Cleaning Instructions    Follow the ’s recommendations.  Do not take your mask apart unless you know how to put it back together.     Mask: Clean daily with a damp washcloth or baby wipe.  Clean weekly with warm water and baby shampoo or other mild soap.     Hose/Tubing: Clean weekly with warm water and baby shampoo or other mild soap.  Hang over a shower devin to dry.      Humidifier chamber: Fill nightly with distilled water.  Every morning, empty the excess water and let the chamber air dry.  Clean weekly with above.     Air filter: A white filter should be removed weekly and the dust flicked off with your fingers.  Do not wash the white filter.  Replace when unable to flick away dust.      All of the above parts are REPLACEABLE and it is recommended to change them out routinely!  Overtime, dirt and oils from your skin can soften the cushion, making it so it doesn't hold a tight seal anymore with your face.  It also affects hygiene.  This creates air leaks that can affect how effective your CPAP therapy is.  Call the company that provided your CPAP/BiPAP machine for help with ordering. Some parts are replaceable monthly and others every 3-6 months.  Ask, and then otis your calendar.  When due, they will be covered by insurance.      Schedule below is typical for most insurances:   Full-Face Cushion: 1 per month  Replaceable Pillow or Cushions: 2 per month   Disposable Filter: 2 per month   Mask frame: 1 every 3 months  Tubin every 3 months  Headgear: 1 every 6 months  Disposable Heated Humidification Chamber: 1 every 6 months  Chin Strap: 1 every 6 months     If you are having problems using your CPAP/BiPAP device due to discomfort, congestion, air leak, etc. please call the office for help.  Do NOT just stop wearing your device.  It is important for your health!      I do not recommend CPAP cleaning machines like SoClean devices.  They use OZONE to clean which is harmful to both you and your device.  As of 2020, manufacturers of CPAP machines will not cover the warranty if you use a cleaning machine.        SLEEP HYGIENE    -Sleep disruption is common, especially during times when you may feel emotionally overwhelmed. Anxiety, relentless replaying of the day's events, and heightened emotions may significantly interfere with your sleep. Lack of sleep robs you of  needed rest, making management of your illness more difficult.  -Bringing sleep patterns under control and working at a consistent stable pattern is very important to illness management. You need your rest.  -The most common cause of insomnia is a change in our daily routine. For example traveling, change in work hours, disruption of other behaviors (eating, exercise, leisure, etc.) relationship conflicts may cause sleep problems.    DO:  1. Go to bed at the same time each day. (Consistent on weeks and weekends).   2. Get up from bed at the same time each day. (Consistent on weeks and weekends).   3. Get regular exercise each day, preferably in the morning. There is good evidence that regular exercise improves restful sleep. This includes stretching and aerobic exercise. Avoid exercise right before bed.   4. Get regular exposure to outdoor or bright lights during the daytime.  5. Keep the temperature in your bedroom comfortable. (More cooler than warmer).   6. Keep the bedroom quiet when sleeping.  7. Keep the bedroom dark enough to facilitate sleep.  8. Use your bed only for sleep and sex.  9. Take medications as directed. It is often helpful to take prescribed sleeping pills just before bedtime, so they are causing drowsiness when you lie down or 10 hours before getting up, to avoid daytime drowsiness.  10. Use a relaxation exercise just before going to sleep such as muscle relaxation, imagery, massage, warm bath etc.  11. Keep your feet and hands warm. Wear warm socks and/or mittens or gloves to bed.  12. Have regular mealtimes/snacks.   13. Limit liquids and avoid heavy foods close to bedtime.   14. Avoid napping.   15. Avoid clock watching.   16. Try not to take problems to bed, schedule these discussions and worry time for earlier in the day.     DO NOT:  1. Exercise just before going to bed.  2. Engage in stimulating activity just before bed, such as playing a competitive game, watching an exciting program on  Calm television or a movie, or having an important discussion with a loved one.  3. Have caffeine in the evening (coffee, many teas, chocolate, soda, etc.) Avoid caffeine after 2:00pm.   4. Watch television or use portable electronic devices in bed before sleeping.  5. Use alcohol to help you sleep.  6. Go to bed too hungry or too full.  7. Take another person's sleeping pills.  8. Take over-the-counter sleeping pills without your doctor's knowledge. Tolerance can develop rapidly with these medications. Diphenhydramine (an ingredient commonly found in over-the-counter sleep medications) can have serious side effects for elderly patients.  9. Take daytime naps.  10. Command yourself to go to sleep. This only makes your mind and body more alert.    NOTE:  - If you lie in bed awake for more than 20-30 minutes, get up, go to a different room (or different part of the bedroom), participate in a quiet activity (example: non-excitable reading or television), then return to bed when you feel sleepy. Preferably a dimly lit room.   - Do this as many times during the night as needed.

## 2023-02-27 ENCOUNTER — LABORATORY RESULT (OUTPATIENT)
Age: 68
End: 2023-02-27

## 2023-03-20 ENCOUNTER — LABORATORY RESULT (OUTPATIENT)
Age: 68
End: 2023-03-20

## 2023-03-20 ENCOUNTER — OUTPATIENT (OUTPATIENT)
Dept: OUTPATIENT SERVICES | Facility: HOSPITAL | Age: 68
LOS: 1 days | End: 2023-03-20
Payer: MEDICAID

## 2023-03-20 ENCOUNTER — APPOINTMENT (OUTPATIENT)
Dept: HEMATOLOGY ONCOLOGY | Facility: CLINIC | Age: 68
End: 2023-03-20
Payer: MEDICAID

## 2023-03-20 VITALS
DIASTOLIC BLOOD PRESSURE: 70 MMHG | BODY MASS INDEX: 28.04 KG/M2 | WEIGHT: 207 LBS | SYSTOLIC BLOOD PRESSURE: 115 MMHG | TEMPERATURE: 98.1 F | RESPIRATION RATE: 16 BRPM | HEIGHT: 72 IN | HEART RATE: 80 BPM

## 2023-03-20 DIAGNOSIS — Z98.890 OTHER SPECIFIED POSTPROCEDURAL STATES: Chronic | ICD-10-CM

## 2023-03-20 DIAGNOSIS — S42.409A UNSPECIFIED FRACTURE OF LOWER END OF UNSPECIFIED HUMERUS, INITIAL ENCOUNTER FOR CLOSED FRACTURE: Chronic | ICD-10-CM

## 2023-03-20 DIAGNOSIS — Z96.0 PRESENCE OF UROGENITAL IMPLANTS: Chronic | ICD-10-CM

## 2023-03-20 DIAGNOSIS — K76.9 LIVER DISEASE, UNSPECIFIED: ICD-10-CM

## 2023-03-20 DIAGNOSIS — C85.80 OTHER SPECIFIED TYPES OF NON-HODGKIN LYMPHOMA, UNSPECIFIED SITE: ICD-10-CM

## 2023-03-20 LAB
ALBUMIN SERPL ELPH-MCNC: 4.3 G/DL
ALP BLD-CCNC: 217 U/L
ALT SERPL-CCNC: 10 U/L
ANION GAP SERPL CALC-SCNC: 12 MMOL/L
AST SERPL-CCNC: 11 U/L
BILIRUB SERPL-MCNC: 0.5 MG/DL
BUN SERPL-MCNC: 41 MG/DL
CALCIUM SERPL-MCNC: 12.4 MG/DL
CHLORIDE SERPL-SCNC: 107 MMOL/L
CO2 SERPL-SCNC: 22 MMOL/L
CREAT SERPL-MCNC: 2.9 MG/DL
EGFR: 23 ML/MIN/1.73M2
GLUCOSE SERPL-MCNC: 108 MG/DL
HCT VFR BLD CALC: 38.4 %
HGB BLD-MCNC: 12.3 G/DL
LDH SERPL-CCNC: 142 U/L
MCHC RBC-ENTMCNC: 27.8 PG
MCHC RBC-ENTMCNC: 32 G/DL
MCV RBC AUTO: 86.9 FL
PLATELET # BLD AUTO: 226 K/UL
PMV BLD: 9.3 FL
POTASSIUM SERPL-SCNC: 4.1 MMOL/L
PROT SERPL-MCNC: 6.2 G/DL
RBC # BLD: 4.42 M/UL
RBC # FLD: 15 %
SODIUM SERPL-SCNC: 141 MMOL/L
WBC # FLD AUTO: 11.55 K/UL

## 2023-03-20 PROCEDURE — 99214 OFFICE O/P EST MOD 30 MIN: CPT

## 2023-03-20 PROCEDURE — 82105 ALPHA-FETOPROTEIN SERUM: CPT

## 2023-03-20 PROCEDURE — 83970 ASSAY OF PARATHORMONE: CPT

## 2023-03-20 PROCEDURE — 82310 ASSAY OF CALCIUM: CPT

## 2023-03-20 PROCEDURE — 82330 ASSAY OF CALCIUM: CPT

## 2023-03-20 PROCEDURE — 85027 COMPLETE CBC AUTOMATED: CPT

## 2023-03-20 PROCEDURE — 80053 COMPREHEN METABOLIC PANEL: CPT

## 2023-03-20 PROCEDURE — 83615 LACTATE (LD) (LDH) ENZYME: CPT

## 2023-03-20 RX ORDER — PROCHLORPERAZINE MALEATE 10 MG/1
10 TABLET ORAL EVERY 6 HOURS
Qty: 40 | Refills: 3 | Status: DISCONTINUED | COMMUNITY
Start: 2019-11-25 | End: 2023-03-20

## 2023-03-20 RX ORDER — ALLOPURINOL 300 MG/1
300 TABLET ORAL DAILY
Qty: 30 | Refills: 0 | Status: DISCONTINUED | COMMUNITY
Start: 2019-11-07 | End: 2023-03-20

## 2023-03-20 RX ORDER — TAMSULOSIN HYDROCHLORIDE 0.4 MG/1
0.4 CAPSULE ORAL
Qty: 30 | Refills: 0 | Status: DISCONTINUED | COMMUNITY
Start: 2017-02-08 | End: 2023-03-20

## 2023-03-20 RX ORDER — ONDANSETRON 8 MG/1
8 TABLET ORAL
Qty: 30 | Refills: 3 | Status: DISCONTINUED | COMMUNITY
Start: 2019-11-25 | End: 2023-03-20

## 2023-03-20 RX ORDER — PHENAZOPYRIDINE HYDROCHLORIDE 100 MG/1
100 TABLET ORAL
Qty: 15 | Refills: 0 | Status: DISCONTINUED | COMMUNITY
Start: 2017-03-27 | End: 2023-03-20

## 2023-03-20 NOTE — RESULTS/DATA
[FreeTextEntry1] : CT Abdomen/Pelvis 2/23/23\par \par --interval worsening in periesophageal and pelvic lymphadenopathy , other bulky lymph nodes in the abdomen and retroperitoneum appear stable\par --1.3 indeterminate segment 4 liver lesion\par --focal areas of pleural nodularity concerning for lymphomatous/neoplastic/metastatic process, new since prior

## 2023-03-20 NOTE — ASSESSMENT
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 Gong who agreed with the above.\par \par

## 2023-03-20 NOTE — HISTORY OF PRESENT ILLNESS
[Disease:__________________________] : Disease: [unfilled] [de-identified] : 67 year old male with marginal zone lymphoma, here after almost 2.5 years without follow up.\par He was in a good partial remission following 3 cycles of bendamustine and Rituxan. \par He was unable to tolerate maintenance Rituxan.\par He does not remember why he was lost to follow up.\par At this time, he was referred back to us by his nephrologist.\par Apparently he was having diarrhea for about 2-3 years.\par He had colonoscopy, which revealed a tubulovillous adenoma.\par CT was done which showed some abdominal adenopathy,with slight progression compare to his last scan from  a few years back, and \par labs revealed hypercalcemia and elevated WBC's.\par He was referred back to us with suspicion of lymphoma recurrence.\par He feels generally well, aside from malaise secondary to anti-psychotic medications for schizophrenia. \par His appetite is good, has a recent 4 pound intentional weight loss.\par However, he was never in complete remission and had preferred just to be observed since we were dealing with a low grade lymphoma. [de-identified] : Marginal zone

## 2023-03-20 NOTE — PHYSICAL EXAM
[Restricted in physically strenuous activity but ambulatory and able to carry out work of a light or sedentary nature] : Status 1- Restricted in physically strenuous activity but ambulatory and able to carry out work of a light or sedentary nature, e.g., light house work, office work [Normal] : affect appropriate [de-identified] : Small R axillary LN [de-identified] : Mild arthritic changes

## 2023-03-20 NOTE — REVIEW OF SYSTEMS
[Fatigue] : fatigue [Joint Pain] : joint pain [Joint Stiffness] : joint stiffness [Anxiety] : anxiety [Negative] : Heme/Lymph [Recent Change In Weight] : ~T recent weight change [Diarrhea] : diarrhea [FreeTextEntry2] : recent 4 pound loss, intentional [FreeTextEntry8] : Hx of kidney stones, frequency [FreeTextEntry9] : L knee and hip [de-identified] : History of schizophrenia

## 2023-03-21 DIAGNOSIS — C85.80 OTHER SPECIFIED TYPES OF NON-HODGKIN LYMPHOMA, UNSPECIFIED SITE: ICD-10-CM

## 2023-03-21 LAB
AFP-TM SERPL-MCNC: 2.7 NG/ML
CALCIUM SERPL-MCNC: 12.5 MG/DL
PARATHYROID HORMONE INTACT: 9 PG/ML

## 2023-03-23 DIAGNOSIS — K76.9 LIVER DISEASE, UNSPECIFIED: ICD-10-CM

## 2023-03-23 LAB — CA-I SERPL-SCNC: 6.1 MG/DL

## 2023-04-14 ENCOUNTER — INPATIENT (INPATIENT)
Facility: HOSPITAL | Age: 68
LOS: 7 days | Discharge: HOME | DRG: 641 | End: 2023-04-22
Attending: INTERNAL MEDICINE | Admitting: INTERNAL MEDICINE
Payer: MEDICARE

## 2023-04-14 VITALS
SYSTOLIC BLOOD PRESSURE: 114 MMHG | RESPIRATION RATE: 18 BRPM | TEMPERATURE: 98 F | OXYGEN SATURATION: 96 % | DIASTOLIC BLOOD PRESSURE: 56 MMHG | HEART RATE: 97 BPM

## 2023-04-14 DIAGNOSIS — N17.9 ACUTE KIDNEY FAILURE, UNSPECIFIED: ICD-10-CM

## 2023-04-14 DIAGNOSIS — Z98.890 OTHER SPECIFIED POSTPROCEDURAL STATES: Chronic | ICD-10-CM

## 2023-04-14 DIAGNOSIS — S42.409A UNSPECIFIED FRACTURE OF LOWER END OF UNSPECIFIED HUMERUS, INITIAL ENCOUNTER FOR CLOSED FRACTURE: Chronic | ICD-10-CM

## 2023-04-14 DIAGNOSIS — Z96.0 PRESENCE OF UROGENITAL IMPLANTS: Chronic | ICD-10-CM

## 2023-04-14 LAB
ALBUMIN SERPL ELPH-MCNC: 3.7 G/DL — SIGNIFICANT CHANGE UP (ref 3.5–5.2)
ALP SERPL-CCNC: 208 U/L — HIGH (ref 30–115)
ALT FLD-CCNC: 10 U/L — SIGNIFICANT CHANGE UP (ref 0–41)
ANION GAP SERPL CALC-SCNC: 11 MMOL/L — SIGNIFICANT CHANGE UP (ref 7–14)
ANION GAP SERPL CALC-SCNC: 9 MMOL/L — SIGNIFICANT CHANGE UP (ref 7–14)
AST SERPL-CCNC: 13 U/L — SIGNIFICANT CHANGE UP (ref 0–41)
BASE EXCESS BLDV CALC-SCNC: -3 MMOL/L — LOW (ref -2–3)
BASOPHILS # BLD AUTO: 0.06 K/UL — SIGNIFICANT CHANGE UP (ref 0–0.2)
BASOPHILS NFR BLD AUTO: 0.7 % — SIGNIFICANT CHANGE UP (ref 0–1)
BILIRUB SERPL-MCNC: 0.4 MG/DL — SIGNIFICANT CHANGE UP (ref 0.2–1.2)
BUN SERPL-MCNC: 45 MG/DL — HIGH (ref 10–20)
BUN SERPL-MCNC: 47 MG/DL — HIGH (ref 10–20)
CA-I SERPL-SCNC: 1.94 MMOL/L — CRITICAL HIGH (ref 1.15–1.33)
CALCIUM SERPL-MCNC: 12.8 MG/DL — HIGH (ref 8.4–10.4)
CALCIUM SERPL-MCNC: 13.3 MG/DL — HIGH (ref 8.4–10.5)
CHLORIDE SERPL-SCNC: 106 MMOL/L — SIGNIFICANT CHANGE UP (ref 98–110)
CHLORIDE SERPL-SCNC: 111 MMOL/L — HIGH (ref 98–110)
CO2 SERPL-SCNC: 20 MMOL/L — SIGNIFICANT CHANGE UP (ref 17–32)
CO2 SERPL-SCNC: 21 MMOL/L — SIGNIFICANT CHANGE UP (ref 17–32)
CREAT SERPL-MCNC: 2.9 MG/DL — HIGH (ref 0.7–1.5)
CREAT SERPL-MCNC: 3.1 MG/DL — HIGH (ref 0.7–1.5)
EGFR: 21 ML/MIN/1.73M2 — LOW
EGFR: 23 ML/MIN/1.73M2 — LOW
EOSINOPHIL # BLD AUTO: 1.4 K/UL — HIGH (ref 0–0.7)
EOSINOPHIL NFR BLD AUTO: 15.5 % — HIGH (ref 0–8)
GAS PNL BLDV: 133 MMOL/L — LOW (ref 136–145)
GAS PNL BLDV: SIGNIFICANT CHANGE UP
GAS PNL BLDV: SIGNIFICANT CHANGE UP
GLUCOSE SERPL-MCNC: 101 MG/DL — HIGH (ref 70–99)
GLUCOSE SERPL-MCNC: 112 MG/DL — HIGH (ref 70–99)
HCO3 BLDV-SCNC: 21 MMOL/L — LOW (ref 22–29)
HCT VFR BLD CALC: 34.3 % — LOW (ref 42–52)
HCT VFR BLDA CALC: 33 % — LOW (ref 39–51)
HGB BLD CALC-MCNC: 11.1 G/DL — LOW (ref 12.6–17.4)
HGB BLD-MCNC: 11.1 G/DL — LOW (ref 14–18)
IMM GRANULOCYTES NFR BLD AUTO: 0.7 % — HIGH (ref 0.1–0.3)
LACTATE BLDV-MCNC: 0.8 MMOL/L — SIGNIFICANT CHANGE UP (ref 0.5–2)
LDH SERPL L TO P-CCNC: 125 U/L — SIGNIFICANT CHANGE UP (ref 50–242)
LDH SERPL L TO P-CCNC: 187 U/L — SIGNIFICANT CHANGE UP (ref 50–242)
LYMPHOCYTES # BLD AUTO: 1.47 K/UL — SIGNIFICANT CHANGE UP (ref 1.2–3.4)
LYMPHOCYTES # BLD AUTO: 16.2 % — LOW (ref 20.5–51.1)
MCHC RBC-ENTMCNC: 28.9 PG — SIGNIFICANT CHANGE UP (ref 27–31)
MCHC RBC-ENTMCNC: 32.4 G/DL — SIGNIFICANT CHANGE UP (ref 32–37)
MCV RBC AUTO: 89.3 FL — SIGNIFICANT CHANGE UP (ref 80–94)
MONOCYTES # BLD AUTO: 1.22 K/UL — HIGH (ref 0.1–0.6)
MONOCYTES NFR BLD AUTO: 13.5 % — HIGH (ref 1.7–9.3)
NEUTROPHILS # BLD AUTO: 4.84 K/UL — SIGNIFICANT CHANGE UP (ref 1.4–6.5)
NEUTROPHILS NFR BLD AUTO: 53.4 % — SIGNIFICANT CHANGE UP (ref 42.2–75.2)
NRBC # BLD: 0 /100 WBCS — SIGNIFICANT CHANGE UP (ref 0–0)
PCO2 BLDV: 32 MMHG — LOW (ref 42–55)
PH BLDV: 7.42 — SIGNIFICANT CHANGE UP (ref 7.32–7.43)
PHOSPHATE SERPL-MCNC: 4.7 MG/DL — SIGNIFICANT CHANGE UP (ref 2.1–4.9)
PLATELET # BLD AUTO: 210 K/UL — SIGNIFICANT CHANGE UP (ref 130–400)
PMV BLD: 10.1 FL — SIGNIFICANT CHANGE UP (ref 7.4–10.4)
PO2 BLDV: 90 MMHG — SIGNIFICANT CHANGE UP
POTASSIUM BLDV-SCNC: 4 MMOL/L — SIGNIFICANT CHANGE UP (ref 3.5–5.1)
POTASSIUM SERPL-MCNC: 4 MMOL/L — SIGNIFICANT CHANGE UP (ref 3.5–5)
POTASSIUM SERPL-MCNC: 4.3 MMOL/L — SIGNIFICANT CHANGE UP (ref 3.5–5)
POTASSIUM SERPL-SCNC: 4 MMOL/L — SIGNIFICANT CHANGE UP (ref 3.5–5)
POTASSIUM SERPL-SCNC: 4.3 MMOL/L — SIGNIFICANT CHANGE UP (ref 3.5–5)
PROT SERPL-MCNC: 5.4 G/DL — LOW (ref 6–8)
RBC # BLD: 3.84 M/UL — LOW (ref 4.7–6.1)
RBC # FLD: 15.2 % — HIGH (ref 11.5–14.5)
SAO2 % BLDV: 99.2 % — SIGNIFICANT CHANGE UP
SARS-COV-2 RNA SPEC QL NAA+PROBE: SIGNIFICANT CHANGE UP
SODIUM SERPL-SCNC: 138 MMOL/L — SIGNIFICANT CHANGE UP (ref 135–146)
SODIUM SERPL-SCNC: 140 MMOL/L — SIGNIFICANT CHANGE UP (ref 135–146)
URATE SERPL-MCNC: 10.2 MG/DL — HIGH (ref 3.4–8.8)
WBC # BLD: 9.05 K/UL — SIGNIFICANT CHANGE UP (ref 4.8–10.8)
WBC # FLD AUTO: 9.05 K/UL — SIGNIFICANT CHANGE UP (ref 4.8–10.8)

## 2023-04-14 PROCEDURE — 80053 COMPREHEN METABOLIC PANEL: CPT

## 2023-04-14 PROCEDURE — 85610 PROTHROMBIN TIME: CPT

## 2023-04-14 PROCEDURE — 85730 THROMBOPLASTIN TIME PARTIAL: CPT

## 2023-04-14 PROCEDURE — 88307 TISSUE EXAM BY PATHOLOGIST: CPT

## 2023-04-14 PROCEDURE — 82306 VITAMIN D 25 HYDROXY: CPT

## 2023-04-14 PROCEDURE — 82310 ASSAY OF CALCIUM: CPT

## 2023-04-14 PROCEDURE — 83735 ASSAY OF MAGNESIUM: CPT

## 2023-04-14 PROCEDURE — 84436 ASSAY OF TOTAL THYROXINE: CPT

## 2023-04-14 PROCEDURE — 86901 BLOOD TYPING SEROLOGIC RH(D): CPT

## 2023-04-14 PROCEDURE — 82652 VIT D 1 25-DIHYDROXY: CPT

## 2023-04-14 PROCEDURE — 99223 1ST HOSP IP/OBS HIGH 75: CPT

## 2023-04-14 PROCEDURE — 86850 RBC ANTIBODY SCREEN: CPT

## 2023-04-14 PROCEDURE — U0003: CPT

## 2023-04-14 PROCEDURE — 85027 COMPLETE CBC AUTOMATED: CPT

## 2023-04-14 PROCEDURE — C1889: CPT

## 2023-04-14 PROCEDURE — 84480 ASSAY TRIIODOTHYRONINE (T3): CPT

## 2023-04-14 PROCEDURE — 36415 COLL VENOUS BLD VENIPUNCTURE: CPT

## 2023-04-14 PROCEDURE — 83615 LACTATE (LD) (LDH) ENZYME: CPT

## 2023-04-14 PROCEDURE — 82164 ANGIOTENSIN I ENZYME TEST: CPT

## 2023-04-14 PROCEDURE — 84100 ASSAY OF PHOSPHORUS: CPT

## 2023-04-14 PROCEDURE — 83521 IG LIGHT CHAINS FREE EACH: CPT

## 2023-04-14 PROCEDURE — 99285 EMERGENCY DEPT VISIT HI MDM: CPT

## 2023-04-14 PROCEDURE — 84165 PROTEIN E-PHORESIS SERUM: CPT

## 2023-04-14 PROCEDURE — 84443 ASSAY THYROID STIM HORMONE: CPT

## 2023-04-14 PROCEDURE — 83519 RIA NONANTIBODY: CPT

## 2023-04-14 PROCEDURE — 86900 BLOOD TYPING SEROLOGIC ABO: CPT

## 2023-04-14 PROCEDURE — 84550 ASSAY OF BLOOD/URIC ACID: CPT

## 2023-04-14 PROCEDURE — 80048 BASIC METABOLIC PNL TOTAL CA: CPT

## 2023-04-14 PROCEDURE — U0005: CPT

## 2023-04-14 PROCEDURE — 85025 COMPLETE CBC W/AUTO DIFF WBC: CPT

## 2023-04-14 PROCEDURE — 84155 ASSAY OF PROTEIN SERUM: CPT

## 2023-04-14 PROCEDURE — 83970 ASSAY OF PARATHORMONE: CPT

## 2023-04-14 RX ORDER — BENZTROPINE MESYLATE 1 MG
2 TABLET ORAL AT BEDTIME
Refills: 0 | Status: DISCONTINUED | OUTPATIENT
Start: 2023-04-14 | End: 2023-04-22

## 2023-04-14 RX ORDER — ONDANSETRON 8 MG/1
4 TABLET, FILM COATED ORAL EVERY 8 HOURS
Refills: 0 | Status: DISCONTINUED | OUTPATIENT
Start: 2023-04-14 | End: 2023-04-22

## 2023-04-14 RX ORDER — ACETAMINOPHEN 500 MG
650 TABLET ORAL EVERY 6 HOURS
Refills: 0 | Status: DISCONTINUED | OUTPATIENT
Start: 2023-04-14 | End: 2023-04-22

## 2023-04-14 RX ORDER — LANOLIN ALCOHOL/MO/W.PET/CERES
3 CREAM (GRAM) TOPICAL AT BEDTIME
Refills: 0 | Status: DISCONTINUED | OUTPATIENT
Start: 2023-04-14 | End: 2023-04-22

## 2023-04-14 RX ORDER — SODIUM CHLORIDE 9 MG/ML
1000 INJECTION INTRAMUSCULAR; INTRAVENOUS; SUBCUTANEOUS ONCE
Refills: 0 | Status: COMPLETED | OUTPATIENT
Start: 2023-04-14 | End: 2023-04-14

## 2023-04-14 RX ORDER — FLUPHENAZINE HYDROCHLORIDE 1 MG/1
10 TABLET, FILM COATED ORAL DAILY
Refills: 0 | Status: DISCONTINUED | OUTPATIENT
Start: 2023-04-14 | End: 2023-04-22

## 2023-04-14 RX ADMIN — SODIUM CHLORIDE 1000 MILLILITER(S): 9 INJECTION INTRAMUSCULAR; INTRAVENOUS; SUBCUTANEOUS at 10:20

## 2023-04-14 RX ADMIN — SODIUM CHLORIDE 100 MILLILITER(S): 9 INJECTION INTRAMUSCULAR; INTRAVENOUS; SUBCUTANEOUS at 12:08

## 2023-04-14 RX ADMIN — SODIUM CHLORIDE 1000 MILLILITER(S): 9 INJECTION INTRAMUSCULAR; INTRAVENOUS; SUBCUTANEOUS at 09:20

## 2023-04-14 RX ADMIN — SODIUM CHLORIDE 1000 MILLILITER(S): 9 INJECTION INTRAMUSCULAR; INTRAVENOUS; SUBCUTANEOUS at 13:30

## 2023-04-14 NOTE — ED PROVIDER NOTE - ATTENDING CONTRIBUTION TO CARE
I personally evaluated the patient. I reviewed the Resident’s or Physician Assistant’s note (as assigned above), and agree with the findings and plan except as documented in my note.   67-year-old male past medical history significant for schizophrenia, nephrolithiasis, ureteral stricture status post stent, marginal zone lymphoma (lost to follow-up, never in complete remission), recently admitted with tubulovillous adenoma of the colon for which she is currently undergoing work-up with heme/onc (recent PET scan with no abnormal activity), sent to the ED by his nephrologist, Dr. Jones with hypercalcemia on recent blood work.  Patient reports 2 months of fatigue, malaise and generalized muscle weakness.  Patient reports intermittent nausea, no vomiting.  Patient also reports several months of loose, watery stool, approximately 1 episode per day.  No blood per rectum or melena.  No abdominal pain.  No fever, chills.  Normal urination.  No chest pain, shortness of breath, palpitations or dizziness.  Vitals noted.  CONSTITUTIONAL: Well-appearing; well-nourished; in no apparent distress.   HEAD: Normocephalic; atraumatic.   EYES: PERRL; EOM intact. Conjunctiva normal B/L.   ENT: Normal pharynx with no tonsillar hypertrophy. MMM.  NECK: Supple; non-tender; no cervical lymphadenopathy.   CHEST: Normal chest excursion with respiration.   CARDIOVASCULAR: Normal S1, S2; no murmurs, rubs, or gallops.   RESPIRATORY: Normal chest excursion with respiration; breath sounds clear and equal bilaterally; no wheezes, rhonchi, or rales.  GI/: Normal bowel sounds; non-distended; non-tender.  BACK: No evidence of trauma or deformity. Non-tender to palpation. No CVA tenderness.   EXT: Normal ROM in all four extremities; non-tender to palpation; distal pulses are normal. No leg edema B/L.   SKIN: Normal for age and race; warm; dry; good turgor.  NEURO: A & O x 4; CN 2-12 intact. Grossly unremarkable.

## 2023-04-14 NOTE — CONSULT NOTE ADULT - SUBJECTIVE AND OBJECTIVE BOX
NEPHROLOGY CONSULTATION NOTE    Patient is a 68 yo man with schizophrenia, prior lithiumuse, atrophic left kidney from stone, rigth uretera; revision, CKD likely stage 4 (baseline creatinine qabout 2.5).  He also has lymphoma a few years ago at treated at [facility]/  Over the past weeks with worsening hypercalcemia and recent CT findings with lymphadenopathy and new cecal mass.     PAST MEDICAL & SURGICAL HISTORY:  Kidney stones      Schizophrenia      Lymphoma      Shingles      Atrophic kidney      Retained urethral stent      H/O umbilical hernia repair      Elbow fracture      H/O cystoscopy        Allergies:  heparin (Urticaria)    Home Medications Reviewed  Hospital Medications:   MEDICATIONS  (STANDING):      SOCIAL HISTORY:  Denies ETOH,Smoking,   FAMILY HISTORY:  FH: hypertension    FH: diabetes mellitus          REVIEW OF SYSTEMS:  CONSTITUTIONAL: No weakness, fevers or chills  EYES/ENT: No visual changes;  No vertigo or throat pain   NECK: No pain or stiffness  RESPIRATORY: No cough, wheezing, hemoptysis; No shortness of breath  CARDIOVASCULAR: No chest pain or palpitations.  GASTROINTESTINAL: No abdominal or epigastric pain. No nausea, vomiting, or hematemesis; No diarrhea or constipation. No melena or hematochezia.  GENITOURINARY: No dysuria, frequency, foamy urine, urinary urgency, incontinence or hematuria  NEUROLOGICAL: No numbness or weakness  SKIN: No itching, burning, rashes, or lesions   VASCULAR: No bilateral lower extremity edema.   All other review of systems is negative unless indicated above.    VITALS:  T(F): 98.1 (04-14-23 @ 07:57), Max: 98.1 (04-14-23 @ 07:57)  HR: 97 (04-14-23 @ 07:57)  BP: 114/56 (04-14-23 @ 07:57)  RR: 18 (04-14-23 @ 07:57)  SpO2: 96% (04-14-23 @ 07:57)        I&O's Detail        PHYSICAL EXAM:  Constitutional: NAD  HEENT: anicteric sclera, oropharynx clear, MMM  Neck: No JVD  Respiratory: CTAB, no wheezes, rales or rhonchi  Cardiovascular: S1, S2, RRR  Gastrointestinal: BS+, soft, NT/ND  Extremities: No cyanosis or clubbing. No peripheral edema  Neurological: A/O x 3, no focal deficits  Psychiatric: Normal mood, normal affect  : No CVA tenderness. No zafar.   Skin: No rashes  Vascular Access:    LABS:  04-14    138  |  106  |  47<H>  ----------------------------<  112<H>  4.3   |  21  |  3.1<H>    Ca    13.3<H>      14 Apr 2023 09:10    TPro  5.4<L>  /  Alb  3.7  /  TBili  0.4  /  DBili      /  AST  13  /  ALT  10  /  AlkPhos  208<H>  04-14    Creatinine Trend: 3.1 <--                        11.1   9.05  )-----------( 210      ( 14 Apr 2023 09:10 )             34.3     Urine Studies:            outpt labs 3/23: bun/creat 34/2.9, C 12.2, ionized Ca 6.3, PTH - 11, PTH rp - 20, ACE - 47, vit D 25- 13. vit D 1,25 - 46, SPEP - IGM and IGG kappa monoclonal spike, kappa/lambda 39/39, 1.016/5.5 prto 1+. leuk trace, bld neg    home meds: benztropine 2 mg po qd, fluphenazine 10 mg po qd    ·	hypercalcemia - suspect recurrence of lymphoma, although recent outpt labs not entirely suggestive as vit D 1,25 normal  ·	ASHLEY/CKD likely from hypercalcemia  ·	hemodynamics stable  ·	anemia  ·	recent CT ? if done at [facility] as not in [facility] computer but cecal mass and lymphadenopathy noted    1) received 1 liter NS - suggest continue NS at 100 cc/hr  2) if calcium not improving may require pamidronate  3) please call oncology (pt known to Dr Granger)  4) am labs with mag and phos - suggest to repeat other labs - PTH, PTH rp, TSH, vit D 25, vit D 1,25, ACE level, mg, phos, SPEP, kappa, lambda  d/w pt and er attending

## 2023-04-14 NOTE — H&P ADULT - NSHPPHYSICALEXAM_GEN_ALL_CORE
GENERAL: NAD, well-groomed, well-developed, elderly, cooperative  HEENT: NCAT, neck supple  HEART: Regular rate and rhythm; No murmurs, rubs, or gallops, no JVD  RESPIRATORY: CTA B/L, No W/R/R, not in respiratory distress  ABDOMEN: Soft, Nontender, Nondistended; Bowel sounds present  NEUROLOGY: A&Ox3, nonfocal, moving all extremities, follows commands, responds appropriately   EXTREMITIES:  2+ Peripheral Pulses, No clubbing, cyanosis, or edema, R knee decreased active/passive motion   SKIN: warm, dry, normal color, R chest wall scar from previous herpes zoster infection

## 2023-04-14 NOTE — H&P ADULT - HISTORY OF PRESENT ILLNESS
67y M pmh Schizophrenia (prior lithium use), nephrolithiasis c/b atrophic L kidney, R ureteral rivision, CKD4 (bsl Cr ~2.5), gout, chronic diarrhea, lymphoma presents to the ED for worsening hypercalcemia.     Pt stated ~1month ago he had hypercalcemia on his op bloodwork. Since then he has been getting repeat blood showing his calcium steadily increasing. He came to the ED today because his latest op calcium was ~13.     He has a hx of lymphoma previously followed by Dr Gong. Patient states that 3yrs ago he had skin Bx of his chest/arm +ve for lymphoma, PET scan at that time +ve for diffuse LAD. Patient received 2x courses of chemotherapy for 1 week but could not tolerate the side effects. Pt stated he after that he stopped seeing the oncologist and was lost to follow up.     Patient recently had CTAP done for workup of cecal polyp/chronic diarrhea revealing cecal mass and LAD ([facility]).    Currently he denies fever, night sweats, LAD. Denies increased urinary frequency, abdominal pain, nausea, vomiting, diarrhea, muscle cramps/spasms at this time. No ETOH/tobacco/recreational drug use.     In the ED:  T 98.1, HR 97, /56, RR 18, 96% RA    WBC 9k, hgb 11k, plt 210, Ca 13.3, corrected 13.5, Na 138, K 4.3, BUN/Cr 47/3.1, , AST/ALT 13/10    Admitted to medicine for further management

## 2023-04-14 NOTE — ED PROVIDER NOTE - PROGRESS NOTE DETAILS
Dr. Jones in the ED to evaluate pt, admit to heme/onc, continue IV hydration and trend calcium. Heme/onc consulted.

## 2023-04-14 NOTE — CONSULT NOTE ADULT - ATTENDING COMMENTS
seen/ examined w hemonc team; note reviewed; case discussed  - hypercalcemia; follow endocrinology recommendations; IVF/ bisphosphonates/ calcitonin  - PET scan as outpatient was ordered, he refused; had a prolonged discussion that there is a strong concern about lymphoma recurrence; PET is important to see the extent and the location of biopsy: it is important to repeat biopsy since lymphoma has tendency to progress to a more aggressive component  - multiple myeloma panel

## 2023-04-14 NOTE — PATIENT PROFILE ADULT - FALL HARM RISK - HARM RISK INTERVENTIONS

## 2023-04-14 NOTE — H&P ADULT - NSICDXPASTMEDICALHX_GEN_ALL_CORE_FT
none
PAST MEDICAL HISTORY:  Atrophic kidney     Kidney stones     Lymphoma     Schizophrenia     Shingles

## 2023-04-14 NOTE — ED PROVIDER NOTE - PHYSICAL EXAMINATION
CONSTITUTIONAL: NAD  SKIN: Warm dry  HEAD: NCAT  EYES: NL inspection  ENT: MMM  NECK: Supple; non tender.  CARD: RRR  RESP: CTAB  ABD: S/NT no R/G  BACK: nontender  EXT: no pedal edema  NEURO: Grossly unremarkable , CNII-XII intact  PSYCH: Cooperative, appropriate.

## 2023-04-14 NOTE — CONSULT NOTE ADULT - SUBJECTIVE AND OBJECTIVE BOX
Hematology Consult Note    HPI:      Allergies    heparin (Urticaria)    Intolerances        MEDICATIONS  (STANDING):    MEDICATIONS  (PRN):      PAST MEDICAL & SURGICAL HISTORY:  Kidney stones      Schizophrenia      Lymphoma      Shingles      Atrophic kidney      Retained urethral stent      H/O umbilical hernia repair      Elbow fracture      H/O cystoscopy          FAMILY HISTORY:  FH: hypertension    FH: diabetes mellitus        SOCIAL HISTORY: No EtOH, no tobacco    REVIEW OF SYSTEMS:    CONSTITUTIONAL: No weakness, fevers or chills  EYES/ENT: No visual changes;  No vertigo or throat pain   NECK: No pain or stiffness  RESPIRATORY: No cough, wheezing, hemoptysis; No shortness of breath  CARDIOVASCULAR: No chest pain or palpitations  GASTROINTESTINAL: No abdominal or epigastric pain. No nausea, vomiting, or hematemesis; No diarrhea or constipation. No melena or hematochezia.  GENITOURINARY: No dysuria, frequency or hematuria  NEUROLOGICAL: No numbness or weakness  SKIN: No itching, burning, rashes, or lesions   All other review of systems is negative unless indicated above.        T(F): 98.1 (04-14-23 @ 07:57), Max: 98.1 (04-14-23 @ 07:57)  HR: 97 (04-14-23 @ 07:57)  BP: 114/56 (04-14-23 @ 07:57)  RR: 18 (04-14-23 @ 07:57)  SpO2: 96% (04-14-23 @ 07:57)  Wt(kg): --    GENERAL: NAD, well-developed  HEAD:  Atraumatic, Normocephalic  EYES: EOMI, PERRLA, conjunctiva and sclera clear  NECK: Supple, No JVD  CHEST/LUNG: Clear to auscultation bilaterally; No wheeze  HEART: Regular rate and rhythm; No murmurs, rubs, or gallops  ABDOMEN: Soft, Nontender, Nondistended; Bowel sounds present  EXTREMITIES:  2+ Peripheral Pulses, No clubbing, cyanosis, or edema  NEUROLOGY: non-focal  SKIN: No rashes or lesions                          11.1   9.05  )-----------( 210      ( 14 Apr 2023 09:10 )             34.3       04-14    138  |  106  |  47<H>  ----------------------------<  112<H>  4.3   |  21  |  3.1<H>    Ca    13.3<H>      14 Apr 2023 09:10    TPro  5.4<L>  /  Alb  3.7  /  TBili  0.4  /  DBili  x   /  AST  13  /  ALT  10  /  AlkPhos  208<H>  04-14           Hematology Consult Note    HPI:  67y M pmh Schizophrenia (prior lithium use), nephrolithiasis c/b atrophic L kidney, R ureteral rivision, CKD4 (bsl Cr ~2.5), gout, chronic diarrhea, lymphoma presents to the ED for worsening hypercalcemia.     Pt stated ~1month ago he had hypercalcemia on his op bloodwork. Since then he has been getting repeat blood showing his calcium steadily increasing. He came to the ED today because his latest op calcium was ~13.     He has a hx of lymphoma previously followed by Dr Gong. Patient states that 3yrs ago he had skin Bx of his chest/arm +ve for lymphoma, PET scan at that time +ve for diffuse LAD. Patient received 2x courses of chemotherapy for 1 week but could not tolerate the side effects. Pt stated he after that he stopped seeing the oncologist and was lost to follow up.     Patient recently had CTAP done for workup of cecal polyp/chronic diarrhea revealing cecal mass and LAD ([facility]).    Currently he denies fever, night sweats, LAD. Denies increased urinary frequency, abdominal pain, nausea, vomiting, diarrhea, muscle cramps/spasms at this time. No ETOH/tobacco/recreational drug use.     In the ED:  T 98.1, HR 97, /56, RR 18, 96% RA    WBC 9k, hgb 11k, plt 210, Ca 13.3, corrected 13.5, Na 138, K 4.3, BUN/Cr 47/3.1, , AST/ALT 13/10    Admitted to medicine for further management    Allergies    heparin (Urticaria)    Intolerances        MEDICATIONS  (STANDING):    MEDICATIONS  (PRN):      PAST MEDICAL & SURGICAL HISTORY:  Kidney stones      Schizophrenia      Lymphoma      Shingles      Atrophic kidney      Retained urethral stent      H/O umbilical hernia repair      Elbow fracture      H/O cystoscopy          FAMILY HISTORY:  FH: hypertension    FH: diabetes mellitus        SOCIAL HISTORY: No EtOH, no tobacco    REVIEW OF SYSTEMS:    CONSTITUTIONAL: No weakness, fevers or chills  EYES/ENT: No visual changes;  No vertigo or throat pain   NECK: No pain or stiffness  RESPIRATORY: No cough, wheezing, hemoptysis; No shortness of breath  CARDIOVASCULAR: No chest pain or palpitations  GASTROINTESTINAL: No abdominal or epigastric pain. No nausea, vomiting, or hematemesis; No diarrhea or constipation. No melena or hematochezia.  GENITOURINARY: No dysuria, frequency or hematuria  NEUROLOGICAL: No numbness or weakness  SKIN: No itching, burning, rashes, or lesions   All other review of systems is negative unless indicated above.        T(F): 98.1 (04-14-23 @ 07:57), Max: 98.1 (04-14-23 @ 07:57)  HR: 97 (04-14-23 @ 07:57)  BP: 114/56 (04-14-23 @ 07:57)  RR: 18 (04-14-23 @ 07:57)  SpO2: 96% (04-14-23 @ 07:57)  Wt(kg): --    GENERAL: NAD, well-developed  HEAD:  Atraumatic, Normocephalic  EYES: EOMI, PERRLA, conjunctiva and sclera clear  NECK: Supple, No JVD  CHEST/LUNG: Clear to auscultation bilaterally; No wheeze  HEART: Regular rate and rhythm; No murmurs, rubs, or gallops  ABDOMEN: Soft, Nontender, Nondistended; Bowel sounds present  EXTREMITIES:  2+ Peripheral Pulses, No clubbing, cyanosis, or edema  NEUROLOGY: non-focal  SKIN: No rashes or lesions                          11.1   9.05  )-----------( 210      ( 14 Apr 2023 09:10 )             34.3       04-14    138  |  106  |  47<H>  ----------------------------<  112<H>  4.3   |  21  |  3.1<H>    Ca    13.3<H>      14 Apr 2023 09:10    TPro  5.4<L>  /  Alb  3.7  /  TBili  0.4  /  DBili  x   /  AST  13  /  ALT  10  /  AlkPhos  208<H>  04-14

## 2023-04-14 NOTE — ED ADULT NURSE NOTE - SUICIDE SCREENING QUESTION 1
SCANNED BRIEF EGD REPORT:     The original EGD report with photos in higher definition available by going to \"chart review\" then \"procedures\" then double click on \"EGD\"
No

## 2023-04-14 NOTE — ED PROVIDER NOTE - CHIEF COMPLAINT

## 2023-04-14 NOTE — H&P ADULT - ATTENDING COMMENTS
66 YO M w/ a PMH of Schizophrenia, nephrolithiasis c/b atrophic L kidney, R ureteral revision, CKD4, gout, chronic diarrhea, and lymphoma who was asked to present to the hospital for eval of elevated calcium on out-pt blood work. Currently, the pt denies any generalized weakness, palpitations, CP, or fevers/chills. ROS is negative for night sweats or weight loss, otherwise negative except as above.     In the ED, Heme/Onc consulted and advised IVFs/Bisphosphonates/Calcitonin.     FMHx:   -No family Hx of early cardiac death, CAD, asthma, or genetic disorders identified    Physical exam shows pt in NAD. VSS, afebrile, not hypoxic on RA. A&Ox3. Neuro exam without deficits, motor/sensory intact, no dysarthria, no facial asymmetry. Muscle strength/sensation intact. CTA B/L with no W/C/R. RRR, no M/G/R. ABD is soft and non-tender, normoactive BSs. LEs without swelling. No rashes. Labs and radiology as above.     Hypercalcemia. Trial of volume expansion, calcitonin, Bisphosphonates. Repeat BMP in the AM. Send PTH, PTHrp, urinary Ca, and Vitamin D levels. Endo consult.   -Trend TLS panel    Normocytic anemia, at baseline. Pt denies bleeding symptoms. Replace as necessary.     Hx of Schizophrenia, nephrolithiasis c/b atrophic L kidney, R ureteral revision, CKD4, gout, chronic diarrhea, and lymphoma. Restart home meds, except as stated above. DVT PPX. Inform PCP of pt's admission to hospital. My note supersedes the residents note.     Date seen by [email or web address]

## 2023-04-14 NOTE — ED PROVIDER NOTE - CLINICAL SUMMARY MEDICAL DECISION MAKING FREE TEXT BOX
67-year-old man past medical history as documented sent to the ED by his nephrologist with hypercalcemia on recent blood work.  Patient complaining of 2 months of fatigue malaise and generalized muscle weakness and nausea.  EKG with no changes of hypercalcemia, arrhythmias or ischemia.  All labs reviewed.  IV fluids given.  Heme-onc consulted and patient evaluated by nephrology in the ED.  Patient admitted to medicine for further evaluation and treatment.

## 2023-04-14 NOTE — CONSULT NOTE ADULT - ASSESSMENT
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     Impression  Active issues  #Severe Hypercalcemia most likely 2/2 malignancy   #Hx of NHL marginal zone   - Hypercalcemia 13.3 - corrected 16.2  - treated with 3 cycles of bendamustine/Rituxan not in remission 2/2 intolerance to Rituxan    Chronic issues  #CKD 4   #Hx of kidney stones   #Gout   #Chronic diarrhea  - recent colonoscopy for evaluation and only significant for a polyp unknown pathology   - around baseline creatinine    Recommendations  - IV hydration and monitoring of hypercalcemia  - Monitor I/O closely and serial BMPs + Magnesium   - Tumour Lysis work-up (PO4, uric acid, K, Ca, LDH, creatinine) + SERIAL f/u closely   - Multiple myeloma work-up (SPEP/UPEP, SFLC)  - F/u as Op with Dr. 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       Hematology and Oncology consulted given hx of Marginal Zone lymphoma now presenting with hypercalcemia.    #Severe Hypercalcemia most likely 2/2 malignancy   - Hypercalcemia 13.3 - corrected 16.2.    #Hx of NHL marginal zone     -He was in a good partial remission following 3 cycles of bendamustine and Rituxan.   -He was unable to tolerate maintenance Rituxan.  -However, he was never in complete remission and had preferred just to be observed since we were dealing with a low grade lymphoma.  - was lost to follow up.  CT Abdomen/Pelvis 2/23/23  --interval worsening in periesophageal and pelvic lymphadenopathy , other bulky lymph nodes in the abdomen and retroperitoneum appear stable  --1.3 indeterminate segment 4 liver lesion  --focal areas of pleural nodularity concerning for lymphomatous/neoplastic/metastatic process, new since prior.    #Chronic diarrhea  He had colonoscopy, which revealed a tubulovillous adenoma.      #CKD 4   #Hx of kidney stones   #Gout     Recommendations  - IV hydration and monitoring of hypercalcemia; Strict intake  and output.  - Tumour Lysis work-up (PO4, uric acid, K, Ca, LDH, creatinine) + SERIAL f/u closely   - Multiple myeloma work-up (SPEP/UPEP, SFLC, serum immunofixation and serum immunoglobulins)  - please obtain recent OP recent CT scan and colonoscopy reports from [facility].  -will obtain PET SCAN as an out patient.  -- F/u as Op with Dr. Gaytan OP PET scan

## 2023-04-14 NOTE — H&P ADULT - NSHPLABSRESULTS_GEN_ALL_CORE
11.1   9.05  )-----------( 210      ( 14 Apr 2023 09:10 )             34.3       04-14    138  |  106  |  47<H>  ----------------------------<  112<H>  4.3   |  21  |  3.1<H>    Ca    13.3<H>      14 Apr 2023 09:10    TPro  5.4<L>  /  Alb  3.7  /  TBili  0.4  /  DBili  x   /  AST  13  /  ALT  10  /  AlkPhos  208<H>  04-14                      Lactate Trend            CAPILLARY BLOOD GLUCOSE

## 2023-04-14 NOTE — H&P ADULT - ASSESSMENT
67y M pmh Schizophrenia (prior lithium use), nephrolithiasis c/b atrophic L kidney, R ureteral rivision, CKD4 (bsl Cr ~2.5), gout, chronic diarrhea, lymphoma presents to the ED for worsening hypercalcemia admitted for malignant hypercalcemia in setting of suspected lymphoma    #Hypercalcemia  #Hx of lymphoma  #ASHLEY on CKD4  #Anemia of chronic kidney disease   - Ca 13.3, BUN Cr 47/3.1, , hgb 11.1  - currently asymptomatic  - previously followed w/ Dr. Dubon for lymphoma had 2 rounds of chemo then lost follow up - could not tolerate side effects  - follows Dr. Jones nephro - op Vit 1,25 D wnl   - recent OP CT at [facility] showing cecal mass and LAD  - s/p 1 L NS - c/w NS @ 100cc/hr  - repeat BMP 4PM - monitor Ca - can start pamidronate if not improving  - PTH, PTHrp, TSH, 25 vit D, 1,25 vit D, ACE, mg, phos, SPEP, k/l light chain   - TLS workup - PO4, uric acid, k, ca, ldh, Cr - trend   - onc following - f/u op with Dr. Gong for PET scan      #Hx Schizophrenia   - c/w benztropine 2mg qhs, fluphenazine 10mg qd    #Hx Gout  #Hx chronic diarrhea  - currently asymptomatic having regular bowel movements     # To follow up  - trend BMP  - MM/TLS workup   - PT eval   - trend Cr/Ca, continue workup for hypercalcemia     # Misc  - DVT Prophylaxis: heparin   - GI Prophylaxis: none  - Diet: Diet, Regular  - Activity: Activity - Ambulate as Tolerated  - Code Status: Full     Arjun Mcarthur  PGY1, Internal Medicine   [facility]

## 2023-04-14 NOTE — ED PROVIDER NOTE - OBJECTIVE STATEMENT
Pt is a 67 y.o Male with PMHx of Schizophrenia, nephrolithiasis, ureteral stricture s/p stent lymphoma, tubulovillous adenoma currently following heme/onc who sent to ED by nephrologist, Dr. Jones for hypercalcemia on recent blood work. Pt admits to feeling of generalized fatigue and muscle weakness for the past couple of months. Pt also admits to loose stools about one a day for the past several weeks. Denies any bloody stools, fever CP, SOB, abdominal pain or dysuria.

## 2023-04-15 LAB
24R-OH-CALCIDIOL SERPL-MCNC: 16 NG/ML — LOW (ref 30–80)
ANION GAP SERPL CALC-SCNC: 10 MMOL/L — SIGNIFICANT CHANGE UP (ref 7–14)
BUN SERPL-MCNC: 46 MG/DL — HIGH (ref 10–20)
CALCIUM SERPL-MCNC: 12.3 MG/DL — HIGH (ref 8.4–10.5)
CALCIUM SERPL-MCNC: 12.7 MG/DL — HIGH (ref 8.4–10.5)
CHLORIDE SERPL-SCNC: 108 MMOL/L — SIGNIFICANT CHANGE UP (ref 98–110)
CO2 SERPL-SCNC: 23 MMOL/L — SIGNIFICANT CHANGE UP (ref 17–32)
CREAT SERPL-MCNC: 3 MG/DL — HIGH (ref 0.7–1.5)
EGFR: 22 ML/MIN/1.73M2 — LOW
GLUCOSE SERPL-MCNC: 93 MG/DL — SIGNIFICANT CHANGE UP (ref 70–99)
HCT VFR BLD CALC: 32.2 % — LOW (ref 42–52)
HGB BLD-MCNC: 10.3 G/DL — LOW (ref 14–18)
MAGNESIUM SERPL-MCNC: 2.5 MG/DL — HIGH (ref 1.8–2.4)
MCHC RBC-ENTMCNC: 28.7 PG — SIGNIFICANT CHANGE UP (ref 27–31)
MCHC RBC-ENTMCNC: 32 G/DL — SIGNIFICANT CHANGE UP (ref 32–37)
MCV RBC AUTO: 89.7 FL — SIGNIFICANT CHANGE UP (ref 80–94)
NRBC # BLD: 0 /100 WBCS — SIGNIFICANT CHANGE UP (ref 0–0)
PHOSPHATE SERPL-MCNC: 3.9 MG/DL — SIGNIFICANT CHANGE UP (ref 2.1–4.9)
PLATELET # BLD AUTO: 172 K/UL — SIGNIFICANT CHANGE UP (ref 130–400)
PMV BLD: 9.4 FL — SIGNIFICANT CHANGE UP (ref 7.4–10.4)
POTASSIUM SERPL-MCNC: 4.3 MMOL/L — SIGNIFICANT CHANGE UP (ref 3.5–5)
POTASSIUM SERPL-SCNC: 4.3 MMOL/L — SIGNIFICANT CHANGE UP (ref 3.5–5)
PROT SERPL-MCNC: 4.8 G/DL — LOW (ref 6–8.3)
PROT SERPL-MCNC: 4.8 G/DL — LOW (ref 6–8.3)
PTH-INTACT FLD-MCNC: 8 PG/ML — LOW (ref 15–65)
RBC # BLD: 3.59 M/UL — LOW (ref 4.7–6.1)
RBC # FLD: 15 % — HIGH (ref 11.5–14.5)
SODIUM SERPL-SCNC: 141 MMOL/L — SIGNIFICANT CHANGE UP (ref 135–146)
T3 SERPL-MCNC: 98 NG/DL — SIGNIFICANT CHANGE UP (ref 80–200)
T4 AB SER-ACNC: 6.5 UG/DL — SIGNIFICANT CHANGE UP (ref 4.6–12)
TSH SERPL-MCNC: 2.27 UIU/ML — SIGNIFICANT CHANGE UP (ref 0.27–4.2)
WBC # BLD: 7.9 K/UL — SIGNIFICANT CHANGE UP (ref 4.8–10.8)
WBC # FLD AUTO: 7.9 K/UL — SIGNIFICANT CHANGE UP (ref 4.8–10.8)

## 2023-04-15 PROCEDURE — 99232 SBSQ HOSP IP/OBS MODERATE 35: CPT

## 2023-04-15 RX ORDER — SODIUM CHLORIDE 9 MG/ML
1000 INJECTION INTRAMUSCULAR; INTRAVENOUS; SUBCUTANEOUS
Refills: 0 | Status: DISCONTINUED | OUTPATIENT
Start: 2023-04-15 | End: 2023-04-19

## 2023-04-15 RX ADMIN — Medication 2 MILLIGRAM(S): at 21:03

## 2023-04-15 RX ADMIN — Medication 40 MILLIGRAM(S): at 17:27

## 2023-04-15 RX ADMIN — FLUPHENAZINE HYDROCHLORIDE 10 MILLIGRAM(S): 1 TABLET, FILM COATED ORAL at 21:03

## 2023-04-15 RX ADMIN — SODIUM CHLORIDE 100 MILLILITER(S): 9 INJECTION INTRAMUSCULAR; INTRAVENOUS; SUBCUTANEOUS at 09:11

## 2023-04-15 NOTE — PROGRESS NOTE ADULT - ASSESSMENT
67y M pmh Schizophrenia (prior lithium use), nephrolithiasis c/b atrophic L kidney, R ureteral rivision, CKD4 (bsl Cr ~2.5), gout, chronic diarrhea, lymphoma presents to the ED for worsening hypercalcemia admitted for malignant hypercalcemia in setting of suspected lymphoma    #Hypercalcemia  #Hx of lymphoma  #ASHLEY on CKD4  #Anemia of chronic kidney disease   - Ca 12.3, BUN Cr 46/3.0, , hgb 11.1  - currently asymptomatic  - previously followed w/ Dr. Dubon for lymphoma had 2 rounds of chemo then lost follow up - could not tolerate side effects  - follows Dr. Jones nephro - op Vit 1,25 D wnl   - recent OP CT at [facility] showing cecal mass and LAD  - s/p 1 L NS - c/w NS @ 100cc/hr  - monitor Ca - can start pamidronate if not improving  - Mg phos, wnl, urc acid 10.2, ldhj wnl   - FU PTH, PTHrp, TSH, 25 vit D, 1,25 vit D, ACE, SPEP, k/l light chain   - onc following - f/u op with Dr. Gong for PET scan      #Hx Schizophrenia   - c/w benztropine 2mg qhs, fluphenazine 10mg qd    #Hx Gout  #Hx chronic diarrhea  - currently asymptomatic having regular bowel movements     # To follow up  - MM/TLS workup   - PT eval   - trend Cr/Ca, continue workup for hypercalcemia     # Misc  - DVT Prophylaxis: heparin   - GI Prophylaxis: none  - Diet: Diet, Regular  - Activity: Activity - Ambulate as Tolerated  - Code Status: Full

## 2023-04-15 NOTE — PROGRESS NOTE ADULT - ASSESSMENT
hypercalcemia, likely secondary to malignancy - patient with h/o lymphoma also has cecal mass, patient reports villous adenoma on biopsy  CKD - secondary to loss of one kidney/obstructive uropathy other kindye secondary to nephrolithiasis  nephrolithiasis in the past - no current symptoms  h/o schizophrenia      Plan:  continue IV  cc/hour  would start prednisone 40 mg po daily since hypercalcemia secondary to lymphoma responds well to systemic steroids  I will try to obtain results of [facility] abd. CT scan  maintain volume status  monitor electrolytes  check 25-OH Vit D  PTH level  1,25 OH Vit D  SPEP/SIEP

## 2023-04-15 NOTE — PROGRESS NOTE ADULT - SUBJECTIVE AND OBJECTIVE BOX
SUBJECTIVE / OVERNIGHT EVENTS  Patient slept well overnight. No acute complaints this AM. Patient does not report fevers, chills, CP, SOB, or n/v/d    MEDICATIONS  benztropine 2 milliGRAM(s) Oral at bedtime  fluPHENAZine 10 milliGRAM(s) Oral daily  sodium chloride 0.9%. 1000 milliLiter(s) IV Continuous <Continuous>    acetaminophen     Tablet .. 650 milliGRAM(s) Oral every 6 hours PRN Temp greater or equal to 38C (100.4F), Mild Pain (1 - 3)  aluminum hydroxide/magnesium hydroxide/simethicone Suspension 30 milliLiter(s) Oral every 4 hours PRN Dyspepsia  melatonin 3 milliGRAM(s) Oral at bedtime PRN Insomnia  ondansetron Injectable 4 milliGRAM(s) IV Push every 8 hours PRN Nausea and/or Vomiting    VITALS /  EXAM    T(C): 36.2 (04-15-23 @ 04:54), Max: 36.2 (04-14-23 @ 15:40)  HR: 80 (04-15-23 @ 04:54) (74 - 80)  BP: 96/52 (04-15-23 @ 04:54) (96/52 - 118/61)  RR: 18 (04-15-23 @ 04:54) (18 - 18)  SpO2: 92% (04-15-23 @ 04:54) (92% - 98%)    GENERAL: NAD, well-developed  CHEST/LUNG: Clear to auscultation bilaterally; No wheezes, rales or rhonchi  HEART: Regular rate and rhythm; No murmurs, rubs, or gallops  ABDOMEN: Soft, Nontender, Nondistended; Bowel sounds present, no masses.  EXTREMITIES:  2+ Peripheral Pulses, No clubbing, cyanosis, or edema    I's & O's     LABS             10.3   7.90  )-----------( 172      ( 04-15-23 @ 08:22 )             32.2     141  |  108  |  46  -------------------------<  93   04-15-23 @ 08:22  4.3  |  23  |  3.0    Ca      12.3     04-15-23 @ 08:22  Phos   3.9     04-15-23 @ 08:22  Mg     2.5     04-15-23 @ 08:22    TPro  5.4  /  Alb  3.7  /  TBili  0.4  /  DBili  x   /  AST  13  /  ALT  10  /  AlkPhos  208  /  GGT  x     04-14-23 @ 09:10                  Blood Gas Venous - Lactate: 0.80 mmol/L (04-14-23 @ 09:06)      MICRO / IMAGING / CARDIOLOGY  Telemetry: Reviewed   EKG: Reviewed    CULTURES    IMAGING    CARDIOLOGY

## 2023-04-15 NOTE — PROGRESS NOTE ADULT - SUBJECTIVE AND OBJECTIVE BOX
patient seen and examined at bedside.  discussed with hospitalist attending Dr. Guan.  patient reports no HA.  feels mentally better.  good urine output. no flank pain. no hematuria. no bone pain.  Vital Signs Last 24 Hrs  T(C): 36.7 (15 Apr 2023 13:31), Max: 36.7 (15 Apr 2023 13:31)  T(F): 98.1 (15 Apr 2023 13:31), Max: 98.1 (15 Apr 2023 13:31)  HR: 76 (15 Apr 2023 13:31) (74 - 80)  BP: 97/55 (15 Apr 2023 13:31) (96/52 - 118/61)  RR: 18 (15 Apr 2023 13:31) (18 - 18)  SpO2: 92% (15 Apr 2023 04:54) (92% - 98%)    Parameters below as of 14 Apr 2023 15:40  Patient On (Oxygen Delivery Method): room air    conj pink, no jaundice  neck supple no JVD  lungs clear to auscultaiton. no crackles or wheezing  heart regular rate and rhythm. no murmur or gallop  abd. soft nontender BS pos. no masses  extremities good skin turgor. no edema.    Labs:                      10.3   7.90  )-----------( 172      ( 15 Apr 2023 08:22 )             32.2   04-15    141  |  108  |  46<H>  ----------------------------<  93  4.3   |  23  |  3.0<H>    Ca    12.3<H>      15 Apr 2023 08:22  Phos  3.9     04-15  Mg     2.5     04-15    TPro  5.4<L>  /  Alb  3.7  /  TBili  0.4  /  DBili  x   /  AST  13  /  ALT  10  /  AlkPhos  208<H>  04-14    MEDICATIONS  (STANDING):  benztropine 2 milliGRAM(s) Oral at bedtime  fluPHENAZine 10 milliGRAM(s) Oral daily  sodium chloride 0.9%. 1000 milliLiter(s) (100 mL/Hr) IV Continuous <Continuous>

## 2023-04-16 LAB
ALBUMIN SERPL ELPH-MCNC: 3.4 G/DL — LOW (ref 3.5–5.2)
ALP SERPL-CCNC: 195 U/L — HIGH (ref 30–115)
ALT FLD-CCNC: 12 U/L — SIGNIFICANT CHANGE UP (ref 0–41)
ANION GAP SERPL CALC-SCNC: 10 MMOL/L — SIGNIFICANT CHANGE UP (ref 7–14)
AST SERPL-CCNC: 11 U/L — SIGNIFICANT CHANGE UP (ref 0–41)
BASOPHILS # BLD AUTO: 0.01 K/UL — SIGNIFICANT CHANGE UP (ref 0–0.2)
BASOPHILS NFR BLD AUTO: 0.2 % — SIGNIFICANT CHANGE UP (ref 0–1)
BILIRUB SERPL-MCNC: 0.3 MG/DL — SIGNIFICANT CHANGE UP (ref 0.2–1.2)
BUN SERPL-MCNC: 50 MG/DL — HIGH (ref 10–20)
CALCIUM SERPL-MCNC: 12.8 MG/DL — HIGH (ref 8.4–10.5)
CHLORIDE SERPL-SCNC: 107 MMOL/L — SIGNIFICANT CHANGE UP (ref 98–110)
CO2 SERPL-SCNC: 22 MMOL/L — SIGNIFICANT CHANGE UP (ref 17–32)
CREAT SERPL-MCNC: 2.9 MG/DL — HIGH (ref 0.7–1.5)
EGFR: 23 ML/MIN/1.73M2 — LOW
EOSINOPHIL # BLD AUTO: 0.02 K/UL — SIGNIFICANT CHANGE UP (ref 0–0.7)
EOSINOPHIL NFR BLD AUTO: 0.3 % — SIGNIFICANT CHANGE UP (ref 0–8)
GLUCOSE SERPL-MCNC: 141 MG/DL — HIGH (ref 70–99)
HCT VFR BLD CALC: 32.4 % — LOW (ref 42–52)
HGB BLD-MCNC: 10.4 G/DL — LOW (ref 14–18)
IMM GRANULOCYTES NFR BLD AUTO: 0.8 % — HIGH (ref 0.1–0.3)
LYMPHOCYTES # BLD AUTO: 0.76 K/UL — LOW (ref 1.2–3.4)
LYMPHOCYTES # BLD AUTO: 12.8 % — LOW (ref 20.5–51.1)
MAGNESIUM SERPL-MCNC: 2.6 MG/DL — HIGH (ref 1.8–2.4)
MCHC RBC-ENTMCNC: 28.4 PG — SIGNIFICANT CHANGE UP (ref 27–31)
MCHC RBC-ENTMCNC: 32.1 G/DL — SIGNIFICANT CHANGE UP (ref 32–37)
MCV RBC AUTO: 88.5 FL — SIGNIFICANT CHANGE UP (ref 80–94)
MONOCYTES # BLD AUTO: 0.39 K/UL — SIGNIFICANT CHANGE UP (ref 0.1–0.6)
MONOCYTES NFR BLD AUTO: 6.5 % — SIGNIFICANT CHANGE UP (ref 1.7–9.3)
NEUTROPHILS # BLD AUTO: 4.73 K/UL — SIGNIFICANT CHANGE UP (ref 1.4–6.5)
NEUTROPHILS NFR BLD AUTO: 79.4 % — HIGH (ref 42.2–75.2)
NRBC # BLD: 0 /100 WBCS — SIGNIFICANT CHANGE UP (ref 0–0)
PHOSPHATE SERPL-MCNC: 4.6 MG/DL — SIGNIFICANT CHANGE UP (ref 2.1–4.9)
PLATELET # BLD AUTO: 200 K/UL — SIGNIFICANT CHANGE UP (ref 130–400)
PMV BLD: 9.9 FL — SIGNIFICANT CHANGE UP (ref 7.4–10.4)
POTASSIUM SERPL-MCNC: 5 MMOL/L — SIGNIFICANT CHANGE UP (ref 3.5–5)
POTASSIUM SERPL-SCNC: 5 MMOL/L — SIGNIFICANT CHANGE UP (ref 3.5–5)
PROT SERPL-MCNC: 5 G/DL — LOW (ref 6–8)
RBC # BLD: 3.66 M/UL — LOW (ref 4.7–6.1)
RBC # FLD: 14.6 % — HIGH (ref 11.5–14.5)
SODIUM SERPL-SCNC: 139 MMOL/L — SIGNIFICANT CHANGE UP (ref 135–146)
VIT D25+D1,25 OH+D1,25 PNL SERPL-MCNC: 68.3 PG/ML — SIGNIFICANT CHANGE UP (ref 19.9–79.3)
WBC # BLD: 5.96 K/UL — SIGNIFICANT CHANGE UP (ref 4.8–10.8)
WBC # FLD AUTO: 5.96 K/UL — SIGNIFICANT CHANGE UP (ref 4.8–10.8)

## 2023-04-16 PROCEDURE — 99233 SBSQ HOSP IP/OBS HIGH 50: CPT

## 2023-04-16 RX ORDER — CALCITONIN SALMON 200 [IU]/ML
200 INJECTION, SOLUTION INTRAMUSCULAR EVERY 12 HOURS
Refills: 0 | Status: COMPLETED | OUTPATIENT
Start: 2023-04-16 | End: 2023-04-16

## 2023-04-16 RX ORDER — HEPARIN SODIUM 5000 [USP'U]/ML
5000 INJECTION INTRAVENOUS; SUBCUTANEOUS EVERY 12 HOURS
Refills: 0 | Status: DISCONTINUED | OUTPATIENT
Start: 2023-04-16 | End: 2023-04-20

## 2023-04-16 RX ORDER — ALLOPURINOL 300 MG
100 TABLET ORAL DAILY
Refills: 0 | Status: DISCONTINUED | OUTPATIENT
Start: 2023-04-16 | End: 2023-04-18

## 2023-04-16 RX ADMIN — SODIUM CHLORIDE 100 MILLILITER(S): 9 INJECTION INTRAMUSCULAR; INTRAVENOUS; SUBCUTANEOUS at 11:22

## 2023-04-16 RX ADMIN — Medication 40 MILLIGRAM(S): at 05:13

## 2023-04-16 RX ADMIN — HEPARIN SODIUM 5000 UNIT(S): 5000 INJECTION INTRAVENOUS; SUBCUTANEOUS at 17:06

## 2023-04-16 RX ADMIN — Medication 100 MILLIGRAM(S): at 17:06

## 2023-04-16 RX ADMIN — FLUPHENAZINE HYDROCHLORIDE 10 MILLIGRAM(S): 1 TABLET, FILM COATED ORAL at 21:12

## 2023-04-16 RX ADMIN — CALCITONIN SALMON 200 INTERNATIONAL UNIT(S): 200 INJECTION, SOLUTION INTRAMUSCULAR at 22:00

## 2023-04-16 RX ADMIN — CALCITONIN SALMON 200 INTERNATIONAL UNIT(S): 200 INJECTION, SOLUTION INTRAMUSCULAR at 15:16

## 2023-04-16 RX ADMIN — Medication 2 MILLIGRAM(S): at 21:12

## 2023-04-16 NOTE — PROGRESS NOTE ADULT - ASSESSMENT
67y M pmh Schizophrenia (prior lithium use), nephrolithiasis c/b atrophic L kidney, R ureteral rivision, CKD4 (bsl Cr ~2.5), gout, chronic diarrhea, lymphoma presents to the ED for worsening hypercalcemia admitted for malignant hypercalcemia in setting of suspected lymphoma    # Hypercalcemia prob 2/2 follicular lymphoma  Lt groin LN bx 10/2019 = follicular lymphoma  supposedly had 2 cycles of chemo, did not ely and did f/u w/ onc  h/o noted: outpt PET and rebx (prob w/ IR)    mild incr AP prob related to bone related to high Ca  no obvious evid for MM: but check SPEP, UPEP, K/L light chain ratio, ACE  iPTH 8 (approp suppressed); Phos nl; check PTHrP  TSH 2.27  Vit D 1,25 diOH = 68.3 (nl); Vit D 25 OH = 16 c/w VIt D Def - DO NOT TX  renal noted  Pred 40mg po q24  Uric Acid 10.2 -> start allopurinol 100mg po q24 (renal dose)  IVFs: /hr  hold off on aredia for now given CKD and only mild Ca symptoms  calcitonin 200 IU IM q12 - can titrate up after 2 days, if needed (max is 8 IU/kg q6) (wt about 90kg)  denosumab is another option (120mcg sc q4 wks)  Tumor Lysis labs (Uric Acid, BMP, Phos, Mg) q24    # CKD 4; no ASHLEY; Anemia of chronic kidney disease   follows Dr. Karen kenney     # recent OP CT at [facility] showing cecal mass and LAD  pt says GI is Dr Nieto  pt says he had c-scope about 3 mo ago (I guess Ca was OK then?)  pt says mass is in cecum; he thinks size was 7-10cm (other noted say 3cm)  pt says path showed TV adenoma  pt says that he is supposed to have Adv GI do colonoscopic rxn (EMR) - however, need Ca better for anesth  if jeanette Ca < 11.5, could do anesth -> if so, can talk w/ Adv GI about timing of rxn  try to obtain outside records of above    # Hx Schizophrenia   c/w benztropine 2mg qhs, fluphenazine 10mg qd  had kidney stones w/ risperdal he said  c/w melatonin    # Hx Gout  starting allopurinol    # Hx chronic diarrhea  could be from cecal polyp  no diarrhea now    # old H Zoster scarring on rt chest/back w/ hypersens  pt did not want any tx so far, but I did offer lido patch and/or neurontin    # DVT Prophylaxis: heparin 5000 12    # GI Prophylaxis: none    # Activity: as ely    # Code Status: Full    Dispo: Ca w/u above; tx ca above; TL panel q24; f/u h/o & renal; IVFs  poss Adv GI eval  eventually, pt will go home w/ no needs - still acute 67y M pmh Schizophrenia (prior lithium use), nephrolithiasis c/b atrophic L kidney, R ureteral rivision, CKD4 (bsl Cr ~2.5), gout, chronic diarrhea, lymphoma presents to the ED for worsening hypercalcemia admitted for malignant hypercalcemia in setting of suspected lymphoma    # Hypercalcemia prob 2/2 follicular lymphoma  Lt groin LN bx 10/2019 = follicular lymphoma  supposedly had 2 cycles of chemo, did not ely and did f/u w/ onc  h/o noted: outpt PET and rebx (prob w/ IR)    mild incr AP prob related to bone related to high Ca  no obvious evid for MM: but check SPEP, UPEP, K/L light chain ratio, ACE  iPTH 8 (approp suppressed); Phos nl; check PTHrP  TSH 2.27  Vit D 1,25 diOH = 68.3 (nl); Vit D 25 OH = 16 c/w VIt D Def - DO NOT TX  renal noted  Pred 40mg po q24  Uric Acid 10.2 -> start allopurinol 100mg po q24 (renal dose)  IVFs: /hr  hold off on aredia for now given CKD and only mild Ca symptoms  calcitonin 200 IU IM q12 - can titrate up after 2 days, if needed (max is 8 IU/kg q6) (wt about 90kg)  denosumab is another option (120mcg sc q4 wks)  Tumor Lysis labs (Uric Acid, BMP, Phos, Mg) q24    # CKD 4; no ASHLEY; Anemia of chronic kidney disease   follows Dr. Karen kenney     # recent OP CT at [facility] showing cecal mass and LAD  I spoke w/ Dr Green, she checked [facility] records, there is nothing at [facility] re c-scope or path after 2016  pt says GI is Dr Nieto  pt says he had c-scope about 3 mo ago (I guess Ca was OK then?)  pt says mass is in cecum; he thinks size was 7-10cm (other noted say 3cm)  pt says path showed TV adenoma  pt says that he is supposed to have Adv GI do colonoscopic rxn (EMR) - however, need Ca better for anesth  if jeanette Ca < 11.5, could do anesth -> if so, can talk w/ Adv GI about timing of rxn  tomorrow, try to obtain outside records of above from [facility] ([facility]) and Dr Nieto's office    # Hx Schizophrenia   c/w benztropine 2mg qhs, fluphenazine 10mg qd  had kidney stones w/ risperdal he said  c/w melatonin    # Hx Gout  starting allopurinol    # Hx chronic diarrhea  could be from cecal polyp  no diarrhea now    # old H Zoster scarring on rt chest/back w/ hypersens  pt did not want any tx so far, but I did offer lido patch and/or neurontin    # DVT Prophylaxis: heparin 5000 12    # GI Prophylaxis: none    # Activity: as ely    # Code Status: Full    Dispo: Ca w/u above; tx ca above; TL panel q24; f/u h/o & renal; IVFs; obtain outside records  poss Adv GI eval  eventually, pt will go home w/ no needs - still acute

## 2023-04-16 NOTE — PROGRESS NOTE ADULT - SUBJECTIVE AND OBJECTIVE BOX
ELDA COVARRUBIAS  67y  Male  ***My note supersedes ALL resident notes that I sign.  My corrections for their notes are in my note.***    I can be reached directly on [employer]. My office number is [phone]. My personal cell number is [phone].    INTERVAL EVENTS: Here for f/u of high Ca. Pt has a little "head fog" from the calcium, but not bad at all. Still very oriented and coherent. He also c/o some extremity bone pain, but not too severe. He actually has more pain in his old zoster area on right chest and back. He was in good spirits. He wanted to have the lg cecal polyp removed on this admission, but I told him that the Ca needs to be better for anesthesia.    T(F): 96.9 (04-16-23 @ 05:04), Max: 98.1 (04-15-23 @ 13:31)  HR: 58 (04-16-23 @ 05:04) (58 - 76)  BP: 92/53 (04-16-23 @ 05:04) (92/53 - 103/55)  RR: 18 (04-16-23 @ 05:04) (18 - 18)  SpO2: 95% (04-16-23 @ 05:04) (95% - 95%)    Gen: NAD  skin: rt back: skin graft from prior zoster  rt chest: several keloids seen up to midline (healed scars from prior zoster)  hypopigmentation is seen in zoster distribution from midline ant to mid line post, wrapping around to the right  the entire old zoster area is sensitive to the touch  HEENT: PERRL, EOMI, mouth clr, nose clr  Neck: no nodes, no JVD, thyroid nl  lungs: clr  hrt: s1 s2 rrr no murmur  abd: soft, NT/ND, no HS megaly  ext: no edema, no c/c  neuro: aa ox3, cn intact, can move all 4 ext    LABS:                      10.4    (    88.5   5.96  )-----------( ---------      200      ( 16 Apr 2023 07:55 )             32.4    (    14.6     139   (   107   (   141      04-16-23 @ 07:55  ----------------------               5.0   (   22   (   50                             -----                        2.9  Ca  12.8   Mg  2.6    P   4.6     LFT  5.0  (  0.3  (  11       04-16-23 @ 07:55  -------------------------  3.4  (  195  (  12    RADIOLOGY & ADDITIONAL TESTS:    MEDICATIONS:    acetaminophen     Tablet .. 650 milliGRAM(s) Oral every 6 hours PRN  aluminum hydroxide/magnesium hydroxide/simethicone Suspension 30 milliLiter(s) Oral every 4 hours PRN  benztropine 2 milliGRAM(s) Oral at bedtime  fluPHENAZine 10 milliGRAM(s) Oral daily  melatonin 3 milliGRAM(s) Oral at bedtime PRN  ondansetron Injectable 4 milliGRAM(s) IV Push every 8 hours PRN  predniSONE   Tablet 40 milliGRAM(s) Oral daily  sodium chloride 0.9%. 1000 milliLiter(s) IV Continuous <Continuous>

## 2023-04-17 LAB
ACE SERPL-CCNC: 48 U/L — SIGNIFICANT CHANGE UP (ref 14–82)
ALBUMIN SERPL ELPH-MCNC: 3.4 G/DL — LOW (ref 3.5–5.2)
ALP SERPL-CCNC: 172 U/L — HIGH (ref 30–115)
ALT FLD-CCNC: 10 U/L — SIGNIFICANT CHANGE UP (ref 0–41)
ANION GAP SERPL CALC-SCNC: 13 MMOL/L — SIGNIFICANT CHANGE UP (ref 7–14)
AST SERPL-CCNC: 10 U/L — SIGNIFICANT CHANGE UP (ref 0–41)
BASOPHILS # BLD AUTO: 0.01 K/UL — SIGNIFICANT CHANGE UP (ref 0–0.2)
BASOPHILS NFR BLD AUTO: 0.1 % — SIGNIFICANT CHANGE UP (ref 0–1)
BILIRUB SERPL-MCNC: <0.2 MG/DL — SIGNIFICANT CHANGE UP (ref 0.2–1.2)
BUN SERPL-MCNC: 53 MG/DL — HIGH (ref 10–20)
CALCIUM SERPL-MCNC: 12.1 MG/DL — HIGH (ref 8.4–10.5)
CHLORIDE SERPL-SCNC: 107 MMOL/L — SIGNIFICANT CHANGE UP (ref 98–110)
CO2 SERPL-SCNC: 17 MMOL/L — SIGNIFICANT CHANGE UP (ref 17–32)
CREAT SERPL-MCNC: 2.8 MG/DL — HIGH (ref 0.7–1.5)
EGFR: 24 ML/MIN/1.73M2 — LOW
EOSINOPHIL # BLD AUTO: 0.01 K/UL — SIGNIFICANT CHANGE UP (ref 0–0.7)
EOSINOPHIL NFR BLD AUTO: 0.1 % — SIGNIFICANT CHANGE UP (ref 0–8)
GLUCOSE SERPL-MCNC: 117 MG/DL — HIGH (ref 70–99)
HCT VFR BLD CALC: 33.5 % — LOW (ref 42–52)
HGB BLD-MCNC: 10.8 G/DL — LOW (ref 14–18)
IMM GRANULOCYTES NFR BLD AUTO: 0.7 % — HIGH (ref 0.1–0.3)
KAPPA LC SER QL IFE: 4.14 MG/DL — HIGH (ref 0.33–1.94)
KAPPA/LAMBDA FREE LIGHT CHAIN RATIO, SERUM: 1.27 RATIO — SIGNIFICANT CHANGE UP (ref 0.26–1.65)
LAMBDA LC SER QL IFE: 3.25 MG/DL — HIGH (ref 0.57–2.63)
LDH SERPL L TO P-CCNC: 137 U/L — SIGNIFICANT CHANGE UP (ref 50–242)
LYMPHOCYTES # BLD AUTO: 0.72 K/UL — LOW (ref 1.2–3.4)
LYMPHOCYTES # BLD AUTO: 6.7 % — LOW (ref 20.5–51.1)
MAGNESIUM SERPL-MCNC: 2.6 MG/DL — HIGH (ref 1.8–2.4)
MCHC RBC-ENTMCNC: 28.6 PG — SIGNIFICANT CHANGE UP (ref 27–31)
MCHC RBC-ENTMCNC: 32.2 G/DL — SIGNIFICANT CHANGE UP (ref 32–37)
MCV RBC AUTO: 88.6 FL — SIGNIFICANT CHANGE UP (ref 80–94)
MONOCYTES # BLD AUTO: 0.7 K/UL — HIGH (ref 0.1–0.6)
MONOCYTES NFR BLD AUTO: 6.6 % — SIGNIFICANT CHANGE UP (ref 1.7–9.3)
NEUTROPHILS # BLD AUTO: 9.17 K/UL — HIGH (ref 1.4–6.5)
NEUTROPHILS NFR BLD AUTO: 85.8 % — HIGH (ref 42.2–75.2)
NRBC # BLD: 0 /100 WBCS — SIGNIFICANT CHANGE UP (ref 0–0)
PHOSPHATE SERPL-MCNC: 4.7 MG/DL — SIGNIFICANT CHANGE UP (ref 2.1–4.9)
PLATELET # BLD AUTO: 218 K/UL — SIGNIFICANT CHANGE UP (ref 130–400)
PMV BLD: 9.9 FL — SIGNIFICANT CHANGE UP (ref 7.4–10.4)
POTASSIUM SERPL-MCNC: 4.4 MMOL/L — SIGNIFICANT CHANGE UP (ref 3.5–5)
POTASSIUM SERPL-SCNC: 4.4 MMOL/L — SIGNIFICANT CHANGE UP (ref 3.5–5)
PROT SERPL-MCNC: 4.9 G/DL — LOW (ref 6–8)
RBC # BLD: 3.78 M/UL — LOW (ref 4.7–6.1)
RBC # FLD: 14.9 % — HIGH (ref 11.5–14.5)
SODIUM SERPL-SCNC: 137 MMOL/L — SIGNIFICANT CHANGE UP (ref 135–146)
URATE SERPL-MCNC: 10.1 MG/DL — HIGH (ref 3.4–8.8)
WBC # BLD: 10.68 K/UL — SIGNIFICANT CHANGE UP (ref 4.8–10.8)
WBC # FLD AUTO: 10.68 K/UL — SIGNIFICANT CHANGE UP (ref 4.8–10.8)

## 2023-04-17 PROCEDURE — 99233 SBSQ HOSP IP/OBS HIGH 50: CPT

## 2023-04-17 RX ADMIN — Medication 2 MILLIGRAM(S): at 22:50

## 2023-04-17 RX ADMIN — Medication 40 MILLIGRAM(S): at 05:07

## 2023-04-17 RX ADMIN — SODIUM CHLORIDE 100 MILLILITER(S): 9 INJECTION INTRAMUSCULAR; INTRAVENOUS; SUBCUTANEOUS at 09:04

## 2023-04-17 RX ADMIN — HEPARIN SODIUM 5000 UNIT(S): 5000 INJECTION INTRAVENOUS; SUBCUTANEOUS at 05:07

## 2023-04-17 RX ADMIN — HEPARIN SODIUM 5000 UNIT(S): 5000 INJECTION INTRAVENOUS; SUBCUTANEOUS at 18:03

## 2023-04-17 RX ADMIN — Medication 100 MILLIGRAM(S): at 11:23

## 2023-04-17 RX ADMIN — FLUPHENAZINE HYDROCHLORIDE 10 MILLIGRAM(S): 1 TABLET, FILM COATED ORAL at 22:50

## 2023-04-17 NOTE — PROGRESS NOTE ADULT - ASSESSMENT
68 y/o man with PMH of Schizophrenia (prior lithium use), nephrolithiasis complicated by atrophic Left kidney, Right ureteral revision, CKD4 (baseline Cr ~2.5), gout, chronic diarrhea and lymphoma (s/p 3 cycles and was in partial remission but could not tolerate maintenance therapy and was lost to follow up) now  presents to the ED for worsening hypercalcemia (malignant hypercalcemia in setting of suspected lymphoma).    1. Hypercalcemia suspected due to lymphoma - rule out recurrence  Calcium level still elevated but trending down - corrected CA 12.6  iPTH 8 (approp suppressed); Phos nl; check PTHrP  TSH 2.27  Vit D 1,25 diOH = 68.3 (nl); Vit D 25 OH = 16 c/w VIt D Def - DO NOT replete  Prednisone 40mg po q24  Uric Acid 10.2 -> started allopurinol 100mg po q24 (renal dose)  continue IVFs: /hr  hold off on aredia for now given CKD   on calcitonin 200 IU IM q12 - can titrate up after 2 days, if needed (max is 8 IU/kg q6) (wt about 90kg)  per renal , start denosumab (120mcg sc q4 wks) - nonformulary - and monitor phos, Mg levels closely  f/u Tumor Lysis labs (Uric Acid, BMP, Phos, Mg) q24    2. h/o follicular lymphoma  Lt groin LN bx 10/2019 = follicular lymphoma  s/p 3 cycles and was in partial remission but could not tolerate maintenance therapy and was lost to follow up per oncology note  CT Abdomen/Pelvis 2/23/23 per oncology note:  --interval worsening in periesophageal and pelvic lymphadenopathy , other bulky lymph nodes in the abdomen and retroperitoneum appear stable  --1.3 indeterminate segment 4 liver lesion  --focal areas of pleural nodularity concerning for lymphomatous/neoplastic/metastatic process, new since prior.  HemOnc recommends outpt PET scan and rebiopsy    elevated AlkPhos  check SPEP, UPEP, ACE    3. CKD 4  Dr. Jones nephrology following  BMP in AM    4. TV adenoma per pt  recent [facility] showed cecal mass and LAD  pt says that he is supposed to have [facility] do colonoscopic rxn (EMR) - however, need Ca better for anesthesia  obtain records from [facility] and Dr. Nieto's office today  once calcium level improved, can speak to [facility] re: intervention    5. Schizophrenia   c/w benztropine 2mg qhs, fluphenazine 10mg qd  had kidney stones w/ risperdal   c/w melatonin    6. Hx Gout and now with hyperuricemia  starting allopurinol    7. Hx chronic diarrhea  could be from cecal polyp  no diarrhea now    8. DVT Prophylaxis: heparin SQ    full code      PROGRESS NOTE HANDOFF    Pending: labs in AM, improvement in calcium level, review records from CT and colonoscopy, possible [facility] eval as inpt    pt informed of the plan of care  Dr. Jones updated family today    Disposition: from home

## 2023-04-17 NOTE — PROGRESS NOTE ADULT - ASSESSMENT
67y M pmh Schizophrenia (prior lithium use), nephrolithiasis c/b atrophic L kidney, R ureteral rivision, CKD4 (bsl Cr ~2.5), gout, chronic diarrhea, lymphoma presents to the ED for worsening hypercalcemia admitted for malignant hypercalcemia in setting of suspected lymphoma    #Hypercalcemia  #Hx of lymphoma  #ASHLEY on CKD4  #Anemia of chronic kidney disease   #Hypercalcemia suspected due to lymphoma - rule out recurrence  Calcium level still elevated but trending down - corrected CA 12.6  iPTH 8 (approp suppressed); Phos nl; check PTHrP  TSH 2.27  Vit D 1,25 diOH = 68.3 (nl); Vit D 25 OH = 16 c/w VIt D Def - DO NOT replete  Prednisone 40mg po q24  Uric Acid 10.2 -> started allopurinol 100mg po q24 (renal dose)  continue IVFs: /hr  hold off on aredia for now given CKD   on calcitonin 200 IU IM q12 - can titrate up after 2 days, if needed (max is 8 IU/kg q6) (wt about 90kg)  per renal , start denosumab (120mcg sc q4 wks) - nonformulary - and monitor phos, Mg levels closely  f/u Tumor Lysis labs (Uric Acid, BMP, Phos, Mg) q24  uric acid today 10.1, cw allupurinol  talked to [facility] today for biopsy   K and L elevated, k/l normal  SPEP low   TSH T4 wnl, 1-25 Vit D wnl, PTH low,   Calcium 12.1 correct 12.6   - Spoke with Dr. Nieto's office they are not able to provide information because there system is down till the end of the week   - will start denosumab     #Hx Schizophrenia   - c/w benztropine 2mg qhs, fluphenazine 10mg qd    #Hx Gout  #Hx chronic diarrhea  - currently asymptomatic having regular bowel movements     # To follow up  - MM/TLS workup   - PT eval   - trend Cr/Ca, continue workup for hypercalcemia     # Misc  - DVT Prophylaxis: heparin   - GI Prophylaxis: none  - Diet: Diet, Regular  - Activity: Activity - Ambulate as Tolerated  - Code Status: Full 67y M pmh Schizophrenia (prior lithium use), nephrolithiasis c/b atrophic L kidney, R ureteral rivision, CKD4 (bsl Cr ~2.5), gout, chronic diarrhea, lymphoma presents to the ED for worsening hypercalcemia admitted for malignant hypercalcemia in setting of suspected lymphoma    #Hypercalcemia  #Hx of lymphoma  #ASHLEY on CKD4  #Anemia of chronic kidney disease   #Hypercalcemia suspected due to lymphoma - rule out recurrence  Calcium level still elevated but trending down - corrected CA 12.6  iPTH 8 (approp suppressed); Phos nl; check PTHrP  TSH 2.27  Vit D 1,25 diOH = 68.3 (nl); Vit D 25 OH = 16 c/w VIt D Def - DO NOT replete  Prednisone 40mg po q24  Uric Acid 10.2 -> started allopurinol 100mg po q24 (renal dose)  continue IVFs: /hr  hold off on aredia for now given CKD   given calcitonin 200 IU IM q12 for 2 doses   - can give calcitonin again and titrate up after 2 days, if needed (max is 8 IU/kg q6) (wt about 90kg)  per renal , start denosumab (120mcg sc q4 wks) - nonformulary - and monitor phos, Mg levels closely  f/u Tumor Lysis labs (Uric Acid, BMP, Phos, Mg) q24  uric acid today 10.1, cw allupurinol  talked to [facility] today for biopsy   K and L elevated, k/l normal  SPEP low   TSH T4 wnl, 1-25 Vit D wnl, PTH low,   Calcium 12.1 correct 12.6   - Spoke with Dr. Nieto's office they are not able to provide information because there system is down till the end of the week   - will start denosumab     #Hx Schizophrenia   - c/w benztropine 2mg qhs, fluphenazine 10mg qd    #Hx Gout  #Hx chronic diarrhea  - currently asymptomatic having regular bowel movements     # To follow up  - MM/TLS workup   - PT eval   - trend Cr/Ca, continue workup for hypercalcemia     # Misc  - DVT Prophylaxis: heparin   - GI Prophylaxis: none  - Diet: Diet, Regular  - Activity: Activity - Ambulate as Tolerated  - Code Status: Full

## 2023-04-17 NOTE — PROGRESS NOTE ADULT - SUBJECTIVE AND OBJECTIVE BOX
Nephrology progress note     no complaints    ON events/Subjective:     Allergies:  Allergy Status Unknown    Hospital Medications:   MEDICATIONS  (STANDING):  allopurinol 100 milliGRAM(s) Oral daily  benztropine 2 milliGRAM(s) Oral at bedtime  fluPHENAZine 10 milliGRAM(s) Oral daily  heparin   Injectable 5000 Unit(s) SubCutaneous every 12 hours  predniSONE   Tablet 40 milliGRAM(s) Oral daily  sodium chloride 0.9%. 1000 milliLiter(s) (100 mL/Hr) IV Continuous <Continuous>    REVIEW OF SYSTEMS:  CONSTITUTIONAL: No weakness, fevers or chills  EYES/ENT: No visual changes;  No vertigo or throat pain   NECK: No pain or stiffness  RESPIRATORY: No cough, wheezing, hemoptysis; No shortness of breath  CARDIOVASCULAR: No chest pain or palpitations.  GASTROINTESTINAL: No abdominal or epigastric pain. No nausea, vomiting, or hematemesis; No diarrhea or constipation. No melena or hematochezia.  GENITOURINARY: No dysuria, frequency, foamy urine, urinary urgency, incontinence or hematuria  NEUROLOGICAL: No numbness or weakness  SKIN: No itching, burning, rashes, or lesions   VASCULAR: No bilateral lower extremity edema.   All other review of systems is negative unless indicated above.    VITALS:  T(F): 96.7 (04-17-23 @ 04:41), Max: 97 (04-16-23 @ 20:55)  HR: 64 (04-17-23 @ 07:43)  BP: 90/55 (04-17-23 @ 04:41)  RR: 19 (04-17-23 @ 04:41)  SpO2: 95% (04-17-23 @ 07:43)  Wt(kg): --      PHYSICAL EXAM:  Constitutional: NAD  HEENT: anicteric sclera, oropharynx clear, MMM  Neck: No JVD  Respiratory: CTAB, no wheezes, rales or rhonchi  Cardiovascular: S1, S2, RRR  Gastrointestinal: BS+, soft, NT/ND  Extremities: No cyanosis or clubbing. No peripheral edema  Neurological: A/O x 3, no focal deficits  Psychiatric: Normal mood, normal affect  : No CVA tenderness. No zafar.   Skin: No rashes  Vascular Access:    LABS:  04-17    137  |  107  |  53<H>  ----------------------------<  117<H>  4.4   |  17  |  2.8<H>    Ca    12.1<H>      17 Apr 2023 08:20  Phos  4.7     04-17  Mg     2.6     04-17    TPro  4.9<L>  /  Alb  3.4<L>  /  TBili  <0.2  /  DBili      /  AST  10  /  ALT  10  /  AlkPhos  172<H>  04-17                          10.8   10.68 )-----------( 218      ( 17 Apr 2023 08:20 )             33.5       Urine Studies:  Creatinine Trend: 2.8<--, 2.9<--, 3.0<--, 2.9<--, 3.1<--      ·hypercalcemia - suspect recurrence of lymphoma  ·ASHLEY/CKD likely from hypercalcemia improved and closer to baseline  ·hemodynamics stable  ·anemia  ·recent CT ? if done at [facility] as not in [facility] computer but cecal mass and lymphadenopathy noted    1) hypercalcemia improved on if but still with corrected calcium 12.6 ( on ivf and calcitonin) - no objections to prolia  2) please have oncology comment on need for further determination of recurence lymphoma (ie may need repeat biopsy which would be easier to obtain in house)  3) post prolia - follow calcium, phos, (mag)  d/w pt and father and medical team

## 2023-04-17 NOTE — PROGRESS NOTE ADULT - SUBJECTIVE AND OBJECTIVE BOX
ELDA COVARRUBIAS  67y Male    INTERVAL HPI/OVERNIGHT EVENTS:    no complaints of pain, N/V, constipation (last BM yesterday), SOB, chest pain  no fever  case discussed with renal attending today    T(F): 96.7 (04-17-23 @ 04:41), Max: 97 (04-16-23 @ 20:55)  HR: 64 (04-17-23 @ 07:43) (61 - 67)  BP: 90/55 (04-17-23 @ 04:41) (90/55 - 108/58)  RR: 19 (04-17-23 @ 04:41) (19 - 20)  SpO2: 95% (04-17-23 @ 07:43) (95% - 95%) on RA    PHYSICAL EXAM:  GENERAL: NAD  HEAD:  Normocephalic  EYES:  conjunctiva and sclera clear  ENMT: Moist mucous membranes  NERVOUS SYSTEM:  Alert, awake, Good concentration  CHEST/LUNG: CTA b/l  HEART: Regular rate and rhythm  ABDOMEN: Soft, Nontender, Nondistended  EXTREMITIES:   No edema  SKIN: warm, dry    Consultant(s) Notes Reviewed:  [x ] YES  [ ] NO  Care Discussed with Consultants/Other Providers [ x] YES  [ ] NO    MEDICATIONS  (STANDING):  allopurinol 100 milliGRAM(s) Oral daily  benztropine 2 milliGRAM(s) Oral at bedtime  fluPHENAZine 10 milliGRAM(s) Oral daily  heparin   Injectable 5000 Unit(s) SubCutaneous every 12 hours  predniSONE   Tablet 40 milliGRAM(s) Oral daily  sodium chloride 0.9%. 1000 milliLiter(s) (100 mL/Hr) IV Continuous <Continuous>    MEDICATIONS  (PRN):  acetaminophen     Tablet .. 650 milliGRAM(s) Oral every 6 hours PRN Temp greater or equal to 38C (100.4F), Mild Pain (1 - 3)  aluminum hydroxide/magnesium hydroxide/simethicone Suspension 30 milliLiter(s) Oral every 4 hours PRN Dyspepsia  melatonin 3 milliGRAM(s) Oral at bedtime PRN Insomnia  ondansetron Injectable 4 milliGRAM(s) IV Push every 8 hours PRN Nausea and/or Vomiting      LABS:                        10.8   10.68 )-----------( 218      ( 17 Apr 2023 08:20 )             33.5     04-17    137  |  107  |  53<H>  ----------------------------<  117<H>  4.4   |  17  |  2.8<H>    Ca    12.1<H>      17 Apr 2023 08:20  Phos  4.7     04-17  Mg     2.6     04-17    TPro  4.9<L>  /  Alb  3.4<L>  /  TBili  <0.2  /  DBili  x   /  AST  10  /  ALT  10  /  AlkPhos  172<H>  04-17              Case discussed with residents and RN on rounds today    Care discussed with pt

## 2023-04-17 NOTE — PROGRESS NOTE ADULT - SUBJECTIVE AND OBJECTIVE BOX
SUBJECTIVE / OVERNIGHT EVENTS  Patient slept well overnight. No acute complaints this AM. Patient does not report fevers, chills, CP, SOB, or n/v/d    MEDICATIONS  allopurinol 100 milliGRAM(s) Oral daily  benztropine 2 milliGRAM(s) Oral at bedtime  fluPHENAZine 10 milliGRAM(s) Oral daily  heparin   Injectable 5000 Unit(s) SubCutaneous every 12 hours  predniSONE   Tablet 40 milliGRAM(s) Oral daily  sodium chloride 0.9%. 1000 milliLiter(s) IV Continuous <Continuous>    acetaminophen     Tablet .. 650 milliGRAM(s) Oral every 6 hours PRN Temp greater or equal to 38C (100.4F), Mild Pain (1 - 3)  aluminum hydroxide/magnesium hydroxide/simethicone Suspension 30 milliLiter(s) Oral every 4 hours PRN Dyspepsia  melatonin 3 milliGRAM(s) Oral at bedtime PRN Insomnia  ondansetron Injectable 4 milliGRAM(s) IV Push every 8 hours PRN Nausea and/or Vomiting    VITALS /  EXAM    T(C): 35.9 (04-17-23 @ 04:41), Max: 36.1 (04-16-23 @ 20:55)  HR: 64 (04-17-23 @ 07:43) (61 - 67)  BP: 90/55 (04-17-23 @ 04:41) (90/55 - 108/58)  RR: 19 (04-17-23 @ 04:41) (19 - 20)  SpO2: 95% (04-17-23 @ 07:43) (95% - 95%)    GENERAL: NAD, well-developed  CHEST/LUNG: Clear to auscultation bilaterally; No wheezes, rales or rhonchi  HEART: Regular rate and rhythm; No murmurs, rubs, or gallops  ABDOMEN: Soft, Nontender, Nondistended; Bowel sounds present, no masses.  EXTREMITIES:  2+ Peripheral Pulses, No clubbing, cyanosis, or edema    I's & O's     LABS             10.8   10.68 )-----------( 218      ( 04-17-23 @ 08:20 )             33.5     137  |  107  |  53  -------------------------<  117   04-17-23 @ 08:20  4.4  |  17  |  2.8    Ca      12.1     04-17-23 @ 08:20  Phos   4.7     04-17-23 @ 08:20  Mg     2.6     04-17-23 @ 08:20    TPro  4.9  /  Alb  3.4  /  TBili  <0.2  /  DBili  x   /  AST  10  /  ALT  10  /  AlkPhos  172  /  GGT  x     04-17-23 @ 08:20      MICRO / IMAGING / CARDIOLOGY  Telemetry: Reviewed   EKG: Reviewed    CULTURES    IMAGING    CARDIOLOGY

## 2023-04-18 LAB
% ALBUMIN: 63.7 % — SIGNIFICANT CHANGE UP
% ALPHA 1: 7.5 % — SIGNIFICANT CHANGE UP
% ALPHA 2: 13.9 % — SIGNIFICANT CHANGE UP
% BETA: 9.3 % — SIGNIFICANT CHANGE UP
% GAMMA: 5.6 % — SIGNIFICANT CHANGE UP
ALBUMIN SERPL ELPH-MCNC: 3.1 G/DL — LOW (ref 3.6–5.5)
ALBUMIN SERPL ELPH-MCNC: 3.2 G/DL — LOW (ref 3.5–5.2)
ALBUMIN/GLOB SERPL ELPH: 1.8 RATIO — SIGNIFICANT CHANGE UP
ALP SERPL-CCNC: 138 U/L — HIGH (ref 30–115)
ALPHA1 GLOB SERPL ELPH-MCNC: 0.4 G/DL — SIGNIFICANT CHANGE UP (ref 0.1–0.4)
ALPHA2 GLOB SERPL ELPH-MCNC: 0.7 G/DL — SIGNIFICANT CHANGE UP (ref 0.5–1)
ALT FLD-CCNC: 9 U/L — SIGNIFICANT CHANGE UP (ref 0–41)
ANION GAP SERPL CALC-SCNC: 8 MMOL/L — SIGNIFICANT CHANGE UP (ref 7–14)
AST SERPL-CCNC: 8 U/L — SIGNIFICANT CHANGE UP (ref 0–41)
B-GLOBULIN SERPL ELPH-MCNC: 0.4 G/DL — LOW (ref 0.5–1)
BASOPHILS # BLD AUTO: 0.02 K/UL — SIGNIFICANT CHANGE UP (ref 0–0.2)
BASOPHILS NFR BLD AUTO: 0.3 % — SIGNIFICANT CHANGE UP (ref 0–1)
BILIRUB SERPL-MCNC: 0.3 MG/DL — SIGNIFICANT CHANGE UP (ref 0.2–1.2)
BUN SERPL-MCNC: 49 MG/DL — HIGH (ref 10–20)
CALCIUM SERPL-MCNC: 10.9 MG/DL — HIGH (ref 8.4–10.5)
CHLORIDE SERPL-SCNC: 114 MMOL/L — HIGH (ref 98–110)
CO2 SERPL-SCNC: 19 MMOL/L — SIGNIFICANT CHANGE UP (ref 17–32)
CREAT SERPL-MCNC: 2.4 MG/DL — HIGH (ref 0.7–1.5)
EGFR: 29 ML/MIN/1.73M2 — LOW
EOSINOPHIL # BLD AUTO: 0.06 K/UL — SIGNIFICANT CHANGE UP (ref 0–0.7)
EOSINOPHIL NFR BLD AUTO: 0.8 % — SIGNIFICANT CHANGE UP (ref 0–8)
GAMMA GLOBULIN: 0.3 G/DL — LOW (ref 0.6–1.6)
GLUCOSE SERPL-MCNC: 86 MG/DL — SIGNIFICANT CHANGE UP (ref 70–99)
HCT VFR BLD CALC: 29.5 % — LOW (ref 42–52)
HGB BLD-MCNC: 9.6 G/DL — LOW (ref 14–18)
IMM GRANULOCYTES NFR BLD AUTO: 0.7 % — HIGH (ref 0.1–0.3)
LDH SERPL L TO P-CCNC: 87 U/L — SIGNIFICANT CHANGE UP (ref 50–242)
LYMPHOCYTES # BLD AUTO: 0.77 K/UL — LOW (ref 1.2–3.4)
LYMPHOCYTES # BLD AUTO: 10 % — LOW (ref 20.5–51.1)
MAGNESIUM SERPL-MCNC: 2.3 MG/DL — SIGNIFICANT CHANGE UP (ref 1.8–2.4)
MCHC RBC-ENTMCNC: 29.3 PG — SIGNIFICANT CHANGE UP (ref 27–31)
MCHC RBC-ENTMCNC: 32.5 G/DL — SIGNIFICANT CHANGE UP (ref 32–37)
MCV RBC AUTO: 89.9 FL — SIGNIFICANT CHANGE UP (ref 80–94)
MONOCYTES # BLD AUTO: 0.9 K/UL — HIGH (ref 0.1–0.6)
MONOCYTES NFR BLD AUTO: 11.7 % — HIGH (ref 1.7–9.3)
NEUTROPHILS # BLD AUTO: 5.88 K/UL — SIGNIFICANT CHANGE UP (ref 1.4–6.5)
NEUTROPHILS NFR BLD AUTO: 76.5 % — HIGH (ref 42.2–75.2)
NRBC # BLD: 0 /100 WBCS — SIGNIFICANT CHANGE UP (ref 0–0)
PHOSPHATE SERPL-MCNC: 4.8 MG/DL — SIGNIFICANT CHANGE UP (ref 2.1–4.9)
PLATELET # BLD AUTO: 162 K/UL — SIGNIFICANT CHANGE UP (ref 130–400)
PMV BLD: 9.9 FL — SIGNIFICANT CHANGE UP (ref 7.4–10.4)
POTASSIUM SERPL-MCNC: 4.2 MMOL/L — SIGNIFICANT CHANGE UP (ref 3.5–5)
POTASSIUM SERPL-SCNC: 4.2 MMOL/L — SIGNIFICANT CHANGE UP (ref 3.5–5)
PROT PATTERN SERPL ELPH-IMP: SIGNIFICANT CHANGE UP
PROT SERPL-MCNC: 4.3 G/DL — LOW (ref 6–8)
RBC # BLD: 3.28 M/UL — LOW (ref 4.7–6.1)
RBC # FLD: 15 % — HIGH (ref 11.5–14.5)
SODIUM SERPL-SCNC: 141 MMOL/L — SIGNIFICANT CHANGE UP (ref 135–146)
URATE SERPL-MCNC: 9.1 MG/DL — HIGH (ref 3.4–8.8)
WBC # BLD: 7.68 K/UL — SIGNIFICANT CHANGE UP (ref 4.8–10.8)
WBC # FLD AUTO: 7.68 K/UL — SIGNIFICANT CHANGE UP (ref 4.8–10.8)

## 2023-04-18 PROCEDURE — 99233 SBSQ HOSP IP/OBS HIGH 50: CPT

## 2023-04-18 RX ORDER — FEBUXOSTAT 40 MG/1
40 TABLET ORAL DAILY
Refills: 0 | Status: DISCONTINUED | OUTPATIENT
Start: 2023-04-18 | End: 2023-04-18

## 2023-04-18 RX ORDER — DIPHENHYDRAMINE HCL 50 MG
25 CAPSULE ORAL ONCE
Refills: 0 | Status: COMPLETED | OUTPATIENT
Start: 2023-04-18 | End: 2023-04-18

## 2023-04-18 RX ORDER — CALCITONIN SALMON 200 [IU]/ML
200 INJECTION, SOLUTION INTRAMUSCULAR EVERY 12 HOURS
Refills: 0 | Status: COMPLETED | OUTPATIENT
Start: 2023-04-18 | End: 2023-04-18

## 2023-04-18 RX ADMIN — Medication 2 MILLIGRAM(S): at 22:44

## 2023-04-18 RX ADMIN — HEPARIN SODIUM 5000 UNIT(S): 5000 INJECTION INTRAVENOUS; SUBCUTANEOUS at 18:02

## 2023-04-18 RX ADMIN — Medication 25 MILLIGRAM(S): at 08:57

## 2023-04-18 RX ADMIN — FEBUXOSTAT 40 MILLIGRAM(S): 40 TABLET ORAL at 12:24

## 2023-04-18 RX ADMIN — FLUPHENAZINE HYDROCHLORIDE 10 MILLIGRAM(S): 1 TABLET, FILM COATED ORAL at 22:44

## 2023-04-18 RX ADMIN — CALCITONIN SALMON 200 INTERNATIONAL UNIT(S): 200 INJECTION, SOLUTION INTRAMUSCULAR at 12:22

## 2023-04-18 RX ADMIN — CALCITONIN SALMON 200 INTERNATIONAL UNIT(S): 200 INJECTION, SOLUTION INTRAMUSCULAR at 22:46

## 2023-04-18 NOTE — PROVIDER CONTACT NOTE (OTHER) - SITUATION
During morning rounds patient c/o rash with itchiness that started at 0300 and patient noticed it spreading more throughout the morning.

## 2023-04-18 NOTE — PROGRESS NOTE ADULT - SUBJECTIVE AND OBJECTIVE BOX
ELDA COVARRUBIAS  67y Male    INTERVAL HPI/OVERNIGHT EVENTS:    pt developed diffuse erythematous maculopapular rash of back, chest this morning  meds reviewed and pt states that he used allopurinol in the past and developed a rash during that time  allopurinol stopped and benadryl given  no fever, pain, SOB, N/V    T(F): 97.2 (04-18-23 @ 05:00), Max: 97.2 (04-18-23 @ 05:00)  HR: 60 (04-18-23 @ 07:54) (58 - 64)  BP: 93/51 (04-18-23 @ 05:00) (93/51 - 119/57)  RR: 18 (04-18-23 @ 05:00) (18 - 18)  SpO2: 97% (04-18-23 @ 07:54) (97% - 97%)    PHYSICAL EXAM:  GENERAL: NAD  HEAD:  Normocephalic  EYES:  conjunctiva and sclera clear  ENMT: Moist mucous membranes  NERVOUS SYSTEM:  Alert, awake, Good concentration  CHEST/LUNG: CTA b/l  HEART: Regular rate and rhythm  ABDOMEN: Soft, Nontender, Nondistended; Bowel sounds present  EXTREMITIES:   No edema  SKIN: diffuse blanching erythematous maculopapular rash of chest, abdomen and back    Consultant(s) Notes Reviewed:  [x ] YES  [ ] NO  Care Discussed with Consultants/Other Providers [ x] YES  [ ] NO    MEDICATIONS  (STANDING):  benztropine 2 milliGRAM(s) Oral at bedtime  calcitonin Injectable 200 International Unit(s) IntraMuscular every 12 hours  febuxostat 40 milliGRAM(s) Oral daily  fluPHENAZine 10 milliGRAM(s) Oral daily  heparin   Injectable 5000 Unit(s) SubCutaneous every 12 hours  predniSONE   Tablet 40 milliGRAM(s) Oral daily  sodium chloride 0.9%. 1000 milliLiter(s) (100 mL/Hr) IV Continuous <Continuous>    MEDICATIONS  (PRN):  acetaminophen     Tablet .. 650 milliGRAM(s) Oral every 6 hours PRN Temp greater or equal to 38C (100.4F), Mild Pain (1 - 3)  aluminum hydroxide/magnesium hydroxide/simethicone Suspension 30 milliLiter(s) Oral every 4 hours PRN Dyspepsia  melatonin 3 milliGRAM(s) Oral at bedtime PRN Insomnia  ondansetron Injectable 4 milliGRAM(s) IV Push every 8 hours PRN Nausea and/or Vomiting      LABS:                        9.6    7.68  )-----------( 162      ( 18 Apr 2023 07:40 )             29.5     04-18    141  |  114<H>  |  49<H>  ----------------------------<  86  4.2   |  19  |  2.4<H>    Ca    10.9<H>      18 Apr 2023 07:40  Phos  4.8     04-18  Mg     2.3     04-18    TPro  4.3<L>  /  Alb  3.2<L>  /  TBili  0.3  /  DBili  x   /  AST  8   /  ALT  9   /  AlkPhos  138<H>  04-18            Case discussed with residents and RN on rounds today    Care discussed with pt

## 2023-04-18 NOTE — PROVIDER CONTACT NOTE (OTHER) - BACKGROUND
pt. recently started on Allopurinol, pt. endorsed to this RN that he had previously in the past received allopurinol and had similar symptoms and skin reaction.

## 2023-04-18 NOTE — PROGRESS NOTE ADULT - SUBJECTIVE AND OBJECTIVE BOX
SUBJECTIVE / OVERNIGHT EVENTS  Patient slept well overnight. No acute complaints this AM. Patient does not report fevers, chills, CP, SOB, or n/v/d    MEDICATIONS  benztropine 2 milliGRAM(s) Oral at bedtime  calcitonin Injectable 200 International Unit(s) IntraMuscular every 12 hours  febuxostat 40 milliGRAM(s) Oral daily  fluPHENAZine 10 milliGRAM(s) Oral daily  heparin   Injectable 5000 Unit(s) SubCutaneous every 12 hours  predniSONE   Tablet 40 milliGRAM(s) Oral daily  sodium chloride 0.9%. 1000 milliLiter(s) IV Continuous <Continuous>    acetaminophen     Tablet .. 650 milliGRAM(s) Oral every 6 hours PRN Temp greater or equal to 38C (100.4F), Mild Pain (1 - 3)  aluminum hydroxide/magnesium hydroxide/simethicone Suspension 30 milliLiter(s) Oral every 4 hours PRN Dyspepsia  melatonin 3 milliGRAM(s) Oral at bedtime PRN Insomnia  ondansetron Injectable 4 milliGRAM(s) IV Push every 8 hours PRN Nausea and/or Vomiting    VITALS /  EXAM    T(C): 36.2 (04-18-23 @ 05:00), Max: 36.2 (04-18-23 @ 05:00)  HR: 60 (04-18-23 @ 07:54) (58 - 64)  BP: 93/51 (04-18-23 @ 05:00) (93/51 - 119/57)  RR: 18 (04-18-23 @ 05:00) (18 - 18)  SpO2: 97% (04-18-23 @ 07:54) (97% - 97%)    GENERAL: NAD, well-developed  CHEST/LUNG: Clear to auscultation bilaterally; No wheezes, rales or rhonchi  HEART: Regular rate and rhythm; No murmurs, rubs, or gallops  ABDOMEN: Soft, Nontender, Nondistended; Bowel sounds present, no masses.  EXTREMITIES:  2+ Peripheral Pulses, No clubbing, cyanosis, or edema    I's & O's     LABS             9.6    7.68  )-----------( 162      ( 04-18-23 @ 07:40 )             29.5     141  |  114  |  49  -------------------------<  86   04-18-23 @ 07:40  4.2  |  19  |  2.4    Ca      10.9     04-18-23 @ 07:40  Phos   4.8     04-18-23 @ 07:40  Mg     2.3     04-18-23 @ 07:40    TPro  4.3  /  Alb  3.2  /  TBili  0.3  /  DBili  x   /  AST  8   /  ALT  9   /  AlkPhos  138  /  GGT  x     04-18-23 @ 07:40                      MICRO / IMAGING / CARDIOLOGY  Telemetry: Reviewed   EKG: Reviewed    CULTURES    IMAGING    CARDIOLOGY

## 2023-04-18 NOTE — PROGRESS NOTE ADULT - ASSESSMENT
67y M pmh Schizophrenia (prior lithium use), nephrolithiasis c/b atrophic L kidney, R ureteral rivision, CKD4 (bsl Cr ~2.5), gout, chronic diarrhea, lymphoma presents to the ED for worsening hypercalcemia admitted for malignant hypercalcemia in setting of suspected lymphoma    #Hypercalcemia  #Hx of lymphoma  #ASHLEY on CKD4  #Anemia of chronic kidney disease   #Hypercalcemia suspected due to lymphoma - rule out recurrence  Calcium level still elevated but trending down - corrected CA 12.6  iPTH 8 (approp suppressed); Phos nl; check PTHrP  TSH 2.27  Vit D 1,25 diOH = 68.3 (nl); Vit D 25 OH = 16 c/w VIt D Def - DO NOT replete  Prednisone 40mg po q24  Uric Acid 10.2 -> started allopurinol 100mg po q24 (renal dose)  -Allopurinol caued rash, Uric acid today 9.1, stopped allopurinaol, gave 25 mg IV benadryl, and sent for Febuxostat non-formulary   continue IVFs: /hr  hold off on aredia for now given CKD   given calcitonin 200 IU IM q12 for 2 doses, will give another 2 doses   corrected Ca today is 11.5   will start denosumab (120mcg sc q4 wks) - nonformulary - and monitor phos, Mg levels closely  f/u Tumor Lysis labs (Uric Acid, BMP, Phos, Mg) q24  uric acid today 9.1, started fuboxostat for rash was caused by allopurinol   talked to [facility] today for biopsy, Fellow will speak to attending and get back to me concerning biopsy otpion   K and L elevated, k/l normal  SPEP low   TSH T4 wnl, 1-25 Vit D wnl, PTH low,   Calcium 11.9, 12.5   - Spoke with Dr. Nieto's office they are not able to provide information because there system is down till the end of the week     #Hx Schizophrenia   - c/w benztropine 2mg qhs, fluphenazine 10mg qd    #Hx Gout  #Hx chronic diarrhea  - currently asymptomatic having regular bowel movements     # To follow up  - MM/TLS workup   - cr 2.4 today improving   - PT eval   - trend Cr/Ca, continue workup for hypercalcemia     # Misc  - DVT Prophylaxis: heparin   - GI Prophylaxis: none  - Diet: Diet, Regular  - Activity: Activity - Ambulate as Tolerated  - Code Status: Full

## 2023-04-18 NOTE — PROGRESS NOTE ADULT - ASSESSMENT
66 y/o man with PMH of Schizophrenia (prior lithium use), nephrolithiasis complicated by atrophic Left kidney, Right ureteral revision, CKD4 (baseline Cr ~2.5), gout, chronic diarrhea and lymphoma (s/p 3 cycles and was in partial remission but could not tolerate maintenance therapy and was lost to follow up) now  presents to the ED for worsening hypercalcemia (malignant hypercalcemia in setting of suspected lymphoma).    1. Hypercalcemia suspected due to lymphoma - rule out recurrence  Calcium level still elevated but trending down - corrected CA 11.5 today  iPTH 8 (approp suppressed); Phos nl; check PTHrP (pending)  TSH 2.27  Vit D 1,25 diOH = 68.3 (nl); Vit D 25 OH = 16 c/w VIt D Def - DO NOT replete  Prednisone 40mg po q24  Uric Acid 10.2 -> started allopurinol 100mg po q24 (renal dose) but pt developed allergic reaction -> allopurinol stopped and pt now on febuxostat (h/o gout)  continue IVFs: /hr  hold off on aredia for now given CKD   on calcitonin 200 IU IM q12 - can titrate up after 2 days, if needed (max is 8 IU/kg q6) (wt about 90kg)  per renal , start denosumab (120mcg sc q4 wks) - nonformulary - dose to be given today and monitor phos, Mg levels closely  f/u Tumor Lysis labs (Uric Acid, BMP, Phos, Mg) q24    2. h/o follicular lymphoma  Lt groin LN bx 10/2019 = follicular lymphoma  s/p 3 cycles and was in partial remission but could not tolerate maintenance therapy and was lost to follow up per oncology note  CT Abdomen/Pelvis 2/23/23 per oncology note:  --interval worsening in periesophageal and pelvic lymphadenopathy , other bulky lymph nodes in the abdomen and retroperitoneum appear stable  --1.3 indeterminate segment 4 liver lesion  --focal areas of pleural nodularity concerning for lymphomatous/neoplastic/metastatic process, new since prior.  HemOnc recommends outpt PET scan and rebiopsy -> recalled to consider biopsy as inpt    elevated AlkPhos  SPEP - hypogammaglobinemia  check UPEP, ACE    3. CKD 4  Dr. Jones nephrology following  BMP in AM    4. TV adenoma per pt  [facility] showed cecal mass and LAD  pt says that he is supposed to have [facility] do colonoscopic rxn (EMR) - however, need Ca better for anesthesia  obtain records from [facility] and Dr. Nieto's office   once calcium level improved, can speak to [facility] re: intervention    5. Schizophrenia   c/w benztropine 2mg qhs, fluphenazine 10mg qd  had kidney stones w/ risperdal   c/w melatonin    6. Hx Gout and now with hyperuricemia  developed rash with allopurinol and now stopped  no need to further lower uric acid per renal    7. Hx chronic diarrhea  could be from cecal polyp  no diarrhea now    8. DVT Prophylaxis: heparin SQ      full code  guarded prognosis    PROGRESS NOTE HANDOFF    Pending: labs in AM, improvement in calcium level, HemOnc f/u re: biopsy, review records from CT and colonoscopy, possible [facility] eval as inpt    pt informed of the plan of care    Disposition: from home

## 2023-04-18 NOTE — PROGRESS NOTE ADULT - SUBJECTIVE AND OBJECTIVE BOX
Nephrology progress note     developed rash    ON events/Subjective:     Allergies:  Allergy Status Unknown    Hospital Medications:   MEDICATIONS  (STANDING):  benztropine 2 milliGRAM(s) Oral at bedtime  febuxostat 40 milliGRAM(s) Oral daily  fluPHENAZine 10 milliGRAM(s) Oral daily  heparin   Injectable 5000 Unit(s) SubCutaneous every 12 hours  predniSONE   Tablet 40 milliGRAM(s) Oral daily  sodium chloride 0.9%. 1000 milliLiter(s) (100 mL/Hr) IV Continuous <Continuous>    REVIEW OF SYSTEMS:  CONSTITUTIONAL: No weakness, fevers or chills  EYES/ENT: No visual changes;  No vertigo or throat pain   NECK: No pain or stiffness  RESPIRATORY: No cough, wheezing, hemoptysis; No shortness of breath  CARDIOVASCULAR: No chest pain or palpitations.  GASTROINTESTINAL: No abdominal or epigastric pain. No nausea, vomiting, or hematemesis; No diarrhea or constipation. No melena or hematochezia.  GENITOURINARY: No dysuria, frequency, foamy urine, urinary urgency, incontinence or hematuria  NEUROLOGICAL: No numbness or weakness  SKIN: No itching, burning, rashes, or lesions   VASCULAR: No bilateral lower extremity edema.   All other review of systems is negative unless indicated above.    VITALS:  T(F): 97.2 (04-18-23 @ 05:00), Max: 97.2 (04-18-23 @ 05:00)  HR: 60 (04-18-23 @ 07:54)  BP: 93/51 (04-18-23 @ 05:00)  RR: 18 (04-18-23 @ 05:00)  SpO2: 97% (04-18-23 @ 07:54)  Wt(kg): --      PHYSICAL EXAM:  Constitutional: NAD  HEENT: anicteric sclera, oropharynx clear, MMM  Neck: No JVD  Respiratory: CTAB, no wheezes, rales or rhonchi  Cardiovascular: S1, S2, RRR  Gastrointestinal: BS+, soft, NT/ND  Extremities: No cyanosis or clubbing. No peripheral edema  Neurological: A/O x 3, no focal deficits  Psychiatric: Normal mood, normal affect  : No CVA tenderness. No zafar.   Skin: No rashes  Vascular Access:    LABS:  04-18    141  |  114<H>  |  49<H>  ----------------------------<  86  4.2   |  19  |  2.4<H>    Ca    10.9<H>      18 Apr 2023 07:40  Phos  4.8     04-18  Mg     2.3     04-18    TPro  4.3<L>  /  Alb  3.2<L>  /  TBili  0.3  /  DBili      /  AST  8   /  ALT  9   /  AlkPhos  138<H>  04-18                          9.6    7.68  )-----------( 162      ( 18 Apr 2023 07:40 )             29.5       Urine Studies:  Creatinine Trend: 2.4<--, 2.8<--, 2.9<--, 3.0<--, 2.9<--, 3.1<--    ·hypercalcemia - suspect recurrence of lymphoma - improved  ·ASHLEY/CKD likely from hypercalcemia improved and closer to baseline  ·hemodynamics stable  ·anemia  ·	rash with allopurinol    1) to receive prolia today per medicine team  2) hold allopurinol - at this time no need to lower uric acid  3) oncology f/u  4) continue NS at 100 cc/hr  d/w medcine team

## 2023-04-18 NOTE — CONSULT NOTE ADULT - ASSESSMENT
Patient is a 68 y/o male with PMHx of Schizophrenia (prior lithium use), nephrolithiasis c/b atrophic L kidney, R ureteral revision, CKD4 (bsl Cr ~2.5), gout, chronic diarrhea, lymphoma presents to the ED for worsening hypercalcemia. Patient was being set for C-scope with EMR of a TVA in the [facility] which was noted on PET from 1/23. Patient feels better since admission.       TVA of the [facility]  - Previously followed by Dr Nieto  - Plan C-scope Friday with EMR  - Clear liquid diet Thursday- Golytely prep Thursday starting at 6pm- dulcolax 10mg 4pm and 8pm  - Will follow

## 2023-04-18 NOTE — CONSULT NOTE ADULT - SUBJECTIVE AND OBJECTIVE BOX
Patient is a 66 y/o male with PMHx of Schizophrenia (prior lithium use), nephrolithiasis c/b atrophic L kidney, R ureteral revision, CKD4 (bsl Cr ~2.5), gout, chronic diarrhea, lymphoma presents to the ED for worsening hypercalcemia.         PAST MEDICAL & SURGICAL HISTORY:  Kidney stones  Schizophrenia  Lymphoma  Shingles  Atrophic kidney  Retained urethral stent  H/O umbilical hernia repair  Elbow fracture  H/O cystoscopy    Social History  Denies Current Tobacco use  Denies Current ETOH use  Denies Current Illicit Drug use         MEDICATIONS  (STANDING):  benztropine 2 milliGRAM(s) Oral at bedtime  calcitonin Injectable 200 International Unit(s) IntraMuscular every 12 hours  fluPHENAZine 10 milliGRAM(s) Oral daily  heparin   Injectable 5000 Unit(s) SubCutaneous every 12 hours  predniSONE   Tablet 40 milliGRAM(s) Oral daily  sodium chloride 0.9%. 1000 milliLiter(s) (100 mL/Hr) IV Continuous <Continuous>    MEDICATIONS  (PRN):  acetaminophen     Tablet .. 650 milliGRAM(s) Oral every 6 hours PRN Temp greater or equal to 38C (100.4F), Mild Pain (1 - 3)  aluminum hydroxide/magnesium hydroxide/simethicone Suspension 30 milliLiter(s) Oral every 4 hours PRN Dyspepsia  melatonin 3 milliGRAM(s) Oral at bedtime PRN Insomnia  ondansetron Injectable 4 milliGRAM(s) IV Push every 8 hours PRN Nausea and/or Vomiting      Allergies  allopurinol (Rash (Moderate))      Review of Systems  General:  Denies Fatigue, Denies Fever, Denies Weakness ,Denies Weight Loss   HEENT: Denies Trouble Swallowing ,Denies  Sore Throat , Denies Change in hearing/vision/speech ,Denies Dizziness    Cardio: Denies  Chest Pain , Palpitations    Respiratory: Denies worsening of SOB, Denies Cough  Abdomen: See detailed HPI  Neuro: Denies Headache Denies Dizziness, Denies Paresthesias  MSK: Denies pain in Bones/Joints/Muscles   Psych: Patient denies depression, denies suicidal or homicidal ideations  Integ: Patient Denies rash, or new skin lesions       Vital Signs Last 24 Hrs  T(C): 36.6 (18 Apr 2023 14:44), Max: 36.6 (18 Apr 2023 14:44)  T(F): 97.9 (18 Apr 2023 14:44), Max: 97.9 (18 Apr 2023 14:44)  HR: 53 (18 Apr 2023 14:44) (53 - 62)  BP: 112/55 (18 Apr 2023 14:44) (93/51 - 119/57)  BP(mean): --  RR: 18 (18 Apr 2023 14:44) (18 - 18)  SpO2: 97% (18 Apr 2023 07:54) (97% - 97%)    Physical Exam  Gen: NAD  Head: NC/AT, no visible deformity  ENT: PERRLA, Sclera  , PERRLA Mucosal Membranes  Cardio: S1/S2 No S3/S4, Regular  Resp: CTA B/L  Abdomen: Soft, ND/NT  Neuro: AAOx3  Extremities: FROM x 4  Skin: No jaundice, no excoriation       Labs:                          9.6    7.68  )-----------( 162      ( 18 Apr 2023 07:40 )             29.5       Auto Neutrophil %: 76.5 % (04-18-23 @ 07:40)  Auto Immature Granulocyte %: 0.7 % (04-18-23 @ 07:40)    04-18    141  |  114<H>  |  49<H>  ----------------------------<  86  4.2   |  19  |  2.4<H>      Calcium, Total Serum: 10.9 mg/dL (04-18-23 @ 07:40)      LFTs:             4.3  | 0.3  | 8        ------------------[138     ( 18 Apr 2023 07:40 )  3.2  | x    | 9              Patient is a 68 y/o male with PMHx of Schizophrenia (prior lithium use), nephrolithiasis c/b atrophic L kidney, R ureteral revision, CKD4 (bsl Cr ~2.5), gout, chronic diarrhea, lymphoma presents to the ED for worsening hypercalcemia. PAtient was being set for C-scope with EMR of a TVA in the [facility] which was noted on PET from 1/23. Patient feels better since admission.       PAST MEDICAL & SURGICAL HISTORY:  Kidney stones  Schizophrenia  Lymphoma  Shingles  Atrophic kidney  Retained urethral stent  H/O umbilical hernia repair  Elbow fracture  H/O cystoscopy    Social History  Denies Current Tobacco use  Denies Current ETOH use  Denies Current Illicit Drug use         MEDICATIONS  (STANDING):  benztropine 2 milliGRAM(s) Oral at bedtime  calcitonin Injectable 200 International Unit(s) IntraMuscular every 12 hours  fluPHENAZine 10 milliGRAM(s) Oral daily  heparin   Injectable 5000 Unit(s) SubCutaneous every 12 hours  predniSONE   Tablet 40 milliGRAM(s) Oral daily  sodium chloride 0.9%. 1000 milliLiter(s) (100 mL/Hr) IV Continuous <Continuous>    MEDICATIONS  (PRN):  acetaminophen     Tablet .. 650 milliGRAM(s) Oral every 6 hours PRN Temp greater or equal to 38C (100.4F), Mild Pain (1 - 3)  aluminum hydroxide/magnesium hydroxide/simethicone Suspension 30 milliLiter(s) Oral every 4 hours PRN Dyspepsia  melatonin 3 milliGRAM(s) Oral at bedtime PRN Insomnia  ondansetron Injectable 4 milliGRAM(s) IV Push every 8 hours PRN Nausea and/or Vomiting      Allergies  allopurinol (Rash (Moderate))      Review of Systems  General:  Denies Fatigue, Denies Fever, Denies Weakness ,Denies Weight Loss   HEENT: Denies Trouble Swallowing ,Denies  Sore Throat , Denies Change in hearing/vision/speech ,Denies Dizziness    Cardio: Denies  Chest Pain , Palpitations    Respiratory: Denies worsening of SOB, Denies Cough  Abdomen: See detailed HPI  Neuro: Denies Headache Denies Dizziness, Denies Paresthesias  MSK: Denies pain in Bones/Joints/Muscles   Psych: Patient denies depression, denies suicidal or homicidal ideations  Integ: Patient Denies rash, or new skin lesions       Vital Signs Last 24 Hrs  T(C): 36.6 (18 Apr 2023 14:44), Max: 36.6 (18 Apr 2023 14:44)  T(F): 97.9 (18 Apr 2023 14:44), Max: 97.9 (18 Apr 2023 14:44)  HR: 53 (18 Apr 2023 14:44) (53 - 62)  BP: 112/55 (18 Apr 2023 14:44) (93/51 - 119/57)  BP(mean): --  RR: 18 (18 Apr 2023 14:44) (18 - 18)  SpO2: 97% (18 Apr 2023 07:54) (97% - 97%)    Physical Exam  Gen: NAD  Head: NC/AT, no visible deformity  ENT: PERRLA, Sclera  , PERRLA Mucosal Membranes  Cardio: S1/S2 No S3/S4, Regular  Resp: CTA B/L  Abdomen: Soft, ND/NT  Neuro: AAOx3  Extremities: FROM x 4  Skin: No jaundice, no excoriation       Labs:                          9.6    7.68  )-----------( 162      ( 18 Apr 2023 07:40 )             29.5       Auto Neutrophil %: 76.5 % (04-18-23 @ 07:40)  Auto Immature Granulocyte %: 0.7 % (04-18-23 @ 07:40)    04-18    141  |  114<H>  |  49<H>  ----------------------------<  86  4.2   |  19  |  2.4<H>      Calcium, Total Serum: 10.9 mg/dL (04-18-23 @ 07:40)      LFTs:             4.3  | 0.3  | 8        ------------------[138     ( 18 Apr 2023 07:40 )  3.2  | x    | 9

## 2023-04-19 LAB
ALBUMIN SERPL ELPH-MCNC: 3 G/DL — LOW (ref 3.5–5.2)
ALP SERPL-CCNC: 123 U/L — HIGH (ref 30–115)
ALT FLD-CCNC: 10 U/L — SIGNIFICANT CHANGE UP (ref 0–41)
ANION GAP SERPL CALC-SCNC: 11 MMOL/L — SIGNIFICANT CHANGE UP (ref 7–14)
AST SERPL-CCNC: 9 U/L — SIGNIFICANT CHANGE UP (ref 0–41)
BASOPHILS # BLD AUTO: 0.03 K/UL — SIGNIFICANT CHANGE UP (ref 0–0.2)
BASOPHILS NFR BLD AUTO: 0.4 % — SIGNIFICANT CHANGE UP (ref 0–1)
BILIRUB SERPL-MCNC: 0.3 MG/DL — SIGNIFICANT CHANGE UP (ref 0.2–1.2)
BUN SERPL-MCNC: 43 MG/DL — HIGH (ref 10–20)
CALCIUM SERPL-MCNC: 9.8 MG/DL — SIGNIFICANT CHANGE UP (ref 8.4–10.5)
CHLORIDE SERPL-SCNC: 116 MMOL/L — HIGH (ref 98–110)
CO2 SERPL-SCNC: 18 MMOL/L — SIGNIFICANT CHANGE UP (ref 17–32)
CREAT SERPL-MCNC: 2.4 MG/DL — HIGH (ref 0.7–1.5)
EGFR: 29 ML/MIN/1.73M2 — LOW
EOSINOPHIL # BLD AUTO: 0.26 K/UL — SIGNIFICANT CHANGE UP (ref 0–0.7)
EOSINOPHIL NFR BLD AUTO: 3.7 % — SIGNIFICANT CHANGE UP (ref 0–8)
GLUCOSE SERPL-MCNC: 95 MG/DL — SIGNIFICANT CHANGE UP (ref 70–99)
HCT VFR BLD CALC: 29.2 % — LOW (ref 42–52)
HGB BLD-MCNC: 9.5 G/DL — LOW (ref 14–18)
IMM GRANULOCYTES NFR BLD AUTO: 0.4 % — HIGH (ref 0.1–0.3)
LDH SERPL L TO P-CCNC: 96 U/L — SIGNIFICANT CHANGE UP (ref 50–242)
LYMPHOCYTES # BLD AUTO: 0.61 K/UL — LOW (ref 1.2–3.4)
LYMPHOCYTES # BLD AUTO: 8.7 % — LOW (ref 20.5–51.1)
MAGNESIUM SERPL-MCNC: 2.1 MG/DL — SIGNIFICANT CHANGE UP (ref 1.8–2.4)
MCHC RBC-ENTMCNC: 29.4 PG — SIGNIFICANT CHANGE UP (ref 27–31)
MCHC RBC-ENTMCNC: 32.5 G/DL — SIGNIFICANT CHANGE UP (ref 32–37)
MCV RBC AUTO: 90.4 FL — SIGNIFICANT CHANGE UP (ref 80–94)
MONOCYTES # BLD AUTO: 0.77 K/UL — HIGH (ref 0.1–0.6)
MONOCYTES NFR BLD AUTO: 11 % — HIGH (ref 1.7–9.3)
NEUTROPHILS # BLD AUTO: 5.31 K/UL — SIGNIFICANT CHANGE UP (ref 1.4–6.5)
NEUTROPHILS NFR BLD AUTO: 75.8 % — HIGH (ref 42.2–75.2)
NRBC # BLD: 0 /100 WBCS — SIGNIFICANT CHANGE UP (ref 0–0)
PHOSPHATE SERPL-MCNC: 4.2 MG/DL — SIGNIFICANT CHANGE UP (ref 2.1–4.9)
PLATELET # BLD AUTO: 161 K/UL — SIGNIFICANT CHANGE UP (ref 130–400)
PMV BLD: 9.8 FL — SIGNIFICANT CHANGE UP (ref 7.4–10.4)
POTASSIUM SERPL-MCNC: 4.2 MMOL/L — SIGNIFICANT CHANGE UP (ref 3.5–5)
POTASSIUM SERPL-SCNC: 4.2 MMOL/L — SIGNIFICANT CHANGE UP (ref 3.5–5)
PROT SERPL-MCNC: 4.2 G/DL — LOW (ref 6–8)
RBC # BLD: 3.23 M/UL — LOW (ref 4.7–6.1)
RBC # FLD: 15.2 % — HIGH (ref 11.5–14.5)
SODIUM SERPL-SCNC: 145 MMOL/L — SIGNIFICANT CHANGE UP (ref 135–146)
URATE SERPL-MCNC: 7 MG/DL — SIGNIFICANT CHANGE UP (ref 3.4–8.8)
URATE SERPL-MCNC: 7 MG/DL — SIGNIFICANT CHANGE UP (ref 3.4–8.8)
WBC # BLD: 7.01 K/UL — SIGNIFICANT CHANGE UP (ref 4.8–10.8)
WBC # FLD AUTO: 7.01 K/UL — SIGNIFICANT CHANGE UP (ref 4.8–10.8)

## 2023-04-19 PROCEDURE — 99233 SBSQ HOSP IP/OBS HIGH 50: CPT

## 2023-04-19 RX ORDER — CALCITONIN SALMON 200 [IU]/ML
200 INJECTION, SOLUTION INTRAMUSCULAR EVERY 12 HOURS
Refills: 0 | Status: DISCONTINUED | OUTPATIENT
Start: 2023-04-19 | End: 2023-04-20

## 2023-04-19 RX ORDER — SOD SULF/SODIUM/NAHCO3/KCL/PEG
4000 SOLUTION, RECONSTITUTED, ORAL ORAL ONCE
Refills: 0 | Status: COMPLETED | OUTPATIENT
Start: 2023-04-20 | End: 2023-04-20

## 2023-04-19 RX ORDER — SOD SULF/SODIUM/NAHCO3/KCL/PEG
4000 SOLUTION, RECONSTITUTED, ORAL ORAL ONCE
Refills: 0 | Status: DISCONTINUED | OUTPATIENT
Start: 2023-04-19 | End: 2023-04-19

## 2023-04-19 RX ORDER — DIPHENHYDRAMINE HCL 50 MG
25 CAPSULE ORAL ONCE
Refills: 0 | Status: COMPLETED | OUTPATIENT
Start: 2023-04-19 | End: 2023-04-19

## 2023-04-19 RX ORDER — FUROSEMIDE 40 MG
40 TABLET ORAL ONCE
Refills: 0 | Status: COMPLETED | OUTPATIENT
Start: 2023-04-19 | End: 2023-04-19

## 2023-04-19 RX ORDER — CALCITONIN SALMON 200 [IU]/ML
200 INJECTION, SOLUTION INTRAMUSCULAR EVERY 12 HOURS
Refills: 0 | Status: COMPLETED | OUTPATIENT
Start: 2023-04-19 | End: 2023-04-19

## 2023-04-19 RX ADMIN — Medication 25 MILLIGRAM(S): at 04:51

## 2023-04-19 RX ADMIN — CALCITONIN SALMON 200 INTERNATIONAL UNIT(S): 200 INJECTION, SOLUTION INTRAMUSCULAR at 09:44

## 2023-04-19 RX ADMIN — Medication 40 MILLIGRAM(S): at 09:43

## 2023-04-19 RX ADMIN — Medication 2 MILLIGRAM(S): at 22:00

## 2023-04-19 RX ADMIN — HEPARIN SODIUM 5000 UNIT(S): 5000 INJECTION INTRAVENOUS; SUBCUTANEOUS at 17:07

## 2023-04-19 RX ADMIN — Medication 40 MILLIGRAM(S): at 05:00

## 2023-04-19 RX ADMIN — CALCITONIN SALMON 200 INTERNATIONAL UNIT(S): 200 INJECTION, SOLUTION INTRAMUSCULAR at 17:06

## 2023-04-19 RX ADMIN — HEPARIN SODIUM 5000 UNIT(S): 5000 INJECTION INTRAVENOUS; SUBCUTANEOUS at 05:00

## 2023-04-19 RX ADMIN — Medication 25 MILLIGRAM(S): at 22:48

## 2023-04-19 RX ADMIN — FLUPHENAZINE HYDROCHLORIDE 10 MILLIGRAM(S): 1 TABLET, FILM COATED ORAL at 22:01

## 2023-04-19 NOTE — PROGRESS NOTE ADULT - SUBJECTIVE AND OBJECTIVE BOX
Nephrology progress note   no complaints      ON events/Subjective:     Allergies:  allopurinol (Rash (Moderate))    Hospital Medications:   MEDICATIONS  (STANDING):  benztropine 2 milliGRAM(s) Oral at bedtime  calcitonin Injectable 200 International Unit(s) IntraMuscular every 12 hours  fluPHENAZine 10 milliGRAM(s) Oral daily  heparin   Injectable 5000 Unit(s) SubCutaneous every 12 hours  predniSONE   Tablet 40 milliGRAM(s) Oral daily  sodium chloride 0.9%. 1000 milliLiter(s) (100 mL/Hr) IV Continuous <Continuous>    REVIEW OF SYSTEMS:  CONSTITUTIONAL: No weakness, fevers or chills  EYES/ENT: No visual changes;  No vertigo or throat pain   NECK: No pain or stiffness  RESPIRATORY: No cough, wheezing, hemoptysis; No shortness of breath  CARDIOVASCULAR: No chest pain or palpitations.  GASTROINTESTINAL: No abdominal or epigastric pain. No nausea, vomiting, or hematemesis; No diarrhea or constipation. No melena or hematochezia.  GENITOURINARY: No dysuria, frequency, foamy urine, urinary urgency, incontinence or hematuria  NEUROLOGICAL: No numbness or weakness  SKIN: No itching, burning, rashes, or lesions   VASCULAR: No bilateral lower extremity edema.   All other review of systems is negative unless indicated above.    VITALS:  T(F): 96.9 (04-19-23 @ 04:51), Max: 97.9 (04-18-23 @ 14:44)  HR: 64 (04-19-23 @ 09:42)  BP: 99/57 (04-19-23 @ 09:42)  RR: 18 (04-19-23 @ 04:51)  SpO2: --  Wt(kg): --      PHYSICAL EXAM:  Constitutional: NAD  HEENT: anicteric sclera, oropharynx clear, MMM  Neck: No JVD  Respiratory: CTAB, no wheezes, rales or rhonchi  Cardiovascular: S1, S2, RRR  Gastrointestinal: BS+, soft, NT/ND  Extremities: No cyanosis or clubbing. No peripheral edema  Neurological: A/O x 3, no focal deficits  Psychiatric: Normal mood, normal affect  : No CVA tenderness. No zafar.   Skin: No rashes  Vascular Access:    LABS:  04-19    145  |  116<H>  |  43<H>  ----------------------------<  95  4.2   |  18  |  2.4<H>    Ca    9.8      19 Apr 2023 07:35  Phos  4.2     04-19  Mg     2.1     04-19    TPro  4.2<L>  /  Alb  3.0<L>  /  TBili  0.3  /  DBili      /  AST  9   /  ALT  10  /  AlkPhos  123<H>  04-19                          9.5    7.01  )-----------( 161      ( 19 Apr 2023 07:35 )             29.2       Urine Studies:  Creatinine Trend: 2.4<--, 2.4<--, 2.8<--, 2.9<--, 3.0<--, 2.9<--    ·hypercalcemia - suspect recurrence of lymphoma - improved  ·ASHLEY/CKD likely from hypercalcemia improved and closer to baseline  ·hemodynamics stable  ·anemia  ·rash with allopurinol    1) ? if received prolia (to d/w medicine) as calcium level improved  2) consider continuing NS at 100 cc/hr especially as pt will not eat much at this time due to his personal concern about proper bowel prep  3) colonscopy 4/21/23  4) heme onc fu - PET scan as ouyt pt and possible biopsy of LN during this admission  5) am labs include mg and phos

## 2023-04-19 NOTE — PROGRESS NOTE ADULT - ASSESSMENT
67y M pmh Schizophrenia (prior lithium use), nephrolithiasis c/b atrophic L kidney, R ureteral rivision, CKD4 (bsl Cr ~2.5), gout, chronic diarrhea, lymphoma presents to the ED for worsening hypercalcemia admitted for malignant hypercalcemia in setting of suspected lymphoma    # Hypercalcemia prob 2/2 follicular lymphoma  Lt groin LN bx 10/2019 = follicular lymphoma  supposedly had 2 cycles of chemo, did not ely and did f/u w/ onc  h/o noted: outpt PET    mild incr AP prob related to bone related to high Ca  no obvious evid for MM: SPEP: hypogammaglob, K/L light chain ratio nl (w/u neg)  doubt sarcoid: ACE nl  iPTH 8 (approp suppressed); Phos nl;  f/u PTHrP (will likely be nl)  TSH 2.27  Vit D 1,25 diOH = 68.3 (nl); Vit D 25 OH = 16 c/w VIt D Def - DO NOT TX  renal noted  decr Pred 20mg po q24 - need plan for steroids from h/o  Uric Acid 10.2 -> developed rash to allopurinol; no further tx for now - to d/w h/o  IVFs: HOLD for mild SOB and feeling of vol overload (s/p lasix 40mg iv x1 - improved symptoms)  hold off on aredia for now given CKD and only mild Ca symptoms  c/w calcitonin 200 IU IM q12 - to Fri    # CKD 4; no ASHLEY; Anemia of chronic kidney disease   follows Dr. Karen kenney     # recent [facility] at [facility] showing cecal mass and LAD  I spoke w/ Dr Green, she checked [facility] records, there is nothing at [facility] re c-scope or path after 2016  pt says GI is Dr Nieto  pt says he had c-scope about 3 mo ago (I guess Ca was OK then?)  pt says mass is in cecum; he thinks size was 7-10cm (other noted say 3cm)  pt says path showed TV adenoma  f/u [facility] for colonoscopic rxn (EMR) - FRI; will need prep TH; NPO p MN Th; gentle IVFs Fri AM  if jeanette Ca remains < 11.5, could do anesth for scope  f/u [facility] (CT): no obvious cecal mass  f/u Dr Nieto's office: computers are down all week and data cannot be retrieved     # Hx Schizophrenia   c/w benztropine 2mg qhs, fluphenazine 10mg qd  had kidney stones w/ risperdal he said  c/w melatonin    # Hx Gout  allergic to allopurinol = rash  uric acid is >10  f/u w/ h/o  can see rheum as outpt    # Hx chronic diarrhea  could be from cecal polyp?  no diarrhea now    # old H Zoster scarring on rt chest/back w/ hypersens  pt did not want any tx so far, but I did offer lido patch and/or neurontin    # DVT Prophylaxis: heparin 5000 12 - will hold FRI AM    # GI Prophylaxis: none    # Activity: as ely    # Code Status: Full    Dispo: tx Ca++; f/u h/o & renal; d/c IVFs; obtain outside records; f/u GI re EMR Fri (prep Th; NPO p MN Th; IVFs Fri AM)  eventually, pt will go home w/ no needs - still acute

## 2023-04-19 NOTE — CHART NOTE - NSCHARTNOTEFT_GEN_A_CORE
- will plan for colonoscopy w/ EMR on 4/21 Friday  - Please correct electrolytes (Target Na = 135-145 | Mg = 1.7-2.2 | K = 3.5-5)  - Please correct INR to <1.5  - Please target Hb  >8  - Please ensure there is one set of CBC BMP and Coagulation profile day prior to procedure and all labs to be optimized the day prior to procedure  - D/C AM Labs unless clinically required  - NPO after midnight Thursday night    Prep Instructions  - 2 Days Before Procedure - Please ensure patient is on LOW RESIDUE DIET    - 1 Day Before Procedure - Please ensure patient is on CLEAR LIQUID DIET and ORDER bowel prep - 1 Gallon of Golytely, dulcolax 20mg at night     - Night Before Procedure - Please keep patient NPO after midnight    - Day of Procedure - Please call [phone] at 7am to discuss quality of bowel movements and possible additional prep instructions. (Goal for bowel movement: clear watery stools with minimial blood or brown stools)

## 2023-04-19 NOTE — PROGRESS NOTE ADULT - SUBJECTIVE AND OBJECTIVE BOX
ELDA COVARRUBIAS  67y  Male  ***My note supersedes ALL resident notes that I sign.  My corrections for their notes are in my note.***    I can be reached directly on [email or web address]. My office number is [phone]. My personal cell number is [phone].    INTERVAL EVENTS: Here for f/u of calcium. Pt doing OK. Feels fine. Agrees to c-scope Fri. Pt felt vol overloaded from all the IVFs. He responded well to iv lasix and felt much better after that.    T(F): 98.6 (04-19-23 @ 14:28), Max: 98.6 (04-19-23 @ 14:28)  HR: 62 (04-19-23 @ 14:28) (62 - 78)  BP: 108/55 (04-19-23 @ 14:28) (90/52 - 126/61)  RR: 18 (04-19-23 @ 14:28) (18 - 18)  SpO2: --    Gen: NAD  skin: rt back: skin graft from prior zoster  rt chest: several keloids seen up to midline (healed scars from prior zoster)  hypopigmentation is seen in zoster distribution from midline ant to mid line post, wrapping around to the right  the entire old zoster area is sensitive to the touch  HEENT: PERRL, EOMI, mouth clr, nose clr  Neck: no nodes, no JVD, thyroid nl  lungs: clr  hrt: s1 s2 rrr no murmur  abd: soft, NT/ND, no HS megaly  ext: no edema, no c/c  neuro: aa ox3, cn intact, can move all 4 ext    LABS:                      9.5     (    90.4   7.01  )-----------( ---------      161      ( 19 Apr 2023 07:35 )             29.2    (    15.2     145   (   116   (   95      04-19-23 @ 07:35  ----------------------               4.2   (   18   (   43                             -----                        2.4  Ca  9.8 = jeaentte 10.6 - better  Mg  2.1    P   4.2     LFT  4.2  (  0.3  (  9       04-19-23 @ 07:35  -------------------------  3.0  (  123  (  10    RADIOLOGY & ADDITIONAL TESTS:    MEDICATIONS:    acetaminophen     Tablet .. 650 milliGRAM(s) Oral every 6 hours PRN  aluminum hydroxide/magnesium hydroxide/simethicone Suspension 30 milliLiter(s) Oral every 4 hours PRN  benztropine 2 milliGRAM(s) Oral at bedtime  fluPHENAZine 10 milliGRAM(s) Oral daily  heparin   Injectable 5000 Unit(s) SubCutaneous every 12 hours  melatonin 3 milliGRAM(s) Oral at bedtime PRN  ondansetron Injectable 4 milliGRAM(s) IV Push every 8 hours PRN  predniSONE   Tablet 40 milliGRAM(s) Oral daily  sodium chloride 0.9%. 1000 milliLiter(s) IV Continuous <Continuous>

## 2023-04-20 LAB
ALBUMIN SERPL ELPH-MCNC: 3.5 G/DL — SIGNIFICANT CHANGE UP (ref 3.5–5.2)
ALBUMIN SERPL ELPH-MCNC: 3.8 G/DL — SIGNIFICANT CHANGE UP (ref 3.5–5.2)
ALP SERPL-CCNC: 130 U/L — HIGH (ref 30–115)
ALP SERPL-CCNC: 153 U/L — HIGH (ref 30–115)
ALT FLD-CCNC: 11 U/L — SIGNIFICANT CHANGE UP (ref 0–41)
ALT FLD-CCNC: 12 U/L — SIGNIFICANT CHANGE UP (ref 0–41)
ANION GAP SERPL CALC-SCNC: 10 MMOL/L — SIGNIFICANT CHANGE UP (ref 7–14)
ANION GAP SERPL CALC-SCNC: 15 MMOL/L — HIGH (ref 7–14)
APTT BLD: 27.8 SEC — SIGNIFICANT CHANGE UP (ref 27–39.2)
AST SERPL-CCNC: 10 U/L — SIGNIFICANT CHANGE UP (ref 0–41)
AST SERPL-CCNC: 11 U/L — SIGNIFICANT CHANGE UP (ref 0–41)
BASOPHILS # BLD AUTO: 0.02 K/UL — SIGNIFICANT CHANGE UP (ref 0–0.2)
BASOPHILS # BLD AUTO: 0.05 K/UL — SIGNIFICANT CHANGE UP (ref 0–0.2)
BASOPHILS NFR BLD AUTO: 0.2 % — SIGNIFICANT CHANGE UP (ref 0–1)
BASOPHILS NFR BLD AUTO: 0.5 % — SIGNIFICANT CHANGE UP (ref 0–1)
BILIRUB SERPL-MCNC: 0.4 MG/DL — SIGNIFICANT CHANGE UP (ref 0.2–1.2)
BILIRUB SERPL-MCNC: 0.8 MG/DL — SIGNIFICANT CHANGE UP (ref 0.2–1.2)
BLD GP AB SCN SERPL QL: SIGNIFICANT CHANGE UP
BUN SERPL-MCNC: 38 MG/DL — HIGH (ref 10–20)
BUN SERPL-MCNC: 43 MG/DL — HIGH (ref 10–20)
CALCIUM SERPL-MCNC: 9.8 MG/DL — SIGNIFICANT CHANGE UP (ref 8.4–10.4)
CALCIUM SERPL-MCNC: 9.8 MG/DL — SIGNIFICANT CHANGE UP (ref 8.4–10.5)
CHLORIDE SERPL-SCNC: 107 MMOL/L — SIGNIFICANT CHANGE UP (ref 98–110)
CHLORIDE SERPL-SCNC: 110 MMOL/L — SIGNIFICANT CHANGE UP (ref 98–110)
CO2 SERPL-SCNC: 17 MMOL/L — SIGNIFICANT CHANGE UP (ref 17–32)
CO2 SERPL-SCNC: 21 MMOL/L — SIGNIFICANT CHANGE UP (ref 17–32)
CREAT SERPL-MCNC: 2.3 MG/DL — HIGH (ref 0.7–1.5)
CREAT SERPL-MCNC: 2.4 MG/DL — HIGH (ref 0.7–1.5)
EGFR: 29 ML/MIN/1.73M2 — LOW
EGFR: 31 ML/MIN/1.73M2 — LOW
EOSINOPHIL # BLD AUTO: 0.4 K/UL — SIGNIFICANT CHANGE UP (ref 0–0.7)
EOSINOPHIL # BLD AUTO: 1.19 K/UL — HIGH (ref 0–0.7)
EOSINOPHIL NFR BLD AUTO: 12.8 % — HIGH (ref 0–8)
EOSINOPHIL NFR BLD AUTO: 4.1 % — SIGNIFICANT CHANGE UP (ref 0–8)
GLUCOSE SERPL-MCNC: 78 MG/DL — SIGNIFICANT CHANGE UP (ref 70–99)
GLUCOSE SERPL-MCNC: 85 MG/DL — SIGNIFICANT CHANGE UP (ref 70–99)
HCT VFR BLD CALC: 32.7 % — LOW (ref 42–52)
HCT VFR BLD CALC: 38 % — LOW (ref 42–52)
HGB BLD-MCNC: 10.7 G/DL — LOW (ref 14–18)
HGB BLD-MCNC: 12.3 G/DL — LOW (ref 14–18)
IMM GRANULOCYTES NFR BLD AUTO: 0.8 % — HIGH (ref 0.1–0.3)
IMM GRANULOCYTES NFR BLD AUTO: 0.9 % — HIGH (ref 0.1–0.3)
INR BLD: 1.01 RATIO — SIGNIFICANT CHANGE UP (ref 0.65–1.3)
LDH SERPL L TO P-CCNC: 118 U/L — SIGNIFICANT CHANGE UP (ref 50–242)
LYMPHOCYTES # BLD AUTO: 0.72 K/UL — LOW (ref 1.2–3.4)
LYMPHOCYTES # BLD AUTO: 0.86 K/UL — LOW (ref 1.2–3.4)
LYMPHOCYTES # BLD AUTO: 7.3 % — LOW (ref 20.5–51.1)
LYMPHOCYTES # BLD AUTO: 9.2 % — LOW (ref 20.5–51.1)
MAGNESIUM SERPL-MCNC: 1.9 MG/DL — SIGNIFICANT CHANGE UP (ref 1.8–2.4)
MAGNESIUM SERPL-MCNC: 2 MG/DL — SIGNIFICANT CHANGE UP (ref 1.8–2.4)
MCHC RBC-ENTMCNC: 28.5 PG — SIGNIFICANT CHANGE UP (ref 27–31)
MCHC RBC-ENTMCNC: 28.9 PG — SIGNIFICANT CHANGE UP (ref 27–31)
MCHC RBC-ENTMCNC: 32.4 G/DL — SIGNIFICANT CHANGE UP (ref 32–37)
MCHC RBC-ENTMCNC: 32.7 G/DL — SIGNIFICANT CHANGE UP (ref 32–37)
MCV RBC AUTO: 88 FL — SIGNIFICANT CHANGE UP (ref 80–94)
MCV RBC AUTO: 88.4 FL — SIGNIFICANT CHANGE UP (ref 80–94)
MONOCYTES # BLD AUTO: 0.8 K/UL — HIGH (ref 0.1–0.6)
MONOCYTES # BLD AUTO: 0.84 K/UL — HIGH (ref 0.1–0.6)
MONOCYTES NFR BLD AUTO: 8.2 % — SIGNIFICANT CHANGE UP (ref 1.7–9.3)
MONOCYTES NFR BLD AUTO: 9 % — SIGNIFICANT CHANGE UP (ref 1.7–9.3)
NEUTROPHILS # BLD AUTO: 6.3 K/UL — SIGNIFICANT CHANGE UP (ref 1.4–6.5)
NEUTROPHILS # BLD AUTO: 7.77 K/UL — HIGH (ref 1.4–6.5)
NEUTROPHILS NFR BLD AUTO: 67.7 % — SIGNIFICANT CHANGE UP (ref 42.2–75.2)
NEUTROPHILS NFR BLD AUTO: 79.3 % — HIGH (ref 42.2–75.2)
NRBC # BLD: 0 /100 WBCS — SIGNIFICANT CHANGE UP (ref 0–0)
NRBC # BLD: 0 /100 WBCS — SIGNIFICANT CHANGE UP (ref 0–0)
PHOSPHATE SERPL-MCNC: 3.8 MG/DL — SIGNIFICANT CHANGE UP (ref 2.1–4.9)
PLATELET # BLD AUTO: 210 K/UL — SIGNIFICANT CHANGE UP (ref 130–400)
PLATELET # BLD AUTO: 236 K/UL — SIGNIFICANT CHANGE UP (ref 130–400)
PMV BLD: 9.7 FL — SIGNIFICANT CHANGE UP (ref 7.4–10.4)
PMV BLD: 9.9 FL — SIGNIFICANT CHANGE UP (ref 7.4–10.4)
POTASSIUM SERPL-MCNC: 3.9 MMOL/L — SIGNIFICANT CHANGE UP (ref 3.5–5)
POTASSIUM SERPL-MCNC: 4.5 MMOL/L — SIGNIFICANT CHANGE UP (ref 3.5–5)
POTASSIUM SERPL-SCNC: 3.9 MMOL/L — SIGNIFICANT CHANGE UP (ref 3.5–5)
POTASSIUM SERPL-SCNC: 4.5 MMOL/L — SIGNIFICANT CHANGE UP (ref 3.5–5)
PROT SERPL-MCNC: 4.7 G/DL — LOW (ref 6–8)
PROT SERPL-MCNC: 5.3 G/DL — LOW (ref 6–8)
PROTHROM AB SERPL-ACNC: 11.5 SEC — SIGNIFICANT CHANGE UP (ref 9.95–12.87)
RBC # BLD: 3.7 M/UL — LOW (ref 4.7–6.1)
RBC # BLD: 4.32 M/UL — LOW (ref 4.7–6.1)
RBC # FLD: 14.6 % — HIGH (ref 11.5–14.5)
RBC # FLD: 14.9 % — HIGH (ref 11.5–14.5)
SODIUM SERPL-SCNC: 139 MMOL/L — SIGNIFICANT CHANGE UP (ref 135–146)
SODIUM SERPL-SCNC: 141 MMOL/L — SIGNIFICANT CHANGE UP (ref 135–146)
URATE SERPL-MCNC: 7.1 MG/DL — SIGNIFICANT CHANGE UP (ref 3.4–8.8)
WBC # BLD: 9.31 K/UL — SIGNIFICANT CHANGE UP (ref 4.8–10.8)
WBC # BLD: 9.8 K/UL — SIGNIFICANT CHANGE UP (ref 4.8–10.8)
WBC # FLD AUTO: 9.31 K/UL — SIGNIFICANT CHANGE UP (ref 4.8–10.8)
WBC # FLD AUTO: 9.8 K/UL — SIGNIFICANT CHANGE UP (ref 4.8–10.8)

## 2023-04-20 PROCEDURE — 99233 SBSQ HOSP IP/OBS HIGH 50: CPT

## 2023-04-20 PROCEDURE — 99232 SBSQ HOSP IP/OBS MODERATE 35: CPT

## 2023-04-20 RX ORDER — DIPHENHYDRAMINE HCL 50 MG
25 CAPSULE ORAL EVERY 4 HOURS
Refills: 0 | Status: DISCONTINUED | OUTPATIENT
Start: 2023-04-20 | End: 2023-04-22

## 2023-04-20 RX ORDER — CALCITONIN SALMON 200 [IU]/ML
200 INJECTION, SOLUTION INTRAMUSCULAR EVERY 12 HOURS
Refills: 0 | Status: COMPLETED | OUTPATIENT
Start: 2023-04-20 | End: 2023-04-20

## 2023-04-20 RX ORDER — SODIUM CHLORIDE 9 MG/ML
1000 INJECTION, SOLUTION INTRAVENOUS
Refills: 0 | Status: DISCONTINUED | OUTPATIENT
Start: 2023-04-20 | End: 2023-04-22

## 2023-04-20 RX ADMIN — Medication 20 MILLIGRAM(S): at 06:28

## 2023-04-20 RX ADMIN — SODIUM CHLORIDE 50 MILLILITER(S): 9 INJECTION, SOLUTION INTRAVENOUS at 11:33

## 2023-04-20 RX ADMIN — Medication 2 MILLIGRAM(S): at 21:20

## 2023-04-20 RX ADMIN — CALCITONIN SALMON 200 INTERNATIONAL UNIT(S): 200 INJECTION, SOLUTION INTRAMUSCULAR at 17:04

## 2023-04-20 RX ADMIN — FLUPHENAZINE HYDROCHLORIDE 10 MILLIGRAM(S): 1 TABLET, FILM COATED ORAL at 21:20

## 2023-04-20 RX ADMIN — Medication 4000 MILLILITER(S): at 11:14

## 2023-04-20 RX ADMIN — CALCITONIN SALMON 200 INTERNATIONAL UNIT(S): 200 INJECTION, SOLUTION INTRAMUSCULAR at 06:27

## 2023-04-20 RX ADMIN — Medication 20 MILLIGRAM(S): at 21:20

## 2023-04-20 RX ADMIN — Medication 25 MILLIGRAM(S): at 21:20

## 2023-04-20 RX ADMIN — HEPARIN SODIUM 5000 UNIT(S): 5000 INJECTION INTRAVENOUS; SUBCUTANEOUS at 06:27

## 2023-04-20 NOTE — PROGRESS NOTE ADULT - SUBJECTIVE AND OBJECTIVE BOX
ELDA COVARRUBIAS 67y Male  MRN#: [id number]     SUBJECTIVE  Patient is a 67y old Male who presents with a chief complaint of Hypercalcemia (20 Apr 2023 13:58)      Today is hospital day 6d, and this morning he is lying in bed without distress.   No acute overnight events.     OBJECTIVE  PAST MEDICAL & SURGICAL HISTORY  Kidney stones    Schizophrenia    Lymphoma    Shingles    Atrophic kidney    Retained urethral stent    H/O umbilical hernia repair    Elbow fracture    H/O cystoscopy      ALLERGIES:  allopurinol (Rash (Moderate))    MEDICATIONS:  STANDING MEDICATIONS  benztropine 2 milliGRAM(s) Oral at bedtime  bisacodyl 20 milliGRAM(s) Oral at bedtime  calcitonin Injectable 200 International Unit(s) IntraMuscular every 12 hours  fluPHENAZine 10 milliGRAM(s) Oral daily  predniSONE   Tablet 20 milliGRAM(s) Oral daily  sodium chloride 0.45%. 1000 milliLiter(s) IV Continuous <Continuous>    PRN MEDICATIONS  acetaminophen     Tablet .. 650 milliGRAM(s) Oral every 6 hours PRN  aluminum hydroxide/magnesium hydroxide/simethicone Suspension 30 milliLiter(s) Oral every 4 hours PRN  diphenhydrAMINE 25 milliGRAM(s) Oral every 4 hours PRN  melatonin 3 milliGRAM(s) Oral at bedtime PRN  ondansetron Injectable 4 milliGRAM(s) IV Push every 8 hours PRN    HOME MEDICATIONS  Home Medications:  benztropine 2 mg oral tablet: 1 tab(s) orally once a day (at bedtime) (10 Feb 2020 21:10)  fluPHENAZine 10 mg oral tablet: 1 tab(s) orally once a day (10 Feb 2020 21:10)      VITAL SIGNS: Last 24 Hours  T(C): 36.2 (20 Apr 2023 05:18), Max: 36.3 (19 Apr 2023 19:55)  T(F): 97.1 (20 Apr 2023 05:18), Max: 97.3 (19 Apr 2023 19:55)  HR: 57 (20 Apr 2023 05:18) (57 - 66)  BP: 95/52 (20 Apr 2023 05:18) (95/52 - 110/64)  BP(mean): --  RR: 18 (20 Apr 2023 05:18) (18 - 18)  SpO2: --      LABS:                        10.7   9.31  )-----------( 210      ( 20 Apr 2023 05:52 )             32.7     04-20    141  |  110  |  43<H>  ----------------------------<  78  3.9   |  21  |  2.3<H>    Ca    9.8      20 Apr 2023 05:52  Phos  3.8     04-20  Mg     1.9     04-20    TPro  4.7<L>  /  Alb  3.5  /  TBili  0.4  /  DBili  x   /  AST  10  /  ALT  11  /  AlkPhos  130<H>  04-20    LIVER FUNCTIONS - ( 20 Apr 2023 05:52 )  Alb: 3.5 g/dL / Pro: 4.7 g/dL / ALK PHOS: 130 U/L / ALT: 11 U/L / AST: 10 U/L / GGT: x                         CAPILLARY BLOOD GLUCOSE        PHYSICAL EXAM:  PHYSICAL EXAM:  GENERAL: NAD, AAO x 3, 67y M  HEAD:  Atraumatic, Normocephalic  EYES: EOMI, conjunctivae clear and sclerae white  NECK: Supple, No JVD  CHEST/LUNG: Clear to auscultation bilaterally; No wheeze; No crackles; No accessory muscles used  HEART: Regular rate and rhythm; No murmurs;   ABDOMEN: Soft, Nontender, Nondistended; Bowel sounds present; No guarding  EXTREMITIES:  2+ Peripheral Pulses, No cyanosis or edema  Skin: mild macular noted on the body ? from allopurinol.    ADMISSION SUMMARY  Patient is a 67y old Male who presents with a chief complaint of Hypercalcemia (20 Apr 2023 13:58)    ELDA COVARRUBIAS 67y Male  MRN#: [id number]     SUBJECTIVE  Patient is a 67y old Male admitted for Hypercalcemia (20 Apr 2023 13:58)      Today is hospital day 6, and this morning he is lying in bed without distress.   No acute overnight events.   Able to ambulate freely.    OBJECTIVE  PAST MEDICAL & SURGICAL HISTORY  Kidney stones    Schizophrenia    Lymphoma    Shingles    Atrophic kidney    Retained urethral stent    H/O umbilical hernia repair    Elbow fracture    H/O cystoscopy      ALLERGIES:  allopurinol (Rash (Moderate))    MEDICATIONS:  STANDING MEDICATIONS  benztropine 2 milliGRAM(s) Oral at bedtime  bisacodyl 20 milliGRAM(s) Oral at bedtime  calcitonin Injectable 200 International Unit(s) IntraMuscular every 12 hours  fluPHENAZine 10 milliGRAM(s) Oral daily  predniSONE   Tablet 20 milliGRAM(s) Oral daily  sodium chloride 0.45%. 1000 milliLiter(s) IV Continuous <Continuous>    PRN MEDICATIONS  acetaminophen     Tablet .. 650 milliGRAM(s) Oral every 6 hours PRN  aluminum hydroxide/magnesium hydroxide/simethicone Suspension 30 milliLiter(s) Oral every 4 hours PRN  diphenhydrAMINE 25 milliGRAM(s) Oral every 4 hours PRN  melatonin 3 milliGRAM(s) Oral at bedtime PRN  ondansetron Injectable 4 milliGRAM(s) IV Push every 8 hours PRN    HOME MEDICATIONS  Home Medications:  benztropine 2 mg oral tablet: 1 tab(s) orally once a day (at bedtime) (10 Feb 2020 21:10)  fluPHENAZine 10 mg oral tablet: 1 tab(s) orally once a day (10 Feb 2020 21:10)      VITAL SIGNS: Last 24 Hours  T(C): 36.2 (20 Apr 2023 05:18), Max: 36.3 (19 Apr 2023 19:55)  T(F): 97.1 (20 Apr 2023 05:18), Max: 97.3 (19 Apr 2023 19:55)  HR: 57 (20 Apr 2023 05:18) (57 - 66)  BP: 95/52 (20 Apr 2023 05:18) (95/52 - 110/64)  BP(mean): --  RR: 18 (20 Apr 2023 05:18) (18 - 18)  SpO2: --      LABS:                        10.7   9.31  )-----------( 210      ( 20 Apr 2023 05:52 )             32.7     04-20    141  |  110  |  43<H>  ----------------------------<  78  3.9   |  21  |  2.3<H>    Ca    9.8      20 Apr 2023 05:52  Phos  3.8     04-20  Mg     1.9     04-20    TPro  4.7<L>  /  Alb  3.5  /  TBili  0.4  /  DBili  x   /  AST  10  /  ALT  11  /  AlkPhos  130<H>  04-20    LIVER FUNCTIONS - ( 20 Apr 2023 05:52 )  Alb: 3.5 g/dL / Pro: 4.7 g/dL / ALK PHOS: 130 U/L / ALT: 11 U/L / AST: 10 U/L / GGT: x                         CAPILLARY BLOOD GLUCOSE        PHYSICAL EXAM:  PHYSICAL EXAM:  GENERAL: NAD, AAO x 3, 67y M  HEAD:  Atraumatic, Normocephalic  EYES: EOMI, conjunctivae clear and sclerae white  NECK: Supple, No JVD  CHEST/LUNG: Clear to auscultation bilaterally; No wheeze; No crackles; No accessory muscles used  HEART: Regular rate and rhythm; No murmurs;   ABDOMEN: Soft, Nontender, Nondistended; Bowel sounds present; No guarding  EXTREMITIES:  2+ Peripheral Pulses, No cyanosis or edema  Skin: mild macular noted on the body ? From allopurinol.    ADMISSION SUMMARY  Patient is a 67y old Male who presents with a chief complaint of Hypercalcemia (20 Apr 2023 13:58)

## 2023-04-20 NOTE — PROGRESS NOTE ADULT - ASSESSMENT
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       Hematology and Oncology consulted given hx of Marginal Zone lymphoma now presenting with hypercalcemia.    #Severe Hypercalcemia most likely 2/2 malignancy  resolved  - Hypercalcemia 13.3 - corrected 16.2.  -s/p calcitonin and IV hydration.    #Hx of NHL marginal zone     -He was in a good partial remission following 3 cycles of bendamustine and Rituxan.   -He was unable to tolerate maintenance Rituxan.  -However, he was never in complete remission and had preferred just to be observed since we were dealing with a low grade lymphoma.  - was lost to follow up.  CT Abdomen/Pelvis 2/23/23  --interval worsening in periesophageal and pelvic lymphadenopathy , other bulky lymph nodes in the abdomen and retroperitoneum appear stable  --1.3 indeterminate segment 4 liver lesion  --focal areas of pleural nodularity concerning for lymphomatous/neoplastic/metastatic process, new since prior.    #Chronic diarrhea  He had colonoscopy, which revealed a tubulovillous adenoma.  -colonoscopy by GI on 4/21/23.      #CKD 4   #Hx of kidney stones   #Gout     Recommendations  -please consult IR for biopsy of of the  Lymph nodes.  - Multiple myeloma work-up (SPEP/UPEP, SFLC, serum immunofixation and serum immunoglobulins)  -will recs stopping prednisone.   - will monitor BMP as an out patient. If hypercalcemia recurs will consider bisphonates.  -If allopurinol cannot be used due to rash for elevated uric acid  and if tight regulation of uric acid level is required given hx of gout  can give a dose of rasburicase.   -follow up with Rheumatology regarding recs  given hx of Gout and mild hyperuricemia.   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     Hematology and Oncology consulted given hx of Marginal Zone lymphoma now presenting with hypercalcemia.    #Severe Hypercalcemia, most likely 2/2 malignancy,  resolved  - Hypercalcemia 13.3 - corrected 16.2.  - S/P calcitonin, steroids and IV hydration.    #Hx of NHL marginal zone     -He was in a good partial remission following 3 cycles of bendamustine and Rituxan.   -He was unable to tolerate maintenance Rituxan.  -However, he was never in complete remission and had preferred just to be observed since we were dealing with a low grade lymphoma.  - was lost to follow up.  CT Abdomen/Pelvis 2/23/23  --Interval worsening in periesophageal and pelvic lymphadenopathy , other bulky lymph nodes in the abdomen and retroperitoneum appear stable  --1.3 indeterminate segment 4 liver lesion  --Focal areas of pleural nodularity concerning for lymphomatous/neoplastic/metastatic process, new since prior.    #Chronic diarrhea  He had colonoscopy in the past which revealed a tubulovillous adenoma.  -Colonoscopy by GI on 4/21/23.      #CKD 4   #Hx of kidney stones   #Gout     Recommendations  - Please, consult IR for biopsy of one of the  lymph nodes to R/O transformation.  - Multiple myeloma work-up (SPEP/UPEP, SFLC, serum immunofixation and serum immunoglobulins)  - Will consider keeping him on low dose prednisone until he proceeds with chemotherapy when he makes up his mind and pathology clarified  - Will monitor BMP as an out patient. If hypercalcemia recurs will consider bisphosphonates.  - If allopurinol cannot be used due to rash for elevated uric acid  and if tight regulation of uric acid level is required given hx of gout  can give a dose of rasburicase.   - Follow up with Rheumatology regarding recommendations  given hx of Gout and mild hyperuricemia (at least partly secondary to his kidney disease).

## 2023-04-20 NOTE — PROGRESS NOTE ADULT - SUBJECTIVE AND OBJECTIVE BOX
ELDA COVARRUBIAS  67y  Male  ***My note supersedes ALL resident notes that I sign.  My corrections for their notes are in my note.***    I can be reached directly on [employer]. My office number is [phone]. My personal cell number is [phone].    INTERVAL EVENTS: Here for f/u of high Ca. Pt doing fine. Has full body rash from allopurinol, but not really bothering (no pain or itch; no throat/tongue swelling; no resp distress or wheeze).     T(F): 97.1 (04-20-23 @ 05:18), Max: 98.6 (04-19-23 @ 14:28)  HR: 57 (04-20-23 @ 05:18) (57 - 66)  BP: 95/52 (04-20-23 @ 05:18) (95/52 - 110/64)  RR: 18 (04-20-23 @ 05:18) (18 - 18)  SpO2: --    Gen: NAD  Skin: pt has whole-body allergic rash (red, macular, no hives/bullae, no scale); more on trunch  rt back: skin graft from prior zoster rxn  rt chest: several keloids seen anteriorly up to midline (healed scars from prior zoster)  hypopigmentation is seen in zoster distribution from midline ant to mid line post, wrapping around to the right  the entire old zoster area is sensitive to the touch  HEENT: PERRL, EOMI, mouth clr, nose clr  Neck: no nodes, no JVD, thyroid nl  lungs: clr  hrt: s1 s2 rrr no murmur  abd: soft, NT/ND, no HS megaly  ext: no edema, no c/c  neuro: aa ox3, cn intact, can move all 4 ext    LABS:                      10.7    (    88.4   9.31  )-----------( ---------      210      ( 20 Apr 2023 05:52 )             32.7    (    14.9     141   (   110   (   78      04-20-23 @ 05:52  ----------------------               3.9   (   21   (   43                             -----                        2.3  Ca  9.8 jeanette = 10.2  Mg  1.9    P   3.8     Creatinine:   2.3 (04-20 @ 05:52) - better  eGFR:  31    Creatinine:   2.4 (04-19 @ 07:35)  eGFR:  29    Creatinine:   2.4 (04-18 @ 07:40)  eGFR:  29    Creatinine:   2.8 (04-17 @ 08:20)  eGFR:  24    Creatinine:   2.9 (04-16 @ 07:55)  eGFR:  23      LFT  4.7  (  0.4  (  10       04-20-23 @ 05:52  -------------------------  3.5  (  130  (  11    RADIOLOGY & ADDITIONAL TESTS:    MEDICATIONS:    acetaminophen     Tablet .. 650 milliGRAM(s) Oral every 6 hours PRN  aluminum hydroxide/magnesium hydroxide/simethicone Suspension 30 milliLiter(s) Oral every 4 hours PRN  benztropine 2 milliGRAM(s) Oral at bedtime  bisacodyl 20 milliGRAM(s) Oral at bedtime  calcitonin Injectable 200 International Unit(s) IntraMuscular every 12 hours  diphenhydrAMINE 25 milliGRAM(s) Oral every 4 hours PRN  fluPHENAZine 10 milliGRAM(s) Oral daily  heparin   Injectable 5000 Unit(s) SubCutaneous every 12 hours  melatonin 3 milliGRAM(s) Oral at bedtime PRN  ondansetron Injectable 4 milliGRAM(s) IV Push every 8 hours PRN  predniSONE   Tablet 20 milliGRAM(s) Oral daily  sodium chloride 0.45%. 1000 milliLiter(s) IV Continuous <Continuous>

## 2023-04-20 NOTE — PROGRESS NOTE ADULT - SUBJECTIVE AND OBJECTIVE BOX
SUBJECTIVE / OVERNIGHT EVENTS  Patient slept well overnight. No acute complaints this AM. Patient does not report fevers, chills, CP, SOB, or n/v/d    MEDICATIONS  benztropine 2 milliGRAM(s) Oral at bedtime  bisacodyl 20 milliGRAM(s) Oral at bedtime  calcitonin Injectable 200 International Unit(s) IntraMuscular every 12 hours  fluPHENAZine 10 milliGRAM(s) Oral daily  heparin   Injectable 5000 Unit(s) SubCutaneous every 12 hours  predniSONE   Tablet 20 milliGRAM(s) Oral daily  sodium chloride 0.45%. 1000 milliLiter(s) IV Continuous <Continuous>    acetaminophen     Tablet .. 650 milliGRAM(s) Oral every 6 hours PRN Temp greater or equal to 38C (100.4F), Mild Pain (1 - 3)  aluminum hydroxide/magnesium hydroxide/simethicone Suspension 30 milliLiter(s) Oral every 4 hours PRN Dyspepsia  diphenhydrAMINE 25 milliGRAM(s) Oral every 4 hours PRN Rash and/or Itching  melatonin 3 milliGRAM(s) Oral at bedtime PRN Insomnia  ondansetron Injectable 4 milliGRAM(s) IV Push every 8 hours PRN Nausea and/or Vomiting    VITALS /  EXAM    T(C): 36.2 (04-20-23 @ 05:18), Max: 37 (04-19-23 @ 14:28)  HR: 57 (04-20-23 @ 05:18) (57 - 66)  BP: 95/52 (04-20-23 @ 05:18) (95/52 - 110/64)  RR: 18 (04-20-23 @ 05:18) (18 - 18)  SpO2: --    GENERAL: NAD, well-developed  CHEST/LUNG: Clear to auscultation bilaterally; No wheezes, rales or rhonchi  HEART: Regular rate and rhythm; No murmurs, rubs, or gallops  ABDOMEN: Soft, Nontender, Nondistended; Bowel sounds present, no masses.  EXTREMITIES:  2+ Peripheral Pulses, No clubbing, cyanosis, or edema    I's & O's     LABS             10.7   9.31  )-----------( 210      ( 04-20-23 @ 05:52 )             32.7     141  |  110  |  43  -------------------------<  78   04-20-23 @ 05:52  3.9  |  21  |  2.3    Ca      9.8     04-20-23 @ 05:52  Phos   3.8     04-20-23 @ 05:52  Mg     1.9     04-20-23 @ 05:52    TPro  4.7  /  Alb  3.5  /  TBili  0.4  /  DBili  x   /  AST  10  /  ALT  11  /  AlkPhos  130  /  GGT  x     04-20-23 @ 05:52                      MICRO / IMAGING / CARDIOLOGY  Telemetry: Reviewed   EKG: Reviewed    CULTURES    IMAGING    CARDIOLOGY

## 2023-04-20 NOTE — PROGRESS NOTE ADULT - ASSESSMENT
Patient is a 66 y/o male with PMHx of Schizophrenia (prior lithium use), nephrolithiasis c/b atrophic L kidney, R ureteral revision, CKD4 (bsl Cr ~2.5), gout, chronic diarrhea, lymphoma presents to the ED for worsening hypercalcemia. Patient was being set for C-scope with EMR of a TVA in the [place] which was noted on PET from 1/23. Patient feels better since admission. Planning colon with EMR tomorrow.       TVA of the [place]  - Previously followed by Dr Nieto  - Plan C-scope Friday with EMR  - Clear liquid diet Thursday- Golytely prep Thursday starting at 6pm- dulcolax 10mg 4pm and 8pm  - IV hydration   - Will follow

## 2023-04-20 NOTE — PROGRESS NOTE ADULT - ATTENDING COMMENTS
Calcium 12.3 ,  Per Nephro, recommending start Prednisone 40 qd  today. cont to f/u Hem/Onc
Patient also seen by myself. I agree with Dr. Dhulipalla's (Hem-Onc fellow) note above. Situation discussed with her and the patient.  All questions answered.

## 2023-04-20 NOTE — PROGRESS NOTE ADULT - ASSESSMENT
67y M pmh Schizophrenia (prior lithium use), nephrolithiasis c/b atrophic L kidney, R ureteral rivision, CKD4 (bsl Cr ~2.5), gout, chronic diarrhea, lymphoma presents to the ED for worsening hypercalcemia admitted for malignant hypercalcemia in setting of suspected lymphoma    # Hypercalcemia prob 2/2 follicular lymphoma  Lt groin LN bx 10/2019 = follicular lymphoma  supposedly had 2 cycles of chemo, did not ely and did f/u w/ onc  h/o noted: outpt PET    mild incr AP prob related to bone related to high Ca  no obvious evid for MM: SPEP: hypogammaglob, K/L light chain ratio nl (w/u neg)  doubt sarcoid: ACE nl; CXR w/out lg LN  iPTH 8 (approp suppressed); Phos nl;  f/u PTHrP (will likely be nl)  TSH 2.27  Vit D 1,25 diOH = 68.3 (nl); Vit D 25 OH = 16 c/w VIt D Def - DO NOT TX  renal noted  decr Pred 20mg po q24 - need plan for steroids from h/o  IVFs: NS 50/hr (pt wanted to stop these yesterday 2/2 vol overload feeling)  hold off on aredia for now given CKD and only mild Ca symptoms  c/w calcitonin 200 IU IM q12 - to Fri    # CKD 4; no ASHLEY; Anemia of chronic kidney disease   follows Dr. Jones nephro - noted  base Cr (per renal) mid/upper 2s    # recent OP [facility] at [facility] showing cecal mass and LAD  I spoke w/ Dr Green, she checked [facility] records, there is nothing at [facility] re c-scope or path after 2016  pt says GI is Dr Nieto  pt says he had c-scope about 3 mo ago (I guess Ca was OK then?)  pt says mass is in cecum; he thinks size was 7-10cm (other noted say 3cm)  pt says path showed TV adenoma  f/u Adv GI for colonoscopic rxn (EMR) - FRI; will need prep today; NPO p MN; gentle IVFs   if jeanette Ca remains < 11.5, could do anesth for scope  f/u [facility] (CT): no obvious cecal mass  f/u Dr Nieto's office: computers are down all week and data cannot be retrieved     # Hx Schizophrenia   c/w benztropine 2mg qhs, fluphenazine 10mg qd  had kidney stones w/ risperdal he said  c/w melatonin    # Hx Gout  allergic to allopurinol = rash  Uric Acid 10.2 -> 7.1 (goal for gout is <6.5) developed rash to allopurinol (on steroids, add benadryl prn); no further tx for now - to d/w h/o  (perhaps outpt rheum eval)    # Hx chronic diarrhea  could be from cecal polyp? (rt side TV adenoma known to do this)  no diarrhea now    # old H Zoster scarring on rt chest/back w/ hypersens  pt did not want any tx so far, but I did offer lido patch and/or neurontin    # DVT Prophylaxis: heparin 5000 12 - will hold FRI AM    # GI Prophylaxis: none    # Activity: as ely    # Code Status: Full    Dispo: tx Ca++; f/u h/o & renal; IVFs; obtain outside records; f/u GI re EMR Fri (prep Th; NPO p MN)  eventually, pt will go home w/ no needs - still acute - will likely be able to d/c if polyp small shonna or if lg polpy rxn, then perhaps Sat/Sun? 67y M pmh Schizophrenia (prior lithium use), nephrolithiasis c/b atrophic L kidney, R ureteral rivision, CKD4 (bsl Cr ~2.5), gout, chronic diarrhea, lymphoma presents to the ED for worsening hypercalcemia admitted for malignant hypercalcemia in setting of suspected lymphoma    # Hypercalcemia prob 2/2 follicular lymphoma  Lt groin LN bx 10/2019 = follicular lymphoma  supposedly had 2 cycles of chemo, did not ely and did f/u w/ onc  h/o noted: outpt PET    mild incr AP prob related to bone related to high Ca  no obvious evid for MM: SPEP: hypogammaglob, K/L light chain ratio nl (w/u neg)  doubt sarcoid: ACE nl; CXR w/out lg LN  iPTH 8 (approp suppressed); Phos nl;  f/u PTHrP (will likely be nl)  TSH 2.27  Vit D 1,25 diOH = 68.3 (nl); Vit D 25 OH = 16 c/w VIt D Def - DO NOT TX  renal noted  decr Pred 20mg po q24 - need plan for steroids from h/o  IVFs: NS 50/hr (pt wanted to stop these yesterday 2/2 vol overload feeling)  hold off on aredia for now given CKD and only mild Ca symptoms  c/w calcitonin 200 IU IM q12 for today, then STOP    # CKD 4; no ASHLEY; Anemia of chronic kidney disease   follows Dr. Jones nephro - noted  base Cr (per renal) mid/upper 2s    # recent OP CT at [facility] showing cecal mass and LAD  I spoke w/ Dr Green, she checked [facility] records, there is nothing at [facility] re c-scope or path after 2016  pt says GI is Dr Nieto  pt says he had c-scope about 3 mo ago (I guess Ca was OK then?)  pt says mass is in cecum; he thinks size was 7-10cm (other noted say 3cm)  pt says path showed TV adenoma  f/u Adv GI for colonoscopic rxn (EMR) - FRI; will need prep today; NPO p MN; gentle IVFs   if jeanette Ca remains < 11.5, could do anesth for scope  f/u [facility] (CT): no obvious cecal mass  f/u Dr Nieto's office: computers are down all week and data cannot be retrieved     # Hx Schizophrenia   c/w benztropine 2mg qhs, fluphenazine 10mg qd  had kidney stones w/ risperdal he said  c/w melatonin    # Hx Gout  allergic to allopurinol = rash  Uric Acid 10.2 -> 7.1 (goal for gout is <6.5) developed rash to allopurinol (on steroids, add benadryl prn); no further tx for now - to d/w h/o  (perhaps outpt rheum eval)    # Hx chronic diarrhea  could be from cecal polyp? (rt side TV adenoma known to do this)  no diarrhea now    # old H Zoster scarring on rt chest/back w/ hypersens  pt did not want any tx so far, but I did offer lido patch and/or neurontin    # DVT Prophylaxis: heparin 5000 12 - will hold FRI AM    # GI Prophylaxis: none    # Activity: as ely    # Code Status: Full    Dispo: tx Ca++; f/u h/o & renal; IVFs; obtain outside records; f/u GI re EMR Fri (prep Th; NPO p MN)  eventually, pt will go home w/ no needs - still acute - will likely be able to d/c if polyp small shonna or if lg polpy rxn, then perhaps Sat/Sun?

## 2023-04-20 NOTE — PROGRESS NOTE ADULT - ASSESSMENT
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   Hem onc planning for OP PET      MM ALMEIDA neg   Calcium level still elevated but trending down - corrected CA 10.2  iPTH 8 (approp suppressed); Phos nl; check PTHrP  TSH 2.27  Vit D 1,25 diOH = 68.3 (nl); Vit D 25 OH = 16 c/w VIt D Def - DO NOT replete  Prednisone 20mg po q24  ACE wnl   Uric Acid 7.1 -> allopurinol caused lena   continue IVFs: 1/2 NS 50cc/hr  hold off on aredia for now given CKD   given calcitonin 200 IU IM q12 for 4 doses, will give another 2 doses till friday    corrected Ca today is 10.2  f/u Tumor Lysis labs (Uric Acid, BMP, Phos, Mg) q24  talked to Onco today for biopsy, Fellow will speak to attending and get back to me concerning biopsy otpion   K and L elevated, k/l normal  SPEP low   TSH T4 wnl, 1-25 Vit D wnl, PTH low,   Calcium 11.9, 12.5   Spoke with Dr. Nieto's office they are not able to provide information because there system is down till the end of the week   Cr 2.4 back at baseline follows with Dr. turk   Hold heparin tmw am for c-scope     # cecal Mass:  - CT records obtained showed esopahageal and paraesophageal and abdominal lymphadenopathy with 1.3 cm indeterminate mass in the liver. Stool colon therefore cannot be characterized on CT   - Prep today for C scope tmw with golytely and fulocolan  - NPO after midnight and preop labs today   - C -scope 3 months ago patient reports a mass (unclear if mass is present)     #Hx Schizophrenia   - c/w benztropine 2mg qhs, fluphenazine 10mg qd    #Hx Gout  #Hx chronic diarrhea  - currently asymptomatic having regular bowel movements     # To follow up  - MM/TLS workup neg for now    - cr 2.4 today resolved   - PT eval   - trend Cr/Ca, continue workup for hypercalcemia     # Misc  - DVT Prophylaxis: heparin hold AM   - GI Prophylaxis: none  - Diet: Diet, Regular  - Activity: Activity - Ambulate as Tolerated  - Code Status: Full

## 2023-04-20 NOTE — PROGRESS NOTE ADULT - SUBJECTIVE AND OBJECTIVE BOX
Nephrology progress note     no complaints    ON events/Subjective:     Allergies:  allopurinol (Rash (Moderate))    Hospital Medications:   MEDICATIONS  (STANDING):  benztropine 2 milliGRAM(s) Oral at bedtime  bisacodyl 20 milliGRAM(s) Oral at bedtime  calcitonin Injectable 200 International Unit(s) IntraMuscular every 12 hours  fluPHENAZine 10 milliGRAM(s) Oral daily  heparin   Injectable 5000 Unit(s) SubCutaneous every 12 hours  polyethylene glycol/electrolyte Solution. 4000 milliLiter(s) Oral once  predniSONE   Tablet 20 milliGRAM(s) Oral daily    REVIEW OF SYSTEMS:  CONSTITUTIONAL: No weakness, fevers or chills  EYES/ENT: No visual changes;  No vertigo or throat pain   NECK: No pain or stiffness  RESPIRATORY: No cough, wheezing, hemoptysis; No shortness of breath  CARDIOVASCULAR: No chest pain or palpitations.  GASTROINTESTINAL: No abdominal or epigastric pain. No nausea, vomiting, or hematemesis; No diarrhea or constipation. No melena or hematochezia.  GENITOURINARY: No dysuria, frequency, foamy urine, urinary urgency, incontinence or hematuria  NEUROLOGICAL: No numbness or weakness  SKIN: No itching, burning, rashes, or lesions   VASCULAR: No bilateral lower extremity edema.   All other review of systems is negative unless indicated above.    VITALS:  T(F): 97.1 (04-20-23 @ 05:18), Max: 98.6 (04-19-23 @ 14:28)  HR: 57 (04-20-23 @ 05:18)  BP: 95/52 (04-20-23 @ 05:18)  RR: 18 (04-20-23 @ 05:18)  SpO2: --  Wt(kg): --      PHYSICAL EXAM:  Constitutional: NAD  HEENT: anicteric sclera, oropharynx clear, MMM  Neck: No JVD  Respiratory: CTAB, no wheezes, rales or rhonchi  Cardiovascular: S1, S2, RRR  Gastrointestinal: BS+, soft, NT/ND  Extremities: No cyanosis or clubbing. No peripheral edema  Neurological: A/O x 3, no focal deficits  Psychiatric: Normal mood, normal affect  : No CVA tenderness. No zafar.   Skin: No rashes  Vascular Access:    LABS:  04-20    141  |  110  |  43<H>  ----------------------------<  78  3.9   |  21  |  2.3<H>    Ca    9.8      20 Apr 2023 05:52  Phos  3.8     04-20  Mg     1.9     04-20    TPro  4.7<L>  /  Alb  3.5  /  TBili  0.4  /  DBili      /  AST  10  /  ALT  11  /  AlkPhos  130<H>  04-20                          10.7   9.31  )-----------( 210      ( 20 Apr 2023 05:52 )             32.7       Urine Studies:  Creatinine Trend: 2.3<--, 2.4<--, 2.4<--, 2.8<--, 2.9<--, 3.0<--      ·hypercalcemia - suspect recurrence of lymphoma - improved - likely from IF, calcitonin and steroids  ·ASHLEY/CKD likely from hypercalcemia improved and closer to baseline  ·hemodynamics stable  ·anemia  ·rash with allopurinol  per d/w resident - prolia not given    1) scheduled fron colonscopy tomorrow  2) pt has been fasting the ppast day due to concerns of adequate bowel prep - please restart IVF NS at 75 cc/hr  3) d/w Hemeon fellow per duration of steroids and LN biopsy indication - she will d/w her attending  4) am labs

## 2023-04-20 NOTE — PROGRESS NOTE ADULT - SUBJECTIVE AND OBJECTIVE BOX
Patient is a 66 y/o male with PMHx of Schizophrenia (prior lithium use), nephrolithiasis c/b atrophic L kidney, R ureteral revision, CKD4 (bsl Cr ~2.5), gout, chronic diarrhea, lymphoma presents to the ED for worsening hypercalcemia. Patient was being set for C-scope with EMR of a TVA in the [facility] which was noted on PET from 1/23. Patient feels better since admission. Planning colon with EMR tomorrow.       PAST MEDICAL & SURGICAL HISTORY:  Kidney stones  Schizophrenia  Lymphoma  Shingles  Atrophic kidney  Retained urethral stent  H/O umbilical hernia repair  Elbow fracture  H/O cystoscopy      MEDICATIONS  (STANDING):  benztropine 2 milliGRAM(s) Oral at bedtime  calcitonin Injectable 200 International Unit(s) IntraMuscular every 12 hours  fluPHENAZine 10 milliGRAM(s) Oral daily  heparin   Injectable 5000 Unit(s) SubCutaneous every 12 hours  predniSONE   Tablet 40 milliGRAM(s) Oral daily  sodium chloride 0.9%. 1000 milliLiter(s) (100 mL/Hr) IV Continuous <Continuous>    MEDICATIONS  (PRN):  acetaminophen     Tablet .. 650 milliGRAM(s) Oral every 6 hours PRN Temp greater or equal to 38C (100.4F), Mild Pain (1 - 3)  aluminum hydroxide/magnesium hydroxide/simethicone Suspension 30 milliLiter(s) Oral every 4 hours PRN Dyspepsia  melatonin 3 milliGRAM(s) Oral at bedtime PRN Insomnia  ondansetron Injectable 4 milliGRAM(s) IV Push every 8 hours PRN Nausea and/or Vomiting      Allergies  allopurinol (Rash (Moderate))      Review of Systems  General:  Denies Fatigue, Denies Fever, Denies Weakness ,Denies Weight Loss   HEENT: Denies Trouble Swallowing ,Denies  Sore Throat , Denies Change in hearing/vision/speech ,Denies Dizziness    Cardio: Denies  Chest Pain , Palpitations    Respiratory: Denies worsening of SOB, Denies Cough  Abdomen: See detailed HPI  Neuro: Denies Headache Denies Dizziness, Denies Paresthesias  MSK: Denies pain in Bones/Joints/Muscles   Psych: Patient denies depression, denies suicidal or homicidal ideations  Integ: Patient Denies rash, or new skin lesions       Vital Signs Last 24 Hrs  T(C): 36.2 (20 Apr 2023 05:18), Max: 37 (19 Apr 2023 14:28)  T(F): 97.1 (20 Apr 2023 05:18), Max: 98.6 (19 Apr 2023 14:28)  HR: 57 (20 Apr 2023 05:18) (57 - 66)  BP: 95/52 (20 Apr 2023 05:18) (95/52 - 110/64)  BP(mean): --  RR: 18 (20 Apr 2023 05:18) (18 - 18)  SpO2: --      Physical Exam  Gen: NAD  Head: NC/AT, no visible deformity  ENT: PERRLA, Sclera  , PERRLA Mucosal Membranes  Cardio: S1/S2 No S3/S4, Regular  Resp: CTA B/L  Abdomen: Soft, ND/NT  Neuro: AAOx3  Extremities: FROM x 4  Skin: No jaundice, no excoriation       Labs:                        10.7   9.31  )-----------( 210      ( 20 Apr 2023 05:52 )             32.7     04-20    141  |  110  |  43<H>  ----------------------------<  78  3.9   |  21  |  2.3<H>    Ca    9.8      20 Apr 2023 05:52  Phos  3.8     04-20  Mg     1.9     04-20    TPro  4.7<L>  /  Alb  3.5  /  TBili  0.4  /  DBili  x   /  AST  10  /  ALT  11  /  AlkPhos  130<H>  04-20

## 2023-04-21 ENCOUNTER — RESULT REVIEW (OUTPATIENT)
Age: 68
End: 2023-04-21

## 2023-04-21 LAB
ALBUMIN SERPL ELPH-MCNC: 3.5 G/DL — SIGNIFICANT CHANGE UP (ref 3.5–5.2)
ALP SERPL-CCNC: 136 U/L — HIGH (ref 30–115)
ALT FLD-CCNC: 11 U/L — SIGNIFICANT CHANGE UP (ref 0–41)
ANION GAP SERPL CALC-SCNC: 11 MMOL/L — SIGNIFICANT CHANGE UP (ref 7–14)
AST SERPL-CCNC: 10 U/L — SIGNIFICANT CHANGE UP (ref 0–41)
BASOPHILS # BLD AUTO: 0.03 K/UL — SIGNIFICANT CHANGE UP (ref 0–0.2)
BASOPHILS NFR BLD AUTO: 0.3 % — SIGNIFICANT CHANGE UP (ref 0–1)
BILIRUB SERPL-MCNC: 0.5 MG/DL — SIGNIFICANT CHANGE UP (ref 0.2–1.2)
BUN SERPL-MCNC: 39 MG/DL — HIGH (ref 10–20)
CALCIUM SERPL-MCNC: 9.7 MG/DL — SIGNIFICANT CHANGE UP (ref 8.4–10.5)
CHLORIDE SERPL-SCNC: 109 MMOL/L — SIGNIFICANT CHANGE UP (ref 98–110)
CO2 SERPL-SCNC: 20 MMOL/L — SIGNIFICANT CHANGE UP (ref 17–32)
CREAT SERPL-MCNC: 2.3 MG/DL — HIGH (ref 0.7–1.5)
EGFR: 31 ML/MIN/1.73M2 — LOW
EOSINOPHIL # BLD AUTO: 1.85 K/UL — HIGH (ref 0–0.7)
EOSINOPHIL NFR BLD AUTO: 18.6 % — HIGH (ref 0–8)
GLUCOSE SERPL-MCNC: 82 MG/DL — SIGNIFICANT CHANGE UP (ref 70–99)
HCT VFR BLD CALC: 34.4 % — LOW (ref 42–52)
HGB BLD-MCNC: 11.3 G/DL — LOW (ref 14–18)
IMM GRANULOCYTES NFR BLD AUTO: 0.9 % — HIGH (ref 0.1–0.3)
LDH SERPL L TO P-CCNC: 116 U/L — SIGNIFICANT CHANGE UP (ref 50–242)
LYMPHOCYTES # BLD AUTO: 0.76 K/UL — LOW (ref 1.2–3.4)
LYMPHOCYTES # BLD AUTO: 7.7 % — LOW (ref 20.5–51.1)
MAGNESIUM SERPL-MCNC: 1.9 MG/DL — SIGNIFICANT CHANGE UP (ref 1.8–2.4)
MCHC RBC-ENTMCNC: 29 PG — SIGNIFICANT CHANGE UP (ref 27–31)
MCHC RBC-ENTMCNC: 32.8 G/DL — SIGNIFICANT CHANGE UP (ref 32–37)
MCV RBC AUTO: 88.4 FL — SIGNIFICANT CHANGE UP (ref 80–94)
MONOCYTES # BLD AUTO: 0.96 K/UL — HIGH (ref 0.1–0.6)
MONOCYTES NFR BLD AUTO: 9.7 % — HIGH (ref 1.7–9.3)
NEUTROPHILS # BLD AUTO: 6.23 K/UL — SIGNIFICANT CHANGE UP (ref 1.4–6.5)
NEUTROPHILS NFR BLD AUTO: 62.8 % — SIGNIFICANT CHANGE UP (ref 42.2–75.2)
NRBC # BLD: 0 /100 WBCS — SIGNIFICANT CHANGE UP (ref 0–0)
PHOSPHATE SERPL-MCNC: 3.8 MG/DL — SIGNIFICANT CHANGE UP (ref 2.1–4.9)
PLATELET # BLD AUTO: 220 K/UL — SIGNIFICANT CHANGE UP (ref 130–400)
PMV BLD: 9.9 FL — SIGNIFICANT CHANGE UP (ref 7.4–10.4)
POTASSIUM SERPL-MCNC: 3.9 MMOL/L — SIGNIFICANT CHANGE UP (ref 3.5–5)
POTASSIUM SERPL-SCNC: 3.9 MMOL/L — SIGNIFICANT CHANGE UP (ref 3.5–5)
PROT SERPL-MCNC: 4.6 G/DL — LOW (ref 6–8)
RBC # BLD: 3.89 M/UL — LOW (ref 4.7–6.1)
RBC # FLD: 14.8 % — HIGH (ref 11.5–14.5)
SODIUM SERPL-SCNC: 140 MMOL/L — SIGNIFICANT CHANGE UP (ref 135–146)
URATE SERPL-MCNC: 7.5 MG/DL — SIGNIFICANT CHANGE UP (ref 3.4–8.8)
WBC # BLD: 9.92 K/UL — SIGNIFICANT CHANGE UP (ref 4.8–10.8)
WBC # FLD AUTO: 9.92 K/UL — SIGNIFICANT CHANGE UP (ref 4.8–10.8)

## 2023-04-21 PROCEDURE — 88307 TISSUE EXAM BY PATHOLOGIST: CPT | Mod: 26

## 2023-04-21 PROCEDURE — 45384 COLONOSCOPY W/LESION REMOVAL: CPT | Mod: XU

## 2023-04-21 PROCEDURE — 99233 SBSQ HOSP IP/OBS HIGH 50: CPT

## 2023-04-21 PROCEDURE — 45390 COLONOSCOPY W/RESECTION: CPT

## 2023-04-21 RX ORDER — SODIUM CHLORIDE 9 MG/ML
500 INJECTION INTRAMUSCULAR; INTRAVENOUS; SUBCUTANEOUS ONCE
Refills: 0 | Status: COMPLETED | OUTPATIENT
Start: 2023-04-21 | End: 2023-04-21

## 2023-04-21 RX ORDER — MIDODRINE HYDROCHLORIDE 2.5 MG/1
10 TABLET ORAL ONCE
Refills: 0 | Status: COMPLETED | OUTPATIENT
Start: 2023-04-21 | End: 2023-04-21

## 2023-04-21 RX ADMIN — Medication 2 MILLIGRAM(S): at 21:32

## 2023-04-21 RX ADMIN — Medication 20 MILLIGRAM(S): at 05:27

## 2023-04-21 RX ADMIN — SODIUM CHLORIDE 1000 MILLILITER(S): 9 INJECTION INTRAMUSCULAR; INTRAVENOUS; SUBCUTANEOUS at 08:37

## 2023-04-21 RX ADMIN — FLUPHENAZINE HYDROCHLORIDE 10 MILLIGRAM(S): 1 TABLET, FILM COATED ORAL at 21:32

## 2023-04-21 RX ADMIN — MIDODRINE HYDROCHLORIDE 10 MILLIGRAM(S): 2.5 TABLET ORAL at 10:06

## 2023-04-21 NOTE — PROGRESS NOTE ADULT - ASSESSMENT
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   Hem onc planning for OP PET      MM ALMEIDA neg   Calcium level still elevated but trending down - corrected CA 10.2  iPTH 8 (approp suppressed); Phos nl; check PTHrP  TSH 2.27  Vit D 1,25 diOH = 68.3 (nl); Vit D 25 OH = 16 c/w VIt D Def - DO NOT replete  Prednisone 20mg po q24  ACE wnl   Uric Acid 7.1 -> allopurinol caused lena   continue IVFs: 1/2 NS 50cc/hr, will give 500 cc bolus   hold off on aredia for now given CKD   given calcitonin 200 IU IM q12, will hold calcitonin for now   corrected Ca today is 10.2  f/u Tumor Lysis labs (Uric Acid, BMP, Phos, Mg) q24  talked to Onco today for biopsy, Fellow will speak to attending and get back to me concerning biopsy otpion   K and L elevated, k/l normal  SPEP low   TSH T4 wnl, 1-25 Vit D wnl, PTH low,   Calcium 10 today   Spoke with Dr. Nieto's office they are not able to provide information because there system is down till the end of the week   Cr 2.4 back at baseline follows with Dr. turk   Hold heparin today am for c-scope   - Took an appointment for May 2nd for a PET CT scan on [address] [place] [address], at 10.30 AM. Patient has to come on 9.15 am. NPO MN, has to drink 12 oz of water by 8.30 am. and empty bladder afterwards, has to have a low carb diet day before , and no exercise day prior. Patient cannot have any anti diabetic medications before test     # cecal Mass:  - CT records obtained showed esopahageal and paraesophageal and abdominal lymphadenopathy with 1.3 cm indeterminate mass in the liver. Stool colon therefore cannot be characterized on CT   - C-scope today for evaluation of mass   - C -scope 3 months ago patient reports a mass (unclear if mass is present)     #Hx Schizophrenia   - c/w benztropine 2mg qhs, fluphenazine 10mg qd    #Hx Gout  #Hx chronic diarrhea  - currently asymptomatic having regular bowel movements     # To follow up  - MM/TLS workup neg for now    - cr 2.4 today resolved   - PT eval   - trend Cr/Ca, continue workup for hypercalcemia     # Misc  - DVT Prophylaxis: heparin hold AM   - GI Prophylaxis: none  - Diet: Diet, Regular  - Activity: Activity - Ambulate as Tolerated  - Code Status: Full   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   Hem onc planning for OP PET      MM ALMEIDA neg   Calcium level still elevated but trending down - corrected CA 10.2  iPTH 8 (approp suppressed); Phos nl; check PTHrP  TSH 2.27  Vit D 1,25 diOH = 68.3 (nl); Vit D 25 OH = 16 c/w VIt D Def - DO NOT replete  Prednisone 20mg po q24  ACE wnl   Uric Acid 7.1 -> allopurinol caused lena   continue IVFs: 1/2 NS 50cc/hr, will give 500 cc bolus   hold off on aredia for now given CKD   given calcitonin 200 IU IM q12, will hold calcitonin for now   corrected Ca today is 10.2  f/u Tumor Lysis labs (Uric Acid, BMP, Phos, Mg) q24  talked to Onco today for biopsy, Fellow will speak to attending and get back to me concerning biopsy otpion   K and L elevated, k/l normal  SPEP low   TSH T4 wnl, 1-25 Vit D wnl, PTH low,   Calcium 10 today   Spoke with Dr. Nieto's office they are not able to provide information because there system is down till the end of the week   Cr 2.4 back at baseline follows with Dr. burke Perez heparin today am for c-scope   - Took an appointment for May 2nd for a PET CT scan on [address] [place] [address], at 10.30 AM. Patient has to come on 9.15 am. NPO MN, has to drink 12 oz of water by 8.30 am. and empty bladder afterwards, has to have a low carb diet day before , and no exercise day prior. Patient cannot have any anti diabetic medications before test   - cw prednisone 20 mg FU Rheum OP  - FU PET CT and IR OP     # cecal Mass:  - CT records obtained showed esopahageal and paraesophageal and abdominal lymphadenopathy with 1.3 cm indeterminate mass in the liver. Stool colon therefore cannot be characterized on CT   - C-scope today for evaluation of mass   - C -scope 3 months ago patient reports a mass (unclear if mass is present)     #Hx Schizophrenia   - c/w benztropine 2mg qhs, fluphenazine 10mg qd    #Hx Gout  #Hx chronic diarrhea  - currently asymptomatic having regular bowel movements     # To follow up  - MM/TLS workup neg for now    - cr 2.4 today resolved   - PT eval   - trend Cr/Ca, continue workup for hypercalcemia     # Misc  - DVT Prophylaxis: heparin hold AM   - GI Prophylaxis: none  - Diet: Diet, Regular  - Activity: Activity - Ambulate as Tolerated  - Code Status: Full

## 2023-04-21 NOTE — PROGRESS NOTE ADULT - SUBJECTIVE AND OBJECTIVE BOX
ELDA COVARRUBIAS  67y  Male  ***My note supersedes ALL resident notes that I sign.  My corrections for their notes are in my note.***    I can be reached directly on [employer]. My office number is [phone]. My personal cell number is [phone].    INTERVAL EVENTS: Here for f/u of high Ca. Pt doing fine. Did prep last night. Had little trouble w/ low BP this AM - asymp and responded to fluid and midodrine. Rash is improving and pt is not bothered by it.    T(F): 97 (04-21-23 @ 16:01), Max: 97.9 (04-20-23 @ 19:41)  HR: 51 (04-21-23 @ 16:01) (51 - 72)  BP: 106/55 (04-21-23 @ 16:01) (79/52 - 111/58)  RR: 18 (04-21-23 @ 15:45) (14 - 18)  SpO2: 99% (04-21-23 @ 15:45) (96% - 100%)    Gen: NAD  Skin: pt has allergic rash (red, macular, no hives/bullae, no scale); more on trunk; less on prox limbs and distal limbs spared - rash is a little better today  rt back: skin graft from prior zoster rxn  rt chest: several keloids seen anteriorly up to midline (healed scars from prior zoster)  hypopigmentation is seen in zoster distribution from midline ant to mid line post, wrapping around to the right  the entire old zoster area is sensitive to the touch  HEENT: PERRL, EOMI, mouth clr, nose clr  Neck: no nodes, no JVD, thyroid nl  lungs: clr  hrt: s1 s2 rrr no murmur  abd: soft, NT/ND, no HS megaly  ext: no edema, no c/c  neuro: aa ox3, cn intact, can move all 4 ext    LABS:                      11.3    (    88.4   9.92  )-----------( ---------      220      ( 21 Apr 2023 07:51 )             34.4    (    14.8     Eos 18.6%, even on steroids    140   (   109   (   82      04-21-23 @ 07:51  ----------------------               3.9   (   20   (   39                             -----                        2.3  Ca  9.7 - better  Mg  1.9    P   3.8     LFT  4.6  (  0.5  (  10       04-21-23 @ 07:51  -------------------------  3.5  (  136  (  11    PT/INR - ( 20 Apr 2023 21:45 )   PT: 11.50 sec;   INR: 1.01 ratio    PTT - ( 20 Apr 2023 21:45 )  PTT: 27.8 sec    RADIOLOGY & ADDITIONAL TESTS:    MEDICATIONS:    acetaminophen     Tablet .. 650 milliGRAM(s) Oral every 6 hours PRN  aluminum hydroxide/magnesium hydroxide/simethicone Suspension 30 milliLiter(s) Oral every 4 hours PRN  benztropine 2 milliGRAM(s) Oral at bedtime  diphenhydrAMINE 25 milliGRAM(s) Oral every 4 hours PRN  fluPHENAZine 10 milliGRAM(s) Oral daily  melatonin 3 milliGRAM(s) Oral at bedtime PRN  ondansetron Injectable 4 milliGRAM(s) IV Push every 8 hours PRN  predniSONE   Tablet 20 milliGRAM(s) Oral daily  sodium chloride 0.45%. 1000 milliLiter(s) IV Continuous <Continuous>

## 2023-04-21 NOTE — PROGRESS NOTE ADULT - ASSESSMENT
67y M pmh Schizophrenia (prior lithium use), nephrolithiasis c/b atrophic L kidney, R ureteral rivision, CKD4 (bsl Cr ~2.5), gout, chronic diarrhea, lymphoma presents to the ED for worsening hypercalcemia admitted for malignant hypercalcemia in setting of suspected lymphoma    # HoTN  not sure of etiology; perhaps dehydrated from bowel prep?  doubt from allergic rxn  pt is asymp  s/p bolus IVF  on IVF 50/hr  mido given x1 - not sure if this will need to be continued?  BP better and OK for c-scope    # Hypercalcemia prob 2/2 follicular lymphoma  Lt groin LN bx 10/2019 = follicular lymphoma  supposedly had 2 cycles of chemo, did not ely and did f/u w/ onc  h/o noted: outpt PET on 5/2 followed by outpt IR for LN bx of most optimal spot  need LN bx to reassess if type of lymphoma has changed  might even need to consider surg eval for excisional LN bx if FNA not helpful or inconclusive    mild incr AP prob related to bone related to high Ca  no obvious evid for MM: SPEP: hypogammaglob, K/L light chain ratio nl (w/u neg)  doubt sarcoid: ACE nl; CXR w/out lg LN  iPTH 8 (approp suppressed); Phos nl;  f/u PTHrP (will likely be nl)  TSH 2.27  Vit D 1,25 diOH = 68.3 (nl); Vit D 25 OH = 16 c/w VIt D Def - DO NOT TX  renal noted  Pred 20mg po q24: h/o says this is enough and to not taper, they will handle as outpt (consider PJP ppx if on 20mg for > 1 mo)  IVFs: NS 50/hr   hold off on aredia for now given CKD and only mild Ca symptoms  s/p calcitonin -  can stop now    # CKD 4; no ASHLEY; Anemia of chronic kidney disease   follows Dr. Jones nephro - noted  base Cr (per renal) mid/upper 2s    # [facility] showing cecal mass and LAD  I spoke w/ Dr Green, she checked [facility] records, there is nothing at [facility] re c-scope or path after 2016  pt says GI is Dr Nieto  pt says he had c-scope about 3 mo ago (I guess Ca was OK then?)  pt says mass is in cecum; he thinks size was 7-10cm (other noted say 3cm)  pt says path showed TV adenoma  f/u Adv GI for colonoscopic rxn (EMR) - FRI 4/21; s/p prep today; NPO; gentle IVFs  can go home when cleared by GI after EMR   [facility] ([facility]): no obvious cecal mass  f/u Dr Nieto's office: computers are down this week and data cannot be retrieved at office    # Hx Schizophrenia   c/w benztropine 2mg qhs, fluphenazine 10mg qd  had kidney stones w/ risperdal he said  c/w melatonin    # Hx Gout  allergic to allopurinol = severe truncal rash; no anaphylaxis; + eosino 18.6%  Uric Acid 10.2 -> 7.5 (goal for gout is <6.5) developed rash to allopurinol (on steroids, add benadryl prn); no further tx for now   spoke w/ h/o: outpt rheum eval    # Hx chronic diarrhea  could be from cecal polyp? (rt side TV adenoma known to do this)  no diarrhea now  f/u EMR w/ Adv GI    # old H Zoster scarring on rt chest/back w/ hypersens  pt did not want any tx so far, but I did offer lido patch and/or neurontin    # DVT Prophylaxis: heparin 5000 12 - hold FRI AM for EMR    # GI Prophylaxis: none    # Activity: as ely    # Code Status: Full    Dispo: f/u BP; f/u h/o & renal; IVFs; obtain outside records; f/u GI re EMR Fri (NPO)  eventually, pt will go home w/ no needs - will likely be able to d/c this weekend, unless bleeding after EMR  PET scan 5/2 as outpt, followed by JENNIFER nicolas as outpt for LN bx  outpt h/o f/u  outpt GI f/u  outpt rheum f/u  outpt renal f/u  outpt IM f/u    Overall, prog is guarded for the long term; currently stable.

## 2023-04-21 NOTE — PROGRESS NOTE ADULT - SUBJECTIVE AND OBJECTIVE BOX
SUBJECTIVE / OVERNIGHT EVENTS  Patient slept well overnight. No acute complaints this AM. Patient does not report fevers, chills, CP, SOB, or n/v/d    MEDICATIONS  benztropine 2 milliGRAM(s) Oral at bedtime  fluPHENAZine 10 milliGRAM(s) Oral daily  predniSONE   Tablet 20 milliGRAM(s) Oral daily  sodium chloride 0.45%. 1000 milliLiter(s) IV Continuous <Continuous>    acetaminophen     Tablet .. 650 milliGRAM(s) Oral every 6 hours PRN Temp greater or equal to 38C (100.4F), Mild Pain (1 - 3)  aluminum hydroxide/magnesium hydroxide/simethicone Suspension 30 milliLiter(s) Oral every 4 hours PRN Dyspepsia  diphenhydrAMINE 25 milliGRAM(s) Oral every 4 hours PRN Rash and/or Itching  melatonin 3 milliGRAM(s) Oral at bedtime PRN Insomnia  ondansetron Injectable 4 milliGRAM(s) IV Push every 8 hours PRN Nausea and/or Vomiting    VITALS /  EXAM    T(C): 35.8 (04-21-23 @ 06:03), Max: 37 (04-20-23 @ 17:16)  HR: 59 (04-21-23 @ 06:03) (58 - 59)  BP: 90/47 (04-21-23 @ 06:03) (90/47 - 109/59)  RR: 18 (04-21-23 @ 06:03) (18 - 18)  SpO2: 96% (04-21-23 @ 06:03) (96% - 96%)    GENERAL: NAD, well-developed  CHEST/LUNG: Clear to auscultation bilaterally; No wheezes, rales or rhonchi  HEART: Regular rate and rhythm; No murmurs, rubs, or gallops  ABDOMEN: Soft, Nontender, Nondistended; Bowel sounds present, no masses.  EXTREMITIES:  2+ Peripheral Pulses, No clubbing, cyanosis, or edema    I's & O's     LABS             12.3   9.80  )-----------( 236      ( 04-20-23 @ 21:45 )             38.0     139  |  107  |  38  -------------------------<  85   04-20-23 @ 21:45  4.5  |  17  |  2.4    Ca      9.8     04-20-23 @ 21:45  Phos   3.8     04-20-23 @ 05:52  Mg     2.0     04-20-23 @ 21:45    TPro  5.3  /  Alb  3.8  /  TBili  0.8  /  DBili  x   /  AST  11  /  ALT  12  /  AlkPhos  153  /  GGT  x     04-20-23 @ 21:45    PT/INR - ( 04-20-23 @ 21:45 )   PT: 11.50 sec;   INR: 1.01 ratio  PTT - ( 04-20-23 @ 21:45 )  PTT:27.8 sec                    MICRO / IMAGING / CARDIOLOGY  Telemetry: Reviewed   EKG: Reviewed    CULTURES    IMAGING    CARDIOLOGY

## 2023-04-22 ENCOUNTER — TRANSCRIPTION ENCOUNTER (OUTPATIENT)
Age: 68
End: 2023-04-22

## 2023-04-22 VITALS
OXYGEN SATURATION: 95 % | HEART RATE: 58 BPM | SYSTOLIC BLOOD PRESSURE: 127 MMHG | RESPIRATION RATE: 18 BRPM | DIASTOLIC BLOOD PRESSURE: 60 MMHG | TEMPERATURE: 98 F

## 2023-04-22 LAB
ALBUMIN SERPL ELPH-MCNC: 3.6 G/DL — SIGNIFICANT CHANGE UP (ref 3.5–5.2)
ALP SERPL-CCNC: 135 U/L — HIGH (ref 30–115)
ALT FLD-CCNC: 11 U/L — SIGNIFICANT CHANGE UP (ref 0–41)
ANION GAP SERPL CALC-SCNC: 16 MMOL/L — HIGH (ref 7–14)
AST SERPL-CCNC: 8 U/L — SIGNIFICANT CHANGE UP (ref 0–41)
BASOPHILS # BLD AUTO: 0.03 K/UL — SIGNIFICANT CHANGE UP (ref 0–0.2)
BASOPHILS NFR BLD AUTO: 0.3 % — SIGNIFICANT CHANGE UP (ref 0–1)
BILIRUB SERPL-MCNC: 0.7 MG/DL — SIGNIFICANT CHANGE UP (ref 0.2–1.2)
BUN SERPL-MCNC: 43 MG/DL — HIGH (ref 10–20)
CALCIUM SERPL-MCNC: 10 MG/DL — SIGNIFICANT CHANGE UP (ref 8.4–10.5)
CHLORIDE SERPL-SCNC: 105 MMOL/L — SIGNIFICANT CHANGE UP (ref 98–110)
CO2 SERPL-SCNC: 19 MMOL/L — SIGNIFICANT CHANGE UP (ref 17–32)
CREAT SERPL-MCNC: 2.4 MG/DL — HIGH (ref 0.7–1.5)
EGFR: 29 ML/MIN/1.73M2 — LOW
EOSINOPHIL # BLD AUTO: 1.05 K/UL — HIGH (ref 0–0.7)
EOSINOPHIL NFR BLD AUTO: 8.9 % — HIGH (ref 0–8)
GLUCOSE SERPL-MCNC: 87 MG/DL — SIGNIFICANT CHANGE UP (ref 70–99)
HCT VFR BLD CALC: 34.8 % — LOW (ref 42–52)
HGB BLD-MCNC: 11.4 G/DL — LOW (ref 14–18)
IMM GRANULOCYTES NFR BLD AUTO: 0.8 % — HIGH (ref 0.1–0.3)
LDH SERPL L TO P-CCNC: 112 U/L — SIGNIFICANT CHANGE UP (ref 50–242)
LYMPHOCYTES # BLD AUTO: 0.57 K/UL — LOW (ref 1.2–3.4)
LYMPHOCYTES # BLD AUTO: 4.8 % — LOW (ref 20.5–51.1)
MAGNESIUM SERPL-MCNC: 1.9 MG/DL — SIGNIFICANT CHANGE UP (ref 1.8–2.4)
MCHC RBC-ENTMCNC: 28.6 PG — SIGNIFICANT CHANGE UP (ref 27–31)
MCHC RBC-ENTMCNC: 32.8 G/DL — SIGNIFICANT CHANGE UP (ref 32–37)
MCV RBC AUTO: 87.2 FL — SIGNIFICANT CHANGE UP (ref 80–94)
MONOCYTES # BLD AUTO: 0.82 K/UL — HIGH (ref 0.1–0.6)
MONOCYTES NFR BLD AUTO: 6.9 % — SIGNIFICANT CHANGE UP (ref 1.7–9.3)
NEUTROPHILS # BLD AUTO: 9.27 K/UL — HIGH (ref 1.4–6.5)
NEUTROPHILS NFR BLD AUTO: 78.3 % — HIGH (ref 42.2–75.2)
NRBC # BLD: 0 /100 WBCS — SIGNIFICANT CHANGE UP (ref 0–0)
PHOSPHATE SERPL-MCNC: 4.4 MG/DL — SIGNIFICANT CHANGE UP (ref 2.1–4.9)
PLATELET # BLD AUTO: 210 K/UL — SIGNIFICANT CHANGE UP (ref 130–400)
PMV BLD: 9.7 FL — SIGNIFICANT CHANGE UP (ref 7.4–10.4)
POTASSIUM SERPL-MCNC: 3.9 MMOL/L — SIGNIFICANT CHANGE UP (ref 3.5–5)
POTASSIUM SERPL-SCNC: 3.9 MMOL/L — SIGNIFICANT CHANGE UP (ref 3.5–5)
PROT SERPL-MCNC: 5 G/DL — LOW (ref 6–8)
PTH RELATED PROT SERPL-MCNC: <2 PMOL/L — SIGNIFICANT CHANGE UP
RBC # BLD: 3.99 M/UL — LOW (ref 4.7–6.1)
RBC # FLD: 14.7 % — HIGH (ref 11.5–14.5)
SODIUM SERPL-SCNC: 140 MMOL/L — SIGNIFICANT CHANGE UP (ref 135–146)
URATE SERPL-MCNC: 7.8 MG/DL — SIGNIFICANT CHANGE UP (ref 3.4–8.8)
WBC # BLD: 11.83 K/UL — HIGH (ref 4.8–10.8)
WBC # FLD AUTO: 11.83 K/UL — HIGH (ref 4.8–10.8)

## 2023-04-22 PROCEDURE — 99239 HOSP IP/OBS DSCHRG MGMT >30: CPT

## 2023-04-22 RX ORDER — DIPHENHYDRAMINE HCL 50 MG
1 CAPSULE ORAL
Qty: 6 | Refills: 0
Start: 2023-04-22 | End: 2023-05-11

## 2023-04-22 RX ADMIN — Medication 20 MILLIGRAM(S): at 05:43

## 2023-04-22 NOTE — DISCHARGE NOTE PROVIDER - NSDCFUADDAPPT_GEN_ALL_CORE_FT
Please follow with your appointments within 1 week. This extremely important. Do not miss any appointment.    Please Make sure that you do a PET CT scan on May 2nd, you received the script. Submit it for the [facility] so they can work on authorizaton. It is extremely important that you ask your hematology oncology doctor for IR biopsy.    appointment for May 2nd for a PET CT scan on [address] [place] [address], at 10.30 AM. go to the appointment on 9.15 am. don't eat anything the day before starting midnight of CT Scan. drink 12 oz of water by 8.30 am on the day of appintment. and empty bladder afterwards. Follow a low carb diet day before , and no exercise 2 days prior. Do not take any anti diabetic medications before test        APPTS ARE READY TO BE MADE: [ ] YES    Best Family or Patient Contact (if needed):    Additional Information about above appointments (if needed):    1: Dr. Fiore  2: Dr. Gong  3: Dr. Jones  4: Dr. Arroyo   5: Dr. Escudero     Other comments or requests:

## 2023-04-22 NOTE — PROGRESS NOTE ADULT - REASON FOR ADMISSION
Hypercalcemia

## 2023-04-22 NOTE — DISCHARGE NOTE PROVIDER - HOSPITAL COURSE
HPI:  67y M pmh Schizophrenia (prior lithium use), nephrolithiasis c/b atrophic L kidney, R ureteral rivision, CKD4 (bsl Cr ~2.5), gout, chronic diarrhea, lymphoma presents to the ED for worsening hypercalcemia. Pt stated ~1month ago he had hypercalcemia on his op bloodwork. Since then he has been getting repeat blood showing his calcium steadily increasing. He came to the ED today because his latest op calcium was ~13. He has a hx of lymphoma previously followed by Dr Gong. Patient states that 3yrs ago he had skin Bx of his chest/arm +ve for lymphoma, PET scan at that time +ve for diffuse LAD. Patient received 2x courses of chemotherapy for 1 week but could not tolerate the side effects. Pt stated he after that he stopped seeing the oncologist and was lost to follow up. Patient recently had CTAP done for workup of cecal polyp/chronic diarrhea revealing cecal mass and LAD ([facility]). Currently he denies fever, night sweats, LAD. Denies increased urinary frequency, abdominal pain, nausea, vomiting, diarrhea, muscle cramps/spasms at this time. No ETOH/tobacco/recreational drug use.     In the ED:  T 98.1, HR 97, /56, RR 18, 96% RA  WBC 9k, hgb 11k, plt 210, Ca 13.3, corrected 13.5, Na 138, K 4.3, BUN/Cr 47/3.1, , AST/ALT 13/10  67y M pmh Schizophrenia (prior lithium use), nephrolithiasis c/b atrophic L kidney, R ureteral rivision, CKD4 (bsl Cr ~2.5), gout, chronic diarrhea, lymphoma presents to the ED for worsening hypercalcemia admitted for malignant hypercalcemia in setting of suspected lymphoma    # HTN  not sure of etiology; perhaps dehydrated from bowel prep?  doubt from allergic rxn  pt is asymp  s/p bolus IVF  on IVF 50/hr  mido given x1 - not sure if this will need to be continued?  BP better and OK for c-scope    # Hypercalcemia prob 2/2 follicular lymphoma  Lt groin LN bx 10/2019 = follicular lymphoma  supposedly had 2 cycles of chemo, did not ely and did f/u w/ onc  h/o noted: outpt PET on 5/2 followed by outpt IR for LN bx of most optimal spot  need LN bx to reassess if type of lymphoma has changed  might even need to consider surg eval for excisional LN bx if FNA not helpful or inconclusive    mild incr AP prob related to bone related to high Ca  no obvious evid for MM: SPEP: hypogammaglob, K/L light chain ratio nl (w/u neg)  doubt sarcoid: ACE nl; CXR w/out lg LN  iPTH 8 (approp suppressed); Phos nl;  f/u PTHrP (will likely be nl)  TSH 2.27  Vit D 1,25 diOH = 68.3 (nl); Vit D 25 OH = 16 c/w VIt D Def - DO NOT TX  Pred 20mg po q24: h/o says this is enough and to not taper, they will handle as outpt (consider PJP ppx if on 20mg for > 1 mo)  IVFs: NS 50/hr   hold off on aredia for now given CKD and only mild Ca symptoms  s/p calcitonin   Took an appointment for May 2nd for a PET CT scan on [address] [place] [address], at 10.30 AM. Patient has to come on 9.15 am. NPO MN, has to drink 12 oz of water by 8.30 am. and empty bladder afterwards, has to have a low carb diet day before , and no exercise day prior. Patient cannot have any anti diabetic medications before test       # CKD 4; no ASHLEY; Anemia of chronic kidney disease   follows Dr. Jones nephro - noted    # recent OP [facility] at [facility] showing cecal mass and LAD  pt says GI is Dr Nieto  pt says he had c-scope about 3 mo ago  Pt has cscope done inpatient, showed around 3 cm polyp that was removed . FU with [facility]   FU pathology results   cleared by GI to be discharged   [facility] (CT): no obvious cecal mass  f/u Dr Nieto's office: computers are down this week and data cannot be retrieved at office    # Hx Schizophrenia    benztropine 2mg qhs, fluphenazine 10mg qd  had kidney stones w/ risperdal he said   melatonin    # Hx Gout  allergic to allopurinol = severe truncal rash; no anaphylaxis; + eosino 18.6%  Uric Acid 10.2 -> 7.5 (goal for gout is <6.5) developed rash to allopurinol (on steroids, add benadryl prn); no further tx for now   spoke w/ h/o: outpt rheum eval    # Hx chronic diarrhea  could be from cecal polyp  no diarrhea now    # old H Zoster scarring on rt chest/back w/ hypersens  pt did not want any tx so far    PET scan 5/2 as outpt, followed by JENNIFER nicolas as outpt for LN bx  outpt h/o f/u  outpt GI f/u  outpt rheum f/u  outpt renal f/u  outpt IM f/u    Patient was seen and examined this morning at bedside and is stable for discharge.  He is to be discharged on the prescribed medications  He is to follow-up with his PCP

## 2023-04-22 NOTE — DISCHARGE NOTE PROVIDER - NSDCMRMEDTOKEN_GEN_ALL_CORE_FT
benztropine 2 mg oral tablet: 1 tab(s) orally once a day (at bedtime)  diphenhydrAMINE 25 mg oral capsule: 1 cap(s) orally every 4 hours as needed for Rash and/or Itching  fluPHENAZine 10 mg oral tablet: 1 tab(s) orally once a day  predniSONE 20 mg oral tablet: 1 tab(s) orally once a day

## 2023-04-22 NOTE — DISCHARGE NOTE NURSING/CASE MANAGEMENT/SOCIAL WORK - NSDCFUADDAPPT_GEN_ALL_CORE_FT
Please follow with your appointments within 1 week. This extremely important. Do not miss any appointment.    Please Make sure that you do a PET CT scan on May 2nd, you received the script. Submit it for the [facility] so they can work on authorizaton. It is extremely important that you ask your hematology oncology doctor for IR biopsy.    appointment for May 2nd for a PET CT scan on [address] [place] [address], at 10.30 AM. go to the appointment on 9.15 am. don't eat anything the day before starting midnight of CT Scan. drink 12 oz of water by 8.30 am on the day of appintment. and empty bladder afterwards. Follow a low carb diet day before , and no exercise 2 days prior. Do not take any anti diabetic medications before test        APPTS ARE READY TO BE MADE: [ ] YES    Best Family or Patient Contact (if needed):    Additional Information about above appointments (if needed):    1: Dr. Fiore  2: Dr. Gong  3: Dr. Jones  4: Dr. Arroyo   5: Dr. Escudero     Other comments or requests:

## 2023-04-22 NOTE — DISCHARGE NOTE PROVIDER - CARE PROVIDER_API CALL
Joanne Fiore (DO)  Internal Medicine; Rheumatology  [address] [place] [address]  Phone: [phone]  Fax: [phone]  Follow Up Time: 1 week    Nate Gong)  Hematology; Internal Medicine; Medical Oncology  [address] [address]  Phone: [phone]  Fax: [phone]  Follow Up Time: 1 week    Iker Jones  INTERNAL MEDICINE  [address] [address]  Phone: [phone]  Fax: [phone]  Follow Up Time: 1 week    Kanu Arroyo)  Gastroenterology  [address] [place] [address]  Phone: [phone]  Fax: [phone]  Follow Up Time: 1 week    Nathen Escudero)  Internal Medicine  [address] [place] [address]  Phone: [phone]  Fax: [phone]  Follow Up Time: 1 week    GRETCHEN PERSAUD  Internal Medicine  Phone: ()-  Fax: ()-  Follow Up Time: 1 week

## 2023-04-22 NOTE — PROGRESS NOTE ADULT - ASSESSMENT
Impression:  CKD  AHSLEY  hypercalcemia    Plan:  Ad linda PO diet and fluid intake  If discharged, will advise Dr Jones of need for prompt outpt f/u

## 2023-04-22 NOTE — DISCHARGE NOTE PROVIDER - NSDCCPCAREPLAN_GEN_ALL_CORE_FT
PRINCIPAL DISCHARGE DIAGNOSIS  Diagnosis: Hypercalcemia  Assessment and Plan of Treatment: You came o the hospital for hypercalcema. You were treated with medication for hypercalcemia. your calcium levels went down. you also had a cecal scope which they removed a polyp and took biopsy. Please take all your medications as prescribed and call to take an appointment with all the physicians instructed within 1 week of discharge. This is EXTREMELY IMPORTANT AND YOU CANNOT MISS APPOINTMENTS WITH THE PHYSICIANS. YOU ALSO HAVE TO DO A PT CT SCAN ON MAY 2 AND DO A BIOPSY AFTERWARDS AFTER SCHEDULING WITH [facility]      SECONDARY DISCHARGE DIAGNOSES  Diagnosis: ASHLEY (acute kidney injury)  Assessment and Plan of Treatment: You were noted to have a temporary insult to your kidney function either at the time that you arrived at the hospital or during your stay here. We have monitored your kidney function with blood work during your time here and you are at a level that no longer requires continued hospital level care. We do recommend that you follow up to continually have your kidney function checked. You can follow up with your primary care doctor, or, if recommended in the discharge paperwork, you should follow up with a kidney specialist called a nephrologist.

## 2023-04-22 NOTE — DISCHARGE NOTE NURSING/CASE MANAGEMENT/SOCIAL WORK - PATIENT PORTAL LINK FT
You can access the FollowMyHealth Patient Portal offered by [facility] by registering at the following website: [email or web address]. By joining [employer]’s FollowMyHealth portal, you will also be able to view your health information using other applications (apps) compatible with our system.

## 2023-04-22 NOTE — DISCHARGE NOTE PROVIDER - ATTENDING DISCHARGE PHYSICAL EXAMINATION:
PHYSICAL EXAM:  GENERAL: NAD, speaks in full sentences, no signs of respiratory distress  HEAD:  Atraumatic, Normocephalic  EYES: EOMI, PERRLA, conjunctiva and sclera clear  NECK: Supple, No JVD  CHEST/LUNG: Clear to auscultation bilaterally; No wheeze; No crackles; No accessory muscles used  HEART: Regular rate and rhythm; No murmurs;   ABDOMEN: Soft, Nontender, Nondistended; Bowel sounds present; No guarding  EXTREMITIES:  No cyanosis or edema  PSYCH: AAOx3  NEUROLOGY: non-focal  SKIN: diffuse rash on back and trunk

## 2023-04-22 NOTE — DISCHARGE NOTE PROVIDER - CARE PROVIDERS DIRECT ADDRESSES
,DirectAddress_Unknown,[email or web address],DirectAddress_Unknown,[email or web address],DirectAddress_Unknown

## 2023-04-22 NOTE — PROGRESS NOTE ADULT - SUBJECTIVE AND OBJECTIVE BOX
[facility] FOLLOW UP NOTE  --------------------------------------------------------------------------------  24 hour events/subjective:  appears comfortable  no complaints    PAST HISTORY  --------------------------------------------------------------------------------  No significant changes to PMH, PSH, FHx, SHx, unless otherwise noted    ALLERGIES & MEDICATIONS  --------------------------------------------------------------------------------  Allergies    allopurinol (Rash (Moderate))    Intolerances      Standing Inpatient Medications  benztropine 2 milliGRAM(s) Oral at bedtime  fluPHENAZine 10 milliGRAM(s) Oral daily  predniSONE   Tablet 20 milliGRAM(s) Oral daily  sodium chloride 0.45%. 1000 milliLiter(s) IV Continuous <Continuous>    PRN Inpatient Medications  acetaminophen     Tablet .. 650 milliGRAM(s) Oral every 6 hours PRN  aluminum hydroxide/magnesium hydroxide/simethicone Suspension 30 milliLiter(s) Oral every 4 hours PRN  diphenhydrAMINE 25 milliGRAM(s) Oral every 4 hours PRN  melatonin 3 milliGRAM(s) Oral at bedtime PRN  ondansetron Injectable 4 milliGRAM(s) IV Push every 8 hours PRN      REVIEW OF SYSTEMS  --------------------------------------------------------------------------------  All other systems were reviewed and are negative, except as noted.    VITALS/PHYSICAL EXAM  --------------------------------------------------------------------------------  T(C): 36.7 (04-22-23 @ 04:38), Max: 36.7 (04-22-23 @ 04:38)  HR: 58 (04-22-23 @ 04:38) (51 - 72)  BP: 127/60 (04-22-23 @ 04:38) (95/55 - 127/60)  RR: 18 (04-22-23 @ 04:38) (14 - 18)  SpO2: 95% (04-22-23 @ 04:38) (95% - 100%)  Wt(kg): --        Physical Exam:  	Gen: NAD  	HEENT: No JVD  	Pulm: CTA B/L  	CV: RRR, S1S2; no rub  	Abd: +BS, soft, nontender/nondistended  	No edema    LABS/STUDIES  --------------------------------------------------------------------------------              11.4   11.83 >-----------<  210      [04-22-23 @ 08:09]              34.8     140  |  105  |  43  ----------------------------<  87      [04-22-23 @ 08:09]  3.9   |  19  |  2.4        Ca     10.0     [04-22-23 @ 08:09]      Mg     1.9     [04-22-23 @ 08:09]      Phos  4.4     [04-22-23 @ 08:09]    TPro  5.0  /  Alb  3.6  /  TBili  0.7  /  DBili  x   /  AST  8   /  ALT  11  /  AlkPhos  135  [04-22-23 @ 08:09]    PT/INR: PT 11.50, INR 1.01       [04-20-23 @ 21:45]  PTT: 27.8       [04-20-23 @ 21:45]    Uric acid 7.8      [04-22-23 @ 08:09]        [04-22-23 @ 08:09]      Creatinine Trend:  SCr 2.4 [04-22 @ 08:09]  SCr 2.3 [04-21 @ 07:51]  SCr 2.4 [04-20 @ 21:45]  SCr 2.3 [04-20 @ 05:52]  SCr 2.4 [04-19 @ 07:35]        PTH -- (Ca 12.7)      [04-15-23 @ 08:22]   8  Vitamin D (25OH) 16      [04-15-23 @ 08:22]  TSH 2.27      [04-15-23 @ 08:22]      Free Light Chains: kappa 4.14, lambda 3.25, ratio = 1.27      [04-15 @ 08:22]  SPEP Interpretation: Hypogammaglobulinemia      [04-15-23 @ 08:22]

## 2023-04-22 NOTE — PROGRESS NOTE ADULT - PROVIDER SPECIALTY LIST ADULT
Nephrology
Internal Medicine
Internal Medicine
Nephrology
Hospitalist
Internal Medicine
Nephrology
Gastroenterology
Heme/Onc
Hospitalist
Internal Medicine
Nephrology
Nephrology

## 2023-04-22 NOTE — DISCHARGE NOTE PROVIDER - PROVIDER TOKENS
PROVIDER:[TOKEN:[[id number]],FOLLOWUP:[1 week]],PROVIDER:[TOKEN:[[id number]],FOLLOWUP:[1 week]],PROVIDER:[TOKEN:[[id number]],FOLLOWUP:[1 week]],PROVIDER:[TOKEN:[[id number]],FOLLOWUP:[1 week]],PROVIDER:[TOKEN:[[id number]],FOLLOWUP:[1 week]],PROVIDER:[TOKEN:[[id number]],FOLLOWUP:[1 week]]

## 2023-04-26 LAB — SURGICAL PATHOLOGY STUDY: SIGNIFICANT CHANGE UP

## 2023-04-26 NOTE — PROGRESS NOTE ADULT - ATTENDING COMMENTS
What Type Of Note Output Would You Prefer (Optional)?: Standard Output How Severe Are Your Spot(S)?: moderate Have Your Spot(S) Been Treated In The Past?: has not been treated Patient seen by myself too. I agree with the Hem-Onc fellow's (Dr. Claire) note above. Situation discussed with her and the patient.  In addition, the patient definitely needs wound management input.   Will recheck if he really had gout in the past. If not, he may come off allopurinol since he does not need it any longer from chemotherapy standpoint.  Further recommendations after the CT results. Hpi Title: Evaluation of Skin Lesions

## 2023-05-01 DIAGNOSIS — D89.2 HYPERGAMMAGLOBULINEMIA, UNSPECIFIED: ICD-10-CM

## 2023-05-01 DIAGNOSIS — D63.1 ANEMIA IN CHRONIC KIDNEY DISEASE: ICD-10-CM

## 2023-05-01 DIAGNOSIS — Z88.8 ALLERGY STATUS TO OTHER DRUGS, MEDICAMENTS AND BIOLOGICAL SUBSTANCES: ICD-10-CM

## 2023-05-01 DIAGNOSIS — C82.88 OTHER TYPES OF FOLLICULAR LYMPHOMA, LYMPH NODES OF MULTIPLE SITES: ICD-10-CM

## 2023-05-01 DIAGNOSIS — E86.0 DEHYDRATION: ICD-10-CM

## 2023-05-01 DIAGNOSIS — D12.0 BENIGN NEOPLASM OF CECUM: ICD-10-CM

## 2023-05-01 DIAGNOSIS — N17.9 ACUTE KIDNEY FAILURE, UNSPECIFIED: ICD-10-CM

## 2023-05-01 DIAGNOSIS — D80.1 NONFAMILIAL HYPOGAMMAGLOBULINEMIA: ICD-10-CM

## 2023-05-01 DIAGNOSIS — R59.1 GENERALIZED ENLARGED LYMPH NODES: ICD-10-CM

## 2023-05-01 DIAGNOSIS — E83.52 HYPERCALCEMIA: ICD-10-CM

## 2023-05-01 DIAGNOSIS — M10.9 GOUT, UNSPECIFIED: ICD-10-CM

## 2023-05-01 DIAGNOSIS — N18.4 CHRONIC KIDNEY DISEASE, STAGE 4 (SEVERE): ICD-10-CM

## 2023-05-01 DIAGNOSIS — T50.4X5A ADVERSE EFFECT OF DRUGS AFFECTING URIC ACID METABOLISM, INITIAL ENCOUNTER: ICD-10-CM

## 2023-05-01 DIAGNOSIS — I12.9 HYPERTENSIVE CHRONIC KIDNEY DISEASE WITH STAGE 1 THROUGH STAGE 4 CHRONIC KIDNEY DISEASE, OR UNSPECIFIED CHRONIC KIDNEY DISEASE: ICD-10-CM

## 2023-05-01 DIAGNOSIS — F20.9 SCHIZOPHRENIA, UNSPECIFIED: ICD-10-CM

## 2023-05-01 DIAGNOSIS — L27.1 LOCALIZED SKIN ERUPTION DUE TO DRUGS AND MEDICAMENTS TAKEN INTERNALLY: ICD-10-CM

## 2023-05-03 NOTE — PATIENT PROFILE ADULT - BRADEN MOBILITY
Faustino called to saynthat [facility] said they didn't get the script for Losartan- Can we please send another one with refills on it?
(4) no limitation

## 2023-06-02 PROCEDURE — 88305 TISSUE EXAM BY PATHOLOGIST: CPT | Performed by: OPHTHALMOLOGY

## 2023-06-05 ENCOUNTER — LAB REQUISITION (OUTPATIENT)
Dept: LAB | Facility: HOSPITAL | Age: 68
End: 2023-06-05
Attending: OPHTHALMOLOGY

## 2023-06-05 DIAGNOSIS — C43.9 MALIGNANT MELANOMA OF SKIN, UNSPECIFIED: ICD-10-CM

## 2023-06-06 LAB
CASE RPRT: NORMAL
CLINICAL INFO: NORMAL
PATH REPORT.FINAL DX SPEC: NORMAL
PATH REPORT.GROSS SPEC: NORMAL

## 2023-06-21 ENCOUNTER — INPATIENT (INPATIENT)
Facility: HOSPITAL | Age: 68
LOS: 5 days | Discharge: HOME | DRG: 683 | End: 2023-06-27
Attending: INTERNAL MEDICINE | Admitting: INTERNAL MEDICINE
Payer: MEDICARE

## 2023-06-21 VITALS
OXYGEN SATURATION: 97 % | SYSTOLIC BLOOD PRESSURE: 101 MMHG | WEIGHT: 195.11 LBS | DIASTOLIC BLOOD PRESSURE: 59 MMHG | HEART RATE: 96 BPM | TEMPERATURE: 97 F | RESPIRATION RATE: 16 BRPM

## 2023-06-21 DIAGNOSIS — S42.409A UNSPECIFIED FRACTURE OF LOWER END OF UNSPECIFIED HUMERUS, INITIAL ENCOUNTER FOR CLOSED FRACTURE: Chronic | ICD-10-CM

## 2023-06-21 DIAGNOSIS — Z98.890 OTHER SPECIFIED POSTPROCEDURAL STATES: Chronic | ICD-10-CM

## 2023-06-21 DIAGNOSIS — N28.9 DISORDER OF KIDNEY AND URETER, UNSPECIFIED: ICD-10-CM

## 2023-06-21 DIAGNOSIS — Z96.0 PRESENCE OF UROGENITAL IMPLANTS: Chronic | ICD-10-CM

## 2023-06-21 LAB
ALBUMIN SERPL ELPH-MCNC: 3.7 G/DL — SIGNIFICANT CHANGE UP (ref 3.5–5.2)
ALP SERPL-CCNC: 211 U/L — HIGH (ref 30–115)
ALT FLD-CCNC: 11 U/L — SIGNIFICANT CHANGE UP (ref 0–41)
ANION GAP SERPL CALC-SCNC: 12 MMOL/L — SIGNIFICANT CHANGE UP (ref 7–14)
AST SERPL-CCNC: 13 U/L — SIGNIFICANT CHANGE UP (ref 0–41)
BASOPHILS # BLD AUTO: 0.06 K/UL — SIGNIFICANT CHANGE UP (ref 0–0.2)
BASOPHILS NFR BLD AUTO: 0.6 % — SIGNIFICANT CHANGE UP (ref 0–1)
BILIRUB SERPL-MCNC: 0.5 MG/DL — SIGNIFICANT CHANGE UP (ref 0.2–1.2)
BUN SERPL-MCNC: 51 MG/DL — HIGH (ref 10–20)
CALCIUM SERPL-MCNC: 14.5 MG/DL — CRITICAL HIGH (ref 8.4–10.5)
CHLORIDE SERPL-SCNC: 107 MMOL/L — SIGNIFICANT CHANGE UP (ref 98–110)
CO2 SERPL-SCNC: 20 MMOL/L — SIGNIFICANT CHANGE UP (ref 17–32)
CREAT SERPL-MCNC: 3.8 MG/DL — HIGH (ref 0.7–1.5)
D DIMER BLD IA.RAPID-MCNC: 297 NG/ML DDU — HIGH
EGFR: 17 ML/MIN/1.73M2 — LOW
EOSINOPHIL # BLD AUTO: 0.92 K/UL — HIGH (ref 0–0.7)
EOSINOPHIL NFR BLD AUTO: 8.5 % — HIGH (ref 0–8)
GLUCOSE SERPL-MCNC: 97 MG/DL — SIGNIFICANT CHANGE UP (ref 70–99)
HCT VFR BLD CALC: 31.6 % — LOW (ref 42–52)
HGB BLD-MCNC: 10.4 G/DL — LOW (ref 14–18)
IMM GRANULOCYTES NFR BLD AUTO: 1.2 % — HIGH (ref 0.1–0.3)
LYMPHOCYTES # BLD AUTO: 2.4 K/UL — SIGNIFICANT CHANGE UP (ref 1.2–3.4)
LYMPHOCYTES # BLD AUTO: 22.1 % — SIGNIFICANT CHANGE UP (ref 20.5–51.1)
MAGNESIUM SERPL-MCNC: 2.6 MG/DL — HIGH (ref 1.8–2.4)
MCHC RBC-ENTMCNC: 29.3 PG — SIGNIFICANT CHANGE UP (ref 27–31)
MCHC RBC-ENTMCNC: 32.9 G/DL — SIGNIFICANT CHANGE UP (ref 32–37)
MCV RBC AUTO: 89 FL — SIGNIFICANT CHANGE UP (ref 80–94)
MONOCYTES # BLD AUTO: 1.46 K/UL — HIGH (ref 0.1–0.6)
MONOCYTES NFR BLD AUTO: 13.5 % — HIGH (ref 1.7–9.3)
NEUTROPHILS # BLD AUTO: 5.87 K/UL — SIGNIFICANT CHANGE UP (ref 1.4–6.5)
NEUTROPHILS NFR BLD AUTO: 54.1 % — SIGNIFICANT CHANGE UP (ref 42.2–75.2)
NRBC # BLD: 0 /100 WBCS — SIGNIFICANT CHANGE UP (ref 0–0)
NT-PROBNP SERPL-SCNC: 1078 PG/ML — HIGH (ref 0–300)
PLATELET # BLD AUTO: 203 K/UL — SIGNIFICANT CHANGE UP (ref 130–400)
PMV BLD: 9.9 FL — SIGNIFICANT CHANGE UP (ref 7.4–10.4)
POTASSIUM SERPL-MCNC: 4.5 MMOL/L — SIGNIFICANT CHANGE UP (ref 3.5–5)
POTASSIUM SERPL-SCNC: 4.5 MMOL/L — SIGNIFICANT CHANGE UP (ref 3.5–5)
PROT SERPL-MCNC: 5.3 G/DL — LOW (ref 6–8)
RBC # BLD: 3.55 M/UL — LOW (ref 4.7–6.1)
RBC # FLD: 15.4 % — HIGH (ref 11.5–14.5)
SODIUM SERPL-SCNC: 139 MMOL/L — SIGNIFICANT CHANGE UP (ref 135–146)
TROPONIN T SERPL-MCNC: <0.01 NG/ML — SIGNIFICANT CHANGE UP
WBC # BLD: 10.84 K/UL — HIGH (ref 4.8–10.8)
WBC # FLD AUTO: 10.84 K/UL — HIGH (ref 4.8–10.8)

## 2023-06-21 PROCEDURE — 83735 ASSAY OF MAGNESIUM: CPT

## 2023-06-21 PROCEDURE — 99223 1ST HOSP IP/OBS HIGH 75: CPT

## 2023-06-21 PROCEDURE — 76775 US EXAM ABDO BACK WALL LIM: CPT

## 2023-06-21 PROCEDURE — 71045 X-RAY EXAM CHEST 1 VIEW: CPT | Mod: 26

## 2023-06-21 PROCEDURE — 82310 ASSAY OF CALCIUM: CPT

## 2023-06-21 PROCEDURE — 82306 VITAMIN D 25 HYDROXY: CPT

## 2023-06-21 PROCEDURE — 84100 ASSAY OF PHOSPHORUS: CPT

## 2023-06-21 PROCEDURE — 93306 TTE W/DOPPLER COMPLETE: CPT

## 2023-06-21 PROCEDURE — 86334 IMMUNOFIX E-PHORESIS SERUM: CPT

## 2023-06-21 PROCEDURE — 84155 ASSAY OF PROTEIN SERUM: CPT

## 2023-06-21 PROCEDURE — 80053 COMPREHEN METABOLIC PANEL: CPT

## 2023-06-21 PROCEDURE — 99285 EMERGENCY DEPT VISIT HI MDM: CPT

## 2023-06-21 PROCEDURE — 85025 COMPLETE CBC W/AUTO DIFF WBC: CPT

## 2023-06-21 PROCEDURE — 83970 ASSAY OF PARATHORMONE: CPT

## 2023-06-21 PROCEDURE — 82164 ANGIOTENSIN I ENZYME TEST: CPT

## 2023-06-21 PROCEDURE — 84443 ASSAY THYROID STIM HORMONE: CPT

## 2023-06-21 PROCEDURE — 85027 COMPLETE CBC AUTOMATED: CPT

## 2023-06-21 PROCEDURE — 80061 LIPID PANEL: CPT

## 2023-06-21 PROCEDURE — 84165 PROTEIN E-PHORESIS SERUM: CPT

## 2023-06-21 PROCEDURE — 93010 ELECTROCARDIOGRAM REPORT: CPT

## 2023-06-21 PROCEDURE — 83521 IG LIGHT CHAINS FREE EACH: CPT

## 2023-06-21 PROCEDURE — 82652 VIT D 1 25-DIHYDROXY: CPT

## 2023-06-21 PROCEDURE — 83519 RIA NONANTIBODY: CPT

## 2023-06-21 PROCEDURE — 83036 HEMOGLOBIN GLYCOSYLATED A1C: CPT

## 2023-06-21 PROCEDURE — 36415 COLL VENOUS BLD VENIPUNCTURE: CPT

## 2023-06-21 PROCEDURE — 97162 PT EVAL MOD COMPLEX 30 MIN: CPT | Mod: GP

## 2023-06-21 RX ORDER — FLUPHENAZINE HYDROCHLORIDE 1 MG/1
10 TABLET, FILM COATED ORAL DAILY
Refills: 0 | Status: DISCONTINUED | OUTPATIENT
Start: 2023-06-21 | End: 2023-06-27

## 2023-06-21 RX ORDER — BENZTROPINE MESYLATE 1 MG
2 TABLET ORAL AT BEDTIME
Refills: 0 | Status: DISCONTINUED | OUTPATIENT
Start: 2023-06-21 | End: 2023-06-27

## 2023-06-21 RX ORDER — PANTOPRAZOLE SODIUM 20 MG/1
40 TABLET, DELAYED RELEASE ORAL
Refills: 0 | Status: DISCONTINUED | OUTPATIENT
Start: 2023-06-21 | End: 2023-06-27

## 2023-06-21 RX ORDER — CALCITONIN SALMON 200 [IU]/ML
350 INJECTION, SOLUTION INTRAMUSCULAR EVERY 12 HOURS
Refills: 0 | Status: COMPLETED | OUTPATIENT
Start: 2023-06-21 | End: 2023-06-22

## 2023-06-21 RX ORDER — HEPARIN SODIUM 5000 [USP'U]/ML
5000 INJECTION INTRAVENOUS; SUBCUTANEOUS EVERY 12 HOURS
Refills: 0 | Status: DISCONTINUED | OUTPATIENT
Start: 2023-06-21 | End: 2023-06-27

## 2023-06-21 RX ORDER — SODIUM CHLORIDE 9 MG/ML
1000 INJECTION INTRAMUSCULAR; INTRAVENOUS; SUBCUTANEOUS
Refills: 0 | Status: DISCONTINUED | OUTPATIENT
Start: 2023-06-21 | End: 2023-06-23

## 2023-06-21 RX ADMIN — SODIUM CHLORIDE 100 MILLILITER(S): 9 INJECTION INTRAMUSCULAR; INTRAVENOUS; SUBCUTANEOUS at 18:56

## 2023-06-21 NOTE — ED PROVIDER NOTE - CARE PLAN
Principal Discharge DX:	Acute renal insufficiency  Secondary Diagnosis:	Hypercalcemia  Secondary Diagnosis:	Dyspnea on exertion  Secondary Diagnosis:	Palpitations   1

## 2023-06-21 NOTE — H&P ADULT - HISTORY OF PRESENT ILLNESS
· HPI Objective Statement: 67 year Male PMH Schizophrenia (prior lithium use), nephrolithiasis c/b atrophic L kidney, R ureteral revision, CKD4 (bsl Cr ~2.5), gout, chronic diarrhea, NH lymphoma marginal zone sub-type, hypercalcemia presents to the ED for palpitations and shortness of breath. Patient states that for the past few weeks he has had dyspnea on exertion as well as intermittent palpitations noting his resting heart rate to be elevated >100. He experienced chest pain 2 days ago for a few minutes but denies pain at this time. Further denies fever, chills, cough, headache, abdominal pain, vomiting, urinary symptoms, leg swelling.     67 year old male past medical history of NH lymphoma in remission (follows with Dr. Gong), CKD stage 3, schizoaffective disorder presents with palpitations.  Patient states for last 2 weeks he has been having intermittent episodes of palpitations and tachycardia.  States that he measured his heart rate and noted to be around 100.  Also has intermittent episodes of shortness of breath at rest.    Patient states that today he started to feel some lightheadedness and so he sat down. Pt says that   No fevers, chills, nausea, vomiting, abdominal pain, chest pain, pressure, leg pain, swelling, recent bleeds, anxiety.   No similar episodes in the past. Pt notes that there significant family hx of clotting in the family, says atleast a few people in his family have passed from "blood clots in the lung."  Patient does not follow with a cardiologist.     In the ED, vitals normal. Hb 10.4 (~baseline 11), Ca 14.4, D-dimer 297, Cr 3.8 (baseline 2.4), BNP 1078.

## 2023-06-21 NOTE — ED PROVIDER NOTE - ATTENDING APP SHARED VISIT CONTRIBUTION OF CARE
67-year-old male past medical history of lymphoma in remission, CKD, schizoaffective disorder presents with palpitations.  Patient states for last 2 weeks he has been having intermittent episodes of palpitations.  States that he measured his heart rate and noted to be around 100.  Also has intermittent episodes of shortness of breath.  Denies any chest pain or pressure.  Patient states that today he started to feel some lightheaded sensation so came in for evaluation.  No fevers or recent illness.  No nausea vomiting .  No abdominal pain.  No similar episodes in the past.  Patient does not follow with a cardiologist.  No leg swelling or pain.    CONSTITUTIONAL: Well-developed; well-nourished; in no acute distress.   SKIN: warm, dry  HEAD: Normocephalic; atraumatic.  EYES: PERRL, EOMI, no conjunctival erythema  ENT: No nasal discharge; airway clear.  NECK: Supple; non tender.  CARD: S1, S2 normal;  Regular rate and rhythm.   RESP: No wheezes, rales or rhonchi.  ABD: soft non tender, non distended, no rebound or guarding  EXT: Normal ROM.  5/5 strength in all 4 extremities. no pedal edema or calf tenderness.   LYMPH: No acute cervical adenopathy.  NEURO: Alert, oriented, grossly unremarkable. neurovascularly intact  PSYCH: Cooperative, appropriate.

## 2023-06-21 NOTE — H&P ADULT - ASSESSMENT
67 year old male past medical history of NH lymphoma in remission (follows with Dr. Gong), CKD stage 3, schizoaffective disorder presents with palpitations      #Palpitations, tachycardia  - occasional episodes of tachycardia and palpitations at home, associated with lightheadedness for past 2 weeks  - Dimer 297 (below adjusted cutoff of VTE); no leg swelling, no immobilization  - no recent stressors/anxiety  - EKG NSR  - trops x1 negative; BNP 1078, no signs of overload  - echo 2021 with EF 59%, G1DD  - f/u  repeat echo, TSH, orthostatics, A1c, lipid panel    #NH lymphoma, in remission  #Hypercalcemia, likely of malignancy  # Anemia of chronic disease  #ASHLEY on CKD vs progressing CKD  - NH lymphoma in remission, last saw Dr. Gong 1 month ago  - calcium 14.4 on admission; no flank pain/ dysuria but admits to diarrhea  - Hb 10.4 (baseline 11), no back pain  -  Cr 3.8 (baseline 2.4)  - SPEP 04/23 showing hypogammaglobulinemia, K/L ration 1.27; f/u heme/onc consult  - IVF; will start calcitonin  - avoid bisphosphonates given CKD    #Schizoaffective disorder  - continue fluphenazine, benztropine      DVT ppx: heparin subq  GI ppx: protonix  Diet: DASH/TLC  Full code 67 year old male past medical history of NH lymphoma in remission (follows with Dr. Gong), CKD stage 3, schizoaffective disorder presents with palpitations      #Palpitations, tachycardia  - occasional episodes of tachycardia and palpitations at home, associated with lightheadedness for past 2 weeks  - Dimer 297 (below adjusted cutoff of VTE); no leg swelling, no immobilization  - no recent stressors/anxiety  - EKG NSR  - trops x1 negative; BNP 1078, no signs of overload  - echo 2021 with EF 59%, G1DD  - f/u  repeat echo, TSH, orthostatics, A1c, lipid panel    #NH lymphoma, in remission  #Hypercalcemia, likely of malignancy  # Anemia of chronic disease  #ASHLEY on CKD vs progressing CKD  - NH lymphoma in remission, last saw Dr. Gong 1 month ago  - calcium 14.4 on admission; no flank pain/ dysuria but admits to diarrhea  - Hb 10.4 (baseline 11), no back pain  -  Cr 3.8 (baseline 2.4)  - SPEP 04/23 showing hypogammaglobulinemia, K/L ration 1.27; f/u heme/onc consult  - IVF; will start calcitonin 350U q12  - avoid bisphosphonates given CKD    #Schizoaffective disorder  - continue fluphenazine, benztropine      DVT ppx: heparin subq  GI ppx: protonix  Diet: DASH/TLC  Full code 67 year old male past medical history of NH lymphoma in remission (follows with Dr. Gong), CKD stage 3, schizoaffective disorder presents with palpitations      #Palpitations, tachycardia  - occasional episodes of tachycardia and palpitations at home, associated with lightheadedness for past 2 weeks  - Dimer 297 (below adjusted cutoff of VTE); no leg swelling, no immobilization  - no recent stressors/anxiety  - EKG NSR  - trops x1 negative; BNP 1078, no signs of overload  - echo 2021 with EF 59%, G1DD  - f/u  repeat echo, TSH, orthostatics, A1c, lipid panel    #NH lymphoma, in remission  #Hypercalcemia, likely of malignancy  # Anemia of chronic disease  #ASHLEY on CKD vs progressing CKD  - NH lymphoma in remission, last saw Dr. Gong 1 month ago  - calcium 14.4 on admission; no flank pain/ dysuria but admits to diarrhea  - Hb 10.4 (baseline 11), no back pain  -  Cr 3.8 (baseline 2.4); f/u urine lytes  - SPEP 04/23 showing hypogammaglobulinemia, K/L ration 1.27; f/u heme/onc consult  - f/u SPEP,UPEP, SFLC, IF  - IVF; will start calcitonin 350U q12  - avoid bisphosphonates given CKD    #Schizoaffective disorder  - continue fluphenazine, benztropine      DVT ppx: heparin subq  GI ppx: protonix  Diet: DASH/TLC  Full code

## 2023-06-21 NOTE — H&P ADULT - ATTENDING COMMENTS
Patient seen and examined at bedside independently of the residents. I read the resident's note and agree with the plan with the additions and corrections as noted below. My note supersedes the resident's note.     REVIEW OF SYSTEMS:  CONSTITUTIONAL: No weakness, fevers or chills  EYES/ENT: No visual changes;  No vertigo or throat pain   NECK: No pain or stiffness  RESPIRATORY: No cough, wheezing, hemoptysis; No shortness of breath  CARDIOVASCULAR: No chest pain or palpitations  GASTROINTESTINAL: No abdominal or epigastric pain. No nausea, vomiting, or hematemesis; No diarrhea or constipation. No melena or hematochezia.  GENITOURINARY: No dysuria, frequency or hematuria  NEUROLOGICAL: No numbness or weakness  MSK: No pain. No weakness.   SKIN: No itching, rashes.     PMH:  Schizophrenia, Nephrolithiasis complicated by Left atrophic kidney, Right ureteral revision, CKD stage 4, Gout, Chronic diarrhea, Non hodgkin lymphoma, Hypercalcemia    FHx: Reviewed. No fhx of asthma/copd, No fhx of liver and pulmonary disease. No fhx of hematological disorder.     Physical Exam:  GEN: No acute distress. Awake, Alert and oriented x 3.   Head: Atraumatic, Normocephalic.   Eye: PEERLA. No sclera icterus. EOMI.   ENT: Normal oropharynx, no thyromegaly, no mass, no lymphadenopathy.   LUNGS: Clear to auscultation bilaterally. No wheeze/rales/crackles.   HEART: Normal. S1/S2 present. RRR. No murmur/gallops.   ABD: Soft, non-tender, non-distended. Bowel sounds present.   EXT: No pitting edema. No erythema. No tenderness.  Integumentary: No rash, No sore, No petechia.   NEURO: CN III-XII intact. Strength: 5/5 b/l ULE. Sensory intact b/l ULE.     Vital Signs Last 24 Hrs  T(C): 36.1 (2023 15:13), Max: 36.1 (2023 15:13)  T(F): 97 (2023 15:13), Max: 97 (2023 15:13)  HR: 96 (2023 15:13) (96 - 96)  BP: 101/59 (2023 15:13) (101/59 - 101/59)  BP(mean): --  RR: 16 (2023 15:13) (16 - 16)  SpO2: 97% (2023 15:13) (97% - 97%)    Parameters below as of 2023 15:13  Patient On (Oxygen Delivery Method): room air      Please see the above notes for Labs and radiology.     Assessment and Plan:     66 yo M with hx of Schizophrenia, Nephrolithiasis complicated by Left atrophic kidney, Right ureteral revision, CKD stage 4, Gout, Chronic diarrhea, Non hodgkin lymphoma, Hypercalcemia presents to ED for palpitation and SOB on exertion.     Hypercalcemia 2/2 malignancy?   - check PTH, vit D, PTHrP  - will give calcitonin SC   - c/w IVF NS @ 100cc/hr   - monitor BMP   - Nephrology consult.     ASHLEY on CKD stage 4 likely 2/2 hypercalcemia  - serum 3.8 currently. (baseline ~ 2.5)  - c/w IVF NS @ 100 cc/hr  - renal US and urine studies.   - Nephrology consult.     Dyspnea on exertion  - CXR looks unremarkable to my read --> pending official report.   - patient is not hypoxic.   - will get TTE    Schizophrenia - c/w home med  Gout - c/w home med  Hx of Lymphoma/Colonic mass - follow up outpatient.     DVT ppx: Heparin SC  GI ppx: PPI  Diet: DASH/Renal diet  Activity: as tolerated.     Date seen by the attendin2023  Total time spent: 75 minutes.

## 2023-06-21 NOTE — H&P ADULT - NSHPLABSRESULTS_GEN_ALL_CORE
06-21    139  |  107  |  51<H>  ----------------------------<  97  4.5   |  20  |  3.8<H>    Ca    14.5<HH>      21 Jun 2023 16:36  Mg     2.6     06-21    TPro  5.3<L>  /  Alb  3.7  /  TBili  0.5  /  DBili  x   /  AST  13  /  ALT  11  /  AlkPhos  211<H>  06-21                        10.4   10.84 )-----------( 203      ( 21 Jun 2023 16:36 )             31.6

## 2023-06-21 NOTE — ED PROVIDER NOTE - CLINICAL SUMMARY MEDICAL DECISION MAKING FREE TEXT BOX
Patient presents with palpitations and shortness of breath. labs, ekg, cxr done. + elevated d-dimer. + ASHLEY on ckd. Unable to do cta due to kidney function. Patient admitted for further evaluation.

## 2023-06-21 NOTE — ED ADULT TRIAGE NOTE - CHIEF COMPLAINT QUOTE
"Bev been short of breath and have had palpitations x1 month, for the past few days Bev been really dizzy."

## 2023-06-21 NOTE — ED PROVIDER NOTE - OBJECTIVE STATEMENT
67 year Male PMH Schizophrenia (prior lithium use), nephrolithiasis c/b atrophic L kidney, R ureteral revision, CKD4 (bsl Cr ~2.5), gout, chronic diarrhea, NH lymphoma marginal zone sub-type, hypercalcemia presents to the ED for palpitations and shortness of breath. Patient states that for the past few weeks he has had dyspnea on exertion as well as intermittent palpitations noting his resting heart rate to be elevated >100. He experienced chest pain 2 days ago for a few minutes but denies pain at this time. Further denies fever, chills, cough, headache, abdominal pain, vomiting, urinary symptoms, leg swelling.

## 2023-06-22 DIAGNOSIS — F25.9 SCHIZOAFFECTIVE DISORDER, UNSPECIFIED: ICD-10-CM

## 2023-06-22 DIAGNOSIS — C85.90 NON-HODGKIN LYMPHOMA, UNSPECIFIED, UNSPECIFIED SITE: ICD-10-CM

## 2023-06-22 DIAGNOSIS — Z79.899 OTHER LONG TERM (CURRENT) DRUG THERAPY: ICD-10-CM

## 2023-06-22 DIAGNOSIS — N17.9 ACUTE KIDNEY FAILURE, UNSPECIFIED: ICD-10-CM

## 2023-06-22 DIAGNOSIS — R06.02 SHORTNESS OF BREATH: ICD-10-CM

## 2023-06-22 DIAGNOSIS — E83.52 HYPERCALCEMIA: ICD-10-CM

## 2023-06-22 LAB
A1C WITH ESTIMATED AVERAGE GLUCOSE RESULT: 5 % — SIGNIFICANT CHANGE UP (ref 4–5.6)
ALBUMIN SERPL ELPH-MCNC: 3.2 G/DL — LOW (ref 3.5–5.2)
ALP SERPL-CCNC: 189 U/L — HIGH (ref 30–115)
ALT FLD-CCNC: 10 U/L — SIGNIFICANT CHANGE UP (ref 0–41)
ANION GAP SERPL CALC-SCNC: 13 MMOL/L — SIGNIFICANT CHANGE UP (ref 7–14)
AST SERPL-CCNC: 12 U/L — SIGNIFICANT CHANGE UP (ref 0–41)
BILIRUB SERPL-MCNC: 0.4 MG/DL — SIGNIFICANT CHANGE UP (ref 0.2–1.2)
BUN SERPL-MCNC: 52 MG/DL — HIGH (ref 10–20)
CALCIUM SERPL-MCNC: 14.4 MG/DL — CRITICAL HIGH (ref 8.4–10.5)
CHLORIDE SERPL-SCNC: 108 MMOL/L — SIGNIFICANT CHANGE UP (ref 98–110)
CHOLEST SERPL-MCNC: 121 MG/DL — SIGNIFICANT CHANGE UP
CO2 SERPL-SCNC: 18 MMOL/L — SIGNIFICANT CHANGE UP (ref 17–32)
CREAT SERPL-MCNC: 3.7 MG/DL — HIGH (ref 0.7–1.5)
EGFR: 17 ML/MIN/1.73M2 — LOW
ESTIMATED AVERAGE GLUCOSE: 97 MG/DL — SIGNIFICANT CHANGE UP (ref 68–114)
GLUCOSE SERPL-MCNC: 99 MG/DL — SIGNIFICANT CHANGE UP (ref 70–99)
HCT VFR BLD CALC: 28.3 % — LOW (ref 42–52)
HDLC SERPL-MCNC: 18 MG/DL — LOW
HGB BLD-MCNC: 9.2 G/DL — LOW (ref 14–18)
LIPID PNL WITH DIRECT LDL SERPL: 76 MG/DL — SIGNIFICANT CHANGE UP
MAGNESIUM SERPL-MCNC: 2.6 MG/DL — HIGH (ref 1.8–2.4)
MCHC RBC-ENTMCNC: 29 PG — SIGNIFICANT CHANGE UP (ref 27–31)
MCHC RBC-ENTMCNC: 32.5 G/DL — SIGNIFICANT CHANGE UP (ref 32–37)
MCV RBC AUTO: 89.3 FL — SIGNIFICANT CHANGE UP (ref 80–94)
NON HDL CHOLESTEROL: 103 MG/DL — SIGNIFICANT CHANGE UP
NRBC # BLD: 0 /100 WBCS — SIGNIFICANT CHANGE UP (ref 0–0)
PLATELET # BLD AUTO: 180 K/UL — SIGNIFICANT CHANGE UP (ref 130–400)
PMV BLD: 9.8 FL — SIGNIFICANT CHANGE UP (ref 7.4–10.4)
POTASSIUM SERPL-MCNC: 4.4 MMOL/L — SIGNIFICANT CHANGE UP (ref 3.5–5)
POTASSIUM SERPL-SCNC: 4.4 MMOL/L — SIGNIFICANT CHANGE UP (ref 3.5–5)
PROT SERPL-MCNC: 4.5 G/DL — LOW (ref 6–8)
PROT SERPL-MCNC: 4.6 G/DL — LOW (ref 6–8.3)
PROT SERPL-MCNC: 4.6 G/DL — LOW (ref 6–8.3)
RBC # BLD: 3.17 M/UL — LOW (ref 4.7–6.1)
RBC # FLD: 15.6 % — HIGH (ref 11.5–14.5)
SODIUM SERPL-SCNC: 139 MMOL/L — SIGNIFICANT CHANGE UP (ref 135–146)
T4 FREE+ TSH PNL SERPL: 1.88 UIU/ML — SIGNIFICANT CHANGE UP (ref 0.27–4.2)
TRIGL SERPL-MCNC: 134 MG/DL — SIGNIFICANT CHANGE UP
WBC # BLD: 9.32 K/UL — SIGNIFICANT CHANGE UP (ref 4.8–10.8)
WBC # FLD AUTO: 9.32 K/UL — SIGNIFICANT CHANGE UP (ref 4.8–10.8)

## 2023-06-22 PROCEDURE — 99222 1ST HOSP IP/OBS MODERATE 55: CPT

## 2023-06-22 PROCEDURE — 93306 TTE W/DOPPLER COMPLETE: CPT | Mod: 26

## 2023-06-22 PROCEDURE — 76775 US EXAM ABDO BACK WALL LIM: CPT | Mod: 26

## 2023-06-22 PROCEDURE — 99233 SBSQ HOSP IP/OBS HIGH 50: CPT

## 2023-06-22 RX ORDER — PAMIDRONATE DISODIUM 9 MG/ML
60 INJECTION, SOLUTION INTRAVENOUS ONCE
Refills: 0 | Status: COMPLETED | OUTPATIENT
Start: 2023-06-22 | End: 2023-06-22

## 2023-06-22 RX ADMIN — CALCITONIN SALMON 350 INTERNATIONAL UNIT(S): 200 INJECTION, SOLUTION INTRAMUSCULAR at 23:43

## 2023-06-22 RX ADMIN — PANTOPRAZOLE SODIUM 40 MILLIGRAM(S): 20 TABLET, DELAYED RELEASE ORAL at 12:52

## 2023-06-22 RX ADMIN — HEPARIN SODIUM 5000 UNIT(S): 5000 INJECTION INTRAVENOUS; SUBCUTANEOUS at 05:53

## 2023-06-22 RX ADMIN — HEPARIN SODIUM 5000 UNIT(S): 5000 INJECTION INTRAVENOUS; SUBCUTANEOUS at 18:01

## 2023-06-22 RX ADMIN — CALCITONIN SALMON 350 INTERNATIONAL UNIT(S): 200 INJECTION, SOLUTION INTRAMUSCULAR at 05:54

## 2023-06-22 RX ADMIN — SODIUM CHLORIDE 100 MILLILITER(S): 9 INJECTION INTRAMUSCULAR; INTRAVENOUS; SUBCUTANEOUS at 18:01

## 2023-06-22 RX ADMIN — FLUPHENAZINE HYDROCHLORIDE 10 MILLIGRAM(S): 1 TABLET, FILM COATED ORAL at 12:52

## 2023-06-22 RX ADMIN — PAMIDRONATE DISODIUM 42.78 MILLIGRAM(S): 9 INJECTION, SOLUTION INTRAVENOUS at 17:49

## 2023-06-22 RX ADMIN — Medication 2 MILLIGRAM(S): at 22:44

## 2023-06-22 NOTE — PROGRESS NOTE ADULT - ASSESSMENT
67M PMHx nonHodgkins lymphoma, CKD III, schizoaffective disorder here with shortness of breath, palpitations; found to have hypercalcemia. ASHLEY.

## 2023-06-22 NOTE — PROGRESS NOTE ADULT - SUBJECTIVE AND OBJECTIVE BOX
INTERVAL HPI/OVERNIGHT EVENTS:    SUBJECTIVE: Patient seen and examined at bedside.     cc: sob  no cp, sob, abd pain, fever  no sob, orthopnea, pnd, cough    OBJECTIVE:    VITAL SIGNS:  Vital Signs Last 24 Hrs  T(C): 36.4 (22 Jun 2023 07:58), Max: 36.4 (22 Jun 2023 07:58)  T(F): 97.6 (22 Jun 2023 07:58), Max: 97.6 (22 Jun 2023 07:58)  HR: 79 (22 Jun 2023 07:58) (79 - 96)  BP: 111/69 (22 Jun 2023 07:58) (101/59 - 113/66)  BP(mean): --  RR: 18 (22 Jun 2023 07:58) (16 - 18)  SpO2: 98% (22 Jun 2023 07:58) (97% - 100%)    Parameters below as of 21 Jun 2023 15:13  Patient On (Oxygen Delivery Method): room air          PHYSICAL EXAM:    General: NAD  HEENT: NC/AT; PERRL, clear conjunctiva  Neck: supple  Respiratory: CTA b/l  Cardiovascular: +S1/S2; RRR  Abdomen: soft, NT/ND; +BS x4  Extremities: WWP, 2+ peripheral pulses b/l; no LE edema  Skin: normal color and turgor; no rash  Neurological:    MEDICATIONS:  MEDICATIONS  (STANDING):  benztropine 2 milliGRAM(s) Oral at bedtime  calcitonin Injectable 350 International Unit(s) IntraMuscular every 12 hours  fluPHENAZine 10 milliGRAM(s) Oral daily  heparin   Injectable 5000 Unit(s) SubCutaneous every 12 hours  pantoprazole    Tablet 40 milliGRAM(s) Oral before breakfast  sodium chloride 0.9%. 1000 milliLiter(s) (100 mL/Hr) IV Continuous <Continuous>    MEDICATIONS  (PRN):      ALLERGIES:  Allergies    allopurinol (Rash (Moderate))    Intolerances        LABS:                        9.2    9.32  )-----------( 180      ( 22 Jun 2023 06:00 )             28.3     Hemoglobin: 9.2 g/dL (06-22 @ 06:00)  Hemoglobin: 10.4 g/dL (06-21 @ 16:36)    CBC Full  -  ( 22 Jun 2023 06:00 )  WBC Count : 9.32 K/uL  RBC Count : 3.17 M/uL  Hemoglobin : 9.2 g/dL  Hematocrit : 28.3 %  Platelet Count - Automated : 180 K/uL  Mean Cell Volume : 89.3 fL  Mean Cell Hemoglobin : 29.0 pg  Mean Cell Hemoglobin Concentration : 32.5 g/dL  Auto Neutrophil # : x  Auto Lymphocyte # : x  Auto Monocyte # : x  Auto Eosinophil # : x  Auto Basophil # : x  Auto Neutrophil % : x  Auto Lymphocyte % : x  Auto Monocyte % : x  Auto Eosinophil % : x  Auto Basophil % : x    06-22    139  |  108  |  52<H>  ----------------------------<  99  4.4   |  18  |  3.7<H>    Ca    14.4<HH>      22 Jun 2023 06:00  Mg     2.6     06-22    TPro  4.5<L>  /  Alb  3.2<L>  /  TBili  0.4  /  DBili  x   /  AST  12  /  ALT  10  /  AlkPhos  189<H>  06-22    Creatinine Trend: 3.7<--, 3.8<--  LIVER FUNCTIONS - ( 22 Jun 2023 06:00 )  Alb: 3.2 g/dL / Pro: 4.5 g/dL / ALK PHOS: 189 U/L / ALT: 10 U/L / AST: 12 U/L / GGT: x               hs Troponin:            Urinalysis Basic - ( 22 Jun 2023 06:00 )    Color: x / Appearance: x / SG: x / pH: x  Gluc: 99 mg/dL / Ketone: x  / Bili: x / Urobili: x   Blood: x / Protein: x / Nitrite: x   Leuk Esterase: x / RBC: x / WBC x   Sq Epi: x / Non Sq Epi: x / Bacteria: x      CSF:                      EKG:   MICROBIOLOGY:    IMAGING:      Labs, imaging, EKG personally reviewed    RADIOLOGY & ADDITIONAL TESTS: Reviewed.

## 2023-06-22 NOTE — CONSULT NOTE ADULT - SUBJECTIVE AND OBJECTIVE BOX
PET/CT  5/2/2023      1.  Interval increase in size and decreased metabolic activity of the thoracoabdominal lymphadenopathy, some are new, probably recurrence /residual disease.     2.  New mildly metabolic 3 cm hypodensity in the liver segment 2/3, concerning for neoplasm or lymphoma.  Poorly defined small hypodensity in the segment 4, below the resolution of PET scan.  MRI will provide further elucidation.     3.  Diffuse mildly increased bone marrow activity, possibly reactive versus bone marrow involvement.     4.  Hepatosplenomegaly.                    AGENT:     11.5 mCi F18-FDG, IV via the left hand vein.     HISTORY:     67-year-old male with lymphadenopathy and liver lesion seen on recent CT.  History of follicular lymphoma, chemotherapy in 2019.  PET scan for restaging.     EXAM CATEGORY:     Subsequent treatment strategy.     TECHNIQUE:     60 minutes following injection of the radiopharmaceutical, PET/CT imaging was performed from the skull base to mid thigh.  Fasting serum glucose was 98 mg/dL prior to injection.  Oral or IV contrast was not given per protocol.  All SUV values reported represent maximum SUV (SUV max) unless otherwise specified.       This study was interpreted using a lean body mass corrected SUV technique.  Please note this may result in lower SUV values compared to a body-weight corrected technique.      Reference liver uptake SUV mean 1.6 (previously 1.9), SUV max 1.9 (previously 2.6)  Spleen uptake SUV mean 1.3 (previously 2.1)  Mediastinal blood pool uptake SUV 1.3 (previously 1.3)     COMPARISON:     CT abdomen pelvis 2/23/2023  PET/CT 6/29/2016     FINDINGS:     Head and neck:     There is no suspicious FDG uptake in the head and neck.     No significant cervical lymphadenopathy on CT.  Interval decrease in size and metabolic resolution of previously seen FDG avid right level 2B and left level 5 lymph nodes, currently 8 mm image 34 (previously image 54, 1.6 x 1.2 cm SUV 3.3 remeasured).     Chest:     Mildly FDG avid lower posterior mediastinal lymph nodes:  * New right posterior periesophageal lymph node image 101, SUV 1.6, 2.3 x 1.9 cm.  * Distal periaortic lymph nodes image 91, SUV 1.3, 1.6 x 1.4 cm (previously image 104, 1.4 cm, SUV 2.7 remeasured) .     Interval metabolic resolution of previously FDG avid bilateral axillary lymph nodes, retropectoral and parasternal small lymph nodes.  Few non-FDG avid axillary lymph nodes measure up to 1.3 x 0.9 cm, image 49 (previously SUV 1.8 remeasured)     On CT, there is stable subcentimeter pretracheal nodes.  Bilateral small gynecomastia.  The heart and great vessels are unremarkable.  Small pericardial effusion. No pleural effusion.  New non-FDG avid subcentimeter pleural nodularity in the medial right lower lobe, below the resolution of PET scan.  No suspicious lung nodule.     Abdomen and pelvis:     New 3.0 x 2.8 cm hypodensity in the hepatic segment 2/3 image 109, SUV 2.8.  Poorly defined small hypodensity in the segment 4 image 109, below the resolution of PET scan.     Hepatomegaly 19 cm in craniocaudal dimension (previously 18.2 cm)  Splenomegaly 16.2 cm in greatest dimension (previously 17.5 cm)     Interval increase in size and decreased metabolic activity of the abdominopelvic lymphadenopathy.  For example:  * Periportal image 122, SUV 2.1, 5.1 x 2.3 cm (previously image 139, 4.3 x 2.4 cm, SUV 3.9 remeasured), increase in size  * Left para-aortic image 147, SUV 1.4, 1.9 x 1.2 cm (previously image 162, 3 x 2.3 cm, SUV 6.2 remeasured)  * Bilateral retrocrural lymph nodes measure up to 1.6 x 1.0 cm, image 112, non-FDG avid (previously image 131, SUV 2.7 remeasured)  * Bilateral external iliac lymph nodes measure up to 2.6 x 1.4 cm, SUV 1.7, image 202 (previously image 228, 1.8 x 1.0 cm, SUV 2.5 remeasured)  * Right femoral lymph node image 243, SUV 0.9, 2.2 x 1.4 cm (previously image 264, 1.2 x 1.0 cm, SUV 2.4 remeasured).     Physiologic activity is visualized in the spleen, pancreas, adrenals, kidneys and bowel.     CT images demonstrate cholelithiasis, small atrophic left kidney, left renal cyst and colonic diverticulosis.       Musculoskeletal and extremities:     There are no suspicious FDG-avid osseous lesions.     Diffuse mildly increased bone marrow activity SUV 2.0 image 183, possibly reflecting reactive versus bone marrow involvement.     No aggressive osseous lesions are seen on CT.     Please note KEY IMAGES for this PET/CT study are visible in [facility] by scrolling to the far right of the collection of image preview thumbnails.     Diagnostic confidence level used in this report:     Consistent with/compatible with or no modifier - greater than 98%  Most likely - greater than 90%  Likely/probably - greater than 75%  Possibly 50%  Less likely - less than 25%  Unlikely - less than 5%   Patient is a 67y old  Male who presents with a chief complaint of     HPI:  67 year old male past medical history of NH lymphoma in remission (follows with Dr. Gong), CKD stage 3, schizoaffective disorder presents with palpitations.  Patient states for last 2 weeks he has been having intermittent episodes of palpitations and tachycardia.  States that he measured his heart rate and noted to be around 100.  Also has intermittent episodes of shortness of breath at rest.    Patient states that today he started to feel some lightheadedness and so he sat down. Pt says that   No fevers, chills, nausea, vomiting, abdominal pain, chest pain, pressure, leg pain, swelling, recent bleeds, anxiety.   No similar episodes in the past. Pt notes that there significant family hx of clotting in the family, says atleast a few people in his family have passed from "blood clots in the lung."  Patient does not follow with a cardiologist.     In the ED, vitals normal. Hb 10.4 (~baseline 11), Ca 14.4, D-dimer 297, Cr 3.8 (baseline 2.4), BNP 1078.  (21 Jun 2023 20:20)       ROS:  Negative except for generalized weakness.    PAST MEDICAL & SURGICAL HISTORY:  Kidney stones      Schizophrenia      Lymphoma      Shingles      Atrophic kidney      Retained urethral stent      H/O umbilical hernia repair      Elbow fracture      H/O cystoscopy          SOCIAL HISTORY: Denies smoking cigarettes and drinking alcohol.    FAMILY HISTORY:  FH: hypertension    FH: diabetes mellitus        MEDICATIONS  (STANDING):  benztropine 2 milliGRAM(s) Oral at bedtime  calcitonin Injectable 350 International Unit(s) IntraMuscular every 12 hours  fluPHENAZine 10 milliGRAM(s) Oral daily  heparin   Injectable 5000 Unit(s) SubCutaneous every 12 hours  pamidronate IVPB 60 milliGRAM(s) IV Intermittent once  pantoprazole    Tablet 40 milliGRAM(s) Oral before breakfast  sodium chloride 0.9%. 1000 milliLiter(s) (100 mL/Hr) IV Continuous <Continuous>    MEDICATIONS  (PRN):      Allergies    allopurinol (Rash (Moderate))    Intolerances        Vital Signs Last 24 Hrs  T(C): 36.7 (22 Jun 2023 16:18), Max: 36.7 (22 Jun 2023 16:18)  T(F): 98 (22 Jun 2023 16:18), Max: 98 (22 Jun 2023 16:18)  HR: 77 (22 Jun 2023 16:18) (77 - 81)  BP: 132/75 (22 Jun 2023 16:18) (111/69 - 132/75)  BP(mean): --  RR: 19 (22 Jun 2023 16:18) (18 - 19)  SpO2: 96% (22 Jun 2023 16:18) (96% - 100%)        PHYSICAL EXAM  General: adult in NAD  HEENT: clear oropharynx, anicteric sclera, pink conjunctiva  Neck: supple  CV: normal S1/S2 with no murmur rubs or gallops  Lungs: positive air movement b/l ant lungs,clear to auscultation, no wheezes, no rales  Abdomen: soft non-tender non-distended, no hepatosplenomegaly  Ext: no clubbing cyanosis or edema  Skin: no rashes and no petechiae  Neuro: alert and oriented X 4, no focal deficits      LABS:                          9.2    9.32  )-----------( 180      ( 22 Jun 2023 06:00 )             28.3         Mean Cell Volume : 89.3 fL  Mean Cell Hemoglobin : 29.0 pg  Mean Cell Hemoglobin Concentration : 32.5 g/dL  Auto Neutrophil # : x  Auto Lymphocyte # : x  Auto Monocyte # : x  Auto Eosinophil # : x  Auto Basophil # : x  Auto Neutrophil % : x  Auto Lymphocyte % : x  Auto Monocyte % : x  Auto Eosinophil % : x  Auto Basophil % : x      Serial CBC's  06-22 @ 06:00  Hct-28.3 / Hgb-9.2 / Plat-180 / RBC-3.17 / WBC-9.32  Serial CBC's  06-21 @ 16:36  Hct-31.6 / Hgb-10.4 / Plat-203 / RBC-3.55 / WBC-10.84      06-22    139  |  108  |  52<H>  ----------------------------<  99  4.4   |  18  |  3.7<H>    Ca    14.4<HH>      22 Jun 2023 06:00  Mg     2.6     06-22    TPro  4.5<L>  /  Alb  3.2<L>  /  TBili  0.4  /  DBili  x   /  AST  12  /  ALT  10  /  AlkPhos  189<H>  06-22                      BLOOD SMEAR INTERPRETATION:       RADIOLOGY & ADDITIONAL STUDIES:                                PET/CT  5/2/2023      1.  Interval increase in size and decreased metabolic activity of the thoracoabdominal lymphadenopathy, some are new, probably recurrence /residual disease.     2.  New mildly metabolic 3 cm hypodensity in the liver segment 2/3, concerning for neoplasm or lymphoma.  Poorly defined small hypodensity in the segment 4, below the resolution of PET scan.  MRI will provide further elucidation.     3.  Diffuse mildly increased bone marrow activity, possibly reactive versus bone marrow involvement.     4.  Hepatosplenomegaly.                    AGENT:     11.5 mCi F18-FDG, IV via the left hand vein.     HISTORY:     67-year-old male with lymphadenopathy and liver lesion seen on recent CT.  History of follicular lymphoma, chemotherapy in 2019.  PET scan for restaging.     EXAM CATEGORY:     Subsequent treatment strategy.     TECHNIQUE:     60 minutes following injection of the radiopharmaceutical, PET/CT imaging was performed from the skull base to mid thigh.  Fasting serum glucose was 98 mg/dL prior to injection.  Oral or IV contrast was not given per protocol.  All SUV values reported represent maximum SUV (SUV max) unless otherwise specified.       This study was interpreted using a lean body mass corrected SUV technique.  Please note this may result in lower SUV values compared to a body-weight corrected technique.      Reference liver uptake SUV mean 1.6 (previously 1.9), SUV max 1.9 (previously 2.6)  Spleen uptake SUV mean 1.3 (previously 2.1)  Mediastinal blood pool uptake SUV 1.3 (previously 1.3)     COMPARISON:     CT abdomen pelvis 2/23/2023  PET/CT 6/29/2016     FINDINGS:     Head and neck:     There is no suspicious FDG uptake in the head and neck.     No significant cervical lymphadenopathy on CT.  Interval decrease in size and metabolic resolution of previously seen FDG avid right level 2B and left level 5 lymph nodes, currently 8 mm image 34 (previously image 54, 1.6 x 1.2 cm SUV 3.3 remeasured).     Chest:     Mildly FDG avid lower posterior mediastinal lymph nodes:  * New right posterior periesophageal lymph node image 101, SUV 1.6, 2.3 x 1.9 cm.  * Distal periaortic lymph nodes image 91, SUV 1.3, 1.6 x 1.4 cm (previously image 104, 1.4 cm, SUV 2.7 remeasured) .     Interval metabolic resolution of previously FDG avid bilateral axillary lymph nodes, retropectoral and parasternal small lymph nodes.  Few non-FDG avid axillary lymph nodes measure up to 1.3 x 0.9 cm, image 49 (previously SUV 1.8 remeasured)     On CT, there is stable subcentimeter pretracheal nodes.  Bilateral small gynecomastia.  The heart and great vessels are unremarkable.  Small pericardial effusion. No pleural effusion.  New non-FDG avid subcentimeter pleural nodularity in the medial right lower lobe, below the resolution of PET scan.  No suspicious lung nodule.     Abdomen and pelvis:     New 3.0 x 2.8 cm hypodensity in the hepatic segment 2/3 image 109, SUV 2.8.  Poorly defined small hypodensity in the segment 4 image 109, below the resolution of PET scan.     Hepatomegaly 19 cm in craniocaudal dimension (previously 18.2 cm)  Splenomegaly 16.2 cm in greatest dimension (previously 17.5 cm)     Interval increase in size and decreased metabolic activity of the abdominopelvic lymphadenopathy.  For example:  * Periportal image 122, SUV 2.1, 5.1 x 2.3 cm (previously image 139, 4.3 x 2.4 cm, SUV 3.9 remeasured), increase in size  * Left para-aortic image 147, SUV 1.4, 1.9 x 1.2 cm (previously image 162, 3 x 2.3 cm, SUV 6.2 remeasured)  * Bilateral retrocrural lymph nodes measure up to 1.6 x 1.0 cm, image 112, non-FDG avid (previously image 131, SUV 2.7 remeasured)  * Bilateral external iliac lymph nodes measure up to 2.6 x 1.4 cm, SUV 1.7, image 202 (previously image 228, 1.8 x 1.0 cm, SUV 2.5 remeasured)  * Right femoral lymph node image 243, SUV 0.9, 2.2 x 1.4 cm (previously image 264, 1.2 x 1.0 cm, SUV 2.4 remeasured).     Physiologic activity is visualized in the spleen, pancreas, adrenals, kidneys and bowel.     CT images demonstrate cholelithiasis, small atrophic left kidney, left renal cyst and colonic diverticulosis.       Musculoskeletal and extremities:     There are no suspicious FDG-avid osseous lesions.     Diffuse mildly increased bone marrow activity SUV 2.0 image 183, possibly reflecting reactive versus bone marrow involvement.     No aggressive osseous lesions are seen on CT.     Please note KEY IMAGES for this PET/CT study are visible in [facility] by scrolling to the far right of the collection of image preview thumbnails.     Diagnostic confidence level used in this report:     Consistent with/compatible with or no modifier - greater than 98%  Most likely - greater than 90%  Likely/probably - greater than 75%  Possibly 50%  Less likely - less than 25%  Unlikely - less than 5%   Patient is a 67y old  Male who presents with a chief complaint of     HPI:  67 year old male past medical history of NH lymphoma in remission (follows with Dr. Gong), CKD stage 3, schizoaffective disorder presents with palpitations.  Patient states for last 2 weeks he has been having intermittent episodes of palpitations and tachycardia.  States that he measured his heart rate and noted to be around 100.  Also has intermittent episodes of shortness of breath at rest.    Patient states that today he started to feel some lightheadedness and so he sat down. Pt says that   No fevers, chills, nausea, vomiting, abdominal pain, chest pain, pressure, leg pain, swelling, recent bleeds, anxiety.   No similar episodes in the past. Pt notes that there significant family hx of clotting in the family, says atleast a few people in his family have passed from "blood clots in the lung."  Patient does not follow with a cardiologist.     In the ED, vitals normal. Hb 10.4 (~baseline 11), Ca 14.4, D-dimer 297, Cr 3.8 (baseline 2.4), BNP 1078.  (21 Jun 2023 20:20)       ROS:  Negative except for generalized weakness.    PAST MEDICAL & SURGICAL HISTORY:  Kidney stones      Schizophrenia      Lymphoma      Shingles      Atrophic kidney      Retained urethral stent      H/O umbilical hernia repair      Elbow fracture      H/O cystoscopy          SOCIAL HISTORY: Denies smoking cigarettes and drinking alcohol.    FAMILY HISTORY:  FH: hypertension    FH: diabetes mellitus        MEDICATIONS  (STANDING):  benztropine 2 milliGRAM(s) Oral at bedtime  calcitonin Injectable 350 International Unit(s) IntraMuscular every 12 hours  fluPHENAZine 10 milliGRAM(s) Oral daily  heparin   Injectable 5000 Unit(s) SubCutaneous every 12 hours  pamidronate IVPB 60 milliGRAM(s) IV Intermittent once  pantoprazole    Tablet 40 milliGRAM(s) Oral before breakfast  sodium chloride 0.9%. 1000 milliLiter(s) (100 mL/Hr) IV Continuous <Continuous>    MEDICATIONS  (PRN):      Allergies    allopurinol (Rash (Moderate))    Intolerances        Vital Signs Last 24 Hrs  T(C): 36.7 (22 Jun 2023 16:18), Max: 36.7 (22 Jun 2023 16:18)  T(F): 98 (22 Jun 2023 16:18), Max: 98 (22 Jun 2023 16:18)  HR: 77 (22 Jun 2023 16:18) (77 - 81)  BP: 132/75 (22 Jun 2023 16:18) (111/69 - 132/75)  BP(mean): --  RR: 19 (22 Jun 2023 16:18) (18 - 19)  SpO2: 96% (22 Jun 2023 16:18) (96% - 100%)        PHYSICAL EXAM  General: adult in NAD  HEENT: clear oropharynx, anicteric sclera, pink conjunctiva  Neck: supple  CV: normal S1/S2 with no murmur rubs or gallops  Lungs: positive air movement b/l ant lungs,clear to auscultation, no wheezes, no rales  Abdomen: soft non-tender non-distended, no hepatosplenomegaly  Ext: no clubbing cyanosis or edema  Skin: no rashes and no petechiae  Neuro: alert and oriented X 4, no focal deficits      LABS:                          9.2    9.32  )-----------( 180      ( 22 Jun 2023 06:00 )             28.3         Mean Cell Volume : 89.3 fL  Mean Cell Hemoglobin : 29.0 pg  Mean Cell Hemoglobin Concentration : 32.5 g/dL  Auto Neutrophil # : x  Auto Lymphocyte # : x  Auto Monocyte # : x  Auto Eosinophil # : x  Auto Basophil # : x  Auto Neutrophil % : x  Auto Lymphocyte % : x  Auto Monocyte % : x  Auto Eosinophil % : x  Auto Basophil % : x      Serial CBC's  06-22 @ 06:00  Hct-28.3 / Hgb-9.2 / Plat-180 / RBC-3.17 / WBC-9.32  Serial CBC's  06-21 @ 16:36  Hct-31.6 / Hgb-10.4 / Plat-203 / RBC-3.55 / WBC-10.84      06-22    139  |  108  |  52<H>  ----------------------------<  99  4.4   |  18  |  3.7<H>    Ca    14.4<HH>      22 Jun 2023 06:00  Mg     2.6     06-22    TPro  4.5<L>  /  Alb  3.2<L>  /  TBili  0.4  /  DBili  x   /  AST  12  /  ALT  10  /  AlkPhos  189<H>  06-22                      BLOOD SMEAR INTERPRETATION:       RADIOLOGY & ADDITIONAL STUDIE    PET[facility]CT  5/2/2023 [facility]       1.  Interval increase in size and decreased metabolic activity of the thoracoabdominal lymphadenopathy, some are new, probably recurrence /residual disease.     2.  New mildly metabolic 3 cm hypodensity in the liver segment 2/3, concerning for neoplasm or lymphoma.  Poorly defined small hypodensity in the segment 4, below the resolution of PET scan.  MRI will provide further elucidation.     3.  Diffuse mildly increased bone marrow activity, possibly reactive versus bone marrow involvement.     4.  Hepatosplenomegaly.                    AGENT:     11.5 mCi F18-FDG, IV via the left hand vein.     HISTORY:     67-year-old male with lymphadenopathy and liver lesion seen on recent CT.  History of follicular lymphoma, chemotherapy in 2019.  PET scan for restaging.     EXAM CATEGORY:     Subsequent treatment strategy.     TECHNIQUE:     60 minutes following injection of the radiopharmaceutical, PET/CT imaging was performed from the skull base to mid thigh.  Fasting serum glucose was 98 mg/dL prior to injection.  Oral or IV contrast was not given per protocol.  All SUV values reported represent maximum SUV (SUV max) unless otherwise specified.       This study was interpreted using a lean body mass corrected SUV technique.  Please note this may result in lower SUV values compared to a body-weight corrected technique.      Reference liver uptake SUV mean 1.6 (previously 1.9), SUV max 1.9 (previously 2.6)  Spleen uptake SUV mean 1.3 (previously 2.1)  Mediastinal blood pool uptake SUV 1.3 (previously 1.3)     COMPARISON:     CT abdomen pelvis 2/23/2023  PET/CT 6/29/2016     FINDINGS:     Head and neck:     There is no suspicious FDG uptake in the head and neck.     No significant cervical lymphadenopathy on CT.  Interval decrease in size and metabolic resolution of previously seen FDG avid right level 2B and left level 5 lymph nodes, currently 8 mm image 34 (previously image 54, 1.6 x 1.2 cm SUV 3.3 remeasured).     Chest:     Mildly FDG avid lower posterior mediastinal lymph nodes:  * New right posterior periesophageal lymph node image 101, SUV 1.6, 2.3 x 1.9 cm.  * Distal periaortic lymph nodes image 91, SUV 1.3, 1.6 x 1.4 cm (previously image 104, 1.4 cm, SUV 2.7 remeasured) .     Interval metabolic resolution of previously FDG avid bilateral axillary lymph nodes, retropectoral and parasternal small lymph nodes.  Few non-FDG avid axillary lymph nodes measure up to 1.3 x 0.9 cm, image 49 (previously SUV 1.8 remeasured)     On CT, there is stable subcentimeter pretracheal nodes.  Bilateral small gynecomastia.  The heart and great vessels are unremarkable.  Small pericardial effusion. No pleural effusion.  New non-FDG avid subcentimeter pleural nodularity in the medial right lower lobe, below the resolution of PET scan.  No suspicious lung nodule.     Abdomen and pelvis:     New 3.0 x 2.8 cm hypodensity in the hepatic segment 2/3 image 109, SUV 2.8.  Poorly defined small hypodensity in the segment 4 image 109, below the resolution of PET scan.     Hepatomegaly 19 cm in craniocaudal dimension (previously 18.2 cm)  Splenomegaly 16.2 cm in greatest dimension (previously 17.5 cm)     Interval increase in size and decreased metabolic activity of the abdominopelvic lymphadenopathy.  For example:  * Periportal image 122, SUV 2.1, 5.1 x 2.3 cm (previously image 139, 4.3 x 2.4 cm, SUV 3.9 remeasured), increase in size  * Left para-aortic image 147, SUV 1.4, 1.9 x 1.2 cm (previously image 162, 3 x 2.3 cm, SUV 6.2 remeasured)  * Bilateral retrocrural lymph nodes measure up to 1.6 x 1.0 cm, image 112, non-FDG avid (previously image 131, SUV 2.7 remeasured)  * Bilateral external iliac lymph nodes measure up to 2.6 x 1.4 cm, SUV 1.7, image 202 (previously image 228, 1.8 x 1.0 cm, SUV 2.5 remeasured)  * Right femoral lymph node image 243, SUV 0.9, 2.2 x 1.4 cm (previously image 264, 1.2 x 1.0 cm, SUV 2.4 remeasured).     Physiologic activity is visualized in the spleen, pancreas, adrenals, kidneys and bowel.     CT images demonstrate cholelithiasis, small atrophic left kidney, left renal cyst and colonic diverticulosis.       Musculoskeletal and extremities:     There are no suspicious FDG-avid osseous lesions.     Diffuse mildly increased bone marrow activity SUV 2.0 image 183, possibly reflecting reactive versus bone marrow involvement.     No aggressive osseous lesions are seen on CT.     Please note KEY IMAGES for this PET/CT study are visible in [facility] by scrolling to the far right of the collection of image preview thumbnails.     Diagnostic confidence level used in this report:     Consistent with/compatible with or no modifier - greater than 98%  Most likely - greater than 90%  Likely/probably - greater than 75%  Possibly 50%  Less likely - less than 25%  Unlikely - less than 5%

## 2023-06-22 NOTE — CONSULT NOTE ADULT - ASSESSMENT
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 ASHLEY on CKD.    Hematology and Oncology consulted given hx of Marginal Zone lymphoma.    #Recurrent Hypercalcemia likely 2/2 Lymphoma.  - calcium 14.4  - on IV hydration.  -s/p one dose of calcitonin.     #Hx of NHL marginal zone     -He was in a good partial remission following 3 cycles of bendamustine and Rituxan.   -He was unable to tolerate maintenance Rituxan.  -However, he was never in complete remission and had preferred just to be observed since we were dealing with a low grade lymphoma.  - was lost to follow up.  CT Abdomen/Pelvis 2/23/23  --Interval worsening in periesophageal and pelvic lymphadenopathy , other bulky lymph nodes in the abdomen and retroperitoneum appear stable  --1.3 indeterminate segment 4 liver lesion  --Focal areas of pleural nodularity concerning for lymphomatous/neoplastic/metastatic process, new since prior.  PET scan 5.2.23: Interval increase in the size of the abdominal lymphnodes.  -LDH normal 4.23.    #CKD 4     #Chronic diarrhea  He had colonoscopy in the past which revealed a tubulovillous adenoma.  -Colonoscopy by GI on 4/21/23.    #Hx of kidney stones   #Gout     Recommendations  -Please give one dose of pamidronate 60 mg (ordered).  -IV hydration as tolerated.   -monitor calcium daily.  -PET scan was done as an out patient reviewed. Transformation less likely.   -please obtain LDH.  -Follow up as an out patient with Dr. Gong to start BTK inhibitors for his lymphoma.     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 ASHLEY on CKD.    Hematology and Oncology consulted given hx of Marginal Zone lymphoma.    #Recurrent Hypercalcemia likely 2/2 Lymphoma.  - calcium 14.4  - on IV hydration.  -s/p one dose of calcitonin.     #Hx of NHL marginal zone     -He was in a good partial remission following 3 cycles of bendamustine and Rituxan.   -He was unable to tolerate maintenance Rituxan.  -However, he was never in complete remission and had preferred just to be observed since we were dealing with a low grade lymphoma.  - was lost to follow up.  CT Abdomen/Pelvis 2/23/23  --Interval worsening in periesophageal and pelvic lymphadenopathy , other bulky lymph nodes in the abdomen and retroperitoneum appear stable  --1.3 indeterminate segment 4 liver lesion  --Focal areas of pleural nodularity concerning for lymphomatous/neoplastic/metastatic process, new since prior.  PET scan 5.2.23: Interval increase in the size of the abdominal lymphnodes.  -LDH normal 4.23.    #CKD 4     #Chronic diarrhea  He had colonoscopy in the past which revealed a tubulovillous adenoma.  -Colonoscopy by GI on 4/21/23  #Hx of kidney stones   #Gout     Recommendations  -Please give one dose of pamidronate 60 mg (ordered).  -IV hydration as tolerated.   -monitor calcium daily.  -PET scan was done as an out patient reviewed. Transformation less likely.   -please obtain LDH.  -Follow up as an out patient with Dr. Gong to start BTK inhibitors for his lymphoma.

## 2023-06-22 NOTE — CONSULT NOTE ADULT - ATTENDING COMMENTS
Patient with previous history of marginal zone lymphoma with recurrent hypoglycemia.  He was previously treated as above he had a PET scan done as an outpatient to rule out transformation and after the review of the PET scan today this I believe its unlikely he had Swift Transformation  he also had a normal LDH previously.  His recurrent hypercalcemia is likely secondary to his marginal zone lymphoma which should improve with initiation of Treatment    Plan  #Progression of marginal zone lymphoma  -Recommend outpatient follow-up with his treating physician to initiate either BTK inhibitor or Revlimid based treatment granted other options can also be considered  -can also consider a biopsy as well     #Recurrent hypercalcemia secondary to his lymphoma  -Continue with IV fluids at 100 ml/hr is fine   -We will give pamidronate 60 mg IV once which can also be repeated again if need be if calcium does not respond  -on Calcitonin as well     Patient can be discharged once calcium level and near normal    DVT prophylaxis heparin subcu

## 2023-06-23 LAB
24R-OH-CALCIDIOL SERPL-MCNC: 17 NG/ML — LOW (ref 30–80)
ALBUMIN SERPL ELPH-MCNC: 3.6 G/DL — SIGNIFICANT CHANGE UP (ref 3.5–5.2)
ALP SERPL-CCNC: 199 U/L — HIGH (ref 30–115)
ALT FLD-CCNC: 9 U/L — SIGNIFICANT CHANGE UP (ref 0–41)
ANION GAP SERPL CALC-SCNC: 13 MMOL/L — SIGNIFICANT CHANGE UP (ref 7–14)
AST SERPL-CCNC: 12 U/L — SIGNIFICANT CHANGE UP (ref 0–41)
BASOPHILS # BLD AUTO: 0.05 K/UL — SIGNIFICANT CHANGE UP (ref 0–0.2)
BASOPHILS NFR BLD AUTO: 0.7 % — SIGNIFICANT CHANGE UP (ref 0–1)
BILIRUB SERPL-MCNC: 0.4 MG/DL — SIGNIFICANT CHANGE UP (ref 0.2–1.2)
BUN SERPL-MCNC: 46 MG/DL — HIGH (ref 10–20)
CALCIUM SERPL-MCNC: 12 MG/DL — HIGH (ref 8.4–10.5)
CALCIUM SERPL-MCNC: 12.3 MG/DL — HIGH (ref 8.4–10.5)
CHLORIDE SERPL-SCNC: 108 MMOL/L — SIGNIFICANT CHANGE UP (ref 98–110)
CO2 SERPL-SCNC: 20 MMOL/L — SIGNIFICANT CHANGE UP (ref 17–32)
CREAT SERPL-MCNC: 3.1 MG/DL — HIGH (ref 0.7–1.5)
EGFR: 21 ML/MIN/1.73M2 — LOW
EOSINOPHIL # BLD AUTO: 0.58 K/UL — SIGNIFICANT CHANGE UP (ref 0–0.7)
EOSINOPHIL NFR BLD AUTO: 7.5 % — SIGNIFICANT CHANGE UP (ref 0–8)
GLUCOSE SERPL-MCNC: 101 MG/DL — HIGH (ref 70–99)
HCT VFR BLD CALC: 30.8 % — LOW (ref 42–52)
HGB BLD-MCNC: 10.1 G/DL — LOW (ref 14–18)
IMM GRANULOCYTES NFR BLD AUTO: 0.4 % — HIGH (ref 0.1–0.3)
KAPPA LC SER QL IFE: 3.2 MG/DL — HIGH (ref 0.33–1.94)
KAPPA/LAMBDA FREE LIGHT CHAIN RATIO, SERUM: 1.13 RATIO — SIGNIFICANT CHANGE UP (ref 0.26–1.65)
LAMBDA LC SER QL IFE: 2.82 MG/DL — HIGH (ref 0.57–2.63)
LYMPHOCYTES # BLD AUTO: 1.76 K/UL — SIGNIFICANT CHANGE UP (ref 1.2–3.4)
LYMPHOCYTES # BLD AUTO: 22.9 % — SIGNIFICANT CHANGE UP (ref 20.5–51.1)
MAGNESIUM SERPL-MCNC: 2.7 MG/DL — HIGH (ref 1.8–2.4)
MCHC RBC-ENTMCNC: 28.8 PG — SIGNIFICANT CHANGE UP (ref 27–31)
MCHC RBC-ENTMCNC: 32.8 G/DL — SIGNIFICANT CHANGE UP (ref 32–37)
MCV RBC AUTO: 87.7 FL — SIGNIFICANT CHANGE UP (ref 80–94)
MONOCYTES # BLD AUTO: 1.01 K/UL — HIGH (ref 0.1–0.6)
MONOCYTES NFR BLD AUTO: 13.1 % — HIGH (ref 1.7–9.3)
NEUTROPHILS # BLD AUTO: 4.26 K/UL — SIGNIFICANT CHANGE UP (ref 1.4–6.5)
NEUTROPHILS NFR BLD AUTO: 55.4 % — SIGNIFICANT CHANGE UP (ref 42.2–75.2)
NRBC # BLD: 0 /100 WBCS — SIGNIFICANT CHANGE UP (ref 0–0)
PLATELET # BLD AUTO: 200 K/UL — SIGNIFICANT CHANGE UP (ref 130–400)
PMV BLD: 10.5 FL — HIGH (ref 7.4–10.4)
POTASSIUM SERPL-MCNC: 4.3 MMOL/L — SIGNIFICANT CHANGE UP (ref 3.5–5)
POTASSIUM SERPL-SCNC: 4.3 MMOL/L — SIGNIFICANT CHANGE UP (ref 3.5–5)
PROT SERPL-MCNC: 4.9 G/DL — LOW (ref 6–8)
PTH-INTACT FLD-MCNC: 12 PG/ML — LOW (ref 15–65)
RBC # BLD: 3.51 M/UL — LOW (ref 4.7–6.1)
RBC # FLD: 15.4 % — HIGH (ref 11.5–14.5)
SODIUM SERPL-SCNC: 141 MMOL/L — SIGNIFICANT CHANGE UP (ref 135–146)
WBC # BLD: 7.69 K/UL — SIGNIFICANT CHANGE UP (ref 4.8–10.8)
WBC # FLD AUTO: 7.69 K/UL — SIGNIFICANT CHANGE UP (ref 4.8–10.8)

## 2023-06-23 PROCEDURE — 99233 SBSQ HOSP IP/OBS HIGH 50: CPT

## 2023-06-23 RX ORDER — SODIUM CHLORIDE 9 MG/ML
1000 INJECTION INTRAMUSCULAR; INTRAVENOUS; SUBCUTANEOUS
Refills: 0 | Status: DISCONTINUED | OUTPATIENT
Start: 2023-06-23 | End: 2023-06-27

## 2023-06-23 RX ADMIN — PANTOPRAZOLE SODIUM 40 MILLIGRAM(S): 20 TABLET, DELAYED RELEASE ORAL at 05:16

## 2023-06-23 RX ADMIN — HEPARIN SODIUM 5000 UNIT(S): 5000 INJECTION INTRAVENOUS; SUBCUTANEOUS at 17:36

## 2023-06-23 RX ADMIN — Medication 2 MILLIGRAM(S): at 21:08

## 2023-06-23 RX ADMIN — HEPARIN SODIUM 5000 UNIT(S): 5000 INJECTION INTRAVENOUS; SUBCUTANEOUS at 05:16

## 2023-06-23 RX ADMIN — SODIUM CHLORIDE 100 MILLILITER(S): 9 INJECTION INTRAMUSCULAR; INTRAVENOUS; SUBCUTANEOUS at 05:17

## 2023-06-23 RX ADMIN — FLUPHENAZINE HYDROCHLORIDE 10 MILLIGRAM(S): 1 TABLET, FILM COATED ORAL at 11:44

## 2023-06-23 NOTE — PATIENT PROFILE ADULT - FALL HARM RISK - HARM RISK INTERVENTIONS

## 2023-06-23 NOTE — PROGRESS NOTE ADULT - NSPROGADDITIONALINFOA_GEN_ALL_CORE
#Progress Note Handoff:  Pending (specify):  Consults_________, Tests________, Test Results_______, Other___f/u onc, hypercalcemia, aki______  Family discussion: d/w pt at bedside  Disposition: Home___/SNF___/Other________/Unknown at this time__x______
#Progress Note Handoff:  Pending (specify):  Consults_________, Tests________, Test Results_______, Other___f/u onc, hypercalcemia, aki______  Family discussion: d/w pt at bedside  Disposition: Home___/SNF___/Other________/Unknown at this time__x______

## 2023-06-23 NOTE — PROGRESS NOTE ADULT - SUBJECTIVE AND OBJECTIVE BOX
INTERVAL HPI/OVERNIGHT EVENTS:    SUBJECTIVE: Patient seen and examined at bedside.     cc: sob  no cp, sob, abd pain, fever  +hand spasm, no numbness, tingling, paresthesias    OBJECTIVE:    VITAL SIGNS:  Vital Signs Last 24 Hrs  T(C): 36.7 (23 Jun 2023 07:51), Max: 36.7 (22 Jun 2023 16:18)  T(F): 98.1 (23 Jun 2023 07:51), Max: 98.1 (23 Jun 2023 07:51)  HR: 73 (23 Jun 2023 07:51) (70 - 77)  BP: 105/59 (23 Jun 2023 07:51) (90/53 - 132/75)  BP(mean): 67 (22 Jun 2023 23:57) (67 - 67)  RR: 18 (23 Jun 2023 07:51) (18 - 19)  SpO2: 97% (23 Jun 2023 07:51) (95% - 97%)    Parameters below as of 23 Jun 2023 05:10  Patient On (Oxygen Delivery Method): room air          PHYSICAL EXAM:    General: NAD  HEENT: NC/AT; PERRL, clear conjunctiva  Neck: supple  Respiratory: CTA b/l  Cardiovascular: +S1/S2; RRR  Abdomen: soft, NT/ND; +BS x4  Extremities: WWP, 2+ peripheral pulses b/l; no LE edema  Skin: normal color and turgor; no rash  Neurological:    MEDICATIONS:  MEDICATIONS  (STANDING):  benztropine 2 milliGRAM(s) Oral at bedtime  fluPHENAZine 10 milliGRAM(s) Oral daily  heparin   Injectable 5000 Unit(s) SubCutaneous every 12 hours  pantoprazole    Tablet 40 milliGRAM(s) Oral before breakfast  sodium chloride 0.9%. 1000 milliLiter(s) (75 mL/Hr) IV Continuous <Continuous>    MEDICATIONS  (PRN):      ALLERGIES:  Allergies    allopurinol (Rash (Moderate))    Intolerances        LABS:                        10.1   7.69  )-----------( 200      ( 23 Jun 2023 06:11 )             30.8     Hemoglobin: 10.1 g/dL (06-23 @ 06:11)  Hemoglobin: 9.2 g/dL (06-22 @ 06:00)  Hemoglobin: 10.4 g/dL (06-21 @ 16:36)    CBC Full  -  ( 23 Jun 2023 06:11 )  WBC Count : 7.69 K/uL  RBC Count : 3.51 M/uL  Hemoglobin : 10.1 g/dL  Hematocrit : 30.8 %  Platelet Count - Automated : 200 K/uL  Mean Cell Volume : 87.7 fL  Mean Cell Hemoglobin : 28.8 pg  Mean Cell Hemoglobin Concentration : 32.8 g/dL  Auto Neutrophil # : 4.26 K/uL  Auto Lymphocyte # : 1.76 K/uL  Auto Monocyte # : 1.01 K/uL  Auto Eosinophil # : 0.58 K/uL  Auto Basophil # : 0.05 K/uL  Auto Neutrophil % : 55.4 %  Auto Lymphocyte % : 22.9 %  Auto Monocyte % : 13.1 %  Auto Eosinophil % : 7.5 %  Auto Basophil % : 0.7 %    06-23    141  |  108  |  46<H>  ----------------------------<  101<H>  4.3   |  20  |  3.1<H>    Ca    12.0<H>      23 Jun 2023 06:11  Mg     2.7     06-23    TPro  4.9<L>  /  Alb  3.6  /  TBili  0.4  /  DBili  x   /  AST  12  /  ALT  9   /  AlkPhos  199<H>  06-23    Creatinine Trend: 3.1<--, 3.7<--, 3.8<--  LIVER FUNCTIONS - ( 23 Jun 2023 06:11 )  Alb: 3.6 g/dL / Pro: 4.9 g/dL / ALK PHOS: 199 U/L / ALT: 9 U/L / AST: 12 U/L / GGT: x               hs Troponin:            Urinalysis Basic - ( 23 Jun 2023 06:11 )    Color: x / Appearance: x / SG: x / pH: x  Gluc: 101 mg/dL / Ketone: x  / Bili: x / Urobili: x   Blood: x / Protein: x / Nitrite: x   Leuk Esterase: x / RBC: x / WBC x   Sq Epi: x / Non Sq Epi: x / Bacteria: x      CSF:                      EKG:   MICROBIOLOGY:    IMAGING:      Labs, imaging, EKG personally reviewed    RADIOLOGY & ADDITIONAL TESTS: Reviewed.

## 2023-06-23 NOTE — PROGRESS NOTE ADULT - PROBLEM SELECTOR PLAN 1
suspected due to lymphoma, s/p calcitonin 6/21  s/p pamidronate 6/22  f/u pth, pth-rp; vit d levels, ace  ns 75 cc/hr  trend  appreciate onc
calcitonin  check pth, pth-rp; vit d levels, ace  ns 100 cc/hr  trend  f/u onc

## 2023-06-23 NOTE — PROGRESS NOTE ADULT - ASSESSMENT
67M PMHx marginal zone lymphoma, CKD III, schizoaffective disorder here with shortness of breath, palpitations; found to have hypercalcemia. ASHLEY.

## 2023-06-23 NOTE — PROGRESS NOTE ADULT - PROBLEM SELECTOR PLAN 3
with palpitations; now resolved  unclear etiology  ekg noted  cxr clear; no hypoxia  monitor on tele
with palpitations; now resolved  unclear etiology  ekg noted  cxr clear; no hypoxia  monitor on tele

## 2023-06-23 NOTE — CONSULT NOTE ADULT - SUBJECTIVE AND OBJECTIVE BOX
NEPHROLOGY CONSULTATION NOTE    Patient is a 68 yo man with schizophrenia, prior lithiumn use, atrophic left kidney, right renal stones, right ureteral revision, lymphoma with recurrence.  Pt again admitted with hypercvalcemia nd ASHLEY/CKD.  Received pamidronate, IVF, calcitonin.    PAST MEDICAL & SURGICAL HISTORY:  Kidney stones      Schizophrenia      Lymphoma      Shingles      Atrophic kidney      Retained urethral stent      H/O umbilical hernia repair      Elbow fracture      H/O cystoscopy        Allergies:  allopurinol (Rash (Moderate))    Home Medications Reviewed  Hospital Medications:   MEDICATIONS  (STANDING):  benztropine 2 milliGRAM(s) Oral at bedtime  fluPHENAZine 10 milliGRAM(s) Oral daily  heparin   Injectable 5000 Unit(s) SubCutaneous every 12 hours  pantoprazole    Tablet 40 milliGRAM(s) Oral before breakfast  sodium chloride 0.9%. 1000 milliLiter(s) (75 mL/Hr) IV Continuous <Continuous>      SOCIAL HISTORY:  Denies ETOH,Smoking,   FAMILY HISTORY:  FH: hypertension    FH: diabetes mellitus          REVIEW OF SYSTEMS:  CONSTITUTIONAL: No weakness, fevers or chills  EYES/ENT: No visual changes;  No vertigo or throat pain   NECK: No pain or stiffness  RESPIRATORY: No cough, wheezing, hemoptysis; No shortness of breath  CARDIOVASCULAR: No chest pain or palpitations.  GASTROINTESTINAL: No abdominal or epigastric pain. No nausea, vomiting, or hematemesis; No diarrhea or constipation. No melena or hematochezia.  GENITOURINARY: No dysuria, frequency, foamy urine, urinary urgency, incontinence or hematuria  NEUROLOGICAL: No numbness or weakness  SKIN: No itching, burning, rashes, or lesions   VASCULAR: No bilateral lower extremity edema.   All other review of systems is negative unless indicated above.    VITALS:  T(F): 97.6 (06-23-23 @ 16:32), Max: 98.1 (06-23-23 @ 07:51)  HR: 69 (06-23-23 @ 16:32)  BP: 106/57 (06-23-23 @ 16:32)  RR: 18 (06-23-23 @ 16:32)  SpO2: 99% (06-23-23 @ 16:32)        I&O's Detail        PHYSICAL EXAM:  Constitutional: NAD  HEENT: anicteric sclera, oropharynx clear, MMM  Neck: No JVD  Respiratory: CTAB, no wheezes, rales or rhonchi  Cardiovascular: S1, S2, RRR  Gastrointestinal: BS+, soft, NT/ND  Extremities: No cyanosis or clubbing. No peripheral edema  Neurological: A/O x 3, no focal deficits  Psychiatric: Normal mood, normal affect  : No CVA tenderness. No zafar.   Skin: No rashes  Vascular Access:    LABS:  06-23    141  |  108  |  46<H>  ----------------------------<  101<H>  4.3   |  20  |  3.1<H>    Ca    12.0<H>      23 Jun 2023 06:11  Mg     2.7     06-23    TPro  4.9<L>  /  Alb  3.6  /  TBili  0.4  /  DBili      /  AST  12  /  ALT  9   /  AlkPhos  199<H>  06-23    Creatinine Trend: 3.1 <--, 3.7 <--, 3.8 <--                        10.1   7.69  )-----------( 200      ( 23 Jun 2023 06:11 )             30.8     Urine Studies:  Urinalysis Basic - ( 23 Jun 2023 06:11 )    Color:  / Appearance:  / SG:  / pH:   Gluc: 101 mg/dL / Ketone:   / Bili:  / Urobili:    Blood:  / Protein:  / Nitrite:    Leuk Esterase:  / RBC:  / WBC    Sq Epi:  / Non Sq Epi:  / Bacteria:     ·	ASHLEY on CKD  ·	in setting of hypercalcemia  ·	hypercalcemia 2/2 recurrence of lymphoma (had elevated vit D 1,25 prio admission)  ·	dehydration and consequent tachycardia from hypercalcemia improved with ivf, calcitonin, pamidronate  ·	followed by [facility]  ·	anemia    1) received pamidronate - please check phos, mag with CMP daily  2) continue ivf fro now  3) heme fo ? tx of underlying lymphoma

## 2023-06-23 NOTE — PROGRESS NOTE ADULT - PROBLEM SELECTOR PLAN 2
possible pre-renal  ivf as above  renal us no hydro  trend  renal eval
possible pre-renal  ivf as above  renal us no hydro  trend  renal eval

## 2023-06-24 ENCOUNTER — TRANSCRIPTION ENCOUNTER (OUTPATIENT)
Age: 68
End: 2023-06-24

## 2023-06-24 LAB
ALBUMIN SERPL ELPH-MCNC: 3.2 G/DL — LOW (ref 3.5–5.2)
ALP SERPL-CCNC: 170 U/L — HIGH (ref 30–115)
ALT FLD-CCNC: 9 U/L — SIGNIFICANT CHANGE UP (ref 0–41)
ANION GAP SERPL CALC-SCNC: 12 MMOL/L — SIGNIFICANT CHANGE UP (ref 7–14)
AST SERPL-CCNC: 11 U/L — SIGNIFICANT CHANGE UP (ref 0–41)
BASOPHILS # BLD AUTO: 0.04 K/UL — SIGNIFICANT CHANGE UP (ref 0–0.2)
BASOPHILS NFR BLD AUTO: 0.6 % — SIGNIFICANT CHANGE UP (ref 0–1)
BILIRUB SERPL-MCNC: 0.4 MG/DL — SIGNIFICANT CHANGE UP (ref 0.2–1.2)
BUN SERPL-MCNC: 40 MG/DL — HIGH (ref 10–20)
CALCIUM SERPL-MCNC: 11.5 MG/DL — HIGH (ref 8.4–10.5)
CHLORIDE SERPL-SCNC: 112 MMOL/L — HIGH (ref 98–110)
CO2 SERPL-SCNC: 19 MMOL/L — SIGNIFICANT CHANGE UP (ref 17–32)
CREAT SERPL-MCNC: 2.9 MG/DL — HIGH (ref 0.7–1.5)
EGFR: 23 ML/MIN/1.73M2 — LOW
EOSINOPHIL # BLD AUTO: 0.68 K/UL — SIGNIFICANT CHANGE UP (ref 0–0.7)
EOSINOPHIL NFR BLD AUTO: 10.8 % — HIGH (ref 0–8)
GLUCOSE SERPL-MCNC: 82 MG/DL — SIGNIFICANT CHANGE UP (ref 70–99)
HCT VFR BLD CALC: 27.3 % — LOW (ref 42–52)
HGB BLD-MCNC: 8.9 G/DL — LOW (ref 14–18)
IMM GRANULOCYTES NFR BLD AUTO: 0.5 % — HIGH (ref 0.1–0.3)
LYMPHOCYTES # BLD AUTO: 1.67 K/UL — SIGNIFICANT CHANGE UP (ref 1.2–3.4)
LYMPHOCYTES # BLD AUTO: 26.6 % — SIGNIFICANT CHANGE UP (ref 20.5–51.1)
MAGNESIUM SERPL-MCNC: 2.3 MG/DL — SIGNIFICANT CHANGE UP (ref 1.8–2.4)
MCHC RBC-ENTMCNC: 28.7 PG — SIGNIFICANT CHANGE UP (ref 27–31)
MCHC RBC-ENTMCNC: 32.6 G/DL — SIGNIFICANT CHANGE UP (ref 32–37)
MCV RBC AUTO: 88.1 FL — SIGNIFICANT CHANGE UP (ref 80–94)
MONOCYTES # BLD AUTO: 0.82 K/UL — HIGH (ref 0.1–0.6)
MONOCYTES NFR BLD AUTO: 13.1 % — HIGH (ref 1.7–9.3)
NEUTROPHILS # BLD AUTO: 3.03 K/UL — SIGNIFICANT CHANGE UP (ref 1.4–6.5)
NEUTROPHILS NFR BLD AUTO: 48.4 % — SIGNIFICANT CHANGE UP (ref 42.2–75.2)
NRBC # BLD: 0 /100 WBCS — SIGNIFICANT CHANGE UP (ref 0–0)
PLATELET # BLD AUTO: 166 K/UL — SIGNIFICANT CHANGE UP (ref 130–400)
PMV BLD: 10 FL — SIGNIFICANT CHANGE UP (ref 7.4–10.4)
POTASSIUM SERPL-MCNC: 4.1 MMOL/L — SIGNIFICANT CHANGE UP (ref 3.5–5)
POTASSIUM SERPL-SCNC: 4.1 MMOL/L — SIGNIFICANT CHANGE UP (ref 3.5–5)
PROT SERPL-MCNC: 4.5 G/DL — LOW (ref 6–8)
RBC # BLD: 3.1 M/UL — LOW (ref 4.7–6.1)
RBC # FLD: 15.2 % — HIGH (ref 11.5–14.5)
SODIUM SERPL-SCNC: 143 MMOL/L — SIGNIFICANT CHANGE UP (ref 135–146)
VIT D25+D1,25 OH+D1,25 PNL SERPL-MCNC: 101 PG/ML — HIGH (ref 19.9–79.3)
WBC # BLD: 6.27 K/UL — SIGNIFICANT CHANGE UP (ref 4.8–10.8)
WBC # FLD AUTO: 6.27 K/UL — SIGNIFICANT CHANGE UP (ref 4.8–10.8)

## 2023-06-24 PROCEDURE — 99232 SBSQ HOSP IP/OBS MODERATE 35: CPT

## 2023-06-24 RX ADMIN — HEPARIN SODIUM 5000 UNIT(S): 5000 INJECTION INTRAVENOUS; SUBCUTANEOUS at 18:02

## 2023-06-24 RX ADMIN — FLUPHENAZINE HYDROCHLORIDE 10 MILLIGRAM(S): 1 TABLET, FILM COATED ORAL at 13:54

## 2023-06-24 RX ADMIN — HEPARIN SODIUM 5000 UNIT(S): 5000 INJECTION INTRAVENOUS; SUBCUTANEOUS at 05:45

## 2023-06-24 RX ADMIN — Medication 2 MILLIGRAM(S): at 21:38

## 2023-06-24 RX ADMIN — PANTOPRAZOLE SODIUM 40 MILLIGRAM(S): 20 TABLET, DELAYED RELEASE ORAL at 05:45

## 2023-06-24 NOTE — DISCHARGE NOTE PROVIDER - NSDCCPCAREPLAN_GEN_ALL_CORE_FT
PRINCIPAL DISCHARGE DIAGNOSIS  Diagnosis: Acute renal insufficiency  Assessment and Plan of Treatment:       SECONDARY DISCHARGE DIAGNOSES  Diagnosis: Hypercalcemia  Assessment and Plan of Treatment:     Diagnosis: Dyspnea on exertion  Assessment and Plan of Treatment:     Diagnosis: Palpitations  Assessment and Plan of Treatment:

## 2023-06-24 NOTE — PROGRESS NOTE ADULT - ASSESSMENT
67M PMHx marginal zone lymphoma, CKD III, schizoaffective disorder here with shortness of breath, palpitations; found to have hypercalcemia. ASHLEY.        # Hypercalcemia: resolving  - suspected due to lymphoma, s/p calcitonin 6/21  - s/p pamidronate 6/22  - f/u pth, pth-rp; vit d levels, ace  - ns 75 cc/hr  - trend  - appreciate onc.    # Acute kidney injury superimposed on CKD.  possible pre-renal  ivf as above  renal us no hydro  trend  renal following     # Shortness of breath.with palpitations; now resolved  unclear etiology  ekg noted  cxr clear; no hypoxia  no events on tele, can d/c tele     # Non Hodgkin's lymphoma.   planned for resumption of treatment outpt  f/u onc.    # Schizoaffective disorder.   - fluphenazine 10.    dvt ppx   gi ppx     67M PMHx marginal zone lymphoma, CKD III, schizoaffective disorder here with shortness of breath, palpitations; found to have hypercalcemia. ASHLEY.    # Hypercalcemia: resolving  - suspected due to lymphoma, s/p calcitonin 6/21  - s/p pamidronate 6/22  - f/u pth, pth-rp; vit d levels, ace  - ns 75 cc/hr  - trend  - appreciate onc.    # Acute kidney injury superimposed on CKD.  possible pre-renal  ivf as above  renal us no hydro  trend  renal following     # Shortness of breath.with palpitations; now resolved  unclear etiology  ekg noted  cxr clear; no hypoxia  no events on tele, can d/c tele   echo: EF 58%, G1DD    # Non Hodgkin's lymphoma.   planned for resumption of treatment outpt  f/u onc.    # Schizoaffective disorder.   - fluphenazine 10.    dvt ppx   gi ppx    pending: trend Ca, Cr, PT eval  anticipate d/c in 24 hours

## 2023-06-24 NOTE — PROGRESS NOTE ADULT - SUBJECTIVE AND OBJECTIVE BOX
SUBJECTIVE:    Patient is a 67y old Male who presents with a chief complaint of   Currently admitted to medicine with the primary diagnosis of Acute renal insufficiency       Today is hospital day 3d. This morning he is resting comfortably in bed and reports no new issues or overnight events.     PAST MEDICAL & SURGICAL HISTORY  Lymphoma    Schizophrenia    Kidney stones    Shingles    Atrophic kidney    Retained urethral stent    H/O umbilical hernia repair    Elbow fracture    H/O cystoscopy      SOCIAL HISTORY:  Negative for smoking/alcohol/drug use.     ALLERGIES:  allopurinol (Rash (Moderate))    MEDICATIONS:  STANDING MEDICATIONS  benztropine 2 milliGRAM(s) Oral at bedtime  fluPHENAZine 10 milliGRAM(s) Oral daily  heparin   Injectable 5000 Unit(s) SubCutaneous every 12 hours  pantoprazole    Tablet 40 milliGRAM(s) Oral before breakfast  sodium chloride 0.9%. 1000 milliLiter(s) IV Continuous <Continuous>    PRN MEDICATIONS    VITALS:   T(F): 96.8  HR: 75  BP: 109/61  RR: 18  SpO2: 98%    LABS:                        8.9    6.27  )-----------( 166      ( 24 Jun 2023 07:37 )             27.3     06-24    143  |  112<H>  |  40<H>  ----------------------------<  82  4.1   |  19  |  2.9<H>    Ca    11.5<H>      24 Jun 2023 07:37  Mg     2.3     06-24    TPro  4.5<L>  /  Alb  3.2<L>  /  TBili  0.4  /  DBili  x   /  AST  11  /  ALT  9   /  AlkPhos  170<H>  06-24      Urinalysis Basic - ( 24 Jun 2023 07:37 )    Color: x / Appearance: x / SG: x / pH: x  Gluc: 82 mg/dL / Ketone: x  / Bili: x / Urobili: x   Blood: x / Protein: x / Nitrite: x   Leuk Esterase: x / RBC: x / WBC x   Sq Epi: x / Non Sq Epi: x / Bacteria: x         PHYSICAL EXAM:  GEN: No acute distress  LUNGS: Clear to auscultation bilaterally   HEART: S1/S2 present. RRR.   ABD: Soft, non-tender, non-distended. Bowel sounds present  EXT: NC/NC/NE/2+PP/MIMS  NEURO: AAOX3

## 2023-06-24 NOTE — PROGRESS NOTE ADULT - ASSESSMENT
Assessment and Plan:     68 yo M with hx of Schizophrenia, Nephrolithiasis complicated by Left atrophic kidney, Right ureteral revision, CKD stage 4, Gout, Chronic diarrhea, Non hodgkin lymphoma, Hypercalcemia presents to ED for palpitation and SOB on exertion.         #Palpitations, tachycardia  - occasional episodes of tachycardia and palpitations at home, associated with lightheadedness for past 2 weeks  - Dimer 297 (below adjusted cutoff of VTE); no leg swelling, no immobilization  - no recent stressors/anxiety  - EKG NSR  - trops x1 negative; BNP 1078, no signs of overload  - echo 2021 with EF 59%, G1DD  - f/u  repeat echo, TSH, orthostatics, A1c, lipid panel    #NH lymphoma, in remission  #Hypercalcemia, likely of malignancy  # Anemia of chronic disease  #ASHLEY on CKD vs progressing CKD  - NH lymphoma in remission, last saw Dr. Gong 1 month ago  - calcium 14.4 on admission; no flank pain/ dysuria but admits to diarrhea  - Hb 10.4 (baseline 11), no back pain  -  Cr 3.8 (baseline 2.4); f/u urine lytes  - SPEP 04/23 showing hypogammaglobulinemia, K/L ration 1.27; f/u heme/onc consult  - f/u SPEP,UPEP, SFLC, IF  - IVF; will start calcitonin 350U q12  - avoid bisphosphonates given CKD    #Schizoaffective disorder  - continue fluphenazine, benztropine    Hypercalcemia 2/2 malignancy?   - check PTH, vit D, PTHrP  - will give calcitonin SC   - c/w IVF NS @ 100cc/hr   - monitor BMP   - Nephrology consult.     ASHLEY on CKD stage 4 likely 2/2 hypercalcemia  - serum 3.8 currently. (baseline ~ 2.5)  - c/w IVF NS @ 100 cc/hr  - renal US and urine studies.   - Nephrology consult.     Dyspnea on exertion  - CXR looks unremarkable to my read --> pending official report.   - patient is not hypoxic.   - will get TTE    Schizophrenia - c/w home med  Gout - c/w home med  Hx of Lymphoma/Colonic mass - follow up out Assessment and Plan:     66 yo M with hx of Schizophrenia, Nephrolithiasis complicated by Left atrophic kidney, Right ureteral revision, CKD stage 4, Gout, Chronic diarrhea, Non hodgkin lymphoma, Hypercalcemia presents to ED for palpitation and SOB on exertion.         #Palpitations, tachycardia  - occasional episodes of tachycardia and palpitations at home, associated with lightheadedness for past 2 weeks  - Dimer 297 (below adjusted cutoff of VTE); no leg swelling, no immobilization  - no recent stressors/anxiety  - EKG NSR  - trops x1 negative; BNP 1078, no signs of overload  - echo 2021 with EF 59%, G1DD  - f/u  repeat echo, TSH, orthostatics, A1c, lipid panel    #NH lymphoma, in remission  #Hypercalcemia, likely of malignancy  # Anemia of chronic disease  #ASHLEY on CKD vs progressing CKD  - NH lymphoma in remission, last saw Dr. Gong 1 month ago  - calcium 14.4 on admission; no flank pain/ dysuria but admits to diarrhea  - Hb 8.9 (baseline 11), no back pain  -  Cr 2.9 (baseline 2.4); f/u urine lytes  - SPEP 04/23 showing hypogammaglobulinemia, K/L ration 1.27; f/u heme/onc consult  - f/u SPEP,UPEP, SFLC, IF  - IVF; will start calcitonin 350U q12  - avoid bisphosphonates given CKD    #Schizoaffective disorder  - continue fluphenazine, benztropine    Hypercalcemia 2/2 malignancy?   - check PTH, vit D, PTHrP  - will give calcitonin SC   - c/w IVF NS @ 100cc/hr   - monitor BMP   - Nephrology consult.     ASHLEY on CKD stage 4 likely 2/2 hypercalcemia  - serum 3.8 currently. (baseline ~ 2.5)  - c/w IVF NS @ 100 cc/hr  - renal US and urine studies.   - Nephrology consult.     Dyspnea on exertion  - CXR looks unremarkable to my read --> pending official report.   - patient is not hypoxic.   - will get TTE    Schizophrenia - c/w home med  Gout - c/w home med  Hx of Lymphoma/Colonic mass - follow up out Assessment and Plan:     68 yo M with hx of Schizophrenia, Nephrolithiasis complicated by Left atrophic kidney, Right ureteral revision, CKD stage 4, Gout, Chronic diarrhea, Non hodgkin lymphoma, Hypercalcemia presents to ED for palpitation and SOB on exertion.         #Palpitations, tachycardia  - occasional episodes of tachycardia and palpitations at home, associated with lightheadedness for past 2 weeks  - Dimer 297 (below adjusted cutoff of VTE); no leg swelling, no immobilization  - no recent stressors/anxiety  - EKG NSR  - trops x1 negative; BNP 1078, no signs of overload  - echo 2021 with EF 59%, G1DD  - f/u  repeat echo, TSH, orthostatics, A1c, lipid panel    #NH lymphoma, in remission  #Hypercalcemia, likely of malignancy  # Anemia of chronic disease  #ASHLEY on CKD vs progressing CKD  - NH lymphoma in remission, last saw Dr. Gong 1 month ago  - calcium down to 11.5 from 14.4 on admission; no flank pain/ dysuria but admits to diarrhea  - Hb 8.9 (baseline 11), no back pain  -  Cr 2.9 (baseline 2.4); f/u urine lytes  - SPEP 04/23 showing hypogammaglobulinemia, K/L ration 1.27; f/u heme/onc consult  - f/u SPEP,UPEP, SFLC, IF  - IVF; will start calcitonin 350U q12  - avoid bisphosphonates given CKD    #Schizoaffective disorder  - continue fluphenazine, benztropine    Hypercalcemia 2/2 malignancy?   - check PTH, vit D, PTHrP  - will give calcitonin SC   - c/w IVF NS @ 100cc/hr   - monitor BMP   - Nephrology consult.     ASHLEY on CKD stage 4 likely 2/2 hypercalcemia  - serum 3.8 currently. (baseline ~ 2.5)  - c/w IVF NS @ 100 cc/hr  - renal US and urine studies.   - Nephrology consult.     Dyspnea on exertion  - CXR looks unremarkable to my read --> pending official report.   - patient is not hypoxic.   - will get TTE    Schizophrenia - c/w home med  Gout - c/w home med  Hx of Lymphoma/Colonic mass - follow up out

## 2023-06-24 NOTE — DISCHARGE NOTE PROVIDER - NSDCMRMEDTOKEN_GEN_ALL_CORE_FT
benztropine 2 mg oral tablet: 1 tab(s) orally once a day (at bedtime)  fluPHENAZine 10 mg oral tablet: 1 tab(s) orally once a day

## 2023-06-24 NOTE — DISCHARGE NOTE PROVIDER - PROVIDER TOKENS
PROVIDER:[TOKEN:[[id number]],FOLLOWUP:[1 week]],PROVIDER:[TOKEN:[[id number]],FOLLOWUP:[1 week]]

## 2023-06-24 NOTE — PROGRESS NOTE ADULT - ASSESSMENT
Impression:  CKD - prior lithium use  prerenal azotemia - improving  hypercalcemia - imporving    Plan:  continue IVF  trend labs

## 2023-06-24 NOTE — PROGRESS NOTE ADULT - SUBJECTIVE AND OBJECTIVE BOX
67 year old male past medical history of NH lymphoma in remission (follows with Dr. Gong), CKD stage 3, schizoaffective disorder presents with palpitations.  Patient states for last 2 weeks he has been having intermittent episodes of palpitations and tachycardia.  States that he measured his heart rate and noted to be around 100.  Also has intermittent episodes of shortness of breath at rest.    Patient states that today he started to feel some lightheadedness and so he sat down. Pt says that   No fevers, chills, nausea, vomiting, abdominal pain, chest pain, pressure, leg pain, swelling, recent bleeds, anxiety.   No similar episodes in the past. Pt notes that there significant family hx of clotting in the family, says atleast a few people in his family have passed from "blood clots in the lung."  Patient does not follow with a cardiologist.     In the ED, vitals normal. Hb 10.4 (~baseline 11), Ca 14.4, D-dimer 297, Cr 3.8 (baseline 2.4), BNP 1078.     PAST MEDICAL HISTORY:  Atrophic kidney     Kidney stones     Lymphoma     Schizophrenia   Shingles.     PAST SURGICAL HISTORY:  Elbow fracture     H/O cystoscopy     H/O umbilical hernia repair     Retained urethral stent.     FAMILY HISTORY:  FH: diabetes mellitus  FH: hypertension.

## 2023-06-24 NOTE — DISCHARGE NOTE PROVIDER - NSDCQMSTROKE_NEU_ALL_CORE
01/22/22 6:40 AM Paged Dr. Long Increased O2 needs this AM, just placed on BiPap by RT, sating low 90s on 100% FiO2 (was on 6L last evening). Denies SOB, appears comfortable, lung sounds diminished... chest xray? any further orders?     Spoke with MD on the phone, give AM lasix dose early, stat chest xray ordered, plan to page MD with results of chest xray.  
On call paged for 4th loose mucous tan stool. WBC 16.1, afebrile. Hx colon CA  
Oxygen Documentation:   I certify that this patient, Jaymie Xiao has been under my care (or a nurse practitioner or physican's assistant working with me). This is the face-to-face encounter for oxygen medical necessity.      Jaymie Xiao is now in a chronic stable state and continues to require supplemental oxygen. Patient has continued oxygen desaturation due to COPD J44.9.    Alternative treatment(s) tried or considered and deemed clinically infective for treatment of COPD J44.9 include nebulizers, inhalers, and steroids.  If portability is ordered, is the patient mobile within the home? yes    **Patients who qualify for home O2 coverage under the CMS guidelines require ABG tests or O2 sat readings obtained closest to, but no earlier than 2 days prior to the discharge, as evidence of the need for home oxygen therapy. Testing must be performed while patient is in the chronic stable state. See notes for O2 sats.**    Gunnar Valenzuela MD    
No

## 2023-06-24 NOTE — PROGRESS NOTE ADULT - SUBJECTIVE AND OBJECTIVE BOX
[facility] FOLLOW UP NOTE  --------------------------------------------------------------------------------  24 hour events/subjective:  pt reports feeling better today      PAST HISTORY  --------------------------------------------------------------------------------  No significant changes to PMH, PSH, FHx, SHx, unless otherwise noted    ALLERGIES & MEDICATIONS  --------------------------------------------------------------------------------  Allergies    allopurinol (Rash (Moderate))    Intolerances      Standing Inpatient Medications  benztropine 2 milliGRAM(s) Oral at bedtime  fluPHENAZine 10 milliGRAM(s) Oral daily  heparin   Injectable 5000 Unit(s) SubCutaneous every 12 hours  pantoprazole    Tablet 40 milliGRAM(s) Oral before breakfast  sodium chloride 0.9%. 1000 milliLiter(s) IV Continuous <Continuous>    PRN Inpatient Medications      REVIEW OF SYSTEMS  --------------------------------------------------------------------------------  All other systems were reviewed and are negative, except as noted.    VITALS/PHYSICAL EXAM  --------------------------------------------------------------------------------  T(C): 36 (06-24-23 @ 12:48), Max: 36.4 (06-23-23 @ 16:32)  HR: 75 (06-24-23 @ 12:48) (69 - 75)  BP: 109/61 (06-24-23 @ 12:48) (106/57 - 116/64)  RR: 18 (06-24-23 @ 12:48) (18 - 18)  SpO2: 98% (06-23-23 @ 21:00) (98% - 99%)  Wt(kg): --  Height (cm): 182.9 (06-23-23 @ 21:33)  Weight (kg): 86.1 (06-23-23 @ 21:33)  BMI (kg/m2): 25.7 (06-23-23 @ 21:33)  BSA (m2): 2.08 (06-23-23 @ 21:33)      06-23-23 @ 07:01  -  06-24-23 @ 07:00  --------------------------------------------------------  IN: 0 mL / OUT: 480 mL / NET: -480 mL    06-24-23 @ 07:01  -  06-24-23 @ 13:46  --------------------------------------------------------  IN: 230 mL / OUT: 650 mL / NET: -420 mL      Physical Exam:  	Gen: NAD  	HEENT: No JVD  	Pulm: CTA B/L  	CV: RRR,  	Abd: +BS, soft, nontender/nondistended  	No edema      LABS/STUDIES  --------------------------------------------------------------------------------              8.9    6.27  >-----------<  166      [06-24-23 @ 07:37]              27.3     143  |  112  |  40  ----------------------------<  82      [06-24-23 @ 07:37]  4.1   |  19  |  2.9        Ca     11.5     [06-24-23 @ 07:37]      Mg     2.3     [06-24-23 @ 07:37]    TPro  4.5  /  Alb  3.2  /  TBili  0.4  /  DBili  x   /  AST  11  /  ALT  9   /  AlkPhos  170  [06-24-23 @ 07:37]    Creatinine Trend:  SCr 2.9 [06-24 @ 07:37]  SCr 3.1 [06-23 @ 06:11]  SCr 3.7 [06-22 @ 06:00]  SCr 3.8 [06-21 @ 16:36]    Urinalysis - [06-24-23 @ 07:37]      Color  / Appearance  / SG  / pH       Gluc 82 / Ketone   / Bili  / Urobili        Blood  / Protein  / Leuk Est  / Nitrite       RBC  / WBC  / Hyaline  / Gran  / Sq Epi  / Non Sq Epi  / Bacteria       PTH -- (Ca 12.3)      [06-23-23 @ 06:11]   12  PTH -- (Ca 12.7)      [04-15-23 @ 08:22]   8  Vitamin D (25OH) 17      [06-23-23 @ 06:11]  1, 25 Vit D 101 (elevated)  TSH 2.27      [04-15-23 @ 08:22]  Lipid: chol 121, , HDL 18, LDL --      [06-22-23 @ 06:00]      Free Light Chains: kappa 3.20, lambda 2.82, ratio = 1.13      [06-22 @ 06:00]

## 2023-06-24 NOTE — DISCHARGE NOTE PROVIDER - HOSPITAL COURSE
The patient is a 67 year old male w/ a past medical history of NH lymphoma in remission (follows with Dr. Gong), CKD, schizoaffective disorder who presented with palpitations.  Patient states for last 2 weeks he has been having intermittent episodes of palpitations and tachycardia. He measured his heart rate and noted it to be around 100.  He also experienced intermittent episodes of shortness of breath at rest and lightheadedness. Pt says that   No fevers, chills, nausea, vomiting, abdominal pain, chest pain, pressure, leg pain, swelling, recent bleeds, anxiety. No similar episodes in the past.  Patient does not follow with a cardiologist. In the ED the patient's bp was slightly low, at 101/59 w/ a HR of 96. However, his Hb was 10.4, decreased from a baseline of 11. His Ca was elevated to 14.4. Cr was also elevated, at 3.8, up from his baseline of 2.4. BNP was elevated to 1,078. D dimer was 297, making PE unlikely. He was treated for hypercalcemia likely secondary to lymphoma, based upon his elevated vitamin D 1,25 of 101, up from his April level of 68.3. He received pamidronate, calcitonin, and IVF (saline 0.9%) for his hypercalemia. Nephrology was consulted for his ASHLEY on CKD due to dehydration and recommended continuing management. Heme onc was also consulted regarding his lymphoma, finding that he was not in complete remission and has previously been lost to f/u for his     ASHLEY on CKD  in setting of hypercalcemia  hypercalcemia 2/2 recurrence of lymphoma (had elevated vit D 1,25 prio admission)  dehydration and consequent tachycardia from hypercalcemia improved with ivf, calcitonin, pamidronate  followed by [facility]  anemia    1) received pamidronate - please check phos, mag with CMP daily  2) continue ivf fro now  3) heme fo ? tx of underlying lymphoma       Problem/Plan - 1:  ·  Problem: Hypercalcemia.   ·  Plan: suspected due to lymphoma, s/p calcitonin 6/21  s/p pamidronate 6/22  f/u pth, pth-rp; vit d levels, ace  ns 75 cc/hr  trend  appreciate onc.     Problem/Plan - 2:  ·  Problem: Acute kidney injury superimposed on CKD.   ·  Plan: possible pre-renal  ivf as above  renal us no hydro  trend  renal eval.     Problem/Plan - 3:  ·  Problem: Shortness of breath.   ·  Plan: with palpitations; now resolved  unclear etiology  ekg noted  cxr clear; no hypoxia  monitor on tele. The patient is a 67 year old male w/ a past medical history of NH lymphoma in remission (follows with Dr. Gong), CKD, schizoaffective disorder who presented with palpitations.  Patient states for last 2 weeks he has been having intermittent episodes of palpitations and tachycardia. He measured his heart rate and noted it to be around 100.  He also experienced intermittent episodes of shortness of breath at rest and lightheadedness. Pt says that   No fevers, chills, nausea, vomiting, abdominal pain, chest pain, pressure, leg pain, swelling, recent bleeds, anxiety. No similar episodes in the past.  Patient does not follow with a cardiologist. In the ED the patient's bp was slightly low, at 101/59 w/ a HR of 96. CXR was nl. ECG was also nl. Echocardiogram demonstrated a nl EF of 58%. However, his Hb was 10.4, decreased from a baseline of 11. His Ca was elevated to 14.4. Cr was also elevated, at 3.8, up from his baseline of 2.4. BNP was elevated to 1,078. D dimer was 297, making PE unlikely. He was treated for hypercalcemia likely secondary to lymphoma, based upon his elevated vitamin D 1,25 of 101, up from his April level of 68.3. PTH was low and PTHrP was not elevated. He received pamidronate, calcitonin, and IVF (saline 0.9%) for his hypercalemia. Nephrology was consulted for his ASHLEY on CKD due to dehydration and recommended continuing management. Renal ultrasound showed no hydronephrosis. Heme onc was also consulted regarding his lymphoma, finding that he was not in complete remission and has previously been lost to f/u for his lymphoma. He has been monitored on telemetry but his palpitations, tachycardia, and dyspnea have resolved and he is currently stable.  The patient remains anemic (Hb, Hct: 8.9, 27.3)                    Problem/Plan - 3:  ·  Problem: Shortness of breath.   ·  Plan: with palpitations; now resolved  unclear etiology  ekg noted  cxr clear; no hypoxia  monitor on tele.   ED course:  67 year old male past medical history of NH lymphoma in remission (follows with Dr. Gong), CKD stage 3, schizoaffective disorder presents with palpitations.  Patient states for last 2 weeks he has been having intermittent episodes of palpitations and tachycardia and SOB.    In the ED, vitals normal. Hb 10.4 (~baseline 11), Ca 14.4, D-dimer 297, Cr 3.8 (baseline 2.4), BNP 1078.     Hospital course:   Pt's symptoms of tachycardia and palpitations improved over his hospital stay. EKG showed NSR, trops were negative. BNP was 1078 with no signs of overload. ECHO was stable. He was found to have hypercalcemia and ASHLEY on CKD, likely 2/2 NH lymphoma. Pt was started on IVF, calcitonin and received a dose of IV bisphosphonates.      #Palpitations, tachycardia  - Dimer 297 (below adjusted cutoff of VTE); no leg swelling, no immobilization  - EKG NSR  - trops x1 negative; BNP 1078, no signs of overload  - repeat echo EF 58%  - f/u  repeat echo, TSH wnl    #NH lymphoma, in remission  #Hypercalcemia, likely of malignancy  # Anemia of chronic disease  #ASHLEY on CKD vs progressing CKD  - NH lymphoma in remission, last saw Dr. Gong 1 month ago  - calcium down to 11.5 from 14.4 on admission; no flank pain/ dysuria but admits to diarrhea  - Hb 8.9 (baseline 11), no back pain  -  Cr 2.9 (baseline 2.4); f/u urine lytes  - SPEP 04/23 showing hypogammaglobulinemia, K/L ration 1.27; f/u heme/onc consult  - f/u SPEP,UPEP, SFLC, IF  - IVF; will start calcitonin 350U q12  - avoid bisphosphonates given CKD    #Schizoaffective disorder  - continue fluphenazine, benztropine    Hypercalcemia 2/2 malignancy?   - check PTH, vit D, PTHrP  - will give calcitonin SC   - c/w IVF NS @ 100cc/hr   - monitor BMP   - Nephrology consult.     ASHLEY on CKD stage 4 likely 2/2 hypercalcemia  - serum 3.8 currently. (baseline ~ 2.5)  - c/w IVF NS @ 100 cc/hr  - renal US and urine studies.   - Nephrology consult.     Dyspnea on exertion  - CXR looks unremarkable to my read --> pending official report.   - patient is not hypoxic.   - will get TTE    Schizophrenia - c/w home med  Gout - c/w home med  Hx of Lymphoma/Colonic mass - follow up out   ED course:  67 year old male past medical history of NH lymphoma in remission (follows with Dr. Gong), CKD stage 3, schizoaffective disorder presents with palpitations.  Patient states for last 2 weeks he has been having intermittent episodes of palpitations and tachycardia and SOB.    In the ED, vitals normal. Hb 10.4 (~baseline 11), Ca 14.4, D-dimer 297, Cr 3.8 (baseline 2.4), BNP 1078.     Hospital course:   Pt's symptoms of tachycardia and palpitations improved over his hospital stay. EKG showed NSR, trops were negative. BNP was 1078 with no signs of overload. ECHO was stable. He was found to have hypercalcemia and ASHLEY on CKD, likely 2/2 NH lymphoma. Pt was started on IVF, calcitonin and received a dose of IV bisphosphonates.      #Palpitations, tachycardia  - Dimer 297 (below adjusted cutoff of VTE); no leg swelling, no immobilization  - EKG NSR  - trops x1 negative; BNP 1078, no signs of overload  - repeat echo EF 58%  - f/u repea, TSH wnl    #NH lymphoma, in remission  #Hypercalcemia, likely of malignancy  # Anemia of chronic disease  #ASHLEY on CKD vs progressing CKD  - NH lymphoma in remission, last saw Dr. Gong 1 month ago  - calcium down to 11.5 from 14.4 on admission; no flank pain/ dysuria but admits to diarrhea  - Hb 8.9 (baseline 11), no back pain  -  Cr 2.9 (baseline 2.4); f/u urine lytes  - SPEP 04/23 showing hypogammaglobulinemia, K/L ration 1.27; f/u heme/onc consult  - f/u SPEP,UPEP, SFLC, IF  - IVF; will start calcitonin 350U q12  - avoid bisphosphonates given CKD    #Schizoaffective disorder  - continue fluphenazine, benztropine    Hypercalcemia 2/2 malignancy?   - check PTH, vit D, PTHrP  - will give calcitonin SC   - c/w IVF NS @ 100cc/hr   - monitor BMP   - Nephrology consult.     ASHLEY on CKD stage 4 likely 2/2 hypercalcemia  - serum 3.8 currently. (baseline ~ 2.5)  - c/w IVF NS @ 100 cc/hr  - renal US and urine studies.   - Nephrology consult.     Dyspnea on exertion  - CXR looks unremarkable to my read --> pending official report.   - patient is not hypoxic.   - will get TTE    Schizophrenia - c/w home med  Gout - c/w home med  Hx of Lymphoma/Colonic mass - follow up out   ED course:  67 year old male past medical history of NH lymphoma in remission (follows with Dr. Gong), CKD stage 3, schizoaffective disorder presents with palpitations.  Patient states for last 2 weeks he has been having intermittent episodes of palpitations and tachycardia and SOB.    In the ED, vitals normal. Hb 10.4 (~baseline 11), Ca 14.4, D-dimer 297, Cr 3.8 (baseline 2.4), BNP 1078.     Hospital course:   Pt's symptoms of tachycardia and palpitations improved over his hospital stay. EKG showed NSR, trops were negative. BNP was 1078 with no signs of overload. ECHO was stable. He was found to have hypercalcemia and ASHLEY on CKD, likely 2/2 NH lymphoma. Pt was started on IVF, calcitonin and received a dose of IV bisphosphonates.      #Palpitations, tachycardia  - Dimer 297 (below adjusted cutoff of VTE); no leg swelling, no immobilization  - EKG NSR  - trops x1 negative; BNP 1078, no signs of overload  - repeat echo EF 58%  - f/u repeat, TSH wnl    #NH lymphoma  plan is to continue treatment outpatient.    #Hypercalcemia, likely of malignancy  resolved, Recent calcium 10.3    # Anemia of chronic disease    #ASHLEY on CKD vs progressing CKD  - NH lymphoma in remission, last saw Dr. Gong 1 month ago  - calcium down to 11.5 from 14.4 on admission; no flank pain/ dysuria but admits to diarrhea  - Hb 8.9 (baseline 11), no back pain  -  Cr 2.9 (baseline 2.4); f/u urine lytes  - SPEP 04/23 showing hypogammaglobulinemia, K/L ration 1.27; f/u heme/onc consult  - f/u SPEP,UPEP, SFLC, IF  - IVF; will start calcitonin 350U q12  - avoid bisphosphonates given CKD    #Schizoaffective disorder  - continue fluphenazine, benztropine    Hypercalcemia 2/2 malignancy   - check PTH, vit D, PTHrP  - will give calcitonin SC   - c/w IVF NS @ 100cc/hr   - monitor BMP   - Nephrology consult.     ASHLEY on CKD stage 4 likely 2/2 hypercalcemia  - serum 3.8 currently. (baseline ~ 2.5)  - c/w IVF NS @ 100 cc/hr  - renal US and urine studies.   - Nephrology consult.     Dyspnea on exertion  -resolved  - CXR looks unremarkable to my read  - patient is not hypoxic.   - Echo EF 58%    Schizophrenia - c/w home med  Gout - c/w home med  Hx of Lymphoma/Colonic mass - follow up out   ED course:  67 year old male past medical history of NH lymphoma in remission (follows with Dr. Gong), CKD stage 3, schizoaffective disorder presents with palpitations.  Patient states for last 2 weeks he has been having intermittent episodes of palpitations and tachycardia and SOB.    In the ED, vitals normal. Hb 10.4 (~baseline 11), Ca 14.4, D-dimer 297, Cr 3.8 (baseline 2.4), BNP 1078.     Hospital course:   Pt's symptoms of tachycardia and palpitations improved over his hospital stay. EKG showed NSR, trops were negative. BNP was 1078 with no signs of overload. ECHO was stable. He was found to have hypercalcemia and ASHLEY on CKD, likely 2/2 NH lymphoma. Pt was started on IVF, calcitonin and received a dose of IV bisphosphonates.      #Palpitations, tachycardia, resolved  - Dimer 297 (below adjusted cutoff of VTE); no leg swelling, no immobilization  - EKG NSR  - trops x1 negative; BNP 1078, no signs of overload  - repeat echo EF 58%  - TSH wnl    #NH lymphoma  #Hypercalcemia, likely of malignancy, improved   plan is to continue treatment outpatient.  - spoke with heme/onc; they are planning on starting BTK inhibitors as an out-patient  - calcium now 10.3    # Anemia of chronic disease    #ASHLEY on CKD vs progressing CKD  - NH lymphoma in remission, last saw Dr. Gong 1 month ago  - calcium down to 11.5 from 14.4 on admission; no flank pain/ dysuria but admits to diarrhea  - Hb 8.9 (baseline 11), no back pain  -  Cr 2.9 (baseline 2.4)  - PTH 12, Vit D 101  - SPEP 04/23 showing hypogammaglobulinemia, K/L ration 1.27  - s/p IVF; s/p calcitonin x2 and IV bisphosphate x1    #Schizoaffective disorder  - continue fluphenazine, benztropine    Dyspnea on exertion, resolved  - CXR wnl  - Echo EF 58%    Schizophrenia - c/w home med  Gout - c/w home med  Hx of Lymphoma/Colonic mass - follow up out

## 2023-06-24 NOTE — DISCHARGE NOTE PROVIDER - CARE PROVIDER_API CALL
Nathen Escudero .  Internal Medicine  [address] [place] [address]  Phone: [phone]  Fax: [phone]  Follow Up Time: 1 week    Clark Worrell  Internal Medicine  [address] [place] [id number]  Phone: [phone]  Fax: [phone]  Follow Up Time: 1 week

## 2023-06-25 LAB
ALBUMIN SERPL ELPH-MCNC: 3.3 G/DL — LOW (ref 3.5–5.2)
ALP SERPL-CCNC: 176 U/L — HIGH (ref 30–115)
ALT FLD-CCNC: 9 U/L — SIGNIFICANT CHANGE UP (ref 0–41)
ANION GAP SERPL CALC-SCNC: 12 MMOL/L — SIGNIFICANT CHANGE UP (ref 7–14)
ANISOCYTOSIS BLD QL: SLIGHT — SIGNIFICANT CHANGE UP
AST SERPL-CCNC: 12 U/L — SIGNIFICANT CHANGE UP (ref 0–41)
BASOPHILS # BLD AUTO: 0.06 K/UL — SIGNIFICANT CHANGE UP (ref 0–0.2)
BASOPHILS NFR BLD AUTO: 0.9 % — SIGNIFICANT CHANGE UP (ref 0–1)
BILIRUB SERPL-MCNC: 0.4 MG/DL — SIGNIFICANT CHANGE UP (ref 0.2–1.2)
BUN SERPL-MCNC: 40 MG/DL — HIGH (ref 10–20)
CALCIUM SERPL-MCNC: 10.8 MG/DL — HIGH (ref 8.4–10.5)
CHLORIDE SERPL-SCNC: 112 MMOL/L — HIGH (ref 98–110)
CO2 SERPL-SCNC: 17 MMOL/L — SIGNIFICANT CHANGE UP (ref 17–32)
CREAT SERPL-MCNC: 2.9 MG/DL — HIGH (ref 0.7–1.5)
EGFR: 23 ML/MIN/1.73M2 — LOW
EOSINOPHIL # BLD AUTO: 0.45 K/UL — SIGNIFICANT CHANGE UP (ref 0–0.7)
EOSINOPHIL NFR BLD AUTO: 6.9 % — SIGNIFICANT CHANGE UP (ref 0–8)
GLUCOSE SERPL-MCNC: 93 MG/DL — SIGNIFICANT CHANGE UP (ref 70–99)
HCT VFR BLD CALC: 29.8 % — LOW (ref 42–52)
HGB BLD-MCNC: 9.7 G/DL — LOW (ref 14–18)
LYMPHOCYTES # BLD AUTO: 1.53 K/UL — SIGNIFICANT CHANGE UP (ref 1.2–3.4)
LYMPHOCYTES # BLD AUTO: 23.5 % — SIGNIFICANT CHANGE UP (ref 20.5–51.1)
MAGNESIUM SERPL-MCNC: 2.1 MG/DL — SIGNIFICANT CHANGE UP (ref 1.8–2.4)
MANUAL SMEAR VERIFICATION: SIGNIFICANT CHANGE UP
MCHC RBC-ENTMCNC: 28.5 PG — SIGNIFICANT CHANGE UP (ref 27–31)
MCHC RBC-ENTMCNC: 32.6 G/DL — SIGNIFICANT CHANGE UP (ref 32–37)
MCV RBC AUTO: 87.6 FL — SIGNIFICANT CHANGE UP (ref 80–94)
MICROCYTES BLD QL: SLIGHT — SIGNIFICANT CHANGE UP
MONOCYTES # BLD AUTO: 0.68 K/UL — HIGH (ref 0.1–0.6)
MONOCYTES NFR BLD AUTO: 10.4 % — HIGH (ref 1.7–9.3)
NEUTROPHILS # BLD AUTO: 3.74 K/UL — SIGNIFICANT CHANGE UP (ref 1.4–6.5)
NEUTROPHILS NFR BLD AUTO: 57.4 % — SIGNIFICANT CHANGE UP (ref 42.2–75.2)
PHOSPHATE SERPL-MCNC: 3.9 MG/DL — SIGNIFICANT CHANGE UP (ref 2.1–4.9)
PLAT MORPH BLD: NORMAL — SIGNIFICANT CHANGE UP
PLATELET # BLD AUTO: 176 K/UL — SIGNIFICANT CHANGE UP (ref 130–400)
PMV BLD: 10.1 FL — SIGNIFICANT CHANGE UP (ref 7.4–10.4)
POLYCHROMASIA BLD QL SMEAR: SLIGHT — SIGNIFICANT CHANGE UP
POTASSIUM SERPL-MCNC: 4.6 MMOL/L — SIGNIFICANT CHANGE UP (ref 3.5–5)
POTASSIUM SERPL-SCNC: 4.6 MMOL/L — SIGNIFICANT CHANGE UP (ref 3.5–5)
PROT SERPL-MCNC: 4.5 G/DL — LOW (ref 6–8)
RBC # BLD: 3.4 M/UL — LOW (ref 4.7–6.1)
RBC # FLD: 15.2 % — HIGH (ref 11.5–14.5)
RBC BLD AUTO: ABNORMAL
SODIUM SERPL-SCNC: 141 MMOL/L — SIGNIFICANT CHANGE UP (ref 135–146)
VARIANT LYMPHS # BLD: 0.9 % — SIGNIFICANT CHANGE UP (ref 0–5)
WBC # BLD: 6.51 K/UL — SIGNIFICANT CHANGE UP (ref 4.8–10.8)
WBC # FLD AUTO: 6.51 K/UL — SIGNIFICANT CHANGE UP (ref 4.8–10.8)

## 2023-06-25 PROCEDURE — 99232 SBSQ HOSP IP/OBS MODERATE 35: CPT

## 2023-06-25 RX ADMIN — FLUPHENAZINE HYDROCHLORIDE 10 MILLIGRAM(S): 1 TABLET, FILM COATED ORAL at 11:16

## 2023-06-25 RX ADMIN — PANTOPRAZOLE SODIUM 40 MILLIGRAM(S): 20 TABLET, DELAYED RELEASE ORAL at 05:04

## 2023-06-25 RX ADMIN — HEPARIN SODIUM 5000 UNIT(S): 5000 INJECTION INTRAVENOUS; SUBCUTANEOUS at 18:38

## 2023-06-25 RX ADMIN — HEPARIN SODIUM 5000 UNIT(S): 5000 INJECTION INTRAVENOUS; SUBCUTANEOUS at 05:04

## 2023-06-25 RX ADMIN — Medication 2 MILLIGRAM(S): at 21:24

## 2023-06-25 RX ADMIN — SODIUM CHLORIDE 75 MILLILITER(S): 9 INJECTION INTRAMUSCULAR; INTRAVENOUS; SUBCUTANEOUS at 18:40

## 2023-06-25 NOTE — PHYSICAL THERAPY INITIAL EVALUATION ADULT - PERTINENT HX OF CURRENT PROBLEM, REHAB EVAL
67 year old male past medical history of NH lymphoma in remission (follows with Dr. Gong), CKD stage 3, schizoaffective disorder presents with palpitations.  Patient states for last 2 weeks he has been having intermittent episodes of palpitations and tachycardia.  States that he measured his heart rate and noted to be around 100.  Also has intermittent episodes of shortness of breath at rest.    Patient states that today he started to feel some lightheadedness and so he sat down. Pt says that   No fevers, chills, nausea, vomiting, abdominal pain, chest pain, pressure, leg pain, swelling, recent bleeds, anxiety.   No similar episodes in the past. Pt notes that there significant family hx of clotting in the family, says at least a few people in his family have passed from "blood clots in the lung."  Patient does not follow with a cardiologist.

## 2023-06-25 NOTE — PHYSICAL THERAPY INITIAL EVALUATION ADULT - ADDITIONAL COMMENTS
Pt reports he lives with his parents and sibling in , 20 steps to enter , 1 flight to bedroom and bathroom/shower, pt was indep with amb and ADL's at baseline, has no DME at home. Pt state he has muscle atrophy in B/L scapula, PT noted and asked how long pt noticed it with pt reported approx 6 months, but was not able to follow up since had other medical conditions to take care of.

## 2023-06-25 NOTE — PHYSICAL THERAPY INITIAL EVALUATION ADULT - GAIT TRAINING, PT EVAL
Improve amb to 100' with supervision using RW by d/c. Improve stair negotiation 1 flight with supervision/mod indep using 1 HR.

## 2023-06-25 NOTE — PROGRESS NOTE ADULT - ASSESSMENT
Assessment and Plan:     66 yo M with hx of Schizophrenia, Nephrolithiasis complicated by Left atrophic kidney, Right ureteral revision, CKD stage 4, Gout, Chronic diarrhea, Non hodgkin lymphoma, Hypercalcemia presents to ED for palpitation and SOB on exertion.         #Palpitations, tachycardia  - occasional episodes of tachycardia and palpitations at home, associated with lightheadedness for past 2 weeks  - Dimer 297 (below adjusted cutoff of VTE); no leg swelling, no immobilization  - no recent stressors/anxiety  - EKG NSR  - trops x1 negative; BNP 1078, no signs of overload  - echo 2021 with EF 59%, G1DD  - f/u  repeat echo, TSH, orthostatics, A1c, lipid panel    #NH lymphoma, in remission  #Hypercalcemia, likely of malignancy  # Anemia of chronic disease  #ASHLEY on CKD vs progressing CKD  - NH lymphoma in remission, last saw Dr. Gong 1 month ago  - calcium down to 11.5 from 14.4 on admission; no flank pain/ dysuria but admits to diarrhea  - Hb 8.9 (baseline 11), no back pain  -  Cr 2.9 (baseline 2.4); f/u urine lytes  - SPEP 04/23 showing hypogammaglobulinemia, K/L ration 1.27; f/u heme/onc consult  - f/u SPEP,UPEP, SFLC, IF  - IVF; will start calcitonin 350U q12  - avoid bisphosphonates given CKD    #Schizoaffective disorder  - continue fluphenazine, benztropine    Hypercalcemia 2/2 malignancy?   - check PTH, vit D, PTHrP  - will give calcitonin SC   - c/w IVF NS @ 100cc/hr   - monitor BMP   - Nephrology consult.     ASHLEY on CKD stage 4 likely 2/2 hypercalcemia  - serum 3.8 currently. (baseline ~ 2.5)  - c/w IVF NS @ 100 cc/hr  - renal US and urine studies.   - Nephrology consult.     Dyspnea on exertion  - CXR looks unremarkable to my read --> pending official report.   - patient is not hypoxic.   - will get TTE    Schizophrenia - c/w home med  Gout - c/w home med  Hx of Lymphoma/Colonic mass - follow up out

## 2023-06-25 NOTE — PHYSICAL THERAPY INITIAL EVALUATION ADULT - GAIT DISTANCE, PT EVAL
70'x 2 and from stairwell close supervision then CGA via hand-held assist, then another 20'x 2 to and from bathroom using RW, with 1 bout of LOB recuperated by PT. Pt stated he did not want RW or SC at home despite pt education on benefits of DME for balance.

## 2023-06-25 NOTE — PHYSICAL THERAPY INITIAL EVALUATION ADULT - GENERAL OBSERVATIONS, REHAB EVAL
10:46-11:27. Pt encountered sitting in chair in NAD, + IV L UE , reported he has been having diarrhea 3x this morning, needed hygenic care at b/s. Jenae PCA provided hygenic care prior to PT.  Pt was agreeable.

## 2023-06-25 NOTE — PROGRESS NOTE ADULT - ATTENDING COMMENTS
67M PMHx marginal zone lymphoma, CKD III, schizoaffective disorder here with shortness of breath, palpitations; found to have hypercalcemia. ASHLEY.    # Hypercalcemia: resolving  - suspected due to lymphoma, s/p calcitonin 6/21  - s/p pamidronate 6/22  - pth appropriately low 12 (6/23), pth-rp was negative in April;   25Hydroxy D low at 17. 1,25 [facility] high at 101. ace levels were normal in April  - c/w ns 75 cc/hr; f/u Nephro.  - trend  - appreciate onc.      # Acute kidney injury superimposed on CKD.  possible pre-renal  ivf as above  renal us no hydro  trend  renal following     # Shortness of breath.with palpitations; now resolved  unclear etiology  ekg noted  cxr clear; no hypoxia  no events on tele, can d/c tele   echo: EF 58%, G1DD    # Non Hodgkin's lymphoma.   planned for resumption of treatment outpt  f/u onc.    # Schizoaffective disorder.   - fluphenazine 10.    dvt ppx   gi ppx    PT EVAL: Eventual Home PT  pending: trend Ca, Cr

## 2023-06-25 NOTE — PROGRESS NOTE ADULT - SUBJECTIVE AND OBJECTIVE BOX
[facility] FOLLOW UP NOTE  --------------------------------------------------------------------------------  24 hour events/subjective:  pt feeing well       PAST HISTORY  --------------------------------------------------------------------------------  No significant changes to PMH, PSH, FHx, SHx, unless otherwise noted    ALLERGIES & MEDICATIONS  --------------------------------------------------------------------------------  Allergies    allopurinol (Rash (Moderate))    Intolerances      Standing Inpatient Medications  benztropine 2 milliGRAM(s) Oral at bedtime  fluPHENAZine 10 milliGRAM(s) Oral daily  heparin   Injectable 5000 Unit(s) SubCutaneous every 12 hours  pantoprazole    Tablet 40 milliGRAM(s) Oral before breakfast  sodium chloride 0.9%. 1000 milliLiter(s) IV Continuous <Continuous>    PRN Inpatient Medications      REVIEW OF SYSTEMS  --------------------------------------------------------------------------------    All other systems were reviewed and are negative, except as noted.    VITALS/PHYSICAL EXAM  --------------------------------------------------------------------------------  T(C): 36 (06-25-23 @ 05:00), Max: 36 (06-25-23 @ 05:00)  HR: 71 (06-25-23 @ 05:00) (71 - 76)  BP: 150/84 (06-25-23 @ 05:00) (101/61 - 150/84)  RR: 18 (06-25-23 @ 05:00) (18 - 18)  SpO2: --  Wt(kg): --  Height (cm): 182.9 (06-23-23 @ 21:33)  Weight (kg): 86.1 (06-23-23 @ 21:33)  BMI (kg/m2): 25.7 (06-23-23 @ 21:33)  BSA (m2): 2.08 (06-23-23 @ 21:33)      06-24-23 @ 07:01  -  06-25-23 @ 07:00  --------------------------------------------------------  IN: 230 mL / OUT: 1000 mL / NET: -770 mL      Physical Exam:  	Gen: NAD  	HEENT: No JVD  	Pulm: CTA B/L  	CV: RRR  	Abd: +BS, soft, nontender/nondistended  	No edema      LABS/STUDIES  --------------------------------------------------------------------------------              9.7    6.51  >-----------<  176      [06-25-23 @ 08:04]              29.8     141  |  112  |  40  ----------------------------<  93      [06-25-23 @ 08:04]  4.6   |  17  |  2.9        Ca     10.8     [06-25-23 @ 08:04]      Mg     2.1     [06-25-23 @ 08:04]      Phos  3.9     [06-25-23 @ 08:04]    TPro  4.5  /  Alb  3.3  /  TBili  0.4  /  DBili  x   /  AST  12  /  ALT  9   /  AlkPhos  176  [06-25-23 @ 08:04]    Creatinine Trend:  SCr 2.9 [06-25 @ 08:04]  SCr 2.9 [06-24 @ 07:37]  SCr 3.1 [06-23 @ 06:11]  SCr 3.7 [06-22 @ 06:00]  SCr 3.8 [06-21 @ 16:36]    Urinalysis - [06-25-23 @ 08:04]      Color  / Appearance  / SG  / pH       Gluc 93 / Ketone   / Bili  / Urobili        Blood  / Protein  / Leuk Est  / Nitrite       RBC  / WBC  / Hyaline  / Gran  / Sq Epi  / Non Sq Epi  / Bacteria       PTH -- (Ca 12.3)      [06-23-23 @ 06:11]   12  PTH -- (Ca 12.7)      [04-15-23 @ 08:22]   8  Vitamin D (25OH) 17      [06-23-23 @ 06:11]  TSH 2.27      [04-15-23 @ 08:22]  Lipid: chol 121, , HDL 18, LDL --      [06-22-23 @ 06:00]      Free Light Chains: kappa 3.20, lambda 2.82, ratio = 1.13      [06-22 @ 06:00]

## 2023-06-25 NOTE — PHYSICAL THERAPY INITIAL EVALUATION ADULT - GAIT DEVIATIONS NOTED, PT EVAL
decreased B/L arm swing, head looking down/decreased jose/decreased step length/decreased stride length

## 2023-06-25 NOTE — PROGRESS NOTE ADULT - SUBJECTIVE AND OBJECTIVE BOX
ELDA COVARRUBIAS 67y Male  MRN#: [id number]   Hospital Day: 4d    HPI:  67 year old male past medical history of NH lymphoma in remission (follows with Dr. Gong), CKD stage 3, schizoaffective disorder presents with palpitations.  Patient states for last 2 weeks he has been having intermittent episodes of palpitations and tachycardia.  States that he measured his heart rate and noted to be around 100.  Also has intermittent episodes of shortness of breath at rest.    Patient states that today he started to feel some lightheadedness and so he sat down. Pt says that   No fevers, chills, nausea, vomiting, abdominal pain, chest pain, pressure, leg pain, swelling, recent bleeds, anxiety.   No similar episodes in the past. Pt notes that there significant family hx of clotting in the family, says atleast a few people in his family have passed from "blood clots in the lung."  Patient does not follow with a cardiologist.     In the ED, vitals normal. Hb 10.4 (~baseline 11), Ca 14.4, D-dimer 297, Cr 3.8 (baseline 2.4), BNP 1078.  (21 Jun 2023 20:20)      SUBJECTIVE  Patient is a 67y old Male who presents with a chief complaint of SOB (24 Jun 2023 16:44)  Currently admitted to medicine with the primary diagnosis of Acute renal insufficiency      INTERVAL HPI AND OVERNIGHT EVENTS:  Patient was examined and seen at bedside. This morning he is resting comfortably in bed and reports no issues or overnight events.    REVIEW OF SYMPTOMS:  CONSTITUTIONAL: No weakness, fevers or chills; No headaches  EYES: No visual changes, eye pain, or discharge  ENT: No vertigo; No ear pain or change in hearing; No sore throat or difficulty swallowing  NECK: No pain or stiffness  RESPIRATORY: No cough, wheezing, or hemoptysis; No shortness of breath  CARDIOVASCULAR: No chest pain or palpitations  GASTROINTESTINAL: No abdominal or epigastric pain; No nausea, vomiting, or hematemesis; No diarrhea or constipation; No melena or hematochezia  GENITOURINARY: No dysuria, frequency or hematuria  MUSCULOSKELETAL: No joint pain, no muscle pain, no weakness  NEUROLOGICAL: No numbness or weakness  SKIN: No itching or rashes    OBJECTIVE  PAST MEDICAL & SURGICAL HISTORY  Kidney stones  Schizophrenia  Lymphoma  Shingles  Atrophic kidney  Retained urethral stent  H/O umbilical hernia repair  Elbow fracture  H/O cystoscopy      ALLERGIES:  allopurinol (Rash (Moderate))    MEDICATIONS:  STANDING MEDICATIONS  benztropine 2 milliGRAM(s) Oral at bedtime  fluPHENAZine 10 milliGRAM(s) Oral daily  heparin   Injectable 5000 Unit(s) SubCutaneous every 12 hours  pantoprazole    Tablet 40 milliGRAM(s) Oral before breakfast  sodium chloride 0.9%. 1000 milliLiter(s) IV Continuous <Continuous>    PRN MEDICATIONS      VITAL SIGNS: Last 24 Hours  T(C): 36 (25 Jun 2023 05:00), Max: 36 (24 Jun 2023 12:48)  T(F): 96.8 (25 Jun 2023 05:00), Max: 96.8 (24 Jun 2023 12:48)  HR: 71 (25 Jun 2023 05:00) (71 - 76)  BP: 150/84 (25 Jun 2023 05:00) (101/61 - 150/84)  BP(mean): --  RR: 18 (25 Jun 2023 05:00) (18 - 18)  SpO2: --    LABS:                        8.9    6.27  )-----------( 166      ( 24 Jun 2023 07:37 )             27.3     06-24    143  |  112<H>  |  40<H>  ----------------------------<  82  4.1   |  19  |  2.9<H>    Ca    11.5<H>      24 Jun 2023 07:37  Mg     2.3     06-24    TPro  4.5<L>  /  Alb  3.2<L>  /  TBili  0.4  /  DBili  x   /  AST  11  /  ALT  9   /  AlkPhos  170<H>  06-24      Urinalysis Basic - ( 24 Jun 2023 07:37 )    Color: x / Appearance: x / SG: x / pH: x  Gluc: 82 mg/dL / Ketone: x  / Bili: x / Urobili: x   Blood: x / Protein: x / Nitrite: x   Leuk Esterase: x / RBC: x / WBC x   Sq Epi: x / Non Sq Epi: x / Bacteria: x                RADIOLOGY:      PHYSICAL EXAM:  CONSTITUTIONAL: No acute distress, well-developed, well-groomed, AAOx3  HEAD: Atraumatic, normocephalic  EYES: EOM intact, PERRLA, conjunctiva and sclera clear  ENT: Supple, no masses, no thyromegaly, no bruits, no JVD; moist mucous membranes  PULMONARY: Clear to auscultation bilaterally; no wheezes, rales, or rhonchi  CARDIOVASCULAR: Regular rate and rhythm; no murmurs, rubs, or gallops  GASTROINTESTINAL: Soft, non-tender, non-distended; bowel sounds present  MUSCULOSKELETAL: 2+ peripheral pulses; no clubbing, no cyanosis, no edema  NEUROLOGY: non-focal  SKIN: No rashes or lesions; warm and dry   [Maximal Pain Intensity: 0/10] : 0/10 [Least Pain Intensity: 0/10] : 0/10 [90: Able to carry normal activity; minor signs or symptoms of disease.] : 90: Able to carry normal activity; minor signs or symptoms of disease.  [ECOG Performance Status: 0 - Fully active, able to carry on all pre-disease performance without restriction] : Performance Status: 0 - Fully active, able to carry on all pre-disease performance without restriction [0 - No Distress] : Distress Level: 0

## 2023-06-25 NOTE — PROGRESS NOTE ADULT - ASSESSMENT
Impression:  CKD - baseline C ~ 2.5  Hypercalcemia  ASHLEY - improving    Plan:  Maintain IVF at current rate for now  Labs in AM

## 2023-06-26 LAB
ACE SERPL-CCNC: 57 U/L — SIGNIFICANT CHANGE UP (ref 14–82)
ALBUMIN SERPL ELPH-MCNC: 3.2 G/DL — LOW (ref 3.5–5.2)
ALP SERPL-CCNC: 166 U/L — HIGH (ref 30–115)
ALT FLD-CCNC: 9 U/L — SIGNIFICANT CHANGE UP (ref 0–41)
ANION GAP SERPL CALC-SCNC: 12 MMOL/L — SIGNIFICANT CHANGE UP (ref 7–14)
AST SERPL-CCNC: 10 U/L — SIGNIFICANT CHANGE UP (ref 0–41)
BASOPHILS # BLD AUTO: 0.03 K/UL — SIGNIFICANT CHANGE UP (ref 0–0.2)
BASOPHILS NFR BLD AUTO: 0.5 % — SIGNIFICANT CHANGE UP (ref 0–1)
BILIRUB SERPL-MCNC: 0.3 MG/DL — SIGNIFICANT CHANGE UP (ref 0.2–1.2)
BUN SERPL-MCNC: 40 MG/DL — HIGH (ref 10–20)
CALCIUM SERPL-MCNC: 11.1 MG/DL — HIGH (ref 8.4–10.5)
CHLORIDE SERPL-SCNC: 114 MMOL/L — HIGH (ref 98–110)
CO2 SERPL-SCNC: 18 MMOL/L — SIGNIFICANT CHANGE UP (ref 17–32)
CREAT SERPL-MCNC: 2.7 MG/DL — HIGH (ref 0.7–1.5)
EGFR: 25 ML/MIN/1.73M2 — LOW
EOSINOPHIL # BLD AUTO: 0.82 K/UL — HIGH (ref 0–0.7)
EOSINOPHIL NFR BLD AUTO: 14.6 % — HIGH (ref 0–8)
GLUCOSE SERPL-MCNC: 104 MG/DL — HIGH (ref 70–99)
HCT VFR BLD CALC: 28.9 % — LOW (ref 42–52)
HGB BLD-MCNC: 9.4 G/DL — LOW (ref 14–18)
IMM GRANULOCYTES NFR BLD AUTO: 0.9 % — HIGH (ref 0.1–0.3)
LYMPHOCYTES # BLD AUTO: 1.79 K/UL — SIGNIFICANT CHANGE UP (ref 1.2–3.4)
LYMPHOCYTES # BLD AUTO: 32 % — SIGNIFICANT CHANGE UP (ref 20.5–51.1)
MAGNESIUM SERPL-MCNC: 2 MG/DL — SIGNIFICANT CHANGE UP (ref 1.8–2.4)
MCHC RBC-ENTMCNC: 28.9 PG — SIGNIFICANT CHANGE UP (ref 27–31)
MCHC RBC-ENTMCNC: 32.5 G/DL — SIGNIFICANT CHANGE UP (ref 32–37)
MCV RBC AUTO: 88.9 FL — SIGNIFICANT CHANGE UP (ref 80–94)
MONOCYTES # BLD AUTO: 0.93 K/UL — HIGH (ref 0.1–0.6)
MONOCYTES NFR BLD AUTO: 16.6 % — HIGH (ref 1.7–9.3)
NEUTROPHILS # BLD AUTO: 1.98 K/UL — SIGNIFICANT CHANGE UP (ref 1.4–6.5)
NEUTROPHILS NFR BLD AUTO: 35.4 % — LOW (ref 42.2–75.2)
NRBC # BLD: 0 /100 WBCS — SIGNIFICANT CHANGE UP (ref 0–0)
PHOSPHATE SERPL-MCNC: 4.6 MG/DL — SIGNIFICANT CHANGE UP (ref 2.1–4.9)
PLATELET # BLD AUTO: 159 K/UL — SIGNIFICANT CHANGE UP (ref 130–400)
PMV BLD: 10.2 FL — SIGNIFICANT CHANGE UP (ref 7.4–10.4)
POTASSIUM SERPL-MCNC: 4.2 MMOL/L — SIGNIFICANT CHANGE UP (ref 3.5–5)
POTASSIUM SERPL-SCNC: 4.2 MMOL/L — SIGNIFICANT CHANGE UP (ref 3.5–5)
PROT SERPL-MCNC: 4.4 G/DL — LOW (ref 6–8)
RBC # BLD: 3.25 M/UL — LOW (ref 4.7–6.1)
RBC # FLD: 15.5 % — HIGH (ref 11.5–14.5)
SODIUM SERPL-SCNC: 144 MMOL/L — SIGNIFICANT CHANGE UP (ref 135–146)
WBC # BLD: 5.6 K/UL — SIGNIFICANT CHANGE UP (ref 4.8–10.8)
WBC # FLD AUTO: 5.6 K/UL — SIGNIFICANT CHANGE UP (ref 4.8–10.8)

## 2023-06-26 PROCEDURE — 99233 SBSQ HOSP IP/OBS HIGH 50: CPT

## 2023-06-26 RX ADMIN — HEPARIN SODIUM 5000 UNIT(S): 5000 INJECTION INTRAVENOUS; SUBCUTANEOUS at 05:24

## 2023-06-26 RX ADMIN — HEPARIN SODIUM 5000 UNIT(S): 5000 INJECTION INTRAVENOUS; SUBCUTANEOUS at 17:03

## 2023-06-26 RX ADMIN — PANTOPRAZOLE SODIUM 40 MILLIGRAM(S): 20 TABLET, DELAYED RELEASE ORAL at 05:24

## 2023-06-26 RX ADMIN — Medication 2 MILLIGRAM(S): at 21:10

## 2023-06-26 RX ADMIN — FLUPHENAZINE HYDROCHLORIDE 10 MILLIGRAM(S): 1 TABLET, FILM COATED ORAL at 11:14

## 2023-06-26 NOTE — PROGRESS NOTE ADULT - ASSESSMENT
67M PMHx marginal zone lymphoma, CKD III, schizoaffective disorder here with shortness of breath, palpitations; found to have hypercalcemia. ASHLEY.    # Hypercalcemia  - suspected due to lymphoma / Granulomatous dz , s/p calcitonin 6/21  - s/p pamidronate 6/22  - pth appropriately low 12 (6/23), pth-rp was negative in April;   25Hydroxy D low at 17. 1,25 [facility] high at 101. ace levels were normal in April  - c/w ns 75 cc/hr; f/u Nephro.  - trend  - appreciate onc.  f/u heme/onc consult     # Acute kidney injury superimposed on CKD.  possible pre-renal  ivf as above  renal us no hydro  trend  renal following     # Shortness of breath.with palpitations; now resolved  unclear etiology  ekg noted  cxr clear; no hypoxia  no events on tele,  echo: EF 58%, G1DD TSH 1.88    # Non Hodgkin's lymphoma.   planned for resumption of treatment outpt  - considering in patient treatment   -f/up with heme/onc     # Schizoaffective disorder.   - fluphenazine 10.    # ? Chronic Loose stool >> Will check GI PCR, C dIff   dvt ppx   gi ppx    pending: trend Ca, Cr, Diarrhea  Plan d/w pt at bedside.   Dispo: home

## 2023-06-26 NOTE — PROGRESS NOTE ADULT - SUBJECTIVE AND OBJECTIVE BOX
Nephrology progress note     loose stools    ON events/Subjective:     Allergies:  allopurinol (Rash (Moderate))    Hospital Medications:   MEDICATIONS  (STANDING):  benztropine 2 milliGRAM(s) Oral at bedtime  fluPHENAZine 10 milliGRAM(s) Oral daily  heparin   Injectable 5000 Unit(s) SubCutaneous every 12 hours  pantoprazole    Tablet 40 milliGRAM(s) Oral before breakfast  sodium chloride 0.9%. 1000 milliLiter(s) (75 mL/Hr) IV Continuous <Continuous>    REVIEW OF SYSTEMS:  CONSTITUTIONAL: No weakness, fevers or chills  EYES/ENT: No visual changes;  No vertigo or throat pain   NECK: No pain or stiffness  RESPIRATORY: No cough, wheezing, hemoptysis; No shortness of breath  CARDIOVASCULAR: No chest pain or palpitations.  GASTROINTESTINAL: No abdominal or epigastric pain. No nausea, vomiting, or hematemesis; No diarrhea or constipation. No melena or hematochezia.  GENITOURINARY: No dysuria, frequency, foamy urine, urinary urgency, incontinence or hematuria  NEUROLOGICAL: No numbness or weakness  SKIN: No itching, burning, rashes, or lesions   VASCULAR: No bilateral lower extremity edema.   All other review of systems is negative unless indicated above.    VITALS:  T(F): 97.3 (06-26-23 @ 05:50), Max: 97.6 (06-25-23 @ 21:46)  HR: 83 (06-26-23 @ 05:50)  BP: 105/56 (06-26-23 @ 05:50)  RR: 18 (06-26-23 @ 05:50)  SpO2: 96% (06-26-23 @ 05:50)  Wt(kg): --    06-24 @ 07:01  -  06-25 @ 07:00  --------------------------------------------------------  IN: 230 mL / OUT: 1000 mL / NET: -770 mL        PHYSICAL EXAM:  Constitutional: NAD  HEENT: anicteric sclera, oropharynx clear, MMM  Neck: No JVD  Respiratory: CTAB, no wheezes, rales or rhonchi  Cardiovascular: S1, S2, RRR  Gastrointestinal: BS+, soft, NT/ND  Extremities: No cyanosis or clubbing. No peripheral edema  Neurological: A/O x 3, no focal deficits  Psychiatric: Normal mood, normal affect  : No CVA tenderness. No zafar.   Skin: No rashes  Vascular Access:    LABS:  06-26    144  |  114<H>  |  40<H>  ----------------------------<  104<H>  4.2   |  18  |  2.7<H>    Ca    11.1<H>      26 Jun 2023 07:53  Phos  4.6     06-26  Mg     2.0     06-26    TPro  4.4<L>  /  Alb  3.2<L>  /  TBili  0.3  /  DBili      /  AST  10  /  ALT  9   /  AlkPhos  166<H>  06-26                          9.4    5.60  )-----------( 159      ( 26 Jun 2023 07:53 )             28.9       Urine Studies:  Creatinine Trend: 2.7<--, 2.9<--, 2.9<--, 3.1<--, 3.7<--, 3.8<--  Urinalysis Basic - ( 26 Jun 2023 07:53 )    Color:  / Appearance:  / SG:  / pH:   Gluc: 104 mg/dL / Ketone:   / Bili:  / Urobili:    Blood:  / Protein:  / Nitrite:    Leuk Esterase:  / RBC:  / WBC    Sq Epi:  / Non Sq Epi:  / Bacteria:     ·ASHLEY on CKD  ·in setting of hypercalcemia  ·hypercalcemia 2/2 recurrence of lymphoma (has elevated vit D 1,25) although improved with pamidronate still corrected calcium 11.8  ·dehydration and consequent tachycardia from hypercalcemia improved with ivf, calcitonin, pamidronate  ·followed by [facility]  ·anemia  loose stools    1) continue IVF  2) appreciate hemonc input per outpt cehmo - consider starting now as unable to control hypercalcemia - (? starting steroids)  3) consider GI input for chronic loose stools  4) derek sagastume albs  d/w pt and will d/w medical team

## 2023-06-26 NOTE — PROGRESS NOTE ADULT - SUBJECTIVE AND OBJECTIVE BOX
ELDA COVARRUBIAS  67y  Male      Patient is a 67y old  Male who presents with a chief complaint of ASHLEY     INTERVAL HPI/OVERNIGHT EVENTS:      ******************************* REVIEW OF SYSTEMS:**********************************************    All other review of systems negative    *********************** VITALS ******************************************    T(F): 98 (06-26-23 @ 11:53)  HR: 78 (06-26-23 @ 11:53) (70 - 83)  BP: 113/59 (06-26-23 @ 11:53) (102/56 - 113/59)  RR: 18 (06-26-23 @ 11:53) (18 - 18)  SpO2: 96% (06-26-23 @ 11:53) (96% - 96%)            ******************************** PHYSICAL EXAM:**************************************************  GENERAL: NAD    PSYCH: no agitation, baseline mentation  HEENT:     NERVOUS SYSTEM:  Alert & Oriented X3,    PULMONARY: AMANDA, CTA    CARDIOVASCULAR: S1S2 RRR    GI: Soft, NT, ND; BS present.    EXTREMITIES:  2+ Peripheral Pulses, No clubbing, cyanosis, or edema    LYMPH: No lymphadenopathy noted    SKIN: No rashes or lesions      **************************** LABS *******************************************************                          9.4    5.60  )-----------( 159      ( 26 Jun 2023 07:53 )             28.9     06-26    144  |  114<H>  |  40<H>  ----------------------------<  104<H>  4.2   |  18  |  2.7<H>    Ca    11.1<H>      26 Jun 2023 07:53  Phos  4.6     06-26  Mg     2.0     06-26    TPro  4.4<L>  /  Alb  3.2<L>  /  TBili  0.3  /  DBili  x   /  AST  10  /  ALT  9   /  AlkPhos  166<H>  06-26      Urinalysis Basic - ( 26 Jun 2023 07:53 )    Color: x / Appearance: x / SG: x / pH: x  Gluc: 104 mg/dL / Ketone: x  / Bili: x / Urobili: x   Blood: x / Protein: x / Nitrite: x   Leuk Esterase: x / RBC: x / WBC x   Sq Epi: x / Non Sq Epi: x / Bacteria: x        Lactate Trend        CAPILLARY BLOOD GLUCOSE              **************************Active Medications *******************************************  allopurinol (Rash (Moderate))      benztropine 2 milliGRAM(s) Oral at bedtime  fluPHENAZine 10 milliGRAM(s) Oral daily  heparin   Injectable 5000 Unit(s) SubCutaneous every 12 hours  pantoprazole    Tablet 40 milliGRAM(s) Oral before breakfast  sodium chloride 0.9%. 1000 milliLiter(s) IV Continuous <Continuous>      ***************************************************  RADIOLOGY & ADDITIONAL TESTS:    Imaging Personally Reviewed:  [ ] YES  [ ] NO    HEALTH ISSUES - PROBLEM Dx:  Hypercalcemia    Acute kidney injury superimposed on CKD    Non Hodgkin's lymphoma    Schizoaffective disorder    On deep vein thrombosis (DVT) prophylaxis    Shortness of breath

## 2023-06-26 NOTE — PROGRESS NOTE ADULT - ASSESSMENT
67M PMHx marginal zone lymphoma, CKD III, schizoaffective disorder here with shortness of breath, palpitations; found to have hypercalcemia. ASHLEY.    # Hypercalcemia  - suspected due to lymphoma / Granulomatous dz , s/p calcitonin 6/21  - s/p pamidronate 6/22  - pth appropriately low 12 (6/23), pth-rp was negative in April;   25Hydroxy D low at 17. 1,25 [facility] high at 101. ace levels were normal in April  - c/w ns 75 cc/hr; f/u Nephro.  - trend  - appreciate onc.    # Acute kidney injury superimposed on CKD.  possible pre-renal  ivf as above  renal us no hydro  trend  renal following     # Shortness of breath.with palpitations; now resolved  unclear etiology  ekg noted  cxr clear; no hypoxia  no events on tele,  echo: EF 58%, G1DD TSH 1.88    # Non Hodgkin's lymphoma.   planned for resumption of treatment outpt  f/u onc.    # Schizoaffective disorder.   - fluphenazine 10.    # ? Chronic Loose stool >> Will check GI PCR, C dIff   dvt ppx   gi ppx    pending: trend Ca, Cr, Diarrhea  Plan d/w pt at bedside.   Dispo: home

## 2023-06-26 NOTE — PROGRESS NOTE ADULT - SUBJECTIVE AND OBJECTIVE BOX
24H events:    Patient is a 67y old Male who presents with a chief complaint of ASHLEY (26 Jun 2023 10:31)    Primary diagnosis of Acute renal insufficiency       Today is hospital day 5d. This morning patient was seen and examined at bedside, resting comfortably in bed.      PAST MEDICAL & SURGICAL HISTORY  Kidney stones    Schizophrenia    Lymphoma    Shingles    Atrophic kidney    Retained urethral stent    H/O umbilical hernia repair    Elbow fracture    H/O cystoscopy      SOCIAL HISTORY:  Social History:      ALLERGIES:  allopurinol (Rash (Moderate))    MEDICATIONS:  STANDING MEDICATIONS  benztropine 2 milliGRAM(s) Oral at bedtime  fluPHENAZine 10 milliGRAM(s) Oral daily  heparin   Injectable 5000 Unit(s) SubCutaneous every 12 hours  pantoprazole    Tablet 40 milliGRAM(s) Oral before breakfast  sodium chloride 0.9%. 1000 milliLiter(s) IV Continuous <Continuous>    PRN MEDICATIONS    VITALS:   T(F): 98  HR: 78  BP: 113/59  RR: 18  SpO2: 96%    PHYSICAL EXAM:  GENERAL: NAD, well-groomed, well-developed  HEAD:  Atraumatic, Normocephalic  EYES: EOMI  NECK: Supple  NERVOUS SYSTEM:  Alert & Oriented X3, non focal   CHEST/LUNG: Clear to auscultation bilaterally; No rales, rhonchi, wheezing, or rubs  HEART: Regular rate and rhythm; No murmurs, rubs, or gallops  ABDOMEN: Soft, Nontender, Nondistended; Bowel sounds present  EXTREMITIES:  2+ Peripheral Pulses, No clubbing, cyanosis, or edema  LYMPH: No lymphadenopathy noted  SKIN: No rashes or lesions  LABS:                        9.4    5.60  )-----------( 159      ( 26 Jun 2023 07:53 )             28.9     06-26    144  |  114<H>  |  40<H>  ----------------------------<  104<H>  4.2   |  18  |  2.7<H>    Ca    11.1<H>      26 Jun 2023 07:53  Phos  4.6     06-26  Mg     2.0     06-26    TPro  4.4<L>  /  Alb  3.2<L>  /  TBili  0.3  /  DBili  x   /  AST  10  /  ALT  9   /  AlkPhos  166<H>  06-26      Urinalysis Basic - ( 26 Jun 2023 07:53 )    Color: x / Appearance: x / SG: x / pH: x  Gluc: 104 mg/dL / Ketone: x  / Bili: x / Urobili: x   Blood: x / Protein: x / Nitrite: x   Leuk Esterase: x / RBC: x / WBC x   Sq Epi: x / Non Sq Epi: x / Bacteria: x                RADIOLOGY:           24H events:    Patient is a 67y old Male who presents with a chief complaint of ASHLEY (26 Jun 2023 10:31)    Primary diagnosis of Acute renal insufficiency       Today is hospital day 5d. This morning patient was seen and examined at bedside, resting comfortably in bed.  pt denied any SOB or palpitations.    PAST MEDICAL & SURGICAL HISTORY  Kidney stones    Schizophrenia    Lymphoma    Shingles    Atrophic kidney    Retained urethral stent    H/O umbilical hernia repair    Elbow fracture    H/O cystoscopy      SOCIAL HISTORY:  Social History:      ALLERGIES:  allopurinol (Rash (Moderate))    MEDICATIONS:  STANDING MEDICATIONS  benztropine 2 milliGRAM(s) Oral at bedtime  fluPHENAZine 10 milliGRAM(s) Oral daily  heparin   Injectable 5000 Unit(s) SubCutaneous every 12 hours  pantoprazole    Tablet 40 milliGRAM(s) Oral before breakfast  sodium chloride 0.9%. 1000 milliLiter(s) IV Continuous <Continuous>    PRN MEDICATIONS    VITALS:   T(F): 98  HR: 78  BP: 113/59  RR: 18  SpO2: 96%    PHYSICAL EXAM:  GENERAL: NAD, well-groomed, well-developed  HEAD:  Atraumatic, Normocephalic  NERVOUS SYSTEM:  Alert & Oriented X3, non focal   CHEST/LUNG: Clear to auscultation bilaterally; No rales, rhonchi, wheezing, or rubs  HEART: Regular rate and rhythm; No murmurs, rubs, or gallops  ABDOMEN: Soft, Nontender, Nondistended; Bowel sounds present  EXTREMITIES:  2+ Peripheral Pulses, No clubbing, cyanosis, or edema  LABS:                        9.4    5.60  )-----------( 159      ( 26 Jun 2023 07:53 )             28.9     06-26    144  |  114<H>  |  40<H>  ----------------------------<  104<H>  4.2   |  18  |  2.7<H>    Ca    11.1<H>      26 Jun 2023 07:53  Phos  4.6     06-26  Mg     2.0     06-26    TPro  4.4<L>  /  Alb  3.2<L>  /  TBili  0.3  /  DBili  x   /  AST  10  /  ALT  9   /  AlkPhos  166<H>  06-26      Urinalysis Basic - ( 26 Jun 2023 07:53 )    Color: x / Appearance: x / SG: x / pH: x  Gluc: 104 mg/dL / Ketone: x  / Bili: x / Urobili: x   Blood: x / Protein: x / Nitrite: x   Leuk Esterase: x / RBC: x / WBC x   Sq Epi: x / Non Sq Epi: x / Bacteria: x                RADIOLOGY:    < from: TTE Echo Complete w/o Contrast w/ Doppler (06.22.23 @ 09:17) >   1. Normal global left ventricular systolic function.   2. LV Ejection Fraction by Ye's Method with a biplane EF of 58 %    < end of copied text >

## 2023-06-27 ENCOUNTER — TRANSCRIPTION ENCOUNTER (OUTPATIENT)
Age: 68
End: 2023-06-27

## 2023-06-27 VITALS
RESPIRATION RATE: 20 BRPM | HEART RATE: 80 BPM | TEMPERATURE: 97 F | OXYGEN SATURATION: 99 % | SYSTOLIC BLOOD PRESSURE: 107 MMHG | DIASTOLIC BLOOD PRESSURE: 58 MMHG

## 2023-06-27 LAB
% ALBUMIN: 65.3 % — SIGNIFICANT CHANGE UP
% ALPHA 1: 7.5 % — SIGNIFICANT CHANGE UP
% ALPHA 2: 14 % — SIGNIFICANT CHANGE UP
% BETA: 9.7 % — SIGNIFICANT CHANGE UP
% GAMMA: 3.5 % — SIGNIFICANT CHANGE UP
% M SPIKE: SIGNIFICANT CHANGE UP
ALBUMIN SERPL ELPH-MCNC: 3 G/DL — LOW (ref 3.6–5.5)
ALBUMIN SERPL ELPH-MCNC: 3.1 G/DL — LOW (ref 3.5–5.2)
ALBUMIN/GLOB SERPL ELPH: 1.9 RATIO — SIGNIFICANT CHANGE UP
ALP SERPL-CCNC: 151 U/L — HIGH (ref 30–115)
ALPHA1 GLOB SERPL ELPH-MCNC: 0.3 G/DL — SIGNIFICANT CHANGE UP (ref 0.1–0.4)
ALPHA2 GLOB SERPL ELPH-MCNC: 0.6 G/DL — SIGNIFICANT CHANGE UP (ref 0.5–1)
ALT FLD-CCNC: 7 U/L — SIGNIFICANT CHANGE UP (ref 0–41)
ANION GAP SERPL CALC-SCNC: 11 MMOL/L — SIGNIFICANT CHANGE UP (ref 7–14)
AST SERPL-CCNC: 9 U/L — SIGNIFICANT CHANGE UP (ref 0–41)
B-GLOBULIN SERPL ELPH-MCNC: 0.4 G/DL — LOW (ref 0.5–1)
BASOPHILS # BLD AUTO: 0.02 K/UL — SIGNIFICANT CHANGE UP (ref 0–0.2)
BASOPHILS NFR BLD AUTO: 0.4 % — SIGNIFICANT CHANGE UP (ref 0–1)
BILIRUB SERPL-MCNC: 0.3 MG/DL — SIGNIFICANT CHANGE UP (ref 0.2–1.2)
BUN SERPL-MCNC: 38 MG/DL — HIGH (ref 10–20)
CALCIUM SERPL-MCNC: 10.3 MG/DL — SIGNIFICANT CHANGE UP (ref 8.4–10.5)
CHLORIDE SERPL-SCNC: 112 MMOL/L — HIGH (ref 98–110)
CO2 SERPL-SCNC: 18 MMOL/L — SIGNIFICANT CHANGE UP (ref 17–32)
CREAT SERPL-MCNC: 2.7 MG/DL — HIGH (ref 0.7–1.5)
EGFR: 25 ML/MIN/1.73M2 — LOW
EOSINOPHIL # BLD AUTO: 0.84 K/UL — HIGH (ref 0–0.7)
EOSINOPHIL NFR BLD AUTO: 16.8 % — HIGH (ref 0–8)
GAMMA GLOBULIN: 0.2 G/DL — LOW (ref 0.6–1.6)
GLUCOSE SERPL-MCNC: 93 MG/DL — SIGNIFICANT CHANGE UP (ref 70–99)
HCT VFR BLD CALC: 25.5 % — LOW (ref 42–52)
HGB BLD-MCNC: 8.4 G/DL — LOW (ref 14–18)
IMM GRANULOCYTES NFR BLD AUTO: 0.6 % — HIGH (ref 0.1–0.3)
INTERPRETATION SERPL IFE-IMP: SIGNIFICANT CHANGE UP
LYMPHOCYTES # BLD AUTO: 1.75 K/UL — SIGNIFICANT CHANGE UP (ref 1.2–3.4)
LYMPHOCYTES # BLD AUTO: 35.1 % — SIGNIFICANT CHANGE UP (ref 20.5–51.1)
M-SPIKE: SIGNIFICANT CHANGE UP (ref 0–0)
MAGNESIUM SERPL-MCNC: 1.7 MG/DL — LOW (ref 1.8–2.4)
MCHC RBC-ENTMCNC: 29.5 PG — SIGNIFICANT CHANGE UP (ref 27–31)
MCHC RBC-ENTMCNC: 32.9 G/DL — SIGNIFICANT CHANGE UP (ref 32–37)
MCV RBC AUTO: 89.5 FL — SIGNIFICANT CHANGE UP (ref 80–94)
MONOCYTES # BLD AUTO: 0.95 K/UL — HIGH (ref 0.1–0.6)
MONOCYTES NFR BLD AUTO: 19 % — HIGH (ref 1.7–9.3)
NEUTROPHILS # BLD AUTO: 1.4 K/UL — SIGNIFICANT CHANGE UP (ref 1.4–6.5)
NEUTROPHILS NFR BLD AUTO: 28.1 % — LOW (ref 42.2–75.2)
NRBC # BLD: 0 /100 WBCS — SIGNIFICANT CHANGE UP (ref 0–0)
PLATELET # BLD AUTO: 126 K/UL — LOW (ref 130–400)
PMV BLD: 10 FL — SIGNIFICANT CHANGE UP (ref 7.4–10.4)
POTASSIUM SERPL-MCNC: 3.9 MMOL/L — SIGNIFICANT CHANGE UP (ref 3.5–5)
POTASSIUM SERPL-SCNC: 3.9 MMOL/L — SIGNIFICANT CHANGE UP (ref 3.5–5)
PROT PATTERN SERPL ELPH-IMP: SIGNIFICANT CHANGE UP
PROT SERPL-MCNC: 4.2 G/DL — LOW (ref 6–8)
RBC # BLD: 2.85 M/UL — LOW (ref 4.7–6.1)
RBC # FLD: 15.4 % — HIGH (ref 11.5–14.5)
SODIUM SERPL-SCNC: 141 MMOL/L — SIGNIFICANT CHANGE UP (ref 135–146)
WBC # BLD: 4.99 K/UL — SIGNIFICANT CHANGE UP (ref 4.8–10.8)
WBC # FLD AUTO: 4.99 K/UL — SIGNIFICANT CHANGE UP (ref 4.8–10.8)

## 2023-06-27 PROCEDURE — 99232 SBSQ HOSP IP/OBS MODERATE 35: CPT

## 2023-06-27 RX ORDER — MAGNESIUM SULFATE 500 MG/ML
2 VIAL (ML) INJECTION ONCE
Refills: 0 | Status: COMPLETED | OUTPATIENT
Start: 2023-06-27 | End: 2023-06-27

## 2023-06-27 RX ADMIN — SODIUM CHLORIDE 75 MILLILITER(S): 9 INJECTION INTRAMUSCULAR; INTRAVENOUS; SUBCUTANEOUS at 13:27

## 2023-06-27 RX ADMIN — Medication 25 GRAM(S): at 10:39

## 2023-06-27 RX ADMIN — PANTOPRAZOLE SODIUM 40 MILLIGRAM(S): 20 TABLET, DELAYED RELEASE ORAL at 05:12

## 2023-06-27 RX ADMIN — SODIUM CHLORIDE 75 MILLILITER(S): 9 INJECTION INTRAMUSCULAR; INTRAVENOUS; SUBCUTANEOUS at 10:38

## 2023-06-27 RX ADMIN — HEPARIN SODIUM 5000 UNIT(S): 5000 INJECTION INTRAVENOUS; SUBCUTANEOUS at 05:12

## 2023-06-27 RX ADMIN — FLUPHENAZINE HYDROCHLORIDE 10 MILLIGRAM(S): 1 TABLET, FILM COATED ORAL at 12:26

## 2023-06-27 NOTE — PROGRESS NOTE ADULT - SUBJECTIVE AND OBJECTIVE BOX
ELDA COVARRUBIAS  67y  Male      Patient is a 67y old  Male who presents with a chief complaint of ASHLEY (27 Jun 2023 10:13)      INTERVAL HPI/OVERNIGHT EVENTS:      ******************************* REVIEW OF SYSTEMS:**********************************************    All other review of systems negative    *********************** VITALS ******************************************    T(F): 97.4 (06-27-23 @ 05:00)  HR: 81 (06-27-23 @ 09:53) (80 - 81)  BP: 103/58 (06-27-23 @ 09:53) (95/52 - 121/61)  RR: 18 (06-27-23 @ 09:53) (18 - 19)  SpO2: 98% (06-27-23 @ 05:00) (98% - 98%)            ******************************** PHYSICAL EXAM:**************************************************  GENERAL: NAD    PSYCH: no agitation, baseline mentation  HEENT:     NERVOUS SYSTEM:  Alert & Oriented X3,    PULMONARY: AMANDA, CTA    CARDIOVASCULAR: S1S2 RRR    GI: Soft, NT, ND; BS present.    EXTREMITIES:  2+ Peripheral Pulses, No clubbing, cyanosis, or edema    LYMPH: No lymphadenopathy noted    SKIN: No rashes or lesions      **************************** LABS *******************************************************                          8.4    4.99  )-----------( 126      ( 27 Jun 2023 05:59 )             25.5     06-27    141  |  112<H>  |  38<H>  ----------------------------<  93  3.9   |  18  |  2.7<H>    Ca    10.3      27 Jun 2023 05:59  Phos  4.6     06-26  Mg     1.7     06-27    TPro  4.2<L>  /  Alb  3.1<L>  /  TBili  0.3  /  DBili  x   /  AST  9   /  ALT  7   /  AlkPhos  151<H>  06-27      Urinalysis Basic - ( 27 Jun 2023 05:59 )    Color: x / Appearance: x / SG: x / pH: x  Gluc: 93 mg/dL / Ketone: x  / Bili: x / Urobili: x   Blood: x / Protein: x / Nitrite: x   Leuk Esterase: x / RBC: x / WBC x   Sq Epi: x / Non Sq Epi: x / Bacteria: x        Lactate Trend        CAPILLARY BLOOD GLUCOSE              **************************Active Medications *******************************************  allopurinol (Rash (Moderate))      benztropine 2 milliGRAM(s) Oral at bedtime  fluPHENAZine 10 milliGRAM(s) Oral daily  heparin   Injectable 5000 Unit(s) SubCutaneous every 12 hours  pantoprazole    Tablet 40 milliGRAM(s) Oral before breakfast  sodium chloride 0.9%. 1000 milliLiter(s) IV Continuous <Continuous>      ***************************************************  RADIOLOGY & ADDITIONAL TESTS:    Imaging Personally Reviewed:  [ ] YES  [ ] NO    HEALTH ISSUES - PROBLEM Dx:  Hypercalcemia    Acute kidney injury superimposed on CKD    Non Hodgkin's lymphoma    Schizoaffective disorder    On deep vein thrombosis (DVT) prophylaxis    Shortness of breath

## 2023-06-27 NOTE — DISCHARGE NOTE NURSING/CASE MANAGEMENT/SOCIAL WORK - NSDCPEFALRISK_GEN_ALL_CORE
For information on Fall & Injury Prevention, visit: [email or web address] OR  [email or web address] OR  https://www.cdc.gov/steadi/patient.html

## 2023-06-27 NOTE — PROGRESS NOTE ADULT - SUBJECTIVE AND OBJECTIVE BOX
Nephrology progress note     closer to baseline    ON events/Subjective:     Allergies:  allopurinol (Rash (Moderate))    Hospital Medications:   MEDICATIONS  (STANDING):  benztropine 2 milliGRAM(s) Oral at bedtime  fluPHENAZine 10 milliGRAM(s) Oral daily  heparin   Injectable 5000 Unit(s) SubCutaneous every 12 hours  magnesium sulfate  IVPB 2 Gram(s) IV Intermittent once  pantoprazole    Tablet 40 milliGRAM(s) Oral before breakfast  sodium chloride 0.9%. 1000 milliLiter(s) (75 mL/Hr) IV Continuous <Continuous>    REVIEW OF SYSTEMS:  CONSTITUTIONAL: No weakness, fevers or chills  EYES/ENT: No visual changes;  No vertigo or throat pain   NECK: No pain or stiffness  RESPIRATORY: No cough, wheezing, hemoptysis; No shortness of breath  CARDIOVASCULAR: No chest pain or palpitations.  GASTROINTESTINAL: No abdominal or epigastric pain. No nausea, vomiting, or hematemesis; No diarrhea or constipation. No melena or hematochezia.  GENITOURINARY: No dysuria, frequency, foamy urine, urinary urgency, incontinence or hematuria  NEUROLOGICAL: No numbness or weakness  SKIN: No itching, burning, rashes, or lesions   VASCULAR: No bilateral lower extremity edema.   All other review of systems is negative unless indicated above.    VITALS:  T(F): 97.4 (06-27-23 @ 05:00), Max: 98 (06-26-23 @ 11:53)  HR: 81 (06-27-23 @ 09:53)  BP: 103/58 (06-27-23 @ 09:53)  RR: 18 (06-27-23 @ 09:53)  SpO2: 98% (06-27-23 @ 05:00)  Wt(kg): --      PHYSICAL EXAM:  Constitutional: NAD  HEENT: anicteric sclera, oropharynx clear, MMM  Neck: No JVD  Respiratory: CTAB, no wheezes, rales or rhonchi  Cardiovascular: S1, S2, RRR  Gastrointestinal: BS+, soft, NT/ND  Extremities: No cyanosis or clubbing. No peripheral edema  Neurological: A/O x 3, no focal deficits  Psychiatric: Normal mood, normal affect  : No CVA tenderness. No zafar.   Skin: No rashes  Vascular Access:    LABS:  06-27    141  |  112<H>  |  38<H>  ----------------------------<  93  3.9   |  18  |  2.7<H>    Ca    10.3      27 Jun 2023 05:59  Phos  4.6     06-26  Mg     1.7     06-27    TPro  4.2<L>  /  Alb  3.1<L>  /  TBili  0.3  /  DBili      /  AST  9   /  ALT  7   /  AlkPhos  151<H>  06-27                          8.4    4.99  )-----------( 126      ( 27 Jun 2023 05:59 )             25.5       Urine Studies:  Creatinine Trend: 2.7<--, 2.7<--, 2.9<--, 2.9<--, 3.1<--, 3.7<--  Urinalysis Basic - ( 27 Jun 2023 05:59 )    Color:  / Appearance:  / SG:  / pH:   Gluc: 93 mg/dL / Ketone:   / Bili:  / Urobili:    Blood:  / Protein:  / Nitrite:    Leuk Esterase:  / RBC:  / WBC    Sq Epi:  / Non Sq Epi:  / Bacteria:     ASHLEY on CKD  ·in setting of hypercalcemia  ·hypercalcemia 2/2 recurrence of lymphoma (has elevated vit D 1,25) although improved with pamidronate still corrected calcium 11.0  ·dehydration and consequent tachycardia from hypercalcemia improved with ivf, calcitonin, pamidronate  ·followed by [facility]  ·anemia  loose stools    1) continue IVF  2) appreciate hemonc input per outpt cehmo - consider starting now as unable to control hypercalcemia - (? starting steroids)  3) consider GI input for chronic loose stools  4) am albs include iron studies  d/w pt and will d/w medical team

## 2023-06-27 NOTE — DISCHARGE NOTE NURSING/CASE MANAGEMENT/SOCIAL WORK - NURSING SECTION COMPLETE
Called to go over Covid pre-screen questions. Patient's daughter answered the phone and stated that patient did have a fever last week and was inpatient here. Patient's daughter also stated that patient is doing better now since being discharged.   
Noted.  
Patient/Caregiver provided printed discharge information.

## 2023-06-27 NOTE — PROGRESS NOTE ADULT - PROVIDER SPECIALTY LIST ADULT
Nephrology
Hospitalist
Internal Medicine
Nephrology
Hospitalist
Hospitalist
Internal Medicine
Internal Medicine
Nephrology
Nephrology
Internal Medicine
Internal Medicine

## 2023-06-27 NOTE — DISCHARGE NOTE NURSING/CASE MANAGEMENT/SOCIAL WORK - PATIENT PORTAL LINK FT
You can access the FollowMyHealth Patient Portal offered by [facility] by registering at the following website: [email or web address]. By joining [employer]’s FollowMyHealth portal, you will also be able to view your health information using other applications (apps) compatible with our system.

## 2023-06-27 NOTE — PROGRESS NOTE ADULT - ASSESSMENT
67M PMHx marginal zone lymphoma, CKD III, schizoaffective disorder here with shortness of breath, palpitations; found to have hypercalcemia. ASHLEY.    # Hypercalcemia  - suspected due to lymphoma / Granulomatous dz , s/p calcitonin 6/21  - s/p pamidronate 6/22  - PTH appropriately low 12 (6/23), PTH-rP was negative in April;   25Hydroxy D low at 17. 1,25 [facility] high at 101. ace levels were normal in April  - on IVF   - trending Ca >> 10.3 today   - if no plan for inpatient chemo > dc planning on prednisone.     # Acute kidney injury superimposed on CKD.  possible pre-renal  ivf as above >> Cr almost at baseline 2.7 today   renal us no hydro    # Shortness of breath.with palpitations; now resolved  unclear etiology  ekg noted  cxr clear; no hypoxia  no events on tele,  echo: EF 58%, G1DD TSH 1.88    # Non Hodgkin's lymphoma.   planned for resumption of treatment outpt  f/u onc.    # Schizoaffective disorder.   - fluphenazine 10.    # ? Chronic Loose stool >> ON /OFF >  Will check GI PCR, C dIff if recurs.   dvt ppx   gi ppx    Pending: Hem onc > ? inpatient chemo  > if none, dc planning.   Plan d/w pt at bedside.   Dispo: home

## 2023-06-30 DIAGNOSIS — N17.9 ACUTE KIDNEY FAILURE, UNSPECIFIED: ICD-10-CM

## 2023-06-30 DIAGNOSIS — F25.9 SCHIZOAFFECTIVE DISORDER, UNSPECIFIED: ICD-10-CM

## 2023-06-30 DIAGNOSIS — E83.52 HYPERCALCEMIA: ICD-10-CM

## 2023-06-30 DIAGNOSIS — N18.4 CHRONIC KIDNEY DISEASE, STAGE 4 (SEVERE): ICD-10-CM

## 2023-06-30 DIAGNOSIS — C85.90 NON-HODGKIN LYMPHOMA, UNSPECIFIED, UNSPECIFIED SITE: ICD-10-CM

## 2023-06-30 DIAGNOSIS — N26.1 ATROPHY OF KIDNEY (TERMINAL): ICD-10-CM

## 2023-06-30 DIAGNOSIS — D63.1 ANEMIA IN CHRONIC KIDNEY DISEASE: ICD-10-CM

## 2023-06-30 DIAGNOSIS — E86.0 DEHYDRATION: ICD-10-CM

## 2023-06-30 DIAGNOSIS — R00.0 TACHYCARDIA, UNSPECIFIED: ICD-10-CM

## 2023-06-30 DIAGNOSIS — Z88.9 ALLERGY STATUS TO UNSPECIFIED DRUGS, MEDICAMENTS AND BIOLOGICAL SUBSTANCES: ICD-10-CM

## 2023-07-01 LAB — PTH RELATED PROT SERPL-MCNC: <2 PMOL/L — SIGNIFICANT CHANGE UP

## 2023-07-07 ENCOUNTER — OUTPATIENT (OUTPATIENT)
Dept: OUTPATIENT SERVICES | Facility: HOSPITAL | Age: 68
LOS: 1 days | End: 2023-07-07
Payer: MEDICARE

## 2023-07-07 ENCOUNTER — APPOINTMENT (OUTPATIENT)
Dept: HEMATOLOGY ONCOLOGY | Facility: CLINIC | Age: 68
End: 2023-07-07
Payer: MEDICARE

## 2023-07-07 ENCOUNTER — LABORATORY RESULT (OUTPATIENT)
Age: 68
End: 2023-07-07

## 2023-07-07 VITALS
DIASTOLIC BLOOD PRESSURE: 65 MMHG | HEART RATE: 76 BPM | BODY MASS INDEX: 27.09 KG/M2 | TEMPERATURE: 97.2 F | HEIGHT: 72 IN | WEIGHT: 200 LBS | SYSTOLIC BLOOD PRESSURE: 104 MMHG

## 2023-07-07 DIAGNOSIS — Z98.890 OTHER SPECIFIED POSTPROCEDURAL STATES: Chronic | ICD-10-CM

## 2023-07-07 DIAGNOSIS — S42.409A UNSPECIFIED FRACTURE OF LOWER END OF UNSPECIFIED HUMERUS, INITIAL ENCOUNTER FOR CLOSED FRACTURE: Chronic | ICD-10-CM

## 2023-07-07 DIAGNOSIS — C85.80 OTHER SPECIFIED TYPES OF NON-HODGKIN LYMPHOMA, UNSPECIFIED SITE: ICD-10-CM

## 2023-07-07 DIAGNOSIS — Z96.0 PRESENCE OF UROGENITAL IMPLANTS: Chronic | ICD-10-CM

## 2023-07-07 LAB
ALBUMIN SERPL ELPH-MCNC: 4 G/DL
ALP BLD-CCNC: 253 U/L
ALT SERPL-CCNC: 11 U/L
ANION GAP SERPL CALC-SCNC: 14 MMOL/L
AST SERPL-CCNC: 10 U/L
BILIRUB SERPL-MCNC: 0.4 MG/DL
BUN SERPL-MCNC: 39 MG/DL
CALCIUM SERPL-MCNC: 12.7 MG/DL
CHLORIDE SERPL-SCNC: 109 MMOL/L
CO2 SERPL-SCNC: 17 MMOL/L
CREAT SERPL-MCNC: 3.3 MG/DL
EGFR: 20 ML/MIN/1.73M2
GLUCOSE SERPL-MCNC: 99 MG/DL
HCT VFR BLD CALC: 32.4 %
HGB BLD-MCNC: 10.5 G/DL
LDH SERPL-CCNC: 137 U/L
MCHC RBC-ENTMCNC: 28.8 PG
MCHC RBC-ENTMCNC: 32.4 G/DL
MCV RBC AUTO: 88.8 FL
PLATELET # BLD AUTO: 219 K/UL
PMV BLD: 10.3 FL
POTASSIUM SERPL-SCNC: 4.3 MMOL/L
PROT SERPL-MCNC: 5.2 G/DL
RBC # BLD: 3.65 M/UL
RBC # FLD: 16.4 %
SODIUM SERPL-SCNC: 140 MMOL/L
WBC # FLD AUTO: 8.4 K/UL

## 2023-07-07 PROCEDURE — 83519 RIA NONANTIBODY: CPT

## 2023-07-07 PROCEDURE — 83615 LACTATE (LD) (LDH) ENZYME: CPT

## 2023-07-07 PROCEDURE — 99214 OFFICE O/P EST MOD 30 MIN: CPT

## 2023-07-07 PROCEDURE — 82330 ASSAY OF CALCIUM: CPT

## 2023-07-07 PROCEDURE — 80053 COMPREHEN METABOLIC PANEL: CPT

## 2023-07-07 PROCEDURE — 82784 ASSAY IGA/IGD/IGG/IGM EACH: CPT

## 2023-07-07 PROCEDURE — 85027 COMPLETE CBC AUTOMATED: CPT

## 2023-07-08 DIAGNOSIS — C85.80 OTHER SPECIFIED TYPES OF NON-HODGKIN LYMPHOMA, UNSPECIFIED SITE: ICD-10-CM

## 2023-07-10 DIAGNOSIS — K76.89 OTHER SPECIFIED DISEASES OF LIVER: ICD-10-CM

## 2023-07-10 DIAGNOSIS — E83.52 HYPERCALCEMIA: ICD-10-CM

## 2023-07-10 DIAGNOSIS — E27.8 OTHER SPECIFIED DISORDERS OF ADRENAL GLAND: ICD-10-CM

## 2023-07-11 ENCOUNTER — APPOINTMENT (OUTPATIENT)
Dept: MRI IMAGING | Facility: CLINIC | Age: 68
End: 2023-07-11

## 2023-07-17 LAB
CA-I SERPL-SCNC: 6.4 MG/DL
IGG SER QL IEP: 152 MG/DL
PTH RELATED PROT SERPL-MCNC: <2 PMOL/L

## 2023-07-19 ENCOUNTER — OUTPATIENT (OUTPATIENT)
Dept: OUTPATIENT SERVICES | Facility: HOSPITAL | Age: 68
LOS: 1 days | End: 2023-07-19
Payer: MEDICARE

## 2023-07-19 ENCOUNTER — APPOINTMENT (OUTPATIENT)
Dept: HEMATOLOGY ONCOLOGY | Facility: CLINIC | Age: 68
End: 2023-07-19

## 2023-07-19 ENCOUNTER — LABORATORY RESULT (OUTPATIENT)
Age: 68
End: 2023-07-19

## 2023-07-19 DIAGNOSIS — E83.52 HYPERCALCEMIA: ICD-10-CM

## 2023-07-19 DIAGNOSIS — C85.80 OTHER SPECIFIED TYPES OF NON-HODGKIN LYMPHOMA, UNSPECIFIED SITE: ICD-10-CM

## 2023-07-19 DIAGNOSIS — Z98.890 OTHER SPECIFIED POSTPROCEDURAL STATES: Chronic | ICD-10-CM

## 2023-07-19 DIAGNOSIS — S42.409A UNSPECIFIED FRACTURE OF LOWER END OF UNSPECIFIED HUMERUS, INITIAL ENCOUNTER FOR CLOSED FRACTURE: Chronic | ICD-10-CM

## 2023-07-19 DIAGNOSIS — E27.8 OTHER SPECIFIED DISORDERS OF ADRENAL GLAND: ICD-10-CM

## 2023-07-19 DIAGNOSIS — Z96.0 PRESENCE OF UROGENITAL IMPLANTS: Chronic | ICD-10-CM

## 2023-07-19 DIAGNOSIS — K76.89 OTHER SPECIFIED DISEASES OF LIVER: ICD-10-CM

## 2023-07-19 LAB
HCT VFR BLD CALC: 33.3 %
HGB BLD-MCNC: 10.7 G/DL
MCHC RBC-ENTMCNC: 28.5 PG
MCHC RBC-ENTMCNC: 32.1 G/DL
MCV RBC AUTO: 88.6 FL
PLATELET # BLD AUTO: 191 K/UL
PMV BLD: 10.2 FL
RBC # BLD: 3.76 M/UL
RBC # FLD: 15.6 %
WBC # FLD AUTO: 7.87 K/UL

## 2023-07-19 PROCEDURE — 83615 LACTATE (LD) (LDH) ENZYME: CPT

## 2023-07-19 PROCEDURE — 36415 COLL VENOUS BLD VENIPUNCTURE: CPT

## 2023-07-19 PROCEDURE — 80053 COMPREHEN METABOLIC PANEL: CPT

## 2023-07-19 PROCEDURE — 85027 COMPLETE CBC AUTOMATED: CPT

## 2023-07-20 LAB
ALBUMIN SERPL ELPH-MCNC: 3.6 G/DL
ALP BLD-CCNC: 156 U/L
ALT SERPL-CCNC: 11 U/L
ANION GAP SERPL CALC-SCNC: 13 MMOL/L
AST SERPL-CCNC: 8 U/L
BILIRUB SERPL-MCNC: 0.5 MG/DL
BUN SERPL-MCNC: 47 MG/DL
CALCIUM SERPL-MCNC: 13.4 MG/DL
CHLORIDE SERPL-SCNC: 110 MMOL/L
CO2 SERPL-SCNC: 18 MMOL/L
CREAT SERPL-MCNC: 2.8 MG/DL
EGFR: 24 ML/MIN/1.73M2
GLUCOSE SERPL-MCNC: 105 MG/DL
LDH SERPL-CCNC: 116 U/L
POTASSIUM SERPL-SCNC: 4.9 MMOL/L
PROT SERPL-MCNC: 4.8 G/DL
SODIUM SERPL-SCNC: 141 MMOL/L

## 2023-07-21 ENCOUNTER — RESULT REVIEW (OUTPATIENT)
Age: 68
End: 2023-07-21

## 2023-07-21 ENCOUNTER — OUTPATIENT (OUTPATIENT)
Dept: OUTPATIENT SERVICES | Facility: HOSPITAL | Age: 68
LOS: 1 days | End: 2023-07-21
Payer: MEDICARE

## 2023-07-21 ENCOUNTER — APPOINTMENT (OUTPATIENT)
Dept: INFUSION THERAPY | Facility: CLINIC | Age: 68
End: 2023-07-21

## 2023-07-21 DIAGNOSIS — Z98.890 OTHER SPECIFIED POSTPROCEDURAL STATES: Chronic | ICD-10-CM

## 2023-07-21 DIAGNOSIS — C85.80 OTHER SPECIFIED TYPES OF NON-HODGKIN LYMPHOMA, UNSPECIFIED SITE: ICD-10-CM

## 2023-07-21 DIAGNOSIS — E83.52 HYPERCALCEMIA: ICD-10-CM

## 2023-07-21 DIAGNOSIS — S42.409A UNSPECIFIED FRACTURE OF LOWER END OF UNSPECIFIED HUMERUS, INITIAL ENCOUNTER FOR CLOSED FRACTURE: Chronic | ICD-10-CM

## 2023-07-21 DIAGNOSIS — Z96.0 PRESENCE OF UROGENITAL IMPLANTS: Chronic | ICD-10-CM

## 2023-07-21 DIAGNOSIS — K76.89 OTHER SPECIFIED DISEASES OF LIVER: ICD-10-CM

## 2023-07-21 DIAGNOSIS — E27.8 OTHER SPECIFIED DISORDERS OF ADRENAL GLAND: ICD-10-CM

## 2023-07-21 DIAGNOSIS — K76.9 LIVER DISEASE, UNSPECIFIED: ICD-10-CM

## 2023-07-21 DIAGNOSIS — R10.9 UNSPECIFIED ABDOMINAL PAIN: ICD-10-CM

## 2023-07-21 LAB — CA-I SERPL-SCNC: 7.2 MG/DL

## 2023-07-21 PROCEDURE — 96413 CHEMO IV INFUSION 1 HR: CPT

## 2023-07-21 PROCEDURE — 74183 MRI ABD W/O CNTR FLWD CNTR: CPT | Mod: 26

## 2023-07-21 PROCEDURE — 96415 CHEMO IV INFUSION ADDL HR: CPT

## 2023-07-21 PROCEDURE — 74183 MRI ABD W/O CNTR FLWD CNTR: CPT

## 2023-07-21 PROCEDURE — A9579: CPT

## 2023-07-21 RX ORDER — PAMIDRONATE DISODIUM 9 MG/ML
90 INJECTION, SOLUTION INTRAVENOUS ONCE
Refills: 0 | Status: COMPLETED | OUTPATIENT
Start: 2023-07-21 | End: 2023-07-21

## 2023-07-21 RX ADMIN — PAMIDRONATE DISODIUM 90 MILLIGRAM(S): 9 INJECTION, SOLUTION INTRAVENOUS at 12:30

## 2023-07-21 RX ADMIN — PAMIDRONATE DISODIUM 86.67 MILLIGRAM(S): 9 INJECTION, SOLUTION INTRAVENOUS at 09:27

## 2023-07-21 NOTE — REVIEW OF SYSTEMS
[Fatigue] : fatigue [SOB on Exertion] : shortness of breath during exertion [Dizziness] : dizziness [Negative] : Heme/Lymph [Anxiety] : no anxiety [FreeTextEntry2] : Just a little bit [FreeTextEntry6] : "Little" [FreeTextEntry7] : Diarrhea resolved a month ago [de-identified] : Very rarely and mostly with change in position [de-identified] : History of schizophrenia

## 2023-07-21 NOTE — ASSESSMENT
[FreeTextEntry1] : 1. Marginal zone lymphoma, was relapsing but seems to be stable for the last few months based on the most recent CT scan from a few weeks ago.\par 2. Hypercalcemia, treated and controlled with pamidronate. R/O relapse.\par 3. Liver and adrenal nodules.\par \par The situation was discussed with the patient.\par Will repeat the blood work including CBC, CMP, LDH, intact PTH, PTH-RP, ionized calcium. Also will order an MRI of the abdomen for better characterization of the liver and adrenal lesions.\par The patient will require treatment soon given the progression of the lymphadenopathy and especially the hypercalcemia.\par \par All questions answered.\par \par Further recommendations after the above completed.

## 2023-07-21 NOTE — HISTORY OF PRESENT ILLNESS
[Disease:__________________________] : Disease: [unfilled] [de-identified] : The patient is coming for a follow up for his marginal zone lymphoma.\par The patient is still having pain, discomfort at the site of HZ at the right upper back area, around T5-T6 dermatomes. It is rated as 4-5/10. He thinks it's slowly going away.\par Otherwise, he feels well without any other particular complaints. He is denying any fever or night sweats. He has not felt any new LNs. His last radiological study was a PET scan from about 2 months ago and the lymphadenopathy was still present although FDG uptake was lower. In addition there was a 3 cm liver lesion and possibly another smaller one not well defined for which he was recommended to have an MRI for better characterization. \par No new medications. \par Recently he had hypercalcemia for which he was hospitalized about 2-3 weeks ago and was given pamidronate. Since then, he has not had any other blood tests.\par \par

## 2023-07-21 NOTE — PHYSICAL EXAM
[Fully active, able to carry on all pre-disease performance without restriction] : Status 0 - Fully active, able to carry on all pre-disease performance without restriction [Normal] : grossly intact [de-identified] : Eyeglasses noted [de-identified] : Bilateral small, not fixed axillary lymph nodes [de-identified] : See history.

## 2023-07-22 DIAGNOSIS — R10.9 UNSPECIFIED ABDOMINAL PAIN: ICD-10-CM

## 2023-07-27 LAB — PTH RELATED PROT SERPL-MCNC: <2 PMOL/L

## 2023-08-03 ENCOUNTER — OUTPATIENT (OUTPATIENT)
Dept: OUTPATIENT SERVICES | Facility: HOSPITAL | Age: 68
LOS: 1 days | End: 2023-08-03
Payer: MEDICARE

## 2023-08-03 VITALS
RESPIRATION RATE: 16 BRPM | SYSTOLIC BLOOD PRESSURE: 99 MMHG | HEIGHT: 72 IN | DIASTOLIC BLOOD PRESSURE: 52 MMHG | OXYGEN SATURATION: 98 % | TEMPERATURE: 98 F | HEART RATE: 94 BPM | WEIGHT: 195.99 LBS

## 2023-08-03 DIAGNOSIS — S42.409A UNSPECIFIED FRACTURE OF LOWER END OF UNSPECIFIED HUMERUS, INITIAL ENCOUNTER FOR CLOSED FRACTURE: Chronic | ICD-10-CM

## 2023-08-03 DIAGNOSIS — Z96.0 PRESENCE OF UROGENITAL IMPLANTS: Chronic | ICD-10-CM

## 2023-08-03 DIAGNOSIS — Z01.818 ENCOUNTER FOR OTHER PREPROCEDURAL EXAMINATION: ICD-10-CM

## 2023-08-03 DIAGNOSIS — Z98.890 OTHER SPECIFIED POSTPROCEDURAL STATES: Chronic | ICD-10-CM

## 2023-08-03 DIAGNOSIS — K76.9 LIVER DISEASE, UNSPECIFIED: ICD-10-CM

## 2023-08-03 LAB
ALBUMIN SERPL ELPH-MCNC: 3.7 G/DL — SIGNIFICANT CHANGE UP (ref 3.5–5.2)
ALP SERPL-CCNC: 248 U/L — HIGH (ref 30–115)
ALT FLD-CCNC: 15 U/L — SIGNIFICANT CHANGE UP (ref 0–41)
ANION GAP SERPL CALC-SCNC: 12 MMOL/L — SIGNIFICANT CHANGE UP (ref 7–14)
APTT BLD: 30.8 SEC — SIGNIFICANT CHANGE UP (ref 27–39.2)
AST SERPL-CCNC: 14 U/L — SIGNIFICANT CHANGE UP (ref 0–41)
BASOPHILS # BLD AUTO: 0.12 K/UL — SIGNIFICANT CHANGE UP (ref 0–0.2)
BASOPHILS NFR BLD AUTO: 2 % — HIGH (ref 0–1)
BILIRUB SERPL-MCNC: 0.4 MG/DL — SIGNIFICANT CHANGE UP (ref 0.2–1.2)
BUN SERPL-MCNC: 38 MG/DL — HIGH (ref 10–20)
CALCIUM SERPL-MCNC: 13 MG/DL — HIGH (ref 8.4–10.5)
CHLORIDE SERPL-SCNC: 105 MMOL/L — SIGNIFICANT CHANGE UP (ref 98–110)
CO2 SERPL-SCNC: 20 MMOL/L — SIGNIFICANT CHANGE UP (ref 17–32)
CREAT SERPL-MCNC: 3.8 MG/DL — HIGH (ref 0.7–1.5)
EGFR: 17 ML/MIN/1.73M2 — LOW
EOSINOPHIL # BLD AUTO: 1.48 K/UL — HIGH (ref 0–0.7)
EOSINOPHIL NFR BLD AUTO: 25 % — HIGH (ref 0–8)
GLUCOSE SERPL-MCNC: 82 MG/DL — SIGNIFICANT CHANGE UP (ref 70–99)
HCT VFR BLD CALC: 31.7 % — LOW (ref 42–52)
HGB BLD-MCNC: 10.1 G/DL — LOW (ref 14–18)
INR BLD: 0.96 RATIO — SIGNIFICANT CHANGE UP (ref 0.65–1.3)
LYMPHOCYTES # BLD AUTO: 2.19 K/UL — SIGNIFICANT CHANGE UP (ref 1–3.3)
LYMPHOCYTES # BLD AUTO: 37 % — SIGNIFICANT CHANGE UP (ref 13–44)
MANUAL SMEAR VERIFICATION: SIGNIFICANT CHANGE UP
MCHC RBC-ENTMCNC: 29 PG — SIGNIFICANT CHANGE UP (ref 27–31)
MCHC RBC-ENTMCNC: 31.9 G/DL — LOW (ref 32–37)
MCV RBC AUTO: 91.1 FL — SIGNIFICANT CHANGE UP (ref 80–94)
MONOCYTES # BLD AUTO: 1.54 K/UL — HIGH (ref 0.1–0.6)
MONOCYTES NFR BLD AUTO: 26 % — HIGH (ref 1.7–9.3)
NEUTROPHILS # BLD AUTO: 0.59 K/UL — LOW (ref 1.4–6.5)
NEUTROPHILS NFR BLD AUTO: 10 % — LOW (ref 42.2–75.2)
NRBC # BLD: 0 /100 — SIGNIFICANT CHANGE UP (ref 0–0)
NRBC # BLD: SIGNIFICANT CHANGE UP /100 WBCS (ref 0–0)
PLAT MORPH BLD: NORMAL — SIGNIFICANT CHANGE UP
PLATELET # BLD AUTO: 181 K/UL — SIGNIFICANT CHANGE UP (ref 130–400)
PMV BLD: 10 FL — SIGNIFICANT CHANGE UP (ref 7.4–10.4)
POTASSIUM SERPL-MCNC: 4.4 MMOL/L — SIGNIFICANT CHANGE UP (ref 3.5–5)
POTASSIUM SERPL-SCNC: 4.4 MMOL/L — SIGNIFICANT CHANGE UP (ref 3.5–5)
PROT SERPL-MCNC: 4.8 G/DL — LOW (ref 6–8)
PROTHROM AB SERPL-ACNC: 10.9 SEC — SIGNIFICANT CHANGE UP (ref 9.95–12.87)
RBC # BLD: 3.48 M/UL — LOW (ref 4.7–6.1)
RBC # FLD: 15.9 % — HIGH (ref 11.5–14.5)
RBC BLD AUTO: NORMAL — SIGNIFICANT CHANGE UP
SODIUM SERPL-SCNC: 137 MMOL/L — SIGNIFICANT CHANGE UP (ref 135–146)
WBC # BLD: 5.92 K/UL — SIGNIFICANT CHANGE UP (ref 4.8–10.8)
WBC # FLD AUTO: 5.92 K/UL — SIGNIFICANT CHANGE UP (ref 4.8–10.8)

## 2023-08-03 PROCEDURE — 85025 COMPLETE CBC W/AUTO DIFF WBC: CPT

## 2023-08-03 PROCEDURE — 36415 COLL VENOUS BLD VENIPUNCTURE: CPT

## 2023-08-03 PROCEDURE — 85610 PROTHROMBIN TIME: CPT

## 2023-08-03 PROCEDURE — 93005 ELECTROCARDIOGRAM TRACING: CPT

## 2023-08-03 PROCEDURE — 93010 ELECTROCARDIOGRAM REPORT: CPT

## 2023-08-03 PROCEDURE — 80053 COMPREHEN METABOLIC PANEL: CPT

## 2023-08-03 PROCEDURE — 99214 OFFICE O/P EST MOD 30 MIN: CPT | Mod: 25

## 2023-08-03 PROCEDURE — 85730 THROMBOPLASTIN TIME PARTIAL: CPT

## 2023-08-03 RX ORDER — BENZTROPINE MESYLATE 1 MG
1 TABLET ORAL
Qty: 0 | Refills: 0 | DISCHARGE

## 2023-08-03 NOTE — H&P PST ADULT - NSANTHOSAYNRD_GEN_A_CORE
No. JILL screening performed.  STOP BANG Legend: 0-2 = LOW Risk; 3-4 = INTERMEDIATE Risk; 5-8 = HIGH Risk

## 2023-08-03 NOTE — H&P PST ADULT - REASON FOR ADMISSION
Case Type: OP Non-block TimeSuite: Interventional RadiologyProceduralist: Jeancarlos Quiroz Surgery DateTime: 08- - 8:00PAST DateTime: 08- - 9:15Procedure: LIVER MASS BIOPSY  ERP?: UnavailableLaterality: N/ALength of Procedure: 60 Minutes  Anesthesia Type: Local Standby

## 2023-08-03 NOTE — H&P PST ADULT - HISTORY OF PRESENT ILLNESS
67y Male presents today for presurgical testing for LIVER MASS BIOPSY. Patient reports incidental finding of liver mass on PET done in surveillance for past treatment of lymphoma with chemotherapy. He denies any pain or symptoms. Now for scheduled procedure.  Patient denies any CP, palpitations, SOB, cough, or dysuria. No recent URI or UTI.  Stated exercise tolerance is FOS 2-3  JILL screen reviewed    Patient denies any recent personal exposure to COVID19. Denies any sick contacts. Patient denies travel within the past 30 days. Patient was instructed to quarantine until after procedure.    Anesthesia Alert  YES--Difficult Airway - Class IV  NO--History of neck surgery or radiation  NO--Limited ROM of neck  NO--History of Malignant hyperthermia  NO--Personal or family history of Pseudocholinesterase deficiency.  NO--Prior Anesthesia Complication  NO--Latex Allergy  NO--Loose teeth  NO--History of Rheumatoid Arthritis  NO--JILL  NO--Bleeding risk  NO--Other_____    Patient states that this is their complete medical history and list of medications.  Patient verbalized understanding of instructions given during this visit and was given the opportunity to ask questions and have them answered. They were instructed to follow up with their surgeon/surgeon's office with any questions regarding their procedure.

## 2023-08-03 NOTE — H&P PST ADULT - NSICDXPASTMEDICALHX_GEN_ALL_CORE_FT
PAST MEDICAL HISTORY:  Atrophic kidney     H/O colonoscopy with polypectomy     H/O neuropathy     History of bladder surgery     History of chemotherapy     Kidney stones     Liver cyst     Lymphoma     Schizophrenia     Shingles      PAST MEDICAL HISTORY:  Atrophic kidney     H/O colonoscopy with polypectomy     H/O neuropathy     History of bladder surgery     History of chemotherapy     Kidney stones     Liver cyst     Lymphoma     Schizophrenia     Shingles     Stage 3 chronic kidney disease

## 2023-08-04 DIAGNOSIS — Z01.818 ENCOUNTER FOR OTHER PREPROCEDURAL EXAMINATION: ICD-10-CM

## 2023-08-04 DIAGNOSIS — K76.9 LIVER DISEASE, UNSPECIFIED: ICD-10-CM

## 2023-08-09 ENCOUNTER — RESULT REVIEW (OUTPATIENT)
Age: 68
End: 2023-08-09

## 2023-08-09 ENCOUNTER — OUTPATIENT (OUTPATIENT)
Dept: OUTPATIENT SERVICES | Facility: HOSPITAL | Age: 68
LOS: 1 days | Discharge: HOME | End: 2023-08-09
Payer: MEDICARE

## 2023-08-09 ENCOUNTER — TRANSCRIPTION ENCOUNTER (OUTPATIENT)
Age: 68
End: 2023-08-09

## 2023-08-09 VITALS
HEART RATE: 88 BPM | RESPIRATION RATE: 20 BRPM | OXYGEN SATURATION: 99 % | SYSTOLIC BLOOD PRESSURE: 107 MMHG | DIASTOLIC BLOOD PRESSURE: 55 MMHG

## 2023-08-09 VITALS
OXYGEN SATURATION: 98 % | SYSTOLIC BLOOD PRESSURE: 94 MMHG | WEIGHT: 195.99 LBS | HEIGHT: 72 IN | RESPIRATION RATE: 18 BRPM | HEART RATE: 85 BPM | TEMPERATURE: 98 F | DIASTOLIC BLOOD PRESSURE: 51 MMHG

## 2023-08-09 DIAGNOSIS — K76.9 LIVER DISEASE, UNSPECIFIED: ICD-10-CM

## 2023-08-09 DIAGNOSIS — S42.409A UNSPECIFIED FRACTURE OF LOWER END OF UNSPECIFIED HUMERUS, INITIAL ENCOUNTER FOR CLOSED FRACTURE: Chronic | ICD-10-CM

## 2023-08-09 DIAGNOSIS — Z98.890 OTHER SPECIFIED POSTPROCEDURAL STATES: Chronic | ICD-10-CM

## 2023-08-09 DIAGNOSIS — Z96.0 PRESENCE OF UROGENITAL IMPLANTS: Chronic | ICD-10-CM

## 2023-08-09 PROCEDURE — 87205 SMEAR GRAM STAIN: CPT

## 2023-08-09 PROCEDURE — 88360 TUMOR IMMUNOHISTOCHEM/MANUAL: CPT | Mod: 26

## 2023-08-09 PROCEDURE — 77012 CT SCAN FOR NEEDLE BIOPSY: CPT | Mod: 26

## 2023-08-09 PROCEDURE — 88333 PATH CONSLTJ SURG CYTO XM 1: CPT

## 2023-08-09 PROCEDURE — 88104 CYTOPATH FL NONGYN SMEARS: CPT | Mod: 26

## 2023-08-09 PROCEDURE — 88360 TUMOR IMMUNOHISTOCHEM/MANUAL: CPT

## 2023-08-09 PROCEDURE — 88341 IMHCHEM/IMCYTCHM EA ADD ANTB: CPT

## 2023-08-09 PROCEDURE — 47000 NEEDLE BIOPSY OF LIVER PERQ: CPT

## 2023-08-09 PROCEDURE — 88307 TISSUE EXAM BY PATHOLOGIST: CPT | Mod: 26

## 2023-08-09 PROCEDURE — 88307 TISSUE EXAM BY PATHOLOGIST: CPT

## 2023-08-09 PROCEDURE — 88104 CYTOPATH FL NONGYN SMEARS: CPT

## 2023-08-09 PROCEDURE — 88189 FLOWCYTOMETRY/READ 16 & >: CPT

## 2023-08-09 PROCEDURE — 99152 MOD SED SAME PHYS/QHP 5/>YRS: CPT

## 2023-08-09 PROCEDURE — 88341 IMHCHEM/IMCYTCHM EA ADD ANTB: CPT | Mod: 26,59

## 2023-08-09 PROCEDURE — 88184 FLOWCYTOMETRY/ TC 1 MARKER: CPT

## 2023-08-09 PROCEDURE — 88342 IMHCHEM/IMCYTCHM 1ST ANTB: CPT | Mod: 26,59

## 2023-08-09 PROCEDURE — 88342 IMHCHEM/IMCYTCHM 1ST ANTB: CPT

## 2023-08-09 PROCEDURE — 88185 FLOWCYTOMETRY/TC ADD-ON: CPT

## 2023-08-09 PROCEDURE — 88333 PATH CONSLTJ SURG CYTO XM 1: CPT | Mod: 26

## 2023-08-09 PROCEDURE — 99153 MOD SED SAME PHYS/QHP EA: CPT

## 2023-08-09 PROCEDURE — 77012 CT SCAN FOR NEEDLE BIOPSY: CPT

## 2023-08-09 NOTE — ASU PATIENT PROFILE, ADULT - FALL HARM RISK - HARM RISK INTERVENTIONS

## 2023-08-09 NOTE — ASU PATIENT PROFILE, ADULT - NSICDXPASTMEDICALHX_GEN_ALL_CORE_FT
PAST MEDICAL HISTORY:  Atrophic kidney     H/O colonoscopy with polypectomy     H/O neuropathy     History of bladder surgery     History of chemotherapy     Kidney stones     Liver cyst     Lymphoma     Schizophrenia     Shingles     Stage 3 chronic kidney disease

## 2023-08-09 NOTE — ASU DISCHARGE PLAN (ADULT/PEDIATRIC) - CALL YOUR DOCTOR IF YOU HAVE ANY OF THE FOLLOWING:
Bleeding that does not stop/Swelling that gets worse/Pain not relieved by Medications/Fever greater than (need to indicate Fahrenheit or Celsius) negative

## 2023-08-09 NOTE — PROGRESS NOTE ADULT - SUBJECTIVE AND OBJECTIVE BOX
Interventional Radiology Outpatient Documentation    PREOPERATIVE DAY OF PROCEDURE EVALUATION:     I have personally seen and examined this patient. I agree with the history and physical from 8/3/23 performed at presurgical testing, which I have reviewed and noted any changes below:     Plan is for image-guided liver mass biopsy with sedation. (Signed electronically) 08-09-23 @ 11:14     Procedure/ risks/ benefits/ goals/ alternatives were explained. All questions answered. Informed content obtained from patient. Consent placed in chart.           
POSTOPERATIVE PROCEDURAL EVALUATION:     Procedure:  CT/US-guided biopsy of left liver mass    Pre-Op Diagnosis:  history of lymphoma with a new liver mass    Post-Op Diagnosis: same    Attending: Power  Resident: None    Anesthesia (type):  [ ] General Anesthesia  [ x] Sedation  [ ] Spinal Anesthesia  [ x] Local/Regional    Contrast: None    Estimated Blood Loss: Minimal, < 5 cc    Condition:   [ ] Critical  [ ] Serious  [ ] Fair   [ x] Good    Findings/Follow up Plan of Care:  Successful biopsy of left liver mass.  Multiple cores reviewed on site by pathology and deemed adequate for diagnosis.  Gelfoam embolization of the biopsy tract was performed.      See full report in pacs    Complications: None    Disposition: Recovery

## 2023-08-10 LAB — TM INTERPRETATION: SIGNIFICANT CHANGE UP

## 2023-08-15 LAB
NON-GYNECOLOGICAL CYTOLOGY STUDY: SIGNIFICANT CHANGE UP
SURGICAL PATHOLOGY STUDY: SIGNIFICANT CHANGE UP

## 2023-08-16 DIAGNOSIS — C85.90 NON-HODGKIN LYMPHOMA, UNSPECIFIED, UNSPECIFIED SITE: ICD-10-CM

## 2023-08-16 DIAGNOSIS — R16.0 HEPATOMEGALY, NOT ELSEWHERE CLASSIFIED: ICD-10-CM

## 2023-08-22 PROBLEM — K76.89 OTHER SPECIFIED DISEASES OF LIVER: Chronic | Status: ACTIVE | Noted: 2023-08-03

## 2023-08-22 PROBLEM — Z98.890 OTHER SPECIFIED POSTPROCEDURAL STATES: Chronic | Status: ACTIVE | Noted: 2023-08-03

## 2023-08-22 PROBLEM — Z86.69 PERSONAL HISTORY OF OTHER DISEASES OF THE NERVOUS SYSTEM AND SENSE ORGANS: Chronic | Status: ACTIVE | Noted: 2023-08-03

## 2023-08-22 PROBLEM — N18.30 CHRONIC KIDNEY DISEASE, STAGE 3 UNSPECIFIED: Chronic | Status: ACTIVE | Noted: 2023-08-03

## 2023-08-22 PROBLEM — Z92.21 PERSONAL HISTORY OF ANTINEOPLASTIC CHEMOTHERAPY: Chronic | Status: ACTIVE | Noted: 2023-08-03

## 2023-08-24 ENCOUNTER — OUTPATIENT (OUTPATIENT)
Dept: OUTPATIENT SERVICES | Facility: HOSPITAL | Age: 68
LOS: 1 days | End: 2023-08-24
Payer: MEDICARE

## 2023-08-24 ENCOUNTER — APPOINTMENT (OUTPATIENT)
Dept: HEMATOLOGY ONCOLOGY | Facility: CLINIC | Age: 68
End: 2023-08-24
Payer: MEDICARE

## 2023-08-24 ENCOUNTER — LABORATORY RESULT (OUTPATIENT)
Age: 68
End: 2023-08-24

## 2023-08-24 ENCOUNTER — INPATIENT (INPATIENT)
Facility: HOSPITAL | Age: 68
LOS: 31 days | Discharge: HOME | DRG: 628 | End: 2023-09-25
Attending: INTERNAL MEDICINE | Admitting: INTERNAL MEDICINE
Payer: MEDICARE

## 2023-08-24 VITALS
WEIGHT: 190.04 LBS | SYSTOLIC BLOOD PRESSURE: 101 MMHG | HEART RATE: 89 BPM | HEIGHT: 72 IN | DIASTOLIC BLOOD PRESSURE: 52 MMHG | RESPIRATION RATE: 18 BRPM | TEMPERATURE: 98 F | OXYGEN SATURATION: 98 %

## 2023-08-24 DIAGNOSIS — C85.80 OTHER SPECIFIED TYPES OF NON-HODGKIN LYMPHOMA, UNSPECIFIED SITE: ICD-10-CM

## 2023-08-24 DIAGNOSIS — Z96.0 PRESENCE OF UROGENITAL IMPLANTS: Chronic | ICD-10-CM

## 2023-08-24 DIAGNOSIS — Z98.890 OTHER SPECIFIED POSTPROCEDURAL STATES: Chronic | ICD-10-CM

## 2023-08-24 DIAGNOSIS — K76.89 OTHER SPECIFIED DISEASES OF LIVER: ICD-10-CM

## 2023-08-24 DIAGNOSIS — S42.409A UNSPECIFIED FRACTURE OF LOWER END OF UNSPECIFIED HUMERUS, INITIAL ENCOUNTER FOR CLOSED FRACTURE: Chronic | ICD-10-CM

## 2023-08-24 DIAGNOSIS — E83.52 HYPERCALCEMIA: ICD-10-CM

## 2023-08-24 DIAGNOSIS — E27.8 OTHER SPECIFIED DISORDERS OF ADRENAL GLAND: ICD-10-CM

## 2023-08-24 LAB
ALBUMIN SERPL ELPH-MCNC: 3.3 G/DL — LOW (ref 3.5–5.2)
ALBUMIN SERPL ELPH-MCNC: 3.5 G/DL
ALP BLD-CCNC: 395 U/L
ALP SERPL-CCNC: 355 U/L — HIGH (ref 30–115)
ALT FLD-CCNC: 22 U/L — SIGNIFICANT CHANGE UP (ref 0–41)
ALT SERPL-CCNC: 26 U/L
ANION GAP SERPL CALC-SCNC: 12 MMOL/L
ANION GAP SERPL CALC-SCNC: 12 MMOL/L — SIGNIFICANT CHANGE UP (ref 7–14)
AST SERPL-CCNC: 16 U/L — SIGNIFICANT CHANGE UP (ref 0–41)
AST SERPL-CCNC: 18 U/L
BASE EXCESS BLDV CALC-SCNC: -4.4 MMOL/L — LOW (ref -2–3)
BASOPHILS # BLD AUTO: 0.04 K/UL — SIGNIFICANT CHANGE UP (ref 0–0.2)
BASOPHILS NFR BLD AUTO: 0.8 % — SIGNIFICANT CHANGE UP (ref 0–1)
BILIRUB SERPL-MCNC: 0.5 MG/DL
BILIRUB SERPL-MCNC: 0.5 MG/DL — SIGNIFICANT CHANGE UP (ref 0.2–1.2)
BUN SERPL-MCNC: 53 MG/DL
BUN SERPL-MCNC: 54 MG/DL — HIGH (ref 10–20)
CA-I SERPL-SCNC: 1.88 MMOL/L — CRITICAL HIGH (ref 1.15–1.33)
CALCIUM SERPL-MCNC: 13.3 MG/DL — HIGH (ref 8.4–10.4)
CALCIUM SERPL-MCNC: 13.4 MG/DL
CHLORIDE SERPL-SCNC: 104 MMOL/L
CHLORIDE SERPL-SCNC: 107 MMOL/L — SIGNIFICANT CHANGE UP (ref 98–110)
CO2 SERPL-SCNC: 19 MMOL/L
CO2 SERPL-SCNC: 19 MMOL/L — SIGNIFICANT CHANGE UP (ref 17–32)
CREAT SERPL-MCNC: 3.9 MG/DL
CREAT SERPL-MCNC: 4.1 MG/DL — CRITICAL HIGH (ref 0.7–1.5)
EGFR: 15 ML/MIN/1.73M2 — LOW
EGFR: 16 ML/MIN/1.73M2
EOSINOPHIL # BLD AUTO: 1.32 K/UL — HIGH (ref 0–0.7)
EOSINOPHIL NFR BLD AUTO: 25.8 % — HIGH (ref 0–8)
GAS PNL BLDV: 134 MMOL/L — LOW (ref 136–145)
GAS PNL BLDV: SIGNIFICANT CHANGE UP
GAS PNL BLDV: SIGNIFICANT CHANGE UP
GLUCOSE SERPL-MCNC: 93 MG/DL — SIGNIFICANT CHANGE UP (ref 70–99)
GLUCOSE SERPL-MCNC: 96 MG/DL
HCO3 BLDV-SCNC: 21 MMOL/L — LOW (ref 22–29)
HCT VFR BLD CALC: 26.9 % — LOW (ref 42–52)
HCT VFR BLD CALC: 29.2 %
HCT VFR BLDA CALC: 39 % — SIGNIFICANT CHANGE UP (ref 39–51)
HGB BLD CALC-MCNC: 13 G/DL — SIGNIFICANT CHANGE UP (ref 12.6–17.4)
HGB BLD-MCNC: 8.4 G/DL — LOW (ref 14–18)
HGB BLD-MCNC: 9.2 G/DL
IMM GRANULOCYTES NFR BLD AUTO: 0.4 % — HIGH (ref 0.1–0.3)
LACTATE BLDV-MCNC: 2 MMOL/L — SIGNIFICANT CHANGE UP (ref 0.5–2)
LACTATE SERPL-SCNC: 2.1 MMOL/L — HIGH (ref 0.7–2)
LYMPHOCYTES # BLD AUTO: 1.74 K/UL — SIGNIFICANT CHANGE UP (ref 1.2–3.4)
LYMPHOCYTES # BLD AUTO: 34.1 % — SIGNIFICANT CHANGE UP (ref 20.5–51.1)
MAGNESIUM SERPL-MCNC: 2.8 MG/DL — HIGH (ref 1.8–2.4)
MCHC RBC-ENTMCNC: 28.2 PG
MCHC RBC-ENTMCNC: 28.8 PG — SIGNIFICANT CHANGE UP (ref 27–31)
MCHC RBC-ENTMCNC: 31.2 G/DL — LOW (ref 32–37)
MCHC RBC-ENTMCNC: 31.5 G/DL
MCV RBC AUTO: 89.6 FL
MCV RBC AUTO: 92.1 FL — SIGNIFICANT CHANGE UP (ref 80–94)
MONOCYTES # BLD AUTO: 1.32 K/UL — HIGH (ref 0.1–0.6)
MONOCYTES NFR BLD AUTO: 25.8 % — HIGH (ref 1.7–9.3)
NEUTROPHILS # BLD AUTO: 0.67 K/UL — LOW (ref 1.4–6.5)
NEUTROPHILS NFR BLD AUTO: 13.1 % — LOW (ref 42.2–75.2)
NRBC # BLD: 0 /100 WBCS — SIGNIFICANT CHANGE UP (ref 0–0)
PCO2 BLDV: 39 MMHG — LOW (ref 42–55)
PH BLDV: 7.34 — SIGNIFICANT CHANGE UP (ref 7.32–7.43)
PHOSPHATE SERPL-MCNC: 5.2 MG/DL — HIGH (ref 2.1–4.9)
PLATELET # BLD AUTO: 160 K/UL — SIGNIFICANT CHANGE UP (ref 130–400)
PLATELET # BLD AUTO: 188 K/UL
PMV BLD: 10.7 FL
PMV BLD: 10.9 FL — HIGH (ref 7.4–10.4)
PO2 BLDV: 48 MMHG — SIGNIFICANT CHANGE UP
POTASSIUM BLDV-SCNC: 3.8 MMOL/L — SIGNIFICANT CHANGE UP (ref 3.5–5.1)
POTASSIUM SERPL-MCNC: 4.2 MMOL/L — SIGNIFICANT CHANGE UP (ref 3.5–5)
POTASSIUM SERPL-SCNC: 4.2 MMOL/L
POTASSIUM SERPL-SCNC: 4.2 MMOL/L — SIGNIFICANT CHANGE UP (ref 3.5–5)
PROT SERPL-MCNC: 4.5 G/DL — LOW (ref 6–8)
PROT SERPL-MCNC: 4.8 G/DL
RBC # BLD: 2.92 M/UL — LOW (ref 4.7–6.1)
RBC # BLD: 3.26 M/UL
RBC # FLD: 15.9 %
RBC # FLD: 16 % — HIGH (ref 11.5–14.5)
SAO2 % BLDV: 75.7 % — SIGNIFICANT CHANGE UP
SODIUM SERPL-SCNC: 135 MMOL/L
SODIUM SERPL-SCNC: 138 MMOL/L — SIGNIFICANT CHANGE UP (ref 135–146)
WBC # BLD: 5.11 K/UL — SIGNIFICANT CHANGE UP (ref 4.8–10.8)
WBC # FLD AUTO: 5.11 K/UL — SIGNIFICANT CHANGE UP (ref 4.8–10.8)
WBC # FLD AUTO: 5.3 K/UL

## 2023-08-24 PROCEDURE — 87493 C DIFF AMPLIFIED PROBE: CPT

## 2023-08-24 PROCEDURE — 88365 INSITU HYBRIDIZATION (FISH): CPT

## 2023-08-24 PROCEDURE — 80074 ACUTE HEPATITIS PANEL: CPT

## 2023-08-24 PROCEDURE — 88360 TUMOR IMMUNOHISTOCHEM/MANUAL: CPT

## 2023-08-24 PROCEDURE — P9040: CPT

## 2023-08-24 PROCEDURE — 93306 TTE W/DOPPLER COMPLETE: CPT

## 2023-08-24 PROCEDURE — A9572: CPT

## 2023-08-24 PROCEDURE — 88237 TISSUE CULTURE BONE MARROW: CPT

## 2023-08-24 PROCEDURE — 86316 IMMUNOASSAY TUMOR OTHER: CPT

## 2023-08-24 PROCEDURE — 87046 STOOL CULTR AEROBIC BACT EA: CPT

## 2023-08-24 PROCEDURE — 92610 EVALUATE SWALLOWING FUNCTION: CPT | Mod: GN

## 2023-08-24 PROCEDURE — 87507 IADNA-DNA/RNA PROBE TQ 12-25: CPT

## 2023-08-24 PROCEDURE — 83497 ASSAY OF 5-HIAA: CPT

## 2023-08-24 PROCEDURE — 82306 VITAMIN D 25 HYDROXY: CPT

## 2023-08-24 PROCEDURE — 88185 FLOWCYTOMETRY/TC ADD-ON: CPT

## 2023-08-24 PROCEDURE — 88312 SPECIAL STAINS GROUP 1: CPT

## 2023-08-24 PROCEDURE — 80053 COMPREHEN METABOLIC PANEL: CPT

## 2023-08-24 PROCEDURE — 86901 BLOOD TYPING SEROLOGIC RH(D): CPT

## 2023-08-24 PROCEDURE — 87070 CULTURE OTHR SPECIMN AEROBIC: CPT

## 2023-08-24 PROCEDURE — 83615 LACTATE (LD) (LDH) ENZYME: CPT

## 2023-08-24 PROCEDURE — 78801 RP LOCLZJ TUM 2+AREA 1+D IMG: CPT

## 2023-08-24 PROCEDURE — 85025 COMPLETE CBC W/AUTO DIFF WBC: CPT

## 2023-08-24 PROCEDURE — 88280 CHROMOSOME KARYOTYPE STUDY: CPT

## 2023-08-24 PROCEDURE — 88341 IMHCHEM/IMCYTCHM EA ADD ANTB: CPT

## 2023-08-24 PROCEDURE — 85027 COMPLETE CBC AUTOMATED: CPT

## 2023-08-24 PROCEDURE — 83519 RIA NONANTIBODY: CPT

## 2023-08-24 PROCEDURE — 83631 LACTOFERRIN FECAL (QUANT): CPT

## 2023-08-24 PROCEDURE — 88305 TISSUE EXAM BY PATHOLOGIST: CPT

## 2023-08-24 PROCEDURE — 36415 COLL VENOUS BLD VENIPUNCTURE: CPT

## 2023-08-24 PROCEDURE — 88342 IMHCHEM/IMCYTCHM 1ST ANTB: CPT

## 2023-08-24 PROCEDURE — 99214 OFFICE O/P EST MOD 30 MIN: CPT

## 2023-08-24 PROCEDURE — 36430 TRANSFUSION BLD/BLD COMPNT: CPT

## 2023-08-24 PROCEDURE — 87205 SMEAR GRAM STAIN: CPT

## 2023-08-24 PROCEDURE — C9399: CPT

## 2023-08-24 PROCEDURE — 87116 MYCOBACTERIA CULTURE: CPT

## 2023-08-24 PROCEDURE — 82533 TOTAL CORTISOL: CPT

## 2023-08-24 PROCEDURE — 99285 EMERGENCY DEPT VISIT HI MDM: CPT

## 2023-08-24 PROCEDURE — 83993 ASSAY FOR CALPROTECTIN FECAL: CPT

## 2023-08-24 PROCEDURE — 87015 SPECIMEN INFECT AGNT CONCNTJ: CPT

## 2023-08-24 PROCEDURE — 80048 BASIC METABOLIC PNL TOTAL CA: CPT

## 2023-08-24 PROCEDURE — 88275 CYTOGENETICS 100-300: CPT

## 2023-08-24 PROCEDURE — 88271 CYTOGENETICS DNA PROBE: CPT

## 2023-08-24 PROCEDURE — 78803 RP LOCLZJ TUM SPECT 1 AREA: CPT

## 2023-08-24 PROCEDURE — 82653 EL-1 FECAL QUANTITATIVE: CPT

## 2023-08-24 PROCEDURE — P9016: CPT

## 2023-08-24 PROCEDURE — 82652 VIT D 1 25-DIHYDROXY: CPT

## 2023-08-24 PROCEDURE — 86850 RBC ANTIBODY SCREEN: CPT

## 2023-08-24 PROCEDURE — 88184 FLOWCYTOMETRY/ TC 1 MARKER: CPT

## 2023-08-24 PROCEDURE — 85610 PROTHROMBIN TIME: CPT

## 2023-08-24 PROCEDURE — 84100 ASSAY OF PHOSPHORUS: CPT

## 2023-08-24 PROCEDURE — 88173 CYTOPATH EVAL FNA REPORT: CPT

## 2023-08-24 PROCEDURE — 86900 BLOOD TYPING SEROLOGIC ABO: CPT

## 2023-08-24 PROCEDURE — 84586 ASSAY OF VIP: CPT

## 2023-08-24 PROCEDURE — 86923 COMPATIBILITY TEST ELECTRIC: CPT

## 2023-08-24 PROCEDURE — 87102 FUNGUS ISOLATION CULTURE: CPT

## 2023-08-24 PROCEDURE — 82943 ASSAY OF GLUCAGON: CPT

## 2023-08-24 PROCEDURE — 71250 CT THORAX DX C-: CPT

## 2023-08-24 PROCEDURE — 84550 ASSAY OF BLOOD/URIC ACID: CPT

## 2023-08-24 PROCEDURE — 88264 CHROMOSOME ANALYSIS 20-25: CPT

## 2023-08-24 PROCEDURE — 83735 ASSAY OF MAGNESIUM: CPT

## 2023-08-24 PROCEDURE — 83970 ASSAY OF PARATHORMONE: CPT

## 2023-08-24 PROCEDURE — 88285 CHROMOSOME COUNT ADDITIONAL: CPT

## 2023-08-24 PROCEDURE — 87206 SMEAR FLUORESCENT/ACID STAI: CPT

## 2023-08-24 PROCEDURE — 88112 CYTOPATH CELL ENHANCE TECH: CPT

## 2023-08-24 PROCEDURE — 88364 INSITU HYBRIDIZATION (FISH): CPT

## 2023-08-24 PROCEDURE — 82310 ASSAY OF CALCIUM: CPT

## 2023-08-24 PROCEDURE — 87077 CULTURE AEROBIC IDENTIFY: CPT

## 2023-08-24 PROCEDURE — 87045 FECES CULTURE AEROBIC BACT: CPT

## 2023-08-24 RX ORDER — SODIUM CHLORIDE 9 MG/ML
1000 INJECTION INTRAMUSCULAR; INTRAVENOUS; SUBCUTANEOUS ONCE
Refills: 0 | Status: COMPLETED | OUTPATIENT
Start: 2023-08-24 | End: 2023-08-24

## 2023-08-24 RX ORDER — CALCITONIN SALMON 200 [IU]/ML
340 INJECTION, SOLUTION INTRAMUSCULAR ONCE
Refills: 0 | Status: COMPLETED | OUTPATIENT
Start: 2023-08-24 | End: 2023-08-24

## 2023-08-24 RX ADMIN — SODIUM CHLORIDE 1000 MILLILITER(S): 9 INJECTION INTRAMUSCULAR; INTRAVENOUS; SUBCUTANEOUS at 20:30

## 2023-08-24 NOTE — ED PROVIDER NOTE - PHYSICAL EXAMINATION
Physical Exam    Constitutional: No acute distress.   Eyes: Conjunctiva pink, Sclera clear, PERRLA, EOMI.  ENT: No sinus tenderness. No nasal discharge. No oropharyngeal erythema, edema, or exudates. Uvula midline.   Cardiovascular: Regular rate, regular rhythm. No noted murmurs rubs or gallops.  Respiratory: unlabored respiratory effort, clear to auscultation bilaterally no wheezing, rales or rhonchi  Gastrointestinal: Normal bowel sounds. soft, non distended, non-tender abdomen.   Musculoskeletal: supple neck, no midline tenderness.  Integumentary: warm, dry, no rash  Neurologic: awake, alert, oriented x 3. cranial nerves II-XII grossly intact, extremities’ motor and sensory functions grossly intact
fair plus

## 2023-08-24 NOTE — ED ADULT NURSE NOTE - OBJECTIVE STATEMENT
67 year old male, presenting to ED c/o abnormal lab results. Pt sent by outpatient MD due to hypercalcemia. Pt denies n/v/d/fevers/chills, chest pain, or SOB. Pt A&Ox4.

## 2023-08-24 NOTE — HISTORY OF PRESENT ILLNESS
[de-identified] : The patient is coming for a follow up to discuss his recent evaluation. He was recently admitted to the hospital again with hypercalcemia with low parathormone and low TSH levels. PTH-RP was undetectable (2.0). The situation was discussed at length with the patient and his father. We went over the results of his recent MR of the abdomen which showed 2 lesions in segment II of the liver. The CT of the abdomen, pelvis and chest had shown previously mediastinal and intraabdominal adenopathy most likely of lymphomatous etiology although  he had gone into remission from his marginal zone lymphoma about 2 years ago after only 3 cycles of chemotherapy because of inability to continue. We then discussed the results of the liver lesion biopsy results when had shown a tumor of neuroendocrine origin. A primary was not obvious. The patient had colonoscopy some time ago with no suspicious findings.  We reviewed the situation. My conclusion was that we may be dealing with 2 different etiologies including the lymphoma and the newly diagnosed neuroendocrine liver tumor which was not seen on previous CT scans from 2 years ago. The etiology of the mediastinal adenopathy was not clear. The patient is having some difficulty swallowing and cough every once in a while, most likely from the mediastinal adenopathy. After a conversation of almost 35 minutes, I recommended repeating the blood work including CBC, CMP, LDH, chromogranin, ionized calcium, Vitamin D 1-25, and 25 -OH, repeat intact parathyroid hormone level. Given the episodes of hypercalcemia, the patient requires to start on therapy most likely for the newer lesion since he did not have any significant hypercalcemia with the lymphoma.  Will decide with final treatment modality after the above test results are back.  All questions answered.

## 2023-08-24 NOTE — ED PROVIDER NOTE - OBJECTIVE STATEMENT
67 year old male with a history of Schizophrenia, non Hodgkin's lymphoma on surveillance, colon adenocarcinoma REBECCA, newly diagnosed NET of the Liver, CKDIII and recurrent hypercalcemia presents to the ED with elevated calcium. patient was sent to the ED after labs revealed that Calcium 13.6. Patient admits to generalized fatigue, malaise and occasional joint pains. Otherwise denies fever, chills, chest pain, shortness of breath, abdominal pain, nausea, vomiting, diarrhea, constipation, dysuria, hematuria, lower extremity swelling, rash.

## 2023-08-24 NOTE — REASON FOR VISIT
[Follow-Up Visit] : a follow-up visit for [Lymphoma] : lymphoma [FreeTextEntry2] : Neuroendocrine tumor

## 2023-08-24 NOTE — ED PROVIDER NOTE - CLINICAL SUMMARY MEDICAL DECISION MAKING FREE TEXT BOX
67 year old male with a history of Schizophrenia, non Hodgkin's lymphoma on surveillance, colon adenocarcinoma REBECCA, newly diagnosed NET of the Liver, CKDIII and recurrent hypercalcemia presents to the ED with elevated calcium. Patient's labs ordered, noted to have labs significant for calcium of 13.3, ASHLEY on CKD.  Patient given IV fluids, given calcitonin.  EKG with no EKG changes secondary to hypercalcemia.  Admitted for further management.

## 2023-08-24 NOTE — ED ADULT TRIAGE NOTE - CHIEF COMPLAINT QUOTE
Pt was sent in from MD for hypercalcemia from blood work this morning. No chest pain SOB, body aches. States " I feel weird "

## 2023-08-24 NOTE — ED ADULT TRIAGE NOTE - HEART RATE (BEATS/MIN)
Reviewed below information with patient:     Notified of negative Covid test result    Informed that once they get their COVID test :   • Do NOT travel out of home area   • Self -quarantine is recommended to minimizes your risk of exposure before your procedure, if you are unable to self-quarantine, please continue to wear a mask, practice social distancing and perform good hand hygiene.  • Immediately report any new symptoms or suspected/known exposures to COVID-19 cases  to your surgeon’s office  o fever of 100.4 or more  o new cough, or cough that gets worse  o difficulty breathing  o sore throat  o stomach problems: nausea, vomiting or diarrhea  o loss of taste or smell  o chills and/or repeated shaking with chills  o muscle pain  o headache  • Maintain physical distancing (at least 6 feet) at all times both at home and away from home  • Wash hands frequently and thoroughly with soap and water (lather at least 20 seconds) or disinfect with alcohol-based hand  before eating.  This should also be done by the person who will be bringing you to and from the procedure.  • Only one visitor allowed into building. They must remain in designated area assigned by the nursing staff and can not eat in the facility.  The visitor may have a drink if it is covered with a lid or cap.  Please do not drink while care team members are in the room.  • It will be an expectation for the patient/support person to wear a mask at all times during their stay.               89

## 2023-08-24 NOTE — ED ADULT NURSE NOTE - NSFALLHARMRISKINTERV_ED_ALL_ED

## 2023-08-25 ENCOUNTER — APPOINTMENT (OUTPATIENT)
Dept: INFUSION THERAPY | Facility: CLINIC | Age: 68
End: 2023-08-25

## 2023-08-25 LAB
24R-OH-CALCIDIOL SERPL-MCNC: 171 PG/ML
25(OH)D3 SERPL-MCNC: 21 NG/ML
ANION GAP SERPL CALC-SCNC: 11 MMOL/L — SIGNIFICANT CHANGE UP (ref 7–14)
ANION GAP SERPL CALC-SCNC: 11 MMOL/L — SIGNIFICANT CHANGE UP (ref 7–14)
BASOPHILS # BLD AUTO: 0.03 K/UL — SIGNIFICANT CHANGE UP (ref 0–0.2)
BASOPHILS NFR BLD AUTO: 0.7 % — SIGNIFICANT CHANGE UP (ref 0–1)
BLD GP AB SCN SERPL QL: SIGNIFICANT CHANGE UP
BUN SERPL-MCNC: 47 MG/DL — HIGH (ref 10–20)
BUN SERPL-MCNC: 49 MG/DL — HIGH (ref 10–20)
CALCIUM SERPL-MCNC: 11.8 MG/DL — HIGH (ref 8.4–10.5)
CALCIUM SERPL-MCNC: 11.9 MG/DL — HIGH (ref 8.4–10.4)
CALCIUM SERPL-MCNC: 13.6 MG/DL
CHLORIDE SERPL-SCNC: 109 MMOL/L — SIGNIFICANT CHANGE UP (ref 98–110)
CHLORIDE SERPL-SCNC: 109 MMOL/L — SIGNIFICANT CHANGE UP (ref 98–110)
CO2 SERPL-SCNC: 18 MMOL/L — SIGNIFICANT CHANGE UP (ref 17–32)
CO2 SERPL-SCNC: 19 MMOL/L — SIGNIFICANT CHANGE UP (ref 17–32)
CREAT SERPL-MCNC: 3.6 MG/DL — HIGH (ref 0.7–1.5)
CREAT SERPL-MCNC: 3.6 MG/DL — HIGH (ref 0.7–1.5)
EGFR: 18 ML/MIN/1.73M2 — LOW
EGFR: 18 ML/MIN/1.73M2 — LOW
EOSINOPHIL # BLD AUTO: 1.08 K/UL — HIGH (ref 0–0.7)
EOSINOPHIL NFR BLD AUTO: 26.3 % — HIGH (ref 0–8)
GLUCOSE SERPL-MCNC: 112 MG/DL — HIGH (ref 70–99)
GLUCOSE SERPL-MCNC: 120 MG/DL — HIGH (ref 70–99)
HCT VFR BLD CALC: 25.5 % — LOW (ref 42–52)
HGB BLD-MCNC: 7.8 G/DL — LOW (ref 14–18)
IMM GRANULOCYTES NFR BLD AUTO: 0.7 % — HIGH (ref 0.1–0.3)
LYMPHOCYTES # BLD AUTO: 1.3 K/UL — SIGNIFICANT CHANGE UP (ref 1.2–3.4)
LYMPHOCYTES # BLD AUTO: 31.7 % — SIGNIFICANT CHANGE UP (ref 20.5–51.1)
MAGNESIUM SERPL-MCNC: 2.5 MG/DL — HIGH (ref 1.8–2.4)
MCHC RBC-ENTMCNC: 27.6 PG — SIGNIFICANT CHANGE UP (ref 27–31)
MCHC RBC-ENTMCNC: 30.6 G/DL — LOW (ref 32–37)
MCV RBC AUTO: 90.1 FL — SIGNIFICANT CHANGE UP (ref 80–94)
MONOCYTES # BLD AUTO: 1.08 K/UL — HIGH (ref 0.1–0.6)
MONOCYTES NFR BLD AUTO: 26.3 % — HIGH (ref 1.7–9.3)
NEUTROPHILS # BLD AUTO: 0.58 K/UL — LOW (ref 1.4–6.5)
NEUTROPHILS NFR BLD AUTO: 14.3 % — LOW (ref 42.2–75.2)
NRBC # BLD: 0 /100 WBCS — SIGNIFICANT CHANGE UP (ref 0–0)
PARATHYROID HORMONE INTACT: 9 PG/ML
PLATELET # BLD AUTO: 155 K/UL — SIGNIFICANT CHANGE UP (ref 130–400)
PMV BLD: 11.2 FL — HIGH (ref 7.4–10.4)
POTASSIUM SERPL-MCNC: 4 MMOL/L — SIGNIFICANT CHANGE UP (ref 3.5–5)
POTASSIUM SERPL-MCNC: 4.4 MMOL/L — SIGNIFICANT CHANGE UP (ref 3.5–5)
POTASSIUM SERPL-SCNC: 4 MMOL/L — SIGNIFICANT CHANGE UP (ref 3.5–5)
POTASSIUM SERPL-SCNC: 4.4 MMOL/L — SIGNIFICANT CHANGE UP (ref 3.5–5)
RBC # BLD: 2.83 M/UL — LOW (ref 4.7–6.1)
RBC # FLD: 16.1 % — HIGH (ref 11.5–14.5)
SODIUM SERPL-SCNC: 138 MMOL/L — SIGNIFICANT CHANGE UP (ref 135–146)
SODIUM SERPL-SCNC: 139 MMOL/L — SIGNIFICANT CHANGE UP (ref 135–146)
WBC # BLD: 4.1 K/UL — LOW (ref 4.8–10.8)
WBC # FLD AUTO: 4.1 K/UL — LOW (ref 4.8–10.8)

## 2023-08-25 PROCEDURE — 99223 1ST HOSP IP/OBS HIGH 75: CPT

## 2023-08-25 PROCEDURE — 99222 1ST HOSP IP/OBS MODERATE 55: CPT

## 2023-08-25 RX ORDER — FLUPHENAZINE HYDROCHLORIDE 1 MG/1
10 TABLET, FILM COATED ORAL DAILY
Refills: 0 | Status: DISCONTINUED | OUTPATIENT
Start: 2023-08-25 | End: 2023-09-25

## 2023-08-25 RX ORDER — HEPARIN SODIUM 5000 [USP'U]/ML
5000 INJECTION INTRAVENOUS; SUBCUTANEOUS EVERY 8 HOURS
Refills: 0 | Status: DISCONTINUED | OUTPATIENT
Start: 2023-08-25 | End: 2023-09-25

## 2023-08-25 RX ORDER — SODIUM CHLORIDE 9 MG/ML
1000 INJECTION INTRAMUSCULAR; INTRAVENOUS; SUBCUTANEOUS
Refills: 0 | Status: DISCONTINUED | OUTPATIENT
Start: 2023-08-25 | End: 2023-08-27

## 2023-08-25 RX ORDER — CALCITONIN SALMON 200 [IU]/ML
340 INJECTION, SOLUTION INTRAMUSCULAR EVERY 12 HOURS
Refills: 0 | Status: COMPLETED | OUTPATIENT
Start: 2023-08-25 | End: 2023-08-26

## 2023-08-25 RX ORDER — HEPARIN SODIUM 5000 [USP'U]/ML
5000 INJECTION INTRAVENOUS; SUBCUTANEOUS EVERY 12 HOURS
Refills: 0 | Status: DISCONTINUED | OUTPATIENT
Start: 2023-08-25 | End: 2023-08-25

## 2023-08-25 RX ORDER — BENZTROPINE MESYLATE 1 MG
2 TABLET ORAL DAILY
Refills: 0 | Status: DISCONTINUED | OUTPATIENT
Start: 2023-08-25 | End: 2023-09-25

## 2023-08-25 RX ADMIN — HEPARIN SODIUM 5000 UNIT(S): 5000 INJECTION INTRAVENOUS; SUBCUTANEOUS at 05:33

## 2023-08-25 RX ADMIN — SODIUM CHLORIDE 75 MILLILITER(S): 9 INJECTION INTRAMUSCULAR; INTRAVENOUS; SUBCUTANEOUS at 08:44

## 2023-08-25 RX ADMIN — SODIUM CHLORIDE 100 MILLILITER(S): 9 INJECTION INTRAMUSCULAR; INTRAVENOUS; SUBCUTANEOUS at 13:26

## 2023-08-25 RX ADMIN — CALCITONIN SALMON 340 INTERNATIONAL UNIT(S): 200 INJECTION, SOLUTION INTRAMUSCULAR at 00:07

## 2023-08-25 RX ADMIN — FLUPHENAZINE HYDROCHLORIDE 10 MILLIGRAM(S): 1 TABLET, FILM COATED ORAL at 11:18

## 2023-08-25 RX ADMIN — CALCITONIN SALMON 340 INTERNATIONAL UNIT(S): 200 INJECTION, SOLUTION INTRAMUSCULAR at 17:09

## 2023-08-25 RX ADMIN — Medication 2 MILLIGRAM(S): at 11:18

## 2023-08-25 RX ADMIN — SODIUM CHLORIDE 1000 MILLILITER(S): 9 INJECTION INTRAMUSCULAR; INTRAVENOUS; SUBCUTANEOUS at 00:09

## 2023-08-25 RX ADMIN — HEPARIN SODIUM 5000 UNIT(S): 5000 INJECTION INTRAVENOUS; SUBCUTANEOUS at 17:09

## 2023-08-25 NOTE — H&P ADULT - HISTORY OF PRESENT ILLNESS
67 year old male PMHx Schizophrenia, non-Hodgkin's lymphoma (follows with Dr. Gong), NET of liver and colon, CKD III, recurrent hypercalcemia sent in to the ED for calcium of 13.6.     T(F): 98.3 HR: 85 BP: 98/55 RR: 18 SpO2: 96%  Hgb 8.4, Cr 4.1, BUN 54, Ca 13.3,   Pt received 2L NS bolus, Calcitonin   Admit to Medicine  67 year old male PMHx Schizophrenia, non-Hodgkin's lymphoma (follows with Dr. Gong), NET of liver, colon adenocarcinoma, CKD III, recurrent hypercalcemia sent in to the ED for hypercalcemia. Pt had an appointment with Dr. Gong yesterday morning and was sent to the ED for calcium of 13.6. Pt reports chronic diarrhea and malaise. Also reports dysphagia and hoarseness for the past month. Denies flank or abdominal pain, dysuria, melena, fever. Pt had a recent liver biopsy and was found to have neuroendocrine tumor of the liver. Recent colonoscopy showing adenoma of colon.    T(F): 98.3 HR: 85 BP: 98/55 RR: 18 SpO2: 96%  Hgb 8.4, Cr 4.1, BUN 54, Ca 13.3,   Pt received 2L NS bolus, Calcitonin   Admit to Medicine

## 2023-08-25 NOTE — CONSULT NOTE ADULT - SUBJECTIVE AND OBJECTIVE BOX
Patient is a 67y old  Male who presents with a chief complaint of     HPI:  67 year old male PMHx Schizophrenia, non-Hodgkin's lymphoma (follows with Dr. Gong), NET of liver, colon adenocarcinoma, CKD III, recurrent hypercalcemia sent in to the ED for hypercalcemia. Pt had an appointment with Dr. Gong yesterday morning and was sent to the ED for calcium of 13.6. Pt reports chronic diarrhea and malaise. Also reports dysphagia and hoarseness for the past month. Denies flank or abdominal pain, dysuria, melena, fever. Pt had a recent liver biopsy and was found to have neuroendocrine tumor of the liver. Recent colonoscopy showing adenoma of colon.    T(F): 98.3 HR: 85 BP: 98/55 RR: 18 SpO2: 96%  Hgb 8.4, Cr 4.1, BUN 54, Ca 13.3,   Pt received 2L NS bolus, Calcitonin   Admit to Medicine  (25 Aug 2023 01:45)       ROS:  Negative except for:    PAST MEDICAL & SURGICAL HISTORY:  Kidney stones      Schizophrenia      Lymphoma      Shingles      Atrophic kidney      H/O colonoscopy with polypectomy      Liver cyst      History of bladder surgery      H/O neuropathy      History of chemotherapy      Stage 3 chronic kidney disease      Retained urethral stent      H/O umbilical hernia repair      Elbow fracture      H/O cystoscopy          SOCIAL HISTORY:    FAMILY HISTORY:  FH: hypertension    FH: diabetes mellitus        MEDICATIONS  (STANDING):  benztropine 2 milliGRAM(s) Oral daily  calcitonin Injectable 340 International Unit(s) IntraMuscular every 12 hours  fluPHENAZine 10 milliGRAM(s) Oral daily  heparin   Injectable 5000 Unit(s) SubCutaneous every 12 hours  sodium chloride 0.9%. 1000 milliLiter(s) (75 mL/Hr) IV Continuous <Continuous>    MEDICATIONS  (PRN):    Height (cm): 182.9 (08-24-23 @ 17:31)  Weight (kg): 86.2 (08-24-23 @ 17:31)  BMI (kg/m2): 25.8 (08-24-23 @ 17:31)  BSA (m2): 2.08 (08-24-23 @ 17:31)  Allergies    allopurinol (Rash (Moderate))    Intolerances        Vital Signs Last 24 Hrs  T(C): 36.7 (25 Aug 2023 08:07), Max: 36.8 (25 Aug 2023 00:11)  T(F): 98 (25 Aug 2023 08:07), Max: 98.3 (25 Aug 2023 00:11)  HR: 76 (25 Aug 2023 08:07) (76 - 95)  BP: 99/57 (25 Aug 2023 08:07) (98/55 - 102/59)  BP(mean): 77 (25 Aug 2023 04:42) (70 - 77)  RR: 18 (25 Aug 2023 08:07) (18 - 18)  SpO2: 96% (25 Aug 2023 08:07) (96% - 98%)    Parameters below as of 25 Aug 2023 08:07  Patient On (Oxygen Delivery Method): room air        PHYSICAL EXAM  General: adult in NAD  HEENT: clear oropharynx, anicteric sclera, pink conjunctiva  Neck: supple  CV: normal S1/S2 with no murmur rubs or gallops  Lungs: positive air movement b/l ant lungs,clear to auscultation, no wheezes, no rales  Abdomen: soft non-tender non-distended, no hepatosplenomegaly  Ext: no clubbing cyanosis or edema  Skin: no rashes and no petechiae  Neuro: alert and oriented X 4, no focal deficits      LABS:                          7.8    4.10  )-----------( 155      ( 25 Aug 2023 05:30 )             25.5         Mean Cell Volume : 90.1 fL  Mean Cell Hemoglobin : 27.6 pg  Mean Cell Hemoglobin Concentration : 30.6 g/dL  Auto Neutrophil # : 0.58 K/uL  Auto Lymphocyte # : 1.30 K/uL  Auto Monocyte # : 1.08 K/uL  Auto Eosinophil # : 1.08 K/uL  Auto Basophil # : 0.03 K/uL  Auto Neutrophil % : 14.3 %  Auto Lymphocyte % : 31.7 %  Auto Monocyte % : 26.3 %  Auto Eosinophil % : 26.3 %  Auto Basophil % : 0.7 %      Serial CBC's  08-25 @ 05:30  Hct-25.5 / Hgb-7.8 / Plat-155 / RBC-2.83 / WBC-4.10  Serial CBC's  08-24 @ 20:01  Hct-26.9 / Hgb-8.4 / Plat-160 / RBC-2.92 / WBC-5.11      08-25    138  |  109  |  47<H>  ----------------------------<  120<H>  4.4   |  18  |  3.6<H>    Ca    11.8<H>      25 Aug 2023 05:30  Phos  5.2     08-24  Mg     2.5     08-25    TPro  4.5<L>  /  Alb  3.3<L>  /  TBili  0.5  /  DBili  x   /  AST  16  /  ALT  22  /  AlkPhos  355<H>  08-24        RADIOLOGY & ADDITIONAL STUDIES    ADIOLOGY & ADDITIONAL STUDIE    PET[facility]CT  [facility]       1.  Interval increase in size and decreased metabolic activity of the thoracoabdominal lymphadenopathy, some are new, probably recurrence /residual disease.     2.  New mildly metabolic 3 cm hypodensity in the liver segment 2/3, concerning for neoplasm or lymphoma.  Poorly defined small hypodensity in the segment 4, below the resolution of PET scan.  MRI will provide further elucidation.     3.  Diffuse mildly increased bone marrow activity, possibly reactive versus bone marrow involvement.     4.  Hepatosplenomegaly.                    AGENT:     11.5 mCi F18-FDG, IV via the left hand vein.     HISTORY:     67-year-old male with lymphadenopathy and liver lesion seen on recent CT.  History of follicular lymphoma, chemotherapy in 2019.  PET scan for restaging.     EXAM CATEGORY:     Subsequent treatment strategy.     TECHNIQUE:     60 minutes following injection of the radiopharmaceutical, PET/CT imaging was performed from the skull base to mid thigh.  Fasting serum glucose was 98 mg/dL prior to injection.  Oral or IV contrast was not given per protocol.  All SUV values reported represent maximum SUV (SUV max) unless otherwise specified.       This study was interpreted using a lean body mass corrected SUV technique.  Please note this may result in lower SUV values compared to a body-weight corrected technique.      Reference liver uptake SUV mean 1.6 (previously 1.9), SUV max 1.9 (previously 2.6)  Spleen uptake SUV mean 1.3 (previously 2.1)  Mediastinal blood pool uptake SUV 1.3 (previously 1.3)     COMPARISON:     CT abdomen pelvis 2/23/2023  PET/CT 6/29/2016     FINDINGS:     Head and neck:     There is no suspicious FDG uptake in the head and neck.     No significant cervical lymphadenopathy on CT.  Interval decrease in size and metabolic resolution of previously seen FDG avid right level 2B and left level 5 lymph nodes, currently 8 mm image 34 (previously image 54, 1.6 x 1.2 cm SUV 3.3 remeasured).     Chest:     Mildly FDG avid lower posterior mediastinal lymph nodes:  * New right posterior periesophageal lymph node image 101, SUV 1.6, 2.3 x 1.9 cm.  * Distal periaortic lymph nodes image 91, SUV 1.3, 1.6 x 1.4 cm (previously image 104, 1.4 cm, SUV 2.7 remeasured) .     Interval metabolic resolution of previously FDG avid bilateral axillary lymph nodes, retropectoral and parasternal small lymph nodes.  Few non-FDG avid axillary lymph nodes measure up to 1.3 x 0.9 cm, image 49 (previously SUV 1.8 remeasured)     On CT, there is stable subcentimeter pretracheal nodes.  Bilateral small gynecomastia.  The heart and great vessels are unremarkable.  Small pericardial effusion. No pleural effusion.  New non-FDG avid subcentimeter pleural nodularity in the medial right lower lobe, below the resolution of PET scan.  No suspicious lung nodule.     Abdomen and pelvis:     New 3.0 x 2.8 cm hypodensity in the hepatic segment 2/3 image 109, SUV 2.8.  Poorly defined small hypodensity in the segment 4 image 109, below the resolution of PET scan.     Hepatomegaly 19 cm in craniocaudal dimension (previously 18.2 cm)  Splenomegaly 16.2 cm in greatest dimension (previously 17.5 cm)     Interval increase in size and decreased metabolic activity of the abdominopelvic lymphadenopathy.  For example:  * Periportal image 122, SUV 2.1, 5.1 x 2.3 cm (previously image 139, 4.3 x 2.4 cm, SUV 3.9 remeasured), increase in size  * Left para-aortic image 147, SUV 1.4, 1.9 x 1.2 cm (previously image 162, 3 x 2.3 cm, SUV 6.2 remeasured)  * Bilateral retrocrural lymph nodes measure up to 1.6 x 1.0 cm, image 112, non-FDG avid (previously image 131, SUV 2.7 remeasured)  * Bilateral external iliac lymph nodes measure up to 2.6 x 1.4 cm, SUV 1.7, image 202 (previously image 228, 1.8 x 1.0 cm, SUV 2.5 remeasured)  * Right femoral lymph node image 243, SUV 0.9, 2.2 x 1.4 cm (previously image 264, 1.2 x 1.0 cm, SUV 2.4 remeasured).     Physiologic activity is visualized in the spleen, pancreas, adrenals, kidneys and bowel.     CT images demonstrate cholelithiasis, small atrophic left kidney, left renal cyst and colonic diverticulosis.       Musculoskeletal and extremities:     There are no suspicious FDG-avid osseous lesions.     Diffuse mildly increased bone marrow activity SUV 2.0 image 183, possibly reflecting reactive versus bone marrow involvement.     No aggressive osseous lesions are seen on CT.     Please note KEY IMAGES for this PET/CT study are visible in [facility] by scrolling to the far right of the collection of image preview thumbnails.     Diagnostic confidence level used in this report:     Consistent with/compatible with or no modifier - greater than 98%  Most likely - greater than 90%  Likely/probably - greater than 75%  Possibly 50%  Less likely - less than 25%  Unlikely - less than 5%        < from: MR Abdomen w/wo IV Cont (07.21.23 @ 18:19) >    Impression:    1. Liver segment II mass measuring 4 cm and segment V mass measuring 1.5   cm with imaging characteristics suspicious for hepatic lymphoma.    2. No suspiciousadrenal mass identified. Correlation with outside recent   imaging suggested given possibility of adjacent suspicious lymph nodes   rather than adrenal mass.    3. Conglomerated upper abdominal lymphadenopathy, likely better evaluated   on recent outside PET/CT.    < end of copied text >       Patient is a 67y old  Male who presents with a chief complaint of     HPI:  67 year old male PMHx Schizophrenia, non-Hodgkin's lymphoma (follows with Dr. Gong), NET of liver, colon adenocarcinoma, CKD III, recurrent hypercalcemia sent in to the ED for hypercalcemia. Pt had an appointment with Dr. Gong yesterday morning and was sent to the ED for calcium of 13.6. Pt reports chronic diarrhea and malaise. Also reports dysphagia and hoarseness for the past month. Denies flank or abdominal pain, dysuria, melena, fever. Pt had a recent liver biopsy and was found to have neuroendocrine tumor of the liver. Recent colonoscopy showing adenoma of colon.    T(F): 98.3 HR: 85 BP: 98/55 RR: 18 SpO2: 96%  Hgb 8.4, Cr 4.1, BUN 54, Ca 13.3,   Pt received 2L NS bolus, Calcitonin   Admit to Medicine  (25 Aug 2023 01:45)       ROS:  Negative except for fatigue.    PAST MEDICAL & SURGICAL HISTORY:  Kidney stones      Schizophrenia      Lymphoma      Shingles      Atrophic kidney      H/O colonoscopy with polypectomy      Liver cyst      History of bladder surgery      H/O neuropathy      History of chemotherapy      Stage 3 chronic kidney disease      Retained urethral stent      H/O umbilical hernia repair      Elbow fracture      H/O cystoscopy          SOCIAL HISTORY: No smoking cigarettes.    FAMILY HISTORY:  FH: hypertension    FH: diabetes mellitus        MEDICATIONS  (STANDING):  benztropine 2 milliGRAM(s) Oral daily  calcitonin Injectable 340 International Unit(s) IntraMuscular every 12 hours  fluPHENAZine 10 milliGRAM(s) Oral daily  heparin   Injectable 5000 Unit(s) SubCutaneous every 12 hours  sodium chloride 0.9%. 1000 milliLiter(s) (75 mL/Hr) IV Continuous <Continuous>    MEDICATIONS  (PRN):    Height (cm): 182.9 (08-24-23 @ 17:31)  Weight (kg): 86.2 (08-24-23 @ 17:31)  BMI (kg/m2): 25.8 (08-24-23 @ 17:31)  BSA (m2): 2.08 (08-24-23 @ 17:31)  Allergies    allopurinol (Rash (Moderate))    Intolerances        Vital Signs Last 24 Hrs  T(C): 36.7 (25 Aug 2023 08:07), Max: 36.8 (25 Aug 2023 00:11)  T(F): 98 (25 Aug 2023 08:07), Max: 98.3 (25 Aug 2023 00:11)  HR: 76 (25 Aug 2023 08:07) (76 - 95)  BP: 99/57 (25 Aug 2023 08:07) (98/55 - 102/59)  BP(mean): 77 (25 Aug 2023 04:42) (70 - 77)  RR: 18 (25 Aug 2023 08:07) (18 - 18)  SpO2: 96% (25 Aug 2023 08:07) (96% - 98%)    Parameters below as of 25 Aug 2023 08:07  Patient On (Oxygen Delivery Method): room air        PHYSICAL EXAM  General: adult in NAD  HEENT: clear oropharynx, anicteric sclera, pink conjunctiva  Neck: supple  CV: normal S1/S2 with no murmur rubs or gallops  Lungs: positive air movement b/l ant lungs,clear to auscultation, no wheezes, no rales  Abdomen: soft non-tender non-distended, no hepatosplenomegaly  Ext: no clubbing cyanosis or edema  Skin: no rashes and no petechiae  Neuro: alert and oriented X 4, no focal deficits      LABS:                          7.8    4.10  )-----------( 155      ( 25 Aug 2023 05:30 )             25.5         Mean Cell Volume : 90.1 fL  Mean Cell Hemoglobin : 27.6 pg  Mean Cell Hemoglobin Concentration : 30.6 g/dL  Auto Neutrophil # : 0.58 K/uL  Auto Lymphocyte # : 1.30 K/uL  Auto Monocyte # : 1.08 K/uL  Auto Eosinophil # : 1.08 K/uL  Auto Basophil # : 0.03 K/uL  Auto Neutrophil % : 14.3 %  Auto Lymphocyte % : 31.7 %  Auto Monocyte % : 26.3 %  Auto Eosinophil % : 26.3 %  Auto Basophil % : 0.7 %      Serial CBC's  08-25 @ 05:30  Hct-25.5 / Hgb-7.8 / Plat-155 / RBC-2.83 / WBC-4.10  Serial CBC's  08-24 @ 20:01  Hct-26.9 / Hgb-8.4 / Plat-160 / RBC-2.92 / WBC-5.11      08-25    138  |  109  |  47<H>  ----------------------------<  120<H>  4.4   |  18  |  3.6<H>    Ca    11.8<H>      25 Aug 2023 05:30  Phos  5.2     08-24  Mg     2.5     08-25    TPro  4.5<L>  /  Alb  3.3<L>  /  TBili  0.5  /  DBili  x   /  AST  16  /  ALT  22  /  AlkPhos  355<H>  08-24        RADIOLOGY & ADDITIONAL STUDIES    ADIOLOGY & ADDITIONAL STUDIE    PET/CT  [facility]23 [facility]       1.  Interval increase in size and decreased metabolic activity of the thoracoabdominal lymphadenopathy, some are new, probably recurrence /residual disease.     2.  New mildly metabolic 3 cm hypodensity in the liver segment 2/3, concerning for neoplasm or lymphoma.  Poorly defined small hypodensity in the segment 4, below the resolution of PET scan.  MRI will provide further elucidation.     3.  Diffuse mildly increased bone marrow activity, possibly reactive versus bone marrow involvement.     4.  Hepatosplenomegaly.                    AGENT:     11.5 mCi F18-FDG, IV via the left hand vein.     HISTORY:     67-year-old male with lymphadenopathy and liver lesion seen on recent CT.  History of follicular lymphoma, chemotherapy in 2019.  PET scan for restaging.     EXAM CATEGORY:     Subsequent treatment strategy.     TECHNIQUE:     60 minutes following injection of the radiopharmaceutical, PET/CT imaging was performed from the skull base to mid thigh.  Fasting serum glucose was 98 mg/dL prior to injection.  Oral or IV contrast was not given per protocol.  All SUV values reported represent maximum SUV (SUV max) unless otherwise specified.       This study was interpreted using a lean body mass corrected SUV technique.  Please note this may result in lower SUV values compared to a body-weight corrected technique.      Reference liver uptake SUV mean 1.6 (previously 1.9), SUV max 1.9 (previously 2.6)  Spleen uptake SUV mean 1.3 (previously 2.1)  Mediastinal blood pool uptake SUV 1.3 (previously 1.3)     COMPARISON:     CT abdomen pelvis 2/23/2023  PET/CT 6/29/2016     FINDINGS:     Head and neck:     There is no suspicious FDG uptake in the head and neck.     No significant cervical lymphadenopathy on CT.  Interval decrease in size and metabolic resolution of previously seen FDG avid right level 2B and left level 5 lymph nodes, currently 8 mm image 34 (previously image 54, 1.6 x 1.2 cm SUV 3.3 remeasured).     Chest:     Mildly FDG avid lower posterior mediastinal lymph nodes:  * New right posterior periesophageal lymph node image 101, SUV 1.6, 2.3 x 1.9 cm.  * Distal periaortic lymph nodes image 91, SUV 1.3, 1.6 x 1.4 cm (previously image 104, 1.4 cm, SUV 2.7 remeasured) .     Interval metabolic resolution of previously FDG avid bilateral axillary lymph nodes, retropectoral and parasternal small lymph nodes.  Few non-FDG avid axillary lymph nodes measure up to 1.3 x 0.9 cm, image 49 (previously SUV 1.8 remeasured)     On CT, there is stable subcentimeter pretracheal nodes.  Bilateral small gynecomastia.  The heart and great vessels are unremarkable.  Small pericardial effusion. No pleural effusion.  New non-FDG avid subcentimeter pleural nodularity in the medial right lower lobe, below the resolution of PET scan.  No suspicious lung nodule.     Abdomen and pelvis:     New 3.0 x 2.8 cm hypodensity in the hepatic segment 2/3 image 109, SUV 2.8.  Poorly defined small hypodensity in the segment 4 image 109, below the resolution of PET scan.     Hepatomegaly 19 cm in craniocaudal dimension (previously 18.2 cm)  Splenomegaly 16.2 cm in greatest dimension (previously 17.5 cm)     Interval increase in size and decreased metabolic activity of the abdominopelvic lymphadenopathy.  For example:  * Periportal image 122, SUV 2.1, 5.1 x 2.3 cm (previously image 139, 4.3 x 2.4 cm, SUV 3.9 remeasured), increase in size  * Left para-aortic image 147, SUV 1.4, 1.9 x 1.2 cm (previously image 162, 3 x 2.3 cm, SUV 6.2 remeasured)  * Bilateral retrocrural lymph nodes measure up to 1.6 x 1.0 cm, image 112, non-FDG avid (previously image 131, SUV 2.7 remeasured)  * Bilateral external iliac lymph nodes measure up to 2.6 x 1.4 cm, SUV 1.7, image 202 (previously image 228, 1.8 x 1.0 cm, SUV 2.5 remeasured)  * Right femoral lymph node image 243, SUV 0.9, 2.2 x 1.4 cm (previously image 264, 1.2 x 1.0 cm, SUV 2.4 remeasured).     Physiologic activity is visualized in the spleen, pancreas, adrenals, kidneys and bowel.     CT images demonstrate cholelithiasis, small atrophic left kidney, left renal cyst and colonic diverticulosis.       Musculoskeletal and extremities:     There are no suspicious FDG-avid osseous lesions.     Diffuse mildly increased bone marrow activity SUV 2.0 image 183, possibly reflecting reactive versus bone marrow involvement.     No aggressive osseous lesions are seen on CT.     Please note KEY IMAGES for this PET/CT study are visible in [facility] by scrolling to the far right of the collection of image preview thumbnails.     Diagnostic confidence level used in this report:     Consistent with/compatible with or no modifier - greater than 98%  Most likely - greater than 90%  Likely/probably - greater than 75%  Possibly 50%  Less likely - less than 25%  Unlikely - less than 5%        < from: MR Abdomen w/wo IV Cont (07.21.23 @ 18:19) >    Impression:    1. Liver segment II mass measuring 4 cm and segment V mass measuring 1.5   cm with imaging characteristics suspicious for hepatic lymphoma.    2. No suspiciousadrenal mass identified. Correlation with outside recent   imaging suggested given possibility of adjacent suspicious lymph nodes   rather than adrenal mass.    3. Conglomerated upper abdominal lymphadenopathy, likely better evaluated   on recent outside PET/CT.    < end of copied text >

## 2023-08-25 NOTE — H&P ADULT - ATTENDING COMMENTS
67 year old male PMHx Schizophrenia, non-Hodgkin's lymphoma (follows with Dr. Gong),   NET of liver, colon adenocarcinoma, CKD III, recurrent hypercalcemia sent in to the ED for   hypercalcemia. Pt had an appointment with Dr. Gong yesterday morning and was sent to the ED for calcium of 13.6.     #·hypercalcemia 2/2 recurrence of lymphoma (has elevated vit D 1,25)   # CKD4  # lactic acidosis  # non-Hodgkin's lymphoma     Plans    - c/w IVF  cc/hr  - give calcitonin 4 units/kg every 12 hours for 3 more doses   - check w nephro if  cn give pamidronate  - Ca level q12h   - nephro and HO eval   - no RRT yet   - poor prognosis 67 year old male PMHx Schizophrenia, non-Hodgkin's lymphoma (follows with Dr. Gong),   NET of liver, colon adenocarcinoma, CKD III, recurrent hypercalcemia sent in to the ED for   hypercalcemia. Pt had an appointment with Dr. Gong yesterday morning and was sent to the ED for calcium of 13.6.     #·hypercalcemia 2/2 recurrence of lymphoma (has elevated vit D 1,25)   # CKD4  # lactic acidosis  # non-Hodgkin's lymphoma   # Hepatic mass-Biopsy consistent with Well differentiated Neuroendocrine tumor, Grade 3, Likely metastatic  # Hx of kidney stones   # Gout     Plans    - c/w IVF  cc/hr  - give calcitonin 4 units/kg every 12 hours for 3 more doses   - check w nephro if  cn give pamidronate  - Ca level q12h   - nephro and HO eval   - no RRT yet   - poor prognosis

## 2023-08-25 NOTE — H&P ADULT - NSHPPHYSICALEXAM_GEN_ALL_CORE
GENERAL: NAD, lying in bed comfortably  CHEST/LUNG: CTAB  HEART: RRR no MRG  ABDOMEN: BSx4; Soft, nontender, nondistended  EXTREMITIES:  No clubbing, cyanosis, or edema  NERVOUS SYSTEM:  A&Ox3, no focal deficits   SKIN: No rashes or lesions CONSTITUTIONAL: No acute distress, well-developed, well-groomed, AAOx3  HEAD: Atraumatic, normocephalic  EYES: EOM intact, PERRLA, conjunctiva and sclera clear  ENT: Supple, no masses, no thyromegaly, no bruits, no JVD; moist mucous membranes  PULMONARY: Clear to auscultation bilaterally; no wheezes, rales, or rhonchi  CARDIOVASCULAR: Regular rate and rhythm; no murmurs, rubs, or gallops  GASTROINTESTINAL: Soft, non-tender, non-distended; bowel sounds present  MUSCULOSKELETAL: 2+ peripheral pulses; no clubbing, no cyanosis, no edema  NEUROLOGY: non-focal  SKIN: No rashes or lesions; warm and dry

## 2023-08-25 NOTE — CONSULT NOTE ADULT - ASSESSMENT
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 ASHLEY on CKD.    Hematology and Oncology consulted given hx of Marginal Zone lymphoma.    #Recurrent Hypercalcemia likely 2/2 Lymphoma.  - calcium 13.9 on admission. Trending down currently at 11.8.   - on IV hydration.  -s/p one dose of calcitonin.     #Hx of NHL marginal zone.    -He was in a good partial remission following 3 cycles of bendamustine and Rituxan treated in 2019 and 2020.  -He was unable to tolerate maintenance Rituxan.  -However, he was never in complete remission and had preferred just to be observed since we were dealing with a low grade lymphoma.  - was lost to follow up.  --CT Abdomen/Pelvis 2/23/23  --Interval worsening in periesophageal and pelvic lymphadenopathy , other bulky lymph nodes in the abdomen and retroperitoneum appear stable  --1.3 indeterminate segment 4 liver lesion  --Focal areas of pleural nodularity concerning for lymphomatous/neoplastic/metastatic process, new since prior.  --PET scan 5.2.23: Interval increase in the size of the abdominal lymph nodes.  ---MRI abdomen 7.23:  Liver segment II 4 cm mass and Liver segment V  1.5 cm mass suspicious for Hepatic Lymphomas.  --Biopsy of Liver lesion 8.23: well differentiated Neuroendocrine tumor, Grade 3 Likely  metastatic.   -LDH normal 4.23.    #Hepatic mass  Biopsy consistent with Well differentiated Neuroendocrine tumor, Grade 3, Likely metastatic. 8.23.    #CKD 4     #Chronic diarrhea  -He had colonoscopy in the past which revealed a tubulovillous adenoma.  -Colonoscopy by GI on 4/21/23    #Hx of kidney stones   #Gout     Recommendations  -C/w calcitonin 4 doses.   -IV hydration as tolerated.   -monitor calcium daily.  -Please obtain LDH.       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 ASHLEY on CKD.    Hematology and Oncology consulted given hx of Marginal Zone lymphoma.    #Recurrent Hypercalcemia likely 2/2 Lymphoma.  - calcium 13.9 on admission. Trending down currently at 11.8.   - on IV hydration.  -s/p one dose of calcitonin.     #Hx of NHL marginal zone now with ? recurrence.      -He was in a good partial remission following 3 cycles of bendamustine and Rituxan treated in 2019 and 2020.  -He was unable to tolerate maintenance Rituxan.  -However, he was never in complete remission and had preferred just to be observed since we were dealing with a low grade lymphoma.  - was lost to follow up.  --CT Abdomen/Pelvis 2/23/23  --Interval worsening in periesophageal and pelvic lymphadenopathy , other bulky lymph nodes in the abdomen and retroperitoneum appear stable  --1.3 indeterminate segment 4 liver lesion  --Focal areas of pleural nodularity concerning for lymphomatous/neoplastic/metastatic process, new since prior.  --PET scan 5.23: 1.  Interval increase in size and decreased metabolic activity of the thoracoabdominal lymphadenopathy, some are new, probably recurrence /residual disease.   2.  New mildly metabolic 3 cm hypodensity in the liver segment 2/3, concerning for neoplasm or lymphoma.  Poorly defined small hypodensity in the segment 4, below the resolution of PET scan.  MRI will provide further elucidation.   3.  Diffuse mildly increased bone marrow activity, possibly reactive versus bone marrow involvement.   4.  Hepatosplenomegaly.  ---MRI abdomen 7.23:  Liver segment II 4 cm mass and Liver segment V  1.5 cm mass suspicious for Hepatic Lymphomas.  --Biopsy of Liver lesion 8.23: well differentiated Neuroendocrine tumor, Grade 3 Likely  metastatic.   -LDH normal 4.23.    #Hepatic mass-Biopsy consistent with Well differentiated Neuroendocrine tumor, Grade 3, Likely metastatic. 8.23.    #CKD 4     #Chronic diarrhea  -He had colonoscopy in the past which revealed a tubulovillous adenoma.  -Colonoscopy by GI on 4/21/23    #Hx of kidney stones   #Gout     Recommendations  -C/w calcitonin 4 doses.   -IV hydration as tolerated.   -monitor calcium daily.  -Please obtain LDH.       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 ASHLEY on CKD.    Hematology and Oncology consulted given hx of Marginal Zone lymphoma.    #Recurrent Hypercalcemia likely 2/2 Lymphoma.  - calcium 13.9 on admission. Trending down currently at 11.8.   - on IV hydration.  -s/p one dose of calcitonin.     #Hx of NHL marginal zone now with ? recurrence.    -He was in a good partial remission following 3 cycles of bendamustine and Rituxan treated in 2019 and 2020.  -He was unable to tolerate maintenance Rituxan.  -However, he was never in complete remission and had preferred just to be observed since we were dealing with a low grade lymphoma.  - was lost to follow up.  --CT Abdomen/Pelvis 2/23/23  --Interval worsening in periesophageal and pelvic lymphadenopathy , other bulky lymph nodes in the abdomen and retroperitoneum appear stable  --1.3 indeterminate segment 4 liver lesion  --Focal areas of pleural nodularity concerning for lymphomatous/neoplastic/metastatic process, new since prior.  --PET scan 5.23: 1.  Interval increase in size and decreased metabolic activity of the thoracoabdominal lymphadenopathy, some are new, probably recurrence /residual disease.   2.  New mildly metabolic 3 cm hypodensity in the liver segment 2/3, concerning for neoplasm or lymphoma.  Poorly defined small hypodensity in the segment 4, below the resolution of PET scan.  MRI will provide further elucidation.   3.  Diffuse mildly increased bone marrow activity, possibly reactive versus bone marrow involvement.   4.  Hepatosplenomegaly.  ---MRI abdomen 7.23:  Liver segment II 4 cm mass and Liver segment V  1.5 cm mass suspicious for Hepatic Lymphomas.  --Biopsy of Liver lesion 8.23: well differentiated Neuroendocrine tumor, Grade 3 Likely  metastatic.   -LDH normal 4.23.    #Hepatic mass on imaging -Biopsy consistent with Well differentiated Neuroendocrine tumor, Grade 3, Likely metastatic. 8.23.    #CKD 4     #Chronic diarrhea  -He had colonoscopy in the past which revealed a tubulovillous adenoma.  -Colonoscopy by GI on 4/21/23    #Hx of kidney stones   #Gout     Recommendations  -C/w calcitonin total of 4 doses.   -IV hydration as tolerated.  Calcium is downtrending currently. If calcium does n't improve can consider Xgeva 60 mg once.  -Please send Urinary 5 hydroxy indole acetic acid and Plasma 5 hydroxy indole acetic acid and serum chromogranin.  -Please obtain LDH.   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 ASHLEY on CKD.    Hematology and Oncology consulted given hx of Marginal Zone lymphoma.    #Recurrent Hypercalcemia likely 2/2 Lymphoma.  - calcium 13.9 on admission. Trending down currently at 11.8.   - on IV hydration.  -s/p one dose of calcitonin.     #Hx of NHL marginal zone now with ? recurrence.    -He was in a good partial remission following 3 cycles of bendamustine and Rituxan treated in 2019 and 2020.  -He was unable to tolerate maintenance Rituxan.  -However, he was never in complete remission and had preferred just to be observed since we were dealing with a low grade lymphoma.  - was lost to follow up.  --CT Abdomen/Pelvis 2/23/23  --Interval worsening in periesophageal and pelvic lymphadenopathy , other bulky lymph nodes in the abdomen and retroperitoneum appear stable  --1.3 indeterminate segment 4 liver lesion  --Focal areas of pleural nodularity concerning for lymphomatous/neoplastic/metastatic process, new since prior.  --PET scan 5.23: 1.  Interval increase in size and decreased metabolic activity of the thoracoabdominal lymphadenopathy, some are new, probably recurrence /residual disease.   2.  New mildly metabolic 3 cm hypodensity in the liver segment 2/3, concerning for neoplasm or lymphoma.  Poorly defined small hypodensity in the segment 4, below the resolution of PET scan.  MRI will provide further elucidation.   3.  Diffuse mildly increased bone marrow activity, possibly reactive versus bone marrow involvement.   4.  Hepatosplenomegaly.  ---MRI abdomen 7.23:  Liver segment II 4 cm mass and Liver segment V  1.5 cm mass suspicious for Hepatic Lymphomas.  --Biopsy of Liver lesion 8.23: well differentiated Neuroendocrine tumor, Grade 3 Likely  metastatic.   -LDH normal 4.23.    #Hepatic mass on imaging -Biopsy consistent with Well differentiated Neuroendocrine tumor, Grade 3, Likely metastatic. 8.23.    #CKD 4     #Chronic diarrhea  -He had colonoscopy in the past which revealed a tubulovillous adenoma.  -Colonoscopy by GI on 4/21/23    #Hx of kidney stones   #Gout     Recommendations  -C/w calcitonin total of 4 doses.   -IV hydration as tolerated.  Calcium is downtrending currently.  consider Xgeva 120 mg once.  -Please send Urinary 5 hydroxy indole acetic acid  and serum chromogranin.  -Please obtain LDH.

## 2023-08-25 NOTE — H&P ADULT - NSHPREVIEWOFSYSTEMS_GEN_ALL_CORE
CONSTITUTIONAL: No weakness, fevers, or chills  EYES/ENT: No visual changes;  No vertigo or throat pain   NECK: No pain or stiffness  RESPIRATORY: No cough, wheezing, hemoptysis; No shortness of breath  CARDIOVASCULAR: No chest pain or palpitations  GASTROINTESTINAL: No abdominal or epigastric pain +nausea +diarrhea; No hematemesis, melena, or hematochezia  GENITOURINARY: No dysuria, frequency, or hematuria  NEUROLOGICAL: No numbness or weakness  SKIN: No itching or new rashes

## 2023-08-25 NOTE — H&P ADULT - ASSESSMENT
67 year old male PMHx Schizophrenia, non-Hodgkin's lymphoma (follows with Dr. Gong), NET of liver and colon, CKD III, recurrent hypercalcemia sent in to the ED for calcium of 13.6.     # Recurrent hypercalcemia   - likely 2/2 NH Lymphoma   - Ca 13.3  - s/p 2L NS bolus, Calcitonin in ED  - c/w IVF    # NET liver and colon  # Non-Hodgkin's Lymphoma  - f/u Dr. Gong to start BTK inhibitors for Lymphoma    # Anemia of chronic disease  - Hgb 8.4  - active T&S, monitor CBC    # ASHELY on CKD vs progressing CKD  - Ca 13.3,   - avoid bisphosphonates given CKD    # Schizoaffective disorder  - c/w Fluphenazine, Benztropine    #DVT ppx: heparin   #GI ppx: protonix  #Diet: DASH/TLC  #Code:  #Dispo: Medicine      67 year old male PMHx Schizophrenia, non-Hodgkin's lymphoma (follows with Dr. Gong), NET of liver and colon, CKD III, recurrent hypercalcemia sent in to the ED for calcium of 13.6.     # Recurrent hypercalcemia   # Non-Hodgkin's Lymphoma  - likely hypercalcemia of malignancy   - Ca 13.3  - s/p 2L NS bolus, Calcitonin in ED  - c/w IVF  - Heme/onc consult     # NET liver and colon  # Colon adenoma     # Anemia of chronic disease  - Hgb 8.4  - active T&S, monitor CBC    # ASHLEY on CKD vs progressing CKD  - Ca 13.3,   - avoid bisphosphonates given CKD    # Schizoaffective disorder  - c/w Fluphenazine, Benztropine    #DVT ppx: heparin   #GI ppx: protonix  #Diet: DASH/TLC  #Dispo: Medicine

## 2023-08-25 NOTE — CONSULT NOTE ADULT - ATTENDING COMMENTS
Patient examined by myself , above note and chart reviewed. 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  will correct hypercalcemia , needs Dotatate scan as outpatient . check 5HIAA . Patient examined by myself , above note and chart reviewed. 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  will correct hypercalcemia , needs Dotatate scan as outpatient . check urine 5HIAA .

## 2023-08-25 NOTE — H&P ADULT - NSHPLABSRESULTS_GEN_ALL_CORE
LABS:                        8.4    5.11  )-----------( 160      ( 24 Aug 2023 20:01 )             26.9     08-24    138  |  107  |  54<H>  ----------------------------<  93  4.2   |  19  |  4.1<HH>    Ca    13.3<H>      24 Aug 2023 20:01  Phos  5.2     08-24  Mg     2.8     08-24    TPro  4.5<L>  /  Alb  3.3<L>  /  TBili  0.5  /  DBili  x   /  AST  16  /  ALT  22  /  AlkPhos  355<H>  08-24

## 2023-08-25 NOTE — PATIENT PROFILE ADULT - FALL HARM RISK - HARM RISK INTERVENTIONS

## 2023-08-26 ENCOUNTER — TRANSCRIPTION ENCOUNTER (OUTPATIENT)
Age: 68
End: 2023-08-26

## 2023-08-26 LAB
ANION GAP SERPL CALC-SCNC: 10 MMOL/L — SIGNIFICANT CHANGE UP (ref 7–14)
BASOPHILS # BLD AUTO: 0.03 K/UL — SIGNIFICANT CHANGE UP (ref 0–0.2)
BASOPHILS NFR BLD AUTO: 0.7 % — SIGNIFICANT CHANGE UP (ref 0–1)
BUN SERPL-MCNC: 47 MG/DL — HIGH (ref 10–20)
CALCIUM SERPL-MCNC: 11.8 MG/DL — HIGH (ref 8.4–10.5)
CHLORIDE SERPL-SCNC: 108 MMOL/L — SIGNIFICANT CHANGE UP (ref 98–110)
CO2 SERPL-SCNC: 18 MMOL/L — SIGNIFICANT CHANGE UP (ref 17–32)
CREAT SERPL-MCNC: 3.4 MG/DL — HIGH (ref 0.7–1.5)
EGFR: 19 ML/MIN/1.73M2 — LOW
EOSINOPHIL # BLD AUTO: 1.1 K/UL — HIGH (ref 0–0.7)
EOSINOPHIL NFR BLD AUTO: 25.2 % — HIGH (ref 0–8)
GLUCOSE SERPL-MCNC: 101 MG/DL — HIGH (ref 70–99)
HCT VFR BLD CALC: 25.5 % — LOW (ref 42–52)
HGB BLD-MCNC: 8 G/DL — LOW (ref 14–18)
IMM GRANULOCYTES NFR BLD AUTO: 1.1 % — HIGH (ref 0.1–0.3)
LDH SERPL L TO P-CCNC: 116 U/L — SIGNIFICANT CHANGE UP (ref 50–242)
LDH SERPL L TO P-CCNC: 126 U/L — SIGNIFICANT CHANGE UP (ref 50–242)
LYMPHOCYTES # BLD AUTO: 1.4 K/UL — SIGNIFICANT CHANGE UP (ref 1.2–3.4)
LYMPHOCYTES # BLD AUTO: 32 % — SIGNIFICANT CHANGE UP (ref 20.5–51.1)
MCHC RBC-ENTMCNC: 28 PG — SIGNIFICANT CHANGE UP (ref 27–31)
MCHC RBC-ENTMCNC: 31.4 G/DL — LOW (ref 32–37)
MCV RBC AUTO: 89.2 FL — SIGNIFICANT CHANGE UP (ref 80–94)
MONOCYTES # BLD AUTO: 1.18 K/UL — HIGH (ref 0.1–0.6)
MONOCYTES NFR BLD AUTO: 27 % — HIGH (ref 1.7–9.3)
NEUTROPHILS # BLD AUTO: 0.61 K/UL — LOW (ref 1.4–6.5)
NEUTROPHILS NFR BLD AUTO: 14 % — LOW (ref 42.2–75.2)
NRBC # BLD: 0 /100 WBCS — SIGNIFICANT CHANGE UP (ref 0–0)
PLATELET # BLD AUTO: 154 K/UL — SIGNIFICANT CHANGE UP (ref 130–400)
PMV BLD: 10.9 FL — HIGH (ref 7.4–10.4)
POTASSIUM SERPL-MCNC: 4.1 MMOL/L — SIGNIFICANT CHANGE UP (ref 3.5–5)
POTASSIUM SERPL-SCNC: 4.1 MMOL/L — SIGNIFICANT CHANGE UP (ref 3.5–5)
RBC # BLD: 2.86 M/UL — LOW (ref 4.7–6.1)
RBC # FLD: 15.9 % — HIGH (ref 11.5–14.5)
SODIUM SERPL-SCNC: 136 MMOL/L — SIGNIFICANT CHANGE UP (ref 135–146)
WBC # BLD: 4.37 K/UL — LOW (ref 4.8–10.8)
WBC # FLD AUTO: 4.37 K/UL — LOW (ref 4.8–10.8)

## 2023-08-26 PROCEDURE — 99232 SBSQ HOSP IP/OBS MODERATE 35: CPT

## 2023-08-26 RX ORDER — PAMIDRONATE DISODIUM 9 MG/ML
60 INJECTION, SOLUTION INTRAVENOUS ONCE
Refills: 0 | Status: COMPLETED | OUTPATIENT
Start: 2023-08-26 | End: 2023-08-26

## 2023-08-26 RX ADMIN — PAMIDRONATE DISODIUM 64.17 MILLIGRAM(S): 9 INJECTION, SOLUTION INTRAVENOUS at 17:01

## 2023-08-26 RX ADMIN — Medication 2 MILLIGRAM(S): at 12:53

## 2023-08-26 RX ADMIN — CALCITONIN SALMON 340 INTERNATIONAL UNIT(S): 200 INJECTION, SOLUTION INTRAMUSCULAR at 05:34

## 2023-08-26 RX ADMIN — FLUPHENAZINE HYDROCHLORIDE 10 MILLIGRAM(S): 1 TABLET, FILM COATED ORAL at 12:06

## 2023-08-26 RX ADMIN — SODIUM CHLORIDE 100 MILLILITER(S): 9 INJECTION INTRAMUSCULAR; INTRAVENOUS; SUBCUTANEOUS at 10:17

## 2023-08-26 RX ADMIN — HEPARIN SODIUM 5000 UNIT(S): 5000 INJECTION INTRAVENOUS; SUBCUTANEOUS at 10:17

## 2023-08-26 RX ADMIN — CALCITONIN SALMON 340 INTERNATIONAL UNIT(S): 200 INJECTION, SOLUTION INTRAMUSCULAR at 17:01

## 2023-08-26 RX ADMIN — HEPARIN SODIUM 5000 UNIT(S): 5000 INJECTION INTRAVENOUS; SUBCUTANEOUS at 17:02

## 2023-08-26 NOTE — PROGRESS NOTE ADULT - SUBJECTIVE AND OBJECTIVE BOX
ELDA COVARRUBIAS 67y Male  MRN#: [id number]   Hospital Day: 2d    SUBJECTIVE  Patient is a 67y old Male who presents with a chief complaint of Currently admitted to medicine with the primary diagnosis of Hypercalcemia      INTERVAL HPI AND OVERNIGHT EVENTS:  Patient was examined and seen at bedside. This morning he is resting comfortably in bed and reports no issues or overnight events.    REVIEW OF SYMPTOMS:  CONSTITUTIONAL: No weakness, fevers or chills; No headaches  EYES: No visual changes, eye pain, or discharge  ENT: No vertigo; No ear pain or change in hearing; No sore throat or difficulty swallowing  NECK: No pain or stiffness  RESPIRATORY: No cough, wheezing, or hemoptysis; No shortness of breath  CARDIOVASCULAR: No chest pain or palpitations  GASTROINTESTINAL: No abdominal or epigastric pain; No nausea, vomiting, or hematemesis; No diarrhea or constipation; No melena or hematochezia  GENITOURINARY: No dysuria, frequency or hematuria  MUSCULOSKELETAL: No joint pain, no muscle pain, no weakness  NEUROLOGICAL: No numbness or weakness  SKIN: No itching or rashes    OBJECTIVE  PAST MEDICAL & SURGICAL HISTORY  Kidney stones    Schizophrenia    Lymphoma    Shingles    Atrophic kidney    H/O colonoscopy with polypectomy    Liver cyst    History of bladder surgery    H/O neuropathy    History of chemotherapy    Stage 3 chronic kidney disease    Retained urethral stent    H/O umbilical hernia repair    Elbow fracture    H/O cystoscopy      ALLERGIES:  allopurinol (Rash (Moderate))    MEDICATIONS:  STANDING MEDICATIONS  benztropine 2 milliGRAM(s) Oral daily  calcitonin Injectable 340 International Unit(s) IntraMuscular every 12 hours  fluPHENAZine 10 milliGRAM(s) Oral daily  heparin   Injectable 5000 Unit(s) SubCutaneous every 8 hours  sodium chloride 0.9%. 1000 milliLiter(s) IV Continuous <Continuous>    PRN MEDICATIONS      VITAL SIGNS: Last 24 Hours  T(C): 36.4 (26 Aug 2023 04:58), Max: 36.8 (25 Aug 2023 22:44)  T(F): 97.6 (26 Aug 2023 04:58), Max: 98.3 (25 Aug 2023 22:44)  HR: 86 (26 Aug 2023 04:58) (79 - 91)  BP: 102/59 (26 Aug 2023 04:58) (102/59 - 117/62)  BP(mean): --  RR: 18 (26 Aug 2023 04:58) (18 - 18)  SpO2: 98% (25 Aug 2023 22:44) (96% - 98%)    LABS:                        8.0    4.37  )-----------( 154      ( 26 Aug 2023 05:48 )             25.5     08-26    136  |  108  |  47<H>  ----------------------------<  101<H>  4.1   |  18  |  3.4<H>    Ca    11.8<H>      26 Aug 2023 05:48  Phos  5.2     08-24  Mg     2.5     08-25    TPro  4.5<L>  /  Alb  3.3<L>  /  TBili  0.5  /  DBili  x   /  AST  16  /  ALT  22  /  AlkPhos  355<H>  08-24      Urinalysis Basic - ( 26 Aug 2023 05:48 )    Color: x / Appearance: x / SG: x / pH: x  Gluc: 101 mg/dL / Ketone: x  / Bili: x / Urobili: x   Blood: x / Protein: x / Nitrite: x   Leuk Esterase: x / RBC: x / WBC x   Sq Epi: x / Non Sq Epi: x / Bacteria: x        PHYSICAL EXAM:  CONSTITUTIONAL: No acute distress, well-developed, well-groomed, AAOx3  HEAD: Atraumatic, normocephalic  PULMONARY: Clear to auscultation bilaterally;  CARDIOVASCULAR: Regular rate and rhythm  GASTROINTESTINAL: Soft, non-tender, non-distended  MUSCULOSKELETAL:  no edema  SKIN: No rashes or lesions    ASSESSMENT & PLAN  67y M PMH Schizophrenia (prior lithium use), nephrolithiasis c/b atrophic L kidney, R ureteral rivision, CKD4 (bsl Cr ~2.5), gout, chronic diarrhea, 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 ASHLEY on CKD.    Hematology and Oncology consulted given hx of Marginal Zone lymphoma.    #Recurrent Hypercalcemia likely 2/2 Lymphoma.  - calcium 13.9 on admission. Trending down currently at 11.8.   - on IV hydration.  -s/p one dose of calcitonin.     #Hx of NHL marginal zone now with ? recurrence.    -He was in a good partial remission following 3 cycles of bendamustine and Rituxan treated in 2019 and 2020.  -He was unable to tolerate maintenance Rituxan.  -However, he was never in complete remission and had preferred just to be observed since we were dealing with a low grade lymphoma.  - was lost to follow up.  --CT Abdomen/Pelvis 2/23/23  --Interval worsening in periesophageal and pelvic lymphadenopathy , other bulky lymph nodes in the abdomen and retroperitoneum appear stable  --1.3 indeterminate segment 4 liver lesion  --Focal areas of pleural nodularity concerning for lymphomatous/neoplastic/metastatic process, new since prior.  --PET scan 5.23: 1.  Interval increase in size and decreased metabolic activity of the thoracoabdominal lymphadenopathy, some are new, probably recurrence /residual disease.   2.  New mildly metabolic 3 cm hypodensity in the liver segment 2/3, concerning for neoplasm or lymphoma.  Poorly defined small hypodensity in the segment 4, below the resolution of PET scan.  MRI will provide further elucidation.   3.  Diffuse mildly increased bone marrow activity, possibly reactive versus bone marrow involvement.   4.  Hepatosplenomegaly.  ---MRI abdomen 7.23:  Liver segment II 4 cm mass and Liver segment V  1.5 cm mass suspicious for Hepatic Lymphomas.  --Biopsy of Liver lesion 8.23: well differentiated Neuroendocrine tumor, Grade 3 Likely  metastatic.   -LDH normal 4.23.    #Hepatic mass on imaging -Biopsy consistent with Well differentiated Neuroendocrine tumor, Grade 3, Likely metastatic. 8.23.    #CKD 4     #Chronic diarrhea  -He had colonoscopy in the past which revealed a tubulovillous adenoma.  -Colonoscopy by GI on 4/21/23    #Hx of kidney stones   #Gout     Recommendations:  - please give one dose of pamidronate 60 mg as denosumab is not available inpatient  -C/w calcitonin total of 4 doses.   -Please send Urinary 5 hydroxy indole acetic acid  (5-HIAA) and serum chromogranin.  -Please obtain LDH

## 2023-08-26 NOTE — PROGRESS NOTE ADULT - ASSESSMENT
67 year old male PMHx Schizophrenia, non-Hodgkin's lymphoma (follows with Dr. Gong),   NET of liver, colon adenocarcinoma, CKD III, recurrent hypercalcemia sent in to the ED for   hypercalcemia. Pt had an appointment with Dr. Gong yesterday morning and was sent to the ED for calcium of 13.6.     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   - pending improvement hyper Ca   67 year old male PMHx Schizophrenia, non-Hodgkin's lymphoma (follows with Dr. Gong),   NET of liver, colon adenocarcinoma, CKD III, recurrent hypercalcemia sent in to the ED for   hypercalcemia. Pt had an appointment with Dr. Gong yesterday morning and was sent to the ED for calcium of 13.6.     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   - pending improvement hyper Ca

## 2023-08-26 NOTE — DISCHARGE NOTE NURSING/CASE MANAGEMENT/SOCIAL WORK - NSDCPEFALRISK_GEN_ALL_CORE
For information on Fall & Injury Prevention, visit: [email or web address] OR  [email or web address] OR  https://www.cdc.gov/steadi/patient.html

## 2023-08-26 NOTE — PROGRESS NOTE ADULT - SUBJECTIVE AND OBJECTIVE BOX
Patient is a 67y old  Male who presents with a chief complaint of     OVERNIGHT EVENTS: remained stable, denies fever, chills, syncope, seizure, headache, cough, SOB, abd pain, N/V/D, urinary symptoms, legs swelling,    SUBJECTIVE / INTERVAL HPI: Patient seen and examined at bedside.     VITAL SIGNS:  Vital Signs Last 24 Hrs  T(C): 36.4 (26 Aug 2023 04:58), Max: 36.8 (25 Aug 2023 22:44)  T(F): 97.6 (26 Aug 2023 04:58), Max: 98.3 (25 Aug 2023 22:44)  HR: 86 (26 Aug 2023 04:58) (79 - 91)  BP: 102/59 (26 Aug 2023 04:58) (102/59 - 117/62)  BP(mean): --  RR: 18 (26 Aug 2023 04:58) (18 - 18)  SpO2: 98% (25 Aug 2023 22:44) (96% - 98%)    Parameters below as of 25 Aug 2023 22:44  Patient On (Oxygen Delivery Method): room air        PHYSICAL EXAM:    General: WDWN  HEENT: NC/AT; PERRL, clear conjunctiva  Neck: supple  Cardiovascular: +S1/S2; RRR  Respiratory: CTA b/l; no W/R/R  Gastrointestinal: soft, NT/ND; +BSx4  Extremities: WWP; 2+ peripheral pulses; no edema   Neurological: AAOx3; no focal deficits    MEDICATIONS:  MEDICATIONS  (STANDING):  benztropine 2 milliGRAM(s) Oral daily  calcitonin Injectable 340 International Unit(s) IntraMuscular every 12 hours  fluPHENAZine 10 milliGRAM(s) Oral daily  heparin   Injectable 5000 Unit(s) SubCutaneous every 8 hours  sodium chloride 0.9%. 1000 milliLiter(s) (100 mL/Hr) IV Continuous <Continuous>    MEDICATIONS  (PRN):      ALLERGIES:  Allergies    allopurinol (Rash (Moderate))    Intolerances        LABS:                        8.0    4.37  )-----------( 154      ( 26 Aug 2023 05:48 )             25.5     08-26    136  |  108  |  47<H>  ----------------------------<  101<H>  4.1   |  18  |  3.4<H>    Ca    11.8<H>      26 Aug 2023 05:48  Phos  5.2     08-24  Mg     2.5     08-25    TPro  4.5<L>  /  Alb  3.3<L>  /  TBili  0.5  /  DBili  x   /  AST  16  /  ALT  22  /  AlkPhos  355<H>  08-24      Urinalysis Basic - ( 26 Aug 2023 05:48 )    Color: x / Appearance: x / SG: x / pH: x  Gluc: 101 mg/dL / Ketone: x  / Bili: x / Urobili: x   Blood: x / Protein: x / Nitrite: x   Leuk Esterase: x / RBC: x / WBC x   Sq Epi: x / Non Sq Epi: x / Bacteria: x      CAPILLARY BLOOD GLUCOSE          RADIOLOGY & ADDITIONAL TESTS: Reviewed.    ASSESSMENT:    PLAN:

## 2023-08-26 NOTE — CONSULT NOTE ADULT - ASSESSMENT
CKD Stage IV  ASHLEY secondary to hypercalcemia and dehydration - improving  Hypercalcemia of malignancy  Poor PO intake    Plan:  Continue IVF  daily labs  Dietary eval

## 2023-08-26 NOTE — CONSULT NOTE ADULT - SUBJECTIVE AND OBJECTIVE BOX
[facility]  INITIAL CONSULT NOTE  --------------------------------------------------------------------------------  HPI:  66 yo with hx schizophrenia, lymphoma, and CKD Stage 4 sent to hospital for hypercalcemia, nausea and vomiting.    The patient has been seeing Dr. Gong and recent w/u revealed liver lesion and diagnosis of metastatic neuro-endocrine cancer  Pt is receiving IVF and calcitonin  Reports poor appetite and trouble swallowing  Baseline Cr mid 2's       PAST HISTORY  --------------------------------------------------------------------------------  PAST MEDICAL & SURGICAL HISTORY:  Kidney stones      Schizophrenia      Lymphoma      Shingles      Atrophic kidney      H/O colonoscopy with polypectomy      Liver cyst      History of bladder surgery      H/O neuropathy      History of chemotherapy      Stage 3 chronic kidney disease      Retained urethral stent      H/O umbilical hernia repair      Elbow fracture      H/O cystoscopy        FAMILY HISTORY:  FH: hypertension    FH: diabetes mellitus      PAST SOCIAL HISTORY:    ALLERGIES & MEDICATIONS  --------------------------------------------------------------------------------  Allergies    allopurinol (Rash (Moderate))    Intolerances      Standing Inpatient Medications  benztropine 2 milliGRAM(s) Oral daily  calcitonin Injectable 340 International Unit(s) IntraMuscular every 12 hours  fluPHENAZine 10 milliGRAM(s) Oral daily  heparin   Injectable 5000 Unit(s) SubCutaneous every 8 hours  sodium chloride 0.9%. 1000 milliLiter(s) IV Continuous <Continuous>    PRN Inpatient Medications      REVIEW OF SYSTEMS  --------------------------------------------------------------------------------  All other systems were reviewed and are negative, except as noted.    VITALS/PHYSICAL EXAM  --------------------------------------------------------------------------------  T(C): 36.4 (08-26-23 @ 04:58), Max: 36.8 (08-25-23 @ 22:44)  HR: 86 (08-26-23 @ 04:58) (79 - 91)  BP: 102/59 (08-26-23 @ 04:58) (102/59 - 117/62)  RR: 18 (08-26-23 @ 04:58) (18 - 18)  SpO2: 98% (08-25-23 @ 22:44) (96% - 98%)  Wt(kg): --  Height (cm): 182.9 (08-24-23 @ 17:31)  Weight (kg): 86.2 (08-24-23 @ 17:31)  BMI (kg/m2): 25.8 (08-24-23 @ 17:31)  BSA (m2): 2.08 (08-24-23 @ 17:31)      Physical Exam:  	Gen: NAD  	HEENT: No JVD  	Pulm: CTA B/L  	CV: RRR,   	Abd: +BS, soft, nontender/nondistended  	No edema	    LABS/STUDIES  --------------------------------------------------------------------------------              8.0    4.37  >-----------<  154      [08-26-23 @ 05:48]              25.5     136  |  108  |  47  ----------------------------<  101      [08-26-23 @ 05:48]  4.1   |  18  |  3.4        Ca     11.8     [08-26-23 @ 05:48]      Mg     2.5     [08-25-23 @ 05:30]      Phos  5.2     [08-24-23 @ 20:01]    TPro  4.5  /  Alb  3.3  /  TBili  0.5  /  DBili  x   /  AST  16  /  ALT  22  /  AlkPhos  355  [08-24-23 @ 20:01]            [08-26-23 @ 05:48]    Creatinine Trend:  SCr 3.4 [08-26 @ 05:48]  SCr 3.6 [08-25 @ 17:50]  SCr 3.6 [08-25 @ 05:30]  SCr 4.1 [08-24 @ 20:01]  SCr 3.8 [08-03 @ 09:42]    Urinalysis - [08-26-23 @ 05:48]      Color  / Appearance  / SG  / pH       Gluc 101 / Ketone   / Bili  / Urobili        Blood  / Protein  / Leuk Est  / Nitrite       RBC  / WBC  / Hyaline  / Gran  / Sq Epi  / Non Sq Epi  / Bacteria       PTH -- (Ca 12.3)      [06-23-23 @ 06:11]   12  PTH -- (Ca 12.7)      [04-15-23 @ 08:22]   8  Vitamin D (25OH) 17      [06-23-23 @ 06:11]  TSH 2.27      [04-15-23 @ 08:22]  Lipid: chol 121, , HDL 18, LDL --      [06-22-23 @ 06:00]    HCV 0.11, Nonreact      [09-27-19 @ 08:35]  HIV Nonreact      [09-29-19 @ 17:24]    Free Light Chains: kappa 3.20, lambda 2.82, ratio = 1.13      [06-22 @ 06:00]  Immunofixation Serum:   Weak IgM Kappa Band Identified    Reference Range: None Detected      [06-22-23 @ 06:00]  SPEP Interpretation: Weak Beta-Migrating Paraprotein Identified      [06-22-23 @ 06:00]  Immunofixation Urine: Reference Range: None Detected      [09-27-19 @ 01:35]  UPEP Interpretation: Mild Selective (Glomerular) Proteinuria      [09-27-19 @ 01:35]

## 2023-08-26 NOTE — DISCHARGE NOTE NURSING/CASE MANAGEMENT/SOCIAL WORK - PATIENT PORTAL LINK FT
You can access the FollowMyHealth Patient Portal offered by [facility] by registering at the following website: [email or web address]. By joining [facility]’s FollowMyHealth portal, you will also be able to view your health information using other applications (apps) compatible with our system.

## 2023-08-27 LAB
ALBUMIN SERPL ELPH-MCNC: 3.2 G/DL — LOW (ref 3.5–5.2)
ALP SERPL-CCNC: 293 U/L — HIGH (ref 30–115)
ALT FLD-CCNC: 15 U/L — SIGNIFICANT CHANGE UP (ref 0–41)
ANION GAP SERPL CALC-SCNC: 9 MMOL/L — SIGNIFICANT CHANGE UP (ref 7–14)
AST SERPL-CCNC: 10 U/L — SIGNIFICANT CHANGE UP (ref 0–41)
BILIRUB SERPL-MCNC: 0.4 MG/DL — SIGNIFICANT CHANGE UP (ref 0.2–1.2)
BUN SERPL-MCNC: 40 MG/DL — HIGH (ref 10–20)
CALCIUM SERPL-MCNC: 10.5 MG/DL — SIGNIFICANT CHANGE UP (ref 8.4–10.5)
CHLORIDE SERPL-SCNC: 114 MMOL/L — HIGH (ref 98–110)
CO2 SERPL-SCNC: 19 MMOL/L — SIGNIFICANT CHANGE UP (ref 17–32)
CREAT SERPL-MCNC: 2.8 MG/DL — HIGH (ref 0.7–1.5)
EGFR: 24 ML/MIN/1.73M2 — LOW
GLUCOSE SERPL-MCNC: 82 MG/DL — SIGNIFICANT CHANGE UP (ref 70–99)
POTASSIUM SERPL-MCNC: 4.4 MMOL/L — SIGNIFICANT CHANGE UP (ref 3.5–5)
POTASSIUM SERPL-SCNC: 4.4 MMOL/L — SIGNIFICANT CHANGE UP (ref 3.5–5)
PROT SERPL-MCNC: 4.1 G/DL — LOW (ref 6–8)
SODIUM SERPL-SCNC: 142 MMOL/L — SIGNIFICANT CHANGE UP (ref 135–146)

## 2023-08-27 PROCEDURE — 99232 SBSQ HOSP IP/OBS MODERATE 35: CPT

## 2023-08-27 RX ORDER — ONDANSETRON 8 MG/1
4 TABLET, FILM COATED ORAL ONCE
Refills: 0 | Status: COMPLETED | OUTPATIENT
Start: 2023-08-27 | End: 2023-09-09

## 2023-08-27 RX ADMIN — HEPARIN SODIUM 5000 UNIT(S): 5000 INJECTION INTRAVENOUS; SUBCUTANEOUS at 01:24

## 2023-08-27 RX ADMIN — Medication 2 MILLIGRAM(S): at 11:29

## 2023-08-27 RX ADMIN — HEPARIN SODIUM 5000 UNIT(S): 5000 INJECTION INTRAVENOUS; SUBCUTANEOUS at 11:26

## 2023-08-27 RX ADMIN — FLUPHENAZINE HYDROCHLORIDE 10 MILLIGRAM(S): 1 TABLET, FILM COATED ORAL at 11:29

## 2023-08-27 RX ADMIN — HEPARIN SODIUM 5000 UNIT(S): 5000 INJECTION INTRAVENOUS; SUBCUTANEOUS at 21:09

## 2023-08-27 NOTE — PROGRESS NOTE ADULT - SUBJECTIVE AND OBJECTIVE BOX
ELDA COVARRUBIAS 67y Male  MRN#: [id number]   Hospital Day: 3d    SUBJECTIVE  Patient is a 67y old Male who presents with a chief complaint of Currently admitted to medicine with the primary diagnosis of Hypercalcemia      INTERVAL HPI AND OVERNIGHT EVENTS:  Patient was examined and seen at bedside. This morning he is resting comfortably in bed and reports no issues or overnight events.    REVIEW OF SYMPTOMS:  CONSTITUTIONAL: No weakness, fevers or chills; No headaches  EYES: No visual changes, eye pain, or discharge  ENT: No vertigo; No ear pain or change in hearing; No sore throat or difficulty swallowing  NECK: No pain or stiffness  RESPIRATORY: No cough, wheezing, or hemoptysis; No shortness of breath  CARDIOVASCULAR: No chest pain or palpitations  GASTROINTESTINAL: No abdominal or epigastric pain; No nausea, vomiting, or hematemesis; No diarrhea or constipation; No melena or hematochezia  GENITOURINARY: No dysuria, frequency or hematuria  MUSCULOSKELETAL: No joint pain, no muscle pain, no weakness  NEUROLOGICAL: No numbness or weakness  SKIN: No itching or rashes    OBJECTIVE  PAST MEDICAL & SURGICAL HISTORY  Kidney stones    Schizophrenia    Lymphoma    Shingles    Atrophic kidney    H/O colonoscopy with polypectomy    Liver cyst    History of bladder surgery    H/O neuropathy    History of chemotherapy    Stage 3 chronic kidney disease    Retained urethral stent    H/O umbilical hernia repair    Elbow fracture    H/O cystoscopy      ALLERGIES:  allopurinol (Rash (Moderate))    MEDICATIONS:  STANDING MEDICATIONS  benztropine 2 milliGRAM(s) Oral daily  fluPHENAZine 10 milliGRAM(s) Oral daily  heparin   Injectable 5000 Unit(s) SubCutaneous every 8 hours  sodium chloride 0.9%. 1000 milliLiter(s) IV Continuous <Continuous>    PRN MEDICATIONS      VITAL SIGNS: Last 24 Hours  T(C): 35.9 (27 Aug 2023 05:00), Max: 36.4 (26 Aug 2023 20:06)  T(F): 96.7 (27 Aug 2023 05:00), Max: 97.5 (26 Aug 2023 20:06)  HR: 80 (27 Aug 2023 05:00) (80 - 88)  BP: 124/58 (27 Aug 2023 05:00) (110/60 - 124/58)  BP(mean): --  RR: 19 (27 Aug 2023 05:00) (18 - 19)  SpO2: 95% (26 Aug 2023 20:06) (95% - 95%)    LABS:                        8.0    4.37  )-----------( 154      ( 26 Aug 2023 05:48 )             25.5     08-27    142  |  114<H>  |  40<H>  ----------------------------<  82  4.4   |  19  |  2.8<H>    Ca    10.5      27 Aug 2023 07:30    TPro  4.1<L>  /  Alb  3.2<L>  /  TBili  0.4  /  DBili  x   /  AST  10  /  ALT  15  /  AlkPhos  293<H>  08-27      Urinalysis Basic - ( 27 Aug 2023 07:30 )    Color: x / Appearance: x / SG: x / pH: x  Gluc: 82 mg/dL / Ketone: x  / Bili: x / Urobili: x   Blood: x / Protein: x / Nitrite: x   Leuk Esterase: x / RBC: x / WBC x   Sq Epi: x / Non Sq Epi: x / Bacteria: x    PHYSICAL EXAM:  CONSTITUTIONAL: No acute distress, well-developed, well-groomed, AAOx3  EYES: EOM intact, PERRLA  PULMONARY: Clear to auscultation bilaterally; no wheezes  CARDIOVASCULAR: Regular rate and rhythm; no murmurs  GASTROINTESTINAL: Soft, non-tender, non-distended;  MUSCULOSKELETAL no edema  SKIN: No rashes or lesions    ASSESSMENT & PLAN  67y M PMH Schizophrenia (prior lithium use), nephrolithiasis c/b atrophic L kidney, R ureteral rivision, CKD4 (bsl Cr ~2.5), gout, chronic diarrhea, 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 ASHLEY on CKD.    Hematology and Oncology consulted given hx of Marginal Zone lymphoma.    #Recurrent Hypercalcemia likely 2/2 Lymphoma.  - calcium 13.9 on admission. Trending down currently at 11.8.   - on IV hydration.  -s/p one dose of calcitonin.     #Hx of NHL marginal zone now with ? recurrence.  -He was in a good partial remission following 3 cycles of bendamustine and Rituxan treated in 2019 and 2020.  -He was unable to tolerate maintenance Rituxan.  -However, he was never in complete remission and had preferred just to be observed since we were dealing with a low grade lymphoma.  - was lost to follow up.  --CT Abdomen/Pelvis 2/23/23  --Interval worsening in periesophageal and pelvic lymphadenopathy , other bulky lymph nodes in the abdomen and retroperitoneum appear stable  --1.3 indeterminate segment 4 liver lesion  --Focal areas of pleural nodularity concerning for lymphomatous/neoplastic/metastatic process, new since prior.  --PET scan 5.23: 1.  Interval increase in size and decreased metabolic activity of the thoracoabdominal lymphadenopathy, some are new, probably recurrence /residual disease.   2.  New mildly metabolic 3 cm hypodensity in the liver segment 2/3, concerning for neoplasm or lymphoma.  Poorly defined small hypodensity in the segment 4, below the resolution of PET scan.  MRI will provide further elucidation.   3.  Diffuse mildly increased bone marrow activity, possibly reactive versus bone marrow involvement.   4.  Hepatosplenomegaly.  ---MRI abdomen 7.23:  Liver segment II 4 cm mass and Liver segment V  1.5 cm mass suspicious for Hepatic Lymphomas.  --Biopsy of Liver lesion 8.23: well differentiated Neuroendocrine tumor, Grade 3 Likely  metastatic.   -LDH normal 4.23.    #Hepatic mass on imaging -Biopsy consistent with Well differentiated Neuroendocrine tumor, Grade 3, Likely metastatic. 8.23.    #Chronic diarrhea  -He had colonoscopy in the past which revealed a tubulovillous adenoma.  -Colonoscopy by GI on 4/21/23      Recommendations:  - Calcium much improved, okay to discharge from hem/onc perspective  - will schedule patient for outpatient appointment in one week for blood work and to schedule PET scan

## 2023-08-27 NOTE — PROGRESS NOTE ADULT - ASSESSMENT
CKD  Neuroendocrine tumor  poor PO intak/ dysphagia and diarrhea  ASHLEY  Hypercalcemia    Suggest:  Dietary and Nutrition evals  maintain IVF for now  If recent PET scan is available - can a treatment plan be recommended as pt with multiple admissions for hypercalcemia and not with new sx of poor PO intake and diarrhea

## 2023-08-27 NOTE — PROGRESS NOTE ADULT - ASSESSMENT
67 year old male PMHx Schizophrenia, non-Hodgkin's lymphoma (follows with Dr. Gong),   NET of liver, colon adenocarcinoma, CKD III, recurrent hypercalcemia sent in to the ED for   hypercalcemia. Pt had an appointment with Dr. Gong yesterday morning and was sent to the ED for calcium of 13.6.     # recurrent hypercalcemia 2/2 lymphoma    # CKD4  # lactic acidosis  # non-Hodgkin's lymphoma   # newly diagnosed metastatic neuroendocrine carcinoma to liver ( primary )  # Hx of kidney stones   # Gout     Plans    - c/w IVF  cc/hr  - s/p calcitonin 4 doses, s/p Pamidronate 60 mg one time  - s/p HO eval, ldh, 5hia and chromogranin sent   - repeat Ca   - nephro and HO eval   - no indications for RRT   - dvt ppx  - poor prognosis.   - pending improvement hyper Ca , possible DC today/ AM  from home  67 year old male PMHx Schizophrenia, non-Hodgkin's lymphoma (follows with Dr. Gong),   NET of liver, colon adenocarcinoma, CKD III, recurrent hypercalcemia sent in to the ED for   hypercalcemia. Pt had an appointment with Dr. Gong yesterday morning and was sent to the ED for calcium of 13.6.     # recurrent hypercalcemia 2/2 lymphoma    # CKD4  # lactic acidosis  # non-Hodgkin's lymphoma   # newly diagnosed metastatic neuroendocrine carcinoma to liver ( primary )  # Hx of kidney stones   # Gout     Plans    - Hyper Ca resolving, corrected level today11.1   - s/p IVF   - s/p calcitonin 4 doses, s/p Pamidronate 60 mg one time  - s/p HO eval, ldh, 5hia and chromogranin sent   - planned for discharge but pt developed watery diarrhea and feeling nauseous and wants to stay 24 more hours, no recent abx use, fever or leukocytosis   - nephro and HO eval   - no indications for RRT   - dvt ppx  - poor prognosis.   - dc in 24 hr if diarrhea resolve   from home

## 2023-08-27 NOTE — PROGRESS NOTE ADULT - SUBJECTIVE AND OBJECTIVE BOX
Patient is a 67y old  Male who presents with a chief complaint of     OVERNIGHT EVENTS: remained stable, denies fever, chills, syncope, seizure, headache, cough, SOB, abd pain, N/V/D, urinary symptoms, legs swelling,     SUBJECTIVE / INTERVAL HPI: Patient seen and examined at bedside.     VITAL SIGNS:  Vital Signs Last 24 Hrs  T(C): 35.9 (27 Aug 2023 05:00), Max: 36.4 (26 Aug 2023 20:06)  T(F): 96.7 (27 Aug 2023 05:00), Max: 97.5 (26 Aug 2023 20:06)  HR: 80 (27 Aug 2023 05:00) (80 - 88)  BP: 124/58 (27 Aug 2023 05:00) (110/60 - 124/58)  BP(mean): --  RR: 19 (27 Aug 2023 05:00) (18 - 19)  SpO2: 95% (26 Aug 2023 20:06) (95% - 95%)        PHYSICAL EXAM:    General: not in distress   HEENT: NC/AT; PERRL, clear conjunctiva  Neck: supple  Cardiovascular: +S1/S2; RRR  Respiratory: CTA b/l; no W/R/R  Gastrointestinal: soft, NT/ND; +BSx4  Extremities: WWP; 2+ peripheral pulses; no edema   Neurological: AAOx3; no focal deficits    MEDICATIONS:  MEDICATIONS  (STANDING):  benztropine 2 milliGRAM(s) Oral daily  fluPHENAZine 10 milliGRAM(s) Oral daily  heparin   Injectable 5000 Unit(s) SubCutaneous every 8 hours  sodium chloride 0.9%. 1000 milliLiter(s) (100 mL/Hr) IV Continuous <Continuous>    MEDICATIONS  (PRN):      ALLERGIES:  Allergies    allopurinol (Rash (Moderate))    Intolerances        LABS:                        8.0    4.37  )-----------( 154      ( 26 Aug 2023 05:48 )             25.5     08-26    136  |  108  |  47<H>  ----------------------------<  101<H>  4.1   |  18  |  3.4<H>    Ca    11.8<H>      26 Aug 2023 05:48        Urinalysis Basic - ( 26 Aug 2023 05:48 )    Color: x / Appearance: x / SG: x / pH: x  Gluc: 101 mg/dL / Ketone: x  / Bili: x / Urobili: x   Blood: x / Protein: x / Nitrite: x   Leuk Esterase: x / RBC: x / WBC x   Sq Epi: x / Non Sq Epi: x / Bacteria: x      CAPILLARY BLOOD GLUCOSE          RADIOLOGY & ADDITIONAL TESTS: Reviewed.    ASSESSMENT:    PLAN:  Patient is a 67y old  Male who presents with a chief complaint of     OVERNIGHT EVENTS: remained stable, but reports nausea and 4 x watery diarrhea, denies fever, chills, syncope, seizure, headache, cough, SOB, abd pain,, urinary symptoms, legs swelling,     SUBJECTIVE / INTERVAL HPI: Patient seen and examined at bedside.     VITAL SIGNS:  Vital Signs Last 24 Hrs  T(C): 35.9 (27 Aug 2023 05:00), Max: 36.4 (26 Aug 2023 20:06)  T(F): 96.7 (27 Aug 2023 05:00), Max: 97.5 (26 Aug 2023 20:06)  HR: 80 (27 Aug 2023 05:00) (80 - 88)  BP: 124/58 (27 Aug 2023 05:00) (110/60 - 124/58)  BP(mean): --  RR: 19 (27 Aug 2023 05:00) (18 - 19)  SpO2: 95% (26 Aug 2023 20:06) (95% - 95%)        PHYSICAL EXAM:    General: not in distress   HEENT: NC/AT; PERRL, clear conjunctiva  Neck: supple  Cardiovascular: +S1/S2; RRR  Respiratory: CTA b/l; no W/R/R  Gastrointestinal: soft, NT/ND; +BSx4  Extremities: WWP; 2+ peripheral pulses; no edema   Neurological: AAOx3; no focal deficits    MEDICATIONS:  MEDICATIONS  (STANDING):  benztropine 2 milliGRAM(s) Oral daily  fluPHENAZine 10 milliGRAM(s) Oral daily  heparin   Injectable 5000 Unit(s) SubCutaneous every 8 hours  sodium chloride 0.9%. 1000 milliLiter(s) (100 mL/Hr) IV Continuous <Continuous>    MEDICATIONS  (PRN):      ALLERGIES:  Allergies    allopurinol (Rash (Moderate))    Intolerances        LABS:                        8.0    4.37  )-----------( 154      ( 26 Aug 2023 05:48 )             25.5     08-26    136  |  108  |  47<H>  ----------------------------<  101<H>  4.1   |  18  |  3.4<H>    Ca    11.8<H>      26 Aug 2023 05:48        Urinalysis Basic - ( 26 Aug 2023 05:48 )    Color: x / Appearance: x / SG: x / pH: x  Gluc: 101 mg/dL / Ketone: x  / Bili: x / Urobili: x   Blood: x / Protein: x / Nitrite: x   Leuk Esterase: x / RBC: x / WBC x   Sq Epi: x / Non Sq Epi: x / Bacteria: x      CAPILLARY BLOOD GLUCOSE          RADIOLOGY & ADDITIONAL TESTS: Reviewed.    ASSESSMENT:    PLAN:

## 2023-08-27 NOTE — PROGRESS NOTE ADULT - SUBJECTIVE AND OBJECTIVE BOX
[facility] FOLLOW UP NOTE  --------------------------------------------------------------------------------  24 hour events/subjective:  Pt will poor appetite and diarrhea      PAST HISTORY  --------------------------------------------------------------------------------  No significant changes to PMH, PSH, FHx, SHx, unless otherwise noted    ALLERGIES & MEDICATIONS  --------------------------------------------------------------------------------  Allergies    allopurinol (Rash (Moderate))    Intolerances      Standing Inpatient Medications  benztropine 2 milliGRAM(s) Oral daily  fluPHENAZine 10 milliGRAM(s) Oral daily  heparin   Injectable 5000 Unit(s) SubCutaneous every 8 hours    PRN Inpatient Medications  ondansetron Injectable 4 milliGRAM(s) IV Push once PRN      REVIEW OF SYSTEMS  -------------------------------------------------------------------------------  All other systems were reviewed and are negative, except as noted.    VITALS/PHYSICAL EXAM  --------------------------------------------------------------------------------  T(C): 35.9 (08-27-23 @ 05:00), Max: 36.4 (08-26-23 @ 20:06)  HR: 80 (08-27-23 @ 05:00) (80 - 88)  BP: 124/58 (08-27-23 @ 05:00) (117/55 - 124/58)  RR: 19 (08-27-23 @ 05:00) (18 - 19)  SpO2: 95% (08-26-23 @ 20:06) (95% - 95%)  Wt(kg): --      Physical Exam:  	Gen: NAD  	HEENT: No JVD  	Pulm: CTA B/L  	CV: RRR,   	Abd: +BS, soft, nontender/nondistended  	No edema    LABS/STUDIES  --------------------------------------------------------------------------------              8.0    4.37  >-----------<  154      [08-26-23 @ 05:48]              25.5     142  |  114  |  40  ----------------------------<  82      [08-27-23 @ 07:30]  4.4   |  19  |  2.8        Ca     10.5     [08-27-23 @ 07:30]    TPro  4.1  /  Alb  3.2  /  TBili  0.4  /  DBili  x   /  AST  10  /  ALT  15  /  AlkPhos  293  [08-27-23 @ 07:30]          [08-26-23 @ 15:46]      Creatinine Trend:  SCr 2.8 [08-27 @ 07:30]  SCr 3.4 [08-26 @ 05:48]  SCr 3.6 [08-25 @ 17:50]  SCr 3.6 [08-25 @ 05:30]  SCr 4.1 [08-24 @ 20:01]    Urinalysis - [08-27-23 @ 07:30]      Color  / Appearance  / SG  / pH       Gluc 82 / Ketone   / Bili  / Urobili        Blood  / Protein  / Leuk Est  / Nitrite       RBC  / WBC  / Hyaline  / Gran  / Sq Epi  / Non Sq Epi  / Bacteria       PTH -- (Ca 12.3)      [06-23-23 @ 06:11]   12  PTH -- (Ca 12.7)      [04-15-23 @ 08:22]   8  Vitamin D (25OH) 17      [06-23-23 @ 06:11] - 1,25 Vit D 170  TSH 2.27      [04-15-23 @ 08:22]  Lipid: chol 121, , HDL 18, LDL --      [06-22-23 @ 06:00]    Outpatient MRI reporst comparison to an outside PET scan so it appears PET scan already done?

## 2023-08-28 LAB
ALBUMIN SERPL ELPH-MCNC: 3.1 G/DL — LOW (ref 3.5–5.2)
ALP SERPL-CCNC: 314 U/L — HIGH (ref 30–115)
ALT FLD-CCNC: 14 U/L — SIGNIFICANT CHANGE UP (ref 0–41)
ANION GAP SERPL CALC-SCNC: 9 MMOL/L — SIGNIFICANT CHANGE UP (ref 7–14)
ANISOCYTOSIS BLD QL: SLIGHT — SIGNIFICANT CHANGE UP
AST SERPL-CCNC: 13 U/L — SIGNIFICANT CHANGE UP (ref 0–41)
BASO STIPL BLD QL SMEAR: PRESENT — SIGNIFICANT CHANGE UP
BASOPHILS # BLD AUTO: 0.1 K/UL — SIGNIFICANT CHANGE UP (ref 0–0.2)
BASOPHILS NFR BLD AUTO: 2.5 % — HIGH (ref 0–1)
BILIRUB SERPL-MCNC: 0.3 MG/DL — SIGNIFICANT CHANGE UP (ref 0.2–1.2)
BUN SERPL-MCNC: 38 MG/DL — HIGH (ref 10–20)
CALCIUM SERPL-MCNC: 11.5 MG/DL — HIGH (ref 8.4–10.5)
CHLORIDE SERPL-SCNC: 112 MMOL/L — HIGH (ref 98–110)
CO2 SERPL-SCNC: 19 MMOL/L — SIGNIFICANT CHANGE UP (ref 17–32)
CREAT SERPL-MCNC: 2.8 MG/DL — HIGH (ref 0.7–1.5)
EGFR: 24 ML/MIN/1.73M2 — LOW
EOSINOPHIL # BLD AUTO: 1.02 K/UL — HIGH (ref 0–0.7)
EOSINOPHIL NFR BLD AUTO: 25.3 % — HIGH (ref 0–8)
GI PCR PANEL: SIGNIFICANT CHANGE UP
GIANT PLATELETS BLD QL SMEAR: PRESENT — SIGNIFICANT CHANGE UP
GLUCOSE SERPL-MCNC: 91 MG/DL — SIGNIFICANT CHANGE UP (ref 70–99)
HCT VFR BLD CALC: 25.3 % — LOW (ref 42–52)
HGB BLD-MCNC: 7.9 G/DL — LOW (ref 14–18)
HYPOCHROMIA BLD QL: SLIGHT — SIGNIFICANT CHANGE UP
LYMPHOCYTES # BLD AUTO: 1.07 K/UL — LOW (ref 1.2–3.4)
LYMPHOCYTES # BLD AUTO: 26.6 % — SIGNIFICANT CHANGE UP (ref 20.5–51.1)
MAGNESIUM SERPL-MCNC: 2.1 MG/DL — SIGNIFICANT CHANGE UP (ref 1.8–2.4)
MANUAL SMEAR VERIFICATION: SIGNIFICANT CHANGE UP
MCHC RBC-ENTMCNC: 27.6 PG — SIGNIFICANT CHANGE UP (ref 27–31)
MCHC RBC-ENTMCNC: 31.2 G/DL — LOW (ref 32–37)
MCV RBC AUTO: 88.5 FL — SIGNIFICANT CHANGE UP (ref 80–94)
MICROCYTES BLD QL: SLIGHT — SIGNIFICANT CHANGE UP
MONOCYTES # BLD AUTO: 0.66 K/UL — HIGH (ref 0.1–0.6)
MONOCYTES NFR BLD AUTO: 16.5 % — HIGH (ref 1.7–9.3)
NEUTROPHILS # BLD AUTO: 1.17 K/UL — LOW (ref 1.4–6.5)
NEUTROPHILS NFR BLD AUTO: 29.1 % — LOW (ref 42.2–75.2)
OVALOCYTES BLD QL SMEAR: SLIGHT — SIGNIFICANT CHANGE UP
PLAT MORPH BLD: NORMAL — SIGNIFICANT CHANGE UP
PLATELET # BLD AUTO: 140 K/UL — SIGNIFICANT CHANGE UP (ref 130–400)
PMV BLD: 11.4 FL — HIGH (ref 7.4–10.4)
POIKILOCYTOSIS BLD QL AUTO: SLIGHT — SIGNIFICANT CHANGE UP
POTASSIUM SERPL-MCNC: 4.4 MMOL/L — SIGNIFICANT CHANGE UP (ref 3.5–5)
POTASSIUM SERPL-SCNC: 4.4 MMOL/L — SIGNIFICANT CHANGE UP (ref 3.5–5)
PROT SERPL-MCNC: 4.2 G/DL — LOW (ref 6–8)
RBC # BLD: 2.86 M/UL — LOW (ref 4.7–6.1)
RBC # FLD: 15.8 % — HIGH (ref 11.5–14.5)
RBC BLD AUTO: ABNORMAL
SMUDGE CELLS # BLD: PRESENT — SIGNIFICANT CHANGE UP
SODIUM SERPL-SCNC: 140 MMOL/L — SIGNIFICANT CHANGE UP (ref 135–146)
WBC # BLD: 4.03 K/UL — LOW (ref 4.8–10.8)
WBC # FLD AUTO: 4.03 K/UL — LOW (ref 4.8–10.8)

## 2023-08-28 PROCEDURE — 99232 SBSQ HOSP IP/OBS MODERATE 35: CPT

## 2023-08-28 RX ORDER — SODIUM CHLORIDE 9 MG/ML
1000 INJECTION INTRAMUSCULAR; INTRAVENOUS; SUBCUTANEOUS
Refills: 0 | Status: DISCONTINUED | OUTPATIENT
Start: 2023-08-28 | End: 2023-09-04

## 2023-08-28 RX ADMIN — Medication 2 MILLIGRAM(S): at 11:09

## 2023-08-28 RX ADMIN — SODIUM CHLORIDE 100 MILLILITER(S): 9 INJECTION INTRAMUSCULAR; INTRAVENOUS; SUBCUTANEOUS at 14:32

## 2023-08-28 RX ADMIN — HEPARIN SODIUM 5000 UNIT(S): 5000 INJECTION INTRAVENOUS; SUBCUTANEOUS at 11:09

## 2023-08-28 RX ADMIN — FLUPHENAZINE HYDROCHLORIDE 10 MILLIGRAM(S): 1 TABLET, FILM COATED ORAL at 11:09

## 2023-08-28 RX ADMIN — HEPARIN SODIUM 5000 UNIT(S): 5000 INJECTION INTRAVENOUS; SUBCUTANEOUS at 02:31

## 2023-08-28 RX ADMIN — HEPARIN SODIUM 5000 UNIT(S): 5000 INJECTION INTRAVENOUS; SUBCUTANEOUS at 17:18

## 2023-08-28 NOTE — PROGRESS NOTE ADULT - SUBJECTIVE AND OBJECTIVE BOX
24H events:    Patient is a 67y old Male who presents with a chief complaint of   Primary diagnosis of Hypercalcemia        Today is hospital day 4d. This morning patient was seen and examined at bedside, resting comfortably in bed.  Pt mentions that he had 2 episodes of diarrhea overnight and 1 episode of vomiting yesterday. Per pt, he is tolerated PO intake well this morning. Pt follows up with Dr. Farideh verma. He reports following with a Dr. Sherman (per pt, Dr. Sherman is a resident at [facility]), every 2 months. He does not report any barriers to following up with his outpt appointments.        PAST MEDICAL & SURGICAL HISTORY  Kidney stones    Schizophrenia    Lymphoma    Shingles    Atrophic kidney    H/O colonoscopy with polypectomy    Liver cyst    History of bladder surgery    H/O neuropathy    History of chemotherapy    Stage 3 chronic kidney disease    Retained urethral stent    H/O umbilical hernia repair    Elbow fracture    H/O cystoscopy      SOCIAL HISTORY:  Social History:      ALLERGIES:  allopurinol (Rash (Moderate))    MEDICATIONS:  STANDING MEDICATIONS  benztropine 2 milliGRAM(s) Oral daily  fluPHENAZine 10 milliGRAM(s) Oral daily  heparin   Injectable 5000 Unit(s) SubCutaneous every 8 hours    PRN MEDICATIONS  ondansetron Injectable 4 milliGRAM(s) IV Push once PRN    VITALS:   T(F): 96  HR: 78  BP: 107/55  RR: 18  SpO2: --    PHYSICAL EXAM:  GENERAL:   ( x ) NAD, lying in bed comfortably     (  ) obtunded     (  ) lethargic     (  ) somnolent    HEAD:   ( x ) Atraumatic     (  ) hematoma     (  ) laceration (specify location:       )     NECK:  ( x ) Supple     (  ) neck stiffness     (  ) nuchal rigidity     (  )  no JVD     (  ) JVD present ( -- cm)    HEART:  Rate -->     ( x ) normal rate     (  ) bradycardic     (  ) tachycardic  Rhythm -->     ( x ) regular     (  ) regularly irregular     (  ) irregularly irregular  Murmurs -->     (  x) normal s1s2     (  ) systolic murmur     (  ) diastolic murmur     (  ) continuous murmur      (  ) S3 present     (  ) S4 present    LUNGS:   ( x )Unlabored respirations     (  ) tachypnea  (x  ) B/L air entry     (  ) decreased breath sounds in:  (location     )    (  ) no adventitious sound     (  ) crackles     (  ) wheezing      (  ) rhonchi      (specify location:       )  (  ) chest wall tenderness (specify location:       )    ABDOMEN:   ( x ) Soft     (  ) tense   |   (  ) nondistended     (  ) distended   |   (  ) +BS     (  ) hypoactive bowel sounds     (  ) hyperactive bowel sounds  (  ) nontender     (  ) RUQ tenderness     (  ) RLQ tenderness     (  ) LLQ tenderness     (  ) epigastric tenderness     (  ) diffuse tenderness  (  ) Splenomegaly      (  ) Hepatomegaly      (  ) Jaundice     (  ) ecchymosis     EXTREMITIES:  (  ) Normal     (  ) Rash     (  ) ecchymosis     (  ) varicose veins      (  ) pitting edema     (  ) non-pitting edema   (  ) ulceration     (  ) gangrene:     (location:     )    NERVOUS SYSTEM:    ( x ) A&Ox3     (  ) confused     (  ) lethargic  CN II-XII:     (  ) Intact     (  ) deficits found     (Specify:     )   Upper extremities:     (  ) no sensorimotor deficits     (  ) weakness     (  ) loss of proprioception/vibration     (  ) loss of touch/temperature (specify:    )  Lower extremities:     (  ) no sensorimotor deficits     (  ) weakness     (  ) loss of proprioception/vibration     (  ) loss of touch/temperature (specify:    )    SKIN:   (  ) No rashes or lesions     (  ) maculopapular rash     (  ) pustules     (  ) vesicles     (  ) ulcer     (  ) ecchymosis     (specify location:     )    AMPAC score:    (  ) [place]welling [place] Catheter:   Date insterted:    Reason (  ) Critical illness     (  ) urinary retention    (  ) Accurate Ins/Outs Monitoring     (  ) CMO patient    (  ) Central Line:   Date inserted:  Location: (  ) Right IJ     (  ) Left IJ     (  ) Right Fem     (  ) Left Fem    (  ) SPC        (  ) pigtail       (  ) PEG tube       (  ) colostomy       (  ) jejunostomy  (  ) U-Dall    LABS:                        7.9    4.03  )-----------( 140      ( 28 Aug 2023 07:52 )             25.3     08-28    140  |  112<H>  |  38<H>  ----------------------------<  91  4.4   |  19  |  2.8<H>    Ca    11.5<H>      28 Aug 2023 07:52  Mg     2.1     08-28    TPro  4.2<L>  /  Alb  3.1<L>  /  TBili  0.3  /  DBili  x   /  AST  13  /  ALT  14  /  AlkPhos  314<H>  08-28      Urinalysis Basic - ( 28 Aug 2023 07:52 )    Color: x / Appearance: x / SG: x / pH: x  Gluc: 91 mg/dL / Ketone: x  / Bili: x / Urobili: x   Blood: x / Protein: x / Nitrite: x   Leuk Esterase: x / RBC: x / WBC x   Sq Epi: x / Non Sq Epi: x / Bacteria: x                RADIOLOGY:

## 2023-08-28 NOTE — PROGRESS NOTE ADULT - SUBJECTIVE AND OBJECTIVE BOX
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 ASHLEY on CKD.    Hematology and Oncology consulted given hx of Marginal Zone lymphoma.    #Recurrent Hypercalcemia likely 2/2 Lymphoma.  - calcium 13.9 on admission. Trending down currently at 11.8.   - on IV hydration.  -s/p one dose of calcitonin.     #Hx of NHL marginal zone now with ? recurrence.  -He was in a good partial remission following 3 cycles of bendamustine and Rituxan treated in 2019 and 2020.  -He was unable to tolerate maintenance Rituxan.  -However, he was never in complete remission and had preferred just to be observed since we were dealing with a low grade lymphoma.  - was lost to follow up.  --CT Abdomen/Pelvis 2/23/23  --Interval worsening in periesophageal and pelvic lymphadenopathy , other bulky lymph nodes in the abdomen and retroperitoneum appear stable  --1.3 indeterminate segment 4 liver lesion  --Focal areas of pleural nodularity concerning for lymphomatous/neoplastic/metastatic process, new since prior.  --PET scan 5.23: 1.  Interval increase in size and decreased metabolic activity of the thoracoabdominal lymphadenopathy, some are new, probably recurrence /residual disease.   2.  New mildly metabolic 3 cm hypodensity in the liver segment 2/3, concerning for neoplasm or lymphoma.  Poorly defined small hypodensity in the segment 4, below the resolution of PET scan.  MRI will provide further elucidation.   3.  Diffuse mildly increased bone marrow activity, possibly reactive versus bone marrow involvement.   4.  Hepatosplenomegaly.  ---MRI abdomen 7.23:  Liver segment II 4 cm mass and Liver segment V  1.5 cm mass suspicious for Hepatic Lymphomas.  --Biopsy of Liver lesion 8.23: well differentiated Neuroendocrine tumor, Grade 3 Likely  metastatic.   -LDH normal 4.23.    #Hepatic mass on imaging -Biopsy consistent with Well differentiated Neuroendocrine tumor, Grade 3, Likely metastatic. 8.23.    #Chronic diarrhea  -He had colonoscopy in the past which revealed a tubulovillous adenoma.  -Colonoscopy by GI on 4/21/23      Recommendations:  - Calcium increasing today, would recommend to start IV Fluids-  cc/hr.  - If Calcium not improving in 1-2 days will consider additional treatment with denosumab  - will schedule patient for outpatient appointment in one week for blood work and to schedule PET scan

## 2023-08-28 NOTE — PROGRESS NOTE ADULT - SUBJECTIVE AND OBJECTIVE BOX
Nephrology progress note     decribes weakness    ON events/Subjective:     Allergies:  allopurinol (Rash (Moderate))    Hospital Medications:   MEDICATIONS  (STANDING):  benztropine 2 milliGRAM(s) Oral daily  fluPHENAZine 10 milliGRAM(s) Oral daily  heparin   Injectable 5000 Unit(s) SubCutaneous every 8 hours    REVIEW OF SYSTEMS:  CONSTITUTIONAL: No weakness, fevers or chills  EYES/ENT: No visual changes;  No vertigo or throat pain   NECK: No pain or stiffness  RESPIRATORY: No cough, wheezing, hemoptysis; No shortness of breath  CARDIOVASCULAR: No chest pain or palpitations.  GASTROINTESTINAL: No abdominal or epigastric pain. No nausea, vomiting, or hematemesis; No diarrhea or constipation. No melena or hematochezia.  GENITOURINARY: No dysuria, frequency, foamy urine, urinary urgency, incontinence or hematuria  NEUROLOGICAL: No numbness or weakness  SKIN: No itching, burning, rashes, or lesions   VASCULAR: No bilateral lower extremity edema.   All other review of systems is negative unless indicated above.    VITALS:  T(F): 96 (08-28-23 @ 05:46), Max: 96 (08-28-23 @ 05:46)  HR: 78 (08-28-23 @ 05:46)  BP: 107/55 (08-28-23 @ 05:46)  RR: 18 (08-28-23 @ 05:46)  SpO2: --  Wt(kg): --      PHYSICAL EXAM:  Constitutional: NAD  HEENT: anicteric sclera, oropharynx clear, MMM  Neck: No JVD  Respiratory: CTAB, no wheezes, rales or rhonchi  Cardiovascular: S1, S2, RRR  Gastrointestinal: BS+, soft, NT/ND  Extremities: No cyanosis or clubbing. No peripheral edema  Neurological: A/O x 3, no focal deficits  Psychiatric: Normal mood, normal affect  : No CVA tenderness. No zafar.   Skin: No rashes  Vascular Access:    LABS:  08-28    140  |  112<H>  |  38<H>  ----------------------------<  91  4.4   |  19  |  2.8<H>    Ca    11.5<H>      28 Aug 2023 07:52  Mg     2.1     08-28    TPro  4.2<L>  /  Alb  3.1<L>  /  TBili  0.3  /  DBili      /  AST  13  /  ALT  14  /  AlkPhos  314<H>  08-28                          7.9    4.03  )-----------( 140      ( 28 Aug 2023 07:52 )             25.3       Urine Studies:  Creatinine Trend: 2.8<--, 2.8<--, 3.4<--, 3.6<--, 3.6<--, 4.1<--  Urinalysis Basic - ( 28 Aug 2023 07:52 )    Color:  / Appearance:  / SG:  / pH:   Gluc: 91 mg/dL / Ketone:   / Bili:  / Urobili:    Blood:  / Protein:  / Nitrite:    Leuk Esterase:  / RBC:  / WBC    Sq Epi:  / Non Sq Epi:  / Bacteria:     ·	CKD stage 4 - has atrophic kidney and other kidney with ureteral stricture - now close to baseline  ·	lymphoma and now new neuroendocrine tumor on liver biopsy  ·	hypercalcemia - appears driven by vit D 1,25 -   ·	has not responded well to pamidronate as calcium level increasing again  ·	chronic loose sttols - causimg more dehydration  ·	anemia  ·	BP stable  ·	low/normal  ·	d/w pt per advanced directives and uncertain at this time     1) uncertain as to how to lower calcium load as received pamidronate already.  possible utility in another agent such as prolia but uncertain of efficacy - suggest endocrine input.  For now restart NS at 100 cc/hr  2) d/w heme fellow - they will follow  3) suggest to repeat hypercalcemia markers - Vit D 25, Vit D 1,25, PTH, PTH Rp  4) am labs  d/w pt and medicine team  d/w housestaff

## 2023-08-28 NOTE — PROGRESS NOTE ADULT - ASSESSMENT
67 year old male PMHx Schizophrenia, non-Hodgkin's lymphoma (follows with Dr. Gong), NET of liver and colon, CKD III, recurrent hypercalcemia sent in to the ED for calcium of  hypercalcemia (13.6).     # Recurrent hypercalcemia   # Non-Hodgkin's Lymphoma  - likely hypercalcemia of malignancy   - Ca 13.3 on admission  - s/p 2L NS bolus, calcitonin x4, pamidronate x1  - s/p IVF  - f/u heme/onc    # NET liver and colon  # Colon adenoma     # Anemia of chronic disease  - Hgb 8.4  - active T&S, monitor CBC    # ASHLEY on CKD vs progressing CKD  - Ca 13.3,   - avoid bisphosphonates given CKD    # Schizoaffective disorder  - c/w Fluphenazine, Benztropine    #DVT ppx: heparin   #GI ppx: protonix  #Diet: DASH/TLC  #Dispo: Medicine    67 year old male PMHx Schizophrenia, non-Hodgkin's lymphoma (follows with Dr. Gong), NET of liver and colon, CKD III, recurrent hypercalcemia sent in to the ED for calcium of  hypercalcemia (13.6).       # Recurrent hypercalcemia   # Non-Hodgkin's Lymphoma  # NET liver and colon  # Colon adenoma  - Ca 13.3 on admission - downtrended to 11.5 this am  - likely hypercalcemia of malignancy  - continues to complain of diarrhea  - s/p 2L NS bolus, calcitonin x4, pamidronate x1  - s/p IVF, tolerating PO intake this morning  - pt was in a good partial remission following 3 cycles of bendamustine and Rituxan treated in 2019 and 2020, unable to tolerate maintenance Rituxan, pt never in complete remission and preferred to be observed due to low grade lymphoma, then lost to follow up per heme/onc. Pt currently does not report any barriers to receiving medical care.  -  wnl  - 5-HIAA, chromogranin pending  - f/u heme/onc recommendations-recommending outpt PET scan  - r/o infectious f/u GI stool PCR, stool culture      #CKD4  - Ca 13.3, alk phos 355 on admission  - s/p IVF  - avoid bisphosphonates given CKD  - appreciate nephro reccs-will need PET scan for further treatment recommendations      # Schizoaffective disorder  - c/w Fluphenazine, Benztropine      #DVT ppx: heparin SQ  #GI ppx: protonix  #Diet: DASH/TLC  #Dispo: Medicine 67 year old male PMHx Schizophrenia, non-Hodgkin's lymphoma (follows with Dr. Gong), NET of liver and colon, CKD III, recurrent hypercalcemia sent in to the ED for calcium of  hypercalcemia (13.6).       # Recurrent hypercalcemia   # Non-Hodgkin's Lymphoma  # NET liver and colon  # Colon adenoma  - Ca 13.3 on admission - downtrended to 11.5 this am  - likely hypercalcemia of malignancy  - continues to complain of diarrhea  - s/p 2L NS bolus, calcitonin x4, pamidronate x1  - s/p IVF, tolerating PO intake this morning  - pt was in a good partial remission following 3 cycles of bendamustine and Rituxan treated in 2019 and 2020, unable to tolerate maintenance Rituxan, pt never in complete remission and preferred to be observed due to low grade lymphoma, then lost to follow up per heme/onc. Pt currently does not report any barriers to receiving medical care.  -  wnl  - 5-HIAA, chromogranin pending  - f/u Vitamin D and PTH levels  - f/u heme/onc recommendations-recommending outpt PET scan  - r/o infectious f/u GI stool PCR, stool culture      #CKD4  - Ca 13.3, alk phos 355 on admission  - s/p IVF  - avoid bisphosphonates given CKD  - appreciate nephro reccs-will need PET scan for further treatment recommendations      # Schizoaffective disorder  - c/w Fluphenazine, Benztropine      #DVT ppx: heparin SQ  #GI ppx: protonix  #Diet: DASH/TLC  #Dispo: Medicine 67 year old male PMHx Schizophrenia, non-Hodgkin's lymphoma (follows with Dr. Gong), NET of liver and colon, CKD III, recurrent hypercalcemia sent in to the ED for calcium of  hypercalcemia (13.6).       # Recurrent hypercalcemia   # Non-Hodgkin's Lymphoma  # NET liver and colon  # Colon adenoma  #CKD4  - Ca 13.3 on admission - 11.5 this am (corrected to 12.2)  - likely hypercalcemia of malignancy  - continues to complain of diarrhea  - s/p 2L NS bolus, calcitonin x4, pamidronate x1  - s/p IVF, tolerating PO intake this morning  - pt was in a good partial remission following 3 cycles of bendamustine and Rituxan treated in 2019 and 2020, unable to tolerate maintenance Rituxan, pt never in complete remission and preferred to be observed due to low grade lymphoma, then lost to follow up per heme/onc. Pt currently does not report any barriers to receiving medical care.  -  wnl  - 5-HIAA, chromogranin pending  - r/o infectious f/u GI stool PCR, stool culture  - heme/onc following  - appreciate nephro reccs- c/w NS@100, repeat hypercalcemia markers (f/u Vitamin D and PTH levels)  - consulted endocrine for persistent hypercalcemia for recommendations as pt not responding to pamidronate      # Schizoaffective disorder  - c/w Fluphenazine, Benztropine      #DVT ppx: heparin SQ  #GI ppx: protonix  #Diet: DASH/TLC  #Dispo: Medicine 67 year old male PMHx Schizophrenia, non-Hodgkin's lymphoma (follows with Dr. Gong), NET of liver and colon, CKD III, recurrent hypercalcemia sent in to the ED for calcium of  hypercalcemia (13.6).       # Recurrent hypercalcemia   # Non-Hodgkin's Lymphoma  # NET liver and colon  # Colon adenoma  #CKD4  - Ca 13.3 on admission - 11.5 this am (corrected to 12.2)  - likely hypercalcemia of malignancy  - continues to complain of diarrhea  - s/p 2L NS bolus, calcitonin x4, pamidronate x1  - s/p IVF, tolerating PO intake this morning  - pt was in a good partial remission following 3 cycles of bendamustine and Rituxan treated in 2019 and 2020, unable to tolerate maintenance Rituxan, pt never in complete remission and preferred to be observed due to low grade lymphoma, then lost to follow up per heme/onc. Pt currently does not report any barriers to receiving medical care.  -  wnl  - 5-HIAA, chromogranin pending  - f/u GI stool PCR, stool culture to r/o infectious cause of diarrhea  - heme/onc following-f/u reccs on hypercalcemia management  - appreciate nephro reccs- c/w NS@100cc/hr, repeat hypercalcemia markers (f/u Vitamin D and PTH levels)  - consulted endocrine for persistent hypercalcemia for recommendations as pt not responding to pamidronate      # Schizoaffective disorder  - c/w Fluphenazine, Benztropine      #DVT ppx: heparin SQ  #GI ppx: protonix  #Diet: DASH/TLC  #Dispo: Medicine 67 year old male PMHx Schizophrenia, non-Hodgkin's lymphoma (follows with Dr. Gong), NET of liver and colon, CKD III, recurrent hypercalcemia sent in to the ED for calcium of  hypercalcemia (13.6).       # Recurrent hypercalcemia   # Non-Hodgkin's Lymphoma  # NET liver and colon  # Colon adenoma  #CKD4  - Ca 13.3 on admission - 11.5 this am (corrected to 12.2)  - likely hypercalcemia of malignancy  - continues to complain of diarrhea  - s/p 2L NS bolus, calcitonin x4, pamidronate x1  - s/p IVF, tolerating PO intake this morning  - pt was in a good partial remission following 3 cycles of bendamustine and Rituxan treated in 2019 and 2020, unable to tolerate maintenance Rituxan, pt never in complete remission and preferred to be observed due to low grade lymphoma, then lost to follow up per heme/onc. Pt currently does not report any barriers to receiving medical care.  -  wnl  - 5-HIAA, chromogranin pending  - f/u GI stool PCR, stool culture to r/o infectious cause of diarrhea  - heme/onc following-f/u reccs on hypercalcemia management  - appreciate nephro reccs- c/w NS@100cc/hr, repeat hypercalcemia markers (f/u Vitamin D and PTH levels)  - consulted endocrine for persistent hypercalcemia for recommendations as pt not responding to pamidronate      # Schizoaffective disorder  - c/w Fluphenazine, Benztropine      #DVT ppx: heparin SQ  #GI ppx: protonix  #Diet: DASH/TLC

## 2023-08-29 LAB
24R-OH-CALCIDIOL SERPL-MCNC: 16 NG/ML — LOW (ref 30–80)
ALBUMIN SERPL ELPH-MCNC: 3.2 G/DL — LOW (ref 3.5–5.2)
ALP SERPL-CCNC: 318 U/L — HIGH (ref 30–115)
ALT FLD-CCNC: 15 U/L — SIGNIFICANT CHANGE UP (ref 0–41)
ANION GAP SERPL CALC-SCNC: 8 MMOL/L — SIGNIFICANT CHANGE UP (ref 7–14)
AST SERPL-CCNC: 14 U/L — SIGNIFICANT CHANGE UP (ref 0–41)
BASOPHILS # BLD AUTO: 0.02 K/UL — SIGNIFICANT CHANGE UP (ref 0–0.2)
BASOPHILS NFR BLD AUTO: 0.4 % — SIGNIFICANT CHANGE UP (ref 0–1)
BILIRUB SERPL-MCNC: 0.4 MG/DL — SIGNIFICANT CHANGE UP (ref 0.2–1.2)
BUN SERPL-MCNC: 33 MG/DL — HIGH (ref 10–20)
CALCIUM SERPL-MCNC: 12.1 MG/DL — HIGH (ref 8.4–10.5)
CGA FLD-MCNC: 1071 NG/ML — HIGH (ref 0–101.8)
CHLORIDE SERPL-SCNC: 113 MMOL/L — HIGH (ref 98–110)
CO2 SERPL-SCNC: 19 MMOL/L — SIGNIFICANT CHANGE UP (ref 17–32)
CREAT SERPL-MCNC: 2.8 MG/DL — HIGH (ref 0.7–1.5)
EGFR: 24 ML/MIN/1.73M2 — LOW
EOSINOPHIL # BLD AUTO: 0.97 K/UL — HIGH (ref 0–0.7)
EOSINOPHIL NFR BLD AUTO: 20.7 % — HIGH (ref 0–8)
GLUCOSE SERPL-MCNC: 100 MG/DL — HIGH (ref 70–99)
HCT VFR BLD CALC: 24.8 % — LOW (ref 42–52)
HGB BLD-MCNC: 7.8 G/DL — LOW (ref 14–18)
IMM GRANULOCYTES NFR BLD AUTO: 0.6 % — HIGH (ref 0.1–0.3)
LYMPHOCYTES # BLD AUTO: 1.4 K/UL — SIGNIFICANT CHANGE UP (ref 1.2–3.4)
LYMPHOCYTES # BLD AUTO: 29.9 % — SIGNIFICANT CHANGE UP (ref 20.5–51.1)
MAGNESIUM SERPL-MCNC: 2.1 MG/DL — SIGNIFICANT CHANGE UP (ref 1.8–2.4)
MCHC RBC-ENTMCNC: 28 PG — SIGNIFICANT CHANGE UP (ref 27–31)
MCHC RBC-ENTMCNC: 31.5 G/DL — LOW (ref 32–37)
MCV RBC AUTO: 88.9 FL — SIGNIFICANT CHANGE UP (ref 80–94)
MONOCYTES # BLD AUTO: 1.21 K/UL — HIGH (ref 0.1–0.6)
MONOCYTES NFR BLD AUTO: 25.9 % — HIGH (ref 1.7–9.3)
NEUTROPHILS # BLD AUTO: 1.05 K/UL — LOW (ref 1.4–6.5)
NEUTROPHILS NFR BLD AUTO: 22.5 % — LOW (ref 42.2–75.2)
NRBC # BLD: 0 /100 WBCS — SIGNIFICANT CHANGE UP (ref 0–0)
PLATELET # BLD AUTO: 140 K/UL — SIGNIFICANT CHANGE UP (ref 130–400)
PMV BLD: 10.8 FL — HIGH (ref 7.4–10.4)
POTASSIUM SERPL-MCNC: 4.3 MMOL/L — SIGNIFICANT CHANGE UP (ref 3.5–5)
POTASSIUM SERPL-SCNC: 4.3 MMOL/L — SIGNIFICANT CHANGE UP (ref 3.5–5)
PROT SERPL-MCNC: 4.1 G/DL — LOW (ref 6–8)
RBC # BLD: 2.79 M/UL — LOW (ref 4.7–6.1)
RBC # FLD: 15.9 % — HIGH (ref 11.5–14.5)
SODIUM SERPL-SCNC: 140 MMOL/L — SIGNIFICANT CHANGE UP (ref 135–146)
WBC # BLD: 4.68 K/UL — LOW (ref 4.8–10.8)
WBC # FLD AUTO: 4.68 K/UL — LOW (ref 4.8–10.8)

## 2023-08-29 PROCEDURE — 99232 SBSQ HOSP IP/OBS MODERATE 35: CPT

## 2023-08-29 PROCEDURE — 99222 1ST HOSP IP/OBS MODERATE 55: CPT

## 2023-08-29 RX ADMIN — FLUPHENAZINE HYDROCHLORIDE 10 MILLIGRAM(S): 1 TABLET, FILM COATED ORAL at 11:21

## 2023-08-29 RX ADMIN — SODIUM CHLORIDE 100 MILLILITER(S): 9 INJECTION INTRAMUSCULAR; INTRAVENOUS; SUBCUTANEOUS at 18:40

## 2023-08-29 RX ADMIN — SODIUM CHLORIDE 100 MILLILITER(S): 9 INJECTION INTRAMUSCULAR; INTRAVENOUS; SUBCUTANEOUS at 08:27

## 2023-08-29 RX ADMIN — HEPARIN SODIUM 5000 UNIT(S): 5000 INJECTION INTRAVENOUS; SUBCUTANEOUS at 17:00

## 2023-08-29 RX ADMIN — HEPARIN SODIUM 5000 UNIT(S): 5000 INJECTION INTRAVENOUS; SUBCUTANEOUS at 10:52

## 2023-08-29 RX ADMIN — Medication 2 MILLIGRAM(S): at 11:21

## 2023-08-29 NOTE — CONSULT NOTE ADULT - ASSESSMENT
67 year old male PMHx Schizophrenia, non-Hodgkin's lymphoma (follows with Dr. Gong), NET of liver, colon adenocarcinoma, CKD III, recurrent hypercalcemia sent in to the ED for hypercalcemia. Endocrine consulted for persistent hypercalcemia     # Recurrent hypercalcemia likely 2/2 Malignancy   # Non-Hodgkin's Lymphoma, Metastatic with recurrence  #Neuroendocrine tumor, Grade 3, Liver   #Chronic Loose Stools   -On admission Calcium 13.3, corrected today 12.7   -PTH low 12, vitamin d 25OH 17, vitamin d1,25OH 101, phos 5.2   -Repeat PTH, PTHrp, vitamin D levels, 5HIAA pending, sent 8/29   -s/p 4 doses of calcitonin 340IM 8/24 - 8/26, s/p pamidronate 60 mg 8/26, s/p 2L NS bolus, currently on NS @100cc/hr   - Nephro/ Oncology following:  if hypercalcemia not improving, oncology team to consider denosumab    Plan:         67 year old male PMHx Schizophrenia, non-Hodgkin's lymphoma (follows with Dr. Gong), NET of liver, colon adenocarcinoma, CKD III, recurrent hypercalcemia sent in to the ED for hypercalcemia. Endocrine consulted for persistent hypercalcemia     #Recurrent hypercalcemia likely 2/2 Malignancy/ Vitamin D 1,25 mediated   #Non-Hodgkin's Lymphoma, Metastatic with recurrence  #Neuroendocrine tumor, Grade 3, Liver   #Chronic Loose Stools   -On admission Calcium 13.3, corrected today 12.7   -PTH low 12, vitamin d 25 low 17, vitamin d1,25 high 101, phos high 5.2   -Repeat PTH, PTHrp, vitamin D levels, 5HIAA pending, sent 8/29   -s/p 4 doses of calcitonin 340IM 8/24 - 8/26, s/p pamidronate 60 mg 8/26, s/p 2L NS bolus, currently on NS @100cc/hr   - Symptoms: Chronic diarrhea, reports NBNB N/V. Denies polyuria, polydipsia nocturia, bone pain.   - Has Hx of kidney stones/ CKD 2/2 to psych medications, was on risperidone and lithium 20 years ago, currently on benztropine and fluphenazine   - Nephro/ Oncology following:  if hypercalcemia not improving, oncology team to consider denosumab    Plan:  -         67 year old male PMHx Schizophrenia, non-Hodgkin's lymphoma (follows with Dr. Gong), NET of liver, colon adenocarcinoma, CKD III, recurrent hypercalcemia sent in to the ED for hypercalcemia. Endocrine consulted for persistent hypercalcemia     #Recurrent hypercalcemia likely 2/2 Malignancy/ Vitamin D 1,25 mediated   #Non-Hodgkin's Lymphoma, Metastatic with recurrence  #Neuroendocrine tumor, Grade 3, Liver   #Chronic Loose Stools   #CKD   -On admission Calcium 13.3, corrected today 12.7   -PTH low 12, vitamin d 25 low 17, vitamin d1,25 high 101, phos high 5.2   -Repeat PTH, PTHrp, vitamin D levels, 5HIAA pending, sent 8/29   -s/p 4 doses of calcitonin 340IM 8/24 - 8/26, s/p pamidronate 60 mg 8/26, s/p 2L NS bolus, currently on NS @100cc/hr   - Symptoms: Chronic diarrhea, reports NBNB N/V. Denies polyuria, polydipsia nocturia, bone pain.   - Has Hx of kidney stones/ CKD 2/2 to psych medications, was on risperidone and lithium 20 years ago, currently on benztropine and fluphenazine   - Nephro/ Oncology following:  if hypercalcemia not improving, oncology team to consider denosumab  - Unknown bone metastasis at this time but Alk Phos elevated 319, pending outpatient PET scan.     Plan:  - c/w IV hydration   - Recommend denosumab 120mg qmonthly (monitor calcium closely as high risk of hypocalcemia in those with impaired kidney function)   - Can consider steroid course if no improvement   - Treatment of hypercalcemia 2/2 malignancy is ultimately treatment of underlying lymphoma. [facility]     Discussed with Dr. Villafana          67 year old male PMHx Schizophrenia, non-Hodgkin's lymphoma (follows with Dr. Gong), NET of liver, colon adenocarcinoma, CKD III, recurrent hypercalcemia sent in to the ED for hypercalcemia. Endocrine consulted for persistent hypercalcemia     #Recurrent hypercalcemia likely 2/2 Malignancy/ Vitamin D 1,25 mediated   #Non-Hodgkin's Lymphoma, Metastatic with recurrence  #Neuroendocrine tumor, Grade 3, Liver   #Chronic Loose Stools   #CKD   -On admission Calcium 13.3, corrected today 12.7   -PTH low 12, vitamin d 25 low 17, vitamin d1,25 high 101, phos high 5.2   -Repeat PTH, PTHrp, vitamin D levels, 5HIAA pending, sent 8/29   -s/p 4 doses of calcitonin 340IM 8/24 - 8/26, s/p pamidronate 60 mg 8/26, s/p 2L NS bolus, currently on NS @100cc/hr   - Symptoms: Chronic diarrhea, reports NBNB N/V. Denies polyuria, polydipsia nocturia, bone pain.   - Has Hx of kidney stones/ CKD 2/2 to psych medications, was on risperidone and lithium 20 years ago, currently on benztropine and fluphenazine   - Nephro/ Oncology following:  if hypercalcemia not improving, oncology team to consider denosumab  - Unknown bone metastasis at this time but Alk Phos elevated 319, pending outpatient PET scan.     Plan:  - c/w IV hydration   - Recommend denosumab 120mg  (monitor calcium closely as high risk of hypocalcemia in those with impaired kidney function)   - Can consider steroid course if no improvement   - Treatment of hypercalcemia 2/2 malignancy is ultimately [facility] lymphoma[facility]     Discussed with Dr. Villafana

## 2023-08-29 NOTE — PROGRESS NOTE ADULT - SUBJECTIVE AND OBJECTIVE BOX
Nephrology progress note     alert to baseline    ON events/Subjective:     Allergies:  allopurinol (Rash (Moderate))    Hospital Medications:   MEDICATIONS  (STANDING):  benztropine 2 milliGRAM(s) Oral daily  fluPHENAZine 10 milliGRAM(s) Oral daily  heparin   Injectable 5000 Unit(s) SubCutaneous every 8 hours  sodium chloride 0.9%. 1000 milliLiter(s) (100 mL/Hr) IV Continuous <Continuous>    REVIEW OF SYSTEMS:  CONSTITUTIONAL: No weakness, fevers or chills  EYES/ENT: No visual changes;  No vertigo or throat pain   NECK: No pain or stiffness  RESPIRATORY: No cough, wheezing, hemoptysis; No shortness of breath  CARDIOVASCULAR: No chest pain or palpitations.  GASTROINTESTINAL: No abdominal or epigastric pain. No nausea, vomiting, or hematemesis; No diarrhea or constipation. No melena or hematochezia.  GENITOURINARY: No dysuria, frequency, foamy urine, urinary urgency, incontinence or hematuria  NEUROLOGICAL: No numbness or weakness  SKIN: No itching, burning, rashes, or lesions   VASCULAR: No bilateral lower extremity edema.   All other review of systems is negative unless indicated above.    VITALS:  T(F): 97.3 (08-29-23 @ 04:50), Max: 98.6 (08-28-23 @ 20:23)  HR: 84 (08-29-23 @ 04:50)  BP: 108/60 (08-29-23 @ 04:50)  RR: 16 (08-29-23 @ 04:50)  SpO2: 97% (08-29-23 @ 04:50)  Wt(kg): --    08-28 @ 07:01  -  08-29 @ 07:00  --------------------------------------------------------  IN: 1100 mL / OUT: 0 mL / NET: 1100 mL        PHYSICAL EXAM:  Constitutional: NAD  HEENT: anicteric sclera, oropharynx clear, MMM  Neck: No JVD  Respiratory: CTAB, no wheezes, rales or rhonchi  Cardiovascular: S1, S2, RRR  Gastrointestinal: BS+, soft, NT/ND  Extremities: No cyanosis or clubbing. No peripheral edema  Neurological: A/O x 3, no focal deficits  Psychiatric: Normal mood, normal affect  : No CVA tenderness. No zafar.   Skin: No rashes  Vascular Access:    LABS:  08-28    140  |  112<H>  |  38<H>  ----------------------------<  91  4.4   |  19  |  2.8<H>    Ca    11.5<H>      28 Aug 2023 07:52  Mg     2.1     08-28    TPro  4.2<L>  /  Alb  3.1<L>  /  TBili  0.3  /  DBili      /  AST  13  /  ALT  14  /  AlkPhos  314<H>  08-28                          7.8    4.68  )-----------( 140      ( 29 Aug 2023 08:30 )             24.8       Urine Studies:  Creatinine Trend: 2.8<--, 2.8<--, 3.4<--, 3.6<--, 3.6<--, 4.1<--  Urinalysis Basic - ( 28 Aug 2023 07:52 )    Color:  / Appearance:  / SG:  / pH:   Gluc: 91 mg/dL / Ketone:   / Bili:  / Urobili:    Blood:  / Protein:  / Nitrite:    Leuk Esterase:  / RBC:  / WBC    Sq Epi:  / Non Sq Epi:  / Bacteria:     ·CKD stage 4 - has atrophic kidney and other kidney with ureteral stricture - now close to baseline  ·lymphoma and now new neuroendocrine tumor on liver biopsy  ·hypercalcemia - appears driven by vit D 1,25 -   ·has not responded well to pamidronate as calcium level increasing again  ·chronic loose sttols - causimg more dehydration  ·anemia  ·BP stable  ·low/normal  loose stools  ·d/w pt per advanced directives and uncertain at this time     1) uncertain as to how to lower calcium load as received pamidronate already.  possible utility in another agent such as prolia but uncertain of efficacy - awaiting endocrine input.  For now continue NS at 100 cc/hr  2) labs today please and include hypercalcemia markers - Vit D 25, Vit D 1,25, PTH, PTH Rp  3) consider GI input fro chronic loose stools ( contributes to volume depletion  d/w pt and medicine resident

## 2023-08-29 NOTE — CONSULT NOTE ADULT - ASSESSMENT
Impression;    Hypercalcemia likley malignancy induced improving  HO Lymphoma   Recent DX of NET of the liver  SOme mediastinal LN on PET from May 2023    Plan:    Continue present management  We will discuss jose Gong  DVT prophylaxis

## 2023-08-29 NOTE — CONSULT NOTE ADULT - SUBJECTIVE AND OBJECTIVE BOX
Patient is a 67y old  Male who presents with a chief complaint of Hypercalcemia of malignancy (29 Aug 2023 12:42)      HPI:  67 year old male PMHx Schizophrenia, non-Hodgkin's lymphoma (follows with Dr. Gong), NET of liver, colon adenocarcinoma, CKD III, recurrent hypercalcemia sent in to the ED for hypercalcemia. Pt had an appointment with Dr. Gong yesterday morning and was sent to the ED for calcium of 13.6. Pt reports chronic diarrhea and malaise. Also reports dysphagia and hoarseness for the past month. Denies flank or abdominal pain, dysuria, melena, fever. Pt had a recent liver biopsy and was found to have neuroendocrine tumor of the liver. Recent colonoscopy showing adenoma of colon.    T(F): 98.3 HR: 85 BP: 98/55 RR: 18 SpO2: 96%  Hgb 8.4, Cr 4.1, BUN 54, Ca 13.3,   Pt received 2L NS bolus, Calcitonin   Admit to Medicine  (25 Aug 2023 01:45)      PAST MEDICAL & SURGICAL HISTORY:  Kidney stones      Schizophrenia      Lymphoma      Shingles      Atrophic kidney      H/O colonoscopy with polypectomy      Liver cyst      History of bladder surgery      H/O neuropathy      History of chemotherapy      Stage 3 chronic kidney disease      Retained urethral stent      H/O umbilical hernia repair      Elbow fracture      H/O cystoscopy          SOCIAL HX:   Smoking       No                   ETOH                            Other    FAMILY HISTORY:  FH: hypertension    FH: diabetes mellitus    :  No known cardiovacular family hisotry     Review Of Systems:     All ROS are negative except per HPI       Allergies    allopurinol (Rash (Moderate))    Intolerances          PHYSICAL EXAM    ICU Vital Signs Last 24 Hrs  T(C): 35.6 (29 Aug 2023 13:40), Max: 37 (28 Aug 2023 20:23)  T(F): 96.1 (29 Aug 2023 13:40), Max: 98.6 (28 Aug 2023 20:23)  HR: 74 (29 Aug 2023 13:40) (74 - 87)  BP: 103/58 (29 Aug 2023 13:40) (98/53 - 108/60)  BP(mean): --  ABP: --  ABP(mean): --  RR: 18 (29 Aug 2023 13:40) (16 - 18)  SpO2: 97% (29 Aug 2023 04:50) (97% - 97%)        CONSTITUTIONAL:  In NAD    ENT:   Airway patent,   Mouth with normal mucosa.       CARDIAC:   Normal rate,   Regular rhythm.    No edema    RESPIRATORY:   No wheezing  Bilateral BS   Not tachypneic,  No use of accessory muscles    GASTROINTESTINAL:  Abdomen soft,   Non-tender,   No guarding,   + BS      NEUROLOGICAL:   Alert and oriented   No motor deficits.    SKIN:   Skin normal color for race,   No evidence of rash.              08-28-23 @ 07:01  -  08-29-23 @ 07:00  --------------------------------------------------------  IN:    sodium chloride 0.9%: 1100 mL  Total IN: 1100 mL    OUT:  Total OUT: 0 mL    Total NET: 1100 mL          LABS:                          7.8    4.68  )-----------( 140      ( 29 Aug 2023 08:30 )             24.8                                               08-29    140  |  113<H>  |  33<H>  ----------------------------<  100<H>  4.3   |  19  |  2.8<H>    Ca    12.1<H>      29 Aug 2023 08:30  Mg     2.1     08-29    TPro  4.1<L>  /  Alb  3.2<L>  /  TBili  0.4  /  DBili  x   /  AST  14  /  ALT  15  /  AlkPhos  318<H>  08-29                                             Urinalysis Basic - ( 29 Aug 2023 08:30 )    Color: x / Appearance: x / SG: x / pH: x  Gluc: 100 mg/dL / Ketone: x  / Bili: x / Urobili: x   Blood: x / Protein: x / Nitrite: x   Leuk Esterase: x / RBC: x / WBC x   Sq Epi: x / Non Sq Epi: x / Bacteria: x                                                  LIVER FUNCTIONS - ( 29 Aug 2023 08:30 )  Alb: 3.2 g/dL / Pro: 4.1 g/dL / ALK PHOS: 318 U/L / ALT: 15 U/L / AST: 14 U/L / GGT: x                                                                                                                                       X-Rays reviewed                                                                                     ECHO    MEDICATIONS  (STANDING):  benztropine 2 milliGRAM(s) Oral daily  fluPHENAZine 10 milliGRAM(s) Oral daily  heparin   Injectable 5000 Unit(s) SubCutaneous every 8 hours  sodium chloride 0.9%. 1000 milliLiter(s) (100 mL/Hr) IV Continuous <Continuous>    MEDICATIONS  (PRN):  ondansetron Injectable 4 milliGRAM(s) IV Push once PRN Nausea and/or Vomiting

## 2023-08-29 NOTE — CONSULT NOTE ADULT - ATTENDING COMMENTS
67 year old male PMHx Schizophrenia, non-Hodgkin's lymphoma (follows with Dr. Gong), NET of liver, colon adenocarcinoma, CKD III, recurrent hypercalcemia sent in to the ED for hypercalcemia. He is s/p calcitonin, pamidronate with mild improvement in calcium levels    #hypercalcemia   -Recurrent most likely secondary to malignancy  -PTH suppressed 12 PTH RP < 2, vitamin D 17 125 vitamin D levels elevated to 100 around a month ago  repeat labs pending  -Status post calcitonin and pamidronate 60 mg 1 dose, calcium showed mild improvement but is now trending back up  -Pamidronate can take up to 3 to 5 days to show improvement in calcium levels, if  no improvement is noted after 5 days,  can consider denosumab 120 mg one dose  - Can also consider treatment with a 10 day course of prednisone if 1,25 vitamin D levels are elevated , appears that he has received this in the past  unclear if he had a response to the same   - Overall will need treatment of underlying malignancy to prevent recurrent hypercalcemia

## 2023-08-29 NOTE — PROGRESS NOTE ADULT - SUBJECTIVE AND OBJECTIVE BOX
24H events:    Patient is a 67y old Male who presents with a chief complaint of Hypercalcemia (29 Aug 2023 10:56)    Primary diagnosis of Hypercalcemia      Today is hospital day 5d. This morning patient was seen and examined at bedside, resting comfortably in bed.  Pt complains of an episode of diarrhea this morning.         PAST MEDICAL & SURGICAL HISTORY  Kidney stones    Schizophrenia    Lymphoma    Shingles    Atrophic kidney    H/O colonoscopy with polypectomy    Liver cyst    History of bladder surgery    H/O neuropathy    History of chemotherapy    Stage 3 chronic kidney disease    Retained urethral stent    H/O umbilical hernia repair    Elbow fracture    H/O cystoscopy      SOCIAL HISTORY:  Social History:      ALLERGIES:  allopurinol (Rash (Moderate))    MEDICATIONS:  STANDING MEDICATIONS  benztropine 2 milliGRAM(s) Oral daily  fluPHENAZine 10 milliGRAM(s) Oral daily  heparin   Injectable 5000 Unit(s) SubCutaneous every 8 hours  sodium chloride 0.9%. 1000 milliLiter(s) IV Continuous <Continuous>    PRN MEDICATIONS  ondansetron Injectable 4 milliGRAM(s) IV Push once PRN    VITALS:   T(F): 97.3  HR: 84  BP: 108/60  RR: 16  SpO2: 97%    PHYSICAL EXAM:  GENERAL:   ( x ) NAD, lying in bed comfortably     (  ) obtunded     (  ) lethargic     (  ) somnolent    HEAD:   ( x ) Atraumatic     (  ) hematoma     (  ) laceration (specify location:       )     NECK:  ( x ) Supple     (  ) neck stiffness     (  ) nuchal rigidity     (  )  no JVD     (  ) JVD present ( -- cm)    HEART:  Rate -->     ( x ) normal rate     (  ) bradycardic     (  ) tachycardic  Rhythm -->     ( x ) regular     (  ) regularly irregular     (  ) irregularly irregular  Murmurs -->     (  x) normal s1s2     (  ) systolic murmur     (  ) diastolic murmur     (  ) continuous murmur      (  ) S3 present     (  ) S4 present    LUNGS:   ( x )Unlabored respirations     (  ) tachypnea  (x  ) B/L air entry     (  ) decreased breath sounds in:  (location     )    (  ) no adventitious sound     (  ) crackles     (  ) wheezing      (  ) rhonchi      (specify location:       )  (  ) chest wall tenderness (specify location:       )    ABDOMEN:   ( x ) Soft     (  ) tense   |   (  ) nondistended     (  ) distended   |   (  ) +BS     (  ) hypoactive bowel sounds     (  ) hyperactive bowel sounds  (  ) nontender     (  ) RUQ tenderness     (  ) RLQ tenderness     (  ) LLQ tenderness     (  ) epigastric tenderness     (  ) diffuse tenderness  (  ) Splenomegaly      (  ) Hepatomegaly      (  ) Jaundice     (  ) ecchymosis     EXTREMITIES:  (  ) Normal     (  ) Rash     (  ) ecchymosis     (  ) varicose veins      (  ) pitting edema     (  ) non-pitting edema   (  ) ulceration     (  ) gangrene:     (location:     )    NERVOUS SYSTEM:    ( x ) A&Ox3     (  ) confused     (  ) lethargic  CN II-XII:     (  ) Intact     (  ) deficits found     (Specify:     )   Upper extremities:     (  ) no sensorimotor deficits     (  ) weakness     (  ) loss of proprioception/vibration     (  ) loss of touch/temperature (specify:    )  Lower extremities:     (  ) no sensorimotor deficits     (  ) weakness     (  ) loss of proprioception/vibration     (  ) loss of touch/temperature (specify:    )    SKIN:   (  ) No rashes or lesions     (  ) maculopapular rash     (  ) pustules     (  ) vesicles     (  ) ulcer     (  ) ecchymosis     (specify location:     )    AMPAC score:    (  ) [place] Catheter:   Date insterted:    Reason (  ) Critical illness     (  ) urinary retention    (  ) Accurate Ins/Outs Monitoring     (  ) CMO patient    (  ) Central Line:   Date inserted:  Location: (  ) Right IJ     (  ) Left IJ     (  ) Right Fem     (  ) Left Fem    (  ) SPC        (  ) pigtail       (  ) PEG tube       (  ) colostomy       (  ) jejunostomy  (  ) U-Dall    LABS:                        7.8    4.68  )-----------( 140      ( 29 Aug 2023 08:30 )             24.8     08-29    140  |  113<H>  |  33<H>  ----------------------------<  100<H>  4.3   |  19  |  2.8<H>    Ca    12.1<H>      29 Aug 2023 08:30  Mg     2.1     08-29    TPro  4.1<L>  /  Alb  3.2<L>  /  TBili  0.4  /  DBili  x   /  AST  14  /  ALT  15  /  AlkPhos  318<H>  08-29      Urinalysis Basic - ( 29 Aug 2023 08:30 )    Color: x / Appearance: x / SG: x / pH: x  Gluc: 100 mg/dL / Ketone: x  / Bili: x / Urobili: x   Blood: x / Protein: x / Nitrite: x   Leuk Esterase: x / RBC: x / WBC x   Sq Epi: x / Non Sq Epi: x / Bacteria: x                RADIOLOGY:

## 2023-08-29 NOTE — PROGRESS NOTE ADULT - ASSESSMENT
67 year old male PMHx Schizophrenia, non-Hodgkin's lymphoma (follows with Dr. Gong), NET of liver and colon, CKD III, recurrent hypercalcemia sent in to the ED for calcium of  hypercalcemia (13.6).       # Recurrent hypercalcemia   # Non-Hodgkin's Lymphoma  # NET liver and colon  # Colon adenoma  #CKD4  - Ca 13.3 on admission - 12.1 this am (corrected to 12.8 this AM)  - likely hypercalcemia of malignancy  - continues to complain of diarrhea  - s/p 2L NS bolus, calcitonin x4, pamidronate x1  - pt was in a good partial remission following 3 cycles of bendamustine and Rituxan treated in 2019 and 2020, unable to tolerate maintenance Rituxan, pt never in complete remission and preferred to be observed due to low grade lymphoma, then lost to follow up per heme/onc. Pt currently does not report any barriers to receiving medical care.  -  wnl  - 5-HIAA, chromogranin pending  - f/u GI stool PCR, stool culture to r/o infectious cause of diarrhea  - heme/onc following-f/u reccs on hypercalcemia management, currently recommending outpatient PET scan  - appreciate nephro reccs- c/w NS@100cc/hr  - f/u repeat hypercalcemia markers (f/u Vitamin D and PTH levels)  - f/u endocrine for persistent hypercalcemia for recommendations as pt not responding to pamidronate      # Schizoaffective disorder  - c/w Fluphenazine, Benztropine      #DVT ppx: heparin SQ  #GI ppx: protonix  #Diet: DASH/TLC

## 2023-08-30 LAB
ALBUMIN SERPL ELPH-MCNC: 2.9 G/DL — LOW (ref 3.5–5.2)
ALP SERPL-CCNC: 317 U/L — HIGH (ref 30–115)
ALT FLD-CCNC: 17 U/L — SIGNIFICANT CHANGE UP (ref 0–41)
ANION GAP SERPL CALC-SCNC: 8 MMOL/L — SIGNIFICANT CHANGE UP (ref 7–14)
AST SERPL-CCNC: 14 U/L — SIGNIFICANT CHANGE UP (ref 0–41)
BASOPHILS # BLD AUTO: 0.03 K/UL — SIGNIFICANT CHANGE UP (ref 0–0.2)
BASOPHILS NFR BLD AUTO: 0.6 % — SIGNIFICANT CHANGE UP (ref 0–1)
BILIRUB SERPL-MCNC: 0.5 MG/DL — SIGNIFICANT CHANGE UP (ref 0.2–1.2)
BUN SERPL-MCNC: 31 MG/DL — HIGH (ref 10–20)
CALCIUM SERPL-MCNC: 11.1 MG/DL — HIGH (ref 8.4–10.5)
CALCIUM SERPL-MCNC: 12.1 MG/DL — HIGH (ref 8.4–10.5)
CHLORIDE SERPL-SCNC: 114 MMOL/L — HIGH (ref 98–110)
CO2 SERPL-SCNC: 19 MMOL/L — SIGNIFICANT CHANGE UP (ref 17–32)
CREAT SERPL-MCNC: 2.9 MG/DL — HIGH (ref 0.7–1.5)
EGFR: 23 ML/MIN/1.73M2 — LOW
EOSINOPHIL # BLD AUTO: 1.02 K/UL — HIGH (ref 0–0.7)
EOSINOPHIL NFR BLD AUTO: 21.9 % — HIGH (ref 0–8)
GLUCOSE SERPL-MCNC: 86 MG/DL — SIGNIFICANT CHANGE UP (ref 70–99)
HCT VFR BLD CALC: 25 % — LOW (ref 42–52)
HGB BLD-MCNC: 7.8 G/DL — LOW (ref 14–18)
IMM GRANULOCYTES NFR BLD AUTO: 1.1 % — HIGH (ref 0.1–0.3)
LYMPHOCYTES # BLD AUTO: 1.47 K/UL — SIGNIFICANT CHANGE UP (ref 1.2–3.4)
LYMPHOCYTES # BLD AUTO: 31.6 % — SIGNIFICANT CHANGE UP (ref 20.5–51.1)
MAGNESIUM SERPL-MCNC: 1.9 MG/DL — SIGNIFICANT CHANGE UP (ref 1.8–2.4)
MCHC RBC-ENTMCNC: 28.2 PG — SIGNIFICANT CHANGE UP (ref 27–31)
MCHC RBC-ENTMCNC: 31.2 G/DL — LOW (ref 32–37)
MCV RBC AUTO: 90.3 FL — SIGNIFICANT CHANGE UP (ref 80–94)
MONOCYTES # BLD AUTO: 1.1 K/UL — HIGH (ref 0.1–0.6)
MONOCYTES NFR BLD AUTO: 23.7 % — HIGH (ref 1.7–9.3)
NEUTROPHILS # BLD AUTO: 0.98 K/UL — LOW (ref 1.4–6.5)
NEUTROPHILS NFR BLD AUTO: 21.1 % — LOW (ref 42.2–75.2)
NRBC # BLD: 0 /100 WBCS — SIGNIFICANT CHANGE UP (ref 0–0)
PLATELET # BLD AUTO: 128 K/UL — LOW (ref 130–400)
PMV BLD: 10.7 FL — HIGH (ref 7.4–10.4)
POTASSIUM SERPL-MCNC: 4.5 MMOL/L — SIGNIFICANT CHANGE UP (ref 3.5–5)
POTASSIUM SERPL-SCNC: 4.5 MMOL/L — SIGNIFICANT CHANGE UP (ref 3.5–5)
PROT SERPL-MCNC: 4 G/DL — LOW (ref 6–8)
PTH-INTACT FLD-MCNC: 8 PG/ML — LOW (ref 15–65)
RBC # BLD: 2.77 M/UL — LOW (ref 4.7–6.1)
RBC # FLD: 16 % — HIGH (ref 11.5–14.5)
SODIUM SERPL-SCNC: 141 MMOL/L — SIGNIFICANT CHANGE UP (ref 135–146)
VIT D25+D1,25 OH+D1,25 PNL SERPL-MCNC: 106 PG/ML — HIGH (ref 19.9–79.3)
WBC # BLD: 4.65 K/UL — LOW (ref 4.8–10.8)
WBC # FLD AUTO: 4.65 K/UL — LOW (ref 4.8–10.8)

## 2023-08-30 PROCEDURE — 99232 SBSQ HOSP IP/OBS MODERATE 35: CPT

## 2023-08-30 RX ORDER — DENOSUMAB 60 MG/ML
120 INJECTION SUBCUTANEOUS ONCE
Refills: 0 | Status: COMPLETED | OUTPATIENT
Start: 2023-08-30 | End: 2023-08-30

## 2023-08-30 RX ADMIN — Medication 2 MILLIGRAM(S): at 11:56

## 2023-08-30 RX ADMIN — HEPARIN SODIUM 5000 UNIT(S): 5000 INJECTION INTRAVENOUS; SUBCUTANEOUS at 09:06

## 2023-08-30 RX ADMIN — HEPARIN SODIUM 5000 UNIT(S): 5000 INJECTION INTRAVENOUS; SUBCUTANEOUS at 17:54

## 2023-08-30 RX ADMIN — FLUPHENAZINE HYDROCHLORIDE 10 MILLIGRAM(S): 1 TABLET, FILM COATED ORAL at 11:55

## 2023-08-30 RX ADMIN — HEPARIN SODIUM 5000 UNIT(S): 5000 INJECTION INTRAVENOUS; SUBCUTANEOUS at 02:02

## 2023-08-30 RX ADMIN — DENOSUMAB 120 MILLIGRAM(S): 60 INJECTION SUBCUTANEOUS at 17:54

## 2023-08-30 NOTE — PROGRESS NOTE ADULT - SUBJECTIVE AND OBJECTIVE BOX
24H events:    Patient is a 67y old Male who presents with a chief complaint of hypercalcemia (30 Aug 2023 11:10)    Primary diagnosis of Hypercalcemia      Today is hospital day 6d. This morning patient was seen and examined at bedside, resting comfortably in bed.  No acute or major events overnight. Pt does not report any diarrhea overnight.        PAST MEDICAL & SURGICAL HISTORY  Kidney stones    Schizophrenia    Lymphoma    Shingles    Atrophic kidney    H/O colonoscopy with polypectomy    Liver cyst    History of bladder surgery    H/O neuropathy    History of chemotherapy    Stage 3 chronic kidney disease    Retained urethral stent    H/O umbilical hernia repair    Elbow fracture    H/O cystoscopy      SOCIAL HISTORY:  Social History:      ALLERGIES:  allopurinol (Rash (Moderate))    MEDICATIONS:  STANDING MEDICATIONS  benztropine 2 milliGRAM(s) Oral daily  fluPHENAZine 10 milliGRAM(s) Oral daily  heparin   Injectable 5000 Unit(s) SubCutaneous every 8 hours  sodium chloride 0.9%. 1000 milliLiter(s) IV Continuous <Continuous>    PRN MEDICATIONS  ondansetron Injectable 4 milliGRAM(s) IV Push once PRN    VITALS:   T(F): 98.6  HR: 86  BP: 103/57  RR: 18  SpO2: --    PHYSICAL EXAM:  GENERAL:   ( x ) NAD, lying in bed comfortably     (  ) obtunded     (  ) lethargic     (  ) somnolent    HEAD:   ( x ) Atraumatic     (  ) hematoma     (  ) laceration (specify location:       )     NECK:  ( x ) Supple     (  ) neck stiffness     (  ) nuchal rigidity     (  )  no JVD     (  ) JVD present ( -- cm)    HEART:  Rate -->     ( x ) normal rate     (  ) bradycardic     (  ) tachycardic  Rhythm -->     ( x ) regular     (  ) regularly irregular     (  ) irregularly irregular  Murmurs -->     (  x) normal s1s2     (  ) systolic murmur     (  ) diastolic murmur     (  ) continuous murmur      (  ) S3 present     (  ) S4 present    LUNGS:   ( x )Unlabored respirations     (  ) tachypnea  (x  ) B/L air entry     (  ) decreased breath sounds in:  (location     )    (  ) no adventitious sound     (  ) crackles     (  ) wheezing      (  ) rhonchi      (specify location:       )  (  ) chest wall tenderness (specify location:       )    ABDOMEN:   ( x ) Soft     (  ) tense   |   (  ) nondistended     (  ) distended   |   (  ) +BS     (  ) hypoactive bowel sounds     (  ) hyperactive bowel sounds  (  ) nontender     (  ) RUQ tenderness     (  ) RLQ tenderness     (  ) LLQ tenderness     (  ) epigastric tenderness     (  ) diffuse tenderness  (  ) Splenomegaly      (  ) Hepatomegaly      (  ) Jaundice     (  ) ecchymosis     EXTREMITIES:  (  ) Normal     (  ) Rash     (  ) ecchymosis     (  ) varicose veins      (  ) pitting edema     (  ) non-pitting edema   (  ) ulceration     (  ) gangrene:     (location:     )    NERVOUS SYSTEM:    ( x ) A&Ox3     (  ) confused     (  ) lethargic  CN II-XII:     (  ) Intact     (  ) deficits found     (Specify:     )   Upper extremities:     (  ) no sensorimotor deficits     (  ) weakness     (  ) loss of proprioception/vibration     (  ) loss of touch/temperature (specify:    )  Lower extremities:     (  ) no sensorimotor deficits     (  ) weakness     (  ) loss of proprioception/vibration     (  ) loss of touch/temperature (specify:    )    SKIN:   (  ) No rashes or lesions     (  ) maculopapular rash     (  ) pustules     (  ) vesicles     (  ) ulcer     (  ) ecchymosis     (specify location:     )    AMPAC score:    (  ) [place] Catheter:   Date insterted:    Reason (  ) Critical illness     (  ) urinary retention    (  ) Accurate Ins/Outs Monitoring     (  ) CMO patient    (  ) Central Line:   Date inserted:  Location: (  ) Right IJ     (  ) Left IJ     (  ) Right Fem     (  ) Left Fem    (  ) SPC        (  ) pigtail       (  ) PEG tube       (  ) colostomy       (  ) jejunostomy  (  ) U-Dall    LABS:                        7.8    4.65  )-----------( 128      ( 30 Aug 2023 07:19 )             25.0     08-30    141  |  114<H>  |  31<H>  ----------------------------<  86  4.5   |  19  |  2.9<H>    Ca    11.1<H>      30 Aug 2023 07:19  Mg     1.9     08-30    TPro  4.0<L>  /  Alb  2.9<L>  /  TBili  0.5  /  DBili  x   /  AST  14  /  ALT  17  /  AlkPhos  317<H>  08-30      Urinalysis Basic - ( 30 Aug 2023 07:19 )    Color: x / Appearance: x / SG: x / pH: x  Gluc: 86 mg/dL / Ketone: x  / Bili: x / Urobili: x   Blood: x / Protein: x / Nitrite: x   Leuk Esterase: x / RBC: x / WBC x   Sq Epi: x / Non Sq Epi: x / Bacteria: x            Culture - Stool (collected 28 Aug 2023 09:45)  Source: .Stool Feces  Preliminary Report (30 Aug 2023 09:42):    No enteric pathogens to date: Final culture pending

## 2023-08-30 NOTE — PROGRESS NOTE ADULT - SUBJECTIVE AND OBJECTIVE BOX
Nephrology progress note     no specific complaints    ON events/Subjective:     Allergies:  allopurinol (Rash (Moderate))    Hospital Medications:   MEDICATIONS  (STANDING):  benztropine 2 milliGRAM(s) Oral daily  fluPHENAZine 10 milliGRAM(s) Oral daily  heparin   Injectable 5000 Unit(s) SubCutaneous every 8 hours  sodium chloride 0.9%. 1000 milliLiter(s) (100 mL/Hr) IV Continuous <Continuous>    REVIEW OF SYSTEMS:  CONSTITUTIONAL: No weakness, fevers or chills  EYES/ENT: No visual changes;  No vertigo or throat pain   NECK: No pain or stiffness  RESPIRATORY: No cough, wheezing, hemoptysis; No shortness of breath  CARDIOVASCULAR: No chest pain or palpitations.  GASTROINTESTINAL: No abdominal or epigastric pain. No nausea, vomiting, or hematemesis; No diarrhea or constipation. No melena or hematochezia.  GENITOURINARY: No dysuria, frequency, foamy urine, urinary urgency, incontinence or hematuria  NEUROLOGICAL: No numbness or weakness  SKIN: No itching, burning, rashes, or lesions   VASCULAR: No bilateral lower extremity edema.   All other review of systems is negative unless indicated above.    VITALS:  T(F): 98.6 (08-30-23 @ 05:35), Max: 98.6 (08-30-23 @ 05:35)  HR: 86 (08-30-23 @ 05:35)  BP: 103/57 (08-30-23 @ 05:35)  RR: 18 (08-30-23 @ 05:35)  SpO2: --  Wt(kg): --    08-28 @ 07:01  -  08-29 @ 07:00  --------------------------------------------------------  IN: 1100 mL / OUT: 0 mL / NET: 1100 mL    08-29 @ 07:01  -  08-30 @ 07:00  --------------------------------------------------------  IN: 1100 mL / OUT: 1800 mL / NET: -700 mL        PHYSICAL EXAM:  Constitutional: NAD  HEENT: anicteric sclera, oropharynx clear, MMM  Neck: No JVD  Respiratory: CTAB, no wheezes, rales or rhonchi  Cardiovascular: S1, S2, RRR  Gastrointestinal: BS+, soft, NT/ND  Extremities: No cyanosis or clubbing. No peripheral edema  Neurological: A/O x 3, no focal deficits  Psychiatric: Normal mood, normal affect  : No CVA tenderness. No zafar.   Skin: No rashes  Vascular Access:    LABS:  08-30    141  |  114<H>  |  31<H>  ----------------------------<  86  4.5   |  19  |  2.9<H>    Ca    11.1<H>      30 Aug 2023 07:19  Mg     1.9     08-30    TPro  4.0<L>  /  Alb  2.9<L>  /  TBili  0.5  /  DBili      /  AST  14  /  ALT  17  /  AlkPhos  317<H>  08-30                          7.8    4.65  )-----------( 128      ( 30 Aug 2023 07:19 )             25.0       Urine Studies:  Creatinine Trend: 2.9<--, 2.8<--, 2.8<--, 2.8<--, 3.4<--, 3.6<--  Urinalysis Basic - ( 30 Aug 2023 07:19 )    Color:  / Appearance:  / SG:  / pH:   Gluc: 86 mg/dL / Ketone:   / Bili:  / Urobili:    Blood:  / Protein:  / Nitrite:    Leuk Esterase:  / RBC:  / WBC    Sq Epi:  / Non Sq Epi:  / Bacteria:       CKD stage 4 - has atrophic kidney and other kidney with ureteral stricture - now close to baseline  ·lymphoma and now new neuroendocrine tumor on liver biopsy  ·hypercalcemia - appears driven by vit D 1,25 -   ·has not responded well to pamidronate as calcium level increasing again  ·chronic loose sttols - causimg more dehydration  ·anemia  ·BP stable  ·low/normal  loose stools  ·d/w pt per advanced directives and uncertain at this time     1) per endocrine trail prolia considered - would continue NS at 100 cc/hr for now  2) hemonc fu - treatment of malignancy is definitive treatment of hypercalcemia too  am labs  d/w pt and housestaff

## 2023-08-30 NOTE — PROGRESS NOTE ADULT - ASSESSMENT
67 year old male PMHx Schizophrenia, non-Hodgkin's lymphoma (follows with Dr. Gong), NET of liver and colon, CKD III, recurrent hypercalcemia sent in to the ED for calcium of  hypercalcemia (13.6).       # Recurrent hypercalcemia   # Non-Hodgkin's Lymphoma  # NET liver and colon  # Colon adenoma  # CKD4  - Ca 13.3 on admission - 11.1 this am (corrected to 12 this AM)  - likely hypercalcemia of malignancy  - continues to complain of diarrhea  - s/p 2L NS bolus, calcitonin x4, pamidronate x1 on 8/24  - pt was in a good partial remission following 3 cycles of bendamustine and Rituxan treated in 2019 and 2020, unable to tolerate maintenance Rituxan, pt never in complete remission and preferred to be observed due to low grade lymphoma, then lost to follow up per heme/onc. Pt currently does not report any barriers to receiving medical care  -  wnl  - 5-HIAA pending, chromogranin 1071-pt has recently diagnosed NET of the liver  - GI stool PCR negative -no infectious cause of diarrhea, diarrhea is likely due to malignancy  - heme/onc following-f/u reccs on hypercalcemia management, currently recommending outpatient PET scan  - appreciate nephro reccs- c/w NS@100cc/hr,   - hypercalcemia markers-vit D1,25 106 (elevated), vitD 16 (low), PTH 8 (low)  - appreciate endocrine and nephro reccs-will give one dose of denosumab 120 mg for hypercalcemia then f/u with heme/onc on further recommendations for additional prolia if persistent hypercalcemia  - pt needs outpatient follow-up for his malignancy as this is likely the cause of his recurrent hypercalemia and diarrhea      # Schizoaffective disorder  - c/w Fluphenazine, Benztropine      #DVT ppx: heparin SQ  #GI ppx: protonix  #Diet: DASH/TLC 67 year old male PMHx Schizophrenia, non-Hodgkin's lymphoma (follows with Dr. Gong), NET of liver and colon, CKD III, recurrent hypercalcemia sent in to the ED for calcium of  hypercalcemia (13.6).       # Recurrent hypercalcemia   # Non-Hodgkin's Lymphoma  # NET liver and colon  # Colon adenoma  # CKD4  - Ca 13.3 on admission - 11.1 this am (corrected to 12 this AM)  - likely hypercalcemia of malignancy  - continues to complain of diarrhea  - s/p 2L NS bolus, calcitonin x4, pamidronate x1 on 8/24  - pt was in a good partial remission following 3 cycles of bendamustine and Rituxan treated in 2019 and 2020, unable to tolerate maintenance Rituxan, pt never in complete remission and preferred to be observed due to low grade lymphoma, then lost to follow up per heme/onc. Pt currently does not report any barriers to receiving medical care  -  wnl  - 5-HIAA pending, chromogranin 1071-pt has recently diagnosed NET of the liver  - GI stool PCR negative -no infectious cause of diarrhea, diarrhea is likely due to malignancy  - heme/onc following-f/u reccs on hypercalcemia management, currently recommending outpatient PET scan  - appreciate nephro reccs- c/w NS@100cc/hr,   - hypercalcemia markers-vit D1,25 106 (elevated), vitD 16 (low), PTH 8 (low)  - appreciate endocrine heme/onc and nephro reccs-will give one dose of denosumab 120 mg for hypercalcemia then f/u with heme/onc on further recommendations for additional denosumab if persistent hypercalcemia  - pt needs outpatient follow-up for his malignancy as this is likely the cause of his recurrent hypercalemia and diarrhea      # Schizoaffective disorder  - c/w Fluphenazine, Benztropine      #DVT ppx: heparin SQ  #GI ppx: protonix  #Diet: DASH/TLC 67 year old male PMHx Schizophrenia, non-Hodgkin's lymphoma (follows with Dr. Gong), NET of liver and colon, CKD III, recurrent hypercalcemia sent in to the ED for calcium of  hypercalcemia (13.6).       # Recurrent hypercalcemia   # Non-Hodgkin's Lymphoma  # NET liver and colon  # Colon adenoma  # CKD4  - Ca 13.3 on admission - 11.1 this am (corrected to 12 this AM)  - likely hypercalcemia of malignancy  - continues to complain of diarrhea  - s/p 2L NS bolus, calcitonin x4, pamidronate x1 on 8/26  - pt was in a good partial remission following 3 cycles of bendamustine and Rituxan treated in 2019 and 2020, unable to tolerate maintenance Rituxan, pt never in complete remission and preferred to be observed due to low grade lymphoma, then lost to follow up per heme/onc. Pt currently does not report any barriers to receiving medical care  -  wnl  - 5-HIAA pending, chromogranin 1071-pt has recently diagnosed NET of the liver  - GI stool PCR negative -no infectious cause of diarrhea, diarrhea is likely due to malignancy  - heme/onc following-f/u reccs on hypercalcemia management, currently recommending outpatient PET scan  - appreciate nephro reccs- c/w NS@100cc/hr,   - hypercalcemia markers-vit D1,25 106 (elevated), vitD 16 (low), PTH 8 (low)  - appreciate endocrine heme/onc and nephro reccs-will give one dose of denosumab 120 mg on 8/30 for hypercalcemia then f/u with heme/onc on further recommendations for additional denosumab if persistent hypercalcemia  - pt needs outpatient follow-up for his malignancy as this is likely the cause of his recurrent hypercalemia and diarrhea      # Schizoaffective disorder  - c/w Fluphenazine, Benztropine      #DVT ppx: heparin SQ  #GI ppx: protonix  #Diet: DASH/TLC

## 2023-08-31 LAB
ALBUMIN SERPL ELPH-MCNC: 3.3 G/DL — LOW (ref 3.5–5.2)
ALP SERPL-CCNC: 362 U/L — HIGH (ref 30–115)
ALT FLD-CCNC: 23 U/L — SIGNIFICANT CHANGE UP (ref 0–41)
ANION GAP SERPL CALC-SCNC: 9 MMOL/L — SIGNIFICANT CHANGE UP (ref 7–14)
AST SERPL-CCNC: 17 U/L — SIGNIFICANT CHANGE UP (ref 0–41)
BASOPHILS # BLD AUTO: 0.03 K/UL — SIGNIFICANT CHANGE UP (ref 0–0.2)
BASOPHILS NFR BLD AUTO: 0.6 % — SIGNIFICANT CHANGE UP (ref 0–1)
BILIRUB SERPL-MCNC: 0.4 MG/DL — SIGNIFICANT CHANGE UP (ref 0.2–1.2)
BUN SERPL-MCNC: 30 MG/DL — HIGH (ref 10–20)
CALCIUM SERPL-MCNC: 11.7 MG/DL — HIGH (ref 8.4–10.5)
CHLORIDE SERPL-SCNC: 107 MMOL/L — SIGNIFICANT CHANGE UP (ref 98–110)
CO2 SERPL-SCNC: 20 MMOL/L — SIGNIFICANT CHANGE UP (ref 17–32)
CREAT SERPL-MCNC: 3.1 MG/DL — HIGH (ref 0.7–1.5)
CULTURE RESULTS: SIGNIFICANT CHANGE UP
EGFR: 21 ML/MIN/1.73M2 — LOW
EOSINOPHIL # BLD AUTO: 1.16 K/UL — HIGH (ref 0–0.7)
EOSINOPHIL NFR BLD AUTO: 22.1 % — HIGH (ref 0–8)
GLUCOSE SERPL-MCNC: 94 MG/DL — SIGNIFICANT CHANGE UP (ref 70–99)
HCT VFR BLD CALC: 26 % — LOW (ref 42–52)
HGB BLD-MCNC: 8.2 G/DL — LOW (ref 14–18)
IMM GRANULOCYTES NFR BLD AUTO: 1.5 % — HIGH (ref 0.1–0.3)
LYMPHOCYTES # BLD AUTO: 1.62 K/UL — SIGNIFICANT CHANGE UP (ref 1.2–3.4)
LYMPHOCYTES # BLD AUTO: 30.8 % — SIGNIFICANT CHANGE UP (ref 20.5–51.1)
MAGNESIUM SERPL-MCNC: 1.8 MG/DL — SIGNIFICANT CHANGE UP (ref 1.8–2.4)
MCHC RBC-ENTMCNC: 28.8 PG — SIGNIFICANT CHANGE UP (ref 27–31)
MCHC RBC-ENTMCNC: 31.5 G/DL — LOW (ref 32–37)
MCV RBC AUTO: 91.2 FL — SIGNIFICANT CHANGE UP (ref 80–94)
MONOCYTES # BLD AUTO: 1.3 K/UL — HIGH (ref 0.1–0.6)
MONOCYTES NFR BLD AUTO: 24.7 % — HIGH (ref 1.7–9.3)
NEUTROPHILS # BLD AUTO: 1.07 K/UL — LOW (ref 1.4–6.5)
NEUTROPHILS NFR BLD AUTO: 20.3 % — LOW (ref 42.2–75.2)
NRBC # BLD: 0 /100 WBCS — SIGNIFICANT CHANGE UP (ref 0–0)
PLATELET # BLD AUTO: 132 K/UL — SIGNIFICANT CHANGE UP (ref 130–400)
PMV BLD: 10.6 FL — HIGH (ref 7.4–10.4)
POTASSIUM SERPL-MCNC: 4.3 MMOL/L — SIGNIFICANT CHANGE UP (ref 3.5–5)
POTASSIUM SERPL-SCNC: 4.3 MMOL/L — SIGNIFICANT CHANGE UP (ref 3.5–5)
PROT SERPL-MCNC: 4.2 G/DL — LOW (ref 6–8)
RBC # BLD: 2.85 M/UL — LOW (ref 4.7–6.1)
RBC # FLD: 16.4 % — HIGH (ref 11.5–14.5)
SODIUM SERPL-SCNC: 136 MMOL/L — SIGNIFICANT CHANGE UP (ref 135–146)
SPECIMEN SOURCE: SIGNIFICANT CHANGE UP
WBC # BLD: 5.32 K/UL — SIGNIFICANT CHANGE UP (ref 4.8–10.8)
WBC # FLD AUTO: 5.32 K/UL — SIGNIFICANT CHANGE UP (ref 4.8–10.8)

## 2023-08-31 PROCEDURE — 99232 SBSQ HOSP IP/OBS MODERATE 35: CPT

## 2023-08-31 RX ADMIN — HEPARIN SODIUM 5000 UNIT(S): 5000 INJECTION INTRAVENOUS; SUBCUTANEOUS at 18:57

## 2023-08-31 RX ADMIN — Medication 2 MILLIGRAM(S): at 11:08

## 2023-08-31 RX ADMIN — HEPARIN SODIUM 5000 UNIT(S): 5000 INJECTION INTRAVENOUS; SUBCUTANEOUS at 11:03

## 2023-08-31 RX ADMIN — HEPARIN SODIUM 5000 UNIT(S): 5000 INJECTION INTRAVENOUS; SUBCUTANEOUS at 02:31

## 2023-08-31 RX ADMIN — FLUPHENAZINE HYDROCHLORIDE 10 MILLIGRAM(S): 1 TABLET, FILM COATED ORAL at 11:08

## 2023-08-31 NOTE — PROGRESS NOTE ADULT - SUBJECTIVE AND OBJECTIVE BOX
Nephrology progress note     loose stools    ON events/Subjective:     Allergies:  allopurinol (Rash (Moderate))    Hospital Medications:   MEDICATIONS  (STANDING):  benztropine 2 milliGRAM(s) Oral daily  fluPHENAZine 10 milliGRAM(s) Oral daily  heparin   Injectable 5000 Unit(s) SubCutaneous every 8 hours  sodium chloride 0.9%. 1000 milliLiter(s) (100 mL/Hr) IV Continuous <Continuous>    REVIEW OF SYSTEMS:  CONSTITUTIONAL: No weakness, fevers or chills  EYES/ENT: No visual changes;  No vertigo or throat pain   NECK: No pain or stiffness  RESPIRATORY: No cough, wheezing, hemoptysis; No shortness of breath  CARDIOVASCULAR: No chest pain or palpitations.  GASTROINTESTINAL: No abdominal or epigastric pain. No nausea, vomiting, or hematemesis; No diarrhea or constipation. No melena or hematochezia.  GENITOURINARY: No dysuria, frequency, foamy urine, urinary urgency, incontinence or hematuria  NEUROLOGICAL: No numbness or weakness  SKIN: No itching, burning, rashes, or lesions   VASCULAR: No bilateral lower extremity edema.   All other review of systems is negative unless indicated above.    VITALS:  T(F): 99.6 (08-31-23 @ 04:38), Max: 100 (08-30-23 @ 20:58)  HR: 97 (08-31-23 @ 04:38)  BP: 101/51 (08-31-23 @ 04:38)  RR: 20 (08-31-23 @ 04:38)  SpO2: --  Wt(kg): --    08-29 @ 07:01  -  08-30 @ 07:00  --------------------------------------------------------  IN: 1100 mL / OUT: 1800 mL / NET: -700 mL    08-30 @ 07:01  -  08-31 @ 07:00  --------------------------------------------------------  IN: 1375 mL / OUT: 0 mL / NET: 1375 mL        PHYSICAL EXAM:  Constitutional: NAD  HEENT: anicteric sclera, oropharynx clear, MMM  Neck: No JVD  Respiratory: CTAB, no wheezes, rales or rhonchi  Cardiovascular: S1, S2, RRR  Gastrointestinal: BS+, soft, NT/ND  Extremities: No cyanosis or clubbing. No peripheral edema  Neurological: A/O x 3, no focal deficits  Psychiatric: Normal mood, normal affect  : No CVA tenderness. No zafar.   Skin: No rashes  Vascular Access:    LABS:  08-31    136  |  107  |  30<H>  ----------------------------<  94  4.3   |  20  |  3.1<H>    Ca    11.7<H>      31 Aug 2023 08:21  Mg     1.8     08-31    TPro  4.2<L>  /  Alb  3.3<L>  /  TBili  0.4  /  DBili      /  AST  17  /  ALT  23  /  AlkPhos  362<H>  08-31                          8.2    5.32  )-----------( 132      ( 31 Aug 2023 08:20 )             26.0       Urine Studies:  Creatinine Trend: 3.1<--, 2.9<--, 2.8<--, 2.8<--, 2.8<--, 3.4<--  Urinalysis Basic - ( 31 Aug 2023 08:21 )    Color:  / Appearance:  / SG:  / pH:   Gluc: 94 mg/dL / Ketone:   / Bili:  / Urobili:    Blood:  / Protein:  / Nitrite:    Leuk Esterase:  / RBC:  / WBC    Sq Epi:  / Non Sq Epi:  / Bacteria:     CKD stage 4 - has atrophic kidney and other kidney with ureteral stricture - now close to baseline  ·lymphoma and now new neuroendocrine tumor on liver biopsy  ·hypercalcemia - appears driven by vit D 1,25 -   ·has not responded well to pamidronate as calcium level increasing again  ·chronic loose sttols - causimg more dehydration  ·anemia  ·BP stable  ·low/normal  loose stools  per pt had recent PET scan  with neck involvement (can not find study) and now with dysphagia  ·d/w pt per advanced directives and uncertain at this time     1) received prolia yestarday  2) please continue ivf  3) suggest GI input for loose stools (chronic)  4) uncertain cause of dyphagia - per pt had PET scan with neck involvement (can not find study)- suggest s&S evaluation  [facility]

## 2023-08-31 NOTE — PROGRESS NOTE ADULT - SUBJECTIVE AND OBJECTIVE BOX
24H events:    Patient is a 67y old Male who presents with a chief complaint of ASHLEY hypercalcemia (31 Aug 2023 10:04)    Primary diagnosis of Hypercalcemia      Today is hospital day 7d. This morning patient was seen and examined at bedside, resting comfortably in bed. No acute or major events overnight. Pt reports no diarrhea overnight however reports having two more episodes of diarrhea today.        PAST MEDICAL & SURGICAL HISTORY  Kidney stones    Schizophrenia    Lymphoma    Shingles    Atrophic kidney    H/O colonoscopy with polypectomy    Liver cyst    History of bladder surgery    H/O neuropathy    History of chemotherapy    Stage 3 chronic kidney disease    Retained urethral stent    H/O umbilical hernia repair    Elbow fracture    H/O cystoscopy      SOCIAL HISTORY:  Social History:      ALLERGIES:  allopurinol (Rash (Moderate))    MEDICATIONS:  STANDING MEDICATIONS  benztropine 2 milliGRAM(s) Oral daily  fluPHENAZine 10 milliGRAM(s) Oral daily  heparin   Injectable 5000 Unit(s) SubCutaneous every 8 hours  sodium chloride 0.9%. 1000 milliLiter(s) IV Continuous <Continuous>    PRN MEDICATIONS  ondansetron Injectable 4 milliGRAM(s) IV Push once PRN    VITALS:   T(F): 97.5  HR: 83  BP: 108/59  RR: 18  SpO2: --    PHYSICAL EXAM:  GENERAL:   ( x ) NAD, lying in bed comfortably     (  ) obtunded     (  ) lethargic     (  ) somnolent    HEAD:   ( x ) Atraumatic     (  ) hematoma     (  ) laceration (specify location:       )     NECK:  ( x ) Supple     (  ) neck stiffness     (  ) nuchal rigidity     (  )  no JVD     (  ) JVD present ( -- cm)    HEART:  Rate -->     ( x ) normal rate     (  ) bradycardic     (  ) tachycardic  Rhythm -->     ( x ) regular     (  ) regularly irregular     (  ) irregularly irregular  Murmurs -->     (  x) normal s1s2     (  ) systolic murmur     (  ) diastolic murmur     (  ) continuous murmur      (  ) S3 present     (  ) S4 present    LUNGS:   ( x )Unlabored respirations     (  ) tachypnea  (x  ) B/L air entry     (  ) decreased breath sounds in:  (location     )    (  ) no adventitious sound     (  ) crackles     (  ) wheezing      (  ) rhonchi      (specify location:       )  (  ) chest wall tenderness (specify location:       )    ABDOMEN:   ( x ) Soft     (  ) tense   |   (  ) nondistended     (  ) distended   |   (  ) +BS     (  ) hypoactive bowel sounds     (  ) hyperactive bowel sounds  (  ) nontender     (  ) RUQ tenderness     (  ) RLQ tenderness     (  ) LLQ tenderness     (  ) epigastric tenderness     (  ) diffuse tenderness  (  ) Splenomegaly      (  ) Hepatomegaly      (  ) Jaundice     (  ) ecchymosis     EXTREMITIES:  (  ) Normal     (  ) Rash     (  ) ecchymosis     (  ) varicose veins      (  ) pitting edema     (  ) non-pitting edema   (  ) ulceration     (  ) gangrene:     (location:     )    NERVOUS SYSTEM:    ( x ) A&Ox3     (  ) confused     (  ) lethargic  CN II-XII:     (  ) Intact     (  ) deficits found     (Specify:     )   Upper extremities:     (  ) no sensorimotor deficits     (  ) weakness     (  ) loss of proprioception/vibration     (  ) loss of touch/temperature (specify:    )  Lower extremities:     (  ) no sensorimotor deficits     (  ) weakness     (  ) loss of proprioception/vibration     (  ) loss of touch/temperature (specify:    )    SKIN:   (  ) No rashes or lesions     (  ) maculopapular rash     (  ) pustules     (  ) vesicles     (  ) ulcer     (  ) ecchymosis     (specify location:     )    LABS:                        8.2    5.32  )-----------( 132      ( 31 Aug 2023 08:20 )             26.0     08-31    136  |  107  |  30<H>  ----------------------------<  94  4.3   |  20  |  3.1<H>    Ca    11.7<H>      31 Aug 2023 08:21  Mg     1.8     08-31    TPro  4.2<L>  /  Alb  3.3<L>  /  TBili  0.4  /  DBili  x   /  AST  17  /  ALT  23  /  AlkPhos  362<H>  08-31      Urinalysis Basic - ( 31 Aug 2023 08:21 )    Color: x / Appearance: x / SG: x / pH: x  Gluc: 94 mg/dL / Ketone: x  / Bili: x / Urobili: x   Blood: x / Protein: x / Nitrite: x   Leuk Esterase: x / RBC: x / WBC x   Sq Epi: x / Non Sq Epi: x / Bacteria: x   24H events:    Patient is a 67y old Male who presents with a chief complaint of ASHLEY hypercalcemia (31 Aug 2023 10:04)    Primary diagnosis of Hypercalcemia      Today is hospital day 7d. This morning patient was seen and examined at bedside, resting comfortably in bed. No acute or major events overnight. Pt reports no diarrhea overnight however reports having two more episodes of diarrhea today. Pt also complaining of dysphagia.        PAST MEDICAL & SURGICAL HISTORY  Kidney stones    Schizophrenia    Lymphoma    Shingles    Atrophic kidney    H/O colonoscopy with polypectomy    Liver cyst    History of bladder surgery    H/O neuropathy    History of chemotherapy    Stage 3 chronic kidney disease    Retained urethral stent    H/O umbilical hernia repair    Elbow fracture    H/O cystoscopy      SOCIAL HISTORY:  Social History:      ALLERGIES:  allopurinol (Rash (Moderate))    MEDICATIONS:  STANDING MEDICATIONS  benztropine 2 milliGRAM(s) Oral daily  fluPHENAZine 10 milliGRAM(s) Oral daily  heparin   Injectable 5000 Unit(s) SubCutaneous every 8 hours  sodium chloride 0.9%. 1000 milliLiter(s) IV Continuous <Continuous>    PRN MEDICATIONS  ondansetron Injectable 4 milliGRAM(s) IV Push once PRN    VITALS:   T(F): 97.5  HR: 83  BP: 108/59  RR: 18  SpO2: --    PHYSICAL EXAM:  GENERAL:   ( x ) NAD, lying in bed comfortably     (  ) obtunded     (  ) lethargic     (  ) somnolent    HEAD:   ( x ) Atraumatic     (  ) hematoma     (  ) laceration (specify location:       )     NECK:  ( x ) Supple     (  ) neck stiffness     (  ) nuchal rigidity     (  )  no JVD     (  ) JVD present ( -- cm)    HEART:  Rate -->     ( x ) normal rate     (  ) bradycardic     (  ) tachycardic  Rhythm -->     ( x ) regular     (  ) regularly irregular     (  ) irregularly irregular  Murmurs -->     (  x) normal s1s2     (  ) systolic murmur     (  ) diastolic murmur     (  ) continuous murmur      (  ) S3 present     (  ) S4 present    LUNGS:   ( x )Unlabored respirations     (  ) tachypnea  (x  ) B/L air entry     (  ) decreased breath sounds in:  (location     )    (  ) no adventitious sound     (  ) crackles     (  ) wheezing      (  ) rhonchi      (specify location:       )  (  ) chest wall tenderness (specify location:       )    ABDOMEN:   ( x ) Soft     (  ) tense   |   (  ) nondistended     (  ) distended   |   (  ) +BS     (  ) hypoactive bowel sounds     (  ) hyperactive bowel sounds  (  ) nontender     (  ) RUQ tenderness     (  ) RLQ tenderness     (  ) LLQ tenderness     (  ) epigastric tenderness     (  ) diffuse tenderness  (  ) Splenomegaly      (  ) Hepatomegaly      (  ) Jaundice     (  ) ecchymosis     EXTREMITIES:  (  ) Normal     (  ) Rash     (  ) ecchymosis     (  ) varicose veins      (  ) pitting edema     (  ) non-pitting edema   (  ) ulceration     (  ) gangrene:     (location:     )    NERVOUS SYSTEM:    ( x ) A&Ox3     (  ) confused     (  ) lethargic  CN II-XII:     (  ) Intact     (  ) deficits found     (Specify:     )   Upper extremities:     (  ) no sensorimotor deficits     (  ) weakness     (  ) loss of proprioception/vibration     (  ) loss of touch/temperature (specify:    )  Lower extremities:     (  ) no sensorimotor deficits     (  ) weakness     (  ) loss of proprioception/vibration     (  ) loss of touch/temperature (specify:    )    SKIN:   (  ) No rashes or lesions     (  ) maculopapular rash     (  ) pustules     (  ) vesicles     (  ) ulcer     (  ) ecchymosis     (specify location:     )    LABS:                        8.2    5.32  )-----------( 132      ( 31 Aug 2023 08:20 )             26.0     08-31    136  |  107  |  30<H>  ----------------------------<  94  4.3   |  20  |  3.1<H>    Ca    11.7<H>      31 Aug 2023 08:21  Mg     1.8     08-31    TPro  4.2<L>  /  Alb  3.3<L>  /  TBili  0.4  /  DBili  x   /  AST  17  /  ALT  23  /  AlkPhos  362<H>  08-31      Urinalysis Basic - ( 31 Aug 2023 08:21 )    Color: x / Appearance: x / SG: x / pH: x  Gluc: 94 mg/dL / Ketone: x  / Bili: x / Urobili: x   Blood: x / Protein: x / Nitrite: x   Leuk Esterase: x / RBC: x / WBC x   Sq Epi: x / Non Sq Epi: x / Bacteria: x

## 2023-08-31 NOTE — SWALLOW BEDSIDE ASSESSMENT ADULT - ADDITIONAL RECOMMENDATIONS
d/c from [facility]. c/o dysphagia do not appear to be oropharyngeal in nature, possibly esophageal

## 2023-08-31 NOTE — SWALLOW BEDSIDE ASSESSMENT ADULT - COMMENTS
RN reporting good tolerance of current diet. Per discussion with pt, he reports globus sensation x2 weeks.

## 2023-08-31 NOTE — PROGRESS NOTE ADULT - ASSESSMENT
67 year old male PMHx Schizophrenia, non-Hodgkin's lymphoma (follows with Dr. Gong), NET of liver and colon, CKD III, recurrent hypercalcemia sent in to the ED for calcium of  hypercalcemia (13.6).       # Recurrent hypercalcemia likely 2/2 malignancy  # Non-Hodgkin's Lymphoma  # NET liver and colon  # Colon adenoma  # CKD4  - Ca 13.3 on admission - 11.7 this am (corrected to 12.3 today)  - continues to complain of diarrhea  - s/p 2L NS bolus, calcitonin x4, pamidronate x1 on 8/26  - pt was in a good partial remission following 3 cycles of bendamustine and Rituxan treated in 2019 and 2020, unable to tolerate maintenance Rituxan, pt never in complete remission and preferred to be observed due to low grade lymphoma, then lost to follow up per heme/onc. Pt currently does not report any barriers to receiving medical care  -  wnl  - 5-HIAA pending, chromogranin 1071-recently diagnosed NET of the liver  - GI stool PCR negative, stool cx negative-no infectious cause of diarrhea, diarrhea is likely due to malignancy  - heme/onc following-f/u reccs on hypercalcemia management, recommended outpatient PET scan  - c/w NS@100cc/hr-Pt currently refusing IVF  - SCr today 3.1  - hypercalcemia markers-vit D1,25 106 (elevated), vitD 16 (low), PTH 8 (low)  - s/p one dose of denosumab 120 mg on 8/30 for hypercalcemia then f/u with heme/onc on further recommendations for additional denosumab if persistent hypercalcemia      # Schizoaffective disorder  - c/w Fluphenazine, Benztropine      #DVT ppx: heparin SQ  #GI ppx: protonix  #Diet: DASH/TLC 67 year old male PMHx Schizophrenia, non-Hodgkin's lymphoma (follows with Dr. Gong), NET of liver and colon, CKD III, recurrent hypercalcemia sent in to the ED for calcium of  hypercalcemia (13.6).       # Recurrent hypercalcemia likely 2/2 malignancy  # Non-Hodgkin's Lymphoma  # NET liver and colon  # Colon adeoncarcinoma  # CKD4  - Ca 13.3 on admission - 11.7 this am (corrected to 12.3 today)  - s/p calcitonin x4, pamidronate x1 on 8/26  - pt was in a good partial remission following 3 cycles of bendamustine and Rituxan treated in 2019 and 2020, unable to tolerate maintenance Rituxan, pt never in complete remission and preferred to be observed due to low grade lymphoma, then lost to follow up per heme/onc. Pt currently does not report any barriers to receiving medical care.  -  wnl  - 5-HIAA pending, chromogranin 1071-recently diagnosed NET of the liver  - GI stool PCR negative, stool cx negative-ruled out infectious cause of diarrhea  - heme/onc following-f/u reccs on hypercalcemia management, recommended outpatient PET scan  - hypercalcemia markers-vit D1,25 106 (elevated), vitD 16 (low), PTH 8 (low)  - s/p one dose of denosumab 120 mg on 8/30 for hypercalcemia then f/u with heme/onc on further recommendations for additional denosumab if persistent hypercalcemia  - SCr today 3.1 from 2.9 yesterday, attempting to c/w NS@100cc/hr-Pt currently refusing IVF  - P complaining of cough and dysphagia today-per speech and swallow, pt reporting globus sensation and dysphagia is likely not due to oropharyngeal cause  - Pt continues to complain of diarrhea-consulted GI      # Schizoaffective disorder  - c/w Fluphenazine, Benztropine      #DVT ppx: heparin SQ  #GI ppx: protonix  #Diet: DASH/TLC

## 2023-08-31 NOTE — SWALLOW BEDSIDE ASSESSMENT ADULT - SWALLOW EVAL: DIAGNOSIS
+ toleration observed without overt symptoms of penetration/aspiration for Regular/thins. Dry cough present before, during, and after PO intake. home

## 2023-08-31 NOTE — SWALLOW BEDSIDE ASSESSMENT ADULT - SLP PERTINENT HISTORY OF CURRENT PROBLEM
67 year old male PMHx Schizophrenia, non-Hodgkin's lymphoma (follows with Dr. Gong), NET of liver and colon, CKD III, recurrent hypercalcemia sent in to the ED for calcium of  hypercalcemia

## 2023-09-01 LAB
ALBUMIN SERPL ELPH-MCNC: 3.2 G/DL — LOW (ref 3.5–5.2)
ALP SERPL-CCNC: 396 U/L — HIGH (ref 30–115)
ALT FLD-CCNC: 29 U/L — SIGNIFICANT CHANGE UP (ref 0–41)
ANION GAP SERPL CALC-SCNC: 10 MMOL/L — SIGNIFICANT CHANGE UP (ref 7–14)
AST SERPL-CCNC: 20 U/L — SIGNIFICANT CHANGE UP (ref 0–41)
BASOPHILS # BLD AUTO: 0.03 K/UL — SIGNIFICANT CHANGE UP (ref 0–0.2)
BASOPHILS NFR BLD AUTO: 0.5 % — SIGNIFICANT CHANGE UP (ref 0–1)
BILIRUB SERPL-MCNC: 0.5 MG/DL — SIGNIFICANT CHANGE UP (ref 0.2–1.2)
BUN SERPL-MCNC: 36 MG/DL — HIGH (ref 10–20)
C DIFF BY PCR RESULT: SIGNIFICANT CHANGE UP
CALCIUM SERPL-MCNC: 11.9 MG/DL — HIGH (ref 8.4–10.4)
CHLORIDE SERPL-SCNC: 109 MMOL/L — SIGNIFICANT CHANGE UP (ref 98–110)
CO2 SERPL-SCNC: 18 MMOL/L — SIGNIFICANT CHANGE UP (ref 17–32)
CREAT SERPL-MCNC: 3 MG/DL — HIGH (ref 0.7–1.5)
EGFR: 22 ML/MIN/1.73M2 — LOW
EOSINOPHIL # BLD AUTO: 1.23 K/UL — HIGH (ref 0–0.7)
EOSINOPHIL NFR BLD AUTO: 21.2 % — HIGH (ref 0–8)
GLUCOSE SERPL-MCNC: 86 MG/DL — SIGNIFICANT CHANGE UP (ref 70–99)
HCT VFR BLD CALC: 25.1 % — LOW (ref 42–52)
HGB BLD-MCNC: 7.9 G/DL — LOW (ref 14–18)
IMM GRANULOCYTES NFR BLD AUTO: 1.2 % — HIGH (ref 0.1–0.3)
LYMPHOCYTES # BLD AUTO: 1.79 K/UL — SIGNIFICANT CHANGE UP (ref 1.2–3.4)
LYMPHOCYTES # BLD AUTO: 30.8 % — SIGNIFICANT CHANGE UP (ref 20.5–51.1)
MAGNESIUM SERPL-MCNC: 1.8 MG/DL — SIGNIFICANT CHANGE UP (ref 1.8–2.4)
MCHC RBC-ENTMCNC: 28.3 PG — SIGNIFICANT CHANGE UP (ref 27–31)
MCHC RBC-ENTMCNC: 31.5 G/DL — LOW (ref 32–37)
MCV RBC AUTO: 90 FL — SIGNIFICANT CHANGE UP (ref 80–94)
MONOCYTES # BLD AUTO: 1.37 K/UL — HIGH (ref 0.1–0.6)
MONOCYTES NFR BLD AUTO: 23.6 % — HIGH (ref 1.7–9.3)
NEUTROPHILS # BLD AUTO: 1.32 K/UL — LOW (ref 1.4–6.5)
NEUTROPHILS NFR BLD AUTO: 22.7 % — LOW (ref 42.2–75.2)
NRBC # BLD: 0 /100 WBCS — SIGNIFICANT CHANGE UP (ref 0–0)
PLATELET # BLD AUTO: 130 K/UL — SIGNIFICANT CHANGE UP (ref 130–400)
PMV BLD: 10.4 FL — SIGNIFICANT CHANGE UP (ref 7.4–10.4)
POTASSIUM SERPL-MCNC: 4.4 MMOL/L — SIGNIFICANT CHANGE UP (ref 3.5–5)
POTASSIUM SERPL-SCNC: 4.4 MMOL/L — SIGNIFICANT CHANGE UP (ref 3.5–5)
PROT SERPL-MCNC: 4.3 G/DL — LOW (ref 6–8)
RBC # BLD: 2.79 M/UL — LOW (ref 4.7–6.1)
RBC # FLD: 16.5 % — HIGH (ref 11.5–14.5)
SODIUM SERPL-SCNC: 137 MMOL/L — SIGNIFICANT CHANGE UP (ref 135–146)
WBC # BLD: 5.81 K/UL — SIGNIFICANT CHANGE UP (ref 4.8–10.8)
WBC # FLD AUTO: 5.81 K/UL — SIGNIFICANT CHANGE UP (ref 4.8–10.8)

## 2023-09-01 PROCEDURE — 99233 SBSQ HOSP IP/OBS HIGH 50: CPT

## 2023-09-01 PROCEDURE — 99232 SBSQ HOSP IP/OBS MODERATE 35: CPT

## 2023-09-01 RX ADMIN — HEPARIN SODIUM 5000 UNIT(S): 5000 INJECTION INTRAVENOUS; SUBCUTANEOUS at 12:38

## 2023-09-01 RX ADMIN — HEPARIN SODIUM 5000 UNIT(S): 5000 INJECTION INTRAVENOUS; SUBCUTANEOUS at 17:47

## 2023-09-01 RX ADMIN — FLUPHENAZINE HYDROCHLORIDE 10 MILLIGRAM(S): 1 TABLET, FILM COATED ORAL at 12:39

## 2023-09-01 RX ADMIN — SODIUM CHLORIDE 100 MILLILITER(S): 9 INJECTION INTRAMUSCULAR; INTRAVENOUS; SUBCUTANEOUS at 12:39

## 2023-09-01 RX ADMIN — Medication 2 MILLIGRAM(S): at 12:39

## 2023-09-01 RX ADMIN — HEPARIN SODIUM 5000 UNIT(S): 5000 INJECTION INTRAVENOUS; SUBCUTANEOUS at 03:06

## 2023-09-01 NOTE — PROGRESS NOTE ADULT - ASSESSMENT
67 year old male PMHx Schizophrenia, non-Hodgkin's lymphoma (follows with Dr. Gong), NET of liver and colon, CKD III, recurrent hypercalcemia sent in to the ED for calcium of  hypercalcemia (13.6).       # Recurrent hypercalcemia likely 2/2 malignancy  # Non-Hodgkin's Lymphoma  # NET liver and colon  # Colon adeoncarcinoma  # CKD4  - Ca 13.3 on admission - corrected calcium 12.5  - s/p calcitonin x4, pamidronate x1 on 8/26  - pt was in a good partial remission following 3 cycles of bendamustine and Rituxan treated in 2019 and 2020, unable to tolerate maintenance Rituxan, pt never in complete remission and preferred to be observed due to low grade lymphoma, then lost to follow up per heme/onc. Pt currently does not report any barriers to receiving medical care.  -  wnl  - 5-HIAA pending, chromogranin 1071-recently diagnosed NET of the liver  - GI stool PCR negative, stool cx negative-so far infectious cause of diarrhea negative  - hypercalcemia markers-vit D1,25 106 (elevated), vitD 16 (low), PTH 8 (low)  - s/p one dose of denosumab 120 mg on 8/30 for hypercalcemia then f/u with heme/onc on further recommendations for additional denosumab if persistent hypercalcemia  - will encourage pt to c/w NS@100cc/hr  - Per speech and swallow, pt reporting globus sensation and dysphagia is likely not due to oropharyngeal cause  - f/u GI recommendations for persistent diarrhea  - f/u heme/onc for further malignancy inpatient treatment options      # Schizoaffective disorder  - c/w Fluphenazine, Benztropine      #DVT ppx: heparin SQ  #GI ppx: protonix  #Diet: DASH/TLC

## 2023-09-01 NOTE — PROGRESS NOTE ADULT - SUBJECTIVE AND OBJECTIVE BOX
24H events:    Patient is a 67y old Male who presents with a chief complaint of ASHLEY hypercalcemia (01 Sep 2023 12:05)    Primary diagnosis of Hypercalcemia    Today is hospital day 8d. This morning patient was seen and examined at bedside, resting comfortably in bed. No acute or major events overnight. Pt was refusing IVF this morning.      PAST MEDICAL & SURGICAL HISTORY  Kidney stones    Schizophrenia    Lymphoma    Shingles    Atrophic kidney    H/O colonoscopy with polypectomy    Liver cyst    History of bladder surgery    H/O neuropathy    History of chemotherapy    Stage 3 chronic kidney disease    Retained urethral stent    H/O umbilical hernia repair    Elbow fracture    H/O cystoscopy      SOCIAL HISTORY:  Social History:      ALLERGIES:  allopurinol (Rash (Moderate))    MEDICATIONS:  STANDING MEDICATIONS  benztropine 2 milliGRAM(s) Oral daily  fluPHENAZine 10 milliGRAM(s) Oral daily  heparin   Injectable 5000 Unit(s) SubCutaneous every 8 hours  sodium chloride 0.9%. 1000 milliLiter(s) IV Continuous <Continuous>    PRN MEDICATIONS  ondansetron Injectable 4 milliGRAM(s) IV Push once PRN    VITALS:   T(F): 97.2  HR: 86  BP: 109/58  RR: 18  SpO2: --    PHYSICAL EXAM:  GENERAL:   ( x ) NAD, lying in bed comfortably     (  ) obtunded     (  ) lethargic     (  ) somnolent    HEAD:   ( x ) Atraumatic     (  ) hematoma     (  ) laceration (specify location:       )     NECK:  ( x ) Supple     (  ) neck stiffness     (  ) nuchal rigidity     (  )  no JVD     (  ) JVD present ( -- cm)    HEART:  Rate -->     ( x ) normal rate     (  ) bradycardic     (  ) tachycardic  Rhythm -->     ( x ) regular     (  ) regularly irregular     (  ) irregularly irregular  Murmurs -->     (  x) normal s1s2     (  ) systolic murmur     (  ) diastolic murmur     (  ) continuous murmur      (  ) S3 present     (  ) S4 present    LUNGS:   ( x )Unlabored respirations     (  ) tachypnea  (x  ) B/L air entry     (  ) decreased breath sounds in:  (location     )    (  ) no adventitious sound     (  ) crackles     (  ) wheezing      (  ) rhonchi      (specify location:       )  (  ) chest wall tenderness (specify location:       )    ABDOMEN:   ( x ) Soft     (  ) tense   |   (  ) nondistended     (  ) distended   |   (  ) +BS     (  ) hypoactive bowel sounds     (  ) hyperactive bowel sounds  (  ) nontender     (  ) RUQ tenderness     (  ) RLQ tenderness     (  ) LLQ tenderness     (  ) epigastric tenderness     (  ) diffuse tenderness  (  ) Splenomegaly      (  ) Hepatomegaly      (  ) Jaundice     (  ) ecchymosis     EXTREMITIES:  (  ) Normal     (  ) Rash     (  ) ecchymosis     (  ) varicose veins      (  ) pitting edema     (  ) non-pitting edema   (  ) ulceration     (  ) gangrene:     (location:     )    NERVOUS SYSTEM:    ( x ) A&Ox3     (  ) confused     (  ) lethargic  CN II-XII:     (  ) Intact     (  ) deficits found     (Specify:     )   Upper extremities:     (  ) no sensorimotor deficits     (  ) weakness     (  ) loss of proprioception/vibration     (  ) loss of touch/temperature (specify:    )  Lower extremities:     (  ) no sensorimotor deficits     (  ) weakness     (  ) loss of proprioception/vibration     (  ) loss of touch/temperature (specify:    )    SKIN:   (  ) No rashes or lesions     (  ) maculopapular rash     (  ) pustules     (  ) vesicles     (  ) ulcer     (  ) ecchymosis     (specify location:     )      LABS:                        7.9    5.81  )-----------( 130      ( 01 Sep 2023 07:07 )             25.1     09-01    137  |  109  |  36<H>  ----------------------------<  86  4.4   |  18  |  3.0<H>    Ca    11.9<H>      01 Sep 2023 07:07  Mg     1.8     09-01    TPro  4.3<L>  /  Alb  3.2<L>  /  TBili  0.5  /  DBili  x   /  AST  20  /  ALT  29  /  AlkPhos  396<H>  09-01      Urinalysis Basic - ( 01 Sep 2023 07:07 )    Color: x / Appearance: x / SG: x / pH: x  Gluc: 86 mg/dL / Ketone: x  / Bili: x / Urobili: x   Blood: x / Protein: x / Nitrite: x   Leuk Esterase: x / RBC: x / WBC x   Sq Epi: x / Non Sq Epi: x / Bacteria: x

## 2023-09-01 NOTE — PROGRESS NOTE ADULT - SUBJECTIVE AND OBJECTIVE BOX
Nephrology progress note     weakness    ON events/Subjective:     Allergies:  allopurinol (Rash (Moderate))    Hospital Medications:   MEDICATIONS  (STANDING):  benztropine 2 milliGRAM(s) Oral daily  fluPHENAZine 10 milliGRAM(s) Oral daily  heparin   Injectable 5000 Unit(s) SubCutaneous every 8 hours  sodium chloride 0.9%. 1000 milliLiter(s) (100 mL/Hr) IV Continuous <Continuous>    REVIEW OF SYSTEMS:  CONSTITUTIONAL: No weakness, fevers or chills  EYES/ENT: No visual changes;  No vertigo or throat pain   NECK: No pain or stiffness  RESPIRATORY: No cough, wheezing, hemoptysis; No shortness of breath  CARDIOVASCULAR: No chest pain or palpitations.  GASTROINTESTINAL: No abdominal or epigastric pain. No nausea, vomiting, or hematemesis; No diarrhea or constipation. No melena or hematochezia.  GENITOURINARY: No dysuria, frequency, foamy urine, urinary urgency, incontinence or hematuria  NEUROLOGICAL: No numbness or weakness  SKIN: No itching, burning, rashes, or lesions   VASCULAR: No bilateral lower extremity edema.   All other review of systems is negative unless indicated above.    VITALS:  T(F): 96.8 (09-01-23 @ 04:51), Max: 98 (08-31-23 @ 20:00)  HR: 100 (09-01-23 @ 04:51)  BP: 98/47 (09-01-23 @ 04:51)  RR: 18 (09-01-23 @ 04:51)  SpO2: --  Wt(kg): --    08-30 @ 07:01  -  08-31 @ 07:00  --------------------------------------------------------  IN: 1375 mL / OUT: 0 mL / NET: 1375 mL    08-31 @ 07:01  -  09-01 @ 07:00  --------------------------------------------------------  IN: 0 mL / OUT: 200 mL / NET: -200 mL    09-01 @ 07:01  -  09-01 @ 12:05  --------------------------------------------------------  IN: 0 mL / OUT: 600 mL / NET: -600 mL        PHYSICAL EXAM:  Constitutional: NAD  HEENT: anicteric sclera, oropharynx clear, MMM  Neck: No JVD  Respiratory: CTAB, no wheezes, rales or rhonchi  Cardiovascular: S1, S2, RRR  Gastrointestinal: BS+, soft, NT/ND  Extremities: No cyanosis or clubbing. No peripheral edema  Neurological: A/O x 3, no focal deficits  Psychiatric: Normal mood, normal affect  : No CVA tenderness. No zafar.   Skin: No rashes  Vascular Access:    LABS:  09-01    137  |  109  |  36<H>  ----------------------------<  86  4.4   |  18  |  3.0<H>    Ca    11.9<H>      01 Sep 2023 07:07  Mg     1.8     09-01    TPro  4.3<L>  /  Alb  3.2<L>  /  TBili  0.5  /  DBili      /  AST  20  /  ALT  29  /  AlkPhos  396<H>  09-01                          7.9    5.81  )-----------( 130      ( 01 Sep 2023 07:07 )             25.1       Urine Studies:  Creatinine Trend: 3.0<--, 3.1<--, 2.9<--, 2.8<--, 2.8<--, 2.8<--  Urinalysis Basic - ( 01 Sep 2023 07:07 )    Color:  / Appearance:  / SG:  / pH:   Gluc: 86 mg/dL / Ketone:   / Bili:  / Urobili:    Blood:  / Protein:  / Nitrite:    Leuk Esterase:  / RBC:  / WBC    Sq Epi:  / Non Sq Epi:  / Bacteria:     CKD stage 4 - has atrophic kidney and other kidney with ureteral stricture - now close to baseline  ·lymphoma and now new neuroendocrine tumor on liver biopsy  ·hypercalcemia - appears driven by vit D 1,25 -   ·has not responded well to pamidronate as calcium level increasing again  ·chronic loose sttols - causimg more dehydration  ·anemia  ·BP stable  ·low/normal  loose stools  per pt had recent PET scan  with neck involvement (can not find study) and now with dysphagia  ·d/w pt per advanced directives and uncertain at this time     1) d/w Dr Groves and Dr Arredondo (fellow) - will consider further evaluation per malignancy treatment in [facility] this admision as calcium levels not significantly reduced despite prolia and pamidronate and fluiods - treatment of malignancy itself may be best way to acheive treatment.  2) suggest to resume NS at 100 cc/hr

## 2023-09-01 NOTE — CONSULT NOTE ADULT - ASSESSMENT
Assessment:    67 year old male PMHx Schizophrenia, non-Hodgkin's lymphoma (follows with Dr. Gong), NET of liver, colon adenocarcinoma, CKD III, recurrent hypercalcemia sent in to the ED for hypercalcemia.  We were consulted for investigations and management of chronic diarrhea, described as above.  Overall, diarrhea might be secretory, in the setting of carcinoid syndrome; prior serum chromogranins were 1071. 5-HIAA is pending  Workup of infectious diarrhea is negative so far.    Plan:    - Order Clostridium difficile antigen, fecal elastase, lactoferrin, calprotectin;  - Order stool electrolytes to calculate the osmolal gap, if low --> in favor of secretory diarrhea  - Might use PRN loperamide, if symptoms are bothersome, subq octreotide which patient can maintain as outpatient Assessment:    67 year old male PMHx Schizophrenia, non-Hodgkin's lymphoma (follows with Dr. Gong), NET of liver, colon adenocarcinoma, CKD III, recurrent hypercalcemia sent in to the ED for hypercalcemia.  We were consulted for investigations and management of chronic diarrhea, described as above.  Overall, diarrhea might be secretory, in the setting of carcinoid syndrome; prior serum chromogranins were 1071. 5-HIAA is pending  Workup of infectious diarrhea is negative so far.    Plan:    Diarrhea  - Order Clostridium difficile antigen, fecal elastase, lactoferrin, calprotectin;  - Order stool electrolytes to calculate the osmolal gap, if low --> in favor of secretory diarrhea  - Might use PRN loperamide, if symptoms are bothersome, subq octreotide which patient can maintain as outpatient    Dysphagia  - Probably secondary to the enlarged esophageal lymph nodes    Adenocarcinoma of the colon  - Stage IA as workup was negative for any regional or distant lesions          Assessment:    67 year old male PMHx Schizophrenia, non-Hodgkin's lymphoma (follows with Dr. Gong), NET of liver, colon adenocarcinoma, CKD III, recurrent hypercalcemia sent in to the ED for hypercalcemia.  We were consulted for investigations and management of chronic diarrhea, described as above.  Overall, diarrhea might be secretory, in the setting of carcinoid syndrome; prior serum chromogranins were 1071. 5-HIAA is pending  Workup of infectious diarrhea is negative so far.    Plan:    Diarrhea  - Order Clostridium difficile antigen, fecal elastase, lactoferrin, calprotectin;  - Order stool electrolytes to calculate the osmolal gap, if low --> in favor of secretory diarrhea  - Might use PRN loperamide, if symptoms are bothersome, subq octreotide which patient can maintain as outpatient  - Check serum VIP and glucagon levels, to rule out an active NET as a cause of chronic Diarrhea.  - Follow up with hematology/oncology for consideration of octreotide Thearapy    Dysphagia  - Probably secondary to the enlarged esophageal lymph nodes    Adenocarcinoma of the colon  - Stage IA as workup was negative for any regional or distant lesions    NET  - Metastatic to liver.  - Check serum VIP and glucagon levels, to rule out an active NET as a cause of chronic Diarrhea.  - Follow up with hematology/oncology for consideration of octreotide Thearapy

## 2023-09-01 NOTE — CONSULT NOTE ADULT - ATTENDING COMMENTS
I edited the note above. Picture of metastatic secretory/functional NET, please obtain the serologic work (VIP, Glucagon) in addition to what previously ordered. May obtain dotatate scan to localize primary. Oncology follow may need octreotide therapy. WIll follow

## 2023-09-01 NOTE — CONSULT NOTE ADULT - SUBJECTIVE AND OBJECTIVE BOX
Chief Complaint:  Patient is a 67y old  Male who presents with a chief complaint of Hypercalemia (31 Aug 2023 14:17)        Patient is a 67y old  Male who presents with a chief complaint of Hypercalemia (31 Aug 2023 14:17)      HPI:  67 year old male PMHx Schizophrenia, non-Hodgkin's lymphoma (follows with Dr. Gong), NET of liver, colon adenocarcinoma, CKD III, recurrent hypercalcemia sent in to the ED for hypercalcemia. Pt had an appointment with Dr. Gong yesterday morning and was sent to the ED for calcium of 13.6. Pt reports chronic diarrhea and malaise. Also reports dysphagia and hoarseness for the past month. Denies flank or abdominal pain, dysuria, melena, fever. Pt had a recent liver biopsy and was found to have neuroendocrine tumor of the liver. Recent colonoscopy showing adenoma of colon.    Patient has been having a chronic diarrhea, that he reports been having for the last 5 years.  Patient's diarrhea is watery, non- bloody, non- mucopurulent, intermittent without any provoking/ relieving factors. No steatorrhea reported by the patient, no nocturnal symptoms, no abdominal pain, no nausea/ vomiting, no decrease in appetite or weight changes.  Patient reports also dysphagia to solids, none for liquids, probably related to the mediastinal lymphadenopathy previously seen on CT scan of the chest.  Patient underwent a colonoscopy in April 23 that showed a cecal lateral spreading tumor, with pathology showing an intramucosal adenocarcinoma with lesions of high- grade dysplasia, and a biopsy of liver lesions revealed lesions of neuroendocrine tumor, primary not found.    T(F): 98.3 HR: 85 BP: 98/55 RR: 18 SpO2: 96%  Hgb 8.4, Cr 4.1, BUN 54, Ca 13.3,   Pt received 2L NS bolus, Calcitonin   Admit to Medicine  (25 Aug 2023 01:45)      PAST MEDICAL & SURGICAL HISTORY:  Kidney stones      Schizophrenia      Lymphoma      Shingles      Atrophic kidney      H/O colonoscopy with polypectomy      Liver cyst      History of bladder surgery      H/O neuropathy      History of chemotherapy      Stage 3 chronic kidney disease      Retained urethral stent      H/O umbilical hernia repair      Elbow fracture      H/O cystoscopy          REVIEW OF SYSTEMS:   General: Negative  HEENT: Negative  CV: Negative  Respiratory: Negative  GI: See HPI  : Negative  MSK: Negative  Hematologic: Negative  Skin: Negative    MEDICATIONS:   MEDICATIONS  (STANDING):  benztropine 2 milliGRAM(s) Oral daily  fluPHENAZine 10 milliGRAM(s) Oral daily  heparin   Injectable 5000 Unit(s) SubCutaneous every 8 hours  sodium chloride 0.9%. 1000 milliLiter(s) (100 mL/Hr) IV Continuous <Continuous>    MEDICATIONS  (PRN):  ondansetron Injectable 4 milliGRAM(s) IV Push once PRN Nausea and/or Vomiting          DIET:  Diet, Regular (08-25-23 @ 03:03) [Active]          ALLERGIES:   Allergies    allopurinol (Rash (Moderate))    Intolerances        Substance Use:   (  ) never used  (  ) other:  Tobacco Usage:  (   ) never smoked   (   ) former smoker   (   ) current smoker  (     ) pack year  (        ) last cigarette date  Alcohol Usage:    Family History   IBD (  ) Yes   (  ) No  GI Malignancy (  )  Yes    (  ) No    Health Management  Last Colonoscopy:  Last Endoscopy:     VITAL SIGNS:   Vital Signs Last 24 Hrs  T(C): 36 (01 Sep 2023 04:51), Max: 36.7 (31 Aug 2023 20:00)  T(F): 96.8 (01 Sep 2023 04:51), Max: 98 (31 Aug 2023 20:00)  HR: 100 (01 Sep 2023 04:51) (83 - 100)  BP: 98/47 (01 Sep 2023 04:51) (98/47 - 110/65)  BP(mean): --  RR: 18 (01 Sep 2023 04:51) (18 - 18)  SpO2: --      I&O's Summary    31 Aug 2023 07:01  -  01 Sep 2023 07:00  --------------------------------------------------------  IN: 0 mL / OUT: 200 mL / NET: -200 mL    01 Sep 2023 07:01  -  01 Sep 2023 10:58  --------------------------------------------------------  IN: 0 mL / OUT: 600 mL / NET: -600 mL        PHYSICAL EXAM:   GENERAL:  No acute distress  HEENT:  NC/AT, conjunctiva clear, sclera anicteric  CHEST:  No increased effort  HEART:  Regular rate  ABDOMEN:  Soft, non-tender, mildly distended, normoactive bowel sounds, no rebound or guarding  EXTREMITIES: No edema  SKIN:  Warm, dry  NEURO:  Calm, cooperative    LABS:                        7.9    5.81  )-----------( 130      ( 01 Sep 2023 07:07 )             25.1     Hemoglobin: 7.9 g/dL (09-01-23 @ 07:07)  Hemoglobin: 8.2 g/dL (08-31-23 @ 08:20)  Hemoglobin: 7.8 g/dL (08-30-23 @ 07:19)    09-01    137  |  109  |  36<H>  ----------------------------<  86  4.4   |  18  |  3.0<H>    Ca    11.9<H>      01 Sep 2023 07:07  Mg     1.8     09-01    TPro  4.3<L>  /  Alb  3.2<L>  /  TBili  0.5  /  DBili  x   /  AST  20  /  ALT  29  /  AlkPhos  396<H>  09-01    LIVER FUNCTIONS - ( 01 Sep 2023 07:07 )  Alb: 3.2 g/dL / Pro: 4.3 g/dL / ALK PHOS: 396 U/L / ALT: 29 U/L / AST: 20 U/L / GGT: x                                                   RADIOLOGY & ADDITIONAL STUDIES:

## 2023-09-01 NOTE — CONSULT NOTE ADULT - CONSULT REASON
Ho of follicular Lymphoma and Hypercalcemia.
Persistent diarrhea
CKD, ASHLEY
Hypercalcemia
Mediastinal LN

## 2023-09-01 NOTE — PROGRESS NOTE ADULT - SUBJECTIVE AND OBJECTIVE BOX
ELDA COVARRUBIAS 67y Male  MRN#: [id number]   Hospital Day: 8d    SUBJECTIVE  Patient is a 67y old Male who presents with a chief complaint of Hypercalemia 2/2 malignancy (01 Sep 2023 13:25)  Currently admitted to medicine with the primary diagnosis of Hypercalcemia      INTERVAL HPI AND OVERNIGHT EVENTS:  Patient was examined and seen at bedside. This morning he is resting comfortably in bed and reports no issues or overnight events.    REVIEW OF SYMPTOMS:  CONSTITUTIONAL: No weakness, fevers or chills; No headaches  EYES: No visual changes, eye pain, or discharge  ENT: No vertigo; No ear pain or change in hearing; No sore throat or difficulty swallowing  NECK: No pain or stiffness  RESPIRATORY: No cough, wheezing, or hemoptysis; No shortness of breath  CARDIOVASCULAR: No chest pain or palpitations  GASTROINTESTINAL: No abdominal or epigastric pain; No nausea, vomiting, or hematemesis; No diarrhea or constipation; No melena or hematochezia  GENITOURINARY: No dysuria, frequency or hematuria  MUSCULOSKELETAL: No joint pain, no muscle pain, no weakness  NEUROLOGICAL: No numbness or weakness  SKIN: No itching or rashes    OBJECTIVE  PAST MEDICAL & SURGICAL HISTORY  Kidney stones    Schizophrenia    Lymphoma    Shingles    Atrophic kidney    H/O colonoscopy with polypectomy    Liver cyst    History of bladder surgery    H/O neuropathy    History of chemotherapy    Stage 3 chronic kidney disease    Retained urethral stent    H/O umbilical hernia repair    Elbow fracture    H/O cystoscopy      ALLERGIES:  allopurinol (Rash (Moderate))    MEDICATIONS:  STANDING MEDICATIONS  benztropine 2 milliGRAM(s) Oral daily  fluPHENAZine 10 milliGRAM(s) Oral daily  heparin   Injectable 5000 Unit(s) SubCutaneous every 8 hours  sodium chloride 0.9%. 1000 milliLiter(s) IV Continuous <Continuous>    PRN MEDICATIONS  ondansetron Injectable 4 milliGRAM(s) IV Push once PRN      VITAL SIGNS: Last 24 Hours  T(C): 36.2 (01 Sep 2023 12:46), Max: 36.7 (31 Aug 2023 20:00)  T(F): 97.2 (01 Sep 2023 12:46), Max: 98 (31 Aug 2023 20:00)  HR: 86 (01 Sep 2023 12:46) (86 - 100)  BP: 109/58 (01 Sep 2023 12:46) (98/47 - 110/65)  BP(mean): --  RR: 18 (01 Sep 2023 12:46) (18 - 18)  SpO2: --    LABS:                        7.9    5.81  )-----------( 130      ( 01 Sep 2023 07:07 )             25.1     09-01    137  |  109  |  36<H>  ----------------------------<  86  4.4   |  18  |  3.0<H>    Ca    11.9<H>      01 Sep 2023 07:07  Mg     1.8     09-01    TPro  4.3<L>  /  Alb  3.2<L>  /  TBili  0.5  /  DBili  x   /  AST  20  /  ALT  29  /  AlkPhos  396<H>  09-01      Urinalysis Basic - ( 01 Sep 2023 07:07 )    Color: x / Appearance: x / SG: x / pH: x  Gluc: 86 mg/dL / Ketone: x  / Bili: x / Urobili: x   Blood: x / Protein: x / Nitrite: x   Leuk Esterase: x / RBC: x / WBC x   Sq Epi: x / Non Sq Epi: x / Bacteria: x      PHYSICAL EXAM:  CONSTITUTIONAL: No acute distress, well-developed, well-groomed, AAOx3  HEAD: Atraumatic, normocephalic  EYES: EOM intact, PERRLA, conjunctiva and sclera clear  ENT: Supple, no masses, no thyromegaly, no bruits, no JVD; moist mucous membranes  PULMONARY: Clear to auscultation bilaterally; no wheezes, rales, or rhonchi  CARDIOVASCULAR: Regular rate and rhythm; no murmurs, rubs, or gallops  GASTROINTESTINAL: Soft, non-tender, non-distended; bowel sounds present  MUSCULOSKELETAL: 2+ peripheral pulses; no clubbing, no cyanosis, no edema  NEUROLOGY: non-focal  SKIN: No rashes or lesions; warm and dry    ASSESSMENT & PLAN  67y M PMH Schizophrenia (prior lithium use), nephrolithiasis c/b atrophic L kidney, R ureteral rivision, CKD4 (bsl Cr ~2.5), gout, chronic diarrhea, 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 ASHLEY on CKD.    Hematology and Oncology consulted given hx of Marginal Zone lymphoma.    #Recurrent Hypercalcemia likely 2/2 Lymphoma.  - calcium 13.9 on admission. Trending down currently at 11.8.   - on IV hydration.  -s/p one dose of calcitonin.     #Hx of NHL marginal zone now with ? recurrence.  -He was in a good partial remission following 3 cycles of bendamustine and Rituxan treated in 2019 and 2020.  -He was unable to tolerate maintenance Rituxan.  -However, he was never in complete remission and had preferred just to be observed since we were dealing with a low grade lymphoma.  - was lost to follow up.  --CT Abdomen/Pelvis 2/23/23  --Interval worsening in periesophageal and pelvic lymphadenopathy , other bulky lymph nodes in the abdomen and retroperitoneum appear stable  --1.3 indeterminate segment 4 liver lesion  --Focal areas of pleural nodularity concerning for lymphomatous/neoplastic/metastatic process, new since prior.  --PET scan 5.23: 1.  Interval increase in size and decreased metabolic activity of the thoracoabdominal lymphadenopathy, some are new, probably recurrence /residual disease.   2.  New mildly metabolic 3 cm hypodensity in the liver segment 2/3, concerning for neoplasm or lymphoma.  Poorly defined small hypodensity in the segment 4, below the resolution of PET scan.  MRI will provide further elucidation.   3.  Diffuse mildly increased bone marrow activity, possibly reactive versus bone marrow involvement.   4.  Hepatosplenomegaly.  ---MRI abdomen 7.23:  Liver segment II 4 cm mass and Liver segment V  1.5 cm mass suspicious for Hepatic Lymphomas.  --Biopsy of Liver lesion 8.23: well differentiated Neuroendocrine tumor, Grade 3 Likely  metastatic.   -LDH normal 4.23.    #Hepatic mass on imaging -Biopsy consistent with Well differentiated Neuroendocrine tumor, Grade 3, Likely metastatic. 8.23.    #Chronic diarrhea  -He had colonoscopy in the past which revealed a tubulovillous adenoma.  -Colonoscopy by GI on 4/21/23      Recommendations:  - Case discussed with radiologist Dr. Louise, who recommends inpatient Ocretoscan  - please continue IV Fluids  - Octreoscan planned for Tuesday 9/5 (soonest available, orders entered)   - do not give any octreotide prior to scan as this may effect the results    Oncology will continue to follow    ELDA COVARRUBIAS 67y Male  MRN#: [id number]   Hospital Day: 8d    SUBJECTIVE  Patient is a 67y old Male who presents with a chief complaint of Hypercalemia 2/2 malignancy (01 Sep 2023 13:25)  Currently admitted to medicine with the primary diagnosis of Hypercalcemia      INTERVAL HPI AND OVERNIGHT EVENTS:  Patient was examined and seen at bedside. This morning he is resting comfortably in bed and reports no issues or overnight events.    REVIEW OF SYMPTOMS:  CONSTITUTIONAL: No weakness, fevers or chills; No headaches  EYES: No visual changes, eye pain, or discharge  ENT: No vertigo; No ear pain or change in hearing; No sore throat or difficulty swallowing  NECK: No pain or stiffness  RESPIRATORY: No cough, wheezing, or hemoptysis; No shortness of breath  CARDIOVASCULAR: No chest pain or palpitations  GASTROINTESTINAL: No abdominal or epigastric pain; No nausea, vomiting, or hematemesis; No diarrhea or constipation; No melena or hematochezia  GENITOURINARY: No dysuria, frequency or hematuria  MUSCULOSKELETAL: No joint pain, no muscle pain, no weakness  NEUROLOGICAL: No numbness or weakness  SKIN: No itching or rashes    OBJECTIVE  PAST MEDICAL & SURGICAL HISTORY  Kidney stones    Schizophrenia    Lymphoma    Shingles    Atrophic kidney    H/O colonoscopy with polypectomy    Liver cyst    History of bladder surgery    H/O neuropathy    History of chemotherapy    Stage 3 chronic kidney disease    Retained urethral stent    H/O umbilical hernia repair    Elbow fracture    H/O cystoscopy      ALLERGIES:  allopurinol (Rash (Moderate))    MEDICATIONS:  STANDING MEDICATIONS  benztropine 2 milliGRAM(s) Oral daily  fluPHENAZine 10 milliGRAM(s) Oral daily  heparin   Injectable 5000 Unit(s) SubCutaneous every 8 hours  sodium chloride 0.9%. 1000 milliLiter(s) IV Continuous <Continuous>    PRN MEDICATIONS  ondansetron Injectable 4 milliGRAM(s) IV Push once PRN      VITAL SIGNS: Last 24 Hours  T(C): 36.2 (01 Sep 2023 12:46), Max: 36.7 (31 Aug 2023 20:00)  T(F): 97.2 (01 Sep 2023 12:46), Max: 98 (31 Aug 2023 20:00)  HR: 86 (01 Sep 2023 12:46) (86 - 100)  BP: 109/58 (01 Sep 2023 12:46) (98/47 - 110/65)  BP(mean): --  RR: 18 (01 Sep 2023 12:46) (18 - 18)  SpO2: --    LABS:                        7.9    5.81  )-----------( 130      ( 01 Sep 2023 07:07 )             25.1     09-01    137  |  109  |  36<H>  ----------------------------<  86  4.4   |  18  |  3.0<H>    Ca    11.9<H>      01 Sep 2023 07:07  Mg     1.8     09-01    TPro  4.3<L>  /  Alb  3.2<L>  /  TBili  0.5  /  DBili  x   /  AST  20  /  ALT  29  /  AlkPhos  396<H>  09-01      Urinalysis Basic - ( 01 Sep 2023 07:07 )    Color: x / Appearance: x / SG: x / pH: x  Gluc: 86 mg/dL / Ketone: x  / Bili: x / Urobili: x   Blood: x / Protein: x / Nitrite: x   Leuk Esterase: x / RBC: x / WBC x   Sq Epi: x / Non Sq Epi: x / Bacteria: x      PHYSICAL EXAM:  CONSTITUTIONAL: No acute distress, well-developed, well-groomed, AAOx3  HEAD: Atraumatic, normocephalic  EYES: EOM intact, PERRLA, conjunctiva and sclera clear  ENT: Supple, no masses, no thyromegaly, no bruits, no JVD; moist mucous membranes  PULMONARY: Clear to auscultation bilaterally; no wheezes, rales, or rhonchi  CARDIOVASCULAR: Regular rate and rhythm; no murmurs, rubs, or gallops  GASTROINTESTINAL: Soft, non-tender, non-distended; bowel sounds present  MUSCULOSKELETAL: 2+ peripheral pulses; no clubbing, no cyanosis, no edema  NEUROLOGY: non-focal  SKIN: No rashes or lesions; warm and dry    ASSESSMENT & PLAN  67y M PMH Schizophrenia (prior lithium use), nephrolithiasis c/b atrophic L kidney, R ureteral rivision, CKD4 (bsl Cr ~2.5), gout, chronic diarrhea, 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 ASHLEY on CKD.    Hematology and Oncology consulted given hx of Marginal Zone lymphoma.    #Recurrent Hypercalcemia likely 2/2 Lymphoma.  - calcium 13.9 on admission. Trending down currently at 11.8.   - on IV hydration.  -s/p one dose of calcitonin.     #Hx of NHL marginal zone now with ? recurrence.  -He was in a good partial remission following 3 cycles of bendamustine and Rituxan treated in 2019 and 2020.  -He was unable to tolerate maintenance Rituxan.  -However, he was never in complete remission and had preferred just to be observed since we were dealing with a low grade lymphoma.  - was lost to follow up.  --CT Abdomen/Pelvis 2/23/23  --Interval worsening in periesophageal and pelvic lymphadenopathy , other bulky lymph nodes in the abdomen and retroperitoneum appear stable  --1.3 indeterminate segment 4 liver lesion  --Focal areas of pleural nodularity concerning for lymphomatous/neoplastic/metastatic process, new since prior.  --PET scan 5.23: 1.  Interval increase in size and decreased metabolic activity of the thoracoabdominal lymphadenopathy, some are new, probably recurrence /residual disease.   2.  New mildly metabolic 3 cm hypodensity in the liver segment 2/3, concerning for neoplasm or lymphoma.  Poorly defined small hypodensity in the segment 4, below the resolution of PET scan.  MRI will provide further elucidation.   3.  Diffuse mildly increased bone marrow activity, possibly reactive versus bone marrow involvement.   4.  Hepatosplenomegaly.  ---MRI abdomen 7.23:  Liver segment II 4 cm mass and Liver segment V  1.5 cm mass suspicious for Hepatic Lymphomas.  --Biopsy of Liver lesion 8.23: well differentiated Neuroendocrine tumor, Grade 3 Likely  metastatic.   -LDH normal 4.23.    #Hepatic mass on imaging -Biopsy consistent with Well differentiated Neuroendocrine tumor, Grade 3, Likely metastatic. 8.23.    #Chronic diarrhea  -He had colonoscopy in the past which revealed a tubulovillous adenoma.  -Colonoscopy by GI on 4/21/23      Recommendations:  - Case discussed with radiologist Dr. Louise, who recommends inpatient Ocretoscan  - please continue IV Fluids  - Octreoscan planned for Tuesday 9/5 (soonest available, orders entered)   - do not give any octreotide prior to scan as this may effect the results     attempted to call patients father to update him with plan was unable to reach him   Oncology will continue to follow

## 2023-09-01 NOTE — CONSULT NOTE ADULT - TIME BILLING
75 minutes spent on total encounter; more than 50% of the visit was spent counseling and / or coordinating care by the attending physician.  The necessity of the time spent during the encounter on this date of service was due to: Coordination of care.

## 2023-09-02 LAB
5OH-INDOLEACETATE UR-MCNC: 17.3 MG/G CREAT — HIGH
ALBUMIN SERPL ELPH-MCNC: 2.9 G/DL — LOW (ref 3.5–5.2)
ALP SERPL-CCNC: 395 U/L — HIGH (ref 30–115)
ALT FLD-CCNC: 32 U/L — SIGNIFICANT CHANGE UP (ref 0–41)
ANION GAP SERPL CALC-SCNC: 11 MMOL/L — SIGNIFICANT CHANGE UP (ref 7–14)
AST SERPL-CCNC: 21 U/L — SIGNIFICANT CHANGE UP (ref 0–41)
BASOPHILS # BLD AUTO: 0.02 K/UL — SIGNIFICANT CHANGE UP (ref 0–0.2)
BASOPHILS NFR BLD AUTO: 0.4 % — SIGNIFICANT CHANGE UP (ref 0–1)
BILIRUB SERPL-MCNC: 0.5 MG/DL — SIGNIFICANT CHANGE UP (ref 0.2–1.2)
BUN SERPL-MCNC: 39 MG/DL — HIGH (ref 10–20)
CALCIUM SERPL-MCNC: 11.2 MG/DL — HIGH (ref 8.4–10.5)
CHLORIDE SERPL-SCNC: 108 MMOL/L — SIGNIFICANT CHANGE UP (ref 98–110)
CO2 SERPL-SCNC: 17 MMOL/L — SIGNIFICANT CHANGE UP (ref 17–32)
CREAT ?TM UR-MCNC: 49 MG/DL — SIGNIFICANT CHANGE UP (ref 20–320)
CREAT SERPL-MCNC: 2.9 MG/DL — HIGH (ref 0.7–1.5)
EGFR: 23 ML/MIN/1.73M2 — LOW
EOSINOPHIL # BLD AUTO: 1.08 K/UL — HIGH (ref 0–0.7)
EOSINOPHIL NFR BLD AUTO: 20.3 % — HIGH (ref 0–8)
GLUCOSE SERPL-MCNC: 87 MG/DL — SIGNIFICANT CHANGE UP (ref 70–99)
HCT VFR BLD CALC: 24.8 % — LOW (ref 42–52)
HGB BLD-MCNC: 7.7 G/DL — LOW (ref 14–18)
IMM GRANULOCYTES NFR BLD AUTO: 1.5 % — HIGH (ref 0.1–0.3)
LYMPHOCYTES # BLD AUTO: 1.54 K/UL — SIGNIFICANT CHANGE UP (ref 1.2–3.4)
LYMPHOCYTES # BLD AUTO: 29 % — SIGNIFICANT CHANGE UP (ref 20.5–51.1)
MAGNESIUM SERPL-MCNC: 2 MG/DL — SIGNIFICANT CHANGE UP (ref 1.8–2.4)
MCHC RBC-ENTMCNC: 28.5 PG — SIGNIFICANT CHANGE UP (ref 27–31)
MCHC RBC-ENTMCNC: 31 G/DL — LOW (ref 32–37)
MCV RBC AUTO: 91.9 FL — SIGNIFICANT CHANGE UP (ref 80–94)
MONOCYTES # BLD AUTO: 1.38 K/UL — HIGH (ref 0.1–0.6)
MONOCYTES NFR BLD AUTO: 26 % — HIGH (ref 1.7–9.3)
NEUTROPHILS # BLD AUTO: 1.21 K/UL — LOW (ref 1.4–6.5)
NEUTROPHILS NFR BLD AUTO: 22.8 % — LOW (ref 42.2–75.2)
NRBC # BLD: 0 /100 WBCS — SIGNIFICANT CHANGE UP (ref 0–0)
PLATELET # BLD AUTO: 130 K/UL — SIGNIFICANT CHANGE UP (ref 130–400)
PMV BLD: 11.3 FL — HIGH (ref 7.4–10.4)
POTASSIUM SERPL-MCNC: 4.6 MMOL/L — SIGNIFICANT CHANGE UP (ref 3.5–5)
POTASSIUM SERPL-SCNC: 4.6 MMOL/L — SIGNIFICANT CHANGE UP (ref 3.5–5)
PROT SERPL-MCNC: 4 G/DL — LOW (ref 6–8)
RBC # BLD: 2.7 M/UL — LOW (ref 4.7–6.1)
RBC # FLD: 17 % — HIGH (ref 11.5–14.5)
SODIUM SERPL-SCNC: 136 MMOL/L — SIGNIFICANT CHANGE UP (ref 135–146)
WBC # BLD: 5.31 K/UL — SIGNIFICANT CHANGE UP (ref 4.8–10.8)
WBC # FLD AUTO: 5.31 K/UL — SIGNIFICANT CHANGE UP (ref 4.8–10.8)

## 2023-09-02 PROCEDURE — 99232 SBSQ HOSP IP/OBS MODERATE 35: CPT

## 2023-09-02 RX ADMIN — Medication 2 MILLIGRAM(S): at 11:18

## 2023-09-02 RX ADMIN — FLUPHENAZINE HYDROCHLORIDE 10 MILLIGRAM(S): 1 TABLET, FILM COATED ORAL at 11:18

## 2023-09-02 RX ADMIN — SODIUM CHLORIDE 100 MILLILITER(S): 9 INJECTION INTRAMUSCULAR; INTRAVENOUS; SUBCUTANEOUS at 16:34

## 2023-09-02 RX ADMIN — SODIUM CHLORIDE 100 MILLILITER(S): 9 INJECTION INTRAMUSCULAR; INTRAVENOUS; SUBCUTANEOUS at 06:31

## 2023-09-02 RX ADMIN — HEPARIN SODIUM 5000 UNIT(S): 5000 INJECTION INTRAVENOUS; SUBCUTANEOUS at 17:23

## 2023-09-02 RX ADMIN — HEPARIN SODIUM 5000 UNIT(S): 5000 INJECTION INTRAVENOUS; SUBCUTANEOUS at 11:18

## 2023-09-02 RX ADMIN — HEPARIN SODIUM 5000 UNIT(S): 5000 INJECTION INTRAVENOUS; SUBCUTANEOUS at 03:04

## 2023-09-02 NOTE — PROGRESS NOTE ADULT - ASSESSMENT
67 year old male PMHx Schizophrenia, non-Hodgkin's lymphoma (follows with Dr. Gong), NET of liver and colon, CKD III, recurrent hypercalcemia sent in to the ED for calcium of  hypercalcemia (13.6).       # Recurrent hypercalcemia likely 2/2 malignancy  # Non-Hodgkin's Lymphoma  # NET liver and colon  # Colon adeoncarcinoma  # CKD4  - Ca 13.3 on admission - corrected calcium 12.5  - s/p calcitonin x4, pamidronate x1 on 8/26  - pt was in a good partial remission following 3 cycles of bendamustine and Rituxan treated in 2019 and 2020, unable to tolerate maintenance Rituxan, pt never in complete remission and preferred to be observed due to low grade lymphoma, then lost to follow up per heme/onc. Pt currently does not report any barriers to receiving medical care.  -  wnl  - 5-HIAA pending, chromogranin 1071-recently diagnosed NET in liver, likely mets  - GI stool PCR negative, stool cx negative, C diff negative-so far infectious cause of diarrhea negative  - hypercalcemia markers-vit D1,25 106 (elevated), vitD 16 (low), PTH 8 (low)  - s/p one dose of denosumab 120 mg on 8/30 for hypercalcemia then f/u with heme/onc on further recommendations for additional denosumab if persistent hypercalcemia  - Per speech and swallow, pt reporting globus sensation and dysphagia is likely not due to oropharyngeal cause- dysphagia may be related to mediastinal LAD previously seen on CT chest  - appreciate GI reccs-f/u markers for chronic secretory diarrhea  - Per heme/onc-Octreoscan planned for Tuesday 9/5 to localize primary tumor-do not give octreotide prior to scan as this may effect the results  - c/w IVF- NS@100cc/hr      # Schizoaffective disorder  - c/w Fluphenazine, Benztropine      #DVT ppx: heparin SQ  #GI ppx: protonix  #Diet: DASH/TLC 67 year old male PMHx Schizophrenia, non-Hodgkin's lymphoma (follows with Dr. Gong), NET of liver and colon, CKD III, recurrent hypercalcemia sent in to the ED for calcium of  hypercalcemia (13.6).       # Recurrent hypercalcemia likely 2/2 malignancy  # Non-Hodgkin's Lymphoma  # NET liver and colon  # Colon adeoncarcinoma  # CKD4  - Ca 13.3 on admission - corrected calcium 12.5  - s/p calcitonin x4, pamidronate x1 on 8/26  - pt was in a good partial remission following 3 cycles of bendamustine and Rituxan treated in 2019 and 2020, unable to tolerate maintenance Rituxan, pt never in complete remission and preferred to be observed due to low grade lymphoma, then lost to follow up per heme/onc. Pt currently does not report any barriers to receiving medical care.  -  wnl  - 5-HIAA pending, chromogranin 1071-recently diagnosed NET in liver, likely mets  - GI stool PCR negative, stool cx negative, C diff negative-so far infectious cause of diarrhea negative  - hypercalcemia markers-vit D1,25 106 (elevated), vitD 16 (low), PTH 8 (low)  - s/p one dose of denosumab 120 mg on 8/30 for hypercalcemia then f/u with heme/onc on further recommendations for additional denosumab if persistent hypercalcemia  - Per speech and swallow, pt reporting globus sensation and dysphagia is likely not due to oropharyngeal cause- dysphagia may be related to mediastinal LAD previously seen on CT chest  - appreciate GI reccs-f/u markers for secretory diarrhea (stool electrolytes, fecal elastase, lactoferrin, calprotectin and VIP, glucagon for NET cause of diarrhea)  - Per heme/onc-Octreoscan planned for Tuesday 9/5 to localize primary tumor-do not give octreotide prior to scan as this may effect the results  - c/w IVF- NS@100cc/hr      # Schizoaffective disorder  - c/w Fluphenazine, Benztropine      #DVT ppx: heparin SQ  #GI ppx: protonix  #Diet: DASH/TLC Plan: Recommended Amlactin Lotion or similar\\nConsider Rx topical eg Urea prn Detail Level: Zone Render In Strict Bullet Format?: No

## 2023-09-02 NOTE — PROGRESS NOTE ADULT - ASSESSMENT
CKD stage 4  cr stable at baseline  atrophic kidney / contralateral ureteral stricture  hypercalcemia  NHL / neuroendocrine tumor  anemia  chronic diarrhea  hypotension  psychiatric disorder     plan:    cont IVF  s/p pamidronate and denosumab  f/u oncology  Octreoscan planned for Tuesday 9/5  full code

## 2023-09-02 NOTE — PROGRESS NOTE ADULT - SUBJECTIVE AND OBJECTIVE BOX
NEPHROLOGY FOLLOW UP NOTE    Pt seen and examined    PAST MEDICAL & SURGICAL HISTORY:  Kidney stones      Schizophrenia      Lymphoma      Shingles      Atrophic kidney      H/O colonoscopy with polypectomy      Liver cyst      History of bladder surgery      H/O neuropathy      History of chemotherapy      Stage 3 chronic kidney disease      Retained urethral stent      H/O umbilical hernia repair      Elbow fracture      H/O cystoscopy        Allergies:  allopurinol (Rash (Moderate))    Home Medications Reviewed    SOCIAL HISTORY:  Denies ETOH,Smoking,   FAMILY HISTORY:  FH: hypertension    FH: diabetes mellitus          REVIEW OF SYSTEMS:  CONSTITUTIONAL: No weakness, fevers or chills  EYES/ENT: No visual changes;  No vertigo or throat pain   NECK: No pain or stiffness  RESPIRATORY: No cough, wheezing, hemoptysis; No shortness of breath  CARDIOVASCULAR: No chest pain or palpitations.  GASTROINTESTINAL: No abdominal or epigastric pain. No nausea, vomiting, or hematemesis; No diarrhea or constipation. No melena or hematochezia.  GENITOURINARY: No dysuria, frequency, foamy urine, urinary urgency, incontinence or hematuria  NEUROLOGICAL: No numbness or weakness  SKIN: No itching, burning, rashes, or lesions   VASCULAR: No bilateral lower extremity edema.   All other review of systems is negative unless indicated above.    PHYSICAL EXAM:  Constitutional: NAD  HEENT: anicteric sclera, oropharynx clear, MMM  Neck: No JVD  Respiratory: CTAB, no wheezes, rales or rhonchi  Cardiovascular: S1, S2, RRR  Gastrointestinal: BS+, soft, NT/ND  Extremities: No cyanosis or clubbing. No peripheral edema  Neurological: A/O x 3, no focal deficits  Psychiatric: Normal mood, normal affect  : No CVA tenderness. No zafar.   Skin: No rashes    Hospital Medications:   MEDICATIONS  (STANDING):  benztropine 2 milliGRAM(s) Oral daily  fluPHENAZine 10 milliGRAM(s) Oral daily  heparin   Injectable 5000 Unit(s) SubCutaneous every 8 hours  sodium chloride 0.9%. 1000 milliLiter(s) (100 mL/Hr) IV Continuous <Continuous>        VITALS:  T(F): 98.7 (09-02-23 @ 12:34), Max: 100.1 (09-02-23 @ 04:25)  HR: 91 (09-02-23 @ 12:34)  BP: 109/60 (09-02-23 @ 12:34)  RR: 18 (09-02-23 @ 04:25)  SpO2: --  Wt(kg): --    08-31 @ 07:01  -  09-01 @ 07:00  --------------------------------------------------------  IN: 0 mL / OUT: 200 mL / NET: -200 mL    09-01 @ 07:01  -  09-02 @ 07:00  --------------------------------------------------------  IN: 0 mL / OUT: 600 mL / NET: -600 mL          LABS:  09-02    136  |  108  |  39<H>  ----------------------------<  87  4.6   |  17  |  2.9<H>    Ca    11.2<H>      02 Sep 2023 08:03  Mg     2.0     09-02    TPro  4.0<L>  /  Alb  2.9<L>  /  TBili  0.5  /  DBili      /  AST  21  /  ALT  32  /  AlkPhos  395<H>  09-02                          7.7    5.31  )-----------( 130      ( 02 Sep 2023 08:03 )             24.8       Urine Studies:  Urinalysis Basic - ( 02 Sep 2023 08:03 )    Color:  / Appearance:  / SG:  / pH:   Gluc: 87 mg/dL / Ketone:   / Bili:  / Urobili:    Blood:  / Protein:  / Nitrite:    Leuk Esterase:  / RBC:  / WBC    Sq Epi:  / Non Sq Epi:  / Bacteria:       Creatinine, Random Urine: 49 mg/dL (08-26 @ 13:50)      RADIOLOGY & ADDITIONAL STUDIES:

## 2023-09-02 NOTE — PROGRESS NOTE ADULT - SUBJECTIVE AND OBJECTIVE BOX
24H events:    Patient is a 67y old Male who presents with a chief complaint of Hypercalemia 2/2 malignancy (01 Sep 2023 13:25)    Primary diagnosis of Hypercalcemia      Today is hospital day 9d. This morning patient was seen and examined at bedside, resting comfortably in bed.  No acute or major events overnight.      PAST MEDICAL & SURGICAL HISTORY  Kidney stones    Schizophrenia    Lymphoma    Shingles    Atrophic kidney    H/O colonoscopy with polypectomy    Liver cyst    History of bladder surgery    H/O neuropathy    History of chemotherapy    Stage 3 chronic kidney disease    Retained urethral stent    H/O umbilical hernia repair    Elbow fracture    H/O cystoscopy      SOCIAL HISTORY:  Social History:      ALLERGIES:  allopurinol (Rash (Moderate))    MEDICATIONS:  STANDING MEDICATIONS  benztropine 2 milliGRAM(s) Oral daily  fluPHENAZine 10 milliGRAM(s) Oral daily  heparin   Injectable 5000 Unit(s) SubCutaneous every 8 hours  sodium chloride 0.9%. 1000 milliLiter(s) IV Continuous <Continuous>    PRN MEDICATIONS  ondansetron Injectable 4 milliGRAM(s) IV Push once PRN    VITALS:   T(F): 100.1  HR: 96  BP: 93/56  RR: 18  SpO2: --    PHYSICAL EXAM:  GENERAL:   ( x ) NAD, lying in bed comfortably     (  ) obtunded     (  ) lethargic     (  ) somnolent    HEAD:   ( x ) Atraumatic     (  ) hematoma     (  ) laceration (specify location:       )     NECK:  ( x ) Supple     (  ) neck stiffness     (  ) nuchal rigidity     (  )  no JVD     (  ) JVD present ( -- cm)    HEART:  Rate -->     ( x ) normal rate     (  ) bradycardic     (  ) tachycardic  Rhythm -->     ( x ) regular     (  ) regularly irregular     (  ) irregularly irregular  Murmurs -->     (  x) normal s1s2     (  ) systolic murmur     (  ) diastolic murmur     (  ) continuous murmur      (  ) S3 present     (  ) S4 present    LUNGS:   ( x )Unlabored respirations     (  ) tachypnea  (x  ) B/L air entry     (  ) decreased breath sounds in:  (location     )    (  ) no adventitious sound     (  ) crackles     (  ) wheezing      (  ) rhonchi      (specify location:       )  (  ) chest wall tenderness (specify location:       )    ABDOMEN:   ( x ) Soft     (  ) tense   |   ( x ) nondistended     (  ) distended   |   (  ) +BS     (  ) hypoactive bowel sounds     (  ) hyperactive bowel sounds  (  ) nontender     (  ) RUQ tenderness     (  ) RLQ tenderness     (  ) LLQ tenderness     (  ) epigastric tenderness     (  ) diffuse tenderness  (  ) Splenomegaly      (  ) Hepatomegaly      (  ) Jaundice     (  ) ecchymosis     EXTREMITIES:  (  ) Normal     (  ) Rash     (  ) ecchymosis     (  ) varicose veins      (  ) pitting edema     (  ) non-pitting edema   (  ) ulceration     (  ) gangrene:     (location:     )    NERVOUS SYSTEM:    ( x ) A&Ox3     (  ) confused     (  ) lethargic  CN II-XII:     (  ) Intact     (  ) deficits found     (Specify:     )   Upper extremities:     (  ) no sensorimotor deficits     (  ) weakness     (  ) loss of proprioception/vibration     (  ) loss of touch/temperature (specify:    )  Lower extremities:     (  ) no sensorimotor deficits     (  ) weakness     (  ) loss of proprioception/vibration     (  ) loss of touch/temperature (specify:    )    SKIN:   ( x ) No rashes or lesions     (  ) maculopapular rash     (  ) pustules     (  ) vesicles     (  ) ulcer     (  ) ecchymosis     (specify location:     )    LABS:                        7.9    5.81  )-----------( 130      ( 01 Sep 2023 07:07 )             25.1     09-01    137  |  109  |  36<H>  ----------------------------<  86  4.4   |  18  |  3.0<H>    Ca    11.9<H>      01 Sep 2023 07:07  Mg     1.8     09-01    TPro  4.3<L>  /  Alb  3.2<L>  /  TBili  0.5  /  DBili  x   /  AST  20  /  ALT  29  /  AlkPhos  396<H>  09-01      Urinalysis Basic - ( 01 Sep 2023 07:07 )    Color: x / Appearance: x / SG: x / pH: x  Gluc: 86 mg/dL / Ketone: x  / Bili: x / Urobili: x   Blood: x / Protein: x / Nitrite: x   Leuk Esterase: x / RBC: x / WBC x   Sq Epi: x / Non Sq Epi: x / Bacteria: x

## 2023-09-03 LAB
ALBUMIN SERPL ELPH-MCNC: 2.9 G/DL — LOW (ref 3.5–5.2)
ALP SERPL-CCNC: 430 U/L — HIGH (ref 30–115)
ALT FLD-CCNC: 33 U/L — SIGNIFICANT CHANGE UP (ref 0–41)
ANION GAP SERPL CALC-SCNC: 11 MMOL/L — SIGNIFICANT CHANGE UP (ref 7–14)
AST SERPL-CCNC: 19 U/L — SIGNIFICANT CHANGE UP (ref 0–41)
BASOPHILS # BLD AUTO: 0.02 K/UL — SIGNIFICANT CHANGE UP (ref 0–0.2)
BASOPHILS NFR BLD AUTO: 0.4 % — SIGNIFICANT CHANGE UP (ref 0–1)
BILIRUB SERPL-MCNC: 0.5 MG/DL — SIGNIFICANT CHANGE UP (ref 0.2–1.2)
BUN SERPL-MCNC: 13 MG/DL — SIGNIFICANT CHANGE UP (ref 10–20)
CALCIUM SERPL-MCNC: 10.3 MG/DL — SIGNIFICANT CHANGE UP (ref 8.4–10.5)
CHLORIDE SERPL-SCNC: 109 MMOL/L — SIGNIFICANT CHANGE UP (ref 98–110)
CO2 SERPL-SCNC: 16 MMOL/L — LOW (ref 17–32)
CREAT SERPL-MCNC: 2.9 MG/DL — HIGH (ref 0.7–1.5)
EGFR: 23 ML/MIN/1.73M2 — LOW
EOSINOPHIL # BLD AUTO: 0.94 K/UL — HIGH (ref 0–0.7)
EOSINOPHIL NFR BLD AUTO: 18.3 % — HIGH (ref 0–8)
GLUCOSE SERPL-MCNC: 83 MG/DL — SIGNIFICANT CHANGE UP (ref 70–99)
HCT VFR BLD CALC: 24.4 % — LOW (ref 42–52)
HGB BLD-MCNC: 7.5 G/DL — LOW (ref 14–18)
IMM GRANULOCYTES NFR BLD AUTO: 1 % — HIGH (ref 0.1–0.3)
LYMPHOCYTES # BLD AUTO: 1.46 K/UL — SIGNIFICANT CHANGE UP (ref 1.2–3.4)
LYMPHOCYTES # BLD AUTO: 28.5 % — SIGNIFICANT CHANGE UP (ref 20.5–51.1)
MAGNESIUM SERPL-MCNC: 2 MG/DL — SIGNIFICANT CHANGE UP (ref 1.8–2.4)
MCHC RBC-ENTMCNC: 28.4 PG — SIGNIFICANT CHANGE UP (ref 27–31)
MCHC RBC-ENTMCNC: 30.7 G/DL — LOW (ref 32–37)
MCV RBC AUTO: 92.4 FL — SIGNIFICANT CHANGE UP (ref 80–94)
MONOCYTES # BLD AUTO: 1.33 K/UL — HIGH (ref 0.1–0.6)
MONOCYTES NFR BLD AUTO: 25.9 % — HIGH (ref 1.7–9.3)
NEUTROPHILS # BLD AUTO: 1.33 K/UL — LOW (ref 1.4–6.5)
NEUTROPHILS NFR BLD AUTO: 25.9 % — LOW (ref 42.2–75.2)
NRBC # BLD: 0 /100 WBCS — SIGNIFICANT CHANGE UP (ref 0–0)
PLATELET # BLD AUTO: 121 K/UL — LOW (ref 130–400)
PMV BLD: 10.7 FL — HIGH (ref 7.4–10.4)
POTASSIUM SERPL-MCNC: 4.4 MMOL/L — SIGNIFICANT CHANGE UP (ref 3.5–5)
POTASSIUM SERPL-SCNC: 4.4 MMOL/L — SIGNIFICANT CHANGE UP (ref 3.5–5)
PROT SERPL-MCNC: 3.9 G/DL — LOW (ref 6–8)
PTH RELATED PROT SERPL-MCNC: <2 PMOL/L — SIGNIFICANT CHANGE UP
RBC # BLD: 2.64 M/UL — LOW (ref 4.7–6.1)
RBC # FLD: 17.1 % — HIGH (ref 11.5–14.5)
SODIUM SERPL-SCNC: 136 MMOL/L — SIGNIFICANT CHANGE UP (ref 135–146)
WBC # BLD: 5.13 K/UL — SIGNIFICANT CHANGE UP (ref 4.8–10.8)
WBC # FLD AUTO: 5.13 K/UL — SIGNIFICANT CHANGE UP (ref 4.8–10.8)

## 2023-09-03 PROCEDURE — 99232 SBSQ HOSP IP/OBS MODERATE 35: CPT

## 2023-09-03 RX ORDER — SODIUM BICARBONATE 1 MEQ/ML
650 SYRINGE (ML) INTRAVENOUS THREE TIMES A DAY
Refills: 0 | Status: COMPLETED | OUTPATIENT
Start: 2023-09-03 | End: 2023-09-06

## 2023-09-03 RX ADMIN — FLUPHENAZINE HYDROCHLORIDE 10 MILLIGRAM(S): 1 TABLET, FILM COATED ORAL at 11:41

## 2023-09-03 RX ADMIN — HEPARIN SODIUM 5000 UNIT(S): 5000 INJECTION INTRAVENOUS; SUBCUTANEOUS at 17:56

## 2023-09-03 RX ADMIN — HEPARIN SODIUM 5000 UNIT(S): 5000 INJECTION INTRAVENOUS; SUBCUTANEOUS at 05:07

## 2023-09-03 RX ADMIN — Medication 2 MILLIGRAM(S): at 11:41

## 2023-09-03 RX ADMIN — Medication 650 MILLIGRAM(S): at 17:55

## 2023-09-03 RX ADMIN — Medication 650 MILLIGRAM(S): at 11:41

## 2023-09-03 RX ADMIN — HEPARIN SODIUM 5000 UNIT(S): 5000 INJECTION INTRAVENOUS; SUBCUTANEOUS at 11:41

## 2023-09-03 RX ADMIN — SODIUM CHLORIDE 100 MILLILITER(S): 9 INJECTION INTRAMUSCULAR; INTRAVENOUS; SUBCUTANEOUS at 12:56

## 2023-09-03 RX ADMIN — Medication 650 MILLIGRAM(S): at 22:15

## 2023-09-03 NOTE — PROGRESS NOTE ADULT - SUBJECTIVE AND OBJECTIVE BOX
ELDA COVARRUBIAS  67y  Male      Patient is a 67y old  Male who presents with a chief complaint of Hypercalcemia (02 Sep 2023 07:44)      INTERVAL HPI/OVERNIGHT EVENTS:  pt seen this am   -continue with current care  -octreotide scan on Tuesday   -no overnight events notified to me     Vital Signs Last 24 Hrs  T(C): 36.2 (03 Sep 2023 05:13), Max: 37.3 (02 Sep 2023 19:54)  T(F): 97.2 (03 Sep 2023 05:13), Max: 99.2 (02 Sep 2023 19:54)  HR: 85 (03 Sep 2023 05:13) (85 - 88)  BP: 95/52 (03 Sep 2023 05:13) (95/52 - 102/59)  BP(mean): 77 (02 Sep 2023 19:54) (77 - 77)  RR: 18 (03 Sep 2023 05:13) (18 - 18)  SpO2: --        PHYSICAL EXAM:  GENERAL: Not in distress - comfortable in bed   HEAD:  Atraumatic, Normocephalic  EYES: EOMI, PERRLA, conjunctiva and sclera clear  NERVOUS SYSTEM:  Alert & Oriented X 3  CHEST/LUNG: Clear air entry   CV/HEART: Regular rate and rhythm  GI/ABDOMEN: Soft abdomen   EXTREMITIES:  moves all extrem     LAB:                        7.5    5.13  )-----------( 121      ( 03 Sep 2023 07:21 )             24.4     09-03    136  |  109  |  13  ----------------------------<  83  4.4   |  16<L>  |  2.9<H>    Ca    10.3      03 Sep 2023 07:21  Mg     2.0     09-03    TPro  3.9<L>  /  Alb  2.9<L>  /  TBili  0.5  /  DBili  x   /  AST  19  /  ALT  33  /  AlkPhos  430<H>  09-03        Daily     Daily   CAPILLARY BLOOD GLUCOSE        Urinalysis Basic - ( 03 Sep 2023 07:21 )    Color: x / Appearance: x / SG: x / pH: x  Gluc: 83 mg/dL / Ketone: x  / Bili: x / Urobili: x   Blood: x / Protein: x / Nitrite: x   Leuk Esterase: x / RBC: x / WBC x   Sq Epi: x / Non Sq Epi: x / Bacteria: x      LIVER FUNCTIONS - ( 03 Sep 2023 07:21 )  Alb: 2.9 g/dL / Pro: 3.9 g/dL / ALK PHOS: 430 U/L / ALT: 33 U/L / AST: 19 U/L / GGT: x           RADIOLOGY:    Imaging Personally visualized and Reviewed:  [ y ] YES  [ ] NO    HEALTH ISSUES - PROBLEM Dx:          benztropine 2 milliGRAM(s) Oral daily  fluPHENAZine 10 milliGRAM(s) Oral daily  heparin   Injectable 5000 Unit(s) SubCutaneous every 8 hours  ondansetron Injectable 4 milliGRAM(s) IV Push once PRN  sodium bicarbonate 650 milliGRAM(s) Oral three times a day  sodium chloride 0.9%. 1000 milliLiter(s) IV Continuous <Continuous>

## 2023-09-03 NOTE — PROGRESS NOTE ADULT - SUBJECTIVE AND OBJECTIVE BOX
NEPHROLOGY FOLLOW UP NOTE    Pt seen and examined  no new events  remains on IVF    PAST MEDICAL & SURGICAL HISTORY:  Kidney stones      Schizophrenia      Lymphoma      Shingles      Atrophic kidney      H/O colonoscopy with polypectomy      Liver cyst      History of bladder surgery      H/O neuropathy      History of chemotherapy      Stage 3 chronic kidney disease      Retained urethral stent      H/O umbilical hernia repair      Elbow fracture      H/O cystoscopy        Allergies:  allopurinol (Rash (Moderate))    Home Medications Reviewed    SOCIAL HISTORY:  Denies ETOH,Smoking,   FAMILY HISTORY:  FH: hypertension    FH: diabetes mellitus          REVIEW OF SYSTEMS:  CONSTITUTIONAL: No weakness, fevers or chills  EYES/ENT: No visual changes;  No vertigo or throat pain   NECK: No pain or stiffness  RESPIRATORY: No cough, wheezing, hemoptysis; No shortness of breath  CARDIOVASCULAR: No chest pain or palpitations.  GASTROINTESTINAL: No abdominal or epigastric pain. No nausea, vomiting, or hematemesis; No diarrhea or constipation. No melena or hematochezia.  GENITOURINARY: No dysuria, frequency, foamy urine, urinary urgency, incontinence or hematuria  NEUROLOGICAL: No numbness or weakness  SKIN: No itching, burning, rashes, or lesions   VASCULAR: No bilateral lower extremity edema.   All other review of systems is negative unless indicated above.    PHYSICAL EXAM:  Constitutional: NAD  HEENT: anicteric sclera, oropharynx clear, MMM  Neck: No JVD  Respiratory: CTAB, no wheezes, rales or rhonchi  Cardiovascular: S1, S2, RRR  Gastrointestinal: BS+, soft, NT/ND  Extremities: No cyanosis or clubbing. No peripheral edema  Neurological: A/O x 3, no focal deficits  Psychiatric: Normal mood, normal affect  : No CVA tenderness. No zafar.   Skin: No rashes    Hospital Medications:   MEDICATIONS  (STANDING):  benztropine 2 milliGRAM(s) Oral daily  fluPHENAZine 10 milliGRAM(s) Oral daily  heparin   Injectable 5000 Unit(s) SubCutaneous every 8 hours  sodium bicarbonate 650 milliGRAM(s) Oral three times a day  sodium chloride 0.9%. 1000 milliLiter(s) (100 mL/Hr) IV Continuous <Continuous>        VITALS:  T(F): 97.2 (09-03-23 @ 05:13), Max: 99.2 (09-02-23 @ 19:54)  HR: 85 (09-03-23 @ 05:13)  BP: 95/52 (09-03-23 @ 05:13)  RR: 18 (09-03-23 @ 05:13)  SpO2: --  Wt(kg): --    09-01 @ 07:01  -  09-02 @ 07:00  --------------------------------------------------------  IN: 0 mL / OUT: 600 mL / NET: -600 mL    09-02 @ 07:01  -  09-03 @ 07:00  --------------------------------------------------------  IN: 1200 mL / OUT: 200 mL / NET: 1000 mL          LABS:  09-03    136  |  109  |  13  ----------------------------<  83  4.4   |  16<L>  |  2.9<H>    Ca    10.3      03 Sep 2023 07:21  Mg     2.0     09-03    TPro  3.9<L>  /  Alb  2.9<L>  /  TBili  0.5  /  DBili      /  AST  19  /  ALT  33  /  AlkPhos  430<H>  09-03                          7.5    5.13  )-----------( 121      ( 03 Sep 2023 07:21 )             24.4       Urine Studies:  Urinalysis Basic - ( 03 Sep 2023 07:21 )    Color:  / Appearance:  / SG:  / pH:   Gluc: 83 mg/dL / Ketone:   / Bili:  / Urobili:    Blood:  / Protein:  / Nitrite:    Leuk Esterase:  / RBC:  / WBC    Sq Epi:  / Non Sq Epi:  / Bacteria:           RADIOLOGY & ADDITIONAL STUDIES:

## 2023-09-03 NOTE — PROGRESS NOTE ADULT - ASSESSMENT
# Hypercalcemia   # Neuroendocrine tumor   # Non Hodgkin Lymphoma    # CKD stage IV  # Schizoaffective disorder  # Anemia of chronic illness   # Diarrhea   # Dysphagia     -continue with IVF, s/p pamidronate and dose of denosumab - will continue with fluids over the weekend and the plan is for octreotide scan Tuesday   -monitor hb and transfuse if < 7 - component of dilutional anemia   -Nephro following   -continue rest of current medical care   -oob as tolerated     DISPO: not discharge ready   -spoke to patient about his plan of care   -staff updated family     Attending Physician Dr. Zari Ahmadi # [id number] .

## 2023-09-03 NOTE — PROGRESS NOTE ADULT - ASSESSMENT
CKD stage 4  cr stable at baseline  atrophic kidney / contralateral ureteral stricture  hypercalcemia, much improved  metabolic acidosis  NHL / neuroendocrine tumor  anemia, hgb downtrending   chronic diarrhea  hypotension  psychiatric disorder     plan:    can dc IVF tomorrow  s/p pamidronate and denosumab  give sodium bicarbonate 650mg po tid x 3 days  f/u oncology   Octreoscan planned for Tuesday 9/5  PRN PRBC transfusion for Hgb < 7

## 2023-09-04 LAB
ALBUMIN SERPL ELPH-MCNC: 2.9 G/DL — LOW (ref 3.5–5.2)
ALP SERPL-CCNC: 460 U/L — HIGH (ref 30–115)
ALT FLD-CCNC: 42 U/L — HIGH (ref 0–41)
ANION GAP SERPL CALC-SCNC: 10 MMOL/L — SIGNIFICANT CHANGE UP (ref 7–14)
AST SERPL-CCNC: 26 U/L — SIGNIFICANT CHANGE UP (ref 0–41)
BASOPHILS # BLD AUTO: 0.03 K/UL — SIGNIFICANT CHANGE UP (ref 0–0.2)
BASOPHILS NFR BLD AUTO: 0.6 % — SIGNIFICANT CHANGE UP (ref 0–1)
BILIRUB SERPL-MCNC: 0.5 MG/DL — SIGNIFICANT CHANGE UP (ref 0.2–1.2)
BLD GP AB SCN SERPL QL: SIGNIFICANT CHANGE UP
BUN SERPL-MCNC: 39 MG/DL — HIGH (ref 10–20)
CALCIUM SERPL-MCNC: 10.8 MG/DL — HIGH (ref 8.4–10.5)
CHLORIDE SERPL-SCNC: 109 MMOL/L — SIGNIFICANT CHANGE UP (ref 98–110)
CO2 SERPL-SCNC: 18 MMOL/L — SIGNIFICANT CHANGE UP (ref 17–32)
CREAT SERPL-MCNC: 3 MG/DL — HIGH (ref 0.7–1.5)
EGFR: 22 ML/MIN/1.73M2 — LOW
EOSINOPHIL # BLD AUTO: 0.91 K/UL — HIGH (ref 0–0.7)
EOSINOPHIL NFR BLD AUTO: 17.4 % — HIGH (ref 0–8)
GLUCOSE SERPL-MCNC: 91 MG/DL — SIGNIFICANT CHANGE UP (ref 70–99)
HCT VFR BLD CALC: 24.8 % — LOW (ref 42–52)
HGB BLD-MCNC: 7.6 G/DL — LOW (ref 14–18)
IMM GRANULOCYTES NFR BLD AUTO: 0.8 % — HIGH (ref 0.1–0.3)
LYMPHOCYTES # BLD AUTO: 1.55 K/UL — SIGNIFICANT CHANGE UP (ref 1.2–3.4)
LYMPHOCYTES # BLD AUTO: 29.6 % — SIGNIFICANT CHANGE UP (ref 20.5–51.1)
MAGNESIUM SERPL-MCNC: 2.1 MG/DL — SIGNIFICANT CHANGE UP (ref 1.8–2.4)
MCHC RBC-ENTMCNC: 28 PG — SIGNIFICANT CHANGE UP (ref 27–31)
MCHC RBC-ENTMCNC: 30.6 G/DL — LOW (ref 32–37)
MCV RBC AUTO: 91.5 FL — SIGNIFICANT CHANGE UP (ref 80–94)
MONOCYTES # BLD AUTO: 1.31 K/UL — HIGH (ref 0.1–0.6)
MONOCYTES NFR BLD AUTO: 25 % — HIGH (ref 1.7–9.3)
NEUTROPHILS # BLD AUTO: 1.39 K/UL — LOW (ref 1.4–6.5)
NEUTROPHILS NFR BLD AUTO: 26.6 % — LOW (ref 42.2–75.2)
NRBC # BLD: 0 /100 WBCS — SIGNIFICANT CHANGE UP (ref 0–0)
PLATELET # BLD AUTO: 129 K/UL — LOW (ref 130–400)
PMV BLD: 10.9 FL — HIGH (ref 7.4–10.4)
POTASSIUM SERPL-MCNC: 4.2 MMOL/L — SIGNIFICANT CHANGE UP (ref 3.5–5)
POTASSIUM SERPL-SCNC: 4.2 MMOL/L — SIGNIFICANT CHANGE UP (ref 3.5–5)
PROT SERPL-MCNC: 4 G/DL — LOW (ref 6–8)
RBC # BLD: 2.71 M/UL — LOW (ref 4.7–6.1)
RBC # FLD: 17 % — HIGH (ref 11.5–14.5)
SODIUM SERPL-SCNC: 137 MMOL/L — SIGNIFICANT CHANGE UP (ref 135–146)
WBC # BLD: 5.23 K/UL — SIGNIFICANT CHANGE UP (ref 4.8–10.8)
WBC # FLD AUTO: 5.23 K/UL — SIGNIFICANT CHANGE UP (ref 4.8–10.8)

## 2023-09-04 PROCEDURE — 99232 SBSQ HOSP IP/OBS MODERATE 35: CPT

## 2023-09-04 RX ORDER — SODIUM CHLORIDE 9 MG/ML
1000 INJECTION INTRAMUSCULAR; INTRAVENOUS; SUBCUTANEOUS
Refills: 0 | Status: DISCONTINUED | OUTPATIENT
Start: 2023-09-04 | End: 2023-09-06

## 2023-09-04 RX ADMIN — HEPARIN SODIUM 5000 UNIT(S): 5000 INJECTION INTRAVENOUS; SUBCUTANEOUS at 20:19

## 2023-09-04 RX ADMIN — Medication 650 MILLIGRAM(S): at 21:07

## 2023-09-04 RX ADMIN — HEPARIN SODIUM 5000 UNIT(S): 5000 INJECTION INTRAVENOUS; SUBCUTANEOUS at 10:15

## 2023-09-04 RX ADMIN — Medication 2 MILLIGRAM(S): at 11:33

## 2023-09-04 RX ADMIN — Medication 650 MILLIGRAM(S): at 13:00

## 2023-09-04 RX ADMIN — SODIUM CHLORIDE 100 MILLILITER(S): 9 INJECTION INTRAMUSCULAR; INTRAVENOUS; SUBCUTANEOUS at 11:33

## 2023-09-04 RX ADMIN — Medication 650 MILLIGRAM(S): at 05:53

## 2023-09-04 RX ADMIN — HEPARIN SODIUM 5000 UNIT(S): 5000 INJECTION INTRAVENOUS; SUBCUTANEOUS at 02:45

## 2023-09-04 RX ADMIN — FLUPHENAZINE HYDROCHLORIDE 10 MILLIGRAM(S): 1 TABLET, FILM COATED ORAL at 11:33

## 2023-09-04 NOTE — PROGRESS NOTE ADULT - SUBJECTIVE AND OBJECTIVE BOX
24H events:    Patient is a 67y old Male who presents with a chief complaint of Hypercalcemia (02 Sep 2023 07:44)    Primary diagnosis of Hypercalcemia       Today is hospital day 11d.      PAST MEDICAL & SURGICAL HISTORY  Kidney stones    Schizophrenia    Lymphoma    Shingles    Atrophic kidney    H/O colonoscopy with polypectomy    Liver cyst    History of bladder surgery    H/O neuropathy    History of chemotherapy    Stage 3 chronic kidney disease    Retained urethral stent    H/O umbilical hernia repair    Elbow fracture    H/O cystoscopy      SOCIAL HISTORY:  Negative for smoking/alcohol/drug use.     ALLERGIES:  allopurinol (Rash (Moderate))    MEDICATIONS:  STANDING MEDICATIONS  benztropine 2 milliGRAM(s) Oral daily  fluPHENAZine 10 milliGRAM(s) Oral daily  heparin   Injectable 5000 Unit(s) SubCutaneous every 8 hours  sodium bicarbonate 650 milliGRAM(s) Oral three times a day  sodium chloride 0.9%. 1000 milliLiter(s) IV Continuous <Continuous>    PRN MEDICATIONS  ondansetron Injectable 4 milliGRAM(s) IV Push once PRN    VITALS:   T(F): 99.8  HR: 95  BP: 113/59  RR: 18  SpO2: --    LABS:                        7.6    5.23  )-----------( 129      ( 04 Sep 2023 07:38 )             24.8     09-03    136  |  109  |  13  ----------------------------<  83  4.4   |  16<L>  |  2.9<H>    Ca    10.3      03 Sep 2023 07:21  Mg     2.0     09-03    TPro  3.9<L>  /  Alb  2.9<L>  /  TBili  0.5  /  DBili  x   /  AST  19  /  ALT  33  /  AlkPhos  430<H>  09-03      Urinalysis Basic - ( 03 Sep 2023 07:21 )    Color: x / Appearance: x / SG: x / pH: x  Gluc: 83 mg/dL / Ketone: x  / Bili: x / Urobili: x   Blood: x / Protein: x / Nitrite: x   Leuk Esterase: x / RBC: x / WBC x   Sq Epi: x / Non Sq Epi: x / Bacteria: x          PHYSICAL EXAM:  GENERAL: NAD  EYES: conjunctiva and sclera clear  NECK: Supple, No JVD, Normal thyroid  NERVOUS SYSTEM:  Alert & Oriented X3, Good concentration; Motor Strength 5/5 B/L upper and lower extremities; DTRs 2+ intact and symmetric  CHEST/LUNG: Clear to auscultation bilaterally; No rales, rhonchi, wheezing, or rubs  HEART: Regular rate and rhythm; No murmurs, rubs, or gallops  ABDOMEN: Soft, Nontender, Nondistended; Bowel sounds present  EXTREMITIES:  + Peripheral Pulses, No clubbing, cyanosis, or edema

## 2023-09-04 NOTE — PROGRESS NOTE ADULT - SUBJECTIVE AND OBJECTIVE BOX
ELDA COVARRUBIAS  67y  Male      Patient is a 67y old  Male who presents with a chief complaint of Hypercalcemia (02 Sep 2023 07:44)      INTERVAL HPI/OVERNIGHT EVENTS:    pt seen this am   -continue with current care  -octreotide scan tomorrow   -no overnight events notified to me     Vital Signs Last 24 Hrs  T(C): 37.7 (04 Sep 2023 05:26), Max: 37.7 (04 Sep 2023 05:26)  T(F): 99.8 (04 Sep 2023 05:26), Max: 99.8 (04 Sep 2023 05:26)  HR: 95 (04 Sep 2023 09:13) (89 - 95)  BP: 113/59 (04 Sep 2023 09:13) (84/45 - 116/55)  BP(mean): 81 (03 Sep 2023 21:24) (81 - 81)  RR: 18 (04 Sep 2023 09:13) (18 - 20)      PHYSICAL EXAM:  GENERAL: Not in distress - comfortable in bed - ambulating on medical floor   HEAD:  Atraumatic, Normocephalic  EYES: EOMI, PERRLA, conjunctiva and sclera clear  NERVOUS SYSTEM:  Alert & Oriented X 3  CHEST/LUNG: Clear air entry   CV/HEART: Regular rate and rhythm  GI/ABDOMEN: Soft abdomen   EXTREMITIES:  moves all extrem     LAB:                                 7.6    5.23  )-----------( 129      ( 04 Sep 2023 07:38 )             24.8     09-04    137  |  109  |  39<H>  ----------------------------<  91  4.2   |  18  |  3.0<H>    Ca    10.8<H>      04 Sep 2023 07:38  Mg     2.1     09-04    TPro  4.0<L>  /  Alb  2.9<L>  /  TBili  0.5  /  DBili  x   /  AST  26  /  ALT  42<H>  /  AlkPhos  460<H>  09-04      Daily   CAPILLARY BLOOD GLUCOSE        Urinalysis Basic - ( 03 Sep 2023 07:21 )    Color: x / Appearance: x / SG: x / pH: x  Gluc: 83 mg/dL / Ketone: x  / Bili: x / Urobili: x   Blood: x / Protein: x / Nitrite: x   Leuk Esterase: x / RBC: x / WBC x   Sq Epi: x / Non Sq Epi: x / Bacteria: x      LIVER FUNCTIONS - ( 03 Sep 2023 07:21 )  Alb: 2.9 g/dL / Pro: 3.9 g/dL / ALK PHOS: 430 U/L / ALT: 33 U/L / AST: 19 U/L / GGT: x           RADIOLOGY:    Imaging Personally visualized and Reviewed:  [ y ] YES  [ ] NO    HEALTH ISSUES - PROBLEM Dx:        MEDICATIONS  (STANDING):  benztropine 2 milliGRAM(s) Oral daily  fluPHENAZine 10 milliGRAM(s) Oral daily  heparin   Injectable 5000 Unit(s) SubCutaneous every 8 hours  sodium bicarbonate 650 milliGRAM(s) Oral three times a day  sodium chloride 0.9%. 1000 milliLiter(s) (100 mL/Hr) IV Continuous <Continuous>    MEDICATIONS  (PRN):  ondansetron Injectable 4 milliGRAM(s) IV Push once PRN Nausea and/or Vomiting

## 2023-09-04 NOTE — PROGRESS NOTE ADULT - ASSESSMENT
# Hypercalcemia   # Neuroendocrine tumor   # Non Hodgkin Lymphoma    # CKD stage IV  # Schizoaffective disorder  # Anemia of chronic illness   # Diarrhea   # Dysphagia     -continue with IVF (decline this am), s/p pamidronate and dose of denosumab - octreotide scan tomorrow   -monitor hb and transfuse if < 7 - component of dilutional anemia   -Nephro follow up appreciated - agree with limiting labwork   -continue rest of current medical care   -oob as tolerated     DISPO: not discharge ready   -spoke to patient about his plan of care   -staff updated family     Attending Physician Dr. Zari Ahmadi # [id number] .

## 2023-09-04 NOTE — PROGRESS NOTE ADULT - ASSESSMENT
Assessment  Patient is a 67y old Male who presents with a chief complaint of Hypercalcemia and diarrhea      Plan  # Hypercalcemia   # Neuroendocrine tumor   # Non Hodgkin Lymphoma    # CKD stage IV  # Schizoaffective disorder  # Anemia of chronic illness   # Diarrhea   # Dysphagia     -Pt refusing IV fluids and IV placement, s/p pamidronate and dose of denosumab - will continue with fluids and the plan is for octreotide scan tomorrow  -monitor hb and transfuse if < 7 - component of dilutional anemia   -Nephro following   -continue rest of current medical care   -oob as tolerated

## 2023-09-04 NOTE — PROGRESS NOTE ADULT - SUBJECTIVE AND OBJECTIVE BOX
Nephrology progress note     no specific complaints    ON events/Subjective:     Allergies:  allopurinol (Rash (Moderate))    Hospital Medications:   MEDICATIONS  (STANDING):  benztropine 2 milliGRAM(s) Oral daily  fluPHENAZine 10 milliGRAM(s) Oral daily  heparin   Injectable 5000 Unit(s) SubCutaneous every 8 hours  sodium bicarbonate 650 milliGRAM(s) Oral three times a day  sodium chloride 0.9%. 1000 milliLiter(s) (100 mL/Hr) IV Continuous <Continuous>    REVIEW OF SYSTEMS:  CONSTITUTIONAL: No weakness, fevers or chills  EYES/ENT: No visual changes;  No vertigo or throat pain   NECK: No pain or stiffness  RESPIRATORY: No cough, wheezing, hemoptysis; No shortness of breath  CARDIOVASCULAR: No chest pain or palpitations.  GASTROINTESTINAL: No abdominal or epigastric pain. No nausea, vomiting, or hematemesis; No diarrhea or constipation. No melena or hematochezia.  GENITOURINARY: No dysuria, frequency, foamy urine, urinary urgency, incontinence or hematuria  NEUROLOGICAL: No numbness or weakness  SKIN: No itching, burning, rashes, or lesions   VASCULAR: No bilateral lower extremity edema.   All other review of systems is negative unless indicated above.    VITALS:  T(F): 99.8 (09-04-23 @ 05:26), Max: 99.8 (09-04-23 @ 05:26)  HR: 95 (09-04-23 @ 09:13)  BP: 113/59 (09-04-23 @ 09:13)  RR: 18 (09-04-23 @ 09:13)  SpO2: --  Wt(kg): --    09-02 @ 07:01  -  09-03 @ 07:00  --------------------------------------------------------  IN: 1200 mL / OUT: 200 mL / NET: 1000 mL    09-03 @ 07:01  -  09-04 @ 07:00  --------------------------------------------------------  IN: 0 mL / OUT: 750 mL / NET: -750 mL    09-04 @ 07:01  -  09-04 @ 11:30  --------------------------------------------------------  IN: 0 mL / OUT: 1200 mL / NET: -1200 mL        PHYSICAL EXAM:  Constitutional: NAD  HEENT: anicteric sclera, oropharynx clear, MMM  Neck: No JVD  Respiratory: CTAB, no wheezes, rales or rhonchi  Cardiovascular: S1, S2, RRR  Gastrointestinal: BS+, soft, NT/ND  Extremities: No cyanosis or clubbing. No peripheral edema  Neurological: A/O x 3, no focal deficits  Psychiatric: Normal mood, normal affect  : No CVA tenderness. No zafar.   Skin: No rashes  Vascular Access:    LABS:  09-04    137  |  109  |  39<H>  ----------------------------<  91  4.2   |  18  |  3.0<H>    Ca    10.8<H>      04 Sep 2023 07:38  Mg     2.1     09-04    TPro  4.0<L>  /  Alb  2.9<L>  /  TBili  0.5  /  DBili      /  AST  26  /  ALT  42<H>  /  AlkPhos  460<H>  09-04                          7.6    5.23  )-----------( 129      ( 04 Sep 2023 07:38 )             24.8       Urine Studies:  Creatinine Trend: 3.0<--, 2.9<--, 2.9<--, 3.0<--, 3.1<--, 2.9<--  Urinalysis Basic - ( 04 Sep 2023 07:38 )    Color:  / Appearance:  / SG:  / pH:   Gluc: 91 mg/dL / Ketone:   / Bili:  / Urobili:    Blood:  / Protein:  / Nitrite:    Leuk Esterase:  / RBC:  / WBC    Sq Epi:  / Non Sq Epi:  / Bacteria:     CKD stage 4 - has atrophic kidney and other kidney with ureteral stricture - now close to baseline  ·lymphoma and now new neuroendocrine tumor on liver biopsy  ·hypercalcemia - appears driven by vit D 1,25 -   ·has not responded well to pamidronate as calcium level increasing again  ·chronic loose sttols - causimg more dehydration  ·anemia  ·BP stable  ·low/normal  loose stools  per pt had recent PET scan  with neck involvement (can not find study) and now with dysphagia  ·d/w pt per advanced directives and uncertain at this time     1) octreotide scan scheduled tomorrow - evalaution neuroendocrine tumor  2) continue NS IVF  3) given anemia and stable counts/electrolytes consider every other day labs

## 2023-09-05 LAB
ANION GAP SERPL CALC-SCNC: 10 MMOL/L — SIGNIFICANT CHANGE UP (ref 7–14)
BASOPHILS # BLD AUTO: 0.03 K/UL — SIGNIFICANT CHANGE UP (ref 0–0.2)
BASOPHILS NFR BLD AUTO: 0.6 % — SIGNIFICANT CHANGE UP (ref 0–1)
BUN SERPL-MCNC: 38 MG/DL — HIGH (ref 10–20)
CALCIUM SERPL-MCNC: 10.3 MG/DL — SIGNIFICANT CHANGE UP (ref 8.4–10.4)
CHLORIDE SERPL-SCNC: 111 MMOL/L — HIGH (ref 98–110)
CO2 SERPL-SCNC: 16 MMOL/L — LOW (ref 17–32)
CREAT SERPL-MCNC: 2.8 MG/DL — HIGH (ref 0.7–1.5)
EGFR: 24 ML/MIN/1.73M2 — LOW
EOSINOPHIL # BLD AUTO: 0.81 K/UL — HIGH (ref 0–0.7)
EOSINOPHIL NFR BLD AUTO: 17.1 % — HIGH (ref 0–8)
GLUCOSE SERPL-MCNC: 89 MG/DL — SIGNIFICANT CHANGE UP (ref 70–99)
HCT VFR BLD CALC: 24.4 % — LOW (ref 42–52)
HGB BLD-MCNC: 7.6 G/DL — LOW (ref 14–18)
IMM GRANULOCYTES NFR BLD AUTO: 0.6 % — HIGH (ref 0.1–0.3)
LYMPHOCYTES # BLD AUTO: 1.64 K/UL — SIGNIFICANT CHANGE UP (ref 1.2–3.4)
LYMPHOCYTES # BLD AUTO: 34.7 % — SIGNIFICANT CHANGE UP (ref 20.5–51.1)
MAGNESIUM SERPL-MCNC: 2.1 MG/DL — SIGNIFICANT CHANGE UP (ref 1.8–2.4)
MCHC RBC-ENTMCNC: 28.5 PG — SIGNIFICANT CHANGE UP (ref 27–31)
MCHC RBC-ENTMCNC: 31.1 G/DL — LOW (ref 32–37)
MCV RBC AUTO: 91.4 FL — SIGNIFICANT CHANGE UP (ref 80–94)
MONOCYTES # BLD AUTO: 0.94 K/UL — HIGH (ref 0.1–0.6)
MONOCYTES NFR BLD AUTO: 19.9 % — HIGH (ref 1.7–9.3)
NEUTROPHILS # BLD AUTO: 1.28 K/UL — LOW (ref 1.4–6.5)
NEUTROPHILS NFR BLD AUTO: 27.1 % — LOW (ref 42.2–75.2)
NRBC # BLD: 0 /100 WBCS — SIGNIFICANT CHANGE UP (ref 0–0)
PLATELET # BLD AUTO: 133 K/UL — SIGNIFICANT CHANGE UP (ref 130–400)
PMV BLD: 11.3 FL — HIGH (ref 7.4–10.4)
POTASSIUM SERPL-MCNC: 4.4 MMOL/L — SIGNIFICANT CHANGE UP (ref 3.5–5)
POTASSIUM SERPL-SCNC: 4.4 MMOL/L — SIGNIFICANT CHANGE UP (ref 3.5–5)
RBC # BLD: 2.67 M/UL — LOW (ref 4.7–6.1)
RBC # FLD: 17.1 % — HIGH (ref 11.5–14.5)
SODIUM SERPL-SCNC: 137 MMOL/L — SIGNIFICANT CHANGE UP (ref 135–146)
WBC # BLD: 4.73 K/UL — LOW (ref 4.8–10.8)
WBC # FLD AUTO: 4.73 K/UL — LOW (ref 4.8–10.8)

## 2023-09-05 PROCEDURE — 78801 RP LOCLZJ TUM 2+AREA 1+D IMG: CPT | Mod: 26

## 2023-09-05 PROCEDURE — 99232 SBSQ HOSP IP/OBS MODERATE 35: CPT

## 2023-09-05 RX ADMIN — SODIUM CHLORIDE 100 MILLILITER(S): 9 INJECTION INTRAMUSCULAR; INTRAVENOUS; SUBCUTANEOUS at 17:09

## 2023-09-05 RX ADMIN — FLUPHENAZINE HYDROCHLORIDE 10 MILLIGRAM(S): 1 TABLET, FILM COATED ORAL at 11:51

## 2023-09-05 RX ADMIN — Medication 2 MILLIGRAM(S): at 11:51

## 2023-09-05 RX ADMIN — Medication 650 MILLIGRAM(S): at 22:44

## 2023-09-05 RX ADMIN — HEPARIN SODIUM 5000 UNIT(S): 5000 INJECTION INTRAVENOUS; SUBCUTANEOUS at 11:52

## 2023-09-05 RX ADMIN — HEPARIN SODIUM 5000 UNIT(S): 5000 INJECTION INTRAVENOUS; SUBCUTANEOUS at 04:30

## 2023-09-05 RX ADMIN — HEPARIN SODIUM 5000 UNIT(S): 5000 INJECTION INTRAVENOUS; SUBCUTANEOUS at 17:10

## 2023-09-05 RX ADMIN — Medication 650 MILLIGRAM(S): at 13:18

## 2023-09-05 RX ADMIN — Medication 650 MILLIGRAM(S): at 06:20

## 2023-09-05 NOTE — PROGRESS NOTE ADULT - ASSESSMENT
# Hypercalcemia   # Grade III Neuroendocrine tumor   # Non Hodgkin Lymphoma    # CKD stage IV/metabolic acidosis   # Schizoaffective disorder  # Anemia of chronic illness   # Diarrhea   # Dysphagia     -continue with IVF, s/p pamidronate and dose of denosumab - octreotide scan today  -monitor hb and transfuse if < 7 - component of dilutional anemia   -Nephro follow up appreciated - agree with limiting labwork - c/w ivf and bicarb   -oncology follow up for inpatient treatment of lymphoma - see last follow up note from 09/01/23 for details on patient's extensive hx of malignancy   -continue rest of current medical care   -oob as tolerated     DISPO: not discharge ready   -spoke to patient about his plan of care   -staff updated family     Attending Physician Dr. Zari Ahmadi # [id number] .

## 2023-09-05 NOTE — PROGRESS NOTE ADULT - SUBJECTIVE AND OBJECTIVE BOX
ELDA COVARRUBIAS  67y  Male      Patient is a 67y old  Male who presents with a chief complaint of Hypercalcemia (02 Sep 2023 07:44)      INTERVAL HPI/OVERNIGHT EVENTS:    pt seen this am   -continue with IVF   -octreotide scan today  -no overnight events notified to me     Vital Signs Last 24 Hrs  T(C): 36.6 (05 Sep 2023 11:54), Max: 37.4 (04 Sep 2023 20:23)  T(F): 97.9 (05 Sep 2023 11:54), Max: 99.4 (04 Sep 2023 20:23)  HR: 84 (05 Sep 2023 11:54) (84 - 92)  BP: 113/57 (05 Sep 2023 11:54) (95/54 - 113/57)  BP(mean): --  RR: 18 (05 Sep 2023 11:54) (18 - 19)  SpO2: --        PHYSICAL EXAM:  GENERAL: Not in distress - comfortable in bed - ambulating on medical floor   HEAD:  Atraumatic, Normocephalic  EYES: EOMI, PERRLA, conjunctiva and sclera clear  NERVOUS SYSTEM:  Alert & Oriented X 3  CHEST/LUNG: Clear air entry   CV/HEART: Regular rate and rhythm  GI/ABDOMEN: Soft abdomen   EXTREMITIES:  moves all extrem     LAB:                                      7.6    4.73  )-----------( 133      ( 05 Sep 2023 07:37 )             24.4                 09-05    137  |  111<H>  |  38<H>  ----------------------------<  89  4.4   |  16<L>  |  2.8<H>    Ca    10.3      05 Sep 2023 07:37  Mg     2.1     09-05    TPro  4.0<L>  /  Alb  2.9<L>  /  TBili  0.5  /  DBili  x   /  AST  26  /  ALT  42<H>  /  AlkPhos  460<H>  09-04        Daily   CAPILLARY BLOOD GLUCOSE        Urinalysis Basic - ( 03 Sep 2023 07:21 )    Color: x / Appearance: x / SG: x / pH: x  Gluc: 83 mg/dL / Ketone: x  / Bili: x / Urobili: x   Blood: x / Protein: x / Nitrite: x   Leuk Esterase: x / RBC: x / WBC x   Sq Epi: x / Non Sq Epi: x / Bacteria: x      LIVER FUNCTIONS - ( 03 Sep 2023 07:21 )  Alb: 2.9 g/dL / Pro: 3.9 g/dL / ALK PHOS: 430 U/L / ALT: 33 U/L / AST: 19 U/L / GGT: x           RADIOLOGY:    Imaging Personally visualized and Reviewed:  [ y ] YES  [ ] NO    MEDICATIONS  (STANDING):  benztropine 2 milliGRAM(s) Oral daily  fluPHENAZine 10 milliGRAM(s) Oral daily  heparin   Injectable 5000 Unit(s) SubCutaneous every 8 hours  sodium bicarbonate 650 milliGRAM(s) Oral three times a day  sodium chloride 0.9%. 1000 milliLiter(s) (100 mL/Hr) IV Continuous <Continuous>    MEDICATIONS  (PRN):  ondansetron Injectable 4 milliGRAM(s) IV Push once PRN Nausea and/or Vomiting

## 2023-09-05 NOTE — PROGRESS NOTE ADULT - ASSESSMENT
Assessment  Patient is a 67y old Male who presents with a chief complaint of Hypercalcemia and diarrhea      Plan  # Hypercalcemia   # Neuroendocrine tumor   # Non Hodgkin Lymphoma    # CKD stage IV  # Schizoaffective disorder  # Anemia of chronic illness   # Diarrhea   # Dysphagia     -IV fluids, s/p pamidronate and dose of denosumab - will continue with fluids, Pt has octreotide scan today  -monitor hb and transfuse if < 7 - component of dilutional anemia   -Nephro following   -continue rest of current medical care   -oob as tolerated

## 2023-09-05 NOTE — PROGRESS NOTE ADULT - SUBJECTIVE AND OBJECTIVE BOX
Nephrology progress note     no specific complaints    ON events/Subjective:     Allergies:  allopurinol (Rash (Moderate))    Hospital Medications:   MEDICATIONS  (STANDING):  benztropine 2 milliGRAM(s) Oral daily  fluPHENAZine 10 milliGRAM(s) Oral daily  heparin   Injectable 5000 Unit(s) SubCutaneous every 8 hours  sodium bicarbonate 650 milliGRAM(s) Oral three times a day  sodium chloride 0.9%. 1000 milliLiter(s) (100 mL/Hr) IV Continuous <Continuous>    REVIEW OF SYSTEMS:  CONSTITUTIONAL: No weakness, fevers or chills  EYES/ENT: No visual changes;  No vertigo or throat pain   NECK: No pain or stiffness  RESPIRATORY: No cough, wheezing, hemoptysis; No shortness of breath  CARDIOVASCULAR: No chest pain or palpitations.  GASTROINTESTINAL: No abdominal or epigastric pain. No nausea, vomiting, or hematemesis; No diarrhea or constipation. No melena or hematochezia.  GENITOURINARY: No dysuria, frequency, foamy urine, urinary urgency, incontinence or hematuria  NEUROLOGICAL: No numbness or weakness  SKIN: No itching, burning, rashes, or lesions   VASCULAR: No bilateral lower extremity edema.   All other review of systems is negative unless indicated above.    VITALS:  T(F): 98.6 (09-05-23 @ 06:05), Max: 99.4 (09-04-23 @ 20:23)  HR: 86 (09-05-23 @ 06:05)  BP: 95/54 (09-05-23 @ 06:05)  RR: 19 (09-05-23 @ 06:05)  SpO2: --  Wt(kg): --    09-03 @ 07:01  -  09-04 @ 07:00  --------------------------------------------------------  IN: 0 mL / OUT: 750 mL / NET: -750 mL    09-04 @ 07:01  -  09-05 @ 07:00  --------------------------------------------------------  IN: 900 mL / OUT: 1620 mL / NET: -720 mL        PHYSICAL EXAM:  Constitutional: NAD  HEENT: anicteric sclera, oropharynx clear, MMM  Neck: No JVD  Respiratory: CTAB, no wheezes, rales or rhonchi  Cardiovascular: S1, S2, RRR  Gastrointestinal: BS+, soft, NT/ND  Extremities: No cyanosis or clubbing. No peripheral edema  Neurological: A/O x 3, no focal deficits  Psychiatric: Normal mood, normal affect  : No CVA tenderness. No zafar.   Skin: No rashes  Vascular Access:    LABS:  09-04    137  |  109  |  39<H>  ----------------------------<  91  4.2   |  18  |  3.0<H>    Ca    10.8<H>      04 Sep 2023 07:38  Mg     2.1     09-04    TPro  4.0<L>  /  Alb  2.9<L>  /  TBili  0.5  /  DBili      /  AST  26  /  ALT  42<H>  /  AlkPhos  460<H>  09-04                          7.6    5.23  )-----------( 129      ( 04 Sep 2023 07:38 )             24.8       Urine Studies:  Creatinine Trend: 3.0<--, 2.9<--, 2.9<--, 3.0<--, 3.1<--, 2.9<--  Urinalysis Basic - ( 04 Sep 2023 07:38 )    Color:  / Appearance:  / SG:  / pH:   Gluc: 91 mg/dL / Ketone:   / Bili:  / Urobili:    Blood:  / Protein:  / Nitrite:    Leuk Esterase:  / RBC:  / WBC    Sq Epi:  / Non Sq Epi:  / Bacteria:     CKD stage 4 - has atrophic kidney and other kidney with ureteral stricture - now close to baseline  ·lymphoma and now new neuroendocrine tumor on liver biopsy  ·hypercalcemia - appears driven by vit D 1,25 -   ·has not responded well to pamidronate as calcium level increasing again  ·chronic loose sttols - causimg more dehydration  ·anemia  ·BP stable  ·low/normal  loose stools  per pt had recent PET scan  with neck involvement (can not find study) and now with dysphagia  ·d/w pt per advanced directives and uncertain at this time     1) octreotide today - evalaution neuroendocrine tumor  2) continue NS IVF  3) given anemia and stable counts/electrolytes consider every other day labs  d/w pt at bedside

## 2023-09-05 NOTE — PROGRESS NOTE ADULT - SUBJECTIVE AND OBJECTIVE BOX
24H events:    Patient is a 67y old Male who presents with a chief complaint of hypercalcemia and ASHLEY on CKD (05 Sep 2023 09:10)    Primary diagnosis of Hypercalcemia       Today is hospital day 12d.      PAST MEDICAL & SURGICAL HISTORY  Kidney stones    Schizophrenia    Lymphoma    Shingles    Atrophic kidney    H/O colonoscopy with polypectomy    Liver cyst    History of bladder surgery    H/O neuropathy    History of chemotherapy    Stage 3 chronic kidney disease    Retained urethral stent    H/O umbilical hernia repair    Elbow fracture    H/O cystoscopy      SOCIAL HISTORY:  Negative for smoking/alcohol/drug use.     ALLERGIES:  allopurinol (Rash (Moderate))    MEDICATIONS:  STANDING MEDICATIONS  benztropine 2 milliGRAM(s) Oral daily  fluPHENAZine 10 milliGRAM(s) Oral daily  heparin   Injectable 5000 Unit(s) SubCutaneous every 8 hours  sodium bicarbonate 650 milliGRAM(s) Oral three times a day  sodium chloride 0.9%. 1000 milliLiter(s) IV Continuous <Continuous>    PRN MEDICATIONS  ondansetron Injectable 4 milliGRAM(s) IV Push once PRN    VITALS:   T(F): 97.9  HR: 84  BP: 113/57  RR: 18  SpO2: --    LABS:                        7.6    4.73  )-----------( 133      ( 05 Sep 2023 07:37 )             24.4     09-05    137  |  111<H>  |  38<H>  ----------------------------<  89  4.4   |  16<L>  |  2.8<H>    Ca    10.3      05 Sep 2023 07:37  Mg     2.1     09-05    TPro  4.0<L>  /  Alb  2.9<L>  /  TBili  0.5  /  DBili  x   /  AST  26  /  ALT  42<H>  /  AlkPhos  460<H>  09-04      Urinalysis Basic - ( 05 Sep 2023 07:37 )    Color: x / Appearance: x / SG: x / pH: x  Gluc: 89 mg/dL / Ketone: x  / Bili: x / Urobili: x   Blood: x / Protein: x / Nitrite: x   Leuk Esterase: x / RBC: x / WBC x   Sq Epi: x / Non Sq Epi: x / Bacteria: x                RADIOLOGY:    PHYSICAL EXAM:  GENERAL: NAD  NECK: Supple, No JVD, Normal thyroid  NERVOUS SYSTEM:  Alert & Oriented X3, Good concentration  CHEST/LUNG: Clear to auscultation bilaterally; No rales, rhonchi, wheezing, or rubs  HEART: Regular rate and rhythm; No murmurs, rubs, or gallops  ABDOMEN: Soft, Nontender, Nondistended; Bowel sounds present  EXTREMITIES:  2+ Peripheral Pulses, No clubbing, cyanosis, or edema

## 2023-09-06 PROCEDURE — 99232 SBSQ HOSP IP/OBS MODERATE 35: CPT

## 2023-09-06 RX ORDER — SODIUM BICARBONATE 1 MEQ/ML
650 SYRINGE (ML) INTRAVENOUS THREE TIMES A DAY
Refills: 0 | Status: DISCONTINUED | OUTPATIENT
Start: 2023-09-06 | End: 2023-09-07

## 2023-09-06 RX ADMIN — FLUPHENAZINE HYDROCHLORIDE 10 MILLIGRAM(S): 1 TABLET, FILM COATED ORAL at 12:16

## 2023-09-06 RX ADMIN — HEPARIN SODIUM 5000 UNIT(S): 5000 INJECTION INTRAVENOUS; SUBCUTANEOUS at 12:16

## 2023-09-06 RX ADMIN — Medication 2 MILLIGRAM(S): at 12:16

## 2023-09-06 RX ADMIN — Medication 650 MILLIGRAM(S): at 12:16

## 2023-09-06 RX ADMIN — HEPARIN SODIUM 5000 UNIT(S): 5000 INJECTION INTRAVENOUS; SUBCUTANEOUS at 18:02

## 2023-09-06 RX ADMIN — Medication 650 MILLIGRAM(S): at 05:57

## 2023-09-06 NOTE — PROGRESS NOTE ADULT - SUBJECTIVE AND OBJECTIVE BOX
24H events:    Patient is a 67y old Male who presents with a chief complaint of hypercalcemia and ASHLEY on CKD (05 Sep 2023 09:10)    Primary diagnosis of Hypercalcemia       Today is hospital day 13d.      PAST MEDICAL & SURGICAL HISTORY  Kidney stones    Schizophrenia    Lymphoma    Shingles    Atrophic kidney    H/O colonoscopy with polypectomy    Liver cyst    History of bladder surgery    H/O neuropathy    History of chemotherapy    Stage 3 chronic kidney disease    Retained urethral stent    H/O umbilical hernia repair    Elbow fracture    H/O cystoscopy      SOCIAL HISTORY:  Negative for smoking/alcohol/drug use.     ALLERGIES:  allopurinol (Rash (Moderate))    MEDICATIONS:  STANDING MEDICATIONS  benztropine 2 milliGRAM(s) Oral daily  fluPHENAZine 10 milliGRAM(s) Oral daily  heparin   Injectable 5000 Unit(s) SubCutaneous every 8 hours  sodium bicarbonate 650 milliGRAM(s) Oral three times a day    PRN MEDICATIONS  ondansetron Injectable 4 milliGRAM(s) IV Push once PRN    VITALS:   T(F): 98.4  HR: 88  BP: 103/54  RR: 18  SpO2: --    LABS:                        7.6    4.73  )-----------( 133      ( 05 Sep 2023 07:37 )             24.4     09-05    137  |  111<H>  |  38<H>  ----------------------------<  89  4.4   |  16<L>  |  2.8<H>    Ca    10.3      05 Sep 2023 07:37  Mg     2.1     09-05        Urinalysis Basic - ( 05 Sep 2023 07:37 )    Color: x / Appearance: x / SG: x / pH: x  Gluc: 89 mg/dL / Ketone: x  / Bili: x / Urobili: x   Blood: x / Protein: x / Nitrite: x   Leuk Esterase: x / RBC: x / WBC x   Sq Epi: x / Non Sq Epi: x / Bacteria: x        PHYSICAL EXAM:  GENERAL: NAD  NECK: Supple, No JVD, Normal thyroid  NERVOUS SYSTEM:  Alert & Oriented X3, Good concentration  CHEST/LUNG: Clear to auscultation bilaterally; No rales, rhonchi, wheezing, or rubs  HEART: Regular rate and rhythm; No murmurs, rubs, or gallops  ABDOMEN: Soft, Nontender, Nondistended; Bowel sounds present  EXTREMITIES:  + Peripheral Pulses, No clubbing, cyanosis, or edema

## 2023-09-06 NOTE — PROGRESS NOTE ADULT - ASSESSMENT
Assessment  Patient is a 67y old Male who presents with a chief complaint of Hypercalcemia and diarrhea      Plan  # Hypercalcemia   # Neuroendocrine tumor with metastasis  # Non Hodgkin Lymphoma    # CKD stage IV  # Schizoaffective disorder  # Anemia of chronic illness   # Diarrhea   # Dysphagia     -IV fluids, s/p pamidronate and dose of denosumab - Discontinued fluids as hypercalcemia resolved  -monitor hb and transfuse if < 7 - component of dilutional anemia   -NM scan===>Focal area of increased uptake in the upper abdomen/epigastrium to the   left of midline suspicious for neuroendocrine tissue expressing   somatostatin receptors, will be performing delayed uptake scan in 48 hrs  -Chromogranin A 1071  -5-HIAA (urine) 17.3  -Heme/onc following  -Nephro following   -Continuing with bicarb 650 TID

## 2023-09-06 NOTE — PROGRESS NOTE ADULT - SUBJECTIVE AND OBJECTIVE BOX
ELDA COVARRUBIAS 67y Male  MRN#: [id number]   Hospital Day: 13d    SUBJECTIVE  Patient is a 67y old Male who presents with a chief complaint of hypercalcemia and ASHLEY on CKD (05 Sep 2023 09:10)  Currently admitted to medicine with the primary diagnosis of Hypercalcemia      INTERVAL HPI AND OVERNIGHT EVENTS:  Patient was examined and seen at bedside. This morning he is resting comfortably in bed and reports no issues or overnight events.    REVIEW OF SYMPTOMS:  CONSTITUTIONAL: No weakness, fevers or chills; No headaches  EYES: No visual changes, eye pain, or discharge  ENT: No vertigo; No ear pain or change in hearing; No sore throat or difficulty swallowing  NECK: No pain or stiffness  RESPIRATORY: No cough, wheezing, or hemoptysis; No shortness of breath  CARDIOVASCULAR: No chest pain or palpitations  GASTROINTESTINAL: No abdominal or epigastric pain; No nausea, vomiting, or hematemesis; No diarrhea or constipation; No melena or hematochezia  GENITOURINARY: No dysuria, frequency or hematuria  MUSCULOSKELETAL: No joint pain, no muscle pain, no weakness  NEUROLOGICAL: No numbness or weakness  SKIN: No itching or rashes    OBJECTIVE  PAST MEDICAL & SURGICAL HISTORY  Kidney stones    Schizophrenia    Lymphoma    Shingles    Atrophic kidney    H/O colonoscopy with polypectomy    Liver cyst    History of bladder surgery    H/O neuropathy    History of chemotherapy    Stage 3 chronic kidney disease    Retained urethral stent    H/O umbilical hernia repair    Elbow fracture    H/O cystoscopy      ALLERGIES:  allopurinol (Rash (Moderate))    MEDICATIONS:  STANDING MEDICATIONS  benztropine 2 milliGRAM(s) Oral daily  fluPHENAZine 10 milliGRAM(s) Oral daily  heparin   Injectable 5000 Unit(s) SubCutaneous every 8 hours  sodium bicarbonate 650 milliGRAM(s) Oral three times a day    PRN MEDICATIONS  ondansetron Injectable 4 milliGRAM(s) IV Push once PRN      VITAL SIGNS: Last 24 Hours  T(C): 36.9 (06 Sep 2023 05:40), Max: 37.1 (05 Sep 2023 19:36)  T(F): 98.4 (06 Sep 2023 05:40), Max: 98.8 (05 Sep 2023 19:36)  HR: 88 (06 Sep 2023 05:40) (84 - 89)  BP: 103/54 (06 Sep 2023 05:40) (99/57 - 113/57)  BP(mean): --  RR: 18 (06 Sep 2023 05:40) (18 - 18)  SpO2: --    LABS:                        7.6    4.73  )-----------( 133      ( 05 Sep 2023 07:37 )             24.4     09-05    137  |  111<H>  |  38<H>  ----------------------------<  89  4.4   |  16<L>  |  2.8<H>    Ca    10.3      05 Sep 2023 07:37  Mg     2.1     09-05        Urinalysis Basic - ( 05 Sep 2023 07:37 )    Color: x / Appearance: x / SG: x / pH: x  Gluc: 89 mg/dL / Ketone: x  / Bili: x / Urobili: x   Blood: x / Protein: x / Nitrite: x   Leuk Esterase: x / RBC: x / WBC x   Sq Epi: x / Non Sq Epi: x / Bacteria: x                RADIOLOGY:      PHYSICAL EXAM:  CONSTITUTIONAL: No acute distress, well-developed, well-groomed, AAOx3  HEAD: Atraumatic, normocephalic  EYES: EOM intact, PERRLA, conjunctiva and sclera clear  ENT: Supple, no masses, no thyromegaly, no bruits, no JVD; moist mucous membranes  PULMONARY: Clear to auscultation bilaterally; no wheezes, rales, or rhonchi  CARDIOVASCULAR: Regular rate and rhythm; no murmurs, rubs, or gallops  GASTROINTESTINAL: Soft, non-tender, non-distended; bowel sounds present  MUSCULOSKELETAL: 2+ peripheral pulses; no clubbing, no cyanosis, no edema  NEUROLOGY: non-focal  SKIN: No rashes or lesions; warm and dry

## 2023-09-06 NOTE — PROGRESS NOTE ADULT - ASSESSMENT
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 ASHLEY on CKD.    Hematology and Oncology consulted given hx of Marginal Zone lymphoma.    #Recurrent Hypercalcemia likely 2/2 Lymphoma vs metastatic NET  - calcium 13.9 on admission. Trending down currently at 10.3  - on IV hydration   -s/p one dose of calcitonin, pamidronate, & Xgeva    #Hx of NHL marginal zone now with ? recurrence.  -He was in a good partial remission following 3 cycles of bendamustine and Rituxan treated in 2019 and 2020.  -He was unable to tolerate maintenance Rituxan.  -However, he was never in complete remission and had preferred just to be observed since we were dealing with a low grade lymphoma.  - was lost to follow up.  --CT Abdomen/Pelvis 2/23/23 Interval worsening in periesophageal and pelvic lymphadenopathy , other bulky lymph nodes in the abdomen and retroperitoneum appear stable. 1.3 indeterminate segment 4 liver lesion. Focal areas of pleural nodularity concerning for lymphomatous/neoplastic/metastatic process, new since prior.  --PET scan 5.23: 1.  Interval increase in size and decreased metabolic activity of the thoracoabdominal lymphadenopathy, some are new, probably recurrence /residual disease. New mildly metabolic 3 cm hypodensity in the liver segment 2/3, concerning for neoplasm or lymphoma.  Poorly defined small hypodensity in the segment 4, below the resolution of PET scan.  MRI will provide further elucidation. Diffuse mildly increased bone marrow activity, possibly reactive versus bone marrow involvement.   Hepatosplenomegaly.  -MRI abdomen 7.23:  Liver segment II 4 cm mass and Liver segment V  1.5 cm mass suspicious for Hepatic Lymphomas.  --Biopsy of Liver lesion 8.23: well differentiated Neuroendocrine tumor, Grade 3 Likely  metastatic.   -LDH normal 4.23   - NM Octreoscan Mutliple Areas (09.05.23 @ 16:43) >Focal area of increased uptake in the upper abdomen/epigastrium to the left of midline suspicious for neuroendocrine tissue expressing somatostatin receptors.      #Hepatic mass on imaging -Biopsy consistent with Well differentiated Neuroendocrine tumor, Grade 3, Likely metastatic. 8.23.        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 ASHLEY on CKD.    Hematology and Oncology consulted given hx of Marginal Zone lymphoma.    #Recurrent Hypercalcemia likely 2/2 Lymphoma vs metastatic NET  - calcium 13.9 on admission. Trending down currently at 10.3  - on IV hydration   -s/p one dose of calcitonin, pamidronate, & Xgeva    #Hx of NHL marginal zone now with ? recurrence.  -He was in a good partial remission following 3 cycles of bendamustine and Rituxan treated in 2019 and 2020.  -He was unable to tolerate maintenance Rituxan.  -However, he was never in complete remission and had preferred just to be observed since we were dealing with a low grade lymphoma.  - was lost to follow up.  --CT Abdomen/Pelvis 2/23/23 Interval worsening in periesophageal and pelvic lymphadenopathy , other bulky lymph nodes in the abdomen and retroperitoneum appear stable. 1.3 indeterminate segment 4 liver lesion. Focal areas of pleural nodularity concerning for lymphomatous/neoplastic/metastatic process, new since prior.  --PET scan 5.23: 1.  Interval increase in size and decreased metabolic activity of the thoracoabdominal lymphadenopathy, some are new, probably recurrence /residual disease. New mildly metabolic 3 cm hypodensity in the liver segment 2/3, concerning for neoplasm or lymphoma.  Poorly defined small hypodensity in the segment 4, below the resolution of PET scan.  MRI will provide further elucidation. Diffuse mildly increased bone marrow activity, possibly reactive versus bone marrow involvement.   Hepatosplenomegaly.  -MRI abdomen 7.23:  Liver segment II 4 cm mass and Liver segment V  1.5 cm mass suspicious for Hepatic Lymphomas.  --Biopsy of Liver lesion 8.23: well differentiated Neuroendocrine tumor, Grade 3 Likely  metastatic.   -LDH normal 4.23   - NM Octreoscan Mutliple Areas (09.05.23 @ 16:43) >Focal area of increased uptake in the upper abdomen/epigastrium to the left of midline suspicious for neuroendocrine tissue expressing somatostatin receptors.    #Hepatic mass on imaging -Biopsy consistent with Well differentiated Neuroendocrine tumor, Grade 3, Likely metastatic. 8.23.      Recommendations:   - Case discussed with Dr. Gutierres who will evaluate patient for inpatient vs outpatient EBUS for biopsy of mediastinal lymphadenopathy  - given high calcium in the setting of patient with known hx of Marginal Zone lymphoma and increasing lymphadenopathy on PET scan in May 2023, need mediastinal lymph node biopsy to r/o relapsing marginal zone lymphoma  - follow up final result of Octreoscan planned for 9/7  - Please send peripheral blood flow cytometry (2 lavendar, 2 dark green) to assess for marginal zone lymphoma (will provide form)   - recommend CT chest NC to reassess mediastinal lymphadenopathy   - Please transfuse 2 units PRBC   - outpatient follow up with Dr. Gong for initiation of octreotide therapy for NET and possible treatment of marginal zone lymphoma if relapse proven with biopsy

## 2023-09-06 NOTE — PROGRESS NOTE ADULT - SUBJECTIVE AND OBJECTIVE BOX
Patient is a 67y old  Male who presents with a chief complaint of hypercalcemia and ASHLEY on CKD (05 Sep 2023 09:10)      OVERNIGHT EVENTS:    SUBJECTIVE / INTERVAL HPI: Patient seen and examined at bedside.     VITAL SIGNS:  Vital Signs Last 24 Hrs  T(C): 36.9 (06 Sep 2023 05:40), Max: 37.1 (05 Sep 2023 19:36)  T(F): 98.4 (06 Sep 2023 05:40), Max: 98.8 (05 Sep 2023 19:36)  HR: 88 (06 Sep 2023 05:40) (84 - 89)  BP: 103/54 (06 Sep 2023 05:40) (99/57 - 113/57)  BP(mean): --  RR: 18 (06 Sep 2023 05:40) (18 - 18)  SpO2: --        PHYSICAL EXAM:    General: WDWN  HEENT: NC/AT; PERRL, clear conjunctiva  Neck: supple  Cardiovascular: +S1/S2; RRR  Respiratory: CTA b/l; no W/R/R  Gastrointestinal: soft, NT/ND; +BSx4  Extremities: WWP; 2+ peripheral pulses; no edema   Neurological: AAOx3; no focal deficits    MEDICATIONS:  MEDICATIONS  (STANDING):  benztropine 2 milliGRAM(s) Oral daily  fluPHENAZine 10 milliGRAM(s) Oral daily  heparin   Injectable 5000 Unit(s) SubCutaneous every 8 hours  sodium chloride 0.9%. 1000 milliLiter(s) (100 mL/Hr) IV Continuous <Continuous>    MEDICATIONS  (PRN):  ondansetron Injectable 4 milliGRAM(s) IV Push once PRN Nausea and/or Vomiting      ALLERGIES:  Allergies    allopurinol (Rash (Moderate))    Intolerances        LABS:                        7.6    4.73  )-----------( 133      ( 05 Sep 2023 07:37 )             24.4     09-05    137  |  111<H>  |  38<H>  ----------------------------<  89  4.4   |  16<L>  |  2.8<H>    Ca    10.3      05 Sep 2023 07:37  Mg     2.1     09-05        Urinalysis Basic - ( 05 Sep 2023 07:37 )    Color: x / Appearance: x / SG: x / pH: x  Gluc: 89 mg/dL / Ketone: x  / Bili: x / Urobili: x   Blood: x / Protein: x / Nitrite: x   Leuk Esterase: x / RBC: x / WBC x   Sq Epi: x / Non Sq Epi: x / Bacteria: x      CAPILLARY BLOOD GLUCOSE          RADIOLOGY & ADDITIONAL TESTS: Reviewed.    ASSESSMENT:    PLAN:   < from: NM Octreoscan Mutliple Areas (09.05.23 @ 16:43) >  Focal area of increased uptake in the upper abdomen/epigastrium to the   left of midline suspicious for neuroendocrine tissue expressing   somatostatin receptors.    < end of copied text >   Patient is a 67y old  Male who presents with a chief complaint of hypercalcemia and ASHLEY on CKD (05 Sep 2023 09:10)      OVERNIGHT EVENTS: remained stable, denies fever, chills, syncope, seizure, headache, cough, SOB, abd pain, N/V/D, urinary symptoms, legs swelling,    SUBJECTIVE / INTERVAL HPI: Patient seen and examined at bedside.     VITAL SIGNS:  Vital Signs Last 24 Hrs  T(C): 36.9 (06 Sep 2023 05:40), Max: 37.1 (05 Sep 2023 19:36)  T(F): 98.4 (06 Sep 2023 05:40), Max: 98.8 (05 Sep 2023 19:36)  HR: 88 (06 Sep 2023 05:40) (84 - 89)  BP: 103/54 (06 Sep 2023 05:40) (99/57 - 113/57)  BP(mean): --  RR: 18 (06 Sep 2023 05:40) (18 - 18)  SpO2: --        PHYSICAL EXAM:    General: WDWN  HEENT: NC/AT; PERRL, clear conjunctiva  Neck: supple  Cardiovascular: +S1/S2; RRR  Respiratory: CTA b/l; no W/R/R  Gastrointestinal: soft, NT/ND; +BSx4  Extremities: WWP; 2+ peripheral pulses; no edema   Neurological: AAOx3; no focal deficits    MEDICATIONS:  MEDICATIONS  (STANDING):  benztropine 2 milliGRAM(s) Oral daily  fluPHENAZine 10 milliGRAM(s) Oral daily  heparin   Injectable 5000 Unit(s) SubCutaneous every 8 hours  sodium chloride 0.9%. 1000 milliLiter(s) (100 mL/Hr) IV Continuous <Continuous>    MEDICATIONS  (PRN):  ondansetron Injectable 4 milliGRAM(s) IV Push once PRN Nausea and/or Vomiting      ALLERGIES:  Allergies    allopurinol (Rash (Moderate))    Intolerances        LABS:                        7.6    4.73  )-----------( 133      ( 05 Sep 2023 07:37 )             24.4     09-05    137  |  111<H>  |  38<H>  ----------------------------<  89  4.4   |  16<L>  |  2.8<H>    Ca    10.3      05 Sep 2023 07:37  Mg     2.1     09-05        Urinalysis Basic - ( 05 Sep 2023 07:37 )    Color: x / Appearance: x / SG: x / pH: x  Gluc: 89 mg/dL / Ketone: x  / Bili: x / Urobili: x   Blood: x / Protein: x / Nitrite: x   Leuk Esterase: x / RBC: x / WBC x   Sq Epi: x / Non Sq Epi: x / Bacteria: x      CAPILLARY BLOOD GLUCOSE          RADIOLOGY & ADDITIONAL TESTS: Reviewed.    ASSESSMENT:    PLAN:   < from: NM Octreoscan Mutliple Areas (09.05.23 @ 16:43) >  Focal area of increased uptake in the upper abdomen/epigastrium to the   left of midline suspicious for neuroendocrine tissue expressing   somatostatin receptors.    < end of copied text >

## 2023-09-06 NOTE — PROGRESS NOTE ADULT - ASSESSMENT
# Hypercalcemia 2/2 malignancy   # active Non Hodgkin Lymphoma, pt didnt tolerate treatement   # new liver mass suspicious for Grade III Neuroendocrine tumor w eleavted 5-HIAA level >> highly suspicious for carcinoid >> s/p Octeriod scan showing Focal area of increased uptake in the upper abdomen/epigastrium to the   left of midline suspicious for neuroendocrine tissue expressing somatostatin receptors.  # CKD stage IV/metabolic acidosis   # Schizoaffective disorder  # Anemia of chronic illness   # chronic Diarrhea likley from neuroendocrine tumor   # Dysphagia likley from elnarged L.N     - s/pIVF, pamidronate and dose of denosumab   - Heme onc f/u after octreotide scan, mioght need repeat  -monitor hb and transfuse if < 7 - component of dilutional anemia   - cont w Na bicarb 650 tid  - DC home once heme once eval completed  # recurrent Hypercalcemia 2/2 malignancy   # active Non Hodgkin Lymphoma, pt didn't tolerate treatement in the past  # new liver mass suspicious for Grade III Neuroendocrine tumor w elevated 5-HIAA level >> highly suspicious for carcinoid  s/p Octeriod scan showing Focal area of increased uptake in the upper abdomen/epigastrium to the  left of midline suspicious for neuroendocrine tissue expressing somatostatin receptors.  # CKD stage IV/metabolic acidosis   # Schizoaffective disorder  # Anemia of chronic illness   # chronic Diarrhea likley from neuroendocrine tumor   # Dysphagia likley from elnarged L.N       Plans:    - s/p IVF, pamidronate and one dose of denosumab   - Heme onc f/u after octreotide scan, need repeat scan 48 hr post injection   -monitor hb and transfuse if < 7 - component of dilutional anemia   - cont w Na bicarb 650 tid  - dvt ppx  - DC home once heme once eval completed

## 2023-09-07 LAB
ALBUMIN SERPL ELPH-MCNC: 3.2 G/DL — LOW (ref 3.5–5.2)
ALP SERPL-CCNC: 587 U/L — HIGH (ref 30–115)
ALT FLD-CCNC: 39 U/L — SIGNIFICANT CHANGE UP (ref 0–41)
ANION GAP SERPL CALC-SCNC: 12 MMOL/L — SIGNIFICANT CHANGE UP (ref 7–14)
ANION GAP SERPL CALC-SCNC: 12 MMOL/L — SIGNIFICANT CHANGE UP (ref 7–14)
AST SERPL-CCNC: 19 U/L — SIGNIFICANT CHANGE UP (ref 0–41)
BASOPHILS # BLD AUTO: 0.06 K/UL — SIGNIFICANT CHANGE UP (ref 0–0.2)
BASOPHILS NFR BLD AUTO: 0.9 % — SIGNIFICANT CHANGE UP (ref 0–1)
BILIRUB SERPL-MCNC: 0.5 MG/DL — SIGNIFICANT CHANGE UP (ref 0.2–1.2)
BUN SERPL-MCNC: 36 MG/DL — HIGH (ref 10–20)
BUN SERPL-MCNC: 37 MG/DL — HIGH (ref 10–20)
CALCIUM SERPL-MCNC: 11.2 MG/DL — HIGH (ref 8.4–10.5)
CALCIUM SERPL-MCNC: 11.3 MG/DL — HIGH (ref 8.4–10.5)
CALPROTECTIN STL-MCNT: 33 UG/G — SIGNIFICANT CHANGE UP (ref 0–120)
CHLORIDE SERPL-SCNC: 106 MMOL/L — SIGNIFICANT CHANGE UP (ref 98–110)
CHLORIDE SERPL-SCNC: 107 MMOL/L — SIGNIFICANT CHANGE UP (ref 98–110)
CO2 SERPL-SCNC: 16 MMOL/L — LOW (ref 17–32)
CO2 SERPL-SCNC: 16 MMOL/L — LOW (ref 17–32)
CREAT SERPL-MCNC: 2.8 MG/DL — HIGH (ref 0.7–1.5)
CREAT SERPL-MCNC: 2.9 MG/DL — HIGH (ref 0.7–1.5)
EGFR: 23 ML/MIN/1.73M2 — LOW
EGFR: 24 ML/MIN/1.73M2 — LOW
EOSINOPHIL # BLD AUTO: 1.13 K/UL — HIGH (ref 0–0.7)
EOSINOPHIL NFR BLD AUTO: 17.4 % — HIGH (ref 0–8)
GLUCOSE SERPL-MCNC: 118 MG/DL — HIGH (ref 70–99)
GLUCOSE SERPL-MCNC: 133 MG/DL — HIGH (ref 70–99)
HCT VFR BLD CALC: 28.2 % — LOW (ref 42–52)
HCT VFR BLD CALC: 28.9 % — LOW (ref 42–52)
HGB BLD-MCNC: 8.9 G/DL — LOW (ref 14–18)
HGB BLD-MCNC: 9.1 G/DL — LOW (ref 14–18)
INR BLD: 0.97 RATIO — SIGNIFICANT CHANGE UP (ref 0.65–1.3)
LYMPHOCYTES # BLD AUTO: 1.69 K/UL — SIGNIFICANT CHANGE UP (ref 1.2–3.4)
LYMPHOCYTES # BLD AUTO: 26.1 % — SIGNIFICANT CHANGE UP (ref 20.5–51.1)
MAGNESIUM SERPL-MCNC: 2.3 MG/DL — SIGNIFICANT CHANGE UP (ref 1.8–2.4)
MAGNESIUM SERPL-MCNC: 2.4 MG/DL — SIGNIFICANT CHANGE UP (ref 1.8–2.4)
MCHC RBC-ENTMCNC: 28.6 PG — SIGNIFICANT CHANGE UP (ref 27–31)
MCHC RBC-ENTMCNC: 28.7 PG — SIGNIFICANT CHANGE UP (ref 27–31)
MCHC RBC-ENTMCNC: 31.5 G/DL — LOW (ref 32–37)
MCHC RBC-ENTMCNC: 31.6 G/DL — LOW (ref 32–37)
MCV RBC AUTO: 90.9 FL — SIGNIFICANT CHANGE UP (ref 80–94)
MCV RBC AUTO: 91 FL — SIGNIFICANT CHANGE UP (ref 80–94)
MONOCYTES # BLD AUTO: 0.67 K/UL — HIGH (ref 0.1–0.6)
MONOCYTES NFR BLD AUTO: 10.4 % — HIGH (ref 1.7–9.3)
NEUTROPHILS # BLD AUTO: 2.7 K/UL — SIGNIFICANT CHANGE UP (ref 1.4–6.5)
NEUTROPHILS NFR BLD AUTO: 41.7 % — LOW (ref 42.2–75.2)
NRBC # BLD: 0 /100 WBCS — SIGNIFICANT CHANGE UP (ref 0–0)
PHOSPHATE SERPL-MCNC: 3.6 MG/DL — SIGNIFICANT CHANGE UP (ref 2.1–4.9)
PLATELET # BLD AUTO: 143 K/UL — SIGNIFICANT CHANGE UP (ref 130–400)
PLATELET # BLD AUTO: 152 K/UL — SIGNIFICANT CHANGE UP (ref 130–400)
PMV BLD: 10.4 FL — SIGNIFICANT CHANGE UP (ref 7.4–10.4)
PMV BLD: 11.3 FL — HIGH (ref 7.4–10.4)
POTASSIUM SERPL-MCNC: 4.6 MMOL/L — SIGNIFICANT CHANGE UP (ref 3.5–5)
POTASSIUM SERPL-MCNC: 4.7 MMOL/L — SIGNIFICANT CHANGE UP (ref 3.5–5)
POTASSIUM SERPL-SCNC: 4.6 MMOL/L — SIGNIFICANT CHANGE UP (ref 3.5–5)
POTASSIUM SERPL-SCNC: 4.7 MMOL/L — SIGNIFICANT CHANGE UP (ref 3.5–5)
PROT SERPL-MCNC: 4.7 G/DL — LOW (ref 6–8)
PROTHROM AB SERPL-ACNC: 11.1 SEC — SIGNIFICANT CHANGE UP (ref 9.95–12.87)
RBC # BLD: 3.1 M/UL — LOW (ref 4.7–6.1)
RBC # BLD: 3.18 M/UL — LOW (ref 4.7–6.1)
RBC # FLD: 17 % — HIGH (ref 11.5–14.5)
RBC # FLD: 17.1 % — HIGH (ref 11.5–14.5)
SODIUM SERPL-SCNC: 134 MMOL/L — LOW (ref 135–146)
SODIUM SERPL-SCNC: 135 MMOL/L — SIGNIFICANT CHANGE UP (ref 135–146)
WBC # BLD: 6.33 K/UL — SIGNIFICANT CHANGE UP (ref 4.8–10.8)
WBC # BLD: 6.48 K/UL — SIGNIFICANT CHANGE UP (ref 4.8–10.8)
WBC # FLD AUTO: 6.33 K/UL — SIGNIFICANT CHANGE UP (ref 4.8–10.8)
WBC # FLD AUTO: 6.48 K/UL — SIGNIFICANT CHANGE UP (ref 4.8–10.8)

## 2023-09-07 PROCEDURE — 78803 RP LOCLZJ TUM SPECT 1 AREA: CPT | Mod: 26

## 2023-09-07 PROCEDURE — 88291 CYTO/MOLECULAR REPORT: CPT

## 2023-09-07 PROCEDURE — 71250 CT THORAX DX C-: CPT | Mod: 26

## 2023-09-07 PROCEDURE — 99232 SBSQ HOSP IP/OBS MODERATE 35: CPT

## 2023-09-07 PROCEDURE — 88189 FLOWCYTOMETRY/READ 16 & >: CPT

## 2023-09-07 PROCEDURE — 99233 SBSQ HOSP IP/OBS HIGH 50: CPT

## 2023-09-07 PROCEDURE — 85060 BLOOD SMEAR INTERPRETATION: CPT

## 2023-09-07 RX ORDER — SODIUM CHLORIDE 9 MG/ML
1000 INJECTION INTRAMUSCULAR; INTRAVENOUS; SUBCUTANEOUS
Refills: 0 | Status: DISCONTINUED | OUTPATIENT
Start: 2023-09-07 | End: 2023-09-08

## 2023-09-07 RX ORDER — SODIUM BICARBONATE 1 MEQ/ML
1300 SYRINGE (ML) INTRAVENOUS
Refills: 0 | Status: DISCONTINUED | OUTPATIENT
Start: 2023-09-07 | End: 2023-09-08

## 2023-09-07 RX ADMIN — Medication 2 MILLIGRAM(S): at 11:30

## 2023-09-07 RX ADMIN — Medication 650 MILLIGRAM(S): at 05:42

## 2023-09-07 RX ADMIN — FLUPHENAZINE HYDROCHLORIDE 10 MILLIGRAM(S): 1 TABLET, FILM COATED ORAL at 11:30

## 2023-09-07 RX ADMIN — SODIUM CHLORIDE 60 MILLILITER(S): 9 INJECTION INTRAMUSCULAR; INTRAVENOUS; SUBCUTANEOUS at 17:58

## 2023-09-07 RX ADMIN — Medication 1300 MILLIGRAM(S): at 17:49

## 2023-09-07 RX ADMIN — HEPARIN SODIUM 5000 UNIT(S): 5000 INJECTION INTRAVENOUS; SUBCUTANEOUS at 11:30

## 2023-09-07 NOTE — PROGRESS NOTE ADULT - SUBJECTIVE AND OBJECTIVE BOX
Patient is a 67y old  Male who presents with a chief complaint of hypercalcemia (07 Sep 2023 09:39)        SUBJECTIVE:      REVIEW OF SYSTEMS:    All Pertinent ROS are negative except per HPI       PHYSICAL EXAM  Vital Signs Last 24 Hrs  T(C): 36.7 (07 Sep 2023 06:15), Max: 36.7 (07 Sep 2023 06:15)  T(F): 98 (07 Sep 2023 06:15), Max: 98 (07 Sep 2023 06:15)  HR: 74 (07 Sep 2023 06:15) (74 - 88)  BP: 110/68 (07 Sep 2023 06:15) (103/55 - 110/68)  BP(mean): --  RR: 18 (07 Sep 2023 06:15) (18 - 18)  SpO2: --        CONSTITUTIONAL:  In NAD    ENT:   Airway patent,     CARDIAC:   Normal rate,   regular rhythm.    no edema    RESPIRATORY:   No Wheezing  No chest expansion  Not tachypneic,  No use of accessory muscles    GASTROINTESTINAL:  Abdomen soft,   non-tender,   no guarding,   + BS    MUSCULOSKELETAL:   range of motion is not limited,  no clubbing, cyanosis    NEUROLOGICAL:   Alert and oriented         LABS:                          9.1    6.48  )-----------( 143      ( 07 Sep 2023 08:53 )             28.9                                               09-07    135  |  107  |  36<H>  ----------------------------<  133<H>  4.6   |  16<L>  |  2.9<H>    Ca    11.2<H>      07 Sep 2023 08:53  Mg     2.3     09-07                                               Urinalysis Basic - ( 07 Sep 2023 08:53 )    Color: x / Appearance: x / SG: x / pH: x  Gluc: 133 mg/dL / Ketone: x  / Bili: x / Urobili: x   Blood: x / Protein: x / Nitrite: x   Leuk Esterase: x / RBC: x / WBC x   Sq Epi: x / Non Sq Epi: x / Bacteria: x                                                    MEDICATIONS  (STANDING):  benztropine 2 milliGRAM(s) Oral daily  fluPHENAZine 10 milliGRAM(s) Oral daily  heparin   Injectable 5000 Unit(s) SubCutaneous every 8 hours  sodium bicarbonate 1300 milliGRAM(s) Oral two times a day    MEDICATIONS  (PRN):  ondansetron Injectable 4 milliGRAM(s) IV Push once PRN Nausea and/or Vomiting     Patient is a 67y old  Male who presents with a chief complaint of hypercalcemia (07 Sep 2023 09:39)        SUBJECTIVE:  No new complaints.       REVIEW OF SYSTEMS:    All Pertinent ROS are negative except per HPI       PHYSICAL EXAM  Vital Signs Last 24 Hrs  T(C): 36.7 (07 Sep 2023 06:15), Max: 36.7 (07 Sep 2023 06:15)  T(F): 98 (07 Sep 2023 06:15), Max: 98 (07 Sep 2023 06:15)  HR: 74 (07 Sep 2023 06:15) (74 - 88)  BP: 110/68 (07 Sep 2023 06:15) (103/55 - 110/68)  BP(mean): --  RR: 18 (07 Sep 2023 06:15) (18 - 18)  SpO2: --        CONSTITUTIONAL:  In NAD    ENT:   Airway patent,     CARDIAC:   Normal rate,   regular rhythm.    no edema    RESPIRATORY:   No Wheezing  No chest expansion  Not tachypneic,  No use of accessory muscles    GASTROINTESTINAL:  Abdomen soft,   non-tender,   no guarding,   + BS    MUSCULOSKELETAL:   range of motion is not limited,  no clubbing, cyanosis    NEUROLOGICAL:   Alert and oriented         LABS:                          9.1    6.48  )-----------( 143      ( 07 Sep 2023 08:53 )             28.9                                               09-07    135  |  107  |  36<H>  ----------------------------<  133<H>  4.6   |  16<L>  |  2.9<H>    Ca    11.2<H>      07 Sep 2023 08:53  Mg     2.3     09-07                                               Urinalysis Basic - ( 07 Sep 2023 08:53 )    Color: x / Appearance: x / SG: x / pH: x  Gluc: 133 mg/dL / Ketone: x  / Bili: x / Urobili: x   Blood: x / Protein: x / Nitrite: x   Leuk Esterase: x / RBC: x / WBC x   Sq Epi: x / Non Sq Epi: x / Bacteria: x                                                    MEDICATIONS  (STANDING):  benztropine 2 milliGRAM(s) Oral daily  fluPHENAZine 10 milliGRAM(s) Oral daily  heparin   Injectable 5000 Unit(s) SubCutaneous every 8 hours  sodium bicarbonate 1300 milliGRAM(s) Oral two times a day    MEDICATIONS  (PRN):  ondansetron Injectable 4 milliGRAM(s) IV Push once PRN Nausea and/or Vomiting

## 2023-09-07 NOTE — PROGRESS NOTE ADULT - ASSESSMENT
# recurrent Hypercalcemia 2/2 malignancy (  # active Non Hodgkin Lymphoma, pt didn't tolerate treatement in the past  # new liver mass suspicious for Grade III Neuroendocrine tumor w elevated 5-HIAA level >> highly suspicious for carcinoid  s/p Octeriod scan showing Focal area of increased uptake in the upper abdomen/epigastrium to the  left of midline suspicious for neuroendocrine tissue expressing somatostatin receptors.  # CKD stage IV/metabolic acidosis   # Schizoaffective disorder  # Anemia of chronic illness   # chronic Diarrhea likley from neuroendocrine tumor   # Dysphagia likley from elnarged L.N       Plans:    - s/p IVF, pamidronate and one dose of denosumab   - Ca level has been stable  - causes is likley progression of lymphoma VS less likley sarcoidosis (given elevated Vit D)  - get non cont CT chest per HO to assess for L.N   - so per HO>> needs EBUS to confirm, call Pulm to see if EBUS inpt VS outpt   - need repeat Octreotide scan 48 hr post injection today, might need to be on Sandostatin   - give one unit of prbc   - cont w Na bicarb 650 tid  - dvt ppx  - DC home after above is done

## 2023-09-07 NOTE — PROGRESS NOTE ADULT - ASSESSMENT
Assessment  Patient is a 67y old Male who presents with a chief complaint of Hypercalcemia and diarrhea      Plan  # Hypercalcemia   # Neuroendocrine tumor with metastasis  # Non Hodgkin Lymphoma    # CKD stage IV  # Schizoaffective disorder  # Anemia of chronic illness   # Diarrhea   # Dysphagia     -IV fluids, s/p pamidronate and dose of denosumab - Discontinued fluids as hypercalcemia resolved  -monitor hb and transfuse if < 7 - component of dilutional anemia   -NM scan===>Focal area of increased uptake in the upper abdomen/epigastrium to the   left of midline suspicious for neuroendocrine tissue expressing   somatostatin receptors, will be performing delayed uptake scan today  -Chromogranin A 1071  -5-HIAA (urine) 17.3  -Heme/onc recommended biopsy of LN for confirmatory DX and also recommended flow cytometry===>f/u flow cytometry  -Pulmn will biopsy node tomorrow===>preop labs ordered  -Nephro following   -Continuing with bicarb 650 TID        Assessment  Patient is a 67y old Male who presents with a chief complaint of Hypercalcemia and diarrhea      Plan  # Hypercalcemia   # Neuroendocrine tumor with metastasis  # Non Hodgkin Lymphoma    # CKD stage IV  # Schizoaffective disorder  # Anemia of chronic illness   # Diarrhea   # Dysphagia     -IV fluids, s/p pamidronate and dose of denosumab - Discontinued fluids as hypercalcemia resolved  -monitor hb and transfuse if < 7 - component of dilutional anemia   -NM scan===>Focal area of increased uptake in the upper abdomen/epigastrium to the   left of midline suspicious for neuroendocrine tissue expressing   somatostatin receptors, will be performing delayed uptake scan today  -Chromogranin A 1071  -5-HIAA (urine) 17.3  -Heme/onc recommended biopsy of LN for confirmatory DX and also recommended flow cytometry===>f/u flow cytometry  - F/U CT scan  - 1PRBC given  -Pulmn will biopsy node tomorrow===>preop labs ordered  -Nephro following   -Continuing with bicarb 650 TID

## 2023-09-07 NOTE — PROGRESS NOTE ADULT - SUBJECTIVE AND OBJECTIVE BOX
Nephrology progress note     no complaints    ON events/Subjective:     Allergies:  allopurinol (Rash (Moderate))    Hospital Medications:   MEDICATIONS  (STANDING):  benztropine 2 milliGRAM(s) Oral daily  fluPHENAZine 10 milliGRAM(s) Oral daily  heparin   Injectable 5000 Unit(s) SubCutaneous every 8 hours  sodium bicarbonate 1300 milliGRAM(s) Oral two times a day    REVIEW OF SYSTEMS:  CONSTITUTIONAL: No weakness, fevers or chills  EYES/ENT: No visual changes;  No vertigo or throat pain   NECK: No pain or stiffness  RESPIRATORY: No cough, wheezing, hemoptysis; No shortness of breath  CARDIOVASCULAR: No chest pain or palpitations.  GASTROINTESTINAL: No abdominal or epigastric pain. No nausea, vomiting, or hematemesis; No diarrhea or constipation. No melena or hematochezia.  GENITOURINARY: No dysuria, frequency, foamy urine, urinary urgency, incontinence or hematuria  NEUROLOGICAL: No numbness or weakness  SKIN: No itching, burning, rashes, or lesions   VASCULAR: No bilateral lower extremity edema.   All other review of systems is negative unless indicated above.    VITALS:  T(F): 98 (09-07-23 @ 06:15), Max: 98 (09-07-23 @ 06:15)  HR: 74 (09-07-23 @ 06:15)  BP: 110/68 (09-07-23 @ 06:15)  RR: 18 (09-07-23 @ 06:15)  SpO2: --  Wt(kg): --    09-05 @ 07:01  -  09-06 @ 07:00  --------------------------------------------------------  IN: 0 mL / OUT: 1000 mL / NET: -1000 mL        PHYSICAL EXAM:  Constitutional: NAD  HEENT: anicteric sclera, oropharynx clear, MMM  Neck: No JVD  Respiratory: CTAB, no wheezes, rales or rhonchi  Cardiovascular: S1, S2, RRR  Gastrointestinal: BS+, soft, NT/ND  Extremities: No cyanosis or clubbing. No peripheral edema  Neurological: A/O x 3, no focal deficits  Psychiatric: Normal mood, normal affect  : No CVA tenderness. No zafar.   Skin: No rashes  Vascular Access:    LABS:                              9.1    6.48  )-----------( 143      ( 07 Sep 2023 08:53 )             28.9       Urine Studies:  Creatinine Trend: 2.8<--, 3.0<--, 2.9<--, 2.9<--, 3.0<--, 3.1<--  Urinalysis Basic - ( 05 Sep 2023 07:37 )    Color:  / Appearance:  / SG:  / pH:   Gluc: 89 mg/dL / Ketone:   / Bili:  / Urobili:    Blood:  / Protein:  / Nitrite:    Leuk Esterase:  / RBC:  / WBC    Sq Epi:  / Non Sq Epi:  / Bacteria:     CKD stage 4 - has atrophic kidney and other kidney with ureteral stricture - now close to baseline  ·lymphoma and now new neuroendocrine tumor on liver biopsy  ·hypercalcemia - appears driven by vit D 1,25 -   ·has not responded well to pamidronate as calcium level increasing again  ·chronic loose sttols - causimg more dehydration  ·anemia  ·BP stable  ·low/normal  loose stools  per pt had recent PET scan  with neck involvement (can not find study) and now with dysphagia  ·d/w pt per advanced directives and uncertain at this time   octreotide scan =- prelim with abdominal uptake  metabolic acidosis    1) completing octreotide scan now  2) d/w Dr quinteros of oncology - possible treatment of neuroendocrine tumor in coming days and evalaution of progression of lymphoma with biopsy also possibly in hospital  3) labs today for need for ivf  2) please increase sodium bicarbonate 650 mg 2 tablets bid  3) oncology f/u  4) every other day labs ok

## 2023-09-07 NOTE — PROGRESS NOTE ADULT - ASSESSMENT
Impression:    Hypercalcemia likley malignancy induced with elevated 1, 25 Vit D  Mediastinal LN on PET from May 2023 - suspicion of sarcoidosis vs relapsing marginal zone lymphoma  HO Lymphoma s/p chemo was in partial remission  Recent Dx of NET of the liver    Plan:    Scheduled for Bronchoscopy and endobronchial US guided biopsy of mediastinal LAD tomorrow (9/8) at 7:00 am  NPO after midnight  Pre-op labs  Keep active type and screen  Correct electrolytes  General pulmonary radha operative care  Incentive spirometry post op  Avoid volume overload  HOB at 45, aspiration precautions  DVT ppx  Continue present management  Case discussed jose Worrell  Post op ambulate as tolerates / PT / OT Impression:    Hypercalcemia likely malignancy induced with elevated 1, 25 Vit D  Mediastinal LN on PET from May 2023 - Relapsing marginal zone lymphoma VS Sarcoidosis ?   HO Lymphoma s/p chemo was in partial remission  Recent Dx of NET of the liver    Plan:    Scheduled for Bronchoscopy and endobronchial US guided biopsy of mediastinal LAD tomorrow (9/8) at 7:00 am  NPO after midnight  Pre-op labs  Keep active type and screen  Correct electrolytes  General pulmonary radha operative care  Incentive spirometry post op  Avoid volume overload  HOB at 45, aspiration precautions  DVT ppx  Continue present management  Case discussed with Dr. Worrell  Post op ambulate as tolerates / PT / OT

## 2023-09-07 NOTE — PROGRESS NOTE ADULT - SUBJECTIVE AND OBJECTIVE BOX
Patient is a 67y old  Male who presents with a chief complaint of ASHLEY, hypercalcemia (06 Sep 2023 18:38)      OVERNIGHT EVENTS: remained stable, denies fever, chills, syncope, seizure, headache, cough, SOB, abd pain, N/V/D, urinary symptoms, legs swelling,     SUBJECTIVE / INTERVAL HPI: Patient seen and examined at bedside.     VITAL SIGNS:  Vital Signs Last 24 Hrs  T(C): 36.7 (07 Sep 2023 06:15), Max: 36.7 (07 Sep 2023 06:15)  T(F): 98 (07 Sep 2023 06:15), Max: 98 (07 Sep 2023 06:15)  HR: 74 (07 Sep 2023 06:15) (74 - 88)  BP: 110/68 (07 Sep 2023 06:15) (103/55 - 110/68)  BP(mean): --  RR: 18 (07 Sep 2023 06:15) (18 - 18)  SpO2: --        PHYSICAL EXAM:    General: WDWN  HEENT: NC/AT; PERRL, clear conjunctiva  Neck: supple  Cardiovascular: +S1/S2; RRR  Respiratory: CTA b/l; no W/R/R  Gastrointestinal: soft, NT/ND; +BSx4  Extremities: WWP; 2+ peripheral pulses; no edema   Neurological: AAOx3; no focal deficits    MEDICATIONS:  MEDICATIONS  (STANDING):  benztropine 2 milliGRAM(s) Oral daily  fluPHENAZine 10 milliGRAM(s) Oral daily  heparin   Injectable 5000 Unit(s) SubCutaneous every 8 hours  sodium bicarbonate 650 milliGRAM(s) Oral three times a day    MEDICATIONS  (PRN):  ondansetron Injectable 4 milliGRAM(s) IV Push once PRN Nausea and/or Vomiting      ALLERGIES:  Allergies    allopurinol (Rash (Moderate))    Intolerances        LABS:              CAPILLARY BLOOD GLUCOSE          RADIOLOGY & ADDITIONAL TESTS: Reviewed.    ASSESSMENT:    PLAN:

## 2023-09-07 NOTE — PROGRESS NOTE ADULT - SUBJECTIVE AND OBJECTIVE BOX
24H events:    Patient is a 67y old Male who presents with a chief complaint of hypercalcemia (07 Sep 2023 09:39)    Primary diagnosis of Hypercalcemia       Today is hospital day 14d.      PAST MEDICAL & SURGICAL HISTORY  Kidney stones    Schizophrenia    Lymphoma    Shingles    Atrophic kidney    H/O colonoscopy with polypectomy    Liver cyst    History of bladder surgery    H/O neuropathy    History of chemotherapy    Stage 3 chronic kidney disease    Retained urethral stent    H/O umbilical hernia repair    Elbow fracture    H/O cystoscopy      SOCIAL HISTORY:  Negative for smoking/alcohol/drug use.     ALLERGIES:  allopurinol (Rash (Moderate))    MEDICATIONS:  STANDING MEDICATIONS  benztropine 2 milliGRAM(s) Oral daily  fluPHENAZine 10 milliGRAM(s) Oral daily  heparin   Injectable 5000 Unit(s) SubCutaneous every 8 hours  sodium bicarbonate 1300 milliGRAM(s) Oral two times a day  sodium chloride 0.9%. 1000 milliLiter(s) IV Continuous <Continuous>    PRN MEDICATIONS  ondansetron Injectable 4 milliGRAM(s) IV Push once PRN    VITALS:   T(F): 96.5  HR: 86  BP: 104/54  RR: 18  SpO2: --    LABS:                        8.9    6.33  )-----------( 152      ( 07 Sep 2023 12:17 )             28.2     09-07    135  |  107  |  36<H>  ----------------------------<  133<H>  4.6   |  16<L>  |  2.9<H>    Ca    11.2<H>      07 Sep 2023 08:53  Mg     2.3     09-07      PT/INR - ( 07 Sep 2023 12:17 )   PT: 11.10 sec;   INR: 0.97 ratio           Urinalysis Basic - ( 07 Sep 2023 08:53 )    Color: x / Appearance: x / SG: x / pH: x  Gluc: 133 mg/dL / Ketone: x  / Bili: x / Urobili: x   Blood: x / Protein: x / Nitrite: x   Leuk Esterase: x / RBC: x / WBC x   Sq Epi: x / Non Sq Epi: x / Bacteria: x        PHYSICAL EXAM:  GENERAL: NAD  NECK: Supple, No JVD, Normal thyroid  NERVOUS SYSTEM:  Alert & Oriented X3, Good concentration  CHEST/LUNG: Clear to auscultation bilaterally; No rales, rhonchi, wheezing, or rubs  HEART: Regular rate and rhythm; No murmurs, rubs, or gallops  ABDOMEN: Soft, Nontender, Nondistended; Bowel sounds present  EXTREMITIES:  + Peripheral Pulses, No clubbing, cyanosis, or edema

## 2023-09-08 ENCOUNTER — RESULT REVIEW (OUTPATIENT)
Age: 68
End: 2023-09-08

## 2023-09-08 LAB
ANION GAP SERPL CALC-SCNC: 11 MMOL/L — SIGNIFICANT CHANGE UP (ref 7–14)
BUN SERPL-MCNC: 42 MG/DL — HIGH (ref 10–20)
CALCIUM SERPL-MCNC: 12 MG/DL — HIGH (ref 8.4–10.4)
CHLORIDE SERPL-SCNC: 109 MMOL/L — SIGNIFICANT CHANGE UP (ref 98–110)
CO2 SERPL-SCNC: 18 MMOL/L — SIGNIFICANT CHANGE UP (ref 17–32)
CREAT SERPL-MCNC: 3.1 MG/DL — HIGH (ref 0.7–1.5)
EGFR: 21 ML/MIN/1.73M2 — LOW
GLUCOSE SERPL-MCNC: 119 MG/DL — HIGH (ref 70–99)
GRAM STN FLD: SIGNIFICANT CHANGE UP
HCT VFR BLD CALC: 31.6 % — LOW (ref 42–52)
HGB BLD-MCNC: 9.7 G/DL — LOW (ref 14–18)
LACTOFERRIN STL-MCNC: <1 CD:794062635 — SIGNIFICANT CHANGE UP (ref 0–7.24)
MCHC RBC-ENTMCNC: 28.4 PG — SIGNIFICANT CHANGE UP (ref 27–31)
MCHC RBC-ENTMCNC: 30.7 G/DL — LOW (ref 32–37)
MCV RBC AUTO: 92.4 FL — SIGNIFICANT CHANGE UP (ref 80–94)
NIGHT BLUE STAIN TISS: SIGNIFICANT CHANGE UP
NRBC # BLD: 0 /100 WBCS — SIGNIFICANT CHANGE UP (ref 0–0)
PLATELET # BLD AUTO: 151 K/UL — SIGNIFICANT CHANGE UP (ref 130–400)
PMV BLD: 11 FL — HIGH (ref 7.4–10.4)
POTASSIUM SERPL-MCNC: 4.8 MMOL/L — SIGNIFICANT CHANGE UP (ref 3.5–5)
POTASSIUM SERPL-SCNC: 4.8 MMOL/L — SIGNIFICANT CHANGE UP (ref 3.5–5)
RBC # BLD: 3.42 M/UL — LOW (ref 4.7–6.1)
RBC # FLD: 16.9 % — HIGH (ref 11.5–14.5)
SODIUM SERPL-SCNC: 138 MMOL/L — SIGNIFICANT CHANGE UP (ref 135–146)
SPECIMEN SOURCE: SIGNIFICANT CHANGE UP
SPECIMEN SOURCE: SIGNIFICANT CHANGE UP
WBC # BLD: 6.46 K/UL — SIGNIFICANT CHANGE UP (ref 4.8–10.8)
WBC # FLD AUTO: 6.46 K/UL — SIGNIFICANT CHANGE UP (ref 4.8–10.8)

## 2023-09-08 PROCEDURE — 88189 FLOWCYTOMETRY/READ 16 & >: CPT

## 2023-09-08 PROCEDURE — 99232 SBSQ HOSP IP/OBS MODERATE 35: CPT

## 2023-09-08 PROCEDURE — 31622 DX BRONCHOSCOPE/WASH: CPT | Mod: GC

## 2023-09-08 RX ORDER — SODIUM CHLORIDE 9 MG/ML
1000 INJECTION INTRAMUSCULAR; INTRAVENOUS; SUBCUTANEOUS
Refills: 0 | Status: DISCONTINUED | OUTPATIENT
Start: 2023-09-08 | End: 2023-09-08

## 2023-09-08 RX ORDER — SODIUM CHLORIDE 9 MG/ML
1000 INJECTION INTRAMUSCULAR; INTRAVENOUS; SUBCUTANEOUS
Refills: 0 | Status: DISCONTINUED | OUTPATIENT
Start: 2023-09-08 | End: 2023-09-10

## 2023-09-08 RX ORDER — SODIUM BICARBONATE 1 MEQ/ML
1300 SYRINGE (ML) INTRAVENOUS THREE TIMES A DAY
Refills: 0 | Status: DISCONTINUED | OUTPATIENT
Start: 2023-09-08 | End: 2023-09-25

## 2023-09-08 RX ADMIN — Medication 1300 MILLIGRAM(S): at 13:27

## 2023-09-08 RX ADMIN — FLUPHENAZINE HYDROCHLORIDE 10 MILLIGRAM(S): 1 TABLET, FILM COATED ORAL at 12:02

## 2023-09-08 RX ADMIN — Medication 2 MILLIGRAM(S): at 12:02

## 2023-09-08 RX ADMIN — Medication 1300 MILLIGRAM(S): at 05:12

## 2023-09-08 NOTE — PROGRESS NOTE ADULT - ASSESSMENT
Assessment  Patient is a 67y old Male who presents with a chief complaint of Hypercalcemia and diarrhea      Plan  # Hypercalcemia   # Neuroendocrine tumor with metastasis  # Non Hodgkin Lymphoma    # CKD stage IV  # Schizoaffective disorder  # Anemia of chronic illness   # Diarrhea   # Dysphagia     - s/p IVF, pamidronate and one dose of denosumab   - Ca level has been stable  - causes is filibertoley progression of lymphoma VS less likley sarcoidosis (given elevated Vit D)  - CT chest shows similar lymphadenopathy  - so per HO>> EBUS performed today with biopsies taken  - s/p Octreotide scan >> neuroendocrine tumor of the liver likley Mets with unkwon origin per HO, will need to be on Sandostatin as outpt  - s/p one unit of prbc   - cont w Na bicarb 1300 tid  - dvt ppx       Assessment  Patient is a 67y old Male who presents with a chief complaint of Hypercalcemia and diarrhea      Plan  # Hypercalcemia   # Neuroendocrine tumor with metastasis  # Non Hodgkin Lymphoma    # CKD stage IV  # Schizoaffective disorder  # Anemia of chronic illness   # Diarrhea   # Dysphagia     - increased NS to 70ml/hr, pamidronate and one dose of denosumab   - Ca level has been stable  - causes is likely progression of lymphoma VS less likely sarcoidosis (given elevated Vit D)  - CT chest shows similar lymphadenopathy  - so per HO>> EBUS performed today with biopsies taken  - s/p Octreotide scan >> 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 per HO, will need to be on Sandostatin as outpt  - s/p one unit of prbc   - cont w Na bicarb 1300 tid  - dvt ppx

## 2023-09-08 NOTE — PROGRESS NOTE ADULT - SUBJECTIVE AND OBJECTIVE BOX
Patient is a 67y old  Male who presents with a chief complaint of Diarrhea (08 Sep 2023 14:19)      OVERNIGHT EVENTS: s/p EBUS, no major events    SUBJECTIVE / INTERVAL HPI: Patient seen and examined at bedside.     VITAL SIGNS:  Vital Signs Last 24 Hrs  T(C): 36.3 (08 Sep 2023 08:53), Max: 36.7 (08 Sep 2023 06:02)  T(F): 97.4 (08 Sep 2023 08:53), Max: 98.1 (08 Sep 2023 06:02)  HR: 98 (08 Sep 2023 09:08) (81 - 100)  BP: 105/56 (08 Sep 2023 09:08) (102/55 - 119/58)  BP(mean): 81 (08 Sep 2023 07:35) (81 - 81)  RR: 18 (08 Sep 2023 09:08) (16 - 20)  SpO2: 97% (08 Sep 2023 09:08) (97% - 98%)    Parameters below as of 08 Sep 2023 08:58  Patient On (Oxygen Delivery Method): room air        PHYSICAL EXAM:    General: WDWN  HEENT: NC/AT; PERRL, clear conjunctiva  Neck: supple  Cardiovascular: +S1/S2; RRR  Respiratory: CTA b/l; no W/R/R  Gastrointestinal: soft, NT/ND; +BSx4  Extremities: WWP; 2+ peripheral pulses; no edema   Neurological: AAOx3; no focal deficits    MEDICATIONS:  MEDICATIONS  (STANDING):  benztropine 2 milliGRAM(s) Oral daily  fluPHENAZine 10 milliGRAM(s) Oral daily  heparin   Injectable 5000 Unit(s) SubCutaneous every 8 hours  sodium bicarbonate 1300 milliGRAM(s) Oral three times a day  sodium chloride 0.9%. 1000 milliLiter(s) (70 mL/Hr) IV Continuous <Continuous>    MEDICATIONS  (PRN):  ondansetron Injectable 4 milliGRAM(s) IV Push once PRN Nausea and/or Vomiting      ALLERGIES:  Allergies    allopurinol (Rash (Moderate))    Intolerances        LABS:                        8.9    6.33  )-----------( 152      ( 07 Sep 2023 12:17 )             28.2     09-07    134<L>  |  106  |  37<H>  ----------------------------<  118<H>  4.7   |  16<L>  |  2.8<H>    Ca    11.3<H>      07 Sep 2023 12:17  Phos  3.6     09-07  Mg     2.4     09-07    TPro  4.7<L>  /  Alb  3.2<L>  /  TBili  0.5  /  DBili  x   /  AST  19  /  ALT  39  /  AlkPhos  587<H>  09-07    PT/INR - ( 07 Sep 2023 12:17 )   PT: 11.10 sec;   INR: 0.97 ratio           Urinalysis Basic - ( 07 Sep 2023 12:17 )    Color: x / Appearance: x / SG: x / pH: x  Gluc: 118 mg/dL / Ketone: x  / Bili: x / Urobili: x   Blood: x / Protein: x / Nitrite: x   Leuk Esterase: x / RBC: x / WBC x   Sq Epi: x / Non Sq Epi: x / Bacteria: x      CAPILLARY BLOOD GLUCOSE          RADIOLOGY & ADDITIONAL TESTS: Reviewed.    ASSESSMENT:    PLAN:     < from: CT Chest No Cont (09.07.23 @ 16:42) >  MEDIASTINUM: Numerous enlarged mediastinal and axillary lymph nodes, both   new and increased in size from prior. The most prominent are as follows:    Indication 5.5 x 2.2 cm subcarinal nodes (2/45)  -1.8 cm left upper paratracheal node (2/21)-station 2 L  - 1.1 cm upper paratracheal lymph node (station 4R)  - 1.2 cm lower paratracheal lymph node (station 4R).    Increased size of retrocrural/paravertebral lymph nodes, the largest of   which now measuring 1.6 cm. Thyroid gland is present.    < end of copied text >  < from: CT Chest No Cont (09.07.23 @ 16:42) >  Bilateral pleural effusions, left greater than right    < end of copied text >  < from: CT Chest No Cont (09.07.23 @ 16:42) >    HEART AND VESSELS: No cardiomegaly. No coronary artery calcifications.   New moderate sized pericardial effusion.    < end of copied text >  < from: CT Chest No Cont (09.07.23 @ 16:42) >   New discrete bibasilar nodules, consistent with pulmonary parenchymal   metastatic disease.    < end of copied text >

## 2023-09-08 NOTE — CHART NOTE - NSCHARTNOTEFT_GEN_A_CORE
PACU ANESTHESIA ADMISSION NOTE      Procedure: bronchoscopy  Post op diagnosis:  Mediastinal LN      __x__  Patent Airway    __x__  Full return of protective reflexes    __x__  Full recovery from anesthesia / back to baseline status    Vitals:  T(C): 36.7 (09-08-23 @ 07:46), Max: 36.7 (09-08-23 @ 06:02)  HR: 83 (09-08-23 @ 07:46) (81 - 91)  BP: 114/70 (09-08-23 @ 07:46) (102/55 - 119/58)  RR: 16 (09-08-23 @ 07:46) (16 - 20)  SpO2: 97% (09-08-23 @ 07:35) (97% - 97%)    Mental Status:  __x__ Awake   ___x__ Alert   _____ Drowsy   _____ Sedated    Nausea/Vomiting:  __x__ NO  ______Yes,   See Post - Op Orders          Pain Scale (0-10):  _____    Treatment: ____ None    __x__ See Post - Op/PCA Orders    Post - Operative Fluids:   ____ Oral   __x__ See Post - Op Orders    Plan: Discharge:   ____Home       __x_Floor     _____Critical Care    _____  Other:_________________    Comments: Patient had smooth intraoperative event, no anesthesia complication.  PACU Vital signs: 112/62, 100hr, 97%, 98F, 16

## 2023-09-08 NOTE — CHART NOTE - NSCHARTNOTEFT_GEN_A_CORE
Pt has a moderate pericardial effusion with no tamponade currently.  The pt was being transferred to telemetry in view of his tamponade, but refused being transferred to the telemetry floor.  The risks of being on this floor without telemetry were explained to the pt and he understands the risks. Pt has a moderate pericardial effusion with no tamponade currently.  The pt was being transferred to telemetry in view of his pericardial effusion but refused being transferred to the telemetry floor.  The risks of being on this floor without telemetry were explained to the pt and he understands the risks.

## 2023-09-08 NOTE — PROGRESS NOTE ADULT - SUBJECTIVE AND OBJECTIVE BOX
24H events:    Patient is a 67y old Male who presents with a chief complaint of hypercalcemia (08 Sep 2023 11:41)    Primary diagnosis of Hypercalcemia       Today is hospital day 15d.      PAST MEDICAL & SURGICAL HISTORY  Kidney stones    Schizophrenia    Lymphoma    Shingles    Atrophic kidney    H/O colonoscopy with polypectomy    Liver cyst    History of bladder surgery    H/O neuropathy    History of chemotherapy    Stage 3 chronic kidney disease    Retained urethral stent    H/O umbilical hernia repair    Elbow fracture    H/O cystoscopy      SOCIAL HISTORY:  Negative for smoking/alcohol/drug use.     ALLERGIES:  allopurinol (Rash (Moderate))    MEDICATIONS:  STANDING MEDICATIONS  benztropine 2 milliGRAM(s) Oral daily  fluPHENAZine 10 milliGRAM(s) Oral daily  heparin   Injectable 5000 Unit(s) SubCutaneous every 8 hours  sodium bicarbonate 1300 milliGRAM(s) Oral three times a day  sodium chloride 0.9%. 1000 milliLiter(s) IV Continuous <Continuous>    PRN MEDICATIONS  ondansetron Injectable 4 milliGRAM(s) IV Push once PRN    VITALS:   T(F): 97.4  HR: 98  BP: 105/56  RR: 18  SpO2: 97%    LABS:                        8.9    6.33  )-----------( 152      ( 07 Sep 2023 12:17 )             28.2     09-07    134<L>  |  106  |  37<H>  ----------------------------<  118<H>  4.7   |  16<L>  |  2.8<H>    Ca    11.3<H>      07 Sep 2023 12:17  Phos  3.6     09-07  Mg     2.4     09-07    TPro  4.7<L>  /  Alb  3.2<L>  /  TBili  0.5  /  DBili  x   /  AST  19  /  ALT  39  /  AlkPhos  587<H>  09-07    PT/INR - ( 07 Sep 2023 12:17 )   PT: 11.10 sec;   INR: 0.97 ratio           Urinalysis Basic - ( 07 Sep 2023 12:17 )    Color: x / Appearance: x / SG: x / pH: x  Gluc: 118 mg/dL / Ketone: x  / Bili: x / Urobili: x   Blood: x / Protein: x / Nitrite: x   Leuk Esterase: x / RBC: x / WBC x   Sq Epi: x / Non Sq Epi: x / Bacteria: x          PHYSICAL EXAM:  GENERAL: NAD  NECK: Supple, No JVD, Normal thyroid  NERVOUS SYSTEM:  Alert & Oriented X3, Good concentration  CHEST/LUNG: Clear to auscultation bilaterally; No rales, rhonchi, wheezing, or rubs  HEART: Regular rate and rhythm; No murmurs, rubs, or gallops  ABDOMEN: Soft, Nontender, Nondistended; Bowel sounds present  EXTREMITIES:  + Peripheral Pulses, No clubbing, cyanosis, or edema

## 2023-09-08 NOTE — PROGRESS NOTE ADULT - SUBJECTIVE AND OBJECTIVE BOX
Nephrology progress note     no complaints    ON events/Subjective:     Allergies:  allopurinol (Rash (Moderate))    Hospital Medications:   MEDICATIONS  (STANDING):  benztropine 2 milliGRAM(s) Oral daily  fluPHENAZine 10 milliGRAM(s) Oral daily  heparin   Injectable 5000 Unit(s) SubCutaneous every 8 hours  sodium bicarbonate 1300 milliGRAM(s) Oral three times a day  sodium chloride 0.9%. 1000 milliLiter(s) (60 mL/Hr) IV Continuous <Continuous>    REVIEW OF SYSTEMS:  CONSTITUTIONAL: No weakness, fevers or chills  EYES/ENT: No visual changes;  No vertigo or throat pain   NECK: No pain or stiffness  RESPIRATORY: No cough, wheezing, hemoptysis; No shortness of breath  CARDIOVASCULAR: No chest pain or palpitations.  GASTROINTESTINAL: No abdominal or epigastric pain. No nausea, vomiting, or hematemesis; No diarrhea or constipation. No melena or hematochezia.  GENITOURINARY: No dysuria, frequency, foamy urine, urinary urgency, incontinence or hematuria  NEUROLOGICAL: No numbness or weakness  SKIN: No itching, burning, rashes, or lesions   VASCULAR: No bilateral lower extremity edema.   All other review of systems is negative unless indicated above.    VITALS:  T(F): 97.4 (09-08-23 @ 08:53), Max: 98.1 (09-08-23 @ 06:02)  HR: 98 (09-08-23 @ 09:08)  BP: 105/56 (09-08-23 @ 09:08)  RR: 18 (09-08-23 @ 09:08)  SpO2: 97% (09-08-23 @ 09:08)  Wt(kg): --    Height (cm): 182.9 (09-08 @ 07:46)  Weight (kg): 95.3 (09-08 @ 07:46)  BMI (kg/m2): 28.5 (09-08 @ 07:46)  BSA (m2): 2.18 (09-08 @ 07:46)  PHYSICAL EXAM:  Constitutional: NAD  HEENT: anicteric sclera, oropharynx clear, MMM  Neck: No JVD  Respiratory: CTAB, no wheezes, rales or rhonchi  Cardiovascular: S1, S2, RRR  Gastrointestinal: BS+, soft, NT/ND  Extremities: No cyanosis or clubbing. No peripheral edema  Neurological: A/O x 3, no focal deficits  Psychiatric: Normal mood, normal affect  : No CVA tenderness. No zafar.   Skin: No rashes  Vascular Access:    LABS:  09-07    134<L>  |  106  |  37<H>  ----------------------------<  118<H>  4.7   |  16<L>  |  2.8<H>    Ca    11.3<H>      07 Sep 2023 12:17  Phos  3.6     09-07  Mg     2.4     09-07    TPro  4.7<L>  /  Alb  3.2<L>  /  TBili  0.5  /  DBili      /  AST  19  /  ALT  39  /  AlkPhos  587<H>  09-07                          8.9    6.33  )-----------( 152      ( 07 Sep 2023 12:17 )             28.2       Urine Studies:  Creatinine Trend: 2.8<--, 2.9<--, 2.8<--, 3.0<--, 2.9<--, 2.9<--  Urinalysis Basic - ( 07 Sep 2023 12:17 )    Color:  / Appearance:  / SG:  / pH:   Gluc: 118 mg/dL / Ketone:   / Bili:  / Urobili:    Blood:  / Protein:  / Nitrite:    Leuk Esterase:  / RBC:  / WBC    Sq Epi:  / Non Sq Epi:  / Bacteria:   CKD stage 4 - has atrophic kidney and other kidney with ureteral stricture - now close to baseline  ·lymphoma and now new neuroendocrine tumor on liver biopsy  ·hypercalcemia - appears driven by vit D 1,25 -   ·has not responded well to pamidronate as calcium level increasing again  ·chronic loose sttols - causimg more dehydration  ·anemia  ·BP stable  ·low/normal  loose stools  per pt had recent PET scan  with neck involvement (can not find study) and now with dysphagia  ·d/w pt per advanced directives and uncertain at this time   octreotide scan =- with liver uptake only  metabolic acidosis    1) as octreotide scan with liver uptake only - the other mediastinal and neck lesion may be lymphoma (or sarcoidosis) - biopsy of neck lesion done today  2) d/w Dr quinteros of oncology  - if to be discharged will have start of chemo set up first as outpt  3) continue bicarbonate supplement  4) on discharge advised to hydrate well

## 2023-09-08 NOTE — PROGRESS NOTE ADULT - ASSESSMENT
# recurrent Hypercalcemia 2/2 malignancy (  # active Non Hodgkin Lymphoma, pt didn't tolerate treatement in the past  # new liver mass >> Grade III Neuroendocrine tumor w elevated 5-HIAA level >> highly suspicious for carcinoid >> spoke to HO >> mets from unknown source angie GI  # CKD stage IV/metabolic acidosis   # Schizoaffective disorder  # Anemia of chronic illness   # chronic Diarrhea likley from neuroendocrine tumor   # Dysphagia likley from elnarged L.N       Plans:    - s/p IVF, pamidronate and one dose of denosumab   - Ca level has been stable  - causes is angie progression of lymphoma VS less angie sarcoidosis (given elevated Vit D)  - CT chest    - so per HO>> needs EBUS to confirm, EBUS 9/8   - s/p Octreotide scan >> 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 per HO, will need to be on Sandostatin as outpt  - s/p one unit of prbc   - cont w Na bicarb 650 tid  - dvt ppx  - DC home after EBUS and if Ca < 11 # recurrent Hypercalcemia 2/2 malignancy (  # active Non Hodgkin Lymphoma, pt didn't tolerate treatement in the past  # new liver mass >> Grade III Neuroendocrine tumor w elevated 5-HIAA level >> highly suspicious for carcinoid >> spoke to HO >> mets from unknown source angie [facility]  # CKD stage IV/metabolic acidosis   # Schizoaffective disorder  # Anemia of chronic illness   # chronic Diarrhea likley from neuroendocrine tumor   # Dysphagia likley from enlarged L.N       Plans:    - s/p IVF, pamidronate and one dose of denosumab   - resume NS IV as Ca today 11.9,  - causes is likley progression of lymphoma VS less likley sarcoidosis (given elevated Vit D)  - s/p EBUS on 9/8   - CT chest w new enlarged mediastinal and axially L.N and increase in size of prior enlarged L.N   - new moderate pericardial effusion, (had small pericardial effusion on previous Echo 6/2023, VS stable for now, (get TTE) if become unstable call cardiac fellow and CT team STAT  - s/p Octreotide scan >> 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 per HO, will need to be on Sandostatin as outpt  - s/p one unit of prbc   - cont w Na bicarb 1300 tid  - dvt ppx   # recurrent Hypercalcemia 2/2 malignancy (  # active Non Hodgkin Lymphoma, pt didn't tolerate treatement in the past  # new liver mass >> Grade III Neuroendocrine tumor w elevated 5-HIAA level >> highly suspicious for carcinoid >> spoke to HO >> mets from unknown source angie GI  # CKD stage IV/metabolic acidosis   # Schizoaffective disorder  # Anemia of chronic illness   # chronic Diarrhea likley from neuroendocrine tumor   # Dysphagia likley from enlarged L.N       Plans:    - s/p IVF, pamidronate and one dose of denosumab   - resume NS IV as Ca today 11.9,  - causes is likley progression of lymphoma VS less likley sarcoidosis (given elevated Vit D)  - s/p EBUS on 9/8   - CT chest w new enlarged mediastinal and axially L.N and increase in size of prior enlarged L.N   - new moderate pericardial effusion, (had small pericardial effusion on previous Echo 6/2023, VS stable for now, (get TTE) if become unstable call cardiac fellow and CT team STAT  - s/p Octreotide scan >> 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 per HO, will need to be on Sandostatin as outpt  - s/p one unit of prbc   - cont w Na bicarb 1300 tid  - dvt ppx  - father was called and updated  # recurrent Hypercalcemia 2/2 malignancy (  # active Non Hodgkin Lymphoma, pt didn't tolerate treatement in the past  # new liver mass >> Grade III Neuroendocrine tumor w elevated 5-HIAA level >> highly suspicious for carcinoid >> spoke to HO >> mets from unknown source angie GI  # CKD stage IV/metabolic acidosis   # Schizoaffective disorder  # Anemia of chronic illness   # chronic Diarrhea likley from neuroendocrine tumor   # Dysphagia likley from enlarged L.N       Plans:    - s/p IVF, pamidronate and one dose of denosumab   - resume NS IV as Ca today 11.9,  - causes is likley progression of lymphoma VS less likley sarcoidosis (given elevated Vit D)  - s/p EBUS on 9/8   - CT chest w new enlarged mediastinal and axially L.N and increase in size of prior enlarged L.N   - new moderate pericardial effusion, (had small pericardial effusion on previous Echo 6/2023, VS stable for now, (get TTE) if become unstable call cardiac fellow and CT team STAT, transfer to tele for monitoring   - s/p Octreotide scan >> 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 per HO, will need to be on Sandostatin as outpt  - s/p one unit of prbc   - cont w Na bicarb 1300 tid  - dvt ppx  - father was called and updated  # recurrent Hypercalcemia 2/2 malignancy (  # active Non Hodgkin Lymphoma, pt didn't tolerate treatement in the past  # new liver mass >> Grade III Neuroendocrine tumor w elevated 5-HIAA level >> highly suspicious for carcinoid >> spoke to HO >> mets from unknown source angie GI  # CKD stage IV/metabolic acidosis   # Schizoaffective disorder  # Anemia of chronic illness   # chronic Diarrhea likley from neuroendocrine tumor   # Dysphagia likley from enlarged L.N       Plans:    - s/p IVF, pamidronate and one dose of denosumab   - resume NS IV as Ca today 11.9,  - causes is likley progression of lymphoma VS less likley sarcoidosis (given elevated Vit D)  - s/p EBUS on 9/8   - CT chest w new enlarged mediastinal and axially L.N and increase in size of prior enlarged L.N   - new moderate pericardial effusion, (had small pericardial effusion on previous Echo 6/2023, VS stable for now, (get TTE) if become unstable call cardiac fellow and CT team STAT, transfer to tele for monitoring (pt refused being under tele monitor, explained ot him the improtanceof tele monitor given the pericardial effusion)   - s/p Octreotide scan >> neuroendocrine tumor of the liver likley Mets with unkwon origin per HO, will need to be on Sandostatin as outpt  - s/p one unit of prbc   - cont w Na bicarb 1300 tid  - dvt ppx  - father was called and updated

## 2023-09-09 LAB
ANION GAP SERPL CALC-SCNC: 9 MMOL/L — SIGNIFICANT CHANGE UP (ref 7–14)
BASOPHILS # BLD AUTO: 0.03 K/UL — SIGNIFICANT CHANGE UP (ref 0–0.2)
BASOPHILS NFR BLD AUTO: 0.6 % — SIGNIFICANT CHANGE UP (ref 0–1)
BUN SERPL-MCNC: 42 MG/DL — HIGH (ref 10–20)
CALCIUM SERPL-MCNC: 10.6 MG/DL — HIGH (ref 8.4–10.5)
CHLORIDE SERPL-SCNC: 109 MMOL/L — SIGNIFICANT CHANGE UP (ref 98–110)
CO2 SERPL-SCNC: 18 MMOL/L — SIGNIFICANT CHANGE UP (ref 17–32)
CREAT SERPL-MCNC: 3.3 MG/DL — HIGH (ref 0.7–1.5)
EGFR: 20 ML/MIN/1.73M2 — LOW
EOSINOPHIL # BLD AUTO: 1.07 K/UL — HIGH (ref 0–0.7)
EOSINOPHIL NFR BLD AUTO: 20.4 % — HIGH (ref 0–8)
GLUCOSE SERPL-MCNC: 94 MG/DL — SIGNIFICANT CHANGE UP (ref 70–99)
HCT VFR BLD CALC: 26 % — LOW (ref 42–52)
HGB BLD-MCNC: 8.1 G/DL — LOW (ref 14–18)
IMM GRANULOCYTES NFR BLD AUTO: 1.1 % — HIGH (ref 0.1–0.3)
LYMPHOCYTES # BLD AUTO: 1.5 K/UL — SIGNIFICANT CHANGE UP (ref 1.2–3.4)
LYMPHOCYTES # BLD AUTO: 28.6 % — SIGNIFICANT CHANGE UP (ref 20.5–51.1)
MCHC RBC-ENTMCNC: 28.2 PG — SIGNIFICANT CHANGE UP (ref 27–31)
MCHC RBC-ENTMCNC: 31.2 G/DL — LOW (ref 32–37)
MCV RBC AUTO: 90.6 FL — SIGNIFICANT CHANGE UP (ref 80–94)
MONOCYTES # BLD AUTO: 1.16 K/UL — HIGH (ref 0.1–0.6)
MONOCYTES NFR BLD AUTO: 22.1 % — HIGH (ref 1.7–9.3)
NEUTROPHILS # BLD AUTO: 1.43 K/UL — SIGNIFICANT CHANGE UP (ref 1.4–6.5)
NEUTROPHILS NFR BLD AUTO: 27.2 % — LOW (ref 42.2–75.2)
NRBC # BLD: 0 /100 WBCS — SIGNIFICANT CHANGE UP (ref 0–0)
PLATELET # BLD AUTO: 127 K/UL — LOW (ref 130–400)
PMV BLD: 11 FL — HIGH (ref 7.4–10.4)
POTASSIUM SERPL-MCNC: 4.6 MMOL/L — SIGNIFICANT CHANGE UP (ref 3.5–5)
POTASSIUM SERPL-SCNC: 4.6 MMOL/L — SIGNIFICANT CHANGE UP (ref 3.5–5)
RBC # BLD: 2.87 M/UL — LOW (ref 4.7–6.1)
RBC # FLD: 16.8 % — HIGH (ref 11.5–14.5)
SODIUM SERPL-SCNC: 136 MMOL/L — SIGNIFICANT CHANGE UP (ref 135–146)
WBC # BLD: 5.25 K/UL — SIGNIFICANT CHANGE UP (ref 4.8–10.8)
WBC # FLD AUTO: 5.25 K/UL — SIGNIFICANT CHANGE UP (ref 4.8–10.8)

## 2023-09-09 PROCEDURE — 99232 SBSQ HOSP IP/OBS MODERATE 35: CPT

## 2023-09-09 RX ORDER — CALCITONIN SALMON 200 [IU]/ML
340 INJECTION, SOLUTION INTRAMUSCULAR EVERY 12 HOURS
Refills: 0 | Status: COMPLETED | OUTPATIENT
Start: 2023-09-09 | End: 2023-09-10

## 2023-09-09 RX ADMIN — FLUPHENAZINE HYDROCHLORIDE 10 MILLIGRAM(S): 1 TABLET, FILM COATED ORAL at 12:05

## 2023-09-09 RX ADMIN — HEPARIN SODIUM 5000 UNIT(S): 5000 INJECTION INTRAVENOUS; SUBCUTANEOUS at 10:41

## 2023-09-09 RX ADMIN — Medication 2 MILLIGRAM(S): at 12:06

## 2023-09-09 RX ADMIN — Medication 1300 MILLIGRAM(S): at 05:36

## 2023-09-09 RX ADMIN — Medication 1300 MILLIGRAM(S): at 12:05

## 2023-09-09 RX ADMIN — HEPARIN SODIUM 5000 UNIT(S): 5000 INJECTION INTRAVENOUS; SUBCUTANEOUS at 17:43

## 2023-09-09 RX ADMIN — CALCITONIN SALMON 340 INTERNATIONAL UNIT(S): 200 INJECTION, SOLUTION INTRAMUSCULAR at 18:01

## 2023-09-09 RX ADMIN — ONDANSETRON 4 MILLIGRAM(S): 8 TABLET, FILM COATED ORAL at 22:10

## 2023-09-09 NOTE — PROGRESS NOTE ADULT - ASSESSMENT
# recurrent Hypercalcemia 2/2 Lymphoma VS sarcoidosis   # hx of Non Hodgkin Lymphoma, pt didn't tolerate treatement in the past  #CT chest w worening enlarged and new enalrged mediastinal and axillary L.N   # new liver mass >> Grade III Neuroendocrine tumor w elevated 5-HIAA level >> highly suspicious for carcinoid >> spoke to HO >> mets from unknown source likley GI  #New moderate pericardial effusion  # CKD stage IV/metabolic acidosis   # Schizoaffective disorder  # Anemia of chronic illness   # chronic Diarrhea likley from neuroendocrine tumor   # Dysphagia likley from enlarged L.N       Plans:    - s/p IVF, pamidronate and denosumab   - resume NS IV as Ca remained elevated   - causes is likley progression of lymphoma VS less likley sarcoidosis (given elevated Vit D), s/p EBUS on 9/8   - had small pericardial effusion on previous Echo 6/2022, VS are stable for now,   - pendng TTE, if become unstable call cardiac fellow and CT team STAT,   - pt refused tele monitoring    - s/p Octreotide scan >> neuroendocrine tumor of the liver likley Mets with unkwon origin per HO, will need to be on Sandostatin as outpt  - s/p one unit of prbc   - cont w Na bicarb 1300 tid  - dvt ppx  - father was called and updated 9/7  - pt wants tos sangeeta in Hospital till pathology result is out, to take chemotherapy as inpt as he had bad reaction in the pst when had the treatment, this  also was his father request Dr. Martinez w # recurrent Hypercalcemia 2/2 Lymphoma VS sarcoidosis   # hx of Non Hodgkin Lymphoma, pt didn't tolerate treatement in the past  #CT chest w worening enlarged and new enalrged mediastinal and axillary L.N   # new liver mass >> Grade III Neuroendocrine tumor w elevated 5-HIAA level >> highly suspicious for carcinoid >> spoke to HO >> mets from unknown source likley GI  #New moderate pericardial effusion  # CKD stage IV/metabolic acidosis   # Schizoaffective disorder  # Anemia of chronic illness   # chronic Diarrhea likley from neuroendocrine tumor   # Dysphagia likley from enlarged L.N       Plans:    - s/p IVF, pamidronate and denosumab   - resume NS IV as Ca remained elevated   - causes is likley progression of lymphoma VS less likley sarcoidosis (given elevated Vit D), s/p EBUS on 9/8   - had small pericardial effusion on previous Echo 6/2022, VS are stable for now,   - pendng TTE, if become unstable call cardiac fellow and CT team STAT,   - pt refused tele monitoring    - s/p Octreotide scan >> neuroendocrine tumor of the liver likley Mets with unkwon origin per HO, will need to be on Sandostatin as outpt  - s/p one unit of prbc   - cont w Na bicarb 1300 tid  - dvt ppx  - father was called and updated 9/8  - pt wants tos sangeeta in Hospital till pathology result is out, to take chemotherapy as inpt as he had bad reaction in the pst when had the treatment, this  also was his father request Dr. Martinez,  # recurrent Hypercalcemia 2/2 Lymphoma VS sarcoidosis   # hx of Non Hodgkin Lymphoma, pt didn't tolerate treatement in the past  #CT chest w worening enlarged and new enalrged mediastinal and axillary L.N   # new liver mass >> Grade III Neuroendocrine tumor w elevated 5-HIAA level >> highly suspicious for carcinoid >> spoke to HO >> mets from unknown source likley GI  #New moderate pericardial effusion  # CKD stage IV/metabolic acidosis   # Schizoaffective disorder  # Anemia of chronic illness   # chronic Diarrhea likley from neuroendocrine tumor   # Dysphagia likley from enlarged L.N       Plans:    - s/p IVF, pamidronate and denosumab   - Ca is up agian, pt refuses IVF, will give 2 doses of calcitonin  - causes is likley progression of lymphoma VS less likley sarcoidosis (given elevated Vit D), s/p EBUS on 9/8   - had small pericardial effusion on previous Echo 6/2022, VS are stable for now,   - no clinical evidenc of tamponade, pending TTE, if become unstable call cardiac fellow and CT team STAT,   - pt refused tele monitoring and not really convinced to have Echo,  - s/p Octreotide scan >> neuroendocrine tumor of the liver likley Mets with unkwon origin per HO, will need to be on Sandostatin as outpt  - s/p one unit of prbc, give another unit today per HO recom   - cont w Na bicarb 1300 tid  - dvt ppx  - father was called and updated 9/8  - pt wants to stay in Hospital till pathology result is out, to take chemotherapy as inpt as he had bad reaction in the past when had the treatment, this  also was his father request Dr. Martinez,   - acute for now, pending TTE, resolving of Hyper Ca  - prognosis is poor

## 2023-09-09 NOTE — PROGRESS NOTE ADULT - SUBJECTIVE AND OBJECTIVE BOX
patient seen and examined.  was planned for DC but Ca increased yesterday to 12.0  patient being treated with calcitonin refused IVF.  patient reports no abd. pain. no increased thirst or urination.  no SOB. no chest pain.  eating well.  Vital Signs Last 24 Hrs  T(C): 36.7 (08 Sep 2023 21:44), Max: 36.7 (08 Sep 2023 21:44)  T(F): 98.1 (08 Sep 2023 21:44), Max: 98.1 (08 Sep 2023 21:44)  HR: 80 (09 Sep 2023 05:39) (80 - 89)  BP: 98/54 (09 Sep 2023 05:39) (96/54 - 98/54)  BP(mean): 70 (09 Sep 2023 05:39) (70 - 71)  RR: 18 (08 Sep 2023 21:44) (18 - 18)  SpO2: 94% (08 Sep 2023 21:44) (94% - 94%)        conj pale, no jaundice  neck no JVD. supple.  lungs some bibasilar crackles. no wheezing.  good air entry bilaterally  heart regular rate and rhythm.  heart sounds clearly heard. no murmur or gallop  abd. soft nontender BS pos.  extremities no edema.  good skin turgor.                    8.1    5.25  )-----------( 127      ( 09 Sep 2023 06:23 )             26.0   09-09    136  |  109  |  42<H>  ----------------------------<  94  4.6   |  18  |  3.3<H>    Ca    10.6<H>      09 Sep 2023 06:23  Phos  3.6     09-07  Mg     2.4     09-07    TPro  4.7<L>  /  Alb  3.2<L>  /  TBili  0.5  /  DBili  x   /  AST  19  /  ALT  39  /  AlkPhos  587<H>  09-07    MEDICATIONS  (STANDING):  benztropine 2 milliGRAM(s) Oral daily  calcitonin Injectable 340 International Unit(s) IntraMuscular every 12 hours  fluPHENAZine 10 milliGRAM(s) Oral daily  heparin   Injectable 5000 Unit(s) SubCutaneous every 8 hours  sodium bicarbonate 1300 milliGRAM(s) Oral three times a day  sodium chloride 0.9%. 1000 milliLiter(s) (70 mL/Hr) IV Continuous <Continuous>

## 2023-09-09 NOTE — PROGRESS NOTE ADULT - ASSESSMENT
hypercalcemia - lymphoma (most likely) vs. sarcoid (has elevated 1,25 Vit D) - s/p denosumab and pamidronate, now receiving calcitonin  lymphoma  neuroendocrine tumor with likely metastases to liver  ASHLEY on CKD - ASHLEY secondary to volume depletion from hypercalcemia and chronic diarrhea - patient now close to baseline.  CKD - has atrophic right kidney and ureteral stricture on the left.  anemia - secondary to CKD plus malignancy    Plan:  continue calcitonin/encourage oral intake and IVF if patient agrees  monitor Ca/renal function  patient s/p transfusion 2 units packed rbc's yesterday  monitor hg/hct  minimize blood draws  hematology for chemotherapy - patient wishes to have next chemo in hospital given bad reaction to last session.

## 2023-09-09 NOTE — PROGRESS NOTE ADULT - SUBJECTIVE AND OBJECTIVE BOX
24H events:    Patient is a 67y old Male who presents with a chief complaint of Diarrhea (08 Sep 2023 14:19)    Primary diagnosis of Hypercalcemia       Today is hospital day 16d.      PAST MEDICAL & SURGICAL HISTORY  Kidney stones    Schizophrenia    Lymphoma    Shingles    Atrophic kidney    H/O colonoscopy with polypectomy    Liver cyst    History of bladder surgery    H/O neuropathy    History of chemotherapy    Stage 3 chronic kidney disease    Retained urethral stent    H/O umbilical hernia repair    Elbow fracture    H/O cystoscopy      SOCIAL HISTORY:  Negative for smoking/alcohol/drug use.     ALLERGIES:  allopurinol (Rash (Moderate))    MEDICATIONS:  STANDING MEDICATIONS  benztropine 2 milliGRAM(s) Oral daily  calcitonin Injectable 340 International Unit(s) IntraMuscular every 12 hours  fluPHENAZine 10 milliGRAM(s) Oral daily  heparin   Injectable 5000 Unit(s) SubCutaneous every 8 hours  sodium bicarbonate 1300 milliGRAM(s) Oral three times a day  sodium chloride 0.9%. 1000 milliLiter(s) IV Continuous <Continuous>    PRN MEDICATIONS  ondansetron Injectable 4 milliGRAM(s) IV Push once PRN    VITALS:   T(F): 98.1  HR: 80  BP: 98/54  RR: 18  SpO2: 94%    LABS:                        8.1    5.25  )-----------( 127      ( 09 Sep 2023 06:23 )             26.0     09-09    136  |  109  |  42<H>  ----------------------------<  94  4.6   |  18  |  3.3<H>    Ca    10.6<H>      09 Sep 2023 06:23  Phos  3.6     09-07  Mg     2.4     09-07    TPro  4.7<L>  /  Alb  3.2<L>  /  TBili  0.5  /  DBili  x   /  AST  19  /  ALT  39  /  AlkPhos  587<H>  09-07    PT/INR - ( 07 Sep 2023 12:17 )   PT: 11.10 sec;   INR: 0.97 ratio           Urinalysis Basic - ( 09 Sep 2023 06:23 )    Color: x / Appearance: x / SG: x / pH: x  Gluc: 94 mg/dL / Ketone: x  / Bili: x / Urobili: x   Blood: x / Protein: x / Nitrite: x   Leuk Esterase: x / RBC: x / WBC x   Sq Epi: x / Non Sq Epi: x / Bacteria: x            Culture - Fungal, Bronchial (collected 08 Sep 2023 08:30)  Source: .Bronchial None  Preliminary Report (09 Sep 2023 08:55):    Testing in progress    Culture - Acid Fast - Bronchial w/Smear (collected 08 Sep 2023 08:30)  Source: Bronch Wash None    Culture - Bronchial (collected 08 Sep 2023 08:30)  Source: Bronch Wash None  Gram Stain (08 Sep 2023 23:13):    Rare polymorphonuclear leukocytes per low power field    No squamous epithelial cells per low power field    No organisms seen per oil power field    PHYSICAL EXAM:  GENERAL: NAD  NECK: Supple, No JVD, Normal thyroid  NERVOUS SYSTEM:  Alert & Oriented X3, Good concentration  CHEST/LUNG: Clear to auscultation bilaterally; No rales, rhonchi, wheezing, or rubs  HEART: Regular rate and rhythm; No muffled heart sounds  ABDOMEN: Soft, Nontender, Nondistended; Bowel sounds present  EXTREMITIES:  + Peripheral Pulses, No clubbing, cyanosis, or edema

## 2023-09-09 NOTE — PROGRESS NOTE ADULT - SUBJECTIVE AND OBJECTIVE BOX
Patient is a 67y old  Male who presents with a chief complaint of Diarrhea (08 Sep 2023 14:19)      OVERNIGHT EVENTS: remained stable, no CP, SOB    SUBJECTIVE / INTERVAL HPI: Patient seen and examined at bedside.     VITAL SIGNS:  Vital Signs Last 24 Hrs  T(C): 36.7 (08 Sep 2023 21:44), Max: 36.7 (08 Sep 2023 07:35)  T(F): 98.1 (08 Sep 2023 21:44), Max: 98.1 (08 Sep 2023 21:44)  HR: 80 (09 Sep 2023 05:39) (80 - 100)  BP: 98/54 (09 Sep 2023 05:39) (96/54 - 115/61)  BP(mean): 70 (09 Sep 2023 05:39) (70 - 81)  RR: 18 (08 Sep 2023 21:44) (16 - 18)  SpO2: 94% (08 Sep 2023 21:44) (94% - 98%)    Parameters below as of 08 Sep 2023 08:58  Patient On (Oxygen Delivery Method): room air        PHYSICAL EXAM:    General: WDWN  HEENT: NC/AT; PERRL, clear conjunctiva  Neck: supple  Cardiovascular: +S1/S2; RRR  Respiratory: CTA b/l; no W/R/R  Gastrointestinal: soft, NT/ND; +BSx4  Extremities: WWP; 2+ peripheral pulses; no edema   Neurological: AAOx3; no focal deficits    MEDICATIONS:  MEDICATIONS  (STANDING):  benztropine 2 milliGRAM(s) Oral daily  fluPHENAZine 10 milliGRAM(s) Oral daily  heparin   Injectable 5000 Unit(s) SubCutaneous every 8 hours  sodium bicarbonate 1300 milliGRAM(s) Oral three times a day  sodium chloride 0.9%. 1000 milliLiter(s) (70 mL/Hr) IV Continuous <Continuous>    MEDICATIONS  (PRN):  ondansetron Injectable 4 milliGRAM(s) IV Push once PRN Nausea and/or Vomiting      ALLERGIES:  Allergies    allopurinol (Rash (Moderate))    Intolerances        LABS:                        9.7    6.46  )-----------( 151      ( 08 Sep 2023 18:18 )             31.6     09-08    138  |  109  |  42<H>  ----------------------------<  119<H>  4.8   |  18  |  3.1<H>    Ca    12.0<H>      08 Sep 2023 18:18  Phos  3.6     09-07  Mg     2.4     09-07    TPro  4.7<L>  /  Alb  3.2<L>  /  TBili  0.5  /  DBili  x   /  AST  19  /  ALT  39  /  AlkPhos  587<H>  09-07    PT/INR - ( 07 Sep 2023 12:17 )   PT: 11.10 sec;   INR: 0.97 ratio           Urinalysis Basic - ( 08 Sep 2023 18:18 )    Color: x / Appearance: x / SG: x / pH: x  Gluc: 119 mg/dL / Ketone: x  / Bili: x / Urobili: x   Blood: x / Protein: x / Nitrite: x   Leuk Esterase: x / RBC: x / WBC x   Sq Epi: x / Non Sq Epi: x / Bacteria: x      CAPILLARY BLOOD GLUCOSE          RADIOLOGY & ADDITIONAL TESTS: Reviewed.    ASSESSMENT:    PLAN:

## 2023-09-09 NOTE — PROGRESS NOTE ADULT - ASSESSMENT
Assessment  Patient is a 67y old Male who presents with a chief complaint of Hypercalcemia and diarrhea      Plan  # Hypercalcemia   # Neuroendocrine tumor with metastasis  # Non Hodgkin Lymphoma    # CKD stage IV  # Schizoaffective disorder  # Anemia of chronic illness   # Diarrhea   # Dysphagia     - Pt refusing IV fluids===> calcium is 12===> 2 doses of calcitonin ordered  - causes is likely progression of lymphoma VS less likely sarcoidosis (given elevated Vit D)  - CT chest shows similar lymphadenopathy and repeat shows new moderate pericardial effusion===>no tamponade currently  - so per HO>> EBUS performed today with biopsies taken  - s/p Octreotide scan >> 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

## 2023-09-10 LAB
ANION GAP SERPL CALC-SCNC: 8 MMOL/L — SIGNIFICANT CHANGE UP (ref 7–14)
BASOPHILS # BLD AUTO: 0.04 K/UL — SIGNIFICANT CHANGE UP (ref 0–0.2)
BASOPHILS NFR BLD AUTO: 0.7 % — SIGNIFICANT CHANGE UP (ref 0–1)
BUN SERPL-MCNC: 45 MG/DL — HIGH (ref 10–20)
CALCIUM SERPL-MCNC: 11.1 MG/DL — HIGH (ref 8.4–10.5)
CHLORIDE SERPL-SCNC: 110 MMOL/L — SIGNIFICANT CHANGE UP (ref 98–110)
CO2 SERPL-SCNC: 19 MMOL/L — SIGNIFICANT CHANGE UP (ref 17–32)
CREAT SERPL-MCNC: 3.2 MG/DL — HIGH (ref 0.7–1.5)
CULTURE RESULTS: NO GROWTH — SIGNIFICANT CHANGE UP
EGFR: 21 ML/MIN/1.73M2 — LOW
ELASTASE PANC STL-MCNT: <50 CD:794062645 — LOW
EOSINOPHIL # BLD AUTO: 1.18 K/UL — HIGH (ref 0–0.7)
EOSINOPHIL NFR BLD AUTO: 19.4 % — HIGH (ref 0–8)
GLUCOSE SERPL-MCNC: 100 MG/DL — HIGH (ref 70–99)
HCT VFR BLD CALC: 28.9 % — LOW (ref 42–52)
HGB BLD-MCNC: 8.9 G/DL — LOW (ref 14–18)
IMM GRANULOCYTES NFR BLD AUTO: 1.1 % — HIGH (ref 0.1–0.3)
LYMPHOCYTES # BLD AUTO: 1.72 K/UL — SIGNIFICANT CHANGE UP (ref 1.2–3.4)
LYMPHOCYTES # BLD AUTO: 28.2 % — SIGNIFICANT CHANGE UP (ref 20.5–51.1)
MCHC RBC-ENTMCNC: 28.1 PG — SIGNIFICANT CHANGE UP (ref 27–31)
MCHC RBC-ENTMCNC: 30.8 G/DL — LOW (ref 32–37)
MCV RBC AUTO: 91.2 FL — SIGNIFICANT CHANGE UP (ref 80–94)
MONOCYTES # BLD AUTO: 1.31 K/UL — HIGH (ref 0.1–0.6)
MONOCYTES NFR BLD AUTO: 21.5 % — HIGH (ref 1.7–9.3)
NEUTROPHILS # BLD AUTO: 1.77 K/UL — SIGNIFICANT CHANGE UP (ref 1.4–6.5)
NEUTROPHILS NFR BLD AUTO: 29.1 % — LOW (ref 42.2–75.2)
NRBC # BLD: 0 /100 WBCS — SIGNIFICANT CHANGE UP (ref 0–0)
PLATELET # BLD AUTO: 146 K/UL — SIGNIFICANT CHANGE UP (ref 130–400)
PMV BLD: 11 FL — HIGH (ref 7.4–10.4)
POTASSIUM SERPL-MCNC: 4.7 MMOL/L — SIGNIFICANT CHANGE UP (ref 3.5–5)
POTASSIUM SERPL-SCNC: 4.7 MMOL/L — SIGNIFICANT CHANGE UP (ref 3.5–5)
RBC # BLD: 3.17 M/UL — LOW (ref 4.7–6.1)
RBC # FLD: 17 % — HIGH (ref 11.5–14.5)
SODIUM SERPL-SCNC: 137 MMOL/L — SIGNIFICANT CHANGE UP (ref 135–146)
SPECIMEN SOURCE: SIGNIFICANT CHANGE UP
WBC # BLD: 6.09 K/UL — SIGNIFICANT CHANGE UP (ref 4.8–10.8)
WBC # FLD AUTO: 6.09 K/UL — SIGNIFICANT CHANGE UP (ref 4.8–10.8)

## 2023-09-10 PROCEDURE — 93306 TTE W/DOPPLER COMPLETE: CPT | Mod: 26

## 2023-09-10 PROCEDURE — 99232 SBSQ HOSP IP/OBS MODERATE 35: CPT

## 2023-09-10 RX ORDER — SODIUM CHLORIDE 9 MG/ML
500 INJECTION, SOLUTION INTRAVENOUS ONCE
Refills: 0 | Status: DISCONTINUED | OUTPATIENT
Start: 2023-09-10 | End: 2023-09-10

## 2023-09-10 RX ORDER — SODIUM CHLORIDE 9 MG/ML
1000 INJECTION INTRAMUSCULAR; INTRAVENOUS; SUBCUTANEOUS
Refills: 0 | Status: DISCONTINUED | OUTPATIENT
Start: 2023-09-10 | End: 2023-09-12

## 2023-09-10 RX ORDER — ONDANSETRON 8 MG/1
4 TABLET, FILM COATED ORAL ONCE
Refills: 0 | Status: COMPLETED | OUTPATIENT
Start: 2023-09-10 | End: 2023-09-10

## 2023-09-10 RX ADMIN — HEPARIN SODIUM 5000 UNIT(S): 5000 INJECTION INTRAVENOUS; SUBCUTANEOUS at 17:55

## 2023-09-10 RX ADMIN — Medication 1300 MILLIGRAM(S): at 06:51

## 2023-09-10 RX ADMIN — Medication 1300 MILLIGRAM(S): at 12:44

## 2023-09-10 RX ADMIN — CALCITONIN SALMON 340 INTERNATIONAL UNIT(S): 200 INJECTION, SOLUTION INTRAMUSCULAR at 06:55

## 2023-09-10 RX ADMIN — Medication 2 MILLIGRAM(S): at 12:42

## 2023-09-10 RX ADMIN — FLUPHENAZINE HYDROCHLORIDE 10 MILLIGRAM(S): 1 TABLET, FILM COATED ORAL at 12:42

## 2023-09-10 RX ADMIN — SODIUM CHLORIDE 70 MILLILITER(S): 9 INJECTION INTRAMUSCULAR; INTRAVENOUS; SUBCUTANEOUS at 16:03

## 2023-09-10 RX ADMIN — HEPARIN SODIUM 5000 UNIT(S): 5000 INJECTION INTRAVENOUS; SUBCUTANEOUS at 02:36

## 2023-09-10 NOTE — PROGRESS NOTE ADULT - ASSESSMENT
# recurrent Hypercalcemia 2/2 Lymphoma VS sarcoidosis   # hx of Non Hodgkin Lymphoma, pt didn't tolerate treatement in the past  #CT chest w worening enlarged and new enalrged mediastinal and axillary L.N   # new liver mass >> Grade III Neuroendocrine tumor w elevated 5-HIAA level >> highly suspicious for carcinoid >> spoke to HO >> mets from unknown source likley GI  #New moderate pericardial effusion  # CKD stage IV/metabolic acidosis   # Schizoaffective disorder  # Anemia of chronic illness   # chronic Diarrhea likley from neuroendocrine tumor   # Dysphagia likley from enlarged L.N       Plans:    - s/p IVF, pamidronate and denosumab   - s/p 2 doses of calcitonin on 9/9, Ca is better  - causes is likley progression of lymphoma VS less likley sarcoidosis (given elevated Vit D), s/p EBUS on 9/8   - hypotensive w pericardia effusion seen on CT chest, s/p bedside echo w cardiac fellow, no evicence of temponade  - likley from hypovolemeia with collapsaple IVC  - will re start IVF, pt hesitant to get more IVF   - pending official Echo   - s/p Octreotide scan >> neuroendocrine tumor of the liver likley Mets with unkwon origin per HO, will need to be on Sandostatin as outpt  - s/p 2 units of prbc,  per HO recom   - cont w Na bicarb 1300 tid  - dvt ppx  - father was called and updated 9/8  - pt wants to stay in Hospital till pathology result is out, to take chemotherapy as inpt as he had bad reaction in the past when had the treatment, this  also was his father request Dr. Martinez,   - acute for now, pending TTE, resolving of Hyper Ca  - prognosis is poor

## 2023-09-10 NOTE — CHART NOTE - NSCHARTNOTEFT_GEN_A_CORE
Called to evaluate for effusion and tamponade physiology given moderate effusion on CAT scan and hypotension.     Bedside echo done, showed small-moderate effusion with no signs of RV collapse, IVC 2.2 cm with > 50% collapsibilty. No echocardiographic signs of tamponade.     Recs- f/u official echo. Called to evaluate for effusion and tamponade physiology given moderate effusion on CAT scan and hypotension.     Bedside echo done, showed small-moderate effusion with no signs of RV collapse, IVC 2.2 cm with > 50% collapsibilty. No echocardiographic signs of tamponade.     Recs- f/u official echo. Consult note to follow.

## 2023-09-10 NOTE — PROGRESS NOTE ADULT - ASSESSMENT
hypercalcemia - lymphoma (most likely) vs. sarcoid (has elevated 1,25 Vit D) - s/p denosumab and pamidronate, now receiving calcitonin, however calcium increased from yesterday to today  lymphoma  neuroendocrine tumor with likely metastases to liver  ASHLEY on CKD - ASHLEY secondary to volume depletion from hypercalcemia and chronic diarrhea - patient now close to baseline.  CKD - has atrophic right kidney and ureteral stricture on the left.  anemia - secondary to CKD plus malignancy, hg/hct stable    Plan:  would give IVF to help lower calcium, patient agrees  monitor Ca/renal function  patient s/p transfusion 2 units packed rbc's 2 days ago, hg/hct stable  minimize blood draws  hematology for chemotherapy - patient continues to want to have next chemo in hospital given bad reaction to last session.

## 2023-09-10 NOTE — PROGRESS NOTE ADULT - SUBJECTIVE AND OBJECTIVE BOX
patient seen and examined.  today agrees to IVF, but wants it to start later today.  does not c/o thirst, increased urination, abd. pain, bone pain, chest pain, SOB.  Vital Signs Last 24 Hrs  T(C): 36.6 (10 Sep 2023 05:30), Max: 36.6 (09 Sep 2023 22:09)  T(F): 97.8 (10 Sep 2023 05:30), Max: 97.8 (09 Sep 2023 22:09)  HR: 87 (10 Sep 2023 07:58) (81 - 98)  BP: 116/56 (10 Sep 2023 07:58) (75/40 - 116/56)  RR: 18 (10 Sep 2023 05:30) (18 - 18)  SpO2: 98% (10 Sep 2023 05:35) (96% - 98%)    Parameters below as of 10 Sep 2023 05:35  Patient On (Oxygen Delivery Method): room air    conj pale, no jaundice  neck no JVD. supple.  lungs no crackles heard today.  no wheezing.  good air entry bilaterally  heart regular rate and rhythm.  heart sounds clearly heard. no murmur or gallop  abd. soft nontender BS pos.  extremities no edema.  good skin turgor.                            8.9    6.09  )-----------( 146      ( 10 Sep 2023 06:09 )             28.9   09-10    137  |  110  |  45<H>  ----------------------------<  100<H>  4.7   |  19  |  3.2<H>    Ca    11.1<H>      10 Sep 2023 06:09    MEDICATIONS  (STANDING):  benztropine 2 milliGRAM(s) Oral daily  fluPHENAZine 10 milliGRAM(s) Oral daily  heparin   Injectable 5000 Unit(s) SubCutaneous every 8 hours  lactated ringers Bolus 500 milliLiter(s) IV Bolus once  sodium bicarbonate 1300 milliGRAM(s) Oral three times a day  sodium chloride 0.9%. 1000 milliLiter(s) (70 mL/Hr) IV Continuous <Continuous>

## 2023-09-10 NOTE — PROGRESS NOTE ADULT - SUBJECTIVE AND OBJECTIVE BOX
Patient is a 67y old  Male who presents with a chief complaint of high calcium (10 Sep 2023 09:50)      OVERNIGHT EVENTS: hypotensive this morning but denies fever, chills, syncope, seizure, headache, cough, SOB, abd pain, N/V/D, urinary symptoms, legs swelling,     SUBJECTIVE / INTERVAL HPI: Patient seen and examined at bedside.     VITAL SIGNS:  Vital Signs Last 24 Hrs  T(C): 36.6 (10 Sep 2023 05:30), Max: 36.6 (09 Sep 2023 22:09)  T(F): 97.8 (10 Sep 2023 05:30), Max: 97.8 (09 Sep 2023 22:09)  HR: 87 (10 Sep 2023 07:58) (81 - 98)  BP: 116/56 (10 Sep 2023 07:58) (75/40 - 116/56)  BP(mean): --  RR: 18 (10 Sep 2023 05:30) (18 - 18)  SpO2: 98% (10 Sep 2023 05:35) (96% - 98%)    Parameters below as of 10 Sep 2023 05:35  Patient On (Oxygen Delivery Method): room air        PHYSICAL EXAM:    General: WDWN  HEENT: NC/AT; PERRL, clear conjunctiva  Neck: supple  Cardiovascular: +S1/S2; RRR  Respiratory: CTA b/l; no W/R/R  Gastrointestinal: soft, NT/ND; +BSx4  Extremities: WWP; 2+ peripheral pulses; no edema   Neurological: AAOx3; no focal deficits    MEDICATIONS:  MEDICATIONS  (STANDING):  benztropine 2 milliGRAM(s) Oral daily  fluPHENAZine 10 milliGRAM(s) Oral daily  heparin   Injectable 5000 Unit(s) SubCutaneous every 8 hours  lactated ringers Bolus 500 milliLiter(s) IV Bolus once  sodium bicarbonate 1300 milliGRAM(s) Oral three times a day  sodium chloride 0.9%. 1000 milliLiter(s) (70 mL/Hr) IV Continuous <Continuous>    MEDICATIONS  (PRN):      ALLERGIES:  Allergies    allopurinol (Rash (Moderate))    Intolerances        LABS:                        8.9    6.09  )-----------( 146      ( 10 Sep 2023 06:09 )             28.9     09-10    137  |  110  |  45<H>  ----------------------------<  100<H>  4.7   |  19  |  3.2<H>    Ca    11.1<H>      10 Sep 2023 06:09        Urinalysis Basic - ( 10 Sep 2023 06:09 )    Color: x / Appearance: x / SG: x / pH: x  Gluc: 100 mg/dL / Ketone: x  / Bili: x / Urobili: x   Blood: x / Protein: x / Nitrite: x   Leuk Esterase: x / RBC: x / WBC x   Sq Epi: x / Non Sq Epi: x / Bacteria: x      CAPILLARY BLOOD GLUCOSE          RADIOLOGY & ADDITIONAL TESTS: Reviewed.    ASSESSMENT:    PLAN:

## 2023-09-10 NOTE — CHART NOTE - NSCHARTNOTEFT_GEN_A_CORE
pt with hypotensive this morning, no tachycardia and mentation normal,   on exam theres no evidence of infection, and no fever lo suspect sepsis,  pt has moderate pericardial effusion on recent CT chest  needs to r/u temponade, call Cardiac fellow  meanwhile give bolus of IVF pt with hypotensive this morning, no tachycardia and mentation normal,   on exam theres no evidence of infection, and no fever lo suspect sepsis,  pt has moderate pericardial effusion on recent CT chest  needs to r/u temponade, \Cardiac fellow made aware  pt trfusing tele transfer  meanwhile give bolus of IVF

## 2023-09-11 LAB
ANION GAP SERPL CALC-SCNC: 12 MMOL/L — SIGNIFICANT CHANGE UP (ref 7–14)
BASOPHILS # BLD AUTO: 0.02 K/UL — SIGNIFICANT CHANGE UP (ref 0–0.2)
BASOPHILS NFR BLD AUTO: 0.4 % — SIGNIFICANT CHANGE UP (ref 0–1)
BUN SERPL-MCNC: 41 MG/DL — HIGH (ref 10–20)
CA-I SERPL-SCNC: 7 MG/DL
CALCIUM SERPL-MCNC: 10.4 MG/DL — SIGNIFICANT CHANGE UP (ref 8.4–10.5)
CGA SERPL-MCNC: 992.3 NG/ML
CHLORIDE SERPL-SCNC: 108 MMOL/L — SIGNIFICANT CHANGE UP (ref 98–110)
CO2 SERPL-SCNC: 18 MMOL/L — SIGNIFICANT CHANGE UP (ref 17–32)
CREAT SERPL-MCNC: 2.8 MG/DL — HIGH (ref 0.7–1.5)
EGFR: 24 ML/MIN/1.73M2 — LOW
EOSINOPHIL # BLD AUTO: 1.11 K/UL — HIGH (ref 0–0.7)
EOSINOPHIL NFR BLD AUTO: 20.8 % — HIGH (ref 0–8)
GLUCOSE SERPL-MCNC: 85 MG/DL — SIGNIFICANT CHANGE UP (ref 70–99)
HCT VFR BLD CALC: 29.6 % — LOW (ref 42–52)
HGB BLD-MCNC: 9.2 G/DL — LOW (ref 14–18)
IMM GRANULOCYTES NFR BLD AUTO: 0.6 % — HIGH (ref 0.1–0.3)
LYMPHOCYTES # BLD AUTO: 1.57 K/UL — SIGNIFICANT CHANGE UP (ref 1.2–3.4)
LYMPHOCYTES # BLD AUTO: 29.5 % — SIGNIFICANT CHANGE UP (ref 20.5–51.1)
MCHC RBC-ENTMCNC: 28 PG — SIGNIFICANT CHANGE UP (ref 27–31)
MCHC RBC-ENTMCNC: 31.1 G/DL — LOW (ref 32–37)
MCV RBC AUTO: 90.2 FL — SIGNIFICANT CHANGE UP (ref 80–94)
MONOCYTES # BLD AUTO: 1.16 K/UL — HIGH (ref 0.1–0.6)
MONOCYTES NFR BLD AUTO: 21.8 % — HIGH (ref 1.7–9.3)
NEUTROPHILS # BLD AUTO: 1.44 K/UL — SIGNIFICANT CHANGE UP (ref 1.4–6.5)
NEUTROPHILS NFR BLD AUTO: 26.9 % — LOW (ref 42.2–75.2)
NRBC # BLD: 0 /100 WBCS — SIGNIFICANT CHANGE UP (ref 0–0)
PLATELET # BLD AUTO: 144 K/UL — SIGNIFICANT CHANGE UP (ref 130–400)
PMV BLD: 11.1 FL — HIGH (ref 7.4–10.4)
POTASSIUM SERPL-MCNC: 4.7 MMOL/L — SIGNIFICANT CHANGE UP (ref 3.5–5)
POTASSIUM SERPL-SCNC: 4.7 MMOL/L — SIGNIFICANT CHANGE UP (ref 3.5–5)
RBC # BLD: 3.28 M/UL — LOW (ref 4.7–6.1)
RBC # FLD: 16.7 % — HIGH (ref 11.5–14.5)
SODIUM SERPL-SCNC: 138 MMOL/L — SIGNIFICANT CHANGE UP (ref 135–146)
TM INTERPRETATION: SIGNIFICANT CHANGE UP
TM INTERPRETATION: SIGNIFICANT CHANGE UP
WBC # BLD: 5.33 K/UL — SIGNIFICANT CHANGE UP (ref 4.8–10.8)
WBC # FLD AUTO: 5.33 K/UL — SIGNIFICANT CHANGE UP (ref 4.8–10.8)

## 2023-09-11 PROCEDURE — 99232 SBSQ HOSP IP/OBS MODERATE 35: CPT

## 2023-09-11 RX ADMIN — Medication 1300 MILLIGRAM(S): at 22:00

## 2023-09-11 RX ADMIN — Medication 2 MILLIGRAM(S): at 12:05

## 2023-09-11 RX ADMIN — Medication 1300 MILLIGRAM(S): at 13:56

## 2023-09-11 RX ADMIN — HEPARIN SODIUM 5000 UNIT(S): 5000 INJECTION INTRAVENOUS; SUBCUTANEOUS at 12:07

## 2023-09-11 RX ADMIN — FLUPHENAZINE HYDROCHLORIDE 10 MILLIGRAM(S): 1 TABLET, FILM COATED ORAL at 12:05

## 2023-09-11 RX ADMIN — HEPARIN SODIUM 5000 UNIT(S): 5000 INJECTION INTRAVENOUS; SUBCUTANEOUS at 05:45

## 2023-09-11 RX ADMIN — Medication 1300 MILLIGRAM(S): at 05:45

## 2023-09-11 RX ADMIN — HEPARIN SODIUM 5000 UNIT(S): 5000 INJECTION INTRAVENOUS; SUBCUTANEOUS at 22:01

## 2023-09-11 NOTE — PROGRESS NOTE ADULT - SUBJECTIVE AND OBJECTIVE BOX
24H events:    Patient is a 67y old Male who presents with a chief complaint of high calcium (10 Sep 2023 09:50)    Primary diagnosis of Hypercalcemia       Today is hospital day 18d.      PAST MEDICAL & SURGICAL HISTORY  Kidney stones    Schizophrenia    Lymphoma    Shingles    Atrophic kidney    H/O colonoscopy with polypectomy    Liver cyst    History of bladder surgery    H/O neuropathy    History of chemotherapy    Stage 3 chronic kidney disease    Retained urethral stent    H/O umbilical hernia repair    Elbow fracture    H/O cystoscopy      SOCIAL HISTORY:  Negative for smoking/alcohol/drug use.     ALLERGIES:  allopurinol (Rash (Moderate))    MEDICATIONS:  STANDING MEDICATIONS  benztropine 2 milliGRAM(s) Oral daily  fluPHENAZine 10 milliGRAM(s) Oral daily  heparin   Injectable 5000 Unit(s) SubCutaneous every 8 hours  sodium bicarbonate 1300 milliGRAM(s) Oral three times a day  sodium chloride 0.9%. 1000 milliLiter(s) IV Continuous <Continuous>    PRN MEDICATIONS    VITALS:   T(F): 97.2  HR: 77  BP: 100/55  RR: 18  SpO2: 98%    LABS:                        9.2    5.33  )-----------( 144      ( 11 Sep 2023 07:05 )             29.6     09-11    138  |  108  |  41<H>  ----------------------------<  85  4.7   |  18  |  2.8<H>    Ca    10.4      11 Sep 2023 07:05        Urinalysis Basic - ( 11 Sep 2023 07:05 )    Color: x / Appearance: x / SG: x / pH: x  Gluc: 85 mg/dL / Ketone: x  / Bili: x / Urobili: x   Blood: x / Protein: x / Nitrite: x   Leuk Esterase: x / RBC: x / WBC x   Sq Epi: x / Non Sq Epi: x / Bacteria: x          PHYSICAL EXAM:  GENERAL: NAD  NECK: Supple, No JVD, Normal thyroid  NERVOUS SYSTEM:  Alert & Oriented X3, Good concentration; Motor Strength 5/5 B/L upper and lower extremities; DTRs 2+ intact and symmetric  CHEST/LUNG: Clear to auscultation bilaterally; No rales, rhonchi, wheezing, or rubs  HEART: Regular rate and rhythm; Heart sounds normal  ABDOMEN: Soft, Nontender, Nondistended; Bowel sounds present  EXTREMITIES:  2+ Peripheral Pulses, No clubbing, cyanosis, or edema

## 2023-09-11 NOTE — PROGRESS NOTE ADULT - SUBJECTIVE AND OBJECTIVE BOX
Nephrology progress note     weakness    ON events/Subjective:     Allergies:  allopurinol (Rash (Moderate))    Hospital Medications:   MEDICATIONS  (STANDING):  benztropine 2 milliGRAM(s) Oral daily  fluPHENAZine 10 milliGRAM(s) Oral daily  heparin   Injectable 5000 Unit(s) SubCutaneous every 8 hours  sodium bicarbonate 1300 milliGRAM(s) Oral three times a day  sodium chloride 0.9%. 1000 milliLiter(s) (70 mL/Hr) IV Continuous <Continuous>    REVIEW OF SYSTEMS:  CONSTITUTIONAL: No weakness, fevers or chills  EYES/ENT: No visual changes;  No vertigo or throat pain   NECK: No pain or stiffness  RESPIRATORY: No cough, wheezing, hemoptysis; No shortness of breath  CARDIOVASCULAR: No chest pain or palpitations.  GASTROINTESTINAL: No abdominal or epigastric pain. No nausea, vomiting, or hematemesis; No diarrhea or constipation. No melena or hematochezia.  GENITOURINARY: No dysuria, frequency, foamy urine, urinary urgency, incontinence or hematuria  NEUROLOGICAL: No numbness or weakness  SKIN: No itching, burning, rashes, or lesions   VASCULAR: No bilateral lower extremity edema.   All other review of systems is negative unless indicated above.    VITALS:  T(F): 97.2 (09-11-23 @ 05:50), Max: 97.3 (09-10-23 @ 11:37)  HR: 77 (09-11-23 @ 05:50)  BP: 100/55 (09-11-23 @ 05:50)  RR: 18 (09-11-23 @ 05:50)  SpO2: 98% (09-10-23 @ 20:23)  Wt(kg): --      PHYSICAL EXAM:  Constitutional: NAD  HEENT: anicteric sclera, oropharynx clear, MMM  Neck: No JVD  Respiratory: CTAB, no wheezes, rales or rhonchi  Cardiovascular: S1, S2, RRR  Gastrointestinal: BS+, soft, NT/ND  Extremities: No cyanosis or clubbing. No peripheral edema  Neurological: A/O x 3, no focal deficits  Psychiatric: Normal mood, normal affect  : No CVA tenderness. No zafar.   Skin: No rashes  Vascular Access:    LABS:  09-11    138  |  108  |  41<H>  ----------------------------<  85  4.7   |  18  |  2.8<H>    Ca    10.4      11 Sep 2023 07:05                            9.2    5.33  )-----------( 144      ( 11 Sep 2023 07:05 )             29.6       Urine Studies:  Creatinine Trend: 2.8<--, 3.2<--, 3.3<--, 3.1<--, 2.8<--, 2.9<--  Urinalysis Basic - ( 11 Sep 2023 07:05 )    Color:  / Appearance:  / SG:  / pH:   Gluc: 85 mg/dL / Ketone:   / Bili:  / Urobili:    Blood:  / Protein:  / Nitrite:    Leuk Esterase:  / RBC:  / WBC    Sq Epi:  / Non Sq Epi:  / Bacteria:         RADIOLOGY & ADDITIONAL STUDIES:    CKD stage 4 - has atrophic kidney and other kidney with ureteral stricture - now close to baseline  ·lymphoma and now new neuroendocrine tumor on liver biopsy  ·hypercalcemia - appears driven by vit D 1,25 -   ·has not responded well to pamidronate as calcium level increasing again  ·chronic loose sttols - causimg more dehydration  ·anemia s/p prbc  ·BP stable  ·low/normal  loose stools coontinues  per pt had recent PET scan  with neck involvement (can not find study) and now with dysphagia  ·d/w pt per advanced directives and uncertain at this time   octreotide scan - with liver uptake only  metabolic acidosis    1) awaiting neck biopsy fro (in house) treatment  2) IV infiltrated and pt for now does not want to repalce - he will try to drink more  3) oncology fu  4) every other day labs ok  d/w housestaff

## 2023-09-11 NOTE — PROGRESS NOTE ADULT - ASSESSMENT
# recurrent Hypercalcemia 2/2 Lymphoma VS sarcoidosis   # hx of Non Hodgkin Lymphoma, pt didn't tolerate treatement in the past  #CT chest w worening enlarged and new enalrged mediastinal and axillary L.N   # new liver mass >> Grade III Neuroendocrine tumor w elevated 5-HIAA level >> highly suspicious for carcinoid >> spoke to HO >> mets from unknown source likley GI  #New moderate pericardial effusion  # hypotensive (resolved) w pericardia effusion >> s/p echo w no temporale stable effusion since 6/2023   # CKD stage IV/metabolic acidosis   # Schizoaffective disorder  # Anemia of chronic illness   # chronic Diarrhea likley from neuroendocrine tumor   # Dysphagia likley from enlarged L.N       Plans:    - s/p IVF, pamidronate and denosumab   - s/p 2 doses of calcitonin on 9/9, refused IVF whn ca level increased 2 days ago  - causes is likley progression of lymphoma VS less likley sarcoidosis (given elevated Vit D), s/p EBUS on 9/8   - s/p Octreotide scan >> neuroendocrine tumor of the liver likley Mets with unkwon origin per HO, will need to be on Sandostatin as outpt  - s/p 2 units of prbc,  per HO recom   - cont w Na bicarb 1300 tid  - dvt ppx  - father was called and updated 9/8  - pt wants to stay in Hospital till pathology result is out, to take chemotherapy as inpt as he had bad reaction in the past when had the treatment, this  also was his father request Dr. Martinez,   - prognosis is poor  # recurrent Hypercalcemia 2/2 Lymphoma VS sarcoidosis   # hx of Non Hodgkin Lymphoma, pt didn't tolerate treatement in the past  #CT chest w worening enlarged and new enalrged mediastinal and axillary L.N   # new liver mass >> Grade III Neuroendocrine tumor w elevated 5-HIAA level >> highly suspicious for carcinoid >> spoke to HO >> mets from unknown source likley GI  #New moderate pericardial effusion  # hypotensive (resolved) w pericardia effusion >> s/p echo w no temporale stable effusion since 6/2023   # CKD stage IV/metabolic acidosis   # Schizoaffective disorder  # Anemia of chronic illness   # chronic Diarrhea likley from neuroendocrine tumor   # Dysphagia likley from enlarged L.N       Plans:    - s/p IVF, pamidronate and denosumab   - s/p 2 doses of calcitonin on 9/9, refused IVF when ca level increased 2 days ago, now Ca level is ok   - causes is likley progression of lymphoma VS less likley sarcoidosis (given elevated Vit D), s/p EBUS on 9/8   - s/p Octreotide scan >> neuroendocrine tumor of the liver likley Mets with unkwon origin per HO, will need to be on Sandostatin as outpt  - s/p 2 units of prbc,  per HO recom   - cont w Na bicarb 1300 tid  - dvt ppx  - father was called and updated 9/8  - pending: DC home, but pt wants to stay in Hospital till pathology result is out, to take chemotherapy as inpt as he had bad reaction in the past when had the treatment, this  also was his father request Dr. Martinez,   - prognosis is poor

## 2023-09-11 NOTE — PRE-ANESTHESIA EVALUATION ADULT - NSRADCARDRESULTSFT_GEN_ALL_CORE
Home ekg nsr  cxr neg  echo 58% EF G1 diastolic dysfunction, mildly affected right heart likely carcinoid

## 2023-09-11 NOTE — PROGRESS NOTE ADULT - ASSESSMENT
Assessment  Patient is a 67y old Male who presents with a chief complaint of Hypercalcemia and diarrhea      Plan  # Hypercalcemia   # Neuroendocrine tumor with metastasis  # Non Hodgkin Lymphoma    # CKD stage IV  # Schizoaffective disorder  # Anemia of chronic illness   # Diarrhea   # Dysphagia     - Pt refusing IV fluids===> calcium is 11.1===> 2 doses of calcitonin given on 09/09/23  - lymphoma VS less likely sarcoidosis  -  F/U ebus biopsy report  - CT chest shows similar lymphadenopathy and repeat shows new moderate pericardial effusion===>no tamponade currently  - s/p Octreotide scan >> 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 per HO, will need to be on Sandostatin as outpt  - s/p two units of prbc   - F/U heme/onc for possible chemo inpt  - cont w Na bicarb 1300 tid  - F/U creat       Assessment  Patient is a 67y old Male who presents with a chief complaint of Hypercalcemia and diarrhea      Plan  # Hypercalcemia   # Neuroendocrine tumor with metastasis  # Non Hodgkin Lymphoma    # CKD stage IV  # Schizoaffective disorder  # Anemia of chronic illness   # Diarrhea   # Dysphagia     - Pt refusing IV fluids intermittently===> calcium is 10.4===> 2 doses of calcitonin given on 09/09/23  - lymphoma VS less likely sarcoidosis  -  F/U ebus biopsy report  - CT chest shows similar lymphadenopathy and repeat shows new moderate pericardial effusion===>no tamponade currently  - s/p Octreotide scan >> 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

## 2023-09-11 NOTE — PROGRESS NOTE ADULT - SUBJECTIVE AND OBJECTIVE BOX
Patient is a 67y old  Male who presents with a chief complaint of high calcium (10 Sep 2023 09:50)      OVERNIGHT EVENTS:    SUBJECTIVE / INTERVAL HPI: Patient seen and examined at bedside.     VITAL SIGNS:  Vital Signs Last 24 Hrs  T(C): 36.2 (11 Sep 2023 05:50), Max: 36.3 (10 Sep 2023 11:37)  T(F): 97.2 (11 Sep 2023 05:50), Max: 97.3 (10 Sep 2023 11:37)  HR: 77 (11 Sep 2023 05:50) (77 - 81)  BP: 100/55 (11 Sep 2023 05:50) (100/55 - 109/57)  BP(mean): 78 (10 Sep 2023 20:23) (78 - 78)  RR: 18 (11 Sep 2023 05:50) (18 - 18)  SpO2: 98% (10 Sep 2023 20:23) (98% - 98%)    Parameters below as of 10 Sep 2023 20:23  Patient On (Oxygen Delivery Method): room air        PHYSICAL EXAM:    General: WDWN  HEENT: NC/AT; PERRL, clear conjunctiva  Neck: supple  Cardiovascular: +S1/S2; RRR  Respiratory: CTA b/l; no W/R/R  Gastrointestinal: soft, NT/ND; +BSx4  Extremities: WWP; 2+ peripheral pulses; no edema   Neurological: AAOx3; no focal deficits    MEDICATIONS:  MEDICATIONS  (STANDING):  benztropine 2 milliGRAM(s) Oral daily  fluPHENAZine 10 milliGRAM(s) Oral daily  heparin   Injectable 5000 Unit(s) SubCutaneous every 8 hours  sodium bicarbonate 1300 milliGRAM(s) Oral three times a day  sodium chloride 0.9%. 1000 milliLiter(s) (70 mL/Hr) IV Continuous <Continuous>    MEDICATIONS  (PRN):      ALLERGIES:  Allergies    allopurinol (Rash (Moderate))    Intolerances        LABS:                        8.9    6.09  )-----------( 146      ( 10 Sep 2023 06:09 )             28.9     09-10    137  |  110  |  45<H>  ----------------------------<  100<H>  4.7   |  19  |  3.2<H>    Ca    11.1<H>      10 Sep 2023 06:09        Urinalysis Basic - ( 10 Sep 2023 06:09 )    Color: x / Appearance: x / SG: x / pH: x  Gluc: 100 mg/dL / Ketone: x  / Bili: x / Urobili: x   Blood: x / Protein: x / Nitrite: x   Leuk Esterase: x / RBC: x / WBC x   Sq Epi: x / Non Sq Epi: x / Bacteria: x      CAPILLARY BLOOD GLUCOSE          RADIOLOGY & ADDITIONAL TESTS: Reviewed.    ASSESSMENT:    PLAN:  Patient is a 67y old  Male who presents with a chief complaint of high calcium (10 Sep 2023 09:50)      OVERNIGHT EVENTS:  remained stable, denies fever, chills, syncope, seizure, headache, cough, SOB, abd pain, N/V/D, urinary symptoms, legs swelling,   SUBJECTIVE / INTERVAL HPI: Patient seen and examined at bedside.     VITAL SIGNS:  Vital Signs Last 24 Hrs  T(C): 36.2 (11 Sep 2023 05:50), Max: 36.3 (10 Sep 2023 11:37)  T(F): 97.2 (11 Sep 2023 05:50), Max: 97.3 (10 Sep 2023 11:37)  HR: 77 (11 Sep 2023 05:50) (77 - 81)  BP: 100/55 (11 Sep 2023 05:50) (100/55 - 109/57)  BP(mean): 78 (10 Sep 2023 20:23) (78 - 78)  RR: 18 (11 Sep 2023 05:50) (18 - 18)  SpO2: 98% (10 Sep 2023 20:23) (98% - 98%)    Parameters below as of 10 Sep 2023 20:23  Patient On (Oxygen Delivery Method): room air        PHYSICAL EXAM:    General: WDWN  HEENT: NC/AT; PERRL, clear conjunctiva  Neck: supple  Cardiovascular: +S1/S2; RRR  Respiratory: CTA b/l; no W/R/R  Gastrointestinal: soft, NT/ND; +BSx4  Extremities: WWP; 2+ peripheral pulses; no edema   Neurological: AAOx3; no focal deficits    MEDICATIONS:  MEDICATIONS  (STANDING):  benztropine 2 milliGRAM(s) Oral daily  fluPHENAZine 10 milliGRAM(s) Oral daily  heparin   Injectable 5000 Unit(s) SubCutaneous every 8 hours  sodium bicarbonate 1300 milliGRAM(s) Oral three times a day  sodium chloride 0.9%. 1000 milliLiter(s) (70 mL/Hr) IV Continuous <Continuous>    MEDICATIONS  (PRN):      ALLERGIES:  Allergies    allopurinol (Rash (Moderate))    Intolerances        LABS:                        8.9    6.09  )-----------( 146      ( 10 Sep 2023 06:09 )             28.9     09-10    137  |  110  |  45<H>  ----------------------------<  100<H>  4.7   |  19  |  3.2<H>    Ca    11.1<H>      10 Sep 2023 06:09        Urinalysis Basic - ( 10 Sep 2023 06:09 )    Color: x / Appearance: x / SG: x / pH: x  Gluc: 100 mg/dL / Ketone: x  / Bili: x / Urobili: x   Blood: x / Protein: x / Nitrite: x   Leuk Esterase: x / RBC: x / WBC x   Sq Epi: x / Non Sq Epi: x / Bacteria: x      CAPILLARY BLOOD GLUCOSE          RADIOLOGY & ADDITIONAL TESTS: Reviewed.    ASSESSMENT:    PLAN:

## 2023-09-12 PROCEDURE — 99233 SBSQ HOSP IP/OBS HIGH 50: CPT

## 2023-09-12 PROCEDURE — 99232 SBSQ HOSP IP/OBS MODERATE 35: CPT

## 2023-09-12 RX ORDER — OCTREOTIDE ACETATE 200 UG/ML
150 INJECTION, SOLUTION INTRAVENOUS; SUBCUTANEOUS THREE TIMES A DAY
Refills: 0 | Status: DISCONTINUED | OUTPATIENT
Start: 2023-09-12 | End: 2023-09-18

## 2023-09-12 RX ADMIN — HEPARIN SODIUM 5000 UNIT(S): 5000 INJECTION INTRAVENOUS; SUBCUTANEOUS at 15:05

## 2023-09-12 RX ADMIN — Medication 1300 MILLIGRAM(S): at 21:33

## 2023-09-12 RX ADMIN — Medication 2 MILLIGRAM(S): at 11:45

## 2023-09-12 RX ADMIN — OCTREOTIDE ACETATE 150 MICROGRAM(S): 200 INJECTION, SOLUTION INTRAVENOUS; SUBCUTANEOUS at 18:46

## 2023-09-12 RX ADMIN — Medication 1300 MILLIGRAM(S): at 08:03

## 2023-09-12 RX ADMIN — HEPARIN SODIUM 5000 UNIT(S): 5000 INJECTION INTRAVENOUS; SUBCUTANEOUS at 08:04

## 2023-09-12 RX ADMIN — FLUPHENAZINE HYDROCHLORIDE 10 MILLIGRAM(S): 1 TABLET, FILM COATED ORAL at 11:45

## 2023-09-12 RX ADMIN — Medication 1300 MILLIGRAM(S): at 15:04

## 2023-09-12 NOTE — PROGRESS NOTE ADULT - SUBJECTIVE AND OBJECTIVE BOX
ELDA COVARRUBIAS 67y Male  MRN#: [id number]   Hospital Day: 19d    SUBJECTIVE  Patient is a 67y old Male who presents with a chief complaint of Hypercalcemia (12 Sep 2023 11:30)  Currently admitted to medicine with the primary diagnosis of Hypercalcemia      INTERVAL HPI AND OVERNIGHT EVENTS:  Patient was examined and seen at bedside. This morning he is resting comfortably in bed and reports no issues or overnight events.    REVIEW OF SYMPTOMS:  CONSTITUTIONAL: No weakness, fevers or chills; No headaches  EYES: No visual changes, eye pain, or discharge  ENT: No vertigo; No ear pain or change in hearing; No sore throat or difficulty swallowing  NECK: No pain or stiffness  RESPIRATORY: No cough, wheezing, or hemoptysis; No shortness of breath  CARDIOVASCULAR: No chest pain or palpitations  GASTROINTESTINAL: No abdominal or epigastric pain; No nausea, vomiting, or hematemesis; No diarrhea or constipation; No melena or hematochezia  GENITOURINARY: No dysuria, frequency or hematuria  MUSCULOSKELETAL: No joint pain, no muscle pain, no weakness  NEUROLOGICAL: No numbness or weakness  SKIN: No itching or rashes    OBJECTIVE  PAST MEDICAL & SURGICAL HISTORY  Kidney stones    Schizophrenia    Lymphoma    Shingles    Atrophic kidney    H/O colonoscopy with polypectomy    Liver cyst    History of bladder surgery    H/O neuropathy    History of chemotherapy    Stage 3 chronic kidney disease    Retained urethral stent    H/O umbilical hernia repair    Elbow fracture    H/O cystoscopy      ALLERGIES:  allopurinol (Rash (Moderate))    MEDICATIONS:  STANDING MEDICATIONS  benztropine 2 milliGRAM(s) Oral daily  fluPHENAZine 10 milliGRAM(s) Oral daily  heparin   Injectable 5000 Unit(s) SubCutaneous every 8 hours  octreotide  Injectable 150 MICROGram(s) SubCutaneous three times a day  sodium bicarbonate 1300 milliGRAM(s) Oral three times a day    PRN MEDICATIONS      VITAL SIGNS: Last 24 Hours  T(C): 36.2 (12 Sep 2023 12:43), Max: 36.3 (12 Sep 2023 05:00)  T(F): 97.1 (12 Sep 2023 12:43), Max: 97.4 (12 Sep 2023 05:00)  HR: 68 (12 Sep 2023 12:43) (68 - 78)  BP: 130/63 (12 Sep 2023 12:43) (97/52 - 130/63)  BP(mean): --  RR: 20 (12 Sep 2023 12:43) (18 - 20)  SpO2: --    LABS:                        9.2    5.33  )-----------( 144      ( 11 Sep 2023 07:05 )             29.6     09-11    138  |  108  |  41<H>  ----------------------------<  85  4.7   |  18  |  2.8<H>    Ca    10.4      11 Sep 2023 07:05        Urinalysis Basic - ( 11 Sep 2023 07:05 )    Color: x / Appearance: x / SG: x / pH: x  Gluc: 85 mg/dL / Ketone: x  / Bili: x / Urobili: x   Blood: x / Protein: x / Nitrite: x   Leuk Esterase: x / RBC: x / WBC x   Sq Epi: x / Non Sq Epi: x / Bacteria: x      PHYSICAL EXAM:  CONSTITUTIONAL: No acute distress, well-developed, well-groomed, AAOx3  HEAD: Atraumatic, normocephalic  PULMONARY: Clear to auscultation bilaterally  CARDIOVASCULAR: Regular rate and rhythm; no murmurs  GASTROINTESTINAL: Soft, non-tender, non-distended  MUSCULOSKELETAL: no edema  SKIN: No rashes or lesions    ASSESSMENT & PLAN  67y M PMH Schizophrenia (prior lithium use), nephrolithiasis c/b atrophic L kidney, R ureteral rivision, CKD4 (bsl Cr ~2.5), gout, chronic diarrhea, 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 ASHLEY on CKD.    Hematology and Oncology consulted given hx of Marginal Zone lymphoma.    #Recurrent Hypercalcemia likely 2/2 Lymphoma vs metastatic NET  - calcium 13.9 on admission. Trending down currently at 10.3  - on IV hydration   -s/p one dose of calcitonin, pamidronate, & Xgeva    #Hx of NHL marginal zone now with ? recurrence.  -He was in a good partial remission following 3 cycles of bendamustine and Rituxan treated in 2019 and 2020.  -He was unable to tolerate maintenance Rituxan.  -However, he was never in complete remission and had preferred just to be observed since we were dealing with a low grade lymphoma.  - was lost to follow up.  --CT Abdomen/Pelvis 2/23/23 Interval worsening in periesophageal and pelvic lymphadenopathy , other bulky lymph nodes in the abdomen and retroperitoneum appear stable. 1.3 indeterminate segment 4 liver lesion. Focal areas of pleural nodularity concerning for lymphomatous/neoplastic/metastatic process, new since prior.  --PET scan 5.23: 1.  Interval increase in size and decreased metabolic activity of the thoracoabdominal lymphadenopathy, some are new, probably recurrence /residual disease. New mildly metabolic 3 cm hypodensity in the liver segment 2/3, concerning for neoplasm or lymphoma.  Poorly defined small hypodensity in the segment 4, below the resolution of PET scan.  MRI will provide further elucidation. Diffuse mildly increased bone marrow activity, possibly reactive versus bone marrow involvement.   Hepatosplenomegaly.  -MRI abdomen 7.23:  Liver segment II 4 cm mass and Liver segment V  1.5 cm mass suspicious for Hepatic Lymphomas.  --Biopsy of Liver lesion 8.23: well differentiated Neuroendocrine tumor, Grade 3 Likely  metastatic.   -LDH normal 4.23   - NM Octreoscan Mutliple Areas (09.05.23 @ 16:43) >Focal area of increased uptake in the upper abdomen/epigastrium to the left of midline suspicious for neuroendocrine tissue expressing somatostatin receptors.    #Hepatic mass on imaging -Biopsy consistent with Well differentiated Neuroendocrine tumor, Grade 3, Likely metastatic. 8.23.      Recommendations:   - For Neuroendocrine tumor, patient started on octreotide 150 mcg subcutaneous TID as the depot formulation is not available in the hospital. Once discharge patient can receive Octreotide depot 30 mg IM q 4 weeks  - awaiting final pathology for Lymph node biopsy via EBUS on 9/8. Patient will likely need to start inpatient chemotherapy if biopsy confirms marginal zone lymphoma given intensity of treatment and hx of multiple admissions for hypercalcemia

## 2023-09-12 NOTE — PROGRESS NOTE ADULT - SUBJECTIVE AND OBJECTIVE BOX
24H events:    Patient is a 67y old Male who presents with a chief complaint of hypercalcemia (12 Sep 2023 09:41)    Primary diagnosis of Hypercalcemia       Today is hospital day 19d.      PAST MEDICAL & SURGICAL HISTORY  Kidney stones    Schizophrenia    Lymphoma    Shingles    Atrophic kidney    H/O colonoscopy with polypectomy    Liver cyst    History of bladder surgery    H/O neuropathy    History of chemotherapy    Stage 3 chronic kidney disease    Retained urethral stent    H/O umbilical hernia repair    Elbow fracture    H/O cystoscopy      SOCIAL HISTORY:  Negative for smoking/alcohol/drug use.     ALLERGIES:  allopurinol (Rash (Moderate))    MEDICATIONS:  STANDING MEDICATIONS  benztropine 2 milliGRAM(s) Oral daily  fluPHENAZine 10 milliGRAM(s) Oral daily  heparin   Injectable 5000 Unit(s) SubCutaneous every 8 hours  sodium bicarbonate 1300 milliGRAM(s) Oral three times a day    PRN MEDICATIONS    VITALS:   T(F): 97.4  HR: 78  BP: 97/52  RR: 18  SpO2: --    LABS:                        9.2    5.33  )-----------( 144      ( 11 Sep 2023 07:05 )             29.6     09-11    138  |  108  |  41<H>  ----------------------------<  85  4.7   |  18  |  2.8<H>    Ca    10.4      11 Sep 2023 07:05        Urinalysis Basic - ( 11 Sep 2023 07:05 )    Color: x / Appearance: x / SG: x / pH: x  Gluc: 85 mg/dL / Ketone: x  / Bili: x / Urobili: x   Blood: x / Protein: x / Nitrite: x   Leuk Esterase: x / RBC: x / WBC x   Sq Epi: x / Non Sq Epi: x / Bacteria: x        PHYSICAL EXAM:  GENERAL: NAD  NECK: Supple, No JVD, Normal thyroid  NERVOUS SYSTEM:  Alert & Oriented X3, Good concentration  CHEST/LUNG: Clear to auscultation bilaterally; No rales, rhonchi, wheezing, or rubs  HEART: Regular rate and rhythm  ABDOMEN: Soft, Nontender, Nondistended; Bowel sounds present  EXTREMITIES:  + Peripheral Pulses, No clubbing, cyanosis, or edema

## 2023-09-12 NOTE — PROGRESS NOTE ADULT - SUBJECTIVE AND OBJECTIVE BOX
Patient is a 67y old  Male who presents with a chief complaint of Hypercalcemia (12 Sep 2023 11:30)      OVERNIGHT EVENTS: remained stable, denies fever, chills, syncope, seizure, headache, cough, SOB, abd pain, N/V/D, urinary symptoms, legs swelling,     SUBJECTIVE / INTERVAL HPI: Patient seen and examined at bedside.     VITAL SIGNS:  Vital Signs Last 24 Hrs  T(C): 36.3 (12 Sep 2023 05:00), Max: 36.3 (12 Sep 2023 05:00)  T(F): 97.4 (12 Sep 2023 05:00), Max: 97.4 (12 Sep 2023 05:00)  HR: 78 (12 Sep 2023 05:00) (76 - 78)  BP: 97/52 (12 Sep 2023 05:00) (97/52 - 110/54)  BP(mean): --  RR: 18 (12 Sep 2023 05:00) (18 - 18)  SpO2: --        PHYSICAL EXAM:    General: WDWN  HEENT: NC/AT; PERRL, clear conjunctiva  Neck: supple  Cardiovascular: +S1/S2; RRR  Respiratory: CTA b/l; no W/R/R  Gastrointestinal: soft, NT/ND; +BSx4  Extremities: WWP; 2+ peripheral pulses; no edema   Neurological: AAOx3; no focal deficits    MEDICATIONS:  MEDICATIONS  (STANDING):  benztropine 2 milliGRAM(s) Oral daily  fluPHENAZine 10 milliGRAM(s) Oral daily  heparin   Injectable 5000 Unit(s) SubCutaneous every 8 hours  sodium bicarbonate 1300 milliGRAM(s) Oral three times a day    MEDICATIONS  (PRN):      ALLERGIES:  Allergies    allopurinol (Rash (Moderate))    Intolerances        LABS:                        9.2    5.33  )-----------( 144      ( 11 Sep 2023 07:05 )             29.6     09-11    138  |  108  |  41<H>  ----------------------------<  85  4.7   |  18  |  2.8<H>    Ca    10.4      11 Sep 2023 07:05        Urinalysis Basic - ( 11 Sep 2023 07:05 )    Color: x / Appearance: x / SG: x / pH: x  Gluc: 85 mg/dL / Ketone: x  / Bili: x / Urobili: x   Blood: x / Protein: x / Nitrite: x   Leuk Esterase: x / RBC: x / WBC x   Sq Epi: x / Non Sq Epi: x / Bacteria: x      CAPILLARY BLOOD GLUCOSE          RADIOLOGY & ADDITIONAL TESTS: Reviewed.    ASSESSMENT:    PLAN:

## 2023-09-12 NOTE — PROGRESS NOTE ADULT - ASSESSMENT
# recurrent Hypercalcemia 2/2 Lymphoma VS sarcoidosis   # hx of Non Hodgkin Lymphoma   #CT chest w worening enlarged and new enalrged mediastinal and axillary L.N s/p EBUS on 9/8, pending biopsy result   # new liver mass >> Grade III Neuroendocrine tumor w elevated 5-HIAA level >> highly suspicious for carcinoid >> spoke to HO >> mets from unknown source likley GI  # pericardial effusion, stbale, no evidence of tamponade   # CKD stage IV/metabolic acidosis   # Schizoaffective disorder  # Anemia of chronic illness   # chronic Diarrhea likley from neuroendocrine tumor   # Dysphagia likley from enlarged L.N       Plans:    - s/p IVF, pamidronate and denosumab and calcitonin   - labs every other day   - causes is likley progression of lymphoma VS less likley sarcoidosis (given elevated Vit D), s/p EBUS on 9/8   - s/p Octreotide scan >> neuroendocrine tumor of the liver likley Mets with unkwon origin per HO, will need to be on Sandostatin as outpt  - s/p 2 units of prbc,  per HO recom   - cont w Na bicarb 1300 tid  - dvt ppx  - father was called and updated 9/8  - 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

## 2023-09-12 NOTE — PROGRESS NOTE ADULT - ASSESSMENT
Assessment  Patient is a 67y old Male who presents with a chief complaint of Hypercalcemia and diarrhea      Plan  # Hypercalcemia   # Neuroendocrine tumor with metastasis  # Non Hodgkin Lymphoma    # CKD stage IV  # Schizoaffective disorder  # Anemia of chronic illness   # Diarrhea   # Dysphagia   - Pt refusing IV fluids===> last calcium was 10.4===> 2 doses of calcitonin given on 09/09/23  - lymphoma VS less likely sarcoidosis  -  F/U ebus biopsy report  - CT chest shows similar lymphadenopathy and repeat shows new moderate pericardial effusion===>no tamponade currently  - s/p Octreotide scan >> 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 per HO, will need to be on Sandostatin as outpt  - s/p two units of prbc   - F/U heme/onc for possible chemo inpt  - cont w Na bicarb 1300 tid

## 2023-09-13 LAB
ANION GAP SERPL CALC-SCNC: 11 MMOL/L — SIGNIFICANT CHANGE UP (ref 7–14)
BASOPHILS # BLD AUTO: 0.02 K/UL — SIGNIFICANT CHANGE UP (ref 0–0.2)
BASOPHILS NFR BLD AUTO: 0.4 % — SIGNIFICANT CHANGE UP (ref 0–1)
BUN SERPL-MCNC: 44 MG/DL — HIGH (ref 10–20)
CALCIUM SERPL-MCNC: 10.4 MG/DL — SIGNIFICANT CHANGE UP (ref 8.4–10.4)
CHLORIDE SERPL-SCNC: 110 MMOL/L — SIGNIFICANT CHANGE UP (ref 98–110)
CO2 SERPL-SCNC: 18 MMOL/L — SIGNIFICANT CHANGE UP (ref 17–32)
CREAT SERPL-MCNC: 3.2 MG/DL — HIGH (ref 0.7–1.5)
EGFR: 21 ML/MIN/1.73M2 — LOW
EOSINOPHIL # BLD AUTO: 0.83 K/UL — HIGH (ref 0–0.7)
EOSINOPHIL NFR BLD AUTO: 17.8 % — HIGH (ref 0–8)
GLUCAGON SERPL-MCNC: 18 PG/ML — SIGNIFICANT CHANGE UP (ref 11–78)
GLUCAGON, SERUM - COMMENT: SIGNIFICANT CHANGE UP
GLUCOSE SERPL-MCNC: 103 MG/DL — HIGH (ref 70–99)
HCT VFR BLD CALC: 28.2 % — LOW (ref 42–52)
HGB BLD-MCNC: 8.8 G/DL — LOW (ref 14–18)
IMM GRANULOCYTES NFR BLD AUTO: 0.9 % — HIGH (ref 0.1–0.3)
LYMPHOCYTES # BLD AUTO: 1.56 K/UL — SIGNIFICANT CHANGE UP (ref 1.2–3.4)
LYMPHOCYTES # BLD AUTO: 33.5 % — SIGNIFICANT CHANGE UP (ref 20.5–51.1)
MCHC RBC-ENTMCNC: 28 PG — SIGNIFICANT CHANGE UP (ref 27–31)
MCHC RBC-ENTMCNC: 31.2 G/DL — LOW (ref 32–37)
MCV RBC AUTO: 89.8 FL — SIGNIFICANT CHANGE UP (ref 80–94)
MONOCYTES # BLD AUTO: 1.03 K/UL — HIGH (ref 0.1–0.6)
MONOCYTES NFR BLD AUTO: 22.1 % — HIGH (ref 1.7–9.3)
NEUTROPHILS # BLD AUTO: 1.18 K/UL — LOW (ref 1.4–6.5)
NEUTROPHILS NFR BLD AUTO: 25.3 % — LOW (ref 42.2–75.2)
NRBC # BLD: 0 /100 WBCS — SIGNIFICANT CHANGE UP (ref 0–0)
PLATELET # BLD AUTO: 135 K/UL — SIGNIFICANT CHANGE UP (ref 130–400)
PMV BLD: 11.3 FL — HIGH (ref 7.4–10.4)
POTASSIUM SERPL-MCNC: 5 MMOL/L — SIGNIFICANT CHANGE UP (ref 3.5–5)
POTASSIUM SERPL-SCNC: 5 MMOL/L — SIGNIFICANT CHANGE UP (ref 3.5–5)
RBC # BLD: 3.14 M/UL — LOW (ref 4.7–6.1)
RBC # FLD: 16.6 % — HIGH (ref 11.5–14.5)
SODIUM SERPL-SCNC: 139 MMOL/L — SIGNIFICANT CHANGE UP (ref 135–146)
WBC # BLD: 4.66 K/UL — LOW (ref 4.8–10.8)
WBC # FLD AUTO: 4.66 K/UL — LOW (ref 4.8–10.8)

## 2023-09-13 PROCEDURE — 99232 SBSQ HOSP IP/OBS MODERATE 35: CPT

## 2023-09-13 RX ADMIN — HEPARIN SODIUM 5000 UNIT(S): 5000 INJECTION INTRAVENOUS; SUBCUTANEOUS at 13:07

## 2023-09-13 RX ADMIN — OCTREOTIDE ACETATE 150 MICROGRAM(S): 200 INJECTION, SOLUTION INTRAVENOUS; SUBCUTANEOUS at 00:13

## 2023-09-13 RX ADMIN — FLUPHENAZINE HYDROCHLORIDE 10 MILLIGRAM(S): 1 TABLET, FILM COATED ORAL at 12:16

## 2023-09-13 RX ADMIN — HEPARIN SODIUM 5000 UNIT(S): 5000 INJECTION INTRAVENOUS; SUBCUTANEOUS at 05:24

## 2023-09-13 RX ADMIN — Medication 2 MILLIGRAM(S): at 12:17

## 2023-09-13 RX ADMIN — OCTREOTIDE ACETATE 150 MICROGRAM(S): 200 INJECTION, SOLUTION INTRAVENOUS; SUBCUTANEOUS at 05:28

## 2023-09-13 RX ADMIN — OCTREOTIDE ACETATE 150 MICROGRAM(S): 200 INJECTION, SOLUTION INTRAVENOUS; SUBCUTANEOUS at 21:56

## 2023-09-13 RX ADMIN — HEPARIN SODIUM 5000 UNIT(S): 5000 INJECTION INTRAVENOUS; SUBCUTANEOUS at 21:54

## 2023-09-13 RX ADMIN — OCTREOTIDE ACETATE 150 MICROGRAM(S): 200 INJECTION, SOLUTION INTRAVENOUS; SUBCUTANEOUS at 13:07

## 2023-09-13 RX ADMIN — Medication 1300 MILLIGRAM(S): at 13:07

## 2023-09-13 NOTE — PROGRESS NOTE ADULT - SUBJECTIVE AND OBJECTIVE BOX
24H events:    Patient is a 67y old Male who presents with a chief complaint of Hypercalcemia (12 Sep 2023 11:30)    Primary diagnosis of Hypercalcemia       Today is hospital day 20d.      PAST MEDICAL & SURGICAL HISTORY  Kidney stones    Schizophrenia    Lymphoma    Shingles    Atrophic kidney    H/O colonoscopy with polypectomy    Liver cyst    History of bladder surgery    H/O neuropathy    History of chemotherapy    Stage 3 chronic kidney disease    Retained urethral stent    H/O umbilical hernia repair    Elbow fracture    H/O cystoscopy      SOCIAL HISTORY:  Negative for smoking/alcohol/drug use.     ALLERGIES:  allopurinol (Rash (Moderate))    MEDICATIONS:  STANDING MEDICATIONS  benztropine 2 milliGRAM(s) Oral daily  fluPHENAZine 10 milliGRAM(s) Oral daily  heparin   Injectable 5000 Unit(s) SubCutaneous every 8 hours  octreotide  Injectable 150 MICROGram(s) SubCutaneous three times a day  sodium bicarbonate 1300 milliGRAM(s) Oral three times a day    PRN MEDICATIONS    VITALS:   T(F): 97.8  HR: 71  BP: 92/63  RR: 18  SpO2: --    LABS:                        8.8    4.66  )-----------( 135      ( 13 Sep 2023 06:52 )             28.2     09-13    139  |  110  |  44<H>  ----------------------------<  103<H>  5.0   |  18  |  3.2<H>    Ca    10.4      13 Sep 2023 06:52        Urinalysis Basic - ( 13 Sep 2023 06:52 )    Color: x / Appearance: x / SG: x / pH: x  Gluc: 103 mg/dL / Ketone: x  / Bili: x / Urobili: x   Blood: x / Protein: x / Nitrite: x   Leuk Esterase: x / RBC: x / WBC x   Sq Epi: x / Non Sq Epi: x / Bacteria: x        PHYSICAL EXAM:  GENERAL: NAD  NECK: Supple, No JVD, Normal thyroid  NERVOUS SYSTEM:  Alert & Oriented X3, Good concentration  CHEST/LUNG: Clear to auscultation bilaterally; No rales, rhonchi, wheezing, or rubs  HEART: Regular rate and rhythm; No murmurs, rubs, or gallops  ABDOMEN: Soft, Nontender, Nondistended; Bowel sounds present  EXTREMITIES:  + Peripheral Pulses, No clubbing, cyanosis, or edema

## 2023-09-13 NOTE — PROGRESS NOTE ADULT - ASSESSMENT
Assessment  Patient is a 67y old Male who presents with a chief complaint of Hypercalcemia and diarrhea      Plan  # Hypercalcemia   # Neuroendocrine tumor with metastasis  # Non Hodgkin Lymphoma    # CKD stage IV  # Schizoaffective disorder  # Anemia of chronic illness   # Diarrhea   # Dysphagia   - Pt refusing IV fluids===> last calcium was 10.4===> 2 doses of calcitonin given on 09/09/23  - lymphoma VS less likely sarcoidosis  -  F/U ebus biopsy report  - CT chest shows similar lymphadenopathy and repeat shows new moderate pericardial effusion===>no tamponade currently  - s/p Octreotide scan >> 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 per HO, will need to be on Sandostatin as outpt  - Started on Octreotide 150mg SC TID and can be continued on 30mg/4 weeks on discharge  - s/p two units of prbc   - F/U heme/onc for possible chemo inpt  - cont w Na bicarb 1300 tid

## 2023-09-13 NOTE — PROGRESS NOTE ADULT - ASSESSMENT
# recurrent Hypercalcemia 2/2 Lymphoma VS sarcoidosis   # hx of Non Hodgkin Lymphoma   # CT chest w worening enlarged and new enalrged mediastinal and axillary L.N s/p EBUS on 9/8, pending biopsy result   # new liver mass >> Grade III Neuroendocrine tumor w elevated 5-HIAA level >> highly suspicious for carcinoid >> spoke to HO >> mets from unknown source filibertoley GI  # pericardial effusion, stbale, no evidence of tamponade   # CKD stage IV/metabolic acidosis   # Schizoaffective disorder  # Anemia of chronic illness   # chronic Diarrhea likley from neuroendocrine tumor   # Dysphagia likley from enlarged L.N       Plans:    - s/p IVF, pamidronate and denosumab and calcitonin   - labs every other day (limit labwork)  - suspected progression of lymphoma VS less likley sarcoidosis (given elevated Vit D), s/p EBUS on 9/8   - s/p Octreotide scan >> 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 Zari Ahmadi #[id number]

## 2023-09-13 NOTE — PROGRESS NOTE ADULT - SUBJECTIVE AND OBJECTIVE BOX
Patient is a 67y old  Male who presents with a chief complaint of Hypercalcemia (12 Sep 2023 11:30)      SUBJECTIVE / INTERVAL HPI - OVERNIGHT EVENTS:   patient seen this am   -no overnight events notified   -remains stable   -awaiting biopsy results - chemo thereafter - oncology following     Vital Signs Last 24 Hrs  T(C): 36.6 (13 Sep 2023 05:13), Max: 37 (12 Sep 2023 20:56)  T(F): 97.8 (13 Sep 2023 05:13), Max: 98.6 (12 Sep 2023 20:56)  HR: 71 (13 Sep 2023 05:13) (68 - 73)  BP: 92/63 (13 Sep 2023 05:13) (92/63 - 130/63)  BP(mean): 79 (12 Sep 2023 20:56) (79 - 79)  RR: 18 (13 Sep 2023 05:13) (18 - 20)    PHYSICAL EXAM:    General: WDWN  HEENT: NC/AT; PERRL, clear conjunctiva  Neck: supple  Cardiovascular: +S1/S2; RRR  Respiratory: CTA b/l; no W/R/R  Gastrointestinal: soft, NT/ND; +BSx4  Extremities: WWP; 2+ peripheral pulses; no edema   Neurological: AAOx3; no focal deficits      ALLERGIES:  Allergies    allopurinol (Rash (Moderate))    Intolerances        LABS:                                 8.8    4.66  )-----------( 135      ( 13 Sep 2023 06:52 )             28.2   09-13    139  |  110  |  44<H>  ----------------------------<  103<H>  5.0   |  18  |  3.2<H>    Ca    10.4      13 Sep 2023 06:52      Urinalysis Basic - ( 11 Sep 2023 07:05 )    Color: x / Appearance: x / SG: x / pH: x  Gluc: 85 mg/dL / Ketone: x  / Bili: x / Urobili: x   Blood: x / Protein: x / Nitrite: x   Leuk Esterase: x / RBC: x / WBC x   Sq Epi: x / Non Sq Epi: x / Bacteria: x        RADIOLOGY & ADDITIONAL TESTS: Reviewed.    MEDICATIONS  (STANDING):  benztropine 2 milliGRAM(s) Oral daily  fluPHENAZine 10 milliGRAM(s) Oral daily  heparin   Injectable 5000 Unit(s) SubCutaneous every 8 hours  octreotide  Injectable 150 MICROGram(s) SubCutaneous three times a day  sodium bicarbonate 1300 milliGRAM(s) Oral three times a day    MEDICATIONS  (PRN):

## 2023-09-13 NOTE — PROGRESS NOTE ADULT - SUBJECTIVE AND OBJECTIVE BOX
Nephrology progress note       ON events/Subjective:     Allergies:  allopurinol (Rash (Moderate))    Hospital Medications:   MEDICATIONS  (STANDING):  benztropine 2 milliGRAM(s) Oral daily  fluPHENAZine 10 milliGRAM(s) Oral daily  heparin   Injectable 5000 Unit(s) SubCutaneous every 8 hours  octreotide  Injectable 150 MICROGram(s) SubCutaneous three times a day  sodium bicarbonate 1300 milliGRAM(s) Oral three times a day    REVIEW OF SYSTEMS:  CONSTITUTIONAL: No weakness, fevers or chills  EYES/ENT: No visual changes;  No vertigo or throat pain   NECK: No pain or stiffness  RESPIRATORY: No cough, wheezing, hemoptysis; No shortness of breath  CARDIOVASCULAR: No chest pain or palpitations.  GASTROINTESTINAL: No abdominal or epigastric pain. No nausea, vomiting, or hematemesis; No diarrhea or constipation. No melena or hematochezia.  GENITOURINARY: No dysuria, frequency, foamy urine, urinary urgency, incontinence or hematuria  NEUROLOGICAL: No numbness or weakness  SKIN: No itching, burning, rashes, or lesions   VASCULAR: No bilateral lower extremity edema.   All other review of systems is negative unless indicated above.    VITALS:  T(F): 97.8 (09-13-23 @ 05:13), Max: 98.6 (09-12-23 @ 20:56)  HR: 71 (09-13-23 @ 05:13)  BP: 92/63 (09-13-23 @ 05:13)  RR: 18 (09-13-23 @ 05:13)  SpO2: --  Wt(kg): --      PHYSICAL EXAM:  Constitutional: NAD  HEENT: anicteric sclera, oropharynx clear, MMM  Neck: No JVD  Respiratory: CTAB, no wheezes, rales or rhonchi  Cardiovascular: S1, S2, RRR  Gastrointestinal: BS+, soft, NT/ND  Extremities: No cyanosis or clubbing. No peripheral edema  Neurological: A/O x 3, no focal deficits  Psychiatric: Normal mood, normal affect  : No CVA tenderness. No zafar.   Skin: No rashes  Vascular Access:    LABS:  09-13    139  |  110  |  44<H>  ----------------------------<  103<H>  5.0   |  18  |  3.2<H>    Ca    10.4      13 Sep 2023 06:52                            8.8    4.66  )-----------( 135      ( 13 Sep 2023 06:52 )             28.2       Urine Studies:  Creatinine Trend: 3.2<--, 2.8<--, 3.2<--, 3.3<--, 3.1<--, 2.8<--  Urinalysis Basic - ( 13 Sep 2023 06:52 )    Color:  / Appearance:  / SG:  / pH:   Gluc: 103 mg/dL / Ketone:   / Bili:  / Urobili:    Blood:  / Protein:  / Nitrite:    Leuk Esterase:  / RBC:  / WBC    Sq Epi:  / Non Sq Epi:  / Bacteria:     CKD stage 4 - has atrophic kidney and other kidney with ureteral stricture - now close to baseline  ·lymphoma and now new neuroendocrine tumor on liver biopsy  ·hypercalcemia - appears driven by vit D 1,25 -   ·has not responded well to pamidronate as calcium level increasing again  ·chronic loose sttols - causimg more dehydration  ·anemia s/p prbc  ·BP stable  ·low/normal  loose stools coontinues  per pt had recent PET scan  with neck involvement (can not find study) and now with dysphagia  ·d/w pt per advanced directives and uncertain at this time   octreotide scan - with liver uptake only  metabolic acidosis    1) awaiting biopsy report  2) continue self hydration  3) labs every other day  4) fu GI reccomendations  5) started octreotide therapy  6) oncology f/u

## 2023-09-14 LAB — CHROM ANALY INTERPHASE BLD FISH-IMP: SIGNIFICANT CHANGE UP

## 2023-09-14 PROCEDURE — 99232 SBSQ HOSP IP/OBS MODERATE 35: CPT

## 2023-09-14 RX ADMIN — Medication 1300 MILLIGRAM(S): at 11:38

## 2023-09-14 RX ADMIN — HEPARIN SODIUM 5000 UNIT(S): 5000 INJECTION INTRAVENOUS; SUBCUTANEOUS at 21:54

## 2023-09-14 RX ADMIN — Medication 1300 MILLIGRAM(S): at 14:02

## 2023-09-14 RX ADMIN — HEPARIN SODIUM 5000 UNIT(S): 5000 INJECTION INTRAVENOUS; SUBCUTANEOUS at 06:15

## 2023-09-14 RX ADMIN — HEPARIN SODIUM 5000 UNIT(S): 5000 INJECTION INTRAVENOUS; SUBCUTANEOUS at 14:01

## 2023-09-14 RX ADMIN — OCTREOTIDE ACETATE 150 MICROGRAM(S): 200 INJECTION, SOLUTION INTRAVENOUS; SUBCUTANEOUS at 14:01

## 2023-09-14 RX ADMIN — OCTREOTIDE ACETATE 150 MICROGRAM(S): 200 INJECTION, SOLUTION INTRAVENOUS; SUBCUTANEOUS at 21:56

## 2023-09-14 RX ADMIN — OCTREOTIDE ACETATE 150 MICROGRAM(S): 200 INJECTION, SOLUTION INTRAVENOUS; SUBCUTANEOUS at 06:35

## 2023-09-14 NOTE — PROGRESS NOTE ADULT - SUBJECTIVE AND OBJECTIVE BOX
Nephrology progress note     loose stools    ON events/Subjective:     Allergies:  allopurinol (Rash (Moderate))    Hospital Medications:   MEDICATIONS  (STANDING):  benztropine 2 milliGRAM(s) Oral daily  fluPHENAZine 10 milliGRAM(s) Oral daily  heparin   Injectable 5000 Unit(s) SubCutaneous every 8 hours  octreotide  Injectable 150 MICROGram(s) SubCutaneous three times a day  sodium bicarbonate 1300 milliGRAM(s) Oral three times a day    REVIEW OF SYSTEMS:  CONSTITUTIONAL: No weakness, fevers or chills  EYES/ENT: No visual changes;  No vertigo or throat pain   NECK: No pain or stiffness  RESPIRATORY: No cough, wheezing, hemoptysis; No shortness of breath  CARDIOVASCULAR: No chest pain or palpitations.  GASTROINTESTINAL: No abdominal or epigastric pain. No nausea, vomiting, or hematemesis; No diarrhea or constipation. No melena or hematochezia.  GENITOURINARY: No dysuria, frequency, foamy urine, urinary urgency, incontinence or hematuria  NEUROLOGICAL: No numbness or weakness  SKIN: No itching, burning, rashes, or lesions   VASCULAR: No bilateral lower extremity edema.   All other review of systems is negative unless indicated above.    VITALS:  T(F): 97.4 (09-14-23 @ 05:00), Max: 97.8 (09-13-23 @ 22:00)  HR: 63 (09-14-23 @ 05:00)  BP: 89/53 (09-14-23 @ 05:00)  RR: 18 (09-14-23 @ 05:00)  SpO2: --  Wt(kg): --    09-13 @ 07:01  -  09-14 @ 07:00  --------------------------------------------------------  IN: 0 mL / OUT: 550 mL / NET: -550 mL        PHYSICAL EXAM:  Constitutional: NAD  HEENT: anicteric sclera, oropharynx clear, MMM  Neck: No JVD  Respiratory: CTAB, no wheezes, rales or rhonchi  Cardiovascular: S1, S2, RRR  Gastrointestinal: BS+, soft, NT/ND  Extremities: No cyanosis or clubbing. No peripheral edema  Neurological: A/O x 3, no focal deficits  Psychiatric: Normal mood, normal affect  : No CVA tenderness. No zafar.   Skin: No rashes  Vascular Access:    LABS:  09-13    139  |  110  |  44<H>  ----------------------------<  103<H>  5.0   |  18  |  3.2<H>    Ca    10.4      13 Sep 2023 06:52                            8.8    4.66  )-----------( 135      ( 13 Sep 2023 06:52 )             28.2       Urine Studies:  Creatinine Trend: 3.2<--, 2.8<--, 3.2<--, 3.3<--, 3.1<--, 2.8<--  Urinalysis Basic - ( 13 Sep 2023 06:52 )    Color:  / Appearance:  / SG:  / pH:   Gluc: 103 mg/dL / Ketone:   / Bili:  / Urobili:    Blood:  / Protein:  / Nitrite:    Leuk Esterase:  / RBC:  / WBC    Sq Epi:  / Non Sq Epi:  / Bacteria:       CKD stage 4 - has atrophic kidney and other kidney with ureteral stricture - now close to baseline  ·lymphoma and now new neuroendocrine tumor on liver biopsy  ·hypercalcemia - appears driven by vit D 1,25 -   ·has not responded well to pamidronate as calcium level increasing again  ·chronic loose stools - causimg more dehydration  ·anemia s/p prbc  ·BP stable  ·low/normal  loose stools coontinues  per pt had recent PET scan  with neck involvement (can not find study) and now with dysphagia  ·d/w pt per advanced directives and uncertain at this time   octreotide scan - with liver uptake only  metabolic acidosis    1) awaiting biopsy report  2) continue self hydration  3) labs every other day  4) fu GI reccomendations  5) started octreotide therapy  6) oncology f/u

## 2023-09-14 NOTE — PROGRESS NOTE ADULT - SUBJECTIVE AND OBJECTIVE BOX
Patient is a 67y old  Male who presents with a chief complaint of Hypercalcemia (12 Sep 2023 11:30)      SUBJECTIVE / INTERVAL HPI - OVERNIGHT EVENTS:   patient seen this am   -no overnight events notified   -remains stable on medical floor   -awaiting biopsy results - chemo thereafter - oncology following     Vital Signs Last 24 Hrs  T(C): 36.3 (14 Sep 2023 05:00), Max: 36.6 (13 Sep 2023 22:00)  T(F): 97.4 (14 Sep 2023 05:00), Max: 97.8 (13 Sep 2023 22:00)  HR: 63 (14 Sep 2023 05:00) (63 - 68)  BP: 89/53 (14 Sep 2023 05:00) (89/53 - 98/53)  BP(mean): --  RR: 18 (14 Sep 2023 05:00) (18 - 18)    PHYSICAL EXAM:    General: WDWN  HEENT: NC/AT; PERRL, clear conjunctiva  Neck: supple  Cardiovascular: +S1/S2; RRR  Respiratory: CTA b/l; no W/R/R  Gastrointestinal: soft, NT/ND; +BSx4  Extremities: WWP; 2+ peripheral pulses; no edema   Neurological: AAOx3; no focal deficits      ALLERGIES:  Allergies    allopurinol (Rash (Moderate))    Intolerances        LABS:                                 8.8    4.66  )-----------( 135      ( 13 Sep 2023 06:52 )             28.2   09-13    139  |  110  |  44<H>  ----------------------------<  103<H>  5.0   |  18  |  3.2<H>    Ca    10.4      13 Sep 2023 06:52      Urinalysis Basic - ( 11 Sep 2023 07:05 )    Color: x / Appearance: x / SG: x / pH: x  Gluc: 85 mg/dL / Ketone: x  / Bili: x / Urobili: x   Blood: x / Protein: x / Nitrite: x   Leuk Esterase: x / RBC: x / WBC x   Sq Epi: x / Non Sq Epi: x / Bacteria: x        RADIOLOGY & ADDITIONAL TESTS: Reviewed.    MEDICATIONS  (STANDING):  benztropine 2 milliGRAM(s) Oral daily  fluPHENAZine 10 milliGRAM(s) Oral daily  heparin   Injectable 5000 Unit(s) SubCutaneous every 8 hours  octreotide  Injectable 150 MICROGram(s) SubCutaneous three times a day  sodium bicarbonate 1300 milliGRAM(s) Oral three times a day

## 2023-09-14 NOTE — PROGRESS NOTE ADULT - ASSESSMENT
# recurrent Hypercalcemia 2/2 Lymphoma VS sarcoidosis   # hx of Non Hodgkin Lymphoma   # CT chest w worsening enlarged and new enlarged mediastinal and axillary L.N s/p EBUS on 9/8, pending biopsy result   # new liver mass >> Grade III Neuroendocrine tumor w elevated 5-HIAA level >> highly suspicious for carcinoid >> spoke to HO >> mets from unknown source likley GI  # pericardial effusion, stabe, no evidence of tamponade   # CKD stage IV/metabolic acidosis   # Schizoaffective disorder  # Anemia of chronic illness   # chronic Diarrhea likley from neuroendocrine tumor   # Dysphagia likley from enlarged L.N       Plans:    - s/p IVF, pamidronate and denosumab and calcitonin   - labs every other day (limit labwork)  - suspected progression of lymphoma VS less likely sarcoidosis (given elevated Vit D), s/p EBUS on 9/8   - s/p Octreotide scan >> 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 Zari Ahmadi #[id number]

## 2023-09-14 NOTE — PROGRESS NOTE ADULT - SUBJECTIVE AND OBJECTIVE BOX
24H events:    Patient is a 67y old Male who presents with a chief complaint of hypercalcemia (13 Sep 2023 11:35)    Primary diagnosis of Hypercalcemia       Today is hospital day 21d.      PAST MEDICAL & SURGICAL HISTORY  Kidney stones    Schizophrenia    Lymphoma    Shingles    Atrophic kidney    H/O colonoscopy with polypectomy    Liver cyst    History of bladder surgery    H/O neuropathy    History of chemotherapy    Stage 3 chronic kidney disease    Retained urethral stent    H/O umbilical hernia repair    Elbow fracture    H/O cystoscopy      SOCIAL HISTORY:  Negative for smoking/alcohol/drug use.     ALLERGIES:  allopurinol (Rash (Moderate))    MEDICATIONS:  STANDING MEDICATIONS  benztropine 2 milliGRAM(s) Oral daily  fluPHENAZine 10 milliGRAM(s) Oral daily  heparin   Injectable 5000 Unit(s) SubCutaneous every 8 hours  octreotide  Injectable 150 MICROGram(s) SubCutaneous three times a day  sodium bicarbonate 1300 milliGRAM(s) Oral three times a day    PRN MEDICATIONS    VITALS:   T(F): 97.4  HR: 63  BP: 89/53  RR: 18  SpO2: --    LABS:                        8.8    4.66  )-----------( 135      ( 13 Sep 2023 06:52 )             28.2     09-13    139  |  110  |  44<H>  ----------------------------<  103<H>  5.0   |  18  |  3.2<H>    Ca    10.4      13 Sep 2023 06:52        Urinalysis Basic - ( 13 Sep 2023 06:52 )    Color: x / Appearance: x / SG: x / pH: x  Gluc: 103 mg/dL / Ketone: x  / Bili: x / Urobili: x   Blood: x / Protein: x / Nitrite: x   Leuk Esterase: x / RBC: x / WBC x   Sq Epi: x / Non Sq Epi: x / Bacteria: x          PHYSICAL EXAM:  GENERAL: NAD  NECK: Supple, No JVD, Normal thyroid  NERVOUS SYSTEM:  Alert & Oriented X3, Good concentration  CHEST/LUNG: Clear to auscultation bilaterally; No rales, rhonchi, wheezing, or rubs  HEART: Regular rate and rhythm; No murmurs, rubs, or gallops  ABDOMEN: Soft, Nontender, Nondistended; Bowel sounds present  EXTREMITIES:  + Peripheral Pulses, No clubbing, cyanosis, or edema

## 2023-09-14 NOTE — PROGRESS NOTE ADULT - ASSESSMENT
Assessment  Patient is a 67y old Male who presents with a chief complaint of Hypercalcemia and diarrhea      Plan  # Hypercalcemia   # Neuroendocrine tumor with metastasis  # Non Hodgkin Lymphoma    # CKD stage IV  # Schizoaffective disorder  # Anemia of chronic illness   # Diarrhea   # Dysphagia   - Pt refusing IV fluids===> last calcium was 10.4===> 2 doses of calcitonin given on 09/09/23  - lymphoma VS less likely sarcoidosis  -  F/U ebus biopsy report  - Pts father Dr. 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

## 2023-09-15 PROCEDURE — 99232 SBSQ HOSP IP/OBS MODERATE 35: CPT

## 2023-09-15 RX ADMIN — Medication 1300 MILLIGRAM(S): at 13:23

## 2023-09-15 RX ADMIN — OCTREOTIDE ACETATE 150 MICROGRAM(S): 200 INJECTION, SOLUTION INTRAVENOUS; SUBCUTANEOUS at 21:45

## 2023-09-15 RX ADMIN — Medication 1300 MILLIGRAM(S): at 10:05

## 2023-09-15 RX ADMIN — Medication 1300 MILLIGRAM(S): at 21:45

## 2023-09-15 RX ADMIN — Medication 2 MILLIGRAM(S): at 11:33

## 2023-09-15 RX ADMIN — HEPARIN SODIUM 5000 UNIT(S): 5000 INJECTION INTRAVENOUS; SUBCUTANEOUS at 06:04

## 2023-09-15 RX ADMIN — OCTREOTIDE ACETATE 150 MICROGRAM(S): 200 INJECTION, SOLUTION INTRAVENOUS; SUBCUTANEOUS at 13:23

## 2023-09-15 RX ADMIN — HEPARIN SODIUM 5000 UNIT(S): 5000 INJECTION INTRAVENOUS; SUBCUTANEOUS at 13:23

## 2023-09-15 RX ADMIN — HEPARIN SODIUM 5000 UNIT(S): 5000 INJECTION INTRAVENOUS; SUBCUTANEOUS at 21:45

## 2023-09-15 RX ADMIN — OCTREOTIDE ACETATE 150 MICROGRAM(S): 200 INJECTION, SOLUTION INTRAVENOUS; SUBCUTANEOUS at 06:07

## 2023-09-15 RX ADMIN — FLUPHENAZINE HYDROCHLORIDE 10 MILLIGRAM(S): 1 TABLET, FILM COATED ORAL at 11:33

## 2023-09-15 NOTE — PROGRESS NOTE ADULT - SUBJECTIVE AND OBJECTIVE BOX
Nephrology progress note     no specific complaints    ON events/Subjective:     Allergies:  allopurinol (Rash (Moderate))    Hospital Medications:   MEDICATIONS  (STANDING):  benztropine 2 milliGRAM(s) Oral daily  fluPHENAZine 10 milliGRAM(s) Oral daily  heparin   Injectable 5000 Unit(s) SubCutaneous every 8 hours  octreotide  Injectable 150 MICROGram(s) SubCutaneous three times a day  sodium bicarbonate 1300 milliGRAM(s) Oral three times a day    REVIEW OF SYSTEMS:  CONSTITUTIONAL: No weakness, fevers or chills  EYES/ENT: No visual changes;  No vertigo or throat pain   NECK: No pain or stiffness  RESPIRATORY: No cough, wheezing, hemoptysis; No shortness of breath  CARDIOVASCULAR: No chest pain or palpitations.  GASTROINTESTINAL: No abdominal or epigastric pain. No nausea, vomiting, or hematemesis; No diarrhea or constipation. No melena or hematochezia.  GENITOURINARY: No dysuria, frequency, foamy urine, urinary urgency, incontinence or hematuria  NEUROLOGICAL: No numbness or weakness  SKIN: No itching, burning, rashes, or lesions   VASCULAR: No bilateral lower extremity edema.   All other review of systems is negative unless indicated above.    VITALS:  T(F): 96.8 (09-15-23 @ 05:14), Max: 97.6 (09-14-23 @ 20:58)  HR: 71 (09-15-23 @ 05:14)  BP: 102/52 (09-15-23 @ 05:14)  RR: 18 (09-15-23 @ 05:14)  SpO2: --  Wt(kg): --    09-13 @ 07:01  -  09-14 @ 07:00  --------------------------------------------------------  IN: 0 mL / OUT: 550 mL / NET: -550 mL        PHYSICAL EXAM:  Constitutional: NAD  HEENT: anicteric sclera, oropharynx clear, MMM  Neck: No JVD  Respiratory: CTAB, no wheezes, rales or rhonchi  Cardiovascular: S1, S2, RRR  Gastrointestinal: BS+, soft, NT/ND  Extremities: No cyanosis or clubbing. No peripheral edema  Neurological: A/O x 3, no focal deficits  Psychiatric: Normal mood, normal affect  : No CVA tenderness. No zafar.   Skin: No rashes  Vascular Access:    LABS:            Urine Studies:  Creatinine Trend: 3.2<--, 2.8<--, 3.2<--, 3.3<--, 3.1<--, 2.8<--  Urinalysis Basic - ( 13 Sep 2023 06:52 )    Color:  / Appearance:  / SG:  / pH:   Gluc: 103 mg/dL / Ketone:   / Bili:  / Urobili:    Blood:  / Protein:  / Nitrite:    Leuk Esterase:  / RBC:  / WBC    Sq Epi:  / Non Sq Epi:  / Bacteria:     CKD stage 4 - has atrophic kidney and other kidney with ureteral stricture - now close to baseline  ·lymphoma and now new neuroendocrine tumor on liver biopsy  ·hypercalcemia - appears driven by vit D 1,25 -   ·has not responded well to pamidronate as calcium level increasing again  ·chronic loose stools - causimg more dehydration  ·anemia s/p prbc  ·BP stable  ·low/normal  loose stools coontinues  per pt had recent PET scan  with neck involvement (can not find study) and now with dysphagia  ·d/w pt per advanced directives and uncertain at this time   octreotide scan - with liver uptake only  metabolic acidosis    1) awaiting biopsy report  2) continue self hydration  3) labs every other day  4) fu GI reccomendations  5) started octreotide therapy  6) oncology f/u  7) continue sodium bicarbonate supplements  8) d/w Dr Crum of pathology and awaiting neuroendocrine and lymphoma stains which may be available today

## 2023-09-15 NOTE — PROGRESS NOTE ADULT - SUBJECTIVE AND OBJECTIVE BOX
Patient is a 67y old  Male who presents with a chief complaint of Hypercalcemia (12 Sep 2023 11:30)      SUBJECTIVE / INTERVAL HPI - OVERNIGHT EVENTS:   patient seen this am   -no overnight events notified   -remains stable on medical floor   -awaiting biopsy results - chemo thereafter - oncology following     Vital Signs Last 24 Hrs  T(C): 36.3 (14 Sep 2023 05:00), Max: 36.6 (13 Sep 2023 22:00)  T(F): 97.4 (14 Sep 2023 05:00), Max: 97.8 (13 Sep 2023 22:00)  HR: 63 (14 Sep 2023 05:00) (63 - 68)  BP: 89/53 (14 Sep 2023 05:00) (89/53 - 98/53) - recheck BP   RR: 18 (14 Sep 2023 05:00) (18 - 18)    PHYSICAL EXAM:    General: WDWN  HEENT: NC/AT; PERRL, clear conjunctiva  Neck: supple  Cardiovascular: +S1/S2; RRR  Respiratory: CTA b/l; no W/R/R  Gastrointestinal: soft, NT/ND; +BSx4  Extremities: WWP; 2+ peripheral pulses; no edema   Neurological: AAOx3; no focal deficits      ALLERGIES:  Allergies    allopurinol (Rash (Moderate))    Intolerances        LABS:                                 8.8    4.66  )-----------( 135      ( 13 Sep 2023 06:52 )             28.2   09-13    139  |  110  |  44<H>  ----------------------------<  103<H>  5.0   |  18  |  3.2<H>    Ca    10.4      13 Sep 2023 06:52      Urinalysis Basic - ( 11 Sep 2023 07:05 )    Color: x / Appearance: x / SG: x / pH: x  Gluc: 85 mg/dL / Ketone: x  / Bili: x / Urobili: x   Blood: x / Protein: x / Nitrite: x   Leuk Esterase: x / RBC: x / WBC x   Sq Epi: x / Non Sq Epi: x / Bacteria: x        RADIOLOGY & ADDITIONAL TESTS: Reviewed.    MEDICATIONS  (STANDING):  benztropine 2 milliGRAM(s) Oral daily  fluPHENAZine 10 milliGRAM(s) Oral daily  heparin   Injectable 5000 Unit(s) SubCutaneous every 8 hours  octreotide  Injectable 150 MICROGram(s) SubCutaneous three times a day  sodium bicarbonate 1300 milliGRAM(s) Oral three times a day    MEDICATIONS  (PRN):

## 2023-09-15 NOTE — PROGRESS NOTE ADULT - SUBJECTIVE AND OBJECTIVE BOX
24H events:    Patient is a 67y old Male who presents with a chief complaint of hypercalcemia (14 Sep 2023 09:45)    Primary diagnosis of Hypercalcemia       Today is hospital day 22d.      PAST MEDICAL & SURGICAL HISTORY  Kidney stones    Schizophrenia    Lymphoma    Shingles    Atrophic kidney    H/O colonoscopy with polypectomy    Liver cyst    History of bladder surgery    H/O neuropathy    History of chemotherapy    Stage 3 chronic kidney disease    Retained urethral stent    H/O umbilical hernia repair    Elbow fracture    H/O cystoscopy      SOCIAL HISTORY:  Negative for smoking/alcohol/drug use.     ALLERGIES:  allopurinol (Rash (Moderate))    MEDICATIONS:  STANDING MEDICATIONS  benztropine 2 milliGRAM(s) Oral daily  fluPHENAZine 10 milliGRAM(s) Oral daily  heparin   Injectable 5000 Unit(s) SubCutaneous every 8 hours  octreotide  Injectable 150 MICROGram(s) SubCutaneous three times a day  sodium bicarbonate 1300 milliGRAM(s) Oral three times a day    PRN MEDICATIONS    VITALS:   T(F): 96.8  HR: 71  BP: 102/52  RR: 18  SpO2: --    LABS:        PHYSICAL EXAM:  GENERAL: NAD  NECK: Supple, No JVD, Normal thyroid  NERVOUS SYSTEM:  Alert & Oriented X3, Good concentration  CHEST/LUNG: Clear to auscultation bilaterally; No rales, rhonchi, wheezing, or rubs  HEART: Regular rate and rhythm; No murmurs, rubs, or gallops  ABDOMEN: Soft, Nontender, Nondistended; Bowel sounds present  EXTREMITIES:  + Peripheral Pulses, No clubbing, cyanosis, or edema

## 2023-09-15 NOTE — PROGRESS NOTE ADULT - ASSESSMENT
# recurrent Hypercalcemia 2/2 Lymphoma VS sarcoidosis   # hx of Non Hodgkin Lymphoma   # CT chest w worsening enlarged and new enlarged mediastinal and axillary L.N s/p EBUS on 9/8, pending biopsy result   # new liver mass >> Grade III Neuroendocrine tumor w elevated 5-HIAA level >> highly suspicious for carcinoid >> spoke to HO >> mets from unknown source likley GI  # pericardial effusion, stabe, no evidence of tamponade   # CKD stage IV/metabolic acidosis   # Schizoaffective disorder  # Anemia of chronic illness   # chronic Diarrhea likley from neuroendocrine tumor   # Dysphagia likley from enlarged L.N       Plans:    - s/p IVF, pamidronate and denosumab and calcitonin   - labs every other day (limit labwork)  - suspected progression of lymphoma VS less likely sarcoidosis (given elevated Vit D), s/p EBUS on 9/8   - s/p Octreotide scan >> 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 Zari Ahmadi #[id number]

## 2023-09-16 LAB
ANION GAP SERPL CALC-SCNC: 9 MMOL/L — SIGNIFICANT CHANGE UP (ref 7–14)
BASOPHILS # BLD AUTO: 0.03 K/UL — SIGNIFICANT CHANGE UP (ref 0–0.2)
BASOPHILS NFR BLD AUTO: 0.6 % — SIGNIFICANT CHANGE UP (ref 0–1)
BUN SERPL-MCNC: 48 MG/DL — HIGH (ref 10–20)
CALCIUM SERPL-MCNC: 9.8 MG/DL — SIGNIFICANT CHANGE UP (ref 8.4–10.5)
CHLORIDE SERPL-SCNC: 108 MMOL/L — SIGNIFICANT CHANGE UP (ref 98–110)
CO2 SERPL-SCNC: 19 MMOL/L — SIGNIFICANT CHANGE UP (ref 17–32)
CREAT SERPL-MCNC: 3 MG/DL — HIGH (ref 0.7–1.5)
EGFR: 22 ML/MIN/1.73M2 — LOW
EOSINOPHIL # BLD AUTO: 1.08 K/UL — HIGH (ref 0–0.7)
EOSINOPHIL NFR BLD AUTO: 22.3 % — HIGH (ref 0–8)
GLUCOSE SERPL-MCNC: 86 MG/DL — SIGNIFICANT CHANGE UP (ref 70–99)
HCT VFR BLD CALC: 28 % — LOW (ref 42–52)
HGB BLD-MCNC: 8.9 G/DL — LOW (ref 14–18)
IMM GRANULOCYTES NFR BLD AUTO: 0.8 % — HIGH (ref 0.1–0.3)
LYMPHOCYTES # BLD AUTO: 1.78 K/UL — SIGNIFICANT CHANGE UP (ref 1.2–3.4)
LYMPHOCYTES # BLD AUTO: 36.7 % — SIGNIFICANT CHANGE UP (ref 20.5–51.1)
MAGNESIUM SERPL-MCNC: 2 MG/DL — SIGNIFICANT CHANGE UP (ref 1.8–2.4)
MCHC RBC-ENTMCNC: 28.7 PG — SIGNIFICANT CHANGE UP (ref 27–31)
MCHC RBC-ENTMCNC: 31.8 G/DL — LOW (ref 32–37)
MCV RBC AUTO: 90.3 FL — SIGNIFICANT CHANGE UP (ref 80–94)
MONOCYTES # BLD AUTO: 0.89 K/UL — HIGH (ref 0.1–0.6)
MONOCYTES NFR BLD AUTO: 18.4 % — HIGH (ref 1.7–9.3)
NEUTROPHILS # BLD AUTO: 1.03 K/UL — LOW (ref 1.4–6.5)
NEUTROPHILS NFR BLD AUTO: 21.2 % — LOW (ref 42.2–75.2)
NRBC # BLD: 0 /100 WBCS — SIGNIFICANT CHANGE UP (ref 0–0)
PLATELET # BLD AUTO: 128 K/UL — LOW (ref 130–400)
PMV BLD: 11 FL — HIGH (ref 7.4–10.4)
POTASSIUM SERPL-MCNC: 5 MMOL/L — SIGNIFICANT CHANGE UP (ref 3.5–5)
POTASSIUM SERPL-SCNC: 5 MMOL/L — SIGNIFICANT CHANGE UP (ref 3.5–5)
RBC # BLD: 3.1 M/UL — LOW (ref 4.7–6.1)
RBC # FLD: 16.5 % — HIGH (ref 11.5–14.5)
SODIUM SERPL-SCNC: 136 MMOL/L — SIGNIFICANT CHANGE UP (ref 135–146)
WBC # BLD: 4.85 K/UL — SIGNIFICANT CHANGE UP (ref 4.8–10.8)
WBC # FLD AUTO: 4.85 K/UL — SIGNIFICANT CHANGE UP (ref 4.8–10.8)

## 2023-09-16 PROCEDURE — 99232 SBSQ HOSP IP/OBS MODERATE 35: CPT

## 2023-09-16 RX ADMIN — Medication 2 MILLIGRAM(S): at 11:13

## 2023-09-16 RX ADMIN — HEPARIN SODIUM 5000 UNIT(S): 5000 INJECTION INTRAVENOUS; SUBCUTANEOUS at 05:52

## 2023-09-16 RX ADMIN — OCTREOTIDE ACETATE 150 MICROGRAM(S): 200 INJECTION, SOLUTION INTRAVENOUS; SUBCUTANEOUS at 13:12

## 2023-09-16 RX ADMIN — FLUPHENAZINE HYDROCHLORIDE 10 MILLIGRAM(S): 1 TABLET, FILM COATED ORAL at 11:13

## 2023-09-16 RX ADMIN — Medication 1300 MILLIGRAM(S): at 13:11

## 2023-09-16 RX ADMIN — HEPARIN SODIUM 5000 UNIT(S): 5000 INJECTION INTRAVENOUS; SUBCUTANEOUS at 21:39

## 2023-09-16 RX ADMIN — OCTREOTIDE ACETATE 150 MICROGRAM(S): 200 INJECTION, SOLUTION INTRAVENOUS; SUBCUTANEOUS at 21:39

## 2023-09-16 RX ADMIN — Medication 1300 MILLIGRAM(S): at 21:40

## 2023-09-16 RX ADMIN — OCTREOTIDE ACETATE 150 MICROGRAM(S): 200 INJECTION, SOLUTION INTRAVENOUS; SUBCUTANEOUS at 05:49

## 2023-09-16 RX ADMIN — HEPARIN SODIUM 5000 UNIT(S): 5000 INJECTION INTRAVENOUS; SUBCUTANEOUS at 13:12

## 2023-09-16 NOTE — PROGRESS NOTE ADULT - SUBJECTIVE AND OBJECTIVE BOX
Patient is a 67y old  Male who presents with a chief complaint of Hypercalcemia (12 Sep 2023 11:30)      SUBJECTIVE / INTERVAL HPI - OVERNIGHT EVENTS:   patient seen this am   -no overnight events notified   -remains stable on medical floor   -awaiting biopsy results (checked this am) - chemo thereafter - oncology following       Vital Signs Last 24 Hrs  T(C): 36.6 (15 Sep 2023 21:06), Max: 36.6 (15 Sep 2023 21:06)  T(F): 97.9 (15 Sep 2023 21:06), Max: 97.9 (15 Sep 2023 21:06)  HR: 71 (15 Sep 2023 21:06) (71 - 73)  BP: 96/53 (15 Sep 2023 21:06) (96/53 - 101/52)  BP(mean): --  RR: 18 (15 Sep 2023 21:06) (18 - 18)  SpO2: --      PHYSICAL EXAM:    General: not distress- resting comfortably in bed   HEENT: NC/AT; PERRL, clear conjunctiva  Neck: supple  Cardiovascular: +S1/S2; RRR  Respiratory: CTA b/l; no W/R/R  Gastrointestinal: soft, NT/ND; +BSx4  Extremities: WWP; 2+ peripheral pulses; no edema   Neurological: AAOx3; no focal deficits      ALLERGIES:  Allergies    allopurinol (Rash (Moderate))    Intolerances        LABS:                                          8.9    4.85  )-----------( 128      ( 16 Sep 2023 06:06 )             28.0     09-16    136  |  108  |  48<H>  ----------------------------<  86  5.0   |  19  |  3.0<H>    Ca    9.8      16 Sep 2023 06:06  Mg     2.0     09-16          Urinalysis Basic - ( 11 Sep 2023 07:05 )    Color: x / Appearance: x / SG: x / pH: x  Gluc: 85 mg/dL / Ketone: x  / Bili: x / Urobili: x   Blood: x / Protein: x / Nitrite: x   Leuk Esterase: x / RBC: x / WBC x   Sq Epi: x / Non Sq Epi: x / Bacteria: x        RADIOLOGY & ADDITIONAL TESTS: Reviewed.    MEDICATIONS  (STANDING):  benztropine 2 milliGRAM(s) Oral daily  fluPHENAZine 10 milliGRAM(s) Oral daily  heparin   Injectable 5000 Unit(s) SubCutaneous every 8 hours  octreotide  Injectable 150 MICROGram(s) SubCutaneous three times a day  sodium bicarbonate 1300 milliGRAM(s) Oral three times a day    MEDICATIONS  (PRN):

## 2023-09-16 NOTE — PROGRESS NOTE ADULT - SUBJECTIVE AND OBJECTIVE BOX
[place] NEPHROLOGY FOLLOW UP NOTE  --------------------------------------------------------------------------------  24 hour events/subjective: Patient examined. Appears comfortable.    PAST HISTORY  --------------------------------------------------------------------------------  No significant changes to PMH, PSH, FHx, SHx, unless otherwise noted    ALLERGIES & MEDICATIONS  --------------------------------------------------------------------------------  Allergies    allopurinol (Rash (Moderate))    Standing Inpatient Medications  benztropine 2 milliGRAM(s) Oral daily  fluPHENAZine 10 milliGRAM(s) Oral daily  heparin   Injectable 5000 Unit(s) SubCutaneous every 8 hours  octreotide  Injectable 150 MICROGram(s) SubCutaneous three times a day  sodium bicarbonate 1300 milliGRAM(s) Oral three times a day    VITALS/PHYSICAL EXAM  --------------------------------------------------------------------------------  T(C): 36.6 (09-15-23 @ 21:06), Max: 36.6 (09-15-23 @ 21:06)  HR: 71 (09-15-23 @ 21:06) (71 - 71)  BP: 96/53 (09-15-23 @ 21:06) (96/53 - 96/53)  RR: 18 (09-15-23 @ 21:06) (18 - 18)    Physical Exam:  	Gen: NAD, no supplemental O2  	Pulm: CTA B/L  	CV: RRR, S1S2  	Abd: +BS, soft, nontender/nondistended  	: No suprapubic tenderness, no Park  	LE: Warm, no edema  	Vascular access:    LABS/STUDIES  --------------------------------------------------------------------------------              8.9    4.85  >-----------<  128      [09-16-23 @ 06:06]              28.0     136  |  108  |  48  ----------------------------<  86      [09-16-23 @ 06:06]  5.0   |  19  |  3.0        Ca     9.8     [09-16-23 @ 06:06]      Mg     2.0     [09-16-23 @ 06:06]    Creatinine Trend:  SCr 3.0 [09-16 @ 06:06]  SCr 3.2 [09-13 @ 06:52]  SCr 2.8 [09-11 @ 07:05]  SCr 3.2 [09-10 @ 06:09]  SCr 3.3 [09-09 @ 06:23]    Urinalysis - [09-16-23 @ 06:06]      Color  / Appearance  / SG  / pH       Gluc 86 / Ketone   / Bili  / Urobili        Blood  / Protein  / Leuk Est  / Nitrite       RBC  / WBC  / Hyaline  / Gran  / Sq Epi  / Non Sq Epi  / Bacteria     PTH -- (Ca 12.1)      [08-29-23 @ 08:30]   8  PTH -- (Ca 12.3)      [06-23-23 @ 06:11]   12  PTH -- (Ca 12.7)      [04-15-23 @ 08:22]   8  Vitamin D (25OH) 16      [08-29-23 @ 08:30]  TSH 2.27      [04-15-23 @ 08:22]  Lipid: chol 121, , HDL 18, LDL --      [06-22-23 @ 06:00]

## 2023-09-16 NOTE — PROGRESS NOTE ADULT - SUBJECTIVE AND OBJECTIVE BOX
24H events:    Patient is a 67y old Male who presents with a chief complaint of hypercalcemia (15 Sep 2023 11:16)    Primary diagnosis of Hypercalcemia       Today is hospital day 23d.      PAST MEDICAL & SURGICAL HISTORY  Kidney stones    Schizophrenia    Lymphoma    Shingles    Atrophic kidney    H/O colonoscopy with polypectomy    Liver cyst    History of bladder surgery    H/O neuropathy    History of chemotherapy    Stage 3 chronic kidney disease    Retained urethral stent    H/O umbilical hernia repair    Elbow fracture    H/O cystoscopy      SOCIAL HISTORY:  Negative for smoking/alcohol/drug use.     ALLERGIES:  allopurinol (Rash (Moderate))    MEDICATIONS:  STANDING MEDICATIONS  benztropine 2 milliGRAM(s) Oral daily  fluPHENAZine 10 milliGRAM(s) Oral daily  heparin   Injectable 5000 Unit(s) SubCutaneous every 8 hours  octreotide  Injectable 150 MICROGram(s) SubCutaneous three times a day  sodium bicarbonate 1300 milliGRAM(s) Oral three times a day    PRN MEDICATIONS    VITALS:   T(F): 97.9  HR: 71  BP: 96/53  RR: 18  SpO2: --    LABS:                        8.9    4.85  )-----------( 128      ( 16 Sep 2023 06:06 )             28.0     09-16    136  |  108  |  48<H>  ----------------------------<  86  5.0   |  19  |  3.0<H>    Ca    9.8      16 Sep 2023 06:06  Mg     2.0     09-16        Urinalysis Basic - ( 16 Sep 2023 06:06 )    Color: x / Appearance: x / SG: x / pH: x  Gluc: 86 mg/dL / Ketone: x  / Bili: x / Urobili: x   Blood: x / Protein: x / Nitrite: x   Leuk Esterase: x / RBC: x / WBC x   Sq Epi: x / Non Sq Epi: x / Bacteria: x            PHYSICAL EXAM:  GENERAL: NAD  NECK: Supple, No JVD, Normal thyroid  NERVOUS SYSTEM:  Alert & Oriented X3, Good concentration  CHEST/LUNG: Clear to auscultation bilaterally; No rales, rhonchi, wheezing, or rubs  HEART: Regular rate and rhythm; No murmurs, rubs, or gallops  ABDOMEN: Soft, Nontender, Nondistended; Bowel sounds present  EXTREMITIES:  + Peripheral Pulses, No clubbing, cyanosis, or edema

## 2023-09-16 NOTE — PROGRESS NOTE ADULT - ASSESSMENT
CKD stage IV  Recurrent hypercalcemia secondary to Lymphoma vs sarcoidosis   H/O of Non Hodgkin Lymphoma   New mediastinal and axillary LN  New liver mass / Grade III Neuroendocrine tumor   Pericardial effusion w/o tamponade   Schizoaffective disorder  Anemia     Plan:    No more IVF  Cont PO bicarb  Renal diet  Onc f/u

## 2023-09-16 NOTE — PROGRESS NOTE ADULT - ASSESSMENT
# recurrent Hypercalcemia 2/2 Lymphoma VS sarcoidosis   # hx of Non Hodgkin Lymphoma   # CT chest w worsening enlarged and new enlarged mediastinal and axillary L.N s/p EBUS on 9/8, pending biopsy result   # new liver mass >> Grade III Neuroendocrine tumor w elevated 5-HIAA level >> highly suspicious for carcinoid >> spoke to HO >> mets from unknown source likley GI  # pericardial effusion, stabe, no evidence of tamponade   # CKD stage IV/metabolic acidosis   # Schizoaffective disorder  # Anemia of chronic illness   # chronic Diarrhea likley from neuroendocrine tumor   # Dysphagia likley from enlarged L.N       Plans:    - s/p IVF, pamidronate and denosumab and calcitonin   - labs every other day (limit labwork)  - suspected progression of lymphoma VS less likely sarcoidosis (given elevated Vit D), s/p EBUS on 9/8   - s/p Octreotide scan >> 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 Zari Ahmadi #[id number]

## 2023-09-17 PROCEDURE — 99232 SBSQ HOSP IP/OBS MODERATE 35: CPT

## 2023-09-17 RX ADMIN — Medication 2 MILLIGRAM(S): at 11:21

## 2023-09-17 RX ADMIN — OCTREOTIDE ACETATE 150 MICROGRAM(S): 200 INJECTION, SOLUTION INTRAVENOUS; SUBCUTANEOUS at 15:49

## 2023-09-17 RX ADMIN — Medication 1300 MILLIGRAM(S): at 21:23

## 2023-09-17 RX ADMIN — OCTREOTIDE ACETATE 150 MICROGRAM(S): 200 INJECTION, SOLUTION INTRAVENOUS; SUBCUTANEOUS at 05:39

## 2023-09-17 RX ADMIN — FLUPHENAZINE HYDROCHLORIDE 10 MILLIGRAM(S): 1 TABLET, FILM COATED ORAL at 11:21

## 2023-09-17 RX ADMIN — HEPARIN SODIUM 5000 UNIT(S): 5000 INJECTION INTRAVENOUS; SUBCUTANEOUS at 21:23

## 2023-09-17 RX ADMIN — HEPARIN SODIUM 5000 UNIT(S): 5000 INJECTION INTRAVENOUS; SUBCUTANEOUS at 15:37

## 2023-09-17 RX ADMIN — Medication 1300 MILLIGRAM(S): at 05:39

## 2023-09-17 RX ADMIN — OCTREOTIDE ACETATE 150 MICROGRAM(S): 200 INJECTION, SOLUTION INTRAVENOUS; SUBCUTANEOUS at 21:23

## 2023-09-17 RX ADMIN — Medication 1300 MILLIGRAM(S): at 15:37

## 2023-09-17 RX ADMIN — HEPARIN SODIUM 5000 UNIT(S): 5000 INJECTION INTRAVENOUS; SUBCUTANEOUS at 05:39

## 2023-09-17 NOTE — PROGRESS NOTE ADULT - SUBJECTIVE AND OBJECTIVE BOX
Patient is a 67y old  Male who presents with a chief complaint of Hypercalcemia (12 Sep 2023 11:30)      SUBJECTIVE / INTERVAL HPI - OVERNIGHT EVENTS:   patient seen this am   -no overnight events notified   -remains stable on medical floor - comfortable in bed   -awaiting biopsy results - chemo thereafter - oncology following     Vital Signs Last 24 Hrs  T(C): 36.8 (17 Sep 2023 05:50), Max: 36.8 (17 Sep 2023 05:50)  T(F): 98.3 (17 Sep 2023 05:50), Max: 98.3 (17 Sep 2023 05:50)  HR: 75 (17 Sep 2023 05:50) (75 - 87)  BP: 93/55 (17 Sep 2023 05:50) (93/55 - 101/53)  BP(mean): --  RR: 20 (17 Sep 2023 05:50) (18 - 20)      PHYSICAL EXAM:    General: not distress- resting comfortably in bed   HEENT: NC/AT; PERRL, clear conjunctiva  Neck: supple  Cardiovascular: +S1/S2; RRR  Respiratory: CTA b/l; no W/R/R  Gastrointestinal: soft, NT/ND; +BSx4  Extremities: WWP; 2+ peripheral pulses; no edema   Neurological: AAOx3; no focal deficits      ALLERGIES:  Allergies    allopurinol (Rash (Moderate))    Intolerances        LABS:                      8.9    4.85  )-----------( 128      ( 16 Sep 2023 06:06 )             28.0     09-16    136  |  108  |  48<H>  ----------------------------<  86  5.0   |  19  |  3.0<H>    Ca    9.8      16 Sep 2023 06:06  Mg     2.0     09-16      Urinalysis Basic - ( 11 Sep 2023 07:05 )    Color: x / Appearance: x / SG: x / pH: x  Gluc: 85 mg/dL / Ketone: x  / Bili: x / Urobili: x   Blood: x / Protein: x / Nitrite: x   Leuk Esterase: x / RBC: x / WBC x   Sq Epi: x / Non Sq Epi: x / Bacteria: x        RADIOLOGY & ADDITIONAL TESTS: Reviewed.    MEDICATIONS  (STANDING):  benztropine 2 milliGRAM(s) Oral daily  fluPHENAZine 10 milliGRAM(s) Oral daily  heparin   Injectable 5000 Unit(s) SubCutaneous every 8 hours  octreotide  Injectable 150 MICROGram(s) SubCutaneous three times a day  sodium bicarbonate 1300 milliGRAM(s) Oral three times a day    MEDICATIONS  (PRN):

## 2023-09-17 NOTE — PROGRESS NOTE ADULT - ASSESSMENT
# recurrent Hypercalcemia 2/2 Lymphoma VS sarcoidosis   # hx of Non Hodgkin Lymphoma   # CT chest w worsening enlarged and new enlarged mediastinal and axillary L.N s/p EBUS on 9/8, pending biopsy result   # new liver mass >> Grade III Neuroendocrine tumor w elevated 5-HIAA level >> highly suspicious for carcinoid >> spoke to HO >> mets from unknown source likley GI  # pericardial effusion, stabe, no evidence of tamponade   # CKD stage IV/metabolic acidosis   # Schizoaffective disorder  # Anemia of chronic illness   # chronic Diarrhea likley from neuroendocrine tumor   # Dysphagia likley from enlarged L.N       Plans:    - s/p IVF, pamidronate and denosumab and calcitonin   - labs every other day (limit labwork)  - suspected progression of lymphoma VS less likely sarcoidosis (given elevated Vit D), s/p EBUS on 9/8   - s/p Octreotide scan >> 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 Zari Ahmadi #[id number]

## 2023-09-17 NOTE — PROGRESS NOTE ADULT - SUBJECTIVE AND OBJECTIVE BOX
[facility] NEPHROLOGY FOLLOW UP NOTE  --------------------------------------------------------------------------------  24 hour events/subjective: Patient examined. Appears comfortable.    PAST HISTORY  --------------------------------------------------------------------------------  No significant changes to PMH, PSH, FHx, SHx, unless otherwise noted    ALLERGIES & MEDICATIONS  --------------------------------------------------------------------------------  Allergies    allopurinol (Rash (Moderate))    Standing Inpatient Medications  benztropine 2 milliGRAM(s) Oral daily  fluPHENAZine 10 milliGRAM(s) Oral daily  heparin   Injectable 5000 Unit(s) SubCutaneous every 8 hours  octreotide  Injectable 150 MICROGram(s) SubCutaneous three times a day  sodium bicarbonate 1300 milliGRAM(s) Oral three times a day    VITALS/PHYSICAL EXAM  --------------------------------------------------------------------------------  T(C): 36.8 (09-17-23 @ 05:50), Max: 36.8 (09-17-23 @ 05:50)  HR: 75 (09-17-23 @ 05:50) (75 - 87)  BP: 93/55 (09-17-23 @ 05:50) (93/55 - 101/53)  RR: 20 (09-17-23 @ 05:50) (18 - 20)    Physical Exam:  	Gen: NAD, no supplemental O2  	Pulm: No crackles, no wheezes B/L  	CV: RRR, S1S2  	Abd: +BS, soft, nontender/nondistended  	: No suprapubic tenderness  	LE: Warm, no edema    LABS/STUDIES  --------------------------------------------------------------------------------              8.9    4.85  >-----------<  128      [09-16-23 @ 06:06]              28.0     136  |  108  |  48  ----------------------------<  86      [09-16-23 @ 06:06]  5.0   |  19  |  3.0        Ca     9.8     [09-16-23 @ 06:06]      Mg     2.0     [09-16-23 @ 06:06]    Creatinine Trend:  SCr 3.0 [09-16 @ 06:06]  SCr 3.2 [09-13 @ 06:52]  SCr 2.8 [09-11 @ 07:05]  SCr 3.2 [09-10 @ 06:09]  SCr 3.3 [09-09 @ 06:23]    Urinalysis - [09-16-23 @ 06:06]      Color  / Appearance  / SG  / pH       Gluc 86 / Ketone   / Bili  / Urobili        Blood  / Protein  / Leuk Est  / Nitrite       RBC  / WBC  / Hyaline  / Gran  / Sq Epi  / Non Sq Epi  / Bacteria     PTH -- (Ca 12.1)      [08-29-23 @ 08:30]   8  PTH -- (Ca 12.3)      [06-23-23 @ 06:11]   12  PTH -- (Ca 12.7)      [04-15-23 @ 08:22]   8  Vitamin D (25OH) 16      [08-29-23 @ 08:30]  TSH 2.27      [04-15-23 @ 08:22]  Lipid: chol 121, , HDL 18, LDL --      [06-22-23 @ 06:00]

## 2023-09-18 LAB
ANION GAP SERPL CALC-SCNC: 11 MMOL/L — SIGNIFICANT CHANGE UP (ref 7–14)
BASOPHILS # BLD AUTO: 0.04 K/UL — SIGNIFICANT CHANGE UP (ref 0–0.2)
BASOPHILS NFR BLD AUTO: 0.8 % — SIGNIFICANT CHANGE UP (ref 0–1)
BUN SERPL-MCNC: 47 MG/DL — HIGH (ref 10–20)
CALCIUM SERPL-MCNC: 11 MG/DL — HIGH (ref 8.4–10.5)
CHLORIDE SERPL-SCNC: 106 MMOL/L — SIGNIFICANT CHANGE UP (ref 98–110)
CO2 SERPL-SCNC: 21 MMOL/L — SIGNIFICANT CHANGE UP (ref 17–32)
CREAT SERPL-MCNC: 3.3 MG/DL — HIGH (ref 0.7–1.5)
EGFR: 20 ML/MIN/1.73M2 — LOW
EOSINOPHIL # BLD AUTO: 1.11 K/UL — HIGH (ref 0–0.7)
EOSINOPHIL NFR BLD AUTO: 21.1 % — HIGH (ref 0–8)
GLUCOSE SERPL-MCNC: 99 MG/DL — SIGNIFICANT CHANGE UP (ref 70–99)
HCT VFR BLD CALC: 28.6 % — LOW (ref 42–52)
HGB BLD-MCNC: 9.2 G/DL — LOW (ref 14–18)
IMM GRANULOCYTES NFR BLD AUTO: 1.7 % — HIGH (ref 0.1–0.3)
LYMPHOCYTES # BLD AUTO: 1.62 K/UL — SIGNIFICANT CHANGE UP (ref 1.2–3.4)
LYMPHOCYTES # BLD AUTO: 30.9 % — SIGNIFICANT CHANGE UP (ref 20.5–51.1)
MAGNESIUM SERPL-MCNC: 2.1 MG/DL — SIGNIFICANT CHANGE UP (ref 1.8–2.4)
MCHC RBC-ENTMCNC: 28.8 PG — SIGNIFICANT CHANGE UP (ref 27–31)
MCHC RBC-ENTMCNC: 32.2 G/DL — SIGNIFICANT CHANGE UP (ref 32–37)
MCV RBC AUTO: 89.4 FL — SIGNIFICANT CHANGE UP (ref 80–94)
MONOCYTES # BLD AUTO: 1.39 K/UL — HIGH (ref 0.1–0.6)
MONOCYTES NFR BLD AUTO: 26.5 % — HIGH (ref 1.7–9.3)
NEUTROPHILS # BLD AUTO: 1 K/UL — LOW (ref 1.4–6.5)
NEUTROPHILS NFR BLD AUTO: 19 % — LOW (ref 42.2–75.2)
NRBC # BLD: 0 /100 WBCS — SIGNIFICANT CHANGE UP (ref 0–0)
PLATELET # BLD AUTO: 133 K/UL — SIGNIFICANT CHANGE UP (ref 130–400)
PMV BLD: 10.3 FL — SIGNIFICANT CHANGE UP (ref 7.4–10.4)
POTASSIUM SERPL-MCNC: 4.8 MMOL/L — SIGNIFICANT CHANGE UP (ref 3.5–5)
POTASSIUM SERPL-SCNC: 4.8 MMOL/L — SIGNIFICANT CHANGE UP (ref 3.5–5)
RBC # BLD: 3.2 M/UL — LOW (ref 4.7–6.1)
RBC # FLD: 16.7 % — HIGH (ref 11.5–14.5)
SODIUM SERPL-SCNC: 138 MMOL/L — SIGNIFICANT CHANGE UP (ref 135–146)
WBC # BLD: 5.25 K/UL — SIGNIFICANT CHANGE UP (ref 4.8–10.8)
WBC # FLD AUTO: 5.25 K/UL — SIGNIFICANT CHANGE UP (ref 4.8–10.8)

## 2023-09-18 PROCEDURE — 99233 SBSQ HOSP IP/OBS HIGH 50: CPT

## 2023-09-18 PROCEDURE — 99232 SBSQ HOSP IP/OBS MODERATE 35: CPT

## 2023-09-18 RX ORDER — SODIUM CHLORIDE 9 MG/ML
1000 INJECTION INTRAMUSCULAR; INTRAVENOUS; SUBCUTANEOUS
Refills: 0 | Status: DISCONTINUED | OUTPATIENT
Start: 2023-09-18 | End: 2023-09-19

## 2023-09-18 RX ORDER — OCTREOTIDE ACETATE 200 UG/ML
150 INJECTION, SOLUTION INTRAVENOUS; SUBCUTANEOUS THREE TIMES A DAY
Refills: 0 | Status: DISCONTINUED | OUTPATIENT
Start: 2023-09-18 | End: 2023-09-25

## 2023-09-18 RX ORDER — SODIUM CHLORIDE 9 MG/ML
500 INJECTION INTRAMUSCULAR; INTRAVENOUS; SUBCUTANEOUS ONCE
Refills: 0 | Status: COMPLETED | OUTPATIENT
Start: 2023-09-18 | End: 2023-09-18

## 2023-09-18 RX ADMIN — OCTREOTIDE ACETATE 150 MICROGRAM(S): 200 INJECTION, SOLUTION INTRAVENOUS; SUBCUTANEOUS at 14:27

## 2023-09-18 RX ADMIN — HEPARIN SODIUM 5000 UNIT(S): 5000 INJECTION INTRAVENOUS; SUBCUTANEOUS at 06:45

## 2023-09-18 RX ADMIN — OCTREOTIDE ACETATE 150 MICROGRAM(S): 200 INJECTION, SOLUTION INTRAVENOUS; SUBCUTANEOUS at 21:22

## 2023-09-18 RX ADMIN — Medication 1300 MILLIGRAM(S): at 14:30

## 2023-09-18 RX ADMIN — HEPARIN SODIUM 5000 UNIT(S): 5000 INJECTION INTRAVENOUS; SUBCUTANEOUS at 14:29

## 2023-09-18 RX ADMIN — Medication 1300 MILLIGRAM(S): at 21:21

## 2023-09-18 RX ADMIN — FLUPHENAZINE HYDROCHLORIDE 10 MILLIGRAM(S): 1 TABLET, FILM COATED ORAL at 11:14

## 2023-09-18 RX ADMIN — Medication 1300 MILLIGRAM(S): at 06:46

## 2023-09-18 RX ADMIN — OCTREOTIDE ACETATE 150 MICROGRAM(S): 200 INJECTION, SOLUTION INTRAVENOUS; SUBCUTANEOUS at 06:45

## 2023-09-18 RX ADMIN — HEPARIN SODIUM 5000 UNIT(S): 5000 INJECTION INTRAVENOUS; SUBCUTANEOUS at 21:22

## 2023-09-18 RX ADMIN — Medication 2 MILLIGRAM(S): at 11:13

## 2023-09-18 NOTE — PROGRESS NOTE ADULT - SUBJECTIVE AND OBJECTIVE BOX
ELDA COVARRUBIAS 67y Male  MRN#: [id number]   Hospital Day: 25d    SUBJECTIVE  Patient is a 67y old Male who presents with a chief complaint of hypercalcemia (18 Sep 2023 10:39)  Currently admitted to medicine with the primary diagnosis of Hypercalcemia      INTERVAL HPI AND OVERNIGHT EVENTS:  Patient was examined and seen at bedside. This morning he is resting comfortably in bed and reports no issues or overnight events.    REVIEW OF SYMPTOMS:  CONSTITUTIONAL: No weakness, fevers or chills; No headaches  EYES: No visual changes, eye pain, or discharge  ENT: No vertigo; No ear pain or change in hearing; No sore throat or difficulty swallowing  NECK: No pain or stiffness  RESPIRATORY: No cough, wheezing, or hemoptysis; No shortness of breath  CARDIOVASCULAR: No chest pain or palpitations  GASTROINTESTINAL: No abdominal or epigastric pain; No nausea, vomiting, or hematemesis; No diarrhea or constipation; No melena or hematochezia  GENITOURINARY: No dysuria, frequency or hematuria  MUSCULOSKELETAL: No joint pain, no muscle pain, no weakness  NEUROLOGICAL: No numbness or weakness  SKIN: No itching or rashes    OBJECTIVE  PAST MEDICAL & SURGICAL HISTORY  Kidney stones    Schizophrenia    Lymphoma    Shingles    Atrophic kidney    H/O colonoscopy with polypectomy    Liver cyst    History of bladder surgery    H/O neuropathy    History of chemotherapy    Stage 3 chronic kidney disease    Retained urethral stent    H/O umbilical hernia repair    Elbow fracture    H/O cystoscopy      ALLERGIES:  allopurinol (Rash (Moderate))    MEDICATIONS:  STANDING MEDICATIONS  benztropine 2 milliGRAM(s) Oral daily  fluPHENAZine 10 milliGRAM(s) Oral daily  heparin   Injectable 5000 Unit(s) SubCutaneous every 8 hours  octreotide  Injectable 150 MICROGram(s) SubCutaneous three times a day  sodium bicarbonate 1300 milliGRAM(s) Oral three times a day    PRN MEDICATIONS      VITAL SIGNS: Last 24 Hours  T(C): 35.8 (18 Sep 2023 12:09), Max: 37 (17 Sep 2023 20:18)  T(F): 96.4 (18 Sep 2023 12:09), Max: 98.6 (17 Sep 2023 20:18)  HR: 81 (18 Sep 2023 12:09) (69 - 81)  BP: 87/52 (18 Sep 2023 12:09) (87/52 - 96/55)  BP(mean): --  RR: 18 (18 Sep 2023 12:09) (18 - 18)  SpO2: --    LABS:                        9.2    5.25  )-----------( 133      ( 18 Sep 2023 06:53 )             28.6     09-18    138  |  106  |  47<H>  ----------------------------<  99  4.8   |  21  |  3.3<H>    Ca    11.0<H>      18 Sep 2023 06:53  Mg     2.1     09-18        Urinalysis Basic - ( 18 Sep 2023 06:53 )    Color: x / Appearance: x / SG: x / pH: x  Gluc: 99 mg/dL / Ketone: x  / Bili: x / Urobili: x   Blood: x / Protein: x / Nitrite: x   Leuk Esterase: x / RBC: x / WBC x   Sq Epi: x / Non Sq Epi: x / Bacteria: x                RADIOLOGY:      PHYSICAL EXAM:  CONSTITUTIONAL: No acute distress, well-developed, well-groomed, AAOx3  HEAD: Atraumatic, normocephalic  EYES: EOM intact, PERRLA, conjunctiva and sclera clear  ENT: Supple, no masses, no thyromegaly, no bruits, no JVD; moist mucous membranes  PULMONARY: Clear to auscultation bilaterally; no wheezes, rales, or rhonchi  CARDIOVASCULAR: Regular rate and rhythm; no murmurs, rubs, or gallops  GASTROINTESTINAL: Soft, non-tender, non-distended; bowel sounds present  MUSCULOSKELETAL: 2+ peripheral pulses; no clubbing, no cyanosis, no edema  NEUROLOGY: non-focal  SKIN: No rashes or lesions; warm and dry    ASSESSMENT & PLAN  67y M PMH Schizophrenia (prior lithium use), nephrolithiasis c/b atrophic L kidney, R ureteral rivision, CKD4 (bsl Cr ~2.5), gout, chronic diarrhea, 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 ASHLEY on CKD.    Hematology and Oncology consulted given hx of Marginal Zone lymphoma.    #Recurrent Hypercalcemia likely 2/2 Lymphoma vs metastatic NET  - calcium 13.9 on admission. Trending down currently at 10.3  - on IV hydration   -s/p one dose of calcitonin, pamidronate, & Xgeva    #Hx of NHL marginal zone now with ? recurrence.  -He was in a good partial remission following 3 cycles of bendamustine and Rituxan treated in 2019 and 2020.  -He was unable to tolerate maintenance Rituxan.  -However, he was never in complete remission and had preferred just to be observed since we were dealing with a low grade lymphoma.  - was lost to follow up.  --CT Abdomen/Pelvis 2/23/23 Interval worsening in periesophageal and pelvic lymphadenopathy , other bulky lymph nodes in the abdomen and retroperitoneum appear stable. 1.3 indeterminate segment 4 liver lesion. Focal areas of pleural nodularity concerning for lymphomatous/neoplastic/metastatic process, new since prior.  --PET scan 5.23: 1.  Interval increase in size and decreased metabolic activity of the thoracoabdominal lymphadenopathy, some are new, probably recurrence /residual disease. New mildly metabolic 3 cm hypodensity in the liver segment 2/3, concerning for neoplasm or lymphoma.  Poorly defined small hypodensity in the segment 4, below the resolution of PET scan.  MRI will provide further elucidation. Diffuse mildly increased bone marrow activity, possibly reactive versus bone marrow involvement.   Hepatosplenomegaly.  -MRI abdomen 7.23:  Liver segment II 4 cm mass and Liver segment V  1.5 cm mass suspicious for Hepatic Lymphomas.  --Biopsy of Liver lesion 8.23: well differentiated Neuroendocrine tumor, Grade 3 Likely  metastatic.   -LDH normal 4.23   - NM Octreoscan Mutliple Areas (09.05.23 @ 16:43) >Focal area of increased uptake in the upper abdomen/epigastrium to the left of midline suspicious for neuroendocrine tissue expressing somatostatin receptors.    #Hepatic mass on imaging -Biopsy consistent with Well differentiated Neuroendocrine tumor, Grade 3, Likely metastatic. 8.23.      Recommendations:   - Please transfer patient to  for inpatient chemotherapy for marginal zone lymphoma (R-CVD)  - Please consult IR for PICC line to be placed ASAP for inpatient chemo

## 2023-09-18 NOTE — PROGRESS NOTE ADULT - SUBJECTIVE AND OBJECTIVE BOX
Nephrology progress note     no specific complaints    ON events/Subjective:     Allergies:  allopurinol (Rash (Moderate))    Hospital Medications:   MEDICATIONS  (STANDING):  benztropine 2 milliGRAM(s) Oral daily  fluPHENAZine 10 milliGRAM(s) Oral daily  heparin   Injectable 5000 Unit(s) SubCutaneous every 8 hours  octreotide  Injectable 150 MICROGram(s) SubCutaneous three times a day  sodium bicarbonate 1300 milliGRAM(s) Oral three times a day  sodium chloride 0.9% Bolus 500 milliLiter(s) IV Bolus once    REVIEW OF SYSTEMS:  CONSTITUTIONAL: No weakness, fevers or chills  EYES/ENT: No visual changes;  No vertigo or throat pain   NECK: No pain or stiffness  RESPIRATORY: No cough, wheezing, hemoptysis; No shortness of breath  CARDIOVASCULAR: No chest pain or palpitations.  GASTROINTESTINAL: No abdominal or epigastric pain. No nausea, vomiting, or hematemesis; No diarrhea or constipation. No melena or hematochezia.  GENITOURINARY: No dysuria, frequency, foamy urine, urinary urgency, incontinence or hematuria  NEUROLOGICAL: No numbness or weakness  SKIN: No itching, burning, rashes, or lesions   VASCULAR: No bilateral lower extremity edema.   All other review of systems is negative unless indicated above.    VITALS:  T(F): 98.2 (09-18-23 @ 05:00), Max: 98.6 (09-17-23 @ 20:18)  HR: 69 (09-18-23 @ 05:00)  BP: 90/58 (09-18-23 @ 05:00)  RR: 18 (09-18-23 @ 05:00)  SpO2: --  Wt(kg): --      PHYSICAL EXAM:  Constitutional: NAD  HEENT: anicteric sclera, oropharynx clear, MMM  Neck: No JVD  Respiratory: CTAB, no wheezes, rales or rhonchi  Cardiovascular: S1, S2, RRR  Gastrointestinal: BS+, soft, NT/ND  Extremities: No cyanosis or clubbing. No peripheral edema  Neurological: A/O x 3, no focal deficits  Psychiatric: Normal mood, normal affect  : No CVA tenderness. No zafar.   Skin: No rashes  Vascular Access:    LABS:  09-18    138  |  106  |  47<H>  ----------------------------<  99  4.8   |  21  |  3.3<H>    Ca    11.0<H>      18 Sep 2023 06:53  Mg     2.1     09-18                            9.2    5.25  )-----------( 133      ( 18 Sep 2023 06:53 )             28.6       Urine Studies:  Creatinine Trend: 3.3<--, 3.0<--, 3.2<--, 2.8<--, 3.2<--, 3.3<--  Urinalysis Basic - ( 18 Sep 2023 06:53 )    Color:  / Appearance:  / SG:  / pH:   Gluc: 99 mg/dL / Ketone:   / Bili:  / Urobili:    Blood:  / Protein:  / Nitrite:    Leuk Esterase:  / RBC:  / WBC    Sq Epi:  / Non Sq Epi:  / Bacteria:     CKD stage 4 - has atrophic kidney and other kidney with ureteral stricture - now close to baseline  ·lymphoma and now new neuroendocrine tumor on liver biopsy  ·hypercalcemia - appears driven by vit D 1,25 -   ·has not responded well to pamidronate as calcium level increasing again  ·chronic loose stools - causimg more dehydration  ·anemia s/p prbc  ·BP stable  ·low/normal  loose stools impproved  per pt had recent PET scan  with neck involvement (can not find study) and now with dysphagia  ·d/w pt per advanced directives and uncertain at this time   octreotide scan - with liver uptake only  metabolic acidosis    1) awaiting biopsy report - d/w pathologist and hopefully today with final result  2) continue self hydration - concerned that calcium increased again  3) labs every other day  4) fu GI reccomendations  5) started octreotide therapy  6) oncology f/u  7) continue sodium bicarbonate supplements

## 2023-09-18 NOTE — PROGRESS NOTE ADULT - ASSESSMENT
# recurrent Hypercalcemia 2/2 Lymphoma VS sarcoidosis   # hx of Non Hodgkin Lymphoma   # CT chest w worsening enlarged and new enlarged mediastinal and axillary L.N s/p EBUS on 9/8, pending biopsy result   # new liver mass >> Grade III Neuroendocrine tumor w elevated 5-HIAA level >> highly suspicious for carcinoid >> spoke to HO >> mets from unknown source likley GI  # pericardial effusion, stabe, no evidence of tamponade   # CKD stage IV/metabolic acidosis   # Schizoaffective disorder  # Anemia of chronic illness   # chronic Diarrhea likley from neuroendocrine tumor   # Dysphagia likley from enlarged L.N       Plans:    - s/p IVF, pamidronate and denosumab and calcitonin   - labs every other day (limit labwork)  - suspected progression of lymphoma VS less likely sarcoidosis (given elevated Vit D), s/p EBUS on 9/8   - s/p Octreotide scan >> 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 per Heme/onc, will need to be on Sandostatin as outpt  - s/p 2 units of prbc,  per Heme/onc reccs   - cont w Na bicarb 1300 tid  - dvt ppx    DISPO: awaiting biopsy results (delayed results - will follow)  may need inpatient chemo - Oncology follow up today   -spoke to patient about his plan of care     Attending Physician Dr. Zari Ahmadi #[id number]

## 2023-09-18 NOTE — CHART NOTE - NSCHARTNOTEFT_GEN_A_CORE
IV fluids were ordered for increased Calcium level again  Patient refuses despite extensive explanation on the risks   Encouraged PO fluid intake  Will trend Calcium

## 2023-09-18 NOTE — PROGRESS NOTE ADULT - SUBJECTIVE AND OBJECTIVE BOX
Patient is a 67y old  Male who presents with a chief complaint of Hypercalcemia (12 Sep 2023 11:30)      SUBJECTIVE / INTERVAL HPI - OVERNIGHT EVENTS:   patient seen this am   -no overnight events notified   -remains stable on medical floor - comfortable in bed   -awaiting biopsy results - chemo thereafter - oncology follow up today (bx results delayed - will follow)  -Nephrology following     Vital Signs Last 24 Hrs  T(C): 36.8 (18 Sep 2023 05:00), Max: 37 (17 Sep 2023 20:18)  T(F): 98.2 (18 Sep 2023 05:00), Max: 98.6 (17 Sep 2023 20:18)  HR: 69 (18 Sep 2023 05:00) (69 - 74)  BP: 90/58 (18 Sep 2023 05:00) (90/58 - 96/55)  BP(mean): --  RR: 18 (18 Sep 2023 05:00) (18 - 18)  SpO2: --    PHYSICAL EXAM:    General: not distress- resting comfortably in bed   HEENT: NC/AT; PERRL, clear conjunctiva  Neck: supple  Cardiovascular: +S1/S2; RRR  Respiratory: CTA b/l; no W/R/R  Gastrointestinal: soft, NT/ND; +BSx4  Extremities: WWP; 2+ peripheral pulses; no edema   Neurological: AAOx3; no focal deficits      ALLERGIES:  Allergies    allopurinol (Rash (Moderate))    Intolerances        LABS:                               9.2    5.25  )-----------( 133      ( 18 Sep 2023 06:53 )             28.6   09-18    138  |  106  |  47<H>  ----------------------------<  99  4.8   |  21  |  3.3<H>    Ca    11.0<H>      18 Sep 2023 06:53  Mg     2.1     09-18      Urinalysis Basic - ( 11 Sep 2023 07:05 )    Color: x / Appearance: x / SG: x / pH: x  Gluc: 85 mg/dL / Ketone: x  / Bili: x / Urobili: x   Blood: x / Protein: x / Nitrite: x   Leuk Esterase: x / RBC: x / WBC x   Sq Epi: x / Non Sq Epi: x / Bacteria: x        RADIOLOGY & ADDITIONAL TESTS: Reviewed.      MEDICATIONS  (STANDING):  benztropine 2 milliGRAM(s) Oral daily  fluPHENAZine 10 milliGRAM(s) Oral daily  heparin   Injectable 5000 Unit(s) SubCutaneous every 8 hours  octreotide  Injectable 150 MICROGram(s) SubCutaneous three times a day  sodium bicarbonate 1300 milliGRAM(s) Oral three times a day    MEDICATIONS  (PRN):

## 2023-09-19 LAB
ANION GAP SERPL CALC-SCNC: 12 MMOL/L — SIGNIFICANT CHANGE UP (ref 7–14)
BASOPHILS # BLD AUTO: 0 K/UL — SIGNIFICANT CHANGE UP (ref 0–0.2)
BASOPHILS NFR BLD AUTO: 0 % — SIGNIFICANT CHANGE UP (ref 0–1)
BUN SERPL-MCNC: 47 MG/DL — HIGH (ref 10–20)
CALCIUM SERPL-MCNC: 11.9 MG/DL — HIGH (ref 8.4–10.5)
CHLORIDE SERPL-SCNC: 101 MMOL/L — SIGNIFICANT CHANGE UP (ref 98–110)
CO2 SERPL-SCNC: 22 MMOL/L — SIGNIFICANT CHANGE UP (ref 17–32)
CREAT SERPL-MCNC: 3.6 MG/DL — HIGH (ref 0.7–1.5)
EGFR: 18 ML/MIN/1.73M2 — LOW
EOSINOPHIL # BLD AUTO: 1.16 K/UL — HIGH (ref 0–0.7)
EOSINOPHIL NFR BLD AUTO: 17 % — HIGH (ref 0–8)
GLUCOSE SERPL-MCNC: 147 MG/DL — HIGH (ref 70–99)
HCT VFR BLD CALC: 32.5 % — LOW (ref 42–52)
HGB BLD-MCNC: 10.4 G/DL — LOW (ref 14–18)
LYMPHOCYTES # BLD AUTO: 1.96 K/UL — SIGNIFICANT CHANGE UP (ref 1.2–3.4)
LYMPHOCYTES # BLD AUTO: 28.6 % — SIGNIFICANT CHANGE UP (ref 20.5–51.1)
MAGNESIUM SERPL-MCNC: 2.1 MG/DL — SIGNIFICANT CHANGE UP (ref 1.8–2.4)
MCHC RBC-ENTMCNC: 29.2 PG — SIGNIFICANT CHANGE UP (ref 27–31)
MCHC RBC-ENTMCNC: 32 G/DL — SIGNIFICANT CHANGE UP (ref 32–37)
MCV RBC AUTO: 91.3 FL — SIGNIFICANT CHANGE UP (ref 80–94)
MONOCYTES # BLD AUTO: 0.98 K/UL — HIGH (ref 0.1–0.6)
MONOCYTES NFR BLD AUTO: 14.3 % — HIGH (ref 1.7–9.3)
NEUTROPHILS # BLD AUTO: 2.2 K/UL — SIGNIFICANT CHANGE UP (ref 1.4–6.5)
NEUTROPHILS NFR BLD AUTO: 32.1 % — LOW (ref 42.2–75.2)
NRBC # BLD: SIGNIFICANT CHANGE UP /100 WBCS (ref 0–0)
PLATELET # BLD AUTO: 152 K/UL — SIGNIFICANT CHANGE UP (ref 130–400)
PMV BLD: 10.5 FL — HIGH (ref 7.4–10.4)
POTASSIUM SERPL-MCNC: 4.6 MMOL/L — SIGNIFICANT CHANGE UP (ref 3.5–5)
POTASSIUM SERPL-SCNC: 4.6 MMOL/L — SIGNIFICANT CHANGE UP (ref 3.5–5)
RBC # BLD: 3.56 M/UL — LOW (ref 4.7–6.1)
RBC # FLD: 16.9 % — HIGH (ref 11.5–14.5)
SODIUM SERPL-SCNC: 135 MMOL/L — SIGNIFICANT CHANGE UP (ref 135–146)
VIP SERPL-MCNC: SIGNIFICANT CHANGE UP
WBC # BLD: 6.84 K/UL — SIGNIFICANT CHANGE UP (ref 4.8–10.8)
WBC # FLD AUTO: 6.84 K/UL — SIGNIFICANT CHANGE UP (ref 4.8–10.8)

## 2023-09-19 PROCEDURE — 99232 SBSQ HOSP IP/OBS MODERATE 35: CPT

## 2023-09-19 RX ORDER — SODIUM CHLORIDE 9 MG/ML
1000 INJECTION INTRAMUSCULAR; INTRAVENOUS; SUBCUTANEOUS
Refills: 0 | Status: DISCONTINUED | OUTPATIENT
Start: 2023-09-19 | End: 2023-09-22

## 2023-09-19 RX ORDER — SODIUM CHLORIDE 9 MG/ML
500 INJECTION INTRAMUSCULAR; INTRAVENOUS; SUBCUTANEOUS ONCE
Refills: 0 | Status: COMPLETED | OUTPATIENT
Start: 2023-09-19 | End: 2023-09-19

## 2023-09-19 RX ADMIN — Medication 1300 MILLIGRAM(S): at 13:07

## 2023-09-19 RX ADMIN — Medication 1300 MILLIGRAM(S): at 05:39

## 2023-09-19 RX ADMIN — OCTREOTIDE ACETATE 150 MICROGRAM(S): 200 INJECTION, SOLUTION INTRAVENOUS; SUBCUTANEOUS at 22:39

## 2023-09-19 RX ADMIN — OCTREOTIDE ACETATE 150 MICROGRAM(S): 200 INJECTION, SOLUTION INTRAVENOUS; SUBCUTANEOUS at 13:09

## 2023-09-19 RX ADMIN — HEPARIN SODIUM 5000 UNIT(S): 5000 INJECTION INTRAVENOUS; SUBCUTANEOUS at 21:44

## 2023-09-19 RX ADMIN — HEPARIN SODIUM 5000 UNIT(S): 5000 INJECTION INTRAVENOUS; SUBCUTANEOUS at 05:39

## 2023-09-19 RX ADMIN — Medication 2 MILLIGRAM(S): at 13:07

## 2023-09-19 RX ADMIN — Medication 1300 MILLIGRAM(S): at 21:44

## 2023-09-19 RX ADMIN — FLUPHENAZINE HYDROCHLORIDE 10 MILLIGRAM(S): 1 TABLET, FILM COATED ORAL at 13:08

## 2023-09-19 RX ADMIN — OCTREOTIDE ACETATE 150 MICROGRAM(S): 200 INJECTION, SOLUTION INTRAVENOUS; SUBCUTANEOUS at 05:39

## 2023-09-19 RX ADMIN — HEPARIN SODIUM 5000 UNIT(S): 5000 INJECTION INTRAVENOUS; SUBCUTANEOUS at 13:15

## 2023-09-19 NOTE — PROGRESS NOTE ADULT - SUBJECTIVE AND OBJECTIVE BOX
ELDA COVARRUBIAS 67y Male  MRN#: [id number]   Hospital Day: 26d    SUBJECTIVE  Patient is a 67y old Male who presents with a chief complaint of Hypercalcemia (19 Sep 2023 11:21)  Currently admitted to medicine with the primary diagnosis of Hypercalcemia      INTERVAL HPI AND OVERNIGHT EVENTS:  Patient was examined and seen at bedside. This morning he is resting comfortably in bed and reports no issues or overnight events.    REVIEW OF SYMPTOMS:  CONSTITUTIONAL: No weakness, fevers or chills; No headaches  EYES: No visual changes, eye pain, or discharge  ENT: No vertigo; No ear pain or change in hearing; No sore throat or difficulty swallowing  NECK: No pain or stiffness  RESPIRATORY: No cough, wheezing, or hemoptysis; No shortness of breath  CARDIOVASCULAR: No chest pain or palpitations  GASTROINTESTINAL: No abdominal or epigastric pain; No nausea, vomiting, or hematemesis; No diarrhea or constipation; No melena or hematochezia  GENITOURINARY: No dysuria, frequency or hematuria  MUSCULOSKELETAL: No joint pain, no muscle pain, no weakness  NEUROLOGICAL: No numbness or weakness  SKIN: No itching or rashes    OBJECTIVE  PAST MEDICAL & SURGICAL HISTORY  Kidney stones    Schizophrenia    Lymphoma    Shingles    Atrophic kidney    H/O colonoscopy with polypectomy    Liver cyst    History of bladder surgery    H/O neuropathy    History of chemotherapy    Stage 3 chronic kidney disease    Retained urethral stent    H/O umbilical hernia repair    Elbow fracture    H/O cystoscopy      ALLERGIES:  allopurinol (Rash (Moderate))    MEDICATIONS:  STANDING MEDICATIONS  benztropine 2 milliGRAM(s) Oral daily  fluPHENAZine 10 milliGRAM(s) Oral daily  heparin   Injectable 5000 Unit(s) SubCutaneous every 8 hours  octreotide  Injectable 150 MICROGram(s) SubCutaneous three times a day  sodium bicarbonate 1300 milliGRAM(s) Oral three times a day  sodium chloride 0.9%. 1000 milliLiter(s) IV Continuous <Continuous>    PRN MEDICATIONS      VITAL SIGNS: Last 24 Hours  T(C): 36 (19 Sep 2023 12:30), Max: 37.1 (19 Sep 2023 05:37)  T(F): 96.8 (19 Sep 2023 12:30), Max: 98.7 (19 Sep 2023 05:37)  HR: 73 (19 Sep 2023 12:30) (73 - 81)  BP: 92/54 (19 Sep 2023 12:30) (81/50 - 92/54)  BP(mean): --  RR: 18 (19 Sep 2023 12:30) (18 - 18)  SpO2: 95% (19 Sep 2023 12:30) (95% - 98%)    LABS:                        10.4   6.84  )-----------( 152      ( 19 Sep 2023 11:26 )             32.5     09-18    138  |  106  |  47<H>  ----------------------------<  99  4.8   |  21  |  3.3<H>    Ca    11.0<H>      18 Sep 2023 06:53  Mg     2.1     09-18        Urinalysis Basic - ( 18 Sep 2023 06:53 )    Color: x / Appearance: x / SG: x / pH: x  Gluc: 99 mg/dL / Ketone: x  / Bili: x / Urobili: x   Blood: x / Protein: x / Nitrite: x   Leuk Esterase: x / RBC: x / WBC x   Sq Epi: x / Non Sq Epi: x / Bacteria: x                RADIOLOGY:      PHYSICAL EXAM:  CONSTITUTIONAL: No acute distress, well-developed, well-groomed, AAOx3  HEAD: Atraumatic, normocephalic  EYES: EOM intact, PERRLA, conjunctiva and sclera clear  ENT: Supple, no masses, no thyromegaly, no bruits, no JVD; moist mucous membranes  PULMONARY: Clear to auscultation bilaterally; no wheezes, rales, or rhonchi  CARDIOVASCULAR: Regular rate and rhythm; no murmurs, rubs, or gallops  GASTROINTESTINAL: Soft, non-tender, non-distended; bowel sounds present  MUSCULOSKELETAL: 2+ peripheral pulses; no clubbing, no cyanosis, no edema  NEUROLOGY: non-focal  SKIN: No rashes or lesions; warm and dry    ASSESSMENT & PLAN  ASSESSMENT & PLAN  67y M PMH Schizophrenia (prior lithium use), nephrolithiasis c/b atrophic L kidney, R ureteral rivision, CKD4 (bsl Cr ~2.5), gout, chronic diarrhea, 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 ASHLEY on CKD.    Hematology and Oncology consulted given hx of Marginal Zone lymphoma.    #Recurrent Hypercalcemia likely 2/2 Lymphoma vs metastatic NET  - calcium 13.9 on admission. Trending down currently at 10.3  - on IV hydration   -s/p one dose of calcitonin, pamidronate, & Xgeva    #Hx of NHL marginal zone now with ? recurrence.  -He was in a good partial remission following 3 cycles of bendamustine and Rituxan treated in 2019 and 2020.  -He was unable to tolerate maintenance Rituxan.  -However, he was never in complete remission and had preferred just to be observed since we were dealing with a low grade lymphoma.  - was lost to follow up.  --CT Abdomen/Pelvis 2/23/23 Interval worsening in periesophageal and pelvic lymphadenopathy , other bulky lymph nodes in the abdomen and retroperitoneum appear stable. 1.3 indeterminate segment 4 liver lesion. Focal areas of pleural nodularity concerning for lymphomatous/neoplastic/metastatic process, new since prior.  --PET scan 5.23: 1.  Interval increase in size and decreased metabolic activity of the thoracoabdominal lymphadenopathy, some are new, probably recurrence /residual disease. New mildly metabolic 3 cm hypodensity in the liver segment 2/3, concerning for neoplasm or lymphoma.  Poorly defined small hypodensity in the segment 4, below the resolution of PET scan.  MRI will provide further elucidation. Diffuse mildly increased bone marrow activity, possibly reactive versus bone marrow involvement.   Hepatosplenomegaly.  -MRI abdomen 7.23:  Liver segment II 4 cm mass and Liver segment V  1.5 cm mass suspicious for Hepatic Lymphomas.  --Biopsy of Liver lesion 8.23: well differentiated Neuroendocrine tumor, Grade 3 Likely  metastatic.   -LDH normal 4.23   - NM Octreoscan Mutliple Areas (09.05.23 @ 16:43) >Focal area of increased uptake in the upper abdomen/epigastrium to the left of midline suspicious for neuroendocrine tissue expressing somatostatin receptors.    #Hepatic mass on imaging -Biopsy consistent with Well differentiated Neuroendocrine tumor, Grade 3, Likely metastatic. 8.23.      Recommendations:   - Plan to start R-CVP inpatient chemotherapy tomorrow via peripheral IV, no PICC line or chemo port needed

## 2023-09-19 NOTE — PROGRESS NOTE ADULT - ASSESSMENT
# recurrent Hypercalcemia 2/2 Lymphoma VS sarcoidosis   # hx of Non Hodgkin Lymphoma   # CT chest w worsening enlarged and new enlarged mediastinal and axillary L.N s/p EBUS on 9/8, pending biopsy result   # new liver mass >> Grade III Neuroendocrine tumor w elevated 5-HIAA level >> highly suspicious for carcinoid >> spoke to HO >> mets from unknown source likley GI  # pericardial effusion, stabe, no evidence of tamponade   # CKD stage IV/metabolic acidosis   # Schizoaffective disorder  # Anemia of chronic illness   # chronic Diarrhea likley from neuroendocrine tumor   # Dysphagia likley from enlarged L.N       Plans:    - s/p IVF, pamidronate and denosumab and calcitonin   - labs every other day (limit labwork)  - suspected progression of lymphoma VS less likely sarcoidosis (given elevated Vit D), s/p EBUS on 9/8   - s/p Octreotide scan >> 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 per Heme/onc, will need to be on Sandostatin as outpt  - s/p 2 units of prbc,  per Heme/onc reccs   - cont w Na bicarb 1300 tid  - dvt ppx  - Pt agreed to being put back on fluids; started on 100ml/hour saline  - Spoke with IR; worried about infection of PICC line so they are considering putting in a port instead   - IR also wanted to know if chemo can be given through peripheral IV until placement of port at discharge     DISPO: awaiting biopsy results (delayed results - will follow)  may need inpatient chemo - Oncology follow up today   -spoke to patient about his plan of care  # recurrent Hypercalcemia 2/2 Lymphoma VS sarcoidosis   # hx of Non Hodgkin Lymphoma   # CT chest w worsening enlarged and new enlarged mediastinal and axillary L.N s/p EBUS on 9/8, pending biopsy result   # new liver mass >> Grade III Neuroendocrine tumor w elevated 5-HIAA level >> highly suspicious for carcinoid >> spoke to HO >> mets from unknown source likley GI  # pericardial effusion, stabe, no evidence of tamponade   # CKD stage IV/metabolic acidosis   # Schizoaffective disorder  # Anemia of chronic illness   # chronic Diarrhea likley from neuroendocrine tumor   # Dysphagia likley from enlarged L.N       Plans:    - s/p IVF, pamidronate and denosumab and calcitonin   - labs every other day (limit labwork)  - suspected progression of lymphoma VS less likely sarcoidosis (given elevated Vit D), s/p EBUS on 9/8   - s/p Octreotide scan >> 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 per Heme/onc, will need to be on Sandostatin as outpt  - s/p 2 units of prbc,  per Heme/onc reccs   - cont w Na bicarb 1300 tid  - dvt ppx  - Pt agreed to being put back on fluids; started on 100ml/hour saline  - Spoke with IR; worried about infection of PICC line so they are considering putting in a port instead   - IR also wanted to know if chemo can be given through peripheral IV until placement of port at discharge  - Oncology 9/19- Plan to start R-CVP inpatient chemotherapy tomorrow via peripheral IV, no PICC line or chemo port needed     DISPO: awaiting biopsy results (delayed results - will follow)  may need inpatient chemo - Oncology follow up today   -spoke to patient about his plan of care

## 2023-09-19 NOTE — PROGRESS NOTE ADULT - SUBJECTIVE AND OBJECTIVE BOX
24H events:    Patient is a 67y old Male who presents with a chief complaint of hypercalcemia (19 Sep 2023 10:36)    Primary diagnosis of Hypercalcemia      Day 1:  Day 2:  Day 3:     Today is hospital day 26d. This morning patient was seen and examined at bedside, resting comfortably in bed.    No acute or major events overnight. Patient was examined and was not in pain. Patient says he is eating, sleeping, walking, urinating, and having bowel movements without any problems.     Code Status:    Family communication:  Contact date:  Name of person contacted:  Relationship to patient:  Communication details:  What matters most:    PAST MEDICAL & SURGICAL HISTORY  Kidney stones    Schizophrenia    Lymphoma    Shingles    Atrophic kidney    H/O colonoscopy with polypectomy    Liver cyst    History of bladder surgery    H/O neuropathy    History of chemotherapy    Stage 3 chronic kidney disease    Retained urethral stent    H/O umbilical hernia repair    Elbow fracture    H/O cystoscopy      SOCIAL HISTORY:  Social History:      ALLERGIES:  allopurinol (Rash (Moderate))    MEDICATIONS:  STANDING MEDICATIONS  benztropine 2 milliGRAM(s) Oral daily  fluPHENAZine 10 milliGRAM(s) Oral daily  heparin   Injectable 5000 Unit(s) SubCutaneous every 8 hours  octreotide  Injectable 150 MICROGram(s) SubCutaneous three times a day  sodium bicarbonate 1300 milliGRAM(s) Oral three times a day  sodium chloride 0.9%. 1000 milliLiter(s) IV Continuous <Continuous>    PRN MEDICATIONS    VITALS:   T(F): 98.7  HR: 81  BP: 81/50  RR: 18  SpO2: 98%    PHYSICAL EXAM:  GENERAL:   ( x ) NAD, lying in bed comfortably     (  ) obtunded     (  ) lethargic     (  ) somnolent    HEAD:   (  ) Atraumatic     (  ) hematoma     (  ) laceration (specify location:       )     NECK:  (  ) Supple     (  ) neck stiffness     (  ) nuchal rigidity     (  )  no JVD     (  ) JVD present ( -- cm)    HEART:  Rate -->     ( x ) normal rate     (  ) bradycardic     (  ) tachycardic  Rhythm -->     (x  ) regular     (  ) regularly irregular     (  ) irregularly irregular  Murmurs -->     ( x ) normal s1s2     (  ) systolic murmur     (  ) diastolic murmur     (  ) continuous murmur      (  ) S3 present     (  ) S4 present    LUNGS:   ( x )Unlabored respirations     (  ) tachypnea  ( x ) B/L air entry     (  ) decreased breath sounds in:  (location     )    ( x ) no adventitious sound     (  ) crackles     (  ) wheezing      (  ) rhonchi      (specify location:       )  (  ) chest wall tenderness (specify location:       )    ABDOMEN:   (  ) Soft     (  ) tense   |   (  ) nondistended     (  ) distended   |   (  ) +BS     (  ) hypoactive bowel sounds     (  ) hyperactive bowel sounds  (  ) nontender     (  ) RUQ tenderness     (  ) RLQ tenderness     (  ) LLQ tenderness     (  ) epigastric tenderness     (  ) diffuse tenderness  (  ) Splenomegaly      (  ) Hepatomegaly      (  ) Jaundice     (  ) ecchymosis     EXTREMITIES:  (  ) Normal     (  ) Rash     (  ) ecchymosis     (  ) varicose veins      (  ) pitting edema     (  ) non-pitting edema   (  ) ulceration     (  ) gangrene:     (location:     )    NERVOUS SYSTEM:    (  ) A&Ox3     (  ) confused     (  ) lethargic  CN II-XII:     (  ) Intact     (  ) deficits found     (Specify:     )   Upper extremities:     (  ) no sensorimotor deficits     (  ) weakness     (  ) loss of proprioception/vibration     (  ) loss of touch/temperature (specify:    )  Lower extremities:     (  ) no sensorimotor deficits     (  ) weakness     (  ) loss of proprioception/vibration     (  ) loss of touch/temperature (specify:    )    SKIN:   (  ) No rashes or lesions     (  ) maculopapular rash     (  ) pustules     (  ) vesicles     (  ) ulcer     (  ) ecchymosis     (specify location:     )    AMPAC score:    (  ) [place]welling [place] Catheter:   Date insterted:    Reason (  ) Critical illness     (  ) urinary retention    (  ) Accurate Ins/Outs Monitoring     (  ) CMO patient    (  ) Central Line:   Date inserted:  Location: (  ) Right IJ     (  ) Left IJ     (  ) Right Fem     (  ) Left Fem    (  ) SPC        (  ) pigtail       (  ) PEG tube       (  ) colostomy       (  ) jejunostomy  (  ) U-Dall    LABS:                        9.2    5.25  )-----------( 133      ( 18 Sep 2023 06:53 )             28.6     09-18    138  |  106  |  47<H>  ----------------------------<  99  4.8   |  21  |  3.3<H>    Ca    11.0<H>      18 Sep 2023 06:53  Mg     2.1     09-18        Urinalysis Basic - ( 18 Sep 2023 06:53 )    Color: x / Appearance: x / SG: x / pH: x  Gluc: 99 mg/dL / Ketone: x  / Bili: x / Urobili: x   Blood: x / Protein: x / Nitrite: x   Leuk Esterase: x / RBC: x / WBC x   Sq Epi: x / Non Sq Epi: x / Bacteria: x                RADIOLOGY:

## 2023-09-19 NOTE — PROGRESS NOTE ADULT - SUBJECTIVE AND OBJECTIVE BOX
Nephrology progress note       ON events/Subjective:     Allergies:  allopurinol (Rash (Moderate))    Hospital Medications:   MEDICATIONS  (STANDING):  benztropine 2 milliGRAM(s) Oral daily  fluPHENAZine 10 milliGRAM(s) Oral daily  heparin   Injectable 5000 Unit(s) SubCutaneous every 8 hours  octreotide  Injectable 150 MICROGram(s) SubCutaneous three times a day  sodium bicarbonate 1300 milliGRAM(s) Oral three times a day  sodium chloride 0.9% Bolus 500 milliLiter(s) IV Bolus once  sodium chloride 0.9%. 1000 milliLiter(s) (100 mL/Hr) IV Continuous <Continuous>    REVIEW OF SYSTEMS:  CONSTITUTIONAL: No weakness, fevers or chills  EYES/ENT: No visual changes;  No vertigo or throat pain   NECK: No pain or stiffness  RESPIRATORY: No cough, wheezing, hemoptysis; No shortness of breath  CARDIOVASCULAR: No chest pain or palpitations.  GASTROINTESTINAL: No abdominal or epigastric pain. No nausea, vomiting, or hematemesis; No diarrhea or constipation. No melena or hematochezia.  GENITOURINARY: No dysuria, frequency, foamy urine, urinary urgency, incontinence or hematuria  NEUROLOGICAL: No numbness or weakness  SKIN: No itching, burning, rashes, or lesions   VASCULAR: No bilateral lower extremity edema.   All other review of systems is negative unless indicated above.    VITALS:  T(F): 98.7 (09-19-23 @ 05:37), Max: 98.7 (09-19-23 @ 05:37)  HR: 81 (09-19-23 @ 05:37)  BP: 81/50 (09-19-23 @ 05:37)  RR: 18 (09-19-23 @ 05:37)  SpO2: 98% (09-18-23 @ 19:23)  Wt(kg): --      PHYSICAL EXAM:  Constitutional: NAD  HEENT: anicteric sclera, oropharynx clear, MMM  Neck: No JVD  Respiratory: CTAB, no wheezes, rales or rhonchi  Cardiovascular: S1, S2, RRR  Gastrointestinal: BS+, soft, NT/ND  Extremities: No cyanosis or clubbing. No peripheral edema  Neurological: A/O x 3, no focal deficits  Psychiatric: Normal mood, normal affect  : No CVA tenderness. No zafar.   Skin: No rashes  Vascular Access:    LABS:  09-18    138  |  106  |  47<H>  ----------------------------<  99  4.8   |  21  |  3.3<H>    Ca    11.0<H>      18 Sep 2023 06:53  Mg     2.1     09-18                            9.2    5.25  )-----------( 133      ( 18 Sep 2023 06:53 )             28.6       Urine Studies:  Creatinine Trend: 3.3<--, 3.0<--, 3.2<--, 2.8<--, 3.2<--, 3.3<--  Urinalysis Basic - ( 18 Sep 2023 06:53 )    Color:  / Appearance:  / SG:  / pH:   Gluc: 99 mg/dL / Ketone:   / Bili:  / Urobili:    Blood:  / Protein:  / Nitrite:    Leuk Esterase:  / RBC:  / WBC    Sq Epi:  / Non Sq Epi:  / Bacteria:     CKD stage 4 - has atrophic kidney and other kidney with ureteral stricture - now close to baseline  ·lymphoma and now new neuroendocrine tumor on liver biopsy  ·hypercalcemia - appears driven by vit D 1,25 -   ·has not responded well to pamidronate as calcium level increasing again  ·chronic loose stools - causimg more dehydration  ·anemia s/p prbc  ·BP stable  ·low/normal  loose stools improved  per pt had recent PET scan  with neck involvement (can not find study) and now with dysphagia  ·d/w pt per advanced directives and uncertain at this time   octreotide scan - with liver uptake only  metabolic acidosis  inconclusive neck biopsy    1) transferred to oncology service to start chemo for reoccurrence of lymphoma  2) as calcium increasing and creatinine increasing please restart  cc/hr - pt agrees  3) am labs  d/w oncology and medicine and pt

## 2023-09-20 LAB
ANION GAP SERPL CALC-SCNC: 11 MMOL/L — SIGNIFICANT CHANGE UP (ref 7–14)
BASOPHILS # BLD AUTO: 0.04 K/UL — SIGNIFICANT CHANGE UP (ref 0–0.2)
BASOPHILS NFR BLD AUTO: 0.7 % — SIGNIFICANT CHANGE UP (ref 0–1)
BUN SERPL-MCNC: 51 MG/DL — HIGH (ref 10–20)
CALCIUM SERPL-MCNC: 11.7 MG/DL — HIGH (ref 8.4–10.4)
CHLORIDE SERPL-SCNC: 104 MMOL/L — SIGNIFICANT CHANGE UP (ref 98–110)
CHROM ANALY OVERALL INTERP SPEC-IMP: SIGNIFICANT CHANGE UP
CO2 SERPL-SCNC: 21 MMOL/L — SIGNIFICANT CHANGE UP (ref 17–32)
CREAT SERPL-MCNC: 3.7 MG/DL — HIGH (ref 0.7–1.5)
EGFR: 17 ML/MIN/1.73M2 — LOW
EOSINOPHIL # BLD AUTO: 1 K/UL — HIGH (ref 0–0.7)
EOSINOPHIL NFR BLD AUTO: 18.3 % — HIGH (ref 0–8)
GLUCOSE SERPL-MCNC: 182 MG/DL — HIGH (ref 70–99)
HCT VFR BLD CALC: 29 % — LOW (ref 42–52)
HGB BLD-MCNC: 9 G/DL — LOW (ref 14–18)
IMM GRANULOCYTES NFR BLD AUTO: 1.5 % — HIGH (ref 0.1–0.3)
LYMPHOCYTES # BLD AUTO: 1.6 K/UL — SIGNIFICANT CHANGE UP (ref 1.2–3.4)
LYMPHOCYTES # BLD AUTO: 29.4 % — SIGNIFICANT CHANGE UP (ref 20.5–51.1)
MCHC RBC-ENTMCNC: 28.4 PG — SIGNIFICANT CHANGE UP (ref 27–31)
MCHC RBC-ENTMCNC: 31 G/DL — LOW (ref 32–37)
MCV RBC AUTO: 91.5 FL — SIGNIFICANT CHANGE UP (ref 80–94)
MONOCYTES # BLD AUTO: 1.21 K/UL — HIGH (ref 0.1–0.6)
MONOCYTES NFR BLD AUTO: 22.2 % — HIGH (ref 1.7–9.3)
NEUTROPHILS # BLD AUTO: 1.52 K/UL — SIGNIFICANT CHANGE UP (ref 1.4–6.5)
NEUTROPHILS NFR BLD AUTO: 27.9 % — LOW (ref 42.2–75.2)
NRBC # BLD: 0 /100 WBCS — SIGNIFICANT CHANGE UP (ref 0–0)
PHOSPHATE SERPL-MCNC: 5.1 MG/DL — HIGH (ref 2.1–4.9)
PLATELET # BLD AUTO: 126 K/UL — LOW (ref 130–400)
PMV BLD: 10.8 FL — HIGH (ref 7.4–10.4)
POTASSIUM SERPL-MCNC: 4.5 MMOL/L — SIGNIFICANT CHANGE UP (ref 3.5–5)
POTASSIUM SERPL-SCNC: 4.5 MMOL/L — SIGNIFICANT CHANGE UP (ref 3.5–5)
RBC # BLD: 3.17 M/UL — LOW (ref 4.7–6.1)
RBC # FLD: 17.2 % — HIGH (ref 11.5–14.5)
SODIUM SERPL-SCNC: 136 MMOL/L — SIGNIFICANT CHANGE UP (ref 135–146)
WBC # BLD: 5.45 K/UL — SIGNIFICANT CHANGE UP (ref 4.8–10.8)
WBC # FLD AUTO: 5.45 K/UL — SIGNIFICANT CHANGE UP (ref 4.8–10.8)

## 2023-09-20 PROCEDURE — 99232 SBSQ HOSP IP/OBS MODERATE 35: CPT

## 2023-09-20 RX ORDER — VINCRISTINE SULFATE 1 MG/ML
2 VIAL (ML) INTRAVENOUS ONCE
Refills: 0 | Status: COMPLETED | OUTPATIENT
Start: 2023-09-20 | End: 2023-09-20

## 2023-09-20 RX ORDER — CHLORHEXIDINE GLUCONATE 213 G/1000ML
1 SOLUTION TOPICAL
Refills: 0 | Status: DISCONTINUED | OUTPATIENT
Start: 2023-09-20 | End: 2023-09-25

## 2023-09-20 RX ORDER — FAMOTIDINE 10 MG/ML
20 INJECTION INTRAVENOUS ONCE
Refills: 0 | Status: COMPLETED | OUTPATIENT
Start: 2023-09-20 | End: 2023-09-20

## 2023-09-20 RX ORDER — CYCLOPHOSPHAMIDE 100 MG
1635 VIAL (EA) INTRAVENOUS ONCE
Refills: 0 | Status: COMPLETED | OUTPATIENT
Start: 2023-09-20 | End: 2023-09-20

## 2023-09-20 RX ORDER — ONDANSETRON 8 MG/1
16 TABLET, FILM COATED ORAL ONCE
Refills: 0 | Status: COMPLETED | OUTPATIENT
Start: 2023-09-20 | End: 2023-09-20

## 2023-09-20 RX ORDER — DIPHENHYDRAMINE HCL 50 MG
50 CAPSULE ORAL ONCE
Refills: 0 | Status: COMPLETED | OUTPATIENT
Start: 2023-09-20 | End: 2023-09-21

## 2023-09-20 RX ORDER — RITUXIMAB 10 MG/ML
820 INJECTION, SOLUTION INTRAVENOUS ONCE
Refills: 0 | Status: COMPLETED | OUTPATIENT
Start: 2023-09-20 | End: 2023-09-21

## 2023-09-20 RX ORDER — FOSAPREPITANT DIMEGLUMINE 150 MG/5ML
150 INJECTION, POWDER, LYOPHILIZED, FOR SOLUTION INTRAVENOUS ONCE
Refills: 0 | Status: COMPLETED | OUTPATIENT
Start: 2023-09-20 | End: 2023-09-20

## 2023-09-20 RX ADMIN — OCTREOTIDE ACETATE 150 MICROGRAM(S): 200 INJECTION, SOLUTION INTRAVENOUS; SUBCUTANEOUS at 21:21

## 2023-09-20 RX ADMIN — Medication 1635 MILLIGRAM(S): at 16:58

## 2023-09-20 RX ADMIN — SODIUM CHLORIDE 100 MILLILITER(S): 9 INJECTION INTRAMUSCULAR; INTRAVENOUS; SUBCUTANEOUS at 12:03

## 2023-09-20 RX ADMIN — Medication 1300 MILLIGRAM(S): at 21:21

## 2023-09-20 RX ADMIN — HEPARIN SODIUM 5000 UNIT(S): 5000 INJECTION INTRAVENOUS; SUBCUTANEOUS at 12:09

## 2023-09-20 RX ADMIN — Medication 2 MILLIGRAM(S): at 18:21

## 2023-09-20 RX ADMIN — SODIUM CHLORIDE 100 MILLILITER(S): 9 INJECTION INTRAMUSCULAR; INTRAVENOUS; SUBCUTANEOUS at 00:00

## 2023-09-20 RX ADMIN — FLUPHENAZINE HYDROCHLORIDE 10 MILLIGRAM(S): 1 TABLET, FILM COATED ORAL at 12:04

## 2023-09-20 RX ADMIN — FOSAPREPITANT DIMEGLUMINE 500 MILLIGRAM(S): 150 INJECTION, POWDER, LYOPHILIZED, FOR SOLUTION INTRAVENOUS at 15:22

## 2023-09-20 RX ADMIN — Medication 100 MILLIGRAM(S): at 15:27

## 2023-09-20 RX ADMIN — ONDANSETRON 116 MILLIGRAM(S): 8 TABLET, FILM COATED ORAL at 15:22

## 2023-09-20 RX ADMIN — Medication 2 MILLIGRAM(S): at 12:03

## 2023-09-20 RX ADMIN — OCTREOTIDE ACETATE 150 MICROGRAM(S): 200 INJECTION, SOLUTION INTRAVENOUS; SUBCUTANEOUS at 06:44

## 2023-09-20 RX ADMIN — Medication 1300 MILLIGRAM(S): at 13:04

## 2023-09-20 RX ADMIN — Medication 1300 MILLIGRAM(S): at 06:45

## 2023-09-20 RX ADMIN — OCTREOTIDE ACETATE 150 MICROGRAM(S): 200 INJECTION, SOLUTION INTRAVENOUS; SUBCUTANEOUS at 12:06

## 2023-09-20 RX ADMIN — HEPARIN SODIUM 5000 UNIT(S): 5000 INJECTION INTRAVENOUS; SUBCUTANEOUS at 06:45

## 2023-09-20 RX ADMIN — FAMOTIDINE 104 MILLIGRAM(S): 10 INJECTION INTRAVENOUS at 15:22

## 2023-09-20 RX ADMIN — HEPARIN SODIUM 5000 UNIT(S): 5000 INJECTION INTRAVENOUS; SUBCUTANEOUS at 21:26

## 2023-09-20 NOTE — PROGRESS NOTE ADULT - SUBJECTIVE AND OBJECTIVE BOX
HPI  Patient is a 67y old Male who presents with a chief complaint of hypercalcemia (20 Sep 2023 12:20)    Currently admitted to medicine with the primary diagnosis of Hypercalcemia       Today is hospital day 27d.     INTERVAL HPI / OVERNIGHT EVENTS:  Patient was seen and examined at bedside  No new complaints. awaiting plans for chemo  Denies any complains of chest pain or shortness of breath  Denies any abdominal pain/nausea/vomiting        PAST MEDICAL & SURGICAL HISTORY  Kidney stones    Schizophrenia    Lymphoma    Shingles    Atrophic kidney    H/O colonoscopy with polypectomy    Liver cyst    History of bladder surgery    H/O neuropathy    History of chemotherapy    Stage 3 chronic kidney disease    Retained urethral stent    H/O umbilical hernia repair    Elbow fracture    H/O cystoscopy      ALLERGIES  allopurinol (Rash (Moderate))    MEDICATIONS  STANDING MEDICATIONS  benztropine 2 milliGRAM(s) Oral daily  chlorhexidine 2% Cloths 1 Application(s) Topical <User Schedule>  diphenhydrAMINE IVPB 50 milliGRAM(s) IV Intermittent once  fluPHENAZine 10 milliGRAM(s) Oral daily  heparin   Injectable 5000 Unit(s) SubCutaneous every 8 hours  octreotide  Injectable 150 MICROGram(s) SubCutaneous three times a day  predniSONE   Tablet 100 milliGRAM(s) Oral every 24 hours  [id number] (RUXIENCE) IVPB (eMAR) 820 milliGRAM(s) IV Intermittent once  sodium bicarbonate 1300 milliGRAM(s) Oral three times a day  sodium chloride 0.9%. 1000 milliLiter(s) IV Continuous <Continuous>  vinCRIStine IVPB (eMAR) 2 milliGRAM(s) IV Intermittent once    PRN MEDICATIONS    VITALS:  T(F): 96.3  HR: 71  BP: 87/52  RR: 18  SpO2: --    PHYSICAL EXAM  GEN: no distress, comfortable  PULM: normal respiration  ABD: Soft, non-distended, no guarding; non-tender  EXT: No lower extremity edema  NEURO: A&Ox3, moving all extremities    LABS                        9.0    5.45  )-----------( 126      ( 20 Sep 2023 08:54 )             29.0     09-20    136  |  104  |  51<H>  ----------------------------<  182<H>  4.5   |  21  |  3.7<H>    Ca    11.7<H>      20 Sep 2023 08:54  Phos  5.1     09-20  Mg     2.1     09-19        Urinalysis Basic - ( 20 Sep 2023 08:54 )    Color: x / Appearance: x / SG: x / pH: x  Gluc: 182 mg/dL / Ketone: x  / Bili: x / Urobili: x   Blood: x / Protein: x / Nitrite: x   Leuk Esterase: x / RBC: x / WBC x   Sq Epi: x / Non Sq Epi: x / Bacteria: x                RADIOLOGY

## 2023-09-20 NOTE — PROGRESS NOTE ADULT - ASSESSMENT
67y old Male who presents with a chief complaint of Hypercalcemia and diarrhea    #Recurrent Hypercalcemia likely 2/2 Lymphoma vs metastatic NET  - on IV hydration   -s/p  calcitonin, pamidronate, & Xgeva  monitor    # Hx of NHL marginal zone- with recurrence?  # metastatic neuroendocrine tumor from liver biopsy- possibly from GI source  # CT chest w worsening enlarged and new enlarged mediastinal and axillary L.N s/p EBUS on 9/8  -He was  treated in 2019 and 2020 but was unable to tolerate maintenance Rituxan.  -never in complete remission - observed for low grade lymphoma, but patient was lost to follow up.  -MRI abdomen 7.23:  Liver segment II 4 cm mass and Liver segment V  1.5 cm mass suspicious for Hepatic Lymphomas.  --Biopsy of Liver lesion 8.23: well differentiated Neuroendocrine tumor, Grade 3 Likely  metastatic.   -LDH normal 4.23   - NM Octreoscan Mutliple Areas (09.05.23 @ 16:43) >Focal area of increased uptake in the upper abdomen/epigastrium to the left of midline suspicious for neuroendocrine tissue expressing somatostatin receptors.    - heme/onc following- plan to start R-CVP inpatient chemotherapy today  - continue octreotide    # pericardial effusion, stable, no evidence of tamponade     # CKD stage IV/metabolic acidosis   monitor  cont sodium bicarb    # Schizoaffective disorder    # Anemia of chronic illness   s/p 2 u PRBC  monitor    # chronic Diarrhea likley from neuroendocrine tumor     # Dysphagia likley from enlarged L.N       - dvt ppx    Pending:  Chemotherapy, Hypercalcemia improvement  Plan of care d/w patient

## 2023-09-20 NOTE — PROGRESS NOTE ADULT - SUBJECTIVE AND OBJECTIVE BOX
24H events:    Patient is a 67y old Male who presents with a chief complaint of hypercalcemia (20 Sep 2023 11:45)    Primary diagnosis of Hypercalcemia      Day 1:  Day 2:  Day 3:     Today is hospital day 27d. This morning patient was seen and examined at bedside, resting comfortably in bed.    No acute or major events overnight. Patient mentions to me that he is doing good today. He has no complaints today.     Code Status:    Family communication:  Contact date:  Name of person contacted:  Relationship to patient:  Communication details:  What matters most:    PAST MEDICAL & SURGICAL HISTORY  Kidney stones    Schizophrenia    Lymphoma    Shingles    Atrophic kidney    H/O colonoscopy with polypectomy    Liver cyst    History of bladder surgery    H/O neuropathy    History of chemotherapy    Stage 3 chronic kidney disease    Retained urethral stent    H/O umbilical hernia repair    Elbow fracture    H/O cystoscopy      SOCIAL HISTORY:  Social History:      ALLERGIES:  allopurinol (Rash (Moderate))    MEDICATIONS:  STANDING MEDICATIONS  benztropine 2 milliGRAM(s) Oral daily  cyclophosphamide IVPB (eMAR) 1635 milliGRAM(s) IV Intermittent once  diphenhydrAMINE IVPB 50 milliGRAM(s) IV Intermittent once  famotidine  IVPB 20 milliGRAM(s) IV Intermittent once  fluPHENAZine 10 milliGRAM(s) Oral daily  fosaprepitant IVPB 150 milliGRAM(s) IV Intermittent once  heparin   Injectable 5000 Unit(s) SubCutaneous every 8 hours  octreotide  Injectable 150 MICROGram(s) SubCutaneous three times a day  ondansetron  IVPB 16 milliGRAM(s) IV Intermittent once  predniSONE   Tablet 100 milliGRAM(s) Oral every 24 hours  riTUXimab-pvvr (RUXIENCE) IVPB (eMAR) 820 milliGRAM(s) IV Intermittent once  sodium bicarbonate 1300 milliGRAM(s) Oral three times a day  sodium chloride 0.9%. 1000 milliLiter(s) IV Continuous <Continuous>  vinCRIStine IVPB (eMAR) 2 milliGRAM(s) IV Intermittent once    PRN MEDICATIONS    VITALS:   T(F): 99.1  HR: 80  BP: 91/51  RR: 18  SpO2: 95%    PHYSICAL EXAM:  GENERAL:   (x  ) NAD, lying in bed comfortably     (  ) obtunded     (  ) lethargic     (  ) somnolent    HEAD:   (  ) Atraumatic     (  ) hematoma     (  ) laceration (specify location:       )     NECK:  (  ) Supple     (  ) neck stiffness     (  ) nuchal rigidity     (  )  no JVD     (  ) JVD present ( -- cm)    HEART:  Rate -->     (x  ) normal rate     (  ) bradycardic     (  ) tachycardic  Rhythm -->     (  x) regular     (  ) regularly irregular     (  ) irregularly irregular  Murmurs -->     ( x ) normal s1s2     (  ) systolic murmur     (  ) diastolic murmur     (  ) continuous murmur      (  ) S3 present     (  ) S4 present    LUNGS:   ( x )Unlabored respirations     (  ) tachypnea  (x  ) B/L air entry     (  ) decreased breath sounds in:  (location     )    (x  ) no adventitious sound     (  ) crackles     (  ) wheezing      (  ) rhonchi      (specify location:       )  (  ) chest wall tenderness (specify location:       )    ABDOMEN:   (  ) Soft     (  ) tense   |   (  ) nondistended     (  ) distended   |   (  ) +BS     (  ) hypoactive bowel sounds     (  ) hyperactive bowel sounds  (  ) nontender     (  ) RUQ tenderness     (  ) RLQ tenderness     (  ) LLQ tenderness     (  ) epigastric tenderness     (  ) diffuse tenderness  (  ) Splenomegaly      (  ) Hepatomegaly      (  ) Jaundice     (  ) ecchymosis     EXTREMITIES:  (  ) Normal     (  ) Rash     (  ) ecchymosis     (  ) varicose veins      (  ) pitting edema     (  ) non-pitting edema   (  ) ulceration     (  ) gangrene:     (location:     )    NERVOUS SYSTEM:    (  ) A&Ox3     (  ) confused     (  ) lethargic  CN II-XII:     (  ) Intact     (  ) deficits found     (Specify:     )   Upper extremities:     (  ) no sensorimotor deficits     (  ) weakness     (  ) loss of proprioception/vibration     (  ) loss of touch/temperature (specify:    )  Lower extremities:     (  ) no sensorimotor deficits     (  ) weakness     (  ) loss of proprioception/vibration     (  ) loss of touch/temperature (specify:    )    SKIN:   (  ) No rashes or lesions     (  ) maculopapular rash     (  ) pustules     (  ) vesicles     (  ) ulcer     (  ) ecchymosis     (specify location:     )    AMPAC score:    (  ) [place] Catheter:   Date insterted:    Reason (  ) Critical illness     (  ) urinary retention    (  ) Accurate Ins/Outs Monitoring     (  ) CMO patient    (  ) Central Line:   Date inserted:  Location: (  ) Right IJ     (  ) Left IJ     (  ) Right Fem     (  ) Left Fem    (  ) SPC        (  ) pigtail       (  ) PEG tube       (  ) colostomy       (  ) jejunostomy  (  ) U-Dall    LABS:                        9.0    5.45  )-----------( 126      ( 20 Sep 2023 08:54 )             29.0     09-20    136  |  104  |  51<H>  ----------------------------<  182<H>  4.5   |  21  |  3.7<H>    Ca    11.7<H>      20 Sep 2023 08:54  Phos  5.1     09-20  Mg     2.1     09-19        Urinalysis Basic - ( 20 Sep 2023 08:54 )    Color: x / Appearance: x / SG: x / pH: x  Gluc: 182 mg/dL / Ketone: x  / Bili: x / Urobili: x   Blood: x / Protein: x / Nitrite: x   Leuk Esterase: x / RBC: x / WBC x   Sq Epi: x / Non Sq Epi: x / Bacteria: x                RADIOLOGY:

## 2023-09-20 NOTE — PROGRESS NOTE ADULT - ASSESSMENT
# recurrent Hypercalcemia 2/2 Lymphoma VS sarcoidosis   # hx of Non Hodgkin Lymphoma   # CT chest w worsening enlarged and new enlarged mediastinal and axillary L.N s/p EBUS on 9/8, pending biopsy result   # new liver mass >> Grade III Neuroendocrine tumor w elevated 5-HIAA level >> highly suspicious for carcinoid >> spoke to HO >> mets from unknown source likley GI  # pericardial effusion, stabe, no evidence of tamponade   # CKD stage IV/metabolic acidosis   # Schizoaffective disorder  # Anemia of chronic illness   # chronic Diarrhea likley from neuroendocrine tumor   # Dysphagia likley from enlarged L.N       Plans:    - s/p IVF, pamidronate and denosumab and calcitonin   - labs every other day (limit labwork)  - suspected progression of lymphoma VS less likely sarcoidosis (given elevated Vit D), s/p EBUS on 9/8   - s/p Octreotide scan >> 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 per Heme/onc, will need to be on Sandostatin as outpt  - s/p 2 units of prbc,  per Heme/onc reccs   - cont w Na bicarb 1300 tid  - dvt ppx  - Pt agreed to being put back on fluids; started on 100ml/hour saline  - Spoke with IR; worried about infection of PICC line so they are considering putting in a port instead   - IR also wanted to know if chemo can be given through peripheral IV until placement of port at discharge  - Oncology 9/20- Plan to start R-CVP inpatient chemotherapy today via peripheral IV, no PICC line or chemo port needed  - Calcium (9/20)- 11.7 down from 11.9 on 9/17    DISPO: awaiting biopsy results (delayed results - will follow)

## 2023-09-20 NOTE — PROGRESS NOTE ADULT - SUBJECTIVE AND OBJECTIVE BOX
Nephrology progress note     no complaints    ON events/Subjective:     Allergies:  allopurinol (Rash (Moderate))    Hospital Medications:   MEDICATIONS  (STANDING):  benztropine 2 milliGRAM(s) Oral daily  cyclophosphamide IVPB (eMAR) 1635 milliGRAM(s) IV Intermittent once  diphenhydrAMINE IVPB 50 milliGRAM(s) IV Intermittent once  famotidine  IVPB 20 milliGRAM(s) IV Intermittent once  fluPHENAZine 10 milliGRAM(s) Oral daily  fosaprepitant IVPB 150 milliGRAM(s) IV Intermittent once  heparin   Injectable 5000 Unit(s) SubCutaneous every 8 hours  octreotide  Injectable 150 MICROGram(s) SubCutaneous three times a day  ondansetron  IVPB 16 milliGRAM(s) IV Intermittent once  predniSONE   Tablet 100 milliGRAM(s) Oral every 24 hours  riTUXimab-pvvr (RUXIENCE) IVPB (eMAR) 820 milliGRAM(s) IV Intermittent once  sodium bicarbonate 1300 milliGRAM(s) Oral three times a day  sodium chloride 0.9%. 1000 milliLiter(s) (100 mL/Hr) IV Continuous <Continuous>  vinCRIStine IVPB (eMAR) 2 milliGRAM(s) IV Intermittent once    REVIEW OF SYSTEMS:  CONSTITUTIONAL: No weakness, fevers or chills  EYES/ENT: No visual changes;  No vertigo or throat pain   NECK: No pain or stiffness  RESPIRATORY: No cough, wheezing, hemoptysis; No shortness of breath  CARDIOVASCULAR: No chest pain or palpitations.  GASTROINTESTINAL: No abdominal or epigastric pain. No nausea, vomiting, or hematemesis; No diarrhea or constipation. No melena or hematochezia.  GENITOURINARY: No dysuria, frequency, foamy urine, urinary urgency, incontinence or hematuria  NEUROLOGICAL: No numbness or weakness  SKIN: No itching, burning, rashes, or lesions   VASCULAR: No bilateral lower extremity edema.   All other review of systems is negative unless indicated above.    VITALS:  T(F): 99.1 (09-20-23 @ 04:45), Max: 99.1 (09-20-23 @ 04:45)  HR: 80 (09-20-23 @ 06:55)  BP: 91/51 (09-20-23 @ 06:55)  RR: 18 (09-20-23 @ 04:45)  SpO2: 95% (09-19-23 @ 12:30)  Wt(kg): --    09-19 @ 07:01  -  09-20 @ 07:00  --------------------------------------------------------  IN: 0 mL / OUT: 1000 mL / NET: -1000 mL        PHYSICAL EXAM:  Constitutional: NAD  HEENT: anicteric sclera, oropharynx clear, MMM  Neck: No JVD  Respiratory: CTAB, no wheezes, rales or rhonchi  Cardiovascular: S1, S2, RRR  Gastrointestinal: BS+, soft, NT/ND  Extremities: No cyanosis or clubbing. No peripheral edema  Neurological: A/O x 3, no focal deficits  Psychiatric: Normal mood, normal affect  : No CVA tenderness. No zafar.   Skin: No rashes  Vascular Access:    LABS:  09-20    136  |  104  |  51<H>  ----------------------------<  182<H>  4.5   |  21  |  3.7<H>    Ca    11.7<H>      20 Sep 2023 08:54  Phos  5.1     09-20  Mg     2.1     09-19                            9.0    5.45  )-----------( 126      ( 20 Sep 2023 08:54 )             29.0       Urine Studies:  Creatinine Trend: 3.7<--, 3.6<--, 3.3<--, 3.0<--, 3.2<--, 2.8<--  Urinalysis Basic - ( 20 Sep 2023 08:54 )    Color:  / Appearance:  / SG:  / pH:   Gluc: 182 mg/dL / Ketone:   / Bili:  / Urobili:    Blood:  / Protein:  / Nitrite:    Leuk Esterase:  / RBC:  / WBC    Sq Epi:  / Non Sq Epi:  / Bacteria:       CKD stage 4 - has atrophic kidney and other kidney with ureteral stricture - now close to baseline  ·lymphoma and now new neuroendocrine tumor on liver biopsy  ·hypercalcemia - appears driven by vit D 1,25 -   ·has not responded well to pamidronate as calcium level increasing again  ·chronic loose stools - causimg more dehydration  ·anemia s/p prbc  ·BP low today  ·low/normal  loose stools improved  per pt had recent PET scan  with neck involvement (can not find study) and now with dysphagia  ·d/w pt per advanced directives and uncertain at this time   octreotide scan - with liver uptake only  metabolic acidosis  inconclusive neck biopsy    1) transferred to oncology service to start chemo for reoccurrence of lymphoma for today  2) as calcium increasing and creatinine increasing please continue  cc/hr - pt agrees  3) am labs  d/w patient at bedsidet

## 2023-09-21 LAB
ALBUMIN SERPL ELPH-MCNC: 3.3 G/DL — LOW (ref 3.5–5.2)
ALP SERPL-CCNC: 630 U/L — HIGH (ref 30–115)
ALT FLD-CCNC: 23 U/L — SIGNIFICANT CHANGE UP (ref 0–41)
ANION GAP SERPL CALC-SCNC: 11 MMOL/L — SIGNIFICANT CHANGE UP (ref 7–14)
ANION GAP SERPL CALC-SCNC: 16 MMOL/L — HIGH (ref 7–14)
AST SERPL-CCNC: 13 U/L — SIGNIFICANT CHANGE UP (ref 0–41)
BASOPHILS # BLD AUTO: 0.03 K/UL — SIGNIFICANT CHANGE UP (ref 0–0.2)
BASOPHILS NFR BLD AUTO: 0.8 % — SIGNIFICANT CHANGE UP (ref 0–1)
BILIRUB SERPL-MCNC: 0.5 MG/DL — SIGNIFICANT CHANGE UP (ref 0.2–1.2)
BUN SERPL-MCNC: 57 MG/DL — HIGH (ref 10–20)
BUN SERPL-MCNC: 61 MG/DL — CRITICAL HIGH (ref 10–20)
CALCIUM SERPL-MCNC: 10.4 MG/DL — SIGNIFICANT CHANGE UP (ref 8.4–10.5)
CALCIUM SERPL-MCNC: 10.4 MG/DL — SIGNIFICANT CHANGE UP (ref 8.4–10.5)
CHLORIDE SERPL-SCNC: 103 MMOL/L — SIGNIFICANT CHANGE UP (ref 98–110)
CHLORIDE SERPL-SCNC: 105 MMOL/L — SIGNIFICANT CHANGE UP (ref 98–110)
CO2 SERPL-SCNC: 14 MMOL/L — LOW (ref 17–32)
CO2 SERPL-SCNC: 19 MMOL/L — SIGNIFICANT CHANGE UP (ref 17–32)
CREAT SERPL-MCNC: 3.5 MG/DL — HIGH (ref 0.7–1.5)
CREAT SERPL-MCNC: 3.6 MG/DL — HIGH (ref 0.7–1.5)
EGFR: 18 ML/MIN/1.73M2 — LOW
EGFR: 19 ML/MIN/1.73M2 — LOW
EOSINOPHIL # BLD AUTO: 0.13 K/UL — SIGNIFICANT CHANGE UP (ref 0–0.7)
EOSINOPHIL NFR BLD AUTO: 3.3 % — SIGNIFICANT CHANGE UP (ref 0–8)
GLUCOSE SERPL-MCNC: 136 MG/DL — HIGH (ref 70–99)
GLUCOSE SERPL-MCNC: 178 MG/DL — HIGH (ref 70–99)
HCT VFR BLD CALC: 32.8 % — LOW (ref 42–52)
HGB BLD-MCNC: 10.3 G/DL — LOW (ref 14–18)
IMM GRANULOCYTES NFR BLD AUTO: 4.8 % — HIGH (ref 0.1–0.3)
LYMPHOCYTES # BLD AUTO: 1.13 K/UL — LOW (ref 1.2–3.4)
LYMPHOCYTES # BLD AUTO: 28.5 % — SIGNIFICANT CHANGE UP (ref 20.5–51.1)
MCHC RBC-ENTMCNC: 28.2 PG — SIGNIFICANT CHANGE UP (ref 27–31)
MCHC RBC-ENTMCNC: 31.4 G/DL — LOW (ref 32–37)
MCV RBC AUTO: 89.9 FL — SIGNIFICANT CHANGE UP (ref 80–94)
MONOCYTES # BLD AUTO: 0.33 K/UL — SIGNIFICANT CHANGE UP (ref 0.1–0.6)
MONOCYTES NFR BLD AUTO: 8.3 % — SIGNIFICANT CHANGE UP (ref 1.7–9.3)
NEUTROPHILS # BLD AUTO: 2.16 K/UL — SIGNIFICANT CHANGE UP (ref 1.4–6.5)
NEUTROPHILS NFR BLD AUTO: 54.3 % — SIGNIFICANT CHANGE UP (ref 42.2–75.2)
NRBC # BLD: 0 /100 WBCS — SIGNIFICANT CHANGE UP (ref 0–0)
PLATELET # BLD AUTO: 139 K/UL — SIGNIFICANT CHANGE UP (ref 130–400)
PMV BLD: 10.7 FL — HIGH (ref 7.4–10.4)
POTASSIUM SERPL-MCNC: 5 MMOL/L — SIGNIFICANT CHANGE UP (ref 3.5–5)
POTASSIUM SERPL-MCNC: 5.1 MMOL/L — HIGH (ref 3.5–5)
POTASSIUM SERPL-SCNC: 5 MMOL/L — SIGNIFICANT CHANGE UP (ref 3.5–5)
POTASSIUM SERPL-SCNC: 5.1 MMOL/L — HIGH (ref 3.5–5)
PROT SERPL-MCNC: 4.4 G/DL — LOW (ref 6–8)
RBC # BLD: 3.65 M/UL — LOW (ref 4.7–6.1)
RBC # FLD: 16.8 % — HIGH (ref 11.5–14.5)
SODIUM SERPL-SCNC: 133 MMOL/L — LOW (ref 135–146)
SODIUM SERPL-SCNC: 135 MMOL/L — SIGNIFICANT CHANGE UP (ref 135–146)
WBC # BLD: 3.97 K/UL — LOW (ref 4.8–10.8)
WBC # FLD AUTO: 3.97 K/UL — LOW (ref 4.8–10.8)

## 2023-09-21 PROCEDURE — 99232 SBSQ HOSP IP/OBS MODERATE 35: CPT

## 2023-09-21 RX ORDER — DIPHENHYDRAMINE HCL 50 MG
50 CAPSULE ORAL ONCE
Refills: 0 | Status: COMPLETED | OUTPATIENT
Start: 2023-09-21 | End: 2023-09-21

## 2023-09-21 RX ORDER — SODIUM ZIRCONIUM CYCLOSILICATE 10 G/10G
5 POWDER, FOR SUSPENSION ORAL ONCE
Refills: 0 | Status: COMPLETED | OUTPATIENT
Start: 2023-09-21 | End: 2023-09-21

## 2023-09-21 RX ORDER — ACETAMINOPHEN 500 MG
650 TABLET ORAL ONCE
Refills: 0 | Status: COMPLETED | OUTPATIENT
Start: 2023-09-21 | End: 2023-09-21

## 2023-09-21 RX ADMIN — HEPARIN SODIUM 5000 UNIT(S): 5000 INJECTION INTRAVENOUS; SUBCUTANEOUS at 23:13

## 2023-09-21 RX ADMIN — Medication 2 MILLIGRAM(S): at 11:16

## 2023-09-21 RX ADMIN — Medication 650 MILLIGRAM(S): at 10:54

## 2023-09-21 RX ADMIN — RITUXIMAB 820 MILLIGRAM(S): 10 INJECTION, SOLUTION INTRAVENOUS at 11:20

## 2023-09-21 RX ADMIN — FLUPHENAZINE HYDROCHLORIDE 10 MILLIGRAM(S): 1 TABLET, FILM COATED ORAL at 11:16

## 2023-09-21 RX ADMIN — Medication 100 MILLIGRAM(S): at 15:27

## 2023-09-21 RX ADMIN — SODIUM ZIRCONIUM CYCLOSILICATE 5 GRAM(S): 10 POWDER, FOR SUSPENSION ORAL at 15:27

## 2023-09-21 RX ADMIN — Medication 1300 MILLIGRAM(S): at 05:41

## 2023-09-21 RX ADMIN — OCTREOTIDE ACETATE 150 MICROGRAM(S): 200 INJECTION, SOLUTION INTRAVENOUS; SUBCUTANEOUS at 05:41

## 2023-09-21 RX ADMIN — Medication 1300 MILLIGRAM(S): at 23:14

## 2023-09-21 RX ADMIN — CHLORHEXIDINE GLUCONATE 1 APPLICATION(S): 213 SOLUTION TOPICAL at 05:42

## 2023-09-21 RX ADMIN — Medication 102 MILLIGRAM(S): at 10:53

## 2023-09-21 RX ADMIN — Medication 50 MILLIGRAM(S): at 15:58

## 2023-09-21 RX ADMIN — OCTREOTIDE ACETATE 150 MICROGRAM(S): 200 INJECTION, SOLUTION INTRAVENOUS; SUBCUTANEOUS at 23:14

## 2023-09-21 RX ADMIN — Medication 650 MILLIGRAM(S): at 12:00

## 2023-09-21 RX ADMIN — HEPARIN SODIUM 5000 UNIT(S): 5000 INJECTION INTRAVENOUS; SUBCUTANEOUS at 05:42

## 2023-09-21 NOTE — PROGRESS NOTE ADULT - SUBJECTIVE AND OBJECTIVE BOX
Nephrology progress note     no complaints    ON events/Subjective:     Allergies:  allopurinol (Rash (Moderate))    Hospital Medications:   MEDICATIONS  (STANDING):  benztropine 2 milliGRAM(s) Oral daily  chlorhexidine 2% Cloths 1 Application(s) Topical <User Schedule>  diphenhydrAMINE IVPB 50 milliGRAM(s) IV Intermittent once  fluPHENAZine 10 milliGRAM(s) Oral daily  heparin   Injectable 5000 Unit(s) SubCutaneous every 8 hours  octreotide  Injectable 150 MICROGram(s) SubCutaneous three times a day  predniSONE   Tablet 100 milliGRAM(s) Oral every 24 hours  riTUXimab-pvvr (RUXIENCE) IVPB (eMAR) 820 milliGRAM(s) IV Intermittent once  sodium bicarbonate 1300 milliGRAM(s) Oral three times a day  sodium chloride 0.9%. 1000 milliLiter(s) (100 mL/Hr) IV Continuous <Continuous>    REVIEW OF SYSTEMS:  CONSTITUTIONAL: No weakness, fevers or chills  EYES/ENT: No visual changes;  No vertigo or throat pain   NECK: No pain or stiffness  RESPIRATORY: No cough, wheezing, hemoptysis; No shortness of breath  CARDIOVASCULAR: No chest pain or palpitations.  GASTROINTESTINAL: No abdominal or epigastric pain. No nausea, vomiting, or hematemesis; No diarrhea or constipation. No melena or hematochezia.  GENITOURINARY: No dysuria, frequency, foamy urine, urinary urgency, incontinence or hematuria  NEUROLOGICAL: No numbness or weakness  SKIN: No itching, burning, rashes, or lesions   VASCULAR: No bilateral lower extremity edema.   All other review of systems is negative unless indicated above.    VITALS:  T(F): 96.2 (09-21-23 @ 04:00), Max: 98.2 (09-20-23 @ 21:35)  HR: 67 (09-21-23 @ 09:12)  BP: 99/56 (09-21-23 @ 09:12)  RR: 18 (09-21-23 @ 09:12)  SpO2: 97% (09-21-23 @ 09:12)  Wt(kg): --    09-19 @ 07:01  -  09-20 @ 07:00  --------------------------------------------------------  IN: 0 mL / OUT: 1000 mL / NET: -1000 mL        PHYSICAL EXAM:  Constitutional: NAD  HEENT: anicteric sclera, oropharynx clear, MMM  Neck: No JVD  Respiratory: CTAB, no wheezes, rales or rhonchi  Cardiovascular: S1, S2, RRR  Gastrointestinal: BS+, soft, NT/ND  Extremities: No cyanosis or clubbing. No peripheral edema  Neurological: A/O x 3, no focal deficits  Psychiatric: Normal mood, normal affect  : No CVA tenderness. No zafar.   Skin: No rashes  Vascular Access:    LABS:  09-21    133<L>  |  103  |  57<H>  ----------------------------<  178<H>  5.1<H>   |  19  |  3.6<H>    Ca    10.4      21 Sep 2023 08:47  Phos  5.1     09-20  Mg     2.1     09-19    TPro  4.4<L>  /  Alb  3.3<L>  /  TBili  0.5  /  DBili      /  AST  13  /  ALT  23  /  AlkPhos  630<H>  09-21                          10.3   3.97  )-----------( 139      ( 21 Sep 2023 08:47 )             32.8       Urine Studies:  Creatinine Trend: 3.6<--, 3.7<--, 3.6<--, 3.3<--, 3.0<--, 3.2<--  Urinalysis Basic - ( 21 Sep 2023 08:47 )    Color:  / Appearance:  / SG:  / pH:   Gluc: 178 mg/dL / Ketone:   / Bili:  / Urobili:    Blood:  / Protein:  / Nitrite:    Leuk Esterase:  / RBC:  / WBC    Sq Epi:  / Non Sq Epi:  / Bacteria:     CKD stage 4 - has atrophic kidney and other kidney with ureteral stricture - now close to baseline  ·lymphoma and now new neuroendocrine tumor on liver biopsy  ·hypercalcemia - appears driven by vit D 1,25 -   ·has not responded well to pamidronate as calcium level increasing again  ·chronic loose stools - causimg more dehydration  ·anemia s/p prbc  ·BP low today  ·low/normal  loose stools improved  per pt had recent PET scan  with neck involvement (can not find study) and now with dysphagia  ·d/w pt per advanced directives and uncertain at this time   octreotide scan - with liver uptake only  metabolic acidosis  inconclusive neck biopsy  started chemo fro lymphoma    1) continue NS at 100 cc/hr  2) if/when renal function improves and hypercalcemia improves suggest to then hold ivf and obsereve for 1 day prior to discharge

## 2023-09-21 NOTE — PROGRESS NOTE ADULT - ASSESSMENT
67y old Male who presents with a chief complaint of Hypercalcemia and diarrhea    #Recurrent Hypercalcemia likely 2/2 Lymphoma  - on IV hydration   -s/p  calcitonin, pamidronate, & Xgeva  monitor    # Hx of NHL marginal zone- with recurrence?  # metastatic neuroendocrine tumor from liver biopsy- possibly from GI source  # CT chest w worsening enlarged and new enlarged mediastinal and axillary L.N s/p EBUS on 9/8  -He was  treated in 2019 and 2020 but was unable to tolerate maintenance Rituxan.  -never in complete remission - observed for low grade lymphoma, but patient was lost to follow up.  -MRI abdomen 7.23:  Liver segment II 4 cm mass and Liver segment V  1.5 cm mass suspicious for Hepatic Lymphomas.  --Biopsy of Liver lesion 8.23: well differentiated Neuroendocrine tumor, Grade 3 Likely  metastatic.   -LDH normal 4.23   - NM Octreoscan Mutliple Areas (09.05.23 @ 16:43) >Focal area of increased uptake in the upper abdomen/epigastrium to the left of midline suspicious for neuroendocrine tissue expressing somatostatin receptors.    - heme/onc following- started on R-CVP treatment  - continue octreotide    # pericardial effusion, stable, no evidence of tamponade     # CKD stage IV/metabolic acidosis   monitor  cont sodium bicarb    # Schizoaffective disorder    # Anemia of chronic illness   s/p 2 u PRBC  monitor    # chronic Diarrhea likley from neuroendocrine tumor   improving    # Dysphagia likley from enlarged L.N       - dvt ppx    Pending:  Chemotherapy, heme/onc follow up and recommendations  Plan of care d/w patient

## 2023-09-21 NOTE — PROGRESS NOTE ADULT - ASSESSMENT
67y old Male who presents with a chief complaint of Hypercalcemia and diarrhea    #Recurrent Hypercalcemia likely 2/2 Lymphoma vs metastatic NET  - on IV hydration   -s/p  calcitonin, pamidronate, & Xgeva  monitor  Calcium (9/21)- 10.4    # Hx of NHL marginal zone- with recurrence?  # metastatic neuroendocrine tumor from liver biopsy- possibly from GI source  # CT chest w worsening enlarged and new enlarged mediastinal and axillary L.N s/p EBUS on 9/8  -He was  treated in 2019 and 2020 but was unable to tolerate maintenance Rituxan.  -never in complete remission - observed for low grade lymphoma, but patient was lost to follow up.  -MRI abdomen 7.23:  Liver segment II 4 cm mass and Liver segment V  1.5 cm mass suspicious for Hepatic Lymphomas.  --Biopsy of Liver lesion 8.23: well differentiated Neuroendocrine tumor, Grade 3 Likely  metastatic.   -LDH normal 4.23   - NM Octreoscan Mutliple Areas (09.05.23 @ 16:43) >Focal area of increased uptake in the upper abdomen/epigastrium to the left of midline suspicious for neuroendocrine tissue expressing somatostatin receptors.  - heme/onc following- plan to start R-CVP inpatient chemotherapy today  - continue octreotide    # pericardial effusion, stable, no evidence of tamponade     # CKD stage IV/metabolic acidosis; monitor  cont sodium bicarb  Monitor creatinine (9/21)- 3.6     # Schizoaffective disorder    # Anemia of chronic illness   s/p 2 u PRBC  monitor    # chronic Diarrhea likley from neuroendocrine tumor     # Dysphagia likley from enlarged L.N     - dvt ppx    Pending:  Chemotherapy, Hypercalcemia improvement

## 2023-09-21 NOTE — PROGRESS NOTE ADULT - SUBJECTIVE AND OBJECTIVE BOX
24H events:    Patient is a 67y old Male who presents with a chief complaint of hypercalcemia (21 Sep 2023 10:10)    Primary diagnosis of Hypercalcemia      Day 1:  Day 2:  Day 3:     Today is hospital day 28d. This morning patient was seen and examined at bedside, resting comfortably in bed.    No acute or major events overnight.     Code Status:    Family communication:  Contact date:  Name of person contacted:  Relationship to patient:  Communication details:  What matters most:    PAST MEDICAL & SURGICAL HISTORY  Kidney stones    Schizophrenia    Lymphoma    Shingles    Atrophic kidney    H/O colonoscopy with polypectomy    Liver cyst    History of bladder surgery    H/O neuropathy    History of chemotherapy    Stage 3 chronic kidney disease    Retained urethral stent    H/O umbilical hernia repair    Elbow fracture    H/O cystoscopy      SOCIAL HISTORY:  Social History:      ALLERGIES:  allopurinol (Rash (Moderate))    MEDICATIONS:  STANDING MEDICATIONS  acetaminophen     Tablet .. 650 milliGRAM(s) Oral once  benztropine 2 milliGRAM(s) Oral daily  chlorhexidine 2% Cloths 1 Application(s) Topical <User Schedule>  diphenhydrAMINE IVPB 50 milliGRAM(s) IV Intermittent once  fluPHENAZine 10 milliGRAM(s) Oral daily  heparin   Injectable 5000 Unit(s) SubCutaneous every 8 hours  octreotide  Injectable 150 MICROGram(s) SubCutaneous three times a day  predniSONE   Tablet 100 milliGRAM(s) Oral every 24 hours  riTUXimab-pvvr (RUXIENCE) IVPB (eMAR) 820 milliGRAM(s) IV Intermittent once  sodium bicarbonate 1300 milliGRAM(s) Oral three times a day  sodium chloride 0.9%. 1000 milliLiter(s) IV Continuous <Continuous>    PRN MEDICATIONS    VITALS:   T(F): 96.2  HR: 67  BP: 99/56  RR: 18  SpO2: 97%    PHYSICAL EXAM:  GENERAL:   (  ) NAD, lying in bed comfortably     (  ) obtunded     (  ) lethargic     (  ) somnolent    HEAD:   (  ) Atraumatic     (  ) hematoma     (  ) laceration (specify location:       )     NECK:  (  ) Supple     (  ) neck stiffness     (  ) nuchal rigidity     (  )  no JVD     (  ) JVD present ( -- cm)    HEART:  Rate -->     (  ) normal rate     (  ) bradycardic     (  ) tachycardic  Rhythm -->     (  ) regular     (  ) regularly irregular     (  ) irregularly irregular  Murmurs -->     (  ) normal s1s2     (  ) systolic murmur     (  ) diastolic murmur     (  ) continuous murmur      (  ) S3 present     (  ) S4 present    LUNGS:   (  )Unlabored respirations     (  ) tachypnea  (  ) B/L air entry     (  ) decreased breath sounds in:  (location     )    (  ) no adventitious sound     (  ) crackles     (  ) wheezing      (  ) rhonchi      (specify location:       )  (  ) chest wall tenderness (specify location:       )    ABDOMEN:   (  ) Soft     (  ) tense   |   (  ) nondistended     (  ) distended   |   (  ) +BS     (  ) hypoactive bowel sounds     (  ) hyperactive bowel sounds  (  ) nontender     (  ) RUQ tenderness     (  ) RLQ tenderness     (  ) LLQ tenderness     (  ) epigastric tenderness     (  ) diffuse tenderness  (  ) Splenomegaly      (  ) Hepatomegaly      (  ) Jaundice     (  ) ecchymosis     EXTREMITIES:  (  ) Normal     (  ) Rash     (  ) ecchymosis     (  ) varicose veins      (  ) pitting edema     (  ) non-pitting edema   (  ) ulceration     (  ) gangrene:     (location:     )    NERVOUS SYSTEM:    (  ) A&Ox3     (  ) confused     (  ) lethargic  CN II-XII:     (  ) Intact     (  ) deficits found     (Specify:     )   Upper extremities:     (  ) no sensorimotor deficits     (  ) weakness     (  ) loss of proprioception/vibration     (  ) loss of touch/temperature (specify:    )  Lower extremities:     (  ) no sensorimotor deficits     (  ) weakness     (  ) loss of proprioception/vibration     (  ) loss of touch/temperature (specify:    )    SKIN:   (  ) No rashes or lesions     (  ) maculopapular rash     (  ) pustules     (  ) vesicles     (  ) ulcer     (  ) ecchymosis     (specify location:     )    AMPAC score:    (  ) [place] Catheter:   Date insterted:    Reason (  ) Critical illness     (  ) urinary retention    (  ) Accurate Ins/Outs Monitoring     (  ) CMO patient    (  ) Central Line:   Date inserted:  Location: (  ) Right IJ     (  ) Left IJ     (  ) Right Fem     (  ) Left Fem    (  ) SPC        (  ) pigtail       (  ) PEG tube       (  ) colostomy       (  ) jejunostomy  (  ) U-Dall    LABS:                        10.3   3.97  )-----------( 139      ( 21 Sep 2023 08:47 )             32.8     09-21    133<L>  |  103  |  57<H>  ----------------------------<  178<H>  5.1<H>   |  19  |  3.6<H>    Ca    10.4      21 Sep 2023 08:47  Phos  5.1     09-20  Mg     2.1     09-19    TPro  4.4<L>  /  Alb  3.3<L>  /  TBili  0.5  /  DBili  x   /  AST  13  /  ALT  23  /  AlkPhos  630<H>  09-21      Urinalysis Basic - ( 21 Sep 2023 08:47 )    Color: x / Appearance: x / SG: x / pH: x  Gluc: 178 mg/dL / Ketone: x  / Bili: x / Urobili: x   Blood: x / Protein: x / Nitrite: x   Leuk Esterase: x / RBC: x / WBC x   Sq Epi: x / Non Sq Epi: x / Bacteria: x                RADIOLOGY:

## 2023-09-21 NOTE — PROGRESS NOTE ADULT - SUBJECTIVE AND OBJECTIVE BOX
HPI  Patient is a 67y old Male who presents with a chief complaint of hypercalcemia (21 Sep 2023 10:32)    Currently admitted to medicine with the primary diagnosis of Hypercalcemia       Today is hospital day 28d.     INTERVAL HPI / OVERNIGHT EVENTS:  Patient was seen and examined at bedside  No new complaints  tolerating chemo  diarrhea improved  Denies any complains of chest pain or shortness of breath  Denies any abdominal pain/nausea/vomiting        PAST MEDICAL & SURGICAL HISTORY  Kidney stones    Schizophrenia    Lymphoma    Shingles    Atrophic kidney    H/O colonoscopy with polypectomy    Liver cyst    History of bladder surgery    H/O neuropathy    History of chemotherapy    Stage 3 chronic kidney disease    Retained urethral stent    H/O umbilical hernia repair    Elbow fracture    H/O cystoscopy      ALLERGIES  allopurinol (Rash (Moderate))    MEDICATIONS  STANDING MEDICATIONS  benztropine 2 milliGRAM(s) Oral daily  chlorhexidine 2% Cloths 1 Application(s) Topical <User Schedule>  fluPHENAZine 10 milliGRAM(s) Oral daily  heparin   Injectable 5000 Unit(s) SubCutaneous every 8 hours  octreotide  Injectable 150 MICROGram(s) SubCutaneous three times a day  predniSONE   Tablet 100 milliGRAM(s) Oral every 24 hours  sodium bicarbonate 1300 milliGRAM(s) Oral three times a day  sodium chloride 0.9%. 1000 milliLiter(s) IV Continuous <Continuous>    PRN MEDICATIONS    VITALS:  T(F): 96.8  HR: 60  BP: 95/58  RR: 18  SpO2: 97%    PHYSICAL EXAM  GEN: no distress, comfortable  PULM: normal respiration  EXT: No lower extremity edema  NEURO: A&Ox3, moving all extremities    LABS                        10.3   3.97  )-----------( 139      ( 21 Sep 2023 08:47 )             32.8     09-21    133<L>  |  103  |  57<H>  ----------------------------<  178<H>  5.1<H>   |  19  |  3.6<H>    Ca    10.4      21 Sep 2023 08:47  Phos  5.1     09-20    TPro  4.4<L>  /  Alb  3.3<L>  /  TBili  0.5  /  DBili  x   /  AST  13  /  ALT  23  /  AlkPhos  630<H>  09-21      Urinalysis Basic - ( 21 Sep 2023 08:47 )    Color: x / Appearance: x / SG: x / pH: x  Gluc: 178 mg/dL / Ketone: x  / Bili: x / Urobili: x   Blood: x / Protein: x / Nitrite: x   Leuk Esterase: x / RBC: x / WBC x   Sq Epi: x / Non Sq Epi: x / Bacteria: x                RADIOLOGY

## 2023-09-22 LAB
ALBUMIN SERPL ELPH-MCNC: 3 G/DL — LOW (ref 3.5–5.2)
ALP SERPL-CCNC: 439 U/L — HIGH (ref 30–115)
ALT FLD-CCNC: 16 U/L — SIGNIFICANT CHANGE UP (ref 0–41)
ANION GAP SERPL CALC-SCNC: 12 MMOL/L — SIGNIFICANT CHANGE UP (ref 7–14)
ANION GAP SERPL CALC-SCNC: 14 MMOL/L — SIGNIFICANT CHANGE UP (ref 7–14)
AST SERPL-CCNC: 7 U/L — SIGNIFICANT CHANGE UP (ref 0–41)
BASOPHILS # BLD AUTO: 0.01 K/UL — SIGNIFICANT CHANGE UP (ref 0–0.2)
BASOPHILS NFR BLD AUTO: 0.3 % — SIGNIFICANT CHANGE UP (ref 0–1)
BILIRUB SERPL-MCNC: 0.3 MG/DL — SIGNIFICANT CHANGE UP (ref 0.2–1.2)
BUN SERPL-MCNC: 66 MG/DL — CRITICAL HIGH (ref 10–20)
BUN SERPL-MCNC: 80 MG/DL — CRITICAL HIGH (ref 10–20)
CALCIUM SERPL-MCNC: 9.6 MG/DL — SIGNIFICANT CHANGE UP (ref 8.4–10.5)
CALCIUM SERPL-MCNC: 9.8 MG/DL — SIGNIFICANT CHANGE UP (ref 8.4–10.5)
CHLORIDE SERPL-SCNC: 104 MMOL/L — SIGNIFICANT CHANGE UP (ref 98–110)
CHLORIDE SERPL-SCNC: 106 MMOL/L — SIGNIFICANT CHANGE UP (ref 98–110)
CO2 SERPL-SCNC: 16 MMOL/L — LOW (ref 17–32)
CO2 SERPL-SCNC: 19 MMOL/L — SIGNIFICANT CHANGE UP (ref 17–32)
CREAT SERPL-MCNC: 3.4 MG/DL — HIGH (ref 0.7–1.5)
CREAT SERPL-MCNC: 3.5 MG/DL — HIGH (ref 0.7–1.5)
EGFR: 19 ML/MIN/1.73M2 — LOW
EGFR: 19 ML/MIN/1.73M2 — LOW
EOSINOPHIL # BLD AUTO: 0.01 K/UL — SIGNIFICANT CHANGE UP (ref 0–0.7)
EOSINOPHIL NFR BLD AUTO: 0.3 % — SIGNIFICANT CHANGE UP (ref 0–8)
GLUCOSE SERPL-MCNC: 142 MG/DL — HIGH (ref 70–99)
GLUCOSE SERPL-MCNC: 149 MG/DL — HIGH (ref 70–99)
HCT VFR BLD CALC: 28.8 % — LOW (ref 42–52)
HGB BLD-MCNC: 9.2 G/DL — LOW (ref 14–18)
IMM GRANULOCYTES NFR BLD AUTO: 1.3 % — HIGH (ref 0.1–0.3)
LYMPHOCYTES # BLD AUTO: 0.26 K/UL — LOW (ref 1.2–3.4)
LYMPHOCYTES # BLD AUTO: 6.8 % — LOW (ref 20.5–51.1)
MAGNESIUM SERPL-MCNC: 2.5 MG/DL — HIGH (ref 1.8–2.4)
MCHC RBC-ENTMCNC: 29 PG — SIGNIFICANT CHANGE UP (ref 27–31)
MCHC RBC-ENTMCNC: 31.9 G/DL — LOW (ref 32–37)
MCV RBC AUTO: 90.9 FL — SIGNIFICANT CHANGE UP (ref 80–94)
MONOCYTES # BLD AUTO: 0.33 K/UL — SIGNIFICANT CHANGE UP (ref 0.1–0.6)
MONOCYTES NFR BLD AUTO: 8.6 % — SIGNIFICANT CHANGE UP (ref 1.7–9.3)
NEUTROPHILS # BLD AUTO: 3.19 K/UL — SIGNIFICANT CHANGE UP (ref 1.4–6.5)
NEUTROPHILS NFR BLD AUTO: 82.7 % — HIGH (ref 42.2–75.2)
NRBC # BLD: 0 /100 WBCS — SIGNIFICANT CHANGE UP (ref 0–0)
PHOSPHATE SERPL-MCNC: 8.3 MG/DL — HIGH (ref 2.1–4.9)
PLATELET # BLD AUTO: 132 K/UL — SIGNIFICANT CHANGE UP (ref 130–400)
PMV BLD: 10.7 FL — HIGH (ref 7.4–10.4)
POTASSIUM SERPL-MCNC: 5.4 MMOL/L — HIGH (ref 3.5–5)
POTASSIUM SERPL-MCNC: 5.5 MMOL/L — HIGH (ref 3.5–5)
POTASSIUM SERPL-SCNC: 5.4 MMOL/L — HIGH (ref 3.5–5)
POTASSIUM SERPL-SCNC: 5.5 MMOL/L — HIGH (ref 3.5–5)
PROT SERPL-MCNC: 3.8 G/DL — LOW (ref 6–8)
RBC # BLD: 3.17 M/UL — LOW (ref 4.7–6.1)
RBC # FLD: 16.9 % — HIGH (ref 11.5–14.5)
SODIUM SERPL-SCNC: 135 MMOL/L — SIGNIFICANT CHANGE UP (ref 135–146)
SODIUM SERPL-SCNC: 136 MMOL/L — SIGNIFICANT CHANGE UP (ref 135–146)
URATE SERPL-MCNC: 15.3 MG/DL — HIGH (ref 3.4–8.8)
WBC # BLD: 3.85 K/UL — LOW (ref 4.8–10.8)
WBC # FLD AUTO: 3.85 K/UL — LOW (ref 4.8–10.8)

## 2023-09-22 PROCEDURE — 99233 SBSQ HOSP IP/OBS HIGH 50: CPT

## 2023-09-22 PROCEDURE — 99232 SBSQ HOSP IP/OBS MODERATE 35: CPT

## 2023-09-22 RX ORDER — RASBURICASE 7.5 MG
3 KIT INTRAVENOUS ONCE
Refills: 0 | Status: COMPLETED | OUTPATIENT
Start: 2023-09-22 | End: 2023-09-22

## 2023-09-22 RX ORDER — RASBURICASE 7.5 MG
3 KIT INTRAVENOUS ONCE
Refills: 0 | Status: DISCONTINUED | OUTPATIENT
Start: 2023-09-22 | End: 2023-09-22

## 2023-09-22 RX ORDER — SODIUM CHLORIDE 9 MG/ML
1000 INJECTION, SOLUTION INTRAVENOUS
Refills: 0 | Status: DISCONTINUED | OUTPATIENT
Start: 2023-09-22 | End: 2023-09-25

## 2023-09-22 RX ORDER — SODIUM ZIRCONIUM CYCLOSILICATE 10 G/10G
10 POWDER, FOR SUSPENSION ORAL ONCE
Refills: 0 | Status: COMPLETED | OUTPATIENT
Start: 2023-09-22 | End: 2023-09-22

## 2023-09-22 RX ORDER — SODIUM CHLORIDE 9 MG/ML
1000 INJECTION INTRAMUSCULAR; INTRAVENOUS; SUBCUTANEOUS
Refills: 0 | Status: DISCONTINUED | OUTPATIENT
Start: 2023-09-22 | End: 2023-09-22

## 2023-09-22 RX ADMIN — HEPARIN SODIUM 5000 UNIT(S): 5000 INJECTION INTRAVENOUS; SUBCUTANEOUS at 22:54

## 2023-09-22 RX ADMIN — SODIUM ZIRCONIUM CYCLOSILICATE 10 GRAM(S): 10 POWDER, FOR SUSPENSION ORAL at 09:40

## 2023-09-22 RX ADMIN — HEPARIN SODIUM 5000 UNIT(S): 5000 INJECTION INTRAVENOUS; SUBCUTANEOUS at 05:12

## 2023-09-22 RX ADMIN — OCTREOTIDE ACETATE 150 MICROGRAM(S): 200 INJECTION, SOLUTION INTRAVENOUS; SUBCUTANEOUS at 13:02

## 2023-09-22 RX ADMIN — Medication 1300 MILLIGRAM(S): at 13:01

## 2023-09-22 RX ADMIN — SODIUM CHLORIDE 100 MILLILITER(S): 9 INJECTION INTRAMUSCULAR; INTRAVENOUS; SUBCUTANEOUS at 10:36

## 2023-09-22 RX ADMIN — Medication 1300 MILLIGRAM(S): at 05:11

## 2023-09-22 RX ADMIN — OCTREOTIDE ACETATE 150 MICROGRAM(S): 200 INJECTION, SOLUTION INTRAVENOUS; SUBCUTANEOUS at 05:12

## 2023-09-22 RX ADMIN — Medication 100 MILLIGRAM(S): at 15:29

## 2023-09-22 RX ADMIN — CHLORHEXIDINE GLUCONATE 1 APPLICATION(S): 213 SOLUTION TOPICAL at 05:13

## 2023-09-22 RX ADMIN — Medication 1300 MILLIGRAM(S): at 22:54

## 2023-09-22 RX ADMIN — SODIUM CHLORIDE 125 MILLILITER(S): 9 INJECTION, SOLUTION INTRAVENOUS at 16:18

## 2023-09-22 RX ADMIN — HEPARIN SODIUM 5000 UNIT(S): 5000 INJECTION INTRAVENOUS; SUBCUTANEOUS at 13:01

## 2023-09-22 RX ADMIN — FLUPHENAZINE HYDROCHLORIDE 10 MILLIGRAM(S): 1 TABLET, FILM COATED ORAL at 11:49

## 2023-09-22 RX ADMIN — RASBURICASE 104 MILLIGRAM(S): KIT at 10:35

## 2023-09-22 RX ADMIN — Medication 2 MILLIGRAM(S): at 11:49

## 2023-09-22 RX ADMIN — OCTREOTIDE ACETATE 150 MICROGRAM(S): 200 INJECTION, SOLUTION INTRAVENOUS; SUBCUTANEOUS at 22:53

## 2023-09-22 NOTE — PROGRESS NOTE ADULT - SUBJECTIVE AND OBJECTIVE BOX
HPI  Patient is a 67y old Male who presents with a chief complaint of hypercalcemia (22 Sep 2023 11:27)    Currently admitted to medicine with the primary diagnosis of Hypercalcemia       Today is hospital day 29d.     INTERVAL HPI / OVERNIGHT EVENTS:  Patient was seen and examined at bedside    Patient Feels better  No new complaints  Denies any complains of chest pain or shortness of breath  Denies any abdominal pain/nausea/vomiting        PAST MEDICAL & SURGICAL HISTORY  Kidney stones    Schizophrenia    Lymphoma    Shingles    Atrophic kidney    H/O colonoscopy with polypectomy    Liver cyst    History of bladder surgery    H/O neuropathy    History of chemotherapy    Stage 3 chronic kidney disease    Retained urethral stent    H/O umbilical hernia repair    Elbow fracture    H/O cystoscopy      ALLERGIES  allopurinol (Rash (Moderate))    MEDICATIONS  STANDING MEDICATIONS  benztropine 2 milliGRAM(s) Oral daily  chlorhexidine 2% Cloths 1 Application(s) Topical <User Schedule>  fluPHENAZine 10 milliGRAM(s) Oral daily  heparin   Injectable 5000 Unit(s) SubCutaneous every 8 hours  octreotide  Injectable 150 MICROGram(s) SubCutaneous three times a day  predniSONE   Tablet 100 milliGRAM(s) Oral every 24 hours  sodium bicarbonate 1300 milliGRAM(s) Oral three times a day  sodium chloride 0.45% 1000 milliLiter(s) IV Continuous <Continuous>    PRN MEDICATIONS    VITALS:  T(F): 97.1  HR: 63  BP: 88/55  RR: 18  SpO2: 96%    PHYSICAL EXAM  GEN: no distress, comfortable  PULM: normal respiration  ABD: Soft, non-distended, no guarding; non-tender  EXT: No lower extremity edema  NEURO: A&Ox3    LABS                        9.2    3.85  )-----------( 132      ( 22 Sep 2023 07:21 )             28.8     09-22    136  |  106  |  66<HH>  ----------------------------<  142<H>  5.5<H>   |  16<L>  |  3.5<H>    Ca    9.8      22 Sep 2023 07:21  Phos  8.3     09-22  Mg     2.5     09-22    TPro  3.8<L>  /  Alb  3.0<L>  /  TBili  0.3  /  DBili  x   /  AST  7   /  ALT  16  /  AlkPhos  439<H>  09-22      Urinalysis Basic - ( 22 Sep 2023 07:21 )    Color: x / Appearance: x / SG: x / pH: x  Gluc: 142 mg/dL / Ketone: x  / Bili: x / Urobili: x   Blood: x / Protein: x / Nitrite: x   Leuk Esterase: x / RBC: x / WBC x   Sq Epi: x / Non Sq Epi: x / Bacteria: x                RADIOLOGY

## 2023-09-22 NOTE — PROGRESS NOTE ADULT - SUBJECTIVE AND OBJECTIVE BOX
Nephrology progress note   alert to baseline    ON events/Subjective:     Allergies:  allopurinol (Rash (Moderate))    Hospital Medications:   MEDICATIONS  (STANDING):  benztropine 2 milliGRAM(s) Oral daily  chlorhexidine 2% Cloths 1 Application(s) Topical <User Schedule>  fluPHENAZine 10 milliGRAM(s) Oral daily  heparin   Injectable 5000 Unit(s) SubCutaneous every 8 hours  octreotide  Injectable 150 MICROGram(s) SubCutaneous three times a day  predniSONE   Tablet 100 milliGRAM(s) Oral every 24 hours  sodium bicarbonate 1300 milliGRAM(s) Oral three times a day  sodium chloride 0.9%. 1000 milliLiter(s) (100 mL/Hr) IV Continuous <Continuous>    REVIEW OF SYSTEMS:  CONSTITUTIONAL: No weakness, fevers or chills  EYES/ENT: No visual changes;  No vertigo or throat pain   NECK: No pain or stiffness  RESPIRATORY: No cough, wheezing, hemoptysis; No shortness of breath  CARDIOVASCULAR: No chest pain or palpitations.  GASTROINTESTINAL: No abdominal or epigastric pain. No nausea, vomiting, or hematemesis; No diarrhea or constipation. No melena or hematochezia.  GENITOURINARY: No dysuria, frequency, foamy urine, urinary urgency, incontinence or hematuria  NEUROLOGICAL: No numbness or weakness  SKIN: No itching, burning, rashes, or lesions   VASCULAR: No bilateral lower extremity edema.   All other review of systems is negative unless indicated above.    VITALS:  T(F): 96.8 (09-22-23 @ 05:19), Max: 97.9 (09-21-23 @ 22:13)  HR: 53 (09-22-23 @ 05:19)  BP: 89/53 (09-22-23 @ 05:19)  RR: 18 (09-22-23 @ 05:19)  SpO2: 96% (09-21-23 @ 22:13)  Wt(kg): --      PHYSICAL EXAM:  Constitutional: NAD  HEENT: anicteric sclera, oropharynx clear, MMM  Neck: No JVD  Respiratory: CTAB, no wheezes, rales or rhonchi  Cardiovascular: S1, S2, RRR  Gastrointestinal: BS+, soft, NT/ND  Extremities: No cyanosis or clubbing. No peripheral edema  Neurological: A/O x 3, no focal deficits  Psychiatric: Normal mood, normal affect  : No CVA tenderness. No zafar.   Skin: No rashes  Vascular Access:    LABS:  09-22    136  |  106  |  66<HH>  ----------------------------<  142<H>  5.5<H>   |  16<L>  |  3.5<H>    Ca    9.8      22 Sep 2023 07:21  Phos  8.3     09-22  Mg     2.5     09-22    TPro  3.8<L>  /  Alb  3.0<L>  /  TBili  0.3  /  DBili      /  AST  7   /  ALT  16  /  AlkPhos  439<H>  09-22                          9.2    3.85  )-----------( 132      ( 22 Sep 2023 07:21 )             28.8       Urine Studies:  Creatinine Trend: 3.5<--, 3.5<--, 3.6<--, 3.7<--, 3.6<--, 3.3<--  Urinalysis Basic - ( 22 Sep 2023 07:21 )    Color:  / Appearance:  / SG:  / pH:   Gluc: 142 mg/dL / Ketone:   / Bili:  / Urobili:    Blood:  / Protein:  / Nitrite:    Leuk Esterase:  / RBC:  / WBC    Sq Epi:  / Non Sq Epi:  / Bacteria:     CKD stage 4 - has atrophic kidney and other kidney with ureteral stricture - now close to baseline  ·lymphoma and now new neuroendocrine tumor on liver biopsy  ·hypercalcemia - appears driven by vit D 1,25 -   ·has not responded well to pamidronate as calcium level increasing again  ·chronic loose stools - causimg more dehydration  ·anemia s/p prbc  ·BP lower today than baseline  loose stools improved  per pt had recent PET scan  with neck involvement (can not find study) and now with dysphagia  ·d/w pt per advanced directives and uncertain at this time   octreotide scan - with liver uptake only  metabolic acidosis  inconclusive neck biopsy  started chemo for lymphoma now with elevated phos, pot, uric acid - suspect tumor lysis     1) change IVF to 1/2 NS with 75 Meq HCO3 at 125 cc/hr  2) suggest am cortisol for low BP  3) agree with rasburicase  4) am labs   5) hopefully will not require RRT   d/w medical attending

## 2023-09-22 NOTE — PROGRESS NOTE ADULT - ASSESSMENT
67y old Male who presents with a chief complaint of Hypercalcemia and diarrhea    #Tumor lysis syndrome   -9/22 labs showed Uric acid=15.2, Phosph= 8.3, Potassium= 5.5  - Started on fluids at 100cc/hr  - rasburicase 3mg IV given today  - Lokelma 10g oral given today   - Monitor electrolytes and uric acid     #Recurrent Hypercalcemia likely 2/2 Lymphoma vs metastatic NET  - on IV hydration   -s/p  calcitonin, pamidronate, & Xgeva  monitor  Calcium (9/22)- 9.8 (Likely due to tumor lysis syndrome)     # Hx of NHL marginal zone- with recurrence?  # metastatic neuroendocrine tumor from liver biopsy- possibly from GI source  # CT chest w worsening enlarged and new enlarged mediastinal and axillary L.N s/p EBUS on 9/8  -He was  treated in 2019 and 2020 but was unable to tolerate maintenance Rituxan.  -never in complete remission - observed for low grade lymphoma, but patient was lost to follow up.  -MRI abdomen 7.23:  Liver segment II 4 cm mass and Liver segment V  1.5 cm mass suspicious for Hepatic Lymphomas.  --Biopsy of Liver lesion 8.23: well differentiated Neuroendocrine tumor, Grade 3 Likely  metastatic.   -LDH normal 4.23   - NM Octreoscan Mutliple Areas (09.05.23 @ 16:43) >Focal area of increased uptake in the upper abdomen/epigastrium to the left of midline suspicious for neuroendocrine tissue expressing somatostatin receptors.  - S/p R-CVP inpatient chemotherapy (9/21)  - continue octreotide    # pericardial effusion, stable, no evidence of tamponade     # CKD stage IV/metabolic acidosis; monitor  cont sodium bicarb  Monitor creatinine (9/21)- 3.6     # Schizoaffective disorder    # Anemia of chronic illness   s/p 2 u PRBC  monitor    # chronic Diarrhea likley from neuroendocrine tumor     # Dysphagia likley from enlarged L.N     - dvt ppx    Pending: Monitor for tumor lysis syndrome, Hypercalcemia improvement

## 2023-09-22 NOTE — PROGRESS NOTE ADULT - TIME BILLING
direct patient care and chart review  -coordinated current plan of care with medical staff on MDR
Total time spent to complete patient's bedside assessment, review medical chart, discuss medical plan of care with  medical team was more than 35 minutes  with >50% of time spent face to face with patient, discussion with patient and/or coordination of care.
direct patient care and chart review  -coordinated current plan of care with medical staff on MDR
Total time spent to complete patient's bedside assessment, review medical chart, discuss medical plan of care with  medical team was more than 35 minutes  with >50% of time spent face to face with patient, discussion with patient and/or coordination of care.
direct patient care and chart review  -coordinated current plan of care with medical staff on MDR
direct pt care
direct patient care and chart review  -coordinated current plan of care with medical staff on MDR
direct patient care and chart review  -coordinated current plan of care with medical staff on MDR
direct pt care

## 2023-09-22 NOTE — PROGRESS NOTE ADULT - SUBJECTIVE AND OBJECTIVE BOX
ELDA COVARRUBIAS 67y Male  MRN#: [id number]   Hospital Day: 29d    SUBJECTIVE  Patient is a 67y old Male who presents with a chief complaint of hypercalcemia (21 Sep 2023 10:32)  Currently admitted to medicine with the primary diagnosis of Hypercalcemia      INTERVAL HPI AND OVERNIGHT EVENTS:  Patient was examined and seen at bedside. This morning he is resting comfortably in bed and reports no issues or overnight events.    REVIEW OF SYMPTOMS:  CONSTITUTIONAL: No weakness, fevers or chills; No headaches  EYES: No visual changes, eye pain, or discharge  ENT: No vertigo; No ear pain or change in hearing; No sore throat or difficulty swallowing  NECK: No pain or stiffness  RESPIRATORY: No cough, wheezing, or hemoptysis; No shortness of breath  CARDIOVASCULAR: No chest pain or palpitations  GASTROINTESTINAL: No abdominal or epigastric pain; No nausea, vomiting, or hematemesis; No diarrhea or constipation; No melena or hematochezia  GENITOURINARY: No dysuria, frequency or hematuria  MUSCULOSKELETAL: No joint pain, no muscle pain, no weakness  NEUROLOGICAL: No numbness or weakness  SKIN: No itching or rashes    OBJECTIVE  PAST MEDICAL & SURGICAL HISTORY  Kidney stones    Schizophrenia    Lymphoma    Shingles    Atrophic kidney    H/O colonoscopy with polypectomy    Liver cyst    History of bladder surgery    H/O neuropathy    History of chemotherapy    Stage 3 chronic kidney disease    Retained urethral stent    H/O umbilical hernia repair    Elbow fracture    H/O cystoscopy      ALLERGIES:  allopurinol (Rash (Moderate))    MEDICATIONS:  STANDING MEDICATIONS  benztropine 2 milliGRAM(s) Oral daily  chlorhexidine 2% Cloths 1 Application(s) Topical <User Schedule>  fluPHENAZine 10 milliGRAM(s) Oral daily  heparin   Injectable 5000 Unit(s) SubCutaneous every 8 hours  octreotide  Injectable 150 MICROGram(s) SubCutaneous three times a day  predniSONE   Tablet 100 milliGRAM(s) Oral every 24 hours  rasburicase IVPB 3 milliGRAM(s) IV Intermittent once  sodium bicarbonate 1300 milliGRAM(s) Oral three times a day  sodium chloride 0.9%. 1000 milliLiter(s) IV Continuous <Continuous>    PRN MEDICATIONS      VITAL SIGNS: Last 24 Hours  T(C): 36 (22 Sep 2023 05:19), Max: 36.6 (21 Sep 2023 12:52)  T(F): 96.8 (22 Sep 2023 05:19), Max: 97.9 (21 Sep 2023 22:13)  HR: 53 (22 Sep 2023 05:19) (53 - 69)  BP: 89/53 (22 Sep 2023 05:19) (89/53 - 99/56)  BP(mean): --  RR: 18 (22 Sep 2023 05:19) (18 - 18)  SpO2: 96% (21 Sep 2023 22:13) (96% - 97%)    LABS:                        9.2    3.85  )-----------( 132      ( 22 Sep 2023 07:21 )             28.8     09-22    136  |  106  |  66<HH>  ----------------------------<  142<H>  5.5<H>   |  16<L>  |  3.5<H>    Ca    9.8      22 Sep 2023 07:21  Phos  8.3     09-22  Mg     2.5     09-22    TPro  3.8<L>  /  Alb  3.0<L>  /  TBili  0.3  /  DBili  x   /  AST  7   /  ALT  16  /  AlkPhos  439<H>  09-22      Urinalysis Basic - ( 22 Sep 2023 07:21 )    Color: x / Appearance: x / SG: x / pH: x  Gluc: 142 mg/dL / Ketone: x  / Bili: x / Urobili: x   Blood: x / Protein: x / Nitrite: x   Leuk Esterase: x / RBC: x / WBC x   Sq Epi: x / Non Sq Epi: x / Bacteria: x                RADIOLOGY:      PHYSICAL EXAM:  CONSTITUTIONAL: No acute distress, well-developed, well-groomed, AAOx3  HEAD: Atraumatic, normocephalic  EYES: EOM intact, PERRLA, conjunctiva and sclera clear  ENT: Supple, no masses, no thyromegaly, no bruits, no JVD; moist mucous membranes  PULMONARY: Clear to auscultation bilaterally; no wheezes, rales, or rhonchi  CARDIOVASCULAR: Regular rate and rhythm; no murmurs, rubs, or gallops  GASTROINTESTINAL: Soft, non-tender, non-distended; bowel sounds present  MUSCULOSKELETAL: 2+ peripheral pulses; no clubbing, no cyanosis, no edema  NEUROLOGY: non-focal  SKIN: No rashes or lesions; warm and dry    ASSESSMENT & PLAN  67y M PMH Schizophrenia (prior lithium use), nephrolithiasis c/b atrophic L kidney, R ureteral rivision, CKD4 (bsl Cr ~2.5), gout, chronic diarrhea, 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 ASHLEY on CKD.    Hematology and Oncology consulted given hx of Marginal Zone lymphoma.    #Recurrent Hypercalcemia likely 2/2 Lymphoma vs metastatic NET  - calcium 13.9 on admission. Trending down currently at 10.3  - on IV hydration   -s/p one dose of calcitonin, pamidronate, & Xgeva    #Hx of NHL marginal zone now with ? recurrence.  -He was in a good partial remission following 3 cycles of bendamustine and Rituxan treated in 2019 and 2020.  -He was unable to tolerate maintenance Rituxan.  -However, he was never in complete remission and had preferred just to be observed since we were dealing with a low grade lymphoma.  - was lost to follow up.  --CT Abdomen/Pelvis 2/23/23 Interval worsening in periesophageal and pelvic lymphadenopathy , other bulky lymph nodes in the abdomen and retroperitoneum appear stable. 1.3 indeterminate segment 4 liver lesion. Focal areas of pleural nodularity concerning for lymphomatous/neoplastic/metastatic process, new since prior.  --PET scan 5.23: 1.  Interval increase in size and decreased metabolic activity of the thoracoabdominal lymphadenopathy, some are new, probably recurrence /residual disease. New mildly metabolic 3 cm hypodensity in the liver segment 2/3, concerning for neoplasm or lymphoma.  Poorly defined small hypodensity in the segment 4, below the resolution of PET scan.  MRI will provide further elucidation. Diffuse mildly increased bone marrow activity, possibly reactive versus bone marrow involvement.   Hepatosplenomegaly.  -MRI abdomen 7.23:  Liver segment II 4 cm mass and Liver segment V  1.5 cm mass suspicious for Hepatic Lymphomas.  --Biopsy of Liver lesion 8.23: well differentiated Neuroendocrine tumor, Grade 3 Likely  metastatic.   -LDH normal 4.23   - NM Octreoscan Mutliple Areas (09.05.23 @ 16:43) >Focal area of increased uptake in the upper abdomen/epigastrium to the left of midline suspicious for neuroendocrine tissue expressing somatostatin receptors.    #Hepatic mass on imaging -Biopsy consistent with Well differentiated Neuroendocrine tumor, Grade 3, Likely metastatic. 8.23.      Recommendations:   - s/p R-CVP for on 9/20 & 9/21  - labs consistent with tumor lysis syndrome > please give IV rasburicase 3 mg  and restart IV fluids (NS @ 100 cc/hr)    ELDA COVARRUBIAS 67y Male  MRN#: [id number]   Hospital Day: 29d    SUBJECTIVE  Patient is a 67y old Male who presents with a chief complaint of hypercalcemia (21 Sep 2023 10:32)  Currently admitted to medicine with the primary diagnosis of Hypercalcemia      INTERVAL HPI AND OVERNIGHT EVENTS:  Patient was examined and seen at bedside. This morning he is resting comfortably in bed and reports no issues or overnight events.    REVIEW OF SYMPTOMS:  CONSTITUTIONAL: No weakness, fevers or chills; No headaches  EYES: No visual changes, eye pain, or discharge  ENT: No vertigo; No ear pain or change in hearing; No sore throat or difficulty swallowing  NECK: No pain or stiffness  RESPIRATORY: No cough, wheezing, or hemoptysis; No shortness of breath  CARDIOVASCULAR: No chest pain or palpitations  GASTROINTESTINAL: No abdominal or epigastric pain; No nausea, vomiting, or hematemesis; No diarrhea or constipation; No melena or hematochezia  GENITOURINARY: No dysuria, frequency or hematuria  MUSCULOSKELETAL: No joint pain, no muscle pain, no weakness  NEUROLOGICAL: No numbness or weakness  SKIN: No itching or rashes    OBJECTIVE  PAST MEDICAL & SURGICAL HISTORY  Kidney stones    Schizophrenia    Lymphoma    Shingles    Atrophic kidney    H/O colonoscopy with polypectomy    Liver cyst    History of bladder surgery    H/O neuropathy    History of chemotherapy    Stage 3 chronic kidney disease    Retained urethral stent    H/O umbilical hernia repair    Elbow fracture    H/O cystoscopy      ALLERGIES:  allopurinol (Rash (Moderate))    MEDICATIONS:  STANDING MEDICATIONS  benztropine 2 milliGRAM(s) Oral daily  chlorhexidine 2% Cloths 1 Application(s) Topical <User Schedule>  fluPHENAZine 10 milliGRAM(s) Oral daily  heparin   Injectable 5000 Unit(s) SubCutaneous every 8 hours  octreotide  Injectable 150 MICROGram(s) SubCutaneous three times a day  predniSONE   Tablet 100 milliGRAM(s) Oral every 24 hours  rasburicase IVPB 3 milliGRAM(s) IV Intermittent once  sodium bicarbonate 1300 milliGRAM(s) Oral three times a day  sodium chloride 0.9%. 1000 milliLiter(s) IV Continuous <Continuous>    PRN MEDICATIONS      VITAL SIGNS: Last 24 Hours  T(C): 36 (22 Sep 2023 05:19), Max: 36.6 (21 Sep 2023 12:52)  T(F): 96.8 (22 Sep 2023 05:19), Max: 97.9 (21 Sep 2023 22:13)  HR: 53 (22 Sep 2023 05:19) (53 - 69)  BP: 89/53 (22 Sep 2023 05:19) (89/53 - 99/56)  BP(mean): --  RR: 18 (22 Sep 2023 05:19) (18 - 18)  SpO2: 96% (21 Sep 2023 22:13) (96% - 97%)    LABS:                        9.2    3.85  )-----------( 132      ( 22 Sep 2023 07:21 )             28.8     09-22    136  |  106  |  66<HH>  ----------------------------<  142<H>  5.5<H>   |  16<L>  |  3.5<H>    Ca    9.8      22 Sep 2023 07:21  Phos  8.3     09-22  Mg     2.5     09-22    TPro  3.8<L>  /  Alb  3.0<L>  /  TBili  0.3  /  DBili  x   /  AST  7   /  ALT  16  /  AlkPhos  439<H>  09-22      Urinalysis Basic - ( 22 Sep 2023 07:21 )    Color: x / Appearance: x / SG: x / pH: x  Gluc: 142 mg/dL / Ketone: x  / Bili: x / Urobili: x   Blood: x / Protein: x / Nitrite: x   Leuk Esterase: x / RBC: x / WBC x   Sq Epi: x / Non Sq Epi: x / Bacteria: x                RADIOLOGY:      PHYSICAL EXAM:  CONSTITUTIONAL: No acute distress, well-developed, well-groomed, AAOx3  HEAD: Atraumatic, normocephalic  EYES: EOM intact, PERRLA, conjunctiva and sclera clear  ENT: Supple, no masses, no thyromegaly, no bruits, no JVD; moist mucous membranes  PULMONARY: Clear to auscultation bilaterally; no wheezes, rales, or rhonchi  CARDIOVASCULAR: Regular rate and rhythm; no murmurs, rubs, or gallops  GASTROINTESTINAL: Soft, non-tender, non-distended; bowel sounds present  MUSCULOSKELETAL: 2+ peripheral pulses; no clubbing, no cyanosis, no edema  NEUROLOGY: non-focal  SKIN: No rashes or lesions; warm and dry    ASSESSMENT & PLAN  67y M PMH Schizophrenia (prior lithium use), nephrolithiasis c/b atrophic L kidney, R ureteral rivision, CKD4 (bsl Cr ~2.5), gout, chronic diarrhea, 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 ASHLEY on CKD.    Hematology and Oncology consulted given hx of Marginal Zone lymphoma.    #Recurrent Hypercalcemia likely 2/2 Lymphoma vs metastatic NET  - calcium 13.9 on admission. Trending down currently at 10.3  - on IV hydration   -s/p one dose of calcitonin, pamidronate, & Xgeva    #Hx of NHL marginal zone now with ? recurrence.  -He was in a good partial remission following 3 cycles of bendamustine and Rituxan treated in 2019 and 2020.  -He was unable to tolerate maintenance Rituxan.  -However, he was never in complete remission and had preferred just to be observed since we were dealing with a low grade lymphoma.  - was lost to follow up.  --CT Abdomen/Pelvis 2/23/23 Interval worsening in periesophageal and pelvic lymphadenopathy , other bulky lymph nodes in the abdomen and retroperitoneum appear stable. 1.3 indeterminate segment 4 liver lesion. Focal areas of pleural nodularity concerning for lymphomatous/neoplastic/metastatic process, new since prior.  --PET scan 5.23: 1.  Interval increase in size and decreased metabolic activity of the thoracoabdominal lymphadenopathy, some are new, probably recurrence /residual disease. New mildly metabolic 3 cm hypodensity in the liver segment 2/3, concerning for neoplasm or lymphoma.  Poorly defined small hypodensity in the segment 4, below the resolution of PET scan.  MRI will provide further elucidation. Diffuse mildly increased bone marrow activity, possibly reactive versus bone marrow involvement.   Hepatosplenomegaly.  -MRI abdomen 7.23:  Liver segment II 4 cm mass and Liver segment V  1.5 cm mass suspicious for Hepatic Lymphomas.  --Biopsy of Liver lesion 8.23: well differentiated Neuroendocrine tumor, Grade 3 Likely  metastatic.   -LDH normal 4.23   - NM Octreoscan Mutliple Areas (09.05.23 @ 16:43) >Focal area of increased uptake in the upper abdomen/epigastrium to the left of midline suspicious for neuroendocrine tissue expressing somatostatin receptors.    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        Recommendations:   - s/p R-CVP for on 9/20 & 9/21  - labs consistent with tumor lysis syndrome > please give IV rasburicase 3 mg STAT x1 and restart IV fluids (NS @ 100 cc/hr)   - check daily TLS panel- CMP, Uric Acid, Phos  - recheck 4:00 PM BMP to monitor potassium

## 2023-09-22 NOTE — PROGRESS NOTE ADULT - REASON FOR ADMISSION
Hypercalcemia
ASHLEY hypercalcemia
ASHLEY hypervalcemia
Diarrhea/ hypercalcemia
Hypercalcemia
Hypercalcemia of malignancy
Hypercalemia
Hypercalemia 2/2 malignancy
dehydration and hypercalcemia
high calcium
hypercalcemia
ASHLEY hypercalcemia
ASHLEY, hypercalcemia
Hypecalcemia
Hypercalcemia
Hypercalcemia of malignancy
Hypercalemia 2/2 malignancy
hypercalcemia
hypercalcemia
hypercalcemia and ASHLEY on CKD
Diarrhea
Hypercalcemia

## 2023-09-22 NOTE — PROGRESS NOTE ADULT - ASSESSMENT
67y old Male who presents with a chief complaint of Hypercalcemia and diarrhea    # tumor lysis syndrome post chemo  - s/p R-CVP for on 9/20 & 9/21  -  IV rasburicase 3 mg STAT x1   IV fluids- 1/2 NS with bicarb at 125 ml /hr  - check daily TLS panel- CMP, Uric Acid, Phos  - dose of lokelma; recheck BMP    # hypercalcemia due to malignancy  - on IV hydration   -s/p  calcitonin, pamidronate, & Xgeva  stable- monitpr    # Hx of NHL marginal zone- with recurrence?  # metastatic neuroendocrine tumor from liver biopsy- possibly from GI source  # CT chest w worsening enlarged and new enlarged mediastinal and axillary L.N s/p EBUS on 9/8  -He was  treated in 2019 and 2020 but was unable to tolerate maintenance Rituxan.  -never in complete remission - observed for low grade lymphoma, but patient was lost to follow up.  -MRI abdomen 7.23:  Liver segment II 4 cm mass and Liver segment V  1.5 cm mass suspicious for Hepatic Lymphomas.  --Biopsy of Liver lesion 8.23: well differentiated Neuroendocrine tumor, Grade 3 Likely  metastatic.   -LDH normal 4.23   - NM Octreoscan Mutliple Areas (09.05.23 @ 16:43) >Focal area of increased uptake in the upper abdomen/epigastrium to the left of midline suspicious for neuroendocrine tissue expressing somatostatin receptors.    - heme/onc following- s/p R-CVP treatment; cont prednisone  - continue octreotide    # hypotension- chronic; asymptomatic  stable  check moring cortisol level  echo:  EF of 67%. Mild (Grade I) diastolic dysfunction. No regional wall motion abnormalities.  Small localized posterior pericardial effusion without echocardiographic evidence of tamponade physiology. Compared to prior study, effusion size is grossly unchanged.    # pericardial effusion, stable, no evidence of tamponade     # CKD stage IV/metabolic acidosis   monitor  cont sodium bicarb    # Schizoaffective disorder    # Anemia of chronic illness   s/p 2 u PRBC  monitor    # chronic Diarrhea likley from neuroendocrine tumor   improving    # Dysphagia likley from enlarged L.N       - dvt ppx    Pending:  monitring TLS, continue IV fluids, heme/onc follow up,, morning cortisol level  Plan of care d/w patient

## 2023-09-22 NOTE — PROGRESS NOTE ADULT - SUBJECTIVE AND OBJECTIVE BOX
24H events:    Patient is a 67y old Male who presents with a chief complaint of hypercalcemia (21 Sep 2023 10:32)    Primary diagnosis of Hypercalcemia      Day 1:  Day 2:  Day 3:     Today is hospital day 29d. This morning patient was seen and examined at bedside, resting comfortably in bed.    No acute or major events overnight. Patient is doing well today. He has no complaints     Code Status:    Family communication:  Contact date:  Name of person contacted:  Relationship to patient:  Communication details:  What matters most:    PAST MEDICAL & SURGICAL HISTORY  Kidney stones    Schizophrenia    Lymphoma    Shingles    Atrophic kidney    H/O colonoscopy with polypectomy    Liver cyst    History of bladder surgery    H/O neuropathy    History of chemotherapy    Stage 3 chronic kidney disease    Retained urethral stent    H/O umbilical hernia repair    Elbow fracture    H/O cystoscopy      SOCIAL HISTORY:  Social History:      ALLERGIES:  allopurinol (Rash (Moderate))    MEDICATIONS:  STANDING MEDICATIONS  benztropine 2 milliGRAM(s) Oral daily  chlorhexidine 2% Cloths 1 Application(s) Topical <User Schedule>  fluPHENAZine 10 milliGRAM(s) Oral daily  heparin   Injectable 5000 Unit(s) SubCutaneous every 8 hours  octreotide  Injectable 150 MICROGram(s) SubCutaneous three times a day  predniSONE   Tablet 100 milliGRAM(s) Oral every 24 hours  sodium bicarbonate 1300 milliGRAM(s) Oral three times a day  sodium chloride 0.9%. 1000 milliLiter(s) IV Continuous <Continuous>    PRN MEDICATIONS    VITALS:   T(F): 96.8  HR: 53  BP: 89/53  RR: 18  SpO2: 96%    PHYSICAL EXAM:  GENERAL:   ( x ) NAD, lying in bed comfortably     (  ) obtunded     (  ) lethargic     (  ) somnolent    HEAD:   (  ) Atraumatic     (  ) hematoma     (  ) laceration (specify location:       )     NECK:  (  ) Supple     (  ) neck stiffness     (  ) nuchal rigidity     (  )  no JVD     (  ) JVD present ( -- cm)    HEART:  Rate -->     ( x ) normal rate     (  ) bradycardic     (  ) tachycardic  Rhythm -->     ( x ) regular     (  ) regularly irregular     (  ) irregularly irregular  Murmurs -->     ( x ) normal s1s2     (  ) systolic murmur     (  ) diastolic murmur     (  ) continuous murmur      (  ) S3 present     (  ) S4 present    LUNGS:   ( x )Unlabored respirations     (  ) tachypnea  ( x ) B/L air entry     (  ) decreased breath sounds in:  (location     )    ( x ) no adventitious sound     (  ) crackles     (  ) wheezing      (  ) rhonchi      (specify location:       )  (  ) chest wall tenderness (specify location:       )    ABDOMEN:   (  ) Soft     (  ) tense   |   (  ) nondistended     (  ) distended   |   (  ) +BS     (  ) hypoactive bowel sounds     (  ) hyperactive bowel sounds  (  ) nontender     (  ) RUQ tenderness     (  ) RLQ tenderness     (  ) LLQ tenderness     (  ) epigastric tenderness     (  ) diffuse tenderness  (  ) Splenomegaly      (  ) Hepatomegaly      (  ) Jaundice     (  ) ecchymosis     EXTREMITIES:  (  ) Normal     (  ) Rash     (  ) ecchymosis     (  ) varicose veins      (  ) pitting edema     (  ) non-pitting edema   (  ) ulceration     (  ) gangrene:     (location:     )    NERVOUS SYSTEM:    (  ) A&Ox3     (  ) confused     (  ) lethargic  CN II-XII:     (  ) Intact     (  ) deficits found     (Specify:     )   Upper extremities:     (  ) no sensorimotor deficits     (  ) weakness     (  ) loss of proprioception/vibration     (  ) loss of touch/temperature (specify:    )  Lower extremities:     (  ) no sensorimotor deficits     (  ) weakness     (  ) loss of proprioception/vibration     (  ) loss of touch/temperature (specify:    )    SKIN:   (  ) No rashes or lesions     (  ) maculopapular rash     (  ) pustules     (  ) vesicles     (  ) ulcer     (  ) ecchymosis     (specify location:     )    AMPAC score:    (  ) Indwelling Park Catheter:   Date insterted:    Reason (  ) Critical illness     (  ) urinary retention    (  ) Accurate Ins/Outs Monitoring     (  ) CMO patient    (  ) Central Line:   Date inserted:  Location: (  ) Right IJ     (  ) Left IJ     (  ) Right Fem     (  ) Left Fem    (  ) SPC        (  ) pigtail       (  ) PEG tube       (  ) colostomy       (  ) jejunostomy  (  ) U-Dall    LABS:                        9.2    3.85  )-----------( 132      ( 22 Sep 2023 07:21 )             28.8     09-22    136  |  106  |  66<HH>  ----------------------------<  142<H>  5.5<H>   |  16<L>  |  3.5<H>    Ca    9.8      22 Sep 2023 07:21  Phos  8.3     09-22  Mg     2.5     09-22    TPro  3.8<L>  /  Alb  3.0<L>  /  TBili  0.3  /  DBili  x   /  AST  7   /  ALT  16  /  AlkPhos  439<H>  09-22      Urinalysis Basic - ( 22 Sep 2023 07:21 )    Color: x / Appearance: x / SG: x / pH: x  Gluc: 142 mg/dL / Ketone: x  / Bili: x / Urobili: x   Blood: x / Protein: x / Nitrite: x   Leuk Esterase: x / RBC: x / WBC x   Sq Epi: x / Non Sq Epi: x / Bacteria: x                RADIOLOGY:

## 2023-09-23 LAB
ALBUMIN SERPL ELPH-MCNC: 2.7 G/DL — LOW (ref 3.5–5.2)
ALP SERPL-CCNC: 351 U/L — HIGH (ref 30–115)
ALT FLD-CCNC: 13 U/L — SIGNIFICANT CHANGE UP (ref 0–41)
ANION GAP SERPL CALC-SCNC: 13 MMOL/L — SIGNIFICANT CHANGE UP (ref 7–14)
ANION GAP SERPL CALC-SCNC: 14 MMOL/L — SIGNIFICANT CHANGE UP (ref 7–14)
AST SERPL-CCNC: 6 U/L — SIGNIFICANT CHANGE UP (ref 0–41)
BASOPHILS # BLD AUTO: 0 K/UL — SIGNIFICANT CHANGE UP (ref 0–0.2)
BASOPHILS NFR BLD AUTO: 0 % — SIGNIFICANT CHANGE UP (ref 0–1)
BILIRUB SERPL-MCNC: 0.2 MG/DL — SIGNIFICANT CHANGE UP (ref 0.2–1.2)
BUN SERPL-MCNC: 83 MG/DL — CRITICAL HIGH (ref 10–20)
BUN SERPL-MCNC: 84 MG/DL — CRITICAL HIGH (ref 10–20)
CALCIUM SERPL-MCNC: 9 MG/DL — SIGNIFICANT CHANGE UP (ref 8.4–10.5)
CALCIUM SERPL-MCNC: 9.4 MG/DL — SIGNIFICANT CHANGE UP (ref 8.4–10.5)
CHLORIDE SERPL-SCNC: 103 MMOL/L — SIGNIFICANT CHANGE UP (ref 98–110)
CHLORIDE SERPL-SCNC: 105 MMOL/L — SIGNIFICANT CHANGE UP (ref 98–110)
CO2 SERPL-SCNC: 17 MMOL/L — SIGNIFICANT CHANGE UP (ref 17–32)
CO2 SERPL-SCNC: 18 MMOL/L — SIGNIFICANT CHANGE UP (ref 17–32)
CORTIS AM PEAK SERPL-MCNC: 4.2 UG/DL — LOW (ref 6–18.4)
CREAT SERPL-MCNC: 2.9 MG/DL — HIGH (ref 0.7–1.5)
CREAT SERPL-MCNC: 3.1 MG/DL — HIGH (ref 0.7–1.5)
EGFR: 21 ML/MIN/1.73M2 — LOW
EGFR: 23 ML/MIN/1.73M2 — LOW
EOSINOPHIL # BLD AUTO: 0 K/UL — SIGNIFICANT CHANGE UP (ref 0–0.7)
EOSINOPHIL NFR BLD AUTO: 0 % — SIGNIFICANT CHANGE UP (ref 0–8)
GLUCOSE SERPL-MCNC: 131 MG/DL — HIGH (ref 70–99)
GLUCOSE SERPL-MCNC: 152 MG/DL — HIGH (ref 70–99)
HAV IGM SER-ACNC: SIGNIFICANT CHANGE UP
HBV CORE IGM SER-ACNC: SIGNIFICANT CHANGE UP
HBV SURFACE AG SER-ACNC: SIGNIFICANT CHANGE UP
HCT VFR BLD CALC: 26.3 % — LOW (ref 42–52)
HCV AB S/CO SERPL IA: 0.02 S/CO — SIGNIFICANT CHANGE UP (ref 0–0.99)
HCV AB SERPL-IMP: SIGNIFICANT CHANGE UP
HGB BLD-MCNC: 8.4 G/DL — LOW (ref 14–18)
IMM GRANULOCYTES NFR BLD AUTO: 1.2 % — HIGH (ref 0.1–0.3)
LYMPHOCYTES # BLD AUTO: 0.22 K/UL — LOW (ref 1.2–3.4)
LYMPHOCYTES # BLD AUTO: 6.5 % — LOW (ref 20.5–51.1)
MAGNESIUM SERPL-MCNC: 2.5 MG/DL — HIGH (ref 1.8–2.4)
MCHC RBC-ENTMCNC: 28.6 PG — SIGNIFICANT CHANGE UP (ref 27–31)
MCHC RBC-ENTMCNC: 31.9 G/DL — LOW (ref 32–37)
MCV RBC AUTO: 89.5 FL — SIGNIFICANT CHANGE UP (ref 80–94)
MONOCYTES # BLD AUTO: 0.42 K/UL — SIGNIFICANT CHANGE UP (ref 0.1–0.6)
MONOCYTES NFR BLD AUTO: 12.3 % — HIGH (ref 1.7–9.3)
NEUTROPHILS # BLD AUTO: 2.73 K/UL — SIGNIFICANT CHANGE UP (ref 1.4–6.5)
NEUTROPHILS NFR BLD AUTO: 80 % — HIGH (ref 42.2–75.2)
NRBC # BLD: 0 /100 WBCS — SIGNIFICANT CHANGE UP (ref 0–0)
PHOSPHATE SERPL-MCNC: 8.6 MG/DL — HIGH (ref 2.1–4.9)
PLATELET # BLD AUTO: 128 K/UL — LOW (ref 130–400)
PMV BLD: 10.5 FL — HIGH (ref 7.4–10.4)
POTASSIUM SERPL-MCNC: 4.8 MMOL/L — SIGNIFICANT CHANGE UP (ref 3.5–5)
POTASSIUM SERPL-MCNC: 5.2 MMOL/L — HIGH (ref 3.5–5)
POTASSIUM SERPL-SCNC: 4.8 MMOL/L — SIGNIFICANT CHANGE UP (ref 3.5–5)
POTASSIUM SERPL-SCNC: 5.2 MMOL/L — HIGH (ref 3.5–5)
PROT SERPL-MCNC: 3.6 G/DL — LOW (ref 6–8)
RBC # BLD: 2.94 M/UL — LOW (ref 4.7–6.1)
RBC # FLD: 16.7 % — HIGH (ref 11.5–14.5)
SODIUM SERPL-SCNC: 135 MMOL/L — SIGNIFICANT CHANGE UP (ref 135–146)
SODIUM SERPL-SCNC: 135 MMOL/L — SIGNIFICANT CHANGE UP (ref 135–146)
URATE SERPL-MCNC: 15 MG/DL — HIGH (ref 3.4–8.8)
URATE SERPL-MCNC: 15.2 MG/DL — HIGH (ref 3.4–8.8)
WBC # BLD: 3.41 K/UL — LOW (ref 4.8–10.8)
WBC # FLD AUTO: 3.41 K/UL — LOW (ref 4.8–10.8)

## 2023-09-23 PROCEDURE — 99232 SBSQ HOSP IP/OBS MODERATE 35: CPT

## 2023-09-23 RX ORDER — RASBURICASE 7.5 MG
6 KIT INTRAVENOUS ONCE
Refills: 0 | Status: COMPLETED | OUTPATIENT
Start: 2023-09-23 | End: 2023-09-23

## 2023-09-23 RX ADMIN — HEPARIN SODIUM 5000 UNIT(S): 5000 INJECTION INTRAVENOUS; SUBCUTANEOUS at 05:49

## 2023-09-23 RX ADMIN — OCTREOTIDE ACETATE 150 MICROGRAM(S): 200 INJECTION, SOLUTION INTRAVENOUS; SUBCUTANEOUS at 13:02

## 2023-09-23 RX ADMIN — FLUPHENAZINE HYDROCHLORIDE 10 MILLIGRAM(S): 1 TABLET, FILM COATED ORAL at 11:09

## 2023-09-23 RX ADMIN — Medication 2 MILLIGRAM(S): at 11:08

## 2023-09-23 RX ADMIN — Medication 1300 MILLIGRAM(S): at 21:12

## 2023-09-23 RX ADMIN — OCTREOTIDE ACETATE 150 MICROGRAM(S): 200 INJECTION, SOLUTION INTRAVENOUS; SUBCUTANEOUS at 21:12

## 2023-09-23 RX ADMIN — CHLORHEXIDINE GLUCONATE 1 APPLICATION(S): 213 SOLUTION TOPICAL at 05:43

## 2023-09-23 RX ADMIN — Medication 1300 MILLIGRAM(S): at 13:02

## 2023-09-23 RX ADMIN — OCTREOTIDE ACETATE 150 MICROGRAM(S): 200 INJECTION, SOLUTION INTRAVENOUS; SUBCUTANEOUS at 05:49

## 2023-09-23 RX ADMIN — Medication 100 MILLIGRAM(S): at 15:16

## 2023-09-23 RX ADMIN — HEPARIN SODIUM 5000 UNIT(S): 5000 INJECTION INTRAVENOUS; SUBCUTANEOUS at 21:12

## 2023-09-23 RX ADMIN — SODIUM CHLORIDE 125 MILLILITER(S): 9 INJECTION, SOLUTION INTRAVENOUS at 13:03

## 2023-09-23 RX ADMIN — RASBURICASE 108 MILLIGRAM(S): KIT at 12:48

## 2023-09-23 RX ADMIN — HEPARIN SODIUM 5000 UNIT(S): 5000 INJECTION INTRAVENOUS; SUBCUTANEOUS at 13:02

## 2023-09-23 RX ADMIN — SODIUM CHLORIDE 125 MILLILITER(S): 9 INJECTION, SOLUTION INTRAVENOUS at 03:28

## 2023-09-23 RX ADMIN — Medication 1300 MILLIGRAM(S): at 05:47

## 2023-09-23 NOTE — PROGRESS NOTE ADULT - ASSESSMENT
67y old Male who presents with a chief complaint of Hypercalcemia and diarrhea    # tumor lysis syndrome post chemo  - s/p R-CVP for on 9/20 & 9/21  -  IV rasburicase 3 mg STAT x1, 6mg today   IV fluids- 1/2 NS with bicarb at 125 ml /hr  - check daily TLS panel- CMP, Uric Acid, Phos  - dose of lokelma; recheck BMP    # hypercalcemia due to malignancy  - on IV hydration   -s/p  calcitonin, pamidronate, & Xgeva  stable- monitpr    # Hx of NHL marginal zone- with recurrence?  # metastatic neuroendocrine tumor from liver biopsy- possibly from GI source  # CT chest w worsening enlarged and new enlarged mediastinal and axillary L.N s/p EBUS on 9/8  -He was  treated in 2019 and 2020 but was unable to tolerate maintenance Rituxan.  -never in complete remission - observed for low grade lymphoma, but patient was lost to follow up.  -MRI abdomen 7.23:  Liver segment II 4 cm mass and Liver segment V  1.5 cm mass suspicious for Hepatic Lymphomas.  --Biopsy of Liver lesion 8.23: well differentiated Neuroendocrine tumor, Grade 3 Likely  metastatic.   -LDH normal 4.23   - NM Octreoscan Mutliple Areas (09.05.23 @ 16:43) >Focal area of increased uptake in the upper abdomen/epigastrium to the left of midline suspicious for neuroendocrine tissue expressing somatostatin receptors.    - heme/onc following- s/p R-CVP treatment; cont prednisone  - continue octreotide    # hypotension- chronic; asymptomatic  stable  check moring cortisol level  echo:  EF of 67%. Mild (Grade I) diastolic dysfunction. No regional wall motion abnormalities.  Small localized posterior pericardial effusion without echocardiographic evidence of tamponade physiology. Compared to prior study, effusion size is grossly unchanged.    # pericardial effusion, stable, no evidence of tamponade     # CKD stage IV/metabolic acidosis   monitor  cont sodium bicarb    # Schizoaffective disorder    # Anemia of chronic illness   s/p 2 u PRBC  monitor    # chronic Diarrhea likley from neuroendocrine tumor   improving    # Dysphagia likley from enlarged L.N       - dvt ppx    Pending:  monitring TLS, continue IV fluids, heme/onc follow up,, morning cortisol level  Plan of care d/w patient

## 2023-09-23 NOTE — PROGRESS NOTE ADULT - SUBJECTIVE AND OBJECTIVE BOX
ELDA COVARRUBIAS 67y Male  MRN#: [id number]     SUBJECTIVE  Patient is a 67y old Male who presents with a chief complaint of hypercalcemia (22 Sep 2023 11:27)  Currently admitted to medicine with the primary diagnosis of Hypercalcemia      OBJECTIVE  PAST MEDICAL & SURGICAL HISTORY  Kidney stones    Schizophrenia    Lymphoma    Shingles    Atrophic kidney    H/O colonoscopy with polypectomy    Liver cyst    History of bladder surgery    H/O neuropathy    History of chemotherapy    Stage 3 chronic kidney disease    Retained urethral stent    H/O umbilical hernia repair    Elbow fracture    H/O cystoscopy      ALLERGIES:  allopurinol (Rash (Moderate))    MEDICATIONS:  STANDING MEDICATIONS  benztropine 2 milliGRAM(s) Oral daily  chlorhexidine 2% Cloths 1 Application(s) Topical <User Schedule>  fluPHENAZine 10 milliGRAM(s) Oral daily  heparin   Injectable 5000 Unit(s) SubCutaneous every 8 hours  octreotide  Injectable 150 MICROGram(s) SubCutaneous three times a day  predniSONE   Tablet 100 milliGRAM(s) Oral every 24 hours  sodium bicarbonate 1300 milliGRAM(s) Oral three times a day  sodium chloride 0.45% 1000 milliLiter(s) IV Continuous <Continuous>    PRN MEDICATIONS      VITAL SIGNS: Last 24 Hours  T(C): 35.9 (23 Sep 2023 04:49), Max: 36.4 (22 Sep 2023 19:29)  T(F): 96.6 (23 Sep 2023 04:49), Max: 97.5 (22 Sep 2023 19:29)  HR: 50 (23 Sep 2023 04:49) (50 - 80)  BP: 80/53 (23 Sep 2023 04:49) (80/53 - 94/56)  BP(mean): --  RR: 18 (23 Sep 2023 04:49) (18 - 18)  SpO2: 96% (22 Sep 2023 20:46) (96% - 96%)    LABS:                        8.4    3.41  )-----------( 128      ( 23 Sep 2023 07:52 )             26.3     09-23    135  |  105  |  84<HH>  ----------------------------<  131<H>  5.2<H>   |  17  |  3.1<H>    Ca    9.0      23 Sep 2023 07:52  Phos  8.6     09-23  Mg     2.5     09-23    TPro  3.6<L>  /  Alb  2.7<L>  /  TBili  0.2  /  DBili  x   /  AST  6   /  ALT  13  /  AlkPhos  351<H>  09-23      Urinalysis Basic - ( 23 Sep 2023 07:52 )    Color: x / Appearance: x / SG: x / pH: x  Gluc: 131 mg/dL / Ketone: x  / Bili: x / Urobili: x   Blood: x / Protein: x / Nitrite: x   Leuk Esterase: x / RBC: x / WBC x   Sq Epi: x / Non Sq Epi: x / Bacteria: x    PHYSICAL EXAM:    GEN: no distress, comfortable  PULM: normal respiration  ABD: Soft, non-distended, no guarding; non-tender  EXT: No lower extremity edema  NEURO: A&Ox3

## 2023-09-23 NOTE — PROGRESS NOTE ADULT - SUBJECTIVE AND OBJECTIVE BOX
ELDA COVARRUBIAS 67y Male  MRN#: [id number]   Hospital Day: 30d    SUBJECTIVE  Patient is a 67y old Male who presents with a chief complaint of hypercalcemia (22 Sep 2023 11:27)  Currently admitted to medicine with the primary diagnosis of Hypercalcemia      INTERVAL HPI AND OVERNIGHT EVENTS:  Patient was examined and seen at bedside. This morning he is resting comfortably in bed and reports no issues or overnight events.    REVIEW OF SYMPTOMS:  CONSTITUTIONAL: No weakness, fevers or chills; No headaches  EYES: No visual changes, eye pain, or discharge  ENT: No vertigo; No ear pain or change in hearing; No sore throat or difficulty swallowing  NECK: No pain or stiffness  RESPIRATORY: No cough, wheezing, or hemoptysis; No shortness of breath  CARDIOVASCULAR: No chest pain or palpitations  GASTROINTESTINAL: No abdominal or epigastric pain; No nausea, vomiting, or hematemesis; No diarrhea or constipation; No melena or hematochezia  GENITOURINARY: No dysuria, frequency or hematuria  MUSCULOSKELETAL: No joint pain, no muscle pain, no weakness  NEUROLOGICAL: No numbness or weakness  SKIN: No itching or rashes    OBJECTIVE  PAST MEDICAL & SURGICAL HISTORY  Kidney stones    Schizophrenia    Lymphoma    Shingles    Atrophic kidney    H/O colonoscopy with polypectomy    Liver cyst    History of bladder surgery    H/O neuropathy    History of chemotherapy    Stage 3 chronic kidney disease    Retained urethral stent    H/O umbilical hernia repair    Elbow fracture    H/O cystoscopy      ALLERGIES:  allopurinol (Rash (Moderate))    MEDICATIONS:  STANDING MEDICATIONS  benztropine 2 milliGRAM(s) Oral daily  chlorhexidine 2% Cloths 1 Application(s) Topical <User Schedule>  fluPHENAZine 10 milliGRAM(s) Oral daily  heparin   Injectable 5000 Unit(s) SubCutaneous every 8 hours  octreotide  Injectable 150 MICROGram(s) SubCutaneous three times a day  predniSONE   Tablet 100 milliGRAM(s) Oral every 24 hours  sodium bicarbonate 1300 milliGRAM(s) Oral three times a day  sodium chloride 0.45% 1000 milliLiter(s) IV Continuous <Continuous>    PRN MEDICATIONS      VITAL SIGNS: Last 24 Hours  T(C): 35.9 (23 Sep 2023 04:49), Max: 36.4 (22 Sep 2023 19:29)  T(F): 96.6 (23 Sep 2023 04:49), Max: 97.5 (22 Sep 2023 19:29)  HR: 50 (23 Sep 2023 04:49) (50 - 80)  BP: 80/53 (23 Sep 2023 04:49) (80/53 - 94/56)  BP(mean): --  RR: 18 (23 Sep 2023 04:49) (18 - 18)  SpO2: 96% (22 Sep 2023 20:46) (96% - 96%)    LABS:                        9.2    3.85  )-----------( 132      ( 22 Sep 2023 07:21 )             28.8     09-22    135  |  104  |  80<HH>  ----------------------------<  149<H>  5.4<H>   |  19  |  3.4<H>    Ca    9.6      22 Sep 2023 22:01  Phos  8.3     09-22  Mg     2.5     09-22    TPro  3.8<L>  /  Alb  3.0<L>  /  TBili  0.3  /  DBili  x   /  AST  7   /  ALT  16  /  AlkPhos  439<H>  09-22      Urinalysis Basic - ( 22 Sep 2023 22:01 )    Color: x / Appearance: x / SG: x / pH: x  Gluc: 149 mg/dL / Ketone: x  / Bili: x / Urobili: x   Blood: x / Protein: x / Nitrite: x   Leuk Esterase: x / RBC: x / WBC x   Sq Epi: x / Non Sq Epi: x / Bacteria: x                RADIOLOGY:      PHYSICAL EXAM:  CONSTITUTIONAL: No acute distress, well-developed, well-groomed, AAOx3  HEAD: Atraumatic, normocephalic  EYES: EOM intact, PERRLA, conjunctiva and sclera clear  ENT: Supple, no masses, no thyromegaly, no bruits, no JVD; moist mucous membranes  PULMONARY: Clear to auscultation bilaterally; no wheezes, rales, or rhonchi  CARDIOVASCULAR: Regular rate and rhythm; no murmurs, rubs, or gallops  GASTROINTESTINAL: Soft, non-tender, non-distended; bowel sounds present  MUSCULOSKELETAL: 2+ peripheral pulses; no clubbing, no cyanosis, no edema  NEUROLOGY: non-focal  SKIN: No rashes or lesions; warm and dry    ASSESSMENT & PLAN  67y M PMH Schizophrenia (prior lithium use), nephrolithiasis c/b atrophic L kidney, R ureteral rivision, CKD4 (bsl Cr ~2.5), gout, chronic diarrhea, 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 ASHLEY on CKD.    Hematology and Oncology consulted given hx of Marginal Zone lymphoma.    #Recurrent Hypercalcemia  #Marginal Zone Lymphoma   - calcium 13.9 on admission. Trending down currently at 10.3  - on IV hydration   -s/p one dose of calcitonin, pamidronate, & Xgeva    #Hx of NHL marginal zone now with ? recurrence.  -He was in a good partial remission following 3 cycles of bendamustine and Rituxan treated in 2019 and 2020.  -He was unable to tolerate maintenance Rituxan.  -However, he was never in complete remission and had preferred just to be observed since we were dealing with a low grade lymphoma.  - was lost to follow up.  --CT Abdomen/Pelvis 2/23/23 Interval worsening in periesophageal and pelvic lymphadenopathy , other bulky lymph nodes in the abdomen and retroperitoneum appear stable. 1.3 indeterminate segment 4 liver lesion. Focal areas of pleural nodularity concerning for lymphomatous/neoplastic/metastatic process, new since prior.  --PET scan 5.23: 1.  Interval increase in size and decreased metabolic activity of the thoracoabdominal lymphadenopathy, some are new, probably recurrence /residual disease. New mildly metabolic 3 cm hypodensity in the liver segment 2/3, concerning for neoplasm or lymphoma.  Poorly defined small hypodensity in the segment 4, below the resolution of PET scan.  MRI will provide further elucidation. Diffuse mildly increased bone marrow activity, possibly reactive versus bone marrow involvement.   Hepatosplenomegaly.  -MRI abdomen 7.23:  Liver segment II 4 cm mass and Liver segment V  1.5 cm mass suspicious for Hepatic Lymphomas.  --Biopsy of Liver lesion 8.23: well differentiated Neuroendocrine tumor, Grade 3 Likely  metastatic.   -LDH normal 4.23   - NM Octreoscan Mutliple Areas (09.05.23 @ 16:43) >Focal area of increased uptake in the upper abdomen/epigastrium to the left of midline suspicious for neuroendocrine tissue expressing somatostatin receptors.  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 This morning he is resting comfortably in bed and reports no issues or overnight events.    REVIEW OF SYMPTOMS:  CONSTITUTIONAL: No weakness, fevers or chills; No headaches  EYES: No visual changes, eye pain, or discharge  ENT: No vertigo; No ear pain or change in hearing; No sore throat or difficulty swallowing  NECK: No pain or stiffness  RESPIRATORY: No cough, wheezing, or hemoptysis; No shortness of breath  CARDIOVASCULAR: No chest pain or palpitations  GASTROINTESTINAL: No abdominal or epigastric pain; No nausea, vomiting, or hematemesis; No diarrhea or constipation; No melena or hematochezia  GENITOURINARY: No dysuria, frequency or hematuria  MUSCULOSKELETAL: No joint pain, no muscle pain, no weakness  NEUROLOGICAL: No numbness or weakness  SKIN: No itching or rashes    OBJECTIVE  PAST MEDICAL & SURGICAL HISTORY  Kidney stones    Schizophrenia    Lymphoma    Shingles    Atrophic kidney    H/O colonoscopy with polypectomy    Liver cyst    History of bladder surgery    H/O neuropathy    History of chemotherapy    Stage 3 chronic kidney disease    Retained urethral stent    H/O umbilical hernia repair    Elbow fracture    H/O cystoscopy      ALLERGIES:  allopurinol (Rash (Moderate))    MEDICATIONS:  STANDING MEDICATIONS  benztropine 2 milliGRAM(s) Oral daily  chlorhexidine 2% Cloths 1 Application(s) Topical <User Schedule>  fluPHENAZine 10 milliGRAM(s) Oral daily  heparin   Injectable 5000 Unit(s) SubCutaneous every 8 hours  octreotide  Injectable 150 MICROGram(s) SubCutaneous three times a day  predniSONE   Tablet 100 milliGRAM(s) Oral every 24 hours  sodium bicarbonate 1300 milliGRAM(s) Oral three times a day  sodium chloride 0.45% 1000 milliLiter(s) IV Continuous <Continuous>    PRN MEDICATIONS      VITAL SIGNS: Last 24 Hours  T(C): 35.9 (23 Sep 2023 04:49), Max: 36.4 (22 Sep 2023 19:29)  T(F): 96.6 (23 Sep 2023 04:49), Max: 97.5 (22 Sep 2023 19:29)  HR: 50 (23 Sep 2023 04:49) (50 - 80)  BP: 80/53 (23 Sep 2023 04:49) (80/53 - 94/56)  BP(mean): --  RR: 18 (23 Sep 2023 04:49) (18 - 18)  SpO2: 96% (22 Sep 2023 20:46) (96% - 96%)    LABS:                        9.2    3.85  )-----------( 132      ( 22 Sep 2023 07:21 )             28.8     09-22    135  |  104  |  80<HH>  ----------------------------<  149<H>  5.4<H>   |  19  |  3.4<H>    Ca    9.6      22 Sep 2023 22:01  Phos  8.3     09-22  Mg     2.5     09-22    TPro  3.8<L>  /  Alb  3.0<L>  /  TBili  0.3  /  DBili  x   /  AST  7   /  ALT  16  /  AlkPhos  439<H>  09-22      Urinalysis Basic - ( 22 Sep 2023 22:01 )    Color: x / Appearance: x / SG: x / pH: x  Gluc: 149 mg/dL / Ketone: x  / Bili: x / Urobili: x   Blood: x / Protein: x / Nitrite: x   Leuk Esterase: x / RBC: x / WBC x   Sq Epi: x / Non Sq Epi: x / Bacteria: x        PHYSICAL EXAM:  CONSTITUTIONAL: No acute distress, well-developed, well-groomed, AAOx3  HEAD: Atraumatic, normocephalic  ENT: moist mucous membranes  PULMONARY: Clear to auscultation bilaterall  CARDIOVASCULAR: Regular rate and rhythm; no murmurs  GASTROINTESTINAL: Soft, non-tender, non-distended  MUSCULOSKELETAL:  no edema  SKIN: No rashes or lesions    ASSESSMENT & PLAN  67y M PMH Schizophrenia (prior lithium use), nephrolithiasis c/b atrophic L kidney, R ureteral rivision, CKD4 (bsl Cr ~2.5), gout, chronic diarrhea, 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 ASHLEY on CKD.    Hematology and Oncology consulted given hx of Marginal Zone lymphoma.    #Recurrent Hypercalcemia  #Marginal Zone Lymphoma   - calcium 13.9 on admission. Trending down currently at 10.3  - on IV hydration   -s/p one dose of calcitonin, pamidronate, & Xgeva    #Hx of NHL marginal zone now with ? recurrence.  -He was in a good partial remission following 3 cycles of bendamustine and Rituxan treated in 2019 and 2020.  -He was unable to tolerate maintenance Rituxan.  -However, he was never in complete remission and had preferred just to be observed since we were dealing with a low grade lymphoma.  - was lost to follow up.  --CT Abdomen/Pelvis 2/23/23 Interval worsening in periesophageal and pelvic lymphadenopathy , other bulky lymph nodes in the abdomen and retroperitoneum appear stable. 1.3 indeterminate segment 4 liver lesion. Focal areas of pleural nodularity concerning for lymphomatous/neoplastic/metastatic process, new since prior.  --PET scan 5.23: 1.  Interval increase in size and decreased metabolic activity of the thoracoabdominal lymphadenopathy, some are new, probably recurrence /residual disease. New mildly metabolic 3 cm hypodensity in the liver segment 2/3, concerning for neoplasm or lymphoma.  Poorly defined small hypodensity in the segment 4, below the resolution of PET scan.  MRI will provide further elucidation. Diffuse mildly increased bone marrow activity, possibly reactive versus bone marrow involvement.   Hepatosplenomegaly.  -MRI abdomen 7.23:  Liver segment II 4 cm mass and Liver segment V  1.5 cm mass suspicious for Hepatic Lymphomas.  --Biopsy of Liver lesion 8.23: well differentiated Neuroendocrine tumor, Grade 3 Likely  metastatic.   -LDH normal 4.23   - NM Octreoscan Mutliple Areas (09.05.23 @ 16:43) >Focal area of increased uptake in the upper abdomen/epigastrium to the left of midline suspicious for neuroendocrine tissue expressing somatostatin receptors.  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

## 2023-09-23 NOTE — PROGRESS NOTE ADULT - ASSESSMENT
CKD   lymphoma with tumor lysis  Hypercalcemia improved    Plan:  Continue IVF and daily lab monitoring

## 2023-09-23 NOTE — PROGRESS NOTE ADULT - SUBJECTIVE AND OBJECTIVE BOX
[facility] FOLLOW UP NOTE  --------------------------------------------------------------------------------  24 hour events/subjective:  ongoing treatment of tumor lysis  pt reports no complaints    PAST HISTORY  --------------------------------------------------------------------------------  No significant changes to PMH, PSH, FHx, SHx, unless otherwise noted    ALLERGIES & MEDICATIONS  --------------------------------------------------------------------------------  Allergies    allopurinol (Rash (Moderate))    Intolerances      Standing Inpatient Medications  benztropine 2 milliGRAM(s) Oral daily  chlorhexidine 2% Cloths 1 Application(s) Topical <User Schedule>  fluPHENAZine 10 milliGRAM(s) Oral daily  heparin   Injectable 5000 Unit(s) SubCutaneous every 8 hours  octreotide  Injectable 150 MICROGram(s) SubCutaneous three times a day  predniSONE   Tablet 100 milliGRAM(s) Oral every 24 hours  sodium bicarbonate 1300 milliGRAM(s) Oral three times a day  sodium chloride 0.45% 1000 milliLiter(s) IV Continuous <Continuous>    PRN Inpatient Medications      REVIEW OF SYSTEMS  --------------------------------------------------------------------------------  All other systems were reviewed and are negative, except as noted.    VITALS/PHYSICAL EXAM  --------------------------------------------------------------------------------  T(C): 36.7 (09-23-23 @ 13:54), Max: 36.7 (09-23-23 @ 13:54)  HR: 65 (09-23-23 @ 13:54) (50 - 80)  BP: 107/57 (09-23-23 @ 13:54) (80/53 - 107/57)  RR: 18 (09-23-23 @ 13:54) (18 - 18)  SpO2: 96% (09-22-23 @ 20:46) (96% - 96%)  Wt(kg): --        Physical Exam:  	Gen: NAD  	HEENT: No JVD  	Pulm: CTA B/L  	CV: RRR,  	Abd: +BS, soft, nontender/nondistended  	No edema    LABS/STUDIES  --------------------------------------------------------------------------------              8.4    3.41  >-----------<  128      [09-23-23 @ 07:52]              26.3     135  |  105  |  84  ----------------------------<  131      [09-23-23 @ 07:52]  5.2   |  17  |  3.1        Ca     9.0     [09-23-23 @ 07:52]      Mg     2.5     [09-23-23 @ 07:52]      Phos  8.6     [09-23-23 @ 07:52]    TPro  3.6  /  Alb  2.7  /  TBili  0.2  /  DBili  x   /  AST  6   /  ALT  13  /  AlkPhos  351  [09-23-23 @ 07:52]    Uric acid 15.2      [09-23-23 @ 07:52]    Creatinine Trend:  SCr 3.1 [09-23 @ 07:52]  SCr 3.4 [09-22 @ 22:01]  SCr 3.5 [09-22 @ 07:21]  SCr 3.5 [09-21 @ 16:16]  SCr 3.6 [09-21 @ 08:47]    Urinalysis - [09-23-23 @ 07:52]      Color  / Appearance  / SG  / pH       Gluc 131 / Ketone   / Bili  / Urobili        Blood  / Protein  / Leuk Est  / Nitrite       RBC  / WBC  / Hyaline  / Gran  / Sq Epi  / Non Sq Epi  / Bacteria       PTH -- (Ca 12.1)      [08-29-23 @ 08:30]   8  PTH -- (Ca 12.3)      [06-23-23 @ 06:11]   12  PTH -- (Ca 12.7)      [04-15-23 @ 08:22]   8  Vitamin D (25OH) 16      [08-29-23 @ 08:30]  TSH 2.27      [04-15-23 @ 08:22]  Lipid: chol 121, , HDL 18, LDL --      [06-22-23 @ 06:00]    HBsAg Nonreact      [09-20-23 @ 11:17]  HCV 0.02, Nonreact      [09-20-23 @ 11:17]

## 2023-09-24 LAB
ALBUMIN SERPL ELPH-MCNC: 3 G/DL — LOW (ref 3.5–5.2)
ALP SERPL-CCNC: 292 U/L — HIGH (ref 30–115)
ALT FLD-CCNC: 12 U/L — SIGNIFICANT CHANGE UP (ref 0–41)
ANION GAP SERPL CALC-SCNC: 12 MMOL/L — SIGNIFICANT CHANGE UP (ref 7–14)
AST SERPL-CCNC: 6 U/L — SIGNIFICANT CHANGE UP (ref 0–41)
BASOPHILS # BLD AUTO: 0 K/UL — SIGNIFICANT CHANGE UP (ref 0–0.2)
BASOPHILS NFR BLD AUTO: 0 % — SIGNIFICANT CHANGE UP (ref 0–1)
BILIRUB SERPL-MCNC: 0.2 MG/DL — SIGNIFICANT CHANGE UP (ref 0.2–1.2)
BUN SERPL-MCNC: 84 MG/DL — CRITICAL HIGH (ref 10–20)
CALCIUM SERPL-MCNC: 9.2 MG/DL — SIGNIFICANT CHANGE UP (ref 8.4–10.5)
CHLORIDE SERPL-SCNC: 105 MMOL/L — SIGNIFICANT CHANGE UP (ref 98–110)
CO2 SERPL-SCNC: 19 MMOL/L — SIGNIFICANT CHANGE UP (ref 17–32)
CREAT SERPL-MCNC: 2.5 MG/DL — HIGH (ref 0.7–1.5)
EGFR: 28 ML/MIN/1.73M2 — LOW
EOSINOPHIL # BLD AUTO: 0 K/UL — SIGNIFICANT CHANGE UP (ref 0–0.7)
EOSINOPHIL NFR BLD AUTO: 0 % — SIGNIFICANT CHANGE UP (ref 0–8)
GLUCOSE SERPL-MCNC: 140 MG/DL — HIGH (ref 70–99)
HCT VFR BLD CALC: 26.1 % — LOW (ref 42–52)
HGB BLD-MCNC: 8.4 G/DL — LOW (ref 14–18)
IMM GRANULOCYTES NFR BLD AUTO: 0.7 % — HIGH (ref 0.1–0.3)
LYMPHOCYTES # BLD AUTO: 0.16 K/UL — LOW (ref 1.2–3.4)
LYMPHOCYTES # BLD AUTO: 5.3 % — LOW (ref 20.5–51.1)
MAGNESIUM SERPL-MCNC: 2.5 MG/DL — HIGH (ref 1.8–2.4)
MCHC RBC-ENTMCNC: 28.6 PG — SIGNIFICANT CHANGE UP (ref 27–31)
MCHC RBC-ENTMCNC: 32.2 G/DL — SIGNIFICANT CHANGE UP (ref 32–37)
MCV RBC AUTO: 88.8 FL — SIGNIFICANT CHANGE UP (ref 80–94)
MONOCYTES # BLD AUTO: 0.17 K/UL — SIGNIFICANT CHANGE UP (ref 0.1–0.6)
MONOCYTES NFR BLD AUTO: 5.6 % — SIGNIFICANT CHANGE UP (ref 1.7–9.3)
NEUTROPHILS # BLD AUTO: 2.67 K/UL — SIGNIFICANT CHANGE UP (ref 1.4–6.5)
NEUTROPHILS NFR BLD AUTO: 88.4 % — HIGH (ref 42.2–75.2)
NRBC # BLD: 0 /100 WBCS — SIGNIFICANT CHANGE UP (ref 0–0)
PHOSPHATE SERPL-MCNC: 8.1 MG/DL — HIGH (ref 2.1–4.9)
PLATELET # BLD AUTO: 125 K/UL — LOW (ref 130–400)
PMV BLD: 10.4 FL — SIGNIFICANT CHANGE UP (ref 7.4–10.4)
POTASSIUM SERPL-MCNC: 4.9 MMOL/L — SIGNIFICANT CHANGE UP (ref 3.5–5)
POTASSIUM SERPL-SCNC: 4.9 MMOL/L — SIGNIFICANT CHANGE UP (ref 3.5–5)
PROT SERPL-MCNC: 3.7 G/DL — LOW (ref 6–8)
RBC # BLD: 2.94 M/UL — LOW (ref 4.7–6.1)
RBC # FLD: 16.3 % — HIGH (ref 11.5–14.5)
SODIUM SERPL-SCNC: 136 MMOL/L — SIGNIFICANT CHANGE UP (ref 135–146)
URATE SERPL-MCNC: 11 MG/DL — HIGH (ref 3.4–8.8)
WBC # BLD: 3.02 K/UL — LOW (ref 4.8–10.8)
WBC # FLD AUTO: 3.02 K/UL — LOW (ref 4.8–10.8)

## 2023-09-24 PROCEDURE — 99232 SBSQ HOSP IP/OBS MODERATE 35: CPT

## 2023-09-24 RX ORDER — RASBURICASE 7.5 MG
3 KIT INTRAVENOUS ONCE
Refills: 0 | Status: COMPLETED | OUTPATIENT
Start: 2023-09-24 | End: 2023-09-24

## 2023-09-24 RX ADMIN — Medication 1300 MILLIGRAM(S): at 22:00

## 2023-09-24 RX ADMIN — HEPARIN SODIUM 5000 UNIT(S): 5000 INJECTION INTRAVENOUS; SUBCUTANEOUS at 22:00

## 2023-09-24 RX ADMIN — RASBURICASE 104 MILLIGRAM(S): KIT at 17:23

## 2023-09-24 RX ADMIN — OCTREOTIDE ACETATE 150 MICROGRAM(S): 200 INJECTION, SOLUTION INTRAVENOUS; SUBCUTANEOUS at 05:08

## 2023-09-24 RX ADMIN — SODIUM CHLORIDE 125 MILLILITER(S): 9 INJECTION, SOLUTION INTRAVENOUS at 22:50

## 2023-09-24 RX ADMIN — OCTREOTIDE ACETATE 150 MICROGRAM(S): 200 INJECTION, SOLUTION INTRAVENOUS; SUBCUTANEOUS at 22:00

## 2023-09-24 RX ADMIN — Medication 2 MILLIGRAM(S): at 11:34

## 2023-09-24 RX ADMIN — OCTREOTIDE ACETATE 150 MICROGRAM(S): 200 INJECTION, SOLUTION INTRAVENOUS; SUBCUTANEOUS at 13:18

## 2023-09-24 RX ADMIN — HEPARIN SODIUM 5000 UNIT(S): 5000 INJECTION INTRAVENOUS; SUBCUTANEOUS at 05:08

## 2023-09-24 RX ADMIN — SODIUM CHLORIDE 125 MILLILITER(S): 9 INJECTION, SOLUTION INTRAVENOUS at 01:30

## 2023-09-24 RX ADMIN — Medication 1300 MILLIGRAM(S): at 05:08

## 2023-09-24 RX ADMIN — Medication 1300 MILLIGRAM(S): at 13:18

## 2023-09-24 RX ADMIN — FLUPHENAZINE HYDROCHLORIDE 10 MILLIGRAM(S): 1 TABLET, FILM COATED ORAL at 11:34

## 2023-09-24 RX ADMIN — Medication 100 MILLIGRAM(S): at 14:43

## 2023-09-24 RX ADMIN — HEPARIN SODIUM 5000 UNIT(S): 5000 INJECTION INTRAVENOUS; SUBCUTANEOUS at 13:18

## 2023-09-24 NOTE — PROGRESS NOTE ADULT - ASSESSMENT
67y old Male who presents with a chief complaint of Hypercalcemia and diarrhea    # tumor lysis syndrome post chemo  - s/p R-CVP for on 9/20 & 9/21  -  IV rasburicase 3 mg (9/22), then 6mg (9/23), will give another dose of rasburicase 3 mg for uric acid of 11   - c/w IV fluids- 1/2 NS with bicarb at 125 ml /hr  - check daily TLS panel- CMP, Uric Acid, Phos  - s/p lokelma; K+ at goal    # hypercalcemia due to malignancy  - on IV hydration   - s/p calcitonin, pamidronate, & Xgeva  stable- monitpr    # Hx of NHL marginal zone- with recurrence?  # metastatic neuroendocrine tumor from liver biopsy- possibly from GI source  # CT chest w worsening enlarged and new enlarged mediastinal and axillary L.N s/p EBUS on 9/8  -He was  treated in 2019 and 2020 but was unable to tolerate maintenance Rituxan.  -never in complete remission - observed for low grade lymphoma, but patient was lost to follow up.  -MRI abdomen 7.23:  Liver segment II 4 cm mass and Liver segment V  1.5 cm mass suspicious for Hepatic Lymphomas.  --Biopsy of Liver lesion 8.23: well differentiated Neuroendocrine tumor, Grade 3 Likely  metastatic.   -LDH normal 4.23   - NM Octreoscan Mutliple Areas (09.05.23 @ 16:43) >Focal area of increased uptake in the upper abdomen/epigastrium to the left of midline suspicious for neuroendocrine tissue expressing somatostatin receptors.  - heme/onc following- s/p R-CVP treatment; cont prednisone  - continue octreotide    # hypotension- chronic; asymptomatic  stable  moring cortisol level noted, pt is on prednisone which may obscure the cortisol level, can repeat after prednisone is complete.  echo:  EF of 67%. Mild (Grade I) diastolic dysfunction. No regional wall motion abnormalities.  Small localized posterior pericardial effusion without echocardiographic evidence of tamponade physiology. Compared to prior study, effusion size is grossly unchanged.    # pericardial effusion, stable, no evidence of tamponade     # CKD stage IV/metabolic acidosis   monitor  cont sodium bicarb    # Schizoaffective disorder    # Anemia of chronic illness   s/p 2 u PRBC  monitor    # chronic Diarrhea likley from neuroendocrine tumor   improving    # Dysphagia likley from enlarged L.N     - dvt ppx    Pending:  monitring TLS, continue IV fluids, heme/onc follow up  Plan of care d/w patient

## 2023-09-24 NOTE — PROGRESS NOTE ADULT - SUBJECTIVE AND OBJECTIVE BOX
ELDA COVARRUBIAS 67y Male  MRN#: [id number]   Hospital Day: 31d    SUBJECTIVE  Patient is a 67y old Male who presents with a chief complaint of hypercalcemia (22 Sep 2023 11:27)  Currently admitted to medicine with the primary diagnosis of Hypercalcemia      INTERVAL HPI AND OVERNIGHT EVENTS:  Patient was examined and seen at bedside. This morning he is resting comfortably in bed and reports no issues or overnight events.    REVIEW OF SYMPTOMS:  CONSTITUTIONAL: No weakness, fevers or chills; No headaches  EYES: No visual changes, eye pain, or discharge  ENT: No vertigo; No ear pain or change in hearing; No sore throat or difficulty swallowing  NECK: No pain or stiffness  RESPIRATORY: No cough, wheezing, or hemoptysis; No shortness of breath  CARDIOVASCULAR: No chest pain or palpitations  GASTROINTESTINAL: No abdominal or epigastric pain; No nausea, vomiting, or hematemesis; No diarrhea or constipation; No melena or hematochezia  GENITOURINARY: No dysuria, frequency or hematuria  MUSCULOSKELETAL: No joint pain, no muscle pain, no weakness  NEUROLOGICAL: No numbness or weakness  SKIN: No itching or rashes    OBJECTIVE  PAST MEDICAL & SURGICAL HISTORY  Kidney stones    Schizophrenia    Lymphoma    Shingles    Atrophic kidney    H/O colonoscopy with polypectomy    Liver cyst    History of bladder surgery    H/O neuropathy    History of chemotherapy    Stage 3 chronic kidney disease    Retained urethral stent    H/O umbilical hernia repair    Elbow fracture    H/O cystoscopy      ALLERGIES:  allopurinol (Rash (Moderate))    MEDICATIONS:  STANDING MEDICATIONS  benztropine 2 milliGRAM(s) Oral daily  chlorhexidine 2% Cloths 1 Application(s) Topical <User Schedule>  fluPHENAZine 10 milliGRAM(s) Oral daily  heparin   Injectable 5000 Unit(s) SubCutaneous every 8 hours  octreotide  Injectable 150 MICROGram(s) SubCutaneous three times a day  predniSONE   Tablet 100 milliGRAM(s) Oral every 24 hours  sodium bicarbonate 1300 milliGRAM(s) Oral three times a day  sodium chloride 0.45% 1000 milliLiter(s) IV Continuous <Continuous>    PRN MEDICATIONS      VITAL SIGNS: Last 24 Hours  T(C): 36.3 (24 Sep 2023 05:24), Max: 36.7 (23 Sep 2023 13:54)  T(F): 97.4 (24 Sep 2023 05:24), Max: 98 (23 Sep 2023 13:54)  HR: 59 (24 Sep 2023 05:24) (59 - 65)  BP: 87/59 (24 Sep 2023 05:24) (87/59 - 107/57)  BP(mean): --  RR: 19 (24 Sep 2023 05:24) (18 - 19)  SpO2: --    LABS:                        8.4    3.41  )-----------( 128      ( 23 Sep 2023 07:52 )             26.3     09-23    135  |  103  |  83<HH>  ----------------------------<  152<H>  4.8   |  18  |  2.9<H>    Ca    9.4      23 Sep 2023 18:07  Phos  8.6     09-23  Mg     2.5     09-23    TPro  3.6<L>  /  Alb  2.7<L>  /  TBili  0.2  /  DBili  x   /  AST  6   /  ALT  13  /  AlkPhos  351<H>  09-23      Urinalysis Basic - ( 23 Sep 2023 18:07 )    Color: x / Appearance: x / SG: x / pH: x  Gluc: 152 mg/dL / Ketone: x  / Bili: x / Urobili: x   Blood: x / Protein: x / Nitrite: x   Leuk Esterase: x / RBC: x / WBC x   Sq Epi: x / Non Sq Epi: x / Bacteria: x                RADIOLOGY:      PHYSICAL EXAM:  CONSTITUTIONAL: No acute distress, well-developed, well-groomed, AAOx3  HEAD: Atraumatic, normocephalic  EYES: EOM intact, PERRLA, conjunctiva and sclera clear  ENT: Supple, no masses, no thyromegaly, no bruits, no JVD; moist mucous membranes  PULMONARY: Clear to auscultation bilaterally; no wheezes, rales, or rhonchi  CARDIOVASCULAR: Regular rate and rhythm; no murmurs, rubs, or gallops  GASTROINTESTINAL: Soft, non-tender, non-distended; bowel sounds present  MUSCULOSKELETAL: 2+ peripheral pulses; no clubbing, no cyanosis, no edema  NEUROLOGY: non-focal  SKIN: No rashes or lesions; warm and dry    ASSESSMENT & PLAN  67y M PMH Schizophrenia (prior lithium use), nephrolithiasis c/b atrophic L kidney, R ureteral rivision, CKD4 (bsl Cr ~2.5), gout, chronic diarrhea, 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 ASHLEY on CKD.    Hematology and Oncology consulted given hx of Marginal Zone lymphoma.    #Recurrent Hypercalcemia  #Marginal Zone Lymphoma   - calcium 13.9 on admission. Trending down currently at 10.3  - on IV hydration   -s/p one dose of calcitonin, pamidronate, & Xgeva    #Hx of NHL marginal zone now with ? recurrence.  -He was in a good partial remission following 3 cycles of bendamustine and Rituxan treated in 2019 and 2020.  -He was unable to tolerate maintenance Rituxan.  -However, he was never in complete remission and had preferred just to be observed since we were dealing with a low grade lymphoma.  - was lost to follow up.  --CT Abdomen/Pelvis 2/23/23 Interval worsening in periesophageal and pelvic lymphadenopathy , other bulky lymph nodes in the abdomen and retroperitoneum appear stable. 1.3 indeterminate segment 4 liver lesion. Focal areas of pleural nodularity concerning for lymphomatous/neoplastic/metastatic process, new since prior.  --PET scan 5.23: 1.  Interval increase in size and decreased metabolic activity of the thoracoabdominal lymphadenopathy, some are new, probably recurrence /residual disease. New mildly metabolic 3 cm hypodensity in the liver segment 2/3, concerning for neoplasm or lymphoma.  Poorly defined small hypodensity in the segment 4, below the resolution of PET scan.  MRI will provide further elucidation. Diffuse mildly increased bone marrow activity, possibly reactive versus bone marrow involvement.   Hepatosplenomegaly.  -MRI abdomen 7.23:  Liver segment II 4 cm mass and Liver segment V  1.5 cm mass suspicious for Hepatic Lymphomas.  --Biopsy of Liver lesion 8.23: well differentiated Neuroendocrine tumor, Grade 3 Likely  metastatic.   -LDH normal 4.23   - NM Octreoscan Mutliple Areas (09.05.23 @ 16:43) >Focal area of increased uptake in the upper abdomen/epigastrium to the left of midline suspicious for neuroendocrine tissue expressing somatostatin receptors.  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

## 2023-09-24 NOTE — PROGRESS NOTE ADULT - SUBJECTIVE AND OBJECTIVE BOX
ELDA COVARRUBIAS 67y Male  MRN#: [id number]     SUBJECTIVE  Patient is a 67y old Male who presents with a chief complaint of hypercalcemia (22 Sep 2023 11:27)  Today is hospital day 31d, and this morning he is lying in bed without distress.   No acute overnight events.     OBJECTIVE  PAST MEDICAL & SURGICAL HISTORY  Kidney stones    Schizophrenia    Lymphoma    Shingles    Atrophic kidney    H/O colonoscopy with polypectomy    Liver cyst    History of bladder surgery    H/O neuropathy    History of chemotherapy    Stage 3 chronic kidney disease    Retained urethral stent    H/O umbilical hernia repair    Elbow fracture    H/O cystoscopy      ALLERGIES:  allopurinol (Rash (Moderate))    MEDICATIONS:  STANDING MEDICATIONS  benztropine 2 milliGRAM(s) Oral daily  chlorhexidine 2% Cloths 1 Application(s) Topical <User Schedule>  fluPHENAZine 10 milliGRAM(s) Oral daily  heparin   Injectable 5000 Unit(s) SubCutaneous every 8 hours  octreotide  Injectable 150 MICROGram(s) SubCutaneous three times a day  predniSONE   Tablet 100 milliGRAM(s) Oral every 24 hours  sodium bicarbonate 1300 milliGRAM(s) Oral three times a day  sodium chloride 0.45% 1000 milliLiter(s) IV Continuous <Continuous>    PRN MEDICATIONS    HOME MEDICATIONS  Home Medications:  benztropine 2 mg oral tablet: 1 tab(s) orally once a day (03 Aug 2023 10:00)  fluPHENAZine 10 mg oral tablet: 1 tab(s) orally once a day (03 Aug 2023 10:00)      VITAL SIGNS: Last 24 Hours  T(C): 36.3 (24 Sep 2023 05:24), Max: 36.7 (23 Sep 2023 13:54)  T(F): 97.4 (24 Sep 2023 05:24), Max: 98 (23 Sep 2023 13:54)  HR: 59 (24 Sep 2023 05:24) (59 - 65)  BP: 87/59 (24 Sep 2023 05:24) (87/59 - 107/57)  BP(mean): --  RR: 19 (24 Sep 2023 05:24) (18 - 19)  SpO2: --      LABS:                        8.4    3.02  )-----------( 125      ( 24 Sep 2023 07:58 )             26.1     09-24    136  |  105  |  84<HH>  ----------------------------<  140<H>  4.9   |  19  |  2.5<H>    Ca    9.2      24 Sep 2023 07:58  Phos  8.1     09-24  Mg     2.5     09-24    TPro  3.7<L>  /  Alb  3.0<L>  /  TBili  0.2  /  DBili  x   /  AST  6   /  ALT  12  /  AlkPhos  292<H>  09-24    LIVER FUNCTIONS - ( 24 Sep 2023 07:58 )  Alb: 3.0 g/dL / Pro: 3.7 g/dL / ALK PHOS: 292 U/L / ALT: 12 U/L / AST: 6 U/L / GGT: x             Urinalysis Basic - ( 24 Sep 2023 07:58 )    Color: x / Appearance: x / SG: x / pH: x  Gluc: 140 mg/dL / Ketone: x  / Bili: x / Urobili: x   Blood: x / Protein: x / Nitrite: x   Leuk Esterase: x / RBC: x / WBC x   Sq Epi: x / Non Sq Epi: x / Bacteria: x                CAPILLARY BLOOD GLUCOSE          RADIOLOGY:    PHYSICAL EXAM:  GENERAL: NAD, 67y M  HEAD:  Atraumatic, Normocephalic  EYES: EOMI, conjunctivae clear and sclerae white  NECK: Supple, No JVD  CHEST/LUNG: Clear to auscultation bilaterally; No wheeze; No crackles; No accessory muscles used  HEART: Regular rate and rhythm; No murmurs;   ABDOMEN: Soft, Nontender, Nondistended; Bowel sounds present; No guarding  EXTREMITIES:  2+ Peripheral Pulses, No cyanosis or edema  NEUROLOGY: non-focal    ADMISSION SUMMARY  Patient is a 67y old Male who presents with a chief complaint of hypercalcemia (22 Sep 2023 11:27)

## 2023-09-24 NOTE — PROGRESS NOTE ADULT - ASSESSMENT
CKD Stage 3  ASHLEY secondary to hypercalcemia   tumor lysis syndrome - cr continues to improve    Plan:  Continue same IVF and monitor labs

## 2023-09-25 ENCOUNTER — TRANSCRIPTION ENCOUNTER (OUTPATIENT)
Age: 68
End: 2023-09-25

## 2023-09-25 VITALS
TEMPERATURE: 98 F | SYSTOLIC BLOOD PRESSURE: 198 MMHG | HEART RATE: 88 BPM | RESPIRATION RATE: 18 BRPM | DIASTOLIC BLOOD PRESSURE: 100 MMHG

## 2023-09-25 LAB
ALBUMIN SERPL ELPH-MCNC: 2.9 G/DL — LOW (ref 3.5–5.2)
ALP SERPL-CCNC: 267 U/L — HIGH (ref 30–115)
ALT FLD-CCNC: 15 U/L — SIGNIFICANT CHANGE UP (ref 0–41)
ANION GAP SERPL CALC-SCNC: 11 MMOL/L — SIGNIFICANT CHANGE UP (ref 7–14)
AST SERPL-CCNC: 8 U/L — SIGNIFICANT CHANGE UP (ref 0–41)
BASOPHILS # BLD AUTO: 0 K/UL — SIGNIFICANT CHANGE UP (ref 0–0.2)
BASOPHILS NFR BLD AUTO: 0 % — SIGNIFICANT CHANGE UP (ref 0–1)
BILIRUB SERPL-MCNC: 0.3 MG/DL — SIGNIFICANT CHANGE UP (ref 0.2–1.2)
BUN SERPL-MCNC: 82 MG/DL — CRITICAL HIGH (ref 10–20)
CALCIUM SERPL-MCNC: 9.8 MG/DL — SIGNIFICANT CHANGE UP (ref 8.4–10.4)
CHLORIDE SERPL-SCNC: 106 MMOL/L — SIGNIFICANT CHANGE UP (ref 98–110)
CO2 SERPL-SCNC: 21 MMOL/L — SIGNIFICANT CHANGE UP (ref 17–32)
CREAT SERPL-MCNC: 2.6 MG/DL — HIGH (ref 0.7–1.5)
EGFR: 27 ML/MIN/1.73M2 — LOW
EOSINOPHIL # BLD AUTO: 0 K/UL — SIGNIFICANT CHANGE UP (ref 0–0.7)
EOSINOPHIL NFR BLD AUTO: 0 % — SIGNIFICANT CHANGE UP (ref 0–8)
GLUCOSE SERPL-MCNC: 162 MG/DL — HIGH (ref 70–99)
HCT VFR BLD CALC: 29.2 % — LOW (ref 42–52)
HCT VFR BLD CALC: 30.6 % — LOW (ref 42–52)
HGB BLD-MCNC: 10.1 G/DL — LOW (ref 14–18)
HGB BLD-MCNC: 9.4 G/DL — LOW (ref 14–18)
IMM GRANULOCYTES NFR BLD AUTO: 0.7 % — HIGH (ref 0.1–0.3)
LYMPHOCYTES # BLD AUTO: 0.24 K/UL — LOW (ref 1.2–3.4)
LYMPHOCYTES # BLD AUTO: 5.9 % — LOW (ref 20.5–51.1)
MAGNESIUM SERPL-MCNC: 2.4 MG/DL — SIGNIFICANT CHANGE UP (ref 1.8–2.4)
MCHC RBC-ENTMCNC: 28.1 PG — SIGNIFICANT CHANGE UP (ref 27–31)
MCHC RBC-ENTMCNC: 28.7 PG — SIGNIFICANT CHANGE UP (ref 27–31)
MCHC RBC-ENTMCNC: 32.2 G/DL — SIGNIFICANT CHANGE UP (ref 32–37)
MCHC RBC-ENTMCNC: 33 G/DL — SIGNIFICANT CHANGE UP (ref 32–37)
MCV RBC AUTO: 86.9 FL — SIGNIFICANT CHANGE UP (ref 80–94)
MCV RBC AUTO: 87.2 FL — SIGNIFICANT CHANGE UP (ref 80–94)
MONOCYTES # BLD AUTO: 0.18 K/UL — SIGNIFICANT CHANGE UP (ref 0.1–0.6)
MONOCYTES NFR BLD AUTO: 4.5 % — SIGNIFICANT CHANGE UP (ref 1.7–9.3)
NEUTROPHILS # BLD AUTO: 3.59 K/UL — SIGNIFICANT CHANGE UP (ref 1.4–6.5)
NEUTROPHILS NFR BLD AUTO: 88.9 % — HIGH (ref 42.2–75.2)
NRBC # BLD: 0 /100 WBCS — SIGNIFICANT CHANGE UP (ref 0–0)
NRBC # BLD: 0 /100 WBCS — SIGNIFICANT CHANGE UP (ref 0–0)
PHOSPHATE SERPL-MCNC: 7.3 MG/DL — HIGH (ref 2.1–4.9)
PLATELET # BLD AUTO: 143 K/UL — SIGNIFICANT CHANGE UP (ref 130–400)
PLATELET # BLD AUTO: 153 K/UL — SIGNIFICANT CHANGE UP (ref 130–400)
PMV BLD: 10.1 FL — SIGNIFICANT CHANGE UP (ref 7.4–10.4)
PMV BLD: 10.4 FL — SIGNIFICANT CHANGE UP (ref 7.4–10.4)
POTASSIUM SERPL-MCNC: 5.1 MMOL/L — HIGH (ref 3.5–5)
POTASSIUM SERPL-SCNC: 5.1 MMOL/L — HIGH (ref 3.5–5)
PROT SERPL-MCNC: 4 G/DL — LOW (ref 6–8)
RBC # BLD: 3.35 M/UL — LOW (ref 4.7–6.1)
RBC # BLD: 3.52 M/UL — LOW (ref 4.7–6.1)
RBC # FLD: 16.1 % — HIGH (ref 11.5–14.5)
RBC # FLD: 16.2 % — HIGH (ref 11.5–14.5)
SODIUM SERPL-SCNC: 138 MMOL/L — SIGNIFICANT CHANGE UP (ref 135–146)
URATE SERPL-MCNC: 8.1 MG/DL — SIGNIFICANT CHANGE UP (ref 3.4–8.8)
WBC # BLD: 4.04 K/UL — LOW (ref 4.8–10.8)
WBC # BLD: 4.66 K/UL — LOW (ref 4.8–10.8)
WBC # FLD AUTO: 4.04 K/UL — LOW (ref 4.8–10.8)
WBC # FLD AUTO: 4.66 K/UL — LOW (ref 4.8–10.8)

## 2023-09-25 PROCEDURE — 99239 HOSP IP/OBS DSCHRG MGMT >30: CPT

## 2023-09-25 PROCEDURE — 99232 SBSQ HOSP IP/OBS MODERATE 35: CPT

## 2023-09-25 RX ORDER — OCTREOTIDE ACETATE 200 UG/ML
150 INJECTION, SOLUTION INTRAVENOUS; SUBCUTANEOUS
Qty: 100 | Refills: 0
Start: 2023-09-25 | End: 2023-10-03

## 2023-09-25 RX ORDER — SEVELAMER CARBONATE 2400 MG/1
2 POWDER, FOR SUSPENSION ORAL
Qty: 30 | Refills: 1
Start: 2023-09-25

## 2023-09-25 RX ORDER — OCTREOTIDE ACETATE 200 UG/ML
150 INJECTION, SOLUTION INTRAVENOUS; SUBCUTANEOUS
Qty: 100 | Refills: 0
Start: 2023-09-25

## 2023-09-25 RX ADMIN — Medication 1300 MILLIGRAM(S): at 13:49

## 2023-09-25 RX ADMIN — Medication 2 MILLIGRAM(S): at 12:29

## 2023-09-25 RX ADMIN — OCTREOTIDE ACETATE 150 MICROGRAM(S): 200 INJECTION, SOLUTION INTRAVENOUS; SUBCUTANEOUS at 05:10

## 2023-09-25 RX ADMIN — FLUPHENAZINE HYDROCHLORIDE 10 MILLIGRAM(S): 1 TABLET, FILM COATED ORAL at 12:29

## 2023-09-25 RX ADMIN — HEPARIN SODIUM 5000 UNIT(S): 5000 INJECTION INTRAVENOUS; SUBCUTANEOUS at 13:48

## 2023-09-25 RX ADMIN — HEPARIN SODIUM 5000 UNIT(S): 5000 INJECTION INTRAVENOUS; SUBCUTANEOUS at 05:10

## 2023-09-25 RX ADMIN — Medication 1300 MILLIGRAM(S): at 05:10

## 2023-09-25 RX ADMIN — OCTREOTIDE ACETATE 150 MICROGRAM(S): 200 INJECTION, SOLUTION INTRAVENOUS; SUBCUTANEOUS at 13:49

## 2023-09-25 NOTE — DISCHARGE NOTE PROVIDER - PROVIDER TOKENS
PROVIDER:[TOKEN:[[id number]],FOLLOWUP:[1 week]],PROVIDER:[TOKEN:[[id number]],FOLLOWUP:[1 week]],PROVIDER:[TOKEN:[[id number]],FOLLOWUP:[1 week]] PROVIDER:[TOKEN:[[id number]],FOLLOWUP:[1 week]],PROVIDER:[TOKEN:[[id number]],FOLLOWUP:[1 week]],PROVIDER:[TOKEN:[[id number]],FOLLOWUP:[1 week]],PROVIDER:[TOKEN:[[id number]],FOLLOWUP:[1 week]]

## 2023-09-25 NOTE — DISCHARGE NOTE PROVIDER - COLLABORATE WITH
lac to left pinky finger w/ kitchen scissors, no active bleeding at this time, not on blood thinner.
Resident

## 2023-09-25 NOTE — DISCHARGE NOTE PROVIDER - HOSPITAL COURSE
67y old Male who presents with a chief complaint of Hypercalcemia and diarrhea    # tumor lysis syndrome post chemo  - s/p R-CVP for on 9/20 & 9/21  -  IV rasburicase 3 mg (9/22), then 6mg (9/23), will give another dose of rasburicase 3 mg for uric acid of 11   - c/w IV fluids- 1/2 NS with bicarb at 125 ml /hr  - check daily TLS panel- CMP, Uric Acid, Phos  - s/p lokelma; K+ at goal    # hypercalcemia due to malignancy  - on IV hydration   - s/p calcitonin, pamidronate, & Xgeva  stable- monitpr    # Hx of NHL marginal zone- with recurrence?  # metastatic neuroendocrine tumor from liver biopsy- possibly from GI source  # CT chest w worsening enlarged and new enlarged mediastinal and axillary L.N s/p EBUS on 9/8  -He was  treated in 2019 and 2020 but was unable to tolerate maintenance Rituxan.  -never in complete remission - observed for low grade lymphoma, but patient was lost to follow up.  -MRI abdomen 7.23:  Liver segment II 4 cm mass and Liver segment V  1.5 cm mass suspicious for Hepatic Lymphomas.  --Biopsy of Liver lesion 8.23: well differentiated Neuroendocrine tumor, Grade 3 Likely  metastatic.   -LDH normal 4.23   - NM Octreoscan Mutliple Areas (09.05.23 @ 16:43) >Focal area of increased uptake in the upper abdomen/epigastrium to the left of midline suspicious for neuroendocrine tissue expressing somatostatin receptors.  - heme/onc following- s/p R-CVP treatment; cont prednisone  - continue octreotide    # hypotension- chronic; asymptomatic  stable  moring cortisol level noted, pt is on prednisone which may obscure the cortisol level, can repeat after prednisone is complete.  echo:  EF of 67%. Mild (Grade I) diastolic dysfunction. No regional wall motion abnormalities.  Small localized posterior pericardial effusion without echocardiographic evidence of tamponade physiology. Compared to prior study, effusion size is grossly unchanged.    # pericardial effusion, stable, no evidence of tamponade     # CKD stage IV/metabolic acidosis   monitor  cont sodium bicarb    # Schizoaffective disorder    # Anemia of chronic illness   s/p 2 u PRBC  monitor    # chronic Diarrhea likley from neuroendocrine tumor   improving    # Dysphagia likley from enlarged L.N

## 2023-09-25 NOTE — PROGRESS NOTE ADULT - PROVIDER SPECIALTY LIST ADULT
Heme/Onc
Hospitalist
Internal Medicine
Nephrology
Heme/Onc
Hospitalist
Internal Medicine
Nephrology
Pulmonology
Heme/Onc
Hospitalist
Internal Medicine
Nephrology
Internal Medicine
Nephrology
Hospitalist

## 2023-09-25 NOTE — PROGRESS NOTE ADULT - SUBJECTIVE AND OBJECTIVE BOX
ELDA COVARRUBIAS 67y Male  MRN#: [id number]   Hospital Day: 32d    SUBJECTIVE  Patient is a 67y old Male who presents with a chief complaint of hypercalcemia (22 Sep 2023 11:27)  Currently admitted to medicine with the primary diagnosis of Hypercalcemia      INTERVAL HPI AND OVERNIGHT EVENTS:  Patient was examined and seen at bedside. This morning he is resting comfortably in bed and reports no issues or overnight events.    REVIEW OF SYMPTOMS:  CONSTITUTIONAL: No weakness, fevers or chills; No headaches  EYES: No visual changes, eye pain, or discharge  ENT: No vertigo; No ear pain or change in hearing; No sore throat or difficulty swallowing  NECK: No pain or stiffness  RESPIRATORY: No cough, wheezing, or hemoptysis; No shortness of breath  CARDIOVASCULAR: No chest pain or palpitations  GASTROINTESTINAL: No abdominal or epigastric pain; No nausea, vomiting, or hematemesis; No diarrhea or constipation; No melena or hematochezia  GENITOURINARY: No dysuria, frequency or hematuria  MUSCULOSKELETAL: No joint pain, no muscle pain, no weakness  NEUROLOGICAL: No numbness or weakness  SKIN: No itching or rashes    OBJECTIVE  PAST MEDICAL & SURGICAL HISTORY  Kidney stones    Schizophrenia    Lymphoma    Shingles    Atrophic kidney    H/O colonoscopy with polypectomy    Liver cyst    History of bladder surgery    H/O neuropathy    History of chemotherapy    Stage 3 chronic kidney disease    Retained urethral stent    H/O umbilical hernia repair    Elbow fracture    H/O cystoscopy      ALLERGIES:  allopurinol (Rash (Moderate))    MEDICATIONS:  STANDING MEDICATIONS  benztropine 2 milliGRAM(s) Oral daily  chlorhexidine 2% Cloths 1 Application(s) Topical <User Schedule>  fluPHENAZine 10 milliGRAM(s) Oral daily  heparin   Injectable 5000 Unit(s) SubCutaneous every 8 hours  octreotide  Injectable 150 MICROGram(s) SubCutaneous three times a day  sodium bicarbonate 1300 milliGRAM(s) Oral three times a day  sodium chloride 0.45% 1000 milliLiter(s) IV Continuous <Continuous>    PRN MEDICATIONS      VITAL SIGNS: Last 24 Hours  T(C): 36.6 (25 Sep 2023 04:40), Max: 36.7 (24 Sep 2023 20:01)  T(F): 97.9 (25 Sep 2023 04:40), Max: 98.1 (24 Sep 2023 20:01)  HR: 59 (25 Sep 2023 04:40) (59 - 83)  BP: 93/53 (25 Sep 2023 04:40) (93/53 - 124/64)  BP(mean): --  RR: 18 (25 Sep 2023 04:40) (18 - 18)  SpO2: --    LABS:                        9.4    4.04  )-----------( 143      ( 25 Sep 2023 08:53 )             29.2     09-25    138  |  106  |  x   ----------------------------<  162<H>  5.1<H>   |  21  |  2.6<H>    Ca    9.8      25 Sep 2023 08:53  Phos  7.3     09-25  Mg     2.5     09-24    TPro  4.0<L>  /  Alb  2.9<L>  /  TBili  0.3  /  DBili  x   /  AST  8   /  ALT  15  /  AlkPhos  267<H>  09-25      Urinalysis Basic - ( 25 Sep 2023 08:53 )    Color: x / Appearance: x / SG: x / pH: x  Gluc: 162 mg/dL / Ketone: x  / Bili: x / Urobili: x   Blood: x / Protein: x / Nitrite: x   Leuk Esterase: x / RBC: x / WBC x   Sq Epi: x / Non Sq Epi: x / Bacteria: x                RADIOLOGY:      PHYSICAL EXAM:  CONSTITUTIONAL: No acute distress, well-developed, well-groomed, AAOx3  HEAD: Atraumatic, normocephalic  EYES: EOM intact, PERRLA, conjunctiva and sclera clear  ENT: Supple, no masses, no thyromegaly, no bruits, no JVD; moist mucous membranes  PULMONARY: Clear to auscultation bilaterally; no wheezes, rales, or rhonchi  CARDIOVASCULAR: Regular rate and rhythm; no murmurs, rubs, or gallops  GASTROINTESTINAL: Soft, non-tender, non-distended; bowel sounds present  MUSCULOSKELETAL: 2+ peripheral pulses; no clubbing, no cyanosis, no edema  NEUROLOGY: non-focal  SKIN: No rashes or lesions; warm and dry    ASSESSMENT & PLAN  67y M PMH Schizophrenia (prior lithium use), nephrolithiasis c/b atrophic L kidney, R ureteral rivision, CKD4 (bsl Cr ~2.5), gout, chronic diarrhea, 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 ASHLEY on CKD.    Hematology and Oncology consulted given hx of Marginal Zone lymphoma.    #Recurrent Hypercalcemia  #Marginal Zone Lymphoma   - calcium 13.9 on admission. Trending down currently at 10.3  - on IV hydration   -s/p one dose of calcitonin, pamidronate, & Xgeva    #Hx of NHL marginal zone now with ? recurrence.  -He was in a good partial remission following 3 cycles of bendamustine and Rituxan treated in 2019 and 2020.  -He was unable to tolerate maintenance Rituxan.  -However, he was never in complete remission and had preferred just to be observed since we were dealing with a low grade lymphoma.  - was lost to follow up.  --CT Abdomen/Pelvis 2/23/23 Interval worsening in periesophageal and pelvic lymphadenopathy , other bulky lymph nodes in the abdomen and retroperitoneum appear stable. 1.3 indeterminate segment 4 liver lesion. Focal areas of pleural nodularity concerning for lymphomatous/neoplastic/metastatic process, new since prior.  --PET scan 5.23: 1.  Interval increase in size and decreased metabolic activity of the thoracoabdominal lymphadenopathy, some are new, probably recurrence /residual disease. New mildly metabolic 3 cm hypodensity in the liver segment 2/3, concerning for neoplasm or lymphoma.  Poorly defined small hypodensity in the segment 4, below the resolution of PET scan.  MRI will provide further elucidation. Diffuse mildly increased bone marrow activity, possibly reactive versus bone marrow involvement.   Hepatosplenomegaly.  -MRI abdomen 7.23:  Liver segment II 4 cm mass and Liver segment V  1.5 cm mass suspicious for Hepatic Lymphomas.  --Biopsy of Liver lesion 8.23: well differentiated Neuroendocrine tumor, Grade 3 Likely  metastatic.   -LDH normal 4.23   - NM Octreoscan Mutliple Areas (09.05.23 @ 16:43) >Focal area of increased uptake in the upper abdomen/epigastrium to the left of midline suspicious for neuroendocrine tissue expressing somatostatin receptors.  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 This morning he is resting comfortably in bed and reports no issues or overnight events.    REVIEW OF SYMPTOMS:  CONSTITUTIONAL: No weakness, fevers or chills; No headaches  EYES: No visual changes, eye pain, or discharge  ENT: No vertigo; No ear pain or change in hearing; No sore throat or difficulty swallowing  NECK: No pain or stiffness  RESPIRATORY: No cough, wheezing, or hemoptysis; No shortness of breath  CARDIOVASCULAR: No chest pain or palpitations  GASTROINTESTINAL: No abdominal or epigastric pain; No nausea, vomiting, or hematemesis; No diarrhea or constipation; No melena or hematochezia  GENITOURINARY: No dysuria, frequency or hematuria  MUSCULOSKELETAL: No joint pain, no muscle pain, no weakness  NEUROLOGICAL: No numbness or weakness  SKIN: No itching or rashes    OBJECTIVE  PAST MEDICAL & SURGICAL HISTORY  Kidney stones    Schizophrenia    Lymphoma    Shingles    Atrophic kidney    H/O colonoscopy with polypectomy    Liver cyst    History of bladder surgery    H/O neuropathy    History of chemotherapy    Stage 3 chronic kidney disease    Retained urethral stent    H/O umbilical hernia repair    Elbow fracture    H/O cystoscopy      ALLERGIES:  allopurinol (Rash (Moderate))    MEDICATIONS:  STANDING MEDICATIONS  benztropine 2 milliGRAM(s) Oral daily  chlorhexidine 2% Cloths 1 Application(s) Topical <User Schedule>  fluPHENAZine 10 milliGRAM(s) Oral daily  heparin   Injectable 5000 Unit(s) SubCutaneous every 8 hours  octreotide  Injectable 150 MICROGram(s) SubCutaneous three times a day  sodium bicarbonate 1300 milliGRAM(s) Oral three times a day  sodium chloride 0.45% 1000 milliLiter(s) IV Continuous <Continuous>    PRN MEDICATIONS      VITAL SIGNS: Last 24 Hours  T(C): 36.6 (25 Sep 2023 04:40), Max: 36.7 (24 Sep 2023 20:01)  T(F): 97.9 (25 Sep 2023 04:40), Max: 98.1 (24 Sep 2023 20:01)  HR: 59 (25 Sep 2023 04:40) (59 - 83)  BP: 93/53 (25 Sep 2023 04:40) (93/53 - 124/64)  BP(mean): --  RR: 18 (25 Sep 2023 04:40) (18 - 18)  SpO2: --    LABS:                        9.4    4.04  )-----------( 143      ( 25 Sep 2023 08:53 )             29.2     09-25    138  |  106  |  x   ----------------------------<  162<H>  5.1<H>   |  21  |  2.6<H>    Ca    9.8      25 Sep 2023 08:53  Phos  7.3     09-25  Mg     2.5     09-24    TPro  4.0<L>  /  Alb  2.9<L>  /  TBili  0.3  /  DBili  x   /  AST  8   /  ALT  15  /  AlkPhos  267<H>  09-25      Urinalysis Basic - ( 25 Sep 2023 08:53 )    Color: x / Appearance: x / SG: x / pH: x  Gluc: 162 mg/dL / Ketone: x  / Bili: x / Urobili: x   Blood: x / Protein: x / Nitrite: x   Leuk Esterase: x / RBC: x / WBC x   Sq Epi: x / Non Sq Epi: x / Bacteria: x      PHYSICAL EXAM:  CONSTITUTIONAL: No acute distress, well-developed, well-groomed, AAOx3  HEAD: Atraumatic, normocephalic  EYES: EOM intact, PERRLA, conjunctiva and sclera clear  ENT: Supple, no masses, no thyromegaly, no bruits, no JVD; moist mucous membranes  PULMONARY: Clear to auscultation bilaterally; no wheezes, rales, or rhonchi  CARDIOVASCULAR: Regular rate and rhythm; no murmurs, rubs, or gallops  GASTROINTESTINAL: Soft, non-tender, non-distended; bowel sounds present  MUSCULOSKELETAL: 2+ peripheral pulses; no clubbing, no cyanosis, no edema  NEUROLOGY: non-focal  SKIN: No rashes or lesions; warm and dry    ASSESSMENT & PLAN  67y M PMH Schizophrenia (prior lithium use), nephrolithiasis c/b atrophic L kidney, R ureteral rivision, CKD4 (bsl Cr ~2.5), gout, chronic diarrhea, 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 ASHLEY on CKD.    Hematology and Oncology consulted given hx of Marginal Zone lymphoma.    #Recurrent Hypercalcemia  #Marginal Zone Lymphoma   - calcium 13.9 on admission. Trending down currently at 10.3  - on IV hydration   -s/p one dose of calcitonin, pamidronate, & Xgeva    #Hx of NHL marginal zone now with ? recurrence.  -He was in a good partial remission following 3 cycles of bendamustine and Rituxan treated in 2019 and 2020.  -He was unable to tolerate maintenance Rituxan.  -However, he was never in complete remission and had preferred just to be observed since we were dealing with a low grade lymphoma.  - was lost to follow up.  --CT Abdomen/Pelvis 2/23/23 Interval worsening in periesophageal and pelvic lymphadenopathy , other bulky lymph nodes in the abdomen and retroperitoneum appear stable. 1.3 indeterminate segment 4 liver lesion. Focal areas of pleural nodularity concerning for lymphomatous/neoplastic/metastatic process, new since prior.  --PET scan 5.23: 1.  Interval increase in size and decreased metabolic activity of the thoracoabdominal lymphadenopathy, some are new, probably recurrence /residual disease. New mildly metabolic 3 cm hypodensity in the liver segment 2/3, concerning for neoplasm or lymphoma.  Poorly defined small hypodensity in the segment 4, below the resolution of PET scan.  MRI will provide further elucidation. Diffuse mildly increased bone marrow activity, possibly reactive versus bone marrow involvement.   Hepatosplenomegaly.  -MRI abdomen 7.23:  Liver segment II 4 cm mass and Liver segment V  1.5 cm mass suspicious for Hepatic Lymphomas.  --Biopsy of Liver lesion 8.23: well differentiated Neuroendocrine tumor, Grade 3 Likely  metastatic.   -LDH normal 4.23   - NM Octreoscan Mutliple Areas (09.05.23 @ 16:43) >Focal area of increased uptake in the upper abdomen/epigastrium to the left of midline suspicious for neuroendocrine tissue expressing somatostatin receptors.  - s/p R-CVP for Marginal Zone Lymphoma on 9/20 & 9/21> patient tolerated treatment well with no reaction     #Grade 3 Neuroendocrine tumor  - metastatic to liver  - c/w daily octreotide while inpatient  - [facility]     Recommendations:   - patient can be discharged from hematology/oncology prespective. He is due to Cycle 2 of R-CVP for marginal zone lymphoma on 10/11.   - continue with subcutaneous ocretotide until patient can receive Octreotide depot (monthly) injection in the clinic. Patient reports having a sister and mother who are nurses who can administer the injections for him   - nephrology follow up for CKD and elevated phos levels

## 2023-09-25 NOTE — DISCHARGE NOTE PROVIDER - ATTENDING DISCHARGE PHYSICAL EXAMINATION:
T(F): , Max: 98.1 (09-24-23 @ 20:01)  HR: 59 (09-25-23 @ 04:40) (59 - 83)  BP: 93/53 (09-25-23 @ 04:40)  RR: 18 (09-25-23 @ 04:40)  SpO2: --  General: No apparent distress  Cardiovascular: S1, S2  Gastrointestinal: Soft, Non-tender, Non-distended  Respiratory: Good air entry bilaterally  Musculoskeletal: Moves all extremities  Lymphatic: No edema  Neurologic: No gross motor deficit  Dermatologic: Skin dry                          10.1   4.66  )-----------( 153      ( 25 Sep 2023 11:00 )             30.6     09-25    138  |  106  |  82<HH>  ----------------------------<  162<H>  5.1<H>   |  21  |  2.6<H>    Ca    9.8      25 Sep 2023 08:53  Phos  7.3     09-25  Mg     2.4     09-25    TPro  4.0<L>  /  Alb  2.9<L>  /  TBili  0.3  /  DBili  x   /  AST  8   /  ALT  15  /  AlkPhos  267<H>  09-25

## 2023-09-25 NOTE — DISCHARGE NOTE PROVIDER - NSDCMRMEDTOKEN_GEN_ALL_CORE_FT
benztropine 2 mg oral tablet: 1 tab(s) orally once a day  fluPHENAZine 10 mg oral tablet: 1 tab(s) orally once a day   benztropine 2 mg oral tablet: 1 tab(s) orally once a day  fluPHENAZine 10 mg oral tablet: 1 tab(s) orally once a day  octreotide 100 mcg/mL injectable solution: 150 microgram(s) subcutaneously 3 times a day  Renagel 800 mg oral tablet: 2 tab(s) orally 3 times a day

## 2023-09-25 NOTE — DISCHARGE NOTE PROVIDER - PREFACE STATEMENT FOR MINUTES SPENT
Physical Therapy Daily Treatment     Visit Count: 8  Plan of Care Dates: Initial: 10/13/2017 Through: 12/8/2017   Insurance Information: Medicare network,  codes apply  Next Referring Provider Visit: none scheduled    Referred by: Jose Alfredo Guardado MD  Medical Diagnosis (from order):  Chronic back pain, unspecified back location, unspecified back pain laterality [M54.9, G89.29]  Chronic shoulder pain, unspecified laterality [M25.519, G89.29]    Treatment Diagnosis: Thoracic Symptoms with Pain, Impaired Posture, Impaired Joint Mobility and Impaired Range of Motion  Insurance: 1. [employer]  2. N/A     Date of onset/injury: a few years ago     Diagnosis Precautions: none  Chart reviewed: Relevant co-morbidities, allergies, tests and medications: PACEMAKER, SKIN CA (squamous cell, basal cell)       SUBJECTIVE   Feeling pretty good today. Mowed the lawn and wasn't as bad because have a new big rider.    Current Pain: 1/10.    Functional Change: increase in pain    OBJECTIVE       Treatment   Manual:  STM to left thoracic paraspinals, rhomboids, semi-prone       Therapeutic Exercise:   Exercise Repetitions Sets Position/Cues   UBE 2 min fwd, 2 min lat                Supine laying for breaks*         Supine B shoulder abd w/scap depression 12       Supine pect stretch B*    30\"   Snow angels w/UE's sitting*      Scap retract*         Seated L QL stretch* 3   30\"   scap retract yellow tband 10       Table wipes flexion 10  5\"   Alligator in right side lying 12     Supine flexion 15  With cane for stretch         sidelying shoulder circles left 12     Comments:          Current Home Program (not performed this date except as noted above):   * above denotes home program issuance as well  Heat or ice as needed      ASSESSMENT   Pt continues to have some trigger points along left medial scapular border, but reports overall less pain and improvement with PT. Doing well with HEP and form with exercises in clinic.  Has 1 more appt remaining.     Pain after treatment: 1/10  Result of above outlined education: Verbalizes understanding and Demonstrates understanding    Goals:       To be obtained by end of this plan of care:  1. Patient independent with modified and progressed home exercise program.  2. Patient will decrease thoracic pain to 2/10 to aid in completing household tasks.   3. Patient will report >60% improvement since onset of therapy.      PLAN   RA to determine cont PT vs d/c or hold    THERAPY DAILY BILLING   Primary Insurance:  [employer]IC[employer]  Secondary Insurance: N/A    Evaluation Procedures:  No evaluation codes were used on this date of service    Timed Procedures:  Manual Therapy, 20 minutes  Therapeutic Exercise, 20 minutes    Untimed Procedures:  No untimed codes were used on this date of service    Total Treatment Time: 40 minutes    Routine safety standards followed per [facility] system and site policies      G-Code:  G-Code Score ABN form  reporting not required this treatment session  Modifier based on outcome measure(s)/functional testing/clinical judgement as listed above    The referring provider's electronic or written signature on the evaluation authorizes the therapy plan of care and certifies the need for these services, furnished under this plan of care while under their care.  Physician Signature on file.      I personally spent

## 2023-09-25 NOTE — PROGRESS NOTE ADULT - ATTENDING SUPERVISION STATEMENT
Fellow
Resident

## 2023-09-25 NOTE — DISCHARGE NOTE PROVIDER - NSDCCPCAREPLAN_GEN_ALL_CORE_FT
PRINCIPAL DISCHARGE DIAGNOSIS  Diagnosis: Hypercalcemia  Assessment and Plan of Treatment: # You were admitted for  tumor lysis syndrome post chemo, you presented with diarhea and vomitting. Heme and oncology was following on you  - You were started on different medications and the condition has resolved now. You are medically stable for discharge. Please follow up with Ben as OP.   # You were having hypercalcemia due to TLS and it has resolved now.   # Hx of NHL marginal zone with metastasis  Please follow up with oncology team as OP.         SECONDARY DISCHARGE DIAGNOSES  Diagnosis: ASHLEY (acute kidney injury)  Assessment and Plan of Treatment:

## 2023-09-25 NOTE — DISCHARGE NOTE PROVIDER - CARE PROVIDER_API CALL
Nathen Escudero   Internal Medicine  [address] [place] [address]  Phone: [phone]  Fax: [phone]  Follow Up Time: 1 week    Lokesh Groves  Medical Oncology  [address] [place] [id number]  Phone: [phone]  Fax: [phone]  Follow Up Time: 1 week    Claribel Back  Nephrology  [address] [place] [address]  Phone: [phone]  Fax: [phone]  Follow Up Time: 1 week   Nathen Escudero.  Internal Medicine  [address] [place] [address]  Phone: [phone]  Fax: [phone]  Follow Up Time: 1 week    Lokesh Groves  Medical Oncology  [address] [place] [address]  Phone: [phone]  Fax: [phone]  Follow Up Time: 1 week    Claribel Back  Nephrology  [address] [place] [address]  Phone: [phone]  Fax: [phone]  Follow Up Time: 1 week    Iker Jones.  Nephrology  [address] [facility] [address]  [place] [address]  Phone: [phone]  Fax: [phone]  Follow Up Time: 1 week

## 2023-09-25 NOTE — PROGRESS NOTE ADULT - ATTENDING COMMENTS
Agree with above A/P
Patient examined , agree with above note , corrected calcium >12 .  agree with Pamidronate .  needs Gallium PET scan as outpatient .
seen/ examined w/ [facility]monc fellow; note reviewed; case discussed w/ Dr Gong : suggested to obtain another biopsy for better understanding the nature of adenopathy; in view of clinical presentation and the presence of lymphocytes on the pathology specimen , Dr Gong is inclined this is lymphoma and strongly recommends not to delay the treatment: the regimin in mind is R-CVP; pt is explained and ready to start
Patient examined , above note reviewed , appreciate renal follow up .  S/P RCVP , suspected TLS , calcium lower , now normal   (post treatment/steroids/TLS )  asymptomatic , diarrhea much improved on octreotide.   repeat lytes in Am including uric acid.
Patient examined , above note reviewed and corrected where appropriate . follow up as outpatient for lanreotide, next RCVP .
Patient examined , feels good . renal function improved , high uric acid partly due to CKD . will increase Rasburicase.  Agree with fellow's note .
Patient examined . above note reviewed. Still with diarrhea ( 5 HIAA pending )   calcium near normal  follow up as outpatient .
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  Most recent calcium is 11.9 albumin recently was 3.2.  Continue with IV hydration, status post calcitonin, pamidronate and Xgeva.  Hopefully the hypercalcemia will improve with treatment of his lymphoma
The patient was seen. Agree with above.  Hypercalcemia, received Pamidronate on 8/24.  Continue IVF. Monitor BMP.
Discussed with resident. Pt continually walking around so asked he stay in bed in order to continuously get IVF to address the hypercalcemia. Spoke to him this am but he preferred that I speak to his Dad, who I missed speaking to. Was told by the resident he requested a PET scan, but unfortunately insurance won't cover an inpatient PET scan. Pt denies any symptoms or issues. Said he saw his oncologist a few weeks ago.
The patient was seen. Agree with above.  Hypercalcemia likely due to neuroendocrine tumor in the liver, s/p Pamidronate followed by Denosumab. Serum calcium remains high.   Continue IVF.  Will order Octreotide scan to evaluate extent and origin of neuroendocrine tumor. Discussed with Dr. Louise. Ga-68 Dotatate scan can not be done as inpatient.   Consider to start Sandostatin injection. Patient has chronic diarrhea.
Date Of Previous Biopsy (Optional): 9-
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   HO Lymphoma s/p chemo was in partial remission  Recent Dx of NET of the liver    Plan as outlined above
Seen earlier today.  He looks tired and was pale..    Plan  #Severe hypercalcemia in the setting of elevated vitamin D1, 25 level is suspicious for progression of his lymphoma versus less likely sarcoidosis  -We do not have histologic prove that this is indeed progression of his lymphoma granted this is highly suspicious and octreotide scan did not  uptake in his mediastinum although activity was seen on outpatient PET/CT and regional radiology in May  -Therefore I explained to the patient is reasonable to biopsy those lymph nodes via EBUS to confirm progression of his lymphoma given that sarcoidosis is also a possibility although probably less likely and I spoke with Dr. 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 Gong next week to finalize treatment plan.
Patient is a 67y old Male who presents with a chief complaint of Hypercalcemia and diarrhea    # recurrent Hypercalcemia 2/2 Lymphoma VS sarcoidosis   # hx of Non Hodgkin Lymphoma   # CT chest w worsening enlarged and new enlarged mediastinal and axillary L.N s/p EBUS on 9/8, pending biopsy result   # new liver mass >> Grade III Neuroendocrine tumor w elevated 5-HIAA level >> highly suspicious for carcinoid >> spoke to HO >> mets from unknown source [facility]  # pericardial effusion, stabe, no evidence of tamponade   # CKD stage IV/metabolic acidosis   # Schizoaffective disorder  # Anemia of chronic illness   # chronic Diarrhea likley from neuroendocrine tumor   # Dysphagia likley from enlarged L.N       Plan:    - s/p IVF, pamidronate and denosumab and calcitonin   - labs every other day (limit labwork)  - suspected progression of lymphoma VS less likely sarcoidosis (given elevated Vit D), s/p EBUS on 9/8   - s/p Octreotide scan >> 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 per Heme/onc, will need to be on Sandostatin as outpt  - s/p 2 units of prbc,  per Heme/onc recs   - cont w Na bicarb 1300 tid  - dvt ppx  - Pt agreed to being put back on fluids; started on 100ml/hour saline  - Spoke with IR; worried about infection of PICC line so they are considering putting in a port instead   - IR also wanted to know if chemo can be given through peripheral IV until placement of port at discharge  - Oncology 9/19- Plan to start R-CVP inpatient chemotherapy tomorrow via peripheral IV, no PICC line or chemo port needed     DISPO: Chemotherapy, Hypercalcemia improvement  -spoke to patient about his plan of care
Vital Signs Last 24 Hrs  T(C): 36.4 (31 Aug 2023 12:29), Max: 37.8 (30 Aug 2023 20:58)  T(F): 97.5 (31 Aug 2023 12:29), Max: 100 (30 Aug 2023 20:58)  HR: 83 (31 Aug 2023 12:29) (83 - 97)  BP: 108/59 (31 Aug 2023 12:29) (95/48 - 108/59)  BP(mean): --  RR: 18 (31 Aug 2023 12:29) (18 - 20)  SpO2: --    - no overnight events notified                                    8.2    5.32  )-----------( 132      ( 31 Aug 2023 08:20 )             26.0           08-31    136  |  107  |  30<H>  ----------------------------<  94  4.3   |  20  |  3.1<H>    Ca    11.7<H>      31 Aug 2023 08:21  Mg     1.8     08-31    TPro  4.2<L>  /  Alb  3.3<L>  /  TBili  0.4  /  DBili  x   /  AST  17  /  ALT  23  /  AlkPhos  362<H>  08-31      # Hypercalcemia   # Neuroendocrine tumor   # Non Hodgkin Lymphoma    # CKD stage IV  # Schizoaffective disorder    -IVF, s/p pamidronate and dose of denosumab   -Nephro following   -requested heme/onc follow up  -GI consulted   -continue rest of current medical care   -oob as tolerated     DISPO: not discharge ready   -spoke to patient about his plan of care with teach back   -staff updated father on the plan of care     Attending Physician Dr. Zari Ahmadi # [id number]
Vital Signs Last 24 Hrs  T(C): 36.2 (01 Sep 2023 12:46), Max: 36.7 (31 Aug 2023 20:00)  T(F): 97.2 (01 Sep 2023 12:46), Max: 98 (31 Aug 2023 20:00)  HR: 86 (01 Sep 2023 12:46) (86 - 100)  BP: 109/58 (01 Sep 2023 12:46) (98/47 - 110/65)  BP(mean): --  RR: 18 (01 Sep 2023 12:46) (18 - 18)  SpO2: --      - no overnight events notified   -patient is ambulating all over medical floor                                          7.9    5.81  )-----------( 130      ( 01 Sep 2023 07:07 )             25.1          09-01    137  |  109  |  36<H>  ----------------------------<  86  4.4   |  18  |  3.0<H>    Ca    11.9<H>      01 Sep 2023 07:07  Mg     1.8     09-01    TPro  4.3<L>  /  Alb  3.2<L>  /  TBili  0.5  /  DBili  x   /  AST  20  /  ALT  29  /  AlkPhos  396<H>  09-01      # Hypercalcemia   # Neuroendocrine tumor   # Non Hodgkin Lymphoma    # CKD stage IV  # Schizoaffective disorder  # Anemia of chronic illness   # Diarrhea   # Dysphagia     -continue with IVF, s/p pamidronate and dose of denosumab (patient declined IV access this am - will attempt again)  -Nephro following   -requested heme/onc follow up - care discussed with patient's father Dr. Martinez   -GI consulted - reccs noted - follow up stool studies   -continue rest of current medical care   -oob as tolerated     DISPO: not discharge ready   -spoke to patient about his plan of care with teach back     Attending Physician Dr. Zari Ahmadi # [id number]
Vital Signs Last 24 Hrs  T(C): 36.2 (30 Aug 2023 12:23), Max: 37 (30 Aug 2023 05:35)  T(F): 97.2 (30 Aug 2023 12:23), Max: 98.6 (30 Aug 2023 05:35)  HR: 75 (30 Aug 2023 12:23) (75 - 86)  BP: 105/60 (30 Aug 2023 12:23) (103/57 - 105/60)  BP(mean): 77 (30 Aug 2023 05:35) (77 - 77)                            7.8    4.65  )-----------( 128      ( 30 Aug 2023 07:19 )             25.0   08-30    141  |  114<H>  |  31<H>  ----------------------------<  86  4.5   |  19  |  2.9<H>    Ca    11.1<H>      30 Aug 2023 07:19  Mg     1.9     08-30    TPro  4.0<L>  /  Alb  2.9<L>  /  TBili  0.5  /  DBili  x   /  AST  14  /  ALT  17  /  AlkPhos  317<H>  08-30    # Hypercalcemia   # Neuroendocrine tumor   # Non Hodgkin Lymphoma    # CKD stage IV  # Schizoaffective disorder    -IVF, s/p pamidronate - now added dose of denosumab as per heme/onc   -Nephro following   -continue rest of current medical care   -oob as tolerated     DISPO: not discharge ready   -spoke to patient about his plan of care with teach back     Attending Physician Dr. Zari Ahmadi # [id number]
Vital Signs Last 24 Hrs  T(C): 37.8 (02 Sep 2023 04:25), Max: 37.8 (02 Sep 2023 04:25)  T(F): 100.1 (02 Sep 2023 04:25), Max: 100.1 (02 Sep 2023 04:25)  HR: 96 (02 Sep 2023 04:25) (86 - 96)  BP: 93/56 (02 Sep 2023 04:25) (93/56 - 109/58)  BP(mean): --  RR: 18 (02 Sep 2023 04:25) (18 - 19)    - no overnight events notified   - patient is ambulating all over medical floor - no complaints   - agreeable for IVF                                          7.7    5.31  )-----------( 130      ( 02 Sep 2023 08:03 )             24.8              09-02    136  |  108  |  39<H>  ----------------------------<  87  4.6   |  17  |  2.9<H>    Ca    11.2<H>      02 Sep 2023 08:03  Mg     2.0     09-02    TPro  4.0<L>  /  Alb  2.9<L>  /  TBili  0.5  /  DBili  x   /  AST  21  /  ALT  32  /  AlkPhos  395<H>  09-0      # Hypercalcemia   # Neuroendocrine tumor   # Non Hodgkin Lymphoma    # CKD stage IV  # Schizoaffective disorder  # Anemia of chronic illness   # Diarrhea   # Dysphagia     -continue with IVF, s/p pamidronate and dose of denosumab - will continue with fluids over the weekend and the plan is for octreotide scan Tuesday   -Sandostatin injection consideration mentioned from oncologist's addendum from 9pm last night - not ordered - medical intern will clarify with the team before ordering it (so far reccs were to hold off)  -Nephro following   -GI consulted - reccs noted - follow up stool studies  -continue rest of current medical care   -oob as tolerated     DISPO: not discharge ready   -spoke to patient about his plan of care with teach back   -staff updated family     Attending Physician Dr. Zari Ahmadi # [id number]
Discussed pt's cancer course and treatment and reasons as to why he is not pursuing treatment. Pt's calcium elevated. Heme appreciated. Will need endo. Unclear if pt getting continuous IVF as I see pt walking around the hallways a lot not connected to IV. Will speak to RN

## 2023-09-25 NOTE — DISCHARGE NOTE PROVIDER - CARE PROVIDERS DIRECT ADDRESSES
,[email or web address],DirectAddress_Unknown ,[email or web address],DirectAddress_Unknown,DirectAddress_Unknown

## 2023-09-27 ENCOUNTER — APPOINTMENT (OUTPATIENT)
Dept: HEMATOLOGY ONCOLOGY | Facility: CLINIC | Age: 68
End: 2023-09-27

## 2023-09-29 DIAGNOSIS — E86.0 DEHYDRATION: ICD-10-CM

## 2023-09-29 DIAGNOSIS — D63.1 ANEMIA IN CHRONIC KIDNEY DISEASE: ICD-10-CM

## 2023-09-29 DIAGNOSIS — K52.9 NONINFECTIVE GASTROENTERITIS AND COLITIS, UNSPECIFIED: ICD-10-CM

## 2023-09-29 DIAGNOSIS — F25.9 SCHIZOAFFECTIVE DISORDER, UNSPECIFIED: ICD-10-CM

## 2023-09-29 DIAGNOSIS — E87.20 ACIDOSIS, UNSPECIFIED: ICD-10-CM

## 2023-09-29 DIAGNOSIS — E83.52 HYPERCALCEMIA: ICD-10-CM

## 2023-09-29 DIAGNOSIS — D12.6 BENIGN NEOPLASM OF COLON, UNSPECIFIED: ICD-10-CM

## 2023-09-29 DIAGNOSIS — C7B.8 OTHER SECONDARY NEUROENDOCRINE TUMORS: ICD-10-CM

## 2023-09-29 DIAGNOSIS — M10.9 GOUT, UNSPECIFIED: ICD-10-CM

## 2023-09-29 DIAGNOSIS — N17.9 ACUTE KIDNEY FAILURE, UNSPECIFIED: ICD-10-CM

## 2023-09-29 DIAGNOSIS — G62.9 POLYNEUROPATHY, UNSPECIFIED: ICD-10-CM

## 2023-09-29 DIAGNOSIS — E86.1 HYPOVOLEMIA: ICD-10-CM

## 2023-09-29 DIAGNOSIS — I95.89 OTHER HYPOTENSION: ICD-10-CM

## 2023-09-29 DIAGNOSIS — T45.1X5A ADVERSE EFFECT OF ANTINEOPLASTIC AND IMMUNOSUPPRESSIVE DRUGS, INITIAL ENCOUNTER: ICD-10-CM

## 2023-09-29 DIAGNOSIS — R13.10 DYSPHAGIA, UNSPECIFIED: ICD-10-CM

## 2023-09-29 DIAGNOSIS — I31.39 OTHER PERICARDIAL EFFUSION (NONINFLAMMATORY): ICD-10-CM

## 2023-09-29 DIAGNOSIS — N26.1 ATROPHY OF KIDNEY (TERMINAL): ICD-10-CM

## 2023-09-29 DIAGNOSIS — C85.90 NON-HODGKIN LYMPHOMA, UNSPECIFIED, UNSPECIFIED SITE: ICD-10-CM

## 2023-09-29 DIAGNOSIS — N18.4 CHRONIC KIDNEY DISEASE, STAGE 4 (SEVERE): ICD-10-CM

## 2023-09-29 DIAGNOSIS — D63.0 ANEMIA IN NEOPLASTIC DISEASE: ICD-10-CM

## 2023-09-29 DIAGNOSIS — N13.5 CROSSING VESSEL AND STRICTURE OF URETER WITHOUT HYDRONEPHROSIS: ICD-10-CM

## 2023-09-29 DIAGNOSIS — E88.3 TUMOR LYSIS SYNDROME: ICD-10-CM

## 2023-10-05 ENCOUNTER — LABORATORY RESULT (OUTPATIENT)
Age: 68
End: 2023-10-05

## 2023-10-05 ENCOUNTER — OUTPATIENT (OUTPATIENT)
Dept: OUTPATIENT SERVICES | Facility: HOSPITAL | Age: 68
LOS: 1 days | End: 2023-10-05
Payer: MEDICARE

## 2023-10-05 ENCOUNTER — APPOINTMENT (OUTPATIENT)
Dept: HEMATOLOGY ONCOLOGY | Facility: CLINIC | Age: 68
End: 2023-10-05
Payer: MEDICARE

## 2023-10-05 VITALS
WEIGHT: 201 LBS | BODY MASS INDEX: 27.22 KG/M2 | DIASTOLIC BLOOD PRESSURE: 53 MMHG | SYSTOLIC BLOOD PRESSURE: 104 MMHG | TEMPERATURE: 98.2 F | HEART RATE: 89 BPM | RESPIRATION RATE: 16 BRPM | HEIGHT: 72 IN

## 2023-10-05 DIAGNOSIS — S42.409A UNSPECIFIED FRACTURE OF LOWER END OF UNSPECIFIED HUMERUS, INITIAL ENCOUNTER FOR CLOSED FRACTURE: Chronic | ICD-10-CM

## 2023-10-05 DIAGNOSIS — Z98.890 OTHER SPECIFIED POSTPROCEDURAL STATES: Chronic | ICD-10-CM

## 2023-10-05 DIAGNOSIS — C85.80 OTHER SPECIFIED TYPES OF NON-HODGKIN LYMPHOMA, UNSPECIFIED SITE: ICD-10-CM

## 2023-10-05 DIAGNOSIS — Z96.0 PRESENCE OF UROGENITAL IMPLANTS: Chronic | ICD-10-CM

## 2023-10-05 LAB
HCT VFR BLD CALC: 27.5 %
HGB BLD-MCNC: 8.6 G/DL
MCHC RBC-ENTMCNC: 28.1 PG
MCHC RBC-ENTMCNC: 31.3 G/DL
MCV RBC AUTO: 89.9 FL
PLATELET # BLD AUTO: 175 K/UL
PMV BLD: 10.2 FL
RBC # BLD: 3.06 M/UL
RBC # FLD: 16.9 %
WBC # FLD AUTO: 1.26 K/UL

## 2023-10-05 PROCEDURE — 80053 COMPREHEN METABOLIC PANEL: CPT

## 2023-10-05 PROCEDURE — 99214 OFFICE O/P EST MOD 30 MIN: CPT

## 2023-10-05 PROCEDURE — 86316 IMMUNOASSAY TUMOR OTHER: CPT

## 2023-10-05 PROCEDURE — 85027 COMPLETE CBC AUTOMATED: CPT

## 2023-10-05 PROCEDURE — 85730 THROMBOPLASTIN TIME PARTIAL: CPT

## 2023-10-05 PROCEDURE — 83615 LACTATE (LD) (LDH) ENZYME: CPT

## 2023-10-05 PROCEDURE — 85610 PROTHROMBIN TIME: CPT

## 2023-10-06 DIAGNOSIS — C85.80 OTHER SPECIFIED TYPES OF NON-HODGKIN LYMPHOMA, UNSPECIFIED SITE: ICD-10-CM

## 2023-10-06 LAB
ALBUMIN SERPL ELPH-MCNC: 3.1 G/DL
ALP BLD-CCNC: 209 U/L
ALT SERPL-CCNC: 10 U/L
ANION GAP SERPL CALC-SCNC: 10 MMOL/L
APTT BLD: 31.1 SEC
AST SERPL-CCNC: 8 U/L
BILIRUB SERPL-MCNC: 0.6 MG/DL
BUN SERPL-MCNC: 36 MG/DL
CALCIUM SERPL-MCNC: 8.7 MG/DL
CHLORIDE SERPL-SCNC: 112 MMOL/L
CO2 SERPL-SCNC: 19 MMOL/L
CREAT SERPL-MCNC: 3.3 MG/DL
EGFR: 20 ML/MIN/1.73M2
GLUCOSE SERPL-MCNC: 112 MG/DL
INR PPP: 0.99 RATIO
LDH SERPL-CCNC: 140 U/L
POTASSIUM SERPL-SCNC: 4.5 MMOL/L
PROT SERPL-MCNC: 4.4 G/DL
PT BLD: 11.3 SEC
SODIUM SERPL-SCNC: 141 MMOL/L

## 2023-10-07 LAB
CULTURE RESULTS: SIGNIFICANT CHANGE UP
SPECIMEN SOURCE: SIGNIFICANT CHANGE UP

## 2023-10-11 LAB — CGA SERPL-MCNC: 837.4 NG/ML

## 2023-10-13 RX ORDER — SYRINGE W-NEEDLE,DISPOSAB,3 ML 25GX1"
25G X 5/8" SYRINGE, EMPTY DISPOSABLE MISCELLANEOUS
Qty: 27 | Refills: 1 | Status: ACTIVE | COMMUNITY
Start: 2023-10-13

## 2023-10-19 DIAGNOSIS — D3A.8 OTHER BENIGN NEUROENDOCRINE TUMORS: ICD-10-CM

## 2023-10-25 LAB
CULTURE RESULTS: SIGNIFICANT CHANGE UP
CULTURE RESULTS: SIGNIFICANT CHANGE UP
SPECIMEN SOURCE: SIGNIFICANT CHANGE UP
SPECIMEN SOURCE: SIGNIFICANT CHANGE UP

## 2023-10-25 RX ORDER — PROCHLORPERAZINE MALEATE 10 MG/1
10 TABLET ORAL EVERY 6 HOURS
Qty: 30 | Refills: 3 | Status: ACTIVE | COMMUNITY
Start: 2023-10-25 | End: 1900-01-01

## 2023-10-25 RX ORDER — PREDNISONE 50 MG/1
50 TABLET ORAL DAILY
Qty: 10 | Refills: 5 | Status: ACTIVE | COMMUNITY
Start: 2023-10-25 | End: 1900-01-01

## 2023-10-25 RX ORDER — ONDANSETRON 8 MG/1
8 TABLET ORAL EVERY 8 HOURS
Qty: 30 | Refills: 3 | Status: ACTIVE | COMMUNITY
Start: 2023-10-25 | End: 1900-01-01

## 2023-10-26 ENCOUNTER — APPOINTMENT (OUTPATIENT)
Dept: HEMATOLOGY ONCOLOGY | Facility: CLINIC | Age: 68
End: 2023-10-26
Payer: MEDICARE

## 2023-10-26 ENCOUNTER — LABORATORY RESULT (OUTPATIENT)
Age: 68
End: 2023-10-26

## 2023-10-26 ENCOUNTER — OUTPATIENT (OUTPATIENT)
Dept: OUTPATIENT SERVICES | Facility: HOSPITAL | Age: 68
LOS: 1 days | End: 2023-10-26
Payer: MEDICARE

## 2023-10-26 ENCOUNTER — APPOINTMENT (OUTPATIENT)
Dept: INFUSION THERAPY | Facility: CLINIC | Age: 68
End: 2023-10-26
Payer: MEDICARE

## 2023-10-26 VITALS
BODY MASS INDEX: 26.82 KG/M2 | SYSTOLIC BLOOD PRESSURE: 100 MMHG | WEIGHT: 198 LBS | HEART RATE: 74 BPM | TEMPERATURE: 98.7 F | RESPIRATION RATE: 16 BRPM | HEIGHT: 72 IN | DIASTOLIC BLOOD PRESSURE: 54 MMHG

## 2023-10-26 DIAGNOSIS — C85.80 OTHER SPECIFIED TYPES OF NON-HODGKIN LYMPHOMA, UNSPECIFIED SITE: ICD-10-CM

## 2023-10-26 DIAGNOSIS — D70.9 NEUTROPENIA, UNSPECIFIED: ICD-10-CM

## 2023-10-26 DIAGNOSIS — Z98.890 OTHER SPECIFIED POSTPROCEDURAL STATES: Chronic | ICD-10-CM

## 2023-10-26 DIAGNOSIS — F20.0 PARANOID SCHIZOPHRENIA: ICD-10-CM

## 2023-10-26 DIAGNOSIS — E83.52 HYPERCALCEMIA: ICD-10-CM

## 2023-10-26 DIAGNOSIS — S42.409A UNSPECIFIED FRACTURE OF LOWER END OF UNSPECIFIED HUMERUS, INITIAL ENCOUNTER FOR CLOSED FRACTURE: Chronic | ICD-10-CM

## 2023-10-26 DIAGNOSIS — D3A.8 OTHER BENIGN NEUROENDOCRINE TUMORS: ICD-10-CM

## 2023-10-26 DIAGNOSIS — Z96.0 PRESENCE OF UROGENITAL IMPLANTS: Chronic | ICD-10-CM

## 2023-10-26 LAB
ALBUMIN SERPL ELPH-MCNC: 3.8 G/DL
ALP BLD-CCNC: 190 U/L
ALT SERPL-CCNC: 12 U/L
ANION GAP SERPL CALC-SCNC: 9 MMOL/L
AST SERPL-CCNC: 11 U/L
BILIRUB SERPL-MCNC: 0.5 MG/DL
BUN SERPL-MCNC: 32 MG/DL
CALCIUM SERPL-MCNC: 8.5 MG/DL
CHLORIDE SERPL-SCNC: 113 MMOL/L
CO2 SERPL-SCNC: 17 MMOL/L
CREAT SERPL-MCNC: 2.7 MG/DL
EGFR: 25 ML/MIN/1.73M2
GLUCOSE SERPL-MCNC: 88 MG/DL
HCT VFR BLD CALC: 32.6 %
HGB BLD-MCNC: 10.7 G/DL
LDH SERPL-CCNC: 147 U/L
MCHC RBC-ENTMCNC: 30.1 PG
MCHC RBC-ENTMCNC: 32.8 G/DL
MCV RBC AUTO: 91.6 FL
PLATELET # BLD AUTO: 140 K/UL
PMV BLD: 10.1 FL
POTASSIUM SERPL-SCNC: 4.6 MMOL/L
PROT SERPL-MCNC: 5.1 G/DL
RBC # BLD: 3.56 M/UL
RBC # FLD: 19.6 %
SODIUM SERPL-SCNC: 139 MMOL/L
WBC # FLD AUTO: 3.89 K/UL

## 2023-10-26 PROCEDURE — 96367 TX/PROPH/DG ADDL SEQ IV INF: CPT

## 2023-10-26 PROCEDURE — 96411 CHEMO IV PUSH ADDL DRUG: CPT

## 2023-10-26 PROCEDURE — 80053 COMPREHEN METABOLIC PANEL: CPT

## 2023-10-26 PROCEDURE — 96377 APPLICATON ON-BODY INJECTOR: CPT | Mod: XU

## 2023-10-26 PROCEDURE — 99214 OFFICE O/P EST MOD 30 MIN: CPT

## 2023-10-26 PROCEDURE — 36415 COLL VENOUS BLD VENIPUNCTURE: CPT

## 2023-10-26 PROCEDURE — 96413 CHEMO IV INFUSION 1 HR: CPT

## 2023-10-26 PROCEDURE — 85027 COMPLETE CBC AUTOMATED: CPT

## 2023-10-26 PROCEDURE — 96415 CHEMO IV INFUSION ADDL HR: CPT

## 2023-10-26 PROCEDURE — 96417 CHEMO IV INFUS EACH ADDL SEQ: CPT

## 2023-10-26 PROCEDURE — 96372 THER/PROPH/DIAG INJ SC/IM: CPT

## 2023-10-26 PROCEDURE — 83615 LACTATE (LD) (LDH) ENZYME: CPT

## 2023-10-26 RX ORDER — DIPHENHYDRAMINE HCL 50 MG
50 CAPSULE ORAL ONCE
Refills: 0 | Status: COMPLETED | OUTPATIENT
Start: 2023-10-26 | End: 2023-10-26

## 2023-10-26 RX ORDER — OCTREOTIDE ACETATE 200 UG/ML
20 INJECTION, SOLUTION INTRAVENOUS; SUBCUTANEOUS ONCE
Refills: 0 | Status: COMPLETED | OUTPATIENT
Start: 2023-10-26 | End: 2023-10-26

## 2023-10-26 RX ORDER — PEGFILGRASTIM-CBQV 6 MG/.6ML
6 INJECTION, SOLUTION SUBCUTANEOUS ONCE
Refills: 0 | Status: COMPLETED | OUTPATIENT
Start: 2023-10-26 | End: 2023-10-26

## 2023-10-26 RX ORDER — RITUXIMAB 10 MG/ML
795 INJECTION, SOLUTION INTRAVENOUS ONCE
Refills: 0 | Status: COMPLETED | OUTPATIENT
Start: 2023-10-26 | End: 2023-10-26

## 2023-10-26 RX ORDER — CYCLOPHOSPHAMIDE 100 MG
1590 VIAL (EA) INTRAVENOUS ONCE
Refills: 0 | Status: COMPLETED | OUTPATIENT
Start: 2023-10-26 | End: 2023-10-26

## 2023-10-26 RX ORDER — ACETAMINOPHEN 500 MG
650 TABLET ORAL ONCE
Refills: 0 | Status: COMPLETED | OUTPATIENT
Start: 2023-10-26 | End: 2023-10-26

## 2023-10-26 RX ORDER — FOSAPREPITANT DIMEGLUMINE 150 MG/5ML
150 INJECTION, POWDER, LYOPHILIZED, FOR SOLUTION INTRAVENOUS ONCE
Refills: 0 | Status: COMPLETED | OUTPATIENT
Start: 2023-10-26 | End: 2023-10-26

## 2023-10-26 RX ORDER — VINCRISTINE SULFATE 1 MG/ML
2 VIAL (ML) INTRAVENOUS ONCE
Refills: 0 | Status: COMPLETED | OUTPATIENT
Start: 2023-10-26 | End: 2023-10-26

## 2023-10-26 RX ORDER — DEXAMETHASONE 0.5 MG/5ML
10 ELIXIR ORAL ONCE
Refills: 0 | Status: COMPLETED | OUTPATIENT
Start: 2023-10-26 | End: 2023-10-26

## 2023-10-26 RX ADMIN — OCTREOTIDE ACETATE 20 MILLIGRAM(S): 200 INJECTION, SOLUTION INTRAVENOUS; SUBCUTANEOUS at 11:29

## 2023-10-26 RX ADMIN — Medication 50 MILLIGRAM(S): at 13:20

## 2023-10-26 RX ADMIN — PEGFILGRASTIM-CBQV 6 MILLIGRAM(S): 6 INJECTION, SOLUTION SUBCUTANEOUS at 17:15

## 2023-10-26 RX ADMIN — Medication 1590 MILLIGRAM(S): at 11:50

## 2023-10-26 RX ADMIN — FOSAPREPITANT DIMEGLUMINE 500 MILLIGRAM(S): 150 INJECTION, POWDER, LYOPHILIZED, FOR SOLUTION INTRAVENOUS at 10:40

## 2023-10-26 RX ADMIN — Medication 118 MILLIGRAM(S): at 11:10

## 2023-10-26 RX ADMIN — Medication 650 MILLIGRAM(S): at 12:45

## 2023-10-26 RX ADMIN — Medication 102 MILLIGRAM(S): at 13:00

## 2023-10-26 RX ADMIN — Medication 10 MILLIGRAM(S): at 11:30

## 2023-10-26 RX ADMIN — Medication 1590 MILLIGRAM(S): at 12:50

## 2023-10-26 RX ADMIN — Medication 2 MILLIGRAM(S): at 11:40

## 2023-10-26 RX ADMIN — FOSAPREPITANT DIMEGLUMINE 150 MILLIGRAM(S): 150 INJECTION, POWDER, LYOPHILIZED, FOR SOLUTION INTRAVENOUS at 11:00

## 2023-10-26 RX ADMIN — Medication 2 MILLIGRAM(S): at 11:30

## 2023-10-26 RX ADMIN — RITUXIMAB 795 MILLIGRAM(S): 10 INJECTION, SOLUTION INTRAVENOUS at 13:20

## 2023-10-26 RX ADMIN — RITUXIMAB 795 MILLIGRAM(S): 10 INJECTION, SOLUTION INTRAVENOUS at 17:40

## 2023-10-31 ENCOUNTER — OUTPATIENT (OUTPATIENT)
Dept: OUTPATIENT SERVICES | Facility: HOSPITAL | Age: 68
LOS: 1 days | End: 2023-10-31
Payer: MEDICARE

## 2023-10-31 VITALS
TEMPERATURE: 98 F | SYSTOLIC BLOOD PRESSURE: 99 MMHG | OXYGEN SATURATION: 99 % | HEIGHT: 72 IN | RESPIRATION RATE: 16 BRPM | DIASTOLIC BLOOD PRESSURE: 65 MMHG | WEIGHT: 192.02 LBS | HEART RATE: 54 BPM

## 2023-10-31 DIAGNOSIS — S42.409A UNSPECIFIED FRACTURE OF LOWER END OF UNSPECIFIED HUMERUS, INITIAL ENCOUNTER FOR CLOSED FRACTURE: Chronic | ICD-10-CM

## 2023-10-31 DIAGNOSIS — Z96.0 PRESENCE OF UROGENITAL IMPLANTS: Chronic | ICD-10-CM

## 2023-10-31 DIAGNOSIS — C85.80 OTHER SPECIFIED TYPES OF NON-HODGKIN LYMPHOMA, UNSPECIFIED SITE: ICD-10-CM

## 2023-10-31 DIAGNOSIS — Z98.890 OTHER SPECIFIED POSTPROCEDURAL STATES: Chronic | ICD-10-CM

## 2023-10-31 DIAGNOSIS — Z01.818 ENCOUNTER FOR OTHER PREPROCEDURAL EXAMINATION: ICD-10-CM

## 2023-10-31 LAB
ANISOCYTOSIS BLD QL: SLIGHT — SIGNIFICANT CHANGE UP
ANISOCYTOSIS BLD QL: SLIGHT — SIGNIFICANT CHANGE UP
BASOPHILS # BLD AUTO: 0 K/UL — SIGNIFICANT CHANGE UP (ref 0–0.2)
BASOPHILS # BLD AUTO: 0 K/UL — SIGNIFICANT CHANGE UP (ref 0–0.2)
BASOPHILS NFR BLD AUTO: 0 % — SIGNIFICANT CHANGE UP (ref 0–1)
BASOPHILS NFR BLD AUTO: 0 % — SIGNIFICANT CHANGE UP (ref 0–1)
BURR CELLS BLD QL SMEAR: PRESENT — SIGNIFICANT CHANGE UP
BURR CELLS BLD QL SMEAR: PRESENT — SIGNIFICANT CHANGE UP
EOSINOPHIL # BLD AUTO: 0.06 K/UL — SIGNIFICANT CHANGE UP (ref 0–0.7)
EOSINOPHIL # BLD AUTO: 0.06 K/UL — SIGNIFICANT CHANGE UP (ref 0–0.7)
EOSINOPHIL NFR BLD AUTO: 2 % — SIGNIFICANT CHANGE UP (ref 0–8)
EOSINOPHIL NFR BLD AUTO: 2 % — SIGNIFICANT CHANGE UP (ref 0–8)
HCT VFR BLD CALC: 31.1 % — LOW (ref 42–52)
HCT VFR BLD CALC: 31.1 % — LOW (ref 42–52)
HGB BLD-MCNC: 10.1 G/DL — LOW (ref 14–18)
HGB BLD-MCNC: 10.1 G/DL — LOW (ref 14–18)
LYMPHOCYTES # BLD AUTO: 0.75 K/UL — LOW (ref 1–3.3)
LYMPHOCYTES # BLD AUTO: 0.75 K/UL — LOW (ref 1–3.3)
LYMPHOCYTES # BLD AUTO: 26 % — SIGNIFICANT CHANGE UP (ref 13–44)
LYMPHOCYTES # BLD AUTO: 26 % — SIGNIFICANT CHANGE UP (ref 13–44)
MCHC RBC-ENTMCNC: 30.4 PG — SIGNIFICANT CHANGE UP (ref 27–31)
MCHC RBC-ENTMCNC: 30.4 PG — SIGNIFICANT CHANGE UP (ref 27–31)
MCHC RBC-ENTMCNC: 32.5 G/DL — SIGNIFICANT CHANGE UP (ref 32–37)
MCHC RBC-ENTMCNC: 32.5 G/DL — SIGNIFICANT CHANGE UP (ref 32–37)
MCV RBC AUTO: 93.7 FL — SIGNIFICANT CHANGE UP (ref 80–94)
MCV RBC AUTO: 93.7 FL — SIGNIFICANT CHANGE UP (ref 80–94)
MICROCYTES BLD QL: SLIGHT — SIGNIFICANT CHANGE UP
MICROCYTES BLD QL: SLIGHT — SIGNIFICANT CHANGE UP
MONOCYTES # BLD AUTO: 0.46 K/UL — SIGNIFICANT CHANGE UP (ref 0.1–0.6)
MONOCYTES # BLD AUTO: 0.46 K/UL — SIGNIFICANT CHANGE UP (ref 0.1–0.6)
MONOCYTES NFR BLD AUTO: 16 % — HIGH (ref 1.7–9.3)
MONOCYTES NFR BLD AUTO: 16 % — HIGH (ref 1.7–9.3)
NEUTROPHILS # BLD AUTO: 1.49 K/UL — SIGNIFICANT CHANGE UP (ref 1.4–6.5)
NEUTROPHILS # BLD AUTO: 1.49 K/UL — SIGNIFICANT CHANGE UP (ref 1.4–6.5)
NEUTROPHILS NFR BLD AUTO: 52 % — SIGNIFICANT CHANGE UP (ref 42.2–75.2)
NEUTROPHILS NFR BLD AUTO: 52 % — SIGNIFICANT CHANGE UP (ref 42.2–75.2)
NRBC # BLD: 0 /100 — SIGNIFICANT CHANGE UP (ref 0–0)
NRBC # BLD: 0 /100 — SIGNIFICANT CHANGE UP (ref 0–0)
NRBC # BLD: SIGNIFICANT CHANGE UP /100 WBCS (ref 0–0)
NRBC # BLD: SIGNIFICANT CHANGE UP /100 WBCS (ref 0–0)
OVALOCYTES BLD QL SMEAR: SLIGHT — SIGNIFICANT CHANGE UP
OVALOCYTES BLD QL SMEAR: SLIGHT — SIGNIFICANT CHANGE UP
PLAT MORPH BLD: NORMAL — SIGNIFICANT CHANGE UP
PLAT MORPH BLD: NORMAL — SIGNIFICANT CHANGE UP
PLATELET # BLD AUTO: 152 K/UL — SIGNIFICANT CHANGE UP (ref 130–400)
PLATELET # BLD AUTO: 152 K/UL — SIGNIFICANT CHANGE UP (ref 130–400)
PMV BLD: 9.4 FL — SIGNIFICANT CHANGE UP (ref 7.4–10.4)
PMV BLD: 9.4 FL — SIGNIFICANT CHANGE UP (ref 7.4–10.4)
POIKILOCYTOSIS BLD QL AUTO: SLIGHT — SIGNIFICANT CHANGE UP
POIKILOCYTOSIS BLD QL AUTO: SLIGHT — SIGNIFICANT CHANGE UP
RBC # BLD: 3.32 M/UL — LOW (ref 4.7–6.1)
RBC # BLD: 3.32 M/UL — LOW (ref 4.7–6.1)
RBC # FLD: 18.1 % — HIGH (ref 11.5–14.5)
RBC # FLD: 18.1 % — HIGH (ref 11.5–14.5)
RBC BLD AUTO: ABNORMAL
RBC BLD AUTO: ABNORMAL
SCHISTOCYTES BLD QL AUTO: SLIGHT — SIGNIFICANT CHANGE UP
SCHISTOCYTES BLD QL AUTO: SLIGHT — SIGNIFICANT CHANGE UP
VARIANT LYMPHS # BLD: 4 % — SIGNIFICANT CHANGE UP (ref 0–6)
VARIANT LYMPHS # BLD: 4 % — SIGNIFICANT CHANGE UP (ref 0–6)
WBC # BLD: 2.87 K/UL — LOW (ref 4.8–10.8)
WBC # BLD: 2.87 K/UL — LOW (ref 4.8–10.8)
WBC # FLD AUTO: 2.87 K/UL — LOW (ref 4.8–10.8)
WBC # FLD AUTO: 2.87 K/UL — LOW (ref 4.8–10.8)

## 2023-10-31 PROCEDURE — 93005 ELECTROCARDIOGRAM TRACING: CPT

## 2023-10-31 PROCEDURE — 36415 COLL VENOUS BLD VENIPUNCTURE: CPT

## 2023-10-31 PROCEDURE — 93010 ELECTROCARDIOGRAM REPORT: CPT

## 2023-10-31 PROCEDURE — 99214 OFFICE O/P EST MOD 30 MIN: CPT | Mod: 25

## 2023-10-31 PROCEDURE — 85025 COMPLETE CBC W/AUTO DIFF WBC: CPT

## 2023-10-31 RX ORDER — FLUPHENAZINE HYDROCHLORIDE 1 MG/1
1 TABLET, FILM COATED ORAL
Qty: 0 | Refills: 0 | DISCHARGE

## 2023-10-31 NOTE — H&P PST ADULT - HISTORY OF PRESENT ILLNESS
Patient is a  67 year old  male presenting to PAST in preparation for Port Placement  on  11/6/23/ under LSB anesthesia by Dr. Dobbs.  Pt was diagnosed w/ "neuroendocrine tumor& lymphoma, i am running out of veins" pt reports last chemo ~ 1 week ago    Pt had prior chemo ~ 5 yrs ago for lymphoma    PATIENT CURRENTLY DENIES CHEST PAIN  SHORTNESS OF BREATH  PALPITATIONS,  DYSURIA, OR UPPER RESPIRATORY INFECTION IN PAST 2 WEEKS    denies travel outside the [place] in the past 30 days  Patient denies any signs or symptoms of COVID 19    Anesthesia Alert  NO--Difficult Airway  NO--History of neck surgery or radiation  NO--Limited ROM of neck  NO--History of Malignant hyperthermia  NO--No personal or family history of Pseudocholinesterase deficiency.  NO--Prior Anesthesia Complication  NO--Latex Allergy  NO--Loose teeth  NO--History of Rheumatoid Arthritis  NO--Bleeding risk  NO--JILL  Yes--Other__DAILY STEROIDS___    PT DENIES ANY RASHES, ABRASION, OR OPEN WOUNDS OR CUTS    AS PER THE PT, THIS IS HIS/HER COMPLETE MEDICAL AND SURGICAL HX, INCLUDING MEDICATIONS PRESCRIBED AND OVER THE COUNTER    Patient verbalized understanding of instructions and was given the opportunity to ask questions and have them answered.    pt denies any suicidal ideation or thoughts, pt states not a threat to self or others

## 2023-10-31 NOTE — H&P PST ADULT - NSICDXPASTMEDICALHX_GEN_ALL_CORE_FT
PAST MEDICAL HISTORY:  Atrophic kidney     H/O colonoscopy with polypectomy     H/O neuropathy     History of bladder surgery     History of chemotherapy     Kidney stones     Liver cyst     Lymphoma     Neuroendocrine malignancy     Schizophrenia     Shingles     Stage 3 chronic kidney disease

## 2023-11-01 DIAGNOSIS — Z01.818 ENCOUNTER FOR OTHER PREPROCEDURAL EXAMINATION: ICD-10-CM

## 2023-11-01 DIAGNOSIS — C85.80 OTHER SPECIFIED TYPES OF NON-HODGKIN LYMPHOMA, UNSPECIFIED SITE: ICD-10-CM

## 2023-11-06 PROBLEM — C7A.8 OTHER MALIGNANT NEUROENDOCRINE TUMORS: Chronic | Status: ACTIVE | Noted: 2023-10-31

## 2023-11-14 ENCOUNTER — OUTPATIENT (OUTPATIENT)
Dept: OUTPATIENT SERVICES | Facility: HOSPITAL | Age: 68
LOS: 1 days | End: 2023-11-14
Payer: MEDICARE

## 2023-11-14 ENCOUNTER — LABORATORY RESULT (OUTPATIENT)
Age: 68
End: 2023-11-14

## 2023-11-14 ENCOUNTER — APPOINTMENT (OUTPATIENT)
Dept: HEMATOLOGY ONCOLOGY | Facility: CLINIC | Age: 68
End: 2023-11-14
Payer: MEDICARE

## 2023-11-14 VITALS
WEIGHT: 200 LBS | RESPIRATION RATE: 16 BRPM | SYSTOLIC BLOOD PRESSURE: 105 MMHG | HEART RATE: 67 BPM | DIASTOLIC BLOOD PRESSURE: 56 MMHG | HEIGHT: 72 IN | TEMPERATURE: 97.3 F | BODY MASS INDEX: 27.09 KG/M2

## 2023-11-14 DIAGNOSIS — Z98.890 OTHER SPECIFIED POSTPROCEDURAL STATES: Chronic | ICD-10-CM

## 2023-11-14 DIAGNOSIS — Z96.0 PRESENCE OF UROGENITAL IMPLANTS: Chronic | ICD-10-CM

## 2023-11-14 DIAGNOSIS — C85.80 OTHER SPECIFIED TYPES OF NON-HODGKIN LYMPHOMA, UNSPECIFIED SITE: ICD-10-CM

## 2023-11-14 DIAGNOSIS — D70.9 NEUTROPENIA, UNSPECIFIED: ICD-10-CM

## 2023-11-14 DIAGNOSIS — S42.409A UNSPECIFIED FRACTURE OF LOWER END OF UNSPECIFIED HUMERUS, INITIAL ENCOUNTER FOR CLOSED FRACTURE: Chronic | ICD-10-CM

## 2023-11-14 LAB
ALBUMIN SERPL ELPH-MCNC: 3.6 G/DL
ALP BLD-CCNC: 115 U/L
ALT SERPL-CCNC: 7 U/L
ANION GAP SERPL CALC-SCNC: 7 MMOL/L
AST SERPL-CCNC: 7 U/L
BILIRUB SERPL-MCNC: 0.3 MG/DL
BUN SERPL-MCNC: 22 MG/DL
CALCIUM SERPL-MCNC: 7.1 MG/DL
CHLORIDE SERPL-SCNC: 119 MMOL/L
CO2 SERPL-SCNC: 17 MMOL/L
CREAT SERPL-MCNC: 2 MG/DL
EGFR: 36 ML/MIN/1.73M2
GLUCOSE SERPL-MCNC: 103 MG/DL
HCT VFR BLD CALC: 29.8 %
HGB BLD-MCNC: 9.7 G/DL
LDH SERPL-CCNC: 132 U/L
MCHC RBC-ENTMCNC: 29.6 PG
MCHC RBC-ENTMCNC: 32.6 G/DL
MCV RBC AUTO: 90.9 FL
PLATELET # BLD AUTO: 117 K/UL
PMV BLD: 8.9 FL
POTASSIUM SERPL-SCNC: 4.9 MMOL/L
PROT SERPL-MCNC: 4.7 G/DL
RBC # BLD: 3.28 M/UL
RBC # FLD: 17.1 %
SODIUM SERPL-SCNC: 143 MMOL/L
URATE SERPL-MCNC: 5.8 MG/DL
WBC # FLD AUTO: 1.37 K/UL

## 2023-11-14 PROCEDURE — 80053 COMPREHEN METABOLIC PANEL: CPT

## 2023-11-14 PROCEDURE — 84550 ASSAY OF BLOOD/URIC ACID: CPT

## 2023-11-14 PROCEDURE — 85027 COMPLETE CBC AUTOMATED: CPT

## 2023-11-14 PROCEDURE — 99214 OFFICE O/P EST MOD 30 MIN: CPT

## 2023-11-14 PROCEDURE — 83615 LACTATE (LD) (LDH) ENZYME: CPT

## 2023-11-15 ENCOUNTER — APPOINTMENT (OUTPATIENT)
Dept: HEMATOLOGY ONCOLOGY | Facility: CLINIC | Age: 68
End: 2023-11-15

## 2023-11-15 ENCOUNTER — RESULT REVIEW (OUTPATIENT)
Age: 68
End: 2023-11-15

## 2023-11-15 ENCOUNTER — TRANSCRIPTION ENCOUNTER (OUTPATIENT)
Age: 68
End: 2023-11-15

## 2023-11-15 ENCOUNTER — OUTPATIENT (OUTPATIENT)
Dept: OUTPATIENT SERVICES | Facility: HOSPITAL | Age: 68
LOS: 1 days | Discharge: HOME | End: 2023-11-15
Payer: MEDICARE

## 2023-11-15 VITALS
HEART RATE: 53 BPM | OXYGEN SATURATION: 100 % | DIASTOLIC BLOOD PRESSURE: 59 MMHG | TEMPERATURE: 97 F | SYSTOLIC BLOOD PRESSURE: 128 MMHG | RESPIRATION RATE: 20 BRPM

## 2023-11-15 VITALS
OXYGEN SATURATION: 98 % | HEART RATE: 61 BPM | SYSTOLIC BLOOD PRESSURE: 106 MMHG | TEMPERATURE: 98 F | RESPIRATION RATE: 20 BRPM | DIASTOLIC BLOOD PRESSURE: 55 MMHG | WEIGHT: 199.96 LBS | HEIGHT: 73 IN

## 2023-11-15 DIAGNOSIS — C85.80 OTHER SPECIFIED TYPES OF NON-HODGKIN LYMPHOMA, UNSPECIFIED SITE: ICD-10-CM

## 2023-11-15 DIAGNOSIS — Z96.0 PRESENCE OF UROGENITAL IMPLANTS: Chronic | ICD-10-CM

## 2023-11-15 DIAGNOSIS — Z98.890 OTHER SPECIFIED POSTPROCEDURAL STATES: Chronic | ICD-10-CM

## 2023-11-15 DIAGNOSIS — S42.409A UNSPECIFIED FRACTURE OF LOWER END OF UNSPECIFIED HUMERUS, INITIAL ENCOUNTER FOR CLOSED FRACTURE: Chronic | ICD-10-CM

## 2023-11-15 PROCEDURE — 36561 INSERT TUNNELED CV CATH: CPT

## 2023-11-15 PROCEDURE — 76937 US GUIDE VASCULAR ACCESS: CPT

## 2023-11-15 PROCEDURE — 77001 FLUOROGUIDE FOR VEIN DEVICE: CPT | Mod: 26

## 2023-11-15 PROCEDURE — 77001 FLUOROGUIDE FOR VEIN DEVICE: CPT

## 2023-11-15 PROCEDURE — 76937 US GUIDE VASCULAR ACCESS: CPT | Mod: 26

## 2023-11-15 PROCEDURE — C1769: CPT

## 2023-11-15 PROCEDURE — C1788: CPT

## 2023-11-15 RX ORDER — SODIUM CHLORIDE 9 MG/ML
1000 INJECTION, SOLUTION INTRAVENOUS
Refills: 0 | Status: DISCONTINUED | OUTPATIENT
Start: 2023-11-15 | End: 2023-11-15

## 2023-11-15 RX ORDER — BENZTROPINE MESYLATE 1 MG
1 TABLET ORAL
Refills: 0 | DISCHARGE

## 2023-11-15 RX ORDER — OXYCODONE AND ACETAMINOPHEN 5; 325 MG/1; MG/1
1 TABLET ORAL ONCE
Refills: 0 | Status: DISCONTINUED | OUTPATIENT
Start: 2023-11-15 | End: 2023-11-15

## 2023-11-15 RX ORDER — FLUPHENAZINE HYDROCHLORIDE 1 MG/1
1 TABLET, FILM COATED ORAL
Refills: 0 | DISCHARGE

## 2023-11-15 RX ORDER — CEFAZOLIN SODIUM 1 G
2000 VIAL (EA) INJECTION ONCE
Refills: 0 | Status: DISCONTINUED | OUTPATIENT
Start: 2023-11-15 | End: 2023-11-15

## 2023-11-15 NOTE — ASU DISCHARGE PLAN (ADULT/PEDIATRIC) - B. DRINK ALCOHOL, BEER, OR WINE
Statement Selected
Review of Systems    Constitutional: (-) fever  Cardiovascular: (-) chest pain, (-) palpitation   Respiratory: (-) cough, (-) shortness of breath  Gastrointestinal: (-) vomiting, (-) diarrhea  Musculoskeletal: (-) neck pain, (-) back pain, (-) joint pain  Integumentary: (-) rash, (-) edema  Neurological: (-) headache, (-) altered mental status  Heme/Lymph: (-) easy bruising (-) easy bleeding

## 2023-11-15 NOTE — ASU PATIENT PROFILE, ADULT - NSICDXPASTMEDICALHX_GEN_ALL_CORE_FT
PAST MEDICAL HISTORY:  Atrophic kidney     H/O colonoscopy with polypectomy     H/O neuropathy     History of bladder surgery     History of chemotherapy     Kidney stones     Liver cyst     Lymphoma     Neuroendocrine cancer     Neuroendocrine malignancy     Neuroendocrine tumor status post surgical treatment     Schizophrenia     Shingles     Stage 3 chronic kidney disease

## 2023-11-15 NOTE — ASU DISCHARGE PLAN (ADULT/PEDIATRIC) - NS MD DC FALL RISK RISK
For information on Fall & Injury Prevention, visit: [email or web address] OR  [email or web address] OR  https://www.cdc.gov/steadi/patient.html

## 2023-11-15 NOTE — PROGRESS NOTE ADULT - SUBJECTIVE AND OBJECTIVE BOX
PREOPERATIVE DAY OF PROCEDURE EVALUATION:     I have personally seen and examined this patient. I agree with the history and physical which I have reviewed and noted any changes below:     Plan is for image guided port catheter placement with conscious sedation / anesthesia today 11/15  H+P from 10/31 - patient denies any changes to history and medications    Procedure/ risks/ benefits/ goals/ alternatives were explained. All questions answered. Informed content obtained from patient. Consent placed in chart.  PREOPERATIVE DAY OF PROCEDURE EVALUATION:     I have personally seen and examined this patient. I agree with the history and physical which I have reviewed and noted any changes below:     Plan is for image guided port catheter placement with conscious sedation / anesthesia today 11/15  H+P from 10/31 - patient denies any changes to history and medications    Procedure/ risks/ benefits/ goals/ alternatives were explained. All questions answered. Informed content obtained from patient. Consent placed in chart..

## 2023-11-15 NOTE — PROGRESS NOTE ADULT - SUBJECTIVE AND OBJECTIVE BOX
INTERVENTIONAL RADIOLOGY BRIEF-OPERATIVE NOTE    Procedure: Port placement   Pre-Op Diagnosis: lymphoma requiring chemotherapy   Post-Op Diagnosis: same  Attending: Kaitlin  Resident: Brian Granados  Anesthesia (type):  [ ] General Anesthesia  [x] Sedation - as provided by present anesthesiologist   [ ] Spinal Anesthesia  [x] Local/Regional    Contrast: None  Estimated Blood Loss: Minimal, < 5 cc  Condition:   [ ] Critical  [ ] Serious  [ ] Fair   [x] Good    Findings/Follow up Plan of Care: successful placement of right IJ chemoport, single port, ready for immediate use.   Specimens Removed: none  Implants: chemoport  Complications: none immediate    Disposition: successful placement of right IJ chemoport, single port, ready for immediate use.     Please call Interventional Radiology [phone] with any questions, concerns, or issues.

## 2023-11-15 NOTE — ASU PATIENT PROFILE, ADULT - FALL HARM RISK - UNIVERSAL INTERVENTIONS
Bed in lowest position, wheels locked, appropriate side rails in place/Call bell, personal items and telephone in reach/Instruct patient to call for assistance before getting out of bed or chair/Non-slip footwear when patient is out of bed/[place] to call system/Physically safe environment - no spills, clutter or unnecessary equipment/Purposeful Proactive Rounding/Room/bathroom lighting operational, light cord in reach

## 2023-11-15 NOTE — CHART NOTE - NSCHARTNOTEFT_GEN_A_CORE
PACU ANESTHESIA ADMISSION NOTE      Procedure:   Post op diagnosis:      ____  Intubated  TV:______       Rate: ______      FiO2: ______    _x___  Patent Airway    ___x_  Full return of protective reflexes    __x__  Full recovery from anesthesia / back to baseline     Vitals:   T:  98         R: 15                 BP:  110/56                Sat: 100                  P: 56      Mental Status:  x____ Awake   __x___ Alert   _____ Drowsy   _____ Sedated    Nausea/Vomiting:  ____ NO  ______Yes,   See Post - Op Orders          Pain Scale (0-10):  _____    Treatment: ____ None    ____ See Post - Op/PCA Orders    Post - Operative Fluids:   ____ Oral   ____ See Post - Op Orders    Plan: Discharge:  x ____Home       _____Floor     _____Critical Care    _____  Other:_________________    Comments:

## 2023-11-15 NOTE — ASU PREOP CHECKLIST - BSA (M2)
Patient trached and mechanically ventilated on Astral ventilator (home vent). Parents updated on patient status and plan of care. Asking appropriate questions which were answered. Eager to participate in and learn about Barby's care.     Areas of Note:    Neuro   Ativan/Methadone maintained   WATS 3-4. (temp, watery stools, return to state, emesis)    Respiratory   Cuff deflated   On home ventilator   Parents working with RT to change trach and perform trach care.   Observed how to open suction.   O2 flow decreased to home vent from 2 lpm to 1.5 lpm.    Cardiovascular   Diuril dosage decreased.    FEN/GI   Frequent loose stools   Feedings paused before 9:00 am meds as ordered. Emesis x 1 two hours later.   Urine output 4.2 ml/kg/hr.    Hematology/ID   Tmax 103.0 axillary (right arm).   Cultures sent (respiratory, blood x 2)   CRP, Procal slightly elevated.   Tylenol x 1 administered.   Temp returned to baseline throughout the day.   Continued on prophylactic heparin drip.    Activity   PT visited with patient x 1 today. Mother assisted.    Please refer to flow-sheets for additional details.    2.15

## 2023-11-16 ENCOUNTER — LABORATORY RESULT (OUTPATIENT)
Age: 68
End: 2023-11-16

## 2023-11-16 ENCOUNTER — APPOINTMENT (OUTPATIENT)
Dept: INFUSION THERAPY | Facility: CLINIC | Age: 68
End: 2023-11-16
Payer: MEDICARE

## 2023-11-16 ENCOUNTER — OUTPATIENT (OUTPATIENT)
Dept: OUTPATIENT SERVICES | Facility: HOSPITAL | Age: 68
LOS: 1 days | End: 2023-11-16
Payer: MEDICARE

## 2023-11-16 VITALS
WEIGHT: 196 LBS | DIASTOLIC BLOOD PRESSURE: 58 MMHG | HEIGHT: 72.01 IN | SYSTOLIC BLOOD PRESSURE: 103 MMHG | HEART RATE: 66 BPM | BODY MASS INDEX: 26.55 KG/M2 | TEMPERATURE: 97.6 F

## 2023-11-16 DIAGNOSIS — D64.9 ANEMIA, UNSPECIFIED: ICD-10-CM

## 2023-11-16 DIAGNOSIS — Z98.890 OTHER SPECIFIED POSTPROCEDURAL STATES: Chronic | ICD-10-CM

## 2023-11-16 DIAGNOSIS — Z96.0 PRESENCE OF UROGENITAL IMPLANTS: Chronic | ICD-10-CM

## 2023-11-16 DIAGNOSIS — E83.51 HYPOCALCEMIA: ICD-10-CM

## 2023-11-16 DIAGNOSIS — S42.409A UNSPECIFIED FRACTURE OF LOWER END OF UNSPECIFIED HUMERUS, INITIAL ENCOUNTER FOR CLOSED FRACTURE: Chronic | ICD-10-CM

## 2023-11-16 DIAGNOSIS — E87.5 HYPERKALEMIA: ICD-10-CM

## 2023-11-16 DIAGNOSIS — D70.2 OTHER DRUG-INDUCED AGRANULOCYTOSIS: ICD-10-CM

## 2023-11-16 DIAGNOSIS — C85.80 OTHER SPECIFIED TYPES OF NON-HODGKIN LYMPHOMA, UNSPECIFIED SITE: ICD-10-CM

## 2023-11-16 LAB
ANION GAP SERPL CALC-SCNC: 6 MMOL/L
BUN SERPL-MCNC: 22 MG/DL
CALCIUM SERPL-MCNC: 7.1 MG/DL
CHLORIDE SERPL-SCNC: 117 MMOL/L
CO2 SERPL-SCNC: 17 MMOL/L
CREAT SERPL-MCNC: 1.8 MG/DL
EGFR: 41 ML/MIN/1.73M2
GLUCOSE SERPL-MCNC: 106 MG/DL
HCT VFR BLD CALC: 31.1 %
HGB BLD-MCNC: 10.2 G/DL
MCHC RBC-ENTMCNC: 29.7 PG
MCHC RBC-ENTMCNC: 32.8 G/DL
MCV RBC AUTO: 90.4 FL
PLATELET # BLD AUTO: 135 K/UL
PMV BLD: 9.4 FL
POTASSIUM SERPL-SCNC: 5.4 MMOL/L
RBC # BLD: 3.44 M/UL
RBC # FLD: 17.2 %
SODIUM SERPL-SCNC: 140 MMOL/L
WBC # FLD AUTO: 1.68 K/UL

## 2023-11-16 PROCEDURE — 96377 APPLICATON ON-BODY INJECTOR: CPT

## 2023-11-16 PROCEDURE — 36415 COLL VENOUS BLD VENIPUNCTURE: CPT

## 2023-11-16 PROCEDURE — 96367 TX/PROPH/DG ADDL SEQ IV INF: CPT

## 2023-11-16 PROCEDURE — 96415 CHEMO IV INFUSION ADDL HR: CPT

## 2023-11-16 PROCEDURE — 96417 CHEMO IV INFUS EACH ADDL SEQ: CPT

## 2023-11-16 PROCEDURE — 99213 OFFICE O/P EST LOW 20 MIN: CPT

## 2023-11-16 PROCEDURE — 96411 CHEMO IV PUSH ADDL DRUG: CPT

## 2023-11-16 PROCEDURE — 96413 CHEMO IV INFUSION 1 HR: CPT

## 2023-11-16 PROCEDURE — 85027 COMPLETE CBC AUTOMATED: CPT

## 2023-11-16 PROCEDURE — 96375 TX/PRO/DX INJ NEW DRUG ADDON: CPT

## 2023-11-16 PROCEDURE — 80048 BASIC METABOLIC PNL TOTAL CA: CPT

## 2023-11-16 RX ORDER — VINCRISTINE SULFATE 1 MG/ML
2 VIAL (ML) INTRAVENOUS ONCE
Refills: 0 | Status: COMPLETED | OUTPATIENT
Start: 2023-11-16 | End: 2023-11-16

## 2023-11-16 RX ORDER — CYCLOPHOSPHAMIDE 100 MG
1275 VIAL (EA) INTRAVENOUS ONCE
Refills: 0 | Status: COMPLETED | OUTPATIENT
Start: 2023-11-16 | End: 2023-11-16

## 2023-11-16 RX ORDER — DEXAMETHASONE 0.5 MG/5ML
10 ELIXIR ORAL ONCE
Refills: 0 | Status: COMPLETED | OUTPATIENT
Start: 2023-11-16 | End: 2023-11-16

## 2023-11-16 RX ORDER — ACETAMINOPHEN 500 MG
650 TABLET ORAL ONCE
Refills: 0 | Status: COMPLETED | OUTPATIENT
Start: 2023-11-16 | End: 2023-11-16

## 2023-11-16 RX ORDER — RITUXIMAB 10 MG/ML
795 INJECTION, SOLUTION INTRAVENOUS ONCE
Refills: 0 | Status: COMPLETED | OUTPATIENT
Start: 2023-11-16 | End: 2023-11-16

## 2023-11-16 RX ORDER — SODIUM ZIRCONIUM CYCLOSILICATE 10 G/10G
10 POWDER, FOR SUSPENSION ORAL 3 TIMES DAILY
Qty: 3 | Refills: 0 | Status: COMPLETED | COMMUNITY
Start: 2023-11-16 | End: 2023-11-17

## 2023-11-16 RX ORDER — FOSAPREPITANT DIMEGLUMINE 150 MG/5ML
150 INJECTION, POWDER, LYOPHILIZED, FOR SOLUTION INTRAVENOUS ONCE
Refills: 0 | Status: COMPLETED | OUTPATIENT
Start: 2023-11-16 | End: 2023-11-16

## 2023-11-16 RX ORDER — GRAPE SEED EXTRACT 50 MG
1250 (500 CA) TABLET ORAL DAILY
Qty: 7 | Refills: 0 | Status: COMPLETED | COMMUNITY
Start: 2023-11-16 | End: 2023-11-23

## 2023-11-16 RX ORDER — PEGFILGRASTIM-CBQV 6 MG/.6ML
6 INJECTION, SOLUTION SUBCUTANEOUS ONCE
Refills: 0 | Status: COMPLETED | OUTPATIENT
Start: 2023-11-16 | End: 2023-11-16

## 2023-11-16 RX ORDER — DIPHENHYDRAMINE HCL 50 MG
50 CAPSULE ORAL ONCE
Refills: 0 | Status: COMPLETED | OUTPATIENT
Start: 2023-11-16 | End: 2023-11-16

## 2023-11-16 RX ADMIN — Medication 10 MILLIGRAM(S): at 10:10

## 2023-11-16 RX ADMIN — Medication 650 MILLIGRAM(S): at 11:45

## 2023-11-16 RX ADMIN — Medication 2 MILLIGRAM(S): at 11:00

## 2023-11-16 RX ADMIN — Medication 650 MILLIGRAM(S): at 11:25

## 2023-11-16 RX ADMIN — Medication 1275 MILLIGRAM(S): at 11:00

## 2023-11-16 RX ADMIN — Medication 1275 MILLIGRAM(S): at 11:45

## 2023-11-16 RX ADMIN — FOSAPREPITANT DIMEGLUMINE 500 MILLIGRAM(S): 150 INJECTION, POWDER, LYOPHILIZED, FOR SOLUTION INTRAVENOUS at 10:10

## 2023-11-16 RX ADMIN — Medication 50 MILLIGRAM(S): at 12:00

## 2023-11-16 RX ADMIN — RITUXIMAB 795 MILLIGRAM(S): 10 INJECTION, SOLUTION INTRAVENOUS at 11:45

## 2023-11-16 RX ADMIN — FOSAPREPITANT DIMEGLUMINE 150 MILLIGRAM(S): 150 INJECTION, POWDER, LYOPHILIZED, FOR SOLUTION INTRAVENOUS at 10:40

## 2023-11-16 RX ADMIN — Medication 118 MILLIGRAM(S): at 09:50

## 2023-11-16 RX ADMIN — Medication 102 MILLIGRAM(S): at 11:45

## 2023-11-16 RX ADMIN — Medication 2 MILLIGRAM(S): at 10:50

## 2023-11-16 RX ADMIN — RITUXIMAB 795 MILLIGRAM(S): 10 INJECTION, SOLUTION INTRAVENOUS at 15:15

## 2023-11-16 RX ADMIN — PEGFILGRASTIM-CBQV 6 MILLIGRAM(S): 6 INJECTION, SOLUTION SUBCUTANEOUS at 15:15

## 2023-11-20 DIAGNOSIS — C85.90 NON-HODGKIN LYMPHOMA, UNSPECIFIED, UNSPECIFIED SITE: ICD-10-CM

## 2023-11-20 DIAGNOSIS — Z45.2 ENCOUNTER FOR ADJUSTMENT AND MANAGEMENT OF VASCULAR ACCESS DEVICE: ICD-10-CM

## 2023-11-27 ENCOUNTER — APPOINTMENT (OUTPATIENT)
Dept: INFUSION THERAPY | Facility: CLINIC | Age: 68
End: 2023-11-27

## 2023-11-27 ENCOUNTER — OUTPATIENT (OUTPATIENT)
Dept: OUTPATIENT SERVICES | Facility: HOSPITAL | Age: 68
LOS: 1 days | End: 2023-11-27
Payer: MEDICARE

## 2023-11-27 VITALS — SYSTOLIC BLOOD PRESSURE: 113 MMHG | TEMPERATURE: 98 F | HEART RATE: 62 BPM | DIASTOLIC BLOOD PRESSURE: 67 MMHG

## 2023-11-27 DIAGNOSIS — Z98.890 OTHER SPECIFIED POSTPROCEDURAL STATES: Chronic | ICD-10-CM

## 2023-11-27 DIAGNOSIS — C85.80 OTHER SPECIFIED TYPES OF NON-HODGKIN LYMPHOMA, UNSPECIFIED SITE: ICD-10-CM

## 2023-11-27 DIAGNOSIS — D3A.8 OTHER BENIGN NEUROENDOCRINE TUMORS: ICD-10-CM

## 2023-11-27 DIAGNOSIS — S42.409A UNSPECIFIED FRACTURE OF LOWER END OF UNSPECIFIED HUMERUS, INITIAL ENCOUNTER FOR CLOSED FRACTURE: Chronic | ICD-10-CM

## 2023-11-27 DIAGNOSIS — Z96.0 PRESENCE OF UROGENITAL IMPLANTS: Chronic | ICD-10-CM

## 2023-11-27 PROCEDURE — 96372 THER/PROPH/DIAG INJ SC/IM: CPT

## 2023-11-27 RX ORDER — OCTREOTIDE ACETATE 200 UG/ML
20 INJECTION, SOLUTION INTRAVENOUS; SUBCUTANEOUS ONCE
Refills: 0 | Status: COMPLETED | OUTPATIENT
Start: 2023-11-27 | End: 2023-11-27

## 2023-11-27 RX ADMIN — OCTREOTIDE ACETATE 20 MILLIGRAM(S): 200 INJECTION, SOLUTION INTRAVENOUS; SUBCUTANEOUS at 16:35

## 2023-12-05 ENCOUNTER — APPOINTMENT (OUTPATIENT)
Dept: HEMATOLOGY ONCOLOGY | Facility: CLINIC | Age: 68
End: 2023-12-05
Payer: MEDICARE

## 2023-12-05 ENCOUNTER — OUTPATIENT (OUTPATIENT)
Dept: OUTPATIENT SERVICES | Facility: HOSPITAL | Age: 68
LOS: 1 days | End: 2023-12-05
Payer: MEDICARE

## 2023-12-05 ENCOUNTER — LABORATORY RESULT (OUTPATIENT)
Age: 68
End: 2023-12-05

## 2023-12-05 VITALS
TEMPERATURE: 98.7 F | SYSTOLIC BLOOD PRESSURE: 108 MMHG | DIASTOLIC BLOOD PRESSURE: 62 MMHG | BODY MASS INDEX: 27.36 KG/M2 | HEIGHT: 72 IN | RESPIRATION RATE: 16 BRPM | HEART RATE: 61 BPM | WEIGHT: 202 LBS

## 2023-12-05 DIAGNOSIS — Z98.890 OTHER SPECIFIED POSTPROCEDURAL STATES: Chronic | ICD-10-CM

## 2023-12-05 DIAGNOSIS — S42.409A UNSPECIFIED FRACTURE OF LOWER END OF UNSPECIFIED HUMERUS, INITIAL ENCOUNTER FOR CLOSED FRACTURE: Chronic | ICD-10-CM

## 2023-12-05 DIAGNOSIS — Z96.0 PRESENCE OF UROGENITAL IMPLANTS: Chronic | ICD-10-CM

## 2023-12-05 DIAGNOSIS — C85.80 OTHER SPECIFIED TYPES OF NON-HODGKIN LYMPHOMA, UNSPECIFIED SITE: ICD-10-CM

## 2023-12-05 LAB
HCT VFR BLD CALC: 32.2 %
HGB BLD-MCNC: 10.7 G/DL
MCHC RBC-ENTMCNC: 30.7 PG
MCHC RBC-ENTMCNC: 33.2 G/DL
MCV RBC AUTO: 92.5 FL
PLATELET # BLD AUTO: 136 K/UL
PMV BLD: 8.9 FL
RBC # BLD: 3.48 M/UL
RBC # FLD: 18 %
WBC # FLD AUTO: 6.22 K/UL

## 2023-12-05 PROCEDURE — 99214 OFFICE O/P EST MOD 30 MIN: CPT

## 2023-12-05 PROCEDURE — 83615 LACTATE (LD) (LDH) ENZYME: CPT

## 2023-12-05 PROCEDURE — 85027 COMPLETE CBC AUTOMATED: CPT

## 2023-12-05 PROCEDURE — 86316 IMMUNOASSAY TUMOR OTHER: CPT

## 2023-12-05 PROCEDURE — 80053 COMPREHEN METABOLIC PANEL: CPT

## 2023-12-06 DIAGNOSIS — C85.80 OTHER SPECIFIED TYPES OF NON-HODGKIN LYMPHOMA, UNSPECIFIED SITE: ICD-10-CM

## 2023-12-06 LAB
ALBUMIN SERPL ELPH-MCNC: 4.3 G/DL
ALP BLD-CCNC: 119 U/L
ALT SERPL-CCNC: 6 U/L
ANION GAP SERPL CALC-SCNC: 10 MMOL/L
AST SERPL-CCNC: 7 U/L
BILIRUB SERPL-MCNC: 0.4 MG/DL
BUN SERPL-MCNC: 32 MG/DL
CALCIUM SERPL-MCNC: 7.9 MG/DL
CHLORIDE SERPL-SCNC: 117 MMOL/L
CO2 SERPL-SCNC: 18 MMOL/L
CREAT SERPL-MCNC: 1.8 MG/DL
EGFR: 41 ML/MIN/1.73M2
GLUCOSE SERPL-MCNC: 112 MG/DL
LDH SERPL-CCNC: 135 U/L
POTASSIUM SERPL-SCNC: 5.2 MMOL/L
PROT SERPL-MCNC: 5.4 G/DL
SODIUM SERPL-SCNC: 145 MMOL/L

## 2023-12-07 ENCOUNTER — APPOINTMENT (OUTPATIENT)
Dept: INFUSION THERAPY | Facility: CLINIC | Age: 68
End: 2023-12-07

## 2023-12-07 ENCOUNTER — OUTPATIENT (OUTPATIENT)
Dept: OUTPATIENT SERVICES | Facility: HOSPITAL | Age: 68
LOS: 1 days | End: 2023-12-07
Payer: MEDICARE

## 2023-12-07 VITALS
DIASTOLIC BLOOD PRESSURE: 59 MMHG | RESPIRATION RATE: 14 BRPM | HEART RATE: 58 BPM | TEMPERATURE: 97 F | SYSTOLIC BLOOD PRESSURE: 110 MMHG

## 2023-12-07 DIAGNOSIS — S42.409A UNSPECIFIED FRACTURE OF LOWER END OF UNSPECIFIED HUMERUS, INITIAL ENCOUNTER FOR CLOSED FRACTURE: Chronic | ICD-10-CM

## 2023-12-07 DIAGNOSIS — Z98.890 OTHER SPECIFIED POSTPROCEDURAL STATES: Chronic | ICD-10-CM

## 2023-12-07 DIAGNOSIS — C85.80 OTHER SPECIFIED TYPES OF NON-HODGKIN LYMPHOMA, UNSPECIFIED SITE: ICD-10-CM

## 2023-12-07 DIAGNOSIS — Z96.0 PRESENCE OF UROGENITAL IMPLANTS: Chronic | ICD-10-CM

## 2023-12-07 PROBLEM — C7A.8 OTHER MALIGNANT NEUROENDOCRINE TUMORS: Chronic | Status: ACTIVE | Noted: 2023-11-15

## 2023-12-07 PROCEDURE — 96415 CHEMO IV INFUSION ADDL HR: CPT

## 2023-12-07 PROCEDURE — 96411 CHEMO IV PUSH ADDL DRUG: CPT

## 2023-12-07 PROCEDURE — 96413 CHEMO IV INFUSION 1 HR: CPT

## 2023-12-07 PROCEDURE — 96417 CHEMO IV INFUS EACH ADDL SEQ: CPT

## 2023-12-07 PROCEDURE — 96377 APPLICATON ON-BODY INJECTOR: CPT

## 2023-12-07 PROCEDURE — 96375 TX/PRO/DX INJ NEW DRUG ADDON: CPT

## 2023-12-07 PROCEDURE — 96367 TX/PROPH/DG ADDL SEQ IV INF: CPT

## 2023-12-07 RX ORDER — PEGFILGRASTIM-CBQV 6 MG/.6ML
6 INJECTION, SOLUTION SUBCUTANEOUS ONCE
Refills: 0 | Status: COMPLETED | OUTPATIENT
Start: 2023-12-07 | End: 2023-12-07

## 2023-12-07 RX ORDER — CYCLOPHOSPHAMIDE 100 MG
1275 VIAL (EA) INTRAVENOUS ONCE
Refills: 0 | Status: COMPLETED | OUTPATIENT
Start: 2023-12-07 | End: 2023-12-07

## 2023-12-07 RX ORDER — DEXAMETHASONE 0.5 MG/5ML
10 ELIXIR ORAL ONCE
Refills: 0 | Status: COMPLETED | OUTPATIENT
Start: 2023-12-07 | End: 2023-12-07

## 2023-12-07 RX ORDER — RITUXIMAB 10 MG/ML
795 INJECTION, SOLUTION INTRAVENOUS ONCE
Refills: 0 | Status: COMPLETED | OUTPATIENT
Start: 2023-12-07 | End: 2023-12-07

## 2023-12-07 RX ORDER — DIPHENHYDRAMINE HCL 50 MG
50 CAPSULE ORAL ONCE
Refills: 0 | Status: COMPLETED | OUTPATIENT
Start: 2023-12-07 | End: 2023-12-07

## 2023-12-07 RX ORDER — VINCRISTINE SULFATE 1 MG/ML
2 VIAL (ML) INTRAVENOUS ONCE
Refills: 0 | Status: COMPLETED | OUTPATIENT
Start: 2023-12-07 | End: 2023-12-07

## 2023-12-07 RX ORDER — ACETAMINOPHEN 500 MG
650 TABLET ORAL ONCE
Refills: 0 | Status: COMPLETED | OUTPATIENT
Start: 2023-12-07 | End: 2023-12-07

## 2023-12-07 RX ORDER — FOSAPREPITANT DIMEGLUMINE 150 MG/5ML
150 INJECTION, POWDER, LYOPHILIZED, FOR SOLUTION INTRAVENOUS ONCE
Refills: 0 | Status: COMPLETED | OUTPATIENT
Start: 2023-12-07 | End: 2023-12-07

## 2023-12-07 RX ADMIN — Medication 118 MILLIGRAM(S): at 09:45

## 2023-12-07 RX ADMIN — FOSAPREPITANT DIMEGLUMINE 500 MILLIGRAM(S): 150 INJECTION, POWDER, LYOPHILIZED, FOR SOLUTION INTRAVENOUS at 09:16

## 2023-12-07 RX ADMIN — Medication 1275 MILLIGRAM(S): at 11:35

## 2023-12-07 RX ADMIN — Medication 10 MILLIGRAM(S): at 10:00

## 2023-12-07 RX ADMIN — RITUXIMAB 795 MILLIGRAM(S): 10 INJECTION, SOLUTION INTRAVENOUS at 12:00

## 2023-12-07 RX ADMIN — Medication 650 MILLIGRAM(S): at 09:16

## 2023-12-07 RX ADMIN — Medication 1275 MILLIGRAM(S): at 10:50

## 2023-12-07 RX ADMIN — RITUXIMAB 795 MILLIGRAM(S): 10 INJECTION, SOLUTION INTRAVENOUS at 15:00

## 2023-12-07 RX ADMIN — PEGFILGRASTIM-CBQV 6 MILLIGRAM(S): 6 INJECTION, SOLUTION SUBCUTANEOUS at 14:20

## 2023-12-07 RX ADMIN — FOSAPREPITANT DIMEGLUMINE 150 MILLIGRAM(S): 150 INJECTION, POWDER, LYOPHILIZED, FOR SOLUTION INTRAVENOUS at 09:45

## 2023-12-07 RX ADMIN — Medication 2 MILLIGRAM(S): at 10:40

## 2023-12-07 RX ADMIN — Medication 102 MILLIGRAM(S): at 11:35

## 2023-12-07 RX ADMIN — Medication 2 MILLIGRAM(S): at 10:50

## 2023-12-07 RX ADMIN — Medication 50 MILLIGRAM(S): at 11:50

## 2023-12-12 LAB — CGA SERPL-MCNC: 497 NG/ML

## 2023-12-18 NOTE — ED ADULT NURSE NOTE - TEMPLATE
Symptoms: Right Eye Felt Like Something Is In It Yesterday & Last Night. There's Redness & Eye Is Watery. Initially Calling To Schedule An Appointment.     Onset: 12/17    Location / description: Pt reports feeling like something is in R eye, R eye is reddened and watering. Non painful.     Pt was in garage on Sat- grinding without eyewear ( metal objects) - pt also hit head above R eye on forehead in garage on Sat. No LOC.  soreness present- no bumps or cuts to area. NO anticoagulants used. No headaches. + blurry vision to R eye,     Used eye drops, and water to flush R eye last night which helped that FB sensation is now resolved.     Precipitating Factors: possible FB in R eye    Pain Scale (1-10), 10 highest: 0/10    What  improves / worsens symptoms: improved after flushing out eye    Symptom specific medications: none used    Recent visits (last 3-4 weeks) for same reason or recent surgery:  no    PLAN:  Provider's office  See PCP now - if no appointment - go to Urgent Care    Able to make appt for pt today in office    Patient/Caller agrees to follow recommendations.    Reason for Disposition   New or worsening blurred vision    Protocols used: Eye - Red Without Pus-A-OH     Abdominal Pain, N/V/D

## 2023-12-21 ENCOUNTER — APPOINTMENT (OUTPATIENT)
Dept: INFUSION THERAPY | Facility: CLINIC | Age: 68
End: 2023-12-21

## 2023-12-22 ENCOUNTER — APPOINTMENT (OUTPATIENT)
Dept: INTERVENTIONAL RADIOLOGY/VASCULAR | Facility: CLINIC | Age: 68
End: 2023-12-22

## 2023-12-22 PROCEDURE — XXXXX: CPT | Mod: 1L

## 2023-12-26 ENCOUNTER — APPOINTMENT (OUTPATIENT)
Dept: HEMATOLOGY ONCOLOGY | Facility: CLINIC | Age: 68
End: 2023-12-26

## 2023-12-28 ENCOUNTER — APPOINTMENT (OUTPATIENT)
Dept: HEMATOLOGY ONCOLOGY | Facility: CLINIC | Age: 68
End: 2023-12-28
Payer: MEDICARE

## 2023-12-28 ENCOUNTER — OUTPATIENT (OUTPATIENT)
Dept: OUTPATIENT SERVICES | Facility: HOSPITAL | Age: 68
LOS: 1 days | End: 2023-12-28
Payer: MEDICARE

## 2023-12-28 ENCOUNTER — LABORATORY RESULT (OUTPATIENT)
Age: 68
End: 2023-12-28

## 2023-12-28 ENCOUNTER — APPOINTMENT (OUTPATIENT)
Dept: INFUSION THERAPY | Facility: CLINIC | Age: 68
End: 2023-12-28
Payer: MEDICARE

## 2023-12-28 VITALS — SYSTOLIC BLOOD PRESSURE: 103 MMHG | TEMPERATURE: 98 F | DIASTOLIC BLOOD PRESSURE: 57 MMHG | HEART RATE: 56 BPM

## 2023-12-28 VITALS
TEMPERATURE: 97.2 F | SYSTOLIC BLOOD PRESSURE: 105 MMHG | HEART RATE: 70 BPM | DIASTOLIC BLOOD PRESSURE: 70 MMHG | BODY MASS INDEX: 26.28 KG/M2 | HEIGHT: 72 IN | WEIGHT: 194 LBS

## 2023-12-28 DIAGNOSIS — Z96.0 PRESENCE OF UROGENITAL IMPLANTS: Chronic | ICD-10-CM

## 2023-12-28 DIAGNOSIS — S42.409A UNSPECIFIED FRACTURE OF LOWER END OF UNSPECIFIED HUMERUS, INITIAL ENCOUNTER FOR CLOSED FRACTURE: Chronic | ICD-10-CM

## 2023-12-28 DIAGNOSIS — Z98.890 OTHER SPECIFIED POSTPROCEDURAL STATES: Chronic | ICD-10-CM

## 2023-12-28 DIAGNOSIS — C85.80 OTHER SPECIFIED TYPES OF NON-HODGKIN LYMPHOMA, UNSPECIFIED SITE: ICD-10-CM

## 2023-12-28 LAB
ALBUMIN SERPL ELPH-MCNC: 4.5 G/DL
ALP BLD-CCNC: 142 U/L
ALT SERPL-CCNC: 9 U/L
ANION GAP SERPL CALC-SCNC: 12 MMOL/L
AST SERPL-CCNC: 9 U/L
BILIRUB SERPL-MCNC: 0.4 MG/DL
BUN SERPL-MCNC: 38 MG/DL
CALCIUM SERPL-MCNC: 9 MG/DL
CHLORIDE SERPL-SCNC: 114 MMOL/L
CO2 SERPL-SCNC: 17 MMOL/L
CREAT SERPL-MCNC: 2 MG/DL
EGFR: 36 ML/MIN/1.73M2
GLUCOSE SERPL-MCNC: 109 MG/DL
HCT VFR BLD CALC: 30.8 %
HGB BLD-MCNC: 10.8 G/DL
LDH SERPL-CCNC: 116 U/L
MCHC RBC-ENTMCNC: 31.9 PG
MCHC RBC-ENTMCNC: 35.1 G/DL
MCV RBC AUTO: 90.9 FL
PLATELET # BLD AUTO: 183 K/UL
PMV BLD: 10.3 FL
POTASSIUM SERPL-SCNC: 5 MMOL/L
PROT SERPL-MCNC: 5.7 G/DL
RBC # BLD: 3.39 M/UL
RBC # FLD: 16.3 %
SODIUM SERPL-SCNC: 143 MMOL/L
WBC # FLD AUTO: 5.9 K/UL

## 2023-12-28 PROCEDURE — 99214 OFFICE O/P EST MOD 30 MIN: CPT

## 2023-12-28 PROCEDURE — 85027 COMPLETE CBC AUTOMATED: CPT

## 2023-12-28 PROCEDURE — 96417 CHEMO IV INFUS EACH ADDL SEQ: CPT

## 2023-12-28 PROCEDURE — 96377 APPLICATON ON-BODY INJECTOR: CPT

## 2023-12-28 PROCEDURE — 36415 COLL VENOUS BLD VENIPUNCTURE: CPT

## 2023-12-28 PROCEDURE — 83615 LACTATE (LD) (LDH) ENZYME: CPT

## 2023-12-28 PROCEDURE — 96413 CHEMO IV INFUSION 1 HR: CPT

## 2023-12-28 PROCEDURE — 96411 CHEMO IV PUSH ADDL DRUG: CPT

## 2023-12-28 PROCEDURE — 80053 COMPREHEN METABOLIC PANEL: CPT

## 2023-12-28 PROCEDURE — 96367 TX/PROPH/DG ADDL SEQ IV INF: CPT

## 2023-12-28 PROCEDURE — 96372 THER/PROPH/DIAG INJ SC/IM: CPT

## 2023-12-28 PROCEDURE — 96415 CHEMO IV INFUSION ADDL HR: CPT

## 2023-12-28 PROCEDURE — 86316 IMMUNOASSAY TUMOR OTHER: CPT

## 2023-12-28 RX ORDER — OCTREOTIDE ACETATE 200 UG/ML
20 INJECTION, SOLUTION INTRAVENOUS; SUBCUTANEOUS ONCE
Refills: 0 | Status: COMPLETED | OUTPATIENT
Start: 2023-12-28 | End: 2023-12-28

## 2023-12-28 RX ORDER — ACETAMINOPHEN 500 MG
650 TABLET ORAL ONCE
Refills: 0 | Status: COMPLETED | OUTPATIENT
Start: 2023-12-28 | End: 2023-12-28

## 2023-12-28 RX ORDER — VINCRISTINE SULFATE 1 MG/ML
2 VIAL (ML) INTRAVENOUS ONCE
Refills: 0 | Status: COMPLETED | OUTPATIENT
Start: 2023-12-28 | End: 2023-12-28

## 2023-12-28 RX ORDER — DIPHENHYDRAMINE HCL 50 MG
50 CAPSULE ORAL ONCE
Refills: 0 | Status: COMPLETED | OUTPATIENT
Start: 2023-12-28 | End: 2023-12-28

## 2023-12-28 RX ORDER — CYCLOPHOSPHAMIDE 100 MG
1275 VIAL (EA) INTRAVENOUS ONCE
Refills: 0 | Status: COMPLETED | OUTPATIENT
Start: 2023-12-28 | End: 2023-12-28

## 2023-12-28 RX ORDER — DEXAMETHASONE 0.5 MG/5ML
10 ELIXIR ORAL ONCE
Refills: 0 | Status: COMPLETED | OUTPATIENT
Start: 2023-12-28 | End: 2023-12-28

## 2023-12-28 RX ORDER — FOSAPREPITANT DIMEGLUMINE 150 MG/5ML
150 INJECTION, POWDER, LYOPHILIZED, FOR SOLUTION INTRAVENOUS ONCE
Refills: 0 | Status: COMPLETED | OUTPATIENT
Start: 2023-12-28 | End: 2023-12-28

## 2023-12-28 RX ORDER — RITUXIMAB 10 MG/ML
795 INJECTION, SOLUTION INTRAVENOUS ONCE
Refills: 0 | Status: COMPLETED | OUTPATIENT
Start: 2023-12-28 | End: 2023-12-28

## 2023-12-28 RX ORDER — PEGFILGRASTIM-CBQV 6 MG/.6ML
6 INJECTION, SOLUTION SUBCUTANEOUS ONCE
Refills: 0 | Status: COMPLETED | OUTPATIENT
Start: 2023-12-28 | End: 2023-12-28

## 2023-12-28 RX ADMIN — OCTREOTIDE ACETATE 20 MILLIGRAM(S): 200 INJECTION, SOLUTION INTRAVENOUS; SUBCUTANEOUS at 10:07

## 2023-12-28 RX ADMIN — Medication 1275 MILLIGRAM(S): at 11:25

## 2023-12-28 RX ADMIN — Medication 10 MILLIGRAM(S): at 10:50

## 2023-12-28 RX ADMIN — Medication 118 MILLIGRAM(S): at 10:30

## 2023-12-28 RX ADMIN — RITUXIMAB 795 MILLIGRAM(S): 10 INJECTION, SOLUTION INTRAVENOUS at 16:10

## 2023-12-28 RX ADMIN — FOSAPREPITANT DIMEGLUMINE 500 MILLIGRAM(S): 150 INJECTION, POWDER, LYOPHILIZED, FOR SOLUTION INTRAVENOUS at 09:59

## 2023-12-28 RX ADMIN — Medication 2 MILLIGRAM(S): at 11:10

## 2023-12-28 RX ADMIN — Medication 650 MILLIGRAM(S): at 12:15

## 2023-12-28 RX ADMIN — Medication 2 MILLIGRAM(S): at 11:20

## 2023-12-28 RX ADMIN — Medication 50 MILLIGRAM(S): at 12:50

## 2023-12-28 RX ADMIN — Medication 1275 MILLIGRAM(S): at 12:25

## 2023-12-28 RX ADMIN — PEGFILGRASTIM-CBQV 6 MILLIGRAM(S): 6 INJECTION, SOLUTION SUBCUTANEOUS at 15:00

## 2023-12-28 RX ADMIN — Medication 650 MILLIGRAM(S): at 10:00

## 2023-12-28 RX ADMIN — Medication 102 MILLIGRAM(S): at 12:30

## 2023-12-28 RX ADMIN — RITUXIMAB 795 MILLIGRAM(S): 10 INJECTION, SOLUTION INTRAVENOUS at 12:55

## 2023-12-28 RX ADMIN — FOSAPREPITANT DIMEGLUMINE 150 MILLIGRAM(S): 150 INJECTION, POWDER, LYOPHILIZED, FOR SOLUTION INTRAVENOUS at 10:30

## 2023-12-28 NOTE — REVIEW OF SYSTEMS
[Cough] : cough [Depression] : depression [Negative] : Neurological [de-identified] : Controlled [FreeTextEntry6] : Slight production of phlegm (did not check color),  [de-identified] : Just on forearms, just a couple

## 2023-12-28 NOTE — HISTORY OF PRESENT ILLNESS
[Disease:__________________________] : Disease: [unfilled] [de-identified] : The patient is coming for his regularly scheduled follow up and continuation of his chemotherapy with R-CVP for his marginal zone lymphoma. So far, he had 5 cycles so far, well-tolerated.  At present, he is feeling "a lot better". No fever or night sweats. Has not felt any new lumps or bumps. The appetite is good.  He started having cold-like symptoms about 10 days ago, getting much better. [de-identified] : Recurrent [de-identified] : Marginal zone

## 2023-12-28 NOTE — ASSESSMENT
[FreeTextEntry1] : 1. Marginal zone lymphoma, recurrent, presently, clinically, in remission but did not have the CT scans for reevaluation as recommended. His disease also included pleural nodularity concerning for lymphomatous involvement. He had extensive intraabdominal and intrathoracic adenopathy. 2. Metastatic carcinoid, on Sandostatin (last injection about 3 weeks ago).  The patient will receive his 6th treatment with R-CVP. He was told about the importance of the CT scans for reevaluation of his disease. Will obtain CBC, CMP, LDH, chromogranin.  Further recommendations after those results are available. Will put the chemo on hold after today's treatment while pending the results of his work up.  All questions answered.  F/U in 3 weeks.

## 2023-12-28 NOTE — PHYSICAL EXAM
[Fully active, able to carry on all pre-disease performance without restriction] : Status 0 - Fully active, able to carry on all pre-disease performance without restriction [Normal] : grossly intact [de-identified] : Mild arthritic changes [de-identified] : Few senile purpuric lesions on forearms

## 2024-01-01 ENCOUNTER — OUTPATIENT (OUTPATIENT)
Dept: OUTPATIENT SERVICES | Facility: HOSPITAL | Age: 69
LOS: 1 days | End: 2024-01-01
Payer: MEDICAID

## 2024-01-01 ENCOUNTER — OUTPATIENT (OUTPATIENT)
Dept: OUTPATIENT SERVICES | Facility: HOSPITAL | Age: 69
LOS: 1 days | End: 2024-01-01
Payer: MEDICARE

## 2024-01-01 ENCOUNTER — OUTPATIENT (OUTPATIENT)
Dept: OUTPATIENT SERVICES | Facility: HOSPITAL | Age: 69
LOS: 1 days | Discharge: HOME | End: 2024-01-01
Payer: MEDICARE

## 2024-01-01 ENCOUNTER — EMERGENCY (EMERGENCY)
Facility: HOSPITAL | Age: 69
LOS: 0 days | Discharge: LEFT BEFORE DISCHARGE | End: 2024-12-29
Payer: MEDICARE

## 2024-01-01 VITALS
RESPIRATION RATE: 20 BRPM | WEIGHT: 190.04 LBS | HEART RATE: 67 BPM | DIASTOLIC BLOOD PRESSURE: 53 MMHG | OXYGEN SATURATION: 99 % | TEMPERATURE: 99 F | HEIGHT: 72 IN | SYSTOLIC BLOOD PRESSURE: 101 MMHG

## 2024-01-01 VITALS
SYSTOLIC BLOOD PRESSURE: 107 MMHG | HEART RATE: 81 BPM | DIASTOLIC BLOOD PRESSURE: 58 MMHG | OXYGEN SATURATION: 100 % | RESPIRATION RATE: 18 BRPM

## 2024-01-01 VITALS
TEMPERATURE: 97 F | DIASTOLIC BLOOD PRESSURE: 56 MMHG | RESPIRATION RATE: 18 BRPM | SYSTOLIC BLOOD PRESSURE: 96 MMHG | HEART RATE: 83 BPM | OXYGEN SATURATION: 98 % | WEIGHT: 190.04 LBS

## 2024-01-01 DIAGNOSIS — D64.9 ANEMIA, UNSPECIFIED: ICD-10-CM

## 2024-01-01 DIAGNOSIS — Z98.890 OTHER SPECIFIED POSTPROCEDURAL STATES: Chronic | ICD-10-CM

## 2024-01-01 DIAGNOSIS — Z86.012 PERSONAL HISTORY OF BENIGN CARCINOID TUMOR: ICD-10-CM

## 2024-01-01 DIAGNOSIS — Z96.0 PRESENCE OF UROGENITAL IMPLANTS: Chronic | ICD-10-CM

## 2024-01-01 DIAGNOSIS — R06.02 SHORTNESS OF BREATH: ICD-10-CM

## 2024-01-01 DIAGNOSIS — S42.409A UNSPECIFIED FRACTURE OF LOWER END OF UNSPECIFIED HUMERUS, INITIAL ENCOUNTER FOR CLOSED FRACTURE: Chronic | ICD-10-CM

## 2024-01-01 DIAGNOSIS — Z79.52 LONG TERM (CURRENT) USE OF SYSTEMIC STEROIDS: ICD-10-CM

## 2024-01-01 DIAGNOSIS — R59.0 LOCALIZED ENLARGED LYMPH NODES: ICD-10-CM

## 2024-01-01 DIAGNOSIS — C85.11 UNSPECIFIED B-CELL LYMPHOMA, LYMPH NODES OF HEAD, FACE, AND NECK: ICD-10-CM

## 2024-01-01 DIAGNOSIS — Z53.21 PROCEDURE AND TREATMENT NOT CARRIED OUT DUE TO PATIENT LEAVING PRIOR TO BEING SEEN BY HEALTH CARE PROVIDER: ICD-10-CM

## 2024-01-01 LAB
-  CLINDAMYCIN: SIGNIFICANT CHANGE UP
-  ERYTHROMYCIN: SIGNIFICANT CHANGE UP
-  GENTAMICIN: SIGNIFICANT CHANGE UP
-  LINEZOLID: SIGNIFICANT CHANGE UP
-  OXACILLIN: SIGNIFICANT CHANGE UP
-  PENICILLIN: SIGNIFICANT CHANGE UP
-  RIFAMPIN: SIGNIFICANT CHANGE UP
-  TETRACYCLINE: SIGNIFICANT CHANGE UP
-  TRIMETHOPRIM/SULFAMETHOXAZOLE: SIGNIFICANT CHANGE UP
-  VANCOMYCIN: SIGNIFICANT CHANGE UP
CULTURE RESULTS: ABNORMAL
GRAM STN FLD: ABNORMAL
METHOD TYPE: SIGNIFICANT CHANGE UP
NIGHT BLUE STAIN TISS: SIGNIFICANT CHANGE UP
NON-GYNECOLOGICAL CYTOLOGY STUDY: SIGNIFICANT CHANGE UP
ORGANISM # SPEC MICROSCOPIC CNT: ABNORMAL
ORGANISM # SPEC MICROSCOPIC CNT: SIGNIFICANT CHANGE UP
SPECIMEN SOURCE: SIGNIFICANT CHANGE UP
SURGICAL PATHOLOGY STUDY: SIGNIFICANT CHANGE UP
TM INTERPRETATION: SIGNIFICANT CHANGE UP

## 2024-01-01 PROCEDURE — 86316 IMMUNOASSAY TUMOR OTHER: CPT

## 2024-01-01 PROCEDURE — 87205 SMEAR GRAM STAIN: CPT

## 2024-01-01 PROCEDURE — 80053 COMPREHEN METABOLIC PANEL: CPT

## 2024-01-01 PROCEDURE — 36415 COLL VENOUS BLD VENIPUNCTURE: CPT

## 2024-01-01 PROCEDURE — 87015 SPECIMEN INFECT AGNT CONCNTJ: CPT

## 2024-01-01 PROCEDURE — 87116 MYCOBACTERIA CULTURE: CPT

## 2024-01-01 PROCEDURE — 88184 FLOWCYTOMETRY/ TC 1 MARKER: CPT

## 2024-01-01 PROCEDURE — L9991: CPT

## 2024-01-01 PROCEDURE — 88307 TISSUE EXAM BY PATHOLOGIST: CPT | Mod: 26

## 2024-01-01 PROCEDURE — 99214 OFFICE O/P EST MOD 30 MIN: CPT

## 2024-01-01 PROCEDURE — 88342 IMHCHEM/IMCYTCHM 1ST ANTB: CPT | Mod: 26,XP

## 2024-01-01 PROCEDURE — 88360 TUMOR IMMUNOHISTOCHEM/MANUAL: CPT

## 2024-01-01 PROCEDURE — 88305 TISSUE EXAM BY PATHOLOGIST: CPT

## 2024-01-01 PROCEDURE — 36416 COLLJ CAPILLARY BLOOD SPEC: CPT

## 2024-01-01 PROCEDURE — 87102 FUNGUS ISOLATION CULTURE: CPT

## 2024-01-01 PROCEDURE — 31652 BRONCH EBUS SAMPLNG 1/2 NODE: CPT

## 2024-01-01 PROCEDURE — 88173 CYTOPATH EVAL FNA REPORT: CPT | Mod: 26

## 2024-01-01 PROCEDURE — 82784 ASSAY IGA/IGD/IGG/IGM EACH: CPT

## 2024-01-01 PROCEDURE — 85027 COMPLETE CBC AUTOMATED: CPT

## 2024-01-01 PROCEDURE — 88341 IMHCHEM/IMCYTCHM EA ADD ANTB: CPT | Mod: 26,59

## 2024-01-01 PROCEDURE — 83615 LACTATE (LD) (LDH) ENZYME: CPT

## 2024-01-01 PROCEDURE — 87186 SC STD MICRODIL/AGAR DIL: CPT

## 2024-01-01 PROCEDURE — 93005 ELECTROCARDIOGRAM TRACING: CPT

## 2024-01-01 PROCEDURE — 88185 FLOWCYTOMETRY/TC ADD-ON: CPT

## 2024-01-01 PROCEDURE — 87077 CULTURE AEROBIC IDENTIFY: CPT

## 2024-01-01 PROCEDURE — 93010 ELECTROCARDIOGRAM REPORT: CPT

## 2024-01-01 PROCEDURE — 88341 IMHCHEM/IMCYTCHM EA ADD ANTB: CPT

## 2024-01-01 PROCEDURE — 87070 CULTURE OTHR SPECIMN AEROBIC: CPT

## 2024-01-01 PROCEDURE — 88173 CYTOPATH EVAL FNA REPORT: CPT

## 2024-01-01 PROCEDURE — 88189 FLOWCYTOMETRY/READ 16 & >: CPT

## 2024-01-01 PROCEDURE — 88342 IMHCHEM/IMCYTCHM 1ST ANTB: CPT | Mod: 26,59

## 2024-01-01 PROCEDURE — 88305 TISSUE EXAM BY PATHOLOGIST: CPT | Mod: 26

## 2024-01-01 PROCEDURE — 87206 SMEAR FLUORESCENT/ACID STAI: CPT

## 2024-01-01 PROCEDURE — 88342 IMHCHEM/IMCYTCHM 1ST ANTB: CPT

## 2024-01-01 PROCEDURE — 88360 TUMOR IMMUNOHISTOCHEM/MANUAL: CPT | Mod: 26,59

## 2024-01-01 PROCEDURE — 88307 TISSUE EXAM BY PATHOLOGIST: CPT

## 2024-01-01 NOTE — CONSULT NOTE ADULT - SUBJECTIVE AND OBJECTIVE BOX
12.3 ENDOCRINE INITIAL CONSULT -     HPI:  67 year old male PMHx Schizophrenia, non-Hodgkin's lymphoma (follows with Dr. Gong), NET of liver, colon adenocarcinoma, CKD III, recurrent hypercalcemia sent in to the ED for hypercalcemia. Pt had an appointment with Dr. Gong yesterday morning and was sent to the ED for calcium of 13.6. Pt reports chronic diarrhea and malaise. Also reports dysphagia and hoarseness for the past month. Denies flank or abdominal pain, dysuria, melena, fever. Pt had a recent liver biopsy and was found to have neuroendocrine tumor of the liver. Recent colonoscopy showing adenoma of colon.    T(F): 98.3 HR: 85 BP: 98/55 RR: 18 SpO2: 96%  Hgb 8.4, Cr 4.1, BUN 54, Ca 13.3,   Pt received 2L NS bolus, Calcitonin   Admit to Medicine  (25 Aug 2023 01:45)      ENDOCRINE HISTORY     PAST MEDICAL & SURGICAL HISTORY:  Kidney stones      Schizophrenia      Lymphoma      Shingles      Atrophic kidney      H/O colonoscopy with polypectomy      Liver cyst      History of bladder surgery      H/O neuropathy      History of chemotherapy      Stage 3 chronic kidney disease      Retained urethral stent      H/O umbilical hernia repair      Elbow fracture      H/O cystoscopy          FAMILY HISTORY:  FH: hypertension    FH: diabetes mellitus        Social History:      Home Medications:  benztropine 2 mg oral tablet: 1 tab(s) orally once a day (03 Aug 2023 10:00)  fluPHENAZine 10 mg oral tablet: 1 tab(s) orally once a day (03 Aug 2023 10:00)      MEDICATIONS  (STANDING):  benztropine 2 milliGRAM(s) Oral daily  fluPHENAZine 10 milliGRAM(s) Oral daily  heparin   Injectable 5000 Unit(s) SubCutaneous every 8 hours  sodium chloride 0.9%. 1000 milliLiter(s) (100 mL/Hr) IV Continuous <Continuous>    MEDICATIONS  (PRN):  ondansetron Injectable 4 milliGRAM(s) IV Push once PRN Nausea and/or Vomiting      Allergies    allopurinol (Rash (Moderate))    Intolerances        REVIEW OF SYSTEMS  Constitutional: No fever  Eyes: No blurry vision  Neuro: No tremors  HEENT: No pain  Cardiovascular: No chest pain, palpitations  Respiratory: No SOB, no cough  GI: No nausea, vomiting, abdominal pain  : No dysuria  Skin: no rash  Psych: no depression  Endocrine: no polyuria, polydipsia  Hem/lymph: no swelling  Osteoporosis: no fractures  ALL OTHER SYSTEMS REVIEWED AND NEGATIVE     UNABLE TO OBTAIN     PHYSICAL EXAM   Vital Signs Last 24 Hrs  T(C): 36.3 (29 Aug 2023 04:50), Max: 37 (28 Aug 2023 20:23)  T(F): 97.3 (29 Aug 2023 04:50), Max: 98.6 (28 Aug 2023 20:23)  HR: 84 (29 Aug 2023 04:50) (84 - 87)  BP: 108/60 (29 Aug 2023 04:50) (98/53 - 108/60)  BP(mean): --  RR: 16 (29 Aug 2023 04:50) (16 - 18)  SpO2: 97% (29 Aug 2023 04:50) (97% - 97%)      GENERAL: NAD, well-groomed, well-developed  EYES: No proptosis, no lid lag, anicteric  HEENT:  Atraumatic, Normocephalic, moist mucous membranes  THYROID: Normal size, no palpable nodules  RESPIRATORY: Clear to auscultation bilaterally; No rales, rhonchi, wheezing  CARDIOVASCULAR: Regular rate and rhythm; No murmurs; no peripheral edema  GI: Soft, nontender, non distended, normal bowel sounds  SKIN: Dry, intact, No rashes or lesions  MUSCULOSKELETAL: Full range of motion, normal strength  NEURO: sensation intact, extraocular movements intact, no tremor  PSYCH: Alert and oriented x 3, normal affect, normal mood  CUSHING'S SIGNS: no striae    CAPILLARY BLOOD GLUCOSE          A1C with Estimated Average Glucose Result: 5.0 % (06-22-23 @ 06:00)                            7.8    4.68  )-----------( 140      ( 29 Aug 2023 08:30 )             24.8       08-29    140  |  113<H>  |  33<H>  ----------------------------<  100<H>  4.3   |  19  |  2.8<H>    Ca    12.1<H>      29 Aug 2023 08:30  Mg     2.1     08-29    TPro  4.1<L>  /  Alb  3.2<L>  /  TBili  0.4  /  DBili  x   /  AST  14  /  ALT  15  /  AlkPhos  318<H>  08-29      LIPIDS    RADIOLOGY

## 2024-01-03 LAB — CGA SERPL-MCNC: 481.5 NG/ML

## 2024-01-15 ENCOUNTER — APPOINTMENT (OUTPATIENT)
Dept: HEMATOLOGY ONCOLOGY | Facility: CLINIC | Age: 69
End: 2024-01-15

## 2024-01-15 ENCOUNTER — OUTPATIENT (OUTPATIENT)
Dept: OUTPATIENT SERVICES | Facility: HOSPITAL | Age: 69
LOS: 1 days | End: 2024-01-15
Payer: MEDICARE

## 2024-01-15 DIAGNOSIS — C85.80 OTHER SPECIFIED TYPES OF NON-HODGKIN LYMPHOMA, UNSPECIFIED SITE: ICD-10-CM

## 2024-01-15 DIAGNOSIS — Z98.890 OTHER SPECIFIED POSTPROCEDURAL STATES: Chronic | ICD-10-CM

## 2024-01-15 DIAGNOSIS — Z96.0 PRESENCE OF UROGENITAL IMPLANTS: Chronic | ICD-10-CM

## 2024-01-15 DIAGNOSIS — S42.409A UNSPECIFIED FRACTURE OF LOWER END OF UNSPECIFIED HUMERUS, INITIAL ENCOUNTER FOR CLOSED FRACTURE: Chronic | ICD-10-CM

## 2024-01-15 LAB
ANION GAP SERPL CALC-SCNC: 11 MMOL/L
BUN SERPL-MCNC: 29 MG/DL
CALCIUM SERPL-MCNC: 9.1 MG/DL
CHLORIDE SERPL-SCNC: 113 MMOL/L
CO2 SERPL-SCNC: 19 MMOL/L
CREAT SERPL-MCNC: 2 MG/DL
EGFR: 36 ML/MIN/1.73M2
GLUCOSE SERPL-MCNC: 113 MG/DL
POTASSIUM SERPL-SCNC: 4.7 MMOL/L
SODIUM SERPL-SCNC: 143 MMOL/L

## 2024-01-15 PROCEDURE — 80048 BASIC METABOLIC PNL TOTAL CA: CPT

## 2024-01-15 PROCEDURE — 36415 COLL VENOUS BLD VENIPUNCTURE: CPT

## 2024-01-16 ENCOUNTER — APPOINTMENT (OUTPATIENT)
Dept: HEMATOLOGY ONCOLOGY | Facility: CLINIC | Age: 69
End: 2024-01-16

## 2024-01-16 DIAGNOSIS — C85.80 OTHER SPECIFIED TYPES OF NON-HODGKIN LYMPHOMA, UNSPECIFIED SITE: ICD-10-CM

## 2024-01-18 ENCOUNTER — APPOINTMENT (OUTPATIENT)
Dept: INFUSION THERAPY | Facility: CLINIC | Age: 69
End: 2024-01-18

## 2024-02-02 NOTE — DISCHARGE NOTE NURSING/CASE MANAGEMENT/SOCIAL WORK - NSTRANSFEREYEGLASSESPAIRS_GEN_A_NUR
Take your medications as prescribed.  Follow-up with your family doctor in the next few days.  Return if you develop any new or worsening symptoms.  Take antibiotic and steroid as prescribed.   1 pair

## 2024-02-09 NOTE — PATIENT PROFILE ADULT - NSPROSPHOSPCHAPLAINYN_GEN_A_NUR
IMPRESSION:  Benign 0.7 cm lymph node to account for the palpable abnormality.  Recommend follow-up is clinical. These results and recommendations were relayed  to the patient.    BI-RADS Assessment Category 2: Benign finding. (#BRad2)    Annual mammograms are recommended for all women over the age of 40.   A reminder  letter will be sent to the patient.  
no

## 2024-02-12 ENCOUNTER — EMERGENCY (EMERGENCY)
Facility: HOSPITAL | Age: 69
LOS: 0 days | Discharge: HOME | End: 2024-02-12
Attending: EMERGENCY MEDICINE
Payer: MEDICARE

## 2024-02-12 VITALS
OXYGEN SATURATION: 100 % | HEIGHT: 72 IN | RESPIRATION RATE: 18 BRPM | SYSTOLIC BLOOD PRESSURE: 112 MMHG | TEMPERATURE: 98 F | WEIGHT: 199.96 LBS | HEART RATE: 68 BPM | DIASTOLIC BLOOD PRESSURE: 58 MMHG

## 2024-02-12 VITALS
DIASTOLIC BLOOD PRESSURE: 56 MMHG | RESPIRATION RATE: 18 BRPM | HEART RATE: 72 BPM | OXYGEN SATURATION: 98 % | TEMPERATURE: 98 F | SYSTOLIC BLOOD PRESSURE: 111 MMHG

## 2024-02-12 DIAGNOSIS — M25.522 PAIN IN LEFT ELBOW: ICD-10-CM

## 2024-02-12 DIAGNOSIS — Z98.890 OTHER SPECIFIED POSTPROCEDURAL STATES: Chronic | ICD-10-CM

## 2024-02-12 DIAGNOSIS — M79.602 PAIN IN LEFT ARM: ICD-10-CM

## 2024-02-12 DIAGNOSIS — N18.9 CHRONIC KIDNEY DISEASE, UNSPECIFIED: ICD-10-CM

## 2024-02-12 DIAGNOSIS — S42.409A UNSPECIFIED FRACTURE OF LOWER END OF UNSPECIFIED HUMERUS, INITIAL ENCOUNTER FOR CLOSED FRACTURE: Chronic | ICD-10-CM

## 2024-02-12 DIAGNOSIS — F20.9 SCHIZOPHRENIA, UNSPECIFIED: ICD-10-CM

## 2024-02-12 DIAGNOSIS — Z96.0 PRESENCE OF UROGENITAL IMPLANTS: Chronic | ICD-10-CM

## 2024-02-12 LAB
CRP SERPL-MCNC: 7.3 MG/L — HIGH
ERYTHROCYTE [SEDIMENTATION RATE] IN BLOOD: 17 MM/HR — HIGH (ref 0–10)
HCT VFR BLD CALC: 30 % — LOW (ref 42–52)
HGB BLD-MCNC: 10 G/DL — LOW (ref 14–18)
MCHC RBC-ENTMCNC: 30.6 PG — SIGNIFICANT CHANGE UP (ref 27–31)
MCHC RBC-ENTMCNC: 33.3 G/DL — SIGNIFICANT CHANGE UP (ref 32–37)
MCV RBC AUTO: 91.7 FL — SIGNIFICANT CHANGE UP (ref 80–94)
NRBC # BLD: 0 /100 WBCS — SIGNIFICANT CHANGE UP (ref 0–0)
PLATELET # BLD AUTO: 147 K/UL — SIGNIFICANT CHANGE UP (ref 130–400)
PMV BLD: 10.4 FL — SIGNIFICANT CHANGE UP (ref 7.4–10.4)
RBC # BLD: 3.27 M/UL — LOW (ref 4.7–6.1)
RBC # FLD: 14 % — SIGNIFICANT CHANGE UP (ref 11.5–14.5)
WBC # BLD: 2.39 K/UL — LOW (ref 4.8–10.8)
WBC # FLD AUTO: 2.39 K/UL — LOW (ref 4.8–10.8)

## 2024-02-12 PROCEDURE — 73060 X-RAY EXAM OF HUMERUS: CPT | Mod: LT

## 2024-02-12 PROCEDURE — 85027 COMPLETE CBC AUTOMATED: CPT

## 2024-02-12 PROCEDURE — 73080 X-RAY EXAM OF ELBOW: CPT | Mod: 26,LT

## 2024-02-12 PROCEDURE — 36415 COLL VENOUS BLD VENIPUNCTURE: CPT

## 2024-02-12 PROCEDURE — 86140 C-REACTIVE PROTEIN: CPT

## 2024-02-12 PROCEDURE — 93010 ELECTROCARDIOGRAM REPORT: CPT

## 2024-02-12 PROCEDURE — 85652 RBC SED RATE AUTOMATED: CPT

## 2024-02-12 PROCEDURE — 93005 ELECTROCARDIOGRAM TRACING: CPT

## 2024-02-12 PROCEDURE — 99285 EMERGENCY DEPT VISIT HI MDM: CPT | Mod: 25

## 2024-02-12 PROCEDURE — 73080 X-RAY EXAM OF ELBOW: CPT | Mod: LT

## 2024-02-12 PROCEDURE — 96372 THER/PROPH/DIAG INJ SC/IM: CPT

## 2024-02-12 PROCEDURE — 73060 X-RAY EXAM OF HUMERUS: CPT | Mod: 26,LT

## 2024-02-12 RX ORDER — ACETAMINOPHEN 500 MG
650 TABLET ORAL ONCE
Refills: 0 | Status: COMPLETED | OUTPATIENT
Start: 2024-02-12 | End: 2024-02-12

## 2024-02-12 RX ORDER — KETOROLAC TROMETHAMINE 30 MG/ML
30 SYRINGE (ML) INJECTION ONCE
Refills: 0 | Status: DISCONTINUED | OUTPATIENT
Start: 2024-02-12 | End: 2024-02-12

## 2024-02-12 RX ORDER — MELOXICAM 15 MG/1
1 TABLET ORAL
Qty: 7 | Refills: 0
Start: 2024-02-12 | End: 2024-02-18

## 2024-02-12 RX ADMIN — Medication 650 MILLIGRAM(S): at 07:42

## 2024-02-12 RX ADMIN — Medication 30 MILLIGRAM(S): at 09:41

## 2024-02-12 NOTE — ED PROVIDER NOTE - OBJECTIVE STATEMENT
Patient is a 68 year old male with PMH of schizophrenia, CKD, lymphoma (recently completed chemo x2 weeks ago) left elbow fracture s/p ORIF @[facility] ~30 years ago, and gout. Patient states that he woke up this morning with acute, atraumatic left elbow pain around 0200 and wanted to be evaluated. Patient denies recent injury or swelling to his left elbow; denies fever, loss of sensation, arm weakness, chest pain, shortness of breath, nausea, vomiting, or additional complaints.  Patient has a remote history of Gout (ten years prior).

## 2024-02-12 NOTE — ED PROVIDER NOTE - CLINICAL SUMMARY MEDICAL DECISION MAKING FREE TEXT BOX
Patient with atraumatic left elbow pain.  After Toradol patient with improvement range of motion see orthopedics felt to be likely gout.  NSAIDs upon discharge return precautions provided.  Independent interpretation of the Xray film(s) performed by: Dr. Erik Gerardo: No fx left elbow

## 2024-02-12 NOTE — ED ADULT NURSE NOTE - NSFALLUNIVINTERV_ED_ALL_ED
Bed/Stretcher in lowest position, wheels locked, appropriate side rails in place/Call bell, personal items and telephone in reach/Instruct patient to call for assistance before getting out of bed/chair/stretcher/Non-slip footwear applied when patient is off stretcher/[place] to call system/Physically safe environment - no spills, clutter or unnecessary equipment/Purposeful proactive rounding/Room/bathroom lighting operational, light cord in reach

## 2024-02-12 NOTE — ED PROVIDER NOTE - PHYSICAL EXAMINATION
VITAL SIGNS: I have reviewed nursing notes and confirm.  CONSTITUTIONAL: Well-appearing, non-toxic, in NAD  SKIN: Warm dry, normal skin turgor  HEAD: NCAT  EYES: No conjunctival injection, scleral anicteric  ENT: Moist mucous membranes, normal pharynx with no erythema or exudates  NECK: Supple; full ROM. Nontender. No cervical LAD  CARD: RRR, no murmurs, rubs or gallops  RESP: Clear to ausculation bilaterally.  No rales, rhonchi, or wheezing.  ABD: Soft, non-distended, non-tender, no rebound or guarding. No CVA tenderness  EXT: Limited active and passive extension ROM to left elbow, no bony tenderness, minor posterior elbow non-erythematous, non-fluctuant, normal temperature.  no pedal edema, no calf tenderness  NEURO: Normal motor, normal sensory. CN II-XII grossly intact. Cerebellar testing normal. Normal gait.  PSYCH: Cooperative, appropriate.

## 2024-02-12 NOTE — ED PROVIDER NOTE - CARE PLAN
[facility] Approved    Filling Pharmacy: [facility]  Additional Information: Pharmacy closed, left detailed message with approval information.            Principal Discharge DX:	Elbow pain   1

## 2024-02-12 NOTE — ED PROVIDER NOTE - NSFOLLOWUPINSTRUCTIONS_ED_ALL_ED_FT
Musculoskeletal Pain    Musculoskeletal pain is muscle and emma aches and pains. These pains can occur in any part of the body. Your caregiver may treat you without knowing the cause of the pain. They may treat you if blood or urine tests, X-rays, and other tests were normal.     CAUSES  There is often not a definite cause or reason for these pains. These pains may be caused by a type of germ (virus). The discomfort may also come from overuse. Overuse includes working out too hard when your body is not fit. Emma aches also come from weather changes. Bone is sensitive to atmospheric pressure changes.    HOME CARE INSTRUCTIONS  Ask when your test results will be ready. Make sure you get your test results.  Only take over-the-counter or prescription medicines for pain, discomfort, or fever as directed by your caregiver. If you were given medications for your condition, do not drive, operate machinery or power tools, or sign legal documents for 24 hours. Do not drink alcohol. Do not take sleeping pills or other medications that may interfere with treatment.  Continue all activities unless the activities cause more pain. When the pain lessens, slowly resume normal activities. Gradually increase the intensity and duration of the activities or exercise.   During periods of severe pain, bed rest may be helpful. Lay or sit in any position that is comfortable.   Putting ice on the injured area.  Put ice in a bag.   Place a towel between your skin and the bag.  Leave the ice on for 15 to 20 minutes, 3 to 4 times a day.   Follow up with your caregiver for continued problems and no reason can be found for the pain. If the pain becomes worse or does not go away, it may be necessary to repeat tests or do additional testing. Your caregiver may need to look further for a possible cause.    SEEK IMMEDIATE MEDICAL CARE IF:  You have pain that is getting worse and is not relieved by medications.  You develop chest pain that is associated with shortness or breath, sweating, feeling sick to your stomach (nauseous), or throw up (vomit).  Your pain becomes localized to the abdomen.  You develop any new symptoms that seem different or that concern you.    MAKE SURE YOU:  Understand these instructions.   Will watch your condition.  Will get help right away if you are not doing well or get worse.    ADDITIONAL NOTES AND INSTRUCTIONS    Please follow up with your Primary MD in 24-48 hr.  Seek immediate medical care for any new/worsening signs or symptoms.       If you have been prescribed medication to control your gout, be sure to take it as prescribed. Avoid foods that are known to trigger gout flares such as: red meat/organ meat, shellfish, refined carbohydrates (ex. white bread, white rice, pasta, sugar), processed foods (ex. chips, snack foods, frozen dinners), sugary beverages, alcohol (no more that 1-2 drinks in a 24hr period).

## 2024-02-12 NOTE — CONSULT NOTE ADULT - SUBJECTIVE AND OBJECTIVE BOX
Orthopaedic Surgery Consult Note      HPI:  68yMale with pmh of schizophrenia, CKD, lymphoma (recently completed chemo x2 weeks ago) left elbow fracture s/p ORIF @[facility] ~30 years ago and gout. Patient states that he woke up this morning with left elbow pain around 2am, he thought he was having a heart attack so he came to the ER when the pain continued for a few hours. He states that he has never experienced pain in the elbow since the surgery. He denies any trauma to the arm. His last gout flareup was about 10 years ago in his feet and knees.  He does admit to having an increased intake of cheese and ice cream in the last 2 weeks. Yesterday he ate two grilled cheeses. He denies any drinking of wine, and minimal intake of red meat. Denies any recent fevers at home, denies any recent illness.  Denies SOB or chest pain. He did not take anything for the pain at home.     HPI:  Allergies  allopurinol (Rash (Moderate))      PAST MEDICAL & SURGICAL HISTORY:  Kidney stones  Schizophrenia  Lymphoma  Shingles  Atrophic kidney  H/O colonoscopy with polypectomy  Liver cyst  History of bladder surgery  H/O neuropathy  History of chemotherapy  Stage 3 chronic kidney disease  Neuroendocrine malignancy  Neuroendocrine tumor status post surgical treatment  Neuroendocrine cancer  Retained urethral stent  H/O umbilical hernia repair  Elbow fracture  H/O cystoscopy      MEDICATIONS  (STANDING):  ketorolac   Injectable 30 milliGRAM(s) IntraMuscular Once    MEDICATIONS  (PRN):      Vital Signs Last 24 Hrs  T(C): 36.7 (12 Feb 2024 08:28), Max: 36.7 (12 Feb 2024 05:41)  T(F): 98 (12 Feb 2024 08:28), Max: 98 (12 Feb 2024 05:41)  HR: 72 (12 Feb 2024 08:28) (68 - 72)  BP: 111/56 (12 Feb 2024 08:28) (111/56 - 112/58)  BP(mean): --  RR: 18 (12 Feb 2024 08:28) (18 - 18)  SpO2: 98% (12 Feb 2024 08:28) (98% - 100%)    Parameters below as of 12 Feb 2024 08:28  Patient On (Oxygen Delivery Method): room air      Physical Exam:  Patient laying in ER stretcher in NAD    Left Elbow:  skin intact, no erythema, no obvious swelling, normothermic  non-specific ttp throughout elbow   Active ROM 0-90 with no pain, unable to flex past 90 deg without pain; minimal pain with pronation or supination   painful PROM , no pain with micromotion   SILT Ax/LABCN/R/M/U  AIN/PIN/Ulnar intact; Firing deltoid/biceps/triceps/wf/we/epl/fpl/fdp/intrinsics  Hand WWP, 2+ radial pulse      Imaging:   Left Elbow:hardware from previous fracture in place; no obvious fracture, hardware intact       A/P: 68yMale with left elbow pain; suspicious for gout flare up  -discussed with patient and with ER staff' likley an acute gout flare up, low suspicion for septic elbow at this time, no concern for hardware complication at this time  -Instructed ER to give dose of toradol and will re-eval patients pain   -ortho following          Orthopaedic Surgery Consult Note      HPI:  68yMale with pmh of schizophrenia, CKD, lymphoma (recently completed chemo x2 weeks ago) left elbow fracture s/p ORIF @[facility] ~30 years ago and gout. Patient states that he woke up this morning with left elbow pain around 2am, he thought he was having a heart attack so he came to the ER when the pain continued for a few hours. He states that he has never experienced pain in the elbow since the surgery. He denies any trauma to the arm. His last gout flareup was about 10 years ago in his feet and knees.  He does admit to having an increased intake of cheese and ice cream in the last 2 weeks. Yesterday he ate two grilled cheeses. He denies any drinking of wine, and minimal intake of red meat. Denies any recent fevers at home, denies any recent illness.  Denies SOB or chest pain. He did not take anything for the pain at home.     HPI:  Allergies  allopurinol (Rash (Moderate))      PAST MEDICAL & SURGICAL HISTORY:  Kidney stones  Schizophrenia  Lymphoma  Shingles  Atrophic kidney  H/O colonoscopy with polypectomy  Liver cyst  History of bladder surgery  H/O neuropathy  History of chemotherapy  Stage 3 chronic kidney disease  Neuroendocrine malignancy  Neuroendocrine tumor status post surgical treatment  Neuroendocrine cancer  Retained urethral stent  H/O umbilical hernia repair  Elbow fracture  H/O cystoscopy      MEDICATIONS  (STANDING):  ketorolac   Injectable 30 milliGRAM(s) IntraMuscular Once    MEDICATIONS  (PRN):      Vital Signs Last 24 Hrs  T(C): 36.7 (12 Feb 2024 08:28), Max: 36.7 (12 Feb 2024 05:41)  T(F): 98 (12 Feb 2024 08:28), Max: 98 (12 Feb 2024 05:41)  HR: 72 (12 Feb 2024 08:28) (68 - 72)  BP: 111/56 (12 Feb 2024 08:28) (111/56 - 112/58)  BP(mean): --  RR: 18 (12 Feb 2024 08:28) (18 - 18)  SpO2: 98% (12 Feb 2024 08:28) (98% - 100%)    Parameters below as of 12 Feb 2024 08:28  Patient On (Oxygen Delivery Method): room air      Physical Exam:  Patient laying in ER stretcher in NAD    Left Elbow:  skin intact, no erythema, no obvious swelling, normothermic  non-specific ttp throughout elbow   Active ROM 0-90 with no pain, unable to flex past 90 deg without pain; minimal pain with pronation or supination   painful PROM , no pain with micromotion   SILT Ax/LABCN/R/M/U  AIN/PIN/Ulnar intact; Firing deltoid/biceps/triceps/wf/we/epl/fpl/fdp/intrinsics  Hand WWP, 2+ radial pulse      Imaging:   Left Elbow:hardware from previous fracture in place; no obvious fracture, hardware intact       A/P: 68yMale with left elbow pain; suspicious for gout flare up  -discussed with patient and with ER staff' angie an acute gout flare up, low suspicion for septic elbow at this time, no concern for hardware complication at this time  -Instructed ER to give dose of toradol and will re-eval patients pain   -ortho following     UPDATE:  Patient recieved one dose of toradol with significant improvement in pain and ROM   AROM 0-130 with no pain   discussed with patient to avoid cheese and ice cream; advised if patient spikes fevers or pain becomes extreme again to report back to ER   please d/c patient with NSAIDs   pt can follow up outpatient with rheumatology and orthopedics- ; please call  [phone] for an appointment

## 2024-02-12 NOTE — ED PROVIDER NOTE - PATIENT PORTAL LINK FT
You can access the FollowMyHealth Patient Portal offered by [facility] [place] by registering at the following website: [email or web address]. By joining [facility]’s FollowMyHealth portal, you will also be able to view your health information using other applications (apps) compatible with our system.

## 2024-02-12 NOTE — ED ADULT TRIAGE NOTE - CHIEF COMPLAINT QUOTE
pt complaining of left arm pain that started a few hours ago . denies any chest pain, no shortness of breath, denies any injury to the arm

## 2024-02-12 NOTE — ED PROVIDER NOTE - ATTENDING CONTRIBUTION TO CARE
Patient is a 68 year old male with PMH of schizophrenia, CKD, lymphoma (recently completed chemo x2 weeks ago) left elbow fracture s/p ORIF @[facility] ~30 years ago, and gout. Patient states that he woke up this morning with acute, atraumatic left elbow pain around 0200 and wanted to be evaluated. Patient does report recently eating cheesy foods he has no trauma fever chills.  Agree with above exam  Impression  Patient with atraumatic left elbow pain.  After Toradol patient with improvement range of motion see orthopedics felt to be likely gout.  NSAIDs upon discharge return precautions provided.

## 2024-03-09 NOTE — ED ADULT TRIAGE NOTE - ACCOMPANIED BY
Pharmacokinetic Initial Assessment: IV Vancomycin    Assessment/Plan:    Initiate intravenous vancomycin with loading dose of 2000 mg once followed by a maintenance dose of vancomycin 1500 mg IV every 12 hours  Desired empiric serum trough concentration is 10 to 20 mcg/mL  Draw vancomycin trough level 60 min prior to fourth dose on 3/11 at approximately 0400  Pharmacy will continue to follow and monitor vancomycin.      Please contact pharmacy at extension [phone] with any questions regarding this assessment.     Thank you for the consult,   Guille Luo       Patient brief summary:  Danilo Zambrano is a 43 y.o. male initiated on antimicrobial therapy with IV Vancomycin for treatment of suspected skin & soft tissue infection    Drug Allergies:   Review of patient's allergies indicates:  No Known Allergies    Actual Body Weight:   108.9 kg    Renal Function:   Estimated Creatinine Clearance: 106.4 mL/min (based on SCr of 1.2 mg/dL).,     Dialysis Method (if applicable):  N/A     Immediate family member

## 2024-04-17 ENCOUNTER — NON-APPOINTMENT (OUTPATIENT)
Age: 69
End: 2024-04-17

## 2024-04-17 ENCOUNTER — APPOINTMENT (OUTPATIENT)
Dept: OTOLARYNGOLOGY | Facility: CLINIC | Age: 69
End: 2024-04-17
Payer: MEDICARE

## 2024-04-17 VITALS — BODY MASS INDEX: 27.09 KG/M2 | HEIGHT: 72 IN | WEIGHT: 200 LBS

## 2024-04-17 DIAGNOSIS — H93.8X3 OTHER SPECIFIED DISORDERS OF EAR, BILATERAL: ICD-10-CM

## 2024-04-17 DIAGNOSIS — H90.6 MIXED CONDUCTIVE AND SENSORINEURAL HEARING LOSS, BILATERAL: ICD-10-CM

## 2024-04-17 PROCEDURE — 92557 COMPREHENSIVE HEARING TEST: CPT

## 2024-04-17 PROCEDURE — 99204 OFFICE O/P NEW MOD 45 MIN: CPT

## 2024-04-17 PROCEDURE — 92550 TYMPANOMETRY & REFLEX THRESH: CPT | Mod: 52

## 2024-04-17 NOTE — HISTORY OF PRESENT ILLNESS
[de-identified] : Patient presents today c/o hearing loss, clogged ears. States that he is having difficulty hearing in bilateral ears. About 1 month ago he had a cold and blew his nose. At that time his ears popped and then hearing has since been decreased. Prescribed Amoxicillin and Fluocinolone with improvement. No longer experiencing pain. No headaches, dizziness or off balance. History of ear infections as a child.

## 2024-04-17 NOTE — ASSESSMENT
[FreeTextEntry1] : Jam is a pleasant 69 yo with bilateral hearing loss and sensation of ear fullness.  He had recent comprehensive audiogram done today, reviewed with patient and consistent with a mixed hearing loss.   Medically cleared for hearing aids  Follow up in 3 months - patient would like time to consider.

## 2024-04-19 ENCOUNTER — OUTPATIENT (OUTPATIENT)
Dept: OUTPATIENT SERVICES | Facility: HOSPITAL | Age: 69
LOS: 1 days | End: 2024-04-19
Payer: MEDICARE

## 2024-04-19 ENCOUNTER — APPOINTMENT (OUTPATIENT)
Age: 69
End: 2024-04-19

## 2024-04-19 ENCOUNTER — APPOINTMENT (OUTPATIENT)
Age: 69
End: 2024-04-19
Payer: MEDICARE

## 2024-04-19 ENCOUNTER — OUTPATIENT (OUTPATIENT)
Dept: OUTPATIENT SERVICES | Facility: HOSPITAL | Age: 69
LOS: 1 days | End: 2024-04-19

## 2024-04-19 ENCOUNTER — LABORATORY RESULT (OUTPATIENT)
Age: 69
End: 2024-04-19

## 2024-04-19 VITALS
SYSTOLIC BLOOD PRESSURE: 92 MMHG | DIASTOLIC BLOOD PRESSURE: 58 MMHG | HEART RATE: 67 BPM | WEIGHT: 203.19 LBS | TEMPERATURE: 97.6 F | BODY MASS INDEX: 27.52 KG/M2 | HEIGHT: 72 IN

## 2024-04-19 DIAGNOSIS — Z96.0 PRESENCE OF UROGENITAL IMPLANTS: Chronic | ICD-10-CM

## 2024-04-19 DIAGNOSIS — S42.409A UNSPECIFIED FRACTURE OF LOWER END OF UNSPECIFIED HUMERUS, INITIAL ENCOUNTER FOR CLOSED FRACTURE: Chronic | ICD-10-CM

## 2024-04-19 DIAGNOSIS — C85.80 OTHER SPECIFIED TYPES OF NON-HODGKIN LYMPHOMA, UNSPECIFIED SITE: ICD-10-CM

## 2024-04-19 DIAGNOSIS — Z98.890 OTHER SPECIFIED POSTPROCEDURAL STATES: Chronic | ICD-10-CM

## 2024-04-19 DIAGNOSIS — C77.0 SECONDARY AND UNSPECIFIED MALIGNANT NEOPLASM OF LYMPH NODES OF HEAD, FACE AND NECK: ICD-10-CM

## 2024-04-19 DIAGNOSIS — D3A.8 OTHER BENIGN NEUROENDOCRINE TUMORS: ICD-10-CM

## 2024-04-19 DIAGNOSIS — D64.9 ANEMIA, UNSPECIFIED: ICD-10-CM

## 2024-04-19 LAB
HCT VFR BLD CALC: 36.1 %
HGB BLD-MCNC: 11.8 G/DL
MCHC RBC-ENTMCNC: 29.3 PG
MCHC RBC-ENTMCNC: 32.7 G/DL
MCV RBC AUTO: 89.6 FL
PLATELET # BLD AUTO: 224 K/UL
PMV BLD: 8.7 FL
RBC # BLD: 4.03 M/UL
RBC # FLD: 13.4 %
WBC # FLD AUTO: 4.2 K/UL

## 2024-04-19 PROCEDURE — 86316 IMMUNOASSAY TUMOR OTHER: CPT

## 2024-04-19 PROCEDURE — 85027 COMPLETE CBC AUTOMATED: CPT

## 2024-04-19 PROCEDURE — 99214 OFFICE O/P EST MOD 30 MIN: CPT

## 2024-04-19 PROCEDURE — 83615 LACTATE (LD) (LDH) ENZYME: CPT

## 2024-04-19 PROCEDURE — 80053 COMPREHEN METABOLIC PANEL: CPT

## 2024-04-19 PROCEDURE — 36415 COLL VENOUS BLD VENIPUNCTURE: CPT

## 2024-04-19 PROCEDURE — 82784 ASSAY IGA/IGD/IGG/IGM EACH: CPT

## 2024-04-20 DIAGNOSIS — C77.0 SECONDARY AND UNSPECIFIED MALIGNANT NEOPLASM OF LYMPH NODES OF HEAD, FACE AND NECK: ICD-10-CM

## 2024-04-20 LAB
ALBUMIN SERPL ELPH-MCNC: 4 G/DL
ALP BLD-CCNC: 550 U/L
ALT SERPL-CCNC: 39 U/L
ANION GAP SERPL CALC-SCNC: 13 MMOL/L
AST SERPL-CCNC: 24 U/L
BILIRUB SERPL-MCNC: 0.4 MG/DL
BUN SERPL-MCNC: 30 MG/DL
CALCIUM SERPL-MCNC: 9.2 MG/DL
CHLORIDE SERPL-SCNC: 110 MMOL/L
CO2 SERPL-SCNC: 19 MMOL/L
CREAT SERPL-MCNC: 1.9 MG/DL
EGFR: 38 ML/MIN/1.73M2
GLUCOSE SERPL-MCNC: 86 MG/DL
LDH SERPL-CCNC: 246 U/L
POTASSIUM SERPL-SCNC: 4.4 MMOL/L
PROT SERPL-MCNC: 5.4 G/DL
SODIUM SERPL-SCNC: 142 MMOL/L

## 2024-04-23 LAB
CGA SERPL-MCNC: 959.6 NG/ML
IGG SER QL IEP: 63 MG/DL

## 2024-05-01 DIAGNOSIS — D64.9 ANEMIA, UNSPECIFIED: ICD-10-CM

## 2024-05-03 ENCOUNTER — INPATIENT (INPATIENT)
Facility: HOSPITAL | Age: 69
LOS: 3 days | Discharge: HOME | DRG: 871 | End: 2024-05-07
Attending: HOSPITALIST | Admitting: STUDENT IN AN ORGANIZED HEALTH CARE EDUCATION/TRAINING PROGRAM
Payer: MEDICARE

## 2024-05-03 VITALS
TEMPERATURE: 98 F | SYSTOLIC BLOOD PRESSURE: 88 MMHG | RESPIRATION RATE: 16 BRPM | HEART RATE: 141 BPM | DIASTOLIC BLOOD PRESSURE: 52 MMHG

## 2024-05-03 DIAGNOSIS — A41.9 SEPSIS, UNSPECIFIED ORGANISM: ICD-10-CM

## 2024-05-03 DIAGNOSIS — S42.409A UNSPECIFIED FRACTURE OF LOWER END OF UNSPECIFIED HUMERUS, INITIAL ENCOUNTER FOR CLOSED FRACTURE: Chronic | ICD-10-CM

## 2024-05-03 DIAGNOSIS — Z98.890 OTHER SPECIFIED POSTPROCEDURAL STATES: Chronic | ICD-10-CM

## 2024-05-03 DIAGNOSIS — Z96.0 PRESENCE OF UROGENITAL IMPLANTS: Chronic | ICD-10-CM

## 2024-05-03 LAB
ALBUMIN SERPL ELPH-MCNC: 4.3 G/DL — SIGNIFICANT CHANGE UP (ref 3.5–5.2)
ALP SERPL-CCNC: 555 U/L — HIGH (ref 30–115)
ALT FLD-CCNC: 55 U/L — HIGH (ref 0–41)
ANION GAP SERPL CALC-SCNC: 26 MMOL/L — HIGH (ref 7–14)
APTT BLD: 30.3 SEC — SIGNIFICANT CHANGE UP (ref 27–39.2)
AST SERPL-CCNC: 54 U/L — HIGH (ref 0–41)
B-OH-BUTYR SERPL-SCNC: <0.2 MMOL/L — SIGNIFICANT CHANGE UP
BASOPHILS # BLD AUTO: 0 K/UL — SIGNIFICANT CHANGE UP (ref 0–0.2)
BASOPHILS NFR BLD AUTO: 0 % — SIGNIFICANT CHANGE UP (ref 0–1)
BILIRUB SERPL-MCNC: 1.2 MG/DL — SIGNIFICANT CHANGE UP (ref 0.2–1.2)
BUN SERPL-MCNC: 47 MG/DL — HIGH (ref 10–20)
CALCIUM SERPL-MCNC: 9.7 MG/DL — SIGNIFICANT CHANGE UP (ref 8.4–10.4)
CHLORIDE SERPL-SCNC: 98 MMOL/L — SIGNIFICANT CHANGE UP (ref 98–110)
CO2 SERPL-SCNC: 11 MMOL/L — LOW (ref 17–32)
CREAT SERPL-MCNC: 3.9 MG/DL — HIGH (ref 0.7–1.5)
EGFR: 16 ML/MIN/1.73M2 — LOW
EOSINOPHIL # BLD AUTO: 0 K/UL — SIGNIFICANT CHANGE UP (ref 0–0.7)
EOSINOPHIL NFR BLD AUTO: 0 % — SIGNIFICANT CHANGE UP (ref 0–8)
FLUAV AG NPH QL: SIGNIFICANT CHANGE UP
FLUBV AG NPH QL: SIGNIFICANT CHANGE UP
GAS PNL BLDV: SIGNIFICANT CHANGE UP
GLUCOSE SERPL-MCNC: 213 MG/DL — HIGH (ref 70–99)
HCT VFR BLD CALC: 37.8 % — LOW (ref 42–52)
HGB BLD-MCNC: 12.6 G/DL — LOW (ref 14–18)
INR BLD: 1.13 RATIO — SIGNIFICANT CHANGE UP (ref 0.65–1.3)
LACTATE SERPL-SCNC: 2.1 MMOL/L — HIGH (ref 0.7–2)
LYMPHOCYTES # BLD AUTO: 0.23 K/UL — LOW (ref 1.2–3.4)
LYMPHOCYTES # BLD AUTO: 2.6 % — LOW (ref 20.5–51.1)
MCHC RBC-ENTMCNC: 29.3 PG — SIGNIFICANT CHANGE UP (ref 27–31)
MCHC RBC-ENTMCNC: 33.3 G/DL — SIGNIFICANT CHANGE UP (ref 32–37)
MCV RBC AUTO: 87.9 FL — SIGNIFICANT CHANGE UP (ref 80–94)
MONOCYTES # BLD AUTO: 1.25 K/UL — HIGH (ref 0.1–0.6)
MONOCYTES NFR BLD AUTO: 13.9 % — HIGH (ref 1.7–9.3)
NEUTROPHILS # BLD AUTO: 5.85 K/UL — SIGNIFICANT CHANGE UP (ref 1.4–6.5)
NEUTROPHILS NFR BLD AUTO: 61.7 % — SIGNIFICANT CHANGE UP (ref 42.2–75.2)
NT-PROBNP SERPL-SCNC: 5293 PG/ML — HIGH (ref 0–300)
PLATELET # BLD AUTO: 256 K/UL — SIGNIFICANT CHANGE UP (ref 130–400)
PMV BLD: 9.7 FL — SIGNIFICANT CHANGE UP (ref 7.4–10.4)
POTASSIUM SERPL-MCNC: 5.8 MMOL/L — HIGH (ref 3.5–5)
POTASSIUM SERPL-SCNC: 5.8 MMOL/L — HIGH (ref 3.5–5)
PROT SERPL-MCNC: 6.3 G/DL — SIGNIFICANT CHANGE UP (ref 6–8)
PROTHROM AB SERPL-ACNC: 12.9 SEC — HIGH (ref 9.95–12.87)
RBC # BLD: 4.3 M/UL — LOW (ref 4.7–6.1)
RBC # FLD: 14.2 % — SIGNIFICANT CHANGE UP (ref 11.5–14.5)
RSV RNA NPH QL NAA+NON-PROBE: SIGNIFICANT CHANGE UP
SARS-COV-2 RNA SPEC QL NAA+PROBE: SIGNIFICANT CHANGE UP
SODIUM SERPL-SCNC: 135 MMOL/L — SIGNIFICANT CHANGE UP (ref 135–146)
TROPONIN T, HIGH SENSITIVITY RESULT: 86 NG/L — CRITICAL HIGH (ref 6–21)
TROPONIN T, HIGH SENSITIVITY RESULT: 89 NG/L — CRITICAL HIGH (ref 6–21)
WBC # BLD: 8.97 K/UL — SIGNIFICANT CHANGE UP (ref 4.8–10.8)
WBC # FLD AUTO: 8.97 K/UL — SIGNIFICANT CHANGE UP (ref 4.8–10.8)

## 2024-05-03 PROCEDURE — 82977 ASSAY OF GGT: CPT

## 2024-05-03 PROCEDURE — 83605 ASSAY OF LACTIC ACID: CPT

## 2024-05-03 PROCEDURE — 87899 AGENT NOS ASSAY W/OPTIC: CPT

## 2024-05-03 PROCEDURE — 74177 CT ABD & PELVIS W/CONTRAST: CPT | Mod: 26,MC

## 2024-05-03 PROCEDURE — 85027 COMPLETE CBC AUTOMATED: CPT

## 2024-05-03 PROCEDURE — 97116 GAIT TRAINING THERAPY: CPT | Mod: GP

## 2024-05-03 PROCEDURE — 83735 ASSAY OF MAGNESIUM: CPT

## 2024-05-03 PROCEDURE — 82010 KETONE BODYS QUAN: CPT

## 2024-05-03 PROCEDURE — 85025 COMPLETE CBC W/AUTO DIFF WBC: CPT

## 2024-05-03 PROCEDURE — 72125 CT NECK SPINE W/O DYE: CPT | Mod: 26,MC

## 2024-05-03 PROCEDURE — 71275 CT ANGIOGRAPHY CHEST: CPT | Mod: 26,MC

## 2024-05-03 PROCEDURE — 87449 NOS EACH ORGANISM AG IA: CPT

## 2024-05-03 PROCEDURE — 97162 PT EVAL MOD COMPLEX 30 MIN: CPT | Mod: GP

## 2024-05-03 PROCEDURE — 70450 CT HEAD/BRAIN W/O DYE: CPT | Mod: 26,MC

## 2024-05-03 PROCEDURE — 93010 ELECTROCARDIOGRAM REPORT: CPT | Mod: 77

## 2024-05-03 PROCEDURE — 99291 CRITICAL CARE FIRST HOUR: CPT

## 2024-05-03 PROCEDURE — 93005 ELECTROCARDIOGRAM TRACING: CPT

## 2024-05-03 PROCEDURE — 97530 THERAPEUTIC ACTIVITIES: CPT | Mod: GP

## 2024-05-03 PROCEDURE — 92610 EVALUATE SWALLOWING FUNCTION: CPT | Mod: GN

## 2024-05-03 PROCEDURE — 93010 ELECTROCARDIOGRAM REPORT: CPT

## 2024-05-03 PROCEDURE — 36415 COLL VENOUS BLD VENIPUNCTURE: CPT

## 2024-05-03 PROCEDURE — 71045 X-RAY EXAM CHEST 1 VIEW: CPT | Mod: 26

## 2024-05-03 PROCEDURE — 80053 COMPREHEN METABOLIC PANEL: CPT

## 2024-05-03 PROCEDURE — 70480 CT ORBIT/EAR/FOSSA W/O DYE: CPT | Mod: MC

## 2024-05-03 RX ORDER — BENZTROPINE MESYLATE 1 MG
2 TABLET ORAL
Refills: 0 | Status: DISCONTINUED | OUTPATIENT
Start: 2024-05-03 | End: 2024-05-07

## 2024-05-03 RX ORDER — FLUPHENAZINE HYDROCHLORIDE 1 MG/1
1 TABLET, FILM COATED ORAL
Refills: 0 | DISCHARGE

## 2024-05-03 RX ORDER — BENZTROPINE MESYLATE 1 MG
1 TABLET ORAL AT BEDTIME
Refills: 0 | Status: DISCONTINUED | OUTPATIENT
Start: 2024-05-03 | End: 2024-05-07

## 2024-05-03 RX ORDER — VANCOMYCIN HCL 1 G
1000 VIAL (EA) INTRAVENOUS ONCE
Refills: 0 | Status: COMPLETED | OUTPATIENT
Start: 2024-05-03 | End: 2024-05-03

## 2024-05-03 RX ORDER — ACETAMINOPHEN 500 MG
975 TABLET ORAL ONCE
Refills: 0 | Status: COMPLETED | OUTPATIENT
Start: 2024-05-03 | End: 2024-05-03

## 2024-05-03 RX ORDER — SODIUM CHLORIDE 9 MG/ML
2000 INJECTION, SOLUTION INTRAVENOUS ONCE
Refills: 0 | Status: COMPLETED | OUTPATIENT
Start: 2024-05-03 | End: 2024-05-03

## 2024-05-03 RX ORDER — FLUPHENAZINE HYDROCHLORIDE 1 MG/1
5 TABLET, FILM COATED ORAL AT BEDTIME
Refills: 0 | Status: DISCONTINUED | OUTPATIENT
Start: 2024-05-03 | End: 2024-05-07

## 2024-05-03 RX ORDER — CEFEPIME 1 G/1
2000 INJECTION, POWDER, FOR SOLUTION INTRAMUSCULAR; INTRAVENOUS EVERY 24 HOURS
Refills: 0 | Status: DISCONTINUED | OUTPATIENT
Start: 2024-05-03 | End: 2024-05-04

## 2024-05-03 RX ORDER — FLUPHENAZINE HYDROCHLORIDE 1 MG/1
10 TABLET, FILM COATED ORAL
Refills: 0 | Status: DISCONTINUED | OUTPATIENT
Start: 2024-05-03 | End: 2024-05-07

## 2024-05-03 RX ORDER — BENZTROPINE MESYLATE 1 MG
1 TABLET ORAL
Refills: 0 | DISCHARGE

## 2024-05-03 RX ORDER — AZITHROMYCIN 500 MG/1
500 TABLET, FILM COATED ORAL ONCE
Refills: 0 | Status: COMPLETED | OUTPATIENT
Start: 2024-05-03 | End: 2024-05-03

## 2024-05-03 RX ADMIN — SODIUM CHLORIDE 2000 MILLILITER(S): 9 INJECTION, SOLUTION INTRAVENOUS at 17:33

## 2024-05-03 RX ADMIN — AZITHROMYCIN 255 MILLIGRAM(S): 500 TABLET, FILM COATED ORAL at 21:44

## 2024-05-03 RX ADMIN — Medication 250 MILLIGRAM(S): at 17:37

## 2024-05-03 RX ADMIN — SODIUM CHLORIDE 2000 MILLILITER(S): 9 INJECTION, SOLUTION INTRAVENOUS at 18:53

## 2024-05-03 RX ADMIN — CEFEPIME 100 MILLIGRAM(S): 1 INJECTION, POWDER, FOR SOLUTION INTRAMUSCULAR; INTRAVENOUS at 21:44

## 2024-05-03 RX ADMIN — Medication 975 MILLIGRAM(S): at 20:31

## 2024-05-03 RX ADMIN — Medication 1000 MILLIGRAM(S): at 21:31

## 2024-05-03 RX ADMIN — Medication 975 MILLIGRAM(S): at 19:23

## 2024-05-03 NOTE — H&P ADULT - NSHPLABSRESULTS_GEN_ALL_CORE
05-03    135  |  98  |  47<H>  ----------------------------<  213<H>  5.8<H>   |  11<L>  |  3.9<H>    Ca    9.7      03 May 2024 17:26    TPro  6.3  /  Alb  4.3  /  TBili  1.2  /  DBili  x   /  AST  54<H>  /  ALT  55<H>  /  AlkPhos  555<H>  05-03                        12.6   8.97  )-----------( 256      ( 03 May 2024 17:26 )             37.8

## 2024-05-03 NOTE — CONSULT NOTE ADULT - ASSESSMENT
Pt is a 69yo Male w PMH of Atrophic kidney, Kidney stones, CKD, Schizophrenia, Lymphoma h/o chemotherapy , recent visit to ENT Dr. Rodriguez for eval of b/l hearing loss s/p audiogram 4/17/24  Dx with Mixed Hearing loss brought to ED s/p fall, reports  1st fall had been 3-4 days ago with + HT. Today patient was mowing the lawn when he started to feel dizzy, shortness of breath and palpitations and fell hitting his head on the grass.  Patient was found to be hypotensive and tachycardic.   ENT called to evaluate Pt. for CT head findings of Extensive opacification of the bilateral mastoids and left middle ear cavity, nonspecific. Please clinically correlate for mastoiditis.  Incidental subcentimeter hypodense nodule in the left thyroid lobe.  Pt. seen and examined at bedside in NAD, found to have left ear TM perforation, no evidence of active bleeding. Denies fever, chills at home. Afebrile.         Plan:  Please obtain CT Temporal bone   Start Floxin drops to Left ear   Cont. IV Abx   Outpatient Thyroid US   Analgesia prn  x pain   Will d/w ENT attending

## 2024-05-03 NOTE — H&P ADULT - NSHPPHYSICALEXAM_GEN_ALL_CORE
GENERAL: NAD, resting comfortably in bed  HEENT: Left sided abrasion on forehead; mild tenderness posterior to right ear  PULMONARY: Clear to auscultation bilaterally. No rales, ronchi, or wheezing.   CARDIOVASCULAR: Regular rate and rhythm, S1-S2, no murmurs   GASTROINTESTINAL: Soft, non-tender, non-distended, no guarding.   SKIN/EXTREMITIES: No clubbing or edema   NEUROLOGIC: AAOX4

## 2024-05-03 NOTE — H&P ADULT - ATTENDING COMMENTS
68-year-old male PMH of schizophrenia, CKD, lymphoma (in remission last chemo 12/2023), left elbow fracture s/p ORIF @[facility] ~30 years ago, and gout senting to the ED s/p fall.  Pt says that today he was mowing the lawn when he became lightheaded/dizzy and fell. Says he usually gets lightheaded when he goes from standing to seated position. Has had mechanical falls in the past and never blacked out or syncopized in the past.  Has also had productive cough for the past 1.5 weeks, says his father who lives with him has similar cough. Has had hearing difficulty for many years but only the past few days noted that he has pain behind the right ear. Also has diarrhea, cannot recall how many episodes daily, but says this is not new as well.  No chest pain, SOB, abdominal pain, visual auras or other symptoms.       In the ED, , BP 88/52. Lactate 8.2--->2.1, trops 89--->86, K 5.8 (hemolyzed), CO2 11, AG 26, BUN 47, Cr 3.9 (~baseline 2.5), , AST 54, ALT 55, BNP 5293. pH 7.26, HCO3 13. RVP/Sars Cov negative. CTH/C spine with Extensive opacification of the bilateral mastoids and left middle ear cavity, nonspecific. CTA PE with LLL consolidation and CTAP suggestive of diverticulitis. S/p 2L LR bolus, cefepime, azithro, vanc (03 May 2024 23:22)    GENERAL: NAD, lying in bed comfortably  CHEST/LUNG: Clear to auscultation bilaterally; No rales, rhonchi, wheezing, or rubs. Unlabored respirations  HEART: Regular rate and rhythm; No murmurs, rubs, or gallops  ABDOMEN: Bowel sounds present; Soft, Nontender, Nondistended. No hepatomegally  EXTREMITIES:  2+ Peripheral Pulses, brisk capillary refill. No clubbing, cyanosis, or edema  NERVOUS SYSTEM:  Alert & Oriented X3, speech clear. No deficits     #Severe Sepsis  on admission   #CAP  #B/l mastoid opacification, suspect mastoiditis  #?Diverticulitis  - CTH suggesting possible mastoiditis but nonspecific,   - CTAP suggestive of sigmoid diverticulitis; pt has diarrhea though this is chronic, not new  - CT chest suggestive of pna  - CT temporal bone pending   - continue rocephin/doxy  - ofloxacin ear drops,   - can add flagyl, but would hold off unless pain develops or worsening diarrhea  -  send urine strep/legionella, MRSA nares; add vanc if positive  - ENT following    #HAGMA 2/2 lactic acidosis and acute renal failure improving   #Hyperkalemia resolved   - endorses relatively poor PO intake past day or so  - BNP 5293 but not overloaded, likely from [facility]  - s/s - diet as per   - continue IVF  - continue sodium bicarb     #Mechanical fall vs dizziness from mastoiditis   - fell while mowing the lawn  - no chest pain, auras, palpitatio before fall  - orthostatics  - PT consult    #Elevated ALP  - appears chronically elevated, however higher than prior levels  - may be from hx of lymphoma, Ca normal otherwise  - follow with Dr. Gong, last chemo 12/2023  - GGT    #Schizophrenia  - continue home prolixin and cogentin    DVT ppx: heparin subq  GI ppx: PPI  Diet: Regular  Full code  Dispo : s/s, IV abx, CT temporal, PT consult, DGTF.

## 2024-05-03 NOTE — ED PROVIDER NOTE - CARE PLAN
Principal Discharge DX:	Sepsis  Secondary Diagnosis:	Diverticulitis  Secondary Diagnosis:	Pneumonia  Secondary Diagnosis:	Perforated tympanic membrane, left  Secondary Diagnosis:	Atrial flutter  Secondary Diagnosis:	Demand ischemia  Secondary Diagnosis:	Acute kidney injury superimposed on CKD   1

## 2024-05-03 NOTE — ED PROVIDER NOTE - PHYSICAL EXAMINATION
CONSTITUTIONAL: NAD  SKIN: Warm dry, normal skin turgor  HEAD: abrasion present on left frontal area of head. No active bleeding   EYES: EOMI, PERRLA, no scleral icterus, conjunctiva pink  ENT: normal pharynx with no erythema or exudates  NECK: Supple; non tender. Full ROM.  CARD: tachycardic   RESP: clear to ausculation b/l. No crackles or wheezing.  ABD: soft, non-tender, non-distended, no rebound or guarding.  EXT: Full ROM, no bony tenderness, no pedal edema, no calf tenderness  NEURO: normal motor. normal sensory  PSYCH: Cooperative, appropriate.

## 2024-05-03 NOTE — ED PROVIDER NOTE - CLINICAL SUMMARY MEDICAL DECISION MAKING FREE TEXT BOX
68-year-old male history of schizophrenia, malignancies evaluation, CKD, hypertension presenting for evaluation status post fall.  Per family, patient had mechanical trip and fall approximately 1 week ago, positive head injury, no LOC or anticoagulation.  Family states that since fall patient has had decreased p.o. intake, today while mowing the grass felt dizzy, lightheaded, experienced shortness of breath, subsequently collapsed.  No traumatic injuries from fall today.  Upon arrival patient found to be hypotensive, tachycardic, and a flutter with a 2-1 block, patient also found to be febrile, empiric antibiotics given.  Status post fluid bolus, antipyretics, vital signs improved.  Patient converted back to normal sinus rhythm, no ischemic changes.  Initial EKG concerning for STEMI, ST elevations noted in inferior leads, STEMI code called, subsequently canceled.  Chronically ill, mildly disheveled appearing, non toxic.  Facial abrasions PERRLA EOMI left TM perforated, scant dried blood in external auditory canal, mild tenderness palpation left mastoid neck supple non tender normal wob cta bl regular, tachycardic abdomen s nt nd no rebound no guarding [id number] neuro non focal.  Labs imaging EKG reviewed.  Patient found to have pneumonia, diverticulitis, mastoiditis, discussed with ICU, will admit to stepdown unit for further evaluation.

## 2024-05-03 NOTE — ED PROVIDER NOTE - OBJECTIVE STATEMENT
Is a 68-year-old male PMH of schizophrenia, CKD, lymphoma (in remission), left elbow fracture s/p ORIF @[facility] ~30 years ago, and gout senting to the ED s/p fall.  Parents at bedside.  States patient was walking last week when he fell and hit his head on the concrete.  Patient has had decreased p.o. intake due to lack of appetite.  Today patient was mowing the lawn when he started to feel dizzy, shortness of breath and palpitations and fell hitting his head on the grass.  Patient was found to be hypotensive and tachycardic in triage.

## 2024-05-03 NOTE — CONSULT NOTE ADULT - NS ATTEND AMEND GEN_ALL_CORE FT
I have examined the patient at bedside with the ACP and reviewed the CT head.   Patient is clinically negative for acute mastoiditis and OE bilaterally on my exam today.  Left TM perforation is stable without purulence with dried blood seen in middle ear space.    Recommend ofloxacin drops, 5 drops BID to left ear and non-urgent CT temporal bone to r/o occult temporal bone fracture.  Facial nerve function and subjective hearing intact so fracture involvement of cochlea / facial nerve trunk extremely unlikely.   Defer management of PO/IV abx to primary team as I do not see evidence of otologic acute infection source today.   Frantz Rodriguez MD, MPH

## 2024-05-03 NOTE — CONSULT NOTE ADULT - SUBJECTIVE AND OBJECTIVE BOX
Pt is a 69yo Male who presents with     PAST MEDICAL & SURGICAL HISTORY:  Kidney stones      Schizophrenia      Lymphoma      Shingles      Atrophic kidney      H/O colonoscopy with polypectomy      Liver cyst      History of bladder surgery      H/O neuropathy      History of chemotherapy      Stage 3 chronic kidney disease      Neuroendocrine malignancy      Neuroendocrine tumor status post surgical treatment      Neuroendocrine cancer      Retained urethral stent      H/O umbilical hernia repair      Elbow fracture      H/O cystoscopy          MEDICATIONS  (STANDING):  azithromycin  IVPB 500 milliGRAM(s) IV Intermittent once  cefepime   IVPB 2000 milliGRAM(s) IV Intermittent every 24 hours    MEDICATIONS  (PRN):      Allergies    allopurinol (Rash (Moderate))    Intolerances          SOCIAL HISTORY:    FAMILY HISTORY:  FH: hypertension    FH: diabetes mellitus        REVIEW OF SYSTEMS   [x] A ten-point review of systems was otherwise negative except as noted.  [ ] Due to altered mental status/intubation, subjective information were not able to be obtained from patient. History was obtained, to the extent possible, from review of the chart and collateral sources of information.    Vital Signs Last 24 Hrs  T(C): 36.9 (03 May 2024 16:58), Max: 36.9 (03 May 2024 16:58)  T(F): 98.5 (03 May 2024 16:58), Max: 98.5 (03 May 2024 16:58)  HR: 90 (03 May 2024 19:58) (90 - 141)  BP: 94/57 (03 May 2024 19:58) (88/52 - 101/56)  BP(mean): 68 (03 May 2024 19:58) (68 - 68)  RR: 18 (03 May 2024 19:58) (16 - 19)  SpO2: 100% (03 May 2024 19:58) (97% - 100%)    Parameters below as of 03 May 2024 19:58  Patient On (Oxygen Delivery Method): nasal cannula  O2 Flow (L/min): 4    PE:  GEN: NAD, awake and alert. No drooling or pooling of secretions. No stridor or stertor. Good vocal quality, no hoarseness.   SKIN: Good color, non diaphoretic.  HEENT: + forehead abrasions noted over left side forehead.   Nose: B/L nares patent , no drainage   Ears: Left ear with postauricular ttp, no tragal ttp, no pre-auricular ttp, EAC clean, TM perforated no evidence of active bleeding. Right ear w/o ttp , EAC  with minimal cerumen, TM visualized intact.    Mouth: Oral mucosa pink and moist. No erythema or edema noted to buccal mucosa, tongue, FOM, uvula or posterior oropharynx. Uvula midline.   NECK: Trachea midline, Neck supple, no TTP to B/L lateral neck, no cervical LAD.  RESP: No dyspnea, non-labored breathing. No use of accessory muscles.   CARDIO: +S1/S2  ABDO: Soft, NT.  EXT: MIMS x 4    LABS:                        12.6   8.97  )-----------( 256      ( 03 May 2024 17:26 )             37.8     05-03    135  |  98  |  47<H>  ----------------------------<  213<H>  5.8<H>   |  11<L>  |  3.9<H>    Ca    9.7      03 May 2024 17:26    TPro  6.3  /  Alb  4.3  /  TBili  1.2  /  DBili  x   /  AST  54<H>  /  ALT  55<H>  /  AlkPhos  555<H>  05-03    PT/INR - ( 03 May 2024 17:26 )   PT: 12.90 sec;   INR: 1.13 ratio         PTT - ( 03 May 2024 17:26 )  PTT:30.3 sec      RADIOLOGY & ADDITIONAL STUDIES: Pt is a 69yo Male w PMH of Atrophic kidney, Kidney stones, CKD, Schizophrenia, Lymphoma h/o chemotherapy , recent visit to ENT Dr. Rodriguez for eval of b/l hearing loss s/p audiogram 4/17/24  Dx with Mixed Hearing loss brought to ED s/p fall, reports  1st fall had been 3-4 days ago with + HT. Today patient was mowing the lawn when he started to feel dizzy, shortness of breath and palpitations and fell hitting his head on the grass.  Patient was found to be hypotensive and tachycardic.   ENT called to evaluate Pt. for CT head findings of Extensive opacification of the bilateral mastoids and left middle ear cavity, nonspecific. Please clinically correlate for mastoiditis.  Pt. seen and examined at bedside in NAD, found to have left ear TM perforation, no evidence of active bleeding. Denies fever, chills at home. Afebrile.     PAST MEDICAL & SURGICAL HISTORY:  Kidney stones      Schizophrenia      Lymphoma      Shingles      Atrophic kidney      H/O colonoscopy with polypectomy      Liver cyst      History of bladder surgery      H/O neuropathy      History of chemotherapy      Stage 3 chronic kidney disease      Neuroendocrine malignancy      Neuroendocrine tumor status post surgical treatment      Neuroendocrine cancer      Retained urethral stent      H/O umbilical hernia repair      Elbow fracture      H/O cystoscopy      Home Medications:  benztropine 2 mg oral tablet: 1 tab(s) orally once a day (15 Nov 2023 06:52)  predniSONE 20 mg oral tablet: 2 tab(s) orally once a day (at bedtime) (15 Nov 2023 06:52)  Prolixin 10 mg oral tablet: 1 tab(s) orally once a day (15 Nov 2023 06:52)      MEDICATIONS  (STANDING):  azithromycin  IVPB 500 milliGRAM(s) IV Intermittent once  cefepime   IVPB 2000 milliGRAM(s) IV Intermittent every 24 hours    MEDICATIONS  (PRN):      Allergies    allopurinol (Rash (Moderate))       SOCIAL HISTORY:    FAMILY HISTORY:  FH: hypertension    FH: diabetes mellitus        REVIEW OF SYSTEMS   [x] A ten-point review of systems was otherwise negative except as noted.     Vital Signs Last 24 Hrs  T(C): 36.9 (03 May 2024 16:58), Max: 36.9 (03 May 2024 16:58)  T(F): 98.5 (03 May 2024 16:58), Max: 98.5 (03 May 2024 16:58)  HR: 90 (03 May 2024 19:58) (90 - 141)  BP: 94/57 (03 May 2024 19:58) (88/52 - 101/56)  BP(mean): 68 (03 May 2024 19:58) (68 - 68)  RR: 18 (03 May 2024 19:58) (16 - 19)  SpO2: 100% (03 May 2024 19:58) (97% - 100%)    Parameters below as of 03 May 2024 19:58  Patient On (Oxygen Delivery Method): nasal cannula  O2 Flow (L/min): 4    PE:  GEN: NAD, awake and alert. No drooling or pooling of secretions. No stridor or stertor. Good vocal quality, no hoarseness.   SKIN: Good color, non diaphoretic.  HEENT: + forehead abrasions noted over left side forehead. Denies fecial numbness B/L.   Nose: B/L nares patent , no drainage   Ears: Left ear with postauricular ttp, no tragal ttp, no pre-auricular ttp, EAC clean, TM perforated no evidence of active bleeding. Right ear w/o ttp , EAC  with minimal cerumen, TM visualized intact.  No edema or erythema over B/L mastoid area.   Mouth: Oral mucosa pink and moist. No erythema or edema noted to buccal mucosa, tongue, FOM, uvula or posterior oropharynx. Uvula midline.   NECK: Trachea midline, Neck supple, no TTP to B/L lateral neck, no cervical LAD.  RESP: No dyspnea, non-labored breathing. No use of accessory muscles.   CARDIO: +S1/S2  ABDO: Soft, NT.  EXT: MIMS x 4    LABS:                        12.6   8.97  )-----------( 256      ( 03 May 2024 17:26 )             37.8     05-03    135  |  98  |  47<H>  ----------------------------<  213<H>  5.8<H>   |  11<L>  |  3.9<H>    Ca    9.7      03 May 2024 17:26    TPro  6.3  /  Alb  4.3  /  TBili  1.2  /  DBili  x   /  AST  54<H>  /  ALT  55<H>  /  AlkPhos  555<H>  05-03    PT/INR - ( 03 May 2024 17:26 )   PT: 12.90 sec;   INR: 1.13 ratio         PTT - ( 03 May 2024 17:26 )  PTT:30.3 sec      RADIOLOGY & ADDITIONAL STUDIES:  < from: CT Head No Cont (05.03.24 @ 18:42) >    ACC: [id number] EXAM:  CT CERVICAL SPINE   ORDERED BY: YAAKOV RASCON     ACC: [id number] EXAM:  CT BRAIN   ORDERED BY: YAAKOV RASCON     PROCEDURE DATE:  05/03/2024          INTERPRETATION:  CLINICAL INDICATION: Status post fall.    TECHNIQUE: CTof the head and cervical spine was performed without the   administration of intravenous contrast.    COMPARISON: None available.    FINDINGS:    CT HEAD:    No evidence of acute territorial infarct, intracranial hemorrhage or mass   lesion.    No hydrocephalus. There are no extra-axial fluid collections.    The visualized intraorbital contents are normal. Moderate mucosal   thickening in the right frontal and right ethmoid sinus. Extensive   effusion and bilateral mastoid air cells. There is opacification of the   left middle ear cavity. The visualized soft tissues and osseous   structures appear normal.      CT CERVICAL SPINE:    Straightening of the cervical spine without spondylolisthesis.    There is no evidence of acute fracture, compression deformity or facet   subluxation of the cervical spine.    The atlantoaxial relationships are maintained. The posterior elements are   intact. There is no significant prevertebral soft tissue swelling. The   visualized paraspinal soft tissues are unremarkable.    Mild multilevel endplate degenerative changes as evidenced by marginal   osteophyte formation, uncovertebral spurring, loss of normal disc space   height as well as facet joint space compartment narrowing, worse at C5-6.    There isno high-grade spinal canal stenosis. Please note spinal canal   contents are suboptimally evaluated inherent to CT technique.    Incidental subcentimeter hypodense nodule in the left thyroid lobe.    The visualized lung apices are within normal limits.      IMPRESSION:    CT HEAD:  No acute intracranial findings.    Extensive opacification of the bilateral mastoids and left middle ear   cavity, nonspecific. Please clinically correlate for mastoiditis.      CT CERVICAL SPINE:  No acute cervical fracture or facet subluxation.    --- End of Report ---      RONNIE SPEARS MD; Attending Radiologist  This document has been electronically signed. May  3 2024  7:25PM    < end of copied text >

## 2024-05-03 NOTE — ED PROVIDER NOTE - SECONDARY DIAGNOSIS.
Diverticulitis Perforated tympanic membrane, left Pneumonia Acute kidney injury superimposed on CKD Demand ischemia Atrial flutter Fall on same level from slipping, tripping and stumbling

## 2024-05-03 NOTE — H&P ADULT - HISTORY OF PRESENT ILLNESS
68-year-old male PMH of schizophrenia, CKD, lymphoma (in remission last chemo 12/2023), left elbow fracture s/p ORIF @[facility] ~30 years ago, and gout senting to the ED s/p fall.  Pt says that today he was mowing the lawn when he became lightheaded/dizzy and fell. Says he usually gets lightheaded when he goes from standing to seated position. Has had mechanical falls in the past and never blacked out or syncopized in the past.  Has also had productive cough for the past 1.5 weeks, says his father who lives with him has similar cough. Has had hearing difficulty for many years but only the past few days noted that he has pain behind the right ear. Also has diarrhea, cannot recall how many episodes daily, but says this is not new as well.  No chest pain, SOB, abdominal pain, visual auras or other symptoms.       In the ED, , BP 88/52. Lactate 8.2--->2.1, trops 89--->86, K 5.8 (hemolyzed), CO2 11, AG 26, BUN 47, Cr 3.9 (~baseline 2.5), , AST 54, ALT 55, BNP 5293. pH 7.26, HCO3 13. RVP/Sars Cov negative. CTH/C spine with Extensive opacification of the bilateral mastoids and left middle ear cavity, nonspecific. CTA PE with LLL consolidation and CTAP suggestive of diverticulitis. S/p 2L LR bolus, cefepime, azithro, vanc

## 2024-05-03 NOTE — H&P ADULT - ASSESSMENT
68-year-old male PMH of schizophrenia, CKD, lymphoma (in remission last chemo 12/2023), left elbow fracture s/p ORIF @[facility] ~30 years ago, and gout senting to the ED s/p fall.        #CAP  #B/l mastoid opacification, suspect mastoiditis  #?Diverticulitis  - productive cough for the past 1.5week, pts father and sister with similar symptoms  - afebrile, wbc normal  - CT chest suggestive of pna  - rocephin/azithro; get urine strep/legionella, MRSA nares; add vanc if positive  - CTH suggesting possible mastoiditis but nonspecific, ENT following  - ofloxacin ear drops, CT temporal bone  - CTAP suggestive of sigmoid diverticulitis; pt has diarrhea though this is chronic, not new  - no abdominal pain on exam   - can add flagyl, but would hold off unless pain develops or worsening diarrhea      #HAGMA 2/2 lactic acidosis and acute renal failure  - endorses relatively poor PO intake past day or so  - CO2 11, AG 26, BUN 47, Cr 3.9 (~baseline 2.5)  - pH 7.26, HCO3 13  - K 5.8, hemolyzed  - BNP 5293 but not overloaded, likely from [facility]  - IVF  - AM ABG  - AM lactate  - PO bicarb 1300mg TID  - if no improvement, consult nephro      DVT ppx: heparin subq  GI ppx: PPI  Diet: Regular  Full code  68-year-old male PMH of schizophrenia, CKD, lymphoma (in remission last chemo 12/2023), left elbow fracture s/p [facility]IF @[facility] ~30 years ago, and gout senting to the ED s/p fall.        #CAP  #B/l mastoid opacification, suspect mastoiditis  #?Diverticulitis  - productive cough for the past 1.5week, pts father and sister with similar symptoms  - afebrile, wbc normal  - CT chest suggestive of pna  - rocephin/doxy  -  send urine strep/legionella, MRSA nares; add vanc if positive  - CTH suggesting possible mastoiditis but nonspecific, ENT following  - ofloxacin ear drops, CT temporal bone  - CTAP suggestive of sigmoid diverticulitis; pt has diarrhea though this is chronic, not new  - no abdominal pain on exam   - can add flagyl, but would hold off unless pain develops or worsening diarrhea      #HAGMA 2/2 lactic acidosis and acute renal failure  - endorses relatively poor PO intake past day or so  - CO2 11, AG 26, BUN 47, Cr 3.9 (~baseline 2.5)  - pH 7.26, HCO3 13  - K 5.8, hemolyzed  - BNP 5293 but not overloaded, likely from [facility]  - IVF  - AM ABG  - AM lactate  - PO bicarb 1300mg TID  - if no improvement, consult nephro      DVT ppx: heparin subq  GI ppx: PPI  Diet: Regular  Full code  68-year-old male PMH of schizophrenia, CKD, lymphoma (in remission last chemo 12/2023), left elbow fracture s/p ORIF @[facility] ~30 years ago, and gout senting to the ED s/p fall.        #CAP  #B/l mastoid opacification, suspect mastoiditis  #?Diverticulitis  - productive cough for the past 1.5week, pts father and sister with similar symptoms  - afebrile, wbc normal  - CT chest suggestive of pna  - rocephin/doxy  -  send urine strep/legionella, MRSA nares; add vanc if positive  - CTH suggesting possible mastoiditis but nonspecific, ENT following  - ofloxacin ear drops, CT temporal bone  - CTAP suggestive of sigmoid diverticulitis; pt has diarrhea though this is chronic, not new  - no abdominal pain on exam   - can add flagyl, but would hold off unless pain develops or worsening diarrhea      #HAGMA 2/2 lactic acidosis and acute renal failure  - endorses relatively poor PO intake past day or so  - CO2 11, AG 26, BUN 47, Cr 3.9 (~baseline 2.5)  - pH 7.26, HCO3 13  - K 5.8, hemolyzed  - BNP 5293 but not overloaded, likely from [facility]  - IVF  - AM ABG  - AM lactate  - PO bicarb 1300mg TID  - if no improvement, consult nephro      #Mechanical fall  - fell while mowing the lawn  - no chest pain, auras, palpitatio before fall  - orthostatics  - PT consult    DVT ppx: heparin subq  GI ppx: PPI  Diet: Regular  Full code  68-year-old male PMH of schizophrenia, CKD, lymphoma (in remission last chemo 12/2023), left elbow fracture s/p ORIF @[facility] ~30 years ago, and gout senting to the ED s/p fall.        #CAP  #B/l mastoid opacification, suspect mastoiditis  #?Diverticulitis  - productive cough for the past 1.5week, pts father and sister with similar symptoms  - afebrile, wbc normal  - CT chest suggestive of pna  - rocephin/doxy  -  send urine strep/legionella, MRSA nares; add vanc if positive  - CTH suggesting possible mastoiditis but nonspecific, ENT following  - ofloxacin ear drops, CT temporal bone  - CTAP suggestive of sigmoid diverticulitis; pt has diarrhea though this is chronic, not new  - no abdominal pain on exam   - can add flagyl, but would hold off unless pain develops or worsening diarrhea      #HAGMA 2/2 lactic acidosis and acute renal failure  - endorses relatively poor PO intake past day or so  - CO2 11, AG 26, BUN 47, Cr 3.9 (~baseline 2.5)  - pH 7.26, HCO3 13  - K 5.8, hemolyzed  - BNP 5293 but not overloaded, likely from [facility]  - IVF  - AM ABG  - AM lactate  - PO bicarb 1300mg TID  - if no improvement, consult nephro      #Mechanical fall  - fell while mowing the lawn  - no chest pain, auras, palpitatio before fall  - orthostatics  - PT consult    #Elevated ALP  - appears chronically elevated, however higher than prior levels  - may be from hx of lymphoma, Ca normal otherwise  - follow with Dr. Gnog, last chemo 12/2023  - GGT    #Schizophrenia  - continue home prolixin and cogentin    DVT ppx: heparin subq  GI ppx: PPI  Diet: Regular  Full code

## 2024-05-03 NOTE — ED ADULT NURSE NOTE - CHIEF COMPLAINT QUOTE
Pt states two days ago he fell walking his dog and she pulled to hard and  hit his head. Pt this mow tried to mow his lawn and his heart was racing and SOB.

## 2024-05-04 LAB
ALBUMIN SERPL ELPH-MCNC: 3.4 G/DL — LOW (ref 3.5–5.2)
ALP SERPL-CCNC: 381 U/L — HIGH (ref 30–115)
ALT FLD-CCNC: 34 U/L — SIGNIFICANT CHANGE UP (ref 0–41)
ANION GAP SERPL CALC-SCNC: 16 MMOL/L — HIGH (ref 7–14)
AST SERPL-CCNC: 33 U/L — SIGNIFICANT CHANGE UP (ref 0–41)
BILIRUB SERPL-MCNC: 0.4 MG/DL — SIGNIFICANT CHANGE UP (ref 0.2–1.2)
BUN SERPL-MCNC: 50 MG/DL — HIGH (ref 10–20)
CALCIUM SERPL-MCNC: 8.2 MG/DL — LOW (ref 8.4–10.4)
CHLORIDE SERPL-SCNC: 105 MMOL/L — SIGNIFICANT CHANGE UP (ref 98–110)
CO2 SERPL-SCNC: 16 MMOL/L — LOW (ref 17–32)
CREAT SERPL-MCNC: 3.2 MG/DL — HIGH (ref 0.7–1.5)
EGFR: 21 ML/MIN/1.73M2 — LOW
GGT SERPL-CCNC: 114 U/L — HIGH (ref 1–40)
GLUCOSE SERPL-MCNC: 99 MG/DL — SIGNIFICANT CHANGE UP (ref 70–99)
HCT VFR BLD CALC: 28.5 % — LOW (ref 42–52)
HGB BLD-MCNC: 9.6 G/DL — LOW (ref 14–18)
LACTATE SERPL-SCNC: 0.8 MMOL/L — SIGNIFICANT CHANGE UP (ref 0.7–2)
MAGNESIUM SERPL-MCNC: 2.9 MG/DL — HIGH (ref 1.8–2.4)
MCHC RBC-ENTMCNC: 29.4 PG — SIGNIFICANT CHANGE UP (ref 27–31)
MCHC RBC-ENTMCNC: 33.7 G/DL — SIGNIFICANT CHANGE UP (ref 32–37)
MCV RBC AUTO: 87.2 FL — SIGNIFICANT CHANGE UP (ref 80–94)
NRBC # BLD: 0 /100 WBCS — SIGNIFICANT CHANGE UP (ref 0–0)
PLATELET # BLD AUTO: 136 K/UL — SIGNIFICANT CHANGE UP (ref 130–400)
PMV BLD: 9.6 FL — SIGNIFICANT CHANGE UP (ref 7.4–10.4)
POTASSIUM SERPL-MCNC: 3.8 MMOL/L — SIGNIFICANT CHANGE UP (ref 3.5–5)
POTASSIUM SERPL-SCNC: 3.8 MMOL/L — SIGNIFICANT CHANGE UP (ref 3.5–5)
PROT SERPL-MCNC: 4.7 G/DL — LOW (ref 6–8)
RBC # BLD: 3.27 M/UL — LOW (ref 4.7–6.1)
RBC # FLD: 14.2 % — SIGNIFICANT CHANGE UP (ref 11.5–14.5)
SODIUM SERPL-SCNC: 137 MMOL/L — SIGNIFICANT CHANGE UP (ref 135–146)
WBC # BLD: 3.5 K/UL — LOW (ref 4.8–10.8)
WBC # FLD AUTO: 3.5 K/UL — LOW (ref 4.8–10.8)

## 2024-05-04 PROCEDURE — 99223 1ST HOSP IP/OBS HIGH 75: CPT

## 2024-05-04 PROCEDURE — 70480 CT ORBIT/EAR/FOSSA W/O DYE: CPT | Mod: 26

## 2024-05-04 RX ORDER — SODIUM CHLORIDE 9 MG/ML
1000 INJECTION, SOLUTION INTRAVENOUS
Refills: 0 | Status: DISCONTINUED | OUTPATIENT
Start: 2024-05-04 | End: 2024-05-06

## 2024-05-04 RX ORDER — OFLOXACIN OTIC SOLUTION 3 MG/ML
10 SOLUTION/ DROPS AURICULAR (OTIC) DAILY
Refills: 0 | Status: DISCONTINUED | OUTPATIENT
Start: 2024-05-04 | End: 2024-05-07

## 2024-05-04 RX ORDER — CEFTRIAXONE 500 MG/1
1000 INJECTION, POWDER, FOR SOLUTION INTRAMUSCULAR; INTRAVENOUS EVERY 24 HOURS
Refills: 0 | Status: DISCONTINUED | OUTPATIENT
Start: 2024-05-03 | End: 2024-05-05

## 2024-05-04 RX ORDER — CHLORHEXIDINE GLUCONATE 213 G/1000ML
1 SOLUTION TOPICAL DAILY
Refills: 0 | Status: DISCONTINUED | OUTPATIENT
Start: 2024-05-04 | End: 2024-05-07

## 2024-05-04 RX ORDER — HEPARIN SODIUM 5000 [USP'U]/ML
5000 INJECTION INTRAVENOUS; SUBCUTANEOUS EVERY 8 HOURS
Refills: 0 | Status: DISCONTINUED | OUTPATIENT
Start: 2024-05-04 | End: 2024-05-07

## 2024-05-04 RX ORDER — SODIUM BICARBONATE 1 MEQ/ML
1300 SYRINGE (ML) INTRAVENOUS THREE TIMES A DAY
Refills: 0 | Status: DISCONTINUED | OUTPATIENT
Start: 2024-05-04 | End: 2024-05-07

## 2024-05-04 RX ORDER — PANTOPRAZOLE SODIUM 20 MG/1
40 TABLET, DELAYED RELEASE ORAL
Refills: 0 | Status: DISCONTINUED | OUTPATIENT
Start: 2024-05-04 | End: 2024-05-07

## 2024-05-04 RX ADMIN — SODIUM CHLORIDE 100 MILLILITER(S): 9 INJECTION, SOLUTION INTRAVENOUS at 05:16

## 2024-05-04 RX ADMIN — Medication 1300 MILLIGRAM(S): at 22:32

## 2024-05-04 RX ADMIN — Medication 100 MILLIGRAM(S): at 05:17

## 2024-05-04 RX ADMIN — FLUPHENAZINE HYDROCHLORIDE 5 MILLIGRAM(S): 1 TABLET, FILM COATED ORAL at 23:06

## 2024-05-04 RX ADMIN — SODIUM CHLORIDE 100 MILLILITER(S): 9 INJECTION, SOLUTION INTRAVENOUS at 22:34

## 2024-05-04 RX ADMIN — Medication 1300 MILLIGRAM(S): at 05:19

## 2024-05-04 RX ADMIN — Medication 100 MILLIGRAM(S): at 17:06

## 2024-05-04 RX ADMIN — HEPARIN SODIUM 5000 UNIT(S): 5000 INJECTION INTRAVENOUS; SUBCUTANEOUS at 06:30

## 2024-05-04 RX ADMIN — CHLORHEXIDINE GLUCONATE 1 APPLICATION(S): 213 SOLUTION TOPICAL at 11:37

## 2024-05-04 RX ADMIN — OFLOXACIN OTIC SOLUTION 10 DROP(S): 3 SOLUTION/ DROPS AURICULAR (OTIC) at 12:08

## 2024-05-04 RX ADMIN — FLUPHENAZINE HYDROCHLORIDE 10 MILLIGRAM(S): 1 TABLET, FILM COATED ORAL at 05:19

## 2024-05-04 RX ADMIN — HEPARIN SODIUM 5000 UNIT(S): 5000 INJECTION INTRAVENOUS; SUBCUTANEOUS at 13:30

## 2024-05-04 RX ADMIN — Medication 1 MILLIGRAM(S): at 23:06

## 2024-05-04 RX ADMIN — HEPARIN SODIUM 5000 UNIT(S): 5000 INJECTION INTRAVENOUS; SUBCUTANEOUS at 22:32

## 2024-05-04 RX ADMIN — Medication 1300 MILLIGRAM(S): at 13:29

## 2024-05-04 RX ADMIN — CEFTRIAXONE 100 MILLIGRAM(S): 500 INJECTION, POWDER, FOR SOLUTION INTRAMUSCULAR; INTRAVENOUS at 05:18

## 2024-05-04 RX ADMIN — PANTOPRAZOLE SODIUM 40 MILLIGRAM(S): 20 TABLET, DELAYED RELEASE ORAL at 06:30

## 2024-05-04 RX ADMIN — Medication 2 MILLIGRAM(S): at 05:19

## 2024-05-04 NOTE — CONSULT NOTE ADULT - ASSESSMENT
IMPRESSION:    Acute mastoiditis   PNA possible aspiration  ASHLEY  HO CKD  HO lymphoma  troponemia type 2  HO schizophrenia  HAGMA improved    PLAN:    CNS:  Avoid CNS depressants.     HEENT: Oral care. CT temporal bone. ENT follow up.    PULMONARY:  HOB @ 45 degrees.     CARDIOVASCULAR: Avoid fluid overload.     GI: GI prophylaxis.  Feeding per S&S.  Bowel regimen     RENAL:  Follow up lytes.  Correct as needed. Trend Cr. CW PO bicarb.    INFECTIOUS DISEASE: Follow up cultures. Urine legionella and strep. ceftriaxone and azithro.    HEMATOLOGICAL:  DVT prophylaxi HSQ.    ENDOCRINE:  Follow up FS.  Insulin protocol if needed.    MUSCULOSKELETAL: OOBTC    DG to Medical floor          IMPRESSION:    Acute mastoiditis   LLL pneumonia  Sepsis POA  Acute on chronic renal failure  HO CKD  HO lymphoma  troponemia type 2  HO schizophrenia  HAGMA improved  Lactic acidosis reolved      PLAN:    CNS:  Avoid CNS depressants.     HEENT: Oral care. CT temporal bone. ENT follow up.    PULMONARY:  HOB @ 45 degrees. keep SaO2 92 TO 96%    CARDIOVASCULAR: Avoid fluid overload.     GI: GI prophylaxis.  Feeding per S&S.  Bowel regimen     RENAL:  Follow up lytes.  Correct as needed. Trend Cr. CW PO bicarb.    INFECTIOUS DISEASE: Follow up cultures. Urine legionella and strep. abs ID eval    HEMATOLOGICAL:  DVT prophylaxis HSQ.    ENDOCRINE:  Follow up FS.  Insulin protocol if needed.    MUSCULOSKELETAL: OOBTC    DG to Medical floor

## 2024-05-04 NOTE — CHART NOTE - NSCHARTNOTEFT_GEN_A_CORE
Transfer Note    Transfer from: CEU    Transfer to: (X) Medicine    (  ) Telemetry     (  ) RCU       (  ) CEU    (  ) VENT                        (  ) Palliative    (  ) Stroke Unit    (  ) MICU    (  ) CCU    Signout given to:     ----------------------------------------------------------------------------------------------------------  HPI / CEU COURSE:    HPI:   68-year-old male PMH of schizophrenia, CKD, lymphoma (in remission last chemo 12/2023), left elbow fracture s/p ORIF @[facility] [[email or web address] ~30 years ago, and gout senting to the ED s/p fall.  Pt says that today he was mowing the lawn when he became lightheaded/dizzy and fell. Says he usually gets lightheaded when he goes from standing to seated position. Has had mechanical falls in the past and never blacked out or syncopized in the past.  Has also had productive cough for the past 1.5 weeks, says his father who lives with him has similar cough. Has had hearing difficulty for many years but only the past few days noted that he has pain behind the right ear. Also has diarrhea, cannot recall how many episodes daily, but says this is not new as well.  No chest pain, SOB, abdominal pain, visual auras or other symptoms.       In the ED, , BP 88/52. Lactate 8.2--->2.1, trops 89--->86, K 5.8 (hemolyzed), CO2 11, AG 26, BUN 47, Cr 3.9 (~baseline 2.5), , AST 54, ALT 55, BNP 5293. pH 7.26, HCO3 13. RVP/Sars Cov negative. CTH/C spine with Extensive opacification of the bilateral mastoids and left middle ear cavity, nonspecific. CTA PE with LLL consolidation and CTAP suggestive of diverticulitis. S/p 2L LR bolus, cefepime, azithro, vanc (03 May 2024 23:22)      --------------------------------------------------------------------------------------------------------  PMH/PSH:  PAST MEDICAL & SURGICAL HISTORY:  Kidney stones    Schizophrenia    Lymphoma    Shingles    Atrophic kidney    H/O colonoscopy with polypectomy    Liver cyst    History of bladder surgery    H/O neuropathy    History of chemotherapy    Stage 3 chronic kidney disease    Neuroendocrine malignancy    Neuroendocrine tumor status post surgical treatment    Neuroendocrine cancer    Retained urethral stent    H/O umbilical hernia repair    Elbow fracture    H/O cystoscopy        -------------------------------------------------------------------------------------------------------    Vital Signs Last 24 Hrs  T(C): 36 (04 May 2024 07:47), Max: 36.9 (03 May 2024 16:58)  T(F): 96.8 (04 May 2024 07:47), Max: 98.5 (03 May 2024 16:58)  HR: 84 (04 May 2024 07:47) (64 - 141)  BP: 105/67 (04 May 2024 07:47) (85/50 - 105/67)  BP(mean): 79 (04 May 2024 07:47) (66 - 79)  RR: 18 (04 May 2024 07:47) (16 - 19)  SpO2: 97% (04 May 2024 07:47) (96% - 100%)    Parameters below as of 04 May 2024 07:47  Patient On (Oxygen Delivery Method): nasal cannula        I&O's Summary    03 May 2024 07:01  -  04 May 2024 07:00  --------------------------------------------------------  IN: 600 mL / OUT: 300 mL / NET: 300 mL    04 May 2024 07:01  -  04 May 2024 09:25  --------------------------------------------------------  IN: 440 mL / OUT: 0 mL / NET: 440 mL        --------------------------------------------------------------------------------------------------------  LABS:                               9.6    3.50  )-----------( 136      ( 04 May 2024 05:50 )             28.5       05-04    137  |  105  |  50<H>  ----------------------------<  99  3.8   |  16<L>  |  3.2<H>    Ca    8.2<L>      04 May 2024 05:50  Mg     2.9     05-04    TPro  4.7<L>  /  Alb  3.4<L>  /  TBili  0.4  /  DBili  x   /  AST  33  /  ALT  34  /  AlkPhos  381<H>  05-04      PT/INR - ( 03 May 2024 17:26 )   PT: 12.90 sec;   INR: 1.13 ratio         PTT - ( 03 May 2024 17:26 )  PTT:30.3 sec            Specimen Source:   Method Type:   Gram Stain:   Culture Results:   Bacteria:     ---------------------------------------------------------------------------------------------------  ASSESSMENT & PLAN:     68-year-old male PMH of schizophrenia, CKD, lymphoma (in remission last chemo 12/2023), left elbow fracture s/p ORIF @[facility] [[email or web address]] ~30 years ago, and gout senting to the ED s/p fall.      #Severe Sepsis  on admission   #CAP  #B/l mastoid opacification, suspect mastoiditis  #?Diverticulitis  - CTH suggesting possible mastoiditis but nonspecific,   - CTAP suggestive of sigmoid diverticulitis; pt has diarrhea though this is chronic, not new  - CT chest suggestive of pna  - CT temporal bone pending   - continue rocephin/doxy  - ofloxacin ear drops,   - can add flagyl, but would hold off unless pain develops or worsening diarrhea  -  send urine strep/legionella, MRSA nares; add vanc if positive  - ENT following    #HAGMA 2/2 lactic acidosis and acute renal failure improving   #Hyperkalemia resolved   - endorses relatively poor PO intake past day or so  - BNP 5293 but not overloaded, likely from ASHLEY  - s/s - diet as per   - continue IVF  - continue sodium bicarb     #Mechanical fall vs dizziness from mastoiditis   - fell while mowing the lawn  - no chest pain, auras, palpitatio before fall  - orthostatics  - PT consult    #Elevated ALP  - appears chronically elevated, however higher than prior levels  - may be from hx of lymphoma, Ca normal otherwise  - follow with Dr. Gong, last chemo 12/2023  - GGT    #Schizophrenia  - continue home prolixin and cogentin    DVT ppx: heparin subq  GI ppx: PPI  Diet: Regular  Full code  Dispo : s/s, IV abx, CT temporal, PT consult, DGTF.      FOR FOLLOW UP:  [ ] S&S eval  [ ] CT temporal  [ ] PT consult Transfer Note    Transfer from: CEU    Transfer to: (X) Medicine    (  ) Telemetry     (  ) RCU       (  ) CEU    (  ) VENT                        (  ) Palliative    (  ) Stroke Unit    (  ) MICU    (  ) CCU    ----------------------------------------------------------------------------------------------------------  HPI / CEU COURSE:    HPI:   68-year-old male PMH of schizophrenia, CKD, lymphoma (in remission last chemo 12/2023), left elbow fracture s/p ORIF @[facility] ~30 years ago, and gout senting to the ED s/p fall.  Pt says that today he was mowing the lawn when he became lightheaded/dizzy and fell. Says he usually gets lightheaded when he goes from standing to seated position. Has had mechanical falls in the past and never blacked out or syncopized in the past.  Has also had productive cough for the past 1.5 weeks, says his father who lives with him has similar cough. Has had hearing difficulty for many years but only the past few days noted that he has pain behind the right ear. Also has diarrhea, cannot recall how many episodes daily, but says this is not new as well.  No chest pain, SOB, abdominal pain, visual auras or other symptoms.       In the ED, , BP 88/52. Lactate 8.2--->2.1, trops 89--->86, K 5.8 (hemolyzed), CO2 11, AG 26, BUN 47, Cr 3.9 (~baseline 2.5), , AST 54, ALT 55, BNP 5293. pH 7.26, HCO3 13. RVP/Sars Cov negative. CTH/C spine with Extensive opacification of the bilateral mastoids and left middle ear cavity, nonspecific. CTA PE with LLL consolidation and CTAP suggestive of diverticulitis. S/p 2L LR bolus, cefepime, azithro, vanc (03 May 2024 23:22)      CEU Course:  While in CEU, patient was given maintenance fluids, rocephin & doxy, and ofloxacin ear drops. ID consult placed for abx recs. Patient  noted to have improvement in AG, lactate and CR. CT of the temporal bone was performed as per ENT request (still pending read). Patient stable for downgrade to medical floor.      --------------------------------------------------------------------------------------------------------  PMH/PSH:  PAST MEDICAL & SURGICAL HISTORY:  Kidney stones    Schizophrenia    Lymphoma    Shingles    Atrophic kidney    H/O colonoscopy with polypectomy    Liver cyst    History of bladder surgery    H/O neuropathy    History of chemotherapy    Stage 3 chronic kidney disease    Neuroendocrine malignancy    Neuroendocrine tumor status post surgical treatment    Neuroendocrine cancer    Retained urethral stent    H/O umbilical hernia repair    Elbow fracture    H/O cystoscopy        -------------------------------------------------------------------------------------------------------    Vital Signs Last 24 Hrs  T(C): 36 (04 May 2024 07:47), Max: 36.9 (03 May 2024 16:58)  T(F): 96.8 (04 May 2024 07:47), Max: 98.5 (03 May 2024 16:58)  HR: 84 (04 May 2024 07:47) (64 - 141)  BP: 105/67 (04 May 2024 07:47) (85/50 - 105/67)  BP(mean): 79 (04 May 2024 07:47) (66 - 79)  RR: 18 (04 May 2024 07:47) (16 - 19)  SpO2: 97% (04 May 2024 07:47) (96% - 100%)    Parameters below as of 04 May 2024 07:47  Patient On (Oxygen Delivery Method): nasal cannula        I&O's Summary    03 May 2024 07:01  -  04 May 2024 07:00  --------------------------------------------------------  IN: 600 mL / OUT: 300 mL / NET: 300 mL    04 May 2024 07:01  -  04 May 2024 09:25  --------------------------------------------------------  IN: 440 mL / OUT: 0 mL / NET: 440 mL        --------------------------------------------------------------------------------------------------------  LABS:                               9.6    3.50  )-----------( 136      ( 04 May 2024 05:50 )             28.5       05-04    137  |  105  |  50<H>  ----------------------------<  99  3.8   |  16<L>  |  3.2<H>    Ca    8.2<L>      04 May 2024 05:50  Mg     2.9     05-04    TPro  4.7<L>  /  Alb  3.4<L>  /  TBili  0.4  /  DBili  x   /  AST  33  /  ALT  34  /  AlkPhos  381<H>  05-04      PT/INR - ( 03 May 2024 17:26 )   PT: 12.90 sec;   INR: 1.13 ratio         PTT - ( 03 May 2024 17:26 )  PTT:30.3 sec            Specimen Source:   Method Type:   Gram Stain:   Culture Results:   Bacteria:     ---------------------------------------------------------------------------------------------------  ASSESSMENT & PLAN:     68-year-old male PMH of schizophrenia, CKD, lymphoma (in remission last chemo 12/2023), left elbow fracture s/p ORIF @[facility] ~30 years ago, and gout senting to the ED s/p fall.      #Severe Sepsis  on admission   #CAP  #B/l mastoid opacification, suspect mastoiditis  #?Diverticulitis  - CTH suggesting possible mastoiditis but nonspecific,   - CTAP suggestive of sigmoid diverticulitis; pt has diarrhea though this is chronic, not new  - CT chest suggestive of pna  - CT temporal bone pending   - continue rocephin/doxy  - ofloxacin ear drops,   - can add flagyl, but would hold off unless pain develops or worsening diarrhea  -  send urine strep/legionella, MRSA nares; add vanc if positive  - ENT following    #HAGMA 2/2 lactic acidosis and acute renal failure improving   #Hyperkalemia resolved   - endorses relatively poor PO intake past day or so  - BNP 5293 but not overloaded, likely from ASHLEY  - s/s - diet as per   - continue IVF  - continue sodium bicarb     #Mechanical fall vs dizziness from mastoiditis   - fell while mowing the lawn  - no chest pain, auras, palpitatio before fall  - orthostatics  - PT consult    #Elevated ALP  - appears chronically elevated, however higher than prior levels  - may be from hx of lymphoma, Ca normal otherwise  - follow with Dr. Gong, last chemo 12/2023  - GGT    #Schizophrenia  - continue home prolixin and cogentin    DVT ppx: heparin subq  GI ppx: PPI  Diet: Regular  Full code  Dispo : s/s, IV abx, CT temporal, PT consult, DGTF.      FOR FOLLOW UP:  [ ] S&S eval  [ ] CT temporal  [ ] PT consult  [ ] ID eval  [ ] F/u urine strep/legionella

## 2024-05-04 NOTE — CONSULT NOTE ADULT - ATTENDING COMMENTS
events noted presented sp fall, has been co cough, ear pain, CT head / chest/ abdomen noted/ LLL PNA, mastoiditis, HO lymphoma, LA 8 improved with IVF, on abx, acute on chronic renal failure, keep abx, ID eval, pancx, fup CT temporal bones

## 2024-05-04 NOTE — PROGRESS NOTE ADULT - ASSESSMENT
68-year-old male PMH of schizophrenia, CKD, lymphoma (in remission last chemo 12/2023), left elbow fracture s/p ORIF @[facility] ~30 years ago, and gout senting to the ED s/p fall.        #CAP  #B/l mastoid opacification, suspect mastoiditis  #?Diverticulitis  - productive cough for the past 1.5week, pts father and sister with similar symptoms  - afebrile, wbc normal  - CT chest suggestive of pna  - rocephin/doxy  -  send urine strep/legionella, MRSA nares; add vanc if positive  - CTH suggesting possible mastoiditis but nonspecific, ENT following  - ofloxacin ear drops, CT temporal bone  - CTAP suggestive of sigmoid diverticulitis; pt has diarrhea though this is chronic, not new  - no abdominal pain on exam   - can add flagyl, but would hold off unless pain develops or worsening diarrhea      #HAGMA 2/2 lactic acidosis and acute renal failure  - endorses relatively poor PO intake past day or so  - CO2 11, AG 26, BUN 47, Cr 3.9 (~baseline 2.5)  - pH 7.26, HCO3 13  - K 5.8, hemolyzed  - BNP 5293 but not overloaded, likely from ASHLEY  - IVF  - AM ABG  - AM lactate  - PO bicarb 1300mg TID  - if no improvement, consult nephro      #Mechanical fall  - fell while mowing the lawn  - no chest pain, auras, palpitatio before fall  - orthostatics  - PT consult    #Elevated ALP  - appears chronically elevated, however higher than prior levels  - may be from hx of lymphoma, Ca normal otherwise  - follow with Dr. Gong, last chemo 12/2023  - GGT    #Schizophrenia  - continue home prolixin and cogentin    DVT ppx: heparin subq  GI ppx: PPI  Diet: Regular  Full code

## 2024-05-04 NOTE — PROGRESS NOTE ADULT - SUBJECTIVE AND OBJECTIVE BOX
24H events:    Patient is a 68y old Male who presents with a chief complaint of pna, mastoiditis (03 May 2024 23:22)    Primary diagnosis of Sepsis    Today is hospital day 1d. This morning patient was seen and examined at bedside, resting comfortably in bed.    No acute or major events overnight.    PAST MEDICAL & SURGICAL HISTORY  Kidney stones    Schizophrenia    Lymphoma    Shingles    Atrophic kidney    H/O colonoscopy with polypectomy    Liver cyst    History of bladder surgery    H/O neuropathy    History of chemotherapy    Stage 3 chronic kidney disease    Neuroendocrine malignancy    Neuroendocrine tumor status post surgical treatment    Neuroendocrine cancer    Retained urethral stent    H/O umbilical hernia repair    Elbow fracture    H/O cystoscopy      SOCIAL HISTORY:  Social History:      ALLERGIES:  allopurinol (Rash (Moderate))    MEDICATIONS:  STANDING MEDICATIONS  benztropine 2 milliGRAM(s) Oral <User Schedule>  benztropine 1 milliGRAM(s) Oral at bedtime  cefTRIAXone   IVPB 1000 milliGRAM(s) IV Intermittent every 24 hours  chlorhexidine 2% Cloths 1 Application(s) Topical daily  doxycycline IVPB 100 milliGRAM(s) IV Intermittent every 12 hours  fluPHENAZine 10 milliGRAM(s) Oral <User Schedule>  fluPHENAZine 5 milliGRAM(s) Oral at bedtime  heparin   Injectable 5000 Unit(s) SubCutaneous every 8 hours  lactated ringers. 1000 milliLiter(s) IV Continuous <Continuous>  ofloxacin 0.3% Ophthalmic Solution for OTIC Use 10 Drop(s) Both Ears daily  pantoprazole    Tablet 40 milliGRAM(s) Oral before breakfast  sodium bicarbonate 1300 milliGRAM(s) Oral three times a day    PRN MEDICATIONS    VITALS:   T(F): 96.4  HR: 70  BP: 85/50  RR: 18  SpO2: 96%    PHYSICAL EXAM:  VITAL SIGNS: AFebrile, vital signs stable  CONSTITUTIONAL: Well-developed; well-nourished; in no acute distress.  SKIN: Skin exam is warm and dry, no acute rash.  HEAD: Normocephalic; atraumatic.  EYES: Pupils equal round reactive to light, Extraocular movements intact; conjunctiva and sclera clear.  ENT: No nasal discharge; airway clear. Moist mucus membranes.  NECK: Supple; non tender. No rigidity  CARD: Regular rate and rhythm. Normal S1, S2; no murmurs, gallops, or rubs.  RESP: Lungs clear to auscultation bilaterally. No wheezes, rales or rhonchi.  ABD: Abdomen soft; non-tender; non-distended;  no hepatosplenomegaly. No costovertebral angle tenderness.   EXT: Normal ROM. No clubbing, cyanosis or edema. No calf tenderness to palpation.  NEURO: Alert and oriented x 3. No focal deficits.  PSYCH: Cooperative, appropriate.    LABS:                        9.6    3.50  )-----------( 136      ( 04 May 2024 05:50 )             28.5     05-04    137  |  105  |  50<H>  ----------------------------<  99  3.8   |  16<L>  |  3.2<H>    Ca    8.2<L>      04 May 2024 05:50  Mg     2.9     05-04    TPro  4.7<L>  /  Alb  3.4<L>  /  TBili  0.4  /  DBili  x   /  AST  33  /  ALT  34  /  AlkPhos  381<H>  05-04    PT/INR - ( 03 May 2024 17:26 )   PT: 12.90 sec;   INR: 1.13 ratio         PTT - ( 03 May 2024 17:26 )  PTT:30.3 sec  Urinalysis Basic - ( 04 May 2024 05:50 )    Color: x / Appearance: x / SG: x / pH: x  Gluc: 99 mg/dL / Ketone: x  / Bili: x / Urobili: x   Blood: x / Protein: x / Nitrite: x   Leuk Esterase: x / RBC: x / WBC x   Sq Epi: x / Non Sq Epi: x / Bacteria: x        Lactate, Blood: 0.8 mmol/L (05-04-24 @ 05:50)  Lactate, Blood: 2.1 mmol/L *H* (05-03-24 @ 19:22)          RADIOLOGY:           24H events:    Patient is a 68y old Male who presents with a chief complaint of pna, mastoiditis (03 May 2024 23:22)    Primary diagnosis of Sepsis    Today is hospital day 1d. This morning patient was seen and examined at bedside, resting comfortably in bed.    No acute or major events overnight.    PAST MEDICAL & SURGICAL HISTORY  Kidney stones    Schizophrenia    Lymphoma    Shingles    Atrophic kidney    H/O colonoscopy with polypectomy    Liver cyst    History of bladder surgery    H/O neuropathy    History of chemotherapy    Stage 3 chronic kidney disease    Neuroendocrine malignancy    Neuroendocrine tumor status post surgical treatment    Neuroendocrine cancer    Retained urethral stent    H/O umbilical hernia repair    Elbow fracture    H/O cystoscopy      SOCIAL HISTORY:  Social History:      ALLERGIES:  allopurinol (Rash (Moderate))    MEDICATIONS:  STANDING MEDICATIONS  benztropine 2 milliGRAM(s) Oral <User Schedule>  benztropine 1 milliGRAM(s) Oral at bedtime  cefTRIAXone   IVPB 1000 milliGRAM(s) IV Intermittent every 24 hours  chlorhexidine 2% Cloths 1 Application(s) Topical daily  doxycycline IVPB 100 milliGRAM(s) IV Intermittent every 12 hours  fluPHENAZine 10 milliGRAM(s) Oral <User Schedule>  fluPHENAZine 5 milliGRAM(s) Oral at bedtime  heparin   Injectable 5000 Unit(s) SubCutaneous every 8 hours  lactated ringers. 1000 milliLiter(s) IV Continuous <Continuous>  ofloxacin 0.3% Ophthalmic Solution for OTIC Use 10 Drop(s) Both Ears daily  pantoprazole    Tablet 40 milliGRAM(s) Oral before breakfast  sodium bicarbonate 1300 milliGRAM(s) Oral three times a day    PRN MEDICATIONS    VITALS:   T(F): 96.4  HR: 70  BP: 85/50  RR: 18  SpO2: 96%    PHYSICAL EXAM:    CONSTITUTIONAL: No acute distress.  HEAD: Normocephalic; atraumatic.  NECK: Supple; non tender.   CARD: Regular rate and rhythm. Normal S1, S2  RESP: + b/l air entry  ABD: Abdomen soft; non-tender; non-distended;   EXT: No clubbing, cyanosis or edema.  NEURO: Awake and Alert      GENERAL: NAD, lying in bed comfortably  CHEST/LUNG: Clear to auscultation bilaterally; No rales, rhonchi, wheezing, or rubs. Unlabored respirations  HEART: Regular rate and rhythm; No murmurs, rubs, or gallops  ABDOMEN: Bowel sounds present; Soft, Nontender, Nondistended. No hepatomegally  EXTREMITIES:  2+ Peripheral Pulses, brisk capillary refill. No clubbing, cyanosis, or edema  NERVOUS SYSTEM:  Alert & Oriented X3, speech clear. No deficits   LABS:                        9.6    3.50  )-----------( 136      ( 04 May 2024 05:50 )             28.5     05-04    137  |  105  |  50<H>  ----------------------------<  99  3.8   |  16<L>  |  3.2<H>    Ca    8.2<L>      04 May 2024 05:50  Mg     2.9     05-04    TPro  4.7<L>  /  Alb  3.4<L>  /  TBili  0.4  /  DBili  x   /  AST  33  /  ALT  34  /  AlkPhos  381<H>  05-04    PT/INR - ( 03 May 2024 17:26 )   PT: 12.90 sec;   INR: 1.13 ratio         PTT - ( 03 May 2024 17:26 )  PTT:30.3 sec  Urinalysis Basic - ( 04 May 2024 05:50 )    Color: x / Appearance: x / SG: x / pH: x  Gluc: 99 mg/dL / Ketone: x  / Bili: x / Urobili: x   Blood: x / Protein: x / Nitrite: x   Leuk Esterase: x / RBC: x / WBC x   Sq Epi: x / Non Sq Epi: x / Bacteria: x        Lactate, Blood: 0.8 mmol/L (05-04-24 @ 05:50)  Lactate, Blood: 2.1 mmol/L *H* (05-03-24 @ 19:22)          RADIOLOGY:

## 2024-05-04 NOTE — CONSULT NOTE ADULT - SUBJECTIVE AND OBJECTIVE BOX
Patient is a 68y old  Male who presents with a chief complaint of pna, mastoiditis (04 May 2024 07:09)      HPI:   68-year-old male PMH of schizophrenia, CKD, lymphoma (in remission last chemo 12/2023), left elbow fracture s/p ORIF @[facility] ~30 years ago, and gout senting to the ED s/p fall.  Pt says that today he was mowing the lawn when he became lightheaded/dizzy and fell. Says he usually gets lightheaded when he goes from standing to seated position. Has had mechanical falls in the past and never blacked out or syncopized in the past.  Has also had productive cough for the past 1.5 weeks, says his father who lives with him has similar cough. Has had hearing difficulty for many years but only the past few days noted that he has pain behind the right ear. Also has diarrhea, cannot recall how many episodes daily, but says this is not new as well.  No chest pain, SOB, abdominal pain, visual auras or other symptoms.       In the ED, , BP 88/52. Lactate 8.2--->2.1, trops 89--->86, K 5.8 (hemolyzed), CO2 11, AG 26, BUN 47, Cr 3.9 (~baseline 2.5), , AST 54, ALT 55, BNP 5293. pH 7.26, HCO3 13. RVP/Sars Cov negative. CTH/C spine with Extensive opacification of the bilateral mastoids and left middle ear cavity, nonspecific. CTA PE with LLL consolidation and CTAP suggestive of diverticulitis. S/p 2L LR bolus, cefepime, azithro, vanc (03 May 2024 23:22)      PAST MEDICAL & SURGICAL HISTORY:  Kidney stones      Schizophrenia      Lymphoma      Shingles      Atrophic kidney      H/O colonoscopy with polypectomy      Liver cyst      History of bladder surgery      H/O neuropathy      History of chemotherapy      Stage 3 chronic kidney disease      Neuroendocrine malignancy      Neuroendocrine tumor status post surgical treatment      Neuroendocrine cancer      Retained urethral stent      H/O umbilical hernia repair      Elbow fracture      H/O cystoscopy          SOCIAL HX:   Smoking                         ETOH                            Other    FAMILY HISTORY:  FH: hypertension    FH: diabetes mellitus    :  No known cardiovacular family hisotry     Review Of Systems:     All ROS are negative except per HPI       Allergies    allopurinol (Rash (Moderate))    Intolerances          PHYSICAL EXAM    ICU Vital Signs Last 24 Hrs  T(C): 36 (04 May 2024 07:47), Max: 36.9 (03 May 2024 16:58)  T(F): 96.8 (04 May 2024 07:47), Max: 98.5 (03 May 2024 16:58)  HR: 84 (04 May 2024 07:47) (64 - 141)  BP: 105/67 (04 May 2024 07:47) (85/50 - 105/67)  BP(mean): 79 (04 May 2024 07:47) (66 - 79)  ABP: --  ABP(mean): --  RR: 18 (04 May 2024 07:47) (16 - 19)  SpO2: 97% (04 May 2024 07:47) (96% - 100%)    O2 Parameters below as of 04 May 2024 07:47  Patient On (Oxygen Delivery Method): nasal cannula            CONSTITUTIONAL:   In NAD    ENT:   Airway patent,   Mouth with normal mucosa.   No thrush      CARDIAC:   Normal rate,   Regular rhythm.    No edema      Vascular:   normal systolic impulse  no bruits    RESPIRATORY:   No wheezing  Bilateral BS   Not tachypneic,  No use of accessory muscles    GASTROINTESTINAL:  Abdomen soft,   Non-tender,   No guarding,   + BS      NEUROLOGICAL:   Alert and oriented   No motor deficits.    SKIN:   Skin normal color for race,           05-03-24 @ 07:01  -  05-04-24 @ 07:00  --------------------------------------------------------  IN:    IV PiggyBack: 100 mL    Lactated Ringers: 500 mL  Total IN: 600 mL    OUT:    Voided (mL): 300 mL  Total OUT: 300 mL    Total NET: 300 mL          LABS:                          9.6    3.50  )-----------( 136      ( 04 May 2024 05:50 )             28.5                                               05-04    137  |  105  |  50<H>  ----------------------------<  99  3.8   |  16<L>  |  3.2<H>    Ca    8.2<L>      04 May 2024 05:50  Mg     2.9     05-04    TPro  4.7<L>  /  Alb  3.4<L>  /  TBili  0.4  /  DBili  x   /  AST  33  /  ALT  34  /  AlkPhos  381<H>  05-04      PT/INR - ( 03 May 2024 17:26 )   PT: 12.90 sec;   INR: 1.13 ratio         PTT - ( 03 May 2024 17:26 )  PTT:30.3 sec                                       Urinalysis Basic - ( 04 May 2024 05:50 )    Color: x / Appearance: x / SG: x / pH: x  Gluc: 99 mg/dL / Ketone: x  / Bili: x / Urobili: x   Blood: x / Protein: x / Nitrite: x   Leuk Esterase: x / RBC: x / WBC x   Sq Epi: x / Non Sq Epi: x / Bacteria: x                                                  LIVER FUNCTIONS - ( 04 May 2024 05:50 )  Alb: 3.4 g/dL / Pro: 4.7 g/dL / ALK PHOS: 381 U/L / ALT: 34 U/L / AST: 33 U/L / GGT: 114 U/L                                                                                                                                   X-Rays reviewed                                                                                     ECHO    CXR interpreted by me     MEDICATIONS  (STANDING):  benztropine 1 milliGRAM(s) Oral at bedtime  benztropine 2 milliGRAM(s) Oral <User Schedule>  cefTRIAXone   IVPB 1000 milliGRAM(s) IV Intermittent every 24 hours  chlorhexidine 2% Cloths 1 Application(s) Topical daily  doxycycline IVPB 100 milliGRAM(s) IV Intermittent every 12 hours  fluPHENAZine 10 milliGRAM(s) Oral <User Schedule>  fluPHENAZine 5 milliGRAM(s) Oral at bedtime  heparin   Injectable 5000 Unit(s) SubCutaneous every 8 hours  lactated ringers. 1000 milliLiter(s) (100 mL/Hr) IV Continuous <Continuous>  ofloxacin 0.3% Ophthalmic Solution for OTIC Use 10 Drop(s) Both Ears daily  pantoprazole    Tablet 40 milliGRAM(s) Oral before breakfast  sodium bicarbonate 1300 milliGRAM(s) Oral three times a day    MEDICATIONS  (PRN):         Patient is a 68y old  Male who presents with a chief complaint of pna, mastoiditis (04 May 2024 07:09)      HPI:   68-year-old male PMH of schizophrenia, CKD, lymphoma (in remission last chemo 12/2023), left elbow fracture s/p ORIF @[facility] ~30 years ago, and gout presented to the ED s/p fall.  Pt says that today he was mowing the lawn when he became lightheaded/dizzy and fell. Says he usually gets lightheaded when he goes from standing to seated position. Has had mechanical falls in the past and never blacked out or syncopized in the past.  Has also had productive cough for the past 1.5 weeks, says his father who lives with him has similar cough. Has had hearing difficulty for many years but only the past few days noted that he has pain behind the right ear. Also has diarrhea, cannot recall how many episodes daily, but says this is not new as well.  No chest pain, SOB, abdominal pain, visual auras or other symptoms.       In the ED, , BP 88/52. Lactate 8.2--->2.1, trops 89--->86, K 5.8 (hemolyzed), CO2 11, AG 26, BUN 47, Cr 3.9 (~baseline 2.5), , AST 54, ALT 55, BNP 5293. pH 7.26, HCO3 13. RVP/Sars Cov negative. CTH/C spine with Extensive opacification of the bilateral mastoids and left middle ear cavity, nonspecific. CTA PE with LLL consolidation and CTAP suggestive of diverticulitis. S/p 2L LR bolus, cefepime, azithro, vanc (03 May 2024 23:22) admitted to SDU, seen by ENT      PAST MEDICAL & SURGICAL HISTORY:  Kidney stones      Schizophrenia      Lymphoma      Shingles      Atrophic kidney      H/O colonoscopy with polypectomy      Liver cyst      History of bladder surgery      H/O neuropathy      History of chemotherapy      Stage 3 chronic kidney disease      Neuroendocrine malignancy      Neuroendocrine tumor status post surgical treatment      Neuroendocrine cancer      Retained urethral stent      H/O umbilical hernia repair      Elbow fracture      H/O cystoscopy          SOCIAL HX:   Smoking-    FAMILY HISTORY:  FH: hypertension    FH: diabetes mellitus    :  No known cardiovascular family history     Review Of Systems:     All ROS are negative except per HPI       Allergies    allopurinol (Rash (Moderate))    Intolerances          PHYSICAL EXAM    ICU Vital Signs Last 24 Hrs  T(C): 36 (04 May 2024 07:47), Max: 36.9 (03 May 2024 16:58)  T(F): 96.8 (04 May 2024 07:47), Max: 98.5 (03 May 2024 16:58)  HR: 84 (04 May 2024 07:47) (64 - 141)  BP: 105/67 (04 May 2024 07:47) (85/50 - 105/67)  BP(mean): 79 (04 May 2024 07:47) (66 - 79)  RR: 18 (04 May 2024 07:47) (16 - 19)  SpO2: 97% (04 May 2024 07:47) (96% - 100%)    O2 Parameters below as of 04 May 2024 07:47  Patient On (Oxygen Delivery Method): nasal cannula            CONSTITUTIONAL:   In NAD    ENT:   No redness/ tendermess      CARDIAC:   Normal rate,   Regular rhythm.    No edema          RESPIRATORY:   l basilar crackles    GASTROINTESTINAL:  Abdomen soft,   Non-tender,   No guarding,   + BS      NEUROLOGICAL:   Alert and oriented   No motor deficits.              05-03-24 @ 07:01  -  05-04-24 @ 07:00  --------------------------------------------------------  IN:    IV PiggyBack: 100 mL    Lactated Ringers: 500 mL  Total IN: 600 mL    OUT:    Voided (mL): 300 mL  Total OUT: 300 mL    Total NET: 300 mL          LABS:                          9.6    3.50  )-----------( 136      ( 04 May 2024 05:50 )             28.5                                               05-04    137  |  105  |  50<H>  ----------------------------<  99  3.8   |  16<L>  |  3.2<H>    Ca    8.2<L>      04 May 2024 05:50  Mg     2.9     05-04    TPro  4.7<L>  /  Alb  3.4<L>  /  TBili  0.4  /  DBili  x   /  AST  33  /  ALT  34  /  AlkPhos  381<H>  05-04      PT/INR - ( 03 May 2024 17:26 )   PT: 12.90 sec;   INR: 1.13 ratio         PTT - ( 03 May 2024 17:26 )  PTT:30.3 sec                                       Urinalysis Basic - ( 04 May 2024 05:50 )    Color: x / Appearance: x / SG: x / pH: x  Gluc: 99 mg/dL / Ketone: x  / Bili: x / Urobili: x   Blood: x / Protein: x / Nitrite: x   Leuk Esterase: x / RBC: x / WBC x   Sq Epi: x / Non Sq Epi: x / Bacteria: x                                                  LIVER FUNCTIONS - ( 04 May 2024 05:50 )  Alb: 3.4 g/dL / Pro: 4.7 g/dL / ALK PHOS: 381 U/L / ALT: 34 U/L / AST: 33 U/L / GGT: 114 U/L                                                                                                                                   MEDICATIONS  (STANDING):  benztropine 1 milliGRAM(s) Oral at bedtime  benztropine 2 milliGRAM(s) Oral <User Schedule>  cefTRIAXone   IVPB 1000 milliGRAM(s) IV Intermittent every 24 hours  chlorhexidine 2% Cloths 1 Application(s) Topical daily  doxycycline IVPB 100 milliGRAM(s) IV Intermittent every 12 hours  fluPHENAZine 10 milliGRAM(s) Oral <User Schedule>  fluPHENAZine 5 milliGRAM(s) Oral at bedtime  heparin   Injectable 5000 Unit(s) SubCutaneous every 8 hours  lactated ringers. 1000 milliLiter(s) (100 mL/Hr) IV Continuous <Continuous>  ofloxacin 0.3% Ophthalmic Solution for OTIC Use 10 Drop(s) Both Ears daily  pantoprazole    Tablet 40 milliGRAM(s) Oral before breakfast  sodium bicarbonate 1300 milliGRAM(s) Oral three times a day

## 2024-05-04 NOTE — PROGRESS NOTE ADULT - ATTENDING COMMENTS
68-year-old male PMH of schizophrenia, CKD, lymphoma (in remission last chemo 12/2023), left elbow fracture s/p ORIF @[facility] ~30 years ago, and gout senting to the ED s/p fall.  Pt says that today he was mowing the lawn when he became lightheaded/dizzy and fell. Says he usually gets lightheaded when he goes from standing to seated position. Has had mechanical falls in the past and never blacked out or syncopized in the past.  Has also had productive cough for the past 1.5 weeks, says his father who lives with him has similar cough. Has had hearing difficulty for many years but only the past few days noted that he has pain behind the right ear. Also has diarrhea, cannot recall how many episodes daily, but says this is not new as well.  No chest pain, SOB, abdominal pain, visual auras or other symptoms.       In the ED, , BP 88/52. Lactate 8.2--->2.1, trops 89--->86, K 5.8 (hemolyzed), CO2 11, AG 26, BUN 47, Cr 3.9 (~baseline 2.5), , AST 54, ALT 55, BNP 5293. pH 7.26, HCO3 13. RVP/Sars Cov negative. CTH/C spine with Extensive opacification of the bilateral mastoids and left middle ear cavity, nonspecific. CTA PE with LLL consolidation and CTAP suggestive of diverticulitis. S/p 2L LR bolus, cefepime, azithro, vanc (03 May 2024 23:22)    GENERAL: NAD, lying in bed comfortably  CHEST/LUNG: Clear to auscultation bilaterally; No rales, rhonchi, wheezing, or rubs. Unlabored respirations  HEART: Regular rate and rhythm; No murmurs, rubs, or gallops  ABDOMEN: Bowel sounds present; Soft, Nontender, Nondistended. No hepatomegally  EXTREMITIES:  2+ Peripheral Pulses, brisk capillary refill. No clubbing, cyanosis, or edema  NERVOUS SYSTEM:  Alert & Oriented X3, speech clear. No deficits     #Severe Sepsis  on admission   #CAP  #B/l mastoid opacification, suspect mastoiditis  #?Diverticulitis  - CTH suggesting possible mastoiditis but nonspecific,   - CTAP suggestive of sigmoid diverticulitis; pt has diarrhea though this is chronic, not new  - CT chest suggestive of pna  - CT temporal bone pending   - continue rocephin/doxy  - ofloxacin ear drops,   - can add flagyl, but would hold off unless pain develops or worsening diarrhea  -  send urine strep/legionella, MRSA nares; add vanc if positive  - ENT following    #HAGMA 2/2 lactic acidosis and acute renal failure improving   #Hyperkalemia resolved   - endorses relatively poor PO intake past day or so  - BNP 5293 but not overloaded, likely from ASHLEY  - s/s - diet as per   - continue IVF  - continue sodium bicarb     #Mechanical fall vs dizziness from mastoiditis   - fell while mowing the lawn  - no chest pain, auras, palpitatio before fall  - orthostatics  - PT consult    #Elevated ALP  - appears chronically elevated, however higher than prior levels  - may be from hx of lymphoma, Ca normal otherwise  - follow with Dr. Gong, last chemo 12/2023  - GGT    #Schizophrenia  - continue home prolixin and cogentin    DVT ppx: heparin subq  GI ppx: PPI  Diet: Regular  Full code  Dispo : s/s, IV abx, CT temporal, PT consult, DGTF

## 2024-05-04 NOTE — SWALLOW BEDSIDE ASSESSMENT ADULT - SLP PERTINENT HISTORY OF CURRENT PROBLEM
68-year-old male PMH of schizophrenia, CKD, non-Hodgkin's lymphoma (in remission last chemo 12/2023), left elbow fracture s/p ORIF @[facility] ~30 years ago, and gout. Presents to the ED s/p fall. CTH-->No acute intracranial findings. Extensive opacification of the bilateral mastoids and left middle ear cavity. R/o mastoiditis.

## 2024-05-05 LAB
ALBUMIN SERPL ELPH-MCNC: 3.1 G/DL — LOW (ref 3.5–5.2)
ALP SERPL-CCNC: 370 U/L — HIGH (ref 30–115)
ALT FLD-CCNC: 41 U/L — SIGNIFICANT CHANGE UP (ref 0–41)
ANION GAP SERPL CALC-SCNC: 12 MMOL/L — SIGNIFICANT CHANGE UP (ref 7–14)
AST SERPL-CCNC: 39 U/L — SIGNIFICANT CHANGE UP (ref 0–41)
BASOPHILS # BLD AUTO: 0.02 K/UL — SIGNIFICANT CHANGE UP (ref 0–0.2)
BASOPHILS NFR BLD AUTO: 0.8 % — SIGNIFICANT CHANGE UP (ref 0–1)
BILIRUB SERPL-MCNC: 0.3 MG/DL — SIGNIFICANT CHANGE UP (ref 0.2–1.2)
BUN SERPL-MCNC: 42 MG/DL — HIGH (ref 10–20)
CALCIUM SERPL-MCNC: 8.3 MG/DL — LOW (ref 8.4–10.5)
CHLORIDE SERPL-SCNC: 110 MMOL/L — SIGNIFICANT CHANGE UP (ref 98–110)
CO2 SERPL-SCNC: 18 MMOL/L — SIGNIFICANT CHANGE UP (ref 17–32)
CREAT SERPL-MCNC: 2.6 MG/DL — HIGH (ref 0.7–1.5)
EGFR: 26 ML/MIN/1.73M2 — LOW
EOSINOPHIL # BLD AUTO: 0.07 K/UL — SIGNIFICANT CHANGE UP (ref 0–0.7)
EOSINOPHIL NFR BLD AUTO: 2.9 % — SIGNIFICANT CHANGE UP (ref 0–8)
GLUCOSE SERPL-MCNC: 102 MG/DL — HIGH (ref 70–99)
HCT VFR BLD CALC: 27.3 % — LOW (ref 42–52)
HGB BLD-MCNC: 9.1 G/DL — LOW (ref 14–18)
IMM GRANULOCYTES NFR BLD AUTO: 11.6 % — HIGH (ref 0.1–0.3)
LEGIONELLA AG UR QL: NEGATIVE — SIGNIFICANT CHANGE UP
LYMPHOCYTES # BLD AUTO: 0.2 K/UL — LOW (ref 1.2–3.4)
LYMPHOCYTES # BLD AUTO: 8.3 % — LOW (ref 20.5–51.1)
MAGNESIUM SERPL-MCNC: 2.3 MG/DL — SIGNIFICANT CHANGE UP (ref 1.8–2.4)
MCHC RBC-ENTMCNC: 29.3 PG — SIGNIFICANT CHANGE UP (ref 27–31)
MCHC RBC-ENTMCNC: 33.3 G/DL — SIGNIFICANT CHANGE UP (ref 32–37)
MCV RBC AUTO: 87.8 FL — SIGNIFICANT CHANGE UP (ref 80–94)
MONOCYTES # BLD AUTO: 0.47 K/UL — SIGNIFICANT CHANGE UP (ref 0.1–0.6)
MONOCYTES NFR BLD AUTO: 19.4 % — HIGH (ref 1.7–9.3)
NEUTROPHILS # BLD AUTO: 1.38 K/UL — LOW (ref 1.4–6.5)
NEUTROPHILS NFR BLD AUTO: 57 % — SIGNIFICANT CHANGE UP (ref 42.2–75.2)
NRBC # BLD: 0 /100 WBCS — SIGNIFICANT CHANGE UP (ref 0–0)
PLATELET # BLD AUTO: 163 K/UL — SIGNIFICANT CHANGE UP (ref 130–400)
PMV BLD: 9.6 FL — SIGNIFICANT CHANGE UP (ref 7.4–10.4)
POTASSIUM SERPL-MCNC: 3.8 MMOL/L — SIGNIFICANT CHANGE UP (ref 3.5–5)
POTASSIUM SERPL-SCNC: 3.8 MMOL/L — SIGNIFICANT CHANGE UP (ref 3.5–5)
PROT SERPL-MCNC: 4.6 G/DL — LOW (ref 6–8)
RBC # BLD: 3.11 M/UL — LOW (ref 4.7–6.1)
RBC # FLD: 14.3 % — SIGNIFICANT CHANGE UP (ref 11.5–14.5)
S PNEUM AG UR QL: NEGATIVE — SIGNIFICANT CHANGE UP
SODIUM SERPL-SCNC: 140 MMOL/L — SIGNIFICANT CHANGE UP (ref 135–146)
WBC # BLD: 2.42 K/UL — LOW (ref 4.8–10.8)
WBC # FLD AUTO: 2.42 K/UL — LOW (ref 4.8–10.8)

## 2024-05-05 PROCEDURE — 99232 SBSQ HOSP IP/OBS MODERATE 35: CPT

## 2024-05-05 RX ORDER — CEFTRIAXONE 500 MG/1
2000 INJECTION, POWDER, FOR SOLUTION INTRAMUSCULAR; INTRAVENOUS EVERY 24 HOURS
Refills: 0 | Status: DISCONTINUED | OUTPATIENT
Start: 2024-05-06 | End: 2024-05-07

## 2024-05-05 RX ADMIN — CEFTRIAXONE 100 MILLIGRAM(S): 500 INJECTION, POWDER, FOR SOLUTION INTRAMUSCULAR; INTRAVENOUS at 05:21

## 2024-05-05 RX ADMIN — HEPARIN SODIUM 5000 UNIT(S): 5000 INJECTION INTRAVENOUS; SUBCUTANEOUS at 13:11

## 2024-05-05 RX ADMIN — Medication 1300 MILLIGRAM(S): at 13:10

## 2024-05-05 RX ADMIN — Medication 1 MILLIGRAM(S): at 23:03

## 2024-05-05 RX ADMIN — Medication 100 MILLIGRAM(S): at 23:16

## 2024-05-05 RX ADMIN — HEPARIN SODIUM 5000 UNIT(S): 5000 INJECTION INTRAVENOUS; SUBCUTANEOUS at 22:49

## 2024-05-05 RX ADMIN — CEFTRIAXONE 100 MILLIGRAM(S): 500 INJECTION, POWDER, FOR SOLUTION INTRAMUSCULAR; INTRAVENOUS at 23:17

## 2024-05-05 RX ADMIN — FLUPHENAZINE HYDROCHLORIDE 10 MILLIGRAM(S): 1 TABLET, FILM COATED ORAL at 06:50

## 2024-05-05 RX ADMIN — Medication 1300 MILLIGRAM(S): at 05:21

## 2024-05-05 RX ADMIN — Medication 100 MILLIGRAM(S): at 05:21

## 2024-05-05 RX ADMIN — Medication 2 MILLIGRAM(S): at 06:43

## 2024-05-05 RX ADMIN — CHLORHEXIDINE GLUCONATE 1 APPLICATION(S): 213 SOLUTION TOPICAL at 14:17

## 2024-05-05 RX ADMIN — FLUPHENAZINE HYDROCHLORIDE 5 MILLIGRAM(S): 1 TABLET, FILM COATED ORAL at 23:03

## 2024-05-05 RX ADMIN — HEPARIN SODIUM 5000 UNIT(S): 5000 INJECTION INTRAVENOUS; SUBCUTANEOUS at 05:23

## 2024-05-05 RX ADMIN — Medication 1300 MILLIGRAM(S): at 22:49

## 2024-05-05 RX ADMIN — PANTOPRAZOLE SODIUM 40 MILLIGRAM(S): 20 TABLET, DELAYED RELEASE ORAL at 06:50

## 2024-05-05 RX ADMIN — OFLOXACIN OTIC SOLUTION 10 DROP(S): 3 SOLUTION/ DROPS AURICULAR (OTIC) at 13:10

## 2024-05-05 NOTE — PHYSICAL THERAPY INITIAL EVALUATION ADULT - GENERAL OBSERVATIONS, REHAB EVAL
PT  [phone]. Chart reviewed and case discussed with GENET Hung. Pt encountered semi-reclined in bed. In NAD.

## 2024-05-05 NOTE — PROGRESS NOTE ADULT - SUBJECTIVE AND OBJECTIVE BOX
Pt seen and examined at bedside.          VITAL SIGNS (Last 24 hrs):  T(C): 36.6 (05-05-24 @ 05:42), Max: 37.3 (05-04-24 @ 19:53)  HR: 87 (05-05-24 @ 05:42) (85 - 87)  BP: 108/51 (05-05-24 @ 05:42) (98/63 - 108/51)  RR: 18 (05-05-24 @ 05:42) (18 - 18)  SpO2: 96% (05-04-24 @ 19:53) (96% - 96%)        I&O's Summary    04 May 2024 07:01  -  05 May 2024 07:00  --------------------------------------------------------  IN: 2520 mL / OUT: 1425 mL / NET: 1095 mL        PHYSICAL EXAM:  GENERAL: NAD   HEAD:  Atraumatic, Normocephalic  EYES:  conjunctiva and sclera clear  NECK: Supple, No JVD  CHEST/LUNG: Clear to auscultation bilaterally; No wheeze  HEART: Regular rate and rhythm; No murmurs, rubs, or gallops  ABDOMEN: Soft, Nontender, Nondistended; Bowel sounds present  EXTREMITIES:  2+ Peripheral Pulses, No clubbing, cyanosis, or edema  PSYCH: AAOx3  NEUROLOGY: non-focal  SKIN: No rashes or lesions        Labs Reviewed       CBC Full  -  ( 05 May 2024 07:43 )  WBC Count : 2.42 K/uL  Hemoglobin : 9.1 g/dL  Hematocrit : 27.3 %  Platelet Count - Automated : 163 K/uL  Mean Cell Volume : 87.8 fL  Mean Cell Hemoglobin : 29.3 pg  Mean Cell Hemoglobin Concentration : 33.3 g/dL  Auto Neutrophil # : 1.38 K/uL  Auto Lymphocyte # : 0.20 K/uL  Auto Monocyte # : 0.47 K/uL  Auto Eosinophil # : 0.07 K/uL  Auto Basophil # : 0.02 K/uL  Auto Neutrophil % : 57.0 %  Auto Lymphocyte % : 8.3 %  Auto Monocyte % : 19.4 %  Auto Eosinophil % : 2.9 %  Auto Basophil % : 0.8 %    BMP:    05-05 @ 07:43    Blood Urea Nitrogen - 42  Calcium - 8.3  Carbond Dioxide - 18  Chloride - 110  Creatinine - 2.6  Glucose - 102  Potassium - 3.8  Sodium - 140      Hemoglobin A1c -   PT/INR - ( 03 May 2024 17:26 )   PT: 12.90 sec;   INR: 1.13 ratio         PTT - ( 03 May 2024 17:26 )  PTT:30.3 sec  Urine Culture:            MEDICATIONS  (STANDING):  benztropine 2 milliGRAM(s) Oral <User Schedule>  benztropine 1 milliGRAM(s) Oral at bedtime  cefTRIAXone   IVPB 1000 milliGRAM(s) IV Intermittent every 24 hours  chlorhexidine 2% Cloths 1 Application(s) Topical daily  doxycycline IVPB 100 milliGRAM(s) IV Intermittent every 12 hours  fluPHENAZine 10 milliGRAM(s) Oral <User Schedule>  fluPHENAZine 5 milliGRAM(s) Oral at bedtime  heparin   Injectable 5000 Unit(s) SubCutaneous every 8 hours  lactated ringers. 1000 milliLiter(s) (100 mL/Hr) IV Continuous <Continuous>  ofloxacin 0.3% Ophthalmic Solution for OTIC Use 10 Drop(s) Both Ears daily  pantoprazole    Tablet 40 milliGRAM(s) Oral before breakfast  sodium bicarbonate 1300 milliGRAM(s) Oral three times a day    MEDICATIONS  (PRN):

## 2024-05-05 NOTE — CONSULT NOTE ADULT - ASSESSMENT
68-year-old male PMH of schizophrenia, CKD, lymphoma (in remission last chemo 12/2023), left elbow fracture s/p [facility]IF @[facility] ~30 years ago, and gout senting to the ED s/p fall.     ID consulted for pneumonia, left mastoiditis and diverticulitis    IMPRESSION:  # LLL pneumonia  WBC 8.97, afebrile  COVID/flu/RSV negative  Urine Strep Ag negative  BNP 5293  CT Chest angio 5/3: Left lower lobe consolidative opacity. Multiple enlarged/prominent   mediastinal lymph nodes.    # Opacification of bilateral masteroids, perforated left TM  CTH 5/3: Extensive opacification of the bilateral mastoids and left middle ear cavity, nonspecific.   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 FINAL RECOMMENDATION IS PENDING *   68-year-old male PMH of schizophrenia, CKD, lymphoma (in remission last chemo 12/2023), left elbow fracture s/p ORIF @[facility] ~30 years ago, and gout senting to the ED s/p fall.     ID consulted for pneumonia, left mastoiditis and diverticulitis    IMPRESSION:  # LLL pneumonia  WBC 8.97, afebrile  COVID/flu/RSV negative  Urine Strep Ag negative  BNP 5293  CT Chest angio 5/3: Left lower lobe consolidative opacity. Multiple enlarged/prominent   mediastinal lymph nodes.    # Opacification of bilateral masteroids, perforated left TM  CTH 5/3: Extensive opacification of the bilateral mastoids and left middle ear cavity, nonspecific.   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  Attending Physician  Division of Infectious Diseases  [facility]  Please contact me via [employer] Teams

## 2024-05-05 NOTE — PHYSICAL THERAPY INITIAL EVALUATION ADULT - PERTINENT HX OF CURRENT PROBLEM, REHAB EVAL
68-year-old male PMH of schizophrenia, CKD, lymphoma (in remission last chemo 12/2023), left elbow fracture s/p ORIF @[facility] ~30 years ago, and gout sending to the ED s/p fall.

## 2024-05-05 NOTE — PHYSICAL THERAPY INITIAL EVALUATION ADULT - PHYSICAL ASSIST/NONPHYSICAL ASSIST: STAND/SIT, REHAB EVAL
verbal cues/1 person assist Otezla Pregnancy And Lactation Text: This medication is Pregnancy Category C and it isn't known if it is safe during pregnancy. It is unknown if it is excreted in breast milk.

## 2024-05-05 NOTE — PROGRESS NOTE ADULT - ASSESSMENT
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     Admitted for ASHLEY on CKD4, PNA and possible mastoiditis.         #Severe Sepsis POA 2/2 CAP  #B/l mastoid opacification, possible acute mastoiditis   - CTH suggesting possible mastoiditis but nonspecific   - CT temporal bone - Near complete opacification of the bilateral mastoid air cells as well as the left middle ear cavity without evidence for septal erosion which could reflect infection/inflammation.  - CTAP suggestive of sigmoid diverticulitis; no clinical signs/symptoms of diverticulitis   - CT chest suggestive of pna  - continue rocephin/doxy  - ofloxacin ear drops per ENT   - ID consult     #ASHLEY on CKD 4   #Metabolic acidosis   - hx of nephrolithiasis c/b atrophic L kidney, R ureteral rivision, CKD4 (bsl Cr ~2.5)   - s/p IV contrast on admission   - continue IVF  - continue sodium bicarb 1300mg TID     #Mechanical fall  - PT consult    #NHL   - follow with Dr. 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     Admitted for ASHLEY on CKD4, PNA and possible mastoiditis.         #Severe Sepsis POA 2/2 CAP  #B/l mastoid opacification, possible acute mastoiditis   - CTH suggesting possible mastoiditis but nonspecific   - CT temporal bone - Near complete opacification of the bilateral mastoid air cells as well as the left middle ear cavity without evidence for septal erosion which could reflect infection/inflammation.  - CTAP suggestive of sigmoid diverticulitis; no clinical signs/symptoms of diverticulitis   - CT chest suggestive of pna  - continue rocephin/doxy  - ofloxacin ear drops per ENT   - ID consult     #ASHLEY on CKD 4   #Metabolic acidosis   - hx of nephrolithiasis c/b atrophic L kidney, R ureteral rivision, CKD4 (bsl Cr ~2.5)   - s/p IV contrast on admission   - continue IVF  - continue sodium bicarb 1300mg TID     #Anemia   - suspect dilutional, continue to monitor     #Mechanical fall  - PT consult    #NHL   - follow with Dr. Gong, last chemo 12/2023    #Elevated ALP - possibly due to hepatic involvement of lymphoma     #Schizophrenia  - continue home Prolixin and cogentin    DVT ppx: heparin subq      Pending: ID consult

## 2024-05-05 NOTE — CONSULT NOTE ADULT - CONSULT REASON
pna, mastoiditis
s/p fall CT findings possible mastoiditis
Pneumonia, left mastoiditis, diverticulitis

## 2024-05-05 NOTE — CONSULT NOTE ADULT - SUBJECTIVE AND OBJECTIVE BOX
INFECTIOUS DISEASE CONSULT NOTE    Patient is a 68y old  Male who presents with a chief complaint of pna, mastoiditis (04 May 2024 08:30)    HPI:   68-year-old male PMH of schizophrenia, CKD, lymphoma (in remission last chemo 12/2023), left elbow fracture s/p ORIF @[facility] ~30 years ago, and gout senting to the ED s/p fall.  Pt says that today he was mowing the lawn when he became lightheaded/dizzy and fell. Says he usually gets lightheaded when he goes from standing to seated position. Has had mechanical falls in the past and never blacked out or syncopized in the past.  Has also had productive cough for the past 1.5 weeks, says his father who lives with him has similar cough. Has had hearing difficulty for many years but only the past few days noted that he has pain behind the right ear. Also has diarrhea, cannot recall how many episodes daily, but says this is not new as well.  No chest pain, SOB, abdominal pain, visual auras or other symptoms.       In the ED, , BP 88/52. Lactate 8.2--->2.1, trops 89--->86, K 5.8 (hemolyzed), CO2 11, AG 26, BUN 47, Cr 3.9 (~baseline 2.5), , AST 54, ALT 55, BNP 5293. pH 7.26, HCO3 13. RVP/Sars Cov negative. CTH/C spine with Extensive opacification of the bilateral mastoids and left middle ear cavity, nonspecific. CTA PE with LLL consolidation and CTAP suggestive of diverticulitis. S/p 2L LR bolus, cefepime, azithro, vanc (03 May 2024 23:22)         Prior hospital charts reviewed [Yes]  Primary team notes reviewed [Yes]  Other consultant notes reviewed [Yes]    REVIEW OF SYSTEMS:      PAST MEDICAL & SURGICAL HISTORY:  Kidney stones      Schizophrenia      Lymphoma      Shingles      Atrophic kidney      H/O colonoscopy with polypectomy      Liver cyst      History of bladder surgery      H/O neuropathy      History of chemotherapy      Stage 3 chronic kidney disease      Neuroendocrine malignancy      Neuroendocrine tumor status post surgical treatment      Neuroendocrine cancer      Retained urethral stent      H/O umbilical hernia repair      Elbow fracture      H/O cystoscopy          SOCIAL HISTORY:  - Born in _____, migrated [place] in 19XX  - Currently working as / Retired  - Lives with _____; no pets  - No recent travel  - Denies tobacco use  - Denies alcohol use  - Denies illicit drug use  - Currently sexually active, has one male/female sexual partner    FAMILY HISTORY:  FH: hypertension    FH: diabetes mellitus        Allergies:  allopurinol (Rash (Moderate))      ANTIMICROBIALS:  cefTRIAXone   IVPB 1000 every 24 hours  doxycycline IVPB 100 every 12 hours      ANTIMICROBIALS (past 90 days):  MEDICATIONS  (STANDING):  azithromycin  IVPB   255 mL/Hr IV Intermittent (05-03-24 @ 21:44)    cefepime   IVPB   100 mL/Hr IV Intermittent (05-03-24 @ 21:44)    cefTRIAXone   IVPB   100 mL/Hr IV Intermittent (05-05-24 @ 05:21)   100 mL/Hr IV Intermittent (05-04-24 @ 05:18)    doxycycline IVPB   100 mL/Hr IV Intermittent (05-05-24 @ 05:21)   100 mL/Hr IV Intermittent (05-04-24 @ 17:06)   100 mL/Hr IV Intermittent (05-04-24 @ 05:17)    vancomycin  IVPB.   250 mL/Hr IV Intermittent (05-03-24 @ 17:37)        OTHER MEDS:   MEDICATIONS  (STANDING):  benztropine 2 <User Schedule>  benztropine 1 at bedtime  fluPHENAZine 10 <User Schedule>  fluPHENAZine 5 at bedtime  heparin   Injectable 5000 every 8 hours  pantoprazole    Tablet 40 before breakfast      VITALS:  Vital Signs Last 24 Hrs  T(F): 97.8 (05-05-24 @ 05:42), Max: 99.1 (05-04-24 @ 19:53)    Vital Signs Last 24 Hrs  HR: 87 (05-05-24 @ 05:42) (85 - 87)  BP: 108/51 (05-05-24 @ 05:42) (98/63 - 108/51)  RR: 18 (05-05-24 @ 05:42)  SpO2: 96% (05-04-24 @ 19:53) (96% - 99%)  Wt(kg): --    EXAM:    Labs:                        9.6    3.50  )-----------( 136      ( 04 May 2024 05:50 )             28.5     05-05    140  |  110  |  42<H>  ----------------------------<  102<H>  3.8   |  18  |  2.6<H>    Ca    8.3<L>      05 May 2024 07:43  Mg     2.3     05-05    TPro  4.6<L>  /  Alb  3.1<L>  /  TBili  0.3  /  DBili  x   /  AST  39  /  ALT  41  /  AlkPhos  370<H>  05-05      WBC Trend:  WBC Count: 3.50 (05-04-24 @ 05:50)  WBC Count: 8.97 (05-03-24 @ 17:26)      Auto Neutrophil #: 5.85 K/uL (05-03-24 @ 17:26)  Band Neutrophils %: 3.5 % (05-03-24 @ 17:26)  Auto Neutrophil #: 1.49 K/uL (10-31-23 @ 18:08)  Auto Neutrophil #: 3.59 K/uL (09-25-23 @ 08:53)  Auto Neutrophil #: 2.67 K/uL (09-24-23 @ 07:58)      Creatine Trend:  Creatinine: 2.6 (05-05)  Creatinine: 3.2 (05-04)  Creatinine: 3.9 (05-03)      Liver Biochemical Testing Trend:  Alanine Aminotransferase (ALT/SGPT): 41 (05-05)  Alanine Aminotransferase (ALT/SGPT): 34 (05-04)  Alanine Aminotransferase (ALT/SGPT): 55 *H* (05-03)  Alanine Aminotransferase (ALT/SGPT): 15 (09-25)  Alanine Aminotransferase (ALT/SGPT): 12 (09-24)  Aspartate Aminotransferase (AST/SGOT): 39 (05-05-24 @ 07:43)  Aspartate Aminotransferase (AST/SGOT): 33 (05-04-24 @ 05:50)  Aspartate Aminotransferase (AST/SGOT): 54 (05-03-24 @ 17:26)  Aspartate Aminotransferase (AST/SGOT): 8 (09-25-23 @ 08:53)  Aspartate Aminotransferase (AST/SGOT): 6 (09-24-23 @ 07:58)  Bilirubin Total: 0.3 (05-05)  Bilirubin Total: 0.4 (05-04)  Bilirubin Total: 1.2 (05-03)  Bilirubin Total: 0.3 (09-25)  Bilirubin Total: 0.2 (09-24)      Trend LDH  08-26-23 @ 15:46  126  08-26-23 @ 05:48  116  04-22-23 @ 08:09  112  04-21-23 @ 07:51  116  04-20-23 @ 05:52  118      Auto Eosinophil %: 0.0 % (05-03-24 @ 17:26)      Urinalysis Basic - ( 05 May 2024 07:43 )    Color: x / Appearance: x / SG: x / pH: x  Gluc: 102 mg/dL / Ketone: x  / Bili: x / Urobili: x   Blood: x / Protein: x / Nitrite: x   Leuk Esterase: x / RBC: x / WBC x   Sq Epi: x / Non Sq Epi: x / Bacteria: x        MICROBIOLOGY:    Troponin T, High Sensitivity Result: 86 (05-03)  Troponin T, High Sensitivity Result: 89 (05-03)    Lactate, Blood: 0.8 (05-04 @ 05:50)  Lactate, Blood: 2.1 (05-03 @ 19:22)  Blood Gas Venous - Lactate: 8.2 (05-03 @ 17:17)        RADIOLOGY:  imaging below personally reviewed    < from: CT Temporal Bones No Cont (05.04.24 @ 03:17) >  IMPRESSION:  Near complete opacification of the bilateral mastoid air cells as well as   the left middle ear cavity without evidence for septal erosion which   could reflect infection/inflammation.    < end of copied text >    < from: CT Angio Chest PE Protocol w/ IV Cont (05.03.24 @ 18:42) >  IMPRESSION:    CHEST:    No CTA evidence of acute pulmonary embolus.    Left lower lobe consolidative opacity. Multiple enlarged/prominent   mediastinal lymph nodes. Finding can be seen with pneumonia. Recommend   radiographic follow-up after treatment.    ABDOMEN/PELVIS:    Inflammatory changes adjacent to the sigmoid colon which can be seen with   diverticulitis. Recommend correlation for abdominal pain. No drainable   fluid collection/abscess.    Multiple findings consistent with known history of lymphoma including   splenomegaly with multiple enlarged portacaval and periportal lymph nodes.    --- End of Report ---    < end of copied text >      < from: CT Head No Cont (05.03.24 @ 18:42) >  IMPRESSION:    CT HEAD:  No acute intracranial findings.    Extensive opacification of the bilateral mastoids and left middle ear   cavity, nonspecific. Please clinically correlate for mastoiditis.      CT CERVICAL SPINE:  No acute cervical fracture or facet subluxation.    < end of copied text >   INFECTIOUS DISEASE CONSULT NOTE    Patient is a 68y old  Male who presents with a chief complaint of pna, mastoiditis (04 May 2024 08:30)    HPI:   68-year-old male PMH of schizophrenia, CKD, lymphoma (in remission last chemo 12/2023), [facility], and gout senting to the ED s/p fall.  Pt says that today he was mowing the lawn when he became lightheaded/dizzy and fell. Says he usually gets lightheaded when he goes from standing to seated position. Has had mechanical falls in the past and never blacked out or syncopized in the past.  Has also had productive cough for the past 1.5 weeks, says his father who lives with him has similar cough. Has had hearing difficulty for many years but only the past few days noted that he has pain behind the right ear. Also has diarrhea, cannot recall how many episodes daily, but says this is not new as well.  No chest pain, SOB, abdominal pain, visual auras or other symptoms.       In the ED, , BP 88/52. Lactate 8.2--->2.1, trops 89--->86, K 5.8 (hemolyzed), CO2 11, AG 26, BUN 47, Cr 3.9 (~baseline 2.5), , AST 54, ALT 55, BNP 5293. pH 7.26, HCO3 13. RVP/Sars Cov negative. CTH/C spine with Extensive opacification of the bilateral mastoids and left middle ear cavity, nonspecific. CTA PE with LLL consolidation and CTAP suggestive of diverticulitis. S/p 2L LR bolus, cefepime, azithro, vanc (03 May 2024 23:22)         Prior hospital charts reviewed [Yes]  Primary team notes reviewed [Yes]  Other consultant notes reviewed [Yes]    REVIEW OF SYSTEMS:  CONSTITUTIONAL: No fever or chills  HEAD: No lesion on scalp  EYES: No visual disturbance  ENT: No sore throat; left posterior ear pain  RESPIRATORY: + cough, no shortness of breath  CARDIOVASCULAR: No chest pain or palpitations  GASTROINTESTINAL: No abdominal or epigastric pain  GENITOURINARY: No dysuria  NEUROLOGICAL: No headache/dizziness  MUSCULOSKELETAL: No joint pain, erythema, or swelling; no back pain  SKIN: No itching, rashes  All other ROS negative except noted above      PAST MEDICAL & SURGICAL HISTORY:  Kidney stones      Schizophrenia      Lymphoma      Shingles      Atrophic kidney      H/O colonoscopy with polypectomy      Liver cyst      History of bladder surgery      H/O neuropathy      History of chemotherapy      Stage 3 chronic kidney disease      Neuroendocrine malignancy      Neuroendocrine tumor status post surgical treatment      Neuroendocrine cancer      Retained urethral stent      H/O umbilical hernia repair      Elbow fracture      H/O cystoscopy          SOCIAL HISTORY:  - No recent travel  - Denies tobacco use  - Denies alcohol use  - Denies illicit drug use    FAMILY HISTORY:  FH: hypertension    FH: diabetes mellitus        Allergies:  allopurinol (Rash (Moderate))      ANTIMICROBIALS:  cefTRIAXone   IVPB 1000 every 24 hours  doxycycline IVPB 100 every 12 hours      ANTIMICROBIALS (past 90 days):  MEDICATIONS  (STANDING):  azithromycin  IVPB   255 mL/Hr IV Intermittent (05-03-24 @ 21:44)    cefepime   IVPB   100 mL/Hr IV Intermittent (05-03-24 @ 21:44)    cefTRIAXone   IVPB   100 mL/Hr IV Intermittent (05-05-24 @ 05:21)   100 mL/Hr IV Intermittent (05-04-24 @ 05:18)    doxycycline IVPB   100 mL/Hr IV Intermittent (05-05-24 @ 05:21)   100 mL/Hr IV Intermittent (05-04-24 @ 17:06)   100 mL/Hr IV Intermittent (05-04-24 @ 05:17)    vancomycin  IVPB.   250 mL/Hr IV Intermittent (05-03-24 @ 17:37)        OTHER MEDS:   MEDICATIONS  (STANDING):  benztropine 2 <User Schedule>  benztropine 1 at bedtime  fluPHENAZine 10 <User Schedule>  fluPHENAZine 5 at bedtime  heparin   Injectable 5000 every 8 hours  pantoprazole    Tablet 40 before breakfast      VITALS:  Vital Signs Last 24 Hrs  T(F): 97.8 (05-05-24 @ 05:42), Max: 99.1 (05-04-24 @ 19:53)    Vital Signs Last 24 Hrs  HR: 87 (05-05-24 @ 05:42) (85 - 87)  BP: 108/51 (05-05-24 @ 05:42) (98/63 - 108/51)  RR: 18 (05-05-24 @ 05:42)  SpO2: 96% (05-04-24 @ 19:53) (96% - 99%)  Wt(kg): --    EXAM:  GENERAL: NAD, lying in bed  HEAD: No head lesions  EYES: Conjunctiva pink and cornea white  EAR, NOSE, MOUTH, THROAT: Normal external ears and nose, no discharges; moist mucous membranes; left mastoid tenderness  NECK: Supple, nontender to palpation; no JVD  RESPIRATORY: Left lower lobe rhonchi  CARDIOVASCULAR: S1 S2  GASTROINTESTINAL: Soft, nontender, nondistended; normoactive bowel sounds  EXTREMITIES: No clubbing, cyanosis, or petal edema  NERVOUS SYSTEM: Alert and oriented to person, time, place and situation, speech clear. No focal deficits   MUSCULOSKELETAL: No joint erythema, swelling or pain  SKIN: No rashes or lesions, no superficial thrombophlebitis  PSYCH: Normal affect    Labs:                        9.6    3.50  )-----------( 136      ( 04 May 2024 05:50 )             28.5     05-05    140  |  110  |  42<H>  ----------------------------<  102<H>  3.8   |  18  |  2.6<H>    Ca    8.3<L>      05 May 2024 07:43  Mg     2.3     05-05    TPro  4.6<L>  /  Alb  3.1<L>  /  TBili  0.3  /  DBili  x   /  AST  39  /  ALT  41  /  AlkPhos  370<H>  05-05      WBC Trend:  WBC Count: 3.50 (05-04-24 @ 05:50)  WBC Count: 8.97 (05-03-24 @ 17:26)      Auto Neutrophil #: 5.85 K/uL (05-03-24 @ 17:26)  Band Neutrophils %: 3.5 % (05-03-24 @ 17:26)  Auto Neutrophil #: 1.49 K/uL (10-31-23 @ 18:08)  Auto Neutrophil #: 3.59 K/uL (09-25-23 @ 08:53)  Auto Neutrophil #: 2.67 K/uL (09-24-23 @ 07:58)      Creatine Trend:  Creatinine: 2.6 (05-05)  Creatinine: 3.2 (05-04)  Creatinine: 3.9 (05-03)      Liver Biochemical Testing Trend:  Alanine Aminotransferase (ALT/SGPT): 41 (05-05)  Alanine Aminotransferase (ALT/SGPT): 34 (05-04)  Alanine Aminotransferase (ALT/SGPT): 55 *H* (05-03)  Alanine Aminotransferase (ALT/SGPT): 15 (09-25)  Alanine Aminotransferase (ALT/SGPT): 12 (09-24)  Aspartate Aminotransferase (AST/SGOT): 39 (05-05-24 @ 07:43)  Aspartate Aminotransferase (AST/SGOT): 33 (05-04-24 @ 05:50)  Aspartate Aminotransferase (AST/SGOT): 54 (05-03-24 @ 17:26)  Aspartate Aminotransferase (AST/SGOT): 8 (09-25-23 @ 08:53)  Aspartate Aminotransferase (AST/SGOT): 6 (09-24-23 @ 07:58)  Bilirubin Total: 0.3 (05-05)  Bilirubin Total: 0.4 (05-04)  Bilirubin Total: 1.2 (05-03)  Bilirubin Total: 0.3 (09-25)  Bilirubin Total: 0.2 (09-24)      Trend LDH  08-26-23 @ 15:46  126  08-26-23 @ 05:48  116  04-22-23 @ 08:09  112  04-21-23 @ 07:51  116  04-20-23 @ 05:52  118      Auto Eosinophil %: 0.0 % (05-03-24 @ 17:26)      Urinalysis Basic - ( 05 May 2024 07:43 )    Color: x / Appearance: x / SG: x / pH: x  Gluc: 102 mg/dL / Ketone: x  / Bili: x / Urobili: x   Blood: x / Protein: x / Nitrite: x   Leuk Esterase: x / RBC: x / WBC x   Sq Epi: x / Non Sq Epi: x / Bacteria: x        MICROBIOLOGY:    Troponin T, High Sensitivity Result: 86 (05-03)  Troponin T, High Sensitivity Result: 89 (05-03)    Lactate, Blood: 0.8 (05-04 @ 05:50)  Lactate, Blood: 2.1 (05-03 @ 19:22)  Blood Gas Venous - Lactate: 8.2 (05-03 @ 17:17)        RADIOLOGY:  imaging below personally reviewed    < from: CT Temporal Bones No Cont (05.04.24 @ 03:17) >  IMPRESSION:  Near complete opacification of the bilateral mastoid air cells as well as   the left middle ear cavity without evidence for septal erosion which   could reflect infection/inflammation.    < end of copied text >    < from: CT Angio Chest PE Protocol w/ IV Cont (05.03.24 @ 18:42) >  IMPRESSION:    CHEST:    No CTA evidence of acute pulmonary embolus.    Left lower lobe consolidative opacity. Multiple enlarged/prominent   mediastinal lymph nodes. Finding can be seen with pneumonia. Recommend   radiographic follow-up after treatment.    ABDOMEN/PELVIS:    Inflammatory changes adjacent to the sigmoid colon which can be seen with   diverticulitis. Recommend correlation for abdominal pain. No drainable   fluid collection/abscess.    Multiple findings consistent with known history of lymphoma including   splenomegaly with multiple enlarged portacaval and periportal lymph nodes.    --- End of Report ---    < end of copied text >      < from: CT Head No Cont (05.03.24 @ 18:42) >  IMPRESSION:    CT HEAD:  No acute intracranial findings.    Extensive opacification of the bilateral mastoids and left middle ear   cavity, nonspecific. Please clinically correlate for mastoiditis.      CT CERVICAL SPINE:  No acute cervical fracture or facet subluxation.    < end of copied text >

## 2024-05-06 LAB
ALBUMIN SERPL ELPH-MCNC: 3.1 G/DL — LOW (ref 3.5–5.2)
ALP SERPL-CCNC: 361 U/L — HIGH (ref 30–115)
ALT FLD-CCNC: 39 U/L — SIGNIFICANT CHANGE UP (ref 0–41)
ANION GAP SERPL CALC-SCNC: 10 MMOL/L — SIGNIFICANT CHANGE UP (ref 7–14)
AST SERPL-CCNC: 25 U/L — SIGNIFICANT CHANGE UP (ref 0–41)
BILIRUB SERPL-MCNC: 0.3 MG/DL — SIGNIFICANT CHANGE UP (ref 0.2–1.2)
BUN SERPL-MCNC: 35 MG/DL — HIGH (ref 10–20)
CALCIUM SERPL-MCNC: 8.7 MG/DL — SIGNIFICANT CHANGE UP (ref 8.4–10.5)
CHLORIDE SERPL-SCNC: 110 MMOL/L — SIGNIFICANT CHANGE UP (ref 98–110)
CO2 SERPL-SCNC: 20 MMOL/L — SIGNIFICANT CHANGE UP (ref 17–32)
CREAT SERPL-MCNC: 2.2 MG/DL — HIGH (ref 0.7–1.5)
EGFR: 32 ML/MIN/1.73M2 — LOW
GLUCOSE SERPL-MCNC: 99 MG/DL — SIGNIFICANT CHANGE UP (ref 70–99)
HCT VFR BLD CALC: 28.4 % — LOW (ref 42–52)
HGB BLD-MCNC: 9 G/DL — LOW (ref 14–18)
MAGNESIUM SERPL-MCNC: 2 MG/DL — SIGNIFICANT CHANGE UP (ref 1.8–2.4)
MCHC RBC-ENTMCNC: 28.4 PG — SIGNIFICANT CHANGE UP (ref 27–31)
MCHC RBC-ENTMCNC: 31.7 G/DL — LOW (ref 32–37)
MCV RBC AUTO: 89.6 FL — SIGNIFICANT CHANGE UP (ref 80–94)
NRBC # BLD: 0 /100 WBCS — SIGNIFICANT CHANGE UP (ref 0–0)
PLATELET # BLD AUTO: 170 K/UL — SIGNIFICANT CHANGE UP (ref 130–400)
PMV BLD: 9.6 FL — SIGNIFICANT CHANGE UP (ref 7.4–10.4)
POTASSIUM SERPL-MCNC: 4 MMOL/L — SIGNIFICANT CHANGE UP (ref 3.5–5)
POTASSIUM SERPL-SCNC: 4 MMOL/L — SIGNIFICANT CHANGE UP (ref 3.5–5)
PROT SERPL-MCNC: 4.4 G/DL — LOW (ref 6–8)
RBC # BLD: 3.17 M/UL — LOW (ref 4.7–6.1)
RBC # FLD: 14 % — SIGNIFICANT CHANGE UP (ref 11.5–14.5)
SODIUM SERPL-SCNC: 140 MMOL/L — SIGNIFICANT CHANGE UP (ref 135–146)
WBC # BLD: 2.33 K/UL — LOW (ref 4.8–10.8)
WBC # FLD AUTO: 2.33 K/UL — LOW (ref 4.8–10.8)

## 2024-05-06 PROCEDURE — 99232 SBSQ HOSP IP/OBS MODERATE 35: CPT

## 2024-05-06 RX ADMIN — HEPARIN SODIUM 5000 UNIT(S): 5000 INJECTION INTRAVENOUS; SUBCUTANEOUS at 13:20

## 2024-05-06 RX ADMIN — CEFTRIAXONE 100 MILLIGRAM(S): 500 INJECTION, POWDER, FOR SOLUTION INTRAMUSCULAR; INTRAVENOUS at 22:45

## 2024-05-06 RX ADMIN — Medication 100 MILLIGRAM(S): at 17:38

## 2024-05-06 RX ADMIN — HEPARIN SODIUM 5000 UNIT(S): 5000 INJECTION INTRAVENOUS; SUBCUTANEOUS at 22:55

## 2024-05-06 RX ADMIN — SODIUM CHLORIDE 100 MILLILITER(S): 9 INJECTION, SOLUTION INTRAVENOUS at 05:11

## 2024-05-06 RX ADMIN — HEPARIN SODIUM 5000 UNIT(S): 5000 INJECTION INTRAVENOUS; SUBCUTANEOUS at 05:10

## 2024-05-06 RX ADMIN — Medication 2 MILLIGRAM(S): at 05:11

## 2024-05-06 RX ADMIN — PANTOPRAZOLE SODIUM 40 MILLIGRAM(S): 20 TABLET, DELAYED RELEASE ORAL at 06:17

## 2024-05-06 RX ADMIN — Medication 1300 MILLIGRAM(S): at 05:10

## 2024-05-06 RX ADMIN — FLUPHENAZINE HYDROCHLORIDE 10 MILLIGRAM(S): 1 TABLET, FILM COATED ORAL at 05:11

## 2024-05-06 RX ADMIN — Medication 1300 MILLIGRAM(S): at 13:20

## 2024-05-06 RX ADMIN — Medication 1 MILLIGRAM(S): at 22:39

## 2024-05-06 RX ADMIN — Medication 1300 MILLIGRAM(S): at 22:44

## 2024-05-06 RX ADMIN — Medication 100 MILLIGRAM(S): at 07:03

## 2024-05-06 RX ADMIN — FLUPHENAZINE HYDROCHLORIDE 5 MILLIGRAM(S): 1 TABLET, FILM COATED ORAL at 22:39

## 2024-05-06 RX ADMIN — OFLOXACIN OTIC SOLUTION 10 DROP(S): 3 SOLUTION/ DROPS AURICULAR (OTIC) at 12:01

## 2024-05-06 RX ADMIN — CHLORHEXIDINE GLUCONATE 1 APPLICATION(S): 213 SOLUTION TOPICAL at 12:06

## 2024-05-06 NOTE — PROGRESS NOTE ADULT - ASSESSMENT
68-year-old male PMH of schizophrenia, CKD, lymphoma (in remission last chemo 12/2023), left elbow fracture s/p ORIF @[facility] ~30 years ago, and gout senting to the ED s/p fall.     ID consulted for pneumonia, left mastoiditis and diverticulitis    IMPRESSION:  # LLL pneumonia  WBC 8.97, afebrile  COVID/flu/RSV negative  Urine Strep Ag negative  BNP 5293  CT Chest angio 5/3: Left lower lobe consolidative opacity. Multiple enlarged/prominent   mediastinal lymph nodes.    # Opacification of bilateral masteroids, perforated left TM  CTH 5/3: Extensive opacification of the bilateral mastoids and left middle ear cavity, nonspecific.   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      RECOMMENDATIONS:  - continue ceftriaxone 2g daily -- can switch to PO vantin 200 mg BID on discharge   - continue doxycycline 100 mg BID until 5/8   MEDICATIONS  (PRN):  azithromycin  IVPB   255 mL/Hr IV Intermittent (05-03-24 @ 21:44)    cefepime   IVPB   100 mL/Hr IV Intermittent (05-03-24 @ 21:44)    cefTRIAXone   IVPB   100 mL/Hr IV Intermittent (05-05-24 @ 05:21)   100 mL/Hr IV Intermittent (05-04-24 @ 05:18)    cefTRIAXone   IVPB   100 mL/Hr IV Intermittent (05-05-24 @ 23:17)    doxycycline IVPB   100 mL/Hr IV Intermittent (05-06-24 @ 07:03)   100 mL/Hr IV Intermittent (05-05-24 @ 23:16)   100 mL/Hr IV Intermittent (05-05-24 @ 05:21)   100 mL/Hr IV Intermittent (05-04-24 @ 17:06)   100 mL/Hr IV Intermittent (05-04-24 @ 05:17)    vancomycin  IVPB.   250 mL/Hr IV Intermittent (05-03-24 @ 17:37)        cefTRIAXone   IVPB 2000 milliGRAM(s) IV Intermittent every 24 hours  doxycycline IVPB 100 milliGRAM(s) IV Intermittent every 12 hours    - Increase IV ceftriaxone to 2g q24hrs to cover for mastoiditis and continue IV/PO doxycyline 100mg q12hrs  - ENT follow up  - Offloading and frequent position changes, aspiration precaution  - Trend WBC, fever curve, transaminases, creatinine daily      Graciela Ramsey D.O.  Attending Physician  Division of Infectious Diseases  [facility]  Please contact me via [employer] Teams     68-year-old male PMH of schizophrenia, CKD, lymphoma (in remission last chemo 12/2023), left elbow fracture s/p ORIF @[facility] ~30 years ago, and gout senting to the ED s/p fall.     ID consulted for pneumonia, left mastoiditis and diverticulitis    IMPRESSION:  # LLL pneumonia  WBC 8.97, afebrile  COVID/flu/RSV negative  Urine Strep Ag negative  BNP 5293  CT Chest angio 5/3: Left lower lobe consolidative opacity. Multiple enlarged/prominent   mediastinal lymph nodes.    # Opacification of bilateral masteroids, perforated left TM  CTH 5/3: Extensive opacification of the bilateral mastoids and left middle ear cavity, nonspecific.   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  Spectra [phone]

## 2024-05-06 NOTE — PROGRESS NOTE ADULT - ASSESSMENT
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 Found to be in severe sepsis POA 2/2 CAP and found to have mastoid opacification. ENT and ID on board.     #Severe Sepsis POA 2/2 CAP  #B/l mastoid opacification, possible acute mastoiditis   - CTH suggesting possible mastoiditis but nonspecific   - CT temporal bone - Near complete opacification of the bilateral mastoid air cells as well as the left middle ear cavity without evidence for septal erosion which could reflect infection/inflammation.  - CTAP suggestive of sigmoid diverticulitis; no clinical signs/symptoms of diverticulitis   - CT chest suggestive of pna  - ID recs appreciated, increase ceftriaxone to 2g daily to cover mastoiditis and c/w doxy   - ofloxacin ear drops per ENT   [ ] ENT f/u    #ASHLEY on CKD 4 - improving   #Metabolic acidosis   - hx of nephrolithiasis c/b atrophic L kidney, R ureteral rivision, CKD4 (bsl Cr ~2.5)   - s/p IV contrast on admission   - continue IVF  - continue sodium bicarb 1300mg TID     #Anemia   - suspect dilutional, continue to monitor     #Mechanical fall  - PT consult    #NHL   - follow with Dr. Gong, last chemo 12/2023    #Elevated ALP - possibly due to hepatic involvement of lymphoma     #Schizophrenia  - continue home Prolixin and cogentin    # To follow up: ID and ENT  # Misc  - DVT Prophylaxis: heparin   Injectable  - GI Prophylaxis: pantoprazole    Tablet 40 milliGRAM(s) Oral before breakfast  - Diet: Diet, Regular  - Activity: Activity - Ambulate as Tolerated  - Code Status: Full

## 2024-05-06 NOTE — PROGRESS NOTE ADULT - SUBJECTIVE AND OBJECTIVE BOX
Pt is a 67yo Male who presents with   HPI:  67 y/o male with PMH of Atrophic kidney, Kidney stones, CKD, Schizophrenia, Lymphoma h/o chemotherapy , recent visit to ENT Dr. Rodriguez for eval of b/l hearing loss s/p audiogram 4/17/24. Dx with Mixed Hearing loss. CT Tbone reveals near complete opacification of bilateral mastoid air cells with no evidence of bony erosion. Pt reports slight improvement in hearing loss on the R since being in hospital.     PAST MEDICAL & SURGICAL HISTORY:  Kidney stones  Schizophrenia  Lymphoma  Shingles  Atrophic kidney  H/O colonoscopy with polypectomy  Liver cyst  History of bladder surgery  H/O neuropathy  History of chemotherapy  Stage 3 chronic kidney disease  Neuroendocrine malignancy  Neuroendocrine tumor status post surgical treatment  Neuroendocrine cancer  Retained urethral stent  H/O umbilical hernia repair  Elbow fracture  H/O cystoscopy    MEDICATIONS  (STANDING):  benztropine 2 milliGRAM(s) Oral <User Schedule>  benztropine 1 milliGRAM(s) Oral at bedtime  cefTRIAXone   IVPB 2000 milliGRAM(s) IV Intermittent every 24 hours  chlorhexidine 2% Cloths 1 Application(s) Topical daily  doxycycline IVPB 100 milliGRAM(s) IV Intermittent every 12 hours  fluPHENAZine 10 milliGRAM(s) Oral <User Schedule>  fluPHENAZine 5 milliGRAM(s) Oral at bedtime  heparin   Injectable 5000 Unit(s) SubCutaneous every 8 hours  ofloxacin 0.3% Ophthalmic Solution for OTIC Use 10 Drop(s) Both Ears daily  pantoprazole    Tablet 40 milliGRAM(s) Oral before breakfast  sodium bicarbonate 1300 milliGRAM(s) Oral three times a day    Allergies  allopurinol (Rash (Moderate))    FAMILY HISTORY:  FH: hypertension  FH: diabetes mellitus    REVIEW OF SYSTEMS   [x] A ten-point review of systems was otherwise negative except as noted.    Vital Signs Last 24 Hrs  T(C): 36.6 (06 May 2024 12:18), Max: 37.1 (06 May 2024 04:19)  T(F): 97.8 (06 May 2024 12:18), Max: 98.8 (06 May 2024 04:19)  HR: 70 (06 May 2024 12:18) (70 - 76)  BP: 103/63 (06 May 2024 12:18) (98/58 - 109/68)  BP(mean): 77 (06 May 2024 12:18) (77 - 77)  RR: 18 (06 May 2024 12:18) (18 - 18)  SpO2: 97% (06 May 2024 12:18) (95% - 97%)    Parameters below as of 06 May 2024 12:18  Patient On (Oxygen Delivery Method): room air    PHYSICAL EXAM:  GEN: NAD, awake and alert. No drooling or pooling of secretions. No stridor or stertor. Good vocal quality, no hoarseness.   SKIN: Good color, non diaphoretic.  HEENT: + forehead abrasions noted over left side forehead. Denies fecial numbness B/L.   Nose: B/L nares patent , no drainage   Ears: Left ear with no postauricular ttp, no tragal ttp, no pre-auricular ttp, EAC clean, TM perforated no evidence of active bleeding. Right ear w/o ttp , EAC  with minimal cerumen, TM visualized intact, + fluid behind TM.  No edema or erythema over B/L mastoid area.   Mouth: Oral mucosa pink and moist. No erythema or edema noted to buccal mucosa, tongue, FOM, uvula or posterior oropharynx. Uvula midline.   NECK: Trachea midline, Neck supple, no TTP to B/L lateral neck, no cervical LAD.  RESP: No dyspnea, non-labored breathing. No use of accessory muscles.   CARDIO: +S1/S2  ABDO: Soft, NT.  EXT: MIMS x 4    LABS:                        9.1    2.42  )-----------( 163      ( 05 May 2024 07:43 )             27.3     05-05    140  |  110  |  42<H>  ----------------------------<  102<H>  3.8   |  18  |  2.6<H>    Ca    8.3<L>      05 May 2024 07:43  Mg     2.3     05-05    TPro  4.6<L>  /  Alb  3.1<L>  /  TBili  0.3  /  DBili  x   /  AST  39  /  ALT  41  /  AlkPhos  370<H>  05-05    RADIOLOGY & ADDITIONAL STUDIES:  < from: CT Temporal Bones No Cont (05.04.24 @ 03:17) >    ACC: [id number] EXAM:  CT TEMPORAL BONES   ORDERED BY: NEHEMIAS FERGUSON     PROCEDURE DATE:  05/04/2024          INTERPRETATION:  INDICATION: Mastoiditis.    TECHNIQUE:  0.6mm thin axial images through the temporal bones were   obtained without contrast.  Retro- reformatted high resolution images   were obtained. Coronal reformatted images were obtained.    COMPARISON EXAMINATION:  Head CT dated 5/3/2024.    FINDINGS:  RIGHT EXTERNAL AUDITORY CANAL: Normal.  RIGHT TYMPANOMASTOID CAVITY:  There is patchy near complete opacification   of the right mastoid air cells. There is no evidence of septal erosion or   dehiscence..  RIGHT OSSICULAR CHAIN:  Intact.  RIGHT OTIC CAPSULE STRUCTURES:  Intact.  RIGHT FACIAL NERVE CANAL:  Intact.    LEFT EXTERNAL AUDITORY CANAL: Normal.  LEFT TYMPANOMASTOID CAVITY:  There is near complete patchy opacification   of the left mastoid air cells. There is no evidence for septal erosion.   There are no osseous dehiscences. There is complete opacification of the   left middle ear cavity.  LEFT OSSICULAR CHAIN:  Intact.  LEFT OTIC CAPSULE STRUCTURES:  Intact.  LEFT FACIAL NERVE CANAL:  Intact.    VISUALIZED INTRACRANIAL STRUCTURES AND ORBITAL CONTENTS:  There is   scattered polypoid mucosal thickening of the paranasal sinuses as well as   patchy opacification of the ethmoid air cells.  MISCELLANEOUS:  None.    IMPRESSION:  Near complete opacification of the bilateral mastoid air cells as well as   the left middle ear cavity without evidence for septal erosion which   could reflect infection/inflammation.    --- End of Report ---            FRANSISCA FOSTER MD; Attending Radiologist  This document has been electronically signed. May  4 2024  4:56PM    < end of copied text >

## 2024-05-06 NOTE — PROGRESS NOTE ADULT - ATTENDING COMMENTS
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     Admitted for ASHLEY on CKD4, PNA and possible mastoiditis.         #Severe Sepsis POA 2/2 CAP  #B/l mastoid opacification, possible acute mastoiditis   - CTH suggesting possible mastoiditis but nonspecific   - CT temporal bone - Near complete opacification of the bilateral mastoid air cells as well as the left middle ear cavity without evidence for septal erosion which could reflect infection/inflammation.  - CTAP suggestive of sigmoid diverticulitis; no clinical signs/symptoms of diverticulitis   - CT chest suggestive of pna  - continue rocephin/doxy  - ofloxacin ear drops per ENT   - ID consult noted     #ASHLEY on CKD 4   #Metabolic acidosis   - hx of nephrolithiasis c/b atrophic L kidney, R ureteral rivision, CKD4 (bsl Cr ~2.5)   - s/p IV contrast on admission   - discontinued IVF  - continue sodium bicarb 1300mg TID     #Anemia   - suspect dilutional, continue to monitor     #Mechanical fall  - PT consult    #NHL   - follow with Dr. Gong, last chemo 12/2023    #Elevated ALP - possibly due to hepatic involvement of lymphoma     #Schizophrenia  - continue home Prolixin and cogentin    DVT ppx: heparin subq      Pending: ID and ENT f/u

## 2024-05-06 NOTE — PROGRESS NOTE ADULT - SUBJECTIVE AND OBJECTIVE BOX
ELDA COVARRUBIAS  68y, Male  Allergy: allopurinol (Rash (Moderate))      LOS  3d    CHIEF COMPLAINT: pna, mastoiditis (06 May 2024 13:35)      INTERVAL EVENTS/HPI  - No acute events overnight  - T(F): , Max: 98.8 (05-06-24 @ 04:19)  - Denies any worsening symptoms  - Tolerating medication  - WBC Count: 2.33 (05-06-24 @ 12:11)  WBC Count: 2.42 (05-05-24 @ 07:43)     - Creatinine: 2.2 (05-06-24 @ 12:11)  Creatinine: 2.6 (05-05-24 @ 07:43)       ROS  General: Denies rigors, nightsweats  HEENT: Denies headache, rhinorrhea, sore throat, eye pain  CV: Denies CP, palpitations  PULM: Denies wheezing, hemoptysis  GI: Denies hematemesis, hematochezia, melena  : Denies discharge, hematuria  MSK: Denies arthralgias, myalgias  SKIN: Denies rash, lesions  NEURO: Denies paresthesias, weakness  PSYCH: Denies depression, anxiety    VITALS:  T(F): 97.8, Max: 98.8 (05-06-24 @ 04:19)  HR: 70  BP: 103/63  RR: 18Vital Signs Last 24 Hrs  T(C): 36.6 (06 May 2024 12:18), Max: 37.1 (06 May 2024 04:19)  T(F): 97.8 (06 May 2024 12:18), Max: 98.8 (06 May 2024 04:19)  HR: 70 (06 May 2024 12:18) (70 - 73)  BP: 103/63 (06 May 2024 12:18) (103/63 - 109/68)  BP(mean): 77 (06 May 2024 12:18) (77 - 77)  RR: 18 (06 May 2024 12:18) (18 - 18)  SpO2: 97% (06 May 2024 12:18) (95% - 97%)    Parameters below as of 06 May 2024 12:18  Patient On (Oxygen Delivery Method): room air        PHYSICAL EXAM:  Gen: NAD, resting in bed  HEENT: Normocephalic, atraumatic  Neck: supple, no lymphadenopathy  CV: Regular rate & regular rhythm  Lungs: decreased BS at bases, no fremitus  Abdomen: Soft, BS present  Ext: Warm, well perfused  Neuro: non focal, awake  Skin: no rash, no erythema  Lines: no phlebitis    FH: Non-contributory  Social Hx: Non-contributory    TESTS & MEASUREMENTS:                        9.0    2.33  )-----------( 170      ( 06 May 2024 12:11 )             28.4     05-06    140  |  110  |  35<H>  ----------------------------<  99  4.0   |  20  |  2.2<H>    Ca    8.7      06 May 2024 12:11  Mg     2.0     05-06    TPro  4.4<L>  /  Alb  3.1<L>  /  TBili  0.3  /  DBili  x   /  AST  25  /  ALT  39  /  AlkPhos  361<H>  05-06      LIVER FUNCTIONS - ( 06 May 2024 12:11 )  Alb: 3.1 g/dL / Pro: 4.4 g/dL / ALK PHOS: 361 U/L / ALT: 39 U/L / AST: 25 U/L / GGT: x           Urinalysis Basic - ( 06 May 2024 12:11 )    Color: x / Appearance: x / SG: x / pH: x  Gluc: 99 mg/dL / Ketone: x  / Bili: x / Urobili: x   Blood: x / Protein: x / Nitrite: x   Leuk Esterase: x / RBC: x / WBC x   Sq Epi: x / Non Sq Epi: x / Bacteria: x          Lactate, Blood: 0.8 mmol/L (05-04-24 @ 05:50)  Lactate, Blood: 2.1 mmol/L (05-03-24 @ 19:22)  Blood Gas Venous - Lactate: 8.2 mmol/L (05-03-24 @ 17:17)      INFECTIOUS DISEASES TESTING  Legionella Antigen, Urine: Negative (05-04-24 @ 12:50)      INFLAMMATORY MARKERS  Sedimentation Rate, Erythrocyte: 17 mm/Hr (02-12-24 @ 09:15)  C-Reactive Protein, Serum: 7.3 mg/L (02-12-24 @ 09:15)      RADIOLOGY & ADDITIONAL TESTS:  I have personally reviewed the last available Chest xray  CXR  Xray Chest 1 View- PORTABLE-Urgent:   ACC: [id number] EXAM:  XR CHEST PORTABLE URGENT 1V   ORDERED BY: YAAKOV RASCON     PROCEDURE DATE:  05/03/2024          INTERPRETATION:  Reason for study : Chest pain  Technique:  Frontal view the chest      Comparison: Chest x-ray6/21/2023    Findings/  impression:        Support devices: Right Port-A-Cath, satisfactory position. EKG leads   overlie thorax, obscuring anatomy.    Cardiac/ Mediastinum: unremarkable    Lungs/ Pleura: Bilateral lower lobe peribronchial thickening. No   consolidation or pneumothorax.    Skeletal/ soft tissues: Thoracic spondylosis.                          --- End of Report ---            DIAMOND ROCHA MD; Attending Radiologist  This document has been electronically signed. May  4 2024 11:18AM (05-03-24 @ 18:01)      CT  CT Angio Chest PE Protocol w/ IV Cont:   ACC: [id number] EXAM:  CT ABDOMEN AND PELVIS IC   ORDERED BY: PANCHO WALKER     ACC: [id number] EXAM:  CT ANGIO CHEST PULM ART WAWIC   ORDERED BY: PANCHO WALKER     PROCEDURE DATE:  05/03/2024          INTERPRETATION:  CLINICAL HISTORY / REASON FOR EXAM: Fall    TECHNIQUE: Contiguous axial CT images were obtained from the thoracic   inlet to the lung bases following the administration of 100 mL Omnipaque   350 intravenous contrast using a CTA pulmonary embolism protocol. The   patient's abdomen from the diaphragm to the pubic symphysis was then   scanned. Oral contrast was not administered. Coronal, sagittal and 3D/MIP   reformatted images are also submitted.    COMPARISON CT: CT abdomen and pelvis from [facility] 10, 2020    OTHER STUDIES USED FOR CORRELATION: CTchest from September 7, 2023. MRI   abdomen from July 21, 2023      FINDINGS:    CHEST:    TUBES/LINES: Tunneled port within the subcutaneous tissues of the right   chest.    PULMONARY EMBOLUS: No central or segmental pulmonary embolus.    LUNGS, PLEURA, AND AIRWAYS: Left lower lobe consolidation. Additional   bibasilar reticulations.    MEDIASTINUM/THORACIC NODES: Scattered prominent lower paratracheal lymph   nodes. Enlarged subcarinal lymph node. Left hilar   lymphadenopathy/peribronchial cuffing.    HEART/GREAT VESSELS: No pericardial effusion. Heart size unremarkable.   Coronary artery and thoracic aorta atherosclerotic calcifications. No   aneurysmal dilation of the thoracic aorta.      ABDOMEN/PELVIS:    HEPATOBILIARY: Cholelithiasis.    SPLEEN: Splenomegaly measuring up to 16 cm in craniocaudal dimension.    PANCREAS: Unremarkable.    ADRENAL GLANDS: Unremarkable.    KIDNEYS: Atrophic left kidney, stable. Renal cysts and other   subcentimeter hypodensities, too small to further characterize. No   hydronephrosis.    ABDOMINOPELVIC NODES: Multiple enlarged periportal and portacaval lymph   nodes. Mesenteric calcified soft tissue density, possibly calcified lymph   node measuring 1.4 cm (series 6, image 264).    PELVIC ORGANS: Unremarkable.    PERITONEUM/MESENTERY/BOWEL: Inflammatory fat stranding adjacent to the   sigmoid colon. No discrete drainable fluid collection/abscess. No bowel   obstruction, ascites or intraperitoneal free air. Normal caliber appendix.    BONES/SOFT TISSUES: Stable lucent lesions within the right iliac bone.   Degenerative changes of the thoracolumbar spine.    VASCULAR: Calcified atherosclerotic disease within the abdominal aorta.      IMPRESSION:    CHEST:    No CTA evidence of acute pulmonary embolus.    Left lower lobe consolidative opacity. Multiple enlarged/prominent   mediastinal lymph nodes. Finding can be seen with pneumonia. Recommend   radiographic follow-up after treatment.    ABDOMEN/PELVIS:    Inflammatory changes adjacent to the sigmoid colon which can be seen with   diverticulitis. Recommend correlation for abdominal pain. No drainable   fluid collection/abscess.    Multiple findings consistent with known history of lymphoma including   splenomegaly with multiple enlarged portacaval and periportal lymph nodes.    --- End of Report ---            AMIRAH LAO MD; Attending Radiologist  This document has been electronically signed. May  3 2024  7:38PM (05-03-24 @ 18:42)      CARDIOLOGY TESTING  12 Lead ECG:   Ventricular Rate 88 BPM    Atrial Rate 88 BPM    P-R Interval 152 ms    QRS Duration 88 ms    Q-T Interval 374 ms    QTC Calculation(Bazett) 452 ms    P Axis 66 degrees    R Axis 58 degrees    T Axis 48 degrees    Diagnosis Line Sinus rhythm with occasional Premature ventricular complexes  Low voltage QRS  Borderline ECG    Confirmed by David Santacruz ([id number]) on 5/4/2024 10:15:00 PM (05-03-24 @ 20:53)      MEDICATIONS  benztropine 2 Oral <User Schedule>  benztropine 1 Oral at bedtime  cefTRIAXone   IVPB 2000 IV Intermittent every 24 hours  chlorhexidine 2% Cloths 1 Topical daily  doxycycline IVPB 100 IV Intermittent every 12 hours  fluPHENAZine 10 Oral <User Schedule>  fluPHENAZine 5 Oral at bedtime  heparin   Injectable 5000 SubCutaneous every 8 hours  ofloxacin 0.3% Ophthalmic Solution for OTIC Use 10 Both Ears daily  pantoprazole    Tablet 40 Oral before breakfast  sodium bicarbonate 1300 Oral three times a day      WEIGHT  Weight (kg): 92 (05-04-24 @ 05:08)  Creatinine: 2.2 mg/dL (05-06-24 @ 12:11)      ANTIBIOTICS:  cefTRIAXone   IVPB 2000 milliGRAM(s) IV Intermittent every 24 hours  doxycycline IVPB 100 milliGRAM(s) IV Intermittent every 12 hours      All available historical records have been reviewed       LEDA COVARRUBIAS  68y, Male  Allergy: allopurinol (Rash (Moderate))      LOS  3d    CHIEF COMPLAINT: pna, mastoiditis (06 May 2024 13:35)      INTERVAL EVENTS/HPI  - No acute events overnight  - T(F): , Max: 98.8 (05-06-24 @ 04:19)  - Denies any worsening symptoms - ear pain improved - coughing improved as well   - Tolerating medication  - WBC Count: 2.33 (05-06-24 @ 12:11)  WBC Count: 2.42 (05-05-24 @ 07:43)     - Creatinine: 2.2 (05-06-24 @ 12:11)  Creatinine: 2.6 (05-05-24 @ 07:43)       ROS  General: Denies rigors, nightsweats  HEENT: Denies headache, rhinorrhea, sore throat, eye pain  CV: Denies CP, palpitations  PULM: Denies wheezing, hemoptysis  GI: Denies hematemesis, hematochezia, melena  : Denies discharge, hematuria  MSK: Denies arthralgias, myalgias  SKIN: Denies rash, lesions  NEURO: Denies paresthesias, weakness  PSYCH: Denies depression, anxiety    VITALS:  T(F): 97.8, Max: 98.8 (05-06-24 @ 04:19)  HR: 70  BP: 103/63  RR: 18Vital Signs Last 24 Hrs  T(C): 36.6 (06 May 2024 12:18), Max: 37.1 (06 May 2024 04:19)  T(F): 97.8 (06 May 2024 12:18), Max: 98.8 (06 May 2024 04:19)  HR: 70 (06 May 2024 12:18) (70 - 73)  BP: 103/63 (06 May 2024 12:18) (103/63 - 109/68)  BP(mean): 77 (06 May 2024 12:18) (77 - 77)  RR: 18 (06 May 2024 12:18) (18 - 18)  SpO2: 97% (06 May 2024 12:18) (95% - 97%)    Parameters below as of 06 May 2024 12:18  Patient On (Oxygen Delivery Method): room air        PHYSICAL EXAM:  Gen: NAD, resting in bed  HEENT: Normocephalic, atraumatic  Neck: supple, no lymphadenopathy  CV: Regular rate & regular rhythm  Lungs: decreased BS at bases, no fremitus  Abdomen: Soft, BS present  Ext: Warm, well perfused  Neuro: non focal, awake  Skin: no rash, no erythema  Lines: no phlebitis    FH: Non-contributory  Social Hx: Non-contributory    TESTS & MEASUREMENTS:                        9.0    2.33  )-----------( 170      ( 06 May 2024 12:11 )             28.4     05-06    140  |  110  |  35<H>  ----------------------------<  99  4.0   |  20  |  2.2<H>    Ca    8.7      06 May 2024 12:11  Mg     2.0     05-06    TPro  4.4<L>  /  Alb  3.1<L>  /  TBili  0.3  /  DBili  x   /  AST  25  /  ALT  39  /  AlkPhos  361<H>  05-06      LIVER FUNCTIONS - ( 06 May 2024 12:11 )  Alb: 3.1 g/dL / Pro: 4.4 g/dL / ALK PHOS: 361 U/L / ALT: 39 U/L / AST: 25 U/L / GGT: x           Urinalysis Basic - ( 06 May 2024 12:11 )    Color: x / Appearance: x / SG: x / pH: x  Gluc: 99 mg/dL / Ketone: x  / Bili: x / Urobili: x   Blood: x / Protein: x / Nitrite: x   Leuk Esterase: x / RBC: x / WBC x   Sq Epi: x / Non Sq Epi: x / Bacteria: x          Lactate, Blood: 0.8 mmol/L (05-04-24 @ 05:50)  Lactate, Blood: 2.1 mmol/L (05-03-24 @ 19:22)  Blood Gas Venous - Lactate: 8.2 mmol/L (05-03-24 @ 17:17)      INFECTIOUS DISEASES TESTING  Legionella Antigen, Urine: Negative (05-04-24 @ 12:50)      INFLAMMATORY MARKERS  Sedimentation Rate, Erythrocyte: 17 mm/Hr (02-12-24 @ 09:15)  C-Reactive Protein, Serum: 7.3 mg/L (02-12-24 @ 09:15)      RADIOLOGY & ADDITIONAL TESTS:  I have personally reviewed the last available Chest xray  CXR  Xray Chest 1 View- PORTABLE-Urgent:   ACC: [id number] EXAM:  XR CHEST PORTABLE URGENT 1V   ORDERED BY: YAAKOV RASCON     PROCEDURE DATE:  05/03/2024          INTERPRETATION:  Reason for study : Chest pain  Technique:  Frontal view the chest      Comparison: Chest x-ray6/21/2023    Findings/  impression:        Support devices: Right Port-A-Cath, satisfactory position. EKG leads   overlie thorax, obscuring anatomy.    Cardiac/ Mediastinum: unremarkable    Lungs/ Pleura: Bilateral lower lobe peribronchial thickening. No   consolidation or pneumothorax.    Skeletal/ soft tissues: Thoracic spondylosis.                          --- End of Report ---            DIAMOND ROCHA MD; Attending Radiologist  This document has been electronically signed. May  4 2024 11:18AM (05-03-24 @ 18:01)      CT  CT Angio Chest PE Protocol w/ IV Cont:   ACC: [id number] EXAM:  CT ABDOMEN AND PELVIS IC   ORDERED BY: PANCHO WAKLER     ACC: [id number] EXAM:  CT ANGIO CHEST PULM ART WAWIC   ORDERED BY: PANCHO WALKER     PROCEDURE DATE:  05/03/2024          INTERPRETATION:  CLINICAL HISTORY / REASON FOR EXAM: Fall    TECHNIQUE: Contiguous axial CT images were obtained from the thoracic   inlet to the lung bases following the administration of 100 mL Omnipaque   350 intravenous contrast using a CTA pulmonary embolism protocol. The   patient's abdomen from the diaphragm to the pubic symphysis was then   scanned. Oral contrast was not administered. Coronal, sagittal and 3D/MIP   reformatted images are also submitted.    COMPARISON CT: CT abdomen and pelvis from [facility] 10, 2020    OTHER STUDIES USED FOR CORRELATION: CTchest from September 7, 2023. MRI   abdomen from July 21, 2023      FINDINGS:    CHEST:    TUBES/LINES: Tunneled port within the subcutaneous tissues of the right   chest.    PULMONARY EMBOLUS: No central or segmental pulmonary embolus.    LUNGS, PLEURA, AND AIRWAYS: Left lower lobe consolidation. Additional   bibasilar reticulations.    MEDIASTINUM/THORACIC NODES: Scattered prominent lower paratracheal lymph   nodes. Enlarged subcarinal lymph node. Left hilar   lymphadenopathy/peribronchial cuffing.    HEART/GREAT VESSELS: No pericardial effusion. Heart size unremarkable.   Coronary artery and thoracic aorta atherosclerotic calcifications. No   aneurysmal dilation of the thoracic aorta.      ABDOMEN/PELVIS:    HEPATOBILIARY: Cholelithiasis.    SPLEEN: Splenomegaly measuring up to 16 cm in craniocaudal dimension.    PANCREAS: Unremarkable.    ADRENAL GLANDS: Unremarkable.    KIDNEYS: Atrophic left kidney, stable. Renal cysts and other   subcentimeter hypodensities, too small to further characterize. No   hydronephrosis.    ABDOMINOPELVIC NODES: Multiple enlarged periportal and portacaval lymph   nodes. Mesenteric calcified soft tissue density, possibly calcified lymph   node measuring 1.4 cm (series 6, image 264).    PELVIC ORGANS: Unremarkable.    PERITONEUM/MESENTERY/BOWEL: Inflammatory fat stranding adjacent to the   sigmoid colon. No discrete drainable fluid collection/abscess. No bowel   obstruction, ascites or intraperitoneal free air. Normal caliber appendix.    BONES/SOFT TISSUES: Stable lucent lesions within the right iliac bone.   Degenerative changes of the thoracolumbar spine.    VASCULAR: Calcified atherosclerotic disease within the abdominal aorta.      IMPRESSION:    CHEST:    No CTA evidence of acute pulmonary embolus.    Left lower lobe consolidative opacity. Multiple enlarged/prominent   mediastinal lymph nodes. Finding can be seen with pneumonia. Recommend   radiographic follow-up after treatment.    ABDOMEN/PELVIS:    Inflammatory changes adjacent to the sigmoid colon which can be seen with   diverticulitis. Recommend correlation for abdominal pain. No drainable   fluid collection/abscess.    Multiple findings consistent with known history of lymphoma including   splenomegaly with multiple enlarged portacaval and periportal lymph nodes.    --- End of Report ---            AMIRAH LAO MD; Attending Radiologist  This document has been electronically signed. May  3 2024  7:38PM (05-03-24 @ 18:42)      CARDIOLOGY TESTING  12 Lead ECG:   Ventricular Rate 88 BPM    Atrial Rate 88 BPM    P-R Interval 152 ms    QRS Duration 88 ms    Q-T Interval 374 ms    QTC Calculation(Bazett) 452 ms    P Axis 66 degrees    R Axis 58 degrees    T Axis 48 degrees    Diagnosis Line Sinus rhythm with occasional Premature ventricular complexes  Low voltage QRS  Borderline ECG    Confirmed by David Santacruz ([id number]) on 5/4/2024 10:15:00 PM (05-03-24 @ 20:53)      MEDICATIONS  benztropine 2 Oral <User Schedule>  benztropine 1 Oral at bedtime  cefTRIAXone   IVPB 2000 IV Intermittent every 24 hours  chlorhexidine 2% Cloths 1 Topical daily  doxycycline IVPB 100 IV Intermittent every 12 hours  fluPHENAZine 10 Oral <User Schedule>  fluPHENAZine 5 Oral at bedtime  heparin   Injectable 5000 SubCutaneous every 8 hours  ofloxacin 0.3% Ophthalmic Solution for OTIC Use 10 Both Ears daily  pantoprazole    Tablet 40 Oral before breakfast  sodium bicarbonate 1300 Oral three times a day      WEIGHT  Weight (kg): 92 (05-04-24 @ 05:08)  Creatinine: 2.2 mg/dL (05-06-24 @ 12:11)      ANTIBIOTICS:  cefTRIAXone   IVPB 2000 milliGRAM(s) IV Intermittent every 24 hours  doxycycline IVPB 100 milliGRAM(s) IV Intermittent every 12 hours      All available historical records have been reviewed

## 2024-05-06 NOTE — PROGRESS NOTE ADULT - SUBJECTIVE AND OBJECTIVE BOX
ELDA COVARRUBIAS 68y Male  MRN#: [id number]     Hospital Day: 3d    Overnight events   -No major overnight events                                          ----------------------------------------------------------  OBJECTIVE  PAST MEDICAL & SURGICAL HISTORY  Kidney stones    Schizophrenia    Lymphoma    Shingles    Atrophic kidney    H/O colonoscopy with polypectomy    Liver cyst    History of bladder surgery    H/O neuropathy    History of chemotherapy    Stage 3 chronic kidney disease    Neuroendocrine malignancy    Neuroendocrine tumor status post surgical treatment    Neuroendocrine cancer    Retained urethral stent    H/O umbilical hernia repair    Elbow fracture    H/O cystoscopy                                              -----------------------------------------------------------  MEDICATIONS:  STANDING MEDICATIONS  benztropine 2 milliGRAM(s) Oral <User Schedule>  benztropine 1 milliGRAM(s) Oral at bedtime  cefTRIAXone   IVPB 2000 milliGRAM(s) IV Intermittent every 24 hours  chlorhexidine 2% Cloths 1 Application(s) Topical daily  doxycycline IVPB 100 milliGRAM(s) IV Intermittent every 12 hours  fluPHENAZine 10 milliGRAM(s) Oral <User Schedule>  fluPHENAZine 5 milliGRAM(s) Oral at bedtime  heparin   Injectable 5000 Unit(s) SubCutaneous every 8 hours  lactated ringers. 1000 milliLiter(s) IV Continuous <Continuous>  ofloxacin 0.3% Ophthalmic Solution for OTIC Use 10 Drop(s) Both Ears daily  pantoprazole    Tablet 40 milliGRAM(s) Oral before breakfast  sodium bicarbonate 1300 milliGRAM(s) Oral three times a day    PRN MEDICATIONS                                            ------------------------------------------------------------  VITAL SIGNS: Last 24 Hours  T(C): 37.1 (06 May 2024 04:19), Max: 37.1 (06 May 2024 04:19)  T(F): 98.8 (06 May 2024 04:19), Max: 98.8 (06 May 2024 04:19)  HR: 73 (06 May 2024 04:19) (73 - 76)  BP: 109/60 (06 May 2024 04:19) (98/58 - 109/68)  BP(mean): --  RR: 18 (06 May 2024 04:19) (18 - 18)  SpO2: 95% (06 May 2024 04:19) (95% - 95%)      05-05-24 @ 07:01  -  05-06-24 @ 07:00  --------------------------------------------------------  IN: 1640 mL / OUT: 925 mL / NET: 715 mL                                             --------------------------------------------------------------  LABS:                        9.1    2.42  )-----------( 163      ( 05 May 2024 07:43 )             27.3     05-05    140  |  110  |  42<H>  ----------------------------<  102<H>  3.8   |  18  |  2.6<H>    Ca    8.3<L>      05 May 2024 07:43  Mg     2.3     05-05    TPro  4.6<L>  /  Alb  3.1<L>  /  TBili  0.3  /  DBili  x   /  AST  39  /  ALT  41  /  AlkPhos  370<H>  05-05      Urinalysis Basic - ( 05 May 2024 07:43 )    Color: x / Appearance: x / SG: x / pH: x  Gluc: 102 mg/dL / Ketone: x  / Bili: x / Urobili: x   Blood: x / Protein: x / Nitrite: x   Leuk Esterase: x / RBC: x / WBC x   Sq Epi: x / Non Sq Epi: x / Bacteria: x                                                            --------------------------------------------------------------  PHYSICAL EXAM:  GEN: AOx3, NAD, laying in bed  RESP: breathing comfortably on RA  CARDS: warm, well perfused  ABD: soft, nontender, nondistended  EXT: no peripheral edema

## 2024-05-06 NOTE — PROGRESS NOTE ADULT - ASSESSMENT
69 y/o male with b/l hearing loss. Slight improvement of R hearing loss  - Start Afrin 2x daily x3-5 days.   - Cont Abx per ID  - Floxin ear drops to L ear  - Pain control  - No acute ENT intervention  - f/u with ENT as o/p

## 2024-05-07 ENCOUNTER — TRANSCRIPTION ENCOUNTER (OUTPATIENT)
Age: 69
End: 2024-05-07

## 2024-05-07 ENCOUNTER — APPOINTMENT (OUTPATIENT)
Age: 69
End: 2024-05-07

## 2024-05-07 VITALS
OXYGEN SATURATION: 97 % | TEMPERATURE: 97 F | RESPIRATION RATE: 18 BRPM | HEART RATE: 83 BPM | DIASTOLIC BLOOD PRESSURE: 61 MMHG | SYSTOLIC BLOOD PRESSURE: 100 MMHG

## 2024-05-07 LAB
ALBUMIN SERPL ELPH-MCNC: 3.6 G/DL — SIGNIFICANT CHANGE UP (ref 3.5–5.2)
ALP SERPL-CCNC: 412 U/L — HIGH (ref 30–115)
ALT FLD-CCNC: 42 U/L — HIGH (ref 0–41)
ANION GAP SERPL CALC-SCNC: 10 MMOL/L — SIGNIFICANT CHANGE UP (ref 7–14)
AST SERPL-CCNC: 24 U/L — SIGNIFICANT CHANGE UP (ref 0–41)
BASOPHILS # BLD AUTO: 0.03 K/UL — SIGNIFICANT CHANGE UP (ref 0–0.2)
BASOPHILS NFR BLD AUTO: 0.9 % — SIGNIFICANT CHANGE UP (ref 0–1)
BILIRUB SERPL-MCNC: 0.3 MG/DL — SIGNIFICANT CHANGE UP (ref 0.2–1.2)
BUN SERPL-MCNC: 33 MG/DL — HIGH (ref 10–20)
CALCIUM SERPL-MCNC: 9.1 MG/DL — SIGNIFICANT CHANGE UP (ref 8.4–10.5)
CHLORIDE SERPL-SCNC: 109 MMOL/L — SIGNIFICANT CHANGE UP (ref 98–110)
CO2 SERPL-SCNC: 22 MMOL/L — SIGNIFICANT CHANGE UP (ref 17–32)
CREAT SERPL-MCNC: 2.2 MG/DL — HIGH (ref 0.7–1.5)
EGFR: 32 ML/MIN/1.73M2 — LOW
EOSINOPHIL # BLD AUTO: 0.15 K/UL — SIGNIFICANT CHANGE UP (ref 0–0.7)
EOSINOPHIL NFR BLD AUTO: 4.3 % — SIGNIFICANT CHANGE UP (ref 0–8)
GLUCOSE SERPL-MCNC: 107 MG/DL — HIGH (ref 70–99)
HCT VFR BLD CALC: 31.8 % — LOW (ref 42–52)
HGB BLD-MCNC: 10.4 G/DL — LOW (ref 14–18)
IMM GRANULOCYTES NFR BLD AUTO: 16.2 % — HIGH (ref 0.1–0.3)
LYMPHOCYTES # BLD AUTO: 0.18 K/UL — LOW (ref 1.2–3.4)
LYMPHOCYTES # BLD AUTO: 5.1 % — LOW (ref 20.5–51.1)
MAGNESIUM SERPL-MCNC: 1.9 MG/DL — SIGNIFICANT CHANGE UP (ref 1.8–2.4)
MCHC RBC-ENTMCNC: 29.2 PG — SIGNIFICANT CHANGE UP (ref 27–31)
MCHC RBC-ENTMCNC: 32.7 G/DL — SIGNIFICANT CHANGE UP (ref 32–37)
MCV RBC AUTO: 89.3 FL — SIGNIFICANT CHANGE UP (ref 80–94)
MONOCYTES # BLD AUTO: 0.59 K/UL — SIGNIFICANT CHANGE UP (ref 0.1–0.6)
MONOCYTES NFR BLD AUTO: 16.8 % — HIGH (ref 1.7–9.3)
NEUTROPHILS # BLD AUTO: 1.99 K/UL — SIGNIFICANT CHANGE UP (ref 1.4–6.5)
NEUTROPHILS NFR BLD AUTO: 56.7 % — SIGNIFICANT CHANGE UP (ref 42.2–75.2)
NRBC # BLD: 0 /100 WBCS — SIGNIFICANT CHANGE UP (ref 0–0)
PLATELET # BLD AUTO: 218 K/UL — SIGNIFICANT CHANGE UP (ref 130–400)
PMV BLD: 9.3 FL — SIGNIFICANT CHANGE UP (ref 7.4–10.4)
POTASSIUM SERPL-MCNC: 4.1 MMOL/L — SIGNIFICANT CHANGE UP (ref 3.5–5)
POTASSIUM SERPL-SCNC: 4.1 MMOL/L — SIGNIFICANT CHANGE UP (ref 3.5–5)
PROT SERPL-MCNC: 5.2 G/DL — LOW (ref 6–8)
RBC # BLD: 3.56 M/UL — LOW (ref 4.7–6.1)
RBC # FLD: 13.8 % — SIGNIFICANT CHANGE UP (ref 11.5–14.5)
SODIUM SERPL-SCNC: 141 MMOL/L — SIGNIFICANT CHANGE UP (ref 135–146)
WBC # BLD: 3.51 K/UL — LOW (ref 4.8–10.8)
WBC # FLD AUTO: 3.51 K/UL — LOW (ref 4.8–10.8)

## 2024-05-07 PROCEDURE — 99239 HOSP IP/OBS DSCHRG MGMT >30: CPT

## 2024-05-07 RX ORDER — OFLOXACIN OTIC SOLUTION 3 MG/ML
5 SOLUTION/ DROPS AURICULAR (OTIC)
Qty: 1 | Refills: 0
Start: 2024-05-07 | End: 2024-05-11

## 2024-05-07 RX ORDER — OXYMETAZOLINE HYDROCHLORIDE 0.5 MG/ML
1 SPRAY NASAL
Refills: 0 | Status: DISCONTINUED | OUTPATIENT
Start: 2024-05-07 | End: 2024-05-07

## 2024-05-07 RX ORDER — OXYMETAZOLINE HYDROCHLORIDE 0.5 MG/ML
1 SPRAY NASAL
Qty: 1 | Refills: 0
Start: 2024-05-07 | End: 2024-05-09

## 2024-05-07 RX ORDER — SODIUM BICARBONATE 1 MEQ/ML
2 SYRINGE (ML) INTRAVENOUS
Qty: 180 | Refills: 0
Start: 2024-05-07 | End: 2024-06-05

## 2024-05-07 RX ORDER — CEFPODOXIME PROXETIL 100 MG
1 TABLET ORAL
Qty: 12 | Refills: 0
Start: 2024-05-07 | End: 2024-05-12

## 2024-05-07 RX ORDER — OFLOXACIN OTIC SOLUTION 3 MG/ML
10 SOLUTION/ DROPS AURICULAR (OTIC) DAILY
Refills: 0 | Status: DISCONTINUED | OUTPATIENT
Start: 2024-05-07 | End: 2024-05-07

## 2024-05-07 RX ADMIN — HEPARIN SODIUM 5000 UNIT(S): 5000 INJECTION INTRAVENOUS; SUBCUTANEOUS at 05:33

## 2024-05-07 RX ADMIN — Medication 1300 MILLIGRAM(S): at 05:33

## 2024-05-07 RX ADMIN — PANTOPRAZOLE SODIUM 40 MILLIGRAM(S): 20 TABLET, DELAYED RELEASE ORAL at 06:10

## 2024-05-07 RX ADMIN — Medication 2 MILLIGRAM(S): at 05:33

## 2024-05-07 RX ADMIN — Medication 100 MILLIGRAM(S): at 05:33

## 2024-05-07 RX ADMIN — FLUPHENAZINE HYDROCHLORIDE 10 MILLIGRAM(S): 1 TABLET, FILM COATED ORAL at 05:33

## 2024-05-07 NOTE — DISCHARGE NOTE PROVIDER - HOSPITAL COURSE
HPI:   68-year-old male PMH of schizophrenia, CKD, lymphoma (in remission last chemo 12/2023), left elbow fracture s/p ORIF @[facility] ~30 years ago, and gout senting to the ED s/p fall.  Pt says that today he was mowing the lawn when he became lightheaded/dizzy and fell. Says he usually gets lightheaded when he goes from standing to seated position. Has had mechanical falls in the past and never blacked out or syncopized in the past.  Has also had productive cough for the past 1.5 weeks, says his father who lives with him has similar cough. Has had hearing difficulty for many years but only the past few days noted that he has pain behind the right ear. Also has diarrhea, cannot recall how many episodes daily, but says this is not new as well.  No chest pain, SOB, abdominal pain, visual auras or other symptoms.     In the ED, , BP 88/52. Lactate 8.2--->2.1, trops 89--->86, K 5.8 (hemolyzed), CO2 11, AG 26, BUN 47, Cr 3.9 (~baseline 2.5), , AST 54, ALT 55, BNP 5293. pH 7.26, HCO3 13. RVP/Sars Cov negative. CTH/C spine with Extensive opacification of the bilateral mastoids and left middle ear cavity, nonspecific. CTA PE with LLL consolidation and CTAP suggestive of diverticulitis. S/p 2L LR bolus, cefepime, azithro, vanc (03 May 2024 23:22)    CEU Course:  While in CEU, patient was given maintenance fluids, rocephin & doxy, and ofloxacin ear drops. ID consult placed for abx recs. Patient  noted to have improvement in AG, lactate and CR. CT of the temporal bone was performed as per ENT request. Patient stable for downgrade to medical floor.    Medical floor course:  Pt remained stable. Lactate normalized, ASHLEY improved. CT of temporal bone showed Near complete opacification of the bilateral mastoid air cells as well as the left middle ear cavity without evidence for septal erosion which could reflect infection/inflammation.  ID recommended increasing the ceftriaxone 2g daily to cover mastoiditis which can be switched to PO vantin 200 mg BID on discharge to complete 10 days (end date 5/12) and to continue doxycycline 100 mg BID until 5/8. ENT evaluated the pt and recommended Afrin 2x daily x3-5 days, Floxin ear drops, and out pt ENT f/u.    Pt feels well and is medically stable for DC.      HPI:  68-year-old male PMH of schizophrenia, CKD, lymphoma (in remission last chemo 12/2023), left elbow fracture s/p ORIF @[facility] ~30 years ago, and gout senting to the ED s/p fall.  Pt says that today he was mowing the lawn when he became lightheaded/dizzy and fell. Says he usually gets lightheaded when he goes from standing to seated position. Has had mechanical falls in the past and never blacked out or syncopized in the past.  Has also had productive cough for the past 1.5 weeks, says his father who lives with him has similar cough. Has had hearing difficulty for many years but only the past few days noted that he has pain behind the right ear. Also has diarrhea, cannot recall how many episodes daily, but says this is not new as well.  No chest pain, SOB, abdominal pain, visual auras or other symptoms.     In the ED, , BP 88/52. Lactate 8.2--->2.1, trops 89--->86, K 5.8 (hemolyzed), CO2 11, AG 26, BUN 47, Cr 3.9 (~baseline 2.5), , AST 54, ALT 55, BNP 5293. pH 7.26, HCO3 13. RVP/Sars Cov negative. CTH/C spine with Extensive opacification of the bilateral mastoids and left middle ear cavity, nonspecific. CTA PE with LLL consolidation and CTAP suggestive of diverticulitis. S/p 2L LR bolus, cefepime, azithro, vanc (03 May 2024 23:22)    CEU Course:  While in CEU, patient was given maintenance fluids, rocephin & doxy, and ofloxacin ear drops. ID consult placed for abx recs. Patient  noted to have improvement in AG, lactate and CR. CT of the temporal bone was performed as per ENT request. Patient stable for downgrade to medical floor.    Medical floor course:  Pt remained stable. Lactate normalized, ASHLEY improved. CT of temporal bone showed Near complete opacification of the bilateral mastoid air cells as well as the left middle ear cavity without evidence for septal erosion which could reflect infection/inflammation.  ID recommended increasing the ceftriaxone 2g daily to cover mastoiditis which can be switched to PO vantin 200 mg BID on discharge to complete 10 days (end date 5/12) and to continue doxycycline 100 mg BID until 5/8. ENT evaluated the pt and recommended Afrin 2x daily x3-5 days, Floxin ear drops, and out pt ENT f/u.    Pt feels well and is medically stable for DC.      HPI:  68-year-old male PMH of schizophrenia, CKD, lymphoma (in remission last chemo 12/2023), left elbow fracture s/p ORIF @[facility] ~30 years ago, and gout senting to the ED s/p fall.  Pt says that today he was mowing the lawn when he became lightheaded/dizzy and fell. Says he usually gets lightheaded when he goes from standing to seated position. Has had mechanical falls in the past and never blacked out or syncopized in the past.  Has also had productive cough for the past 1.5 weeks, says his father who lives with him has similar cough. Has had hearing difficulty for many years but only the past few days noted that he has pain behind the right ear. Also has diarrhea, cannot recall how many episodes daily, but says this is not new as well.  No chest pain, SOB, abdominal pain, visual auras or other symptoms.     In the ED, , BP 88/52. Lactate 8.2--->2.1, trops 89--->86, K 5.8 (hemolyzed), CO2 11, AG 26, BUN 47, Cr 3.9 (~baseline 2.5), , AST 54, ALT 55, BNP 5293. pH 7.26, HCO3 13. RVP/Sars Cov negative. CTH/C spine with Extensive opacification of the bilateral mastoids and left middle ear cavity, nonspecific. CTA PE with LLL consolidation and CTAP suggestive of diverticulitis. S/p 2L LR bolus, cefepime, azithro, vanc (03 May 2024 23:22)    CEU Course:  While in CEU, patient was given maintenance fluids, rocephin & doxy, and ofloxacin ear drops. ID consult placed for abx recs. Patient  noted to have improvement in AG, lactate and CR. CT of the temporal bone was performed as per ENT request. Patient stable for downgrade to medical floor.    Medical floor course:  Pt remained stable. Lactate normalized, ASHLEY improved. CT of temporal bone showed Near complete opacification of the bilateral mastoid air cells as well as the left middle ear cavity without evidence for septal erosion which could reflect infection/inflammation.  ID recommended increasing the ceftriaxone 2g daily to cover mastoiditis which can be switched to PO vantin 200 mg BID on discharge to complete 10 days (end date 5/12) and to continue doxycycline 100 mg BID until 5/8. ENT evaluated the pt and recommended Afrin 2x daily x3-5 days, Floxin ear drops, and out pt ENT f/u.    Pt will need a repeat CT chest in 6 weeks after DC.   Pt feels well and is medically stable for DC.

## 2024-05-07 NOTE — DISCHARGE NOTE PROVIDER - CARE PROVIDER_API CALL
Nathen Escudero .  Internal Medicine  [address] [place] [address]  Phone: [phone]  Fax: [phone]  Follow Up Time: 1 week   Nathen Escudero .  Internal Medicine  [address] [place] [address]  Phone: [phone]  Fax: [phone]  Follow Up Time: 1 week    Rajesh Juan  Otolaryngology  [address] [place] [id number]  Phone: [phone]  Fax: [phone]  Follow Up Time: 1 week

## 2024-05-07 NOTE — DISCHARGE NOTE PROVIDER - NSDCCPCAREPLAN_GEN_ALL_CORE_FT
PRINCIPAL DISCHARGE DIAGNOSIS  Diagnosis: Sepsis  Assessment and Plan of Treatment: You came to the hospital for dizziness and a cough. You were found to have pneumonia and mastoiditis - an infection of your ear. You were treated with antibiotics. You will need to continue with the antibiotics and ear drops and follow up with ENT in the clinic. Please take your medications as described. Please follow up with you doctors as described. For worsening symptoms, please return to the hospital.      SECONDARY DISCHARGE DIAGNOSES  Diagnosis: Diverticulitis  Assessment and Plan of Treatment:     Diagnosis: Pneumonia  Assessment and Plan of Treatment:     Diagnosis: Perforated tympanic membrane, left  Assessment and Plan of Treatment:     Diagnosis: Atrial flutter  Assessment and Plan of Treatment:     Diagnosis: Demand ischemia  Assessment and Plan of Treatment:     Diagnosis: Acute kidney injury superimposed on CKD  Assessment and Plan of Treatment:     Diagnosis: Fall on same level from slipping, tripping and stumbling  Assessment and Plan of Treatment:

## 2024-05-07 NOTE — DISCHARGE NOTE PROVIDER - CARE PROVIDERS DIRECT ADDRESSES
,[email or web address]

## 2024-05-07 NOTE — DISCHARGE NOTE NURSING/CASE MANAGEMENT/SOCIAL WORK - NSDCPEFALRISK_GEN_ALL_CORE
For information on Fall & Injury Prevention, visit: [email or web address] OR  [email or web address] OR  https://www.cdc.gov/steadi/patient.html

## 2024-05-07 NOTE — DISCHARGE NOTE NURSING/CASE MANAGEMENT/SOCIAL WORK - PATIENT PORTAL LINK FT
You can access the FollowMyHealth Patient Portal offered by [facility] by registering at the following website: [email or web address]. By joining [employer]’s FollowMyHealth portal, you will also be able to view your health information using other applications (apps) compatible with our system.

## 2024-05-07 NOTE — DISCHARGE NOTE PROVIDER - NSDCMRMEDTOKEN_GEN_ALL_CORE_FT
benztropine 1 mg oral tablet: 1 tab(s) orally once a day (at bedtime)  benztropine 2 mg oral tablet: 1 tab(s) orally once a day  Prolixin 10 mg oral tablet: 1 tab(s) orally once a day  Prolixin 5 mg oral tablet: 1 tab(s) orally once a day (at bedtime)   benztropine 1 mg oral tablet: 1 tab(s) orally once a day (at bedtime)  benztropine 2 mg oral tablet: 1 tab(s) orally once a day  cefpodoxime 200 mg oral tablet: 1 tab(s) orally 2 times a day  doxycycline monohydrate 100 mg oral capsule: 1 cap(s) orally 2 times a day  ofloxacin 0.3% otic solution: 5 drop(s) to each affected ear 2 times a day  oxymetazoline 0.05% nasal spray: 1 spray(s) nasal 2 times a day  Prolixin 10 mg oral tablet: 1 tab(s) orally once a day  Prolixin 5 mg oral tablet: 1 tab(s) orally once a day (at bedtime)  sodium bicarbonate 650 mg oral tablet: 2 tab(s) orally 3 times a day

## 2024-05-07 NOTE — DISCHARGE NOTE PROVIDER - PROVIDER TOKENS
PROVIDER:[TOKEN:[[id number]],FOLLOWUP:[1 week]] PROVIDER:[TOKEN:[[id number]],FOLLOWUP:[1 week]],PROVIDER:[TOKEN:[[id number]],FOLLOWUP:[1 week]]

## 2024-05-13 DIAGNOSIS — C7A.1 MALIGNANT POORLY DIFFERENTIATED NEUROENDOCRINE TUMORS: ICD-10-CM

## 2024-05-13 DIAGNOSIS — A41.9 SEPSIS, UNSPECIFIED ORGANISM: ICD-10-CM

## 2024-05-13 DIAGNOSIS — H72.92 UNSPECIFIED PERFORATION OF TYMPANIC MEMBRANE, LEFT EAR: ICD-10-CM

## 2024-05-13 DIAGNOSIS — Z92.21 PERSONAL HISTORY OF ANTINEOPLASTIC CHEMOTHERAPY: ICD-10-CM

## 2024-05-13 DIAGNOSIS — M10.9 GOUT, UNSPECIFIED: ICD-10-CM
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
## 2024-05-13 DIAGNOSIS — B02.9 ZOSTER WITHOUT COMPLICATIONS: ICD-10-CM

## 2024-05-13 DIAGNOSIS — R65.20 SEVERE SEPSIS WITHOUT SEPTIC SHOCK: ICD-10-CM

## 2024-05-13 DIAGNOSIS — H70.93 UNSPECIFIED MASTOIDITIS, BILATERAL: ICD-10-CM

## 2024-05-13 DIAGNOSIS — I12.9 HYPERTENSIVE CHRONIC KIDNEY DISEASE WITH STAGE 1 THROUGH STAGE 4 CHRONIC KIDNEY DISEASE, OR UNSPECIFIED CHRONIC KIDNEY DISEASE: ICD-10-CM

## 2024-05-13 DIAGNOSIS — K57.92 DIVERTICULITIS OF INTESTINE, PART UNSPECIFIED, WITHOUT PERFORATION OR ABSCESS WITHOUT BLEEDING: ICD-10-CM

## 2024-05-13 DIAGNOSIS — K76.89 OTHER SPECIFIED DISEASES OF LIVER: ICD-10-CM
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
## 2024-05-13 DIAGNOSIS — N17.9 ACUTE KIDNEY FAILURE, UNSPECIFIED: ICD-10-CM

## 2024-05-13 DIAGNOSIS — G62.9 POLYNEUROPATHY, UNSPECIFIED: ICD-10-CM

## 2024-05-13 DIAGNOSIS — I48.92 UNSPECIFIED ATRIAL FLUTTER: ICD-10-CM

## 2024-05-13 DIAGNOSIS — Z85.72 PERSONAL HISTORY OF NON-HODGKIN LYMPHOMAS: ICD-10-CM
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
## 2024-05-13 DIAGNOSIS — D84.81 IMMUNODEFICIENCY DUE TO CONDITIONS CLASSIFIED ELSEWHERE: ICD-10-CM

## 2024-05-13 DIAGNOSIS — I24.89 OTHER FORMS OF ACUTE ISCHEMIC HEART DISEASE: ICD-10-CM

## 2024-05-13 DIAGNOSIS — J18.9 PNEUMONIA, UNSPECIFIED ORGANISM: ICD-10-CM

## 2024-05-13 DIAGNOSIS — F20.9 SCHIZOPHRENIA, UNSPECIFIED: ICD-10-CM

## 2024-05-13 DIAGNOSIS — N18.4 CHRONIC KIDNEY DISEASE, STAGE 4 (SEVERE): ICD-10-CM

## 2024-05-13 DIAGNOSIS — E87.20 ACIDOSIS, UNSPECIFIED: ICD-10-CM

## 2024-05-13 DIAGNOSIS — D63.1 ANEMIA IN CHRONIC KIDNEY DISEASE: ICD-10-CM

## 2024-05-13 DIAGNOSIS — E87.5 HYPERKALEMIA: ICD-10-CM

## 2024-06-14 ENCOUNTER — APPOINTMENT (OUTPATIENT)
Age: 69
End: 2024-06-14

## 2024-07-12 ENCOUNTER — APPOINTMENT (OUTPATIENT)
Age: 69
End: 2024-07-12

## 2024-07-22 ENCOUNTER — APPOINTMENT (OUTPATIENT)
Age: 69
End: 2024-07-22
Payer: MEDICARE

## 2024-07-22 ENCOUNTER — LABORATORY RESULT (OUTPATIENT)
Age: 69
End: 2024-07-22

## 2024-07-22 ENCOUNTER — OUTPATIENT (OUTPATIENT)
Dept: OUTPATIENT SERVICES | Facility: HOSPITAL | Age: 69
LOS: 1 days | End: 2024-07-22
Payer: MEDICARE

## 2024-07-22 VITALS — SYSTOLIC BLOOD PRESSURE: 108 MMHG | TEMPERATURE: 98 F | DIASTOLIC BLOOD PRESSURE: 67 MMHG | HEART RATE: 71 BPM

## 2024-07-22 VITALS
TEMPERATURE: 97.5 F | DIASTOLIC BLOOD PRESSURE: 67 MMHG | OXYGEN SATURATION: 95 % | WEIGHT: 198.38 LBS | SYSTOLIC BLOOD PRESSURE: 108 MMHG | HEART RATE: 71 BPM | HEIGHT: 73 IN | BODY MASS INDEX: 26.29 KG/M2

## 2024-07-22 DIAGNOSIS — Z98.890 OTHER SPECIFIED POSTPROCEDURAL STATES: Chronic | ICD-10-CM

## 2024-07-22 DIAGNOSIS — C85.80 OTHER SPECIFIED TYPES OF NON-HODGKIN LYMPHOMA, UNSPECIFIED SITE: ICD-10-CM

## 2024-07-22 DIAGNOSIS — Z96.0 PRESENCE OF UROGENITAL IMPLANTS: Chronic | ICD-10-CM

## 2024-07-22 LAB
ALBUMIN SERPL ELPH-MCNC: 3.8 G/DL
ALP BLD-CCNC: 249 U/L
ALT SERPL-CCNC: 9 U/L
ANION GAP SERPL CALC-SCNC: 13 MMOL/L
AST SERPL-CCNC: 9 U/L
BILIRUB SERPL-MCNC: 0.3 MG/DL
BUN SERPL-MCNC: 28 MG/DL
CALCIUM SERPL-MCNC: 8.9 MG/DL
CHLORIDE SERPL-SCNC: 109 MMOL/L
CO2 SERPL-SCNC: 20 MMOL/L
CREAT SERPL-MCNC: 2.3 MG/DL
EGFR: 31 ML/MIN/1.73M2
GLUCOSE SERPL-MCNC: 101 MG/DL
HCT VFR BLD CALC: 31.5 %
HGB BLD-MCNC: 10.1 G/DL
LDH SERPL-CCNC: 237 U/L
MCHC RBC-ENTMCNC: 28 PG
MCHC RBC-ENTMCNC: 32.1 G/DL
MCV RBC AUTO: 87.3 FL
PLATELET # BLD AUTO: 229 K/UL
PMV BLD: 9.6 FL
POTASSIUM SERPL-SCNC: 4.4 MMOL/L
PROT SERPL-MCNC: 5.1 G/DL
RBC # BLD: 3.61 M/UL
RBC # FLD: 17.3 %
SODIUM SERPL-SCNC: 142 MMOL/L
WBC # FLD AUTO: 3.9 K/UL

## 2024-07-22 PROCEDURE — 86316 IMMUNOASSAY TUMOR OTHER: CPT

## 2024-07-22 PROCEDURE — 99214 OFFICE O/P EST MOD 30 MIN: CPT | Mod: 25

## 2024-07-22 PROCEDURE — 99214 OFFICE O/P EST MOD 30 MIN: CPT

## 2024-07-22 PROCEDURE — 96372 THER/PROPH/DIAG INJ SC/IM: CPT

## 2024-07-22 PROCEDURE — 80053 COMPREHEN METABOLIC PANEL: CPT

## 2024-07-22 PROCEDURE — 85027 COMPLETE CBC AUTOMATED: CPT

## 2024-07-22 PROCEDURE — 83615 LACTATE (LD) (LDH) ENZYME: CPT

## 2024-07-22 RX ORDER — OCTREOTIDE ACETATE 50 UG/ML
20 INJECTION, SOLUTION INTRAVENOUS; SUBCUTANEOUS ONCE
Refills: 0 | Status: COMPLETED | OUTPATIENT
Start: 2024-07-22 | End: 2024-07-22

## 2024-07-22 RX ADMIN — OCTREOTIDE ACETATE 20 MILLIGRAM(S): 50 INJECTION, SOLUTION INTRAVENOUS; SUBCUTANEOUS at 10:50

## 2024-07-22 NOTE — HISTORY OF PRESENT ILLNESS
[Disease:__________________________] : Disease: [unfilled] [de-identified] : The patient is coming for a follow up after a hiatus of almost 2 months. At present, he states that he is improving from a viral syndrome that he had now almost "4 months". He has missed couple of visits. He has started feeling also a "lump" at the left posterior submandibular area; he had 2 teeth extracted from the lower mandible in that area. No fever or night sweats.  He has missed his somatostatin injection this month. His diarrhea has improved with the injections. He was hospitalized several weeks ago for hypercalcemia. He is no longer experiencing the symptoms he was when the calcium was elevated. No other problems. He has not had his repeat PET scan yet.

## 2024-07-22 NOTE — PHYSICAL EXAM
[Restricted in physically strenuous activity but ambulatory and able to carry out work of a light or sedentary nature] : Status 1- Restricted in physically strenuous activity but ambulatory and able to carry out work of a light or sedentary nature, e.g., light house work, office work [Normal] : grossly intact [de-identified] : Eyeglasses noted [de-identified] : Left submandibular LN, firm, movable [de-identified] : See above [de-identified] : Senile purpuric lesions on the hands and forearms [de-identified] : See history

## 2024-07-22 NOTE — ASSESSMENT
[FreeTextEntry1] : 1. Pancreatic neuroendocrine tumor, controlled with somatostatin. 2. 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 However, he has not proceeded with the repeat PET scan that was ordered. 3. Hypercalcemia, not clear whether from #1 or 2 above.  The situation was discussed with him. He is due for his somatostatin injections. He was encouraged to proceed with his PET scan. Will obtain CBC, CMP, LDH, chromogranin today.  Further recommendations after the above results are available.  All questions answered.  If all stable, he will continue with the monthly injections of somatostatin. If significant worsening of either the lymphoma or the neuroendocrine pancreatic tumor, may proceed with changes in his management.  F/U monthly for the injections and to see me again in 2-3 months unless above show definite worsening of the condition.

## 2024-07-22 NOTE — REVIEW OF SYSTEMS
[Loss of Hearing] : loss of hearing [Chest Pain] : chest pain [Cough] : cough [Negative] : Psychiatric [FreeTextEntry4] : Since his recent "cold" but gradually coming back. Nasal congestion getting better. [FreeTextEntry5] : Only for his HZ was [FreeTextEntry6] : Improving [de-identified] : Just areas consistent with senile purpura

## 2024-07-23 DIAGNOSIS — C85.80 OTHER SPECIFIED TYPES OF NON-HODGKIN LYMPHOMA, UNSPECIFIED SITE: ICD-10-CM

## 2024-07-25 LAB — CGA SERPL-MCNC: 1293 NG/ML

## 2024-08-19 ENCOUNTER — APPOINTMENT (OUTPATIENT)
Age: 69
End: 2024-08-19

## 2024-08-31 NOTE — PHYSICAL THERAPY INITIAL EVALUATION ADULT - IMPAIRMENTS CONTRIBUTING IMPAIRED BED MOBILITY, REHAB EVAL
Wound Evaluation    SUBJECTIVE    History of Present Illness: Chief Complaint   Chief Complaint   Patient presents with    Altered Mental Status    Wound     Pt is in bed, non-conversational, sleeping during much of visit.       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    Past Medical History  Past Medical History:   Diagnosis Date    Encounter for surgical aftercare following surgery on the nervous system     History of falling     Pressure ulcer of sacral region     Traumatic subarachnoid hemorrhage without loss of consciousness, subsequent encounter         Surgical History  No past surgical history on file.     Social History          Family History  No family history on file.    Assessments  Pain  Pain Assessment Tool: Faces  Pain Intensity  Faces Scale: 0     Lower Extremity Assessment:          Nutrition Assessment:         Body Mass Index: Body mass index is 22.62 kg/m².          Recent labwork shows:  Lab Results   Component Value Date    WBC 19.2 (H) 08/31/2024    HGB 8.6 (L) 08/31/2024    HCT 28.0 (L) 08/31/2024    BUN 31 (H) 08/31/2024    CREATININE 0.72 08/31/2024    TOTPROTEIN 4.5 (L) 08/31/2024    ALBUMIN 1.4 (L) 08/31/2024    INR 1.1 08/29/2024    HGBA1C 6.8 (H) 08/29/2024     Recent Labs   Lab 08/30/24  1239 08/30/24  1741   GLUCOSE BEDSIDE 164* 153*        Wound Assessments  Wound Sacrum Midline/Middle;Posterior Pressure Injury (Active)   Date First Assessed: 08/29/24   Present on Original Admission: Yes  Location: Sacrum  Modifier: Midline/Middle;Posterior  Primary Wound Type: Pressure Injury  Initial Pressure Injury Stage: Stage 4      Assessments 8/31/2024 10:38 AM   Pressure Injury Stage Stage 4   Wound Exudate Moderate;Serosanguineous   Cleansing Agent Commercial  cleansing solution   Wound Bed/Tissue Type Red;Yellow;Necrotic tissue, slough   Periwound Condition Normal   Wound Edge Well defined   Wound Length (cm) 5 cm   Wound Width (cm) 6 cm   Wound Depth (cm) 1 cm   Wound Surface Area (cm^2) 30 cm^2   Wound Volume (cm^3) 30 cm^3   PhotoTaken? Yes   Debridement Selective - Conservative Sharp Selective - Ultrasound   Debridement Percentage (%) 25   Post-Procedure Length (cm) 5 cm   Post-Procedure Width (cm) 6 cm   Post-Procedure Depth (cm) 1 cm   Post-Procedure Surface Area (cm^2) 30 cm^2   Post-Procedure Volume (cm^3) 30 cm^3        Treatment:   Treatment  Wound Location: sacral  Treatment Type: Debridement  Debridement  Wound Cleansing : Dermal wound cleanser  Type of Tissue Debrided: Slough  Hemostasis Assured by: N/A  Primary Wound Dressing: Honey gel, Gauze, sterile 2 X 2  Secondary Wound Dressing: Other (comment) (mepilex sacral)  Dressing Time Spent (min): 15     ASSESSMENT  The patient presents with a stage IV sacral pressure injury. She was received in bed on her stage IV mattress, with wedges in place for partial sidelying. Her ulceration is mixed with granulation tissue and necrosis. There was no odor, no purulence, no fluctuance noted. She is dependent for all bed mobility. She has a fecal containment system in place. Performed non-excisional debridement of nonviable tissue to the level of subcutaneous tissue including necrotic adipose and eschar. Honey wound gel, 2x2 gauze, and mepilex sacral dressings were applied.   Wound Prognosis: fair    Goals  Reduce necrosis by 25%    Plan  Will continue to follow 1x/week for the care of her sacral pressure injury. Treatment may include selective debridement, advanced dressings, and patient education.       Lindsey Retana, PT  8/31/2024    impaired balance/decreased strength

## 2024-09-13 NOTE — CHART NOTE - NSCHARTNOTEFT_GEN_A_CORE
I spoke to pt's father Dr. Martinez today afternoon who was requesting the medicine team to order an in patient PET scan and capsule endoscopy. I explained to him given the heme/onc recommendations that we do not do inpatient PET scans and the medicine also cannot order a capsule endoscopy without the recommendations of gastroenterology. GI was consulted given the pt's recurrent diarrhea and per nephro and heme/onc recommendations. Heme/onc was also contacted for any further inpatient recommendations and to confirm that PET scans cannot be done in-patient. I then spoke to pt's father again updating him that medicine has reached out to heme/onc who will contact him regarding PET scans not being done in-patient. 1

## 2024-09-16 ENCOUNTER — LABORATORY RESULT (OUTPATIENT)
Age: 69
End: 2024-09-16

## 2024-09-16 ENCOUNTER — APPOINTMENT (OUTPATIENT)
Age: 69
End: 2024-09-16
Payer: MEDICARE

## 2024-09-16 ENCOUNTER — OUTPATIENT (OUTPATIENT)
Dept: OUTPATIENT SERVICES | Facility: HOSPITAL | Age: 69
LOS: 1 days | End: 2024-09-16
Payer: MEDICARE

## 2024-09-16 VITALS
BODY MASS INDEX: 26.11 KG/M2 | HEART RATE: 101 BPM | SYSTOLIC BLOOD PRESSURE: 107 MMHG | OXYGEN SATURATION: 99 % | DIASTOLIC BLOOD PRESSURE: 68 MMHG | RESPIRATION RATE: 16 BRPM | HEIGHT: 73 IN | TEMPERATURE: 98 F | WEIGHT: 197 LBS

## 2024-09-16 VITALS — TEMPERATURE: 98 F | SYSTOLIC BLOOD PRESSURE: 107 MMHG | HEART RATE: 101 BPM

## 2024-09-16 DIAGNOSIS — Z98.890 OTHER SPECIFIED POSTPROCEDURAL STATES: Chronic | ICD-10-CM

## 2024-09-16 DIAGNOSIS — D3A.8 OTHER BENIGN NEUROENDOCRINE TUMORS: ICD-10-CM

## 2024-09-16 DIAGNOSIS — C85.80 OTHER SPECIFIED TYPES OF NON-HODGKIN LYMPHOMA, UNSPECIFIED SITE: ICD-10-CM

## 2024-09-16 DIAGNOSIS — S42.409A UNSPECIFIED FRACTURE OF LOWER END OF UNSPECIFIED HUMERUS, INITIAL ENCOUNTER FOR CLOSED FRACTURE: Chronic | ICD-10-CM

## 2024-09-16 DIAGNOSIS — R05.9 COUGH, UNSPECIFIED: ICD-10-CM

## 2024-09-16 LAB
ALBUMIN SERPL ELPH-MCNC: 3.8 G/DL
ALP BLD-CCNC: 240 U/L
ALT SERPL-CCNC: 26 U/L
ANION GAP SERPL CALC-SCNC: 11 MMOL/L
AST SERPL-CCNC: 21 U/L
BILIRUB SERPL-MCNC: 0.6 MG/DL
BUN SERPL-MCNC: 42 MG/DL
CALCIUM SERPL-MCNC: 12.4 MG/DL
CHLORIDE SERPL-SCNC: 108 MMOL/L
CO2 SERPL-SCNC: 16 MMOL/L
CREAT SERPL-MCNC: 3 MG/DL
EGFR: 22 ML/MIN/1.73M2
ERYTHROCYTE [SEDIMENTATION RATE] IN BLOOD BY WESTERGREN METHOD: 61 MM/HR
GLUCOSE SERPL-MCNC: 122 MG/DL
HCT VFR BLD CALC: 30.5 %
HGB BLD-MCNC: 10 G/DL
LDH SERPL-CCNC: 203 U/L
MCHC RBC-ENTMCNC: 28.1 PG
MCHC RBC-ENTMCNC: 32.8 G/DL
MCV RBC AUTO: 85.7 FL
PLATELET # BLD AUTO: 189 K/UL
PMV BLD: 10 FL
POTASSIUM SERPL-SCNC: 4.4 MMOL/L
PROT SERPL-MCNC: 5.2 G/DL
RBC # BLD: 3.56 M/UL
RBC # FLD: 16.1 %
SODIUM SERPL-SCNC: 135 MMOL/L
WBC # FLD AUTO: 5.12 K/UL

## 2024-09-16 PROCEDURE — 80053 COMPREHEN METABOLIC PANEL: CPT

## 2024-09-16 PROCEDURE — 99214 OFFICE O/P EST MOD 30 MIN: CPT | Mod: 25

## 2024-09-16 PROCEDURE — 96372 THER/PROPH/DIAG INJ SC/IM: CPT

## 2024-09-16 PROCEDURE — 86316 IMMUNOASSAY TUMOR OTHER: CPT

## 2024-09-16 PROCEDURE — 83615 LACTATE (LD) (LDH) ENZYME: CPT

## 2024-09-16 PROCEDURE — 99214 OFFICE O/P EST MOD 30 MIN: CPT

## 2024-09-16 PROCEDURE — 82784 ASSAY IGA/IGD/IGG/IGM EACH: CPT

## 2024-09-16 PROCEDURE — 85027 COMPLETE CBC AUTOMATED: CPT

## 2024-09-16 PROCEDURE — 85652 RBC SED RATE AUTOMATED: CPT

## 2024-09-16 RX ORDER — OCTREOTIDE ACETATE 500 UG/ML
20 INJECTION, SOLUTION INTRAVENOUS; SUBCUTANEOUS ONCE
Refills: 0 | Status: COMPLETED | OUTPATIENT
Start: 2024-09-16 | End: 2024-09-16

## 2024-09-16 RX ADMIN — OCTREOTIDE ACETATE 20 MILLIGRAM(S): 500 INJECTION, SOLUTION INTRAVENOUS; SUBCUTANEOUS at 11:18

## 2024-09-16 NOTE — REVIEW OF SYSTEMS
[Loss of Hearing] : loss of hearing [Cough] : cough [Negative] : Heme/Lymph [Chest Pain] : no chest pain [FreeTextEntry4] : nasal discharge [FreeTextEntry6] : productive

## 2024-09-16 NOTE — PHYSICAL EXAM
[Restricted in physically strenuous activity but ambulatory and able to carry out work of a light or sedentary nature] : Status 1- Restricted in physically strenuous activity but ambulatory and able to carry out work of a light or sedentary nature, e.g., light house work, office work [Normal] : grossly intact [de-identified] : pale [de-identified] : Eyeglasses noted [de-identified] : Left submandibular LN, firm, movable [de-identified] : decreased BS R base [de-identified] : tachycardic [de-identified] : Senile purpuric lesions on the hands and forearms, laceration R hand, clean and dry [de-identified] : See history

## 2024-09-16 NOTE — ASSESSMENT
[FreeTextEntry1] : 1. Pancreatic neuroendocrine tumor, started on somatostatin, he missed appointments, chromogranin trending up (1293 in July). 2. 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 However, he has not proceeded with the repeat PET scan that was ordered. 3. Productive cough. 4. Hypercalcemia, previously, with negative PTH-RP  The situation was discussed with him. He is due for his somatostatin injection. He was encouraged to proceed with his PET scan, referral provided again. Will obtain CBC, CMP, LDH, IgG, chromogranin today. CXR ordered.  Further recommendations after the above results are available. All questions answered. Case discussed and patient seen with Dr. Gong.

## 2024-09-16 NOTE — HISTORY OF PRESENT ILLNESS
[Disease:__________________________] : Disease: [unfilled] [de-identified] : The patient is coming for a follow up after a hiatus of almost 2 months. He missed last month's apt for Sandostatin. He's complaining of productive cough for over 2 months. States he was given 2 or 3 rounds of antibiotics by his PMD. No fever or night sweats.  He slipped on wet floor in his bathroom yesterday, with resulting laceration R hand. He has not had his repeat PET scan yet which was ordered previously.

## 2024-09-17 ENCOUNTER — APPOINTMENT (OUTPATIENT)
Age: 69
End: 2024-09-17

## 2024-09-17 ENCOUNTER — OUTPATIENT (OUTPATIENT)
Dept: OUTPATIENT SERVICES | Facility: HOSPITAL | Age: 69
LOS: 1 days | End: 2024-09-17
Payer: MEDICARE

## 2024-09-17 DIAGNOSIS — Z98.890 OTHER SPECIFIED POSTPROCEDURAL STATES: Chronic | ICD-10-CM

## 2024-09-17 DIAGNOSIS — S42.409A UNSPECIFIED FRACTURE OF LOWER END OF UNSPECIFIED HUMERUS, INITIAL ENCOUNTER FOR CLOSED FRACTURE: Chronic | ICD-10-CM

## 2024-09-17 DIAGNOSIS — Z96.0 PRESENCE OF UROGENITAL IMPLANTS: Chronic | ICD-10-CM

## 2024-09-17 LAB — IGG SER QL IEP: 40 MG/DL

## 2024-09-17 PROCEDURE — 96360 HYDRATION IV INFUSION INIT: CPT

## 2024-09-17 RX ORDER — SODIUM CHLORIDE 9 MG/ML
1000 INJECTION, SOLUTION INTRAMUSCULAR; INTRAVENOUS; SUBCUTANEOUS
Refills: 0 | Status: DISCONTINUED | OUTPATIENT
Start: 2024-09-17 | End: 2024-12-17

## 2024-09-17 RX ADMIN — SODIUM CHLORIDE 500 MILLILITER(S): 9 INJECTION, SOLUTION INTRAMUSCULAR; INTRAVENOUS; SUBCUTANEOUS at 09:46

## 2024-09-19 LAB — CGA SERPL-MCNC: 1685 NG/ML

## 2024-09-20 ENCOUNTER — APPOINTMENT (OUTPATIENT)
Age: 69
End: 2024-09-20

## 2024-09-20 ENCOUNTER — OUTPATIENT (OUTPATIENT)
Dept: OUTPATIENT SERVICES | Facility: HOSPITAL | Age: 69
LOS: 1 days | End: 2024-09-20
Payer: MEDICARE

## 2024-09-20 VITALS — TEMPERATURE: 98 F | DIASTOLIC BLOOD PRESSURE: 62 MMHG | SYSTOLIC BLOOD PRESSURE: 100 MMHG | HEART RATE: 102 BPM

## 2024-09-20 DIAGNOSIS — Z98.890 OTHER SPECIFIED POSTPROCEDURAL STATES: Chronic | ICD-10-CM

## 2024-09-20 DIAGNOSIS — C85.80 OTHER SPECIFIED TYPES OF NON-HODGKIN LYMPHOMA, UNSPECIFIED SITE: ICD-10-CM

## 2024-09-20 DIAGNOSIS — S42.409A UNSPECIFIED FRACTURE OF LOWER END OF UNSPECIFIED HUMERUS, INITIAL ENCOUNTER FOR CLOSED FRACTURE: Chronic | ICD-10-CM

## 2024-09-20 DIAGNOSIS — Z96.0 PRESENCE OF UROGENITAL IMPLANTS: Chronic | ICD-10-CM

## 2024-09-20 PROCEDURE — 96361 HYDRATE IV INFUSION ADD-ON: CPT

## 2024-09-20 PROCEDURE — 96360 HYDRATION IV INFUSION INIT: CPT

## 2024-09-20 RX ORDER — SODIUM CHLORIDE 9 MG/ML
1000 INJECTION, SOLUTION INTRAMUSCULAR; INTRAVENOUS; SUBCUTANEOUS
Refills: 0 | Status: ACTIVE | OUTPATIENT
Start: 2024-09-20 | End: 2024-09-20

## 2024-09-20 RX ADMIN — SODIUM CHLORIDE 1000 MILLILITER(S): 9 INJECTION, SOLUTION INTRAMUSCULAR; INTRAVENOUS; SUBCUTANEOUS at 15:50

## 2024-09-20 RX ADMIN — SODIUM CHLORIDE 250 MILLILITER(S): 9 INJECTION, SOLUTION INTRAMUSCULAR; INTRAVENOUS; SUBCUTANEOUS at 12:13

## 2024-09-26 ENCOUNTER — INPATIENT (INPATIENT)
Facility: HOSPITAL | Age: 69
LOS: 19 days | Discharge: HOME | DRG: 195 | End: 2024-10-16
Attending: STUDENT IN AN ORGANIZED HEALTH CARE EDUCATION/TRAINING PROGRAM | Admitting: STUDENT IN AN ORGANIZED HEALTH CARE EDUCATION/TRAINING PROGRAM
Payer: MEDICARE

## 2024-09-26 VITALS
TEMPERATURE: 100 F | DIASTOLIC BLOOD PRESSURE: 66 MMHG | WEIGHT: 199.96 LBS | RESPIRATION RATE: 20 BRPM | HEART RATE: 102 BPM | OXYGEN SATURATION: 98 % | SYSTOLIC BLOOD PRESSURE: 99 MMHG

## 2024-09-26 DIAGNOSIS — Z98.890 OTHER SPECIFIED POSTPROCEDURAL STATES: Chronic | ICD-10-CM

## 2024-09-26 DIAGNOSIS — S42.409A UNSPECIFIED FRACTURE OF LOWER END OF UNSPECIFIED HUMERUS, INITIAL ENCOUNTER FOR CLOSED FRACTURE: Chronic | ICD-10-CM

## 2024-09-26 DIAGNOSIS — Z96.0 PRESENCE OF UROGENITAL IMPLANTS: Chronic | ICD-10-CM

## 2024-09-26 LAB
HCT VFR BLD CALC: 24.3 % — LOW (ref 42–52)
HGB BLD-MCNC: 7.8 G/DL — LOW (ref 14–18)
MCHC RBC-ENTMCNC: 28.3 PG — SIGNIFICANT CHANGE UP (ref 27–31)
MCHC RBC-ENTMCNC: 32.1 G/DL — SIGNIFICANT CHANGE UP (ref 32–37)
MCV RBC AUTO: 88 FL — SIGNIFICANT CHANGE UP (ref 80–94)
PLATELET # BLD AUTO: 223 K/UL — SIGNIFICANT CHANGE UP (ref 130–400)
PMV BLD: 10.9 FL — HIGH (ref 7.4–10.4)
RBC # BLD: 2.76 M/UL — LOW (ref 4.7–6.1)
RBC # FLD: 16.6 % — HIGH (ref 11.5–14.5)
WBC # BLD: 2.97 K/UL — LOW (ref 4.8–10.8)
WBC # FLD AUTO: 2.97 K/UL — LOW (ref 4.8–10.8)

## 2024-09-26 PROCEDURE — 99291 CRITICAL CARE FIRST HOUR: CPT

## 2024-09-26 PROCEDURE — 93010 ELECTROCARDIOGRAM REPORT: CPT

## 2024-09-26 RX ORDER — SODIUM CHLORIDE 0.9 % (FLUSH) 0.9 %
1000 SYRINGE (ML) INJECTION ONCE
Refills: 0 | Status: COMPLETED | OUTPATIENT
Start: 2024-09-26 | End: 2024-09-26

## 2024-09-26 NOTE — ED PROVIDER NOTE - ATTENDING CONTRIBUTION TO CARE
68-year-old male PMH of schizophrenia, CKD, lymphoma (in remission), left elbow fracture s/p ORIF @[facility] ~30 years ago, 68-year-old male PMH of schizophrenia, CKD, lymphoma (in remission), left elbow fracture s/p ORIF @[facility] ~30 years ago, comes in c/o weakness/SOB. Pt states weakness started 2 wks ago, getting worse, today fell while trying to ambulate due to weakness. No head trauma. No LOC. No CP. Reports SOB on exertion. No abdominal pain. No n/v/c/d. Last chemo 2 mo ago. No fever/chills. On exam, pt in NAD, AAOx3, head NC/AT, CN II-XII intact, PEERL, EOMi, pale skin, neck (-) midline tenderness, lungs CTA B/L, CV S1S2 regular, abdomen soft/NT/ND/(+)BS, ext (-) edema, motor 5/5x4, sensation intact. Will do labs, CT, XR, EKG and reevaluate.

## 2024-09-26 NOTE — ED PROVIDER NOTE - CARE PLAN
1 Principal Discharge DX:	GI bleed   Principal Discharge DX:	GI bleed  Secondary Diagnosis:	Pneumonia  Secondary Diagnosis:	Anemia  Secondary Diagnosis:	Hypotension   Principal Discharge DX:	GI bleed  Secondary Diagnosis:	Pneumonia  Secondary Diagnosis:	Anemia  Secondary Diagnosis:	Hypotension  Secondary Diagnosis:	Sepsis due to pneumonia

## 2024-09-26 NOTE — ED PROVIDER NOTE - PROGRESS NOTE DETAILS
Dr. Melissa Montoya, DO: Patient requiring pressors for BP of 80/50 after fluid boluses. Will upgrade to crit Dr. Melissa Montoya, DO: Patient reports black stools over the past few days. Will consult GI. Will order PRBC for drop in hemoglobin.

## 2024-09-26 NOTE — ED PROVIDER NOTE - PHYSICAL EXAMINATION
GENERAL: Well-developed; well-nourished; in no acute distress.   SKIN: warm, dry  HEAD: Normocephalic; atraumatic.  EYES: PERRLA, EOMI, no conjunctival erythema  ENT: No nasal discharge; airway clear.  NECK: Supple; non tender.  CARD: Regular rate and rhythm. S1, S2 normal; no murmurs, gallops, or rubs.   RESP: Dry cough. LCTAB; No wheezes, rales, rhonchi, or stridor.  ABD: soft, nontender, and nondistended  EXT: Normal ROM.  No LE TTP or edema bilaterally.  LYMPH: No acute cervical adenopathy.  NEURO: A/ox3, grossly unremarkable  PSYCH: Cooperative, appropriate.

## 2024-09-26 NOTE — ED PROVIDER NOTE - OBJECTIVE STATEMENT
68-year-old male, with past medical history of lymphoma on chemotherapy, CKD, schizophrenia, presents to the emergency department for multiple falls today secondary to weakness.  Patient reports worsening weakness, SOB, cough, decreased appetite over the past 2 weeks.  Denies fever, chills, chest pain, vomiting, diarrhea.

## 2024-09-26 NOTE — ED PROVIDER NOTE - CLINICAL SUMMARY MEDICAL DECISION MAKING FREE TEXT BOX
Pt is here for weakness/SOB. Found to be anemic with black stool- blood transfusion ordered. Pt also found to have pnuemonia- Abx/IVF ordered. Will admit for treatment.

## 2024-09-27 DIAGNOSIS — J18.9 PNEUMONIA, UNSPECIFIED ORGANISM: ICD-10-CM

## 2024-09-27 LAB
24R-OH-CALCIDIOL SERPL-MCNC: 15 NG/ML — LOW (ref 30–80)
ALBUMIN SERPL ELPH-MCNC: 2.7 G/DL — LOW (ref 3.5–5.2)
ALBUMIN SERPL ELPH-MCNC: 2.8 G/DL — LOW (ref 3.5–5.2)
ALP SERPL-CCNC: 232 U/L — HIGH (ref 30–115)
ALP SERPL-CCNC: 273 U/L — HIGH (ref 30–115)
ALT FLD-CCNC: 23 U/L — SIGNIFICANT CHANGE UP (ref 0–41)
ALT FLD-CCNC: 28 U/L — SIGNIFICANT CHANGE UP (ref 0–41)
ANION GAP SERPL CALC-SCNC: 10 MMOL/L — SIGNIFICANT CHANGE UP (ref 7–14)
ANION GAP SERPL CALC-SCNC: 12 MMOL/L — SIGNIFICANT CHANGE UP (ref 7–14)
ANION GAP SERPL CALC-SCNC: 12 MMOL/L — SIGNIFICANT CHANGE UP (ref 7–14)
AST SERPL-CCNC: 19 U/L — SIGNIFICANT CHANGE UP (ref 0–41)
AST SERPL-CCNC: 37 U/L — SIGNIFICANT CHANGE UP (ref 0–41)
BASE EXCESS BLDV CALC-SCNC: -7.5 MMOL/L — LOW (ref -2–3)
BASOPHILS # BLD AUTO: 0.02 K/UL — SIGNIFICANT CHANGE UP (ref 0–0.2)
BASOPHILS # BLD AUTO: 0.03 K/UL — SIGNIFICANT CHANGE UP (ref 0–0.2)
BASOPHILS NFR BLD AUTO: 0.6 % — SIGNIFICANT CHANGE UP (ref 0–1)
BASOPHILS NFR BLD AUTO: 0.9 % — SIGNIFICANT CHANGE UP (ref 0–1)
BILIRUB SERPL-MCNC: 0.9 MG/DL — SIGNIFICANT CHANGE UP (ref 0.2–1.2)
BILIRUB SERPL-MCNC: 1.1 MG/DL — SIGNIFICANT CHANGE UP (ref 0.2–1.2)
BLD GP AB SCN SERPL QL: SIGNIFICANT CHANGE UP
BUN SERPL-MCNC: 44 MG/DL — HIGH (ref 10–20)
BUN SERPL-MCNC: 46 MG/DL — HIGH (ref 10–20)
BUN SERPL-MCNC: 50 MG/DL — HIGH (ref 10–20)
CA-I SERPL-SCNC: 1.72 MMOL/L — CRITICAL HIGH (ref 1.15–1.33)
CALCIUM SERPL-MCNC: 10.2 MG/DL — SIGNIFICANT CHANGE UP (ref 8.4–10.5)
CALCIUM SERPL-MCNC: 11.3 MG/DL — HIGH (ref 8.4–10.4)
CALCIUM SERPL-MCNC: 11.7 MG/DL — HIGH (ref 8.4–10.5)
CHLORIDE SERPL-SCNC: 104 MMOL/L — SIGNIFICANT CHANGE UP (ref 98–110)
CHLORIDE SERPL-SCNC: 106 MMOL/L — SIGNIFICANT CHANGE UP (ref 98–110)
CHLORIDE SERPL-SCNC: 110 MMOL/L — SIGNIFICANT CHANGE UP (ref 98–110)
CK SERPL-CCNC: 19 U/L — SIGNIFICANT CHANGE UP (ref 0–225)
CO2 SERPL-SCNC: 17 MMOL/L — SIGNIFICANT CHANGE UP (ref 17–32)
CO2 SERPL-SCNC: 19 MMOL/L — SIGNIFICANT CHANGE UP (ref 17–32)
CO2 SERPL-SCNC: 19 MMOL/L — SIGNIFICANT CHANGE UP (ref 17–32)
CREAT SERPL-MCNC: 2.4 MG/DL — HIGH (ref 0.7–1.5)
CREAT SERPL-MCNC: 2.5 MG/DL — HIGH (ref 0.7–1.5)
CREAT SERPL-MCNC: 2.6 MG/DL — HIGH (ref 0.7–1.5)
D DIMER BLD IA.RAPID-MCNC: 715 NG/ML DDU — HIGH
EGFR: 26 ML/MIN/1.73M2 — LOW
EGFR: 28 ML/MIN/1.73M2 — LOW
EGFR: 29 ML/MIN/1.73M2 — LOW
ELLIPTOCYTES BLD QL SMEAR: SLIGHT — SIGNIFICANT CHANGE UP
EOSINOPHIL # BLD AUTO: 0 K/UL — SIGNIFICANT CHANGE UP (ref 0–0.7)
EOSINOPHIL # BLD AUTO: 0.06 K/UL — SIGNIFICANT CHANGE UP (ref 0–0.7)
EOSINOPHIL NFR BLD AUTO: 0 % — SIGNIFICANT CHANGE UP (ref 0–8)
EOSINOPHIL NFR BLD AUTO: 1.7 % — SIGNIFICANT CHANGE UP (ref 0–8)
FLUAV AG NPH QL: SIGNIFICANT CHANGE UP
FLUBV AG NPH QL: SIGNIFICANT CHANGE UP
GAS PNL BLDV: 128 MMOL/L — LOW (ref 136–145)
GAS PNL BLDV: SIGNIFICANT CHANGE UP
GAS PNL BLDV: SIGNIFICANT CHANGE UP
GIANT PLATELETS BLD QL SMEAR: PRESENT — SIGNIFICANT CHANGE UP
GLUCOSE SERPL-MCNC: 111 MG/DL — HIGH (ref 70–99)
GLUCOSE SERPL-MCNC: 129 MG/DL — HIGH (ref 70–99)
GLUCOSE SERPL-MCNC: 96 MG/DL — SIGNIFICANT CHANGE UP (ref 70–99)
HCO3 BLDV-SCNC: 18 MMOL/L — LOW (ref 22–29)
HCT VFR BLD CALC: 25.4 % — LOW (ref 42–52)
HCT VFR BLD CALC: 26.8 % — LOW (ref 42–52)
HCT VFR BLDA CALC: 32 % — LOW (ref 39–51)
HGB BLD CALC-MCNC: 10.7 G/DL — LOW (ref 12.6–17.4)
HGB BLD-MCNC: 7.9 G/DL — LOW (ref 14–18)
HGB BLD-MCNC: 8.5 G/DL — LOW (ref 14–18)
HYPOCHROMIA BLD QL: SIGNIFICANT CHANGE UP
IMM GRANULOCYTES NFR BLD AUTO: 19.4 % — HIGH (ref 0.1–0.3)
LACTATE BLDV-MCNC: 0.9 MMOL/L — SIGNIFICANT CHANGE UP (ref 0.5–2)
LDH SERPL L TO P-CCNC: 133 U/L — SIGNIFICANT CHANGE UP (ref 50–242)
LEGIONELLA AG UR QL: NEGATIVE — SIGNIFICANT CHANGE UP
LYMPHOCYTES # BLD AUTO: 0.1 K/UL — LOW (ref 1.2–3.4)
LYMPHOCYTES # BLD AUTO: 0.2 K/UL — LOW (ref 1.2–3.4)
LYMPHOCYTES # BLD AUTO: 3.5 % — LOW (ref 20.5–51.1)
LYMPHOCYTES # BLD AUTO: 5.5 % — LOW (ref 20.5–51.1)
MANUAL SMEAR VERIFICATION: SIGNIFICANT CHANGE UP
MCHC RBC-ENTMCNC: 27.5 PG — SIGNIFICANT CHANGE UP (ref 27–31)
MCHC RBC-ENTMCNC: 27.8 PG — SIGNIFICANT CHANGE UP (ref 27–31)
MCHC RBC-ENTMCNC: 31.1 G/DL — LOW (ref 32–37)
MCHC RBC-ENTMCNC: 31.7 G/DL — LOW (ref 32–37)
MCV RBC AUTO: 87.6 FL — SIGNIFICANT CHANGE UP (ref 80–94)
MCV RBC AUTO: 88.5 FL — SIGNIFICANT CHANGE UP (ref 80–94)
MONOCYTES # BLD AUTO: 0.58 K/UL — SIGNIFICANT CHANGE UP (ref 0.1–0.6)
MONOCYTES # BLD AUTO: 0.93 K/UL — HIGH (ref 0.1–0.6)
MONOCYTES NFR BLD AUTO: 16.1 % — HIGH (ref 1.7–9.3)
MONOCYTES NFR BLD AUTO: 31.3 % — HIGH (ref 1.7–9.3)
MRSA PCR RESULT.: NEGATIVE — SIGNIFICANT CHANGE UP
NEUTROPHILS # BLD AUTO: 1.86 K/UL — SIGNIFICANT CHANGE UP (ref 1.4–6.5)
NEUTROPHILS # BLD AUTO: 2.05 K/UL — SIGNIFICANT CHANGE UP (ref 1.4–6.5)
NEUTROPHILS NFR BLD AUTO: 56.7 % — SIGNIFICANT CHANGE UP (ref 42.2–75.2)
NEUTROPHILS NFR BLD AUTO: 60.9 % — SIGNIFICANT CHANGE UP (ref 42.2–75.2)
NEUTS BAND # BLD: 1.7 % — SIGNIFICANT CHANGE UP (ref 0–6)
NRBC # BLD: 0 /100 WBCS — SIGNIFICANT CHANGE UP (ref 0–0)
NRBC # BLD: 0 /100 WBCS — SIGNIFICANT CHANGE UP (ref 0–0)
OVALOCYTES BLD QL SMEAR: SLIGHT — SIGNIFICANT CHANGE UP
PCO2 BLDV: 36 MMHG — LOW (ref 42–55)
PH BLDV: 7.31 — LOW (ref 7.32–7.43)
PLAT MORPH BLD: NORMAL — SIGNIFICANT CHANGE UP
PLATELET # BLD AUTO: 218 K/UL — SIGNIFICANT CHANGE UP (ref 130–400)
PLATELET # BLD AUTO: 237 K/UL — SIGNIFICANT CHANGE UP (ref 130–400)
PMV BLD: 10.9 FL — HIGH (ref 7.4–10.4)
PMV BLD: 11.1 FL — HIGH (ref 7.4–10.4)
PO2 BLDV: 43 MMHG — SIGNIFICANT CHANGE UP (ref 25–45)
POIKILOCYTOSIS BLD QL AUTO: SIGNIFICANT CHANGE UP
POLYCHROMASIA BLD QL SMEAR: SLIGHT — SIGNIFICANT CHANGE UP
POTASSIUM BLDV-SCNC: 4.3 MMOL/L — SIGNIFICANT CHANGE UP (ref 3.5–5.1)
POTASSIUM SERPL-MCNC: 4.2 MMOL/L — SIGNIFICANT CHANGE UP (ref 3.5–5)
POTASSIUM SERPL-MCNC: 4.7 MMOL/L — SIGNIFICANT CHANGE UP (ref 3.5–5)
POTASSIUM SERPL-MCNC: 4.8 MMOL/L — SIGNIFICANT CHANGE UP (ref 3.5–5)
POTASSIUM SERPL-SCNC: 4.2 MMOL/L — SIGNIFICANT CHANGE UP (ref 3.5–5)
POTASSIUM SERPL-SCNC: 4.7 MMOL/L — SIGNIFICANT CHANGE UP (ref 3.5–5)
POTASSIUM SERPL-SCNC: 4.8 MMOL/L — SIGNIFICANT CHANGE UP (ref 3.5–5)
PROT SERPL-MCNC: 4 G/DL — LOW (ref 6–8)
PROT SERPL-MCNC: 4.2 G/DL — LOW (ref 6–8)
RBC # BLD: 2.87 M/UL — LOW (ref 4.7–6.1)
RBC # BLD: 3.06 M/UL — LOW (ref 4.7–6.1)
RBC # FLD: 16.7 % — HIGH (ref 11.5–14.5)
RBC # FLD: 16.7 % — HIGH (ref 11.5–14.5)
RBC BLD AUTO: ABNORMAL
RSV RNA NPH QL NAA+NON-PROBE: SIGNIFICANT CHANGE UP
SAO2 % BLDV: 70.8 % — SIGNIFICANT CHANGE UP (ref 67–88)
SARS-COV-2 RNA SPEC QL NAA+PROBE: SIGNIFICANT CHANGE UP
SODIUM SERPL-SCNC: 135 MMOL/L — SIGNIFICANT CHANGE UP (ref 135–146)
SODIUM SERPL-SCNC: 137 MMOL/L — SIGNIFICANT CHANGE UP (ref 135–146)
SODIUM SERPL-SCNC: 137 MMOL/L — SIGNIFICANT CHANGE UP (ref 135–146)
TROPONIN T, HIGH SENSITIVITY RESULT: 35 NG/L — HIGH (ref 6–21)
VARIANT LYMPHS # BLD: 1.7 % — SIGNIFICANT CHANGE UP (ref 0–5)
WBC # BLD: 3.18 K/UL — LOW (ref 4.8–10.8)
WBC # BLD: 3.61 K/UL — LOW (ref 4.8–10.8)
WBC # FLD AUTO: 3.18 K/UL — LOW (ref 4.8–10.8)
WBC # FLD AUTO: 3.61 K/UL — LOW (ref 4.8–10.8)

## 2024-09-27 PROCEDURE — 86334 IMMUNOFIX E-PHORESIS SERUM: CPT

## 2024-09-27 PROCEDURE — 82652 VIT D 1 25-DIHYDROXY: CPT

## 2024-09-27 PROCEDURE — 86901 BLOOD TYPING SEROLOGIC RH(D): CPT

## 2024-09-27 PROCEDURE — 76770 US EXAM ABDO BACK WALL COMP: CPT

## 2024-09-27 PROCEDURE — 87086 URINE CULTURE/COLONY COUNT: CPT

## 2024-09-27 PROCEDURE — 82746 ASSAY OF FOLIC ACID SERUM: CPT

## 2024-09-27 PROCEDURE — 83521 IG LIGHT CHAINS FREE EACH: CPT

## 2024-09-27 PROCEDURE — 97110 THERAPEUTIC EXERCISES: CPT | Mod: GP

## 2024-09-27 PROCEDURE — 70450 CT HEAD/BRAIN W/O DYE: CPT | Mod: MC

## 2024-09-27 PROCEDURE — 99223 1ST HOSP IP/OBS HIGH 75: CPT

## 2024-09-27 PROCEDURE — 85652 RBC SED RATE AUTOMATED: CPT

## 2024-09-27 PROCEDURE — 71045 X-RAY EXAM CHEST 1 VIEW: CPT

## 2024-09-27 PROCEDURE — 97116 GAIT TRAINING THERAPY: CPT | Mod: GP

## 2024-09-27 PROCEDURE — 84155 ASSAY OF PROTEIN SERUM: CPT

## 2024-09-27 PROCEDURE — 83550 IRON BINDING TEST: CPT

## 2024-09-27 PROCEDURE — 87040 BLOOD CULTURE FOR BACTERIA: CPT

## 2024-09-27 PROCEDURE — 93005 ELECTROCARDIOGRAM TRACING: CPT

## 2024-09-27 PROCEDURE — 83605 ASSAY OF LACTIC ACID: CPT

## 2024-09-27 PROCEDURE — 85045 AUTOMATED RETICULOCYTE COUNT: CPT

## 2024-09-27 PROCEDURE — 36415 COLL VENOUS BLD VENIPUNCTURE: CPT

## 2024-09-27 PROCEDURE — 74177 CT ABD & PELVIS W/CONTRAST: CPT | Mod: MC

## 2024-09-27 PROCEDURE — 71045 X-RAY EXAM CHEST 1 VIEW: CPT | Mod: 26

## 2024-09-27 PROCEDURE — 87640 STAPH A DNA AMP PROBE: CPT

## 2024-09-27 PROCEDURE — 83735 ASSAY OF MAGNESIUM: CPT

## 2024-09-27 PROCEDURE — 80053 COMPREHEN METABOLIC PANEL: CPT

## 2024-09-27 PROCEDURE — 86923 COMPATIBILITY TEST ELECTRIC: CPT

## 2024-09-27 PROCEDURE — 84443 ASSAY THYROID STIM HORMONE: CPT

## 2024-09-27 PROCEDURE — 93306 TTE W/DOPPLER COMPLETE: CPT

## 2024-09-27 PROCEDURE — 84449 ASSAY OF TRANSCORTIN: CPT

## 2024-09-27 PROCEDURE — 82533 TOTAL CORTISOL: CPT

## 2024-09-27 PROCEDURE — 82784 ASSAY IGA/IGD/IGG/IGM EACH: CPT

## 2024-09-27 PROCEDURE — P9016: CPT

## 2024-09-27 PROCEDURE — 82607 VITAMIN B-12: CPT

## 2024-09-27 PROCEDURE — 82310 ASSAY OF CALCIUM: CPT

## 2024-09-27 PROCEDURE — 97162 PT EVAL MOD COMPLEX 30 MIN: CPT | Mod: GP

## 2024-09-27 PROCEDURE — 84165 PROTEIN E-PHORESIS SERUM: CPT

## 2024-09-27 PROCEDURE — 82550 ASSAY OF CK (CPK): CPT

## 2024-09-27 PROCEDURE — 0241U: CPT

## 2024-09-27 PROCEDURE — 87899 AGENT NOS ASSAY W/OPTIC: CPT

## 2024-09-27 PROCEDURE — 85027 COMPLETE CBC AUTOMATED: CPT

## 2024-09-27 PROCEDURE — 82306 VITAMIN D 25 HYDROXY: CPT

## 2024-09-27 PROCEDURE — 84145 PROCALCITONIN (PCT): CPT

## 2024-09-27 PROCEDURE — 86316 IMMUNOASSAY TUMOR OTHER: CPT

## 2024-09-27 PROCEDURE — 86850 RBC ANTIBODY SCREEN: CPT

## 2024-09-27 PROCEDURE — 87205 SMEAR GRAM STAIN: CPT

## 2024-09-27 PROCEDURE — 87449 NOS EACH ORGANISM AG IA: CPT

## 2024-09-27 PROCEDURE — 84436 ASSAY OF TOTAL THYROXINE: CPT

## 2024-09-27 PROCEDURE — 84484 ASSAY OF TROPONIN QUANT: CPT

## 2024-09-27 PROCEDURE — 82728 ASSAY OF FERRITIN: CPT

## 2024-09-27 PROCEDURE — 83540 ASSAY OF IRON: CPT

## 2024-09-27 PROCEDURE — 82962 GLUCOSE BLOOD TEST: CPT

## 2024-09-27 PROCEDURE — 84466 ASSAY OF TRANSFERRIN: CPT

## 2024-09-27 PROCEDURE — 70450 CT HEAD/BRAIN W/O DYE: CPT | Mod: 26

## 2024-09-27 PROCEDURE — 87641 MR-STAPH DNA AMP PROBE: CPT

## 2024-09-27 PROCEDURE — 84100 ASSAY OF PHOSPHORUS: CPT

## 2024-09-27 PROCEDURE — 85379 FIBRIN DEGRADATION QUANT: CPT

## 2024-09-27 PROCEDURE — 36430 TRANSFUSION BLD/BLD COMPNT: CPT

## 2024-09-27 PROCEDURE — 86140 C-REACTIVE PROTEIN: CPT

## 2024-09-27 PROCEDURE — 80048 BASIC METABOLIC PNL TOTAL CA: CPT

## 2024-09-27 PROCEDURE — 85025 COMPLETE CBC W/AUTO DIFF WBC: CPT

## 2024-09-27 PROCEDURE — 0225U NFCT DS DNA&RNA 21 SARSCOV2: CPT

## 2024-09-27 PROCEDURE — 71275 CT ANGIOGRAPHY CHEST: CPT | Mod: 26,MC

## 2024-09-27 PROCEDURE — 83010 ASSAY OF HAPTOGLOBIN QUANT: CPT

## 2024-09-27 PROCEDURE — 83970 ASSAY OF PARATHORMONE: CPT

## 2024-09-27 PROCEDURE — 83615 LACTATE (LD) (LDH) ENZYME: CPT

## 2024-09-27 PROCEDURE — 83519 RIA NONANTIBODY: CPT

## 2024-09-27 PROCEDURE — 86900 BLOOD TYPING SEROLOGIC ABO: CPT

## 2024-09-27 PROCEDURE — 97530 THERAPEUTIC ACTIVITIES: CPT | Mod: GP

## 2024-09-27 PROCEDURE — 84590 ASSAY OF VITAMIN A: CPT

## 2024-09-27 PROCEDURE — 87070 CULTURE OTHR SPECIMN AEROBIC: CPT

## 2024-09-27 RX ORDER — ACETAMINOPHEN 325 MG
650 TABLET ORAL EVERY 6 HOURS
Refills: 0 | Status: DISCONTINUED | OUTPATIENT
Start: 2024-09-27 | End: 2024-10-16

## 2024-09-27 RX ORDER — SENNOSIDES 8.6 MG
1 TABLET ORAL
Refills: 0 | Status: DISCONTINUED | OUTPATIENT
Start: 2024-09-27 | End: 2024-10-16

## 2024-09-27 RX ORDER — PANTOPRAZOLE SODIUM 40 MG/1
80 TABLET, DELAYED RELEASE ORAL ONCE
Refills: 0 | Status: COMPLETED | OUTPATIENT
Start: 2024-09-27 | End: 2024-09-27

## 2024-09-27 RX ORDER — CEFEPIME 2 G/1
2000 INJECTION, POWDER, FOR SOLUTION INTRAVENOUS ONCE
Refills: 0 | Status: COMPLETED | OUTPATIENT
Start: 2024-09-27 | End: 2024-09-27

## 2024-09-27 RX ORDER — FLUPHENAZINE HCL 10 MG
10 TABLET ORAL DAILY
Refills: 0 | Status: DISCONTINUED | OUTPATIENT
Start: 2024-09-27 | End: 2024-09-29

## 2024-09-27 RX ORDER — NOREPINEPHRINE BITARTRATE/D5W 16MG/250ML
0.06 PLASTIC BAG, INJECTION (ML) INTRAVENOUS
Qty: 8 | Refills: 0 | Status: DISCONTINUED | OUTPATIENT
Start: 2024-09-27 | End: 2024-10-02

## 2024-09-27 RX ORDER — CEFTRIAXONE SODIUM 1 G
2000 VIAL (EA) INJECTION EVERY 24 HOURS
Refills: 0 | Status: DISCONTINUED | OUTPATIENT
Start: 2024-09-27 | End: 2024-09-27

## 2024-09-27 RX ORDER — SODIUM BICARBONATE 650 MG
1300 TABLET ORAL THREE TIMES A DAY
Refills: 0 | Status: DISCONTINUED | OUTPATIENT
Start: 2024-09-27 | End: 2024-10-16

## 2024-09-27 RX ORDER — DOXYCYCLINE HYCLATE 100 MG
100 CAPSULE ORAL EVERY 12 HOURS
Refills: 0 | Status: DISCONTINUED | OUTPATIENT
Start: 2024-09-27 | End: 2024-09-30

## 2024-09-27 RX ORDER — SODIUM CHLORIDE 0.9 % (FLUSH) 0.9 %
1000 SYRINGE (ML) INJECTION ONCE
Refills: 0 | Status: COMPLETED | OUTPATIENT
Start: 2024-09-27 | End: 2024-09-27

## 2024-09-27 RX ORDER — SODIUM CHLORIDE 0.9 % (FLUSH) 0.9 %
1000 SYRINGE (ML) INJECTION
Refills: 0 | Status: DISCONTINUED | OUTPATIENT
Start: 2024-09-27 | End: 2024-10-01

## 2024-09-27 RX ORDER — FLUPHENAZINE HCL 10 MG
5 TABLET ORAL AT BEDTIME
Refills: 0 | Status: DISCONTINUED | OUTPATIENT
Start: 2024-09-27 | End: 2024-09-29

## 2024-09-27 RX ORDER — DOXYCYCLINE HYCLATE 100 MG
100 CAPSULE ORAL EVERY 12 HOURS
Refills: 0 | Status: DISCONTINUED | OUTPATIENT
Start: 2024-09-27 | End: 2024-09-27

## 2024-09-27 RX ORDER — SODIUM CHLORIDE 0.9 % (FLUSH) 0.9 %
1000 SYRINGE (ML) INJECTION
Refills: 0 | Status: DISCONTINUED | OUTPATIENT
Start: 2024-09-27 | End: 2024-09-27

## 2024-09-27 RX ORDER — PANTOPRAZOLE SODIUM 40 MG/1
8 TABLET, DELAYED RELEASE ORAL
Qty: 80 | Refills: 0 | Status: DISCONTINUED | OUTPATIENT
Start: 2024-09-27 | End: 2024-09-27

## 2024-09-27 RX ORDER — PIPERACILLIN SODIUM AND TAZOBACTAM SODIUM 12; 1.5 G/60ML; G/60ML
3.38 INJECTION, POWDER, LYOPHILIZED, FOR SOLUTION INTRAVENOUS EVERY 8 HOURS
Refills: 0 | Status: COMPLETED | OUTPATIENT
Start: 2024-09-27 | End: 2024-10-04

## 2024-09-27 RX ORDER — ONDANSETRON HCL/PF 4 MG/2 ML
4 VIAL (ML) INJECTION EVERY 8 HOURS
Refills: 0 | Status: DISCONTINUED | OUTPATIENT
Start: 2024-09-27 | End: 2024-10-16

## 2024-09-27 RX ORDER — MAG HYDROX/ALUMINUM HYD/SIMETH 200-200-20
30 SUSPENSION, ORAL (FINAL DOSE FORM) ORAL EVERY 4 HOURS
Refills: 0 | Status: DISCONTINUED | OUTPATIENT
Start: 2024-09-27 | End: 2024-10-16

## 2024-09-27 RX ORDER — PIPERACILLIN SODIUM AND TAZOBACTAM SODIUM 12; 1.5 G/60ML; G/60ML
3.38 INJECTION, POWDER, LYOPHILIZED, FOR SOLUTION INTRAVENOUS ONCE
Refills: 0 | Status: COMPLETED | OUTPATIENT
Start: 2024-09-27 | End: 2024-09-27

## 2024-09-27 RX ORDER — BENZTROPINE MESYLATE 2 MG
1 TABLET ORAL AT BEDTIME
Refills: 0 | Status: DISCONTINUED | OUTPATIENT
Start: 2024-09-27 | End: 2024-10-03

## 2024-09-27 RX ORDER — 5-HYDROXYTRYPTOPHAN (5-HTP) 100 MG
3 TABLET,DISINTEGRATING ORAL AT BEDTIME
Refills: 0 | Status: DISCONTINUED | OUTPATIENT
Start: 2024-09-27 | End: 2024-10-16

## 2024-09-27 RX ORDER — AZITHROMYCIN 250 MG/1
500 TABLET, FILM COATED ORAL ONCE
Refills: 0 | Status: COMPLETED | OUTPATIENT
Start: 2024-09-27 | End: 2024-09-27

## 2024-09-27 RX ORDER — VANCOMYCIN HCL-SODIUM CHLORIDE IV SOLN 1.5 GM/250ML-0.9% 1.5-0.9/25 GM/ML-%
1000 SOLUTION INTRAVENOUS ONCE
Refills: 0 | Status: COMPLETED | OUTPATIENT
Start: 2024-09-27 | End: 2024-09-27

## 2024-09-27 RX ORDER — PANTOPRAZOLE SODIUM 40 MG/1
40 TABLET, DELAYED RELEASE ORAL
Refills: 0 | Status: DISCONTINUED | OUTPATIENT
Start: 2024-09-27 | End: 2024-10-08

## 2024-09-27 RX ORDER — BENZTROPINE MESYLATE 2 MG
2 TABLET ORAL DAILY
Refills: 0 | Status: DISCONTINUED | OUTPATIENT
Start: 2024-09-27 | End: 2024-10-03

## 2024-09-27 RX ORDER — ACETAMINOPHEN 325 MG
650 TABLET ORAL ONCE
Refills: 0 | Status: COMPLETED | OUTPATIENT
Start: 2024-09-27 | End: 2024-09-27

## 2024-09-27 RX ADMIN — Medication 650 MILLIGRAM(S): at 01:25

## 2024-09-27 RX ADMIN — Medication 1 MILLIGRAM(S): at 23:02

## 2024-09-27 RX ADMIN — Medication 1 TABLET(S): at 22:51

## 2024-09-27 RX ADMIN — PANTOPRAZOLE SODIUM 40 MILLIGRAM(S): 40 TABLET, DELAYED RELEASE ORAL at 18:58

## 2024-09-27 RX ADMIN — CEFEPIME 100 MILLIGRAM(S): 2 INJECTION, POWDER, FOR SOLUTION INTRAVENOUS at 01:25

## 2024-09-27 RX ADMIN — PANTOPRAZOLE SODIUM 10 MG/HR: 40 TABLET, DELAYED RELEASE ORAL at 03:20

## 2024-09-27 RX ADMIN — Medication 10 MILLIGRAM(S): at 12:26

## 2024-09-27 RX ADMIN — Medication 8.5 MICROGRAM(S)/KG/MIN: at 03:20

## 2024-09-27 RX ADMIN — Medication 2 MILLIGRAM(S): at 12:26

## 2024-09-27 RX ADMIN — Medication 1300 MILLIGRAM(S): at 23:03

## 2024-09-27 RX ADMIN — Medication 1000 MILLILITER(S): at 00:45

## 2024-09-27 RX ADMIN — AZITHROMYCIN 255 MILLIGRAM(S): 250 TABLET, FILM COATED ORAL at 01:25

## 2024-09-27 RX ADMIN — Medication 150 MILLILITER(S): at 09:00

## 2024-09-27 RX ADMIN — Medication 5 MILLIGRAM(S): at 23:03

## 2024-09-27 RX ADMIN — Medication 1000 MILLILITER(S): at 02:30

## 2024-09-27 RX ADMIN — PANTOPRAZOLE SODIUM 40 MILLIGRAM(S): 40 TABLET, DELAYED RELEASE ORAL at 05:21

## 2024-09-27 RX ADMIN — VANCOMYCIN HCL-SODIUM CHLORIDE IV SOLN 1.5 GM/250ML-0.9% 250 MILLIGRAM(S): 1.5-0.9/25 SOLUTION at 01:26

## 2024-09-27 RX ADMIN — PIPERACILLIN SODIUM AND TAZOBACTAM SODIUM 25 GRAM(S): 12; 1.5 INJECTION, POWDER, LYOPHILIZED, FOR SOLUTION INTRAVENOUS at 23:02

## 2024-09-27 RX ADMIN — PANTOPRAZOLE SODIUM 80 MILLIGRAM(S): 40 TABLET, DELAYED RELEASE ORAL at 02:30

## 2024-09-27 RX ADMIN — PIPERACILLIN SODIUM AND TAZOBACTAM SODIUM 200 GRAM(S): 12; 1.5 INJECTION, POWDER, LYOPHILIZED, FOR SOLUTION INTRAVENOUS at 09:20

## 2024-09-27 RX ADMIN — Medication 1000 MILLILITER(S): at 01:15

## 2024-09-27 RX ADMIN — Medication 100 MILLILITER(S): at 15:19

## 2024-09-27 RX ADMIN — PIPERACILLIN SODIUM AND TAZOBACTAM SODIUM 25 GRAM(S): 12; 1.5 INJECTION, POWDER, LYOPHILIZED, FOR SOLUTION INTRAVENOUS at 12:25

## 2024-09-27 RX ADMIN — Medication 1000 MILLILITER(S): at 00:00

## 2024-09-27 RX ADMIN — Medication 100 MILLIGRAM(S): at 18:58

## 2024-09-27 NOTE — CONSULT NOTE ADULT - ASSESSMENT
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 cough is productive of yellow sputum and has been going on for 3 months with 10 lb weight loss over the last 3 months ,  he also states weakness started 2 wks ago, getting worse, today fell while trying to ambulate due to weakness.     #Metastatic carcinoid,  on Sandostatin  -MRI abdomen 7.23:  Liver segment II 4 cm mass and Liver segment V  1.5 cm mass suspicious for Hepatic Lymphomas.  --Biopsy of Liver lesion 8.23: well differentiated Neuroendocrine tumor, Grade 3 Likely  metastatic.   -- NM Octreoscan Mutliple Areas (09.05.23 @ 16:43) >Focal area of increased uptake in the upper abdomen/epigastrium to the left of midline suspicious for neuroendocrine tissue expressing somatostatin receptors.  --gets lost to follow up and has missed few doses   --last received sandostatin on 9.16.24   --chromogranin increased to 1685 from 1293 in july       #Hx of NHL marginal zone   -He was in a good partial remission following 3 cycles of bendamustine and Rituxan treated in 2019 and 2020.  -He was unable to tolerate maintenance Rituxan.  -However, he was never in complete remission and had preferred just to be observed since we were dealing with a low grade lymphoma.  - was lost to follow up.  --CT Abdomen/Pelvis 2/23/23 Interval worsening in periesophageal and pelvic lymphadenopathy , other bulky lymph nodes in the abdomen and retroperitoneum appear stable. 1.3 indeterminate segment 4 liver lesion. Focal areas of pleural nodularity concerning for lymphomatous/neoplastic/metastatic process, new since prior.  --PET scan 5.23: 1.  Interval increase in size and decreased metabolic activity of the thoracoabdominal lymphadenopathy, some are new, probably recurrence /residual disease. New mildly metabolic 3 cm hypodensity in the liver segment 2/3, concerning for neoplasm or lymphoma.  Poorly defined small hypodensity in the segment 4, below the resolution of PET scan.  MRI will provide further elucidation. Diffuse mildly increased bone marrow activity, possibly reactive versus bone marrow involvement.   Hepatosplenomegaly.  - s/p R-CVP for Marginal Zone Lymphoma on 9/20 & 9/21> patient tolerated treatment well with no reaction   -s/p CVP. last received 12/23   -was evaluated outpatient and is planned for PET, hasnt been done yet.     #Hypercalcemia  prior work up neg PTHRP   9.24 outpatient lab Ca 12.6   F/up labs PTH, PTHrp , vit D, SPEP , UPEP , Ig panel and FLC  C/w IVF   Stool softner PRN     #Leucopenia   2/2 septic shock     #Anemia normocytic   Presented with Hb 7.8   Baseline hb 9-10   Colonoscopy 2023- Cecal polyp, EMR: Tubular adenoma fragments showing foci of high-grade dysplasia and focal   intramucosal adenocarcinoma.   Recommendation was 6M f/up.   Obtain iron panel.   WIll need work up with GI            68-year-old male with schizophrenia, CKD, marginal zone lymphoma and pancreatic neuroendocrine tumor on monthly somatostatin injections( follows Dr Gong )  left elbow fracture s/p ORIF @[facility] ~30 years ago, comes in c/o weakness/SOB and cough that is productive of yellow sputum and has been going on for 3 months with 10 lb weight loss over the last 3 months. He also states weakness started 2 wks ago, getting worse, today fell while trying to ambulate due to weakness.     #Metastatic carcinoid,  on Sandostatin  --MRI abdomen 7.23:  Liver segment II 4 cm mass and Liver segment V  1.5 cm mass suspicious for Hepatic Lymphomas.  --Biopsy of Liver lesion 8.23: well differentiated Neuroendocrine tumor, Grade 3 Likely  metastatic.   --NM Octreoscan: multiple areas (09.05.23 @ 16:43) >Focal area of increased uptake in the upper abdomen/epigastrium to the left of midline suspicious for neuroendocrine tissue expressing somatostatin receptors.  --Gets lost to follow up and has missed few doses   --Last received Sandostatin on 9.16.24   --Chromogranin increased to 1685 from 1293 in July       # NHL, marginal zone   -He was in a good partial remission following 3 cycles of bendamustine and Rituxan treated in 2019 and 2020.  -He was unable to tolerate maintenance Rituxan.  -However, he was never in complete remission and it was preferred just to observe since we were dealing with a low grade lymphoma.  --Was lost to follow up.  --CT Abdomen/Pelvis 2/23/23 Interval worsening in periesophageal and pelvic lymphadenopathy, other bulky lymph nodes in the abdomen and retroperitoneum appear stable. 1.3 indeterminate segment 4 liver lesion. Focal areas of pleural nodularity concerning for lymphomatous/neoplastic/metastatic process, new since prior.  --PET scan 5.23: 1.  Interval increase in size and decreased metabolic activity of the thoracoabdominal lymphadenopathy, some are new, probably recurrence /residual disease. New mildly metabolic 3 cm hypodensity in the liver segment 2/3, concerning for neoplasm or lymphoma.  Poorly defined small hypodensity in the segment 4, below the resolution of PET scan.  MRI will provide further elucidation. Diffuse mildly increased bone marrow activity, possibly reactive versus bone marrow involvement.  --Hepatosplenomegaly.  --s/p R-CVP for Marginal Zone Lymphoma on 9/20 & 9/21>. Had reaction to Rituxan   --s/p CVP,  last received 12/23   --Was evaluated outpatient and is planned for PET, hasn't been done yet.     #Hypercalcemia  prior work up neg PTHRP   9.24 outpatient lab Ca 12.6   F/up labs PTH, PTHrp , vit D, SPEP , UPEP , Ig panel and FLC  C/w IVF   Stool softener PRN     #Leucopenia   Most likely 2/2 septic shock     #Anemia normocytic   Presented with Hb 7.8   Baseline hb 9-10   Colonoscopy 2023- Cecal polyp, EMR: Tubular adenoma fragments showing foci of high-grade dysplasia and focal   intramucosal adenocarcinoma.   Recommendation was 6M f/up but did not.  Obtain iron panel.   Will need work up with GI     In general, has been a difficult management overall, given the diagnoses and lack of regular follow ups.

## 2024-09-27 NOTE — CONSULT NOTE ADULT - ASSESSMENT
68-year-old male PMH of schizophrenia, CKD, pancreatic neuroendocrine tumor, started on somatostatin, he missed appointments, chromogranin trending up (1293 in July), Marginal zone lymphoma may be with mild progression. However, he has not proceeded with the repeat PET scan, adenocarcinoma of the colon Stage IA as workup was negative for any regional or distant lesions  comes in c/o weakness/SOB and cough. Found to have multilobular PNA . GI consulted for anemia.       # Acute on chronic anemia  # Hx of Adenocarcinoma of the colon Stage IA as workup was negative for any regional or distant lesions  - no evidence of active GI bleed  - Hgb ~ 10 on admission trending down to 7.8 on admission   - HD stable   - JAE brown stool on this admission  - no abdominal imaging on this admission    - last colonoscopy as above         Plan  - check iron studies, B12, and folate  - pt will benefit from outpatient EGD/colonoscopy as care bridge. Please recall once pt is ready for discharge to set up an appointment  - Trend CBC daily adn transfuse prn to target Hgb >7   - Keep active T&S  - please call GI stat for any active GI bleed (melena, hematochezia, hematemesis)   - in case of any unstable bleed please obtain CTA and consult IR stat

## 2024-09-27 NOTE — H&P ADULT - ASSESSMENT
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 on monthly somatostatin injections( follows Dr Gong )  left elbow fracture s/p ORIF @[facility] ~30 years ago, comes in c/o weakness/SOB and cough. Pt states weakness started 2 wks ago, getting worse, today fell while trying to ambulate due to weakness. No head trauma. No LOC. No CP. Reports SOB on exertion. No abdominal pain. No n/v/c/d. Last chemo > 2 mo ago. No fever/chills.    #Fatigue, cough and SOB secondary to Multifocal PNA  #Small likely parapneumonic rt pleural effusion   #Sepsis POA  - patient presented with weakness and cough for 2 weeks, had finished abx course by his PMD for pneumonia  - Sepsis POA > SIRS+ with source : T 100.7 ,  , and pneumonia  - patient BP was soft , MAP < 65 started on small dose levophed   - CURB 65 = 2   - Blood cultures sent , s/p cefepime , vancomycin and azithromycin in ED  - Will start zosyn 3.375 q8 IV given recent abx use   - send RVP, sputum cx , urine strep and legionella , nasal MRSA  - Patient is satting 95% on RA  - ID eval     #Acute on chronic Normocytic anemia  #Leukopenia- chronic  -patient baseline Hb 9-10 , Hb today 7.8 , MCV 88 , RDW 16.6%   - s/p 1 unit PRBC's in ED  - no evidence of bleeding , ED called GI who said to keep him NPO for scope today  - WBC 2.4, has been chronically on lower side since 9/2023 , could be related to BM involved lymphoma ?   - keep active T&S  - resume diet after scope and check Hb to see response to transfusion   - Heme-onc eval     #Moderate asymptomatic Hypercalcemia likely secondary to malignancy  #Marginal zone lymphoma  #Pancreatic neuroendocrine tumor   - patient follows with Heme-Onc Dr Gong  - on Monthly somatostatin injections - taken 9/16  - patient CT scan with Multistation lymphadenopathy though a component could be reactive given the extensive lung consolidations, suspect also related to patient's known history of lymphoma. Again noted is splenomegaly.  - correct calcium level = 12.7   - send PTH, PTHrp , vit D, vit A , SPEP , UPEP , Ig panel and FLC   - calcium level rising > start NS0.9% 150cc/hr , target -200cc/hr  - Will send chromogranin and LDH given neuroendocrine pancreatic tumor.     #Small radha-cardial effusion - chronic  - stable small pericardial effusion seen on CT scan , was also reported on multiple previous ECHO;s  - Monitor for now     #CKD 4 - around baseline   #Chronic Jeannette AG Metabolic acidosis   - hx of nephrolithiasis c/b atrophic L kidney, R ureteral rivision, CKD4 (bsl Cr ~2.5)   - s/p IV contrast on admission   - continue IVF  - continue sodium bicarb 1300mg TID   - Trops elevated likely secondary to CKD      #Elevated ALP  - possibly due to hepatic involvement of lymphoma     #Schizophrenia  - continue home Prolixin and cogentin      # Misc  - DVT Prophylaxis: SCD's for now   - GI Prophylaxis: pantoprazole    Tablet 40 milliGRAM(s) Oral before breakfast  - Diet: Diet, NPO   - Activity: Activity - Ambulate as Tolerated  - Code Status: Full   # To follow up: ID , heme onc , taper levophed , c/w abx . IV fluids for hypercalcemia , GI for Scope then resume diet , check BMP and cbc in the afternoon  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 on monthly somatostatin injections( follows Dr Gong )  left elbow fracture [facility] ~[facility] years ago, comes in c/o weakness/SOB and cough. cough is productive of yellow sputum and has been going on for 3 months with 10 lb weight loss over the last 3 months ,  he also states weakness started 2 wks ago, getting worse, today fell while trying to ambulate due to weakness. No head trauma. No LOC. No CP. Reports SOB on exertion. No abdominal pain. No n/v/c/d. Last chemo > 2 mo ago. No fever/chills. Of note he took abx course for PNA prescribed by his PMD     #Fatigue, cough and SOB secondary to Multifocal PNA  #Fall  #Small likely parapneumonic rt pleural effusion   #Sepsis POA  - patient presented with weakness and cough for 2 weeks, had finished abx course by his PMD for pneumonia  - Sepsis POA > SIRS+ with source : T 100.7 ,  , and pneumonia  - patient BP was soft , MAP < 65 started on small dose levophed   - CURB 65 = 2   - Blood cultures sent , s/p cefepime , vancomycin and azithromycin in ED  - Will start zosyn 3.375 q8 IV given recent abx use   - send RVP, sputum cx , urine strep and legionella , nasal MRSA  - Patient is satting 95% on RA  - will get CPK given fall   - will get CT head given fall and lethargy.   - ID eval     #Acute on chronic Normocytic anemia  #Leukopenia- chronic  -patient baseline Hb 9-10 , Hb today 7.8 , MCV 88 , RDW 16.6%   - s/p 1 unit PRBC's in ED  - no evidence of bleeding , ED called GI who said to keep him NPO for scope today  - WBC 2.4, has been chronically on lower side since 9/2023 , could be related to BM involved lymphoma ?   - keep active T&S  - resume diet after scope and check Hb to see response to transfusion   - Heme-onc eval     #Moderate symptomatic Hypercalcemia likely secondary to malignancy - has constipation and confusion   #Marginal zone lymphoma  #Pancreatic neuroendocrine tumor   - patient follows with Heme-Onc Dr Gong  - on Monthly somatostatin injections - taken 9/16  - patient CT scan with Multistation lymphadenopathy though a component could be reactive given the extensive lung consolidations, suspect also related to patient's known history of lymphoma. Again noted is splenomegaly.  - correct calcium level = 12.7   - send PTH, PTHrp , vit D, vit A , SPEP , UPEP , Ig panel and FLC   - calcium level rising > start NS 0.9% 150cc/hr , target -200cc/hr ,patient has constipation and lethargic/slightly confused   - Will send chromogranin and LDH given neuroendocrine pancreatic tumor.   - patient supposed to do PET scan OP to check status of his lymphoma but has not yet  - Heme-Onc eval     #Small radha-cardial effusion - chronic  - stable small pericardial effusion seen on CT scan , was also reported on multiple previous ECHO;s  - Monitor for now     #CKD 4 - around baseline   #Chronic Normal AG Metabolic acidosis   #NSTEMI type II in the setting of CKD   - hx of nephrolithiasis c/b atrophic L kidney, R ureteral rivision, CKD4 (bsl Cr ~2.5)   - s/p IV contrast on admission   - continue IVF  - continue sodium bicarb 1300mg TID   - Trops elevated likely secondary to CKD      #Elevated ALP- chronic   - possibly due to hepatic involvement of lymphoma/ bone involvement     #Schizophrenia  - continue home Prolixin and cogentin      # Misc  - DVT Prophylaxis: SCD's for now   - GI Prophylaxis: pantoprazole    Tablet 40 milliGRAM(s) Oral before breakfast  - Diet: Diet, NPO   - Activity: Activity - Ambulate as Tolerated  - Code Status: Full   # To follow up: ID , heme onc , taper levophed , c/w abx . IV fluids for hypercalcemia , GI for Scope then resume diet , check BMP and cbc in the afternoon  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 on monthly somatostatin injections( follows Dr Gong )  left elbow fracture s/p ORIF @[facility] ~30 years ago, comes in c/o weakness/SOB and cough. cough is productive of yellow sputum and has been going on for 3 months with 10 lb weight loss over the last 3 months ,  he also states weakness started 2 wks ago, getting worse, today fell while trying to ambulate due to weakness. No head trauma. No LOC. No CP. Reports SOB on exertion. No abdominal pain. No n/v/c/d. Last chemo > 2 mo ago. No fever/chills. Of note he took abx course for PNA prescribed by his PMD     #Fatigue, cough and SOB secondary to Multifocal PNA  #Fall  #Small likely parapneumonic rt pleural effusion   #Sepsis POA  - patient presented with weakness and cough for 2 weeks, had finished abx course by his PMD for pneumonia  - Sepsis POA > SIRS+ with source : T 100.7 ,  , and pneumonia  - patient BP was soft , MAP < 65 started on small dose levophed   - CURB 65 = 2   - Blood cultures sent , s/p cefepime , vancomycin and azithromycin in ED  - Will start zosyn 3.375 q8 IV given recent abx use   - send RVP, sputum cx , urine strep and legionella , nasal MRSA  - Patient is satting 95% on RA  - will get CPK given fall   - will get CT head given fall and lethargy.   - ID eval     #Acute on chronic Normocytic anemia  #Leukopenia- chronic  -patient baseline Hb 9-10 , Hb today 7.8 , MCV 88 , RDW 16.6%   - s/p 1 unit PRBC's in ED  - no evidence of bleeding , ED called GI who said to keep him NPO for scope today  - c/w PPI IV BID - although anemia could be secondary to underlying malignancy   - WBC 2.4, has been chronically on lower side since 9/2023 , could be related to BM involved lymphoma ?   - keep active T&S  - resume diet after scope and check Hb to see response to transfusion   - Heme-onc eval     #Moderate symptomatic Hypercalcemia likely secondary to malignancy - has constipation and confusion   #Marginal zone lymphoma  #Pancreatic neuroendocrine tumor   - patient follows with Heme-Onc Dr Terjanian  - on Monthly somatostatin injections - taken 9/16  - patient CT scan with Multistation lymphadenopathy though a component could be reactive given the extensive lung consolidations, suspect also related to patient's known history of lymphoma. Again noted is splenomegaly.  - correct calcium level = 12.7   - send PTH, PTHrp , vit D, vit A , SPEP , UPEP , Ig panel and FLC ( myleoma panel in the setting of elevated Cr , anemia and hypercalcemia )    - calcium level rising > start NS 0.9% 150cc/hr , target -200cc/hr ,patient has constipation and lethargic/slightly confused   - Will send chromogranin and LDH given neuroendocrine pancreatic tumor.   - patient supposed to do PET scan OP to check status of his lymphoma but has not yet  - Heme-Onc eval     #Small radha-cardial effusion - chronic  - stable small pericardial effusion seen on CT scan , was also reported on multiple previous ECHO;s  - Monitor for now     #CKD 4 - around baseline   #Chronic Normal AG Metabolic acidosis   #NSTEMI type II in the setting of CKD   - hx of nephrolithiasis c/b atrophic L kidney, R ureteral rivision, CKD4 (bsl Cr ~2.5)   - s/p IV contrast on admission   - continue IVF  - continue sodium bicarb 1300mg TID   - Trops elevated likely secondary to CKD      #Elevated ALP- chronic   - possibly due to hepatic involvement of lymphoma/ bone involvement     #Schizophrenia  - continue home Prolixin and cogentin      # Misc  - DVT Prophylaxis: SCD's for now   - GI Prophylaxis: pantoprazole    Tablet 40 milliGRAM(s) Oral before breakfast  - Diet: Diet, NPO   - Activity: Activity - Ambulate as Tolerated  - Code Status: Full   # To follow up: ID , heme onc , taper levophed , c/w abx . IV fluids for hypercalcemia , GI for Scope then resume diet , check BMP and cbc in the afternoon  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 on monthly somatostatin injections( follows Dr Gong )  left elbow fracture s/p ORIF @[facility] ~30 years ago, comes in c/o weakness/SOB and cough. cough is productive of yellow sputum and has been going on for 3 months with 10 lb weight loss over the last 3 months ,  he also states weakness started 2 wks ago, getting worse, today fell while trying to ambulate due to weakness. No head trauma. No LOC. No CP. Reports SOB on exertion. No abdominal pain. No n/v/c/d. Last chemo > 2 mo ago. No fever/chills. Of note he took abx course for PNA prescribed by his PMD     #Fatigue, cough and SOB secondary to Multifocal PNA  #Fall  #Small likely parapneumonic rt pleural effusion   #Sepsis POA  - patient presented with weakness and cough for 2 weeks, had finished abx course by his PMD for pneumonia  - Sepsis POA > SIRS+ with source : T 100.7 ,  , and pneumonia  - patient BP was soft , MAP < 65 started on small dose levophed   - CURB 65 = 2   - Blood cultures sent , s/p cefepime , vancomycin and azithromycin in ED  - Will start zosyn 3.375 q8 IV given recent abx use   - send RVP, sputum cx , urine strep and legionella , nasal MRSA  - Patient is satting 95% on RA  - will get CPK given fall   - will get CT head given fall and lethargy.   - ID eval     #Acute on chronic Normocytic anemia  #Leukopenia- chronic  -patient baseline Hb 9-10 , Hb today 7.8 , MCV 88 , RDW 16.6%   - s/p 1 unit PRBC's in ED  - no evidence of bleeding , ED called GI who said to keep him NPO for scope today  - c/w PPI IV BID - although anemia could be secondary to underlying malignancy   - WBC 2.4, has been chronically on lower side since 9/2023 , could be related to BM involved lymphoma ?   - keep active T&S  - resume diet after scope and check Hb to see response to transfusion   - Heme-onc eval     #Moderate symptomatic Hypercalcemia likely secondary to malignancy - has constipation and confusion   #Marginal zone lymphoma  #Pancreatic neuroendocrine tumor   - patient follows with Heme-Onc Dr Terjanian  - on Monthly somatostatin injections - taken 9/16  - patient CT scan with Multistation lymphadenopathy though a component could be reactive given the extensive lung consolidations, suspect also related to patient's known history of lymphoma. Again noted is splenomegaly.  - correct calcium level = 12.7   - send PTH, PTHrp , vit D, vit A , SPEP , UPEP , Ig panel and FLC ( myleoma panel in the setting of elevated Cr , anemia and hypercalcemia )    - calcium level rising > start NS 0.9% 150cc/hr , target -200cc/hr ,patient has constipation and lethargic/slightly confused ( last EF 67% , Patient dry on exam )   - Will send chromogranin and LDH given neuroendocrine pancreatic tumor.   - patient supposed to do PET scan OP to check status of his lymphoma but has not yet  - Heme-Onc eval     #Small radha-cardial effusion - chronic  - stable small pericardial effusion seen on CT scan , was also reported on multiple previous ECHO;s  - Monitor for now     #CKD 4 - around baseline   #Chronic Normal AG Metabolic acidosis   #NSTEMI type II in the setting of CKD   - hx of nephrolithiasis c/b atrophic L kidney, R ureteral rivision, CKD4 (bsl Cr ~2.5)   - s/p IV contrast on admission   - continue IVF  - continue sodium bicarb 1300mg TID   - Trops elevated likely secondary to CKD , EKG NSR       #Elevated ALP- chronic   - possibly due to hepatic involvement of lymphoma/ bone involvement     #Schizophrenia  - continue home Prolixin and cogentin      # Misc  - DVT Prophylaxis: SCD's for now   - GI Prophylaxis: pantoprazole    Tablet 40 milliGRAM(s) Oral before breakfast  - Diet: Diet, NPO   - Activity: Activity - Ambulate as Tolerated  - Code Status: Full   # To follow up: ID , heme onc , taper levophed , c/w abx . IV fluids for hypercalcemia , GI for Scope then resume diet , check BMP and cbc in the afternoon

## 2024-09-27 NOTE — H&P ADULT - NSHPLABSRESULTS_GEN_ALL_CORE
LABS:      CBC Full  -  ( 26 Sep 2024 23:39 )  WBC Count : 2.97 K/uL  RBC Count : 2.76 M/uL  Hemoglobin : 7.8 g/dL  Hematocrit : 24.3 %  Platelet Count - Automated : 223 K/uL  Mean Cell Volume : 88.0 fL  Mean Cell Hemoglobin : 28.3 pg  Mean Cell Hemoglobin Concentration : 32.1 g/dL  Auto Neutrophil # : 1.86 K/uL  Auto Lymphocyte # : 0.10 K/uL  Auto Monocyte # : 0.93 K/uL  Auto Eosinophil # : 0.00 K/uL  Auto Basophil # : 0.03 K/uL  Auto Neutrophil % : 60.9 %  Auto Lymphocyte % : 3.5 %  Auto Monocyte % : 31.3 %  Auto Eosinophil % : 0.0 %  Auto Basophil % : 0.9 %    09-26    135  |  104  |  50[H]  ----------------------------<  129[H]  4.8   |  19  |  2.6[H]    Ca    11.7[H]      26 Sep 2024 23:39    TPro  4.2[L]  /  Alb  2.7[L]  /  TBili  0.9  /  DBili  x   /  AST  37  /  ALT  28  /  AlkPhos  273[H]  09-26          Urinalysis Basic - ( 26 Sep 2024 23:39 )    Color: x / Appearance: x / SG: x / pH: x  Gluc: 129 mg/dL / Ketone: x  / Bili: x / Urobili: x   Blood: x / Protein: x / Nitrite: x   Leuk Esterase: x / RBC: x / WBC x   Sq Epi: x / Non Sq Epi: x / Bacteria: x                RADIOLOGY & ADDITIONAL STUDIES (The following images were personally reviewed):

## 2024-09-27 NOTE — H&P ADULT - NSHPPHYSICALEXAM_GEN_ALL_CORE
GENERAL: Well-developed; well-nourished; in no acute distress.   SKIN: warm, dry  HEAD: Normocephalic; atraumatic.  EYES: PERRLA, EOMI, no conjunctival erythema  ENT: No nasal discharge; airway clear.  NECK: Supple; non tender.  CARD: Rgular rate and rhythm. S1, S2 normal; no murmurs, gallops, or rubs.   RESP:. LCTAB; No wheezes, rales, rhonchi, or stridor.  ABD: soft, nontender, and nondistended  EXT: Normal ROM.  No LE TTP or edema bilaterally.  LYMPH: No acute cervical adenopathy.  NEURO: A/ox3, grossly unremarkable  PSYCH: Cooperative, appropriate. GENERAL: elderly tired looking   SKIN: warm, dry , lower ext more cold   HEAD: Normocephalic; atraumatic.  EYES: conjunctival palor   ENT: No nasal discharge; airway clear.  NECK: Supple; non tender.  CARD: Rgular rate and rhythm. S1, S2 normal; no murmurs, gallops, or rubs.   RESP:.dec b/l air entry w/ faint crackles   ABD: soft, nontender, and nondistended  EXT: Normal ROM.  No LE TTP or edema bilaterally.  NEURO: A/ox3 but lethargic , confused at times   PSYCH: Cooperative, appropriate.

## 2024-09-27 NOTE — CONSULT NOTE ADULT - SUBJECTIVE AND OBJECTIVE BOX
Hematology Consult Note    HPI:  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 on monthly somatostatin injections( follows Dr Gong )  left elbow fracture s/p ORIF @[facility] ~30 years ago, comes in c/o weakness/SOB and cough. cough is productive of yellow sputum and has been going on for 3 months with 10 lb weight loss over the last 3 months ,  he also states weakness started 2 wks ago, getting worse, today fell while trying to ambulate due to weakness. No head trauma. No LOC. No CP. Reports SOB on exertion. No abdominal pain. No n/v/c/d. Last chemo > 2 mo ago. No fever/chills. Of note he took abx course for PNA prescribed by his PMD     Vitals in ED :   T 100.7 (MAX) , HR 76 , /55,      Labs significant for :  Anemia, Hb of 7.8 ( baseline 9-10) , Leukopenia ; wbc 2.4 k   Hypercalcemia , corrected ca = 12.7 and Cr of 2.6 ( around baseline )     EKG NSR   Trops 35    CT chest angio : No pulmonary embolism.    Multilobar consolidative opacities in bilateral lungs, consistent with   multifocal pneumonia.    Small right pleural effusion and trace left pleural effusion.    Small pericardial effusion.    Multistation lymphadenopathy though a component could be reactive given   the extensive lung consolidations, suspect also related to patient's   known history of lymphoma. Again noted is splenomegaly.      s/p 1 unit PRBC's , 2L NS in ED , cefeipme,vancomycin and azithromycin    admitted to medicine  (27 Sep 2024 08:29)      Allergies    allopurinol (Rash (Moderate))    Intolerances        MEDICATIONS  (STANDING):  benztropine 1 milliGRAM(s) Oral at bedtime  benztropine 2 milliGRAM(s) Oral daily  doxycycline IVPB 100 milliGRAM(s) IV Intermittent every 12 hours  fluPHENAZine 10 milliGRAM(s) Oral daily  fluPHENAZine 5 milliGRAM(s) Oral at bedtime  norepinephrine Infusion 0.05 MICROgram(s)/kG/Min (8.5 mL/Hr) IV Continuous <Continuous>  pantoprazole  Injectable 40 milliGRAM(s) IV Push two times a day  piperacillin/tazobactam IVPB.. 3.375 Gram(s) IV Intermittent every 8 hours  polyethylene glycol 3350 17 Gram(s) Oral daily  senna 1 Tablet(s) Oral two times a day  sodium bicarbonate 1300 milliGRAM(s) Oral three times a day  sodium chloride 0.9%. 1000 milliLiter(s) (100 mL/Hr) IV Continuous <Continuous>    MEDICATIONS  (PRN):  acetaminophen     Tablet .. 650 milliGRAM(s) Oral every 6 hours PRN Temp greater or equal to 38C (100.4F), Mild Pain (1 - 3)  aluminum hydroxide/magnesium hydroxide/simethicone Suspension 30 milliLiter(s) Oral every 4 hours PRN Dyspepsia  melatonin 3 milliGRAM(s) Oral at bedtime PRN Insomnia  ondansetron Injectable 4 milliGRAM(s) IV Push every 8 hours PRN Nausea and/or Vomiting      PAST MEDICAL & SURGICAL HISTORY:  Kidney stones      Schizophrenia      Lymphoma      Shingles      Atrophic kidney      H/O colonoscopy with polypectomy      Liver cyst      History of bladder surgery      H/O neuropathy      History of chemotherapy      Stage 3 chronic kidney disease      Neuroendocrine malignancy      Neuroendocrine tumor status post surgical treatment      Neuroendocrine cancer      Retained urethral stent      H/O umbilical hernia repair      Elbow fracture      H/O cystoscopy          FAMILY HISTORY:  FH: hypertension    FH: diabetes mellitus        SOCIAL HISTORY: No EtOH, no tobacco    REVIEW OF SYSTEMS:  feels weak   has been coughing   had 2 falls at home due to weakness   no fever/diarrhea       Weight (kg): 90.7 (09-26 @ 22:31)    T(F): 98.5 (09-27-24 @ 03:45), Max: 100.7 (09-26-24 @ 23:00)  HR: 74 (09-27-24 @ 13:10)  BP: 108/56 (09-27-24 @ 17:15)  RR: 18 (09-27-24 @ 17:15)  SpO2: 97% (09-27-24 @ 17:15)  Wt(kg): --    GENERAL: NAD, fatigued  HEAD:  Atraumatic, Normocephalic  EYES: EOMI, PERRLA, conjunctiva pale  and sclera clear  NECK: Supple, No JVD  CHEST/LUNG: +b/l crackles at base  HEART: Regular rate and rhythm; No murmurs, rubs, or gallops  ABDOMEN: Soft, Nontender, Nondistended; Bowel sounds present  EXTREMITIES:  No cyanosis, or edema  NEUROLOGY: non-focal, able to lift all extremities   SKIN: No rashes or lesions                          8.5    3.61  )-----------( 237      ( 27 Sep 2024 11:56 )             26.8       09-27    137  |  106  |  46[H]  ----------------------------<  111[H]  4.2   |  19  |  2.5[H]    Ca    11.3[H]      27 Sep 2024 11:56    TPro  4.0[L]  /  Alb  2.8[L]  /  TBili  1.1  /  DBili  x   /  AST  19  /  ALT  23  /  AlkPhos  232[H]  09-27      Lactate Dehydrogenase, Serum: 133 U/L (09-27 @ 11:56)       Hematology Consult Note    HPI:  68-year-old male with schizophrenia, CKD, Marginal zone lymphoma and pancreatic neuroendocrine tumor on monthly somatostatin injections( follows Dr Gong ),  left elbow fracture s[facility] ~30 years ago, comes in c/o weakness/SOB and cough. Cough is productive of yellow sputum and has been going on for 3 months with 10 lb weight loss over the last 3 months. He also states weakness started 2 wks ago, getting worse, today fell while trying to ambulate due to weakness. No head trauma. No LOC. No CP.  Reports SOB on exertion. No abdominal or back pain. No n/v/c/d. Last treatment for neuroendocrine tumor > 2 mo ago. No fever/chills. Of note, he took abx course for PNA prescribed by his PMD.     Vitals in ED :   T 100.7 (MAX) , HR 76 , /55,      Labs significant for :  Anemia, HbC of 7.8 ( baseline 9-10) , Leukopenia ; wbc 2.4 k   Hypercalcemia , corrected Ca = 12.7 and Cr of 2.6 ( around baseline )     EKG NSR   Trops 35    CT chest angio : No pulmonary embolism.    Multilobar consolidative opacities in bilateral lungs, consistent with   multifocal pneumonia.    Small right pleural effusion and trace left pleural effusion.    Small pericardial effusion.    Multistation lymphadenopathy though a component could be reactive given   the extensive lung consolidations, suspect also related to patient's   known history of lymphoma. Again noted is splenomegaly.      s/p 1 unit PRBC , 2L NS in ED , cefepime, vancomycin and azithromycin    Admitted to Medicine  (27 Sep 2024 08:29)    Allergies  allopurinol (Rash (Moderate))    Intolerances    MEDICATIONS  (STANDING):  benztropine 1 milliGRAM(s) Oral at bedtime  benztropine 2 milliGRAM(s) Oral daily  doxycycline IVPB 100 milliGRAM(s) IV Intermittent every 12 hours  fluPHENAZine 10 milliGRAM(s) Oral daily  fluPHENAZine 5 milliGRAM(s) Oral at bedtime  norepinephrine Infusion 0.05 MICROgram(s)/kG/Min (8.5 mL/Hr) IV Continuous <Continuous>  pantoprazole  Injectable 40 milliGRAM(s) IV Push two times a day  piperacillin/tazobactam IVPB.. 3.375 Gram(s) IV Intermittent every 8 hours  polyethylene glycol 3350 17 Gram(s) Oral daily  senna 1 Tablet(s) Oral two times a day  sodium bicarbonate 1300 milliGRAM(s) Oral three times a day  sodium chloride 0.9%. 1000 milliLiter(s) (100 mL/Hr) IV Continuous <Continuous>    MEDICATIONS  (PRN):  acetaminophen     Tablet .. 650 milliGRAM(s) Oral every 6 hours PRN Temp greater or equal to 38C (100.4F), Mild Pain (1 - 3)  aluminum hydroxide/magnesium hydroxide/simethicone Suspension 30 milliLiter(s) Oral every 4 hours PRN Dyspepsia  melatonin 3 milliGRAM(s) Oral at bedtime PRN Insomnia  ondansetron Injectable 4 milliGRAM(s) IV Push every 8 hours PRN Nausea and/or Vomiting      PAST MEDICAL & SURGICAL HISTORY:  Kidney stones      Schizophrenia      Lymphoma      Shingles      Atrophic kidney      H/O colonoscopy with polypectomy      Liver cyst      History of bladder surgery      H/O neuropathy      History of chemotherapy      Stage 3 chronic kidney disease      Neuroendocrine malignancy      Neuroendocrine tumor status post surgical treatment      Neuroendocrine cancer      Retained urethral stent      H/O umbilical hernia repair      Elbow fracture      H/O cystoscopy          FAMILY HISTORY:  FH: hypertension    FH: diabetes mellitus        SOCIAL HISTORY: No EtOH, no tobacco    REVIEW OF SYSTEMS:  feels weak   has been coughing   had 2 falls at home due to weakness   no fever/diarrhea       Weight (kg): 90.7 (09-26 @ 22:31)    T(F): 98.5 (09-27-24 @ 03:45), Max: 100.7 (09-26-24 @ 23:00)  HR: 74 (09-27-24 @ 13:10)  BP: 108/56 (09-27-24 @ 17:15)  RR: 18 (09-27-24 @ 17:15)  SpO2: 97% (09-27-24 @ 17:15)  Wt(kg): --    GENERAL: NAD, fatigued  HEAD:  Atraumatic, Normocephalic  EYES: EOMI, PERRLA, conjunctivae pale  and sclerae clear  NECK: Supple, No JVD  CHEST/LUNG: +b/l crackles at base  HEART: Regular rate and rhythm; No murmurs, rubs, or gallops  ABDOMEN: Soft, Nontender, Nondistended; Bowel sounds present  EXTREMITIES:  No cyanosis, or edema  NEUROLOGY: non-focal, able to lift all extremities   SKIN: No rashes or lesions                          8.5    3.61  )-----------( 237      ( 27 Sep 2024 11:56 )             26.8       09-27    137  |  106  |  46[H]  ----------------------------<  111[H]  4.2   |  19  |  2.5[H]    Ca    11.3[H]      27 Sep 2024 11:56    TPro  4.0[L]  /  Alb  2.8[L]  /  TBili  1.1  /  DBili  x   /  AST  19  /  ALT  23  /  AlkPhos  232[H]  09-27      Lactate Dehydrogenase, Serum: 133 U/L (09-27 @ 11:56)

## 2024-09-27 NOTE — CONSULT NOTE ADULT - SUBJECTIVE AND OBJECTIVE BOX
ELDA COVARRUBIAS  68y, Male  Allergy: allopurinol (Rash (Moderate))      CHIEF COMPLAINT: multifocal pna (27 Sep 2024 08:29)      LOS      HPI:  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 on monthly somatostatin injections( follows Dr Gong )  left elbow fracture s[facility] ~[facility] years ago, comes in c/o weakness/SOB and cough. cough is productive of yellow sputum and has been going on for 3 months with 10 lb weight loss over the last 3 months ,  he also states weakness started 2 wks ago, getting worse, today fell while trying to ambulate due to weakness. No head trauma. No LOC. No CP. Reports SOB on exertion. No abdominal pain. No n/v/c/d. Last chemo > 2 mo ago. No fever/chills. Of note he took abx course for PNA prescribed by his PMD     Vitals in ED :   T 100.7 (MAX) , HR 76 , /55,      Labs significant for :  Anemia, Hb of 7.8 ( baseline 9-10) , Leukopenia ; wbc 2.4 k   Hypercalcemia , corrected ca = 12.7 and Cr of 2.6 ( around baseline )     EKG NSR   Trops 35    CT chest angio : No pulmonary embolism.    Multilobar consolidative opacities in bilateral lungs, consistent with   multifocal pneumonia.    Small right pleural effusion and trace left pleural effusion.    Small pericardial effusion.    Multistation lymphadenopathy though a component could be reactive given   the extensive lung consolidations, suspect also related to patient's   known history of lymphoma. Again noted is splenomegaly.      s/p 1 unit PRBC's , 2L NS in ED , cefeipme,vancomycin and azithromycin    admitted to medicine  (27 Sep 2024 08:29)      INFECTIOUS DISEASE HISTORY:  History as above.    PAST MEDICAL & SURGICAL HISTORY:  Kidney stones      Schizophrenia      Lymphoma      Shingles      Atrophic kidney      H/O colonoscopy with polypectomy      Liver cyst      History of bladder surgery      H/O neuropathy      History of chemotherapy      Stage 3 chronic kidney disease      Neuroendocrine malignancy      Neuroendocrine tumor status post surgical treatment      Neuroendocrine cancer      Retained urethral stent      H/O umbilical hernia repair      Elbow fracture      H/O cystoscopy          FAMILY HISTORY  FH: hypertension    FH: diabetes mellitus        SOCIAL HISTORY  Social History:  Denies smoking, alcohol intake or drug use. (06 Oct 2021 19:35)        ROS  General: Denies rigors, nightsweats  HEENT: Denies headache, rhinorrhea, sore throat, eye pain  CV: Denies CP, palpitations  PULM: Denies wheezing, hemoptysis  GI: Denies hematemesis, hematochezia, melena  : Denies discharge, hematuria  MSK: Denies arthralgias, myalgias  SKIN: Denies rash, lesions  NEURO: Denies paresthesias, weakness  PSYCH: Denies depression, anxiety    VITALS:  T(F): 98.5, Max: 100.7 (09-26-24 @ 23:00)  HR: 69  BP: 99/57  RR: 18Vital Signs Last 24 Hrs  T(C): 36.9 (27 Sep 2024 03:45), Max: 38.2 (26 Sep 2024 23:00)  T(F): 98.5 (27 Sep 2024 03:45), Max: 100.7 (26 Sep 2024 23:00)  HR: 69 (27 Sep 2024 11:10) (68 - 102)  BP: 99/57 (27 Sep 2024 11:10) (85/50 - 119/66)  BP(mean): 75 (27 Sep 2024 11:10) (73 - 81)  RR: 18 (27 Sep 2024 11:10) (18 - 20)  SpO2: 96% (27 Sep 2024 11:10) (94% - 98%)    Parameters below as of 27 Sep 2024 11:10  Patient On (Oxygen Delivery Method): room air        PHYSICAL EXAM:  Gen: NAD, resting in bed  HEENT: Normocephalic, atraumatic  Neck: supple, no lymphadenopathy  CV: Regular rate & regular rhythm  Lungs: decreased BS at bases, no fremitus  Abdomen: Soft, BS present  Ext: Warm, well perfused  Neuro: non focal, awake  Skin: no rash, no erythema  Lines: no phlebitis    TESTS & MEASUREMENTS:                        7.8    2.97  )-----------( 223      ( 26 Sep 2024 23:39 )             24.3     09-26    135  |  104  |  50[H]  ----------------------------<  129[H]  4.8   |  19  |  2.6[H]    Ca    11.7[H]      26 Sep 2024 23:39    TPro  4.2[L]  /  Alb  2.7[L]  /  TBili  0.9  /  DBili  x   /  AST  37  /  ALT  28  /  AlkPhos  273[H]  09-26      LIVER FUNCTIONS - ( 26 Sep 2024 23:39 )  Alb: 2.7 g/dL / Pro: 4.2 g/dL / ALK PHOS: 273 U/L / ALT: 28 U/L / AST: 37 U/L / GGT: x           Urinalysis Basic - ( 26 Sep 2024 23:39 )    Color: x / Appearance: x / SG: x / pH: x  Gluc: 129 mg/dL / Ketone: x  / Bili: x / Urobili: x   Blood: x / Protein: x / Nitrite: x   Leuk Esterase: x / RBC: x / WBC x   Sq Epi: x / Non Sq Epi: x / Bacteria: x          Blood Gas Venous - Lactate: 0.9 mmol/L (09-27-24 @ 01:19)      INFECTIOUS DISEASES TESTING  MRSA PCR Result.: Negative (09-27-24 @ 10:10)  Legionella Antigen, Urine: Negative (05-04-24 @ 12:50)      RADIOLOGY & ADDITIONAL TESTS:  I have personally reviewed the last Chest xray  CXR  Xray Chest 1 View- PORTABLE-Urgent:   ACC: [id number] EXAM:  XR CHEST PORTABLE URGENT 1V   ORDERED BY: CT GARG     PROCEDURE DATE:  09/27/2024          INTERPRETATION:  Clinical History / Reason for exam: Shortness of breath    Comparison : Chest radiograph 5/3/2024.    Technique/Positioning: Frontal chest radiograph.    Findings:    Support devices: Right chest port.    Cardiac/mediastinum/hilum: Unremarkable.    Lung parenchyma/Pleura: Large bilateral opacities. No pneumothorax.    Skeleton/soft tissues: Degenerative changes.    Impression:    Large bilateral opacities. See subsequently performed chest CT and   recommend close follow-up.    --- End of Report ---            SOULEYMANE GAMEZ MD; Attending Radiologist  This document has been electronically signed. Sep27 2024 10:11AM (09-27-24 @ 00:13)      CT  CT Angio Chest PE Protocol w/ IV Cont:   ACC: [id number] EXAM:  CT ANGIO CHEST PULM ART WAWIC   ORDERED BY: CT GARG     PROCEDURE DATE:  09/27/2024          INTERPRETATION:  CLINICAL INDICATION: Shortness of breath. Lymphoma   chemotherapy.    TECHNIQUE:  CTA of the thorax was performed after administration of contrast per the   PE protocol. Sagittal and coronal reformats were performed as well as 3D   reconstructions.  Intravenous contrast: 60 cc of Omnipaque 350    COMPARISON: CT chest 5/3/2024.    INTERPRETATION:    PULMONARY ARTERIES: No pulmonary emboli.    AIRWAYS/LUNGS/PLEURA: Central tracheobronchial tree is patent. Dense   consolidative opacities in bilateral lower lobes, left upper lobe, and   right middle lobe. Patchy consolidative opacities in the right upper  lobe. Small right pleural effusion. Trace left pleural effusion. No   pneumothorax.    MEDIASTINUM: Mediastinal, bilateral hilar, cardiophrenic, and cervical   lymphadenopathy    HEART AND VESSELS: Cardiomegaly.. Small pericardial effusion. No right   heart strain. Ascending thoracic aorta is normal in caliber.    UPPER ABDOMEN: Splenomegaly.    BONES AND SOFT TISSUES: Bilateral gynecomastia, unchanged. Degenerative   changes of the spine.      IMPRESSION:    No pulmonary embolism.    Multilobar consolidative opacities in bilateral lungs, consistent with   multifocal pneumonia.    Small right pleural effusion and trace left pleural effusion.    Small pericardial effusion.    Multistation lymphadenopathy though a component could be reactive given   the extensive lung consolidations, suspect also related to patient's   known history of lymphoma. Again noted is splenomegaly.    --- End of Report ---          DELVIN JUSTIN MD; Resident Radiologist  This document has been electronically signed.  MIKE ALVARENGA MD; Attending Radiologist  This document has been electronically signed. Sep 27 2024  5:28AM (09-27-24 @ 00:54)      CARDIOLOGY TESTING  12 Lead ECG:   Ventricular Rate 92 BPM    Atrial Rate 92 BPM    P-R Interval 152 ms    QRS Duration 84 ms    Q-T Interval 326 ms    QTC Calculation(Bazett) 403 ms    P Axis 51 degrees    R Axis 4 degrees    T Axis 20 degrees    Diagnosis Line Normal sinus rhythm  Inferior infarct , age undetermined  Abnormal ECG    Confirmed by MARY MACEDO MD ([id number]) on 9/27/2024 7:01:58 AM (09-26-24 @ 23:41)      MEDICATIONS  benztropine 1 Oral at bedtime  benztropine 2 Oral daily  fluPHENAZine 10 Oral daily  fluPHENAZine 5 Oral at bedtime  norepinephrine Infusion 0.05 IV Continuous <Continuous>  pantoprazole  Injectable 40 IV Push two times a day  piperacillin/tazobactam IVPB.- 3.375 IV Intermittent once  piperacillin/tazobactam IVPB.. 3.375 IV Intermittent every 8 hours  sodium bicarbonate 1300 Oral three times a day  sodium chloride 0.9%. 1000 IV Continuous <Continuous>      Weight  Weight (kg): 90.7 (09-26-24 @ 22:31)    ANTIBIOTICS:  piperacillin/tazobactam IVPB.- 3.375 Gram(s) IV Intermittent once  piperacillin/tazobactam IVPB.. 3.375 Gram(s) IV Intermittent every 8 hours      ALLERGIES:  allopurinol (Rash (Moderate))

## 2024-09-27 NOTE — H&P ADULT - ATTENDING COMMENTS
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 on monthly somatostatin injections( follows Dr Gong )  left elbow fracture s/p ORIF @[facility] ~30 years ago, comes in c/o weakness/SOB and cough. cough is productive of yellow sputum and has been going on for 3 months with 10 lb weight loss over the last 3 months ,  he also states weakness started 2 wks ago, getting worse, today fell while trying to ambulate due to weakness. No head trauma. No LOC. No CP. Reports SOB on exertion. No abdominal pain. No n/v/c/d. Last chemo > 2 mo ago. No fever/chills. Of note he took abx course for PNA prescribed by his PMD     1. Fatigue, cough and SOB secondary to Sepsis (POA) due to  Multifocal PNA associated with Small likely parapneumonic rt pleural effusion   *  patient presented with weakness and cough for 2 weeks, had finished abx course by his PMD for pneumonia  *  Sepsis POA > SIRS+ with source : T 100.7 ,  , and pneumonia  - Admit to medicine              - CTH:WNL   - patient BP was soft , MAP < 65 started on small dose levophed   - CURB 65 = 2   - Cont [email or web address] doxycycline  -  received a dose of vancomycin in the ED   - Blood cx:pending              - MRSA:pending             - Sputum cx:pending             - RVP:pending   - Cont IVF  - CT angio chest:  a) Multilobar consolidative opacities in bilateral lungs, consistent with   multifocal pneumonia.  b) Small right pleural effusion and trace left pleural effusion.  c) Small pericardial effusion.  d) Multistation lymphadenopathy though a component could be reactive given   the extensive lung consolidations, suspect also related to patient's   known history of lymphoma. Again noted is splenomegaly.  - ID eval     2. Possible acute blood loss anemia due to upper gi bleeding s/p 1 PRBC   * patient baseline Hb 9-10 , Hb today 7.8 , MCV 88 , RDW 16.6%   - s/p 1 unit PRBC's in ED  - no evidence of bleeding , ED called GI who said to keep him NPO for scope today  - c/w PPI IV BID - although anemia could be secondary to underlying malignancy   - WBC 2.4, has been chronically on lower side since 9/2023 , could be related to BM involved lymphoma ?   - keep active T&S  - resume diet after scope and check Hb to see response to transfusion   - Heme-onc eval   - GI consult:pending     3. Moderate symptomatic Hypercalcemia likely secondary to malignancy - has constipation and confusion . hx of Marginal zone lymphoma. hx of Pancreatic neuroendocrine tumor   - on Monthly somatostatin injections - taken 9/16  - patient CT scan with Multistation lymphadenopathy though a component could be reactive given the extensive lung consolidations, suspect also related to patient's known history of lymphoma. Again noted is splenomegaly.  - correct calcium level = 12.7   - send PTH, PTHrp , vit D, vit A , SPEP , UPEP , Ig panel and FLC ( myleoma panel in the setting of elevated Cr , anemia and hypercalcemia )    - calcium level rising > start NS 0.9% 150cc/hr , target -200cc/hr ,patient has constipation and lethargic/slightly confused ( last EF 67% , Patient dry on exam )   - Will send chromogranin and LDH given neuroendocrine pancreatic tumor.   - patient supposed to do PET scan OP to check status of his lymphoma but has not yet  - Heme-Onc eval     4. Small radha-cardial effusion - chronic  - stable small pericardial effusion seen on CT scan , was also reported on multiple previous ECHO;s  - Monitor for now     5. CKD 4 - around baseline . hx of NSTEMI type II in the setting of CKD   - hx of nephrolithiasis c/b atrophic L kidney, R ureteral rivision, CKD4 (bsl Cr ~2.5)   - s/p IV contrast on admission   - continue IVF  - continue sodium bicarb 1300mg TID   - Trops elevated likely secondary to CKD , EKG NSR       6. Elevated ALP- chronic   - possibly due to hepatic involvement of lymphoma/ bone involvement     7. Schizophrenia  - continue home Prolixin and cogentin      # Misc  - DVT Prophylaxis: SCD's for now   - GI Prophylaxis: pantoprazole    Tablet 40 milliGRAM(s) Oral before breakfast  - Diet: Diet, NPO   - Activity: Activity - Ambulate as Tolerated  - Code Status: Full   # To follow up: ID , heme onc , taper levophed , c/w abx . IV fluids for hypercalcemia , GI for Scope then resume diet , 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 on monthly somatostatin injections( follows Dr Gong )  left elbow fracture s/p ORIF @[facility] ~30 years ago, comes in c/o weakness/SOB and cough. cough is productive of yellow sputum and has been going on for 3 months with 10 lb weight loss over the last 3 months ,  he also states weakness started 2 wks ago, getting worse, today fell while trying to ambulate due to weakness. No head trauma. No LOC. No CP. Reports SOB on exertion. No abdominal pain. No n/v/c/d. Last chemo > 2 mo ago. No fever/chills. Of note he took abx course for PNA prescribed by his PMD     1. Fatigue, cough and SOB secondary to Sepsis (POA) due to  Multifocal PNA associated with Small likely parapneumonic rt pleural effusion   *  patient presented with weakness and cough for 2 weeks, had finished abx course by his PMD for pneumonia  *  Sepsis POA > SIRS+ with source : T 100.7 ,  , and pneumonia  - Admit to medicine              - CTH:WNL             - LA: 0.9   - patient BP was soft , MAP < 65 started on small dose levophed   - CURB 65 = 2   - Cont [email or web address] doxycycline  -  received a dose of vancomycin in the ED   - Blood cx:pending              - MRSA:pending             - Sputum cx:pending             - RVP:pending   - Cont IVF  - CT angio chest:  a) Multilobar consolidative opacities in bilateral lungs, consistent with   multifocal pneumonia.  b) Small right pleural effusion and trace left pleural effusion.  c) Small pericardial effusion.  d) Multistation lymphadenopathy though a component could be reactive given   the extensive lung consolidations, suspect also related to patient's   known history of lymphoma. Again noted is splenomegaly.  - ID eval     2. Possible acute blood loss anemia due to upper gi bleeding s/p 1 PRBC   * patient baseline Hb 9-10 , Hb today 7.8 , MCV 88 , RDW 16.6%   - s/p 1 unit PRBC's in ED  - no evidence of bleeding , ED called GI who said to keep him NPO for scope today  - c/w PPI IV BID - although anemia could be secondary to underlying malignancy   - WBC 2.4, has been chronically on lower side since 9/2023 , could be related to BM involved lymphoma ?   - keep active T&S  - resume diet after scope and check Hb to see response to transfusion   - Heme-onc eval :pending  - GI consult:pending     3. Moderate symptomatic Hypercalcemia likely secondary to malignancy - has constipation and confusion . hx of Marginal zone lymphoma. hx of Pancreatic neuroendocrine tumor   - on Monthly somatostatin injections - taken 9/16  - patient CT scan with Multistation lymphadenopathy though a component could be reactive given the extensive lung consolidations, suspect also related to patient's known history of lymphoma. Again noted is splenomegaly.  - correct calcium level = 12.7   - send PTH, PTHrp , vit D, vit A , SPEP , UPEP , Ig panel and FLC ( myleoma panel in the setting of elevated Cr , anemia and hypercalcemia )    - calcium level rising > start NS 0.9% 150cc/hr , target -200cc/hr ,patient has constipation and lethargic/slightly confused ( last EF 67% , Patient dry on exam )   - Will send chromogranin and LDH given neuroendocrine pancreatic tumor.   - patient supposed to do PET scan OP to check status of his lymphoma but has not yet  - Heme-Onc eval     4. Small radha-cardial effusion - chronic  - stable small pericardial effusion seen on CT scan , was also reported on multiple previous ECHO;s  - Monitor for now     5. CKD 4 - around baseline . hx of NSTEMI type II in the setting of CKD   - hx of nephrolithiasis c/b atrophic L kidney, R ureteral rivision, CKD4 (bsl Cr ~2.5)   - s/p IV contrast on admission   - continue IVF  - continue sodium bicarb 1300mg TID   - Trops elevated likely secondary to CKD , EKG NSR       6. Elevated ALP- chronic   - possibly due to hepatic involvement of lymphoma/ bone involvement     7. Schizophrenia  - continue home Prolixin and cogentin      # Misc  - DVT Prophylaxis: SCD's for now   - GI Prophylaxis: pantoprazole    Tablet 40 milliGRAM(s) Oral before breakfast  - Diet: Diet, NPO   - Activity: Activity - Ambulate as Tolerated  - Code Status: Full   # To follow up: ID , heme onc , taper levophed , c/w abx . IV fluids for hypercalcemia , GI for Scope then resume diet ,

## 2024-09-27 NOTE — ED ADULT NURSE REASSESSMENT NOTE - NS ED NURSE REASSESS COMMENT FT1
Pt. received from previous RN. Pt. lying on stretcher, breathing with ease on RA. A&O x4. On CCM. 20g noted to the R AC; IV intact, no redness/swelling at site. 20g noted to the R hand; IV intact, no redness/swelling at site. 20g noted to the L hand; IV intact, no redness/swelling at site. Awaiting clean bed assignment. Pt. received from previous RN. Pt. lying on stretcher, breathing with ease on RA. A&O x4. On CCM. 20g noted to the R AC; IV intact, no redness/swelling at site. 20g noted to the R hand; IV intact, no redness/swelling at site. 20g noted to the L hand; IV intact, no redness/swelling at site. Norepinephrine running at 0.07mcg/kr/hr via 20g to the R AC. Awaiting clean bed assignment.

## 2024-09-27 NOTE — ED ADULT NURSE REASSESSMENT NOTE - NS ED NURSE REASSESS COMMENT FT1
Pt upgraded from Main ED and report received from GENET Begum. Vitals signs stable, cardiac monitoring continued. No acute distress noted. Safety and comfort measures maintained.

## 2024-09-27 NOTE — H&P ADULT - HISTORY OF PRESENT ILLNESS
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 on monthly somatostatin injections( follows Dr Gong )  left elbow fracture s/p ORIF @[facility] ~30 years ago, comes in c/o weakness/SOB. Pt states weakness started 2 wks ago, getting worse, today fell while trying to ambulate due to weakness. No head trauma. No LOC. No CP. Reports SOB on exertion. No abdominal pain. No n/v/c/d. Last chemo > 2 mo ago. No fever/chills.    Vitals in ED :   T 100.7 (MAX) , HR 76 , /55,      Labs significant for :  Anemia, Hb of 7.8 ( baseline 9-10) , Leukopenia ; wbc 2.4 k   Hypercalcemia , corrected ca = 12.7 and Cr of 2.6 ( around baseline )     EKG NSR   Trops 35    CT chest angio : No pulmonary embolism.    Multilobar consolidative opacities in bilateral lungs, consistent with   multifocal pneumonia.    Small right pleural effusion and trace left pleural effusion.    Small pericardial effusion.    Multistation lymphadenopathy though a component could be reactive given   the extensive lung consolidations, suspect also related to patient's   known history of lymphoma. Again noted is splenomegaly.      s/p 1 unit PRBC's , 2L NS in ED , cefeipme,vancomycin and azithromycin    admitted to medicine  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 on monthly somatostatin injections( follows Dr Gong )  left elbow fracture s/p ORIF @[facility] ~30 years ago, comes in c/o weakness/SOB and cough. cough is productive of yellow sputum and has been going on for 3 months with 10 lb weight loss over the last 3 months ,  he also states weakness started 2 wks ago, getting worse, today fell while trying to ambulate due to weakness. No head trauma. No LOC. No CP. Reports SOB on exertion. No abdominal pain. No n/v/c/d. Last chemo > 2 mo ago. No fever/chills. Of note he took abx course for PNA prescribed by his PMD     Vitals in ED :   T 100.7 (MAX) , HR 76 , /55,      Labs significant for :  Anemia, Hb of 7.8 ( baseline 9-10) , Leukopenia ; wbc 2.4 k   Hypercalcemia , corrected ca = 12.7 and Cr of 2.6 ( around baseline )     EKG NSR   Trops 35    CT chest angio : No pulmonary embolism.    Multilobar consolidative opacities in bilateral lungs, consistent with   multifocal pneumonia.    Small right pleural effusion and trace left pleural effusion.    Small pericardial effusion.    Multistation lymphadenopathy though a component could be reactive given   the extensive lung consolidations, suspect also related to patient's   known history of lymphoma. Again noted is splenomegaly.      s/p 1 unit PRBC's , 2L NS in ED , cefeipme,vancomycin and azithromycin    admitted to medicine

## 2024-09-27 NOTE — CONSULT NOTE ADULT - SUBJECTIVE AND OBJECTIVE BOX
Gastroenterology Consultation:    Patient is a 68y old  Male who presents with a chief complaint of multifocal pna (27 Sep 2024 17:34)        Admitted on: 09-27-24      HPI:  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 on monthly somatostatin injections( follows Dr Gong )  left elbow fracture s/p ORIF, comes in c/o weakness/SOB and cough. Found to have multilobular PNA . GI consulted for anemia. Pt reports significant weight loss in the past month. Denies any hematochezia, dysphagia,                       Prior EGD: none in the system     Prior Colonoscopy: 2023: In the cecum, a 2.5x1cm granular laterally spreading tumor G-LST was identified. The lesion was resected by endoscopic mucosal resection. After injecting the submucosa with a commercially available premixed solution, achieving an adequate lift, the lesion was resected en bloc. A marginal area was resected by avulsion using a hot biopsy forceps. The specimen was retrieved using a major net. Vessels running in the submucosa were ablated using coagulation current. The defect was closed using a single endoclip.   Tubular adenoma fragments showing foci of high-grade dysplasia and focal   intramucosal adenocarcinoma (see comment).   - No evidence of invasive carcinoma in this entirely submitted specimen.   Comment: Due to fragmentation and lack of orientation, all margins   cannot be fully evaluated. However, where present, the cauterized deep/   submucosal margin is free of lesional tissue.       PAST MEDICAL & SURGICAL HISTORY:  Kidney stones  Schizophrenia  Lymphoma  Shingles  Atrophic kidney  H/O colonoscopy with polypectomy  Liver cyst  History of bladder surgery  H/O neuropathy  History of chemotherapy  Stage 3 chronic kidney disease  Neuroendocrine malignancy  Neuroendocrine tumor status post surgical treatment  Neuroendocrine cancer  Retained urethral stent  H/O umbilical hernia repair  Elbow fracture  H/O cystoscopy            FAMILY HISTORY:  FH: hypertension    FH: diabetes mellitus        Social History:  Tobacco:  Alcohol:  Drugs:    Home Medications:  benztropine 1 mg oral tablet: 1 tab(s) orally once a day (at bedtime) (03 May 2024 23:36)  benztropine 2 mg oral tablet: 1 tab(s) orally once a day (15 Nov 2023 06:52)  Prolixin 10 mg oral tablet: 1 tab(s) orally once a day (15 Nov 2023 06:52)  Prolixin 5 mg oral tablet: 1 tab(s) orally once a day (at bedtime) (03 May 2024 23:36)        MEDICATIONS  (STANDING):  benztropine 1 milliGRAM(s) Oral at bedtime  benztropine 2 milliGRAM(s) Oral daily  doxycycline IVPB 100 milliGRAM(s) IV Intermittent every 12 hours  fluPHENAZine 10 milliGRAM(s) Oral daily  fluPHENAZine 5 milliGRAM(s) Oral at bedtime  norepinephrine Infusion 0.05 MICROgram(s)/kG/Min (8.5 mL/Hr) IV Continuous <Continuous>  pantoprazole  Injectable 40 milliGRAM(s) IV Push two times a day  piperacillin/tazobactam IVPB.. 3.375 Gram(s) IV Intermittent every 8 hours  polyethylene glycol 3350 17 Gram(s) Oral daily  senna 1 Tablet(s) Oral two times a day  sodium bicarbonate 1300 milliGRAM(s) Oral three times a day  sodium chloride 0.9%. 1000 milliLiter(s) (100 mL/Hr) IV Continuous <Continuous>    MEDICATIONS  (PRN):  acetaminophen     Tablet .. 650 milliGRAM(s) Oral every 6 hours PRN Temp greater or equal to 38C (100.4F), Mild Pain (1 - 3)  aluminum hydroxide/magnesium hydroxide/simethicone Suspension 30 milliLiter(s) Oral every 4 hours PRN Dyspepsia  melatonin 3 milliGRAM(s) Oral at bedtime PRN Insomnia  ondansetron Injectable 4 milliGRAM(s) IV Push every 8 hours PRN Nausea and/or Vomiting      Allergies  allopurinol (Rash (Moderate))      Review of Systems:   Constitutional:  No Fever, No Chills  ENT/Mouth:  No Hearing Changes,  No Difficulty Swallowing  Eyes:  No Eye Pain, No Vision Changes  Cardiovascular:  No Chest Pain, No Palpitations  Respiratory:  No Cough, No Dyspnea  Gastrointestinal:  As described in HPI  Musculoskeletal:  No Joint Swelling, No Back Pain  Skin:  No Skin Lesions, No Jaundice  Neuro:  No Syncope, No Dizziness  Heme/Lymph:  No Bruising, No Bleeding.          Physical Examination:  T(C): 36.9 (09-27-24 @ 03:45), Max: 38.2 (09-26-24 @ 23:00)  HR: 74 (09-27-24 @ 13:10) (68 - 102)  BP: 108/56 (09-27-24 @ 17:15) (85/50 - 119/66)  RR: 18 (09-27-24 @ 17:15) (18 - 20)  SpO2: 97% (09-27-24 @ 17:15) (94% - 98%)    Weight (kg): 90.7 (09-26-24 @ 22:31)        GENERAL: AAOx3, no acute distress.  HEAD:  Atraumatic, Normocephalic  EYES: conjunctiva and sclera clear  NECK: Supple, no JVD or thyromegaly  CHEST/LUNG: Clear to auscultation bilaterally; No wheeze, rhonchi, or rales  HEART: Regular rate and rhythm; normal S1, S2, No murmurs.  ABDOMEN: Soft, nontender, nondistended; Bowel sounds present  NEUROLOGY: No asterixis or tremor.   SKIN: Intact, no jaundice        Data:                        7.9    3.18  )-----------( 218      ( 27 Sep 2024 16:49 )             25.4     Hgb Trend:  7.9  09-27-24 @ 16:49  8.5  09-27-24 @ 11:56  7.8  09-26-24 @ 23:39      09-27    137  |  110  |  44[H]  ----------------------------<  96  4.7   |  17  |  2.4[H]    Ca    10.2      27 Sep 2024 16:49    TPro  4.0[L]  /  Alb  2.8[L]  /  TBili  1.1  /  DBili  x   /  AST  19  /  ALT  23  /  AlkPhos  232[H]  09-27    Liver panel trend:  TBili 1.1   /   AST 19   /   ALT 23   /   AlkP 232   /   Tptn 4.0   /   Alb 2.8    /   DBili --      09-27  TBili 0.9   /   AST 37   /   ALT 28   /   AlkP 273   /   Tptn 4.2   /   Alb 2.7    /   DBili --      09-26              Radiology:    CT AP with IV contrast 5/2024   ABDOMEN/PELVIS:    HEPATOBILIARY: Cholelithiasis.    SPLEEN: Splenomegaly measuring up to 16 cm in craniocaudal dimension.    PANCREAS: Unremarkable.    ADRENAL GLANDS: Unremarkable.    KIDNEYS: Atrophic left kidney, stable. Renal cysts and other   subcentimeter hypodensities, too small to further characterize. No   hydronephrosis.    ABDOMINOPELVIC NODES: Multiple enlarged periportal and portacaval lymph   nodes. Mesenteric calcified soft tissue density, possibly calcified lymph   node measuring 1.4 cm (series 6, image 264).    PELVIC ORGANS: Unremarkable.    PERITONEUM/MESENTERY/BOWEL: Inflammatory fat stranding adjacent to the   sigmoid colon. No discrete drainable fluid collection/abscess. No bowel   obstruction, ascites or intraperitoneal free air. Normal caliber appendix.    BONES/SOFT TISSUES: Stable lucent lesions within the right iliac bone.   Degenerative changes of the thoracolumbar spine.    VASCULAR: Calcified atherosclerotic disease within the abdominal aorta.

## 2024-09-27 NOTE — CONSULT NOTE ADULT - ASSESSMENT
ASSESSMENT  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 on monthly somatostatin injections( follows Dr Gong )  left elbow fracture s/p [facility]IF @[facility] ~30 years ago, comes in c/o weakness/SOB and cough. cough is productive of yellow sputum and has been going on for 3 months with 10 lb weight loss over the last 3 months ,  he also states weakness started 2 wks ago, getting worse, today fell while trying to ambulate due to weakness. No head trauma. No LOC. No CP. Reports SOB on exertion. No abdominal pain. No n/v/c/d. Last chemo > 2 mo ago. No fever/chills. Of note he took abx course for PNA prescribed by his PMD     IMPRESSION  #Sepsis present on admission (WBC<4, Fever )   #Multifocal pneumonia   - recent antibiotic course by PMD  - CT Angio Chest PE Protocol w/ IV Cont (09.27.24 @ 00:54): IMPRESSION: No pulmonary embolism. Multilobar consolidative opacities in bilateral lungs, consistent with   multifocal pneumonia. Small right pleural effusion and trace left pleural effusion. Small pericardial effusion. Multistation lymphadenopathy though a component could be reactive given  the extensive lung consolidations, suspect also related to patient's  known history of lymphoma. Again noted is splenomegaly.    #Marginal Zone Lymphoma  #Pancreatic neuroendocrine tumor   #CKD   #Schizophrenia     #DM   #Abx allergy: allopurinol (Rash (Moderate))        RECOMMENDATIONS  This is a preliminary incomplete pended note, all final recommendations to follow after interview and examination of the patient.    Please call or message on [employer] Teams if with any questions.  Spectra [phone]       ASSESSMENT  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 on monthly somatostatin injections( follows Dr Gong )  left elbow fracture s/p ORIF @[email or web address] ~30 years ago, comes in c/o weakness/SOB and cough. cough is productive of yellow sputum and has been going on for 3 months with 10 lb weight loss over the last 3 months ,  he also states weakness started 2 wks ago, getting worse, today fell while trying to ambulate due to weakness. No head trauma. No LOC. No CP. Reports SOB on exertion. No abdominal pain. No n/v/c/d. Last chemo > 2 mo ago. No fever/chills. Of note he took abx course for PNA prescribed by his PMD     IMPRESSION  #Septic Shock   #Multifocal pneumonia   - recent antibiotic course by PMD  - CT Angio Chest PE Protocol w/ IV Cont (09.27.24 @ 00:54): IMPRESSION: No pulmonary embolism. Multilobar consolidative opacities in bilateral lungs, consistent with   multifocal pneumonia. Small right pleural effusion and trace left pleural effusion. Small pericardial effusion. Multistation lymphadenopathy though a component could be reactive given  the extensive lung consolidations, suspect also related to patient's  known history of lymphoma. Again noted is splenomegaly.    #Marginal Zone Lymphoma  #Pancreatic neuroendocrine tumor   #CKD   #Schizophrenia     #DM   #Abx allergy: allopurinol (Rash (Moderate))    RECOMMENDATIONS  - agree with zosyn 3.375 mg q 8 hours for severe pneumonia   - check sputum Cx (ordered)  - follow-up blood Cx  - trend WBC     Please call or message on [employer] Teams if with any questions.  Spectra [phone]

## 2024-09-28 LAB
24R-OH-CALCIDIOL SERPL-MCNC: 19 NG/ML — LOW (ref 30–80)
ALBUMIN SERPL ELPH-MCNC: 2.6 G/DL — LOW (ref 3.5–5.2)
ALP SERPL-CCNC: 209 U/L — HIGH (ref 30–115)
ALT FLD-CCNC: 17 U/L — SIGNIFICANT CHANGE UP (ref 0–41)
ANION GAP SERPL CALC-SCNC: 10 MMOL/L — SIGNIFICANT CHANGE UP (ref 7–14)
ANION GAP SERPL CALC-SCNC: 11 MMOL/L — SIGNIFICANT CHANGE UP (ref 7–14)
AST SERPL-CCNC: 13 U/L — SIGNIFICANT CHANGE UP (ref 0–41)
BASOPHILS # BLD AUTO: 0.02 K/UL — SIGNIFICANT CHANGE UP (ref 0–0.2)
BASOPHILS NFR BLD AUTO: 0.8 % — SIGNIFICANT CHANGE UP (ref 0–1)
BILIRUB SERPL-MCNC: 0.9 MG/DL — SIGNIFICANT CHANGE UP (ref 0.2–1.2)
BUN SERPL-MCNC: 42 MG/DL — HIGH (ref 10–20)
BUN SERPL-MCNC: 47 MG/DL — HIGH (ref 10–20)
CALCIUM SERPL-MCNC: 10.8 MG/DL — HIGH (ref 8.4–10.5)
CALCIUM SERPL-MCNC: 11.1 MG/DL — HIGH (ref 8.4–10.5)
CALCIUM SERPL-MCNC: 11.2 MG/DL — HIGH (ref 8.4–10.5)
CALCIUM SERPL-MCNC: 11.2 MG/DL — HIGH (ref 8.4–10.5)
CHLORIDE SERPL-SCNC: 105 MMOL/L — SIGNIFICANT CHANGE UP (ref 98–110)
CHLORIDE SERPL-SCNC: 109 MMOL/L — SIGNIFICANT CHANGE UP (ref 98–110)
CO2 SERPL-SCNC: 18 MMOL/L — SIGNIFICANT CHANGE UP (ref 17–32)
CO2 SERPL-SCNC: 18 MMOL/L — SIGNIFICANT CHANGE UP (ref 17–32)
CREAT SERPL-MCNC: 2.4 MG/DL — HIGH (ref 0.7–1.5)
CREAT SERPL-MCNC: 2.5 MG/DL — HIGH (ref 0.7–1.5)
CRP SERPL-MCNC: 119.1 MG/L — HIGH
EGFR: 28 ML/MIN/1.73M2 — LOW
EGFR: 29 ML/MIN/1.73M2 — LOW
EOSINOPHIL # BLD AUTO: 0.14 K/UL — SIGNIFICANT CHANGE UP (ref 0–0.7)
EOSINOPHIL NFR BLD AUTO: 5.3 % — SIGNIFICANT CHANGE UP (ref 0–8)
ERYTHROCYTE [SEDIMENTATION RATE] IN BLOOD: 82 MM/HR — HIGH (ref 0–10)
GLUCOSE SERPL-MCNC: 105 MG/DL — HIGH (ref 70–99)
GLUCOSE SERPL-MCNC: 184 MG/DL — HIGH (ref 70–99)
HCT VFR BLD CALC: 26.2 % — LOW (ref 42–52)
HGB BLD-MCNC: 8.3 G/DL — LOW (ref 14–18)
IGA FLD-MCNC: 36 MG/DL — LOW (ref 84–499)
IGG FLD-MCNC: 32 MG/DL — LOW (ref 610–1660)
IGM SERPL-MCNC: 26 MG/DL — LOW (ref 35–242)
IMM GRANULOCYTES NFR BLD AUTO: 13 % — HIGH (ref 0.1–0.3)
KAPPA LC SER QL IFE: 1.56 MG/DL — SIGNIFICANT CHANGE UP (ref 0.33–1.94)
KAPPA/LAMBDA FREE LIGHT CHAIN RATIO, SERUM: 0.72 RATIO — SIGNIFICANT CHANGE UP (ref 0.26–1.65)
LAMBDA LC SER QL IFE: 2.18 MG/DL — SIGNIFICANT CHANGE UP (ref 0.57–2.63)
LYMPHOCYTES # BLD AUTO: 0.21 K/UL — LOW (ref 1.2–3.4)
LYMPHOCYTES # BLD AUTO: 8 % — LOW (ref 20.5–51.1)
MAGNESIUM SERPL-MCNC: 2.5 MG/DL — HIGH (ref 1.8–2.4)
MCHC RBC-ENTMCNC: 28 PG — SIGNIFICANT CHANGE UP (ref 27–31)
MCHC RBC-ENTMCNC: 31.7 G/DL — LOW (ref 32–37)
MCV RBC AUTO: 88.5 FL — SIGNIFICANT CHANGE UP (ref 80–94)
MONOCYTES # BLD AUTO: 0.58 K/UL — SIGNIFICANT CHANGE UP (ref 0.1–0.6)
MONOCYTES NFR BLD AUTO: 22.1 % — HIGH (ref 1.7–9.3)
NEUTROPHILS # BLD AUTO: 1.33 K/UL — LOW (ref 1.4–6.5)
NEUTROPHILS NFR BLD AUTO: 50.8 % — SIGNIFICANT CHANGE UP (ref 42.2–75.2)
NRBC # BLD: 0 /100 WBCS — SIGNIFICANT CHANGE UP (ref 0–0)
PHOSPHATE SERPL-MCNC: 3.9 MG/DL — SIGNIFICANT CHANGE UP (ref 2.1–4.9)
PLATELET # BLD AUTO: 205 K/UL — SIGNIFICANT CHANGE UP (ref 130–400)
PMV BLD: 11.3 FL — HIGH (ref 7.4–10.4)
POTASSIUM SERPL-MCNC: 4.8 MMOL/L — SIGNIFICANT CHANGE UP (ref 3.5–5)
POTASSIUM SERPL-MCNC: 4.9 MMOL/L — SIGNIFICANT CHANGE UP (ref 3.5–5)
POTASSIUM SERPL-SCNC: 4.8 MMOL/L — SIGNIFICANT CHANGE UP (ref 3.5–5)
POTASSIUM SERPL-SCNC: 4.9 MMOL/L — SIGNIFICANT CHANGE UP (ref 3.5–5)
PROCALCITONIN SERPL-MCNC: 2.29 NG/ML — HIGH (ref 0.02–0.1)
PROT SERPL-MCNC: 3.9 G/DL — LOW (ref 6–8.3)
PROT SERPL-MCNC: 4 G/DL — LOW (ref 6–8)
PTH-INTACT FLD-MCNC: 9 PG/ML — LOW (ref 15–65)
PTH-INTACT FLD-MCNC: 9 PG/ML — LOW (ref 15–65)
RBC # BLD: 2.96 M/UL — LOW (ref 4.7–6.1)
RBC # FLD: 16.7 % — HIGH (ref 11.5–14.5)
S PNEUM AG UR QL: NEGATIVE — SIGNIFICANT CHANGE UP
SODIUM SERPL-SCNC: 134 MMOL/L — LOW (ref 135–146)
SODIUM SERPL-SCNC: 137 MMOL/L — SIGNIFICANT CHANGE UP (ref 135–146)
VIT D25+D1,25 OH+D1,25 PNL SERPL-MCNC: 61.4 PG/ML — SIGNIFICANT CHANGE UP (ref 19.9–79.3)
WBC # BLD: 2.62 K/UL — LOW (ref 4.8–10.8)
WBC # FLD AUTO: 2.62 K/UL — LOW (ref 4.8–10.8)

## 2024-09-28 PROCEDURE — 99291 CRITICAL CARE FIRST HOUR: CPT

## 2024-09-28 PROCEDURE — 99232 SBSQ HOSP IP/OBS MODERATE 35: CPT

## 2024-09-28 RX ORDER — METHYLPREDNISOLONE ACETATE 80 MG/ML
40 INJECTION, SUSPENSION INTRA-ARTICULAR; INTRALESIONAL; INTRAMUSCULAR; SOFT TISSUE
Refills: 0 | Status: DISCONTINUED | OUTPATIENT
Start: 2024-09-28 | End: 2024-10-03

## 2024-09-28 RX ORDER — FUROSEMIDE 10 MG/ML
60 INJECTION INTRAVENOUS ONCE
Refills: 0 | Status: COMPLETED | OUTPATIENT
Start: 2024-09-28 | End: 2024-09-28

## 2024-09-28 RX ADMIN — Medication 1300 MILLIGRAM(S): at 13:48

## 2024-09-28 RX ADMIN — METHYLPREDNISOLONE ACETATE 40 MILLIGRAM(S): 80 INJECTION, SUSPENSION INTRA-ARTICULAR; INTRALESIONAL; INTRAMUSCULAR; SOFT TISSUE at 17:31

## 2024-09-28 RX ADMIN — Medication 100 MILLIGRAM(S): at 05:16

## 2024-09-28 RX ADMIN — PIPERACILLIN SODIUM AND TAZOBACTAM SODIUM 25 GRAM(S): 12; 1.5 INJECTION, POWDER, LYOPHILIZED, FOR SOLUTION INTRAVENOUS at 06:05

## 2024-09-28 RX ADMIN — METHYLPREDNISOLONE ACETATE 40 MILLIGRAM(S): 80 INJECTION, SUSPENSION INTRA-ARTICULAR; INTRALESIONAL; INTRAMUSCULAR; SOFT TISSUE at 09:44

## 2024-09-28 RX ADMIN — PANTOPRAZOLE SODIUM 40 MILLIGRAM(S): 40 TABLET, DELAYED RELEASE ORAL at 06:38

## 2024-09-28 RX ADMIN — Medication 5000 UNIT(S): at 17:30

## 2024-09-28 RX ADMIN — Medication 100 MILLIGRAM(S): at 17:29

## 2024-09-28 RX ADMIN — FUROSEMIDE 60 MILLIGRAM(S): 10 INJECTION INTRAVENOUS at 09:43

## 2024-09-28 RX ADMIN — Medication 2 MILLIGRAM(S): at 13:48

## 2024-09-28 RX ADMIN — Medication 8.5 MICROGRAM(S)/KG/MIN: at 09:58

## 2024-09-28 RX ADMIN — Medication 1 MILLIGRAM(S): at 21:04

## 2024-09-28 RX ADMIN — Medication 1300 MILLIGRAM(S): at 21:03

## 2024-09-28 RX ADMIN — Medication 5 MILLIGRAM(S): at 21:03

## 2024-09-28 RX ADMIN — Medication 100 MILLILITER(S): at 09:45

## 2024-09-28 RX ADMIN — Medication 10 MILLIGRAM(S): at 13:59

## 2024-09-28 RX ADMIN — PANTOPRAZOLE SODIUM 40 MILLIGRAM(S): 40 TABLET, DELAYED RELEASE ORAL at 17:30

## 2024-09-28 RX ADMIN — PIPERACILLIN SODIUM AND TAZOBACTAM SODIUM 25 GRAM(S): 12; 1.5 INJECTION, POWDER, LYOPHILIZED, FOR SOLUTION INTRAVENOUS at 21:03

## 2024-09-28 RX ADMIN — Medication 17 GRAM(S): at 13:48

## 2024-09-28 RX ADMIN — Medication 1 TABLET(S): at 17:30

## 2024-09-28 RX ADMIN — PIPERACILLIN SODIUM AND TAZOBACTAM SODIUM 25 GRAM(S): 12; 1.5 INJECTION, POWDER, LYOPHILIZED, FOR SOLUTION INTRAVENOUS at 13:49

## 2024-09-28 NOTE — CONSULT NOTE ADULT - SUBJECTIVE AND OBJECTIVE BOX
Patient is a 68y old  Male who presents with a chief complaint of SOB (27 Sep 2024 18:44)      HPI:  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 on monthly somatostatin injections( follows Dr Gong )  left elbow fracture s/p ORIF @[facility] ~30 years ago, comes in c/o weakness/SOB and cough. cough is productive of yellow sputum and has been going on for 3 months with 10 lb weight loss over the last 3 months ,  he also states weakness started 2 wks ago, getting worse, today fell while trying to ambulate due to weakness. No head trauma. No LOC. No CP. Reports SOB on exertion. No abdominal pain. No n/v/c/d. Last chemo > 2 mo ago. No fever/chills. Of note he took abx course for PNA prescribed by his PMD     Vitals in ED :   T 100.7 (MAX) , HR 76 , /55,      Labs significant for :  Anemia, Hb of 7.8 ( baseline 9-10) , Leukopenia ; wbc 2.4 k   Hypercalcemia , corrected ca = 12.7 and Cr of 2.6 ( around baseline )     EKG NSR   Trops 35    CT chest angio : No pulmonary embolism.    Multilobar consolidative opacities in bilateral lungs, consistent with   multifocal pneumonia.    Small right pleural effusion and trace left pleural effusion.    Small pericardial effusion.    Multistation lymphadenopathy though a component could be reactive given   the extensive lung consolidations, suspect also related to patient's   known history of lymphoma. Again noted is splenomegaly.      s/p 1 unit PRBC's , 2L NS in ED , cefeipme,vancomycin and azithromycin    admitted to sdu, called to evaluate, cough unable to produce phlegm      PAST MEDICAL & SURGICAL HISTORY:  Kidney stones      Schizophrenia      Lymphoma      Shingles      Atrophic kidney      H/O colonoscopy with polypectomy      Liver cyst      History of bladder surgery      H/O neuropathy      History of chemotherapy      Stage 3 chronic kidney disease      Neuroendocrine malignancy      Neuroendocrine tumor status post surgical treatment      Neuroendocrine cancer      Retained urethral stent      H/O umbilical hernia repair      Elbow fracture      H/O cystoscopy          SOCIAL HX:   Smoking-     FAMILY HISTORY:  FH: hypertension    FH: diabetes mellitus        REVIEW OF SYSTEMS see hpi    Allergies    allopurinol (Rash (Moderate))    Intolerances        acetaminophen     Tablet .. 650 milliGRAM(s) Oral every 6 hours PRN  aluminum hydroxide/magnesium hydroxide/simethicone Suspension 30 milliLiter(s) Oral every 4 hours PRN  benztropine 1 milliGRAM(s) Oral at bedtime  benztropine 2 milliGRAM(s) Oral daily  doxycycline IVPB 100 milliGRAM(s) IV Intermittent every 12 hours  fluPHENAZine 10 milliGRAM(s) Oral daily  fluPHENAZine 5 milliGRAM(s) Oral at bedtime  melatonin 3 milliGRAM(s) Oral at bedtime PRN  norepinephrine Infusion 0.05 MICROgram(s)/kG/Min IV Continuous <Continuous>  ondansetron Injectable 4 milliGRAM(s) IV Push every 8 hours PRN  pantoprazole  Injectable 40 milliGRAM(s) IV Push two times a day  piperacillin/tazobactam IVPB.. 3.375 Gram(s) IV Intermittent every 8 hours  polyethylene glycol 3350 17 Gram(s) Oral daily  senna 1 Tablet(s) Oral two times a day  sodium bicarbonate 1300 milliGRAM(s) Oral three times a day  sodium chloride 0.9%. 1000 milliLiter(s) IV Continuous <Continuous>  : Home Meds:      PHYSICAL EXAM    ICU Vital Signs Last 24     HR: 85 (27 Sep 2024 23:10) (69 - 92)  BP: 95/56 (28 Sep 2024 02:46) (95/52 - 117/54)  BP(mean): 69 (28 Sep 2024 02:46) (68 - 81)-  RR: 18 (28 Sep 2024 02:46) (18 - 20)  SpO2: 98% (28 Sep 2024 02:46) (95% - 99%)    O2 Parameters below as of 28 Sep 2024 02:46              General: ill looking  Lungs: Bilateral crackles  Cardiovascular: JEFF 2.6  Abdomen: Soft, Positive BS  Extremities: No clubbing  Skin: Warm  Neurological: Non focal         LABS:                          7.9    3.18  )-----------( 218      ( 27 Sep 2024 16:49 )             25.4                                               09-27    137  |  110  |  44[H]  ----------------------------<  96  4.7   |  17  |  2.4[H]    Ca    10.2      27 Sep 2024 16:49    TPro  4.0[L]  /  Alb  2.8[L]  /  TBili  1.1  /  DBili  x   /  AST  19  /  ALT  23  /  AlkPhos  232[H]  09-27                                             Urinalysis Basic - ( 27 Sep 2024 16:49 )    Color: x / Appearance: x / SG: x / pH: x  Gluc: 96 mg/dL / Ketone: x  / Bili: x / Urobili: x   Blood: x / Protein: x / Nitrite: x   Leuk Esterase: x / RBC: x / WBC x   Sq Epi: x / Non Sq Epi: x / Bacteria: x                                                  LIVER FUNCTIONS - ( 27 Sep 2024 11:56 )  Alb: 2.8 g/dL / Pro: 4.0 g/dL / ALK PHOS: 232 U/L / ALT: 23 U/L / AST: 19 U/L / GGT: x                                                                                                                                     MEDICATIONS  (STANDING):  benztropine 1 milliGRAM(s) Oral at bedtime  benztropine 2 milliGRAM(s) Oral daily  doxycycline IVPB 100 milliGRAM(s) IV Intermittent every 12 hours  fluPHENAZine 10 milliGRAM(s) Oral daily  fluPHENAZine 5 milliGRAM(s) Oral at bedtime  norepinephrine Infusion 0.05 MICROgram(s)/kG/Min (8.5 mL/Hr) IV Continuous <Continuous>  pantoprazole  Injectable 40 milliGRAM(s) IV Push two times a day  piperacillin/tazobactam IVPB.. 3.375 Gram(s) IV Intermittent every 8 hours  polyethylene glycol 3350 17 Gram(s) Oral daily  senna 1 Tablet(s) Oral two times a day  sodium bicarbonate 1300 milliGRAM(s) Oral three times a day  sodium chloride 0.9%. 1000 milliLiter(s) (100 mL/Hr) IV Continuous <Continuous>    MEDICATIONS  (PRN):  acetaminophen     Tablet .. 650 milliGRAM(s) Oral every 6 hours PRN Temp greater or equal to 38C (100.4F), Mild Pain (1 - 3)  aluminum hydroxide/magnesium hydroxide/simethicone Suspension 30 milliLiter(s) Oral every 4 hours PRN Dyspepsia  melatonin 3 milliGRAM(s) Oral at bedtime PRN Insomnia  ondansetron Injectable 4 milliGRAM(s) IV Push every 8 hours PRN Nausea and/or Vomiting    CXR/ Chest ct noted

## 2024-09-28 NOTE — CONSULT NOTE ADULT - ASSESSMENT
IMPRESSION:    Acute hypoxemic resp failure  Multifocal pneumonia/ possible organizing prior CT noted  Possible recurrent aspiration  Anemia  HO CKD  HO schizophrenia  HO Marginal zone lymphoma and pancreatic neuroendocrine tumor    PLAN:    CNS:  Avoid CNS depressants.     HEENT: Oral care.    PULMONARY:  HOB @ 45 degrees. keep SaO2 92 TO 96%, on NC, solumedrol 40 q 12    CARDIOVASCULAR: Avoid fluid overload. IVF 75 cc  LR/ h, taper and dc levophed    GI: GI prophylaxis.  Feeding per S&S.    RENAL:  Follow up lytes.  Correct as needed. Trend Cr. pO bicarb 1300 Q 8    INFECTIOUS DISEASE: on zosyn, fungitell, procal, ERS/ CRP    HEMATOLOGICAL:  DVT prophylaxis    ENDOCRINE:  Follow up FS.  Insulin protocol if needed.    MUSCULOSKELETAL: OOBTC    SDU  GOC

## 2024-09-28 NOTE — PROGRESS NOTE ADULT - SUBJECTIVE AND OBJECTIVE BOX
ELDA COVARRUBIAS  68y  [facility] ED Hold 005 A      Patient is a 68y old  Male who presents with a chief complaint of multifocal pna (28 Sep 2024 06:05)      INTERVAL HPI/OVERNIGHT EVENTS:        REVIEW OF SYSTEMS:        FAMILY HISTORY:  FH: hypertension    FH: diabetes mellitus      T(C): --  HR: 73 (09-28-24 @ 03:30) (69 - 92)  BP: 101/56 (09-28-24 @ 06:00) (95/52 - 117/54)  RR: 18 (09-28-24 @ 02:46) (18 - 20)  SpO2: 98% (09-28-24 @ 02:46) (95% - 99%)  Wt(kg): --Vital Signs Last 24 Hrs  T(C): --  T(F): --  HR: 73 (28 Sep 2024 03:30) (69 - 92)  BP: 101/56 (28 Sep 2024 06:00) (95/52 - 117/54)  BP(mean): 73 (28 Sep 2024 06:00) (68 - 79)  RR: 18 (28 Sep 2024 02:46) (18 - 20)  SpO2: 98% (28 Sep 2024 02:46) (95% - 99%)    Parameters below as of 28 Sep 2024 02:46  Patient On (Oxygen Delivery Method): room air        PHYSICAL EXAM:  GENERAL: NAD, well-groomed, well-developed  HEAD:  Atraumatic, Normocephalic  EYES: EOMI, PERRLA, conjunctiva and sclera clear  ENMT: No tonsillar erythema, exudates, or enlargement; Moist mucous membranes, Good dentition, No lesions  NECK: Supple, No JVD, Normal thyroid  NERVOUS SYSTEM:  Alert & Oriented X3, Good concentration; Motor Strength 5/5 B/L upper and lower extremities; DTRs 2+ intact and symmetric  PULM: Clear to auscultation bilaterally  CARDIAC: Regular rate and rhythm; No murmurs, rubs, or gallops  GI: Soft, Nontender, Nondistended; Bowel sounds present  EXTREMITIES:  2+ Peripheral Pulses, No clubbing, cyanosis, or edema  LYMPH: No lymphadenopathy noted  SKIN: No rashes or lesions    Consultant(s) Notes Reviewed:  [x ] YES  [ ] NO  Care Discussed with Consultants/Other Providers [ x] YES  [ ] NO    LABS:                            8.3    2.62  )-----------( 205      ( 28 Sep 2024 06:20 )             26.2   09-28    137  |  109  |  42[H]  ----------------------------<  105[H]  4.8   |  18  |  2.4[H]    Ca    11.2[H]      28 Sep 2024 06:20  Phos  3.9     09-28  Mg     2.5     09-28    TPro  4.0[L]  /  Alb  2.6[L]  /  TBili  0.9  /  DBili  x   /  AST  13  /  ALT  17  /  AlkPhos  209[H]  09-28            acetaminophen     Tablet .. 650 milliGRAM(s) Oral every 6 hours PRN  aluminum hydroxide/magnesium hydroxide/simethicone Suspension 30 milliLiter(s) Oral every 4 hours PRN  benztropine 1 milliGRAM(s) Oral at bedtime  benztropine 2 milliGRAM(s) Oral daily  doxycycline IVPB 100 milliGRAM(s) IV Intermittent every 12 hours  fluPHENAZine 10 milliGRAM(s) Oral daily  fluPHENAZine 5 milliGRAM(s) Oral at bedtime  melatonin 3 milliGRAM(s) Oral at bedtime PRN  norepinephrine Infusion 0.05 MICROgram(s)/kG/Min IV Continuous <Continuous>  ondansetron Injectable 4 milliGRAM(s) IV Push every 8 hours PRN  pantoprazole  Injectable 40 milliGRAM(s) IV Push two times a day  piperacillin/tazobactam IVPB.. 3.375 Gram(s) IV Intermittent every 8 hours  polyethylene glycol 3350 17 Gram(s) Oral daily  senna 1 Tablet(s) Oral two times a day  sodium bicarbonate 1300 milliGRAM(s) Oral three times a day  sodium chloride 0.9%. 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 cough is productive of yellow sputum and has been going on for 3 months with 10 lb weight loss over the last 3 months ,  he also states weakness started 2 wks ago, getting worse, today fell while trying to ambulate due to weakness. No head trauma. No LOC. No CP. Reports SOB on exertion. No abdominal pain. No n/v/c/d. Last chemo > 2 mo ago. No fever/chills. Of note he took abx course for PNA prescribed by his PMD     1. Fatigue, cough and SOB secondary to Sepsis (POA) due to  Multifocal PNA associated with Small likely parapneumonic rt pleural effusion   *  patient presented with weakness and cough for 2 weeks, had finished abx course by his PMD for pneumonia  *  Sepsis POA > SIRS+ with source : T 100.7 ,  , and pneumonia  - Admit to medicine              - CTH:WNL             - LA: 0.9                        - ESR:pending              - CRP:pending               -Fungitel:pending  - patient BP was soft , MAP < 65 started on small dose levophed  that was weaned off   - CURB 65 = 2   - Cont Zosyn.  - Cont doxycycline  -  received a dose of vancomycin in the ED   - Start Solumedrol 40mg IV q12hr  - Blood cx:pending              - MRSA:pending             - Sputum cx:pending             - RVP:pending   - Cont IVF  - CT angio chest:  a) Multilobar consolidative opacities in bilateral lungs, consistent with   multifocal pneumonia.  b) Small right pleural effusion and trace left pleural effusion.  c) Small pericardial effusion.  d) Multistation lymphadenopathy though a component could be reactive given   the extensive lung consolidations, suspect also related to patient's   known history of lymphoma. Again noted is splenomegaly.  - ID eval appreciated     2. Possible acute blood loss anemia due to upper gi bleeding s/p 1 PRBC   * patient baseline Hb 9-10 , Hb today 7.8 , MCV 88 , RDW 16.6%   - s/p 1 unit PRBC's in ED  - no evidence of bleeding , ED called GI who said to keep him NPO for scope today  - c/w PPI IV BID - although anemia could be secondary to underlying malignancy   - WBC 2.4, has been chronically on lower side since 9/2023 , could be related to BM involved lymphoma ?   - keep active T&S  - resume diet after scope and check Hb to see response to transfusion   - GI consult appreciated  a) outpatient upper and lower endo  * Colonoscopy 2023- Cecal polyp, EMR: Tubular adenoma fragments showing foci of high-grade dysplasia and focal  intramucosal adenocarcinoma.  Plan was for 6month follow up     3. Moderate symptomatic Hypercalcemia likely secondary to malignancy - has constipation and confusion . hx of Marginal zone lymphoma. hx of Pancreatic neuroendocrine tumor   - on Monthly somatostatin injections - taken 9/16  *  patient CT scan with Multistation lymphadenopathy though a component could be reactive given the extensive lung consolidations, suspect also related to patient's known history of lymphoma. Again noted is splenomegaly.  - correct calcium level still high   - PTH:pending               - PTHrP:pending                - Vit D:pending                 - MM screening:pending              - Vit A:pending   - Chromogranin:pending               - LDH:pending   - Cont IVF 100ml/hr. Give one dose of lasix 60mg IV*1   *  patient supposed to do PET scan OP to check status of his lymphoma but has not yet  - Heme-Onc eval appreciated     4. Small radha-cardial effusion - chronic  - stable small pericardial effusion seen on CT scan , was also reported on multiple previous ECHO;s  - Monitor for now     5. CKD 4 - around baseline . hx of NSTEMI type II in the setting of CKD   - hx of nephrolithiasis c/b atrophic L kidney, R ureteral rivision, CKD4 (bsl Cr ~2.5)   - s/p IV contrast on admission   - continue IVF  - continue sodium bicarb 1300mg TID   - Trops elevated likely secondary to CKD , EKG NSR       6. Elevated ALP- chronic   - possibly due to hepatic involvement of lymphoma/ bone involvement     7. Schizophrenia  - continue home Prolixin and cogentin      # Misc  - DVT Prophylaxis: SCD's for now . start heparin 5000 bid   - GI Prophylaxis: pantoprazole    Tablet 40 milliGRAM(s) Oral before breakfast  - Diet: Die   - Activity: Activity - Ambulate as Tolerated  - Code Status: Full    WILBERTMICHELLEELDA MCNALLY  68y  [facility]      Patient is a 68y old  Male who presents with a chief complaint of multifocal pna (28 Sep 2024 06:05)      INTERVAL HPI/OVERNIGHT EVENTS:    Patient still ill looking but reported improvement  he has no active complains   updated about the plan of care. he didn't like the idea of steroid but agreeable with the plan   no other events noted         FAMILY HISTORY:  FH: hypertension    FH: diabetes mellitus      T(C): --  HR: 73 (09-28-24 @ 03:30) (69 - 92)  BP: 101/56 (09-28-24 @ 06:00) (95/52 - 117/54)  RR: 18 (09-28-24 @ 02:46) (18 - 20)  SpO2: 98% (09-28-24 @ 02:46) (95% - 99%)  Wt(kg): --Vital Signs Last 24 Hrs  T(C): --  T(F): --  HR: 73 (28 Sep 2024 03:30) (69 - 92)  BP: 101/56 (28 Sep 2024 06:00) (95/52 - 117/54)  BP(mean): 73 (28 Sep 2024 06:00) (68 - 79)  RR: 18 (28 Sep 2024 02:46) (18 - 20)  SpO2: 98% (28 Sep 2024 02:46) (95% - 99%)    Parameters below as of 28 Sep 2024 02:46  Patient On (Oxygen Delivery Method): room air        PHYSICAL EXAM:  Gen: looks comfortable   NERVOUS SYSTEM:  Alert & Oriented X3  PULM: Clear to auscultation bilaterally  CARDIAC: Regular rate and rhythm;  GI: Soft, Nontender, Nondistended; Bowel sounds present  EXTREMITIES:  2+ Peripheral Pulses,    Consultant(s) Notes Reviewed:  [x ] YES  [ ] NO  Care Discussed with Consultants/Other Providers [ x] YES  [ ] NO    LABS:                            8.3    2.62  )-----------( 205      ( 28 Sep 2024 06:20 )             26.2   09-28    137  |  109  |  42[H]  ----------------------------<  105[H]  4.8   |  18  |  2.4[H]    Ca    11.2[H]      28 Sep 2024 06:20  Phos  3.9     09-28  Mg     2.5     09-28    TPro  4.0[L]  /  Alb  2.6[L]  /  TBili  0.9  /  DBili  x   /  AST  13  /  ALT  17  /  AlkPhos  209[H]  09-28            acetaminophen     Tablet .. 650 milliGRAM(s) Oral every 6 hours PRN  aluminum hydroxide/magnesium hydroxide/simethicone Suspension 30 milliLiter(s) Oral every 4 hours PRN  benztropine 1 milliGRAM(s) Oral at bedtime  benztropine 2 milliGRAM(s) Oral daily  doxycycline IVPB 100 milliGRAM(s) IV Intermittent every 12 hours  fluPHENAZine 10 milliGRAM(s) Oral daily  fluPHENAZine 5 milliGRAM(s) Oral at bedtime  melatonin 3 milliGRAM(s) Oral at bedtime PRN  norepinephrine Infusion 0.05 MICROgram(s)/kG/Min IV Continuous <Continuous>  ondansetron Injectable 4 milliGRAM(s) IV Push every 8 hours PRN  pantoprazole  Injectable 40 milliGRAM(s) IV Push two times a day  piperacillin/tazobactam IVPB.. 3.375 Gram(s) IV Intermittent every 8 hours  polyethylene glycol 3350 17 Gram(s) Oral daily  senna 1 Tablet(s) Oral two times a day  sodium bicarbonate 1300 milliGRAM(s) Oral three times a day  sodium chloride 0.9%. 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 cough is productive of yellow sputum and has been going on for 3 months with 10 lb weight loss over the last 3 months ,  he also states weakness started 2 wks ago, getting worse, today fell while trying to ambulate due to weakness. No head trauma. No LOC. No CP. Reports SOB on exertion. No abdominal pain. No n/v/c/d. Last chemo > 2 mo ago. No fever/chills. Of note he took abx course for PNA prescribed by his PMD     1. Fatigue, cough and SOB secondary to Sepsis (POA) due to  Multifocal PNA associated with Small likely parapneumonic rt pleural effusion   *  patient presented with weakness and cough for 2 weeks, had finished abx course by his PMD for pneumonia  *  Sepsis POA > SIRS+ with source : T 100.7 ,  , and pneumonia  - Admit to medicine              - CTH:WNL             - LA: 0.9                        - ESR:pending              - CRP:Elevated             -Fungitel:pending  - patient BP was soft , MAP < 65 started on small dose levophed  that was weaned off   - CURB 65 = 2   - Cont Zosyn.  - Cont doxycycline  -  received a dose of vancomycin in the ED   - Start Solumedrol 40mg IV q12hr  - Blood cx:NTD              - MRSA:Neg            - Sputum cx:pending             - RVP:pending   - Cont IVF  - CT angio chest:  a) Multilobar consolidative opacities in bilateral lungs, consistent with   multifocal pneumonia.  b) Small right pleural effusion and trace left pleural effusion.  c) Small pericardial effusion.  d) Multistation lymphadenopathy though a component could be reactive given   the extensive lung consolidations, suspect also related to patient's   known history of lymphoma. Again noted is splenomegaly.  - ID eval appreciated     2. Possible acute blood loss anemia due to upper gi bleeding s/p 1 PRBC   * patient baseline Hb 9-10 , Hb today 7.8 , MCV 88 , RDW 16.6%   - s/p 1 unit PRBC's in ED  - no evidence of bleeding , ED called GI who said to keep him NPO for scope today  - c/w PPI IV BID - although anemia could be secondary to underlying malignancy   - WBC 2.4, has been chronically on lower side since 9/2023 , could be related to BM involved lymphoma ?   - keep active T&S  - resume diet after scope and check Hb to see response to transfusion   - GI consult appreciated  a) outpatient upper and lower endo  * Colonoscopy 2023- Cecal polyp, EMR: Tubular adenoma fragments showing foci of high-grade dysplasia and focal  intramucosal adenocarcinoma.  Plan was for 6month follow up     3. Moderate symptomatic Hypercalcemia likely secondary to malignancy - has constipation and confusion . hx of Marginal zone lymphoma. hx of Pancreatic neuroendocrine tumor   - on Monthly somatostatin injections - taken 9/16  *  patient CT scan with Multistation lymphadenopathy though a component could be reactive given the extensive lung consolidations, suspect also related to patient's known history of lymphoma. Again noted is splenomegaly.  - correct calcium level still high   - PTH:low                - PTHrP:pending                - Vit D:low               - MM screening:pending              - Vit A:pending   - Chromogranin:pending               - LDH:nl   - Cont IVF 100ml/hr. Give one dose of lasix 60mg IV*1   *  patient supposed to do PET scan OP to check status of his lymphoma but has not yet  - Heme-Onc eval appreciated     4. Small radha-cardial effusion - chronic  - stable small pericardial effusion seen on CT scan , was also reported on multiple previous ECHO;s  - Monitor for now     5. CKD 4 - around baseline . hx of NSTEMI type II in the setting of CKD   - hx of nephrolithiasis c/b atrophic L kidney, R ureteral rivision, CKD4 (bsl Cr ~2.5)   - s/p IV contrast on admission   - continue IVF  - continue sodium bicarb 1300mg TID   - Trops elevated likely secondary to CKD , EKG NSR       6. Elevated ALP- chronic   - possibly due to hepatic involvement of lymphoma/ bone involvement     7. Schizophrenia  - continue home Prolixin and cogentin      # Misc  - DVT Prophylaxis: SCD's for now . start heparin 5000 bid   - GI Prophylaxis: pantoprazole    Tablet 40 milliGRAM(s) Oral before breakfast  - Diet: Die   - Activity: Activity - Ambulate as Tolerated  - Code Status: Full    WILBERTMICHELLEELDA MCNALLY  68y  [facility]      Patient is a 68y old  Male who presents with a chief complaint of multifocal pna (28 Sep 2024 06:05)      INTERVAL HPI/OVERNIGHT EVENTS:    Patient still ill looking but reported improvement  he has no active complains   updated about the plan of care. he didn't like the idea of steroid but agreeable with the plan   no other events noted         FAMILY HISTORY:  FH: hypertension    FH: diabetes mellitus      T(C): --  HR: 73 (09-28-24 @ 03:30) (69 - 92)  BP: 101/56 (09-28-24 @ 06:00) (95/52 - 117/54)  RR: 18 (09-28-24 @ 02:46) (18 - 20)  SpO2: 98% (09-28-24 @ 02:46) (95% - 99%)  Wt(kg): --Vital Signs Last 24 Hrs  T(C): --  T(F): --  HR: 73 (28 Sep 2024 03:30) (69 - 92)  BP: 101/56 (28 Sep 2024 06:00) (95/52 - 117/54)  BP(mean): 73 (28 Sep 2024 06:00) (68 - 79)  RR: 18 (28 Sep 2024 02:46) (18 - 20)  SpO2: 98% (28 Sep 2024 02:46) (95% - 99%)    Parameters below as of 28 Sep 2024 02:46  Patient On (Oxygen Delivery Method): room air        PHYSICAL EXAM:  Gen: looks comfortable   NERVOUS SYSTEM:  Alert & Oriented X3  PULM: Clear to auscultation bilaterally  CARDIAC: Regular rate and rhythm;  GI: Soft, Nontender, Nondistended; Bowel sounds present  EXTREMITIES:  2+ Peripheral Pulses,    Consultant(s) Notes Reviewed:  [x ] YES  [ ] NO  Care Discussed with Consultants/Other Providers [ x] YES  [ ] NO    LABS:                            8.3    2.62  )-----------( 205      ( 28 Sep 2024 06:20 )             26.2   09-28    137  |  109  |  42[H]  ----------------------------<  105[H]  4.8   |  18  |  2.4[H]    Ca    11.2[H]      28 Sep 2024 06:20  Phos  3.9     09-28  Mg     2.5     09-28    TPro  4.0[L]  /  Alb  2.6[L]  /  TBili  0.9  /  DBili  x   /  AST  13  /  ALT  17  /  AlkPhos  209[H]  09-28            acetaminophen     Tablet .. 650 milliGRAM(s) Oral every 6 hours PRN  aluminum hydroxide/magnesium hydroxide/simethicone Suspension 30 milliLiter(s) Oral every 4 hours PRN  benztropine 1 milliGRAM(s) Oral at bedtime  benztropine 2 milliGRAM(s) Oral daily  doxycycline IVPB 100 milliGRAM(s) IV Intermittent every 12 hours  fluPHENAZine 10 milliGRAM(s) Oral daily  fluPHENAZine 5 milliGRAM(s) Oral at bedtime  melatonin 3 milliGRAM(s) Oral at bedtime PRN  norepinephrine Infusion 0.05 MICROgram(s)/kG/Min IV Continuous <Continuous>  ondansetron Injectable 4 milliGRAM(s) IV Push every 8 hours PRN  pantoprazole  Injectable 40 milliGRAM(s) IV Push two times a day  piperacillin/tazobactam IVPB.. 3.375 Gram(s) IV Intermittent every 8 hours  polyethylene glycol 3350 17 Gram(s) Oral daily  senna 1 Tablet(s) Oral two times a day  sodium bicarbonate 1300 milliGRAM(s) Oral three times a day  sodium chloride 0.9%. 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 ~30 years ago, comes in c/o weakness/SOB and cough. cough is productive of yellow sputum and has been going on for 3 months with 10 lb weight loss over the last 3 months ,  he also states weakness started 2 wks ago, getting worse, today fell while trying to ambulate due to weakness. No head trauma. No LOC. No CP. Reports SOB on exertion. No abdominal pain. No n/v/c/d. Last chemo > 2 mo ago. No fever/chills. Of note he took abx course for PNA prescribed by his PMD     1. Fatigue, cough and SOB secondary to Sepsis (POA) due to  Multifocal PNA associated with Small likely parapneumonic rt pleural effusion   *  patient presented with weakness and cough for 2 weeks, had finished abx course by his PMD for pneumonia  *  Sepsis POA > SIRS+ with source : T 100.7 ,  , and pneumonia  - Admit to medicine              - CTH:WNL             - LA: 0.9                        - ESR:pending              - CRP:Elevated             -Fungitel:pending  - patient BP was soft , MAP < 65 started on small dose levophed  that was weaned off   - CURB 65 = 2   - Cont Zosyn.  - Cont doxycycline  -  received a dose of vancomycin in the ED   - Start Solumedrol 40mg IV q12hr (organizing pneumonia?)   - Blood cx:NTD              - MRSA:Neg            - Sputum cx:pending             - RVP:pending   - Cont IVF  - CT angio chest:  a) Multilobar consolidative opacities in bilateral lungs, consistent with   multifocal pneumonia.  b) Small right pleural effusion and trace left pleural effusion.  c) Small pericardial effusion.  d) Multistation lymphadenopathy though a component could be reactive given   the extensive lung consolidations, suspect also related to patient's   known history of lymphoma. Again noted is splenomegaly.  - ID eval appreciated     2. Possible acute blood loss anemia due to upper gi bleeding s/p 1 PRBC   * patient baseline Hb 9-10 , Hb today 7.8 , MCV 88 , RDW 16.6%   - s/p 1 unit PRBC's in ED  - no evidence of bleeding , ED called GI who said to keep him NPO for scope today  - c/w PPI IV BID - although anemia could be secondary to underlying malignancy   - WBC 2.4, has been chronically on lower side since 9/2023 , could be related to BM involved lymphoma ?   - keep active T&S  - resume diet after scope and check Hb to see response to transfusion   - GI consult appreciated  a) outpatient upper and lower endo  * Colonoscopy 2023- Cecal polyp, EMR: Tubular adenoma fragments showing foci of high-grade dysplasia and focal  intramucosal adenocarcinoma.  Plan was for 6month follow up     3. Moderate symptomatic Hypercalcemia likely secondary to malignancy - has constipation and confusion . hx of Marginal zone lymphoma. hx of Pancreatic neuroendocrine tumor   - on Monthly somatostatin injections - taken 9/16  *  patient CT scan with Multistation lymphadenopathy though a component could be reactive given the extensive lung consolidations, suspect also related to patient's known history of lymphoma. Again noted is splenomegaly.  - correct calcium level still high   - PTH:low                - PTHrP:pending                - Vit D:low               - MM screening:pending              - Vit A:pending   - Chromogranin:pending               - LDH:nl   - Cont IVF 100ml/hr. Give one dose of lasix 60mg IV*1   *  patient supposed to do PET scan OP to check status of his lymphoma but has not yet  - Heme-Onc eval appreciated     4. Small radha-cardial effusion - chronic  - stable small pericardial effusion seen on CT scan , was also reported on multiple previous ECHO;s  - Monitor for now     5. CKD 4 - around baseline . hx of NSTEMI type II in the setting of CKD   - hx of nephrolithiasis c/b atrophic L kidney, R ureteral rivision, CKD4 (bsl Cr ~2.5)   - s/p IV contrast on admission   - continue IVF  - continue sodium bicarb 1300mg TID   - Trops elevated likely secondary to CKD , EKG NSR       6. Elevated ALP- chronic   - possibly due to hepatic involvement of lymphoma/ bone involvement     7. Schizophrenia  - continue home Prolixin and cogentin      # Misc  - DVT Prophylaxis: SCD's for now . start heparin 5000 bid   - GI Prophylaxis: pantoprazole    Tablet 40 milliGRAM(s) Oral before breakfast  - Diet: Die   - Activity: Activity - Ambulate as Tolerated  - Code Status: Full

## 2024-09-28 NOTE — PROGRESS NOTE ADULT - ASSESSMENT
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 on monthly somatostatin injections( follows Dr Gong )  left elbow fracture s/p ORIF @[facility] ~30 years ago, comes in c/o weakness/SOB and cough. cough is productive of yellow sputum and has been going on for 3 months with 10 lb weight loss over the last 3 months ,  he also states weakness started 2 wks ago, getting worse, today fell while trying to ambulate due to weakness. No head trauma. No LOC. No CP. Reports SOB on exertion. No abdominal pain. No n/v/c/d. Last chemo > 2 mo ago. No fever/chills. Of note he took abx course for PNA prescribed by his PMD     #Fatigue, cough and SOB secondary to Multifocal PNA  #Fall  #Small likely parapneumonic rt pleural effusion   #Sepsis POA  - patient presented with weakness and cough for 2 weeks, had finished abx course by his PMD for pneumonia  - Sepsis POA > SIRS+ with source : T 100.7 ,  , and pneumonia  - patient BP was soft , MAP < 65 started on small dose levophed   - CURB 65 = 2   - Blood cultures sent , s/p cefepime , vancomycin and azithromycin in ED  - Will start zosyn 3.375 q8 IV given recent abx use   Possible Organizing pneumonia picture -> started on Solumedrol 40 BID   - send RVP, sputum cx , urine strep and legionella , nasal MRSA, fu esr, crp and fungitell  - Patient is satting 95% on RA  - will get CT head given fall and lethargy.   - ID eval   Lasix once 60 for pulmonary edema     #Acute on chronic Normocytic anemia  #Leukopenia- chronic  -patient baseline Hb 9-10 , Hb today 7.8 , MCV 88 , RDW 16.6%   - s/p 1 unit PRBC's in ED  - no evidence of bleeding , ED called GI who said to keep him NPO for scope today  - c/w PPI IV BID - although anemia could be secondary to underlying malignancy   - WBC 2.4, has been chronically on lower side since 9/2023 , could be related to BM involved lymphoma ?   - keep active T&S  - Diet resumed   - Heme-onc eval   Outpatient egd / colonoscopy as out     #Moderate symptomatic Hypercalcemia likely secondary to malignancy - has constipation and confusion   #Marginal zone lymphoma  #Pancreatic neuroendocrine tumor   - patient follows with Heme-Onc Dr Gong  - on Monthly somatostatin injections - taken 9/16  - patient CT scan with Multistation lymphadenopathy though a component could be reactive given the extensive lung consolidations, suspect also related to patient's known history of lymphoma. Again noted is splenomegaly.  - correct calcium level = 12.7   - send PTH, PTHrp , vit D, vit A , SPEP , UPEP , Ig panel and FLC ( myleoma panel in the setting of elevated Cr , anemia and hypercalcemia )    - calcium level rising > start NS 0.9% 150cc/hr , target -200cc/hr ,patient has constipation and lethargic/slightly confused ( last EF 67% , Patient dry on exam )   - Will send chromogranin and LDH given neuroendocrine pancreatic tumor.   - patient supposed to do PET scan OP to check status of his lymphoma but has not yet  - Heme-Onc eval     #Small radha-cardial effusion - chronic  - stable small pericardial effusion seen on CT scan , was also reported on multiple previous ECHO;s  - Monitor for now     #CKD 4 - around baseline   #Chronic Normal AG Metabolic acidosis   #NSTEMI type II in the setting of CKD   - hx of nephrolithiasis c/b atrophic L kidney, R ureteral rivision, CKD4 (bsl Cr ~2.5)   - s/p IV contrast on admission   - continue IVF  - continue sodium bicarb 1300mg TID   - Trops elevated likely secondary to CKD , EKG NSR       #Elevated ALP- chronic   - possibly due to hepatic involvement of lymphoma/ bone involvement     #Schizophrenia  - continue home Prolixin and cogentin      # Misc  - DVT Prophylaxis: SCD's for now   - GI Prophylaxis: pantoprazole    Tablet 40 milliGRAM(s) Oral before breakfast  - Diet: Diet, NPO   - Activity: Activity - Ambulate as Tolerated  - Code Status: Full   # To follow up: ID , heme onc , taper levophed , c/w abx . IV fluids for hypercalcemia , GI for Scope then resume diet , check BMP and cbc in the afternoon

## 2024-09-28 NOTE — PROGRESS NOTE ADULT - SUBJECTIVE AND OBJECTIVE BOX
SUBJECTIVE/OVERNIGHT EVENTS  Today is hospital day 1d. This morning patient was seen and examined at bedside, resting comfortably in bed. No acute or major events overnight.    HOSPITAL COURSE  Day 1:   Day 2:   Day 3:     CODE STATUS:    FAMILY COMMUNICATION  Contact date:  Name of person contacted:  Relationship to patient:  Communication details:    MEDICATIONS  STANDING MEDICATIONS  benztropine 1 milliGRAM(s) Oral at bedtime  benztropine 2 milliGRAM(s) Oral daily  doxycycline IVPB 100 milliGRAM(s) IV Intermittent every 12 hours  fluPHENAZine 10 milliGRAM(s) Oral daily  fluPHENAZine 5 milliGRAM(s) Oral at bedtime  heparin   Injectable 5000 Unit(s) SubCutaneous every 12 hours  methylPREDNISolone sodium succinate Injectable 40 milliGRAM(s) IV Push two times a day  norepinephrine Infusion 0.05 MICROgram(s)/kG/Min IV Continuous <Continuous>  pantoprazole  Injectable 40 milliGRAM(s) IV Push two times a day  piperacillin/tazobactam IVPB.. 3.375 Gram(s) IV Intermittent every 8 hours  polyethylene glycol 3350 17 Gram(s) Oral daily  senna 1 Tablet(s) Oral two times a day  sodium bicarbonate 1300 milliGRAM(s) Oral three times a day  sodium chloride 0.9%. 1000 milliLiter(s) IV Continuous <Continuous>    PRN MEDICATIONS  acetaminophen     Tablet .. 650 milliGRAM(s) Oral every 6 hours PRN  aluminum hydroxide/magnesium hydroxide/simethicone Suspension 30 milliLiter(s) Oral every 4 hours PRN  melatonin 3 milliGRAM(s) Oral at bedtime PRN  ondansetron Injectable 4 milliGRAM(s) IV Push every 8 hours PRN    VITALS  T(F): --  HR: 73 (09-28-24 @ 03:30) (69 - 92)  BP: 101/56 (09-28-24 @ 06:00) (95/52 - 117/54)  RR: 18 (09-28-24 @ 02:46) (18 - 20)  SpO2: 98% (09-28-24 @ 02:46) (95% - 99%)    PHYSICAL EXAM  GENERAL  ( x ) NAD, lying in bed comfortably     (  ) obtunded     (  ) lethargic     (  ) somnolent    HEAD  ( x ) Atraumatic     (  ) hematoma     (  ) laceration (specify location:       )     NECK  (x  ) Supple     (  ) neck stiffness     (  ) nuchal rigidity     (  )  no JVD     (  ) JVD present ( -- cm)    HEART  Rate -->  (x  ) normal rate    (  ) bradycardic    (  ) tachycardic  Rhythm -->  (x  ) regular    (  ) regularly irregular    (  ) irregularly irregular  Murmurs -->  (x  ) normal s1/s2    (  ) systolic murmur    (  ) diastolic murmur    (  ) continuous murmur     (  ) S3 present    (  ) S4 present    LUNGS  (x  )Unlabored respirations     (  ) tachypnea  (  ) B/L air entry     ( x ) decreased breath sounds in:  (location     )    (  ) no adventitious sound     (x  ) crackles     (  ) wheezing      (  ) rhonchi      (specify location:       )  (  ) chest wall tenderness (specify location:       )    ABDOMEN  ( x ) Soft     (  ) tense   |   (  ) nondistended     (  ) distended   |   x(  ) +BS     (  ) hypoactive bowel sounds     (  ) hyperactive bowel sounds  ( x ) nontender     (  ) RUQ tenderness     (  ) RLQ tenderness     (  ) LLQ tenderness     (  ) epigastric tenderness     (  ) diffuse tenderness  (  ) Splenomegaly      (  ) Hepatomegaly      (  ) Jaundice     (  ) ecchymosis     EXTREMITIES  (  ) Normal     (  ) Rash     (  ) ecchymosis     (  ) varicose veins      (  ) pitting edema     (  ) non-pitting edema   (  ) ulceration     (  ) gangrene:     (location:     )    NERVOUS SYSTEM  ( x ) A&Ox3     (  ) confused     (  ) lethargic  CN II-XII:     (  ) Intact     (  ) focal deficits  (Specify:     )   Upper extremities:     (  ) strength X/5     (  ) focal deficit (specify:    )  Lower extremities:     (  ) strength  X/5    (  ) focal deficit (specify:    )    SKIN  (x  ) No rashes or lesions     (  ) maculopapular rash     (  ) pustules     (  ) vesicles     (  ) ulcer     (  ) ecchymosis     (specify location:     )      LABS             8.3    2.62  )-----------( 205      ( 09-28-24 @ 06:20 )             26.2     137  |  109  |  42  -------------------------<  105   09-28-24 @ 06:20  4.8  |  18  |  2.4    Ca      11.2     09-28-24 @ 06:20  Phos   3.9     09-28-24 @ 06:20  Mg     2.5     09-28-24 @ 06:20    TPro  4.0  /  Alb  2.6  /  TBili  0.9  /  DBili  x   /  AST  13  /  ALT  17  /  AlkPhos  209  /  GGT  x     09-28-24 @ 06:20      Troponin T, High Sensitivity Result: 35 ng/L (09-26-24 @ 23:39)    Urinalysis Basic - ( 28 Sep 2024 06:20 )    Color: x / Appearance: x / SG: x / pH: x  Gluc: 105 mg/dL / Ketone: x  / Bili: x / Urobili: x   Blood: x / Protein: x / Nitrite: x   Leuk Esterase: x / RBC: x / WBC x   Sq Epi: x / Non Sq Epi: x / Bacteria: x          Culture - Blood (collected 27 Sep 2024 00:15)  Source: .Blood BLOOD  Preliminary Report (28 Sep 2024 09:01):    No growth at 24 hours    Culture - Blood (collected 27 Sep 2024 00:15)  Source: .Blood BLOOD  Preliminary Report (28 Sep 2024 09:01):    No growth at 24 hours      IMAGING

## 2024-09-29 LAB
ALBUMIN SERPL ELPH-MCNC: 2.3 G/DL — LOW (ref 3.5–5.2)
ALP SERPL-CCNC: 152 U/L — HIGH (ref 30–115)
ALT FLD-CCNC: 11 U/L — SIGNIFICANT CHANGE UP (ref 0–41)
ANION GAP SERPL CALC-SCNC: 13 MMOL/L — SIGNIFICANT CHANGE UP (ref 7–14)
AST SERPL-CCNC: 7 U/L — SIGNIFICANT CHANGE UP (ref 0–41)
BASOPHILS # BLD AUTO: 0.01 K/UL — SIGNIFICANT CHANGE UP (ref 0–0.2)
BASOPHILS NFR BLD AUTO: 0.3 % — SIGNIFICANT CHANGE UP (ref 0–1)
BILIRUB SERPL-MCNC: 0.5 MG/DL — SIGNIFICANT CHANGE UP (ref 0.2–1.2)
BUN SERPL-MCNC: 41 MG/DL — HIGH (ref 10–20)
CALCIUM SERPL-MCNC: 9.2 MG/DL — SIGNIFICANT CHANGE UP (ref 8.4–10.5)
CHLORIDE SERPL-SCNC: 106 MMOL/L — SIGNIFICANT CHANGE UP (ref 98–110)
CO2 SERPL-SCNC: 15 MMOL/L — LOW (ref 17–32)
CREAT SERPL-MCNC: 2.2 MG/DL — HIGH (ref 0.7–1.5)
CULTURE RESULTS: NO GROWTH — SIGNIFICANT CHANGE UP
DEPRECATED KAPPA LC FREE/LAMBDA SER: 2.06 RATIO — SIGNIFICANT CHANGE UP (ref 0.7–6.02)
EGFR: 32 ML/MIN/1.73M2 — LOW
EOSINOPHIL # BLD AUTO: 0 K/UL — SIGNIFICANT CHANGE UP (ref 0–0.7)
EOSINOPHIL NFR BLD AUTO: 0 % — SIGNIFICANT CHANGE UP (ref 0–8)
GLUCOSE BLDC GLUCOMTR-MCNC: 157 MG/DL — HIGH (ref 70–99)
GLUCOSE SERPL-MCNC: 332 MG/DL — HIGH (ref 70–99)
GRAM STN FLD: ABNORMAL
HCT VFR BLD CALC: 23.2 % — LOW (ref 42–52)
HGB BLD-MCNC: 7.5 G/DL — LOW (ref 14–18)
IGA FLD-MCNC: 42 MG/DL — LOW (ref 84–499)
IGG FLD-MCNC: 32 MG/DL — LOW (ref 610–1660)
IGM SERPL-MCNC: 27 MG/DL — LOW (ref 35–242)
IMM GRANULOCYTES NFR BLD AUTO: 11.4 % — HIGH (ref 0.1–0.3)
KAPPA LC SER QL IFE: 1.44 MG/DL — SIGNIFICANT CHANGE UP (ref 0.33–1.94)
KAPPA LC UR-MCNC: 56.57 MG/L — HIGH
KAPPA/LAMBDA FREE LIGHT CHAIN RATIO, SERUM: 0.63 RATIO — SIGNIFICANT CHANGE UP (ref 0.26–1.65)
LAMBDA LC SER QL IFE: 2.3 MG/DL — SIGNIFICANT CHANGE UP (ref 0.57–2.63)
LAMBDA LC UR-MCNC: 27.47 MG/L — HIGH
LYMPHOCYTES # BLD AUTO: 0.23 K/UL — LOW (ref 1.2–3.4)
LYMPHOCYTES # BLD AUTO: 5.8 % — LOW (ref 20.5–51.1)
MAGNESIUM SERPL-MCNC: 2 MG/DL — SIGNIFICANT CHANGE UP (ref 1.8–2.4)
MCHC RBC-ENTMCNC: 27.8 PG — SIGNIFICANT CHANGE UP (ref 27–31)
MCHC RBC-ENTMCNC: 32.3 G/DL — SIGNIFICANT CHANGE UP (ref 32–37)
MCV RBC AUTO: 85.9 FL — SIGNIFICANT CHANGE UP (ref 80–94)
MONOCYTES # BLD AUTO: 0.52 K/UL — SIGNIFICANT CHANGE UP (ref 0.1–0.6)
MONOCYTES NFR BLD AUTO: 13.1 % — HIGH (ref 1.7–9.3)
NEUTROPHILS # BLD AUTO: 2.75 K/UL — SIGNIFICANT CHANGE UP (ref 1.4–6.5)
NEUTROPHILS NFR BLD AUTO: 69.4 % — SIGNIFICANT CHANGE UP (ref 42.2–75.2)
NRBC # BLD: 0 /100 WBCS — SIGNIFICANT CHANGE UP (ref 0–0)
PHOSPHATE SERPL-MCNC: 4.5 MG/DL — SIGNIFICANT CHANGE UP (ref 2.1–4.9)
PLATELET # BLD AUTO: 287 K/UL — SIGNIFICANT CHANGE UP (ref 130–400)
PMV BLD: 10.6 FL — HIGH (ref 7.4–10.4)
POTASSIUM SERPL-MCNC: 3.8 MMOL/L — SIGNIFICANT CHANGE UP (ref 3.5–5)
POTASSIUM SERPL-SCNC: 3.8 MMOL/L — SIGNIFICANT CHANGE UP (ref 3.5–5)
PROT SERPL-MCNC: 3.5 G/DL — LOW (ref 6–8)
PROT SERPL-MCNC: 4.1 G/DL — LOW (ref 6–8.3)
RBC # BLD: 2.7 M/UL — LOW (ref 4.7–6.1)
RBC # FLD: 16.5 % — HIGH (ref 11.5–14.5)
SODIUM SERPL-SCNC: 134 MMOL/L — LOW (ref 135–146)
SPECIMEN SOURCE: SIGNIFICANT CHANGE UP
SPECIMEN SOURCE: SIGNIFICANT CHANGE UP
TROPONIN T, HIGH SENSITIVITY RESULT: 20 NG/L — SIGNIFICANT CHANGE UP (ref 6–21)
VIT D25+D1,25 OH+D1,25 PNL SERPL-MCNC: 58.5 PG/ML — SIGNIFICANT CHANGE UP (ref 19.9–79.3)
WBC # BLD: 3.96 K/UL — LOW (ref 4.8–10.8)
WBC # FLD AUTO: 3.96 K/UL — LOW (ref 4.8–10.8)

## 2024-09-29 PROCEDURE — 99233 SBSQ HOSP IP/OBS HIGH 50: CPT

## 2024-09-29 PROCEDURE — 99222 1ST HOSP IP/OBS MODERATE 55: CPT

## 2024-09-29 PROCEDURE — 93010 ELECTROCARDIOGRAM REPORT: CPT

## 2024-09-29 RX ORDER — ATROPINE SULFATE 0.4 MG/ML
1 AMPUL (ML) INJECTION ONCE
Refills: 0 | Status: DISCONTINUED | OUTPATIENT
Start: 2024-09-29 | End: 2024-09-29

## 2024-09-29 RX ORDER — ATROPINE SULFATE 0.4 MG/ML
1 AMPUL (ML) INJECTION ONCE
Refills: 0 | Status: DISCONTINUED | OUTPATIENT
Start: 2024-09-29 | End: 2024-10-16

## 2024-09-29 RX ORDER — ATROPINE SULFATE 0.4 MG/ML
1 AMPUL (ML) INJECTION ONCE
Refills: 0 | Status: COMPLETED | OUTPATIENT
Start: 2024-09-29 | End: 2024-09-29

## 2024-09-29 RX ORDER — CHLORHEXIDINE GLUCONATE ORAL RINSE 1.2 MG/ML
1 SOLUTION DENTAL
Refills: 0 | Status: DISCONTINUED | OUTPATIENT
Start: 2024-09-29 | End: 2024-10-16

## 2024-09-29 RX ADMIN — Medication 1 MILLIGRAM(S): at 17:02

## 2024-09-29 RX ADMIN — METHYLPREDNISOLONE ACETATE 40 MILLIGRAM(S): 80 INJECTION, SUSPENSION INTRA-ARTICULAR; INTRALESIONAL; INTRAMUSCULAR; SOFT TISSUE at 17:11

## 2024-09-29 RX ADMIN — Medication 1 TABLET(S): at 17:10

## 2024-09-29 RX ADMIN — PANTOPRAZOLE SODIUM 40 MILLIGRAM(S): 40 TABLET, DELAYED RELEASE ORAL at 05:16

## 2024-09-29 RX ADMIN — CHLORHEXIDINE GLUCONATE ORAL RINSE 1 APPLICATION(S): 1.2 SOLUTION DENTAL at 22:01

## 2024-09-29 RX ADMIN — PIPERACILLIN SODIUM AND TAZOBACTAM SODIUM 25 GRAM(S): 12; 1.5 INJECTION, POWDER, LYOPHILIZED, FOR SOLUTION INTRAVENOUS at 14:06

## 2024-09-29 RX ADMIN — Medication 100 MILLIGRAM(S): at 05:13

## 2024-09-29 RX ADMIN — PIPERACILLIN SODIUM AND TAZOBACTAM SODIUM 25 GRAM(S): 12; 1.5 INJECTION, POWDER, LYOPHILIZED, FOR SOLUTION INTRAVENOUS at 22:01

## 2024-09-29 RX ADMIN — Medication 1 MILLIGRAM(S): at 22:00

## 2024-09-29 RX ADMIN — PANTOPRAZOLE SODIUM 40 MILLIGRAM(S): 40 TABLET, DELAYED RELEASE ORAL at 17:10

## 2024-09-29 RX ADMIN — Medication 1300 MILLIGRAM(S): at 14:07

## 2024-09-29 RX ADMIN — Medication 5000 UNIT(S): at 17:10

## 2024-09-29 RX ADMIN — Medication 5000 UNIT(S): at 05:14

## 2024-09-29 RX ADMIN — PIPERACILLIN SODIUM AND TAZOBACTAM SODIUM 25 GRAM(S): 12; 1.5 INJECTION, POWDER, LYOPHILIZED, FOR SOLUTION INTRAVENOUS at 05:53

## 2024-09-29 RX ADMIN — Medication 1 TABLET(S): at 05:14

## 2024-09-29 RX ADMIN — Medication 1300 MILLIGRAM(S): at 05:14

## 2024-09-29 RX ADMIN — Medication 10 MILLIGRAM(S): at 14:01

## 2024-09-29 RX ADMIN — Medication 2 MILLIGRAM(S): at 14:00

## 2024-09-29 RX ADMIN — Medication 1 MILLIGRAM(S): at 08:38

## 2024-09-29 RX ADMIN — Medication 1300 MILLIGRAM(S): at 22:00

## 2024-09-29 RX ADMIN — METHYLPREDNISOLONE ACETATE 40 MILLIGRAM(S): 80 INJECTION, SUSPENSION INTRA-ARTICULAR; INTRALESIONAL; INTRAMUSCULAR; SOFT TISSUE at 05:15

## 2024-09-29 RX ADMIN — Medication 100 MILLIGRAM(S): at 17:10

## 2024-09-29 NOTE — RAPID RESPONSE TEAM SUMMARY - NSSITUATIONBACKGROUNDRRT_GEN_ALL_CORE
RR called for this patient by RN because HR was 35.  EKG showed sinus bradycardia.  Patient was otherwise hemodynamically alert, stable and in no acute distress.  Fluphenazine 5 mg evening dose discontinued as can cause AV block and bradycardia.

## 2024-09-29 NOTE — CONSULT NOTE ADULT - SUBJECTIVE AND OBJECTIVE BOX
Date of Admission: 09-27-24    HISTORY OF PRESENT ILLNESS:    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 on monthly somatostatin injections( follows Dr Gong )  left elbow fracture s/p ORIF @[facility] ~30 years ago, comes in c/o weakness/SOB and cough. cough is productive of yellow sputum and has been going on for 3 months with 10 lb weight loss over the last 3 months ,  he also states weakness started 2 wks ago, getting worse, today fell while trying to ambulate due to weakness. No head trauma. No LOC. No CP. Reports SOB on exertion. No abdominal pain. No n/v/c/d. Last chemo > 2 mo ago. No fever/chills. Of note he took abx course for PNA prescribed by his PMD     Vitals in ED :   T 100.7 (MAX) , HR 76 , /55,      Labs significant for :  Anemia, Hb of 7.8 ( baseline 9-10) , Leukopenia ; wbc 2.4 k   Hypercalcemia , corrected ca = 12.7 and Cr of 2.6 ( around baseline )     EKG NSR   Trops 35    CT chest angio : No pulmonary embolism.    Multilobar consolidative opacities in bilateral lungs, consistent with   multifocal pneumonia.    Small right pleural effusion and trace left pleural effusion.    Small pericardial effusion.    Multistation lymphadenopathy though a component could be reactive given   the extensive lung consolidations, suspect also related to patient's   known history of lymphoma. Again noted is splenomegaly.      s/p 1 unit PRBC's , 2L NS in ED , cefeipme,vancomycin and azithromycin    admitted to medicine  (27 Sep 2024 08:29)      PAST MEDICAL & SURGICAL HISTORY  Kidney stones    Schizophrenia    Lymphoma    Shingles    Atrophic kidney    H/O colonoscopy with polypectomy    Liver cyst    History of bladder surgery    H/O neuropathy    History of chemotherapy    Stage 3 chronic kidney disease    Neuroendocrine malignancy    Neuroendocrine tumor status post surgical treatment    Neuroendocrine cancer    Retained urethral stent    H/O umbilical hernia repair    Elbow fracture    H/O cystoscopy        FAMILY HISTORY:  FH: hypertension    FH: diabetes mellitus      SOCIAL HISTORY:   - Non-smoker    REVIEW OF SYMPTOMS:  CONSTITUTIONAL: No fevers or chills.  EYES: No visual changes, eye pain, or discharge  ENT: No vertigo; No ear pain or change in hearing; No sore throat or difficulty swallowing  NECK: No pain or stiffness  RESPIRATORY: Shortness of breath  CARDIOVASCULAR: No chest pain, chest pressure or palpitations  GASTROINTESTINAL: No abdominal or epigastric pain; No nausea, vomiting, or hematemesis; No diarrhea or constipation; No melena or hematochezia  GENITOURINARY: No dysuria, frequency or hematuria  MUSCULOSKELETAL: No joint pain, no muscle pain  NEUROLOGICAL: Weakness    VITALS:  T(C): 36.1 (09-29-24 @ 11:29), Max: 36.6 (09-29-24 @ 07:15)  HR: 49 (09-29-24 @ 11:29) (34 - 100)  BP: 108/55 (09-29-24 @ 11:29) (81/52 - 145/70)  RR: 18 (09-29-24 @ 11:29) (17 - 19)  SpO2: 99% (09-29-24 @ 11:29) (95% - 100%)  Wt(kg): --  Daily     Daily     PHYSICAL EXAM:  GEN: Not in acute distress  HEENT: EOMI  LUNGS: b/l crackles. R > L.  CARDIOVASCULAR: RRR, S1/S2 present  ABD: Soft, non-tender, non-distended  EXT: 1+ MARYLIN  SKIN: Warm  NEURO: AAOx3    LABS:	 	                        7.5    3.96  )-----------( 287      ( 29 Sep 2024 06:36 )             23.2     09-29    134[L]  |  106  |  41[H]  ----------------------------<  332[H]  3.8   |  15[L]  |  2.2[H]    Ca    9.2      29 Sep 2024 06:36  Phos  4.5     09-29  Mg     2.0     09-29    TPro  3.5[L]  /  Alb  2.3[L]  /  TBili  0.5  /  DBili  x   /  AST  7   /  ALT  11  /  AlkPhos  152[H]  09-29            Intake and Output:    09-28-24 @ 07:01  -  09-29-24 @ 07:00  --------------------------------------------------------  IN: 0 mL / OUT: 950 mL / NET: -950 mL        CARDIAC MARKERS:  Troponin T, High Sensitivity Result: 20 ng/L (09-29-24 @ 08:56)  Troponin T, High Sensitivity Result: 35 ng/L (09-26-24 @ 23:39)  Troponin T, High Sensitivity Result: 86 ng/L (05-03-24 @ 19:22)  Troponin T, High Sensitivity Result: 89 ng/L (05-03-24 @ 17:26)      TELEMETRY EVENTS:    ECG:    RADIOLOGY:    OTHER:    Echocardiogram:  < from: TTE Echo Complete w/o Contrast w/ Doppler (09.10.23 @ 09:08) >   1. Normal globalleft ventricular systolic function with a biplane EF of   67%. Mild (Grade I) diastolic dysfunction. No regional wall motion   abnormalities.   2. Normal right ventricular size and function.   3. Mildly enlarged left atrium.   4. Mildly enlarged right atrium.   5. No hemodynamically significant valvular disease.   6. Small localized posterior pericardial effusion without   echocardiographic evidence of tamponade physiology.   7. Compared to prior study, effusion size is grossly unchanged.        < end of copied text >    Catheterization:    Stress Test: 	    Home Medications:  benztropine 1 mg oral tablet: 1 tab(s) orally once a day (at bedtime) (03 May 2024 23:36)  benztropine 2 mg oral tablet: 1 tab(s) orally once a day (15 Nov 2023 06:52)  Prolixin 10 mg oral tablet: 1 tab(s) orally once a day (15 Nov 2023 06:52)  Prolixin 5 mg oral tablet: 1 tab(s) orally once a day (at bedtime) (03 May 2024 23:36)    MEDICATIONS  (STANDING):  benztropine 1 milliGRAM(s) Oral at bedtime  benztropine 2 milliGRAM(s) Oral daily  doxycycline IVPB 100 milliGRAM(s) IV Intermittent every 12 hours  fluPHENAZine 10 milliGRAM(s) Oral daily  heparin   Injectable 5000 Unit(s) SubCutaneous every 12 hours  methylPREDNISolone sodium succinate Injectable 40 milliGRAM(s) IV Push two times a day  norepinephrine Infusion 0.05 MICROgram(s)/kG/Min (8.5 mL/Hr) IV Continuous <Continuous>  pantoprazole  Injectable 40 milliGRAM(s) IV Push two times a day  piperacillin/tazobactam IVPB.. 3.375 Gram(s) IV Intermittent every 8 hours  polyethylene glycol 3350 17 Gram(s) Oral daily  senna 1 Tablet(s) Oral two times a day  sodium bicarbonate 1300 milliGRAM(s) Oral three times a day  sodium chloride 0.9%. 1000 milliLiter(s) (100 mL/Hr) IV Continuous <Continuous>    MEDICATIONS  (PRN):  acetaminophen     Tablet .. 650 milliGRAM(s) Oral every 6 hours PRN Temp greater or equal to 38C (100.4F), Mild Pain (1 - 3)  aluminum hydroxide/magnesium hydroxide/simethicone Suspension 30 milliLiter(s) Oral every 4 hours PRN Dyspepsia  melatonin 3 milliGRAM(s) Oral at bedtime PRN Insomnia  ondansetron Injectable 4 milliGRAM(s) IV Push every 8 hours PRN Nausea and/or Vomiting         Date of Admission: 09-27-24    HISTORY OF PRESENT ILLNESS:    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 on monthly somatostatin injections( follows Dr Gong )  left elbow fracture s/p [facility]IF @[facility] ~30 years ago, comes in c/o weakness/SOB and cough. cough is productive of yellow sputum and has been going on for 3 months with 10 lb weight loss over the last 3 months ,  he also states weakness started 2 wks ago, getting worse, today fell while trying to ambulate due to weakness. No head trauma. No LOC. No CP. Reports SOB on exertion. No abdominal pain. No n/v/c/d. Last chemo > 2 mo ago. No fever/chills. Of note he took abx course for PNA prescribed by his PMD     Vitals in ED :   T 100.7 (MAX) , HR 76 , /55,      Labs significant for :  Anemia, Hb of 7.8 ( baseline 9-10) , Leukopenia ; wbc 2.4 k   Hypercalcemia , corrected ca = 12.7 and Cr of 2.6 ( around baseline )     EKG NSR   Trops 35    CT chest angio : No pulmonary embolism.    Multilobar consolidative opacities in bilateral lungs, consistent with   multifocal pneumonia.    Small right pleural effusion and trace left pleural effusion.    Small pericardial effusion.    Multistation lymphadenopathy though a component could be reactive given   the extensive lung consolidations, suspect also related to patient's   known history of lymphoma. Again noted is splenomegaly.      s/p 1 unit PRBC's , 2L NS in ED , cefeipme,vancomycin and azithromycin    admitted to medicine  (27 Sep 2024 08:29)      PAST MEDICAL & SURGICAL HISTORY  Kidney stones    Schizophrenia    Lymphoma    Shingles    Atrophic kidney    H/O colonoscopy with polypectomy    Liver cyst    History of bladder surgery    H/O neuropathy    History of chemotherapy    Stage 3 chronic kidney disease    Neuroendocrine malignancy    Neuroendocrine tumor status post surgical treatment    Neuroendocrine cancer    Retained urethral stent    H/O umbilical hernia repair    Elbow fracture    H/O cystoscopy        FAMILY HISTORY:  FH: hypertension    FH: diabetes mellitus      SOCIAL HISTORY:   - Non-smoker    REVIEW OF SYMPTOMS:  CONSTITUTIONAL: No fevers or chills.  EYES: No visual changes, eye pain, or discharge  ENT: No vertigo; No ear pain or change in hearing; No sore throat or difficulty swallowing  NECK: No pain or stiffness  RESPIRATORY: Shortness of breath  CARDIOVASCULAR: No chest pain, chest pressure or palpitations  GASTROINTESTINAL: No abdominal or epigastric pain; No nausea, vomiting, or hematemesis; No diarrhea or constipation; No melena or hematochezia  GENITOURINARY: No dysuria, frequency or hematuria  MUSCULOSKELETAL: No joint pain, no muscle pain  NEUROLOGICAL: Weakness  10 point ROS completed; negative except as stated in HPI    VITALS:  T(C): 36.1 (09-29-24 @ 11:29), Max: 36.6 (09-29-24 @ 07:15)  HR: 49 (09-29-24 @ 11:29) (34 - 100)  BP: 108/55 (09-29-24 @ 11:29) (81/52 - 145/70)  RR: 18 (09-29-24 @ 11:29) (17 - 19)  SpO2: 99% (09-29-24 @ 11:29) (95% - 100%)  Wt(kg): --  Daily     Daily     PHYSICAL EXAM:  GEN: Not in acute distress, pleasant  HEENT: EOMI, PERRLA  LUNGS: b/l crackles. R > L.  CARDIOVASCULAR: RRR, S1/S2 present  ABD: Soft, non-tender, non-distended  EXT: 1+ MARYLIN, warm  SKIN: intact, no rash  NEURO: AAOx3, no focal deficits    LABS:	 	                        7.5    3.96  )-----------( 287      ( 29 Sep 2024 06:36 )             23.2     09-29    134[L]  |  106  |  41[H]  ----------------------------<  332[H]  3.8   |  15[L]  |  2.2[H]    Ca    9.2      29 Sep 2024 06:36  Phos  4.5     09-29  Mg     2.0     09-29    TPro  3.5[L]  /  Alb  2.3[L]  /  TBili  0.5  /  DBili  x   /  AST  7   /  ALT  11  /  AlkPhos  152[H]  09-29            Intake and Output:    09-28-24 @ 07:01  -  09-29-24 @ 07:00  --------------------------------------------------------  IN: 0 mL / OUT: 950 mL / NET: -950 mL        CARDIAC MARKERS:  Troponin T, High Sensitivity Result: 20 ng/L (09-29-24 @ 08:56)  Troponin T, High Sensitivity Result: 35 ng/L (09-26-24 @ 23:39)  Troponin T, High Sensitivity Result: 86 ng/L (05-03-24 @ 19:22)  Troponin T, High Sensitivity Result: 89 ng/L (05-03-24 @ 17:26)      TELEMETRY EVENTS:    ECG:    RADIOLOGY:    OTHER:    Echocardiogram:  < from: TTE Echo Complete w/o Contrast w/ Doppler (09.10.23 @ 09:08) >   1. Normal globalleft ventricular systolic function with a biplane EF of   67%. Mild (Grade I) diastolic dysfunction. No regional wall motion   abnormalities.   2. Normal right ventricular size and function.   3. Mildly enlarged left atrium.   4. Mildly enlarged right atrium.   5. No hemodynamically significant valvular disease.   6. Small localized posterior pericardial effusion without   echocardiographic evidence of tamponade physiology.   7. Compared to prior study, effusion size is grossly unchanged.        < end of copied text >    Catheterization:    Stress Test: 	    Home Medications:  benztropine 1 mg oral tablet: 1 tab(s) orally once a day (at bedtime) (03 May 2024 23:36)  benztropine 2 mg oral tablet: 1 tab(s) orally once a day (15 Nov 2023 06:52)  Prolixin 10 mg oral tablet: 1 tab(s) orally once a day (15 Nov 2023 06:52)  Prolixin 5 mg oral tablet: 1 tab(s) orally once a day (at bedtime) (03 May 2024 23:36)    MEDICATIONS  (STANDING):  benztropine 1 milliGRAM(s) Oral at bedtime  benztropine 2 milliGRAM(s) Oral daily  doxycycline IVPB 100 milliGRAM(s) IV Intermittent every 12 hours  fluPHENAZine 10 milliGRAM(s) Oral daily  heparin   Injectable 5000 Unit(s) SubCutaneous every 12 hours  methylPREDNISolone sodium succinate Injectable 40 milliGRAM(s) IV Push two times a day  norepinephrine Infusion 0.05 MICROgram(s)/kG/Min (8.5 mL/Hr) IV Continuous <Continuous>  pantoprazole  Injectable 40 milliGRAM(s) IV Push two times a day  piperacillin/tazobactam IVPB.. 3.375 Gram(s) IV Intermittent every 8 hours  polyethylene glycol 3350 17 Gram(s) Oral daily  senna 1 Tablet(s) Oral two times a day  sodium bicarbonate 1300 milliGRAM(s) Oral three times a day  sodium chloride 0.9%. 1000 milliLiter(s) (100 mL/Hr) IV Continuous <Continuous>    MEDICATIONS  (PRN):  acetaminophen     Tablet .. 650 milliGRAM(s) Oral every 6 hours PRN Temp greater or equal to 38C (100.4F), Mild Pain (1 - 3)  aluminum hydroxide/magnesium hydroxide/simethicone Suspension 30 milliLiter(s) Oral every 4 hours PRN Dyspepsia  melatonin 3 milliGRAM(s) Oral at bedtime PRN Insomnia  ondansetron Injectable 4 milliGRAM(s) IV Push every 8 hours PRN Nausea and/or Vomiting

## 2024-09-29 NOTE — PROGRESS NOTE ADULT - SUBJECTIVE AND OBJECTIVE BOX
Over Night Events: events noted, on NC, levophed 0.06, ESR, CRP reviewed, afebrile    PHYSICAL EXAM    ICU Vital Signs Last 24 Hrs  T(C): 36.4 (29 Sep 2024 05:50), Max: 36.4 (29 Sep 2024 05:50)  T(F): 97.6 (29 Sep 2024 05:50), Max: 97.6 (29 Sep 2024 05:50)  HR: 60 (29 Sep 2024 05:50) (34 - 100)  BP: 105/68 (29 Sep 2024 05:50) (80/47 - 122/66)  BP(mean): 66 (28 Sep 2024 23:19) (59 - 88)  RR: 18 (29 Sep 2024 05:50) (18 - 19)  SpO2: 100% (29 Sep 2024 05:50) (95% - 100%)    O2 Parameters below as of 29 Sep 2024 05:50  Patient On (Oxygen Delivery Method): nasal cannula  O2 Flow (L/min): 2          General: ill looking  Lungs: Bilateral rhonchi  Cardiovascular: JEFF 2.6  Abdomen: Soft, Positive BS  Extremities: No clubbing   Skin: Warm  Neurological: Non focal       09-28-24 @ 07:01  -  09-29-24 @ 06:17  --------------------------------------------------------  IN:  Total IN: 0 mL    OUT:    Voided (mL): 950 mL  Total OUT: 950 mL    Total NET: -950 mL          LABS:                          8.3    2.62  )-----------( 205      ( 28 Sep 2024 06:20 )             26.2                                               09-28    134[L]  |  105  |  47[H]  ----------------------------<  184[H]  4.9   |  18  |  2.5[H]    Ca    11.2[H]      28 Sep 2024 16:57  Phos  3.9     09-28  Mg     2.5     09-28    TPro  4.0[L]  /  Alb  2.6[L]  /  TBili  0.9  /  DBili  x   /  AST  13  /  ALT  17  /  AlkPhos  209[H]  09-28                                             Urinalysis Basic - ( 28 Sep 2024 16:57 )    Color: x / Appearance: x / SG: x / pH: x  Gluc: 184 mg/dL / Ketone: x  / Bili: x / Urobili: x   Blood: x / Protein: x / Nitrite: x   Leuk Esterase: x / RBC: x / WBC x   Sq Epi: x / Non Sq Epi: x / Bacteria: x                                                  LIVER FUNCTIONS - ( 28 Sep 2024 06:20 )  Alb: 2.6 g/dL / Pro: 4.0 g/dL / ALK PHOS: 209 U/L / ALT: 17 U/L / AST: 13 U/L / GGT: x                                                  Culture - Blood (collected 27 Sep 2024 00:15)  Source: .Blood BLOOD  Preliminary Report (28 Sep 2024 09:01):    No growth at 24 hours    Culture - Blood (collected 27 Sep 2024 00:15)  Source: .Blood BLOOD  Preliminary Report (28 Sep 2024 09:01):    No growth at 24 hours                                                                                           MEDICATIONS  (STANDING):  benztropine 1 milliGRAM(s) Oral at bedtime  benztropine 2 milliGRAM(s) Oral daily  doxycycline IVPB 100 milliGRAM(s) IV Intermittent every 12 hours  fluPHENAZine 10 milliGRAM(s) Oral daily  fluPHENAZine 5 milliGRAM(s) Oral at bedtime  heparin   Injectable 5000 Unit(s) SubCutaneous every 12 hours  methylPREDNISolone sodium succinate Injectable 40 milliGRAM(s) IV Push two times a day  norepinephrine Infusion 0.05 MICROgram(s)/kG/Min (8.5 mL/Hr) IV Continuous <Continuous>  pantoprazole  Injectable 40 milliGRAM(s) IV Push two times a day  piperacillin/tazobactam IVPB.. 3.375 Gram(s) IV Intermittent every 8 hours  polyethylene glycol 3350 17 Gram(s) Oral daily  senna 1 Tablet(s) Oral two times a day  sodium bicarbonate 1300 milliGRAM(s) Oral three times a day  sodium chloride 0.9%. 1000 milliLiter(s) (100 mL/Hr) IV Continuous <Continuous>    MEDICATIONS  (PRN):  acetaminophen     Tablet .. 650 milliGRAM(s) Oral every 6 hours PRN Temp greater or equal to 38C (100.4F), Mild Pain (1 - 3)  aluminum hydroxide/magnesium hydroxide/simethicone Suspension 30 milliLiter(s) Oral every 4 hours PRN Dyspepsia  melatonin 3 milliGRAM(s) Oral at bedtime PRN Insomnia  ondansetron Injectable 4 milliGRAM(s) IV Push every 8 hours PRN Nausea and/or Vomiting

## 2024-09-29 NOTE — RAPID RESPONSE TEAM SUMMARY - NSOTHERINTERVENTIONSRRT_GEN_ALL_CORE
obtain echo and cardio consult as new onset bradycardia.  obtain TSH, free t4, cortisol, and troponin

## 2024-09-29 NOTE — PROGRESS NOTE ADULT - ASSESSMENT
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 cough is productive of yellow sputum and has been going on for 3 months with 10 lb weight loss over the last 3 months ,  he also states weakness started 2 wks ago, getting worse, today fell while trying to ambulate due to weakness. No head trauma. No LOC. No CP. Reports SOB on exertion. No abdominal pain. No n/v/c/d. Last chemo > 2 mo ago. No fever/chills. Of note he took abx course for PNA prescribed by his PMD     1. Fatigue, cough and SOB secondary to Sepsis (POA) due to  Multifocal PNA associated with Small likely parapneumonic rt pleural effusion   *  patient presented with weakness and cough for 2 weeks, had finished abx course by his PMD for pneumonia  *  Sepsis POA > SIRS+ with source : T 100.7 ,  , and pneumonia  - Admit to medicine              - CTH:WNL             - LA: 0.9                        - ESR:pending              - CRP:Elevated             -Fungitel:pending  - patient BP was soft , MAP < 65 started on small dose levophed  that was weaned off   - CURB 65 = 2   - Cont Zosyn.  - Cont doxycycline  -  received a dose of vancomycin in the ED   - continueSolumedrol 40mg IV q12hr (organizing pneumonia?)   - Blood cx:NTD              - MRSA:Neg            - Sputum cx:GN and GP cocci             - RVP: neg  - Cont IVF  - CT angio chest:  a) Multilobar consolidative opacities in bilateral lungs, consistent with   multifocal pneumonia.  b) Small right pleural effusion and trace left pleural effusion.  c) Small pericardial effusion.  d) Multistation lymphadenopathy though a component could be reactive given   the extensive lung consolidations, suspect also related to patient's   known history of lymphoma. Again noted is splenomegaly.  - ID eval appreciated   - wean off pressor, continue SDU , dw CC    2. Possible acute blood loss anemia due to upper gi bleeding s/p 1 PRBC   * patient baseline Hb 9-10 , Hb today 7.8 , MCV 88 , RDW 16.6%   - s/p 1 unit PRBC's in ED  - no evidence of bleeding , ED called GI who said to keep him NPO for scope today  - c/w PPI IV BID - although anemia could be secondary to underlying malignancy   - WBC 2.4, has been chronically on lower side since 9/2023 , could be related to BM involved lymphoma ?   - keep active T&S  - resume diet after scope and check Hb to see response to transfusion   - GI consult appreciated  a) outpatient upper and lower endo  * Colonoscopy 2023- Cecal polyp, EMR: Tubular adenoma fragments showing foci of high-grade dysplasia and focal  intramucosal adenocarcinoma.  Plan was for 6month follow up       3. Moderate symptomatic Hypercalcemia likely secondary to malignancy - has constipation and confusion . hx of Marginal zone lymphoma. hx of Pancreatic neuroendocrine tumor   - on Monthly somatostatin injections - taken 9/16  *  patient CT scan with Multistation lymphadenopathy though a component could be reactive given the extensive lung consolidations, suspect also related to patient's known history of lymphoma. Again noted is splenomegaly.  - correct calcium level still high   - PTH:low                - PTHrP:pending                - Vit D:low               - MM screening:pending              - Vit A:pending   - Chromogranin:pending               - LDH:nl   - Cont IVF 100ml/hr. Give one dose of lasix 60mg IV*1   *  patient supposed to do PET scan OP to check status of his lymphoma but has not yet  - Heme-Onc eval appreciated     4. Small radha-cardial effusion - chronic  - stable small pericardial effusion seen on CT scan , was also reported on multiple previous ECHO;s  - Monitor for now     5. CKD 4 - around baseline . hx of NSTEMI type II in the setting of CKD   - hx of nephrolithiasis c/b atrophic L kidney, R ureteral rivision, CKD4 (bsl Cr ~2.5)   - s/p IV contrast on admission   - continue IVF  - continue sodium bicarb 1300mg TID   - Trops elevated likely secondary to CKD , EKG NSR       6. Elevated ALP- chronic   - possibly due to hepatic involvement of lymphoma/ bone involvement     7. Schizophrenia  - continue home Prolixin and cogentin    8. Sinus bradycardia  - RR this am, pt asymptomatic   - given atropine, atropine prn 30-40s or symptomatic   - Cardio   - pt is on fluphenazine 10 mg daily and 5 mg in evening, pt should only be on fluphenazine 10 mg daily, dced 5 mg in pm   - avoid av node blockers     - hold fluphenazine in am for 24 hrs until Ramesh improves, can uptritrate levophed if MAP <65    9. hypona- started IVF- follow up Na     10. Schitzohrenia-  consult, continue cogentin and fluphenazine 10 mg     #Progress Note Handoff  Pending (specify):  follow up Na, ca, Sepsis  Disposition: dw cc, sdu

## 2024-09-29 NOTE — CONSULT NOTE ADULT - ASSESSMENT
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   Cardiology consulted for bradycardia.     TTE 9/10/23: EF 67%. GIDD. Small localized posterior pericardial effusion without echocardiographic evidence of tamponade physiology.    IMPRESSION  #Sinus bradycardia  - EKG: Sinus bradycardia. No acute ST changes   - Patient reported being asymptomatic during the episode   #PNA with small parapneumonic effusion  #Hypercalcemia in the setting of malignancy; Marginal Zone Lymphoma, Pancreatic neuroendocrine tumor   #CHD; baseline Cr ~ 2  - HS Troponin trend: 35    (26 Sep 2024 23:39), 20    (29 Sep 2024 08:56)  #Normocytic Anemia    PLAN  - Check TSH  - Avoid AV chemo blocking agents.  - Continue to monitor on tele  - Monitor electrolytes. Keep K > 4 and Mg > 2.2.      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   Cardiology consulted for bradycardia.     TTE 9/10/23: EF 67%. GIDD. Small localized posterior pericardial effusion without echocardiographic evidence of tamponade physiology.    IMPRESSION  #Sinus bradycardia  - EKG: Sinus bradycardia. No acute ST changes   - Patient reported being asymptomatic during the episode   - Limited ambulation at baseline due to underlying malignancy and multiple hospitalizations   #PNA with small parapneumonic effusion  #Hypercalcemia in the setting of malignancy; Marginal Zone Lymphoma, Pancreatic neuroendocrine tumor   #CHD; baseline Cr ~ 2  - HS Troponin trend: 35    (26 Sep 2024 23:39), 20    (29 Sep 2024 08:56)  #Normocytic Anemia    PLAN  - Check TSH  - Avoid AV chemo blocking agents.  - Continue to monitor on tele  - Monitor electrolytes. Keep K > 4 and Mg > 2.2.      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   Cardiology consulted for bradycardia.     TTE 9/10/23: EF 67%. GIDD. Small localized posterior pericardial effusion without echocardiographic evidence of tamponade physiology.    IMPRESSION  #Sinus bradycardia  - EKG: Sinus bradycardia. No acute ST changes   - Patient reported being asymptomatic during the episode   - Limited ambulation at baseline due to underlying malignancy and multiple hospitalizations   #PNA with small parapneumonic effusion  #Hypercalcemia in the setting of malignancy; Marginal Zone Lymphoma, Pancreatic neuroendocrine tumor   #CHD; baseline Cr ~ 2  - HS Troponin trend: 35    (26 Sep 2024 23:39), 20    (29 Sep 2024 08:56)  #Normocytic Anemia    PLAN  - Check TSH  - Avoid AV chemo blocking agents.  - Continue to monitor on tele  - Monitor electrolytes. Keep K > 4 and Mg > 2.2.

## 2024-09-29 NOTE — PROGRESS NOTE ADULT - SUBJECTIVE AND OBJECTIVE BOX
Patient is a 68y old  Male who presents with a chief complaint of multifocal pna (09-29-24)      Pt seen and examined at bedside. No CP or SOB. RR this am. Sinus bradycardia           PAST MEDICAL & SURGICAL HISTORY:  Kidney stones    Schizophrenia    Lymphoma    Shingles    Atrophic kidney    H/O colonoscopy with polypectomy    Liver cyst    History of bladder surgery    H/O neuropathy    History of chemotherapy    Stage 3 chronic kidney disease    Neuroendocrine malignancy    Neuroendocrine tumor status post surgical treatment    Neuroendocrine cancer    Retained urethral stent    H/O umbilical hernia repair    Elbow fracture    H/O cystoscopy        VITAL SIGNS (Last 24 hrs):  T(C): 36.1 (09-29-24 @ 11:29), Max: 36.6 (09-29-24 @ 07:15)  HR: 49 (09-29-24 @ 11:29) (34 - 80)  BP: 108/55 (09-29-24 @ 11:29) (81/52 - 145/70)  RR: 18 (09-29-24 @ 11:29) (17 - 19)  SpO2: 99% (09-29-24 @ 11:29) (95% - 100%)  Wt(kg): --  Daily     Daily     I&O's Summary    28 Sep 2024 07:01  -  29 Sep 2024 07:00  --------------------------------------------------------  IN: 0 mL / OUT: 950 mL / NET: -950 mL        PHYSICAL EXAM:  GENERAL: NAD, well-developed  HEAD:  Atraumatic, Normocephalic  EYES: EOMI, PERRLA, conjunctiva and sclera clear  NECK: Supple, No JVD  CHEST/LUNG: rales vs ronchi  HEART: Regular rate and rhythm; No murmurs, rubs, or gallops  ABDOMEN: Soft, Nontender, Nondistended; Bowel sounds present  EXTREMITIES:  2+ Peripheral Pulses, No clubbing, cyanosis, or edema  PSYCH: Alert and awake       Labs Reviewed  Spoke to patient in regards to abnormal labs.    CBC Full  -  ( 29 Sep 2024 06:36 )  WBC Count : 3.96 K/uL  Hemoglobin : 7.5 g/dL  Hematocrit : 23.2 %  Platelet Count - Automated : 287 K/uL  Mean Cell Volume : 85.9 fL  Mean Cell Hemoglobin : 27.8 pg  Mean Cell Hemoglobin Concentration : 32.3 g/dL  Auto Neutrophil # : 2.75 K/uL  Auto Lymphocyte # : 0.23 K/uL  Auto Monocyte # : 0.52 K/uL  Auto Eosinophil # : 0.00 K/uL  Auto Basophil # : 0.01 K/uL  Auto Neutrophil % : 69.4 %  Auto Lymphocyte % : 5.8 %  Auto Monocyte % : 13.1 %  Auto Eosinophil % : 0.0 %  Auto Basophil % : 0.3 %    BMP:    09-29 @ 06:36    Blood Urea Nitrogen - 41  Calcium - 9.2  Carbond Dioxide - 15  Chloride - 106  Creatinine - 2.2  Glucose - 332  Potassium - 3.8  Sodium - 134      Hemoglobin A1c -     Urine Culture:  09-28 @ 23:28 Urine culture: --    Culture Results: --  Method Type: --  Organism: --  Organism Identification: --  Specimen Source: .Sputum Sputum  09-27 @ 10:00 Urine culture: --    Culture Results:   No growth  Method Type: --  Organism: --  Organism Identification: --  Specimen Source: Clean Catch Clean Catch (Midstream)  09-27 @ 00:15 Urine culture: --    Culture Results:   No growth at 48 Hours  Method Type: --  Organism: --  Organism Identification: --  Specimen Source: .Blood BLOOD    COVID Labs  CRP:  119.1 (09-28-24)    Procalcitonin: 2.29 ng/mL (09-27-24 @ 11:56)    D-Dimer:  715 ng/mL DDU (09-27-24 @ 11:56)      Imaging reviewed independently and reviewed read  < from: CT Head No Cont (09.27.24 @ 10:23) >  IMPRESSION:  No acute intracranial pathology. No evidence of midline shift, mass   effect or intracranial hemorrhage.  < end of copied text >    < from: CT Angio Chest PE Protocol w/ IV Cont (09.27.24 @ 00:54) >  IMPRESSION:  No pulmonary embolism.  Multilobar consolidative opacities in bilateral lungs, consistent with  multifocal pneumonia.  Small right pleural effusion and trace left pleural effusion.  Small pericardial effusion.    Multistation lymphadenopathy though a component could be reactive given   the extensive lung consolidations, suspect also related to patient's   known history of lymphoma. Again noted is splenomegaly.  < end of copied text >    < from: Xray Chest 1 View- PORTABLE-Urgent (09.27.24 @ 00:13) >  Impression:  Large bilateral opacities. See subsequently performed chest CT and   recommend close follow-up.   < end of copied text >    MEDICATIONS  (STANDING):  benztropine 1 milliGRAM(s) Oral at bedtime  benztropine 2 milliGRAM(s) Oral daily  doxycycline IVPB 100 milliGRAM(s) IV Intermittent every 12 hours  fluPHENAZine 10 milliGRAM(s) Oral daily  heparin   Injectable 5000 Unit(s) SubCutaneous every 12 hours  methylPREDNISolone sodium succinate Injectable 40 milliGRAM(s) IV Push two times a day  norepinephrine Infusion 0.05 MICROgram(s)/kG/Min (8.5 mL/Hr) IV Continuous <Continuous>  pantoprazole  Injectable 40 milliGRAM(s) IV Push two times a day  piperacillin/tazobactam IVPB.. 3.375 Gram(s) IV Intermittent every 8 hours  polyethylene glycol 3350 17 Gram(s) Oral daily  senna 1 Tablet(s) Oral two times a day  sodium bicarbonate 1300 milliGRAM(s) Oral three times a day  sodium chloride 0.9%. 1000 milliLiter(s) (100 mL/Hr) IV Continuous <Continuous>    MEDICATIONS  (PRN):  acetaminophen     Tablet .. 650 milliGRAM(s) Oral every 6 hours PRN Temp greater or equal to 38C (100.4F), Mild Pain (1 - 3)  aluminum hydroxide/magnesium hydroxide/simethicone Suspension 30 milliLiter(s) Oral every 4 hours PRN Dyspepsia  melatonin 3 milliGRAM(s) Oral at bedtime PRN Insomnia  ondansetron Injectable 4 milliGRAM(s) IV Push every 8 hours PRN Nausea and/or Vomiting

## 2024-09-29 NOTE — PROGRESS NOTE ADULT - SUBJECTIVE AND OBJECTIVE BOX
SUBJECTIVE/OVERNIGHT EVENTS  Today is hospital day 2d. This morning patient was seen and examined at bedside, resting comfortably in bed. No acute or major events overnight.    MEDICATIONS  STANDING MEDICATIONS  benztropine 1 milliGRAM(s) Oral at bedtime  benztropine 2 milliGRAM(s) Oral daily  doxycycline IVPB 100 milliGRAM(s) IV Intermittent every 12 hours  heparin   Injectable 5000 Unit(s) SubCutaneous every 12 hours  methylPREDNISolone sodium succinate Injectable 40 milliGRAM(s) IV Push two times a day  norepinephrine Infusion 0.06 MICROgram(s)/kG/Min IV Continuous <Continuous>  pantoprazole  Injectable 40 milliGRAM(s) IV Push two times a day  piperacillin/tazobactam IVPB.. 3.375 Gram(s) IV Intermittent every 8 hours  polyethylene glycol 3350 17 Gram(s) Oral daily  senna 1 Tablet(s) Oral two times a day  sodium bicarbonate 1300 milliGRAM(s) Oral three times a day  sodium chloride 0.9%. 1000 milliLiter(s) IV Continuous <Continuous>    PRN MEDICATIONS  acetaminophen     Tablet .. 650 milliGRAM(s) Oral every 6 hours PRN  aluminum hydroxide/magnesium hydroxide/simethicone Suspension 30 milliLiter(s) Oral every 4 hours PRN  melatonin 3 milliGRAM(s) Oral at bedtime PRN  ondansetron Injectable 4 milliGRAM(s) IV Push every 8 hours PRN    VITALS  T(F): 97 (09-29-24 @ 13:58), Max: 97.9 (09-29-24 @ 07:15)  HR: 46 (09-29-24 @ 18:45) (34 - 82)  BP: 101/55 (09-29-24 @ 18:45) (81/52 - 160/77)  RR: 18 (09-29-24 @ 14:53) (17 - 18)  SpO2: 99% (09-29-24 @ 14:53) (86% - 100%)  POCT Blood Glucose.: 157 mg/dL (09-29-24 @ 08:07)    PHYSICAL EXAM  GENERAL  ( x) NAD, lying in bed comfortably     (  ) obtunded     (  ) lethargic     (  ) somnolent    HEAD  (  ) Atraumatic     (  ) hematoma     (  ) laceration (specify location:       )     NECK  (  ) Supple     (  ) neck stiffness     (  ) nuchal rigidity     (  )  no JVD     (  ) JVD present ( -- cm)    HEART  Rate -->  (  ) normal rate    (  ) bradycardic    (  ) tachycardic  Rhythm -->  (  ) regular    (  ) regularly irregular    (  ) irregularly irregular  Murmurs -->  (  ) normal s1/s2    (  ) systolic murmur    (  ) diastolic murmur    (  ) continuous murmur     (  ) S3 present    (  ) S4 present    LUNGS  (  )Unlabored respirations     (  ) tachypnea  (  ) B/L air entry     (  ) decreased breath sounds in:  (location     )    (  ) no adventitious sound     (  ) crackles     (  ) wheezing      (  ) rhonchi      (specify location:       )  (  ) chest wall tenderness (specify location:       )    ABDOMEN  (  ) Soft     (  ) tense   |   (  ) nondistended     (  ) distended   |   (  ) +BS     (  ) hypoactive bowel sounds     (  ) hyperactive bowel sounds  (  ) nontender     (  ) RUQ tenderness     (  ) RLQ tenderness     (  ) LLQ tenderness     (  ) epigastric tenderness     (  ) diffuse tenderness  (  ) Splenomegaly      (  ) Hepatomegaly      (  ) Jaundice     (  ) ecchymosis     EXTREMITIES  (  ) Normal     (  ) Rash     (  ) ecchymosis     (  ) varicose veins      (  ) pitting edema     (  ) non-pitting edema   (  ) ulceration     (  ) gangrene:     (location:     )    NERVOUS SYSTEM  (  ) A&Ox3     (  ) confused     (  ) lethargic  CN II-XII:     (  ) Intact     (  ) focal deficits  (Specify:     )   Upper extremities:     (  ) strength X/5     (  ) focal deficit (specify:    )  Lower extremities:     (  ) strength  X/5    (  ) focal deficit (specify:    )    SKIN  (  ) No rashes or lesions     (  ) maculopapular rash     (  ) pustules     (  ) vesicles     (  ) ulcer     (  ) ecchymosis     (specify location:     )    (  ) Indwelling Park Catheter   Date insterted:    Reason (  ) Critical illness     (  ) urinary retention    (  ) Accurate Ins/Outs Monitoring     (  ) CMO patient    (  ) Central Line  Date inserted:  Location: (  ) Right IJ   (  ) Left IJ   (  ) Right Fem   (  ) Left Fem    (  ) SPC  (  ) pigtail  (  ) PEG tube  (  ) colostomy  (  ) jejunostomy  (  ) U-Dall    LABS             7.5    3.96  )-----------( 287      ( 09-29-24 @ 06:36 )             23.2     134  |  106  |  41  -------------------------<  332   09-29-24 @ 06:36  3.8  |  15  |  2.2    Ca      9.2     09-29-24 @ 06:36  Phos   4.5     09-29-24 @ 06:36  Mg     2.0     09-29-24 @ 06:36    TPro  3.5  /  Alb  2.3  /  TBili  0.5  /  DBili  x   /  AST  7   /  ALT  11  /  AlkPhos  152  /  GGT  x     09-29-24 @ 06:36      Troponin T, High Sensitivity Result: 20 ng/L (09-29-24 @ 08:56)  Troponin T, High Sensitivity Result: 35 ng/L (09-26-24 @ 23:39)    Urinalysis Basic - ( 29 Sep 2024 06:36 )    Color: x / Appearance: x / SG: x / pH: x  Gluc: 332 mg/dL / Ketone: x  / Bili: x / Urobili: x   Blood: x / Protein: x / Nitrite: x   Leuk Esterase: x / RBC: x / WBC x   Sq Epi: x / Non Sq Epi: x / Bacteria: x          Culture - Sputum (collected 28 Sep 2024 23:28)  Source: .Sputum Sputum  Gram Stain (29 Sep 2024 08:24):    Moderate polymorphonuclear leukocytes seen per low power field    Moderate Squamous epithelial cells seen per low power field    Moderate Gram Negative Rods seen per oil power field    Rare Gram positive cocci in pairs seen per oil power field    Culture - Urine (collected 27 Sep 2024 10:00)  Source: Clean Catch Clean Catch (Midstream)  Final Report (29 Sep 2024 07:34):    No growth    Culture - Blood (collected 27 Sep 2024 00:15)  Source: .Blood BLOOD  Preliminary Report (29 Sep 2024 09:01):    No growth at 48 Hours    Culture - Blood (collected 27 Sep 2024 00:15)  Source: .Blood BLOOD  Preliminary Report (29 Sep 2024 09:01):    No growth at 48 Hours

## 2024-09-29 NOTE — CONSULT NOTE ADULT - ATTENDING COMMENTS
Briefly, 68 year old man for whom Cardiology is consulted for bradycardia.     VS, PE as above.    Telemetry, labs, imaging personally reviewed.     A/P: Patient was asymptomatic during the episode. He is currently sinus shamika in the 50s. So far on telemetry, there is no AV block. Would continue telemetry monitoring. Avoid AV chemo blocking agents. Keep atropine at bedside. If signs of AV block on tele, please call Cardiology.
Patient also seen by myself. I agree with the Hem-Onc fellow's note above. Situation discussed with the fellow and the patient.  All questions answered.

## 2024-09-29 NOTE — PROGRESS NOTE ADULT - ASSESSMENT
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 on monthly somatostatin injections( follows Dr Gong )  left elbow fracture s/p ORIF @[facility] ~30 years ago, comes in c/o weakness/SOB and cough. cough is productive of yellow sputum and has been going on for 3 months with 10 lb weight loss over the last 3 months ,  he also states weakness started 2 wks ago, getting worse, today fell while trying to ambulate due to weakness. No head trauma. No LOC. No CP. Reports SOB on exertion. No abdominal pain. No n/v/c/d. Last chemo > 2 mo ago. No fever/chills. Of note he took abx course for PNA prescribed by his PMD     1. Fatigue, cough and SOB secondary to Sepsis (POA) due to  Multifocal PNA associated with Small likely parapneumonic rt pleural effusion   *  patient presented with weakness and cough for 2 weeks, had finished abx course by his PMD for pneumonia  *  Sepsis POA > SIRS+ with source : T 100.7 ,  , and pneumonia  - Admit to medicine              - CTH:WNL             - LA: 0.9                        - ESR:pending              - CRP:Elevated             -Fungitel:pending  - patient BP was soft , MAP < 65 started on small dose levophed  that was weaned off   - CURB 65 = 2   - Cont Zosyn.  - Cont doxycycline  -  received a dose of vancomycin in the ED   - continueSolumedrol 40mg IV q12hr (organizing pneumonia?)   - Blood cx:NTD              - MRSA:Neg            - Sputum cx:GN and GP cocci             - RVP: neg  - Cont IVF  - CT angio chest:  a) Multilobar consolidative opacities in bilateral lungs, consistent with   multifocal pneumonia.  b) Small right pleural effusion and trace left pleural effusion.  c) Small pericardial effusion.  d) Multistation lymphadenopathy though a component could be reactive given   the extensive lung consolidations, suspect also related to patient's   known history of lymphoma. Again noted is splenomegaly.  - ID eval appreciated   - wean off pressor, continue SDU , dw CC    2. Possible acute blood loss anemia due to upper gi bleeding s/p 1 PRBC   * patient baseline Hb 9-10 , Hb today 7.8 , MCV 88 , RDW 16.6%   - s/p 1 unit PRBC's in ED  - no evidence of bleeding , ED called GI who said to keep him NPO for scope today  - c/w PPI IV BID - although anemia could be secondary to underlying malignancy   - WBC 2.4, has been chronically on lower side since 9/2023 , could be related to BM involved lymphoma ?   - keep active T&S  - resume diet after scope and check Hb to see response to transfusion   - GI consult appreciated  a) outpatient upper and lower endo  * Colonoscopy 2023- Cecal polyp, EMR: Tubular adenoma fragments showing foci of high-grade dysplasia and focal  intramucosal adenocarcinoma.  Plan was for 6month follow up       3. Moderate symptomatic Hypercalcemia likely secondary to malignancy - has constipation and confusion . hx of Marginal zone lymphoma. hx of Pancreatic neuroendocrine tumor   - on Monthly somatostatin injections - taken 9/16  *  patient CT scan with Multistation lymphadenopathy though a component could be reactive given the extensive lung consolidations, suspect also related to patient's known history of lymphoma. Again noted is splenomegaly.  - correct calcium level still high   - PTH:low                - PTHrP:pending                - Vit D:low               - MM screening:pending              - Vit A:pending   - Chromogranin:pending               - LDH:nl   - Cont IVF 100ml/hr. Give one dose of lasix 60mg IV*1   *  patient supposed to do PET scan OP to check status of his lymphoma but has not yet  - Heme-Onc eval appreciated     4. Small radha-cardial effusion - chronic  - stable small pericardial effusion seen on CT scan , was also reported on multiple previous ECHO;s  - Monitor for now     5. CKD 4 - around baseline . hx of NSTEMI type II in the setting of CKD   - hx of nephrolithiasis c/b atrophic L kidney, R ureteral rivision, CKD4 (bsl Cr ~2.5)   - s/p IV contrast on admission   - continue IVF  - continue sodium bicarb 1300mg TID   - Trops elevated likely secondary to CKD , EKG NSR       6. Elevated ALP- chronic   - possibly due to hepatic involvement of lymphoma/ bone involvement     7. Schizophrenia  - continue home Prolixin and cogentin    8. Sinus bradycardia  - RR this am, pt asymptomatic   - given atropine, atropine prn   - Cardio   - pt is on fluphenazine 10 mg daily and 5 mg in evening, pt should only be on fluphenazine 10 mg daily, dced 5 mg in pm   - avoid av node blockers       9. hypona- started IVF- follow up Na     10. Schitzohrenia- BH consult, continue cogentin and fluphenazine 10 mg     #Progress Note Handoff  Pending (specify):  follow up Na, ca, Sepsis  Family discussion: house staff updated pt family  Disposition: dw cc, sdu   Decision to admit the pt is based on acuity as above      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 on monthly somatostatin injections( follows Dr Gong )  left elbow fracture s/p ORIF @[facility] ~30 years ago, comes in c/o weakness/SOB and cough. cough is productive of yellow sputum and has been going on for 3 months with 10 lb weight loss over the last 3 months ,  he also states weakness started 2 wks ago, getting worse, today fell while trying to ambulate due to weakness. No head trauma. No LOC. No CP. Reports SOB on exertion. No abdominal pain. No n/v/c/d. Last chemo > 2 mo ago. No fever/chills. Of note he took abx course for PNA prescribed by his PMD     1. Fatigue, cough and SOB secondary to Sepsis (POA) due to  Multifocal PNA associated with Small likely parapneumonic rt pleural effusion   *  patient presented with weakness and cough for 2 weeks, had finished abx course by his PMD for pneumonia  *  Sepsis POA > SIRS+ with source : T 100.7 ,  , and pneumonia  - Admit to medicine              - CTH:WNL             - LA: 0.9                        - ESR:pending              - CRP:Elevated             -Fungitel:pending  - patient BP was soft , MAP < 65 started on small dose levophed  that was weaned off   - CURB 65 = 2   - Cont Zosyn.  - Cont doxycycline  -  received a dose of vancomycin in the ED   - continueSolumedrol 40mg IV q12hr (organizing pneumonia?)   - Blood cx:NTD              - MRSA:Neg            - Sputum cx:GN and GP cocci             - RVP: neg  - Cont IVF  - CT angio chest:  a) Multilobar consolidative opacities in bilateral lungs, consistent with   multifocal pneumonia.  b) Small right pleural effusion and trace left pleural effusion.  c) Small pericardial effusion.  d) Multistation lymphadenopathy though a component could be reactive given   the extensive lung consolidations, suspect also related to patient's   known history of lymphoma. Again noted is splenomegaly.  - ID eval appreciated   - wean off pressor, continue SDU , dw CC    2. Possible acute blood loss anemia due to upper gi bleeding s/p 1 PRBC   * patient baseline Hb 9-10 , Hb today 7.8 , MCV 88 , RDW 16.6%   - s/p 1 unit PRBC's in ED  - no evidence of bleeding , ED called GI who said to keep him NPO for scope today  - c/w PPI IV BID - although anemia could be secondary to underlying malignancy   - WBC 2.4, has been chronically on lower side since 9/2023 , could be related to BM involved lymphoma ?   - keep active T&S  - resume diet after scope and check Hb to see response to transfusion   - GI consult appreciated  a) outpatient upper and lower endo  * Colonoscopy 2023- Cecal polyp, EMR: Tubular adenoma fragments showing foci of high-grade dysplasia and focal  intramucosal adenocarcinoma.  Plan was for 6month follow up       3. Moderate symptomatic Hypercalcemia likely secondary to malignancy - has constipation and confusion . hx of Marginal zone lymphoma. hx of Pancreatic neuroendocrine tumor   - on Monthly somatostatin injections - taken 9/16  *  patient CT scan with Multistation lymphadenopathy though a component could be reactive given the extensive lung consolidations, suspect also related to patient's known history of lymphoma. Again noted is splenomegaly.  - correct calcium level still high   - PTH:low                - PTHrP:pending                - Vit D:low               - MM screening:pending              - Vit A:pending   - Chromogranin:pending               - LDH:nl   - Cont IVF 100ml/hr. Give one dose of lasix 60mg IV*1   *  patient supposed to do PET scan OP to check status of his lymphoma but has not yet  - Heme-Onc eval appreciated     4. Small radha-cardial effusion - chronic  - stable small pericardial effusion seen on CT scan , was also reported on multiple previous ECHO;s  - Monitor for now     5. CKD 4 - around baseline . hx of NSTEMI type II in the setting of CKD   - hx of nephrolithiasis c/b atrophic L kidney, R ureteral rivision, CKD4 (bsl Cr ~2.5)   - s/p IV contrast on admission   - continue IVF  - continue sodium bicarb 1300mg TID   - Trops elevated likely secondary to CKD , EKG NSR       6. Elevated ALP- chronic   - possibly due to hepatic involvement of lymphoma/ bone involvement     7. Schizophrenia  - continue home Prolixin and cogentin    8. Sinus bradycardia  - RR this am, pt asymptomatic   - given atropine, atropine prn 30-40s or symptomatic   - Cardio   - pt is on fluphenazine 10 mg daily and 5 mg in evening, pt should only be on fluphenazine 10 mg daily, dced 5 mg in pm   - avoid av node blockers     - hold fluphenazine in am for 24 hrs until Ramesh improves, can uptritrate levophed if MAP <65    9. hypona- started IVF- follow up Na     10. Schitzohrenia-  consult, continue cogentin and fluphenazine 10 mg     #Progress Note Handoff  Pending (specify):  follow up Na, ca, Sepsis  Family discussion: house staff updated pt family  Disposition: dw cc, sdu   Decision to admit the pt is based on acuity as above

## 2024-09-29 NOTE — PROGRESS NOTE ADULT - ASSESSMENT
IMPRESSION:    Acute hypoxemic resp failure  Multifocal pneumonia/ possible organizing prior CT noted  Possible recurrent aspiration  Anemia  Hypercalcemia low PTH  HO CKD  HO schizophrenia  HO Marginal zone lymphoma and pancreatic neuroendocrine tumor    PLAN:    CNS:  Avoid CNS depressants.     HEENT: Oral care.    PULMONARY:  HOB @ 45 degrees. keep SaO2 92 TO 96%, on NC, solumedrol 40 q 12, Repeat CXR    CARDIOVASCULAR: Avoid fluid overload. IVF continue,  taper and dc levophed    GI: GI prophylaxis.  Feeding per S&S.    RENAL:  Follow up lytes.  Correct as needed. Trend Cr. pO bicarb 1300 Q 8    INFECTIOUS DISEASE: on zosyn Doxy    HEMATOLOGICAL:  DVT prophylaxis    ENDOCRINE:  Follow up FS.  Insulin protocol if needed.    MUSCULOSKELETAL: OOBTC    SDU  GOC    poor prognosis

## 2024-09-30 ENCOUNTER — RESULT REVIEW (OUTPATIENT)
Age: 69
End: 2024-09-30

## 2024-09-30 LAB
ALBUMIN SERPL ELPH-MCNC: 2.7 G/DL — LOW (ref 3.5–5.2)
ALP SERPL-CCNC: 143 U/L — HIGH (ref 30–115)
ALT FLD-CCNC: 11 U/L — SIGNIFICANT CHANGE UP (ref 0–41)
ANION GAP SERPL CALC-SCNC: 13 MMOL/L — SIGNIFICANT CHANGE UP (ref 7–14)
AST SERPL-CCNC: 7 U/L — SIGNIFICANT CHANGE UP (ref 0–41)
BASOPHILS # BLD AUTO: 0.01 K/UL — SIGNIFICANT CHANGE UP (ref 0–0.2)
BASOPHILS NFR BLD AUTO: 0.4 % — SIGNIFICANT CHANGE UP (ref 0–1)
BILIRUB SERPL-MCNC: 0.4 MG/DL — SIGNIFICANT CHANGE UP (ref 0.2–1.2)
BUN SERPL-MCNC: 54 MG/DL — HIGH (ref 10–20)
CALCIUM SERPL-MCNC: 10.4 MG/DL — SIGNIFICANT CHANGE UP (ref 8.4–10.5)
CGA FLD-MCNC: 1205 NG/ML — HIGH (ref 0–101.8)
CHLORIDE SERPL-SCNC: 108 MMOL/L — SIGNIFICANT CHANGE UP (ref 98–110)
CO2 SERPL-SCNC: 16 MMOL/L — LOW (ref 17–32)
CREAT SERPL-MCNC: 2.4 MG/DL — HIGH (ref 0.7–1.5)
CULTURE RESULTS: ABNORMAL
EGFR: 29 ML/MIN/1.73M2 — LOW
EOSINOPHIL # BLD AUTO: 0 K/UL — SIGNIFICANT CHANGE UP (ref 0–0.7)
EOSINOPHIL NFR BLD AUTO: 0 % — SIGNIFICANT CHANGE UP (ref 0–8)
FUNGITELL: <31 PG/ML — SIGNIFICANT CHANGE UP
GLUCOSE SERPL-MCNC: 131 MG/DL — HIGH (ref 70–99)
HCT VFR BLD CALC: 23.9 % — LOW (ref 42–52)
HGB BLD-MCNC: 7.5 G/DL — LOW (ref 14–18)
IMM GRANULOCYTES NFR BLD AUTO: 11.8 % — HIGH (ref 0.1–0.3)
LYMPHOCYTES # BLD AUTO: 0.25 K/UL — LOW (ref 1.2–3.4)
LYMPHOCYTES # BLD AUTO: 9.5 % — LOW (ref 20.5–51.1)
MAGNESIUM SERPL-MCNC: 2.3 MG/DL — SIGNIFICANT CHANGE UP (ref 1.8–2.4)
MCHC RBC-ENTMCNC: 27.6 PG — SIGNIFICANT CHANGE UP (ref 27–31)
MCHC RBC-ENTMCNC: 31.4 G/DL — LOW (ref 32–37)
MCV RBC AUTO: 87.9 FL — SIGNIFICANT CHANGE UP (ref 80–94)
MONOCYTES # BLD AUTO: 0.49 K/UL — SIGNIFICANT CHANGE UP (ref 0.1–0.6)
MONOCYTES NFR BLD AUTO: 18.7 % — HIGH (ref 1.7–9.3)
NEUTROPHILS # BLD AUTO: 1.56 K/UL — SIGNIFICANT CHANGE UP (ref 1.4–6.5)
NEUTROPHILS NFR BLD AUTO: 59.6 % — SIGNIFICANT CHANGE UP (ref 42.2–75.2)
NRBC # BLD: 0 /100 WBCS — SIGNIFICANT CHANGE UP (ref 0–0)
PHOSPHATE SERPL-MCNC: 5.6 MG/DL — HIGH (ref 2.1–4.9)
PLATELET # BLD AUTO: 239 K/UL — SIGNIFICANT CHANGE UP (ref 130–400)
PMV BLD: 10.7 FL — HIGH (ref 7.4–10.4)
POTASSIUM SERPL-MCNC: 4.8 MMOL/L — SIGNIFICANT CHANGE UP (ref 3.5–5)
POTASSIUM SERPL-SCNC: 4.8 MMOL/L — SIGNIFICANT CHANGE UP (ref 3.5–5)
PROT SERPL-MCNC: 3.8 G/DL — LOW (ref 6–8)
RBC # BLD: 2.72 M/UL — LOW (ref 4.7–6.1)
RBC # FLD: 16.4 % — HIGH (ref 11.5–14.5)
SODIUM SERPL-SCNC: 137 MMOL/L — SIGNIFICANT CHANGE UP (ref 135–146)
SPECIMEN SOURCE: SIGNIFICANT CHANGE UP
T4 AB SER-ACNC: 5.6 UG/DL — SIGNIFICANT CHANGE UP (ref 4.6–12)
TSH SERPL-MCNC: 0.62 UIU/ML — SIGNIFICANT CHANGE UP (ref 0.27–4.2)
WBC # BLD: 2.62 K/UL — LOW (ref 4.8–10.8)
WBC # FLD AUTO: 2.62 K/UL — LOW (ref 4.8–10.8)

## 2024-09-30 PROCEDURE — 99233 SBSQ HOSP IP/OBS HIGH 50: CPT

## 2024-09-30 PROCEDURE — 93306 TTE W/DOPPLER COMPLETE: CPT | Mod: 26

## 2024-09-30 PROCEDURE — 71045 X-RAY EXAM CHEST 1 VIEW: CPT | Mod: 26

## 2024-09-30 RX ORDER — SODIUM CHLORIDE IRRIG SOLUTION 0.9 %
1000 SOLUTION, IRRIGATION IRRIGATION ONCE
Refills: 0 | Status: COMPLETED | OUTPATIENT
Start: 2024-09-30 | End: 2024-09-30

## 2024-09-30 RX ORDER — AZITHROMYCIN 250 MG/1
500 TABLET, FILM COATED ORAL EVERY 24 HOURS
Refills: 0 | Status: DISCONTINUED | OUTPATIENT
Start: 2024-09-30 | End: 2024-10-02

## 2024-09-30 RX ORDER — SODIUM CHLORIDE IRRIG SOLUTION 0.9 %
500 SOLUTION, IRRIGATION IRRIGATION ONCE
Refills: 0 | Status: DISCONTINUED | OUTPATIENT
Start: 2024-09-30 | End: 2024-09-30

## 2024-09-30 RX ADMIN — PIPERACILLIN SODIUM AND TAZOBACTAM SODIUM 25 GRAM(S): 12; 1.5 INJECTION, POWDER, LYOPHILIZED, FOR SOLUTION INTRAVENOUS at 13:43

## 2024-09-30 RX ADMIN — PANTOPRAZOLE SODIUM 40 MILLIGRAM(S): 40 TABLET, DELAYED RELEASE ORAL at 05:51

## 2024-09-30 RX ADMIN — Medication 1 TABLET(S): at 05:52

## 2024-09-30 RX ADMIN — METHYLPREDNISOLONE ACETATE 40 MILLIGRAM(S): 80 INJECTION, SUSPENSION INTRA-ARTICULAR; INTRALESIONAL; INTRAMUSCULAR; SOFT TISSUE at 17:56

## 2024-09-30 RX ADMIN — Medication 1300 MILLIGRAM(S): at 13:43

## 2024-09-30 RX ADMIN — PIPERACILLIN SODIUM AND TAZOBACTAM SODIUM 25 GRAM(S): 12; 1.5 INJECTION, POWDER, LYOPHILIZED, FOR SOLUTION INTRAVENOUS at 05:50

## 2024-09-30 RX ADMIN — Medication 1 MILLIGRAM(S): at 22:56

## 2024-09-30 RX ADMIN — Medication 1300 MILLIGRAM(S): at 22:56

## 2024-09-30 RX ADMIN — AZITHROMYCIN 255 MILLIGRAM(S): 250 TABLET, FILM COATED ORAL at 13:42

## 2024-09-30 RX ADMIN — Medication 2 MILLIGRAM(S): at 12:02

## 2024-09-30 RX ADMIN — Medication 1000 MILLILITER(S): at 10:31

## 2024-09-30 RX ADMIN — Medication 100 MILLIGRAM(S): at 05:50

## 2024-09-30 RX ADMIN — Medication 5000 UNIT(S): at 17:56

## 2024-09-30 RX ADMIN — PIPERACILLIN SODIUM AND TAZOBACTAM SODIUM 25 GRAM(S): 12; 1.5 INJECTION, POWDER, LYOPHILIZED, FOR SOLUTION INTRAVENOUS at 22:56

## 2024-09-30 RX ADMIN — CHLORHEXIDINE GLUCONATE ORAL RINSE 1 APPLICATION(S): 1.2 SOLUTION DENTAL at 05:52

## 2024-09-30 RX ADMIN — Medication 5000 UNIT(S): at 05:51

## 2024-09-30 RX ADMIN — Medication 1300 MILLIGRAM(S): at 05:51

## 2024-09-30 RX ADMIN — PANTOPRAZOLE SODIUM 40 MILLIGRAM(S): 40 TABLET, DELAYED RELEASE ORAL at 17:56

## 2024-09-30 RX ADMIN — METHYLPREDNISOLONE ACETATE 40 MILLIGRAM(S): 80 INJECTION, SUSPENSION INTRA-ARTICULAR; INTRALESIONAL; INTRAMUSCULAR; SOFT TISSUE at 05:52

## 2024-09-30 NOTE — PROGRESS NOTE ADULT - SUBJECTIVE AND OBJECTIVE BOX
68-year-old male with schizophrenia, CKD, Marginal zone lymphoma and pancreatic neuroendocrine tumor on monthly somatostatin injections( follows Dr Gong ),  left elbow fracture s/p ORIF @[facility] ~30 years ago, comes in c/o weakness/SOB and cough. Cough is productive of yellow sputum and has been going on for 3 months with 10 lb weight loss over the last 3 months. He also states weakness started 2 wks ago, getting worse, on the day of ER visit pt  fell while trying to ambulate due to weakness. No head trauma. No LOC. No CP.  Reports SOB on exertion. No abdominal or back pain. No n/v/c/d. Last treatment for neuroendocrine tumor > 2 mo ago. No fever/chills. Of note, he took abx course for PNA prescribed by his PMD.   CT chest angio : No pulmonary embolism. Multilobar consolidative opacities in bilateral lungs, consistent with multifocal pneumonia. Small right pleural effusion and trace left pleural effusion. Small pericardial effusion.  Multistation lymphadenopathy though a component could be reactive given   the extensive lung consolidations, suspect also related to patient's   known history of lymphoma. Again noted is splenomegaly.  Pt received s/p 1 unit PRBC , 2L NS in ED , cefepime, vancomycin and azithromycin.   He required vasopressors and was admitted to SDU.   Today pt is very weak, falling asleep during conversation, c/o cough denies fever, chills.     PAST MEDICAL & SURGICAL HISTORY:  Kidney stones  Schizophrenia  Lymphoma  Shingles  Atrophic kidney  H/O colonoscopy with polypectomy  Liver cyst  History of bladder surgery  H/O neuropathy  History of chemotherapy  Stage 3 chronic kidney disease  Neuroendocrine malignancy  Neuroendocrine tumor status post surgical treatment  Neuroendocrine cancer  Retained urethral stent  H/O umbilical hernia repair  Elbow fracture  H/O cystoscopy      Vital Signs Last 24 Hrs  T(C): 36.1 (30 Sep 2024 12:00), Max: 36.2 (30 Sep 2024 08:35)  T(F): 97 (30 Sep 2024 12:00), Max: 97.2 (30 Sep 2024 08:35)  HR: 49 (30 Sep 2024 16:00) (40 - 77)  BP: 99/55 (30 Sep 2024 16:00) (80/45 - 160/77)  BP(mean): 74 (30 Sep 2024 16:00) (57 - 105)  RR: 18 (30 Sep 2024 16:00) (16 - 20)  SpO2: 98% (30 Sep 2024 16:00) (93% - 100%)    Parameters below as of 30 Sep 2024 16:00  Patient On (Oxygen Delivery Method): room air      PHYSICAL EXAM:  GENERAL: NAD, frail, ill looking male with temporal muscle waisting   HEAD:  Atraumatic, Normocephalic  NECK: Supple, No JVD, Normal thyroid  NERVOUS SYSTEM:  Awake, minimally verbal, following simple commands   CHEST/LUNG: decreased BS at bases   HEART: S1, S2   ABDOMEN: Soft, Nontender, Nondistended; Bowel sounds present  EXTREMITIES: no  edema on LE     LABS:                        7.5    2.62  )-----------( 239      ( 30 Sep 2024 05:51 )             23.9     09-30    137  |  108  |  54[H]  ----------------------------<  131[H]  4.8   |  16[L]  |  2.4[H]    Ca    10.4      30 Sep 2024 05:51  Phos  5.6     09-30  Mg     2.3     09-30    TPro  3.8[L]  /  Alb  2.7[L]  /  TBili  0.4  /  DBili  x   /  AST  7   /  ALT  11  /  AlkPhos  143[H]  09-30      Urinalysis Basic - ( 30 Sep 2024 05:51 )    Color: x / Appearance: x / SG: x / pH: x  Gluc: 131 mg/dL / Ketone: x  / Bili: x / Urobili: x   Blood: x / Protein: x / Nitrite: x   Leuk Esterase: x / RBC: x / WBC x   Sq Epi: x / Non Sq Epi: x / Bacteria: x        Culture - Sputum (collected 28 Sep 2024 23:28)  Source: .Sputum Sputum  Gram Stain (29 Sep 2024 08:24):    Moderate polymorphonuclear leukocytes seen per low power field    Moderate Squamous epithelial cells seen per low power field    Moderate Gram Negative Rods seen per oil power field    Rare Gram positive cocci in pairs seen per oil power field  Final Report (30 Sep 2024 12:42):    Commensal ranulfo consistent with body site    Culture - Urine (09.27.24 @ 10:00)   Specimen Source: Clean Catch Clean Catch (Midstream)  Culture Results:   No growthCulture - Blood (09.27.24 @ 00:15)   Specimen Source: .Blood BLOOD  Culture Results:   No growth at 72 Hours      RADIOLOGY & ADDITIONAL TESTS:    < from: Xray Chest 1 View-PORTABLE IMMEDIATE (Xray Chest 1 View-PORTABLE IMMEDIATE .) (09.30.24 @ 08:28) >  Findings:    Support devices: Telemetry leads. Right chest.    Cardiac/mediastinum/hilum: Unremarkable.    Lung parenchyma/Pleura: Within normal limits.    Skeleton/soft tissues: Unremarkable.    Impression: Stable bilateral opacities with effusions.    < end of copied text >  < from: CT Head No Cont (09.27.24 @ 10:23) >  IMPRESSION:  No acute intracranial pathology. No evidence of midline shift, mass   effect or intracranial hemorrhage.    < end of copied text >  < from: CT Angio Chest PE Protocol w/ IV Cont (09.27.24 @ 00:54) >  IMPRESSION:    No pulmonary embolism.    Multilobar consolidative opacities in bilateral lungs, consistent with   multifocal pneumonia.    Small right pleural effusion and trace left pleural effusion.    Small pericardial effusion.    Multistation lymphadenopathy though a component could be reactive given   the extensive lung consolidations, suspect also related to patient's   known history of lymphoma. Again noted is splenomegaly.    < end of copied text >  < from: TTE Echo Complete w/o Contrast w/ Doppler (09.30.24 @ 09:20) >  Summary:   1. Left ventricular ejection fraction, by visual estimation, is 60 to   65%.   2. Normal global left ventricular systolic function.   3. Indeterminate left ventricular diastolic function.   4. Mildly enlarged left atrium.   5. Mildly enlarged right atrium.   6. Small posterior pericardial effusion.    MEDICATIONS  (STANDING):  azithromycin  IVPB 500 milliGRAM(s) IV Intermittent every 24 hours  benztropine 1 milliGRAM(s) Oral at bedtime  benztropine 2 milliGRAM(s) Oral daily  chlorhexidine 2% Cloths 1 Application(s) Topical <User Schedule>  heparin   Injectable 5000 Unit(s) SubCutaneous every 12 hours  methylPREDNISolone sodium succinate Injectable 40 milliGRAM(s) IV Push two times a day  norepinephrine Infusion 0.06 MICROgram(s)/kG/Min (10.2 mL/Hr) IV Continuous <Continuous>  pantoprazole  Injectable 40 milliGRAM(s) IV Push two times a day  piperacillin/tazobactam IVPB.. 3.375 Gram(s) IV Intermittent every 8 hours  polyethylene glycol 3350 17 Gram(s) Oral daily  senna 1 Tablet(s) Oral two times a day  sodium bicarbonate 1300 milliGRAM(s) Oral three times a day  sodium chloride 0.9%. 1000 milliLiter(s) (100 mL/Hr) IV Continuous <Continuous>    MEDICATIONS  (PRN):  acetaminophen     Tablet .. 650 milliGRAM(s) Oral every 6 hours PRN Temp greater or equal to 38C (100.4F), Mild Pain (1 - 3)  aluminum hydroxide/magnesium hydroxide/simethicone Suspension 30 milliLiter(s) Oral every 4 hours PRN Dyspepsia  atropine Injectable 1 milliGRAM(s) IV Push once PRN shamika <40 or symptomatic pt  melatonin 3 milliGRAM(s) Oral at bedtime PRN Insomnia  ondansetron Injectable 4 milliGRAM(s) IV Push every 8 hours PRN Nausea and/or Vomiting

## 2024-09-30 NOTE — PROGRESS NOTE ADULT - ASSESSMENT
IMPRESSION:    Acute hypoxemic respiratory failure  Multifocal pneumonia/ possible organizing [id number]   Possible recurrent aspiration  Anemia  Hypercalcemia low PTH  HO CKD  HO schizophrenia  HO Marginal zone lymphoma and pancreatic neuroendocrine tumor    PLAN:    CNS:  Avoid CNS depressants.     HEENT: Oral care.    PULMONARY:  HOB @ 45 degrees.  Keep SaO2 92 TO 96%.  Wean o2 as tolerated.  Solumedrol 40 q 12.  Repeat CXR.  Will need OP CT     CARDIOVASCULAR: Avoid fluid overload. IVF continue.  GDFR.  Wean Levophed     GI: GI prophylaxis.  Feeding per S&S.    RENAL:  Follow up lytes.  Correct as needed. Trend Cr. pO bicarb 1300 Q 8    INFECTIOUS DISEASE: Zosyn.  Change Doxy to Azithromycin.  Cultures negative to date     HEMATOLOGICAL:  DVT prophylaxis    ENDOCRINE:  Follow up FS.  Insulin protocol if needed.    MUSCULOSKELETAL: OOBTC.  PT OT     GOC    poor prognosis

## 2024-09-30 NOTE — PROGRESS NOTE ADULT - ASSESSMENT
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    #Sepsis (POA) 2/2 Multifocal PNA associated with small parapneumonic rt pleural effusion   -  Sepsis POA (SIRS+ with source : T 100.7 ,  , and pneumonia)  - Cont Zosyn.  - Start azithromycin 2/2 anti-inflammatory properties   - C/W with Solumedrol 40mg IV q12hr (for organizing pneumonia treatment)  - Blood Cx negative   - Sputum Cx: GN and GP cocci               - Cont IVF  - CT angio chest: Multilobar consolidative opacities in bilateral lungs, consistent with  multifocal pneumonia. Small right pleural effusion and trace left pleural effusion.    # Acute on chronic anemia requiring 1xpRBC  - Patient baseline Hb 9-10  - Hb on 9/29/2024: 7.8 , MCV 88 , RDW 16.6%   - S/p 1 unit PRBC's in ED  - GI consulted: Outpatient EGD/ colonoscopy   - Trend CBC daily and transfuse prn to target Hgb >7   - Keep active T&S    #Moderate symptomatic Hypercalcemia ISO of malignancy   - Hx of Marginal zone lymphoma. hx of Pancreatic neuroendocrine tumor   - On Monthly somatostatin injections - taken 9/16  - PTH: low, Vit D low,   - F/U PTH-related peptide    #Small radha-cardial effusion - chronic  - stable small pericardial effusion seen on CT scan , was also reported on multiple previous ECHO;s  - Monitor for now     # CKD 4 - around baseline . hx of NSTEMI type II in the setting of CKD   - hx of nephrolithiasis c/b atrophic L kidney, R ureteral rivision, CKD4 (bsl Cr ~2.5)   - s/p IV contrast on admission   - continue IVF  - continue sodium bicarb 1300mg TID   - Trops elevated likely secondary to CKD , EKG NSR       # Elevated ALP- chronic   - possibly due to hepatic involvement of lymphoma/ bone involvement     #Schizophrenia  - continue home Prolixin and cogentin    #Sinus bradycardia  - RR this am, pt asymptomatic   - given atropine, atropine prn 30-40s or symptomatic   - Cardio   - pt is on fluphenazine 10 mg daily and 5 mg in evening, pt should only be on fluphenazine 10 mg daily, dced 5 mg in pm   - avoid av node blockers     - hold fluphenazine in am for 24 hrs until Ramesh improves, can uptritrate levophed if MAP <65    #hypona- started IVF- follow up Na     # Salbador-  consult, continue cogentin and fluphenazine 10 mg        68-year-old male patient with a  PMH of schizophrenia, CKD, Marginal zone lymphoma and pancreatic neuroendocrine tumor on monthly somatostatin injections         (follows Dr Gong) admitted to step-down for managed of sepsis POA secondary to multifocal PNA.    #Sepsis (POA) 2/2 Multifocal PNA associated with small parapneumonic rt pleural effusion   - Sepsis POA (SIRS+ with source : T 100.7 ,  , and pneumonia)  - CT angio chest: Multilobar consolidative opacities in bilateral lungs, consistent with  multifocal pneumonia. Small right pleural effusion and trace left pleural effusion.  - C/W Zosyn  - Start azithromycin 2/2 anti-inflammatory properties   - C/W with Solumedrol 40mg IV q12hr (for organizing pneumonia treatment)  - Blood Cx negative   - Sputum Cx: GN and GP cocci               - C/W IVF  - x1 1L LR bolus    # Acute on chronic anemia requiring 1xpRBC  - Patient baseline Hb 9-10  - Hb on 9/29/2024: 7.8 , MCV 88 , RDW 16.6%   - S/p 1 unit PRBC's in ED  - GI consulted: Outpatient EGD/ colonoscopy   - Trend CBC daily and transfuse prn to target Hgb >7   - Keep active T&S    #Moderate symptomatic Hypercalcemia ISO of malignancy   - Hx of Marginal zone lymphoma. hx of Pancreatic neuroendocrine tumor   - On Monthly somatostatin injections ( last taken on 9/16)  - PTH: low, Vit D low,   - F/U PTH-related peptide    #Small radha-cardial effusion - chronic  - Stable small pericardial effusion seen on CT scan , was also reported on multiple previous ECHO;s  - Monitor for now     #CKD 4  #Hx of NSTEMI type II ISO of CKD   - Hx of nephrolithiasis c/b atrophic L kidney, R ureteral revision   - CKD4 (bsl Cr ~2.5)   - s/p IV contrast on admission   - continue IVF  - continue sodium bicarb 1300mg TID   - Trops elevated likely secondary to CKD , EKG NSR     # Elevated ALP- chronic   - Can be 2/2 hepatic involvement of lymphoma/ bone involvement     #Schizophrenia  - C/W home Prolixin and cogentin    #Sinus bradycardia  - Atropine prn 30-40s or symptomatic   - C/W fluphenazine 10 mg daily, d/elyssa 5 mg in pm   - Avoid av node blockers       #Hyponatremia- resolved   - C/W IVF    #DVT PPx: Heparin subq  #GI PPx: PPI   #Diet: Renal     #Hand-off: Follow-up anemia workup.

## 2024-09-30 NOTE — PROGRESS NOTE ADULT - SUBJECTIVE AND OBJECTIVE BOX
Patient is a 68y old  Male who presents with a chief complaint of multifocal pna (29 Sep 2024 19:16)        Over Night Events:  On 3 liters O2.  On Levophed.          ROS:     All ROS are negative except HPI         PHYSICAL EXAM    ICU Vital Signs Last 24 Hrs  T(C): 35.4 (30 Sep 2024 04:00), Max: 36.1 (29 Sep 2024 08:38)  T(F): 95.8 (30 Sep 2024 04:00), Max: 97 (29 Sep 2024 08:38)  HR: 41 (30 Sep 2024 05:00) (35 - 82)  BP: 92/55 (30 Sep 2024 05:00) (81/52 - 160/77)  BP(mean): 69 (30 Sep 2024 05:00) (66 - 99)  ABP: --  ABP(mean): --  RR: 18 (30 Sep 2024 00:00) (17 - 18)  SpO2: 100% (30 Sep 2024 05:00) (86% - 100%)    O2 Parameters below as of 30 Sep 2024 05:00  Patient On (Oxygen Delivery Method): nasal cannula  O2 Flow (L/min): 4          CONSTITUTIONAL:  IN NAD    ENT:   Airway patent,   Mouth with normal mucosa.       EYES:   Pupils equal,   Round and reactive to light.    CARDIAC:   Normal rate,   Regular rhythm.    No edema      RESPIRATORY:   No wheezing  Bilateral BS  Normal chest expansion  Not tachypneic,  No use of accessory muscles    GASTROINTESTINAL:  Abdomen soft,   Non-tender,   No guarding,   + BS    MUSCULOSKELETAL:   Range of motion is not limited,  No clubbing, cyanosis    NEUROLOGICAL:   Alert and oriented   No motor  deficits.    SKIN:   Skin normal color for race,   Warm and dry  No evidence of rash.      09-29-24 @ 07:01  -  09-30-24 @ 07:00  --------------------------------------------------------  IN:    Norepinephrine: 3 mL    Norepinephrine: 55 mL    sodium chloride 0.9%: 1700 mL  Total IN: 1758 mL    OUT:    Voided (mL): 1400 mL  Total OUT: 1400 mL    Total NET: 358 mL          LABS:                            7.5    2.62  )-----------( 239      ( 30 Sep 2024 05:51 )             23.9                                               09-30    137  |  108  |  54[H]  ----------------------------<  131[H]  4.8   |  16[L]  |  2.4[H]    Creatinine Trend  BUN 54, Cr 2.4, (09-30-24 @ 05:51)  Creatinine Trend  BUN 41, Cr 2.2, (09-29-24 @ 06:36)  Creatinine Trend  BUN 47, Cr 2.5, (09-28-24 @ 16:57)  Creatinine Trend  BUN 42, Cr 2.4, (09-28-24 @ 06:20)  Creatinine Trend  BUN 44, Cr 2.4, (09-27-24 @ 16:49)  Creatinine Trend  BUN 46, Cr 2.5, (09-27-24 @ 11:56)  Creatinine Trend  BUN 50, Cr 2.6, (09-26-24 @ 23:39)      Ca    10.4      30 Sep 2024 05:51  Phos  5.6     09-30  Mg     2.3     09-30    TPro  3.8[L]  /  Alb  2.7[L]  /  TBili  0.4  /  DBili  x   /  AST  7   /  ALT  11  /  AlkPhos  143[H]  09-30                                             Urinalysis Basic - ( 30 Sep 2024 05:51 )    Color: x / Appearance: x / SG: x / pH: x  Gluc: 131 mg/dL / Ketone: x  / Bili: x / Urobili: x   Blood: x / Protein: x / Nitrite: x   Leuk Esterase: x / RBC: x / WBC x   Sq Epi: x / Non Sq Epi: x / Bacteria: x                                                  LIVER FUNCTIONS - ( 30 Sep 2024 05:51 )  Alb: 2.7 g/dL / Pro: 3.8 g/dL / ALK PHOS: 143 U/L / ALT: 11 U/L / AST: 7 U/L / GGT: x                                                  Culture - Sputum (collected 28 Sep 2024 23:28)  Source: .Sputum Sputum  Gram Stain (29 Sep 2024 08:24):    Moderate polymorphonuclear leukocytes seen per low power field    Moderate Squamous epithelial cells seen per low power field    Moderate Gram Negative Rods seen per oil power field    Rare Gram positive cocci in pairs seen per oil power field  Preliminary Report (29 Sep 2024 21:35):    Commensal ranulfo consistent with body site    Culture - Urine (collected 27 Sep 2024 10:00)  Source: Clean Catch Clean Catch (Midstream)  Final Report (29 Sep 2024 07:34):    No growth                                                                                           MEDICATIONS  (STANDING):  benztropine 1 milliGRAM(s) Oral at bedtime  benztropine 2 milliGRAM(s) Oral daily  chlorhexidine 2% Cloths 1 Application(s) Topical <User Schedule>  doxycycline IVPB 100 milliGRAM(s) IV Intermittent every 12 hours  heparin   Injectable 5000 Unit(s) SubCutaneous every 12 hours  methylPREDNISolone sodium succinate Injectable 40 milliGRAM(s) IV Push two times a day  norepinephrine Infusion 0.06 MICROgram(s)/kG/Min (10.2 mL/Hr) IV Continuous <Continuous>  pantoprazole  Injectable 40 milliGRAM(s) IV Push two times a day  piperacillin/tazobactam IVPB.. 3.375 Gram(s) IV Intermittent every 8 hours  polyethylene glycol 3350 17 Gram(s) Oral daily  senna 1 Tablet(s) Oral two times a day  sodium bicarbonate 1300 milliGRAM(s) Oral three times a day  sodium chloride 0.9%. 1000 milliLiter(s) (100 mL/Hr) IV Continuous <Continuous>    MEDICATIONS  (PRN):  acetaminophen     Tablet .. 650 milliGRAM(s) Oral every 6 hours PRN Temp greater or equal to 38C (100.4F), Mild Pain (1 - 3)  aluminum hydroxide/magnesium hydroxide/simethicone Suspension 30 milliLiter(s) Oral every 4 hours PRN Dyspepsia  atropine Injectable 1 milliGRAM(s) IV Push once PRN shamika <40 or symptomatic pt  melatonin 3 milliGRAM(s) Oral at bedtime PRN Insomnia  ondansetron Injectable 4 milliGRAM(s) IV Push every 8 hours PRN Nausea and/or Vomiting      New X-rays reviewed:                                                                                  ECHO    CXR interpreted by me:  Bilateral infiltrates

## 2024-09-30 NOTE — PROGRESS NOTE ADULT - SUBJECTIVE AND OBJECTIVE BOX
ELDA COVARRUBIAS 68y Male  MRN#: [id number]     Hospital Day: 3d    SUBJECTIVE     Today is hospital day 3. No overnight events. Patient seen and evaluated at bedside reports feeling well. Complains of right-sided arm pain at side of IV infiltration (peripheral IV was removed at site of infiltration). Otherwise, patient has no acute complaints.                                             ----------------------------------------------------------  OBJECTIVE  PAST MEDICAL & SURGICAL HISTORY  Kidney stones    Schizophrenia    Lymphoma    Shingles    Atrophic kidney    H/O colonoscopy with polypectomy    Liver cyst    History of bladder surgery    H/O neuropathy    History of chemotherapy    Stage 3 chronic kidney disease    Neuroendocrine malignancy    Neuroendocrine tumor status post surgical treatment    Neuroendocrine cancer    Retained urethral stent    H/O umbilical hernia repair    Elbow fracture    H/O cystoscopy                                              -----------------------------------------------------------  ALLERGIES:  allopurinol (Rash (Moderate))                                            ------------------------------------------------------------    HOME MEDICATIONS  Home Medications:  benztropine 1 mg oral tablet: 1 tab(s) orally once a day (at bedtime) (03 May 2024 23:36)  benztropine 2 mg oral tablet: 1 tab(s) orally once a day (15 Nov 2023 06:52)  Prolixin 10 mg oral tablet: 1 tab(s) orally once a day (15 Nov 2023 06:52)  Prolixin 5 mg oral tablet: 1 tab(s) orally once a day (at bedtime) (03 May 2024 23:36)                           MEDICATIONS:  STANDING MEDICATIONS  azithromycin  IVPB 500 milliGRAM(s) IV Intermittent every 24 hours  benztropine 1 milliGRAM(s) Oral at bedtime  benztropine 2 milliGRAM(s) Oral daily  chlorhexidine 2% Cloths 1 Application(s) Topical <User Schedule>  heparin   Injectable 5000 Unit(s) SubCutaneous every 12 hours  methylPREDNISolone sodium succinate Injectable 40 milliGRAM(s) IV Push two times a day  norepinephrine Infusion 0.06 MICROgram(s)/kG/Min IV Continuous <Continuous>  pantoprazole  Injectable 40 milliGRAM(s) IV Push two times a day  piperacillin/tazobactam IVPB.. 3.375 Gram(s) IV Intermittent every 8 hours  polyethylene glycol 3350 17 Gram(s) Oral daily  senna 1 Tablet(s) Oral two times a day  sodium bicarbonate 1300 milliGRAM(s) Oral three times a day  sodium chloride 0.9%. 1000 milliLiter(s) IV Continuous <Continuous>    PRN MEDICATIONS  acetaminophen     Tablet .. 650 milliGRAM(s) Oral every 6 hours PRN  aluminum hydroxide/magnesium hydroxide/simethicone Suspension 30 milliLiter(s) Oral every 4 hours PRN  atropine Injectable 1 milliGRAM(s) IV Push once PRN  melatonin 3 milliGRAM(s) Oral at bedtime PRN  ondansetron Injectable 4 milliGRAM(s) IV Push every 8 hours PRN                                            ------------------------------------------------------------  VITAL SIGNS: Last 24 Hours  T(C): 36.2 (30 Sep 2024 08:35), Max: 36.2 (30 Sep 2024 08:35)  T(F): 97.2 (30 Sep 2024 08:35), Max: 97.2 (30 Sep 2024 08:35)  HR: 41 (30 Sep 2024 10:00) (40 - 82)  BP: 94/53 (30 Sep 2024 10:00) (80/45 - 160/77)  BP(mean): 71 (30 Sep 2024 10:00) (57 - 105)  RR: 18 (30 Sep 2024 10:00) (16 - 20)  SpO2: 98% (30 Sep 2024 10:00) (86% - 100%)      09-29-24 @ 07:01  -  09-30-24 @ 07:00  --------------------------------------------------------  IN: 1758 mL / OUT: 1400 mL / NET: 358 mL    09-30-24 @ 07:01  -  09-30-24 @ 11:58  --------------------------------------------------------  IN: 1318.7 mL / OUT: 300 mL / NET: 1018.7 mL                                             --------------------------------------------------------------  LABS:                        7.5    2.62  )-----------( 239      ( 30 Sep 2024 05:51 )             23.9     09-30    137  |  108  |  54[H]  ----------------------------<  131[H]  4.8   |  16[L]  |  2.4[H]    Ca    10.4      30 Sep 2024 05:51  Phos  5.6     09-30  Mg     2.3     09-30    TPro  3.8[L]  /  Alb  2.7[L]  /  TBili  0.4  /  DBili  x   /  AST  7   /  ALT  11  /  AlkPhos  143[H]  09-30              Culture - Sputum (collected 28 Sep 2024 23:28)  Source: .Sputum Sputum  Gram Stain (29 Sep 2024 08:24):    Moderate polymorphonuclear leukocytes seen per low power field    Moderate Squamous epithelial cells seen per low power field    Moderate Gram Negative Rods seen per oil power field    Rare Gram positive cocci in pairs seen per oil power field  Preliminary Report (29 Sep 2024 21:35):    Commensal ranulfo consistent with body site        PHYSICAL EXAM:  GENERAL: NAD, lying in bed comfortably  HEAD:  Atraumatic, Normocephalic  NECK: Supple, No JVD  CHEST/LUNG:Unlabored respirations. B/L crackles.   HEART: regular rate and rhythm; No murmurs, rubs, or gallops  ABDOMEN:  Soft, Nontender, Nondistended.    EXTREMITIES: 1+ LE edema, warm   NERVOUS SYSTEM:  Alert & Oriented X3                                               --------------------------------------------------------------

## 2024-09-30 NOTE — PROGRESS NOTE ADULT - ASSESSMENT
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 cough is productive of yellow sputum and has been going on for 3 months with 10 lb weight loss over the last 3 months ,  he also states weakness started 2 wks ago, getting worse, today fell while trying to ambulate due to weakness. No head trauma. No LOC. No CP. Reports SOB on exertion. No abdominal pain. No n/v/c/d. Last chemo > 2 mo ago. No fever/chills. Of note he took abx course for PNA prescribed by his PMD       A/P   #  Septic shock  (POA) due to  Multifocal PNA in immunocompromised patient  - IVC is collapsing, give 1 L on LR, c/w maintenance fluids   - c/w Levophed, keep MAP above 65   - sputum Cx noted  - c/w piperacillin/tazobactam IVPB.. 3.375 Gram(s) IV Intermittent every 8 hours and Zithromax   - c/w solumedrol 40 mg Q 12 hours   - check procalcitonin level  - send nasal swab for MRSA  - consult ID     # Multifactorial anemia / neutropenia   - s/p 1 PRBC in ER  - no evidence of bleeding , ED called GI , pt needs follow up   - c/w PPI IV BID - although anemia could be secondary to underlying malignancy   -  Colonoscopy 2023- Cecal polyp, EMR: Tubular adenoma fragments showing foci of high-grade dysplasia and focal  intramucosal adenocarcinoma.  Plan was for 6month follow up   - pt has underlying hematological malignancy, needs hematology follow up   - monitor WBC count   - monitor H/H, keep Hb above 7.5     # Hypercalcemia likely secondary to malignancy / hx of Marginal zone lymphoma/ hx of Pancreatic neuroendocrine tumor   - on Monthly somatostatin injections - taken 9/16  - IV hydration, monitor corrected Ca level   - PTH:low  , PTHrP:pending , Vit D:low    -  patient supposed to do PET scan OP to check status of his lymphoma but lost for follow up   - Heme-Onc eval appreciated, pt needs follow up      #  Small radha-cardial effusion   - chronic  - stable small pericardial effusion seen on CT scan , was also reported on multiple previous ECHO;s      # ASHLEY on  CKD 4 /metabolic acidosis /  NSTEMI type II in the setting of CKD   - pt received fluids in boluses today , c/w maintenance fluids   - monitor BUN/cr and urine output   - hx of nephrolithiasis c/b atrophic L kidney, R ureteral rivision, CKD4 (bsl Cr ~2.5)   - s/p IV contrast on admission   - sodium bicarb 1300mg TID , monitor bicarb level   - Trops elevated likely secondary to CKD   - pt is chest pain free  -  EKG NSR   - telemetry monitoring       #  Elevated ALP  - possibly due to hepatic involvement of lymphoma/ bone involvement   - monitor levels     # Schizophrenia  - continue home Prolixin and cogentin  - supportive care     #  Sinus bradycardia  - telemetry monitoring, check TSH, FT4   - given atropine, atropine prn 30-40s or symptomatic   - consult cardiology   - avoid av node blockers     - fluphenazine held     # GI/DVT prophylaxis     # Dispo: c/w close monitoring in SDU

## 2024-10-01 LAB
ALBUMIN SERPL ELPH-MCNC: 2.8 G/DL — LOW (ref 3.5–5.2)
ALP SERPL-CCNC: 125 U/L — HIGH (ref 30–115)
ALT FLD-CCNC: 9 U/L — SIGNIFICANT CHANGE UP (ref 0–41)
ANION GAP SERPL CALC-SCNC: 13 MMOL/L — SIGNIFICANT CHANGE UP (ref 7–14)
AST SERPL-CCNC: 6 U/L — SIGNIFICANT CHANGE UP (ref 0–41)
BASOPHILS # BLD AUTO: 0 K/UL — SIGNIFICANT CHANGE UP (ref 0–0.2)
BASOPHILS NFR BLD AUTO: 0 % — SIGNIFICANT CHANGE UP (ref 0–1)
BILIRUB SERPL-MCNC: 0.5 MG/DL — SIGNIFICANT CHANGE UP (ref 0.2–1.2)
BUN SERPL-MCNC: 55 MG/DL — HIGH (ref 10–20)
CALCIUM SERPL-MCNC: 10.1 MG/DL — SIGNIFICANT CHANGE UP (ref 8.4–10.4)
CHLORIDE SERPL-SCNC: 110 MMOL/L — SIGNIFICANT CHANGE UP (ref 98–110)
CO2 SERPL-SCNC: 17 MMOL/L — SIGNIFICANT CHANGE UP (ref 17–32)
CREAT SERPL-MCNC: 2.2 MG/DL — HIGH (ref 0.7–1.5)
DEPRECATED KAPPA LC FREE/LAMBDA SER: 1.73 RATIO — SIGNIFICANT CHANGE UP (ref 0.7–6.02)
EGFR: 32 ML/MIN/1.73M2 — LOW
EOSINOPHIL # BLD AUTO: 0 K/UL — SIGNIFICANT CHANGE UP (ref 0–0.7)
EOSINOPHIL NFR BLD AUTO: 0 % — SIGNIFICANT CHANGE UP (ref 0–8)
GLUCOSE SERPL-MCNC: 133 MG/DL — HIGH (ref 70–99)
HCT VFR BLD CALC: 25.6 % — LOW (ref 42–52)
HGB BLD-MCNC: 8 G/DL — LOW (ref 14–18)
IMM GRANULOCYTES NFR BLD AUTO: 12.2 % — HIGH (ref 0.1–0.3)
IRON SATN MFR SERPL: 151 UG/DL — HIGH (ref 35–150)
KAPPA LC UR-MCNC: 64.76 MG/L — HIGH
LAMBDA LC UR-MCNC: 37.35 MG/L — HIGH
LDH SERPL L TO P-CCNC: 107 U/L — SIGNIFICANT CHANGE UP (ref 50–242)
LYMPHOCYTES # BLD AUTO: 0.23 K/UL — LOW (ref 1.2–3.4)
LYMPHOCYTES # BLD AUTO: 8 % — LOW (ref 20.5–51.1)
MCHC RBC-ENTMCNC: 27.6 PG — SIGNIFICANT CHANGE UP (ref 27–31)
MCHC RBC-ENTMCNC: 31.3 G/DL — LOW (ref 32–37)
MCV RBC AUTO: 88.3 FL — SIGNIFICANT CHANGE UP (ref 80–94)
MONOCYTES # BLD AUTO: 0.42 K/UL — SIGNIFICANT CHANGE UP (ref 0.1–0.6)
MONOCYTES NFR BLD AUTO: 14.7 % — HIGH (ref 1.7–9.3)
NEUTROPHILS # BLD AUTO: 1.86 K/UL — SIGNIFICANT CHANGE UP (ref 1.4–6.5)
NEUTROPHILS NFR BLD AUTO: 65.1 % — SIGNIFICANT CHANGE UP (ref 42.2–75.2)
NRBC # BLD: 0 /100 WBCS — SIGNIFICANT CHANGE UP (ref 0–0)
PLATELET # BLD AUTO: 293 K/UL — SIGNIFICANT CHANGE UP (ref 130–400)
PMV BLD: 10.6 FL — HIGH (ref 7.4–10.4)
POTASSIUM SERPL-MCNC: 4.9 MMOL/L — SIGNIFICANT CHANGE UP (ref 3.5–5)
POTASSIUM SERPL-SCNC: 4.9 MMOL/L — SIGNIFICANT CHANGE UP (ref 3.5–5)
PROT SERPL-MCNC: 3.8 G/DL — LOW (ref 6–8)
RBC # BLD: 2.9 M/UL — LOW (ref 4.7–6.1)
RBC # BLD: 2.9 M/UL — LOW (ref 4.7–6.1)
RBC # FLD: 16.3 % — HIGH (ref 11.5–14.5)
RETICS #: 28.7 K/UL — SIGNIFICANT CHANGE UP (ref 25–125)
RETICS/RBC NFR: 1 % — SIGNIFICANT CHANGE UP (ref 0.5–1.5)
SODIUM SERPL-SCNC: 140 MMOL/L — SIGNIFICANT CHANGE UP (ref 135–146)
T4 AB SER-ACNC: 4.6 UG/DL — SIGNIFICANT CHANGE UP (ref 4.6–12)
TIBC SERPL-MCNC: <168 UG/DL — LOW (ref 220–430)
TRANSFERRIN SERPL-MCNC: 118 MG/DL — LOW (ref 200–360)
UIBC SERPL-MCNC: <17 UG/DL — LOW (ref 110–370)
WBC # BLD: 2.86 K/UL — LOW (ref 4.8–10.8)
WBC # FLD AUTO: 2.86 K/UL — LOW (ref 4.8–10.8)

## 2024-10-01 PROCEDURE — 99233 SBSQ HOSP IP/OBS HIGH 50: CPT

## 2024-10-01 PROCEDURE — 99223 1ST HOSP IP/OBS HIGH 75: CPT

## 2024-10-01 RX ORDER — GUAIFENESIN 100 MG/5ML
100 SOLUTION ORAL EVERY 6 HOURS
Refills: 0 | Status: DISCONTINUED | OUTPATIENT
Start: 2024-10-01 | End: 2024-10-16

## 2024-10-01 RX ADMIN — AZITHROMYCIN 255 MILLIGRAM(S): 250 TABLET, FILM COATED ORAL at 12:47

## 2024-10-01 RX ADMIN — Medication 5000 UNIT(S): at 17:33

## 2024-10-01 RX ADMIN — PANTOPRAZOLE SODIUM 40 MILLIGRAM(S): 40 TABLET, DELAYED RELEASE ORAL at 06:40

## 2024-10-01 RX ADMIN — METHYLPREDNISOLONE ACETATE 40 MILLIGRAM(S): 80 INJECTION, SUSPENSION INTRA-ARTICULAR; INTRALESIONAL; INTRAMUSCULAR; SOFT TISSUE at 17:32

## 2024-10-01 RX ADMIN — Medication 1 TABLET(S): at 06:09

## 2024-10-01 RX ADMIN — Medication 17 GRAM(S): at 12:40

## 2024-10-01 RX ADMIN — CHLORHEXIDINE GLUCONATE ORAL RINSE 1 APPLICATION(S): 1.2 SOLUTION DENTAL at 06:10

## 2024-10-01 RX ADMIN — METHYLPREDNISOLONE ACETATE 40 MILLIGRAM(S): 80 INJECTION, SUSPENSION INTRA-ARTICULAR; INTRALESIONAL; INTRAMUSCULAR; SOFT TISSUE at 06:08

## 2024-10-01 RX ADMIN — Medication 1 TABLET(S): at 17:32

## 2024-10-01 RX ADMIN — Medication 1300 MILLIGRAM(S): at 06:09

## 2024-10-01 RX ADMIN — Medication 2 MILLIGRAM(S): at 12:40

## 2024-10-01 RX ADMIN — PIPERACILLIN SODIUM AND TAZOBACTAM SODIUM 25 GRAM(S): 12; 1.5 INJECTION, POWDER, LYOPHILIZED, FOR SOLUTION INTRAVENOUS at 06:08

## 2024-10-01 RX ADMIN — PANTOPRAZOLE SODIUM 40 MILLIGRAM(S): 40 TABLET, DELAYED RELEASE ORAL at 17:32

## 2024-10-01 RX ADMIN — Medication 5000 UNIT(S): at 06:08

## 2024-10-01 RX ADMIN — Medication 1 MILLIGRAM(S): at 21:08

## 2024-10-01 RX ADMIN — PIPERACILLIN SODIUM AND TAZOBACTAM SODIUM 25 GRAM(S): 12; 1.5 INJECTION, POWDER, LYOPHILIZED, FOR SOLUTION INTRAVENOUS at 13:13

## 2024-10-01 RX ADMIN — Medication 1300 MILLIGRAM(S): at 21:08

## 2024-10-01 RX ADMIN — PIPERACILLIN SODIUM AND TAZOBACTAM SODIUM 25 GRAM(S): 12; 1.5 INJECTION, POWDER, LYOPHILIZED, FOR SOLUTION INTRAVENOUS at 21:08

## 2024-10-01 RX ADMIN — Medication 1300 MILLIGRAM(S): at 13:13

## 2024-10-01 NOTE — PHYSICAL THERAPY INITIAL EVALUATION ADULT - LEVEL OF INDEPENDENCE: GAIT, REHAB EVAL
6/5/2020      Lety Grayson  [address]  [place] [address]      Dear Lety,    I am writing on behalf of [facility] because you missed your appointment with me on 6/5/20. I understand things come up that cannot be avoided. In the future, I ask that you call my office at least 24 hours before your appointment if you need to cancel. This allows me to see another patient who needs care.     You can also cancel or reschedule appointments easily through [employer]. This online tool also lets you view test results and health information, request or renew prescriptions, pay bills and talk with your health care team. You can sign up for [email or web address] at [email or web address] at any time.    Your health matters to me. Please call oru office at [phone] to schedule a follow up visit.      Thank you,      Sayra Bender PA-C  [facility]  [address] [facility] [facility] [address]  Phone: [phone]   contact guard

## 2024-10-01 NOTE — PROGRESS NOTE ADULT - ASSESSMENT
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 cough is productive of yellow sputum and has been going on for 3 months with 10 lb weight loss over the last 3 months ,  he also states weakness started 2 wks ago, getting worse, today fell while trying to ambulate due to weakness. No head trauma. No LOC. No CP. Reports SOB on exertion. No abdominal pain. No n/v/c/d. Last chemo > 2 mo ago. No fever/chills. Of note he took abx course for PNA prescribed by his PMD       A/P   #  Septic shock  (POA) due to  Multifocal PNA in immunocompromised patient  -  check IVC if  collapsing, will  give 1 L on LR, c/w maintenance fluids, reevaluate in 24 hours   - taper off  Levophed as tolerated,  keep MAP above 65   - sputum Cx noted  - c/w piperacillin/tazobactam IVPB.. 3.375 Gram(s) IV Intermittent every 8 hours and Zithromax   - ID follow up   - c/w solumedrol 40 mg Q 12 hours   - supportive care, aspiration precautions   - nebs Q 6 hours   - pulmonary is following, recommendations noted     # Sinus bradycardia  - HR is dropping below 40  - EP consult is pending   - TSH WNL   - given atropine, atropine prn 30-40s or symptomatic   - consult cardiology   - avoid av node blockers     - fluphenazine held     # Multifactorial anemia / neutropenia   - s/p 1 PRBC in ER  - no evidence of bleeding , ED called GI , pt needs follow up   - c/w PPI IV BID - although anemia could be secondary to underlying malignancy   -  Colonoscopy 2023- Cecal polyp, EMR: Tubular adenoma fragments showing foci of high-grade dysplasia and focal  intramucosal adenocarcinoma.  Plan was for 6month follow up   -  hematology/oncology  follow up   - monitor WBC count   - monitor H/H, keep Hb above 7.5     # Hypercalcemia likely secondary to malignancy / hx of Marginal zone lymphoma/ hx of Pancreatic neuroendocrine tumor   - on Monthly somatostatin injections - taken 9/16  - IV hydration, monitor corrected Ca level   - PTH:low  , PTHrP:pending   - start Vit D supplements   -  patient supposed to do PET scan OP to check status of his lymphoma but lost for follow up   - Heme-Onc eval appreciated, pt needs follow up      #  Small radha-cardial effusion   - chronic  - stable small pericardial effusion seen on CT scan , was also reported on multiple previous ECHO;s      # ASHLEY on  CKD 4 /metabolic acidosis /  NSTEMI type II in the setting of CKD   - IV hydration, maintain fluid balance   - monitor BUN/cr and urine output   - hx of nephrolithiasis c/b atrophic L kidney, R ureteral rivision, CKD4 (bsl Cr ~2.5)   - s/p IV contrast on admission   - sodium bicarb 1300mg TID , monitor bicarb level   - Trops elevated likely secondary to CKD   - pt is chest pain free  -  EKG NSR   - telemetry monitoring       #  Elevated ALP  - possibly due to hepatic involvement of lymphoma/ bone involvement   - monitor levels     # Schizophrenia  - continue home Prolixin and cogentin  - supportive care       # GI/DVT prophylaxis         #Progress Note Handoff  Pending (specify):  f/u EP recommendations, monitor HR, check IVC if collapsable, give 1 L LA, c/w maintanence fluids for one more day, taper off Levophed as tolerated, , monitor BUN/Cr, urine output, H/H, GI, ID and oncology follow up, supportive care   Family discussion: I spoke with pt and his father at the bedside, they agreed with a plan of care   Disposition: SDU

## 2024-10-01 NOTE — PHYSICAL THERAPY INITIAL EVALUATION ADULT - PERTINENT HX OF CURRENT PROBLEM, REHAB EVAL
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 cough is productive of yellow sputum and has been going on for 3 months with 10 lb weight loss over the last 3 months ,  he also states weakness started 2 wks ago, getting worse, today fell while trying to ambulate due to weakness. No head trauma. No LOC. No CP. Reports SOB on exertion. No abdominal pain. No n/v/c/d. Last chemo > 2 mo ago. No fever/chills. Of note he took abx course for PNA prescribed by his PMD   #  Septic shock  (POA) due to  Multifocal PNA in immunocompromised patient

## 2024-10-01 NOTE — CONSULT NOTE ADULT - SUBJECTIVE AND OBJECTIVE BOX
Patient is a 68y old  Male who presents with a chief complaint of multifocal pna (01 Oct 2024 08:30)    HPI:  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 on monthly somatostatin injections( follows Dr Gong )  left elbow fracture s/p ORIF @[facility] ~30 years ago, comes in c/o weakness/SOB and cough. cough is productive of yellow sputum and has been going on for 3 months with 10 lb weight loss over the last 3 months ,  he also states weakness started 2 wks ago, getting worse, today fell while trying to ambulate due to weakness. No head trauma. No LOC. No CP. Reports SOB on exertion. No abdominal pain. No n/v/c/d. Last chemo > 2 mo ago. No fever/chills. Of note he took abx course for PNA prescribed by his PMD       PAST MEDICAL & SURGICAL HISTORY:  Kidney stones      Schizophrenia      Lymphoma      Shingles      Atrophic kidney      H/O colonoscopy with polypectomy      Liver cyst      History of bladder surgery      H/O neuropathy      History of chemotherapy      Stage 3 chronic kidney disease      Neuroendocrine malignancy      Neuroendocrine tumor status post surgical treatment      Neuroendocrine cancer      Retained urethral stent      H/O umbilical hernia repair      Elbow fracture      H/O cystoscopy              PREVIOUS DIAGNOSTIC TESTING:      ECHO  FINDINGS:    STRESS  FINDINGS:    CATHETERIZATION  FINDINGS:    ELECTROPHYSIOLOGY STUDY  FINDINGS:    CAROTID ULTRASOUND:  FINDINGS    VENOUS DUPLEX SCAN:  FINDINGS:    CHEST CT PULMONARY ANGIO with IV Contrast:  FINDINGS:    MEDICATIONS  (STANDING):  azithromycin  IVPB 500 milliGRAM(s) IV Intermittent every 24 hours  benztropine 1 milliGRAM(s) Oral at bedtime  benztropine 2 milliGRAM(s) Oral daily  chlorhexidine 2% Cloths 1 Application(s) Topical <User Schedule>  heparin   Injectable 5000 Unit(s) SubCutaneous every 12 hours  methylPREDNISolone sodium succinate Injectable 40 milliGRAM(s) IV Push two times a day  norepinephrine Infusion 0.06 MICROgram(s)/kG/Min (10.2 mL/Hr) IV Continuous <Continuous>  pantoprazole  Injectable 40 milliGRAM(s) IV Push two times a day  piperacillin/tazobactam IVPB.. 3.375 Gram(s) IV Intermittent every 8 hours  polyethylene glycol 3350 17 Gram(s) Oral daily  senna 1 Tablet(s) Oral two times a day  sodium bicarbonate 1300 milliGRAM(s) Oral three times a day    MEDICATIONS  (PRN):  acetaminophen     Tablet .. 650 milliGRAM(s) Oral every 6 hours PRN Temp greater or equal to 38C (100.4F), Mild Pain (1 - 3)  aluminum hydroxide/magnesium hydroxide/simethicone Suspension 30 milliLiter(s) Oral every 4 hours PRN Dyspepsia  atropine Injectable 1 milliGRAM(s) IV Push once PRN shamika <40 or symptomatic pt  melatonin 3 milliGRAM(s) Oral at bedtime PRN Insomnia  ondansetron Injectable 4 milliGRAM(s) IV Push every 8 hours PRN Nausea and/or Vomiting      FAMILY HISTORY:  FH: hypertension    FH: diabetes mellitus        SOCIAL HISTORY:    CIGARETTES:    ALCOHOL:    Past Surgical History:    Allergies:    allopurinol (Rash (Moderate))      REVIEW OF SYSTEMS:    CONSTITUTIONAL: No fever, weight loss, chills, shakes, or fatigue  EYES: No eye pain, visual disturbances, or discharge  ENMT:  No difficulty hearing, tinnitus, vertigo; No sinus or throat pain  NECK: No pain or stiffness  BREASTS: No pain, masses, or nipple discharge  RESPIRATORY: No cough, wheezing, hemoptysis, or shortness of breath  CARDIOVASCULAR: No chest pain, dyspnea, palpitations, dizziness, syncope, paroxysmal nocturnal dyspnea, orthopnea, or arm or leg swelling  GASTROINTESTINAL: No abdominal  or epigastric pain, nausea, vomiting, hematemesis, diarrhea, constipation, melena or bright red blood.  GENITOURINARY: No dysuria, nocturia, hematuria, or urinary incontinence  NEUROLOGICAL: No headaches, memory loss, slurred speech, limb weakness, loss of strength, numbness, or tremors  SKIN: No itching, burning, rashes, or lesions   LYMPH NODES: No enlarged glands  ENDOCRINE: No heat or cold intolerance, or hair loss  MUSCULOSKELETAL: No joint pain or swelling, muscle, back, or extremity pain  PSYCHIATRIC: No depression, anxiety, or difficulty sleeping  HEME/LYMPH: No easy bruising or bleeding gums  ALLERY AND IMMUNOLOGIC: No hives or rash.      Vital Signs Last 24 Hrs  T(C): 36.9 (01 Oct 2024 11:35), Max: 36.9 (01 Oct 2024 11:35)  T(F): 98.4 (01 Oct 2024 11:35), Max: 98.4 (01 Oct 2024 11:35)  HR: 45 (01 Oct 2024 11:35) (40 - 81)  BP: 96/54 (01 Oct 2024 11:35) (95/54 - 132/64)  BP(mean): 77 (01 Oct 2024 07:58) (70 - 80)  RR: 18 (01 Oct 2024 11:35) (16 - 20)  SpO2: 96% (01 Oct 2024 11:35) (94% - 100%)    Parameters below as of 30 Sep 2024 21:37  Patient On (Oxygen Delivery Method): room air        PHYSICAL EXAM:    GENERAL: In no apparent distress, well nourished, and hydrated.  HEART: Regular rate and rhythm; No murmurs, rubs, or gallops.  PULMONARY: Clear to auscultation and perfusion.  No rales, wheezing, or rhonchi bilaterally.  ABDOMEN: Soft, Nontender, Nondistended; Bowel sounds present  EXTREMITIES:  2+ Peripheral Pulses, No clubbing, cyanosis, or edema  NEUROLOGICAL: Grossly nonfocal    INTERPRETATION OF TELEMETRY:    ECG:      LABS:                        8.0    2.86  )-----------( 293      ( 01 Oct 2024 04:51 )             25.6     10-01    140  |  110  |  55[H]  ----------------------------<  133[H]  4.9   |  17  |  2.2[H]    Ca    10.1      01 Oct 2024 04:51  Phos  5.6     09-30  Mg     2.3     09-30    TPro  3.8[L]  /  Alb  2.8[L]  /  TBili  0.5  /  DBili  x   /  AST  6   /  ALT  9   /  AlkPhos  125[H]  10-01          Urinalysis Basic - ( 01 Oct 2024 04:51 )    Color: x / Appearance: x / SG: x / pH: x  Gluc: 133 mg/dL / Ketone: x  / Bili: x / Urobili: x   Blood: x / Protein: x / Nitrite: x   Leuk Esterase: x / RBC: x / WBC x   Sq Epi: x / Non Sq Epi: x / Bacteria: x      BNP      RADIOLOGY & ADDITIONAL STUDIES: Patient is a 68y old  Male who presents with a chief complaint of multifocal pna (01 Oct 2024 08:30)    HPI:  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 on monthly somatostatin injections( follows Dr Gong )  left elbow fracture s/p ORIF @[facility] ~30 years ago, comes in c/o weakness/SOB and cough. cough is productive of yellow sputum and has been going on for 3 months with 10 lb weight loss over the last 3 months ,  he also states weakness started 2 wks ago, getting worse, today fell while trying to ambulate due to weakness. No head trauma. No LOC. No CP. Reports SOB on exertion. No abdominal pain. No n/v/c/d. Last chemo > 2 mo ago. No fever/chills. Of note he took abx course for PNA prescribed by his PMD     EP: Pt's HR is currently 49 BPM, sinus.  he denies lightheadedness, dizziness, syncope.  Pt is on low dose of levophed    PAST MEDICAL & SURGICAL HISTORY:  Kidney stones      Schizophrenia      Lymphoma      Shingles      Atrophic kidney      H/O colonoscopy with polypectomy      Liver cyst      History of bladder surgery      H/O neuropathy      History of chemotherapy      Stage 3 chronic kidney disease      Neuroendocrine malignancy      Neuroendocrine tumor status post surgical treatment      Neuroendocrine cancer      Retained urethral stent      H/O umbilical hernia repair      Elbow fracture      H/O cystoscopy    PREVIOUS DIAGNOSTIC TESTING:      ECHO  FINDINGS:  < from: TTE Echo Complete w/o Contrast w/ Doppler (09.30.24 @ 09:20) >  Summary:   1. Left ventricular ejection fraction, by visual estimation, is 60 to   65%.   2. Normal global left ventricular systolic function.   3. Indeterminate left ventricular diastolic function.   4. Mildly enlarged left atrium.   5. Mildly enlarged right atrium.   6. Small posterior pericardial effusion.      < end of copied text >      MEDICATIONS  (STANDING):  azithromycin  IVPB 500 milliGRAM(s) IV Intermittent every 24 hours  benztropine 1 milliGRAM(s) Oral at bedtime  benztropine 2 milliGRAM(s) Oral daily  chlorhexidine 2% Cloths 1 Application(s) Topical <User Schedule>  heparin   Injectable 5000 Unit(s) SubCutaneous every 12 hours  methylPREDNISolone sodium succinate Injectable 40 milliGRAM(s) IV Push two times a day  norepinephrine Infusion 0.06 MICROgram(s)/kG/Min (10.2 mL/Hr) IV Continuous <Continuous>  pantoprazole  Injectable 40 milliGRAM(s) IV Push two times a day  piperacillin/tazobactam IVPB.. 3.375 Gram(s) IV Intermittent every 8 hours  polyethylene glycol 3350 17 Gram(s) Oral daily  senna 1 Tablet(s) Oral two times a day  sodium bicarbonate 1300 milliGRAM(s) Oral three times a day    MEDICATIONS  (PRN):  acetaminophen     Tablet .. 650 milliGRAM(s) Oral every 6 hours PRN Temp greater or equal to 38C (100.4F), Mild Pain (1 - 3)  aluminum hydroxide/magnesium hydroxide/simethicone Suspension 30 milliLiter(s) Oral every 4 hours PRN Dyspepsia  atropine Injectable 1 milliGRAM(s) IV Push once PRN shamika <40 or symptomatic pt  melatonin 3 milliGRAM(s) Oral at bedtime PRN Insomnia  ondansetron Injectable 4 milliGRAM(s) IV Push every 8 hours PRN Nausea and/or Vomiting      FAMILY HISTORY:  FH: hypertension    FH: diabetes mellitus      SOCIAL HISTORY:    CIGARETTES:    ALCOHOL:    Past Surgical History:    Allergies:    allopurinol (Rash (Moderate))      REVIEW OF SYSTEMS:  as above    Vital Signs Last 24 Hrs  T(C): 36.9 (01 Oct 2024 11:35), Max: 36.9 (01 Oct 2024 11:35)  T(F): 98.4 (01 Oct 2024 11:35), Max: 98.4 (01 Oct 2024 11:35)  HR: 45 (01 Oct 2024 11:35) (40 - 81)  BP: 96/54 (01 Oct 2024 11:35) (95/54 - 132/64)  BP(mean): 77 (01 Oct 2024 07:58) (70 - 80)  RR: 18 (01 Oct 2024 11:35) (16 - 20)  SpO2: 96% (01 Oct 2024 11:35) (94% - 100%)    Parameters below as of 30 Sep 2024 21:37  Patient On (Oxygen Delivery Method): room air    PHYSICAL EXAM:    GENERAL: In no apparent distress, well nourished, and hydrated.  HEART: Regular rhythm; No murmurs, rubs, or gallops.  PULMONARY: Clear to auscultation and perfusion.  No rales, wheezing, or rhonchi bilaterally.  ABDOMEN: Soft, Nontender, Nondistended; Bowel sounds present  EXTREMITIES:  2+ Peripheral Pulses, No clubbing, cyanosis, or edema  NEUROLOGICAL: Grossly nonfocal    INTERPRETATION OF TELEMETRY:  Sinus Shamika in the 40s    ECG:  < from: 12 Lead ECG (09.29.24 @ 08:07) >  Ventricular Rate 39 BPM    Atrial Rate 39 BPM    P-R Interval 168 ms    QRS Duration 76 ms    Q-T Interval 494 ms    QTC Calculation(Bazett) 397 ms    P Axis 107 degrees    R Axis 14 degrees    T Axis 35 degrees    Diagnosis Line Marked sinus bradycardia  Abnormal ECG    < end of copied text >      LABS:                        8.0    2.86  )-----------( 293      ( 01 Oct 2024 04:51 )             25.6     10-01    140  |  110  |  55[H]  ----------------------------<  133[H]  4.9   |  17  |  2.2[H]    Ca    10.1      01 Oct 2024 04:51  Phos  5.6     09-30  Mg     2.3     09-30    TPro  3.8[L]  /  Alb  2.8[L]  /  TBili  0.5  /  DBili  x   /  AST  6   /  ALT  9   /  AlkPhos  125[H]  10-01    Urinalysis Basic - ( 01 Oct 2024 04:51 )    Color: x / Appearance: x / SG: x / pH: x  Gluc: 133 mg/dL / Ketone: x  / Bili: x / Urobili: x   Blood: x / Protein: x / Nitrite: x   Leuk Esterase: x / RBC: x / WBC x   Sq Epi: x / Non Sq Epi: x / Bacteria: x

## 2024-10-01 NOTE — PROGRESS NOTE ADULT - ASSESSMENT
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    A&P     #Sepsis (POA) 2/2 Multifocal PNA associated with small parapneumonic rt pleural effusion   - Sepsis POA (SIRS+ with source : T 100.7 ,  , and pneumonia)  - CT angio chest: Multilobar consolidative opacities in bilateral lungs, consistent with  multifocal pneumonia. Small right pleural effusion and trace left pleural effusion.  - C/W Zosyn  - C/W azithromycin 2/2 anti-inflammatory properties   - C/W with Solumedrol 40mg IV q12hr (for organizing pneumonia treatment)  - Blood Cx negative   - Sputum Cx: GN and GP cocci                   # Acute on chronic anemia requiring 1xpRBC  - Patient baseline Hb 9-10  - Hb on 9/29/2024: 7.8 , MCV 88 , RDW 16.6%   - S/p 1 unit PRBC's in ED  - GI consulted: Outpatient EGD/ colonoscopy   - Trend CBC daily and transfuse prn to target Hgb >7   - Keep active T&S  - Iron studies consistent with anemia of chronic disease    #Moderate symptomatic Hypercalcemia ISO of malignancy   - Hx of Marginal zone lymphoma. hx of Pancreatic neuroendocrine tumor   - On Monthly somatostatin injections ( last taken on 9/16)  - PTH: low, Vit D low,   - F/U PTH-related peptide    #Small radha-cardial effusion - chronic  - Stable small pericardial effusion seen on CT scan , was also reported on multiple previous ECHO;s  - Monitor for now     #CKD 4  #Hx of NSTEMI type II ISO of CKD   - Hx of nephrolithiasis c/b atrophic L kidney, R ureteral revision   - CKD4 (bsl Cr ~2.5)   - s/p IV contrast on admission   - continue IVF  - continue sodium bicarb 1300mg TID   - Trops elevated likely secondary to CKD , EKG NSR     # Elevated ALP- chronic   - Can be 2/2 hepatic involvement of lymphoma/ bone involvement     #Schizophrenia  - C/W home Prolixin and cogentin    #Sinus bradycardia  - Atropine prn 30-40s or symptomatic   - Avoid av node blockers    - TSH WNL   - Follow-up EP consult     #Hyponatremia- resolved     #DVT PPx: Heparin subq  #GI PPx: PPI   #Diet: Renal     #Hand-off: EP consult for bradycardia.

## 2024-10-01 NOTE — PATIENT PROFILE ADULT - FALL HARM RISK - RISK INTERVENTIONS

## 2024-10-01 NOTE — CONSULT NOTE ADULT - NS ATTEND AMEND GEN_ALL_CORE FT
Sinus Bradycardia  Sepsis    HR on monitor in high 40s while awake  No tele available for review  Please put patient on tele for trend  At this HR, patient doesn't qualify for a PPM. Risks/benefit discussed/  Please assess HR when patient off vasopressor. if patient becomes bradycardiac on consistent basis while awake, will be a candidate for a PPM  Recall as needed

## 2024-10-01 NOTE — PHYSICAL THERAPY INITIAL EVALUATION ADULT - GENERAL OBSERVATIONS, REHAB EVAL
pt. encountered in bed in NAD + tele,   +IV + single lumen catheter, R chest wall pt. encountered in bed in NAD + tele,   +IV + single lumen catheter, R chest wall. [id number]

## 2024-10-01 NOTE — PROGRESS NOTE ADULT - SUBJECTIVE AND OBJECTIVE BOX
Patient is a 68y old  Male who presents with a chief complaint of multifocal pna (30 Sep 2024 16:27)        Over Night Events:  No overnight events. On room air. Afebrile. Levophed at 0.02.       ROS:     All ROS are negative except HPI         PHYSICAL EXAM    ICU Vital Signs Last 24 Hrs  T(C): 36.2 (01 Oct 2024 04:00), Max: 36.8 (30 Sep 2024 16:33)  T(F): 97.1 (01 Oct 2024 04:00), Max: 98.3 (30 Sep 2024 16:33)  HR: 53 (01 Oct 2024 07:58) (40 - 81)  BP: 107/57 (01 Oct 2024 07:58) (80/45 - 132/64)  BP(mean): 77 (01 Oct 2024 07:58) (57 - 84)  ABP: --  ABP(mean): --  RR: 18 (01 Oct 2024 04:00) (16 - 20)  SpO2: 95% (01 Oct 2024 07:58) (93% - 100%)    O2 Parameters below as of 30 Sep 2024 21:37  Patient On (Oxygen Delivery Method): room air            CONSTITUTIONAL:  in NAD    ENT:   Airway patent,   Mouth with normal mucosa.     EYES:   Pupils equal,   Round and reactive to light.    CARDIAC:   bradycardic   No edema    RESPIRATORY:   Coarse bilateral BS  Normal chest expansion  Not tachypneic,  No use of accessory muscles    GASTROINTESTINAL:  Abdomen soft,   Non-tender,   No guarding,   + BS    MUSCULOSKELETAL:   Range of motion is not limited,  No clubbing, cyanosis    NEUROLOGICAL:   Alert and oriented   No motor  deficits.    SKIN:   Skin normal color for race,   Warm and dry       09-30-24 @ 07:01  -  10-01-24 @ 07:00  --------------------------------------------------------  IN:    IV PiggyBack: 550 mL    Lactated Ringers Bolus: 1000 mL    Norepinephrine: 103.7 mL    sodium chloride 0.9%: 2300 mL  Total IN: 3953.7 mL    OUT:    Voided (mL): 1000 mL  Total OUT: 1000 mL    Total NET: 2953.7 mL          LABS:                            8.0    2.86  )-----------( 293      ( 01 Oct 2024 04:51 )             25.6                                               10-01    140  |  110  |  55[H]  ----------------------------<  133[H]  4.9   |  17  |  2.2[H]    Ca    10.1      01 Oct 2024 04:51  Phos  5.6     09-30  Mg     2.3     09-30    TPro  3.8[L]  /  Alb  2.8[L]  /  TBili  0.5  /  DBili  x   /  AST  6   /  ALT  9   /  AlkPhos  125[H]  10-01                                             Urinalysis Basic - ( 01 Oct 2024 04:51 )    Color: x / Appearance: x / SG: x / pH: x  Gluc: 133 mg/dL / Ketone: x  / Bili: x / Urobili: x   Blood: x / Protein: x / Nitrite: x   Leuk Esterase: x / RBC: x / WBC x   Sq Epi: x / Non Sq Epi: x / Bacteria: x                                                  LIVER FUNCTIONS - ( 01 Oct 2024 04:51 )  Alb: 2.8 g/dL / Pro: 3.8 g/dL / ALK PHOS: 125 U/L / ALT: 9 U/L / AST: 6 U/L / GGT: x                                                  Culture - Sputum (collected 28 Sep 2024 23:28)  Source: .Sputum Sputum  Gram Stain (29 Sep 2024 08:24):    Moderate polymorphonuclear leukocytes seen per low power field    Moderate Squamous epithelial cells seen per low power field    Moderate Gram Negative Rods seen per oil power field    Rare Gram positive cocci in pairs seen per oil power field  Final Report (30 Sep 2024 12:42):    Commensal ranulfo consistent with body site                                                                                           MEDICATIONS  (STANDING):  azithromycin  IVPB 500 milliGRAM(s) IV Intermittent every 24 hours  benztropine 1 milliGRAM(s) Oral at bedtime  benztropine 2 milliGRAM(s) Oral daily  chlorhexidine 2% Cloths 1 Application(s) Topical <User Schedule>  heparin   Injectable 5000 Unit(s) SubCutaneous every 12 hours  methylPREDNISolone sodium succinate Injectable 40 milliGRAM(s) IV Push two times a day  norepinephrine Infusion 0.06 MICROgram(s)/kG/Min (10.2 mL/Hr) IV Continuous <Continuous>  pantoprazole  Injectable 40 milliGRAM(s) IV Push two times a day  piperacillin/tazobactam IVPB.. 3.375 Gram(s) IV Intermittent every 8 hours  polyethylene glycol 3350 17 Gram(s) Oral daily  senna 1 Tablet(s) Oral two times a day  sodium bicarbonate 1300 milliGRAM(s) Oral three times a day  sodium chloride 0.9%. 1000 milliLiter(s) (100 mL/Hr) IV Continuous <Continuous>    MEDICATIONS  (PRN):  acetaminophen     Tablet .. 650 milliGRAM(s) Oral every 6 hours PRN Temp greater or equal to 38C (100.4F), Mild Pain (1 - 3)  aluminum hydroxide/magnesium hydroxide/simethicone Suspension 30 milliLiter(s) Oral every 4 hours PRN Dyspepsia  atropine Injectable 1 milliGRAM(s) IV Push once PRN shamika <40 or symptomatic pt  melatonin 3 milliGRAM(s) Oral at bedtime PRN Insomnia  ondansetron Injectable 4 milliGRAM(s) IV Push every 8 hours PRN Nausea and/or Vomiting      New X-rays reviewed:                                                                                  ECHO

## 2024-10-01 NOTE — PROGRESS NOTE ADULT - SUBJECTIVE AND OBJECTIVE BOX
ELDA COVARRUBIAS 68y Male  MRN#: [id number]     Hospital Day: 4d    SUBJECTIVE     No overnight events. Patient seen and evaluated at bedside reports feeling well and has no acute complaints.                                             ----------------------------------------------------------  OBJECTIVE  PAST MEDICAL & SURGICAL HISTORY  Kidney stones    Schizophrenia    Lymphoma    Shingles    Atrophic kidney    H/O colonoscopy with polypectomy    Liver cyst    History of bladder surgery    H/O neuropathy    History of chemotherapy    Stage 3 chronic kidney disease    Neuroendocrine malignancy    Neuroendocrine tumor status post surgical treatment    Neuroendocrine cancer    Retained urethral stent    H/O umbilical hernia repair    Elbow fracture    H/O cystoscopy                                              -----------------------------------------------------------  ALLERGIES:  allopurinol (Rash (Moderate))                                            ------------------------------------------------------------    HOME MEDICATIONS  Home Medications:  benztropine 1 mg oral tablet: 1 tab(s) orally once a day (at bedtime) (03 May 2024 23:36)  benztropine 2 mg oral tablet: 1 tab(s) orally once a day (15 Nov 2023 06:52)  Prolixin 10 mg oral tablet: 1 tab(s) orally once a day (15 Nov 2023 06:52)  Prolixin 5 mg oral tablet: 1 tab(s) orally once a day (at bedtime) (03 May 2024 23:36)                           MEDICATIONS:  STANDING MEDICATIONS  azithromycin  IVPB 500 milliGRAM(s) IV Intermittent every 24 hours  benztropine 1 milliGRAM(s) Oral at bedtime  benztropine 2 milliGRAM(s) Oral daily  chlorhexidine 2% Cloths 1 Application(s) Topical <User Schedule>  heparin   Injectable 5000 Unit(s) SubCutaneous every 12 hours  methylPREDNISolone sodium succinate Injectable 40 milliGRAM(s) IV Push two times a day  norepinephrine Infusion 0.06 MICROgram(s)/kG/Min IV Continuous <Continuous>  pantoprazole  Injectable 40 milliGRAM(s) IV Push two times a day  piperacillin/tazobactam IVPB.. 3.375 Gram(s) IV Intermittent every 8 hours  polyethylene glycol 3350 17 Gram(s) Oral daily  senna 1 Tablet(s) Oral two times a day  sodium bicarbonate 1300 milliGRAM(s) Oral three times a day    PRN MEDICATIONS  acetaminophen     Tablet .. 650 milliGRAM(s) Oral every 6 hours PRN  aluminum hydroxide/magnesium hydroxide/simethicone Suspension 30 milliLiter(s) Oral every 4 hours PRN  atropine Injectable 1 milliGRAM(s) IV Push once PRN  melatonin 3 milliGRAM(s) Oral at bedtime PRN  ondansetron Injectable 4 milliGRAM(s) IV Push every 8 hours PRN                                            ------------------------------------------------------------  VITAL SIGNS: Last 24 Hours  T(C): 36.9 (01 Oct 2024 11:35), Max: 36.9 (01 Oct 2024 11:35)  T(F): 98.4 (01 Oct 2024 11:35), Max: 98.4 (01 Oct 2024 11:35)  HR: 45 (01 Oct 2024 11:35) (40 - 81)  BP: 96/54 (01 Oct 2024 11:35) (96/54 - 132/64)  BP(mean): 77 (01 Oct 2024 07:58) (70 - 80)  RR: 18 (01 Oct 2024 11:35) (16 - 19)  SpO2: 96% (01 Oct 2024 11:35) (94% - 100%)      09-30-24 @ 07:01  -  10-01-24 @ 07:00  --------------------------------------------------------  IN: 3953.7 mL / OUT: 1000 mL / NET: 2953.7 mL                                             --------------------------------------------------------------  LABS:                        8.0    2.86  )-----------( 293      ( 01 Oct 2024 04:51 )             25.6     10-01    140  |  110  |  55[H]  ----------------------------<  133[H]  4.9   |  17  |  2.2[H]    Ca    10.1      01 Oct 2024 04:51  Phos  5.6     09-30  Mg     2.3     09-30    TPro  3.8[L]  /  Alb  2.8[L]  /  TBili  0.5  /  DBili  x   /  AST  6   /  ALT  9   /  AlkPhos  125[H]  10-01              Culture - Sputum (collected 28 Sep 2024 23:28)  Source: .Sputum Sputum  Gram Stain (29 Sep 2024 08:24):    Moderate polymorphonuclear leukocytes seen per low power field    Moderate Squamous epithelial cells seen per low power field    Moderate Gram Negative Rods seen per oil power field    Rare Gram positive cocci in pairs seen per oil power field  Final Report (30 Sep 2024 12:42):    Commensal ranulfo consistent with body site        PHYSICAL EXAM:  GENERAL: NAD, lying in bed comfortably  HEAD:  Atraumatic, Normocephalic  NECK: Supple, No JVD  CHEST/LUNG:Unlabored respirations. B/L crackles.   HEART: regular rate and rhythm; No murmurs, rubs, or gallops  ABDOMEN:  Soft, Nontender, Nondistended.    EXTREMITIES: 1+ LE edema, warm   NERVOUS SYSTEM:  Alert & Oriented X3                                           --------------------------------------------------------------

## 2024-10-01 NOTE — PHYSICAL THERAPY INITIAL EVALUATION ADULT - GAIT TRAINING, PT EVAL
Goal: pt will ambulate with least restrictive assistive device 50 fee with supervision by discharge to facilitate return to PLOF.

## 2024-10-01 NOTE — PROGRESS NOTE ADULT - SUBJECTIVE AND OBJECTIVE BOX
68-year-old male with schizophrenia, CKD, Marginal zone lymphoma and pancreatic neuroendocrine tumor on monthly somatostatin injections( follows Dr Gong ),  left elbow fracture s/p [facility]IF @[facility] ~30 years ago, comes in c/o weakness/SOB and cough. Cough is productive of yellow sputum and has been going on for 3 months with 10 lb weight loss over the last 3 months. He also states weakness started 2 wks ago, getting worse, on the day of ER visit pt  fell while trying to ambulate due to weakness. No head trauma. No LOC. No CP.  Reports SOB on exertion. No abdominal or back pain. No n/v/c/d. Last treatment for neuroendocrine tumor > 2 mo ago. No fever/chills. Of note, he took abx course for PNA prescribed by his PMD.   CT chest angio : No pulmonary embolism. Multilobar consolidative opacities in bilateral lungs, consistent with multifocal pneumonia. Small right pleural effusion and trace left pleural effusion. Small pericardial effusion.  Multistation lymphadenopathy though a component could be reactive given   the extensive lung consolidations, suspect also related to patient's   known history of lymphoma. Again noted is splenomegaly.  Pt received s/p 1 unit PRBC , 2L NS in ED , cefepime, vancomycin and azithromycin.   He required vasopressors and was admitted to SDU.   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     PAST MEDICAL & SURGICAL HISTORY:  Kidney stones  Schizophrenia  Lymphoma  Shingles  Atrophic kidney  H/O colonoscopy with polypectomy  Liver cyst  History of bladder surgery  H/O neuropathy  History of chemotherapy  Stage 3 chronic kidney disease  Neuroendocrine malignancy  Neuroendocrine tumor status post surgical treatment  Neuroendocrine cancer  Retained urethral stent  H/O umbilical hernia repair  Elbow fracture  H/O cystoscopy      Vital Signs Last 24 Hrs  T(C): 36.9 (01 Oct 2024 11:35), Max: 36.9 (01 Oct 2024 11:35)  T(F): 98.4 (01 Oct 2024 11:35), Max: 98.4 (01 Oct 2024 11:35)  HR: 45 (01 Oct 2024 11:35) (40 - 81)  BP: 96/54 (01 Oct 2024 11:35) (96/54 - 132/64)  BP(mean): 77 (01 Oct 2024 07:58) (70 - 80)  RR: 18 (01 Oct 2024 11:35) (16 - 19)  SpO2: 96% (01 Oct 2024 11:35) (94% - 100%)    Parameters below as of 30 Sep 2024 21:37  Patient On (Oxygen Delivery Method): room air      PHYSICAL EXAM:  GENERAL: NAD, frail, ill looking male with temporal muscle waisting   HEAD:  Atraumatic, Normocephalic  NECK: Supple, No JVD, Normal thyroid  NERVOUS SYSTEM:  Awake, minimally verbal, following simple commands   CHEST/LUNG: decreased BS at bases   HEART: S1, S2   ABDOMEN: Soft, Nontender, Nondistended; Bowel sounds present  EXTREMITIES: no  edema on LE     LABS:                                   8.0    2.86  )-----------( 293      ( 01 Oct 2024 04:51 )             25.6   10-01    140  |  110  |  55[H]  ----------------------------<  133[H]  4.9   |  17  |  2.2[H]    Ca    10.1      01 Oct 2024 04:51  Phos  5.6     09-30  Mg     2.3     09-30    TPro  3.8[L]  /  Alb  2.8[L]  /  TBili  0.5  /  DBili  x   /  AST  6   /  ALT  9   /  AlkPhos  125[H]  10-01      Urinalysis Basic - ( 30 Sep 2024 05:51 )    Color: x / Appearance: x / SG: x / pH: x  Gluc: 131 mg/dL / Ketone: x  / Bili: x / Urobili: x   Blood: x / Protein: x / Nitrite: x   Leuk Esterase: x / RBC: x / WBC x   Sq Epi: x / Non Sq Epi: x / Bacteria: x        Culture - Sputum (collected 28 Sep 2024 23:28)  Source: .Sputum Sputum  Gram Stain (29 Sep 2024 08:24):    Moderate polymorphonuclear leukocytes seen per low power field    Moderate Squamous epithelial cells seen per low power field    Moderate Gram Negative Rods seen per oil power field    Rare Gram positive cocci in pairs seen per oil power field  Final Report (30 Sep 2024 12:42):    Commensal ranulfo consistent with body site    Culture - Urine (09.27.24 @ 10:00)   Specimen Source: Clean Catch Clean Catch (Midstream)  Culture Results:   No growthCulture - Blood (09.27.24 @ 00:15)   Specimen Source: .Blood BLOOD  Culture Results:   No growth at 72 Hours      RADIOLOGY & ADDITIONAL TESTS:    < from: Xray Chest 1 View-PORTABLE IMMEDIATE (Xray Chest 1 View-PORTABLE IMMEDIATE .) (09.30.24 @ 08:28) >  Findings:    Support devices: Telemetry leads. Right chest.    Cardiac/mediastinum/hilum: Unremarkable.    Lung parenchyma/Pleura: Within normal limits.    Skeleton/soft tissues: Unremarkable.    Impression: Stable bilateral opacities with effusions.    < end of copied text >  < from: CT Head No Cont (09.27.24 @ 10:23) >  IMPRESSION:  No acute intracranial pathology. No evidence of midline shift, mass   effect or intracranial hemorrhage.    < end of copied text >  < from: CT Angio Chest PE Protocol w/ IV Cont (09.27.24 @ 00:54) >  IMPRESSION:    No pulmonary embolism.    Multilobar consolidative opacities in bilateral lungs, consistent with   multifocal pneumonia.    Small right pleural effusion and trace left pleural effusion.    Small pericardial effusion.    Multistation lymphadenopathy though a component could be reactive given   the extensive lung consolidations, suspect also related to patient's   known history of lymphoma. Again noted is splenomegaly.    < end of copied text >  < from: TTE Echo Complete w/o Contrast w/ Doppler (09.30.24 @ 09:20) >  Summary:   1. Left ventricular ejection fraction, by visual estimation, is 60 to   65%.   2. Normal global left ventricular systolic function.   3. Indeterminate left ventricular diastolic function.   4. Mildly enlarged left atrium.   5. Mildly enlarged right atrium.   6. Small posterior pericardial effusion.    MEDICATIONS  (STANDING):  azithromycin  IVPB 500 milliGRAM(s) IV Intermittent every 24 hours  benztropine 1 milliGRAM(s) Oral at bedtime  benztropine 2 milliGRAM(s) Oral daily  chlorhexidine 2% Cloths 1 Application(s) Topical <User Schedule>  heparin   Injectable 5000 Unit(s) SubCutaneous every 12 hours  methylPREDNISolone sodium succinate Injectable 40 milliGRAM(s) IV Push two times a day  norepinephrine Infusion 0.06 MICROgram(s)/kG/Min (10.2 mL/Hr) IV Continuous <Continuous>  pantoprazole  Injectable 40 milliGRAM(s) IV Push two times a day  piperacillin/tazobactam IVPB.. 3.375 Gram(s) IV Intermittent every 8 hours  polyethylene glycol 3350 17 Gram(s) Oral daily  senna 1 Tablet(s) Oral two times a day  sodium bicarbonate 1300 milliGRAM(s) Oral three times a day    MEDICATIONS  (PRN):  acetaminophen     Tablet .. 650 milliGRAM(s) Oral every 6 hours PRN Temp greater or equal to 38C (100.4F), Mild Pain (1 - 3)  aluminum hydroxide/magnesium hydroxide/simethicone Suspension 30 milliLiter(s) Oral every 4 hours PRN Dyspepsia  atropine Injectable 1 milliGRAM(s) IV Push once PRN shamika <40 or symptomatic pt  melatonin 3 milliGRAM(s) Oral at bedtime PRN Insomnia  ondansetron Injectable 4 milliGRAM(s) IV Push every 8 hours PRN Nausea and/or Vomiting

## 2024-10-01 NOTE — CONSULT NOTE ADULT - SUBJECTIVE AND OBJECTIVE BOX
Patient is a 68y old  Male who presents with a chief complaint of multifocal pna (01 Oct 2024 12:01)    HPI:  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 on monthly somatostatin injections( follows Dr Gong )  left elbow fracture s/p ORIF @[facility] ~30 years ago, comes in c/o weakness/SOB and cough. cough is productive of yellow sputum and has been going on for 3 months with 10 lb weight loss over the last 3 months ,  he also states weakness started 2 wks ago, getting worse, today fell while trying to ambulate due to weakness. No head trauma. No LOC. No CP. Reports SOB on exertion. No abdominal pain. No n/v/c/d. Last chemo > 2 mo ago. No fever/chills. Of note he took abx course for PNA prescribed by his PMD     EP: EP was consulted for sinus bradycardia.  Pt denies lightheadedness, dizziness, syncope. His HR is currently 49.  He is not on any AVN blocking agents.  Pt is currently on levophed, low dose.    PAST MEDICAL & SURGICAL HISTORY:  Kidney stones      Schizophrenia      Lymphoma      Shingles      Atrophic kidney      H/O colonoscopy with polypectomy      Liver cyst      History of bladder surgery      H/O neuropathy      History of chemotherapy      Stage 3 chronic kidney disease      Neuroendocrine malignancy      Neuroendocrine tumor status post surgical treatment      Neuroendocrine cancer      Retained urethral stent      H/O umbilical hernia repair      Elbow fracture      H/O cystoscopy    PREVIOUS DIAGNOSTIC TESTING:      ECHO  FINDINGS:  < from: TTE Echo Complete w/o Contrast w/ Doppler (09.30.24 @ 09:20) >  Summary:   1. Left ventricular ejection fraction, by visual estimation, is 60 to   65%.   2. Normal global left ventricular systolic function.   3. Indeterminate left ventricular diastolic function.   4. Mildly enlarged left atrium.   5. Mildly enlarged right atrium.   6. Small posterior pericardial effusion.    < end of copied text >      STRESS  FINDINGS:    CATHETERIZATION  FINDINGS:    ELECTROPHYSIOLOGY STUDY  FINDINGS:    CAROTID ULTRASOUND:  FINDINGS    VENOUS DUPLEX SCAN:  FINDINGS:    CHEST CT PULMONARY ANGIO with IV Contrast:  FINDINGS:    MEDICATIONS  (STANDING):  azithromycin  IVPB 500 milliGRAM(s) IV Intermittent every 24 hours  benztropine 1 milliGRAM(s) Oral at bedtime  benztropine 2 milliGRAM(s) Oral daily  chlorhexidine 2% Cloths 1 Application(s) Topical <User Schedule>  heparin   Injectable 5000 Unit(s) SubCutaneous every 12 hours  methylPREDNISolone sodium succinate Injectable 40 milliGRAM(s) IV Push two times a day  norepinephrine Infusion 0.06 MICROgram(s)/kG/Min (10.2 mL/Hr) IV Continuous <Continuous>  pantoprazole  Injectable 40 milliGRAM(s) IV Push two times a day  piperacillin/tazobactam IVPB.. 3.375 Gram(s) IV Intermittent every 8 hours  polyethylene glycol 3350 17 Gram(s) Oral daily  senna 1 Tablet(s) Oral two times a day  sodium bicarbonate 1300 milliGRAM(s) Oral three times a day    MEDICATIONS  (PRN):  acetaminophen     Tablet .. 650 milliGRAM(s) Oral every 6 hours PRN Temp greater or equal to 38C (100.4F), Mild Pain (1 - 3)  aluminum hydroxide/magnesium hydroxide/simethicone Suspension 30 milliLiter(s) Oral every 4 hours PRN Dyspepsia  atropine Injectable 1 milliGRAM(s) IV Push once PRN shamika <40 or symptomatic pt  melatonin 3 milliGRAM(s) Oral at bedtime PRN Insomnia  ondansetron Injectable 4 milliGRAM(s) IV Push every 8 hours PRN Nausea and/or Vomiting      FAMILY HISTORY:  FH: hypertension    FH: diabetes mellitus        SOCIAL HISTORY:    CIGARETTES:    ALCOHOL:    Past Surgical History:    Allergies:    allopurinol (Rash (Moderate))      REVIEW OF SYSTEMS:    CONSTITUTIONAL: No fever, weight loss, chills, shakes, or fatigue  EYES: No eye pain, visual disturbances, or discharge  ENMT:  No difficulty hearing, tinnitus, vertigo; No sinus or throat pain  NECK: No pain or stiffness  BREASTS: No pain, masses, or nipple discharge  RESPIRATORY: No cough, wheezing, hemoptysis, or shortness of breath  CARDIOVASCULAR: No chest pain, dyspnea, palpitations, dizziness, syncope, paroxysmal nocturnal dyspnea, orthopnea, or arm or leg swelling  GASTROINTESTINAL: No abdominal  or epigastric pain, nausea, vomiting, hematemesis, diarrhea, constipation, melena or bright red blood.  GENITOURINARY: No dysuria, nocturia, hematuria, or urinary incontinence  NEUROLOGICAL: No headaches, memory loss, slurred speech, limb weakness, loss of strength, numbness, or tremors  SKIN: No itching, burning, rashes, or lesions   LYMPH NODES: No enlarged glands  ENDOCRINE: No heat or cold intolerance, or hair loss  MUSCULOSKELETAL: No joint pain or swelling, muscle, back, or extremity pain  PSYCHIATRIC: No depression, anxiety, or difficulty sleeping  HEME/LYMPH: No easy bruising or bleeding gums  ALLERY AND IMMUNOLOGIC: No hives or rash.      Vital Signs Last 24 Hrs  T(C): 36.9 (01 Oct 2024 11:35), Max: 36.9 (01 Oct 2024 11:35)  T(F): 98.4 (01 Oct 2024 11:35), Max: 98.4 (01 Oct 2024 11:35)  HR: 45 (01 Oct 2024 11:35) (40 - 81)  BP: 96/54 (01 Oct 2024 11:35) (95/54 - 132/64)  BP(mean): 77 (01 Oct 2024 07:58) (70 - 80)  RR: 18 (01 Oct 2024 11:35) (16 - 20)  SpO2: 96% (01 Oct 2024 11:35) (94% - 100%)    Parameters below as of 30 Sep 2024 21:37  Patient On (Oxygen Delivery Method): room air        PHYSICAL EXAM:    GENERAL: In no apparent distress, well nourished, and hydrated.  HEART: Regular rate and rhythm; No murmurs, rubs, or gallops.  PULMONARY: Clear to auscultation and perfusion.  No rales, wheezing, or rhonchi bilaterally.  ABDOMEN: Soft, Nontender, Nondistended; Bowel sounds present  EXTREMITIES:  2+ Peripheral Pulses, No clubbing, cyanosis, or edema  NEUROLOGICAL: Grossly nonfocal    INTERPRETATION OF TELEMETRY:    ECG:      LABS:                        8.0    2.86  )-----------( 293      ( 01 Oct 2024 04:51 )             25.6     10-01    140  |  110  |  55[H]  ----------------------------<  133[H]  4.9   |  17  |  2.2[H]    Ca    10.1      01 Oct 2024 04:51  Phos  5.6     09-30  Mg     2.3     09-30    TPro  3.8[L]  /  Alb  2.8[L]  /  TBili  0.5  /  DBili  x   /  AST  6   /  ALT  9   /  AlkPhos  125[H]  10-01          Urinalysis Basic - ( 01 Oct 2024 04:51 )    Color: x / Appearance: x / SG: x / pH: x  Gluc: 133 mg/dL / Ketone: x  / Bili: x / Urobili: x   Blood: x / Protein: x / Nitrite: x   Leuk Esterase: x / RBC: x / WBC x   Sq Epi: x / Non Sq Epi: x / Bacteria: x      BNP      RADIOLOGY & ADDITIONAL STUDIES:

## 2024-10-01 NOTE — PHYSICAL THERAPY INITIAL EVALUATION ADULT - ADDITIONAL COMMENTS
pt lives with family in a house with 20 steps to enter and 15 steps to the bedroom. he was independent prior to admission. pt lives with family in a house with 8 steps to enter and one flight steps to the bedroom. he was independent prior to admission.

## 2024-10-01 NOTE — CHART NOTE - NSCHARTNOTEFT_GEN_A_CORE
Labs reviewed  Leucopenia- around baseline. ANC >1500.   Hb stable after PRBC- planned for outpatient GI work up   Serum Kappa, juan jose ratio is normal. Urine Kappa and Lambda elevated, but ratio WNL.     Recommend continuing management for Sepsis (POA) 2/2 Multifocal PNA     Needs outpatient follow up with Dr Gong on discharge.     Plan Discussed with covering attending, Dr Clinton.

## 2024-10-01 NOTE — CONSULT NOTE ADULT - ASSESSMENT
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 on monthly somatostatin injections( follows Dr Gong).  Admitted for pna.  Pt is on low dose levophed.  He is sinus shamika in the 40s on tele (asymptomatic)    Plan:  Cont to monitor on tele  No indication for pacemaker at this time  Will re-evaluate once pt is off levophed  Avoid AVN blocking agents

## 2024-10-01 NOTE — PROGRESS NOTE ADULT - ASSESSMENT
IMPRESSION:    Acute hypoxemic respiratory failure  Multifocal pneumonia/ possible organizing [id number]   Possible recurrent aspiration  Anemia  Hypercalcemia low PTH  HO CKD - Cr stable.   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     HEENT: Oral care.    PULMONARY:  HOB @ 45 degrees.  Keep SaO2 92 TO 96%.  Wean o2 as tolerated.  Solumedrol 40 q 12. CXR noted. WIll Will need OP CT     CARDIOVASCULAR: Avoid fluid overload. DC IVF.  GDFR.  Wean Levophed. EPS eval.     GI: GI prophylaxis.  Feeding per S&S.    RENAL:  Follow up lytes.  Correct as needed. Trend Cr. PO bicarb 1300 Q 8    INFECTIOUS DISEASE: Zosyn.  Change Doxy to Azithromycin.  Cultures negative to date     HEMATOLOGICAL:  DVT prophylaxis    ENDOCRINE:  Follow up FS.  Insulin protocol if needed. TSH noted.     MUSCULOSKELETAL: OOBTC.  PT OT.     GOC  SDU for now.   poor prognosis

## 2024-10-02 LAB
ALBUMIN SERPL ELPH-MCNC: 3.1 G/DL — LOW (ref 3.5–5.2)
ALP SERPL-CCNC: 113 U/L — SIGNIFICANT CHANGE UP (ref 30–115)
ALT FLD-CCNC: 10 U/L — SIGNIFICANT CHANGE UP (ref 0–41)
ANION GAP SERPL CALC-SCNC: 10 MMOL/L — SIGNIFICANT CHANGE UP (ref 7–14)
AST SERPL-CCNC: 8 U/L — SIGNIFICANT CHANGE UP (ref 0–41)
BASOPHILS # BLD AUTO: 0.01 K/UL — SIGNIFICANT CHANGE UP (ref 0–0.2)
BASOPHILS NFR BLD AUTO: 0.3 % — SIGNIFICANT CHANGE UP (ref 0–1)
BILIRUB SERPL-MCNC: 0.5 MG/DL — SIGNIFICANT CHANGE UP (ref 0.2–1.2)
BUN SERPL-MCNC: 56 MG/DL — HIGH (ref 10–20)
CALCIUM SERPL-MCNC: 9.5 MG/DL — SIGNIFICANT CHANGE UP (ref 8.4–10.5)
CHLORIDE SERPL-SCNC: 110 MMOL/L — SIGNIFICANT CHANGE UP (ref 98–110)
CO2 SERPL-SCNC: 19 MMOL/L — SIGNIFICANT CHANGE UP (ref 17–32)
CREAT SERPL-MCNC: 2.2 MG/DL — HIGH (ref 0.7–1.5)
CULTURE RESULTS: SIGNIFICANT CHANGE UP
CULTURE RESULTS: SIGNIFICANT CHANGE UP
EGFR: 32 ML/MIN/1.73M2 — LOW
EOSINOPHIL # BLD AUTO: 0 K/UL — SIGNIFICANT CHANGE UP (ref 0–0.7)
EOSINOPHIL NFR BLD AUTO: 0 % — SIGNIFICANT CHANGE UP (ref 0–8)
FERRITIN SERPL-MCNC: 620 NG/ML — HIGH (ref 30–400)
FOLATE SERPL-MCNC: 3.3 NG/ML — LOW
GLUCOSE SERPL-MCNC: 114 MG/DL — HIGH (ref 70–99)
HAPTOGLOB SERPL-MCNC: 182 MG/DL — SIGNIFICANT CHANGE UP (ref 34–200)
HCT VFR BLD CALC: 26 % — LOW (ref 42–52)
HGB BLD-MCNC: 8.1 G/DL — LOW (ref 14–18)
IMM GRANULOCYTES NFR BLD AUTO: 13.8 % — HIGH (ref 0.1–0.3)
LYMPHOCYTES # BLD AUTO: 0.27 K/UL — LOW (ref 1.2–3.4)
LYMPHOCYTES # BLD AUTO: 7.9 % — LOW (ref 20.5–51.1)
MCHC RBC-ENTMCNC: 27.3 PG — SIGNIFICANT CHANGE UP (ref 27–31)
MCHC RBC-ENTMCNC: 31.2 G/DL — LOW (ref 32–37)
MCV RBC AUTO: 87.5 FL — SIGNIFICANT CHANGE UP (ref 80–94)
MONOCYTES # BLD AUTO: 0.48 K/UL — SIGNIFICANT CHANGE UP (ref 0.1–0.6)
MONOCYTES NFR BLD AUTO: 14.1 % — HIGH (ref 1.7–9.3)
NEUTROPHILS # BLD AUTO: 2.17 K/UL — SIGNIFICANT CHANGE UP (ref 1.4–6.5)
NEUTROPHILS NFR BLD AUTO: 63.9 % — SIGNIFICANT CHANGE UP (ref 42.2–75.2)
NRBC # BLD: 0 /100 WBCS — SIGNIFICANT CHANGE UP (ref 0–0)
PLATELET # BLD AUTO: 267 K/UL — SIGNIFICANT CHANGE UP (ref 130–400)
PMV BLD: 11.3 FL — HIGH (ref 7.4–10.4)
POTASSIUM SERPL-MCNC: 4.8 MMOL/L — SIGNIFICANT CHANGE UP (ref 3.5–5)
POTASSIUM SERPL-SCNC: 4.8 MMOL/L — SIGNIFICANT CHANGE UP (ref 3.5–5)
PROCALCITONIN SERPL-MCNC: 0.49 NG/ML — HIGH (ref 0.02–0.1)
PROT SERPL-MCNC: 3.8 G/DL — LOW (ref 6–8)
RBC # BLD: 2.97 M/UL — LOW (ref 4.7–6.1)
RBC # FLD: 15.9 % — HIGH (ref 11.5–14.5)
SODIUM SERPL-SCNC: 139 MMOL/L — SIGNIFICANT CHANGE UP (ref 135–146)
SPECIMEN SOURCE: SIGNIFICANT CHANGE UP
SPECIMEN SOURCE: SIGNIFICANT CHANGE UP
VIT B12 SERPL-MCNC: >2000 PG/ML — HIGH (ref 232–1245)
WBC # BLD: 3.4 K/UL — LOW (ref 4.8–10.8)
WBC # FLD AUTO: 3.4 K/UL — LOW (ref 4.8–10.8)

## 2024-10-02 PROCEDURE — 99233 SBSQ HOSP IP/OBS HIGH 50: CPT

## 2024-10-02 PROCEDURE — 71045 X-RAY EXAM CHEST 1 VIEW: CPT | Mod: 26

## 2024-10-02 PROCEDURE — 90792 PSYCH DIAG EVAL W/MED SRVCS: CPT

## 2024-10-02 RX ORDER — FOLIC ACID 1 MG/1
1 TABLET ORAL DAILY
Refills: 0 | Status: DISCONTINUED | OUTPATIENT
Start: 2024-10-02 | End: 2024-10-16

## 2024-10-02 RX ORDER — DOPAMINE HCL 200 MG/5ML
5 VIAL (ML) INTRAVENOUS
Qty: 400 | Refills: 0 | Status: DISCONTINUED | OUTPATIENT
Start: 2024-10-02 | End: 2024-10-03

## 2024-10-02 RX ORDER — ATROPINE SULFATE 0.4 MG/ML
1 AMPUL (ML) INJECTION ONCE
Refills: 0 | Status: COMPLETED | OUTPATIENT
Start: 2024-10-02 | End: 2024-10-02

## 2024-10-02 RX ORDER — FLUPHENAZINE HCL 10 MG
2.5 TABLET ORAL DAILY
Refills: 0 | Status: DISCONTINUED | OUTPATIENT
Start: 2024-10-02 | End: 2024-10-03

## 2024-10-02 RX ADMIN — Medication 17 GRAM(S): at 11:16

## 2024-10-02 RX ADMIN — AZITHROMYCIN 255 MILLIGRAM(S): 250 TABLET, FILM COATED ORAL at 13:23

## 2024-10-02 RX ADMIN — CHLORHEXIDINE GLUCONATE ORAL RINSE 1 APPLICATION(S): 1.2 SOLUTION DENTAL at 05:54

## 2024-10-02 RX ADMIN — METHYLPREDNISOLONE ACETATE 40 MILLIGRAM(S): 80 INJECTION, SUSPENSION INTRA-ARTICULAR; INTRALESIONAL; INTRAMUSCULAR; SOFT TISSUE at 05:53

## 2024-10-02 RX ADMIN — Medication 1300 MILLIGRAM(S): at 05:54

## 2024-10-02 RX ADMIN — PIPERACILLIN SODIUM AND TAZOBACTAM SODIUM 25 GRAM(S): 12; 1.5 INJECTION, POWDER, LYOPHILIZED, FOR SOLUTION INTRAVENOUS at 05:53

## 2024-10-02 RX ADMIN — Medication 1 MILLIGRAM(S): at 21:51

## 2024-10-02 RX ADMIN — FOLIC ACID 1 MILLIGRAM(S): 1 TABLET ORAL at 11:16

## 2024-10-02 RX ADMIN — Medication 1300 MILLIGRAM(S): at 13:23

## 2024-10-02 RX ADMIN — METHYLPREDNISOLONE ACETATE 40 MILLIGRAM(S): 80 INJECTION, SUSPENSION INTRA-ARTICULAR; INTRALESIONAL; INTRAMUSCULAR; SOFT TISSUE at 17:13

## 2024-10-02 RX ADMIN — Medication 5000 UNIT(S): at 17:13

## 2024-10-02 RX ADMIN — Medication 2 MILLIGRAM(S): at 11:17

## 2024-10-02 RX ADMIN — Medication 5000 UNIT(S): at 05:54

## 2024-10-02 RX ADMIN — PIPERACILLIN SODIUM AND TAZOBACTAM SODIUM 25 GRAM(S): 12; 1.5 INJECTION, POWDER, LYOPHILIZED, FOR SOLUTION INTRAVENOUS at 21:51

## 2024-10-02 RX ADMIN — PIPERACILLIN SODIUM AND TAZOBACTAM SODIUM 25 GRAM(S): 12; 1.5 INJECTION, POWDER, LYOPHILIZED, FOR SOLUTION INTRAVENOUS at 13:23

## 2024-10-02 RX ADMIN — PANTOPRAZOLE SODIUM 40 MILLIGRAM(S): 40 TABLET, DELAYED RELEASE ORAL at 17:13

## 2024-10-02 RX ADMIN — PANTOPRAZOLE SODIUM 40 MILLIGRAM(S): 40 TABLET, DELAYED RELEASE ORAL at 05:53

## 2024-10-02 RX ADMIN — Medication 1300 MILLIGRAM(S): at 21:51

## 2024-10-02 RX ADMIN — Medication 17 MICROGRAM(S)/KG/MIN: at 13:23

## 2024-10-02 RX ADMIN — Medication 1 MILLIGRAM(S): at 03:28

## 2024-10-02 RX ADMIN — Medication 1 TABLET(S): at 05:54

## 2024-10-02 RX ADMIN — Medication 2.5 MILLIGRAM(S): at 13:23

## 2024-10-02 NOTE — PROGRESS NOTE ADULT - SUBJECTIVE AND OBJECTIVE BOX
ELDA COVARRUBIAS  68y Male    CHIEF COMPLAINT:    Patient is a 68y old  Male who presents with a chief complaint of multifocal pna (01 Oct 2024 14:10)    INTERVAL HPI/OVERNIGHT EVENTS:    Patient seen and examined. bradycardic overnight s/p atropine, remains on levophed 0.02    ROS: All other systems are negative.    Vital Signs:    T(F): 96 (10-02-24 @ 08:00), Max: 98.4 (10-01-24 @ 11:35)  HR: 49 (10-02-24 @ 08:00) (42 - 63)  BP: 110/58 (10-02-24 @ 08:00) (96/54 - 117/56)  RR: 18 (10-02-24 @ 08:00) (18 - 18)  SpO2: 96% (10-02-24 @ 08:00) (94% - 99%)    01 Oct 2024 07:01  -  02 Oct 2024 07:00  --------------------------------------------------------  IN: 428.2 mL / OUT: 2500 mL / NET: -2071.8 mL    Daily Height in cm: 182.88 (01 Oct 2024 15:30)    Daily Weight in k (02 Oct 2024 00:00)    PHYSICAL EXAM:    GENERAL:  NAD pale  SKIN: No rashes or lesions  HEENT: Atraumatic. Normocephalic.    NECK: Supple, No JVD   PULMONARY: bibasilar cracklesB/L. No wheezing. No rales  CVS: Normal S1, S2. Rate and Rhythm are regular.  ABDOMEN/GI: Soft, Nontender, Nondistended  MSK:  No  clubbing or cyanosis   NEUROLOGIC:  follows commands   PSYCH: Awake and alert     Consultant(s) Notes Reviewed:  [x ] YES  [ ] NO  Care Discussed with Consultants/Other Providers [ x] YES  [ ] NO    LABS:                        8.1    3.40  )-----------( 267      ( 02 Oct 2024 05:30 )             26.0     139  |  110  |  56[H]  ----------------------------<  114[H]  4.8   |  19  |  2.2[H]    Ca    9.5      02 Oct 2024 05:30    TPro  3.8[L]  /  Alb  3.1[L]  /  TBili  0.5  /  DBili  x   /  AST  8   /  ALT  10  /  AlkPhos  113  10-    RADIOLOGY & ADDITIONAL TESTS:  Imaging or report Personally Reviewed:  [x] YES  [ ] NO  EKG reviewed: [x] YES  [ ] NO    Medications:  Standing  azithromycin  IVPB 500 milliGRAM(s) IV Intermittent every 24 hours  benztropine 1 milliGRAM(s) Oral at bedtime  benztropine 2 milliGRAM(s) Oral daily  chlorhexidine 2% Cloths 1 Application(s) Topical <User Schedule>  folic acid 1 milliGRAM(s) Oral daily  heparin   Injectable 5000 Unit(s) SubCutaneous every 12 hours  methylPREDNISolone sodium succinate Injectable 40 milliGRAM(s) IV Push two times a day  norepinephrine Infusion 0.06 MICROgram(s)/kG/Min IV Continuous <Continuous>  pantoprazole  Injectable 40 milliGRAM(s) IV Push two times a day  piperacillin/tazobactam IVPB.. 3.375 Gram(s) IV Intermittent every 8 hours  polyethylene glycol 3350 17 Gram(s) Oral daily  senna 1 Tablet(s) Oral two times a day  sodium bicarbonate 1300 milliGRAM(s) Oral three times a day    PRN Meds  acetaminophen     Tablet .. 650 milliGRAM(s) Oral every 6 hours PRN  aluminum hydroxide/magnesium hydroxide/simethicone Suspension 30 milliLiter(s) Oral every 4 hours PRN  atropine Injectable 1 milliGRAM(s) IV Push once PRN  guaiFENesin Oral Liquid (Sugar-Free) 100 milliGRAM(s) Oral every 6 hours PRN  melatonin 3 milliGRAM(s) Oral at bedtime PRN  ondansetron Injectable 4 milliGRAM(s) IV Push every 8 hours PRN

## 2024-10-02 NOTE — BH CONSULTATION LIAISON ASSESSMENT NOTE - NSBHCHARTREVIEWLAB_PSY_A_CORE FT
10-02    139  |  110  |  56[H]  ----------------------------<  114[H]  4.8   |  19  |  2.2[H]    Ca    9.5      02 Oct 2024 05:30    TPro  3.8[L]  /  Alb  3.1[L]  /  TBili  0.5  /  DBili  x   /  AST  8   /  ALT  10  /  AlkPhos  113  10-02                            8.1    3.40  )-----------( 267      ( 02 Oct 2024 05:30 )             26.0

## 2024-10-02 NOTE — PROGRESS NOTE ADULT - ASSESSMENT
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    A&P     #Sepsis (POA) 2/2 Multifocal PNA associated with small parapneumonic rt pleural effusion   - Sepsis POA (SIRS+ with source : T 100.7 ,  , and pneumonia)  - CT angio chest: Multilobar consolidative opacities in bilateral lungs, consistent with  multifocal pneumonia. Small right pleural effusion and trace left pleural effusion.  - C/W Zosyn  - C/W azithromycin 2/2 anti-inflammatory properties   - C/W with Solumedrol 40mg IV q12hr (for organizing pneumonia treatment)  - Blood Cx negative   - Sputum Cx: GN and GP cocci                 #Sinus bradycardia  - Atropine prn 30-40s or symptomatic   - Avoid av node blockers    - TSH WNL   - Per EP: No indication for PPM-> once patient is off pressor they will re-evaluate  - Dopamine infusion started            # Acute on chronic anemia requiring 1xpRBC  - Patient baseline Hb 9-10  - Hb on 9/29/2024: 7.8 , MCV 88 , RDW 16.6%   - S/p 1 unit PRBC's in ED  - GI consulted: Outpatient EGD/ colonoscopy   - Trend CBC daily and transfuse prn to target Hgb >7   - Keep active T&S  - Iron studies consistent with anemia of chronic disease    #Moderate symptomatic Hypercalcemia ISO of malignancy   - Hx of Marginal zone lymphoma. hx of Pancreatic neuroendocrine tumor   - On Monthly somatostatin injections ( last taken on 9/16)  - PTH: low, Vit D low,   - F/U PTH-related peptide    #Small radha-cardial effusion - chronic  - Stable small pericardial effusion seen on CT scan , was also reported on multiple previous ECHO;s  - Monitor for now     #CKD 4  #Hx of NSTEMI type II ISO of CKD   - Hx of nephrolithiasis c/b atrophic L kidney, R ureteral revision   - CKD4 (bsl Cr ~2.5)   - s/p IV contrast on admission   - continue IVF  - continue sodium bicarb 1300mg TID   - Trops elevated likely secondary to CKD , EKG NSR     # Elevated ALP- chronic   - Can be 2/2 hepatic involvement of lymphoma/ bone involvement     #Schizophrenia  - Home Prolixin discontinued 2/2 AV chemo blocking risk   - Psych consulted for anti-psychotic medication recommendations     #Hyponatremia- resolved     #DVT PPx: Heparin subq  #GI PPx: PPI   #Diet: Renal     #Hand-off: Monitor BP and HR on dopamine infusion. Follow-up psych consult.

## 2024-10-02 NOTE — PROGRESS NOTE ADULT - ASSESSMENT
ASSESSMENT  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 on monthly somatostatin injections( follows Dr Gong )  left elbow fracture s/p [facility]IF @[facility] ~30 years ago, comes in c/o weakness/SOB and cough. cough is productive of yellow sputum and has been going on for 3 months with 10 lb weight loss over the last 3 months ,  he also states weakness started 2 wks ago, getting worse, today fell while trying to ambulate due to weakness. No head trauma. No LOC. No CP. Reports SOB on exertion. No abdominal pain. No n/v/c/d. Last chemo > 2 mo ago. No fever/chills. Of note he took abx course for PNA prescribed by his PMD     IMPRESSION  #Septic Shock   #Multifocal pneumonia   - recent antibiotic course by PMD  - CT Angio Chest PE Protocol w/ IV Cont (09.27.24 @ 00:54): IMPRESSION: No pulmonary embolism. Multilobar consolidative opacities in bilateral lungs, consistent with   multifocal pneumonia. Small right pleural effusion and trace left pleural effusion. Small pericardial effusion. Multistation lymphadenopathy though a component could be reactive given  the extensive lung consolidations, suspect also related to patient's  known history of lymphoma. Again noted is splenomegaly.    #Marginal Zone Lymphoma  #Pancreatic neuroendocrine tumor   #CKD   #Schizophrenia     #DM   #Abx allergy: allopurinol (Rash (Moderate))    RECOMMENDATIONS  - stop azithromycin   - continue zosyn 3.375 mg q 8 hours -- procalcitonin/CXR reviewed   - will plan to continue until 10/7  - steroids by primary     Please call or message on [employer] Teams if with any questions.  Spectra [phone]

## 2024-10-02 NOTE — DIETITIAN INITIAL EVALUATION ADULT - OTHER CALCULATIONS
Energy: 1729-2075kcal/day (using MSJ 1-1.2AF -- cancer + overwt BMI considered)   Protein: 109-118g/day (using 1.2-1.3g/kg -- same reason as above)   Fluid: 1mL/kcal/day

## 2024-10-02 NOTE — DIETITIAN INITIAL EVALUATION ADULT - PERTINENT LABORATORY DATA
10-02    139  |  110  |  56[H]  ----------------------------<  114[H]  4.8   |  19  |  2.2[H]    Ca    9.5      02 Oct 2024 05:30    TPro  3.8[L]  /  Alb  3.1[L]  /  TBili  0.5  /  DBili  x   /  AST  8   /  ALT  10  /  AlkPhos  113  10-02
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
## 2024-10-02 NOTE — DIETITIAN INITIAL EVALUATION ADULT - ORAL INTAKE PTA/DIET HISTORY
pt reports good PO/appetite PTA, consumes 2-3 meals/day. Reports UBW: 86.3kg vs. wt at admit: 90.7kg -- no wt loss reported or noted. NKFA. No MVI.

## 2024-10-02 NOTE — DIETITIAN INITIAL EVALUATION ADULT - OTHER INFO
-- 68y old  Male who presents with a chief complaint of multifocal pna   # Septic shock  (POA) due to suspected Multifocal PNA in immunocompromised patient  #Multifactorial anemia / neutropenia   #Hypercalcemia likely secondary to malignancy   #ASHLEY on  CKD 4   #metabolic acidosis   #NSTEMI type II in the setting of CKD

## 2024-10-02 NOTE — DIETITIAN INITIAL EVALUATION ADULT - ADD RECOMMEND
1. Add Ensure plus HP daily (350kcal/20g PRO)   2. d/c renal restrictions as pt is not on HD and rod snot restrict protein   3. add Regular diet order   4. encourage and assist with PO intake

## 2024-10-02 NOTE — BH CONSULTATION LIAISON ASSESSMENT NOTE - NSBHMSEAFFQUAL_PSY_A_CORE
Hospitalist Progress Note 3/4/2019 Admit Date: 3/3/2019  5:28 PM  
NAME: Deborah Gresham :  1953 MRN:  [id number] Attending: Dinah Apley, MD 
PCP:  Vashti Glez MD 
 
SUBJECTIVE:  
 
Deborah Gresham is a 61years old female with pmhx chronic pain on chronic opioid medications admitted on 3/3 for opioid withdrawal. Pt reported having skin crawling and itching sensation, confusion. Pt was taking oral opioids for past 1 week since her pump stopped working. Pt was started on IV dilaudid and IV fluids in ER, following which symptoms resolved. 3/4: 
Pt seen at bedside. Sitting up in bed, reading a novel. She reports feeling better. Denies any skin crawling or itching or pain, nausea/vomiting, abdominal pain, diarrhea, fever. No other overnight events. She is asking for IV dilaudid dose. Review of Systems negative with exception of pertinent positives noted above PHYSICAL EXAM  
 
 
Visit Vitals /73 (BP 1 Location: Right arm, BP Patient Position: At rest;Supine) Pulse (!) 53 Temp 98.3 °F (36.8 °C) Resp 18 Ht 5' 8\" (1.727 m) Wt 72.6 kg (160 lb) SpO2 96% BMI 24.33 kg/m² Temp (24hrs), Av.7 °F (36.5 °C), Min:96.1 °F (35.6 °C), Max:98.4 °F (36.9 °C) Oxygen Therapy O2 Sat (%): 96 % (19 0748) Pulse via Oximetry: 50 beats per minute (19) O2 Device: Room air (19 1709) Intake/Output Summary (Last 24 hours) at 3/4/2019 1013 Last data filed at 3/4/2019 0345 Gross per 24 hour Intake  Output 400 ml Net -400 ml General: No acute distress. Head:  Atraumatic Normocephalic. Eyes:  PERRLA, EOMI, Anicteric. ENT:  No discharges/lesions. Lungs:  CTA Bilaterally. CVS:  Regular rate and rhythm,  No murmur, rub, or gallop, No JVD, No lower   extremity edema. Abdomen: Soft, Non distended, Non tender, Positive bowel sounds. MSK:  No deformities, lesions, Spontaneously moves extremities. Neurologic:  GCS 15, no motor or sensory loss, CN 2-12 intact Psychiatry:      AOx 3, mood and affect appropriate Skin:   No rash/lesions. Good skin turgor Heme/Lymph/Immune:  No petechiae, ecchymoses, overt signs of bleeding or    lymphadenopathy noted. Recent Results (from the past 24 hour(s)) CBC WITH AUTOMATED DIFF Collection Time: 03/03/19  5:39 PM  
Result Value Ref Range WBC 9.3 4.3 - 11.1 K/uL  
 RBC 5.13 4.05 - 5.2 M/uL  
 HGB 15.0 11.7 - 15.4 g/dL HCT 44.0 35.8 - 46.3 % MCV 85.8 79.6 - 97.8 FL  
 MCH 29.2 26.1 - 32.9 PG  
 MCHC 34.1 31.4 - 35.0 g/dL  
 RDW 13.7 11.9 - 14.6 % PLATELET 268 829 - 385 K/uL MPV 10.8 9.4 - 12.3 FL ABSOLUTE NRBC 0.00 0.0 - 0.2 K/uL  
 DF AUTOMATED NEUTROPHILS 59 43 - 78 % LYMPHOCYTES 34 13 - 44 % MONOCYTES 7 4.0 - 12.0 % EOSINOPHILS 0 (L) 0.5 - 7.8 % BASOPHILS 1 0.0 - 2.0 % IMMATURE GRANULOCYTES 0 0.0 - 5.0 %  
 ABS. NEUTROPHILS 5.5 1.7 - 8.2 K/UL  
 ABS. LYMPHOCYTES 3.1 0.5 - 4.6 K/UL  
 ABS. MONOCYTES 0.6 0.1 - 1.3 K/UL  
 ABS. EOSINOPHILS 0.0 0.0 - 0.8 K/UL  
 ABS. BASOPHILS 0.1 0.0 - 0.2 K/UL  
 ABS. IMM. GRANS. 0.0 0.0 - 0.5 K/UL METABOLIC PANEL, COMPREHENSIVE Collection Time: 03/03/19  5:39 PM  
Result Value Ref Range Sodium 136 136 - 145 mmol/L Potassium 4.9 3.5 - 5.1 mmol/L Chloride 106 98 - 107 mmol/L  
 CO2 20 (L) 21 - 32 mmol/L Anion gap 10 mmol/L Glucose 110 (H) 65 - 100 mg/dL BUN 20 8 - 23 MG/DL Creatinine 0.86 0.6 - 1.0 MG/DL  
 GFR est AA >60 >60 ml/min/1.73m2 GFR est non-AA >60 ml/min/1.73m2 Calcium 9.9 8.3 - 10.4 MG/DL Bilirubin, total 0.6 0.2 - 1.1 MG/DL  
 ALT (SGPT) 25 12 - 65 U/L  
 AST (SGOT) 33 15 - 37 U/L Alk. phosphatase 69 50 - 136 U/L Protein, total 8.4 g/dL Albumin 4.4 3.2 - 4.6 g/dL Globulin 4.0 (H) 2.3 - 3.5 g/dL A-G Ratio 1.1 TROPONIN I Collection Time: 03/03/19  5:39 PM  
Result Value Ref Range  Troponin-I, Qt. <0.02 (L) 0.02 - 0.05 NG/ML  
 METABOLIC PANEL, BASIC Collection Time: 03/04/19  5:52 AM  
Result Value Ref Range Sodium 140 136 - 145 mmol/L Potassium 3.5 3.5 - 5.1 mmol/L Chloride 109 (H) 98 - 107 mmol/L  
 CO2 22 21 - 32 mmol/L Anion gap 9 mmol/L Glucose 107 (H) 65 - 100 mg/dL BUN 18 8 - 23 MG/DL Creatinine 0.79 0.6 - 1.0 MG/DL  
 GFR est AA >60 >60 ml/min/1.73m2 GFR est non-AA >60 ml/min/1.73m2 Calcium 8.4 8.3 - 10.4 MG/DL  
CBC W/O DIFF Collection Time: 03/04/19  5:52 AM  
Result Value Ref Range WBC 6.5 4.3 - 11.1 K/uL  
 RBC 4.11 4.05 - 5.2 M/uL  
 HGB 12.0 11.7 - 15.4 g/dL HCT 36.9 35.8 - 46.3 % MCV 89.8 79.6 - 97.8 FL  
 MCH 29.2 26.1 - 32.9 PG  
 MCHC 32.5 31.4 - 35.0 g/dL  
 RDW 13.2 11.9 - 14.6 % PLATELET 729 000 - 789 K/uL MPV 10.0 9.4 - 12.3 FL ABSOLUTE NRBC 0.00 0.0 - 0.2 K/uL Imaging Loretta Arthur All diagnostic procedure personally reviewed by me. EXAM: CT head without contrast. 
ADDITIONAL CLINICAL INFORMATION: Altered mental status. COMPARISON: None. 
  
Multiple axial images obtained through the brain without intravenous contrast.  
Radiation dose reduction techniques were used for this study: All CT scans 
performed at this facility use one or all of the following: Automated exposure 
control, adjustment of the mA and/or kVp according to patient's size, iterative 
reconstruction. 
  
FINDINGS: 
  
No evidence of intracranial mass, hemorrhage, or large territorial infarct. The 
ventricles are normal in size and position. The basal cisterns are patent. No 
extra-axial fluid collection or mass effect.  
  
The orbital contents are within normal limits. The paranasal sinuses are clear. The mastoid air cells and middle ears are clear. No significant osseous or 
extracranial soft tissue lesions. 
  
  
IMPRESSION IMPRESSION: 
1. No evidence of acute intracranial abnormality. ASSESSMENT Hospital Problems as of 3/4/2019 Never Reviewed Codes Class Noted - Resolved POA Opioid withdrawal ([facility] [address] ICD-10-CM: F11.23 
ICD-9-CM: 292.0, 304.00  3/4/2019 - Present Unknown Acute metabolic encephalopathy [id number]: G93.41 
ICD-9-CM: 348.31  3/3/2019 - Present Plan: 
- continue conservative treatment with IV fluids and symptomatic treatment for withdrawal with IV dilaudid. - will start scheduled oxicodone - CM will try to coordinate appointment at pain management clinic in [facility]. Continue other treatment as ordered Regular diet PT/OT eval 
 
DVT Prophylaxis: HEPARIN High risk with opioids Dispo: pending Pako Benavidez MD 
 
 Euthymic

## 2024-10-02 NOTE — PROGRESS NOTE ADULT - ASSESSMENT
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 on monthly somatostatin injections( follows Dr Gong )  left elbow fracture s/p ORIF @[facility] ~30 years ago, comes in c/o weakness/SOB and cough. cough is productive of yellow sputum and has been going on for 3 months with 10 lb weight loss over the last 3 months , progressively worsening weakness, s./p fall. No head trauma. No LOC. No CP. Reports SOB on exertion. No abdominal pain. No n/v/c/d. Last chemo > 2 mo ago.   Remains on Low dose levophed in SDU      Septic shock  (POA) due to suspected Multifocal PNA in immunocompromised patient  -  check IVC if  collapsing, will  give 1 L on LR, c/w maintenance fluids, reevaluate in 24 hours   - taper off  Levophed as tolerated,  keep MAP above 65   - sputum Cx noted  - c/w piperacillin/tazobactam IVPB.. 3.375 Gram(s) IV Intermittent every 8 hours and Zithromax   - ID follow up   - c/w solumedrol 40 mg Q 12 hours   - supportive care, aspiration precautions   - nebs Q 6 hours   - pulmonary is following, recommendations noted     # Sinus bradycardia  - HR is dropping below 40  - EP consult is pending   - TSH WNL   - given atropine, atropine prn 30-40s or symptomatic   - consult cardiology   - avoid av node blockers     - fluphenazine held     # Multifactorial anemia / neutropenia   - s/p 1 PRBC in ER  - no evidence of bleeding , ED called GI , pt needs follow up   - c/w PPI IV BID - although anemia could be secondary to underlying malignancy   -  Colonoscopy 2023- Cecal polyp, EMR: Tubular adenoma fragments showing foci of high-grade dysplasia and focal  intramucosal adenocarcinoma.  Plan was for 6month follow up   -  hematology/oncology  follow up   - monitor WBC count   - monitor H/H, keep Hb above 7.5     # Hypercalcemia likely secondary to malignancy / hx of Marginal zone lymphoma/ hx of Pancreatic neuroendocrine tumor   - on Monthly somatostatin injections - taken 9/16  - IV hydration, monitor corrected Ca level   - PTH:low  , PTHrP:pending   - start Vit D supplements   -  patient supposed to do PET scan OP to check status of his lymphoma but lost for follow up   - Heme-Onc eval appreciated, pt needs follow up      #  Small radha-cardial effusion   - chronic  - stable small pericardial effusion seen on CT scan , was also reported on multiple previous ECHO;s      # ASHLEY on  CKD 4 /metabolic acidosis /  NSTEMI type II in the setting of CKD   - IV hydration, maintain fluid balance   - monitor BUN/cr and urine output   - hx of nephrolithiasis c/b atrophic L kidney, R ureteral rivision, CKD4 (bsl Cr ~2.5)   - s/p IV contrast on admission   - sodium bicarb 1300mg TID , monitor bicarb level   - Trops elevated likely secondary to CKD   - pt is chest pain free  -  EKG NSR   - telemetry monitoring       #  Elevated ALP  - possibly due to hepatic involvement of lymphoma/ bone involvement   - monitor levels     # Schizophrenia  - continue home Prolixin and cogentin  - supportive care       # GI/DVT prophylaxis         #Progress Note Handoff  Pending (specify):  f/u EP recommendations, monitor HR, check IVC if collapsable, give 1 L LA, c/w maintanence fluids for one more day, taper off Levophed as tolerated, , monitor BUN/Cr, urine output, H/H, GI, ID and oncology follow up, supportive care   Family discussion: I spoke with pt and his father at the bedside, they agreed with a plan of care   Disposition: SDU      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 on monthly somatostatin injections( follows Dr Reyes )  left elbow fracture s/p [facility]IF @[facility] ~30 years ago, comes in c/o weakness/SOB and cough. cough is productive of yellow sputum and has been going on for 3 months with 10 lb weight loss over the last 3 months , progressively worsening weakness, s./p fall. No head trauma. No LOC. No CP. Reports SOB on exertion. No abdominal pain. No n/v/c/d. Last chemo > 2 mo ago.   Remains on Low dose levophed in SDU      Septic shock  (POA) due to suspected Multifocal PNA in immunocompromised patient  - remains on Levophed 0.02, on room air  - chest xray with worsening congestion. Hold IVF for now. Discussed with pulm/cc, will bedside POCUS today  - taper off  Levophed as tolerated,  keep MAP above 65 . FU with EP if dopamine is an alternative given bradycardia   - c/w piperacillin/tazobactam IVPB.. 3.375 Gram(s) IV Intermittent every 8 hours and Zithromax   - ID follow up re: duration of abx   - c/w solumedrol 40 mg Q 12 hours   - supportive care, aspiration precautions   - nebs Q 6 hours PRN     Sinus bradycardia  - HR is dropping below 40. S/P atropine overnight for HR ~30  - telemetry/EKGs reviewed   - EP/Cardiology following   - TSH WNL   - avoid av node blockers     - fluphenazine held      Multifactorial anemia / neutropenia   - s/p 1 PRBC in ER  - no evidence of bleeding currently   - c/w PPI IV BID   -  Colonoscopy 2023- Cecal polyp, EMR: Tubular adenoma fragments showing foci of high-grade dysplasia and focal  intramucosal adenocarcinoma.  Plan was for 6month follow up   - appreciate heme onc fu, patient planned for OP work up and fu with Dr Reyes     Hypercalcemia likely secondary to malignancy   hx of Marginal zone lymphoma  hx of Pancreatic neuroendocrine tumor   - on Monthly somatostatin injections - last 9/16  - s/p IV hydration   - PTH:low  , PTHrP:pending   - monitor Ca, s/p heme onc eval     Small pericardial effusion - chronic   - stable small pericardial effusion seen on CT scan , was also reported on multiple previous ECHO;s    ASHLEY on  CKD 4   metabolic acidosis   NSTEMI type II in the setting of CKD   - hx of nephrolithiasis c/b atrophic L kidney, R ureteral rivision, CKD4 (bsl Cr ~2.5)   - s/p IV contrast on admission   - sodium bicarb 1300mg TID , monitor bicarb level   - Trops elevated likely secondary to CKD   - pt is chest pain free  -  EKG NSR   - telemetry monitoring     Elevated ALP  - possibly due to hepatic involvement of lymphoma/ bone involvement   - monitor levels     Schizophrenia  - continue home cogentin. Prolixin held  - psychiatry fu for med optimization     FUll code overall prognosis is guarded, continue monitoring in SDU    Patient seen at bedside, total time spent to evaluate and treat the patient's acute illness and chronic medical conditions as well as time spent reviewing prior records, labs, radiology, documenting in electronic medical records,  discussing medical plan with   medical team was more than 50 minutes with >50% of time spent face to face with patient, discussing with patient/family as well as coordination of care

## 2024-10-02 NOTE — BH CONSULTATION LIAISON ASSESSMENT NOTE - NSSUICPROTFACT_PSY_ALL_CORE
Identifies reasons for living/Supportive social network of family or friends/Positive therapeutic relationships/Ability to cope with stress/Frustration tolerance/Religion beliefs

## 2024-10-02 NOTE — DIETITIAN INITIAL EVALUATION ADULT - ENTERAL
Nutrition Intervention:meals and snacks,medical food supplements   Nutrition Monitoring:diet order,energy intake,body composition,NFPF, lytes, GI (BM), BG

## 2024-10-02 NOTE — PROGRESS NOTE ADULT - SUBJECTIVE AND OBJECTIVE BOX
Patient is a 68y old  Male who presents with a chief complaint of multifocal pna (01 Oct 2024 14:10)        Over Night Events:  SP atropine overnight. Afebrile. On levophed 0.02.       ROS:     All ROS are negative except HPI         PHYSICAL EXAM    ICU Vital Signs Last 24 Hrs  T(C): 35.6 (02 Oct 2024 08:00), Max: 36.9 (01 Oct 2024 11:35)  T(F): 96 (02 Oct 2024 08:00), Max: 98.4 (01 Oct 2024 11:35)  HR: 49 (02 Oct 2024 08:00) (42 - 63)  BP: 110/58 (02 Oct 2024 08:00) (96/54 - 117/56)  BP(mean): 82 (02 Oct 2024 08:00) (72 - 82)  ABP: --  ABP(mean): --  RR: 18 (02 Oct 2024 08:00) (18 - 18)  SpO2: 96% (02 Oct 2024 08:00) (94% - 99%)    O2 Parameters below as of 01 Oct 2024 20:00  Patient On (Oxygen Delivery Method): room air            CONSTITUTIONAL:  ill appearing     ENT:   Airway patent,   Mouth with normal mucosa.     EYES:   Pupils equal,   Round and reactive to light.    CARDIAC:   bradycardic   No edema    RESPIRATORY:   Bilateral BS  Normal chest expansion  Not tachypneic,  No use of accessory muscles    GASTROINTESTINAL:  Abdomen soft,   Non-tender,   No guarding,     MUSCULOSKELETAL:   Range of motion is not limited,  No clubbing, cyanosis    NEUROLOGICAL:   Alert  Follows commands     SKIN:   Skin normal color for race,   Warm and dry and intact.         10-01-24 @ 07:01  -  10-02-24 @ 07:00  --------------------------------------------------------  IN:    IV PiggyBack: 350 mL    Norepinephrine: 78.2 mL  Total IN: 428.2 mL    OUT:    Voided (mL): 2500 mL  Total OUT: 2500 mL    Total NET: -2071.8 mL          LABS:                            8.1    3.40  )-----------( 267      ( 02 Oct 2024 05:30 )             26.0                                               10-02    139  |  110  |  56[H]  ----------------------------<  114[H]  4.8   |  19  |  2.2[H]    Ca    9.5      02 Oct 2024 05:30    TPro  3.8[L]  /  Alb  3.1[L]  /  TBili  0.5  /  DBili  x   /  AST  8   /  ALT  10  /  AlkPhos  113  10-02                                             Urinalysis Basic - ( 02 Oct 2024 05:30 )    Color: x / Appearance: x / SG: x / pH: x  Gluc: 114 mg/dL / Ketone: x  / Bili: x / Urobili: x   Blood: x / Protein: x / Nitrite: x   Leuk Esterase: x / RBC: x / WBC x   Sq Epi: x / Non Sq Epi: x / Bacteria: x                                                  LIVER FUNCTIONS - ( 02 Oct 2024 05:30 )  Alb: 3.1 g/dL / Pro: 3.8 g/dL / ALK PHOS: 113 U/L / ALT: 10 U/L / AST: 8 U/L / GGT: x                                                                                                                                       MEDICATIONS  (STANDING):  azithromycin  IVPB 500 milliGRAM(s) IV Intermittent every 24 hours  benztropine 1 milliGRAM(s) Oral at bedtime  benztropine 2 milliGRAM(s) Oral daily  chlorhexidine 2% Cloths 1 Application(s) Topical <User Schedule>  folic acid 1 milliGRAM(s) Oral daily  heparin   Injectable 5000 Unit(s) SubCutaneous every 12 hours  methylPREDNISolone sodium succinate Injectable 40 milliGRAM(s) IV Push two times a day  norepinephrine Infusion 0.06 MICROgram(s)/kG/Min (10.2 mL/Hr) IV Continuous <Continuous>  pantoprazole  Injectable 40 milliGRAM(s) IV Push two times a day  piperacillin/tazobactam IVPB.. 3.375 Gram(s) IV Intermittent every 8 hours  polyethylene glycol 3350 17 Gram(s) Oral daily  senna 1 Tablet(s) Oral two times a day  sodium bicarbonate 1300 milliGRAM(s) Oral three times a day    MEDICATIONS  (PRN):  acetaminophen     Tablet .. 650 milliGRAM(s) Oral every 6 hours PRN Temp greater or equal to 38C (100.4F), Mild Pain (1 - 3)  aluminum hydroxide/magnesium hydroxide/simethicone Suspension 30 milliLiter(s) Oral every 4 hours PRN Dyspepsia  atropine Injectable 1 milliGRAM(s) IV Push once PRN shamika <40 or symptomatic pt  guaiFENesin Oral Liquid (Sugar-Free) 100 milliGRAM(s) Oral every 6 hours PRN Cough  melatonin 3 milliGRAM(s) Oral at bedtime PRN Insomnia  ondansetron Injectable 4 milliGRAM(s) IV Push every 8 hours PRN Nausea and/or Vomiting      New X-rays reviewed:                                                                                  ECHO

## 2024-10-02 NOTE — BH CONSULTATION LIAISON ASSESSMENT NOTE - RISK ASSESSMENT
Risk factors include past psychiatric hospitalization and schizophrenia diagnosis. Protective factors include family support, good therapeutic relationships, problem solving capabilities. No previous suicide history.

## 2024-10-02 NOTE — PROGRESS NOTE ADULT - SUBJECTIVE AND OBJECTIVE BOX
ELDA COVARRUBIAS 68y Male  MRN#: [id number]     Hospital Day: 5d    SUBJECTIVE     Patient was bradycardic overnight requiring a dose of atropine. This morning, patient reports feeling well and has no acute complaints.                                               ----------------------------------------------------------  OBJECTIVE  PAST MEDICAL & SURGICAL HISTORY  Kidney stones    Schizophrenia    Lymphoma    Shingles    Atrophic kidney    H/O colonoscopy with polypectomy    Liver cyst    History of bladder surgery    H/O neuropathy    History of chemotherapy    Stage 3 chronic kidney disease    Neuroendocrine malignancy    Neuroendocrine tumor status post surgical treatment    Neuroendocrine cancer    Retained urethral stent    H/O umbilical hernia repair    Elbow fracture    H/O cystoscopy                                              -----------------------------------------------------------  ALLERGIES:  allopurinol (Rash (Moderate))                                            ------------------------------------------------------------    HOME MEDICATIONS  Home Medications:  benztropine 1 mg oral tablet: 1 tab(s) orally once a day (at bedtime) (03 May 2024 23:36)  benztropine 2 mg oral tablet: 1 tab(s) orally once a day (15 Nov 2023 06:52)  Prolixin 10 mg oral tablet: 1 tab(s) orally once a day (15 Nov 2023 06:52)  Prolixin 5 mg oral tablet: 1 tab(s) orally once a day (at bedtime) (03 May 2024 23:36)                           MEDICATIONS:  STANDING MEDICATIONS  azithromycin  IVPB 500 milliGRAM(s) IV Intermittent every 24 hours  benztropine 1 milliGRAM(s) Oral at bedtime  benztropine 2 milliGRAM(s) Oral daily  chlorhexidine 2% Cloths 1 Application(s) Topical <User Schedule>  DOPamine Infusion 5 MICROgram(s)/kG/Min IV Continuous <Continuous>  fluPHENAZine 2.5 milliGRAM(s) Oral daily  folic acid 1 milliGRAM(s) Oral daily  heparin   Injectable 5000 Unit(s) SubCutaneous every 12 hours  methylPREDNISolone sodium succinate Injectable 40 milliGRAM(s) IV Push two times a day  pantoprazole  Injectable 40 milliGRAM(s) IV Push two times a day  piperacillin/tazobactam IVPB.. 3.375 Gram(s) IV Intermittent every 8 hours  polyethylene glycol 3350 17 Gram(s) Oral daily  senna 1 Tablet(s) Oral two times a day  sodium bicarbonate 1300 milliGRAM(s) Oral three times a day    PRN MEDICATIONS  acetaminophen     Tablet .. 650 milliGRAM(s) Oral every 6 hours PRN  aluminum hydroxide/magnesium hydroxide/simethicone Suspension 30 milliLiter(s) Oral every 4 hours PRN  atropine Injectable 1 milliGRAM(s) IV Push once PRN  guaiFENesin Oral Liquid (Sugar-Free) 100 milliGRAM(s) Oral every 6 hours PRN  melatonin 3 milliGRAM(s) Oral at bedtime PRN  ondansetron Injectable 4 milliGRAM(s) IV Push every 8 hours PRN                                            ------------------------------------------------------------  VITAL SIGNS: Last 24 Hours  T(C): 35.7 (02 Oct 2024 11:00), Max: 36.1 (01 Oct 2024 16:00)  T(F): 96.3 (02 Oct 2024 11:00), Max: 96.9 (01 Oct 2024 16:00)  HR: 55 (02 Oct 2024 11:00) (42 - 63)  BP: 102/53 (02 Oct 2024 11:00) (97/56 - 117/56)  BP(mean): 72 (02 Oct 2024 11:00) (72 - 82)  RR: 18 (02 Oct 2024 11:00) (18 - 18)  SpO2: 94% (02 Oct 2024 11:00) (94% - 99%)      10-01-24 @ 07:01  -  10-02-24 @ 07:00  --------------------------------------------------------  IN: 428.2 mL / OUT: 2500 mL / NET: -2071.8 mL    10-02-24 @ 07:01  -  10-02-24 @ 13:29  --------------------------------------------------------  IN: 0 mL / OUT: 800 mL / NET: -800 mL                                             --------------------------------------------------------------  LABS:                        8.1    3.40  )-----------( 267      ( 02 Oct 2024 05:30 )             26.0     10-02    139  |  110  |  56[H]  ----------------------------<  114[H]  4.8   |  19  |  2.2[H]    Ca    9.5      02 Oct 2024 05:30    TPro  3.8[L]  /  Alb  3.1[L]  /  TBili  0.5  /  DBili  x   /  AST  8   /  ALT  10  /  AlkPhos  113  10-02            PHYSICAL EXAM:  GENERAL: NAD, lying in bed comfortably  HEAD:  Atraumatic, Normocephalic  NECK: Supple, No JVD  CHEST/LUNG:Unlabored respirations. B/L crackles.   HEART: regular rate and rhythm; No murmurs, rubs, or gallops  ABDOMEN:  Soft, Nontender, Nondistended.    EXTREMITIES: 1+ LE edema, warm   NERVOUS SYSTEM:  Alert & Oriented X3                                           --------------------------------------------------------------

## 2024-10-02 NOTE — DIETITIAN INITIAL EVALUATION ADULT - NS FNS DIET ORDER
Diet, Renal Restrictions:   For patients receiving Renal Replacement - No Protein Restr, No Conc K, No Conc Phos, Low Sodium (09-30-24 @ 08:16) [Active]

## 2024-10-02 NOTE — BH CONSULTATION LIAISON ASSESSMENT NOTE - DESCRIPTION
Patient lives with parents and two siblings. He was previously employed as an Emergency Medicine Physician until 1994.

## 2024-10-02 NOTE — BH CONSULTATION LIAISON ASSESSMENT NOTE - NSBHCHARTREVIEWVS_PSY_A_CORE FT
Vital Signs Last 24 Hrs  T(C): 35.7 (02 Oct 2024 11:00), Max: 35.9 (01 Oct 2024 20:00)  T(F): 96.3 (02 Oct 2024 11:00), Max: 96.7 (01 Oct 2024 20:00)  HR: 55 (02 Oct 2024 11:00) (42 - 63)  BP: 124/58 (02 Oct 2024 15:33) (89/53 - 129/58)  BP(mean): 84 (02 Oct 2024 15:33) (68 - 84)  RR: 18 (02 Oct 2024 11:00) (18 - 18)  SpO2: 94% (02 Oct 2024 11:00) (94% - 99%)    Parameters below as of 01 Oct 2024 20:00  Patient On (Oxygen Delivery Method): room air

## 2024-10-02 NOTE — DIETITIAN INITIAL EVALUATION ADULT - PERTINENT MEDS FT
MEDICATIONS  (STANDING):  azithromycin  IVPB 500 milliGRAM(s) IV Intermittent every 24 hours  benztropine 1 milliGRAM(s) Oral at bedtime  benztropine 2 milliGRAM(s) Oral daily  folic acid 1 milliGRAM(s) Oral daily  heparin   Injectable 5000 Unit(s) SubCutaneous every 12 hours  methylPREDNISolone sodium succinate Injectable 40 milliGRAM(s) IV Push two times a day  norepinephrine Infusion 0.06 MICROgram(s)/kG/Min (10.2 mL/Hr) IV Continuous <Continuous>  pantoprazole  Injectable 40 milliGRAM(s) IV Push two times a day  piperacillin/tazobactam IVPB.. 3.375 Gram(s) IV Intermittent every 8 hours  polyethylene glycol 3350 17 Gram(s) Oral daily  senna 1 Tablet(s) Oral two times a day  sodium bicarbonate 1300 milliGRAM(s) Oral three times a day    MEDICATIONS  (PRN):  aluminum hydroxide/magnesium hydroxide/simethicone Suspension 30 milliLiter(s) Oral every 4 hours PRN Dyspepsia  atropine Injectable 1 milliGRAM(s) IV Push once PRN shamika <40 or symptomatic pt  guaiFENesin Oral Liquid (Sugar-Free) 100 milliGRAM(s) Oral every 6 hours PRN Cough  ondansetron Injectable 4 milliGRAM(s) IV Push every 8 hours PRN Nausea and/or Vomiting

## 2024-10-02 NOTE — BH CONSULTATION LIAISON ASSESSMENT NOTE - ADDITIONAL DETAILS / COMMENTS
HR 50s-60s on telemetry  Neuro:  EOM intact, no gaze preference or deviation;   Motor--moving all 4 extremities equally and spontaneously; No tremors; no dystonia; no rigidity on extremities; no myoclonus  Reflexes--2/4 throughout

## 2024-10-02 NOTE — PROGRESS NOTE ADULT - ASSESSMENT
IMPRESSION:    Acute hypoxemic respiratory failure  Multifocal pneumonia/ possible organizing [id number]   Possible recurrent aspiration  Anemia  Hypercalcemia low PTH  HO CKD - Cr stable.   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 Fluphenazine held in the setting of severe bradycardia. Psych eval.     HEENT: Oral care.    PULMONARY:  HOB @ 45 degrees.  Keep SaO2 92 TO 96%.  Wean o2 as tolerated.  Solumedrol 40 q 12. CXR noted. Will need OP CT     CARDIOVASCULAR: TTE 60-65% Avoid fluid overload. GDFR. Wean Levophed. DC standing IVF. Repeat EKG. EPS follow up.     GI: GI prophylaxis.  Feeding per S&S.    RENAL:  Follow up lytes.  Correct as needed. Trend Cr. PO bicarb 1300 Q 8    INFECTIOUS DISEASE: CW zosyn and azithromycin. Procal downtrending. Cultures negative to date     HEMATOLOGICAL:  DVT prophylaxis. Oncology eval noted, OP oncology follow up.     ENDOCRINE:  Follow up FS.  Insulin protocol if needed. TSH noted.     MUSCULOSKELETAL: OOBTC.  PT OT.     GOC  SDU for now.   poor prognosis

## 2024-10-02 NOTE — PROGRESS NOTE ADULT - SUBJECTIVE AND OBJECTIVE BOX
ELDA COVARRUBIAS  68y, Male  Allergy: allopurinol (Rash (Moderate))      LOS  5d    CHIEF COMPLAINT: multifocal pna (02 Oct 2024 13:28)      INTERVAL EVENTS/HPI  - No acute events overnight  - T(F): , Max: 96.7 (10-01-24 @ 20:00)  - on room air   - WBC Count: 3.40 (10-02-24 @ 05:30)  WBC Count: 2.86 (10-01-24 @ 04:51)     - Creatinine: 2.2 (10-02-24 @ 05:30)  Creatinine: 2.2 (10-01-24 @ 04:51)       ROS  General: Denies rigors, nightsweats  HEENT: Denies headache, rhinorrhea, sore throat, eye pain  CV: Denies CP, palpitations  PULM: Denies wheezing, hemoptysis  GI: Denies hematemesis, hematochezia, melena  : Denies discharge, hematuria  MSK: Denies arthralgias, myalgias  SKIN: Denies rash, lesions  NEURO: Denies paresthesias, weakness  PSYCH: Denies depression, anxiety    VITALS:  T(F): 96.3, Max: 96.7 (10-01-24 @ 20:00)  HR: 55  BP: 102/53  RR: 18Vital Signs Last 24 Hrs  T(C): 35.7 (02 Oct 2024 11:00), Max: 35.9 (01 Oct 2024 20:00)  T(F): 96.3 (02 Oct 2024 11:00), Max: 96.7 (01 Oct 2024 20:00)  HR: 55 (02 Oct 2024 11:00) (42 - 63)  BP: 102/53 (02 Oct 2024 11:00) (100/57 - 117/56)  BP(mean): 72 (02 Oct 2024 11:00) (72 - 82)  RR: 18 (02 Oct 2024 11:00) (18 - 18)  SpO2: 94% (02 Oct 2024 11:00) (94% - 99%)    Parameters below as of 01 Oct 2024 20:00  Patient On (Oxygen Delivery Method): room air        PHYSICAL EXAM:  Gen: NAD, resting in bed  HEENT: Normocephalic, atraumatic  Neck: supple, no lymphadenopathy  CV: Regular rate & regular rhythm  Lungs: decreased BS at bases, no fremitus  Abdomen: Soft, BS present  Ext: Warm, well perfused  Neuro: non focal, awake  Skin: no rash, no erythema  Lines: no phlebitis    FH: Non-contributory  Social Hx: Non-contributory    TESTS & MEASUREMENTS:                        8.1    3.40  )-----------( 267      ( 02 Oct 2024 05:30 )             26.0     10-02    139  |  110  |  56[H]  ----------------------------<  114[H]  4.8   |  19  |  2.2[H]    Ca    9.5      02 Oct 2024 05:30    TPro  3.8[L]  /  Alb  3.1[L]  /  TBili  0.5  /  DBili  x   /  AST  8   /  ALT  10  /  AlkPhos  113  10-02      LIVER FUNCTIONS - ( 02 Oct 2024 05:30 )  Alb: 3.1 g/dL / Pro: 3.8 g/dL / ALK PHOS: 113 U/L / ALT: 10 U/L / AST: 8 U/L / GGT: x           Urinalysis Basic - ( 02 Oct 2024 05:30 )    Color: x / Appearance: x / SG: x / pH: x  Gluc: 114 mg/dL / Ketone: x  / Bili: x / Urobili: x   Blood: x / Protein: x / Nitrite: x   Leuk Esterase: x / RBC: x / WBC x   Sq Epi: x / Non Sq Epi: x / Bacteria: x        Culture - Sputum (collected 09-28-24 @ 23:28)  Source: .Sputum Sputum  Gram Stain (09-29-24 @ 08:24):    Moderate polymorphonuclear leukocytes seen per low power field    Moderate Squamous epithelial cells seen per low power field    Moderate Gram Negative Rods seen per oil power field    Rare Gram positive cocci in pairs seen per oil power field  Final Report (09-30-24 @ 12:42):    Commensal ranulfo consistent with body site    Culture - Urine (collected 09-27-24 @ 10:00)  Source: Clean Catch Clean Catch (Midstream)  Final Report (09-29-24 @ 07:34):    No growth    Culture - Blood (collected 09-27-24 @ 00:15)  Source: .Blood BLOOD  Final Report (10-02-24 @ 09:00):    No growth at 5 days    Culture - Blood (collected 09-27-24 @ 00:15)  Source: .Blood BLOOD  Final Report (10-02-24 @ 09:00):    No growth at 5 days            INFECTIOUS DISEASES TESTING  Procalcitonin: 0.49 (10-01-24 @ 04:51)  Fungitell: <31 (09-28-24 @ 12:05)  Procalcitonin: 2.29 (09-27-24 @ 11:56)  MRSA PCR Result.: Negative (09-27-24 @ 10:10)  Legionella Antigen, Urine: Negative (09-27-24 @ 10:00)  Legionella Antigen, Urine: Negative (05-04-24 @ 12:50)      INFLAMMATORY MARKERS  Sedimentation Rate, Erythrocyte: 82 mm/Hr (09-28-24 @ 12:05)  C-Reactive Protein: 119.1 mg/L (09-28-24 @ 12:05)      RADIOLOGY & ADDITIONAL TESTS:  I have personally reviewed the last available Chest xray  CXR      CT      CARDIOLOGY TESTING  12 Lead ECG:   Ventricular Rate 39 BPM    Atrial Rate 39 BPM    P-R Interval 168 ms    QRS Duration 76 ms    Q-T Interval 494 ms    QTC Calculation(Bazett) 397 ms    P Axis 107 degrees    R Axis 14 degrees    T Axis 35 degrees    Diagnosis Line Marked sinus bradycardia  Abnormal ECG    Confirmed by ROBERTO BOONE MDM [id number]) on 9/29/2024 10:22:27 PM (09-29-24 @ 08:07)  12 Lead ECG:   Ventricular Rate 92 BPM    Atrial Rate 92 BPM    P-R Interval 152 ms    QRS Duration 84 ms    Q-T Interval 326 ms    QTC Calculation(Bazett) 403 ms    P Axis 51 degrees    R Axis 4 degrees    T Axis 20 degrees    Diagnosis Line Normal sinus rhythm  Inferior infarct , age undetermined  Abnormal ECG    Confirmed by MARY MACEDO MD [id number]) on 9/27/2024 7:01:58 AM (09-26-24 @ 23:41)      MEDICATIONS  azithromycin  IVPB 500 IV Intermittent every 24 hours  benztropine 1 Oral at bedtime  benztropine 2 Oral daily  chlorhexidine 2% Cloths 1 Topical <User Schedule>  DOPamine Infusion 5 IV Continuous <Continuous>  fluPHENAZine 2.5 Oral daily  folic acid 1 Oral daily  heparin   Injectable 5000 SubCutaneous every 12 hours  methylPREDNISolone sodium succinate Injectable 40 IV Push two times a day  pantoprazole  Injectable 40 IV Push two times a day  piperacillin/tazobactam IVPB.. 3.375 IV Intermittent every 8 hours  polyethylene glycol 3350 17 Oral daily  senna 1 Oral two times a day  sodium bicarbonate 1300 Oral three times a day      WEIGHT  Weight (kg): 90.7 (09-26-24 @ 22:31)  Creatinine: 2.2 mg/dL (10-02-24 @ 05:30)      ANTIBIOTICS:  azithromycin  IVPB 500 milliGRAM(s) IV Intermittent every 24 hours  piperacillin/tazobactam IVPB.. 3.375 Gram(s) IV Intermittent every 8 hours      All available historical records have been reviewed

## 2024-10-02 NOTE — BH CONSULTATION LIAISON ASSESSMENT NOTE - CURRENT MEDICATION
MEDICATIONS  (STANDING):  azithromycin  IVPB 500 milliGRAM(s) IV Intermittent every 24 hours  benztropine 1 milliGRAM(s) Oral at bedtime  benztropine 2 milliGRAM(s) Oral daily  chlorhexidine 2% Cloths 1 Application(s) Topical <User Schedule>  DOPamine Infusion 5 MICROgram(s)/kG/Min (17 mL/Hr) IV Continuous <Continuous>  fluPHENAZine 2.5 milliGRAM(s) Oral daily  folic acid 1 milliGRAM(s) Oral daily  heparin   Injectable 5000 Unit(s) SubCutaneous every 12 hours  methylPREDNISolone sodium succinate Injectable 40 milliGRAM(s) IV Push two times a day  pantoprazole  Injectable 40 milliGRAM(s) IV Push two times a day  piperacillin/tazobactam IVPB.. 3.375 Gram(s) IV Intermittent every 8 hours  polyethylene glycol 3350 17 Gram(s) Oral daily  senna 1 Tablet(s) Oral two times a day  sodium bicarbonate 1300 milliGRAM(s) Oral three times a day    MEDICATIONS  (PRN):  acetaminophen     Tablet .. 650 milliGRAM(s) Oral every 6 hours PRN Temp greater or equal to 38C (100.4F), Mild Pain (1 - 3)  aluminum hydroxide/magnesium hydroxide/simethicone Suspension 30 milliLiter(s) Oral every 4 hours PRN Dyspepsia  atropine Injectable 1 milliGRAM(s) IV Push once PRN shamika <40 or symptomatic pt  guaiFENesin Oral Liquid (Sugar-Free) 100 milliGRAM(s) Oral every 6 hours PRN Cough  melatonin 3 milliGRAM(s) Oral at bedtime PRN Insomnia  ondansetron Injectable 4 milliGRAM(s) IV Push every 8 hours PRN Nausea and/or Vomiting

## 2024-10-02 NOTE — BH CONSULTATION LIAISON ASSESSMENT NOTE - NSBHCONSULTRECOMMENDOTHER_PSY_A_CORE FT
-Restart Prolixin 2.5 mg daily. Will monitor for any impact on bradycardia  -Taper Cogentin to 1 mg daily--not necessary at high dosages given reduced dosage of Prolixin.  -Psychiatry will continue to follow.

## 2024-10-03 LAB
% ALBUMIN: 54.4 % — SIGNIFICANT CHANGE UP
% ALPHA 1: 14.3 % — SIGNIFICANT CHANGE UP
% ALPHA 2: 20.3 % — SIGNIFICANT CHANGE UP
% BETA: 9.4 % — SIGNIFICANT CHANGE UP
% GAMMA: 1.6 % — SIGNIFICANT CHANGE UP
% M SPIKE: SIGNIFICANT CHANGE UP
ALBUMIN SERPL ELPH-MCNC: 2.1 G/DL — LOW (ref 3.6–5.5)
ALBUMIN SERPL ELPH-MCNC: 3 G/DL — LOW (ref 3.5–5.2)
ALBUMIN/GLOB SERPL ELPH: 1.2 RATIO — SIGNIFICANT CHANGE UP
ALP SERPL-CCNC: 111 U/L — SIGNIFICANT CHANGE UP (ref 30–115)
ALPHA1 GLOB SERPL ELPH-MCNC: 0.6 G/DL — HIGH (ref 0.1–0.4)
ALPHA2 GLOB SERPL ELPH-MCNC: 0.8 G/DL — SIGNIFICANT CHANGE UP (ref 0.5–1)
ALT FLD-CCNC: 10 U/L — SIGNIFICANT CHANGE UP (ref 0–41)
ANION GAP SERPL CALC-SCNC: 13 MMOL/L — SIGNIFICANT CHANGE UP (ref 7–14)
AST SERPL-CCNC: 8 U/L — SIGNIFICANT CHANGE UP (ref 0–41)
B-GLOBULIN SERPL ELPH-MCNC: 0.4 G/DL — LOW (ref 0.5–1)
BASOPHILS # BLD AUTO: 0 K/UL — SIGNIFICANT CHANGE UP (ref 0–0.2)
BASOPHILS NFR BLD AUTO: 0 % — SIGNIFICANT CHANGE UP (ref 0–1)
BILIRUB SERPL-MCNC: 0.6 MG/DL — SIGNIFICANT CHANGE UP (ref 0.2–1.2)
BUN SERPL-MCNC: 56 MG/DL — HIGH (ref 10–20)
CALCIUM SERPL-MCNC: 9.2 MG/DL — SIGNIFICANT CHANGE UP (ref 8.4–10.5)
CHLORIDE SERPL-SCNC: 107 MMOL/L — SIGNIFICANT CHANGE UP (ref 98–110)
CO2 SERPL-SCNC: 17 MMOL/L — SIGNIFICANT CHANGE UP (ref 17–32)
CREAT SERPL-MCNC: 2.2 MG/DL — HIGH (ref 0.7–1.5)
EGFR: 32 ML/MIN/1.73M2 — LOW
EOSINOPHIL # BLD AUTO: 0 K/UL — SIGNIFICANT CHANGE UP (ref 0–0.7)
EOSINOPHIL NFR BLD AUTO: 0 % — SIGNIFICANT CHANGE UP (ref 0–8)
GAMMA GLOBULIN: 0.1 G/DL — LOW (ref 0.6–1.6)
GLUCOSE SERPL-MCNC: 113 MG/DL — HIGH (ref 70–99)
HCT VFR BLD CALC: 27.2 % — LOW (ref 42–52)
HGB BLD-MCNC: 8.3 G/DL — LOW (ref 14–18)
IMM GRANULOCYTES NFR BLD AUTO: 10.5 % — HIGH (ref 0.1–0.3)
LYMPHOCYTES # BLD AUTO: 0.26 K/UL — LOW (ref 1.2–3.4)
LYMPHOCYTES # BLD AUTO: 10.9 % — LOW (ref 20.5–51.1)
M-SPIKE: SIGNIFICANT CHANGE UP (ref 0–0)
MCHC RBC-ENTMCNC: 27.2 PG — SIGNIFICANT CHANGE UP (ref 27–31)
MCHC RBC-ENTMCNC: 30.5 G/DL — LOW (ref 32–37)
MCV RBC AUTO: 89.2 FL — SIGNIFICANT CHANGE UP (ref 80–94)
MONOCYTES # BLD AUTO: 0.33 K/UL — SIGNIFICANT CHANGE UP (ref 0.1–0.6)
MONOCYTES NFR BLD AUTO: 13.8 % — HIGH (ref 1.7–9.3)
NEUTROPHILS # BLD AUTO: 1.55 K/UL — SIGNIFICANT CHANGE UP (ref 1.4–6.5)
NEUTROPHILS NFR BLD AUTO: 64.8 % — SIGNIFICANT CHANGE UP (ref 42.2–75.2)
NRBC # BLD: 0 /100 WBCS — SIGNIFICANT CHANGE UP (ref 0–0)
PHOSPHATE SERPL-MCNC: 4.3 MG/DL — SIGNIFICANT CHANGE UP (ref 2.1–4.9)
PLATELET # BLD AUTO: 193 K/UL — SIGNIFICANT CHANGE UP (ref 130–400)
PMV BLD: 11.1 FL — HIGH (ref 7.4–10.4)
POTASSIUM SERPL-MCNC: 4.8 MMOL/L — SIGNIFICANT CHANGE UP (ref 3.5–5)
POTASSIUM SERPL-SCNC: 4.8 MMOL/L — SIGNIFICANT CHANGE UP (ref 3.5–5)
PROT PATTERN SERPL ELPH-IMP: SIGNIFICANT CHANGE UP
PROT SERPL-MCNC: 3.9 G/DL — LOW (ref 6–8)
PROT SERPL-MCNC: 3.9 G/DL — LOW (ref 6–8.3)
RBC # BLD: 3.05 M/UL — LOW (ref 4.7–6.1)
RBC # FLD: 15.6 % — HIGH (ref 11.5–14.5)
SODIUM SERPL-SCNC: 137 MMOL/L — SIGNIFICANT CHANGE UP (ref 135–146)
VIT A SERPL-MCNC: 5.7 UG/DL — LOW (ref 22–69.5)
VIT A SERPL-MCNC: 6.1 UG/DL — LOW (ref 22–69.5)
WBC # BLD: 2.39 K/UL — LOW (ref 4.8–10.8)
WBC # FLD AUTO: 2.39 K/UL — LOW (ref 4.8–10.8)

## 2024-10-03 PROCEDURE — 99233 SBSQ HOSP IP/OBS HIGH 50: CPT

## 2024-10-03 PROCEDURE — 99231 SBSQ HOSP IP/OBS SF/LOW 25: CPT

## 2024-10-03 RX ORDER — BENZTROPINE MESYLATE 2 MG
1 TABLET ORAL DAILY
Refills: 0 | Status: DISCONTINUED | OUTPATIENT
Start: 2024-10-03 | End: 2024-10-15

## 2024-10-03 RX ORDER — FLUPHENAZINE HCL 10 MG
5 TABLET ORAL DAILY
Refills: 0 | Status: DISCONTINUED | OUTPATIENT
Start: 2024-10-03 | End: 2024-10-07

## 2024-10-03 RX ORDER — METHYLPREDNISOLONE ACETATE 80 MG/ML
40 INJECTION, SUSPENSION INTRA-ARTICULAR; INTRALESIONAL; INTRAMUSCULAR; SOFT TISSUE DAILY
Refills: 0 | Status: DISCONTINUED | OUTPATIENT
Start: 2024-10-03 | End: 2024-10-06

## 2024-10-03 RX ORDER — DOPAMINE HCL 200 MG/5ML
5 VIAL (ML) INTRAVENOUS
Qty: 400 | Refills: 0 | Status: DISCONTINUED | OUTPATIENT
Start: 2024-10-03 | End: 2024-10-05

## 2024-10-03 RX ADMIN — PIPERACILLIN SODIUM AND TAZOBACTAM SODIUM 25 GRAM(S): 12; 1.5 INJECTION, POWDER, LYOPHILIZED, FOR SOLUTION INTRAVENOUS at 21:04

## 2024-10-03 RX ADMIN — Medication 1300 MILLIGRAM(S): at 21:05

## 2024-10-03 RX ADMIN — FOLIC ACID 1 MILLIGRAM(S): 1 TABLET ORAL at 12:08

## 2024-10-03 RX ADMIN — Medication 1 MILLIGRAM(S): at 12:08

## 2024-10-03 RX ADMIN — PIPERACILLIN SODIUM AND TAZOBACTAM SODIUM 25 GRAM(S): 12; 1.5 INJECTION, POWDER, LYOPHILIZED, FOR SOLUTION INTRAVENOUS at 14:04

## 2024-10-03 RX ADMIN — Medication 2.5 MILLIGRAM(S): at 12:09

## 2024-10-03 RX ADMIN — Medication 1300 MILLIGRAM(S): at 14:05

## 2024-10-03 RX ADMIN — PANTOPRAZOLE SODIUM 40 MILLIGRAM(S): 40 TABLET, DELAYED RELEASE ORAL at 17:55

## 2024-10-03 RX ADMIN — Medication 5000 UNIT(S): at 17:55

## 2024-10-03 RX ADMIN — CHLORHEXIDINE GLUCONATE ORAL RINSE 1 APPLICATION(S): 1.2 SOLUTION DENTAL at 05:34

## 2024-10-03 RX ADMIN — METHYLPREDNISOLONE ACETATE 40 MILLIGRAM(S): 80 INJECTION, SUSPENSION INTRA-ARTICULAR; INTRALESIONAL; INTRAMUSCULAR; SOFT TISSUE at 05:31

## 2024-10-03 RX ADMIN — Medication 5000 UNIT(S): at 05:33

## 2024-10-03 RX ADMIN — PANTOPRAZOLE SODIUM 40 MILLIGRAM(S): 40 TABLET, DELAYED RELEASE ORAL at 05:34

## 2024-10-03 NOTE — PROGRESS NOTE ADULT - SUBJECTIVE AND OBJECTIVE BOX
ELDA COVARRUBIAS  68y Male    CHIEF COMPLAINT:    Patient is a 68y old  Male who presents with a chief complaint of multifocal pna (03 Oct 2024 08:16)    INTERVAL HPI/OVERNIGHT EVENTS:    Patient seen and examined. No acute events overnight. Remain on dopamine 4, HR better     ROS: All other systems are negative.    Vital Signs:    T(F): 96.2 (10-03-24 @ 04:00), Max: 96.9 (10-02-24 @ 16:00)  HR: 50 (10-03-24 @ 04:00) (49 - 77)  BP: 111/59 (10-03-24 @ 06:00) (89/53 - 132/63)  RR: 18 (10-03-24 @ 04:00) (18 - 18)  SpO2: 95% (10-03-24 @ 04:00) (94% - 98%)    02 Oct 2024 07:01  -  03 Oct 2024 07:00  --------------------------------------------------------  IN: 51 mL / OUT: 1800 mL / NET: -1749 mL    Daily Height in cm: 182.9 (02 Oct 2024 10:43)    Daily Weight in k (03 Oct 2024 00:00)    PHYSICAL EXAM:    GENERAL:  NAD pale, chronically ill appearing   SKIN: No rashes or lesions  HEENT: Atraumatic. Normocephalic.  NECK: Supple, No JVD.   PULMONARY: CTA B/L. No wheezing. No rales  CVS: Normal S1, S2. Rate and Rhythm are regular.  ABDOMEN/GI: Soft, Nontender, Nondistended  MSK:  No clubbing or cyanosis   NEUROLOGIC:  weak, moves all extremities  PSYCH: Alert & oriented x 3, normal affect    Consultant(s) Notes Reviewed:  [x ] YES  [ ] NO  Care Discussed with Consultants/Other Providers [ x] YES  [ ] NO    LABS:                        8.3    x     )-----------( 193      ( 03 Oct 2024 05:37 )             27.2     137  |  107  |  56[H]  ----------------------------<  113[H]  4.8   |  17  |  2.2[H]    Ca    9.2      03 Oct 2024 05:37  Phos  4.3     10-03    TPro  3.9[L]  /  Alb  3.0[L]  /  TBili  0.6  /  DBili  x   /  AST  8   /  ALT  10  /  AlkPhos  111  10-03    RADIOLOGY & ADDITIONAL TESTS:  Imaging or report Personally Reviewed:  [x] YES  [ ] NO  EKG reviewed: [x] YES  [ ] NO    Medications:  Standing  benztropine 1 milliGRAM(s) Oral daily  chlorhexidine 2% Cloths 1 Application(s) Topical <User Schedule>  DOPamine Infusion 4.998 MICROgram(s)/kG/Min IV Continuous <Continuous>  fluPHENAZine 2.5 milliGRAM(s) Oral daily  folic acid 1 milliGRAM(s) Oral daily  heparin   Injectable 5000 Unit(s) SubCutaneous every 12 hours  methylPREDNISolone sodium succinate Injectable 40 milliGRAM(s) IV Push daily  pantoprazole  Injectable 40 milliGRAM(s) IV Push two times a day  piperacillin/tazobactam IVPB.. 3.375 Gram(s) IV Intermittent every 8 hours  polyethylene glycol 3350 17 Gram(s) Oral daily  senna 1 Tablet(s) Oral two times a day  sodium bicarbonate 1300 milliGRAM(s) Oral three times a day    PRN Meds  acetaminophen     Tablet .. 650 milliGRAM(s) Oral every 6 hours PRN  aluminum hydroxide/magnesium hydroxide/simethicone Suspension 30 milliLiter(s) Oral every 4 hours PRN  atropine Injectable 1 milliGRAM(s) IV Push once PRN  guaiFENesin Oral Liquid (Sugar-Free) 100 milliGRAM(s) Oral every 6 hours PRN  melatonin 3 milliGRAM(s) Oral at bedtime PRN  ondansetron Injectable 4 milliGRAM(s) IV Push every 8 hours PRN

## 2024-10-03 NOTE — PROGRESS NOTE ADULT - ASSESSMENT
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 cough is productive of yellow sputum and has been going on for 3 months with 10 lb weight loss over the last 3 months , progressively worsening weakness, s./p fall. No head trauma. No LOC. No CP. Reports SOB on exertion. No abdominal pain. No n/v/c/d. Last chemo > 2 mo ago.   Remains on Low dose levophed in SDU      Septic shock  (POA) due to suspected Multifocal PNA in immunocompromised patient  - remains on dopamine 4, on room air  - off fluids  - s/p azithromycin, c/w piperacillin/tazobactam IVPB.. 3.375 Gram(s) IV Intermittent every 8 hours until 10/7  - c/w solumedrol 40 mg Q 24 hours   - supportive care, aspiration precautions   - nebs Q 6 hours PRN   - out of bed to chair     Sinus bradycardia - resolved  - HR is dropping below 40. S/P atropine x3 previously   - telemetry/EKGs reviewed   - EP/Cardiology following   - started on dopamine 10/3  - TSH WNL   - avoid av node blockers        Multifactorial anemia / neutropenia   - s/p 1 PRBC in ER  - no evidence of bleeding currently   - c/w PPI IV BID   -  Colonoscopy 2023- Cecal polyp, EMR: Tubular adenoma fragments showing foci of high-grade dysplasia and focal  intramucosal adenocarcinoma.  Plan was for 6month follow up   - appreciate heme onc fu, patient planned for OP work up and fu with Dr Reyes     Hypercalcemia likely secondary to malignancy   hx of Marginal zone lymphoma  hx of Pancreatic neuroendocrine tumor   - on Monthly somatostatin injections - last 9/16  - s/p IV hydration   - PTH:low  , PTHrP:pending   - monitor Ca, s/p heme onc eval     Small pericardial effusion - chronic   - stable small pericardial effusion seen on CT scan , was also reported on multiple previous ECHO;s    ASHLEY on  CKD 4   metabolic acidosis   NSTEMI type II in the setting of CKD   - hx of nephrolithiasis c/b atrophic L kidney, R ureteral revision CKD4 (bsl Cr ~2.5)   - s/p IV contrast on admission   - sodium bicarb 1300mg TID , monitor bicarb level   - Trops elevated likely secondary to CKD   - pt is chest pain free  -  EKG NSR   - telemetry monitoring     Elevated ALP  - possibly due to hepatic involvement of lymphoma/ bone involvement   - monitor levels     Schizophrenia  - case discussed with psychiatry, restarted fluphenazine 2.5 (on 10mg at home) and decreased benztropine to 1mg daily     Full code overall prognosis is guarded, continue monitoring in SDU    Patient seen at bedside, total time spent to evaluate and treat the patient's acute illness and chronic medical conditions as well as time spent reviewing prior records, labs, radiology, documenting in electronic medical records,  discussing medical plan with   medical team was more than 51 minutes with >50% of time spent face to face with patient, discussing with patient/family as well as coordination of care

## 2024-10-03 NOTE — PROGRESS NOTE ADULT - SUBJECTIVE AND OBJECTIVE BOX
Patient is a 68y old  Male who presents with a chief complaint of multifocal pna (02 Oct 2024 16:18)        Over Night Events:        ROS:     All ROS are negative except HPI         PHYSICAL EXAM    ICU Vital Signs Last 24 Hrs  T(C): 35.7 (03 Oct 2024 04:00), Max: 36.1 (02 Oct 2024 16:00)  T(F): 96.2 (03 Oct 2024 04:00), Max: 96.9 (02 Oct 2024 16:00)  HR: 50 (03 Oct 2024 04:00) (49 - 77)  BP: 111/59 (03 Oct 2024 06:00) (89/53 - 132/63)  BP(mean): 81 (03 Oct 2024 06:00) (68 - 91)  ABP: --  ABP(mean): --  RR: 18 (03 Oct 2024 04:00) (18 - 18)  SpO2: 95% (03 Oct 2024 04:00) (94% - 98%)    O2 Parameters below as of 02 Oct 2024 20:31  Patient On (Oxygen Delivery Method): room air            CONSTITUTIONAL:  Well nourished.  NAD    ENT:   Airway patent,   Mouth with normal mucosa.   No thrush    EYES:   Pupils equal,   Round and reactive to light.    CARDIAC:   Normal rate,   Regular rhythm.    No edema      Vascular:  Normal systolic impulse  No Carotid bruits    RESPIRATORY:   No wheezing  Bilateral BS  Normal chest expansion  Not tachypneic,  No use of accessory muscles    GASTROINTESTINAL:  Abdomen soft,   Non-tender,   No guarding,   + BS    MUSCULOSKELETAL:   Range of motion is not limited,  No clubbing, cyanosis    NEUROLOGICAL:   Alert and oriented   No motor  deficits.    SKIN:   Skin normal color for race,   Warm and dry and intact.   No evidence of rash.    PSYCHIATRIC:   Normal mood and affect.   No apparent risk to self or others.    HEMATOLOGICAL:  No cervical  lymphadenopathy.  no inguinal lymphadenopathy      10-02-24 @ 07:01  -  10-03-24 @ 07:00  --------------------------------------------------------  IN:    DOPamine Infusion: 51 mL  Total IN: 51 mL    OUT:    Voided (mL): 1800 mL  Total OUT: 1800 mL    Total NET: -1749 mL          LABS:                            8.3    x     )-----------( 193      ( 03 Oct 2024 05:37 )             27.2                                               10-03    137  |  107  |  56[H]  ----------------------------<  113[H]  4.8   |  17  |  2.2[H]    Ca    9.2      03 Oct 2024 05:37  Phos  4.3     10-03    TPro  3.9[L]  /  Alb  3.0[L]  /  TBili  0.6  /  DBili  x   /  AST  8   /  ALT  10  /  AlkPhos  111  10-03                                             Urinalysis Basic - ( 03 Oct 2024 05:37 )    Color: x / Appearance: x / SG: x / pH: x  Gluc: 113 mg/dL / Ketone: x  / Bili: x / Urobili: x   Blood: x / Protein: x / Nitrite: x   Leuk Esterase: x / RBC: x / WBC x   Sq Epi: x / Non Sq Epi: x / Bacteria: x                                                  LIVER FUNCTIONS - ( 03 Oct 2024 05:37 )  Alb: 3.0 g/dL / Pro: 3.9 g/dL / ALK PHOS: 111 U/L / ALT: 10 U/L / AST: 8 U/L / GGT: x                                                                                                                                       MEDICATIONS  (STANDING):  benztropine 1 milliGRAM(s) Oral daily  chlorhexidine 2% Cloths 1 Application(s) Topical <User Schedule>  DOPamine Infusion 5 MICROgram(s)/kG/Min (17 mL/Hr) IV Continuous <Continuous>  fluPHENAZine 2.5 milliGRAM(s) Oral daily  folic acid 1 milliGRAM(s) Oral daily  heparin   Injectable 5000 Unit(s) SubCutaneous every 12 hours  methylPREDNISolone sodium succinate Injectable 40 milliGRAM(s) IV Push two times a day  pantoprazole  Injectable 40 milliGRAM(s) IV Push two times a day  piperacillin/tazobactam IVPB.. 3.375 Gram(s) IV Intermittent every 8 hours  polyethylene glycol 3350 17 Gram(s) Oral daily  senna 1 Tablet(s) Oral two times a day  sodium bicarbonate 1300 milliGRAM(s) Oral three times a day    MEDICATIONS  (PRN):  acetaminophen     Tablet .. 650 milliGRAM(s) Oral every 6 hours PRN Temp greater or equal to 38C (100.4F), Mild Pain (1 - 3)  aluminum hydroxide/magnesium hydroxide/simethicone Suspension 30 milliLiter(s) Oral every 4 hours PRN Dyspepsia  atropine Injectable 1 milliGRAM(s) IV Push once PRN shamika <40 or symptomatic pt  guaiFENesin Oral Liquid (Sugar-Free) 100 milliGRAM(s) Oral every 6 hours PRN Cough  melatonin 3 milliGRAM(s) Oral at bedtime PRN Insomnia  ondansetron Injectable 4 milliGRAM(s) IV Push every 8 hours PRN Nausea and/or Vomiting      New X-rays reviewed:                                                                                  ECHO    CXR interpreted by me: Bilateral infiltrates

## 2024-10-03 NOTE — PROGRESS NOTE ADULT - ASSESSMENT
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    A&P     #Sepsis (POA) 2/2 Multifocal PNA associated with small parapneumonic rt pleural effusion   - Sepsis POA (SIRS+ with source : T 100.7 ,  , and pneumonia)  - CT angio chest: Multilobar consolidative opacities in bilateral lungs, consistent with  multifocal pneumonia. Small right pleural effusion and trace left pleural effusion.  - C/W Zosyn (end date 10/7)  - Decrease Solumedrol to 40mg IV qd (for organizing pneumonia treatment)  - Blood Cx negative   - Sputum Cx: GN and GP cocci                 #Sinus bradycardia (improved)   - HR now ranges between 60s-70s; improved with dopamine infusion   - Atropine prn 30-40s or symptomatic   - Avoid av node blockers    - TSH WNL   - Per EP: No indication for PPM-> once patient is off pressor they will re-evaluate  - C/W dopamine infusion started    # Acute on chronic anemia requiring 1xpRBC  - Patient baseline Hb 9-10  - Hb on 9/29/2024: 7.8 , MCV 88 , RDW 16.6%   - S/p 1 unit PRBC's in ED  - GI consulted: Outpatient EGD/ colonoscopy   - Trend CBC daily and transfuse prn to target Hgb >7   - Keep active T&S  - Iron studies consistent with anemia of chronic disease    #Moderate symptomatic Hypercalcemia ISO of malignancy   - Hx of Marginal zone lymphoma. hx of Pancreatic neuroendocrine tumor   - On Monthly somatostatin injections ( last taken on 9/16)  - PTH: low, Vit D low,     #Small radha-cardial effusion - chronic  - Stable small pericardial effusion seen on CT scan , was also reported on multiple previous ECHO;s  - Monitor for now     #CKD 4  #Hx of NSTEMI type II ISO of CKD   - Hx of nephrolithiasis c/b atrophic L kidney, R ureteral revision   - CKD4 (bsl Cr ~2.5)   - s/p IV contrast on admission   - continue sodium bicarb 1300mg TID   - Trops elevated likely secondary to CKD , EKG NSR     # Elevated ALP- chronic   - Can be 2/2 hepatic involvement of lymphoma/ bone involvement     #Schizophrenia  - C/W Prolixin 2.5 daily   - Decrease Benztropine to 1mg daily   - Psych on board     #Hyponatremia- resolved     #DVT PPx: Heparin subq  #GI PPx: PPI   #Diet: Renal     #Hand-off: Monitor BP and HR on dopamine infusion.

## 2024-10-03 NOTE — PROGRESS NOTE ADULT - SUBJECTIVE AND OBJECTIVE BOX
ELDA COVARRUBIAS 68y Male  MRN#: [id number]     Hospital Day: 6d    SUBJECTIVE     No overnight events. Patient seen and evaluated at bedside reports feeling well, and has no acute medical complaints.                                             ----------------------------------------------------------  OBJECTIVE  PAST MEDICAL & SURGICAL HISTORY  Kidney stones    Schizophrenia    Lymphoma    Shingles    Atrophic kidney    H/O colonoscopy with polypectomy    Liver cyst    History of bladder surgery    H/O neuropathy    History of chemotherapy    Stage 3 chronic kidney disease    Neuroendocrine malignancy    Neuroendocrine tumor status post surgical treatment    Neuroendocrine cancer    Retained urethral stent    H/O umbilical hernia repair    Elbow fracture    H/O cystoscopy                                              -----------------------------------------------------------  ALLERGIES:  allopurinol (Rash (Moderate))                                            ------------------------------------------------------------    HOME MEDICATIONS  Home Medications:  benztropine 1 mg oral tablet: 1 tab(s) orally once a day (at bedtime) (03 May 2024 23:36)  benztropine 2 mg oral tablet: 1 tab(s) orally once a day (15 Nov 2023 06:52)  Prolixin 10 mg oral tablet: 1 tab(s) orally once a day (15 Nov 2023 06:52)  Prolixin 5 mg oral tablet: 1 tab(s) orally once a day (at bedtime) (03 May 2024 23:36)                           MEDICATIONS:  STANDING MEDICATIONS  benztropine 1 milliGRAM(s) Oral daily  chlorhexidine 2% Cloths 1 Application(s) Topical <User Schedule>  DOPamine Infusion 4.998 MICROgram(s)/kG/Min IV Continuous <Continuous>  fluPHENAZine 2.5 milliGRAM(s) Oral daily  folic acid 1 milliGRAM(s) Oral daily  heparin   Injectable 5000 Unit(s) SubCutaneous every 12 hours  methylPREDNISolone sodium succinate Injectable 40 milliGRAM(s) IV Push daily  pantoprazole  Injectable 40 milliGRAM(s) IV Push two times a day  piperacillin/tazobactam IVPB.. 3.375 Gram(s) IV Intermittent every 8 hours  polyethylene glycol 3350 17 Gram(s) Oral daily  senna 1 Tablet(s) Oral two times a day  sodium bicarbonate 1300 milliGRAM(s) Oral three times a day    PRN MEDICATIONS  acetaminophen     Tablet .. 650 milliGRAM(s) Oral every 6 hours PRN  aluminum hydroxide/magnesium hydroxide/simethicone Suspension 30 milliLiter(s) Oral every 4 hours PRN  atropine Injectable 1 milliGRAM(s) IV Push once PRN  guaiFENesin Oral Liquid (Sugar-Free) 100 milliGRAM(s) Oral every 6 hours PRN  melatonin 3 milliGRAM(s) Oral at bedtime PRN  ondansetron Injectable 4 milliGRAM(s) IV Push every 8 hours PRN                                            ------------------------------------------------------------  VITAL SIGNS: Last 24 Hours  T(C): 35.7 (03 Oct 2024 04:00), Max: 36.1 (02 Oct 2024 16:00)  T(F): 96.2 (03 Oct 2024 04:00), Max: 96.9 (02 Oct 2024 16:00)  HR: 69 (03 Oct 2024 08:00) (49 - 77)  BP: 105/56 (03 Oct 2024 08:00) (91/55 - 132/63)  BP(mean): 77 (03 Oct 2024 08:00) (68 - 91)  RR: 18 (03 Oct 2024 08:00) (18 - 18)  SpO2: 94% (03 Oct 2024 08:00) (94% - 98%)      10-02-24 @ 07:01  -  10-03-24 @ 07:00  --------------------------------------------------------  IN: 51 mL / OUT: 1800 mL / NET: -1749 mL                                             --------------------------------------------------------------  LABS:                        8.3    2.39  )-----------( 193      ( 03 Oct 2024 05:37 )             27.2     10-03    137  |  107  |  56[H]  ----------------------------<  113[H]  4.8   |  17  |  2.2[H]    Ca    9.2      03 Oct 2024 05:37  Phos  4.3     10-03    TPro  3.9[L]  /  Alb  3.0[L]  /  TBili  0.6  /  DBili  x   /  AST  8   /  ALT  10  /  AlkPhos  111  10-03                  PHYSICAL EXAM:  GENERAL: NAD, lying in bed comfortably  HEAD:  Atraumatic, Normocephalic  NECK: Supple, No JVD  CHEST/LUNG:Unlabored respirations. B/L crackles.   HEART: regular rate and rhythm; No murmurs, rubs, or gallops  ABDOMEN:  Soft, Nontender, Nondistended.    EXTREMITIES: 1+ LE edema, warm   NERVOUS SYSTEM:  Alert & Oriented X3                                             --------------------------------------------------------------

## 2024-10-03 NOTE — BH CONSULTATION LIAISON PROGRESS NOTE - NSBHCONSULTRECOMMENDOTHER_PSY_A_CORE FT
-Increase Prolixin to 5 mg daily. Will continue to monitor for any impact on bradycardia.  -Continue Cogentin to 1 mg daily  -Psychiatry will continue to follow and titrate as needed.   -Increase Prolixin to 5 mg daily. Will continue to monitor for any impact on bradycardia.  -Continue Cogentin to 1 mg daily  -Patient requesting  consult for prayers.  -Psychiatry will continue to follow and titrate as needed.

## 2024-10-03 NOTE — PROGRESS NOTE ADULT - ASSESSMENT
IMPRESSION:    Acute hypoxemic respiratory failure resolved   Multifocal pneumonia/ possible organizing pneumnonia   Possible recurrent aspiration  Anemia  Hypercalcemia low PTH  HO CKD - Cr stable.   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 Psych eval.     HEENT: Oral care.    PULMONARY:  HOB @ 45 degrees.  Keep SaO2 92 TO 96%.  Wean o2 as tolerated.  Solumedrol 40 q 24 . CXR noted. Will need OP CT     CARDIOVASCULAR: TTE 60-65% Avoid fluid overload. GDFR. Wean Dopamine EPS follow up.     GI: GI prophylaxis.  Feeding per S&S.    RENAL:  Follow up lytes.  Correct as needed. Trend Cr. PO bicarb 1300 Q 8    INFECTIOUS DISEASE: CW zosyn.  Procal downtrending. Cultures negative to date     HEMATOLOGICAL:  DVT prophylaxis. Oncology eval noted, OP oncology follow up.     ENDOCRINE:  Follow up FS.  Insulin protocol if needed. TSH noted.     MUSCULOSKELETAL: OOBTC.  PT OT.     GOC  SDU for now.   poor prognosis

## 2024-10-04 LAB
% ALBUMIN: 54.7 % — SIGNIFICANT CHANGE UP
% ALPHA 1: 14.5 % — SIGNIFICANT CHANGE UP
% ALPHA 2: 19.4 % — SIGNIFICANT CHANGE UP
% BETA: 9.8 % — SIGNIFICANT CHANGE UP
% GAMMA: 1.6 % — SIGNIFICANT CHANGE UP
% M SPIKE: SIGNIFICANT CHANGE UP
ALBUMIN SERPL ELPH-MCNC: 2.2 G/DL — LOW (ref 3.6–5.5)
ALBUMIN SERPL ELPH-MCNC: 3 G/DL — LOW (ref 3.5–5.2)
ALBUMIN/GLOB SERPL ELPH: 1.2 RATIO — SIGNIFICANT CHANGE UP
ALP SERPL-CCNC: 103 U/L — SIGNIFICANT CHANGE UP (ref 30–115)
ALPHA1 GLOB SERPL ELPH-MCNC: 0.6 G/DL — HIGH (ref 0.1–0.4)
ALPHA2 GLOB SERPL ELPH-MCNC: 0.8 G/DL — SIGNIFICANT CHANGE UP (ref 0.5–1)
ALT FLD-CCNC: 9 U/L — SIGNIFICANT CHANGE UP (ref 0–41)
ANION GAP SERPL CALC-SCNC: 12 MMOL/L — SIGNIFICANT CHANGE UP (ref 7–14)
AST SERPL-CCNC: 8 U/L — SIGNIFICANT CHANGE UP (ref 0–41)
B-GLOBULIN SERPL ELPH-MCNC: 0.4 G/DL — LOW (ref 0.5–1)
BASOPHILS # BLD AUTO: 0 K/UL — SIGNIFICANT CHANGE UP (ref 0–0.2)
BASOPHILS NFR BLD AUTO: 0 % — SIGNIFICANT CHANGE UP (ref 0–1)
BILIRUB SERPL-MCNC: 0.5 MG/DL — SIGNIFICANT CHANGE UP (ref 0.2–1.2)
BUN SERPL-MCNC: 53 MG/DL — HIGH (ref 10–20)
CALCIUM SERPL-MCNC: 9.1 MG/DL — SIGNIFICANT CHANGE UP (ref 8.4–10.5)
CHLORIDE SERPL-SCNC: 109 MMOL/L — SIGNIFICANT CHANGE UP (ref 98–110)
CO2 SERPL-SCNC: 18 MMOL/L — SIGNIFICANT CHANGE UP (ref 17–32)
CREAT SERPL-MCNC: 2.1 MG/DL — HIGH (ref 0.7–1.5)
EGFR: 34 ML/MIN/1.73M2 — LOW
EOSINOPHIL # BLD AUTO: 0.02 K/UL — SIGNIFICANT CHANGE UP (ref 0–0.7)
EOSINOPHIL NFR BLD AUTO: 1 % — SIGNIFICANT CHANGE UP (ref 0–8)
GAMMA GLOBULIN: 0.1 G/DL — LOW (ref 0.6–1.6)
GLUCOSE SERPL-MCNC: 109 MG/DL — HIGH (ref 70–99)
HCT VFR BLD CALC: 23.8 % — LOW (ref 42–52)
HGB BLD-MCNC: 7.4 G/DL — LOW (ref 14–18)
LYMPHOCYTES # BLD AUTO: 0.27 K/UL — LOW (ref 1.2–3.4)
LYMPHOCYTES # BLD AUTO: 17.4 % — LOW (ref 20.5–51.1)
M-SPIKE: SIGNIFICANT CHANGE UP (ref 0–0)
MCHC RBC-ENTMCNC: 27.1 PG — SIGNIFICANT CHANGE UP (ref 27–31)
MCHC RBC-ENTMCNC: 31.1 G/DL — LOW (ref 32–37)
MCV RBC AUTO: 87.2 FL — SIGNIFICANT CHANGE UP (ref 80–94)
MONOCYTES # BLD AUTO: 0.18 K/UL — SIGNIFICANT CHANGE UP (ref 0.1–0.6)
MONOCYTES NFR BLD AUTO: 11.2 % — HIGH (ref 1.7–9.3)
NEUTROPHILS # BLD AUTO: 1.1 K/UL — LOW (ref 1.4–6.5)
NEUTROPHILS NFR BLD AUTO: 66.3 % — SIGNIFICANT CHANGE UP (ref 42.2–75.2)
PLATELET # BLD AUTO: 164 K/UL — SIGNIFICANT CHANGE UP (ref 130–400)
PMV BLD: 10.8 FL — HIGH (ref 7.4–10.4)
POTASSIUM SERPL-MCNC: 4.3 MMOL/L — SIGNIFICANT CHANGE UP (ref 3.5–5)
POTASSIUM SERPL-SCNC: 4.3 MMOL/L — SIGNIFICANT CHANGE UP (ref 3.5–5)
PROT PATTERN SERPL ELPH-IMP: SIGNIFICANT CHANGE UP
PROT SERPL-MCNC: 3.8 G/DL — LOW (ref 6–8)
PROT SERPL-MCNC: 4.1 G/DL — LOW (ref 6–8.3)
RBC # BLD: 2.73 M/UL — LOW (ref 4.7–6.1)
RBC # FLD: 15.5 % — HIGH (ref 11.5–14.5)
SODIUM SERPL-SCNC: 139 MMOL/L — SIGNIFICANT CHANGE UP (ref 135–146)
WBC # BLD: 1.58 K/UL — LOW (ref 4.8–10.8)
WBC # FLD AUTO: 1.58 K/UL — LOW (ref 4.8–10.8)

## 2024-10-04 PROCEDURE — 99231 SBSQ HOSP IP/OBS SF/LOW 25: CPT

## 2024-10-04 PROCEDURE — 99233 SBSQ HOSP IP/OBS HIGH 50: CPT

## 2024-10-04 RX ORDER — PIPERACILLIN SODIUM AND TAZOBACTAM SODIUM 12; 1.5 G/60ML; G/60ML
3.38 INJECTION, POWDER, LYOPHILIZED, FOR SOLUTION INTRAVENOUS EVERY 8 HOURS
Refills: 0 | Status: COMPLETED | OUTPATIENT
Start: 2024-10-04 | End: 2024-10-06

## 2024-10-04 RX ORDER — INFLUENZA VIRUS VACCINE 15; 15; 15; 15 UG/.5ML; UG/.5ML; UG/.5ML; UG/.5ML
0.5 SUSPENSION INTRAMUSCULAR ONCE
Refills: 0 | Status: DISCONTINUED | OUTPATIENT
Start: 2024-10-04 | End: 2024-10-16

## 2024-10-04 RX ADMIN — CHLORHEXIDINE GLUCONATE ORAL RINSE 1 APPLICATION(S): 1.2 SOLUTION DENTAL at 05:44

## 2024-10-04 RX ADMIN — Medication 1 MILLIGRAM(S): at 11:10

## 2024-10-04 RX ADMIN — Medication 1 TABLET(S): at 05:45

## 2024-10-04 RX ADMIN — Medication 17 GRAM(S): at 11:10

## 2024-10-04 RX ADMIN — Medication 5 MILLIGRAM(S): at 11:10

## 2024-10-04 RX ADMIN — Medication 1300 MILLIGRAM(S): at 13:39

## 2024-10-04 RX ADMIN — PIPERACILLIN SODIUM AND TAZOBACTAM SODIUM 25 GRAM(S): 12; 1.5 INJECTION, POWDER, LYOPHILIZED, FOR SOLUTION INTRAVENOUS at 21:43

## 2024-10-04 RX ADMIN — Medication 5000 UNIT(S): at 17:00

## 2024-10-04 RX ADMIN — FOLIC ACID 1 MILLIGRAM(S): 1 TABLET ORAL at 11:10

## 2024-10-04 RX ADMIN — Medication 1 TABLET(S): at 17:01

## 2024-10-04 RX ADMIN — Medication 5000 UNIT(S): at 05:45

## 2024-10-04 RX ADMIN — PIPERACILLIN SODIUM AND TAZOBACTAM SODIUM 25 GRAM(S): 12; 1.5 INJECTION, POWDER, LYOPHILIZED, FOR SOLUTION INTRAVENOUS at 05:45

## 2024-10-04 RX ADMIN — Medication 1300 MILLIGRAM(S): at 05:45

## 2024-10-04 RX ADMIN — PANTOPRAZOLE SODIUM 40 MILLIGRAM(S): 40 TABLET, DELAYED RELEASE ORAL at 17:01

## 2024-10-04 RX ADMIN — PIPERACILLIN SODIUM AND TAZOBACTAM SODIUM 25 GRAM(S): 12; 1.5 INJECTION, POWDER, LYOPHILIZED, FOR SOLUTION INTRAVENOUS at 13:40

## 2024-10-04 RX ADMIN — PANTOPRAZOLE SODIUM 40 MILLIGRAM(S): 40 TABLET, DELAYED RELEASE ORAL at 05:45

## 2024-10-04 RX ADMIN — METHYLPREDNISOLONE ACETATE 40 MILLIGRAM(S): 80 INJECTION, SUSPENSION INTRA-ARTICULAR; INTRALESIONAL; INTRAMUSCULAR; SOFT TISSUE at 05:45

## 2024-10-04 NOTE — PROGRESS NOTE ADULT - ASSESSMENT
IMPRESSION:    Acute hypoxemic respiratory failure resolved   Multifocal pneumonia/ possible organizing pneumnonia   Possible recurrent aspiration  Anemia  Hypercalcemia low PTH  HO CKD - Cr stable.   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 Psych following for anti-psychotic medications.     HEENT: Oral care.    PULMONARY:  HOB @ 45 degrees.  Keep SaO2 92 TO 96%.  Wean o2 as tolerated.  Solumedrol 40 q24 . CXR noted. Will need OP CT     CARDIOVASCULAR: TTE 60-65% Avoid fluid overload. GDFR. Wean Dopamine. EPS follow up.     GI: GI prophylaxis.  Feeding per S&S.    RENAL:  Follow up lytes.  Correct as needed. Trend Cr. PO bicarb 1300 Q 8    INFECTIOUS DISEASE: CW zosyn.  Procal downtrending. Cultures negative to date     HEMATOLOGICAL:  DVT prophylaxis. Oncology eval noted, OP oncology follow up.     ENDOCRINE:  Follow up FS.  Insulin protocol if needed. TSH noted.     MUSCULOSKELETAL: OOBTC.  PT OT.     GOC  SDU for now.   poor prognosis

## 2024-10-04 NOTE — PROGRESS NOTE ADULT - ASSESSMENT
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    A&P     #Sepsis (POA) 2/2 Multifocal PNA associated with small parapneumonic rt pleural effusion   - Sepsis POA (SIRS+ with source : T 100.7 ,  , and pneumonia)  - CT angio chest: Multilobar consolidative opacities in bilateral lungs, consistent with  multifocal pneumonia. Small right pleural effusion and trace left pleural effusion.  - C/W Zosyn (end date 10/7)  - Decrease Solumedrol to 40mg IV qd (for organizing pneumonia treatment)  - Blood Cx negative   - Sputum Cx: GN and GP cocci                 #Sinus bradycardia (improved)   - HR now ranges between 60s-70s; improved with dopamine infusion   - Atropine prn 30-40s or symptomatic   - Avoid av node blockers    - TSH WNL   - Per EP: No indication for PPM-> once patient is off pressor they will re-evaluate  - Dopamine infusion stopped today -> HR appears to be holding above 55 bpm. Will continue to monitor.     # Acute on chronic anemia requiring 1xpRBC  - Patient baseline Hb 9-10  - Hb on 9/29/2024: 7.8 , MCV 88 , RDW 16.6%   - S/p 1 unit PRBC's in ED  - GI consulted: Outpatient EGD/ colonoscopy   - Trend CBC daily and transfuse prn to target Hgb >7   - Keep active T&S  - Iron studies consistent with anemia of chronic disease    #Moderate symptomatic Hypercalcemia ISO of malignancy   - Hx of Marginal zone lymphoma. hx of Pancreatic neuroendocrine tumor   - On Monthly somatostatin injections ( last taken on 9/16)  - PTH: low, Vit D low    #Small radha-cardial effusion - chronic  - Stable small pericardial effusion seen on CT scan , was also reported on multiple previous ECHO;s  - Monitor for now     #CKD 4  #Hx of NSTEMI type II ISO of CKD   - Hx of nephrolithiasis c/b atrophic L kidney, R ureteral revision   - CKD4 (bsl Cr ~2.5)   - s/p IV contrast on admission   - continue sodium bicarb 1300mg TID   - Trops elevated likely secondary to CKD , EKG NSR     # Elevated ALP- chronic   - Can be 2/2 hepatic involvement of lymphoma/ bone involvement     #Schizophrenia  - C/W Prolixin 2.5 daily   - Decrease Benztropine to 1mg daily   - Psych on board     #Hyponatremia- resolved     #DVT PPx: Heparin subq  #GI PPx: PPI   #Diet: Renal     #Hand-off: Monitor BP and HR off of dopamine drip. If patient goes shamika, will re-call EP.

## 2024-10-04 NOTE — BH CONSULTATION LIAISON PROGRESS NOTE - NSBHCONSULTMEDNEWDISCUSS_PSY_A_CORE
----- Message from Melany Landin MD sent at 8/15/2024  2:53 PM CDT -----  Please call and mail if Patient wishes:   Hi Esha,  Your A1c is slightly higher than previous at 6.6 (last reading was 5.9).   This is still considered well-controlled.   We should repeat in 6 months.   Please reach out to us if you have any questions.  Best,  Dr Landin   yes

## 2024-10-04 NOTE — BH CONSULTATION LIAISON PROGRESS NOTE - NSBHCONSULTRECOMMENDOTHER_PSY_A_CORE FT
-Continue Prolixin to 5 mg daily. Will continue to monitor for any impact on bradycardia.  -Continue Cogentin to 1 mg daily  -Patient requesting  consult for prayers.  -Psychiatry will continue to follow peripherally and recommend titration as needed.

## 2024-10-04 NOTE — PROGRESS NOTE ADULT - SUBJECTIVE AND OBJECTIVE BOX
ELDA COVARRUBIAS 68y Male  MRN#: [id number]     Hospital Day: 7d    SUBJECTIVE     No overnight events. Patient seen and evaluated at bedside reports feeling well, and has no acute complaints.                                             ----------------------------------------------------------  OBJECTIVE  PAST MEDICAL & SURGICAL HISTORY  Kidney stones    Schizophrenia    Lymphoma    Shingles    Atrophic kidney    H/O colonoscopy with polypectomy    Liver cyst    History of bladder surgery    H/O neuropathy    History of chemotherapy    Stage 3 chronic kidney disease    Neuroendocrine malignancy    Neuroendocrine tumor status post surgical treatment    Neuroendocrine cancer    Retained urethral stent    H/O umbilical hernia repair    Elbow fracture    H/O cystoscopy                                              -----------------------------------------------------------  ALLERGIES:  allopurinol (Rash (Moderate))                                            ------------------------------------------------------------    HOME MEDICATIONS  Home Medications:  benztropine 1 mg oral tablet: 1 tab(s) orally once a day (at bedtime) (03 May 2024 23:36)  benztropine 2 mg oral tablet: 1 tab(s) orally once a day (15 Nov 2023 06:52)  Prolixin 10 mg oral tablet: 1 tab(s) orally once a day (15 Nov 2023 06:52)  Prolixin 5 mg oral tablet: 1 tab(s) orally once a day (at bedtime) (03 May 2024 23:36)                           MEDICATIONS:  STANDING MEDICATIONS  benztropine 1 milliGRAM(s) Oral daily  chlorhexidine 2% Cloths 1 Application(s) Topical <User Schedule>  DOPamine Infusion 4.998 MICROgram(s)/kG/Min IV Continuous <Continuous>  fluPHENAZine 5 milliGRAM(s) Oral daily  folic acid 1 milliGRAM(s) Oral daily  heparin   Injectable 5000 Unit(s) SubCutaneous every 12 hours  methylPREDNISolone sodium succinate Injectable 40 milliGRAM(s) IV Push daily  pantoprazole  Injectable 40 milliGRAM(s) IV Push two times a day  polyethylene glycol 3350 17 Gram(s) Oral daily  senna 1 Tablet(s) Oral two times a day  sodium bicarbonate 1300 milliGRAM(s) Oral three times a day    PRN MEDICATIONS  acetaminophen     Tablet .. 650 milliGRAM(s) Oral every 6 hours PRN  aluminum hydroxide/magnesium hydroxide/simethicone Suspension 30 milliLiter(s) Oral every 4 hours PRN  atropine Injectable 1 milliGRAM(s) IV Push once PRN  guaiFENesin Oral Liquid (Sugar-Free) 100 milliGRAM(s) Oral every 6 hours PRN  melatonin 3 milliGRAM(s) Oral at bedtime PRN  ondansetron Injectable 4 milliGRAM(s) IV Push every 8 hours PRN                                            ------------------------------------------------------------  VITAL SIGNS: Last 24 Hours  T(C): 35.3 (04 Oct 2024 08:00), Max: 36.1 (03 Oct 2024 20:00)  T(F): 95.6 (04 Oct 2024 08:00), Max: 97 (03 Oct 2024 20:00)  HR: 68 (04 Oct 2024 12:00) (48 - 72)  BP: 82/53 (04 Oct 2024 12:00) (82/53 - 112/55)  BP(mean): 63 (04 Oct 2024 12:00) (63 - 79)  RR: 18 (04 Oct 2024 12:00) (18 - 18)  SpO2: 97% (04 Oct 2024 12:00) (95% - 97%)      10-03-24 @ 07:01  -  10-04-24 @ 07:00  --------------------------------------------------------  IN: 102 mL / OUT: 2100 mL / NET: -1998 mL                                             --------------------------------------------------------------  LABS:                        7.4    1.58  )-----------( 164      ( 04 Oct 2024 05:06 )             23.8     10-04    139  |  109  |  53[H]  ----------------------------<  109[H]  4.3   |  18  |  2.1[H]    Ca    9.1      04 Oct 2024 05:06  Phos  4.3     10-03    TPro  3.8[L]  /  Alb  3.0[L]  /  TBili  0.5  /  DBili  x   /  AST  8   /  ALT  9   /  AlkPhos  103  10-04              PHYSICAL EXAM:  GENERAL: NAD, lying in bed comfortably  HEAD:  Atraumatic, Normocephalic  NECK: Supple, No JVD  CHEST/LUNG:Unlabored respirations. B/L crackles.   HEART: regular rate and rhythm; No murmurs, rubs, or gallops  ABDOMEN:  Soft, Nontender, Nondistended.    EXTREMITIES: 1+ LE edema, warm   NERVOUS SYSTEM:  Alert & Oriented X3                                             --------------------------------------------------------------

## 2024-10-04 NOTE — PROGRESS NOTE ADULT - SUBJECTIVE AND OBJECTIVE BOX
Patient is a 68y old  Male who presents with a chief complaint of multifocal pna (03 Oct 2024 11:49)        Over Night Events:  On dopamine 3. Afebrile. On room air.       ROS:     All ROS are negative except HPI         PHYSICAL EXAM    ICU Vital Signs Last 24 Hrs  T(C): 36.1 (04 Oct 2024 04:19), Max: 36.1 (03 Oct 2024 20:00)  T(F): 97 (04 Oct 2024 04:19), Max: 97 (03 Oct 2024 20:00)  HR: 48 (04 Oct 2024 04:19) (48 - 72)  BP: 98/55 (04 Oct 2024 04:19) (93/54 - 112/55)  BP(mean): 72 (03 Oct 2024 20:00) (70 - 79)  ABP: --  ABP(mean): --  RR: 18 (04 Oct 2024 04:19) (18 - 18)  SpO2: 97% (04 Oct 2024 04:19) (95% - 97%)    O2 Parameters below as of 03 Oct 2024 20:00  Patient On (Oxygen Delivery Method): room air            CONSTITUTIONAL:  in NAD    ENT:   Airway patent,   Mouth with normal mucosa.   No thrush    EYES:   Pupils equal,   Round and reactive to light.    CARDIAC:   bradycardia  No edema    RESPIRATORY:   Bilateral BS  Normal chest expansion  Not tachypneic,  No use of accessory muscles    GASTROINTESTINAL:  Abdomen soft,   Non-tender,   No guarding,   + BS    MUSCULOSKELETAL:   Range of motion is not limited,  No clubbing, cyanosis    NEUROLOGICAL:   Alert and oriented   No motor  deficits.    SKIN:   Skin normal color for race,   Warm and dry and intact.       10-03-24 @ 07:01  -  10-04-24 @ 07:00  --------------------------------------------------------  IN:    DOPamine Infusion: 102 mL  Total IN: 102 mL    OUT:    Voided (mL): 2100 mL  Total OUT: 2100 mL    Total NET: -1998 mL          LABS:                            7.4    1.58  )-----------( 164      ( 04 Oct 2024 05:06 )             23.8                                               10-04    139  |  109  |  53[H]  ----------------------------<  109[H]  4.3   |  18  |  2.1[H]    Ca    9.1      04 Oct 2024 05:06  Phos  4.3     10-03    TPro  3.8[L]  /  Alb  3.0[L]  /  TBili  0.5  /  DBili  x   /  AST  8   /  ALT  9   /  AlkPhos  103  10-04                                             Urinalysis Basic - ( 04 Oct 2024 05:06 )    Color: x / Appearance: x / SG: x / pH: x  Gluc: 109 mg/dL / Ketone: x  / Bili: x / Urobili: x   Blood: x / Protein: x / Nitrite: x   Leuk Esterase: x / RBC: x / WBC x   Sq Epi: x / Non Sq Epi: x / Bacteria: x                                                  LIVER FUNCTIONS - ( 04 Oct 2024 05:06 )  Alb: 3.0 g/dL / Pro: 3.8 g/dL / ALK PHOS: 103 U/L / ALT: 9 U/L / AST: 8 U/L / GGT: x                                                                                                                                       MEDICATIONS  (STANDING):  benztropine 1 milliGRAM(s) Oral daily  chlorhexidine 2% Cloths 1 Application(s) Topical <User Schedule>  DOPamine Infusion 4.998 MICROgram(s)/kG/Min (17 mL/Hr) IV Continuous <Continuous>  fluPHENAZine 5 milliGRAM(s) Oral daily  folic acid 1 milliGRAM(s) Oral daily  heparin   Injectable 5000 Unit(s) SubCutaneous every 12 hours  methylPREDNISolone sodium succinate Injectable 40 milliGRAM(s) IV Push daily  pantoprazole  Injectable 40 milliGRAM(s) IV Push two times a day  piperacillin/tazobactam IVPB.. 3.375 Gram(s) IV Intermittent every 8 hours  polyethylene glycol 3350 17 Gram(s) Oral daily  senna 1 Tablet(s) Oral two times a day  sodium bicarbonate 1300 milliGRAM(s) Oral three times a day    MEDICATIONS  (PRN):  acetaminophen     Tablet .. 650 milliGRAM(s) Oral every 6 hours PRN Temp greater or equal to 38C (100.4F), Mild Pain (1 - 3)  aluminum hydroxide/magnesium hydroxide/simethicone Suspension 30 milliLiter(s) Oral every 4 hours PRN Dyspepsia  atropine Injectable 1 milliGRAM(s) IV Push once PRN shamika <40 or symptomatic pt  guaiFENesin Oral Liquid (Sugar-Free) 100 milliGRAM(s) Oral every 6 hours PRN Cough  melatonin 3 milliGRAM(s) Oral at bedtime PRN Insomnia  ondansetron Injectable 4 milliGRAM(s) IV Push every 8 hours PRN Nausea and/or Vomiting      New X-rays reviewed:                                                                                  ECHO

## 2024-10-05 LAB
ALBUMIN SERPL ELPH-MCNC: 3 G/DL — LOW (ref 3.5–5.2)
ALP SERPL-CCNC: 106 U/L — SIGNIFICANT CHANGE UP (ref 30–115)
ALT FLD-CCNC: 10 U/L — SIGNIFICANT CHANGE UP (ref 0–41)
ANION GAP SERPL CALC-SCNC: 12 MMOL/L — SIGNIFICANT CHANGE UP (ref 7–14)
AST SERPL-CCNC: 7 U/L — SIGNIFICANT CHANGE UP (ref 0–41)
BASOPHILS # BLD AUTO: 0 K/UL — SIGNIFICANT CHANGE UP (ref 0–0.2)
BASOPHILS NFR BLD AUTO: 0 % — SIGNIFICANT CHANGE UP (ref 0–1)
BILIRUB SERPL-MCNC: 0.5 MG/DL — SIGNIFICANT CHANGE UP (ref 0.2–1.2)
BUN SERPL-MCNC: 49 MG/DL — HIGH (ref 10–20)
CALCIUM SERPL-MCNC: 8.8 MG/DL — SIGNIFICANT CHANGE UP (ref 8.4–10.4)
CHLORIDE SERPL-SCNC: 108 MMOL/L — SIGNIFICANT CHANGE UP (ref 98–110)
CO2 SERPL-SCNC: 22 MMOL/L — SIGNIFICANT CHANGE UP (ref 17–32)
CREAT SERPL-MCNC: 2.2 MG/DL — HIGH (ref 0.7–1.5)
EGFR: 32 ML/MIN/1.73M2 — LOW
EOSINOPHIL # BLD AUTO: 0.04 K/UL — SIGNIFICANT CHANGE UP (ref 0–0.7)
EOSINOPHIL NFR BLD AUTO: 2.8 % — SIGNIFICANT CHANGE UP (ref 0–8)
GLUCOSE SERPL-MCNC: 98 MG/DL — SIGNIFICANT CHANGE UP (ref 70–99)
HCT VFR BLD CALC: 23.6 % — LOW (ref 42–52)
HGB BLD-MCNC: 7.4 G/DL — LOW (ref 14–18)
IMM GRANULOCYTES NFR BLD AUTO: 10.5 % — HIGH (ref 0.1–0.3)
LYMPHOCYTES # BLD AUTO: 0.27 K/UL — LOW (ref 1.2–3.4)
LYMPHOCYTES # BLD AUTO: 18.9 % — LOW (ref 20.5–51.1)
MCHC RBC-ENTMCNC: 27.4 PG — SIGNIFICANT CHANGE UP (ref 27–31)
MCHC RBC-ENTMCNC: 31.4 G/DL — LOW (ref 32–37)
MCV RBC AUTO: 87.4 FL — SIGNIFICANT CHANGE UP (ref 80–94)
MONOCYTES # BLD AUTO: 0.18 K/UL — SIGNIFICANT CHANGE UP (ref 0.1–0.6)
MONOCYTES NFR BLD AUTO: 12.6 % — HIGH (ref 1.7–9.3)
NEUTROPHILS # BLD AUTO: 0.79 K/UL — LOW (ref 1.4–6.5)
NEUTROPHILS NFR BLD AUTO: 55.2 % — SIGNIFICANT CHANGE UP (ref 42.2–75.2)
NRBC # BLD: 0 /100 WBCS — SIGNIFICANT CHANGE UP (ref 0–0)
PLATELET # BLD AUTO: 172 K/UL — SIGNIFICANT CHANGE UP (ref 130–400)
PMV BLD: 11.2 FL — HIGH (ref 7.4–10.4)
POTASSIUM SERPL-MCNC: 4.3 MMOL/L — SIGNIFICANT CHANGE UP (ref 3.5–5)
POTASSIUM SERPL-SCNC: 4.3 MMOL/L — SIGNIFICANT CHANGE UP (ref 3.5–5)
PROT SERPL-MCNC: 3.9 G/DL — LOW (ref 6–8)
RBC # BLD: 2.7 M/UL — LOW (ref 4.7–6.1)
RBC # FLD: 15.7 % — HIGH (ref 11.5–14.5)
SODIUM SERPL-SCNC: 142 MMOL/L — SIGNIFICANT CHANGE UP (ref 135–146)
WBC # BLD: 1.43 K/UL — LOW (ref 4.8–10.8)
WBC # FLD AUTO: 1.43 K/UL — LOW (ref 4.8–10.8)

## 2024-10-05 PROCEDURE — 99232 SBSQ HOSP IP/OBS MODERATE 35: CPT

## 2024-10-05 PROCEDURE — 99233 SBSQ HOSP IP/OBS HIGH 50: CPT

## 2024-10-05 PROCEDURE — 71045 X-RAY EXAM CHEST 1 VIEW: CPT | Mod: 26

## 2024-10-05 RX ORDER — MIDODRINE HYDROCHLORIDE 5 MG/1
5 TABLET ORAL EVERY 8 HOURS
Refills: 0 | Status: DISCONTINUED | OUTPATIENT
Start: 2024-10-05 | End: 2024-10-05

## 2024-10-05 RX ADMIN — PIPERACILLIN SODIUM AND TAZOBACTAM SODIUM 25 GRAM(S): 12; 1.5 INJECTION, POWDER, LYOPHILIZED, FOR SOLUTION INTRAVENOUS at 12:50

## 2024-10-05 RX ADMIN — FOLIC ACID 1 MILLIGRAM(S): 1 TABLET ORAL at 12:49

## 2024-10-05 RX ADMIN — Medication 1 MILLIGRAM(S): at 12:49

## 2024-10-05 RX ADMIN — Medication 650 MILLIGRAM(S): at 02:38

## 2024-10-05 RX ADMIN — PIPERACILLIN SODIUM AND TAZOBACTAM SODIUM 25 GRAM(S): 12; 1.5 INJECTION, POWDER, LYOPHILIZED, FOR SOLUTION INTRAVENOUS at 21:21

## 2024-10-05 RX ADMIN — Medication 5 MILLIGRAM(S): at 12:49

## 2024-10-05 RX ADMIN — CHLORHEXIDINE GLUCONATE ORAL RINSE 1 APPLICATION(S): 1.2 SOLUTION DENTAL at 06:05

## 2024-10-05 RX ADMIN — PANTOPRAZOLE SODIUM 40 MILLIGRAM(S): 40 TABLET, DELAYED RELEASE ORAL at 18:05

## 2024-10-05 RX ADMIN — Medication 5000 UNIT(S): at 18:04

## 2024-10-05 RX ADMIN — Medication 1300 MILLIGRAM(S): at 12:50

## 2024-10-05 RX ADMIN — PANTOPRAZOLE SODIUM 40 MILLIGRAM(S): 40 TABLET, DELAYED RELEASE ORAL at 06:05

## 2024-10-05 RX ADMIN — METHYLPREDNISOLONE ACETATE 40 MILLIGRAM(S): 80 INJECTION, SUSPENSION INTRA-ARTICULAR; INTRALESIONAL; INTRAMUSCULAR; SOFT TISSUE at 06:05

## 2024-10-05 RX ADMIN — Medication 1300 MILLIGRAM(S): at 21:21

## 2024-10-05 RX ADMIN — Medication 1 TABLET(S): at 21:20

## 2024-10-05 RX ADMIN — PIPERACILLIN SODIUM AND TAZOBACTAM SODIUM 25 GRAM(S): 12; 1.5 INJECTION, POWDER, LYOPHILIZED, FOR SOLUTION INTRAVENOUS at 06:06

## 2024-10-05 RX ADMIN — Medication 5000 UNIT(S): at 06:05

## 2024-10-05 RX ADMIN — Medication 17 GRAM(S): at 12:49

## 2024-10-05 NOTE — PROGRESS NOTE ADULT - SUBJECTIVE AND OBJECTIVE BOX
ELDA COVARRUBIAS  68y Male    CHIEF COMPLAINT:    Patient is a 68y old  Male who presents with a chief complaint of multifocal pna (04 Oct 2024 13:48)    INTERVAL HPI/OVERNIGHT EVENTS:    Patient seen and examined. No acute events overnight. Bradycardia improved     ROS: All other systems are negative.    Vital Signs:    T(F): 96.2 (10-05-24 @ 08:00), Max: 96.5 (10-04-24 @ 16:34)  HR: 78 (10-05-24 @ 08:00) (51 - 78)  BP: 86/53 (10-05-24 @ 08:00) (82/53 - 95/51)  RR: 18 (10-05-24 @ 08:00) (18 - 18)  SpO2: 98% (10-05-24 @ 08:00) (96% - 99%)    04 Oct 2024 07:01  -  05 Oct 2024 07:00  --------------------------------------------------------  IN: 100 mL / OUT: 1350 mL / NET: -1250 mL    Daily Weight in k (05 Oct 2024 04:00)    PHYSICAL EXAM:    GENERAL:  NAD pale   SKIN: No rashes or lesions  HEENT: Atraumatic. Normocephalic.  NECK: Supple, No JVD.    PULMONARY: CTA B/L. No wheezing. No rales  CVS: Normal S1, S2. Rate and Rhythm are regular   ABDOMEN/GI: Soft, Nontender, Nondistended  MSK:  No clubbuing or cyanosis   NEUROLOGIC:  moves all extremities   PSYCH: Alert & oriented x 3, normal affect    Consultant(s) Notes Reviewed:  [x ] YES  [ ] NO  Care Discussed with Consultants/Other Providers [ x] YES  [ ] NO    LABS:                        7.4    1.43  )-----------( 172      ( 05 Oct 2024 04:38 )             23.6     142  |  108  |  49[H]  ----------------------------<  98  4.3   |  22  |  2.2[H]    Ca    8.8      05 Oct 2024 04:38    TPro  3.9[L]  /  Alb  3.0[L]  /  TBili  0.5  /  DBili  x   /  AST  7   /  ALT  10  /  AlkPhos  106  10-05    RADIOLOGY & ADDITIONAL TESTS:  Imaging or report Personally Reviewed:  [x] YES  [ ] NO  EKG reviewed: [x] YES  [ ] NO    Medications:  Standing  benztropine 1 milliGRAM(s) Oral daily  chlorhexidine 2% Cloths 1 Application(s) Topical <User Schedule>  DOPamine Infusion 4.998 MICROgram(s)/kG/Min IV Continuous <Continuous>  fluPHENAZine 5 milliGRAM(s) Oral daily  folic acid 1 milliGRAM(s) Oral daily  heparin   Injectable 5000 Unit(s) SubCutaneous every 12 hours  influenza  Vaccine (HIGH DOSE) 0.5 milliLiter(s) IntraMuscular once  methylPREDNISolone sodium succinate Injectable 40 milliGRAM(s) IV Push daily  midodrine 5 milliGRAM(s) Oral every 8 hours  pantoprazole  Injectable 40 milliGRAM(s) IV Push two times a day  piperacillin/tazobactam IVPB.. 3.375 Gram(s) IV Intermittent every 8 hours  polyethylene glycol 3350 17 Gram(s) Oral daily  senna 1 Tablet(s) Oral two times a day  sodium bicarbonate 1300 milliGRAM(s) Oral three times a day    PRN Meds  acetaminophen     Tablet .. 650 milliGRAM(s) Oral every 6 hours PRN  aluminum hydroxide/magnesium hydroxide/simethicone Suspension 30 milliLiter(s) Oral every 4 hours PRN  atropine Injectable 1 milliGRAM(s) IV Push once PRN  guaiFENesin Oral Liquid (Sugar-Free) 100 milliGRAM(s) Oral every 6 hours PRN  melatonin 3 milliGRAM(s) Oral at bedtime PRN  ondansetron Injectable 4 milliGRAM(s) IV Push every 8 hours PRN

## 2024-10-05 NOTE — PROGRESS NOTE ADULT - ASSESSMENT
IMPRESSION:    Acute hypoxemic respiratory failure resolved   Multifocal pneumonia/ possible organizing pneumonia   Possible recurrent aspiration  Pancytopenia - progressing   Hypercalcemia low PTH  HO CKD - Cr stable.   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 Psych following for anti-psychotic medications.     HEENT: Oral care.    PULMONARY:  HOB @ 45 degrees.  Keep SaO2 92 TO 96%.  Wean o2 as tolerated.  Solumedrol 40 q24 . CXR noted. Will need OP CT     CARDIOVASCULAR:  Avoid fluid overload. GDFR.  DC midodrine, f/u EP recs    GI: GI prophylaxis.  Feeding per S&S.    RENAL:  Follow up lytes.  Correct as needed. Trend Cr. PO bicarb 1300 Q 8    INFECTIOUS DISEASE: complete abx, check UA,     HEMATOLOGICAL:  DVT prophylaxis. hemonc re-consult for worsening pancytopenia     ENDOCRINE:  Follow up FS.  Insulin protocol if needed. TSH noted. Wean steroid     MUSCULOSKELETAL: OOBTC.  PT OT.     GOC  DG to tele   poor prognosis     IMPRESSION:    Acute hypoxemic respiratory failure resolved   Multifocal pneumonia/ possible organizing pneumonia   Possible recurrent aspiration  Pancytopenia - progressing   Hypercalcemia low PTH  HO CKD - Cr stable.   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 Psych following for anti-psychotic medications.     HEENT: Oral care.    PULMONARY:  HOB @ 45 degrees.  Keep SaO2 92 TO 96%.  Wean o2 as tolerated. Wean off steroids . Repeat CXR today.     CARDIOVASCULAR:  Avoid fluid overload. GDFR.  DC midodrine, f/u EP recs.     GI: GI prophylaxis.  Feeding per S&S.    RENAL:  Follow up lytes.  Correct as needed. Trend Cr. PO bicarb 650 TID with goal more than 20.     INFECTIOUS DISEASE: Finish 5-7 days of abx. No fevers. Sputum culture noted.     HEMATOLOGICAL:  DVT prophylaxis. hemonc followup for the pancytopenia     ENDOCRINE:  Follow up FS.  Insulin protocol if needed. TSH noted. Wean off steroid     MUSCULOSKELETAL: OOBTC.  PT OT.     GOC  DG to tele   poor prognosis

## 2024-10-05 NOTE — PROGRESS NOTE ADULT - SUBJECTIVE AND OBJECTIVE BOX
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 on monthly somatostatin injections( follows Dr Gong ), last chemo 2 months ago, left elbow fracture s/p ORIF @[facility] ~30 years ago, comes in c/o weakness/SOB and cough, productive of yellow sputum and has been going on for 3 months with 10 lb weight loss over the last 3 months ,     Admitted for AHRF 2/2 multifocal PNA  With pancytopenia  Hypercalcemia (mild)  s/p 1 unit PRBC's , 2L NS in ED , cefeipme, vancomycin and azithromycin    admitted to medicine     RRT for bracycardia while sleeping   got atropine, and placed on dopamine     OVERNIGHT    stable HR 70s  no complaints     ROS:  See HPI    PHYSICAL EXAM    ICU Vital Signs Last 24 Hrs  T(C): 35.7 (05 Oct 2024 08:00), Max: 35.8 (04 Oct 2024 16:34)  T(F): 96.2 (05 Oct 2024 08:00), Max: 96.5 (04 Oct 2024 16:34)  HR: 78 (05 Oct 2024 08:00) (51 - 78)  BP: 86/53 (05 Oct 2024 08:00) (82/53 - 95/51)  BP(mean): 64 (05 Oct 2024 08:00) (63 - 68)  ABP: --  ABP(mean): --  RR: 18 (05 Oct 2024 08:00) (18 - 18)  SpO2: 98% (05 Oct 2024 08:00) (96% - 99%)    O2 Parameters below as of 04 Oct 2024 20:30  Patient On (Oxygen Delivery Method): room air            General: NAD, awake and alert   HEENT:              MMM, PERRL   Lungs: Bilateral BS, no wheezing or crackles   Cardiovascular: Regular, normal S1, S2   Abdomen: Soft, Positive BS  Extremities: No clubbing, no edema   Skin: war, no new rashes   Neurological: no fnd       I&O's Detail    04 Oct 2024 07:01  -  05 Oct 2024 07:00  --------------------------------------------------------  IN:    Oral Fluid: 100 mL  Total IN: 100 mL    OUT:    Voided (mL): 1350 mL  Total OUT: 1350 mL    Total NET: -1250 mL          LABS:                          7.4    1.43  )-----------( 172      ( 05 Oct 2024 04:38 )             23.6         05 Oct 2024 04:38    142    |  108    |  49     ----------------------------<  98     4.3     |  22     |  2.2      Ca    8.8        05 Oct 2024 04:38    TPro  3.9    /  Alb  3.0    /  TBili  0.5    /  DBili  x      /  AST  7      /  ALT  10     /  AlkPhos  106    05 Oct 2024 04:38  Amylase x     lipase x                                                                                        Urinalysis Basic - ( 05 Oct 2024 04:38 )    Color: x / Appearance: x / SG: x / pH: x  Gluc: 98 mg/dL / Ketone: x  / Bili: x / Urobili: x   Blood: x / Protein: x / Nitrite: x   Leuk Esterase: x / RBC: x / WBC x   Sq Epi: x / Non Sq Epi: x / Bacteria: x                                                                                                                                                     MEDICATIONS  (STANDING):  benztropine 1 milliGRAM(s) Oral daily  chlorhexidine 2% Cloths 1 Application(s) Topical <User Schedule>  fluPHENAZine 5 milliGRAM(s) Oral daily  folic acid 1 milliGRAM(s) Oral daily  heparin   Injectable 5000 Unit(s) SubCutaneous every 12 hours  influenza  Vaccine (HIGH DOSE) 0.5 milliLiter(s) IntraMuscular once  methylPREDNISolone sodium succinate Injectable 40 milliGRAM(s) IV Push daily  pantoprazole  Injectable 40 milliGRAM(s) IV Push two times a day  piperacillin/tazobactam IVPB.. 3.375 Gram(s) IV Intermittent every 8 hours  polyethylene glycol 3350 17 Gram(s) Oral daily  senna 1 Tablet(s) Oral two times a day  sodium bicarbonate 1300 milliGRAM(s) Oral three times a day    MEDICATIONS  (PRN):  acetaminophen     Tablet .. 650 milliGRAM(s) Oral every 6 hours PRN Temp greater or equal to 38C (100.4F), Mild Pain (1 - 3)  aluminum hydroxide/magnesium hydroxide/simethicone Suspension 30 milliLiter(s) Oral every 4 hours PRN Dyspepsia  atropine Injectable 1 milliGRAM(s) IV Push once PRN shamika <40 or symptomatic pt  guaiFENesin Oral Liquid (Sugar-Free) 100 milliGRAM(s) Oral every 6 hours PRN Cough  melatonin 3 milliGRAM(s) Oral at bedtime PRN Insomnia  ondansetron Injectable 4 milliGRAM(s) IV Push every 8 hours PRN Nausea and/or Vomiting

## 2024-10-05 NOTE — CHART NOTE - NSCHARTNOTEFT_GEN_A_CORE
Transfer Note    Transfer from: CEU   Transfer to: Telemetry       Interim Events:   While in CEU was weaned off levophed, became bradycardic, had EP and cardio eval and required atropine pushes then was on dopamine drip. Weaned off dopamine drip as of 10/4/24 afternoon, and remained bradycardic while asleep to 40s, but in am of 10/5/24 maintained HR in 70-80s while at rest. BP remains soft but MAP >65 and pt is AAO3 w/o complaints this morning.     For Follow-Up:  - Monitor HR   - Consulted Hem/Onc again - f/u Hem Onc recs. Pt becoming more neutropenic and has required zarxio in the past.   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 cough is productive of yellow sputum and has been going on for 3 months with 10 lb weight loss over the last 3 months , progressively worsening weakness, s./p fall. No head trauma. No LOC. No CP. Reports SOB on exertion. No abdominal pain. No n/v/c/d. Last chemo > 2 mo ago.   Remains on Low dose levophed in SDU      Septic shock  (POA) due to suspected Multifocal PNA in immunocompromised patient  - weaned off dopamine 3, on room air  - off fluids  - s/p azithromycin, c/w piperacillin/tazobactam IVPB.. 3.375 Gram(s) IV Intermittent every 8 hours until 10/7  - c/w solumedrol 40 mg Q 24 hours per pulmonary   - supportive care, aspiration precautions   - nebs Q 6 hours PRN   - out of bed to chair     Sinus bradycardia - improved   - HR was dropping below 40. S/P atropine x3 previously, dopamine drip 10/3 to 10/4, now stable off dopamine   - telemetry/EKGs reviewed   - EP/Cardiology following   - TSH WNL   - avoid av node blockers, AVOID MIDODRINE WHICH CAN ALSO CAUSE BRADYCARDIA     Multifactorial anemia / pancytopenia  - s/p 1 PRBC in ER  - no evidence of bleeding currently   - Keep active T&S   - c/w PPI IV BID   -  Colonoscopy 2023- Cecal polyp, EMR: Tubular adenoma fragments showing foci of high-grade dysplasia and focal  intramucosal adenocarcinoma.  Plan was for 6month follow up   - appreciate heme onc fu, patient planned for OP work up and fu with Dr Gong   - worsening pancytopenia, recalled heme onc on 10/5/24     Hypercalcemia likely secondary to malignancy   hx of Marginal zone lymphoma  hx of Pancreatic neuroendocrine tumor   - on Monthly somatostatin injections - last 9/16  - s/p IV hydration   - PTH:low  , PTHrP: pending   - monitor Ca, s/p heme onc eval     Small pericardial effusion - chronic   - stable small pericardial effusion seen on CT scan , was also reported on multiple previous ECHO;s    ASHLEY on  CKD 4 - improved   metabolic acidosis   NSTEMI type II in the setting of CKD   - hx of nephrolithiasis c/b atrophic L kidney, R ureteral revision CKD4 (bsl Cr ~2.5) and Cr 2.2 on 10/5/24   - s/p IV contrast on admission   - sodium bicarb 1300mg TID , monitor bicarb level   - Trops elevated likely secondary to CKD   - pt is chest pain free  - telemetry monitoring     Elevated ALP  - possibly due to hepatic involvement of lymphoma/ bone involvement   - monitor levels     Schizophrenia  - case discussed with psychiatry, increase fluphenazine to 5mg (on 10mg at home) and decreased benztropine to 1mg daily                 MEDICATIONS:  STANDING MEDICATIONS  benztropine 1 milliGRAM(s) Oral daily  chlorhexidine 2% Cloths 1 Application(s) Topical <User Schedule>  fluPHENAZine 5 milliGRAM(s) Oral daily  folic acid 1 milliGRAM(s) Oral daily  heparin   Injectable 5000 Unit(s) SubCutaneous every 12 hours  influenza  Vaccine (HIGH DOSE) 0.5 milliLiter(s) IntraMuscular once  methylPREDNISolone sodium succinate Injectable 40 milliGRAM(s) IV Push daily  pantoprazole  Injectable 40 milliGRAM(s) IV Push two times a day  piperacillin/tazobactam IVPB.. 3.375 Gram(s) IV Intermittent every 8 hours  polyethylene glycol 3350 17 Gram(s) Oral daily  senna 1 Tablet(s) Oral two times a day  sodium bicarbonate 1300 milliGRAM(s) Oral three times a day    PRN MEDICATIONS  acetaminophen     Tablet .. 650 milliGRAM(s) Oral every 6 hours PRN  aluminum hydroxide/magnesium hydroxide/simethicone Suspension 30 milliLiter(s) Oral every 4 hours PRN  atropine Injectable 1 milliGRAM(s) IV Push once PRN  guaiFENesin Oral Liquid (Sugar-Free) 100 milliGRAM(s) Oral every 6 hours PRN  melatonin 3 milliGRAM(s) Oral at bedtime PRN  ondansetron Injectable 4 milliGRAM(s) IV Push every 8 hours PRN      VITAL SIGNS: Last 24 Hours  T(C): 35.7 (05 Oct 2024 08:00), Max: 35.8 (04 Oct 2024 16:34)  T(F): 96.2 (05 Oct 2024 08:00), Max: 96.5 (04 Oct 2024 16:34)  HR: 78 (05 Oct 2024 08:00) (51 - 78)  BP: 86/53 (05 Oct 2024 08:00) (82/53 - 95/51)  BP(mean): 64 (05 Oct 2024 08:00) (63 - 68)  RR: 18 (05 Oct 2024 08:00) (18 - 18)  SpO2: 98% (05 Oct 2024 08:00) (96% - 99%)    LABS:                        7.4    1.43  )-----------( 172      ( 05 Oct 2024 04:38 )             23.6     10-05    142  |  108  |  49[H]  ----------------------------<  98  4.3   |  22  |  2.2[H]    Ca    8.8      05 Oct 2024 04:38    TPro  3.9[L]  /  Alb  3.0[L]  /  TBili  0.5  /  DBili  x   /  AST  7   /  ALT  10  /  AlkPhos  106  10-05      Urinalysis Basic - ( 05 Oct 2024 04:38 )    Color: x / Appearance: x / SG: x / pH: x  Gluc: 98 mg/dL / Ketone: x  / Bili: x / Urobili: x   Blood: x / Protein: x / Nitrite: x   Leuk Esterase: x / RBC: x / WBC x   Sq Epi: x / Non Sq Epi: x / Bacteria: x

## 2024-10-05 NOTE — PROGRESS NOTE ADULT - ASSESSMENT
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 cough is productive of yellow sputum and has been going on for 3 months with 10 lb weight loss over the last 3 months , progressively worsening weakness, s./p fall. No head trauma. No LOC. No CP. Reports SOB on exertion. No abdominal pain. No n/v/c/d. Last chemo > 2 mo ago.   Remains on Low dose levophed in SDU      Septic shock  (POA) due to suspected Multifocal PNA in immunocompromised patient - resolved  - s/p levophed, dopamine, now off, on room air  - off fluids  - s/p azithromycin, c/w piperacillin/tazobactam IVPB.. 3.375 Gram(s) IV Intermittent every 8 hours until 10/7  - c/w solumedrol 40 mg Q 24 hours per pulmonary for 5 days total   - supportive care, aspiration precautions   - nebs Q 6 hours PRN   - out of bed to chair   - dc midodrine     Sinus bradycardia - resolved  - HR is dropping below 40. S/P atropine x3 previously   - telemetry/EKGs reviewed   - EP/Cardiology following   - started on dopamine 10/3, off since 10/4  - TSH WNL   - avoid av node blockers     Multifactorial anemia / pancytopenia  - s/p 1 PRBC in ER  - no evidence of bleeding currently   - c/w PPI   -  Colonoscopy 2023- Cecal polyp, EMR: Tubular adenoma fragments showing foci of high-grade dysplasia and focal  intramucosal adenocarcinoma.  Plan was for 6month follow up   - appreciate heme onc fu, patient planned for OP work up and fu with Dr Reyes   - worsening pancytopenia, recall heme onc     Hypercalcemia likely secondary to malignancy   hx of Marginal zone lymphoma  hx of Pancreatic neuroendocrine tumor   - on Monthly somatostatin injections - last 9/16  - s/p IV hydration   - PTH:low   - monitor Ca, s/p heme onc eval     Small pericardial effusion - chronic   - stable small pericardial effusion seen on CT scan , was also reported on multiple previous ECHO;s    ASHLEY on  CKD 4   metabolic acidosis   NSTEMI type II in the setting of CKD   - hx of nephrolithiasis c/b atrophic L kidney, R ureteral revision CKD4 (bsl Cr ~2.5)   - s/p IV contrast on admission   - sodium bicarb 1300mg TID , monitor bicarb level   - pt is chest pain free  - telemetry monitoring     Elevated ALP  - possibly due to hepatic involvement of lymphoma/ bone involvement   - monitor levels     Schizophrenia  - case discussed with psychiatry, increased fluphenazine to 5mg (on 10mg at home) and decreased benztropine to 1mg daily     Full code overall prognosis is guarded  Pending: monitor for bradycardia, labs, recall heme onc, complete abx course, complete steroids,     Patient seen at bedside, total time spent to evaluate and treat the patient's acute illness and chronic medical conditions as well as time spent reviewing prior records, labs, radiology, documenting in electronic medical records,  discussing medical plan with   medical team was more than 52 minutes with >50% of time spent face to face with patient, discussing with patient/family as well as coordination of care

## 2024-10-06 LAB
ALBUMIN SERPL ELPH-MCNC: 3.2 G/DL — LOW (ref 3.5–5.2)
ALP SERPL-CCNC: 119 U/L — HIGH (ref 30–115)
ALT FLD-CCNC: 9 U/L — SIGNIFICANT CHANGE UP (ref 0–41)
ANION GAP SERPL CALC-SCNC: 10 MMOL/L — SIGNIFICANT CHANGE UP (ref 7–14)
AST SERPL-CCNC: 6 U/L — SIGNIFICANT CHANGE UP (ref 0–41)
BASOPHILS # BLD AUTO: 0 K/UL — SIGNIFICANT CHANGE UP (ref 0–0.2)
BASOPHILS NFR BLD AUTO: 0 % — SIGNIFICANT CHANGE UP (ref 0–1)
BILIRUB SERPL-MCNC: 0.8 MG/DL — SIGNIFICANT CHANGE UP (ref 0.2–1.2)
BUN SERPL-MCNC: 43 MG/DL — HIGH (ref 10–20)
CALCIUM SERPL-MCNC: 8.7 MG/DL — SIGNIFICANT CHANGE UP (ref 8.4–10.4)
CHLORIDE SERPL-SCNC: 109 MMOL/L — SIGNIFICANT CHANGE UP (ref 98–110)
CO2 SERPL-SCNC: 21 MMOL/L — SIGNIFICANT CHANGE UP (ref 17–32)
CREAT SERPL-MCNC: 2.1 MG/DL — HIGH (ref 0.7–1.5)
EGFR: 34 ML/MIN/1.73M2 — LOW
EOSINOPHIL # BLD AUTO: 0.06 K/UL — SIGNIFICANT CHANGE UP (ref 0–0.7)
EOSINOPHIL NFR BLD AUTO: 5.9 % — SIGNIFICANT CHANGE UP (ref 0–8)
GLUCOSE SERPL-MCNC: 92 MG/DL — SIGNIFICANT CHANGE UP (ref 70–99)
HCT VFR BLD CALC: 25.3 % — LOW (ref 42–52)
HGB BLD-MCNC: 8 G/DL — LOW (ref 14–18)
IMM GRANULOCYTES NFR BLD AUTO: 9.8 % — HIGH (ref 0.1–0.3)
LYMPHOCYTES # BLD AUTO: 0.22 K/UL — LOW (ref 1.2–3.4)
LYMPHOCYTES # BLD AUTO: 21.6 % — SIGNIFICANT CHANGE UP (ref 20.5–51.1)
MAGNESIUM SERPL-MCNC: 2.3 MG/DL — SIGNIFICANT CHANGE UP (ref 1.8–2.4)
MCHC RBC-ENTMCNC: 27.8 PG — SIGNIFICANT CHANGE UP (ref 27–31)
MCHC RBC-ENTMCNC: 31.6 G/DL — LOW (ref 32–37)
MCV RBC AUTO: 87.8 FL — SIGNIFICANT CHANGE UP (ref 80–94)
MONOCYTES # BLD AUTO: 0.15 K/UL — SIGNIFICANT CHANGE UP (ref 0.1–0.6)
MONOCYTES NFR BLD AUTO: 14.7 % — HIGH (ref 1.7–9.3)
NEUTROPHILS # BLD AUTO: 0.49 K/UL — LOW (ref 1.4–6.5)
NEUTROPHILS NFR BLD AUTO: 48 % — SIGNIFICANT CHANGE UP (ref 42.2–75.2)
NRBC # BLD: 0 /100 WBCS — SIGNIFICANT CHANGE UP (ref 0–0)
PLATELET # BLD AUTO: 157 K/UL — SIGNIFICANT CHANGE UP (ref 130–400)
PMV BLD: 12.1 FL — HIGH (ref 7.4–10.4)
POTASSIUM SERPL-MCNC: 4 MMOL/L — SIGNIFICANT CHANGE UP (ref 3.5–5)
POTASSIUM SERPL-SCNC: 4 MMOL/L — SIGNIFICANT CHANGE UP (ref 3.5–5)
PROT SERPL-MCNC: 4 G/DL — LOW (ref 6–8)
RBC # BLD: 2.88 M/UL — LOW (ref 4.7–6.1)
RBC # FLD: 15.7 % — HIGH (ref 11.5–14.5)
SODIUM SERPL-SCNC: 140 MMOL/L — SIGNIFICANT CHANGE UP (ref 135–146)
WBC # BLD: 1.02 K/UL — LOW (ref 4.8–10.8)
WBC # FLD AUTO: 1.02 K/UL — LOW (ref 4.8–10.8)

## 2024-10-06 PROCEDURE — 99232 SBSQ HOSP IP/OBS MODERATE 35: CPT

## 2024-10-06 PROCEDURE — 93010 ELECTROCARDIOGRAM REPORT: CPT

## 2024-10-06 RX ORDER — HEPARIN SOD,PORCINE/0.9 % NACL 10 UNIT/ML
300 KIT INTRAVENOUS ONCE
Refills: 0 | Status: COMPLETED | OUTPATIENT
Start: 2024-10-06 | End: 2024-10-06

## 2024-10-06 RX ORDER — PREDNISONE 5 MG/1
40 TABLET ORAL DAILY
Refills: 0 | Status: DISCONTINUED | OUTPATIENT
Start: 2024-10-07 | End: 2024-10-16

## 2024-10-06 RX ADMIN — Medication 5 MILLIGRAM(S): at 14:56

## 2024-10-06 RX ADMIN — Medication 17 GRAM(S): at 21:11

## 2024-10-06 RX ADMIN — METHYLPREDNISOLONE ACETATE 40 MILLIGRAM(S): 80 INJECTION, SUSPENSION INTRA-ARTICULAR; INTRALESIONAL; INTRAMUSCULAR; SOFT TISSUE at 05:03

## 2024-10-06 RX ADMIN — FOLIC ACID 1 MILLIGRAM(S): 1 TABLET ORAL at 14:56

## 2024-10-06 RX ADMIN — Medication 5000 UNIT(S): at 17:31

## 2024-10-06 RX ADMIN — Medication 5000 UNIT(S): at 05:04

## 2024-10-06 RX ADMIN — PIPERACILLIN SODIUM AND TAZOBACTAM SODIUM 25 GRAM(S): 12; 1.5 INJECTION, POWDER, LYOPHILIZED, FOR SOLUTION INTRAVENOUS at 21:11

## 2024-10-06 RX ADMIN — PANTOPRAZOLE SODIUM 40 MILLIGRAM(S): 40 TABLET, DELAYED RELEASE ORAL at 05:03

## 2024-10-06 RX ADMIN — Medication 1300 MILLIGRAM(S): at 05:03

## 2024-10-06 RX ADMIN — Medication 1 MILLIGRAM(S): at 14:56

## 2024-10-06 RX ADMIN — PIPERACILLIN SODIUM AND TAZOBACTAM SODIUM 25 GRAM(S): 12; 1.5 INJECTION, POWDER, LYOPHILIZED, FOR SOLUTION INTRAVENOUS at 05:03

## 2024-10-06 RX ADMIN — Medication 1300 MILLIGRAM(S): at 14:56

## 2024-10-06 RX ADMIN — Medication 1 TABLET(S): at 05:03

## 2024-10-06 RX ADMIN — PIPERACILLIN SODIUM AND TAZOBACTAM SODIUM 25 GRAM(S): 12; 1.5 INJECTION, POWDER, LYOPHILIZED, FOR SOLUTION INTRAVENOUS at 14:56

## 2024-10-06 RX ADMIN — Medication 300 UNIT(S): at 18:03

## 2024-10-06 RX ADMIN — PANTOPRAZOLE SODIUM 40 MILLIGRAM(S): 40 TABLET, DELAYED RELEASE ORAL at 17:31

## 2024-10-06 RX ADMIN — CHLORHEXIDINE GLUCONATE ORAL RINSE 1 APPLICATION(S): 1.2 SOLUTION DENTAL at 05:04

## 2024-10-06 RX ADMIN — Medication 1300 MILLIGRAM(S): at 21:11

## 2024-10-06 NOTE — PROGRESS NOTE ADULT - SUBJECTIVE AND OBJECTIVE BOX
SUBJECTIVE:    Patient is a 68y old Male who presents with a chief complaint of multifocal pna (06 Oct 2024 08:00)    Currently admitted to medicine with the primary diagnosis of GI bleed       Today is hospital day 9d.     PAST MEDICAL & SURGICAL HISTORY  Kidney stones    Schizophrenia    Lymphoma    Shingles    Atrophic kidney    H/O colonoscopy with polypectomy    Liver cyst    History of bladder surgery    H/O neuropathy    History of chemotherapy    Stage 3 chronic kidney disease    Neuroendocrine malignancy    Neuroendocrine tumor status post surgical treatment    Neuroendocrine cancer    Retained urethral stent    H/O umbilical hernia repair    Elbow fracture    H/O cystoscopy      ALLERGIES:  allopurinol (Rash (Moderate))    MEDICATIONS:  STANDING MEDICATIONS  benztropine 1 milliGRAM(s) Oral daily  chlorhexidine 2% Cloths 1 Application(s) Topical <User Schedule>  fluPHENAZine 5 milliGRAM(s) Oral daily  folic acid 1 milliGRAM(s) Oral daily  heparin   Injectable 5000 Unit(s) SubCutaneous every 12 hours  influenza  Vaccine (HIGH DOSE) 0.5 milliLiter(s) IntraMuscular once  methylPREDNISolone sodium succinate Injectable 40 milliGRAM(s) IV Push daily  pantoprazole  Injectable 40 milliGRAM(s) IV Push two times a day  piperacillin/tazobactam IVPB.. 3.375 Gram(s) IV Intermittent every 8 hours  polyethylene glycol 3350 17 Gram(s) Oral daily  senna 1 Tablet(s) Oral two times a day  sodium bicarbonate 1300 milliGRAM(s) Oral three times a day    PRN MEDICATIONS  acetaminophen     Tablet .. 650 milliGRAM(s) Oral every 6 hours PRN  aluminum hydroxide/magnesium hydroxide/simethicone Suspension 30 milliLiter(s) Oral every 4 hours PRN  atropine Injectable 1 milliGRAM(s) IV Push once PRN  guaiFENesin Oral Liquid (Sugar-Free) 100 milliGRAM(s) Oral every 6 hours PRN  melatonin 3 milliGRAM(s) Oral at bedtime PRN  ondansetron Injectable 4 milliGRAM(s) IV Push every 8 hours PRN    VITALS:   T(F): 97.5  HR: 72  BP: 95/58  RR: 18  SpO2: 99%    LABS:                        8.0    1.02  )-----------( 157      ( 06 Oct 2024 05:32 )             25.3     10-06    140  |  109  |  43[H]  ----------------------------<  92  4.0   |  21  |  2.1[H]    Ca    8.7      06 Oct 2024 05:32  Mg     2.3     10-06    TPro  4.0[L]  /  Alb  3.2[L]  /  TBili  0.8  /  DBili  x   /  AST  6   /  ALT  9   /  AlkPhos  119[H]  10-06      Urinalysis Basic - ( 06 Oct 2024 05:32 )    Color: x / Appearance: x / SG: x / pH: x  Gluc: 92 mg/dL / Ketone: x  / Bili: x / Urobili: x   Blood: x / Protein: x / Nitrite: x   Leuk Esterase: x / RBC: x / WBC x   Sq Epi: x / Non Sq Epi: x / Bacteria: x                RADIOLOGY:    PHYSICAL EXAM:  GEN: No acute distress  LUNGS: Clear to auscultation bilaterally   HEART: S1/S2 present. RRR.   ABD/ GI: Soft, non-tender, non-distended. Bowel sounds present  EXT: NC/NC/NE/2+PP/MIMS  NEURO: AAOX3

## 2024-10-06 NOTE — PROGRESS NOTE ADULT - SUBJECTIVE AND OBJECTIVE BOX
SUBJECTIVE/OVERNIGHT EVENTS  Today is hospital day 9d. This morning patient was seen and examined at bedside, resting comfortably in bed. No acute or major events overnight.      CODE STATUS:    FAMILY COMMUNICATION  Contact date:  Name of person contacted:  Relationship to patient:  Communication details:    MEDICATIONS  STANDING MEDICATIONS  benztropine 1 milliGRAM(s) Oral daily  chlorhexidine 2% Cloths 1 Application(s) Topical <User Schedule>  fluPHENAZine 5 milliGRAM(s) Oral daily  folic acid 1 milliGRAM(s) Oral daily  heparin   Injectable 5000 Unit(s) SubCutaneous every 12 hours  influenza  Vaccine (HIGH DOSE) 0.5 milliLiter(s) IntraMuscular once  methylPREDNISolone sodium succinate Injectable 40 milliGRAM(s) IV Push daily  pantoprazole  Injectable 40 milliGRAM(s) IV Push two times a day  piperacillin/tazobactam IVPB.. 3.375 Gram(s) IV Intermittent every 8 hours  polyethylene glycol 3350 17 Gram(s) Oral daily  senna 1 Tablet(s) Oral two times a day  sodium bicarbonate 1300 milliGRAM(s) Oral three times a day    PRN MEDICATIONS  acetaminophen     Tablet .. 650 milliGRAM(s) Oral every 6 hours PRN  aluminum hydroxide/magnesium hydroxide/simethicone Suspension 30 milliLiter(s) Oral every 4 hours PRN  atropine Injectable 1 milliGRAM(s) IV Push once PRN  guaiFENesin Oral Liquid (Sugar-Free) 100 milliGRAM(s) Oral every 6 hours PRN  melatonin 3 milliGRAM(s) Oral at bedtime PRN  ondansetron Injectable 4 milliGRAM(s) IV Push every 8 hours PRN    VITALS  T(F): 97.5 (10-06-24 @ 04:20), Max: 97.6 (10-05-24 @ 20:45)  HR: 72 (10-06-24 @ 04:20) (68 - 73)  BP: 95/58 (10-06-24 @ 04:20) (94/54 - 102/51)  RR: 18 (10-06-24 @ 04:20) (18 - 18)  SpO2: 99% (10-05-24 @ 12:00) (99% - 99%)    PHYSICAL EXAM  GENERAL  NAD, lying in bed comfortably    HEAD  Atraumatic     NECK  Supple     HEART  Regular rate and rhythm no murmurs rubs or gallops    LUNGS  Clear to auscultation bilaterally, no wheezing rales or rhonchi    ABDOMEN  soft, nontender, nondistended, +BS    EXTREMITIES  no edema    NERVOUS SYSTEM  A&Ox3       SKIN  No rashes or lesions      LABS             8.0    1.02  )-----------( 157      ( 10-06-24 @ 05:32 )             25.3     140  |  109  |  43  -------------------------<  92   10-06-24 @ 05:32  4.0  |  21  |  2.1    Ca      8.7     10-06-24 @ 05:32  Mg     2.3     10-06-24 @ 05:32    TPro  4.0  /  Alb  3.2  /  TBili  0.8  /  DBili  x   /  AST  6   /  ALT  9   /  AlkPhos  119  /  GGT  x     10-06-24 @ 05:32        Urinalysis Basic - ( 06 Oct 2024 05:32 )    Color: x / Appearance: x / SG: x / pH: x  Gluc: 92 mg/dL / Ketone: x  / Bili: x / Urobili: x   Blood: x / Protein: x / Nitrite: x   Leuk Esterase: x / RBC: x / WBC x   Sq Epi: x / Non Sq Epi: x / Bacteria: x          IMAGING

## 2024-10-06 NOTE — PROGRESS NOTE ADULT - SUBJECTIVE AND OBJECTIVE BOX
ELDA COVARRUBIAS 68y Male  MRN#: [id number]   Hospital Day: 9d    SUBJECTIVE  Patient is a 68y old Male who presents with a chief complaint of multifocal pna (06 Oct 2024 11:52)  Currently admitted to medicine with the primary diagnosis of GI bleed      INTERVAL HPI AND OVERNIGHT EVENTS:  Patient was examined and seen at bedside. This morning he is resting comfortably in bed and reports no issues or overnight events.    REVIEW OF SYMPTOMS: feels well      OBJECTIVE  PAST MEDICAL & SURGICAL HISTORY  Kidney stones    Schizophrenia    Lymphoma    Shingles    Atrophic kidney    H/O colonoscopy with polypectomy    Liver cyst    History of bladder surgery    H/O neuropathy    History of chemotherapy    Stage 3 chronic kidney disease    Neuroendocrine malignancy    Neuroendocrine tumor status post surgical treatment    Neuroendocrine cancer    Retained urethral stent    H/O umbilical hernia repair    Elbow fracture    H/O cystoscopy      ALLERGIES:  allopurinol (Rash (Moderate))    MEDICATIONS:  STANDING MEDICATIONS  benztropine 1 milliGRAM(s) Oral daily  chlorhexidine 2% Cloths 1 Application(s) Topical <User Schedule>  fluPHENAZine 5 milliGRAM(s) Oral daily  folic acid 1 milliGRAM(s) Oral daily  heparin   Injectable 5000 Unit(s) SubCutaneous every 12 hours  influenza  Vaccine (HIGH DOSE) 0.5 milliLiter(s) IntraMuscular once  pantoprazole  Injectable 40 milliGRAM(s) IV Push two times a day  piperacillin/tazobactam IVPB.. 3.375 Gram(s) IV Intermittent every 8 hours  polyethylene glycol 3350 17 Gram(s) Oral daily  senna 1 Tablet(s) Oral two times a day  sodium bicarbonate 1300 milliGRAM(s) Oral three times a day    PRN MEDICATIONS  acetaminophen     Tablet .. 650 milliGRAM(s) Oral every 6 hours PRN  aluminum hydroxide/magnesium hydroxide/simethicone Suspension 30 milliLiter(s) Oral every 4 hours PRN  atropine Injectable 1 milliGRAM(s) IV Push once PRN  guaiFENesin Oral Liquid (Sugar-Free) 100 milliGRAM(s) Oral every 6 hours PRN  melatonin 3 milliGRAM(s) Oral at bedtime PRN  ondansetron Injectable 4 milliGRAM(s) IV Push every 8 hours PRN      VITAL SIGNS: Last 24 Hours  T(C): 36.4 (06 Oct 2024 04:20), Max: 36.4 (05 Oct 2024 20:45)  T(F): 97.5 (06 Oct 2024 04:20), Max: 97.6 (05 Oct 2024 20:45)  HR: 72 (06 Oct 2024 04:20) (68 - 72)  BP: 95/58 (06 Oct 2024 04:20) (94/54 - 95/58)  BP(mean): --  RR: 18 (06 Oct 2024 04:20) (18 - 18)  SpO2: --    LABS:                        8.0    1.02  )-----------( 157      ( 06 Oct 2024 05:32 )             25.3     10-06    140  |  109  |  43[H]  ----------------------------<  92  4.0   |  21  |  2.1[H]    Ca    8.7      06 Oct 2024 05:32  Mg     2.3     10-06    TPro  4.0[L]  /  Alb  3.2[L]  /  TBili  0.8  /  DBili  x   /  AST  6   /  ALT  9   /  AlkPhos  119[H]  10-06      Urinalysis Basic - ( 06 Oct 2024 05:32 )    Color: x / Appearance: x / SG: x / pH: x  Gluc: 92 mg/dL / Ketone: x  / Bili: x / Urobili: x   Blood: x / Protein: x / Nitrite: x   Leuk Esterase: x / RBC: x / WBC x   Sq Epi: x / Non Sq Epi: x / Bacteria: x              PHYSICAL EXAM:  GENERAL: NAD, fatigued  HEAD:  Atraumatic, Normocephalic  EYES: EOMI, PERRLA, conjunctivae pale  and sclerae clear  NECK: Supple, No JVD  CHEST/LUNG: +b/l crackles at base  HEART: Regular rate and rhythm; No murmurs, rubs, or gallops  ABDOMEN: Soft, Nontender, Nondistended; Bowel sounds present  EXTREMITIES:  No cyanosis, or edema  NEUROLOGY: non-focal, able to lift all extremities       ASSESSMENT & PLAN:    68-year-old male with schizophrenia, CKD, marginal zone lymphoma and pancreatic neuroendocrine tumor on monthly somatostatin injections( follows Dr Gong )  left elbow fracture s[facility] ~30 years ago, comes in c/o weakness/SOB and cough that is productive of yellow sputum and has been going on for 3 months with 10 lb weight loss over the last 3 months. He also states weakness started 2 wks ago, getting worse, today fell while trying to ambulate due to weakness.     #Metastatic carcinoid,  on Sandostatin  --MRI abdomen 7.23:  Liver segment II 4 cm mass and Liver segment V  1.5 cm mass suspicious for Hepatic Lymphomas.  --Biopsy of Liver lesion 8.23: well differentiated Neuroendocrine tumor, Grade 3 Likely  metastatic.   --NM Octreoscan: multiple areas (09.05.23 @ 16:43) >Focal area of increased uptake in the upper abdomen/epigastrium to the left of midline suspicious for neuroendocrine tissue expressing somatostatin receptors.  --Gets lost to follow up and has missed few doses   --Last received Sandostatin on 9.16.24   --Chromogranin increased to 1685 from 1293 in July       # NHL, marginal zone   -He was in a good partial remission following 3 cycles of bendamustine and Rituxan treated in 2019 and 2020.  -He was unable to tolerate maintenance Rituxan.  -However, he was never in complete remission and it was preferred just to observe since we were dealing with a low grade lymphoma.  --Was lost to follow up.  --CT Abdomen/Pelvis 2/23/23 Interval worsening in periesophageal and pelvic lymphadenopathy, other bulky lymph nodes in the abdomen and retroperitoneum appear stable. 1.3 indeterminate segment 4 liver lesion. Focal areas of pleural nodularity concerning for lymphomatous/neoplastic/metastatic process, new since prior.  --PET scan 5.23: 1.  Interval increase in size and decreased metabolic activity of the thoracoabdominal lymphadenopathy, some are new, probably recurrence /residual disease. New mildly metabolic 3 cm hypodensity in the liver segment 2/3, concerning for neoplasm or lymphoma.  Poorly defined small hypodensity in the segment 4, below the resolution of PET scan.  MRI will provide further elucidation. Diffuse mildly increased bone marrow activity, possibly reactive versus bone marrow involvement.  --Hepatosplenomegaly.  --s/p R-CVP for Marginal Zone Lymphoma on 9/20 & 9/21>. Had reaction to Rituxan   --s/p CVP,  last received 12/23     #Hypercalcemia-resolved   prior work up neg PTHRP   9.24 outpatient lab Ca 12.6     #Leucopenia   Most likely 2/2 septic shock and underlying lymphoma   Since late dec 2023- he had had low WBC on multiple lab work     #Anemia normocytic   Presented with Hb 7.8   Baseline hb 9-10   Colonoscopy 2023- Cecal polyp, EMR: Tubular adenoma fragments showing foci of high-grade dysplasia and focal   intramucosal adenocarcinoma.   Recommendation was 6M f/up but did not.  Iron TIBC 168, ferritin 630   F/up GI     Recommendations:  His leucopenia is multifactorial. 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   In general, has been a difficult management overall, given the diagnoses and lack of regular follow ups.  No role of Zarxio for now   Needs outpatient follow up for evaluation of lymphoma status with PET scan.      ELDA COVARRUBIAS 68y Male  MRN#: [id number]   Hospital Day: 9d    SUBJECTIVE  Patient is a 68y old Male who presents with a chief complaint of multifocal pna (06 Oct 2024 11:52)  Currently admitted to medicine with the primary diagnosis of GI bleed      INTERVAL HPI AND OVERNIGHT EVENTS:  Patient was examined and seen at bedside. This morning he is resting comfortably in bed and reports no issues or overnight events.    REVIEW OF SYMPTOMS: feels well      OBJECTIVE  PAST MEDICAL & SURGICAL HISTORY  Kidney stones    Schizophrenia    Lymphoma    Shingles    Atrophic kidney    H/O colonoscopy with polypectomy    Liver cyst    History of bladder surgery    H/O neuropathy    History of chemotherapy    Stage 3 chronic kidney disease    Neuroendocrine malignancy    Neuroendocrine tumor status post surgical treatment    Neuroendocrine cancer    Retained urethral stent    H/O umbilical hernia repair    Elbow fracture    H/O cystoscopy      ALLERGIES:  allopurinol (Rash (Moderate))    MEDICATIONS:  STANDING MEDICATIONS  benztropine 1 milliGRAM(s) Oral daily  chlorhexidine 2% Cloths 1 Application(s) Topical <User Schedule>  fluPHENAZine 5 milliGRAM(s) Oral daily  folic acid 1 milliGRAM(s) Oral daily  heparin   Injectable 5000 Unit(s) SubCutaneous every 12 hours  influenza  Vaccine (HIGH DOSE) 0.5 milliLiter(s) IntraMuscular once  pantoprazole  Injectable 40 milliGRAM(s) IV Push two times a day  piperacillin/tazobactam IVPB.. 3.375 Gram(s) IV Intermittent every 8 hours  polyethylene glycol 3350 17 Gram(s) Oral daily  senna 1 Tablet(s) Oral two times a day  sodium bicarbonate 1300 milliGRAM(s) Oral three times a day    PRN MEDICATIONS  acetaminophen     Tablet .. 650 milliGRAM(s) Oral every 6 hours PRN  aluminum hydroxide/magnesium hydroxide/simethicone Suspension 30 milliLiter(s) Oral every 4 hours PRN  atropine Injectable 1 milliGRAM(s) IV Push once PRN  guaiFENesin Oral Liquid (Sugar-Free) 100 milliGRAM(s) Oral every 6 hours PRN  melatonin 3 milliGRAM(s) Oral at bedtime PRN  ondansetron Injectable 4 milliGRAM(s) IV Push every 8 hours PRN      VITAL SIGNS: Last 24 Hours  T(C): 36.4 (06 Oct 2024 04:20), Max: 36.4 (05 Oct 2024 20:45)  T(F): 97.5 (06 Oct 2024 04:20), Max: 97.6 (05 Oct 2024 20:45)  HR: 72 (06 Oct 2024 04:20) (68 - 72)  BP: 95/58 (06 Oct 2024 04:20) (94/54 - 95/58)  BP(mean): --  RR: 18 (06 Oct 2024 04:20) (18 - 18)  SpO2: --    LABS:                        8.0    1.02  )-----------( 157      ( 06 Oct 2024 05:32 )             25.3     10-06    140  |  109  |  43[H]  ----------------------------<  92  4.0   |  21  |  2.1[H]    Ca    8.7      06 Oct 2024 05:32  Mg     2.3     10-06    TPro  4.0[L]  /  Alb  3.2[L]  /  TBili  0.8  /  DBili  x   /  AST  6   /  ALT  9   /  AlkPhos  119[H]  10-06      Urinalysis Basic - ( 06 Oct 2024 05:32 )    Color: x / Appearance: x / SG: x / pH: x  Gluc: 92 mg/dL / Ketone: x  / Bili: x / Urobili: x   Blood: x / Protein: x / Nitrite: x   Leuk Esterase: x / RBC: x / WBC x   Sq Epi: x / Non Sq Epi: x / Bacteria: x              PHYSICAL EXAM:  GENERAL: NAD, fatigued  HEAD:  Atraumatic, Normocephalic  EYES: EOMI, PERRLA, conjunctivae pale  and sclerae clear  NECK: Supple, No JVD  CHEST/LUNG: +b/l crackles at base  HEART: Regular rate and rhythm; No murmurs, rubs, or gallops  ABDOMEN: Soft, Nontender, Nondistended; Bowel sounds present  EXTREMITIES:  No cyanosis, or edema  NEUROLOGY: non-focal, able to lift all extremities       ASSESSMENT & PLAN:    68-year-old male with schizophrenia, CKD, marginal zone lymphoma and pancreatic neuroendocrine tumor on monthly somatostatin injections( follows Dr Gong )  left elbow fracture s/p [facility]IF @[facility] ~30 years ago, comes in c/o weakness/SOB and cough that is productive of yellow sputum and has been going on for 3 months with 10 lb weight loss over the last 3 months. He also states weakness started 2 wks ago, getting worse, today fell while trying to ambulate due to weakness.     #Metastatic carcinoid,  on Sandostatin  --MRI abdomen 7.23:  Liver segment II 4 cm mass and Liver segment V  1.5 cm mass suspicious for Hepatic Lymphomas.  --Biopsy of Liver lesion 8.23: well differentiated Neuroendocrine tumor, Grade 3 Likely  metastatic.   --NM Octreoscan: multiple areas (09.05.23 @ 16:43) >Focal area of increased uptake in the upper abdomen/epigastrium to the left of midline suspicious for neuroendocrine tissue expressing somatostatin receptors.  --Gets lost to follow up and has missed few doses   --Last received Sandostatin on 9.16.24   --Chromogranin increased to 1685 from 1293 in July       # NHL, marginal zone   -He was in a good partial remission following 3 cycles of bendamustine and Rituxan treated in 2019 and 2020.  -He was unable to tolerate maintenance Rituxan.  -However, he was never in complete remission and it was preferred just to observe since we were dealing with a low grade lymphoma.  --Was lost to follow up.  --CT Abdomen/Pelvis 2/23/23 Interval worsening in periesophageal and pelvic lymphadenopathy, other bulky lymph nodes in the abdomen and retroperitoneum appear stable. 1.3 indeterminate segment 4 liver lesion. Focal areas of pleural nodularity concerning for lymphomatous/neoplastic/metastatic process, new since prior.  --PET scan 5.23: 1.  Interval increase in size and decreased metabolic activity of the thoracoabdominal lymphadenopathy, some are new, probably recurrence /residual disease. New mildly metabolic 3 cm hypodensity in the liver segment 2/3, concerning for neoplasm or lymphoma.  Poorly defined small hypodensity in the segment 4, below the resolution of PET scan.  MRI will provide further elucidation. Diffuse mildly increased bone marrow activity, possibly reactive versus bone marrow involvement.  --Hepatosplenomegaly.  --s/p R-CVP for Marginal Zone Lymphoma on 9/20 & 9/21>. Had reaction to Rituxan   --s/p CVP,  last received 12/23     #Hypercalcemia-resolved   prior work up neg PTHRP   9.24 outpatient lab Ca 12.6     #Leucopenia   Most likely 2/2 septic shock and underlying lymphoma   Since late dec 2023- he had had low WBC on multiple lab work     #Anemia normocytic   Presented with Hb 7.8   Baseline hb 9-10   Colonoscopy 2023- Cecal polyp, EMR: Tubular adenoma fragments showing foci of high-grade dysplasia and focal   intramucosal adenocarcinoma.   Recommendation was 6M f/up but did not.  Iron TIBC 168, ferritin 630   F/up GI     Recommendations:  His leucopenia is multifactorial. He has had baseline leucopenia since 12.2023 likely from lymphoma with worsening due to his episode of septic shock.   In general, has been a difficult management overall, given the diagnoses and lack of regular follow ups.  Needs outpatient follow up for evaluation of lymphoma status with PET scan.

## 2024-10-06 NOTE — PROGRESS NOTE ADULT - ASSESSMENT
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 on monthly somatostatin injections( follows Dr Gong )  left elbow fracture s/p ORIF @[facility] ~30 years ago, comes in c/o weakness/SOB and cough. cough is productive of yellow sputum and has been going on for 3 months with 10 lb weight loss over the last 3 months , progressively worsening weakness, s./p fall. No head trauma. No LOC. No CP. Reports SOB on exertion. No abdominal pain. No n/v/c/d. Last chemo > 2 mo ago.   Remains on Low dose levophed in SDU      Septic shock  (POA) due to suspected Multifocal PNA in immunocompromised patient  - weaned off dopamine 3, on room air  - off fluids  - s/p azithromycin, c/w piperacillin/tazobactam IVPB.. 3.375 Gram(s) IV Intermittent every 8 hours until 10/7  - c/w solumedrol 40 mg Q 24 hours per pulmonary. to stop tomorrow  - supportive care, aspiration precautions   - nebs Q 6 hours PRN   - out of bed to chair     Sinus bradycardia - improved   - HR was dropping below 40. S/P atropine x3 previously, dopamine drip 10/3 to 10/4, now stable off dopamine   - telemetry/EKGs reviewed   - EP/Cardiology following   - TSH WNL   - avoid av node blockers, AVOID MIDODRINE WHICH CAN ALSO CAUSE BRADYCARDIA     Multifactorial anemia / pancytopenia  - s/p 1 PRBC in ER  - no evidence of bleeding currently   - Keep active T&S   - c/w PPI IV BID   -  Colonoscopy 2023- Cecal polyp, EMR: Tubular adenoma fragments showing foci of high-grade dysplasia and focal  intramucosal adenocarcinoma.  Plan was for 6month follow up   - appreciate heme onc fu, patient planned for OP work up and fu with Dr Gong   - worsening pancytopenia, recalled heme onc on 10/5/24     Hypercalcemia likely secondary to malignancy   hx of Marginal zone lymphoma  hx of Pancreatic neuroendocrine tumor   - on Monthly somatostatin injections - last 9/16  - s/p IV hydration   - PTH:low  , PTHrP: pending   - monitor Ca, s/p heme onc eval     Small pericardial effusion - chronic   - stable small pericardial effusion seen on CT scan , was also reported on multiple previous ECHO;s    ASHLEY on  CKD 4 - improved   metabolic acidosis   NSTEMI type II in the setting of CKD   - hx of nephrolithiasis c/b atrophic L kidney, R ureteral revision CKD4 (bsl Cr ~2.5) and Cr 2.2 on 10/5/24   - s/p IV contrast on admission   - sodium bicarb 1300mg TID , monitor bicarb level   - Trops elevated likely secondary to CKD   - pt is chest pain free  - telemetry monitoring     Elevated ALP  - possibly due to hepatic involvement of lymphoma/ bone involvement   - monitor levels     Schizophrenia  - psychiatry consult- increase fluphenazine to 5mg (on 10mg at home) and decreased benztropine to 1mg daily     Pending- f/u heme/onc, avoid midodrine

## 2024-10-06 NOTE — PROGRESS NOTE ADULT - ASSESSMENT
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 on monthly somatostatin injections( follows Dr Reyes )  left elbow fracture s/p [facility] ~30 years ago, comes in c/o weakness/SOB and cough. cough is productive of yellow sputum and has been going on for 3 months with 10 lb weight loss over the last 3 months , progressively worsening weakness, s./p fall. No head trauma. No LOC. No CP. Reports SOB on exertion. No abdominal pain. No n/v/c/d. Last chemo > 2 mo ago.   Remains on Low dose levophed in SDU      Septic shock  (POA) due to suspected Multifocal PNA in immunocompromised patient - resolved  - s/p levophed, dopamine, now off, on room air  - off fluids  -  c/w piperacillin/tazobactam IVPB.. 3.375 Gram(s) IV Intermittent every 8 hours until 10/7  - c/w solumedrol 40 mg Q 24 hours per pulmonary for 3 days now and changed to po prednisone today  - nebs Q 6 hours PRN   - out of bed to chair   - dc midodrine     Sinus bradycardia - resolved  - HR is dropping below 40. S/P atropine x3 previously   - telemetry/EKGs reviewed -- HR is n 50s -- 60s today  - EP/Cardiology following   -off dopamine since 10/4  - TSH WNL   - avoid av node blockers     Multifactorial anemia / pancytopenia  - s/p 1 PRBC in ER  - no evidence of bleeding currently   - c/w PPI   -  Colonoscopy 2023- Cecal polyp, EMR: Tubular adenoma fragments showing foci of high-grade dysplasia and focal  intramucosal adenocarcinoma.  Plan was for 6month follow up   - appreciate heme onc fu, patient planned for OP work up and fu with Dr Reyes   - worsening pancytopenia,  ANC is 490 --recall heme onc -- d not think he is septic now    Hypercalcemia likely secondary to malignancy   hx of Marginal zone lymphoma  hx of Pancreatic neuroendocrine tumor   - on Monthly somatostatin injections - last 9/16  - s/p IV hydration   - PTH:low   - monitor Ca, s/p heme onc eval     Small pericardial effusion - chronic   - stable small pericardial effusion seen on CT scan , was also reported on multiple previous ECHO;s    ASHLEY on  CKD 4   metabolic acidosis   NSTEMI type II in the setting of CKD   - hx of nephrolithiasis c/b atrophic L kidney, R ureteral revision CKD4 (bsl Cr ~2.5)   - s/p IV contrast on admission   - sodium bicarb 1300mg TID , monitor bicarb level   - pt is chest pain free  - telemetry monitoring     Elevated ALP  - possibly due to hepatic involvement of lymphoma/ bone involvement   - monitor levels     Schizophrenia  - case discussed with psychiatry, increased fluphenazine to 5mg (on 10mg at home) and decreased benztropine to 1mg daily -- check QTC on ekg    Full code   overall prognosis is guarded  Pending: monitor for bradycardia, labs, recall heme onc, complete abx course, complete steroids,

## 2024-10-07 LAB
ALBUMIN SERPL ELPH-MCNC: 3 G/DL — LOW (ref 3.5–5.2)
ALP SERPL-CCNC: 118 U/L — HIGH (ref 30–115)
ALT FLD-CCNC: 7 U/L — SIGNIFICANT CHANGE UP (ref 0–41)
ANION GAP SERPL CALC-SCNC: 11 MMOL/L — SIGNIFICANT CHANGE UP (ref 7–14)
AST SERPL-CCNC: 6 U/L — SIGNIFICANT CHANGE UP (ref 0–41)
BASOPHILS # BLD AUTO: 0 K/UL — SIGNIFICANT CHANGE UP (ref 0–0.2)
BASOPHILS NFR BLD AUTO: 0 % — SIGNIFICANT CHANGE UP (ref 0–1)
BILIRUB SERPL-MCNC: 0.7 MG/DL — SIGNIFICANT CHANGE UP (ref 0.2–1.2)
BUN SERPL-MCNC: 41 MG/DL — HIGH (ref 10–20)
CALCIUM SERPL-MCNC: 8.4 MG/DL — SIGNIFICANT CHANGE UP (ref 8.4–10.5)
CHLORIDE SERPL-SCNC: 111 MMOL/L — HIGH (ref 98–110)
CO2 SERPL-SCNC: 21 MMOL/L — SIGNIFICANT CHANGE UP (ref 17–32)
CREAT SERPL-MCNC: 2 MG/DL — HIGH (ref 0.7–1.5)
EGFR: 36 ML/MIN/1.73M2 — LOW
EOSINOPHIL # BLD AUTO: 0.04 K/UL — SIGNIFICANT CHANGE UP (ref 0–0.7)
EOSINOPHIL NFR BLD AUTO: 6.1 % — SIGNIFICANT CHANGE UP (ref 0–8)
GLUCOSE SERPL-MCNC: 106 MG/DL — HIGH (ref 70–99)
HCT VFR BLD CALC: 22.1 % — LOW (ref 42–52)
HGB BLD-MCNC: 6.9 G/DL — CRITICAL LOW (ref 14–18)
IMM GRANULOCYTES NFR BLD AUTO: 6.1 % — HIGH (ref 0.1–0.3)
LYMPHOCYTES # BLD AUTO: 0.18 K/UL — LOW (ref 1.2–3.4)
LYMPHOCYTES # BLD AUTO: 27.3 % — SIGNIFICANT CHANGE UP (ref 20.5–51.1)
MAGNESIUM SERPL-MCNC: 2.2 MG/DL — SIGNIFICANT CHANGE UP (ref 1.8–2.4)
MCHC RBC-ENTMCNC: 27.7 PG — SIGNIFICANT CHANGE UP (ref 27–31)
MCHC RBC-ENTMCNC: 31.2 G/DL — LOW (ref 32–37)
MCV RBC AUTO: 88.8 FL — SIGNIFICANT CHANGE UP (ref 80–94)
MONOCYTES # BLD AUTO: 0.12 K/UL — SIGNIFICANT CHANGE UP (ref 0.1–0.6)
MONOCYTES NFR BLD AUTO: 18.2 % — HIGH (ref 1.7–9.3)
NEUTROPHILS # BLD AUTO: 0.28 K/UL — LOW (ref 1.4–6.5)
NEUTROPHILS NFR BLD AUTO: 42.3 % — SIGNIFICANT CHANGE UP (ref 42.2–75.2)
NRBC # BLD: 0 /100 WBCS — SIGNIFICANT CHANGE UP (ref 0–0)
PLATELET # BLD AUTO: 135 K/UL — SIGNIFICANT CHANGE UP (ref 130–400)
PMV BLD: 10.7 FL — HIGH (ref 7.4–10.4)
POTASSIUM SERPL-MCNC: 4 MMOL/L — SIGNIFICANT CHANGE UP (ref 3.5–5)
POTASSIUM SERPL-SCNC: 4 MMOL/L — SIGNIFICANT CHANGE UP (ref 3.5–5)
PROT SERPL-MCNC: 3.8 G/DL — LOW (ref 6–8)
RBC # BLD: 2.49 M/UL — LOW (ref 4.7–6.1)
RBC # FLD: 16 % — HIGH (ref 11.5–14.5)
SODIUM SERPL-SCNC: 143 MMOL/L — SIGNIFICANT CHANGE UP (ref 135–146)
WBC # BLD: 0.66 K/UL — CRITICAL LOW (ref 4.8–10.8)
WBC # FLD AUTO: 0.66 K/UL — CRITICAL LOW (ref 4.8–10.8)

## 2024-10-07 PROCEDURE — 99233 SBSQ HOSP IP/OBS HIGH 50: CPT

## 2024-10-07 PROCEDURE — 99231 SBSQ HOSP IP/OBS SF/LOW 25: CPT

## 2024-10-07 RX ADMIN — Medication 5000 UNIT(S): at 06:18

## 2024-10-07 RX ADMIN — Medication 1 TABLET(S): at 06:19

## 2024-10-07 RX ADMIN — Medication 5 MILLIGRAM(S): at 11:30

## 2024-10-07 RX ADMIN — PANTOPRAZOLE SODIUM 40 MILLIGRAM(S): 40 TABLET, DELAYED RELEASE ORAL at 06:18

## 2024-10-07 RX ADMIN — Medication 1300 MILLIGRAM(S): at 13:41

## 2024-10-07 RX ADMIN — Medication 1 MILLIGRAM(S): at 11:30

## 2024-10-07 RX ADMIN — Medication 5000 UNIT(S): at 17:34

## 2024-10-07 RX ADMIN — FOLIC ACID 1 MILLIGRAM(S): 1 TABLET ORAL at 11:30

## 2024-10-07 RX ADMIN — PREDNISONE 40 MILLIGRAM(S): 5 TABLET ORAL at 06:19

## 2024-10-07 RX ADMIN — Medication 1300 MILLIGRAM(S): at 06:19

## 2024-10-07 RX ADMIN — CHLORHEXIDINE GLUCONATE ORAL RINSE 1 APPLICATION(S): 1.2 SOLUTION DENTAL at 06:25

## 2024-10-07 RX ADMIN — PANTOPRAZOLE SODIUM 40 MILLIGRAM(S): 40 TABLET, DELAYED RELEASE ORAL at 17:34

## 2024-10-07 NOTE — PROGRESS NOTE ADULT - SUBJECTIVE AND OBJECTIVE BOX
SUBJECTIVE/OVERNIGHT EVENTS  Today is hospital day 10d. This morning patient was seen and examined at bedside, resting comfortably in bed. No acute or major events overnight.    MEDICATIONS  STANDING MEDICATIONS  benztropine 1 milliGRAM(s) Oral daily  chlorhexidine 2% Cloths 1 Application(s) Topical <User Schedule>  fluPHENAZine 5 milliGRAM(s) Oral daily  folic acid 1 milliGRAM(s) Oral daily  heparin   Injectable 5000 Unit(s) SubCutaneous every 12 hours  influenza  Vaccine (HIGH DOSE) 0.5 milliLiter(s) IntraMuscular once  pantoprazole  Injectable 40 milliGRAM(s) IV Push two times a day  polyethylene glycol 3350 17 Gram(s) Oral daily  predniSONE   Tablet 40 milliGRAM(s) Oral daily  senna 1 Tablet(s) Oral two times a day  sodium bicarbonate 1300 milliGRAM(s) Oral three times a day    PRN MEDICATIONS  acetaminophen     Tablet .. 650 milliGRAM(s) Oral every 6 hours PRN  aluminum hydroxide/magnesium hydroxide/simethicone Suspension 30 milliLiter(s) Oral every 4 hours PRN  atropine Injectable 1 milliGRAM(s) IV Push once PRN  guaiFENesin Oral Liquid (Sugar-Free) 100 milliGRAM(s) Oral every 6 hours PRN  melatonin 3 milliGRAM(s) Oral at bedtime PRN  ondansetron Injectable 4 milliGRAM(s) IV Push every 8 hours PRN    VITALS  T(F): 97.7 (10-07-24 @ 05:02), Max: 97.7 (10-07-24 @ 05:02)  HR: 74 (10-07-24 @ 08:23) (70 - 74)  BP: 93/50 (10-07-24 @ 05:02) (91/51 - 98/60)  RR: 18 (10-07-24 @ 08:23) (18 - 18)  SpO2: 99% (10-07-24 @ 08:23) (99% - 99%)    PHYSICAL EXAM  GENERAL  ( x ) NAD, lying in bed comfortably     (  ) obtunded     (  ) lethargic     (  ) somnolent    HEAD  (  ) Atraumatic     (  ) hematoma     (  ) laceration (specify location:       )     NECK  (  ) Supple     (  ) neck stiffness     (  ) nuchal rigidity     (  )  no JVD     (  ) JVD present ( -- cm)    HEART  Rate -->  (x  ) normal rate    (  ) bradycardic    (  ) tachycardic  Rhythm -->  ( x ) regular    (  ) regularly irregular    (  ) irregularly irregular  Murmurs -->  (  ) normal s1/s2    (  ) systolic murmur    (  ) diastolic murmur    (  ) continuous murmur     (  ) S3 present    (  ) S4 present    LUNGS  ( x )Unlabored respirations     (  ) tachypnea  ( x) B/L air entry     (  ) decreased breath sounds in:  (location     )    (  ) no adventitious sound     (  ) crackles     (  ) wheezing      (  ) rhonchi      (specify location:       )  (  ) chest wall tenderness (specify location:       )    ABDOMEN  ( x ) Soft     (  ) tense   |   (  ) nondistended     (  ) distended   |   (  ) +BS     (  ) hypoactive bowel sounds     (  ) hyperactive bowel sounds  (x  ) nontender     (  ) RUQ tenderness     (  ) RLQ tenderness     (  ) LLQ tenderness     (  ) epigastric tenderness     (  ) diffuse tenderness  (  ) Splenomegaly      (  ) Hepatomegaly      (  ) Jaundice     (  ) ecchymosis     EXTREMITIES  (  x) Normal     (  ) Rash     (  ) ecchymosis     (  ) varicose veins      (  ) pitting edema     (  ) non-pitting edema   (  ) ulceration     (  ) gangrene:     (location:     )    NERVOUS SYSTEM  (  ) A&Ox3     (  ) confused     (  ) lethargic  CN II-XII:     (  ) Intact     (  ) focal deficits  (Specify:     )   Upper extremities:     (  ) strength X/5     (  ) focal deficit (specify:    )  Lower extremities:     (  ) strength  X/5    (  ) focal deficit (specify:    )    SKIN  (  ) No rashes or lesions     (  ) maculopapular rash     (  ) pustules     (  ) vesicles     (  ) ulcer     (  ) ecchymosis     (specify location:     )    (  ) Indwelling Park Catheter   Date insterted:    Reason (  ) Critical illness     (  ) urinary retention    (  ) Accurate Ins/Outs Monitoring     (  ) CMO patient    (  ) Central Line  Date inserted:  Location: (  ) Right IJ   (  ) Left IJ   (  ) Right Fem   (  ) Left Fem    (  ) SPC  (  ) pigtail  (  ) PEG tube  (  ) colostomy  (  ) jejunostomy  (  ) U-Dall    LABS             6.9    0.66  )-----------( 135      ( 10-07-24 @ 05:32 )             22.1     143  |  111  |  41  -------------------------<  106   10-07-24 @ 05:32  4.0  |  21  |  2.0    Ca      8.4     10-07-24 @ 05:32  Mg     2.2     10-07-24 @ 05:32    TPro  3.8  /  Alb  3.0  /  TBili  0.7  /  DBili  x   /  AST  6   /  ALT  7   /  AlkPhos  118  /  GGT  x     10-07-24 @ 05:32        Urinalysis Basic - ( 07 Oct 2024 05:32 )    Color: x / Appearance: x / SG: x / pH: x  Gluc: 106 mg/dL / Ketone: x  / Bili: x / Urobili: x   Blood: x / Protein: x / Nitrite: x   Leuk Esterase: x / RBC: x / WBC x   Sq Epi: x / Non Sq Epi: x / Bacteria: x          IMAGING

## 2024-10-07 NOTE — PROGRESS NOTE ADULT - SUBJECTIVE AND OBJECTIVE BOX
ELDA COVARRUBIAS  68y Male    CHIEF COMPLAINT:    Patient is a 68y old  Male who presents with a chief complaint of multifocal pna (06 Oct 2024 12:13)      INTERVAL HPI/OVERNIGHT EVENTS:    Patient seen and examined.    ROS: All other systems are negative.    Vital Signs:    T(F): 97.7 (10-07-24 @ 05:02), Max: 97.7 (10-07-24 @ 05:02)  HR: 70 (10-07-24 @ 05:02) (70 - 74)  BP: 93/50 (10-07-24 @ 05:02) (91/51 - 98/60)  RR: 18 (10-07-24 @ 05:02) (18 - 18)  SpO2: --  I&O's Summary    06 Oct 2024 07:01  -  07 Oct 2024 07:00  --------------------------------------------------------  IN: 723 mL / OUT: 850 mL / NET: -127 mL      Daily     Daily   CAPILLARY BLOOD GLUCOSE          PHYSICAL EXAM:    GENERAL:  NAD  SKIN: No rashes or lesions  HENT: Atraumatic. Normocephalic. PERRL. Moist membranes.  NECK: Supple, No JVD. No lymphadenopathy.  PULMONARY: CTA B/L. No wheezing. No rales  CVS: Normal S1, S2. Rate and Rhythm are regular. No murmurs.  ABDOMEN/GI: Soft, Nontender, Nondistended; BS present  EXTREMITIES: Peripheral pulses intact. No edema B/L LE.  NEUROLOGIC:  No motor or sensory deficit.  PSYCH: Alert & oriented x 3    Consultant(s) Notes Reviewed:  [x ] YES  [ ] NO  Care Discussed with Consultants/Other Providers [ x] YES  [ ] NO    EKG reviewed  Telemetry reviewed    LABS:                        8.0    1.02  )-----------( 157      ( 06 Oct 2024 05:32 )             25.3     10-06    140  |  109  |  43[H]  ----------------------------<  92  4.0   |  21  |  2.1[H]    Ca    8.7      06 Oct 2024 05:32  Mg     2.3     10-06    TPro  4.0[L]  /  Alb  3.2[L]  /  TBili  0.8  /  DBili  x   /  AST  6   /  ALT  9   /  AlkPhos  119[H]  10-06              RADIOLOGY & ADDITIONAL TESTS:      Imaging or report Personally Reviewed:  [ ] YES  [ ] NO    Medications:  Standing  benztropine 1 milliGRAM(s) Oral daily  chlorhexidine 2% Cloths 1 Application(s) Topical <User Schedule>  fluPHENAZine 5 milliGRAM(s) Oral daily  folic acid 1 milliGRAM(s) Oral daily  heparin   Injectable 5000 Unit(s) SubCutaneous every 12 hours  influenza  Vaccine (HIGH DOSE) 0.5 milliLiter(s) IntraMuscular once  pantoprazole  Injectable 40 milliGRAM(s) IV Push two times a day  polyethylene glycol 3350 17 Gram(s) Oral daily  predniSONE   Tablet 40 milliGRAM(s) Oral daily  senna 1 Tablet(s) Oral two times a day  sodium bicarbonate 1300 milliGRAM(s) Oral three times a day    PRN Meds  acetaminophen     Tablet .. 650 milliGRAM(s) Oral every 6 hours PRN  aluminum hydroxide/magnesium hydroxide/simethicone Suspension 30 milliLiter(s) Oral every 4 hours PRN  atropine Injectable 1 milliGRAM(s) IV Push once PRN  guaiFENesin Oral Liquid (Sugar-Free) 100 milliGRAM(s) Oral every 6 hours PRN  melatonin 3 milliGRAM(s) Oral at bedtime PRN  ondansetron Injectable 4 milliGRAM(s) IV Push every 8 hours PRN      Case discussed with resident    Care discussed with pt/family           ELDA COVARRUBIAS  68y Male    CHIEF COMPLAINT:    Patient is a 68y old  Male who presents with a chief complaint of multifocal pna (06 Oct 2024 12:13)      INTERVAL HPI/OVERNIGHT EVENTS:    Patient seen and examined. Pt states that he feels better. No cough. No sob.     ROS: All other systems are negative.    Vital Signs:    T(F): 97.7 (10-07-24 @ 05:02), Max: 97.7 (10-07-24 @ 05:02)  HR: 70 (10-07-24 @ 05:02) (70 - 74)  BP: 93/50 (10-07-24 @ 05:02) (91/51 - 98/60)  RR: 18 (10-07-24 @ 05:02) (18 - 18)  SpO2: --  I&O's Summary    06 Oct 2024 07:01  -  07 Oct 2024 07:00  --------------------------------------------------------  IN: 723 mL / OUT: 850 mL / NET: -127 mL      Daily     Daily   CAPILLARY BLOOD GLUCOSE          PHYSICAL EXAM:    GENERAL:  NAD  SKIN: No rashes or lesions  HENT: Atraumatic. Normocephalic. PERRL. Moist membranes.  NECK: Supple, No JVD. No lymphadenopathy.  PULMONARY: CTA B/L. No wheezing. No rales  CVS: Normal S1, S2. Rate and Rhythm are regular. No murmurs.  ABDOMEN/GI: Soft, Nontender, Nondistended; BS present  EXTREMITIES: Peripheral pulses intact. No edema B/L LE.  NEUROLOGIC:  No motor or sensory deficit.  PSYCH: Alert & oriented x 3    Consultant(s) Notes Reviewed:  [x ] YES  [ ] NO  Care Discussed with Consultants/Other Providers [ x] YES  [ ] NO    EKG reviewed  Telemetry reviewed    LABS:                        8.0    1.02  )-----------( 157      ( 06 Oct 2024 05:32 )             25.3     10-06    140  |  109  |  43[H]  ----------------------------<  92    Creatinine Trend: 2.0<--, 2.1<--, 2.2<--, 2.1<--, 2.2<--, 2.2<--  4.0   |  21  |  2.1[H]    Ca    8.7      06 Oct 2024 05:32  Mg     2.3     10-06    TPro  4.0[L]  /  Alb  3.2[L]  /  TBili  0.8  /  DBili  x   /  AST  6   /  ALT  9   /  AlkPhos  119[H]  10-06              RADIOLOGY & ADDITIONAL TESTS:      Imaging or report Personally Reviewed:  [ ] YES  [ ] NO    Medications:  Standing  benztropine 1 milliGRAM(s) Oral daily  chlorhexidine 2% Cloths 1 Application(s) Topical <User Schedule>  fluPHENAZine 5 milliGRAM(s) Oral daily  folic acid 1 milliGRAM(s) Oral daily  heparin   Injectable 5000 Unit(s) SubCutaneous every 12 hours  influenza  Vaccine (HIGH DOSE) 0.5 milliLiter(s) IntraMuscular once  pantoprazole  Injectable 40 milliGRAM(s) IV Push two times a day  polyethylene glycol 3350 17 Gram(s) Oral daily  predniSONE   Tablet 40 milliGRAM(s) Oral daily  senna 1 Tablet(s) Oral two times a day  sodium bicarbonate 1300 milliGRAM(s) Oral three times a day    PRN Meds  acetaminophen     Tablet .. 650 milliGRAM(s) Oral every 6 hours PRN  aluminum hydroxide/magnesium hydroxide/simethicone Suspension 30 milliLiter(s) Oral every 4 hours PRN  atropine Injectable 1 milliGRAM(s) IV Push once PRN  guaiFENesin Oral Liquid (Sugar-Free) 100 milliGRAM(s) Oral every 6 hours PRN  melatonin 3 milliGRAM(s) Oral at bedtime PRN  ondansetron Injectable 4 milliGRAM(s) IV Push every 8 hours PRN      Case discussed with resident    Care discussed with pt/family

## 2024-10-07 NOTE — BH CONSULTATION LIAISON PROGRESS NOTE - NSBHTIMEACTIVITIESPERFORMED_PSY_A_CORE
Preparing to see the patient, counseling or educating patient, Documentation, care coordination, reviewing separately obtained history, communicating with other health care professionals

## 2024-10-07 NOTE — PROGRESS NOTE ADULT - SUBJECTIVE AND OBJECTIVE BOX
ELDA COVARRUBIAS 68y Male  MRN#: [id number]   Hospital Day: 10d    SUBJECTIVE  Patient is a 68y old Male who presents with a chief complaint of multifocal pna (07 Oct 2024 11:21)  Currently admitted to medicine with the primary diagnosis of GI bleed      INTERVAL HPI AND OVERNIGHT EVENTS:  Patient was examined and seen at bedside. This morning he is resting comfortably in bed and reports no issues or overnight events.    REVIEW OF SYMPTOMS:  feels well  denies any fever/chills/abdominal pain     OBJECTIVE  PAST MEDICAL & SURGICAL HISTORY  Kidney stones    Schizophrenia    Lymphoma    Shingles    Atrophic kidney    H/O colonoscopy with polypectomy    Liver cyst    History of bladder surgery    H/O neuropathy    History of chemotherapy    Stage 3 chronic kidney disease    Neuroendocrine malignancy    Neuroendocrine tumor status post surgical treatment    Neuroendocrine cancer    Retained urethral stent    H/O umbilical hernia repair    Elbow fracture    H/O cystoscopy      ALLERGIES:  allopurinol (Rash (Moderate))    MEDICATIONS:  STANDING MEDICATIONS  benztropine 1 milliGRAM(s) Oral daily  chlorhexidine 2% Cloths 1 Application(s) Topical <User Schedule>  fluPHENAZine 5 milliGRAM(s) Oral daily  folic acid 1 milliGRAM(s) Oral daily  heparin   Injectable 5000 Unit(s) SubCutaneous every 12 hours  influenza  Vaccine (HIGH DOSE) 0.5 milliLiter(s) IntraMuscular once  pantoprazole  Injectable 40 milliGRAM(s) IV Push two times a day  polyethylene glycol 3350 17 Gram(s) Oral daily  predniSONE   Tablet 40 milliGRAM(s) Oral daily  senna 1 Tablet(s) Oral two times a day  sodium bicarbonate 1300 milliGRAM(s) Oral three times a day    PRN MEDICATIONS  acetaminophen     Tablet .. 650 milliGRAM(s) Oral every 6 hours PRN  aluminum hydroxide/magnesium hydroxide/simethicone Suspension 30 milliLiter(s) Oral every 4 hours PRN  atropine Injectable 1 milliGRAM(s) IV Push once PRN  guaiFENesin Oral Liquid (Sugar-Free) 100 milliGRAM(s) Oral every 6 hours PRN  melatonin 3 milliGRAM(s) Oral at bedtime PRN  ondansetron Injectable 4 milliGRAM(s) IV Push every 8 hours PRN      VITAL SIGNS: Last 24 Hours  T(C): 36.5 (07 Oct 2024 12:58), Max: 36.5 (07 Oct 2024 05:02)  T(F): 97.7 (07 Oct 2024 12:58), Max: 97.7 (07 Oct 2024 05:02)  HR: 71 (07 Oct 2024 12:58) (70 - 74)  BP: 92/52 (07 Oct 2024 12:58) (92/52 - 98/60)  BP(mean): --  RR: 18 (07 Oct 2024 12:58) (18 - 18)  SpO2: 99% (07 Oct 2024 08:23) (99% - 99%)    LABS:                        6.9    0.66  )-----------( 135      ( 07 Oct 2024 05:32 )             22.1     10-07    143  |  111[H]  |  41[H]  ----------------------------<  106[H]  4.0   |  21  |  2.0[H]    Ca    8.4      07 Oct 2024 05:32  Mg     2.2     10-07    TPro  3.8[L]  /  Alb  3.0[L]  /  TBili  0.7  /  DBili  x   /  AST  6   /  ALT  7   /  AlkPhos  118[H]  10-07      Urinalysis Basic - ( 07 Oct 2024 05:32 )    Color: x / Appearance: x / SG: x / pH: x  Gluc: 106 mg/dL / Ketone: x  / Bili: x / Urobili: x   Blood: x / Protein: x / Nitrite: x   Leuk Esterase: x / RBC: x / WBC x   Sq Epi: x / Non Sq Epi: x / Bacteria: x        PHYSICAL EXAM:  GENERAL: NAD, fatigued  HEAD:  Atraumatic, Normocephalic  EYES: EOMI, PERRLA, conjunctivae pale  and sclerae clear  NECK: Supple, No JVD  CHEST/LUNG: +b/l crackles at base  HEART: Regular rate and rhythm; No murmurs, rubs, or gallops  ABDOMEN: Soft, Nontender, Nondistended; Bowel sounds present  EXTREMITIES:  No cyanosis, or edema  NEUROLOGY: non-focal, able to lift all extremities     ASSESSMENT & PLAN:    68-year-old male with schizophrenia, CKD, marginal zone lymphoma and pancreatic neuroendocrine tumor on monthly somatostatin injections( follows Dr Gong )  left elbow fracture s/p [facility]IF @[facility] ~30 years ago, comes in c/o weakness/SOB and cough that is productive of yellow sputum and has been going on for 3 months with 10 lb weight loss over the last 3 months. He also states weakness started 2 wks ago, getting worse, today fell while trying to ambulate due to weakness.     #Metastatic carcinoid,  on Sandostatin  --MRI abdomen 7.23:  Liver segment II 4 cm mass and Liver segment V  1.5 cm mass suspicious for Hepatic Lymphomas.  --Biopsy of Liver lesion 8.23: well differentiated Neuroendocrine tumor, Grade 3 Likely  metastatic.   --NM Octreoscan: multiple areas (09.05.23 @ 16:43) >Focal area of increased uptake in the upper abdomen/epigastrium to the left of midline suspicious for neuroendocrine tissue expressing somatostatin receptors.  --Gets lost to follow up and has missed few doses   --Last received Sandostatin on 9.16.24   --Chromogranin increased to 1685 from 1293 in July       # NHL, marginal zone   -He was in a good partial remission following 3 cycles of bendamustine and Rituxan treated in 2019 and 2020.  -He was unable to tolerate maintenance Rituxan.  -However, he was never in complete remission and it was preferred just to observe since we were dealing with a low grade lymphoma.  --Was lost to follow up.  --CT Abdomen/Pelvis 2/23/23 Interval worsening in periesophageal and pelvic lymphadenopathy, other bulky lymph nodes in the abdomen and retroperitoneum appear stable. 1.3 indeterminate segment 4 liver lesion. Focal areas of pleural nodularity concerning for lymphomatous/neoplastic/metastatic process, new since prior.  --PET scan 5.23: 1.  Interval increase in size and decreased metabolic activity of the thoracoabdominal lymphadenopathy, some are new, probably recurrence /residual disease. New mildly metabolic 3 cm hypodensity in the liver segment 2/3, concerning for neoplasm or lymphoma.  Poorly defined small hypodensity in the segment 4, below the resolution of PET scan.  MRI will provide further elucidation. Diffuse mildly increased bone marrow activity, possibly reactive versus bone marrow involvement.  --Hepatosplenomegaly.  --s/p R-CVP for Marginal Zone Lymphoma on 9/20 & 9/21>. Had reaction to Rituxan   --s/p CVP,  last received 12/23     #Hypercalcemia-resolved   prior work up neg PTHRP   9.24 outpatient lab Ca 12.6     #Leucopenia - worsening   Most likely 2/2 septic shock and underlying lymphoma   Since late dec 2023- he had had low WBC on multiple lab work     #Anemia normocytic - worsening   Presented with Hb 7.8   Baseline hb 9-10   Colonoscopy 2023- Cecal polyp, EMR: Tubular adenoma fragments showing foci of high-grade dysplasia and focal   intramucosal adenocarcinoma.   Recommendation was 6M f/up but did not.  Iron TIBC 168, ferritin 630   FA 3.3     Recommendations:  His leucopenia is multifactorial. He has had baseline leucopenia since 12.2023 likely from lymphoma with worsening due to his episode of septic shock. His home medication of Fluphenazine was resumed from 10.3. It has been shown in studies to cause pancytopenia, neutropenia, agranulocytosis. If possible would recommend holding off and switching to other agent.  Get work MM   Transfuse to keep threshold 7/21   Monitor for fever given ANC <1000  Obtain CT A/P + contrast   C/w folic acid 1mg daily   Needs outpatient follow up for evaluation of lymphoma status with PET scan.   F/up GI for worsening anemia    ELDA COVARRUBIAS 68y Male  MRN#: [id number]   Hospital Day: 10d    SUBJECTIVE  Patient is a 68y old Male who presents with a chief complaint of multifocal pna (07 Oct 2024 11:21)  Currently admitted to medicine with the primary diagnosis of GI bleed      INTERVAL HPI AND OVERNIGHT EVENTS:  Patient was examined and seen at bedside. This morning he is resting comfortably in bed and reports no issues or overnight events.    REVIEW OF SYMPTOMS:  feels well  denies any fever/chills/abdominal pain     OBJECTIVE  PAST MEDICAL & SURGICAL HISTORY  Kidney stones    Schizophrenia    Lymphoma    Shingles    Atrophic kidney    H/O colonoscopy with polypectomy    Liver cyst    History of bladder surgery    H/O neuropathy    History of chemotherapy    Stage 3 chronic kidney disease    Neuroendocrine malignancy    Neuroendocrine tumor status post surgical treatment    Neuroendocrine cancer    Retained urethral stent    H/O umbilical hernia repair    Elbow fracture    H/O cystoscopy      ALLERGIES:  allopurinol (Rash (Moderate))    MEDICATIONS:  STANDING MEDICATIONS  benztropine 1 milliGRAM(s) Oral daily  chlorhexidine 2% Cloths 1 Application(s) Topical <User Schedule>  fluPHENAZine 5 milliGRAM(s) Oral daily  folic acid 1 milliGRAM(s) Oral daily  heparin   Injectable 5000 Unit(s) SubCutaneous every 12 hours  influenza  Vaccine (HIGH DOSE) 0.5 milliLiter(s) IntraMuscular once  pantoprazole  Injectable 40 milliGRAM(s) IV Push two times a day  polyethylene glycol 3350 17 Gram(s) Oral daily  predniSONE   Tablet 40 milliGRAM(s) Oral daily  senna 1 Tablet(s) Oral two times a day  sodium bicarbonate 1300 milliGRAM(s) Oral three times a day    PRN MEDICATIONS  acetaminophen     Tablet .. 650 milliGRAM(s) Oral every 6 hours PRN  aluminum hydroxide/magnesium hydroxide/simethicone Suspension 30 milliLiter(s) Oral every 4 hours PRN  atropine Injectable 1 milliGRAM(s) IV Push once PRN  guaiFENesin Oral Liquid (Sugar-Free) 100 milliGRAM(s) Oral every 6 hours PRN  melatonin 3 milliGRAM(s) Oral at bedtime PRN  ondansetron Injectable 4 milliGRAM(s) IV Push every 8 hours PRN      VITAL SIGNS: Last 24 Hours  T(C): 36.5 (07 Oct 2024 12:58), Max: 36.5 (07 Oct 2024 05:02)  T(F): 97.7 (07 Oct 2024 12:58), Max: 97.7 (07 Oct 2024 05:02)  HR: 71 (07 Oct 2024 12:58) (70 - 74)  BP: 92/52 (07 Oct 2024 12:58) (92/52 - 98/60)  BP(mean): --  RR: 18 (07 Oct 2024 12:58) (18 - 18)  SpO2: 99% (07 Oct 2024 08:23) (99% - 99%)    LABS:                        6.9    0.66  )-----------( 135      ( 07 Oct 2024 05:32 )             22.1     10-07    143  |  111[H]  |  41[H]  ----------------------------<  106[H]  4.0   |  21  |  2.0[H]    Ca    8.4      07 Oct 2024 05:32  Mg     2.2     10-07    TPro  3.8[L]  /  Alb  3.0[L]  /  TBili  0.7  /  DBili  x   /  AST  6   /  ALT  7   /  AlkPhos  118[H]  10-07      Urinalysis Basic - ( 07 Oct 2024 05:32 )    Color: x / Appearance: x / SG: x / pH: x  Gluc: 106 mg/dL / Ketone: x  / Bili: x / Urobili: x   Blood: x / Protein: x / Nitrite: x   Leuk Esterase: x / RBC: x / WBC x   Sq Epi: x / Non Sq Epi: x / Bacteria: x        PHYSICAL EXAM:  GENERAL: NAD, fatigued  HEAD:  Atraumatic, Normocephalic  EYES: EOMI, PERRLA, conjunctivae pale  and sclerae clear  NECK: Supple, No JVD  CHEST/LUNG: +b/l crackles at base  HEART: Regular rate and rhythm; No murmurs, rubs, or gallops  ABDOMEN: Soft, Nontender, Nondistended; Bowel sounds present  EXTREMITIES:  No cyanosis, or edema  NEUROLOGY: non-focal, able to lift all extremities     ASSESSMENT & PLAN:    68-year-old male with schizophrenia, CKD, marginal zone lymphoma and pancreatic neuroendocrine tumor on monthly somatostatin injections( follows Dr Gong )  left elbow fracture s[facility]p [facility] ~[facility] years ago, comes in c/o weakness/SOB and cough that is productive of yellow sputum and has been going on for 3 months with 10 lb weight loss over the last 3 months. He also states weakness started 2 wks ago, getting worse, today fell while trying to ambulate due to weakness.     #Metastatic carcinoid,  on Sandostatin  --MRI abdomen 7.23:  Liver segment II 4 cm mass and Liver segment V  1.5 cm mass suspicious for Hepatic Lymphomas.  --Biopsy of Liver lesion 8.23: well differentiated Neuroendocrine tumor, Grade 3 Likely  metastatic.   --NM Octreoscan: multiple areas (09.05.23 @ 16:43) >Focal area of increased uptake in the upper abdomen/epigastrium to the left of midline suspicious for neuroendocrine tissue expressing somatostatin receptors.  --Gets lost to follow up and has missed few doses   --Last received Sandostatin on 9.16.24   --Chromogranin increased to 1685 from 1293 in July       # NHL, marginal zone   -He was in a good partial remission following 3 cycles of bendamustine and Rituxan treated in 2019 and 2020.  -He was unable to tolerate maintenance Rituxan.  -However, he was never in complete remission and it was preferred just to observe since we were dealing with a low grade lymphoma.  --Was lost to follow up.  --CT Abdomen/Pelvis 2/23/23 Interval worsening in periesophageal and pelvic lymphadenopathy, other bulky lymph nodes in the abdomen and retroperitoneum appear stable. 1.3 indeterminate segment 4 liver lesion. Focal areas of pleural nodularity concerning for lymphomatous/neoplastic/metastatic process, new since prior.  --PET scan 5.23: 1.  Interval increase in size and decreased metabolic activity of the thoracoabdominal lymphadenopathy, some are new, probably recurrence /residual disease. New mildly metabolic 3 cm hypodensity in the liver segment 2/3, concerning for neoplasm or lymphoma.  Poorly defined small hypodensity in the segment 4, below the resolution of PET scan.  MRI will provide further elucidation. Diffuse mildly increased bone marrow activity, possibly reactive versus bone marrow involvement.  --Hepatosplenomegaly.  --s/p R-CVP for Marginal Zone Lymphoma on 9/20 & 9/21>. Had reaction to Rituxan   --s/p CVP,  last received 12/23     #Hypercalcemia-resolved   prior work up neg PTHRP   9.24 outpatient lab Ca 12.6     #Leucopenia - worsening   Most likely 2/2 septic shock and underlying lymphoma   Since late dec 2023- he had had low WBC on multiple lab work     #Anemia normocytic - worsening   Presented with Hb 7.8   Baseline hb 9-10   Colonoscopy 2023- Cecal polyp, EMR: Tubular adenoma fragments showing foci of high-grade dysplasia and focal   intramucosal adenocarcinoma.   Recommendation was 6M f/up but did not.  Iron TIBC 168, ferritin 630   FA 3.3     Recommendations:  His leucopenia is multifactorial. He has had baseline leucopenia since 12.2023 likely from lymphoma with worsening due to his episode of septic shock. His home medication of Fluphenazine was resumed from 10.3. It has been shown in studies to cause pancytopenia, neutropenia, agranulocytosis. If possible would recommend holding off and switching to other agent.  Get work MM   Transfuse to keep threshold 7/21   Monitor for fever given ANC <1000  Obtain CT A/P + contrast   C/w folic acid 1mg daily   Needs outpatient follow up for evaluation of lymphoma status with PET scan.   F/up GI for worsening anemia    5 Grossly Intact

## 2024-10-07 NOTE — BH CONSULTATION LIAISON PROGRESS NOTE - NSBHCONSULTRECOMMENDOTHER_PSY_A_CORE FT
-Increase Prolixin to 10 mg daily. Monitor for any impact on bradycardia.   	Continue on discharge, please give 30 day supply on discharge. Patient can follow up with his outpatient psychiatrist for further titration.  -Increase Cogentin to 2 mg daily  	Continue on discharge, please give 30 day supply on discharge.  -Psychiatry will sign off. Please reconsult prn.

## 2024-10-07 NOTE — PROGRESS NOTE ADULT - ASSESSMENT
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 on monthly somatostatin injections( follows Dr Gong )  left elbow fracture s/p ORIF @[facility] ~30 years ago, comes in c/o weakness/SOB and cough. cough is productive of yellow sputum and has been going on for 3 months with 10 lb weight loss over the last 3 months , progressively worsening weakness, s./p fall.    Septic shock  (POA) due to suspected Multifocal PNA, resolved  Sinus Bradycardia likely due to Fluphenazine, resolved   ASHLEY on CKD-4 / Metabolic acidosis  Schizophrenia  H/O Marginal zone lymphoma / Pancytopenia / Anemia  H/O Pancreatic neuroendocrine tumor   NSTMI type 2 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 on monthly somatostatin injections( follows Dr Gong )  left elbow fracture s/p ORIF @[facility] ~30 years ago, comes in c/o weakness/SOB and cough. cough is productive of yellow sputum and has been going on for 3 months with 10 lb weight loss over the last 3 months , progressively worsening weakness, s./p fall.    Septic shock  (POA) due to suspected Multifocal PNA, resolved  Sinus Bradycardia likely due to Fluphenazine, resolved   ASHLEY on CKD-4 / Metabolic acidosis  Schizophrenia  H/O Marginal zone lymphoma / Pancytopenia / Anemia  H/O Pancreatic neuroendocrine tumor   NSTMI type 2  Leukopenia/Neutropenia, worsening  Anemia, worsening                PLAN:    ·	Tele reviewed. No more bradycardia  ·	Pt's chart reviewed and discussed his case with the hospitalist who took care of him in the step down unit  ·	Septic shock has been resolved. Pt is off pressors and also completed his course of Abx.   ·	Labs noted. Pt's Hb and WBC count is trending down  ·	Transfuse one unit of PRBC's. Keep Hb >7  ·	Hem/Onc f/u noted. Leukopenia/Neurtropenia likely from lymphoma. Possibly worsening due to his episode of septic shock  ·	Hem/Onc recommended to hold Fluphenazine as it causes bone marrow suppression and pancytopenia  ·	Psych f/u for alternate medication  ·	Will order w/u for MM as per Hem/Onc  ·	Labs noted. Cr is stable  ·	Monitor CBC and renal function    Progress Note Handoff    Pending (specify):  Consults_________, Tests________, Test Results_______, Other__Neutropenia_______  Family discussion:  Disposition: Home___/SNF___/Other________/Unknown at this time________    Kendall Hodges MD  Spectra: [id number]

## 2024-10-07 NOTE — PROGRESS NOTE ADULT - ASSESSMENT
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 on monthly somatostatin injections( follows Dr Gong )  left elbow fracture s/p ORIF @[facility] ~30 years ago, comes in c/o weakness/SOB and cough. cough is productive of yellow sputum and has been going on for 3 months with 10 lb weight loss over the last 3 months , progressively worsening weakness, s./p fall. No head trauma. No LOC. No CP. Reports SOB on exertion. No abdominal pain. No n/v/c/d. Last chemo > 2 mo ago.   Admitted to SDU for septic shock due to PNA on pressors. Weaned off pressors and downgraded to tele floor for further management as at sinus bradycardia likely due to fluphenazine.      Septic shock  (POA) due to suspected Multifocal PNA in immunocompromised patient  - weaned off dopamine 3, on room air  - off fluids  - s/p azithromycin,   -s/p piperacillin/tazobactam IVPB  - supportive care, aspiration precautions   - nebs Q 6 hours PRN   - out of bed to chair     Sinus bradycardia - improved   - HR was dropping below 40. S/P atropine x3 previously, dopamine drip 10/3 to 10/4, now stable off dopamine   - EP/Cardiology following   - TSH wnl  - avoid av node blockers,   - avoid midorine     Multifactorial anemia / pancytopenia  - s/p 1 PRBC in ER  - no evidence of bleeding currently   - Keep active T&S   - c/w PPI IV BID   -  Colonoscopy 2023- Cecal polyp, EMR: Tubular adenoma fragments showing foci of high-grade dysplasia and focal  intramucosal adenocarcinoma.  Plan was for 6month follow up   - appreciate heme onc fu, patient planned for OP work up and fu with Dr Gong   - worsening pancytopenia, recalled heme onc on 10/5/24   - patient Hb 10/7 6.9-> given 1 unit pRBC  - f/u heme onc 10/7 for further management as also leukopenic and neutropenic- Needs outpatient follow up for evaluation of lymphoma status with PET scan.      #Hypercalcemia likely secondary to malignancy   #hx of Marginal zone lymphoma  #hx of Pancreatic neuroendocrine tumor   - on Monthly somatostatin injections - last 9/16  - s/p IV hydration   - PTH:low  , PTHrP: pending   - monitor Ca    Small pericardial effusion - chronic   - stable small pericardial effusion seen on CT scan , was also reported on multiple previous ECHO;s    ASHLEY on  CKD 4 - improved   metabolic acidosis   NSTEMI type II in the setting of CKD   - hx of nephrolithiasis c/b atrophic L kidney, R ureteral revision CKD4 (bsl Cr ~2.5) and Cr 2.2 on 10/5/24   - s/p IV contrast on admission   - sodium bicarb 1300mg TID , monitor bicarb level   - Trops elevated likely secondary to CKD   - pt is chest pain free  - telemetry monitoring     Elevated ALP  - possibly due to hepatic involvement of lymphoma/ bone involvement   - monitor levels     Schizophrenia  - psychiatry consult- increase fluphenazine to 5mg (on 10mg at home) and decreased benztropine to 1mg daily      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 on monthly somatostatin injections( follows Dr Gong )  left elbow fracture s/p ORIF @[facility] ~30 years ago, comes in c/o weakness/SOB and cough. cough is productive of yellow sputum and has been going on for 3 months with 10 lb weight loss over the last 3 months , progressively worsening weakness, s./p fall. No head trauma. No LOC. No CP. Reports SOB on exertion. No abdominal pain. No n/v/c/d. Last chemo > 2 mo ago.   Admitted to SDU for septic shock due to PNA on pressors. Weaned off pressors and downgraded to tele floor for further management as at sinus bradycardia likely due to fluphenazine.      Septic shock  (POA) due to suspected Multifocal PNA in immunocompromised patient  - weaned off dopamine 3, on room air  - off fluids  - s/p azithromycin,   -s/p piperacillin/tazobactam IVPB  s/p Iv solumderol   - oral predisone  - supportive care, aspiration precautions   - nebs Q 6 hours PRN   - out of bed to chair     Sinus bradycardia - improved   - HR was dropping below 40. S/P atropine x3 previously, dopamine drip 10/3 to 10/4, now stable off dopamine   - EP/Cardiology following   - TSH wnl  - avoid av node blockers,   - avoid midorine     Multifactorial anemia / pancytopenia  - s/p 1 PRBC in ER  - no evidence of bleeding currently   - Keep active T&S   - c/w PPI IV BID   -  Colonoscopy 2023- Cecal polyp, EMR: Tubular adenoma fragments showing foci of high-grade dysplasia and focal  intramucosal adenocarcinoma.  Plan was for 6month follow up   - appreciate heme onc fu, patient planned for OP work up and fu with Dr Gong   - worsening pancytopenia, recalled heme onc on 10/5/24   - patient Hb 10/7 6.9-> given 1 unit pRBC  - f/u heme onc 10/7 for further management as also leukopenic and neutropenic- Needs outpatient follow up for evaluation of lymphoma status with PET scan.      #Hypercalcemia likely secondary to malignancy   #hx of Marginal zone lymphoma  #hx of Pancreatic neuroendocrine tumor   - on Monthly somatostatin injections - last 9/16  - s/p IV hydration   - PTH:low  , PTHrP: pending   - monitor Ca    Small pericardial effusion - chronic   - stable small pericardial effusion seen on CT scan , was also reported on multiple previous ECHO;s    ASHLEY on  CKD 4 - improved   metabolic acidosis   NSTEMI type II in the setting of CKD   - hx of nephrolithiasis c/b atrophic L kidney, R ureteral revision CKD4 (bsl Cr ~2.5) and Cr 2.2 on 10/5/24   - s/p IV contrast on admission   - sodium bicarb 1300mg TID , monitor bicarb level   - Trops elevated likely secondary to CKD   - pt is chest pain free  - telemetry monitoring     Elevated ALP  - possibly due to hepatic involvement of lymphoma/ bone involvement   - monitor levels     Schizophrenia  - psychiatry consult- increase fluphenazine to 5mg (on 10mg at home) and decreased benztropine to 1mg daily      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 on monthly somatostatin injections( follows Dr Gong )  left elbow fracture s/p [facility]IF @[facility] ~30 years ago, comes in c/o weakness/SOB and cough. cough is productive of yellow sputum and has been going on for 3 months with 10 lb weight loss over the last 3 months , progressively worsening weakness, s./p fall. No head trauma. No LOC. No CP. Reports SOB on exertion. No abdominal pain. No n/v/c/d. Last chemo > 2 mo ago.   Admitted to SDU for septic shock due to PNA on pressors. Weaned off pressors and downgraded to tele floor for further management as at sinus bradycardia likely due to fluphenazine.      Septic shock  (POA) due to suspected Multifocal PNA in immunocompromised patient  - weaned off dopamine 3, on room air  - off fluids  - s/p azithromycin,   -s/p piperacillin/tazobactam IVPB  s/p Iv solumderol   - oral predisone  - supportive care, aspiration precautions   - nebs Q 6 hours PRN   - out of bed to chair     Sinus bradycardia - improved   - HR was dropping below 40. S/P atropine x3 previously, dopamine drip 10/3 to 10/4, now stable off dopamine   - EP/Cardiology following   - TSH wnl  - avoid av node blockers,   - avoid midorine     Multifactorial anemia / pancytopenia  - s/p 1 PRBC in ER  - no evidence of bleeding currently   - Keep active T&S   - c/w PPI IV BID   -  Colonoscopy 2023- Cecal polyp, EMR: Tubular adenoma fragments showing foci of high-grade dysplasia and focal  intramucosal adenocarcinoma.  Plan was for 6month follow up   - appreciate heme onc fu, patient planned for OP work up and fu with Dr Gong   - worsening pancytopenia, recalled heme onc on 10/5/24   - patient Hb 10/7 6.9-> given 1 unit pRBC  - f/u heme onc 10/7 for further management as also leukopenic and neutropenic- Needs outpatient follow up for evaluation of lymphoma status with PET scan.      #Hypercalcemia likely secondary to malignancy   #hx of Marginal zone lymphoma  #hx of Pancreatic neuroendocrine tumor   - on Monthly somatostatin injections - last 9/16  - s/p IV hydration   - PTH:low  , PTHrP: pending   - monitor Ca  -MM workup ordered 10/7    Small pericardial effusion - chronic   - stable small pericardial effusion seen on CT scan , was also reported on multiple previous ECHO;s    ASHLEY on  CKD 4 - improved   metabolic acidosis   NSTEMI type II in the setting of CKD   - hx of nephrolithiasis c/b atrophic L kidney, R ureteral revision CKD4 (bsl Cr ~2.5) and Cr 2.2 on 10/5/24   - s/p IV contrast on admission   - sodium bicarb 1300mg TID , monitor bicarb level   - Trops elevated likely secondary to CKD   - pt is chest pain free  - telemetry monitoring     Elevated ALP  - possibly due to hepatic involvement of lymphoma/ bone involvement   - monitor levels     Schizophrenia  - psychiatry consult- increase fluphenazine to 5mg (on 10mg at home) and decreased benztropine to 1mg daily   - fluphenazine dc 10/7

## 2024-10-08 LAB
ALBUMIN SERPL ELPH-MCNC: 3.4 G/DL — LOW (ref 3.5–5.2)
ALP SERPL-CCNC: 138 U/L — HIGH (ref 30–115)
ALT FLD-CCNC: 10 U/L — SIGNIFICANT CHANGE UP (ref 0–41)
ANION GAP SERPL CALC-SCNC: 10 MMOL/L — SIGNIFICANT CHANGE UP (ref 7–14)
ANION GAP SERPL CALC-SCNC: 14 MMOL/L — SIGNIFICANT CHANGE UP (ref 7–14)
AST SERPL-CCNC: 9 U/L — SIGNIFICANT CHANGE UP (ref 0–41)
BASOPHILS # BLD AUTO: 0 K/UL — SIGNIFICANT CHANGE UP (ref 0–0.2)
BASOPHILS # BLD AUTO: 0 K/UL — SIGNIFICANT CHANGE UP (ref 0–0.2)
BASOPHILS NFR BLD AUTO: 0 % — SIGNIFICANT CHANGE UP (ref 0–1)
BASOPHILS NFR BLD AUTO: 0 % — SIGNIFICANT CHANGE UP (ref 0–1)
BILIRUB SERPL-MCNC: 0.7 MG/DL — SIGNIFICANT CHANGE UP (ref 0.2–1.2)
BUN SERPL-MCNC: 36 MG/DL — HIGH (ref 10–20)
BUN SERPL-MCNC: 38 MG/DL — HIGH (ref 10–20)
CALCIUM SERPL-MCNC: 8.7 MG/DL — SIGNIFICANT CHANGE UP (ref 8.4–10.5)
CALCIUM SERPL-MCNC: 8.8 MG/DL — SIGNIFICANT CHANGE UP (ref 8.4–10.5)
CHLORIDE SERPL-SCNC: 106 MMOL/L — SIGNIFICANT CHANGE UP (ref 98–110)
CHLORIDE SERPL-SCNC: 107 MMOL/L — SIGNIFICANT CHANGE UP (ref 98–110)
CO2 SERPL-SCNC: 18 MMOL/L — SIGNIFICANT CHANGE UP (ref 17–32)
CO2 SERPL-SCNC: 24 MMOL/L — SIGNIFICANT CHANGE UP (ref 17–32)
CREAT SERPL-MCNC: 1.9 MG/DL — HIGH (ref 0.7–1.5)
CREAT SERPL-MCNC: 2 MG/DL — HIGH (ref 0.7–1.5)
EGFR: 36 ML/MIN/1.73M2 — LOW
EGFR: 38 ML/MIN/1.73M2 — LOW
EOSINOPHIL # BLD AUTO: 0 K/UL — SIGNIFICANT CHANGE UP (ref 0–0.7)
EOSINOPHIL # BLD AUTO: 0 K/UL — SIGNIFICANT CHANGE UP (ref 0–0.7)
EOSINOPHIL NFR BLD AUTO: 0 % — SIGNIFICANT CHANGE UP (ref 0–8)
EOSINOPHIL NFR BLD AUTO: 0 % — SIGNIFICANT CHANGE UP (ref 0–8)
GLUCOSE SERPL-MCNC: 130 MG/DL — HIGH (ref 70–99)
GLUCOSE SERPL-MCNC: 191 MG/DL — HIGH (ref 70–99)
HCT VFR BLD CALC: 28.2 % — LOW (ref 42–52)
HCT VFR BLD CALC: 30.5 % — LOW (ref 42–52)
HGB BLD-MCNC: 8.9 G/DL — LOW (ref 14–18)
HGB BLD-MCNC: 9.7 G/DL — LOW (ref 14–18)
IMM GRANULOCYTES NFR BLD AUTO: 8.5 % — HIGH (ref 0.1–0.3)
IMM GRANULOCYTES NFR BLD AUTO: 9.1 % — HIGH (ref 0.1–0.3)
LYMPHOCYTES # BLD AUTO: 0.11 K/UL — LOW (ref 1.2–3.4)
LYMPHOCYTES # BLD AUTO: 0.15 K/UL — LOW (ref 1.2–3.4)
LYMPHOCYTES # BLD AUTO: 20 % — LOW (ref 20.5–51.1)
LYMPHOCYTES # BLD AUTO: 31.9 % — SIGNIFICANT CHANGE UP (ref 20.5–51.1)
MAGNESIUM SERPL-MCNC: 2.2 MG/DL — SIGNIFICANT CHANGE UP (ref 1.8–2.4)
MAGNESIUM SERPL-MCNC: 2.2 MG/DL — SIGNIFICANT CHANGE UP (ref 1.8–2.4)
MCHC RBC-ENTMCNC: 27.9 PG — SIGNIFICANT CHANGE UP (ref 27–31)
MCHC RBC-ENTMCNC: 28.4 PG — SIGNIFICANT CHANGE UP (ref 27–31)
MCHC RBC-ENTMCNC: 31.6 G/DL — LOW (ref 32–37)
MCHC RBC-ENTMCNC: 31.8 G/DL — LOW (ref 32–37)
MCV RBC AUTO: 88.4 FL — SIGNIFICANT CHANGE UP (ref 80–94)
MCV RBC AUTO: 89.2 FL — SIGNIFICANT CHANGE UP (ref 80–94)
MONOCYTES # BLD AUTO: 0.04 K/UL — LOW (ref 0.1–0.6)
MONOCYTES # BLD AUTO: 0.08 K/UL — LOW (ref 0.1–0.6)
MONOCYTES NFR BLD AUTO: 14.5 % — HIGH (ref 1.7–9.3)
MONOCYTES NFR BLD AUTO: 8.5 % — SIGNIFICANT CHANGE UP (ref 1.7–9.3)
NEUTROPHILS # BLD AUTO: 0.24 K/UL — LOW (ref 1.4–6.5)
NEUTROPHILS # BLD AUTO: 0.31 K/UL — LOW (ref 1.4–6.5)
NEUTROPHILS NFR BLD AUTO: 51.1 % — SIGNIFICANT CHANGE UP (ref 42.2–75.2)
NEUTROPHILS NFR BLD AUTO: 56.4 % — SIGNIFICANT CHANGE UP (ref 42.2–75.2)
NRBC # BLD: 0 /100 WBCS — SIGNIFICANT CHANGE UP (ref 0–0)
NRBC # BLD: 0 /100 WBCS — SIGNIFICANT CHANGE UP (ref 0–0)
PLATELET # BLD AUTO: 169 K/UL — SIGNIFICANT CHANGE UP (ref 130–400)
PLATELET # BLD AUTO: 183 K/UL — SIGNIFICANT CHANGE UP (ref 130–400)
PMV BLD: 10.9 FL — HIGH (ref 7.4–10.4)
PMV BLD: 11.2 FL — HIGH (ref 7.4–10.4)
POTASSIUM SERPL-MCNC: 4.5 MMOL/L — SIGNIFICANT CHANGE UP (ref 3.5–5)
POTASSIUM SERPL-MCNC: 4.9 MMOL/L — SIGNIFICANT CHANGE UP (ref 3.5–5)
POTASSIUM SERPL-SCNC: 4.5 MMOL/L — SIGNIFICANT CHANGE UP (ref 3.5–5)
POTASSIUM SERPL-SCNC: 4.9 MMOL/L — SIGNIFICANT CHANGE UP (ref 3.5–5)
PROT SERPL-MCNC: 4.3 G/DL — LOW (ref 6–8)
RBC # BLD: 3.19 M/UL — LOW (ref 4.7–6.1)
RBC # BLD: 3.42 M/UL — LOW (ref 4.7–6.1)
RBC # FLD: 16.2 % — HIGH (ref 11.5–14.5)
RBC # FLD: 16.4 % — HIGH (ref 11.5–14.5)
SODIUM SERPL-SCNC: 139 MMOL/L — SIGNIFICANT CHANGE UP (ref 135–146)
SODIUM SERPL-SCNC: 140 MMOL/L — SIGNIFICANT CHANGE UP (ref 135–146)
WBC # BLD: 0.47 K/UL — CRITICAL LOW (ref 4.8–10.8)
WBC # BLD: 0.55 K/UL — CRITICAL LOW (ref 4.8–10.8)
WBC # FLD AUTO: 0.47 K/UL — CRITICAL LOW (ref 4.8–10.8)
WBC # FLD AUTO: 0.55 K/UL — CRITICAL LOW (ref 4.8–10.8)

## 2024-10-08 PROCEDURE — 99233 SBSQ HOSP IP/OBS HIGH 50: CPT

## 2024-10-08 PROCEDURE — 74177 CT ABD & PELVIS W/CONTRAST: CPT | Mod: 26

## 2024-10-08 PROCEDURE — 99232 SBSQ HOSP IP/OBS MODERATE 35: CPT

## 2024-10-08 RX ORDER — FILGRASTIM 300 UG/ML
480 INJECTION, SOLUTION INTRAVENOUS DAILY
Refills: 0 | Status: DISCONTINUED | OUTPATIENT
Start: 2024-10-08 | End: 2024-10-15

## 2024-10-08 RX ORDER — PANTOPRAZOLE SODIUM 40 MG/1
40 TABLET, DELAYED RELEASE ORAL
Refills: 0 | Status: DISCONTINUED | OUTPATIENT
Start: 2024-10-08 | End: 2024-10-16

## 2024-10-08 RX ADMIN — CHLORHEXIDINE GLUCONATE ORAL RINSE 1 APPLICATION(S): 1.2 SOLUTION DENTAL at 05:11

## 2024-10-08 RX ADMIN — PANTOPRAZOLE SODIUM 40 MILLIGRAM(S): 40 TABLET, DELAYED RELEASE ORAL at 05:04

## 2024-10-08 RX ADMIN — Medication 1300 MILLIGRAM(S): at 14:45

## 2024-10-08 RX ADMIN — Medication 1300 MILLIGRAM(S): at 21:35

## 2024-10-08 RX ADMIN — Medication 1 TABLET(S): at 05:04

## 2024-10-08 RX ADMIN — PREDNISONE 40 MILLIGRAM(S): 5 TABLET ORAL at 05:05

## 2024-10-08 RX ADMIN — Medication 1300 MILLIGRAM(S): at 05:04

## 2024-10-08 RX ADMIN — Medication 5000 UNIT(S): at 05:04

## 2024-10-08 RX ADMIN — Medication 1 MILLIGRAM(S): at 12:39

## 2024-10-08 RX ADMIN — FOLIC ACID 1 MILLIGRAM(S): 1 TABLET ORAL at 12:39

## 2024-10-08 NOTE — CONSULT NOTE ADULT - CONSULT REQUESTED DATE/TIME
01-Oct-2024 13:56
29-Sep-2024 13:10
27-Sep-2024 12:08
27-Sep-2024 18:44
08-Oct-2024 15:06
27-Sep-2024 17:34
01-Oct-2024 12:01
28-Sep-2024 06:05

## 2024-10-08 NOTE — CONSULT NOTE ADULT - PROVIDER SPECIALTY LIST ADULT
Electrophysiology
Gastroenterology
Heme/Onc
Infectious Disease
Electrophysiology
Cardiology
Critical Care
Urology

## 2024-10-08 NOTE — CHART NOTE - NSCHARTNOTEFT_GEN_A_CORE
Spoke with Heme/onc service:  -Expressed concern of worsening leukopenia after restarting home fluphenazine    Recommendation:  -Agreed with plan to hold fluphenazine 1-2 days and monitor for changes via daily CBC  -Unfortunately almost all neuroleptics can potentially contribute to leukopenia--may consider Abilify which may have slightly lower risk if necessary to switch.  -Psychiatry will follow up tomorrow. Spoke with Heme/onc service:  -Expressed concern of worsening leukopenia after restarting home fluphenazine    Recommendation:  -Agreed with plan to hold fluphenazine 1-2 days and monitor for changes via daily CBC  -Unfortunately almost all neuroleptics can potentially contribute to leukopenia--may consider Abilify which may have slightly lower risk if necessary to switch.  -Psychiatry will follow up tomorrow.  -Will attempt to reach outpatient psychiatrist to discuss potential long term treatment planning.

## 2024-10-08 NOTE — PROGRESS NOTE ADULT - ASSESSMENT
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 on monthly somatostatin injections( follows Dr Gong )  left elbow fracture s/p [facility]IF @[facility] ~30 years ago, comes in c/o weakness/SOB and cough. cough is productive of yellow sputum and has been going on for 3 months with 10 lb weight loss over the last 3 months , progressively worsening weakness, s./p fall. No head trauma. No LOC. No CP. Reports SOB on exertion. No abdominal pain. No n/v/c/d. Last chemo > 2 mo ago.   Admitted to SDU for septic shock due to PNA on pressors. Weaned off pressors and downgraded to tele floor for further management as at sinus bradycardia.      Septic shock  (POA) due to suspected Multifocal PNA in immunocompromised patient  - weaned off dopamine 3, on room air  - off fluids  - s/p azithromycin,   -s/p piperacillin/tazobactam IVPB  - s/p Iv solumderol   - oral predisone  - supportive care, aspiration precautions   - nebs Q 6 hours PRN   - out of bed to chair     Sinus bradycardia - improved   - HR was dropping below 40. S/P atropine x3 previously, dopamine drip 10/3 to 10/4, now stable off dopamine   - EP/Cardiology following   - TSH wnl  - avoid av node blockers,   - avoid midorine     Multifactorial anemia / pancytopenia  - s/p 1 PRBC in ER  - no evidence of bleeding currently   - Keep active T&S   - c/w PPI IV BID   -  Colonoscopy 2023- Cecal polyp, EMR: Tubular adenoma fragments showing foci of high-grade dysplasia and focal  intramucosal adenocarcinoma.  Plan was for 6month follow up   - appreciate heme onc fu, patient planned for OP work up and fu with Dr Gong   - worsening pancytopenia, recalled heme onc on 10/5/24   - patient Hb 10/7 6.9-> given 1 unit pRBC  - f/u heme onc 10/7 for further management as also leukopenic and neutropenic- Needs outpatient follow up for evaluation of lymphoma status with PET scan.      #Hypercalcemia likely secondary to malignancy   #hx of Marginal zone lymphoma  #hx of Pancreatic neuroendocrine tumor   - on Monthly somatostatin injections - last 9/16  - s/p IV hydration   - PTH:low  , PTHrP: pending   - monitor Ca  -MM workup ordered 10/7    Small pericardial effusion - chronic   - stable small pericardial effusion seen on CT scan , was also reported on multiple previous ECHO;s    ASHLEY on  CKD 4 - improved   metabolic acidosis   NSTEMI type II in the setting of CKD   - hx of nephrolithiasis c/b atrophic L kidney, R ureteral revision CKD4 (bsl Cr ~2.5) and Cr 2.2 on 10/5/24   - s/p IV contrast on admission   - sodium bicarb 1300mg TID , monitor bicarb level   - Trops elevated likely secondary to CKD   - pt is chest pain free  - telemetry monitoring     Elevated ALP  - possibly due to hepatic involvement of lymphoma/ bone involvement   - monitor levels     Schizophrenia  - psychiatry consult- increase fluphenazine to 5mg (on 10mg at home) and decreased benztropine to 1mg daily   - fluphenazine dc 10/7   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 on monthly somatostatin injections( follows Dr Gong )  left elbow fracture s/p ORIF @[facility] ~30 years ago, comes in c/o weakness/SOB and cough. cough is productive of yellow sputum and has been going on for 3 months with 10 lb weight loss over the last 3 months , progressively worsening weakness, s./p fall. No head trauma. No LOC. No CP. Reports SOB on exertion. No abdominal pain. No n/v/c/d. Last chemo > 2 mo ago.   Admitted to SDU for septic shock due to PNA on pressors. Weaned off pressors and downgraded to tele floor for further management as at sinus bradycardia.      Septic shock  (POA) due to suspected Multifocal PNA in immunocompromised patient  - weaned off dopamine 3, on room air  - off fluids  - s/p azithromycin,   -s/p piperacillin/tazobactam IVPB  - s/p Iv solumderol   - oral predisone  - supportive care, aspiration precautions   - nebs Q 6 hours PRN   - out of bed to chair     Sinus bradycardia - improved   - HR was dropping below 40. S/P atropine x3 previously, dopamine drip 10/3 to 10/4, now stable off dopamine   - EP/Cardiology following   - TSH wnl  - avoid av node blockers,   - avoid midorine     Multifactorial anemia / pancytopenia  - s/p 1 PRBC in ER  - no evidence of bleeding currently   - Keep active T&S   - c/w PPI IV BID   -  Colonoscopy 2023- Cecal polyp, EMR: Tubular adenoma fragments showing foci of high-grade dysplasia and focal  intramucosal adenocarcinoma.  Plan was for 6month follow up   - appreciate heme onc fu, patient planned for OP work up and fu with Dr Gong   - worsening pancytopenia, recalled heme onc on 10/5/24   - patient Hb 10/7 6.9-> given 1 unit pRBC  - f/u heme onc 10/7 for further management as also leukopenic and neutropenic- Needs outpatient follow up for evaluation of lymphoma status with PET scan.      #Hypercalcemia likely secondary to malignancy   #hx of Marginal zone lymphoma  #hx of Pancreatic neuroendocrine tumor   - on Monthly somatostatin injections - last 9/16  - s/p IV hydration   - PTH:low  , PTHrP: pending   - monitor Ca  -MM workup ordered 10/7-> pt denied labs- reordered 10/8  - CT scan for staging per heme on 10/8    Small pericardial effusion - chronic   - stable small pericardial effusion seen on CT scan , was also reported on multiple previous ECHO;s    ASHLEY on  CKD 4 - improved   metabolic acidosis   NSTEMI type II in the setting of CKD   - hx of nephrolithiasis c/b atrophic L kidney, R ureteral revision CKD4 (bsl Cr ~2.5) and Cr 2.2 on 10/5/24   - s/p IV contrast on admission   - sodium bicarb 1300mg TID , monitor bicarb level   - Trops elevated likely secondary to CKD   - pt is chest pain free  - telemetry monitoring     Elevated ALP  - possibly due to hepatic involvement of lymphoma/ bone involvement   - monitor levels     Schizophrenia  - psychiatry consult- increase fluphenazine to 5mg (on 10mg at home) and decreased benztropine to 1mg daily   - fluphenazine dc 10/7 per heme onc  -f/u with [facility] for med management 10/8   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 on monthly somatostatin injections( follows Dr Gong )  left elbow fracture s/p ORIF @[facility] ~30 years ago, comes in c/o weakness/SOB and cough. cough is productive of yellow sputum and has been going on for 3 months with 10 lb weight loss over the last 3 months , progressively worsening weakness, s./p fall. No head trauma. No LOC. No CP. Reports SOB on exertion. No abdominal pain. No n/v/c/d. Last chemo > 2 mo ago.   Admitted to SDU for septic shock due to PNA on pressors. Weaned off pressors and downgraded to tele floor for further management as at sinus bradycardia.      Septic shock  (POA) due to suspected Multifocal PNA in immunocompromised patient  - weaned off dopamine 3, on room air  - off fluids  - s/p azithromycin,   -s/p piperacillin/tazobactam IVPB  - s/p Iv solumderol   - oral predisone  - supportive care, aspiration precautions   - nebs Q 6 hours PRN   - out of bed to chair     Sinus bradycardia - improved   - HR was dropping below 40. S/P atropine x3 previously, dopamine drip 10/3 to 10/4, now stable off dopamine   - EP/Cardiology following   - TSH wnl  - avoid av node blockers,   - avoid midorine     Multifactorial anemia / pancytopenia  - s/p 1 PRBC in ER  - no evidence of bleeding currently   - Keep active T&S   - c/w PPI IV BID   -  Colonoscopy 2023- Cecal polyp, EMR: Tubular adenoma fragments showing foci of high-grade dysplasia and focal  intramucosal adenocarcinoma.  Plan was for 6month follow up   - appreciate heme onc fu, patient planned for OP work up and fu with Dr Gong   - worsening pancytopenia, recalled heme onc on 10/5/24   - patient Hb 10/7 6.9-> given 1 unit pRBC  - f/u heme onc 10/7 for further management as also leukopenic and neutropenic- Needs outpatient follow up for evaluation of lymphoma status with PET scan.      #Hypercalcemia likely secondary to malignancy   #hx of Marginal zone lymphoma  #hx of Pancreatic neuroendocrine tumor   - on Monthly somatostatin injections - last 9/16  - s/p IV hydration   - PTH:low  , PTHrP: pending   - monitor Ca  -MM workup ordered 10/7-> pt denied labs- reordered 10/8  - CT scan for staging per heme on 10/8    Small pericardial effusion - chronic   - stable small pericardial effusion seen on CT scan , was also reported on multiple previous ECHO;s    ASHLEY on  CKD 4 - improved   metabolic acidosis   NSTEMI type II in the setting of CKD   - hx of nephrolithiasis c/b atrophic L kidney, R ureteral revision CKD4 (bsl Cr ~2.5) and Cr 2.2 on 10/5/24   - s/p IV contrast on admission   - sodium bicarb 1300mg TID , monitor bicarb level   - Trops elevated likely secondary to CKD   - pt is chest pain free  - telemetry monitoring     Elevated ALP  - possibly due to hepatic involvement of lymphoma/ bone involvement   - monitor levels     Schizophrenia  - psychiatry consult- increase fluphenazine to 5mg (on 10mg at home) and decreased benztropine to 1mg daily   - fluphenazine dc 10/7 per heme onc  -f/u with pscy for med management 10/8-> per heme/onc and psych collab can hold fluphenazine for 2 days- monitor CBC to see if numbers improve   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 on monthly somatostatin injections( follows Dr Gong )  left elbow fracture s/p ORIF @[facility] ~30 years ago, comes in c/o weakness/SOB and cough. cough is productive of yellow sputum and has been going on for 3 months with 10 lb weight loss over the last 3 months , progressively worsening weakness, s./p fall. No head trauma. No LOC. No CP. Reports SOB on exertion. No abdominal pain. No n/v/c/d. Last chemo > 2 mo ago.   Admitted to SDU for septic shock due to PNA on pressors. Weaned off pressors and downgraded to tele floor for further management as at sinus bradycardia.      Septic shock  (POA) due to suspected Multifocal PNA in immunocompromised patient  - weaned off dopamine 3, on room air  - off fluids  - s/p azithromycin,   -s/p piperacillin/tazobactam IVPB  - s/p Iv solumderol   - oral predisone  - supportive care, aspiration precautions   - nebs Q 6 hours PRN   - out of bed to chair     Sinus bradycardia - improved   - HR was dropping below 40. S/P atropine x3 previously, dopamine drip 10/3 to 10/4, now stable off dopamine   - EP/Cardiology following   - TSH wnl  - avoid av node blockers,   - avoid midorine     Multifactorial anemia / pancytopenia  - s/p 1 PRBC in ER  - no evidence of bleeding currently   - Keep active T&S   - c/w PPI IV BID   -  Colonoscopy 2023- Cecal polyp, EMR: Tubular adenoma fragments showing foci of high-grade dysplasia and focal  intramucosal adenocarcinoma.  Plan was for 6month follow up   - appreciate heme onc fu, patient planned for OP work up and fu with Dr Gong   - worsening pancytopenia, recalled heme onc on 10/5/24   - patient Hb 10/7 6.9-> given 1 unit pRBC  - f/u heme onc 10/7 for further management as also leukopenic and neutropenic- Needs outpatient follow up for evaluation of lymphoma status with PET scan.   -starting zarxio 480 mcg daily per heme/onc 10/8     #Hypercalcemia likely secondary to malignancy   #hx of Marginal zone lymphoma  #hx of Pancreatic neuroendocrine tumor   - on Monthly somatostatin injections - last 9/16  - s/p IV hydration   - PTH:low  , PTHrP: pending   - monitor Ca  -MM workup ordered 10/7-> pt denied labs- reordered 10/8  - CT scan for staging per heme on 10/8    Small pericardial effusion - chronic   - stable small pericardial effusion seen on CT scan , was also reported on multiple previous ECHO;s    ASHLEY on  CKD 4 - improved   metabolic acidosis   NSTEMI type II in the setting of CKD   - hx of nephrolithiasis c/b atrophic L kidney, R ureteral revision CKD4 (bsl Cr ~2.5) and Cr 2.2 on 10/5/24   - s/p IV contrast on admission   - sodium bicarb 1300mg TID , monitor bicarb level   - Trops elevated likely secondary to CKD   - pt is chest pain free  - telemetry monitoring     Elevated ALP  - possibly due to hepatic involvement of lymphoma/ bone involvement   - monitor levels     Schizophrenia  - psychiatry consult- increase fluphenazine to 5mg (on 10mg at home) and decreased benztropine to 1mg daily   - fluphenazine dc 10/7 per heme onc  -f/u with pscy for med management 10/8-> per heme/onc and psych collab can hold fluphenazine for 2 days- monitor CBC to see if numbers improve

## 2024-10-08 NOTE — CONSULT NOTE ADULT - ASSESSMENT
67 yo M with Schizophrenia, Marginal Zone Lymphoma, Pancreatic neuroendocrine tumor on Somatostatin, Nephrolithiasis s/p PCN, Hx of Left atrophic kidney and right ureteral stricture s/p right pyeloplasty  c/o weakness /SOB, cough--  c/s ASHLEY, Deformity of the urinary bladder on ct.     Plan:   - No acute  intervention at this time   - Continue I&Os   - Continue to Trend Cr, Cr 1.9 (Baseline 2.5)  - If ASHLEY worsens then patient may need nephrostomy placement with IR   - f/u OP with private  for further management   - Will d/w attending

## 2024-10-08 NOTE — PROGRESS NOTE ADULT - ASSESSMENT
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 on monthly somatostatin injections( follows Dr Gong )  left elbow fracture s/p ORIF @[facility] ~30 years ago, comes in c/o weakness/SOB and cough. Cough is productive of yellow sputum and has been going on for 3 months with 10 lb weight loss over the last 3 months , progressively worsening weakness, s./p fall.    Septic shock  (POA) due to suspected Multifocal PNA, resolved  Sinus Bradycardia likely due to Fluphenazine, resolved   ASHLEY on CKD-4 / Metabolic acidosis  Schizophrenia  H/O Marginal zone lymphoma / Pancytopenia / Anemia  H/O Pancreatic neuroendocrine tumor   NSTMI type 2  Leukopenia/Neutropenia, worsening  Anemia, worsening                PLAN:    ·	Tele reviewed. No more bradycardia  ·	Pt's chart reviewed and discussed his case with the hospitalist who took care of him in the step down unit  ·	Septic shock has been resolved. Pt is off pressors and also completed his course of Abx.   ·	Labs noted. Pt's Hb and WBC count is trending down  ·	Transfuse one unit of PRBC's. Keep Hb >7  ·	Hem/Onc f/u noted. Leukopenia/Neurtropenia likely from lymphoma. Possibly worsening due to his episode of septic shock  ·	Hem/Onc recommended to hold Fluphenazine as it causes bone marrow suppression and pancytopenia  ·	Psych f/u noted. Agree to hold Fluphenazine and watch CBC.   ·	Will order w/u for MM as per Hem/Onc. CT abd/pelvis done as per Hem/Onc recommendation. Report is pending  ·	Pt refused morning labs. Will draw the labs now.   ·	Monitor CBC and renal function    Progress Note Handoff    Pending (specify):  Consults_________, Tests________, Test Results_______, Other__Neutropenia_______  Family discussion: With pt's mother and sister on bedside.   Disposition: Home___/SNF___/Other________/Unknown at this time________    Kendall Hodges MD  Spectra: [phone]

## 2024-10-08 NOTE — PROGRESS NOTE ADULT - SUBJECTIVE AND OBJECTIVE BOX
SUBJECTIVE/OVERNIGHT EVENTS  Today is hospital day 11d. This morning patient was seen and examined at bedside, resting comfortably in bed. No acute or major events overnight. Pt refusing labs      MEDICATIONS  STANDING MEDICATIONS  benztropine 1 milliGRAM(s) Oral daily  chlorhexidine 2% Cloths 1 Application(s) Topical <User Schedule>  folic acid 1 milliGRAM(s) Oral daily  heparin   Injectable 5000 Unit(s) SubCutaneous every 12 hours  influenza  Vaccine (HIGH DOSE) 0.5 milliLiter(s) IntraMuscular once  pantoprazole  Injectable 40 milliGRAM(s) IV Push two times a day  polyethylene glycol 3350 17 Gram(s) Oral daily  predniSONE   Tablet 40 milliGRAM(s) Oral daily  senna 1 Tablet(s) Oral two times a day  sodium bicarbonate 1300 milliGRAM(s) Oral three times a day    PRN MEDICATIONS  acetaminophen     Tablet .. 650 milliGRAM(s) Oral every 6 hours PRN  aluminum hydroxide/magnesium hydroxide/simethicone Suspension 30 milliLiter(s) Oral every 4 hours PRN  atropine Injectable 1 milliGRAM(s) IV Push once PRN  guaiFENesin Oral Liquid (Sugar-Free) 100 milliGRAM(s) Oral every 6 hours PRN  melatonin 3 milliGRAM(s) Oral at bedtime PRN  ondansetron Injectable 4 milliGRAM(s) IV Push every 8 hours PRN    VITALS  T(F): 97.6 (10-08-24 @ 04:50), Max: 97.7 (10-07-24 @ 12:58)  HR: 57 (10-08-24 @ 04:50) (57 - 74)  BP: 97/59 (10-08-24 @ 04:50) (91/49 - 99/61)  RR: 18 (10-08-24 @ 04:50) (18 - 18)  SpO2: 100% (10-07-24 @ 14:00) (99% - 100%)    PHYSICAL EXAM  GENERAL  (x  ) NAD, lying in bed comfortably     (  ) obtunded     (  ) lethargic     (  ) somnolent    HEAD  (  ) Atraumatic     (  ) hematoma     (  ) laceration (specify location:       )     NECK  (  ) Supple     (  ) neck stiffness     (  ) nuchal rigidity     (  )  no JVD     (  ) JVD present ( -- cm)    HEART  Rate -->  ( x ) normal rate    (  ) bradycardic    (  ) tachycardic  Rhythm -->  ( x ) regular    (  ) regularly irregular    (  ) irregularly irregular  Murmurs -->  (  ) normal s1/s2    (  ) systolic murmur    (  ) diastolic murmur    (  ) continuous murmur     (  ) S3 present    (  ) S4 present    LUNGS  ( x )Unlabored respirations     (  ) tachypnea  ( x ) B/L air entry     (  ) decreased breath sounds in:  (location     )    (  ) no adventitious sound     (  ) crackles     (  ) wheezing      (  ) rhonchi      (specify location:       )  (  ) chest wall tenderness (specify location:       )    ABDOMEN  ( x ) Soft     (  ) tense   |   (  ) nondistended     (  ) distended   |   (  ) +BS     (  ) hypoactive bowel sounds     (  ) hyperactive bowel sounds  (x  ) nontender     (  ) RUQ tenderness     (  ) RLQ tenderness     (  ) LLQ tenderness     (  ) epigastric tenderness     (  ) diffuse tenderness  (  ) Splenomegaly      (  ) Hepatomegaly      (  ) Jaundice     (  ) ecchymosis     EXTREMITIES  (x  ) Normal     (  ) Rash     (  ) ecchymosis     (  ) varicose veins      (  ) pitting edema     (  ) non-pitting edema   (  ) ulceration     (  ) gangrene:     (location:     )    NERVOUS SYSTEM  (  ) A&Ox3     (  ) confused     (  ) lethargic  CN II-XII:     (  ) Intact     (  ) focal deficits  (Specify:     )   Upper extremities:     (  ) strength X/5     (  ) focal deficit (specify:    )  Lower extremities:     (  ) strength  X/5    (  ) focal deficit (specify:    )    SKIN  (  ) No rashes or lesions     (  ) maculopapular rash     (  ) pustules     (  ) vesicles     (  ) ulcer     (  ) ecchymosis     (specify location:     )    (  ) Indwelling Park Catheter   Date insterted:    Reason (  ) Critical illness     (  ) urinary retention    (  ) Accurate Ins/Outs Monitoring     (  ) CMO patient    (  ) Central Line  Date inserted:  Location: (  ) Right IJ   (  ) Left IJ   (  ) Right Fem   (  ) Left Fem    (  ) SPC  (  ) pigtail  (  ) PEG tube  (  ) colostomy  (  ) jejunostomy  (  ) U-Dall    LABS             6.9    0.66  )-----------( 135      ( 10-07-24 @ 05:32 )             22.1     143  |  111  |  41  -------------------------<  106   10-07-24 @ 05:32  4.0  |  21  |  2.0    Ca      8.4     10-07-24 @ 05:32  Mg     2.2     10-07-24 @ 05:32    TPro  3.8  /  Alb  3.0  /  TBili  0.7  /  DBili  x   /  AST  6   /  ALT  7   /  AlkPhos  118  /  GGT  x     10-07-24 @ 05:32        Urinalysis Basic - ( 07 Oct 2024 05:32 )    Color: x / Appearance: x / SG: x / pH: x  Gluc: 106 mg/dL / Ketone: x  / Bili: x / Urobili: x   Blood: x / Protein: x / Nitrite: x   Leuk Esterase: x / RBC: x / WBC x   Sq Epi: x / Non Sq Epi: x / Bacteria: x          IMAGING

## 2024-10-08 NOTE — PROGRESS NOTE ADULT - SUBJECTIVE AND OBJECTIVE BOX
ELDA COVARRUBIAS  68y Male    CHIEF COMPLAINT:    Patient is a 68y old  Male who presents with a chief complaint of multifocal pna (08 Oct 2024 08:14)      INTERVAL HPI/OVERNIGHT EVENTS:    Patient seen and examined. No new complaint. Refused blood w/u earlier today    ROS: All other systems are negative.    Vital Signs:    T(F): 97.6 (10-08-24 @ 04:50), Max: 97.7 (10-07-24 @ 12:58)  HR: 57 (10-08-24 @ 04:50) (57 - 71)  BP: 97/59 (10-08-24 @ 04:50) (92/52 - 99/61)  RR: 18 (10-08-24 @ 04:50) (18 - 18)  SpO2: 100% (10-07-24 @ 14:00) (100% - 100%)  I&O's Summary    07 Oct 2024 07:01  -  08 Oct 2024 07:00  --------------------------------------------------------  IN: 384 mL / OUT: 2025 mL / NET: -1641 mL    08 Oct 2024 07:01  -  08 Oct 2024 12:42  --------------------------------------------------------  IN: 384 mL / OUT: 200 mL / NET: 184 mL      Daily     Daily Weight in k.2 (08 Oct 2024 04:50)  CAPILLARY BLOOD GLUCOSE          PHYSICAL EXAM:    GENERAL:  NAD  SKIN: No rashes or lesions  HENT: Atraumatic. Normocephalic. PERRL. Moist membranes.  NECK: Supple, No JVD. No lymphadenopathy.  PULMONARY: CTA B/L. No wheezing. No rales  CVS: Normal S1, S2. Rate and Rhythm are regular. No murmurs.  ABDOMEN/GI: Soft, Nontender, Nondistended; BS present  EXTREMITIES: Peripheral pulses intact. No edema B/L LE.  NEUROLOGIC:  No motor or sensory deficit.  PSYCH: Alert & oriented x 3    Consultant(s) Notes Reviewed:  [x ] YES  [ ] NO  Care Discussed with Consultants/Other Providers [ x] YES  [ ] NO    EKG reviewed  Telemetry reviewed    LABS:                        6.9    0.66  )-----------( 135      ( 07 Oct 2024 05:32 )             22.1     10-07    143  |  111[H]  |  41[H]  ----------------------------<  106[H]   Creatinine Trend: 2.0<--, 2.1<--, 2.2<--, 2.1<--, 2.2<--, 2.2<--  4.0   |  21  |  2.0[H]    Ca    8.4      07 Oct 2024 05:32  Mg     2.2     10-07    TPro  3.8[L]  /  Alb  3.0[L]  /  TBili  0.7  /  DBili  x   /  AST  6   /  ALT  7   /  AlkPhos  118[H]  10-07              RADIOLOGY & ADDITIONAL TESTS:      Imaging or report Personally Reviewed:  [ ] YES  [ ] NO    Medications:  Standing  benztropine 1 milliGRAM(s) Oral daily  chlorhexidine 2% Cloths 1 Application(s) Topical <User Schedule>  folic acid 1 milliGRAM(s) Oral daily  heparin   Injectable 5000 Unit(s) SubCutaneous every 12 hours  influenza  Vaccine (HIGH DOSE) 0.5 milliLiter(s) IntraMuscular once  pantoprazole    Tablet 40 milliGRAM(s) Oral before breakfast  polyethylene glycol 3350 17 Gram(s) Oral daily  predniSONE   Tablet 40 milliGRAM(s) Oral daily  senna 1 Tablet(s) Oral two times a day  sodium bicarbonate 1300 milliGRAM(s) Oral three times a day    PRN Meds  acetaminophen     Tablet .. 650 milliGRAM(s) Oral every 6 hours PRN  aluminum hydroxide/magnesium hydroxide/simethicone Suspension 30 milliLiter(s) Oral every 4 hours PRN  atropine Injectable 1 milliGRAM(s) IV Push once PRN  guaiFENesin Oral Liquid (Sugar-Free) 100 milliGRAM(s) Oral every 6 hours PRN  melatonin 3 milliGRAM(s) Oral at bedtime PRN  ondansetron Injectable 4 milliGRAM(s) IV Push every 8 hours PRN      Case discussed with resident    Care discussed with pt/family

## 2024-10-08 NOTE — CONSULT NOTE ADULT - CONSULT REASON
brittney, Deformity of the urinary bladder on ct, which may be related to mass effect by adjacent pelvic adenopathy.

## 2024-10-08 NOTE — PROGRESS NOTE ADULT - SUBJECTIVE AND OBJECTIVE BOX
ELDA COVARRUBIAS 68y Male  MRN#: [id number]   Hospital Day: 11d    SUBJECTIVE  Patient is a 68y old Male who presents with a chief complaint of multifocal pna (08 Oct 2024 15:06)  Currently admitted to medicine with the primary diagnosis of GI bleed      INTERVAL HPI AND OVERNIGHT EVENTS:  Patient was examined and seen at bedside. This morning he is resting comfortably in bed and reports no issues or overnight events.    REVIEW OF SYMPTOMS:  feels well      OBJECTIVE  PAST MEDICAL & SURGICAL HISTORY  Kidney stones    Schizophrenia    Lymphoma    Shingles    Atrophic kidney    H/O colonoscopy with polypectomy    Liver cyst    History of bladder surgery    H/O neuropathy    History of chemotherapy    Stage 3 chronic kidney disease    Neuroendocrine malignancy    Neuroendocrine tumor status post surgical treatment    Neuroendocrine cancer    Retained urethral stent    H/O umbilical hernia repair    Elbow fracture    H/O cystoscopy      ALLERGIES:  allopurinol (Rash (Moderate))    MEDICATIONS:  STANDING MEDICATIONS  benztropine 1 milliGRAM(s) Oral daily  chlorhexidine 2% Cloths 1 Application(s) Topical <User Schedule>  [id number]-[id number] (ZARXIO) Injectable 480 MICROGram(s) SubCutaneous daily  folic acid 1 milliGRAM(s) Oral daily  heparin   Injectable 5000 Unit(s) SubCutaneous every 12 hours  influenza  Vaccine (HIGH DOSE) 0.5 milliLiter(s) IntraMuscular once  pantoprazole    Tablet 40 milliGRAM(s) Oral before breakfast  polyethylene glycol 3350 17 Gram(s) Oral daily  predniSONE   Tablet 40 milliGRAM(s) Oral daily  senna 1 Tablet(s) Oral two times a day  sodium bicarbonate 1300 milliGRAM(s) Oral three times a day    PRN MEDICATIONS  acetaminophen     Tablet .. 650 milliGRAM(s) Oral every 6 hours PRN  aluminum hydroxide/magnesium hydroxide/simethicone Suspension 30 milliLiter(s) Oral every 4 hours PRN  atropine Injectable 1 milliGRAM(s) IV Push once PRN  guaiFENesin Oral Liquid (Sugar-Free) 100 milliGRAM(s) Oral every 6 hours PRN  melatonin 3 milliGRAM(s) Oral at bedtime PRN  ondansetron Injectable 4 milliGRAM(s) IV Push every 8 hours PRN      VITAL SIGNS: Last 24 Hours  T(C): 36.4 (08 Oct 2024 04:50), Max: 36.4 (07 Oct 2024 20:44)  T(F): 97.6 (08 Oct 2024 04:50), Max: 97.6 (07 Oct 2024 20:44)  HR: 57 (08 Oct 2024 04:50) (57 - 68)  BP: 97/59 (08 Oct 2024 04:50) (97/59 - 99/61)  BP(mean): --  RR: 18 (08 Oct 2024 04:50) (18 - 18)  SpO2: --    LABS:                        8.9    0.47  )-----------( 169      ( 08 Oct 2024 13:04 )             28.2     10-08    140  |  106  |  36[H]  ----------------------------<  130[H]  4.9   |  24  |  1.9[H]    Ca    8.8      08 Oct 2024 13:04  Mg     2.2     10-08    TPro  4.3[L]  /  Alb  3.4[L]  /  TBili  0.7  /  DBili  x   /  AST  9   /  ALT  10  /  AlkPhos  138[H]  10-08      Urinalysis Basic - ( 08 Oct 2024 13:04 )    Color: x / Appearance: x / SG: x / pH: x  Gluc: 130 mg/dL / Ketone: x  / Bili: x / Urobili: x   Blood: x / Protein: x / Nitrite: x   Leuk Esterase: x / RBC: x / WBC x   Sq Epi: x / Non Sq Epi: x / Bacteria: x          PHYSICAL EXAM:  GENERAL: NAD, fatigued  HEAD:  Atraumatic, Normocephalic  EYES: EOMI, PERRLA, conjunctivae pale  and sclerae clear  NECK: Supple, No JVD  CHEST/LUNG: +b/l crackles at base  HEART: Regular rate and rhythm; No murmurs, rubs, or gallops  ABDOMEN: Soft, Nontender, Nondistended; Bowel sounds present  EXTREMITIES:  No cyanosis, or edema  NEUROLOGY: non-focal, able to lift all extremities       ASSESSMENT & PLAN:    68-year-old male with schizophrenia, CKD, marginal zone lymphoma and pancreatic neuroendocrine tumor on monthly somatostatin injections( follows Dr Gong )  left elbow fracture s/p ORIF @[facility] ~30 years ago, comes in c/o weakness/SOB and cough that is productive of yellow sputum and has been going on for 3 months with 10 lb weight loss over the last 3 months. He also states weakness started 2 wks ago, getting worse, today fell while trying to ambulate due to weakness.     #Metastatic carcinoid,  on Sandostatin  --MRI abdomen 7.23:  Liver segment II 4 cm mass and Liver segment V  1.5 cm mass suspicious for Hepatic Lymphomas.  --Biopsy of Liver lesion 8.23: well differentiated Neuroendocrine tumor, Grade 3 Likely  metastatic.   --NM Octreoscan: multiple areas (09.05.23 @ 16:43) >Focal area of increased uptake in the upper abdomen/epigastrium to the left of midline suspicious for neuroendocrine tissue expressing somatostatin receptors.  --Gets lost to follow up and has missed few doses   --Last received Sandostatin on 9.16.24   --Chromogranin increased to 1685 from 1293 in July       # NHL, marginal zone   -He was in a good partial remission following 3 cycles of bendamustine and Rituxan treated in 2019 and 2020.  -He was unable to tolerate maintenance Rituxan.  -However, he was never in complete remission and it was preferred just to observe since we were dealing with a low grade lymphoma.  --Was lost to follow up.  --CT Abdomen/Pelvis 2/23/23 Interval worsening in periesophageal and pelvic lymphadenopathy, other bulky lymph nodes in the abdomen and retroperitoneum appear stable. 1.3 indeterminate segment 4 liver lesion. Focal areas of pleural nodularity concerning for lymphomatous/neoplastic/metastatic process, new since prior.  --PET scan 5.23: 1.  Interval increase in size and decreased metabolic activity of the thoracoabdominal lymphadenopathy, some are new, probably recurrence /residual disease. New mildly metabolic 3 cm hypodensity in the liver segment 2/3, concerning for neoplasm or lymphoma.  Poorly defined small hypodensity in the segment 4, below the resolution of PET scan.  MRI will provide further elucidation. Diffuse mildly increased bone marrow activity, possibly reactive versus bone marrow involvement.  --Hepatosplenomegaly.  --s/p R-CVP for Marginal Zone Lymphoma on 9/20 & 9/21>. Had reaction to Rituxan   --s/p CVP,  last received 12/23   -CT A/P 10.24 Interval progression of extensive adenopathy within the upper abdomen,   retroperitoneum and pelvis. Resultant hydronephrosis and hydroureter.Liver lesions. Apparent mass effect upon the left portal vein without mass effect. Splenomegaly.      #Hypercalcemia-resolved   prior work up neg PTHRP   9.24 outpatient lab Ca 12.6     #Leucopenia - worsening   Most likely 2/2 septic shock and underlying lymphoma   Since late dec 2023- he had had low WBC on multiple lab work     #Anemia normocytic - worsening   Presented with Hb 7.8   Baseline hb 9-10   Colonoscopy 2023- Cecal polyp, EMR: Tubular adenoma fragments showing foci of high-grade dysplasia and focal   intramucosal adenocarcinoma.   Recommendation was 6M f/up but did not.  Iron TIBC 168, ferritin 630   FA 3.3     Recommendations:  His leucopenia is multifactorial. He has had baseline leucopenia since 12.2023 likely from lymphoma with worsening due to his episode of septic shock, Fluphenazine has been shown in studies to cause pancytopenia, neutropenia, agranulocytosis. Review of his CT scan shows progression of extensive adenopathy, ?progression of lymphoma which could be contributing.  Start Zarxio 480mcg daily subcut till ANC >1000.   Get work MM   Transfuse to keep threshold 7/21   Monitor for fever given ANC <1000  C/w folic acid 1mg daily   Needs outpatient follow up for evaluation of lymphoma status with PET scan.   F/up GI for worsening anemia

## 2024-10-08 NOTE — PHARMACOTHERAPY INTERVENTION NOTE - COMMENTS
Recommend to switch pantoprazole from IV to PO.
Consistent with ID note, recommended to re-order piperacillin/tazobactam 3.375 g IV q8h (until 10/7) since the original order fell off the profile.    Jeremie Stratton, PharmD, [facility]  Clinical Pharmacy Specialist, Infectious Diseases  Tele-Antimicrobial Stewardship Program (Tele-ASP)  Tele-ASP Phone: [phone]  
Consistent with ID note, recommended to discontinue azithromycin order.    Jeremie Stratton, PharmD, BCIDP  Clinical Pharmacy Specialist, Infectious Diseases  Tele-Antimicrobial Stewardship Program (Tele-ASP)  Tele-ASP Phone: [phone]

## 2024-10-08 NOTE — CONSULT NOTE ADULT - SUBJECTIVE AND OBJECTIVE BOX
UROLOGY CONSULT:     67 yo M with Schizophrenia, Marginal Zone Lymphoma, Pancreatic neuroendocrine tumor on Somatostatin, Nephrolithiasis s/p PCN, Hx of Left atrophic kidney and right ureteral stricture s/p right pyeloplasty  c/o weakness /SOB, cough--  c/s ASHLEY, Deformity of the urinary bladder on ct.  Patient seen and examined at bedside. Patient reports he had hx of known Left atrophic kidney, right ureteral stricture s/p right pyeloplasty with Dr. Toledo (5 years ago). Patient last seen his private urologist about 2 years ago.Patient reports he voids 12 times a day which is his baseline. Denies fever, chills, N/V, SOB/Difficulty breathing, CP, abdominal pain, flank pain, dysuria, hematuria.     HPI:  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 on monthly somatostatin injections( follows Dr Gong )  left elbow fracture s/p ORIF @[facility] ~30 years ago, comes in c/o weakness/SOB and cough. cough is productive of yellow sputum and has been going on for 3 months with 10 lb weight loss over the last 3 months ,  he also states weakness started 2 wks ago, getting worse, today fell while trying to ambulate due to weakness. No head trauma. No LOC. No CP. Reports SOB on exertion. No abdominal pain. No n/v/c/d. Last chemo > 2 mo ago. No fever/chills. Of note he took abx course for PNA prescribed by his PMD     Vitals in ED :   T 100.7 (MAX) , HR 76 , /55,      Labs significant for :  Anemia, Hb of 7.8 ( baseline 9-10) , Leukopenia ; wbc 2.4 k   Hypercalcemia , corrected ca = 12.7 and Cr of 2.6 ( around baseline )     EKG NSR   Trops 35    CT chest angio : No pulmonary embolism.    Multilobar consolidative opacities in bilateral lungs, consistent with   multifocal pneumonia.    Small right pleural effusion and trace left pleural effusion.    Small pericardial effusion.    Multistation lymphadenopathy though a component could be reactive given   the extensive lung consolidations, suspect also related to patient's   known history of lymphoma. Again noted is splenomegaly.      s/p 1 unit PRBC's , 2L NS in ED , cefeipme,vancomycin and azithromycin    admitted to medicine  (27 Sep 2024 08:29)      PAST MEDICAL & SURGICAL HISTORY:  Kidney stones      Schizophrenia      Lymphoma      Shingles      Atrophic kidney      H/O colonoscopy with polypectomy      Liver cyst      History of bladder surgery      H/O neuropathy      History of chemotherapy      Stage 3 chronic kidney disease      Neuroendocrine malignancy      Neuroendocrine tumor status post surgical treatment      Neuroendocrine cancer      Retained urethral stent      H/O umbilical hernia repair      Elbow fracture      H/O cystoscopy          MEDICATIONS  (STANDING):  benztropine 1 milliGRAM(s) Oral daily  chlorhexidine 2% Cloths 1 Application(s) Topical <User Schedule>  folic acid 1 milliGRAM(s) Oral daily  heparin   Injectable 5000 Unit(s) SubCutaneous every 12 hours  influenza  Vaccine (HIGH DOSE) 0.5 milliLiter(s) IntraMuscular once  pantoprazole    Tablet 40 milliGRAM(s) Oral before breakfast  polyethylene glycol 3350 17 Gram(s) Oral daily  predniSONE   Tablet 40 milliGRAM(s) Oral daily  senna 1 Tablet(s) Oral two times a day  sodium bicarbonate 1300 milliGRAM(s) Oral three times a day    MEDICATIONS  (PRN):  acetaminophen     Tablet .. 650 milliGRAM(s) Oral every 6 hours PRN Temp greater or equal to 38C (100.4F), Mild Pain (1 - 3)  aluminum hydroxide/magnesium hydroxide/simethicone Suspension 30 milliLiter(s) Oral every 4 hours PRN Dyspepsia  atropine Injectable 1 milliGRAM(s) IV Push once PRN shamika <40 or symptomatic pt  guaiFENesin Oral Liquid (Sugar-Free) 100 milliGRAM(s) Oral every 6 hours PRN Cough  melatonin 3 milliGRAM(s) Oral at bedtime PRN Insomnia  ondansetron Injectable 4 milliGRAM(s) IV Push every 8 hours PRN Nausea and/or Vomiting      Allergies    allopurinol (Rash (Moderate))    Intolerances        SOCIAL HISTORY: No illicit drug use    FAMILY HISTORY:  FH: hypertension    FH: diabetes mellitus        REVIEW OF SYSTEMS:    CONSTITUTIONAL:  No fevers or chills  HEENT: No visual changes  ENDO: No sweating  NECK: No pain or stiffness  MUSCULOSKELETAL: No back pain, no joint pain  RESPIRATORY: No shortness of breath  CARDIOVASCULAR: No chest pain  GASTROINTESTINAL: No abdominal or epigastric pain. No nausea, vomiting,  No diarrhea or constipation.   NEUROLOGICAL: No mental status changes  PSYCH: No depression, no mood changes  SKIN: No itching    Vital Signs Last 24 Hrs  T(C): 36.4 (08 Oct 2024 04:50), Max: 36.4 (07 Oct 2024 20:44)  T(F): 97.6 (08 Oct 2024 04:50), Max: 97.6 (07 Oct 2024 20:44)  HR: 57 (08 Oct 2024 04:50) (57 - 68)  BP: 97/59 (08 Oct 2024 04:50) (97/59 - 99/61)  BP(mean): --  RR: 18 (08 Oct 2024 04:50) (18 - 18)  SpO2: --        PHYSICAL EXAM:    GEN: NAD, well-developed, awake and alert.  SKIN: Good color, non diaphoretic.  HEENT: NC/AT.  RESP: No dyspnea, non-labored breathing. No use of accessory muscles.  CARDIO: +S1/S2  ABDO: Soft, NT/ND, no palpable bladder, no suprapubic tenderness  BACK: No CVAT B/L  : + Circumcised / Non circumcised male. B/L descended testicles x 2. No lesions or palpable masses noted B/L. No meatal discharge.     I&O's Summary    07 Oct 2024 07:01  -  08 Oct 2024 07:00  --------------------------------------------------------  IN: 384 mL / OUT: 2025 mL / NET: -1641 mL    08 Oct 2024 07:01  -  08 Oct 2024 15:08  --------------------------------------------------------  IN: 605 mL / OUT: 350 mL / NET: 255 mL        LABS:                        8.9    0.47  )-----------( 169      ( 08 Oct 2024 13:04 )             28.2     10-08    140  |  106  |  36[H]  ----------------------------<  130[H]  4.9   |  24  |  1.9[H]    Ca    8.8      08 Oct 2024 13:04  Mg     2.2     10-08    TPro  4.3[L]  /  Alb  3.4[L]  /  TBili  0.7  /  DBili  x   /  AST  9   /  ALT  10  /  AlkPhos  138[H]  10-08      Urinalysis Basic - ( 08 Oct 2024 13:04 )    Color: x / Appearance: x / SG: x / pH: x  Gluc: 130 mg/dL / Ketone: x  / Bili: x / Urobili: x   Blood: x / Protein: x / Nitrite: x   Leuk Esterase: x / RBC: x / WBC x   Sq Epi: x / Non Sq Epi: x / Bacteria: x            RADIOLOGY & ADDITIONAL STUDIES: UROLOGY CONSULT:     69 yo M with Schizophrenia, Marginal Zone Lymphoma, Pancreatic neuroendocrine tumor on Somatostatin, Nephrolithiasis s/p PCN, Hx of Left atrophic kidney and right ureteral stricture s/p right pyeloplasty  c/o weakness /SOB, cough--  c/s ASHLEY, Deformity of the urinary bladder on ct.  Patient seen and examined at bedside. Patient reports he had hx of known Left atrophic kidney, right ureteral stricture s/p right pyeloplasty with Dr. Toledo (5 years ago). Patient last seen his private urologist about 2 years ago.Patient reports he voids 12 times a day which is his baseline. Denies fever, chills, N/V, SOB/Difficulty breathing, CP, abdominal pain, flank pain, dysuria, hematuria.     HPI:  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 on monthly somatostatin injections( follows Dr Gong )  left elbow fracture s/p ORIF @[facility] ~30 years ago, comes in c/o weakness/SOB and cough. cough is productive of yellow sputum and has been going on for 3 months with 10 lb weight loss over the last 3 months ,  he also states weakness started 2 wks ago, getting worse, today fell while trying to ambulate due to weakness. No head trauma. No LOC. No CP. Reports SOB on exertion. No abdominal pain. No n/v/c/d. Last chemo > 2 mo ago. No fever/chills. Of note he took abx course for PNA prescribed by his PMD     Vitals in ED :   T 100.7 (MAX) , HR 76 , /55,      Labs significant for :  Anemia, Hb of 7.8 ( baseline 9-10) , Leukopenia ; wbc 2.4 k   Hypercalcemia , corrected ca = 12.7 and Cr of 2.6 ( around baseline )     EKG NSR   Trops 35    CT chest angio : No pulmonary embolism.    Multilobar consolidative opacities in bilateral lungs, consistent with   multifocal pneumonia.    Small right pleural effusion and trace left pleural effusion.    Small pericardial effusion.    Multistation lymphadenopathy though a component could be reactive given   the extensive lung consolidations, suspect also related to patient's   known history of lymphoma. Again noted is splenomegaly.      s/p 1 unit PRBC's , 2L NS in ED , cefeipme,vancomycin and azithromycin    admitted to medicine  (27 Sep 2024 08:29)      PAST MEDICAL & SURGICAL HISTORY:  Kidney stones      Schizophrenia      Lymphoma      Shingles      Atrophic kidney      H/O colonoscopy with polypectomy      Liver cyst      History of bladder surgery      H/O neuropathy      History of chemotherapy      Stage 3 chronic kidney disease      Neuroendocrine malignancy      Neuroendocrine tumor status post surgical treatment      Neuroendocrine cancer      Retained urethral stent      H/O umbilical hernia repair      Elbow fracture      H/O cystoscopy          MEDICATIONS  (STANDING):  benztropine 1 milliGRAM(s) Oral daily  chlorhexidine 2% Cloths 1 Application(s) Topical <User Schedule>  folic acid 1 milliGRAM(s) Oral daily  heparin   Injectable 5000 Unit(s) SubCutaneous every 12 hours  influenza  Vaccine (HIGH DOSE) 0.5 milliLiter(s) IntraMuscular once  pantoprazole    Tablet 40 milliGRAM(s) Oral before breakfast  polyethylene glycol 3350 17 Gram(s) Oral daily  predniSONE   Tablet 40 milliGRAM(s) Oral daily  senna 1 Tablet(s) Oral two times a day  sodium bicarbonate 1300 milliGRAM(s) Oral three times a day    MEDICATIONS  (PRN):  acetaminophen     Tablet .. 650 milliGRAM(s) Oral every 6 hours PRN Temp greater or equal to 38C (100.4F), Mild Pain (1 - 3)  aluminum hydroxide/magnesium hydroxide/simethicone Suspension 30 milliLiter(s) Oral every 4 hours PRN Dyspepsia  atropine Injectable 1 milliGRAM(s) IV Push once PRN shamika <40 or symptomatic pt  guaiFENesin Oral Liquid (Sugar-Free) 100 milliGRAM(s) Oral every 6 hours PRN Cough  melatonin 3 milliGRAM(s) Oral at bedtime PRN Insomnia  ondansetron Injectable 4 milliGRAM(s) IV Push every 8 hours PRN Nausea and/or Vomiting      Allergies    allopurinol (Rash (Moderate))    Intolerances        SOCIAL HISTORY: No illicit drug use    FAMILY HISTORY:  FH: hypertension    FH: diabetes mellitus        REVIEW OF SYSTEMS:    CONSTITUTIONAL:  No fevers or chills  HEENT: No visual changes  ENDO: No sweating  NECK: No pain or stiffness  MUSCULOSKELETAL: No back pain, no joint pain  RESPIRATORY: No shortness of breath  CARDIOVASCULAR: No chest pain  GASTROINTESTINAL: No abdominal or epigastric pain. No nausea, vomiting,  No diarrhea or constipation.   NEUROLOGICAL: No mental status changes  PSYCH: No depression, no mood changes  SKIN: No itching    Vital Signs Last 24 Hrs  T(C): 36.4 (08 Oct 2024 04:50), Max: 36.4 (07 Oct 2024 20:44)  T(F): 97.6 (08 Oct 2024 04:50), Max: 97.6 (07 Oct 2024 20:44)  HR: 57 (08 Oct 2024 04:50) (57 - 68)  BP: 97/59 (08 Oct 2024 04:50) (97/59 - 99/61)  BP(mean): --  RR: 18 (08 Oct 2024 04:50) (18 - 18)  SpO2: --        PHYSICAL EXAM:    GEN: NAD, well-developed, awake and alert.  SKIN: Good color, non diaphoretic.  HEENT: NC/AT.  RESP: No dyspnea, non-labored breathing. No use of accessory muscles.  CARDIO: +S1/S2  ABDO: Soft, NT/ND, no palpable bladder, no suprapubic tenderness  BACK: No CVAT B/L  : + Circumcised / Non circumcised male. B/L descended testicles x 2. No lesions or palpable masses noted B/L. No meatal discharge.     I&O's Summary    07 Oct 2024 07:01  -  08 Oct 2024 07:00  --------------------------------------------------------  IN: 384 mL / OUT: 2025 mL / NET: -1641 mL    08 Oct 2024 07:01  -  08 Oct 2024 15:08  --------------------------------------------------------  IN: 605 mL / OUT: 350 mL / NET: 255 mL        LABS:                        8.9    0.47  )-----------( 169      ( 08 Oct 2024 13:04 )             28.2     10-08    140  |  106  |  36[H]  ----------------------------<  130[H]  4.9   |  24  |  1.9[H]    Ca    8.8      08 Oct 2024 13:04  Mg     2.2     10-08    TPro  4.3[L]  /  Alb  3.4[L]  /  TBili  0.7  /  DBili  x   /  AST  9   /  ALT  10  /  AlkPhos  138[H]  10-08      Urinalysis Basic - ( 08 Oct 2024 13:04 )    Color: x / Appearance: x / SG: x / pH: x  Gluc: 130 mg/dL / Ketone: x  / Bili: x / Urobili: x   Blood: x / Protein: x / Nitrite: x   Leuk Esterase: x / RBC: x / WBC x   Sq Epi: x / Non Sq Epi: x / Bacteria: x            RADIOLOGY & ADDITIONAL STUDIES:  < from: CT Abdomen and Pelvis w/ IV Cont (10.08.24 @ 12:12) >  ACC: [id number] EXAM:  CT ABDOMEN AND PELVIS IC   ORDERED BY: CAROLYNN PERALTA     PROCEDURE DATE:  10/08/2024          INTERPRETATION:  CLINICAL INFORMATION: Staging    COMPARISON: CT angiogram chest from September 27, 2024. Comparison also   made to CTabdomen pelvis from May 3, 2024    CONTRAST/COMPLICATIONS:  IV Contrast: Omnipaque 350  100 cc administered   0 cc discarded  Oral Contrast: NONE  Complications: None reported at time of study completion    PROCEDURE:  CT of the Abdomen and Pelvis was performed.  Sagittal and coronal reformats were performed.    FINDINGS:  LOWER CHEST: Again demonstrated are bibasilar consolidations with   adjacent small pleural effusions. Small pericardial effusion. Few years   she is    LIVER: There are numerous lesions within the liver. The largest within   the left lobe. It is ill-defined measuring up to 5 x 5.9 cm. Lesion also   noted within the right lobe of liver measuring up to 2.9 cm. There is   mass effect and narrowing of the left portal vein.  BILE DUCTS: Normal caliber.  GALLBLADDER: The gallbladder is distended with stones.  SPLEEN: The spleen is enlarged measuring 16 cm in length by 9 cm in   transverse diameter by 17 changes or be cm in AP diameter.  PANCREAS: Fatty changes of the pancreas.  ADRENALS: Within normal limits.  KIDNEYS/URETERS: Atrophic left kidney. Increasing hydronephrosis and   hydroureter.  The ureters are dilated likely secondary to distal   adenopathy.    BLADDER: Moderately distended urinary bladder. There is some deformity of   the urinary bladder extending asymmetric on the right. Question related   to underlying mass effect from adjacent adenopathy.    REPRODUCTIVE ORGANS: Calcifications of the prostate gland.    BOWEL: No bowel obstruction. Appendix within normal limits. Persistent   thickening of the sigmoid colon. Mild surrounding inflammatory changes.  PERITONEUM/RETROPERITONEUM: Increasing retroperitoneal adenopathy. .  VESSELS: Atherosclerotic changes of the aorta. Adenopathy seen   surrounding the aorta and iliac vessels.  LYMPH NODES: Subcentimeter increasing adenopathy compared to the prior   examination. There is a conglomerate of adenopathy within the   gastrohepatic region, periportal region and portacaval region.   Conglomerate of adenopathy is seen surrounding the celiac artery at its   origin, poorly defined.    There is adenopathy within the retroperitoneum. Left para-aortic   adenopathy measures 3.5 x 1.8 cm. Aortocaval adenopathy measuring 4.5 x   1.5 cm.    There is retrocrural adenopathy measuring up to 9 mm within the right   retrocrural space. Ill-defined adenopathy surrounding the iliac vessels   bilaterally. This is asymmetric on the left. Ill-defined adenopathy   adjacent to the left iliac/femoral vessels measuring 2.3x 2.0 cm.  ABDOMINAL WALL: Within normal limits.  BONES: Stable lucent lesions within the right iliac bone. Degenerative   changes.    IMPRESSION:      Interval progression of extensive adenopathy within the upper abdomen,   retroperitoneum and pelvis. Resultant hydronephrosis and hydroureter.    Liver lesions. Apparent mass effect upon the left portal vein without   mass effect. Splenomegaly.    Mild thickening again noted surrounding the sigmoid colon. Question   diverticulitis or focal coloniclesion.    Large consolidations within the visualized lower lungs.    Deformity of the urinary bladder as described above, which may be related   to mass effect by adjacent pelvic adenopathy.    Cholelithiasis.    --- End of Report ---      ENIO SABILLON MD; Attending Radiologist  This document has been electronically signed. Oct  8 2024 12:57PM    < end of copied text >

## 2024-10-09 LAB
ANION GAP SERPL CALC-SCNC: 9 MMOL/L — SIGNIFICANT CHANGE UP (ref 7–14)
BASOPHILS # BLD AUTO: 0 K/UL — SIGNIFICANT CHANGE UP (ref 0–0.2)
BASOPHILS # BLD AUTO: 0 K/UL — SIGNIFICANT CHANGE UP (ref 0–0.2)
BASOPHILS NFR BLD AUTO: 0 % — SIGNIFICANT CHANGE UP (ref 0–1)
BASOPHILS NFR BLD AUTO: 0 % — SIGNIFICANT CHANGE UP (ref 0–1)
BUN SERPL-MCNC: 35 MG/DL — HIGH (ref 10–20)
CALCIUM SERPL-MCNC: 8.6 MG/DL — SIGNIFICANT CHANGE UP (ref 8.4–10.5)
CHLORIDE SERPL-SCNC: 112 MMOL/L — HIGH (ref 98–110)
CO2 SERPL-SCNC: 24 MMOL/L — SIGNIFICANT CHANGE UP (ref 17–32)
CREAT SERPL-MCNC: 1.7 MG/DL — HIGH (ref 0.7–1.5)
EGFR: 44 ML/MIN/1.73M2 — LOW
EOSINOPHIL # BLD AUTO: 0 K/UL — SIGNIFICANT CHANGE UP (ref 0–0.7)
EOSINOPHIL # BLD AUTO: 0.01 K/UL — SIGNIFICANT CHANGE UP (ref 0–0.7)
EOSINOPHIL NFR BLD AUTO: 0 % — SIGNIFICANT CHANGE UP (ref 0–8)
EOSINOPHIL NFR BLD AUTO: 1.4 % — SIGNIFICANT CHANGE UP (ref 0–8)
GLUCOSE SERPL-MCNC: 95 MG/DL — SIGNIFICANT CHANGE UP (ref 70–99)
HCT VFR BLD CALC: 24.8 % — LOW (ref 42–52)
HCT VFR BLD CALC: 30.5 % — LOW (ref 42–52)
HGB BLD-MCNC: 7.9 G/DL — LOW (ref 14–18)
HGB BLD-MCNC: 9.5 G/DL — LOW (ref 14–18)
IMM GRANULOCYTES NFR BLD AUTO: 10.7 % — HIGH (ref 0.1–0.3)
IMM GRANULOCYTES NFR BLD AUTO: 2.9 % — HIGH (ref 0.1–0.3)
LYMPHOCYTES # BLD AUTO: 0.17 K/UL — LOW (ref 1.2–3.4)
LYMPHOCYTES # BLD AUTO: 0.31 K/UL — LOW (ref 1.2–3.4)
LYMPHOCYTES # BLD AUTO: 22.7 % — SIGNIFICANT CHANGE UP (ref 20.5–51.1)
LYMPHOCYTES # BLD AUTO: 44.9 % — SIGNIFICANT CHANGE UP (ref 20.5–51.1)
MAGNESIUM SERPL-MCNC: 2.2 MG/DL — SIGNIFICANT CHANGE UP (ref 1.8–2.4)
MCHC RBC-ENTMCNC: 28.2 PG — SIGNIFICANT CHANGE UP (ref 27–31)
MCHC RBC-ENTMCNC: 28.2 PG — SIGNIFICANT CHANGE UP (ref 27–31)
MCHC RBC-ENTMCNC: 31.1 G/DL — LOW (ref 32–37)
MCHC RBC-ENTMCNC: 31.9 G/DL — LOW (ref 32–37)
MCV RBC AUTO: 88.6 FL — SIGNIFICANT CHANGE UP (ref 80–94)
MCV RBC AUTO: 90.5 FL — SIGNIFICANT CHANGE UP (ref 80–94)
MONOCYTES # BLD AUTO: 0.2 K/UL — SIGNIFICANT CHANGE UP (ref 0.1–0.6)
MONOCYTES # BLD AUTO: 0.21 K/UL — SIGNIFICANT CHANGE UP (ref 0.1–0.6)
MONOCYTES NFR BLD AUTO: 28 % — HIGH (ref 1.7–9.3)
MONOCYTES NFR BLD AUTO: 29 % — HIGH (ref 1.7–9.3)
NEUTROPHILS # BLD AUTO: 0.15 K/UL — LOW (ref 1.4–6.5)
NEUTROPHILS # BLD AUTO: 0.29 K/UL — LOW (ref 1.4–6.5)
NEUTROPHILS NFR BLD AUTO: 21.8 % — LOW (ref 42.2–75.2)
NEUTROPHILS NFR BLD AUTO: 38.6 % — LOW (ref 42.2–75.2)
NRBC # BLD: 0 /100 WBCS — SIGNIFICANT CHANGE UP (ref 0–0)
NRBC # BLD: 0 /100 WBCS — SIGNIFICANT CHANGE UP (ref 0–0)
PLATELET # BLD AUTO: 147 K/UL — SIGNIFICANT CHANGE UP (ref 130–400)
PLATELET # BLD AUTO: 158 K/UL — SIGNIFICANT CHANGE UP (ref 130–400)
PMV BLD: 10.5 FL — HIGH (ref 7.4–10.4)
PMV BLD: 10.8 FL — HIGH (ref 7.4–10.4)
POTASSIUM SERPL-MCNC: 4 MMOL/L — SIGNIFICANT CHANGE UP (ref 3.5–5)
POTASSIUM SERPL-SCNC: 4 MMOL/L — SIGNIFICANT CHANGE UP (ref 3.5–5)
RBC # BLD: 2.8 M/UL — LOW (ref 4.7–6.1)
RBC # BLD: 3.37 M/UL — LOW (ref 4.7–6.1)
RBC # FLD: 16.4 % — HIGH (ref 11.5–14.5)
RBC # FLD: 17 % — HIGH (ref 11.5–14.5)
SODIUM SERPL-SCNC: 145 MMOL/L — SIGNIFICANT CHANGE UP (ref 135–146)
WBC # BLD: 0.69 K/UL — CRITICAL LOW (ref 4.8–10.8)
WBC # BLD: 0.75 K/UL — CRITICAL LOW (ref 4.8–10.8)
WBC # FLD AUTO: 0.69 K/UL — CRITICAL LOW (ref 4.8–10.8)
WBC # FLD AUTO: 0.75 K/UL — CRITICAL LOW (ref 4.8–10.8)

## 2024-10-09 PROCEDURE — 99231 SBSQ HOSP IP/OBS SF/LOW 25: CPT

## 2024-10-09 PROCEDURE — 76770 US EXAM ABDO BACK WALL COMP: CPT | Mod: 26

## 2024-10-09 PROCEDURE — 99233 SBSQ HOSP IP/OBS HIGH 50: CPT

## 2024-10-09 RX ADMIN — PANTOPRAZOLE SODIUM 40 MILLIGRAM(S): 40 TABLET, DELAYED RELEASE ORAL at 06:08

## 2024-10-09 RX ADMIN — PREDNISONE 40 MILLIGRAM(S): 5 TABLET ORAL at 06:07

## 2024-10-09 RX ADMIN — FOLIC ACID 1 MILLIGRAM(S): 1 TABLET ORAL at 11:49

## 2024-10-09 RX ADMIN — Medication 5000 UNIT(S): at 18:02

## 2024-10-09 RX ADMIN — CHLORHEXIDINE GLUCONATE ORAL RINSE 1 APPLICATION(S): 1.2 SOLUTION DENTAL at 07:03

## 2024-10-09 RX ADMIN — FILGRASTIM 480 MICROGRAM(S): 300 INJECTION, SOLUTION INTRAVENOUS at 11:50

## 2024-10-09 RX ADMIN — Medication 1300 MILLIGRAM(S): at 21:36

## 2024-10-09 RX ADMIN — Medication 1300 MILLIGRAM(S): at 06:07

## 2024-10-09 RX ADMIN — Medication 1300 MILLIGRAM(S): at 13:19

## 2024-10-09 RX ADMIN — Medication 1 TABLET(S): at 06:08

## 2024-10-09 RX ADMIN — Medication 5000 UNIT(S): at 06:08

## 2024-10-09 RX ADMIN — Medication 1 MILLIGRAM(S): at 11:49

## 2024-10-09 RX ADMIN — Medication 1 TABLET(S): at 18:03

## 2024-10-09 NOTE — BH CONSULTATION LIAISON PROGRESS NOTE - NSBHCONSULTRECOMMENDOTHER_PSY_A_CORE FT
-Continue to hold Prolixin. Monitor for any impact on CBC.  	-Currently monitoring for psychotic symptoms.  -Continue Cogentin to 2 mg daily  -Psychiatry will follow peripherally.

## 2024-10-09 NOTE — PROGRESS NOTE ADULT - ASSESSMENT
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 on monthly somatostatin injections( follows Dr Gong )  left elbow fracture s/p ORIF @[facility] ~30 years ago, comes in c/o weakness/SOB and cough. cough is productive of yellow sputum and has been going on for 3 months with 10 lb weight loss over the last 3 months , progressively worsening weakness, s./p fall. No head trauma. No LOC. No CP. Reports SOB on exertion. No abdominal pain. No n/v/c/d. Last chemo > 2 mo ago.   Admitted to SDU for septic shock due to PNA on pressors. Weaned off pressors and downgraded to tele floor for further management as at sinus bradycardia.      Septic shock  (POA) due to suspected Multifocal PNA in immunocompromised patient  - weaned off dopamine 3, on room air  - off fluids  - s/p azithromycin,   -s/p piperacillin/tazobactam IVPB  - s/p Iv solumderol   - oral predisone  - supportive care, aspiration precautions   - nebs Q 6 hours PRN   - out of bed to chair     Sinus bradycardia - improved   - HR was dropping below 40. S/P atropine x3 previously, dopamine drip 10/3 to 10/4, now stable off dopamine   - EP/Cardiology following   - TSH wnl  - avoid av node blockers,   - avoid midorine     Multifactorial anemia / pancytopenia  - s/p 1 PRBC in ER  - no evidence of bleeding currently   - Keep active T&S   - c/w PPI IV BID   -  Colonoscopy 2023- Cecal polyp, EMR: Tubular adenoma fragments showing foci of high-grade dysplasia and focal  intramucosal adenocarcinoma.  Plan was for 6month follow up   - appreciate heme onc fu, patient planned for OP work up and fu with Dr Gong   - worsening pancytopenia, recalled heme onc on 10/5/24   - patient Hb 10/7 6.9-> given 1 unit pRBC  - f/u heme onc 10/7 for further management as also leukopenic and neutropenic- Needs outpatient follow up for evaluation of lymphoma status with PET scan.   -starting zarxio 480 mcg daily per heme/onc 10/8     #Hypercalcemia likely secondary to malignancy   #hx of Marginal zone lymphoma  #hx of Pancreatic neuroendocrine tumor   - on Monthly somatostatin injections - last 9/16  - s/p IV hydration   - PTH:low  , PTHrP: pending   - monitor Ca  -MM workup ordered 10/7-> pt denied labs- reordered 10/8  - CT scan for staging per heme on 10/8    Small pericardial effusion - chronic   - stable small pericardial effusion seen on CT scan , was also reported on multiple previous ECHO;s    ASHLEY on  CKD 4 - improved   metabolic acidosis   NSTEMI type II in the setting of CKD   - hx of nephrolithiasis c/b atrophic L kidney, R ureteral revision CKD4 (bsl Cr ~2.5) and Cr 2.2 on 10/5/24   - s/p IV contrast on admission   - sodium bicarb 1300mg TID , monitor bicarb level   - Trops elevated likely secondary to CKD   - pt is chest pain free  - telemetry monitoring   - urology 10/8-> no acute intervention at this time    Elevated ALP  - possibly due to hepatic involvement of lymphoma/ bone involvement   - monitor levels     Schizophrenia  - psychiatry consult- increase fluphenazine to 5mg (on 10mg at home) and decreased benztropine to 1mg daily   - fluphenazine dc 10/7 per heme onc  -f/u with pscyh for med management 10/8-> per heme/onc and psych collab can hold fluphenazine for 2 days (10/8-10/10)- monitor CBC to see if numbers improve 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 on monthly somatostatin injections( follows Dr Gong )  left elbow fracture s/p ORIF @[facility] ~30 years ago, comes in c/o weakness/SOB and cough. cough is productive of yellow sputum and has been going on for 3 months with 10 lb weight loss over the last 3 months , progressively worsening weakness, s./p fall. No head trauma. No LOC. No CP. Reports SOB on exertion. No abdominal pain. No n/v/c/d. Last chemo > 2 mo ago.   Admitted to SDU for septic shock due to PNA on pressors. Weaned off pressors and downgraded to tele floor for further management as at sinus bradycardia.      Septic shock  (POA) due to suspected Multifocal PNA in immunocompromised patient  - weaned off dopamine 3, on room air  - off fluids  - s/p azithromycin,   -s/p piperacillin/tazobactam IVPB  - s/p Iv solumderol   - oral predisone  - supportive care, aspiration precautions   - nebs Q 6 hours PRN   - out of bed to chair     Sinus bradycardia - improved   - HR was dropping below 40. S/P atropine x3 previously, dopamine drip 10/3 to 10/4, now stable off dopamine   - EP/Cardiology following   - TSH wnl  - avoid av node blockers,   - avoid midorine     Multifactorial anemia / pancytopenia  - s/p 1 PRBC in ER  - no evidence of bleeding currently   - Keep active T&S   - c/w PPI IV BID   -  Colonoscopy 2023- Cecal polyp, EMR: Tubular adenoma fragments showing foci of high-grade dysplasia and focal  intramucosal adenocarcinoma.  Plan was for 6month follow up   - appreciate heme onc fu, patient planned for OP work up and fu with Dr Gong   - worsening pancytopenia, recalled heme onc on 10/5/24   - patient Hb 10/7 6.9-> given 1 unit pRBC  - f/u heme onc 10/7 for further management as also leukopenic and neutropenic- Needs outpatient follow up for evaluation of lymphoma status with PET scan.   -starting zarxio 480 mcg daily per heme/onc 10/8     #Hypercalcemia likely secondary to malignancy   #hx of Marginal zone lymphoma  #hx of Pancreatic neuroendocrine tumor   - on Monthly somatostatin injections - last 9/16  - s/p IV hydration   - PTH:low  , PTHrP: pending   - monitor Ca  -MM workup ordered 10/7-> pt denied labs- reordered 10/8  - CT scan for staging per heme on 10/8    Small pericardial effusion - chronic   - stable small pericardial effusion seen on CT scan , was also reported on multiple previous ECHO;s    ASHLEY on  CKD 4 - improved   metabolic acidosis   NSTEMI type II in the setting of CKD   - hx of nephrolithiasis c/b atrophic L kidney, R ureteral revision CKD4 (bsl Cr ~2.5) and Cr 2.2 on 10/5/24   - s/p IV contrast on admission   - sodium bicarb 1300mg TID , monitor bicarb level   - Trops elevated likely secondary to CKD   - pt is chest pain free  - telemetry monitoring   - urology 10/8-> no acute intervention at this time  - f/u [facility] 10/8    Elevated ALP  - possibly due to hepatic involvement of lymphoma/ bone involvement   - monitor levels     Schizophrenia  - psychiatry consult- increase fluphenazine to 5mg (on 10mg at home) and decreased benztropine to 1mg daily   - fluphenazine dc 10/7 per heme onc  -f/u with pscyh for med management 10/8-> per heme/onc and psych collab can hold fluphenazine for 2 days (10/8-10/10)- monitor CBC to see if numbers improve

## 2024-10-09 NOTE — PROGRESS NOTE ADULT - SUBJECTIVE AND OBJECTIVE BOX
SUBJECTIVE/OVERNIGHT EVENTS  Today is hospital day 12d. This morning patient was seen and examined at bedside, resting comfortably in bed. No acute or major events overnight.      MEDICATIONS  STANDING MEDICATIONS  benztropine 1 milliGRAM(s) Oral daily  chlorhexidine 2% Cloths 1 Application(s) Topical <User Schedule>  filgrastim-sndz (ZARXIO) Injectable 480 MICROGram(s) SubCutaneous daily  folic acid 1 milliGRAM(s) Oral daily  heparin   Injectable 5000 Unit(s) SubCutaneous every 12 hours  influenza  Vaccine (HIGH DOSE) 0.5 milliLiter(s) IntraMuscular once  pantoprazole    Tablet 40 milliGRAM(s) Oral before breakfast  polyethylene glycol 3350 17 Gram(s) Oral daily  predniSONE   Tablet 40 milliGRAM(s) Oral daily  senna 1 Tablet(s) Oral two times a day  sodium bicarbonate 1300 milliGRAM(s) Oral three times a day    PRN MEDICATIONS  acetaminophen     Tablet .. 650 milliGRAM(s) Oral every 6 hours PRN  aluminum hydroxide/magnesium hydroxide/simethicone Suspension 30 milliLiter(s) Oral every 4 hours PRN  atropine Injectable 1 milliGRAM(s) IV Push once PRN  guaiFENesin Oral Liquid (Sugar-Free) 100 milliGRAM(s) Oral every 6 hours PRN  melatonin 3 milliGRAM(s) Oral at bedtime PRN  ondansetron Injectable 4 milliGRAM(s) IV Push every 8 hours PRN    VITALS  T(F): 98 (10-09-24 @ 05:02), Max: 98 (10-09-24 @ 05:02)  HR: 51 (10-09-24 @ 05:02) (51 - 55)  BP: 90/52 (10-09-24 @ 05:02) (90/52 - 104/60)  RR: 18 (10-09-24 @ 05:02) (18 - 18)  SpO2: --    PHYSICAL EXAM  GENERAL  (x  ) NAD, lying in bed comfortably     (  ) obtunded     (  ) lethargic     (  ) somnolent    HEAD  (  ) Atraumatic     (  ) hematoma     (  ) laceration (specify location:       )     NECK  (  ) Supple     (  ) neck stiffness     (  ) nuchal rigidity     (  )  no JVD     (  ) JVD present ( -- cm)    HEART  Rate -->  ( x ) normal rate    (  ) bradycardic    (  ) tachycardic  Rhythm -->  ( x ) regular    (  ) regularly irregular    (  ) irregularly irregular  Murmurs -->  (  ) normal s1/s2    (  ) systolic murmur    (  ) diastolic murmur    (  ) continuous murmur     (  ) S3 present    (  ) S4 present    LUNGS  ( x )Unlabored respirations     (  ) tachypnea  ( x ) B/L air entry     (  ) decreased breath sounds in:  (location     )    (  ) no adventitious sound     (  ) crackles     (  ) wheezing      (  ) rhonchi      (specify location:       )  (  ) chest wall tenderness (specify location:       )    ABDOMEN  ( x ) Soft     (  ) tense   |   (  ) nondistended     (  ) distended   |   (  ) +BS     (  ) hypoactive bowel sounds     (  ) hyperactive bowel sounds  ( x ) nontender     (  ) RUQ tenderness     (  ) RLQ tenderness     (  ) LLQ tenderness     (  ) epigastric tenderness     (  ) diffuse tenderness  (  ) Splenomegaly      (  ) Hepatomegaly      (  ) Jaundice     (  ) ecchymosis     EXTREMITIES  ( x ) Normal     (  ) Rash     (  ) ecchymosis     (  ) varicose veins      (  ) pitting edema     (  ) non-pitting edema   (  ) ulceration     (  ) gangrene:     (location:     )    NERVOUS SYSTEM  (  ) A&Ox3     (  ) confused     (  ) lethargic  CN II-XII:     (  ) Intact     (  ) focal deficits  (Specify:     )   Upper extremities:     (  ) strength X/5     (  ) focal deficit (specify:    )  Lower extremities:     (  ) strength  X/5    (  ) focal deficit (specify:    )    SKIN  (  ) No rashes or lesions     (  ) maculopapular rash     (  ) pustules     (  ) vesicles     (  ) ulcer     (  ) ecchymosis     (specify location:     )    (  ) Indwelling Park Catheter   Date insterted:    Reason (  ) Critical illness     (  ) urinary retention    (  ) Accurate Ins/Outs Monitoring     (  ) CMO patient    (  ) Central Line  Date inserted:  Location: (  ) Right IJ   (  ) Left IJ   (  ) Right Fem   (  ) Left Fem    (  ) SPC  (  ) pigtail  (  ) PEG tube  (  ) colostomy  (  ) jejunostomy  (  ) U-Dall    LABS             7.9    0.69  )-----------( 147      ( 10-09-24 @ 05:41 )             24.8     145  |  112  |  35  -------------------------<  95   10-09-24 @ 05:41  4.0  |  24  |  1.7    Ca      8.6     10-09-24 @ 05:41  Mg     2.2     10-09-24 @ 05:41    TPro  4.3  /  Alb  3.4  /  TBili  0.7  /  DBili  x   /  AST  9   /  ALT  10  /  AlkPhos  138  /  GGT  x     10-08-24 @ 13:04        Urinalysis Basic - ( 09 Oct 2024 05:41 )    Color: x / Appearance: x / SG: x / pH: x  Gluc: 95 mg/dL / Ketone: x  / Bili: x / Urobili: x   Blood: x / Protein: x / Nitrite: x   Leuk Esterase: x / RBC: x / WBC x   Sq Epi: x / Non Sq Epi: x / Bacteria: x          IMAGING

## 2024-10-09 NOTE — PROGRESS NOTE ADULT - SUBJECTIVE AND OBJECTIVE BOX
ELDA COVARRUBIAS  68y Male    CHIEF COMPLAINT:    Patient is a 68y old  Male who presents with a chief complaint of multifocal pna (09 Oct 2024 08:19)      INTERVAL HPI/OVERNIGHT EVENTS:    Patient seen and examined. Sitting in a chair. Feels good. No fever.     ROS: All other systems are negative.    Vital Signs:    T(F): 98 (10-09-24 @ 12:29), Max: 98 (10-09-24 @ 05:02)  HR: 69 (10-09-24 @ 12:29) (51 - 69)  BP: 124/66 (10-09-24 @ 12:29) (90/52 - 124/66)  RR: 18 (10-09-24 @ 12:29) (18 - 18)  SpO2: --  I&O's Summary    08 Oct 2024 07:01  -  09 Oct 2024 07:00  --------------------------------------------------------  IN: 845 mL / OUT: 1375 mL / NET: -530 mL    09 Oct 2024 07:01  -  09 Oct 2024 14:30  --------------------------------------------------------  IN: 650 mL / OUT: 850 mL / NET: -200 mL      Daily     Daily   CAPILLARY BLOOD GLUCOSE          PHYSICAL EXAM:    GENERAL:  NAD  SKIN: No rashes or lesions  HENT: Atraumatic. Normocephalic. PERRL. Moist membranes.  NECK: Supple, No JVD. No lymphadenopathy.  PULMONARY: CTA B/L. No wheezing. No rales  CVS: Normal S1, S2. Rate and Rhythm are regular. No murmurs.  ABDOMEN/GI: Soft, Nontender, Nondistended; BS present  EXTREMITIES: Peripheral pulses intact. No edema B/L LE.  NEUROLOGIC:  No motor or sensory deficit.  PSYCH: Alert & oriented x 3    Consultant(s) Notes Reviewed:  [x ] YES  [ ] NO  Care Discussed with Consultants/Other Providers [ x] YES  [ ] NO    EKG reviewed  Telemetry reviewed    LABS:                        7.9    0.69  )-----------( 147      ( 09 Oct 2024 05:41 )             24.8     10-09    145  |  112[H]  |  35[H]  ----------------------------<  95  4.0   |  24  |  1.7[H]    Ca    8.6      09 Oct 2024 05:41  Mg     2.2     10-09    TPro  4.3[L]  /  Alb  3.4[L]  /  TBili  0.7  /  DBili  x   /  AST  9   /  ALT  10  /  AlkPhos  138[H]  10-08              RADIOLOGY & ADDITIONAL TESTS:      Imaging or report Personally Reviewed:  [ ] YES  [ ] NO    Medications:  Standing  benztropine 1 milliGRAM(s) Oral daily  chlorhexidine 2% Cloths 1 Application(s) Topical <User Schedule>  filgrastim-sndz (ZARXIO) Injectable 480 MICROGram(s) SubCutaneous daily  folic acid 1 milliGRAM(s) Oral daily  heparin   Injectable 5000 Unit(s) SubCutaneous every 12 hours  influenza  Vaccine (HIGH DOSE) 0.5 milliLiter(s) IntraMuscular once  pantoprazole    Tablet 40 milliGRAM(s) Oral before breakfast  polyethylene glycol 3350 17 Gram(s) Oral daily  predniSONE   Tablet 40 milliGRAM(s) Oral daily  senna 1 Tablet(s) Oral two times a day  sodium bicarbonate 1300 milliGRAM(s) Oral three times a day    PRN Meds  acetaminophen     Tablet .. 650 milliGRAM(s) Oral every 6 hours PRN  aluminum hydroxide/magnesium hydroxide/simethicone Suspension 30 milliLiter(s) Oral every 4 hours PRN  atropine Injectable 1 milliGRAM(s) IV Push once PRN  guaiFENesin Oral Liquid (Sugar-Free) 100 milliGRAM(s) Oral every 6 hours PRN  melatonin 3 milliGRAM(s) Oral at bedtime PRN  ondansetron Injectable 4 milliGRAM(s) IV Push every 8 hours PRN      Case discussed with resident    Care discussed with pt/family           ELDA COVARRUBIAS  68y Male    CHIEF COMPLAINT:    Patient is a 68y old  Male who presents with a chief complaint of multifocal pna (09 Oct 2024 08:19)      INTERVAL HPI/OVERNIGHT EVENTS:    Patient seen and examined. Sitting in a chair. Feels good. No fever.     ROS: All other systems are negative.    Vital Signs:    T(F): 98 (10-09-24 @ 12:29), Max: 98 (10-09-24 @ 05:02)  HR: 69 (10-09-24 @ 12:29) (51 - 69)  BP: 124/66 (10-09-24 @ 12:29) (90/52 - 124/66)  RR: 18 (10-09-24 @ 12:29) (18 - 18)  SpO2: --  I&O's Summary    08 Oct 2024 07:01  -  09 Oct 2024 07:00  --------------------------------------------------------  IN: 845 mL / OUT: 1375 mL / NET: -530 mL    09 Oct 2024 07:01  -  09 Oct 2024 14:30  --------------------------------------------------------  IN: 650 mL / OUT: 850 mL / NET: -200 mL      Daily     Daily   CAPILLARY BLOOD GLUCOSE          PHYSICAL EXAM:    GENERAL:  NAD  SKIN: No rashes or lesions  HENT: Atraumatic. Normocephalic. PERRL. Moist membranes.  NECK: Supple, No JVD. No lymphadenopathy.  PULMONARY: CTA B/L. No wheezing. No rales  CVS: Normal S1, S2. Rate and Rhythm are regular. No murmurs.  ABDOMEN/GI: Soft, Nontender, Nondistended; BS present  EXTREMITIES: Peripheral pulses intact. No edema B/L LE.  NEUROLOGIC:  No motor or sensory deficit.  PSYCH: Alert & oriented x 3    Consultant(s) Notes Reviewed:  [x ] YES  [ ] NO  Care Discussed with Consultants/Other Providers [ x] YES  [ ] NO    EKG reviewed  Telemetry reviewed    LABS:                        7.9    0.69  )-----------( 147      ( 09 Oct 2024 05:41 )             24.8     10-09    145  |  112[H]  |  35[H]  ----------------------------<  95  4.0   |  24  |  1.7[H]    Ca    8.6      09 Oct 2024 05:41  Mg     2.2     10-09    TPro  4.3[L]  /  Alb  3.4[L]  /  TBili  0.7  /  DBili  x   /  AST  9   /  ALT  10  /  AlkPhos  138[H]  10-08              RADIOLOGY & ADDITIONAL TESTS:      < from: CT Abdomen and Pelvis w/ IV Cont (10.08.24 @ 12:12) >  IMPRESSION:      Interval progression of extensive adenopathy within the upper abdomen,   retroperitoneum and pelvis. Resultant hydronephrosis and hydroureter.    Liver lesions. Apparent mass effect upon the left portal vein without   mass effect. Splenomegaly.    Mild thickening again noted surrounding the sigmoid colon. Question   diverticulitis or focal coloniclesion.    Large consolidations within the visualized lower lungs.    Deformity of the urinary bladder as described above, which may be related   to mass effect by adjacent pelvic adenopathy.    Cholelithiasis.    < end of copied text >  Imaging or report Personally Reviewed:  [x ] YES  [ ] NO    Medications:  Standing  benztropine 1 milliGRAM(s) Oral daily  chlorhexidine 2% Cloths 1 Application(s) Topical <User Schedule>  filgrastim-sndz (ZARXIO) Injectable 480 MICROGram(s) SubCutaneous daily  folic acid 1 milliGRAM(s) Oral daily  heparin   Injectable 5000 Unit(s) SubCutaneous every 12 hours  influenza  Vaccine (HIGH DOSE) 0.5 milliLiter(s) IntraMuscular once  pantoprazole    Tablet 40 milliGRAM(s) Oral before breakfast  polyethylene glycol 3350 17 Gram(s) Oral daily  predniSONE   Tablet 40 milliGRAM(s) Oral daily  senna 1 Tablet(s) Oral two times a day  sodium bicarbonate 1300 milliGRAM(s) Oral three times a day    PRN Meds  acetaminophen     Tablet .. 650 milliGRAM(s) Oral every 6 hours PRN  aluminum hydroxide/magnesium hydroxide/simethicone Suspension 30 milliLiter(s) Oral every 4 hours PRN  atropine Injectable 1 milliGRAM(s) IV Push once PRN  guaiFENesin Oral Liquid (Sugar-Free) 100 milliGRAM(s) Oral every 6 hours PRN  melatonin 3 milliGRAM(s) Oral at bedtime PRN  ondansetron Injectable 4 milliGRAM(s) IV Push every 8 hours PRN      Case discussed with resident    Care discussed with pt/family

## 2024-10-09 NOTE — PROGRESS NOTE ADULT - ASSESSMENT
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 Cough is productive of yellow sputum and has been going on for 3 months with 10 lb weight loss over the last 3 months , progressively worsening weakness, s./p fall.    Septic shock  (POA) due to suspected Multifocal PNA, resolved  Sinus Bradycardia likely due to Fluphenazine, resolved   ASHLEY on CKD-4 / Metabolic acidosis  Schizophrenia  H/O Marginal zone lymphoma / Pancytopenia / Anemia  H/O Pancreatic neuroendocrine tumor   NSTMI type 2  Leukopenia/Neutropenia, worsening  Anemia, worsening                PLAN:    ·	Tele reviewed. No more bradycardia  ·	Pt's chart reviewed and discussed his case with the hospitalist who took care of him in the step down unit  ·	Septic shock has been resolved. Pt is off pressors and also completed his course of Abx.   ·	Labs noted. Pt's Hb and WBC count is trending down  ·	Transfuse one unit of PRBC's. Keep Hb >7  ·	Hem/Onc f/u noted. Leukopenia/Neurtropenia likely from lymphoma. Possibly worsening due to his episode of septic shock  ·	Hem/Onc recommended to hold Fluphenazine as it causes bone marrow suppression and pancytopenia  ·	Psych f/u noted. Agree to hold Fluphenazine and watch CBC.   ·	Will order w/u for MM as per Hem/Onc. CT abd/pelvis done as per Hem/Onc recommendation. Report is pending  ·	WBC count noted. ANC is arount 150. Hem/Onc f/u noted. Recommended to start Zarxio 480mcg daily SQ till ANC >1000.  ·	Monitor CBC and renal function    Progress Note Handoff    Pending (specify):  Consults_________, Tests________, Test Results_______, Other__Neutropenia_______  Family discussion: With pt's mother and sister on bedside.   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 ~30 years ago, comes in c/o weakness/SOB and cough. Cough is productive of yellow sputum and has been going on for 3 months with 10 lb weight loss over the last 3 months , progressively worsening weakness, s./p fall.    Septic shock  (POA) due to suspected Multifocal PNA, resolved  Sinus Bradycardia likely due to Fluphenazine, resolved   ASHLEY on CKD-4 / Metabolic acidosis  Schizophrenia  H/O Marginal zone lymphoma / Pancytopenia / Anemia  H/O Pancreatic neuroendocrine tumor   NSTMI type 2  Leukopenia/Neutropenia, worsening  Anemia, worsening                PLAN:    ·	Tele reviewed. No more bradycardia  ·	Pt's chart reviewed and discussed his case with the hospitalist who took care of him in the step down unit  ·	Septic shock has been resolved. Pt is off pressors and also completed his course of Abx.   ·	Labs noted. Pt's Hb and WBC count is trending down  ·	Transfuse one unit of PRBC's. Keep Hb >7  ·	Hem/Onc f/u noted. Leukopenia/Neurtropenia likely from lymphoma. Possibly worsening due to his episode of septic shock  ·	Hem/Onc recommended to hold Fluphenazine as it causes bone marrow suppression and pancytopenia  ·	Psych f/u noted. Agree to hold Fluphenazine and watch CBC.   ·	Will order w/u for MM as per Hem/Onc. CT abd/pelvis done as per Hem/Onc recommendation. Report is pending  ·	WBC count noted. ANC is arount 150. Hem/Onc f/u noted. Recommended to start Zarxio 480mcg daily SQ till ANC >1000.  ·	Monitor CBC and renal function  ·	CT abd/pelvis noted. Interval progression of extensive adenopathy within the upper abdomen, retroperitoneum and pelvis. Resultant hydronephrosis and hydroureter.  ·	Renal/bladder US  ·	Urology eval noted. Recommended to monitor renal function. If Cr goes up will need nephrostomy.     Progress Note Handoff    Pending (specify):  Consults_________, Tests________, Test Results_______, Other__Neutropenia_______  Family discussion: With pt's mother and sister on bedside.   Disposition: Home___/SNF___/Other________/Unknown at this time________    Kendall Hodges MD  Spectra: [phone]

## 2024-10-10 LAB
GLUCOSE BLDC GLUCOMTR-MCNC: 100 MG/DL — HIGH (ref 70–99)
PTH RELATED PROT SERPL-MCNC: <2 PMOL/L — SIGNIFICANT CHANGE UP

## 2024-10-10 PROCEDURE — 99233 SBSQ HOSP IP/OBS HIGH 50: CPT

## 2024-10-10 RX ADMIN — Medication 5000 UNIT(S): at 05:49

## 2024-10-10 RX ADMIN — PANTOPRAZOLE SODIUM 40 MILLIGRAM(S): 40 TABLET, DELAYED RELEASE ORAL at 05:49

## 2024-10-10 RX ADMIN — CHLORHEXIDINE GLUCONATE ORAL RINSE 1 APPLICATION(S): 1.2 SOLUTION DENTAL at 05:49

## 2024-10-10 RX ADMIN — Medication 1300 MILLIGRAM(S): at 05:49

## 2024-10-10 RX ADMIN — Medication 5000 UNIT(S): at 17:26

## 2024-10-10 RX ADMIN — Medication 1 TABLET(S): at 05:49

## 2024-10-10 RX ADMIN — PREDNISONE 40 MILLIGRAM(S): 5 TABLET ORAL at 05:49

## 2024-10-10 RX ADMIN — Medication 1300 MILLIGRAM(S): at 21:02

## 2024-10-10 RX ADMIN — Medication 1 TABLET(S): at 17:26

## 2024-10-10 RX ADMIN — Medication 3 MILLIGRAM(S): at 21:02

## 2024-10-10 NOTE — CHART NOTE - NSCHARTNOTEFT_GEN_A_CORE
After discussion with heme/onc okay to resume Prolixin.     Recommendations:  -Resume Prolixin 5 mg daily  -Resume Cogentin 1 mg daily After discussion with heme/onc okay to resume Prolixin.     Recommendations:  -Resume Prolixin 5 mg daily  -Resume Cogentin 1 mg daily  -Psychiatry will follow up tomorrow.

## 2024-10-10 NOTE — PROGRESS NOTE ADULT - ASSESSMENT
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 Cough is productive of yellow sputum and has been going on for 3 months with 10 lb weight loss over the last 3 months , progressively worsening weakness, s./p fall.    PLAN:   #Septic shock  (POA) due to suspected Multifocal PNA, resolved  - patient off pressors and completed abx treatment      #Sinus Bradycardia likely due to fluphenazine, resolved   - holding fluphenazine from 10/08-10/10    #NSTEMI, type 2 in the setting of CKD   #ASHLEY on CKD4 / Metabolic acidosis  #HO nephrolithiasis, R ureteral revision   - downtrending Cr at 1.7 (basal creatinine ~2.5)   - RBSU notable for atrophic L kidney and no notable R hydro  - monitor renal function   - Urology eval noted. If Cr goes up, patient will need nephrostomy.     #Schizophrenia  - psych following   - hold fluphenazine in the setting of severe neutropenia from 10/08-10/10 and trend labs   - c/w benztropine 2mg qd po     #Leukopenia/Neutropenia, worsening  #Anemia, worsening   #H/O Marginal zone lymphoma / Pancytopenia / Anemia  #H/O Pancreatic neuroendocrine tumor   - heme/onc following,    - Leukopenia/Neutropenia likely from lymphoma with possible worsening secondary to septic shock on admission   - Hold fluphenazine as it causes bone marrow suppression and pancytopenia  - trend CBC   - trend ANC   - c/w Zarxio (filgrastim-sndz) 480mg subcutaneous daily until ANC > 1000 (0.29 as of 10/10/24)    - per heme/onc, c/w MM workup   - transfuse one unit of PRBCs to keep Hb >7  - CT Scan (from 10/08/24) notable for the following:   - Interval progression of extensive adenopathy within the upper abdomen,   retroperitoneum and pelvis. Resultant hydronephrosis and hydroureter.  - Liver lesions. Apparent mass effect upon the left portal vein without   mass effect. Splenomegaly.  - Mild thickening again noted surrounding the sigmoid colon. Question   diverticulitis or focal colonic lesion.  - Large consolidations within the visualized lower lungs.  - Deformity of the urinary bladder as described above, which may be related   to mass effect by adjacent pelvic adenopathy.  - Cholelithiasis.    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 Gong), left elbow fracture s/p ORIF @[facility] ~30 years ago, comes in c/o weakness/SOB and cough. Cough is productive of yellow sputum and has been going on for 3 months with 10 lb weight loss over the last 3 months , progressively worsening weakness, s./p fall.    PLAN:   #Septic shock  (POA) due to suspected Multifocal PNA, resolved  - patient off pressors and completed abx treatment      #Sinus Bradycardia likely due to fluphenazine, resolved   - holding fluphenazine     #NSTEMI, type 2 in the setting of CKD   #ASHLEY on CKD4 / Metabolic acidosis  #HO nephrolithiasis, R ureteral revision   - downtrending Cr at 1.7 (basal creatinine ~2.5)   - RBSU notable for atrophic L kidney and no notable R hydro  - monitor renal function   - Urology eval noted. If Cr goes up, patient will need nephrostomy.     #Schizophrenia  - psych following   - hold fluphenazine in the setting of severe neutropenia and trend labs   - c/w benztropine 1mg qd po     #Leukopenia/Neutropenia, worsening  #Anemia, worsening   #H/O Marginal zone lymphoma / Pancytopenia / Anemia  #H/O Pancreatic neuroendocrine tumor   - heme/onc following    - Leukopenia/Neutropenia likely from lymphoma with possible worsening secondary to septic shock on admission   - Hold fluphenazine as it causes bone marrow suppression and pancytopenia  - trend CBC   - trend ANC   - maintain Hb >7  - c/w Zarxio (filgrastim) 480mg subcutaneous daily until ANC > 1000 (290 as of 10/10/24)    - per heme/onc, goal ANC > 1000 for discharge and plan for outpatient work up     - CT Scan (from 10/08/24) notable for the following:   - Interval progression of extensive adenopathy within the upper abdomen,   retroperitoneum and pelvis. Resultant hydronephrosis and hydroureter.  - Liver lesions. Apparent mass effect upon the left portal vein without   mass effect. Splenomegaly.  - Mild thickening again noted surrounding the sigmoid colon. Question   diverticulitis or focal colonic lesion.  - Large consolidations within the visualized lower lungs.  - Deformity of the urinary bladder as described above, which may be related   to mass effect by adjacent pelvic adenopathy.  - Cholelithiasis.    #Provider Handoff:   - psych following   - repeat labs   - trend CBC   - trend Cr   - trend ANC   - c/w filgrastim until ANC > 1000  - heme/onc plan appreciated:   - stable for d/c when ANC > 1000   - plan for outpatient PET scan to determine lymphoma burden   - consider treatment options based on PET scan findings

## 2024-10-10 NOTE — PROGRESS NOTE ADULT - SUBJECTIVE AND OBJECTIVE BOX
24H events:    Patient is a 68y old Male who presents with a chief complaint of multifocal pna (09 Oct 2024 14:30)    Primary diagnosis of GI bleed          Today is hospital day 13d.   This morning patient was seen and examined at bedside, resting comfortably in bed.    No acute or major events overnight.    Code Status:    Family communication:  Contact date:  Name of person contacted:  Relationship to patient:  Communication details:  What matters most:    PAST MEDICAL & SURGICAL HISTORY  Kidney stones    Schizophrenia    Lymphoma    Shingles    Atrophic kidney    H/O colonoscopy with polypectomy    Liver cyst    History of bladder surgery    H/O neuropathy    History of chemotherapy    Stage 3 chronic kidney disease    Neuroendocrine malignancy    Neuroendocrine tumor status post surgical treatment    Neuroendocrine cancer    Retained urethral stent    H/O umbilical hernia repair    Elbow fracture    H/O cystoscopy      SOCIAL HISTORY:  Social History:      ALLERGIES:  allopurinol (Rash (Moderate))    MEDICATIONS:  STANDING MEDICATIONS  benztropine 1 milliGRAM(s) Oral daily  chlorhexidine 2% Cloths 1 Application(s) Topical <User Schedule>  filgrastim-sndz (ZARXIO) Injectable 480 MICROGram(s) SubCutaneous daily  folic acid 1 milliGRAM(s) Oral daily  heparin   Injectable 5000 Unit(s) SubCutaneous every 12 hours  influenza  Vaccine (HIGH DOSE) 0.5 milliLiter(s) IntraMuscular once  pantoprazole    Tablet 40 milliGRAM(s) Oral before breakfast  polyethylene glycol 3350 17 Gram(s) Oral daily  predniSONE   Tablet 40 milliGRAM(s) Oral daily  senna 1 Tablet(s) Oral two times a day  sodium bicarbonate 1300 milliGRAM(s) Oral three times a day    PRN MEDICATIONS  acetaminophen     Tablet .. 650 milliGRAM(s) Oral every 6 hours PRN  aluminum hydroxide/magnesium hydroxide/simethicone Suspension 30 milliLiter(s) Oral every 4 hours PRN  atropine Injectable 1 milliGRAM(s) IV Push once PRN  guaiFENesin Oral Liquid (Sugar-Free) 100 milliGRAM(s) Oral every 6 hours PRN  melatonin 3 milliGRAM(s) Oral at bedtime PRN  ondansetron Injectable 4 milliGRAM(s) IV Push every 8 hours PRN    VITALS:   T(F): 98.3  HR: 67  BP: 116/56  RR: 18  SpO2: 97%    PHYSICAL EXAM:  GENERAL: NAD, lying in bed comfortably  HEAD:  Atraumatic, Normocephalic  EYES: EOMI, PERRLA, conjunctiva and sclera clear  ENT: Moist mucous membranes  NECK: Supple, No JVD  CHEST/LUNG: Clear to auscultation bilaterally; No rales, rhonchi, wheezing, or rubs. Unlabored respirations  HEART: Regular rate and rhythm; No murmurs, rubs, or gallops  ABDOMEN: BSx4; Soft, nontender, nondistended  EXTREMITIES:  2+ Peripheral Pulses, brisk capillary refill. No clubbing, cyanosis, or edema  NERVOUS SYSTEM:  A&Ox3, no focal deficits   SKIN: No rashes or lesions    (  ) [place]welling [place] Catheter:   Date inserted:    Reason (  ) Critical illness     (  ) urinary retention    (  ) Accurate Ins/Outs Monitoring     (  ) CMO patient    (  ) Central Line:   Date inserted:  Location: (  ) Right IJ     (  ) Left IJ     (  ) Right Fem     (  ) Left Fem    (  ) SPC        (  ) pigtail       (  ) PEG tube       (  ) colostomy       (  ) jejunostomy  (  ) U-Dall    LABS:                        9.5    0.75  )-----------( 158      ( 09 Oct 2024 17:16 )             30.5     10-09    145  |  112[H]  |  35[H]  ----------------------------<  95  4.0   |  24  |  1.7[H]    Ca    8.6      09 Oct 2024 05:41  Mg     2.2     10-09    TPro  4.3[L]  /  Alb  3.4[L]  /  TBili  0.7  /  DBili  x   /  AST  9   /  ALT  10  /  AlkPhos  138[H]  10-08      Urinalysis Basic - ( 09 Oct 2024 05:41 )    Color: x / Appearance: x / SG: x / pH: x  Gluc: 95 mg/dL / Ketone: x  / Bili: x / Urobili: x   Blood: x / Protein: x / Nitrite: x   Leuk Esterase: x / RBC: x / WBC x   Sq Epi: x / Non Sq Epi: x / Bacteria: x                RADIOLOGY:  CXR      CT  CT Abdomen and Pelvis w/ IV Cont:   ACC: [id number] EXAM:  CT ABDOMEN AND PELVIS IC   ORDERED BY: CAROLYNN PERALTA     PROCEDURE DATE:  10/08/2024          INTERPRETATION:  CLINICAL INFORMATION: Staging    COMPARISON: CT angiogram chest from September 27, 2024. Comparison also   made to CTabdomen pelvis from May 3, 2024    CONTRAST/COMPLICATIONS:  IV Contrast: Omnipaque 350  100 cc administered   0 cc discarded  Oral Contrast: NONE  Complications: None reported at time of study completion    PROCEDURE:  CT of the Abdomen and Pelvis was performed.  Sagittal and coronal reformats were performed.    FINDINGS:  LOWER CHEST: Again demonstrated are bibasilar consolidations with   adjacent small pleural effusions. Small pericardial effusion. Few years   she is    LIVER: There are numerous lesions within the liver. The largest within   the left lobe. It is ill-defined measuring up to 5 x 5.9 cm. Lesion also   noted within the right lobe of liver measuring up to 2.9 cm. There is   mass effect and narrowing of the left portal vein.  BILE DUCTS: Normal caliber.  GALLBLADDER: The gallbladder is distended with stones.  SPLEEN: The spleen is enlarged measuring 16 cm in length by 9 cm in   transverse diameter by 17 changes or be cm in AP diameter.  PANCREAS: Fatty changes of the pancreas.  ADRENALS: Within normal limits.  KIDNEYS/URETERS: Atrophic left kidney. Increasing hydronephrosis and   hydroureter.  The ureters are dilated likely secondary to distal   adenopathy.    BLADDER: Moderately distended urinary bladder. There is some deformity of   the urinary bladder extending asymmetric on the right. Question related   to underlying mass effect from adjacent adenopathy.    REPRODUCTIVE ORGANS: Calcifications of the prostate gland.    BOWEL: No bowel obstruction. Appendix within normal limits. Persistent   thickening of the sigmoid colon. Mild surrounding inflammatory changes.  PERITONEUM/RETROPERITONEUM: Increasing retroperitoneal adenopathy. .  VESSELS: Atherosclerotic changes of the aorta. Adenopathy seen   surrounding the aorta and iliac vessels.  LYMPH NODES: Subcentimeter increasing adenopathy compared to the prior   examination. There is a conglomerate of adenopathy within the   gastrohepatic region, periportal region and portacaval region.   Conglomerate of adenopathy is seen surrounding the celiac artery at its   origin, poorly defined.    There is adenopathy within the retroperitoneum. Left para-aortic   adenopathy measures 3.5 x 1.8 cm. Aortocaval adenopathy measuring 4.5 x   1.5 cm.    There is retrocrural adenopathy measuring up to 9 mm within the right   retrocrural space. Ill-defined adenopathy surrounding the iliac vessels   bilaterally. This is asymmetric on the left. Ill-defined adenopathy   adjacent to the left iliac/femoral vessels measuring 2.3x 2.0 cm.  ABDOMINAL WALL: Within normal limits.  BONES: Stable lucent lesions within the right iliac bone. Degenerative   changes.    IMPRESSION:      Interval progression of extensive adenopathy within the upper abdomen,   retroperitoneum and pelvis. Resultant hydronephrosis and hydroureter.    Liver lesions. Apparent mass effect upon the left portal vein without   mass effect. Splenomegaly.    Mild thickening again noted surrounding the sigmoid colon. Question   diverticulitis or focal coloniclesion.    Large consolidations within the visualized lower lungs.    Deformity of the urinary bladder as described above, which may be related   to mass effect by adjacent pelvic adenopathy.    Cholelithiasis.    --- End of Report ---            ENIO SABILLON MD; Attending Radiologist  This document has been electronically signed. Oct  8 2024 12:57PM (10-08-24 @ 12:12)           24H events:    Patient is a 68y old Male who presents with a chief complaint of multifocal pna (09 Oct 2024 14:30)    Primary diagnosis of GI bleed    Today is hospital day 13d.   This morning patient was seen and examined at bedside, resting comfortably in bed.    No acute or major events overnight.    PAST MEDICAL & SURGICAL HISTORY  Kidney stones    Schizophrenia    Lymphoma    Shingles    Atrophic kidney    H/O colonoscopy with polypectomy    Liver cyst    History of bladder surgery    H/O neuropathy    History of chemotherapy    Stage 3 chronic kidney disease    Neuroendocrine malignancy    Neuroendocrine tumor status post surgical treatment    Neuroendocrine cancer    Retained urethral stent    H/O umbilical hernia repair    Elbow fracture    H/O cystoscopy      SOCIAL HISTORY:  Social History:      ALLERGIES:  allopurinol (Rash (Moderate))    MEDICATIONS:  STANDING MEDICATIONS  benztropine 1 milliGRAM(s) Oral daily  chlorhexidine 2% Cloths 1 Application(s) Topical <User Schedule>  filgrastim-sndz (ZARXIO) Injectable 480 MICROGram(s) SubCutaneous daily  folic acid 1 milliGRAM(s) Oral daily  heparin   Injectable 5000 Unit(s) SubCutaneous every 12 hours  influenza  Vaccine (HIGH DOSE) 0.5 milliLiter(s) IntraMuscular once  pantoprazole    Tablet 40 milliGRAM(s) Oral before breakfast  polyethylene glycol 3350 17 Gram(s) Oral daily  predniSONE   Tablet 40 milliGRAM(s) Oral daily  senna 1 Tablet(s) Oral two times a day  sodium bicarbonate 1300 milliGRAM(s) Oral three times a day    PRN MEDICATIONS  acetaminophen     Tablet .. 650 milliGRAM(s) Oral every 6 hours PRN  aluminum hydroxide/magnesium hydroxide/simethicone Suspension 30 milliLiter(s) Oral every 4 hours PRN  atropine Injectable 1 milliGRAM(s) IV Push once PRN  guaiFENesin Oral Liquid (Sugar-Free) 100 milliGRAM(s) Oral every 6 hours PRN  melatonin 3 milliGRAM(s) Oral at bedtime PRN  ondansetron Injectable 4 milliGRAM(s) IV Push every 8 hours PRN    VITALS:   T(F): 98.3  HR: 67  BP: 116/56  RR: 18  SpO2: 97%    PHYSICAL EXAM:  GENERAL: NAD, lying in bed comfortably  HEAD:  Atraumatic, Normocephalic  EYES: EOMI, PERRLA, conjunctiva and sclera clear  ENT: Moist mucous membranes  NECK: Supple, No JVD  CHEST/LUNG: Clear to auscultation bilaterally; No rales, rhonchi, wheezing, or rubs. Unlabored respirations  HEART: Regular rate and rhythm; No murmurs, rubs, or gallops  ABDOMEN: BSx4; Soft, nontender, nondistended  EXTREMITIES:  2+ Peripheral Pulses, brisk capillary refill. No clubbing, cyanosis, or edema  NERVOUS SYSTEM:  A&Ox3, no focal deficits   SKIN: No rashes or lesions    LABS:                        9.5    0.75  )-----------( 158      ( 09 Oct 2024 17:16 )             30.5     10-09    145  |  112[H]  |  35[H]  ----------------------------<  95  4.0   |  24  |  1.7[H]    Ca    8.6      09 Oct 2024 05:41  Mg     2.2     10-09    TPro  4.3[L]  /  Alb  3.4[L]  /  TBili  0.7  /  DBili  x   /  AST  9   /  ALT  10  /  AlkPhos  138[H]  10-08      Urinalysis Basic - ( 09 Oct 2024 05:41 )    Color: x / Appearance: x / SG: x / pH: x  Gluc: 95 mg/dL / Ketone: x  / Bili: x / Urobili: x   Blood: x / Protein: x / Nitrite: x   Leuk Esterase: x / RBC: x / WBC x   Sq Epi: x / Non Sq Epi: x / Bacteria: x        RADIOLOGY:  CXR      CT  CT Abdomen and Pelvis w/ IV Cont:   ACC: [id number] EXAM:  CT ABDOMEN AND PELVIS IC   ORDERED BY: CAROLYNN PERALTA     PROCEDURE DATE:  10/08/2024          INTERPRETATION:  CLINICAL INFORMATION: Staging    COMPARISON: CT angiogram chest from September 27, 2024. Comparison also   made to CTabdomen pelvis from May 3, 2024    CONTRAST/COMPLICATIONS:  IV Contrast: Omnipaque 350  100 cc administered   0 cc discarded  Oral Contrast: NONE  Complications: None reported at time of study completion    PROCEDURE:  CT of the Abdomen and Pelvis was performed.  Sagittal and coronal reformats were performed.    FINDINGS:  LOWER CHEST: Again demonstrated are bibasilar consolidations with   adjacent small pleural effusions. Small pericardial effusion. Few years   she is    LIVER: There are numerous lesions within the liver. The largest within   the left lobe. It is ill-defined measuring up to 5 x 5.9 cm. Lesion also   noted within the right lobe of liver measuring up to 2.9 cm. There is   mass effect and narrowing of the left portal vein.  BILE DUCTS: Normal caliber.  GALLBLADDER: The gallbladder is distended with stones.  SPLEEN: The spleen is enlarged measuring 16 cm in length by 9 cm in   transverse diameter by 17 changes or be cm in AP diameter.  PANCREAS: Fatty changes of the pancreas.  ADRENALS: Within normal limits.  KIDNEYS/URETERS: Atrophic left kidney. Increasing hydronephrosis and   hydroureter.  The ureters are dilated likely secondary to distal   adenopathy.    BLADDER: Moderately distended urinary bladder. There is some deformity of   the urinary bladder extending asymmetric on the right. Question related   to underlying mass effect from adjacent adenopathy.    REPRODUCTIVE ORGANS: Calcifications of the prostate gland.    BOWEL: No bowel obstruction. Appendix within normal limits. Persistent   thickening of the sigmoid colon. Mild surrounding inflammatory changes.  PERITONEUM/RETROPERITONEUM: Increasing retroperitoneal adenopathy. .  VESSELS: Atherosclerotic changes of the aorta. Adenopathy seen   surrounding the aorta and iliac vessels.  LYMPH NODES: Subcentimeter increasing adenopathy compared to the prior   examination. There is a conglomerate of adenopathy within the   gastrohepatic region, periportal region and portacaval region.   Conglomerate of adenopathy is seen surrounding the celiac artery at its   origin, poorly defined.    There is adenopathy within the retroperitoneum. Left para-aortic   adenopathy measures 3.5 x 1.8 cm. Aortocaval adenopathy measuring 4.5 x   1.5 cm.    There is retrocrural adenopathy measuring up to 9 mm within the right   retrocrural space. Ill-defined adenopathy surrounding the iliac vessels   bilaterally. This is asymmetric on the left. Ill-defined adenopathy   adjacent to the left iliac/femoral vessels measuring 2.3x 2.0 cm.  ABDOMINAL WALL: Within normal limits.  BONES: Stable lucent lesions within the right iliac bone. Degenerative   changes.    IMPRESSION:      Interval progression of extensive adenopathy within the upper abdomen,   retroperitoneum and pelvis. Resultant hydronephrosis and hydroureter.    Liver lesions. Apparent mass effect upon the left portal vein without   mass effect. Splenomegaly.    Mild thickening again noted surrounding the sigmoid colon. Question   diverticulitis or focal coloniclesion.    Large consolidations within the visualized lower lungs.    Deformity of the urinary bladder as described above, which may be related   to mass effect by adjacent pelvic adenopathy.    Cholelithiasis.    --- End of Report ---            ENIO SABILLON MD; Attending Radiologist  This document has been electronically signed. Oct  8 2024 12:57PM (10-08-24 @ 12:12)           24H events:    Patient is a 68y old Male who presents with a chief complaint of multifocal pna (09 Oct 2024 14:30)    Primary diagnosis of GI bleed    Today is hospital day 13d.   This morning patient was seen and examined at bedside, resting comfortably in bed.    No acute or major events overnight.    PAST MEDICAL & SURGICAL HISTORY  Kidney stones    Schizophrenia    Lymphoma    Shingles    Atrophic kidney    H/O colonoscopy with polypectomy    Liver cyst    History of bladder surgery    H/O neuropathy    History of chemotherapy    Stage 3 chronic kidney disease    Neuroendocrine malignancy    Neuroendocrine tumor status post surgical treatment    Neuroendocrine cancer    Retained urethral stent    H/O umbilical hernia repair    Elbow fracture    H/O cystoscopy      SOCIAL HISTORY:  Social History:      ALLERGIES:  allopurinol (Rash (Moderate))    MEDICATIONS:  STANDING MEDICATIONS  benztropine 1 milliGRAM(s) Oral daily  chlorhexidine 2% Cloths 1 Application(s) Topical <User Schedule>  filgrastim-sndz (ZARXIO) Injectable 480 MICROGram(s) SubCutaneous daily  folic acid 1 milliGRAM(s) Oral daily  heparin   Injectable 5000 Unit(s) SubCutaneous every 12 hours  influenza  Vaccine (HIGH DOSE) 0.5 milliLiter(s) IntraMuscular once  pantoprazole    Tablet 40 milliGRAM(s) Oral before breakfast  polyethylene glycol 3350 17 Gram(s) Oral daily  predniSONE   Tablet 40 milliGRAM(s) Oral daily  senna 1 Tablet(s) Oral two times a day  sodium bicarbonate 1300 milliGRAM(s) Oral three times a day    PRN MEDICATIONS  acetaminophen     Tablet .. 650 milliGRAM(s) Oral every 6 hours PRN  aluminum hydroxide/magnesium hydroxide/simethicone Suspension 30 milliLiter(s) Oral every 4 hours PRN  atropine Injectable 1 milliGRAM(s) IV Push once PRN  guaiFENesin Oral Liquid (Sugar-Free) 100 milliGRAM(s) Oral every 6 hours PRN  melatonin 3 milliGRAM(s) Oral at bedtime PRN  ondansetron Injectable 4 milliGRAM(s) IV Push every 8 hours PRN    VITALS:   T(F): 98.3  HR: 67  BP: 116/56  RR: 18  SpO2: 97%    PHYSICAL EXAM:  GENERAL: NAD, lying in bed comfortably  HEENT: Atraumatic, Normocephalic, EOMI, PERRLA, conjunctiva and sclera clear, moist mucous membranes  CHEST/LUNG: Clear to auscultation bilaterally; No rales, rhonchi, wheezing, or rubs. Unlabored respirations  HEART: Regular rate and rhythm; No murmurs, rubs, or gallops  ABDOMEN: BSx4; Soft, nontender, nondistended  EXTREMITIES:  2+ Peripheral Pulses, brisk capillary refill. No clubbing, cyanosis, or edema  NERVOUS SYSTEM:  A&Ox3, no focal deficits   SKIN: No rashes or lesions    LABS:                        9.5    0.75  )-----------( 158      ( 09 Oct 2024 17:16 )             30.5     10-09    145  |  112[H]  |  35[H]  ----------------------------<  95  4.0   |  24  |  1.7[H]    Ca    8.6      09 Oct 2024 05:41  Mg     2.2     10-09    TPro  4.3[L]  /  Alb  3.4[L]  /  TBili  0.7  /  DBili  x   /  AST  9   /  ALT  10  /  AlkPhos  138[H]  10-08      Urinalysis Basic - ( 09 Oct 2024 05:41 )    Color: x / Appearance: x / SG: x / pH: x  Gluc: 95 mg/dL / Ketone: x  / Bili: x / Urobili: x   Blood: x / Protein: x / Nitrite: x   Leuk Esterase: x / RBC: x / WBC x   Sq Epi: x / Non Sq Epi: x / Bacteria: x        RADIOLOGY:  CXR      CT  CT Abdomen and Pelvis w/ IV Cont:   ACC: [id number] EXAM:  CT ABDOMEN AND PELVIS IC   ORDERED BY: CAROLYNN PERALTA     PROCEDURE DATE:  10/08/2024          INTERPRETATION:  CLINICAL INFORMATION: Staging    COMPARISON: CT angiogram chest from September 27, 2024. Comparison also   made to CTabdomen pelvis from May 3, 2024    CONTRAST/COMPLICATIONS:  IV Contrast: Omnipaque 350  100 cc administered   0 cc discarded  Oral Contrast: NONE  Complications: None reported at time of study completion    PROCEDURE:  CT of the Abdomen and Pelvis was performed.  Sagittal and coronal reformats were performed.    FINDINGS:  LOWER CHEST: Again demonstrated are bibasilar consolidations with   adjacent small pleural effusions. Small pericardial effusion. Few years   she is    LIVER: There are numerous lesions within the liver. The largest within   the left lobe. It is ill-defined measuring up to 5 x 5.9 cm. Lesion also   noted within the right lobe of liver measuring up to 2.9 cm. There is   mass effect and narrowing of the left portal vein.  BILE DUCTS: Normal caliber.  GALLBLADDER: The gallbladder is distended with stones.  SPLEEN: The spleen is enlarged measuring 16 cm in length by 9 cm in   transverse diameter by 17 changes or be cm in AP diameter.  PANCREAS: Fatty changes of the pancreas.  ADRENALS: Within normal limits.  KIDNEYS/URETERS: Atrophic left kidney. Increasing hydronephrosis and   hydroureter.  The ureters are dilated likely secondary to distal   adenopathy.    BLADDER: Moderately distended urinary bladder. There is some deformity of   the urinary bladder extending asymmetric on the right. Question related   to underlying mass effect from adjacent adenopathy.    REPRODUCTIVE ORGANS: Calcifications of the prostate gland.    BOWEL: No bowel obstruction. Appendix within normal limits. Persistent   thickening of the sigmoid colon. Mild surrounding inflammatory changes.  PERITONEUM/RETROPERITONEUM: Increasing retroperitoneal adenopathy. .  VESSELS: Atherosclerotic changes of the aorta. Adenopathy seen   surrounding the aorta and iliac vessels.  LYMPH NODES: Subcentimeter increasing adenopathy compared to the prior   examination. There is a conglomerate of adenopathy within the   gastrohepatic region, periportal region and portacaval region.   Conglomerate of adenopathy is seen surrounding the celiac artery at its   origin, poorly defined.    There is adenopathy within the retroperitoneum. Left para-aortic   adenopathy measures 3.5 x 1.8 cm. Aortocaval adenopathy measuring 4.5 x   1.5 cm.    There is retrocrural adenopathy measuring up to 9 mm within the right   retrocrural space. Ill-defined adenopathy surrounding the iliac vessels   bilaterally. This is asymmetric on the left. Ill-defined adenopathy   adjacent to the left iliac/femoral vessels measuring 2.3x 2.0 cm.  ABDOMINAL WALL: Within normal limits.  BONES: Stable lucent lesions within the right iliac bone. Degenerative   changes.    IMPRESSION:      Interval progression of extensive adenopathy within the upper abdomen,   retroperitoneum and pelvis. Resultant hydronephrosis and hydroureter.    Liver lesions. Apparent mass effect upon the left portal vein without   mass effect. Splenomegaly.    Mild thickening again noted surrounding the sigmoid colon. Question   diverticulitis or focal coloniclesion.    Large consolidations within the visualized lower lungs.    Deformity of the urinary bladder as described above, which may be related   to mass effect by adjacent pelvic adenopathy.    Cholelithiasis.    --- End of Report ---            ENIO SABILLON MD; Attending Radiologist  This document has been electronically signed. Oct  8 2024 12:57PM (10-08-24 @ 12:12)           24H events:    Patient is a 68y old Male who presents with a chief complaint of multifocal pna (09 Oct 2024 14:30)    Today is hospital day 13d.   This morning patient was seen and examined at bedside, resting comfortably in bed.    No acute or major events overnight.    PAST MEDICAL & SURGICAL HISTORY  Kidney stones    Schizophrenia    Lymphoma    Shingles    Atrophic kidney    H/O colonoscopy with polypectomy    Liver cyst    History of bladder surgery    H/O neuropathy    History of chemotherapy    Stage 3 chronic kidney disease    Neuroendocrine malignancy    Neuroendocrine tumor status post surgical treatment    Neuroendocrine cancer    Retained urethral stent    H/O umbilical hernia repair    Elbow fracture    H/O cystoscopy      SOCIAL HISTORY:  Social History:      ALLERGIES:  allopurinol (Rash (Moderate))    MEDICATIONS:  STANDING MEDICATIONS  benztropine 1 milliGRAM(s) Oral daily  chlorhexidine 2% Cloths 1 Application(s) Topical <User Schedule>  filgrastim-sndz (ZARXIO) Injectable 480 MICROGram(s) SubCutaneous daily  folic acid 1 milliGRAM(s) Oral daily  heparin   Injectable 5000 Unit(s) SubCutaneous every 12 hours  influenza  Vaccine (HIGH DOSE) 0.5 milliLiter(s) IntraMuscular once  pantoprazole    Tablet 40 milliGRAM(s) Oral before breakfast  polyethylene glycol 3350 17 Gram(s) Oral daily  predniSONE   Tablet 40 milliGRAM(s) Oral daily  senna 1 Tablet(s) Oral two times a day  sodium bicarbonate 1300 milliGRAM(s) Oral three times a day    PRN MEDICATIONS  acetaminophen     Tablet .. 650 milliGRAM(s) Oral every 6 hours PRN  aluminum hydroxide/magnesium hydroxide/simethicone Suspension 30 milliLiter(s) Oral every 4 hours PRN  atropine Injectable 1 milliGRAM(s) IV Push once PRN  guaiFENesin Oral Liquid (Sugar-Free) 100 milliGRAM(s) Oral every 6 hours PRN  melatonin 3 milliGRAM(s) Oral at bedtime PRN  ondansetron Injectable 4 milliGRAM(s) IV Push every 8 hours PRN    VITALS:   T(F): 98.3  HR: 67  BP: 116/56  RR: 18  SpO2: 97%    PHYSICAL EXAM:  GENERAL: NAD, lying in bed comfortably  HEENT: Atraumatic, Normocephalic, EOMI, PERRLA, conjunctiva and sclera clear, moist mucous membranes  CHEST/LUNG: Clear to auscultation bilaterally; No rales, rhonchi, wheezing, or rubs. Unlabored respirations  HEART: Regular rate and rhythm; No murmurs, rubs, or gallops  ABDOMEN: BSx4; Soft, nontender, nondistended  EXTREMITIES:  2+ Peripheral Pulses, brisk capillary refill. No clubbing, cyanosis, or edema  NERVOUS SYSTEM:  A&Ox3, no focal deficits   SKIN: No rashes or lesions    LABS:                        9.5    0.75  )-----------( 158      ( 09 Oct 2024 17:16 )             30.5     10-09    145  |  112[H]  |  35[H]  ----------------------------<  95  4.0   |  24  |  1.7[H]    Ca    8.6      09 Oct 2024 05:41  Mg     2.2     10-09    TPro  4.3[L]  /  Alb  3.4[L]  /  TBili  0.7  /  DBili  x   /  AST  9   /  ALT  10  /  AlkPhos  138[H]  10-08      Urinalysis Basic - ( 09 Oct 2024 05:41 )    Color: x / Appearance: x / SG: x / pH: x  Gluc: 95 mg/dL / Ketone: x  / Bili: x / Urobili: x   Blood: x / Protein: x / Nitrite: x   Leuk Esterase: x / RBC: x / WBC x   Sq Epi: x / Non Sq Epi: x / Bacteria: x      RADIOLOGY:  CT (10/08/24)   IMPRESSION:   - Interval progression of extensive adenopathy within the upper abdomen,   retroperitoneum and pelvis. Resultant hydronephrosis and hydroureter.  - Liver lesions. Apparent mass effect upon the left portal vein without   mass effect. Splenomegaly.  - Mild thickening again noted surrounding the sigmoid colon. Question   diverticulitis or focal colonic lesion.  - Large consolidations within the visualized lower lungs.  - Deformity of the urinary bladder as described above, which may be related   to mass effect by adjacent pelvic adenopathy.  - Cholelithiasis.

## 2024-10-11 ENCOUNTER — APPOINTMENT (OUTPATIENT)
Age: 69
End: 2024-10-11

## 2024-10-11 LAB
ANION GAP SERPL CALC-SCNC: 13 MMOL/L — SIGNIFICANT CHANGE UP (ref 7–14)
BASOPHILS # BLD AUTO: 0.05 K/UL — SIGNIFICANT CHANGE UP (ref 0–0.2)
BASOPHILS NFR BLD AUTO: 6.2 % — HIGH (ref 0–1)
BUN SERPL-MCNC: 35 MG/DL — HIGH (ref 10–20)
CALCIUM SERPL-MCNC: 8.4 MG/DL — SIGNIFICANT CHANGE UP (ref 8.4–10.5)
CHLORIDE SERPL-SCNC: 109 MMOL/L — SIGNIFICANT CHANGE UP (ref 98–110)
CO2 SERPL-SCNC: 22 MMOL/L — SIGNIFICANT CHANGE UP (ref 17–32)
CORTICOSTEROID BINDING GLOBULIN RESULT: 2.3 MG/DL — SIGNIFICANT CHANGE UP
CORTIS F/TOTAL MFR SERPL: 3.1 % — SIGNIFICANT CHANGE UP
CORTIS SERPL-MCNC: 4.8 UG/DL — SIGNIFICANT CHANGE UP
CORTISOL, FREE RESULT: 0.15 UG/DL — LOW
CREAT SERPL-MCNC: 2.1 MG/DL — HIGH (ref 0.7–1.5)
EGFR: 34 ML/MIN/1.73M2 — LOW
EOSINOPHIL # BLD AUTO: 0 K/UL — SIGNIFICANT CHANGE UP (ref 0–0.7)
EOSINOPHIL NFR BLD AUTO: 0 % — SIGNIFICANT CHANGE UP (ref 0–8)
GLUCOSE SERPL-MCNC: 117 MG/DL — HIGH (ref 70–99)
HCT VFR BLD CALC: 28 % — LOW (ref 42–52)
HGB BLD-MCNC: 8.8 G/DL — LOW (ref 14–18)
LYMPHOCYTES # BLD AUTO: 0.3 K/UL — LOW (ref 1.2–3.4)
LYMPHOCYTES # BLD AUTO: 37.5 % — SIGNIFICANT CHANGE UP (ref 20.5–51.1)
MAGNESIUM SERPL-MCNC: 1.9 MG/DL — SIGNIFICANT CHANGE UP (ref 1.8–2.4)
MCHC RBC-ENTMCNC: 28.7 PG — SIGNIFICANT CHANGE UP (ref 27–31)
MCHC RBC-ENTMCNC: 31.4 G/DL — LOW (ref 32–37)
MCV RBC AUTO: 91.2 FL — SIGNIFICANT CHANGE UP (ref 80–94)
MONOCYTES # BLD AUTO: 0.2 K/UL — SIGNIFICANT CHANGE UP (ref 0.1–0.6)
MONOCYTES NFR BLD AUTO: 25 % — HIGH (ref 1.7–9.3)
NEUTROPHILS # BLD AUTO: 0.25 K/UL — LOW (ref 1.4–6.5)
NEUTROPHILS NFR BLD AUTO: 25 % — LOW (ref 42.2–75.2)
NRBC # BLD: SIGNIFICANT CHANGE UP /100 WBCS (ref 0–0)
PLATELET # BLD AUTO: 132 K/UL — SIGNIFICANT CHANGE UP (ref 130–400)
PMV BLD: 10.5 FL — HIGH (ref 7.4–10.4)
POTASSIUM SERPL-MCNC: 4 MMOL/L — SIGNIFICANT CHANGE UP (ref 3.5–5)
POTASSIUM SERPL-SCNC: 4 MMOL/L — SIGNIFICANT CHANGE UP (ref 3.5–5)
RBC # BLD: 3.07 M/UL — LOW (ref 4.7–6.1)
RBC # FLD: 17.5 % — HIGH (ref 11.5–14.5)
SODIUM SERPL-SCNC: 144 MMOL/L — SIGNIFICANT CHANGE UP (ref 135–146)
WBC # BLD: 0.8 K/UL — CRITICAL LOW (ref 4.8–10.8)
WBC # FLD AUTO: 0.8 K/UL — CRITICAL LOW (ref 4.8–10.8)

## 2024-10-11 PROCEDURE — 99231 SBSQ HOSP IP/OBS SF/LOW 25: CPT

## 2024-10-11 PROCEDURE — 99232 SBSQ HOSP IP/OBS MODERATE 35: CPT

## 2024-10-11 RX ORDER — FLUPHENAZINE HCL 10 MG
5 TABLET ORAL DAILY
Refills: 0 | Status: DISCONTINUED | OUTPATIENT
Start: 2024-10-11 | End: 2024-10-15

## 2024-10-11 RX ADMIN — Medication 1300 MILLIGRAM(S): at 21:01

## 2024-10-11 RX ADMIN — PREDNISONE 40 MILLIGRAM(S): 5 TABLET ORAL at 05:11

## 2024-10-11 RX ADMIN — CHLORHEXIDINE GLUCONATE ORAL RINSE 1 APPLICATION(S): 1.2 SOLUTION DENTAL at 05:12

## 2024-10-11 RX ADMIN — Medication 1300 MILLIGRAM(S): at 05:11

## 2024-10-11 RX ADMIN — Medication 1 TABLET(S): at 05:12

## 2024-10-11 RX ADMIN — Medication 1 TABLET(S): at 17:36

## 2024-10-11 RX ADMIN — PANTOPRAZOLE SODIUM 40 MILLIGRAM(S): 40 TABLET, DELAYED RELEASE ORAL at 05:11

## 2024-10-11 RX ADMIN — Medication 3 MILLIGRAM(S): at 21:01

## 2024-10-11 RX ADMIN — FILGRASTIM 480 MICROGRAM(S): 300 INJECTION, SOLUTION INTRAVENOUS at 12:01

## 2024-10-11 RX ADMIN — Medication 1 MILLIGRAM(S): at 12:00

## 2024-10-11 RX ADMIN — Medication 17 GRAM(S): at 12:01

## 2024-10-11 RX ADMIN — Medication 5 MILLIGRAM(S): at 12:01

## 2024-10-11 RX ADMIN — Medication 5000 UNIT(S): at 05:11

## 2024-10-11 RX ADMIN — FOLIC ACID 1 MILLIGRAM(S): 1 TABLET ORAL at 12:00

## 2024-10-11 RX ADMIN — Medication 5000 UNIT(S): at 17:37

## 2024-10-11 RX ADMIN — Medication 1300 MILLIGRAM(S): at 13:17

## 2024-10-11 NOTE — PROGRESS NOTE ADULT - SUBJECTIVE AND OBJECTIVE BOX
24H events:    Patient is a 68y old Male who presents with a chief complaint of multifocal pna (11 Oct 2024 10:21)    Primary diagnosis of GI bleed          Today is hospital day 14d.   This morning patient was seen and examined at bedside, resting comfortably in bed.    No acute or major events overnight.    Code Status:    Family communication:  Contact date:  Name of person contacted:  Relationship to patient:  Communication details:  What matters most:    PAST MEDICAL & SURGICAL HISTORY  Kidney stones    Schizophrenia    Lymphoma    Shingles    Atrophic kidney    H/O colonoscopy with polypectomy    Liver cyst    History of bladder surgery    H/O neuropathy    History of chemotherapy    Stage 3 chronic kidney disease    Neuroendocrine malignancy    Neuroendocrine tumor status post surgical treatment    Neuroendocrine cancer    Retained urethral stent    H/O umbilical hernia repair    Elbow fracture    H/O cystoscopy      SOCIAL HISTORY:  Social History:      ALLERGIES:  allopurinol (Rash (Moderate))    MEDICATIONS:  STANDING MEDICATIONS  benztropine 1 milliGRAM(s) Oral daily  chlorhexidine 2% Cloths 1 Application(s) Topical <User Schedule>  filgrastim-sndz (ZARXIO) Injectable 480 MICROGram(s) SubCutaneous daily  fluPHENAZine 5 milliGRAM(s) Oral daily  folic acid 1 milliGRAM(s) Oral daily  heparin   Injectable 5000 Unit(s) SubCutaneous every 12 hours  influenza  Vaccine (HIGH DOSE) 0.5 milliLiter(s) IntraMuscular once  pantoprazole    Tablet 40 milliGRAM(s) Oral before breakfast  polyethylene glycol 3350 17 Gram(s) Oral daily  predniSONE   Tablet 40 milliGRAM(s) Oral daily  senna 1 Tablet(s) Oral two times a day  sodium bicarbonate 1300 milliGRAM(s) Oral three times a day    PRN MEDICATIONS  acetaminophen     Tablet .. 650 milliGRAM(s) Oral every 6 hours PRN  aluminum hydroxide/magnesium hydroxide/simethicone Suspension 30 milliLiter(s) Oral every 4 hours PRN  atropine Injectable 1 milliGRAM(s) IV Push once PRN  guaiFENesin Oral Liquid (Sugar-Free) 100 milliGRAM(s) Oral every 6 hours PRN  melatonin 3 milliGRAM(s) Oral at bedtime PRN  ondansetron Injectable 4 milliGRAM(s) IV Push every 8 hours PRN    VITALS:   T(F): 97.9  HR: 58  BP: 95/52  RR: 18  SpO2: 96%    PHYSICAL EXAM:  GENERAL: NAD, lying in bed comfortably  HEAD:  atraumatic, Normocephalic, EOMI, moist mucous membranes, supple, no JVD, no lymphadenopathy   CHEST/LUNG: Clear to auscultation bilaterally; No rales, rhonchi, wheezing, or rubs. Unlabored respirations  HEART: Regular rate and rhythm; No murmurs, rubs, or gallops  ABDOMEN: BSx4; Soft, nontender, nondistended  EXTREMITIES:  2+ Peripheral Pulses, brisk capillary refill. No clubbing, cyanosis, or edema  NERVOUS SYSTEM:  A&Ox3, no focal deficits   SKIN: No rashes or lesions      LABS:                        8.8    0.80  )-----------( 132      ( 11 Oct 2024 07:14 )             28.0     10-11    144  |  109  |  35[H]  ----------------------------<  117[H]  4.0   |  22  |  2.1[H]    Ca    8.4      11 Oct 2024 07:14  Mg     1.9     10-11    Urinalysis Basic - ( 11 Oct 2024 07:14 )    Color: x / Appearance: x / SG: x / pH: x  Gluc: 117 mg/dL / Ketone: x  / Bili: x / Urobili: x   Blood: x / Protein: x / Nitrite: x   Leuk Esterase: x / RBC: x / WBC x   Sq Epi: x / Non Sq Epi: x / Bacteria: x

## 2024-10-11 NOTE — PROGRESS NOTE ADULT - ASSESSMENT
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 Gong), left elbow fracture s/p ORIF @[facility] ~30 years ago, comes in c/o weakness/SOB and cough. Cough is productive of yellow sputum and has been going on for 3 months with 10 lb weight loss over the last 3 months , progressively worsening weakness, s./p fall.    PLAN:   #Septic shock  (POA) due to suspected Multifocal PNA, resolved  - patient off pressors and completed abx treatment      #Sinus Bradycardia likely due to fluphenazine, resolved   - holding fluphenazine     #NSTEMI, type 2 in the setting of CKD   #ASHLEY on CKD4 / Metabolic acidosis  #HO nephrolithiasis, R ureteral revision   - downtrending Cr at 1.7 (basal creatinine ~2.5)   - RBSU notable for atrophic L kidney and no notable R hydro  - monitor renal function   - Urology eval noted. If Cr goes up, patient will need nephrostomy.     #Schizophrenia  - psych following   - re-start fluphenazine in the setting of severe neutropenia and trend labs   - c/w benztropine 1mg qd po     #Leukopenia/Neutropenia, worsening  #Anemia, worsening   #H/O Marginal zone lymphoma / Pancytopenia / Anemia  #H/O Pancreatic neuroendocrine tumor   - heme/onc following    - Leukopenia/Neutropenia likely from lymphoma with possible worsening secondary to septic shock on admission   - re-start fluphenazine as it causes bone marrow suppression and pancytopenia  - trend CBC   - trend ANC   - maintain Hb >7  - c/w Zarxio (filgrastim) 480mg subcutaneous daily until ANC > 1000 (290 as of 10/10/24)    - per heme/onc, goal ANC > 1000 for discharge and plan for outpatient work up     - CT Scan (from 10/08/24) notable for the following:   - Interval progression of extensive adenopathy within the upper abdomen,   retroperitoneum and pelvis. Resultant hydronephrosis and hydroureter.  - Liver lesions. Apparent mass effect upon the left portal vein without   mass effect. Splenomegaly.  - Mild thickening again noted surrounding the sigmoid colon. Question   diverticulitis or focal colonic lesion.  - Large consolidations within the visualized lower lungs.  - Deformity of the urinary bladder as described above, which may be related   to mass effect by adjacent pelvic adenopathy.  - Cholelithiasis.    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 Gong), left elbow fracture s/p ORIF @[facility] ~30 years ago, comes in c/o weakness/SOB and cough. Cough is productive of yellow sputum and has been going on for 3 months with 10 lb weight loss over the last 3 months , progressively worsening weakness, s./p fall.    PLAN:   #Septic shock  (POA) due to suspected Multifocal PNA, resolved  - patient off pressors and completed abx treatment      #Sinus Bradycardia likely due to fluphenazine, resolved   - holding fluphenazine     #NSTEMI, type 2 in the setting of CKD   #ASHLEY on CKD4 / Metabolic acidosis  #HO nephrolithiasis, R ureteral revision   - trend Cr (basal creatinine ~2.5)   - RBSU notable for atrophic L kidney and no notable R hydro  - monitor renal function   - Urology eval noted. If Cr goes up, patient will need nephrostomy.     #Schizophrenia  - psych following   - re-start fluphenazine in the setting of severe neutropenia and trend labs   - c/w benztropine 1mg qd po     #Leukopenia/Neutropenia, worsening  #Anemia, worsening   #H/O Marginal zone lymphoma / Pancytopenia / Anemia  #H/O Pancreatic neuroendocrine tumor   - heme/onc following    - Leukopenia/Neutropenia likely from lymphoma with possible worsening secondary to septic shock on admission   - re-start fluphenazine as it causes bone marrow suppression and pancytopenia  - trend CBC   - trend ANC   - maintain Hb >7  - c/w Zarxio (filgrastim) 480mg subcutaneous daily until ANC > 1000 (290 as of 10/10/24)    - per heme/onc, goal ANC > 1000 for discharge and plan for outpatient work up     - CT Scan (from 10/08/24) notable for the following:   - Interval progression of extensive adenopathy within the upper abdomen,   retroperitoneum and pelvis. Resultant hydronephrosis and hydroureter.  - Liver lesions. Apparent mass effect upon the left portal vein without   mass effect. Splenomegaly.  - Mild thickening again noted surrounding the sigmoid colon. Question   diverticulitis or focal colonic lesion.  - Large consolidations within the visualized lower lungs.  - Deformity of the urinary bladder as described above, which may be related   to mass effect by adjacent pelvic adenopathy.  - Cholelithiasis.    #Provider Handoff:   - psych following   - follow labs   - trend CBC (WBC 0.8)   - trend Cr (2.1 up from 1.7 on 10/10/24)   - trend ANC (250 on 10/11/24 down from 10/09/24)   - c/w filgrastim until ANC > 1000  - heme/onc plan appreciated:   - stable for d/c when ANC > 1000   - plan for outpatient PET scan to determine lymphoma burden   - consider treatment options based on PET scan findings

## 2024-10-12 LAB
ANION GAP SERPL CALC-SCNC: 10 MMOL/L — SIGNIFICANT CHANGE UP (ref 7–14)
BASOPHILS # BLD AUTO: 0 K/UL — SIGNIFICANT CHANGE UP (ref 0–0.2)
BASOPHILS NFR BLD AUTO: 0 % — SIGNIFICANT CHANGE UP (ref 0–1)
BUN SERPL-MCNC: 32 MG/DL — HIGH (ref 10–20)
CALCIUM SERPL-MCNC: 8.7 MG/DL — SIGNIFICANT CHANGE UP (ref 8.4–10.5)
CHLORIDE SERPL-SCNC: 108 MMOL/L — SIGNIFICANT CHANGE UP (ref 98–110)
CO2 SERPL-SCNC: 26 MMOL/L — SIGNIFICANT CHANGE UP (ref 17–32)
CREAT SERPL-MCNC: 2 MG/DL — HIGH (ref 0.7–1.5)
EGFR: 36 ML/MIN/1.73M2 — LOW
EOSINOPHIL # BLD AUTO: 0.02 K/UL — SIGNIFICANT CHANGE UP (ref 0–0.7)
EOSINOPHIL NFR BLD AUTO: 2.2 % — SIGNIFICANT CHANGE UP (ref 0–8)
GLUCOSE SERPL-MCNC: 97 MG/DL — SIGNIFICANT CHANGE UP (ref 70–99)
HCT VFR BLD CALC: 29 % — LOW (ref 42–52)
HGB BLD-MCNC: 9.2 G/DL — LOW (ref 14–18)
IMM GRANULOCYTES NFR BLD AUTO: 17.8 % — HIGH (ref 0.1–0.3)
LYMPHOCYTES # BLD AUTO: 0.19 K/UL — LOW (ref 1.2–3.4)
LYMPHOCYTES # BLD AUTO: 21.1 % — SIGNIFICANT CHANGE UP (ref 20.5–51.1)
MAGNESIUM SERPL-MCNC: 2 MG/DL — SIGNIFICANT CHANGE UP (ref 1.8–2.4)
MCHC RBC-ENTMCNC: 29.1 PG — SIGNIFICANT CHANGE UP (ref 27–31)
MCHC RBC-ENTMCNC: 31.7 G/DL — LOW (ref 32–37)
MCV RBC AUTO: 91.8 FL — SIGNIFICANT CHANGE UP (ref 80–94)
MONOCYTES # BLD AUTO: 0.21 K/UL — SIGNIFICANT CHANGE UP (ref 0.1–0.6)
MONOCYTES NFR BLD AUTO: 23.3 % — HIGH (ref 1.7–9.3)
NEUTROPHILS # BLD AUTO: 0.32 K/UL — LOW (ref 1.4–6.5)
NEUTROPHILS NFR BLD AUTO: 35.6 % — LOW (ref 42.2–75.2)
NRBC # BLD: 0 /100 WBCS — SIGNIFICANT CHANGE UP (ref 0–0)
PLATELET # BLD AUTO: 140 K/UL — SIGNIFICANT CHANGE UP (ref 130–400)
PMV BLD: 11.7 FL — HIGH (ref 7.4–10.4)
POTASSIUM SERPL-MCNC: 3.8 MMOL/L — SIGNIFICANT CHANGE UP (ref 3.5–5)
POTASSIUM SERPL-SCNC: 3.8 MMOL/L — SIGNIFICANT CHANGE UP (ref 3.5–5)
PTH RELATED PROT SERPL-MCNC: <2 PMOL/L — SIGNIFICANT CHANGE UP
RBC # BLD: 3.16 M/UL — LOW (ref 4.7–6.1)
RBC # FLD: 18 % — HIGH (ref 11.5–14.5)
SODIUM SERPL-SCNC: 144 MMOL/L — SIGNIFICANT CHANGE UP (ref 135–146)
WBC # BLD: 0.9 K/UL — CRITICAL LOW (ref 4.8–10.8)
WBC # FLD AUTO: 0.9 K/UL — CRITICAL LOW (ref 4.8–10.8)

## 2024-10-12 PROCEDURE — 99232 SBSQ HOSP IP/OBS MODERATE 35: CPT

## 2024-10-12 RX ADMIN — Medication 5 MILLIGRAM(S): at 11:49

## 2024-10-12 RX ADMIN — Medication 1 TABLET(S): at 05:08

## 2024-10-12 RX ADMIN — Medication 1 MILLIGRAM(S): at 11:49

## 2024-10-12 RX ADMIN — Medication 1300 MILLIGRAM(S): at 05:08

## 2024-10-12 RX ADMIN — Medication 5000 UNIT(S): at 05:08

## 2024-10-12 RX ADMIN — FILGRASTIM 480 MICROGRAM(S): 300 INJECTION, SOLUTION INTRAVENOUS at 11:49

## 2024-10-12 RX ADMIN — CHLORHEXIDINE GLUCONATE ORAL RINSE 1 APPLICATION(S): 1.2 SOLUTION DENTAL at 05:11

## 2024-10-12 RX ADMIN — Medication 1300 MILLIGRAM(S): at 13:21

## 2024-10-12 RX ADMIN — Medication 1300 MILLIGRAM(S): at 22:01

## 2024-10-12 RX ADMIN — FOLIC ACID 1 MILLIGRAM(S): 1 TABLET ORAL at 11:49

## 2024-10-12 RX ADMIN — PREDNISONE 40 MILLIGRAM(S): 5 TABLET ORAL at 05:08

## 2024-10-12 RX ADMIN — Medication 5000 UNIT(S): at 17:01

## 2024-10-12 RX ADMIN — PANTOPRAZOLE SODIUM 40 MILLIGRAM(S): 40 TABLET, DELAYED RELEASE ORAL at 05:08

## 2024-10-12 NOTE — PROGRESS NOTE ADULT - SUBJECTIVE AND OBJECTIVE BOX
ELDA COVARRUBIAS  68y Male    CHIEF COMPLAINT:    Patient is a 68y old  Male who presents with a chief complaint of multifocal pna (09 Oct 2024 08:19)      Overnight   Patient seen and examined. Sitting in a chair. Denies any complaints / very pleasant     ROS: All other systems are negative.    Vital Signs:    Vital Signs Last 24 Hrs  T(C): 36.3 (12 Oct 2024 13:45), Max: 36.7 (12 Oct 2024 04:30)  T(F): 97.3 (12 Oct 2024 13:45), Max: 98 (12 Oct 2024 04:30)  HR: 59 (12 Oct 2024 13:45) (59 - 60)  BP: 93/49 (12 Oct 2024 13:45) (93/49 - 101/59)  BP(mean): 64 (12 Oct 2024 13:45) (64 - 73)  RR: 18 (12 Oct 2024 13:45) (18 - 18)  SpO2: 96% (12 Oct 2024 13:45) (96% - 97%)    Parameters below as of 12 Oct 2024 13:45  Patient On (Oxygen Delivery Method): room air    PHYSICAL EXAM:    GENERAL:  NAD  SKIN: No rashes or lesions  HENT: Atraumatic. Normocephalic. PERRL. Moist membranes.  NECK: Supple, No JVD. No lymphadenopathy.  PULMONARY: CTA B/L. No wheezing. No rales  CVS: Normal S1, S2. Rate and Rhythm are regular. No murmurs.  ABDOMEN/GI: Soft, Nontender, Nondistended; BS present  EXTREMITIES: Peripheral pulses intact. No edema B/L LE.  NEUROLOGIC:  No motor or sensory deficit.  PSYCH: Alert & oriented x 3    Consultant(s) Notes Reviewed:  [x ] YES      Care Discussed with Consultants/Other Providers [ x] YES      EKG reviewed    Telemetry reviewed    LABS:                        9.2    0.90  )-----------( 140      ( 12 Oct 2024 07:57 )             29.0     10-12    144  |  108  |  32[H]  ----------------------------<  97  3.8   |  26  |  2.0[H]    Ca    8.7      12 Oct 2024 07:57    Mg     2.0     10-12                        7.9    0.69  )-----------( 147      ( 09 Oct 2024 05:41 )             24.8     10-09    145  |  112[H]  |  35[H]  ----------------------------<  95  4.0   |  24  |  1.7[H]    Ca    8.6      09 Oct 2024 05:41    Mg     2.2     10-09    TPro  4.3[L]  /  Alb  3.4[L]  /  TBili  0.7  /  DBili  x   /  AST  9   /  ALT  10  /  AlkPhos  138[H]  10-08    RADIOLOGY & ADDITIONAL TESTS:    < from: CT Abdomen and Pelvis w/ IV Cont (10.08.24 @ 12:12) >  IMPRESSION:      Interval progression of extensive adenopathy within the upper abdomen,   retroperitoneum and pelvis. Resultant hydronephrosis and hydroureter.    Liver lesions. Apparent mass effect upon the left portal vein without   mass effect. Splenomegaly.    Mild thickening again noted surrounding the sigmoid colon. Question   diverticulitis or focal coloniclesion.    Large consolidations within the visualized lower lungs.    Deformity of the urinary bladder as described above, which may be related   to mass effect by adjacent pelvic adenopathy.    Cholelithiasis.    < end of copied text >  Imaging or report Personally Reviewed:  [x ] YES    MEDICATIONS  (STANDING):  benztropine 1 milliGRAM(s) Oral daily  chlorhexidine 2% Cloths 1 Application(s) Topical <User Schedule>  filgrastim-sndz (ZARXIO) Injectable 480 MICROGram(s) SubCutaneous daily  fluPHENAZine 5 milliGRAM(s) Oral daily  folic acid 1 milliGRAM(s) Oral daily  heparin   Injectable 5000 Unit(s) SubCutaneous every 12 hours  influenza  Vaccine (HIGH DOSE) 0.5 milliLiter(s) IntraMuscular once  pantoprazole    Tablet 40 milliGRAM(s) Oral before breakfast  polyethylene glycol 3350 17 Gram(s) Oral daily  predniSONE   Tablet 40 milliGRAM(s) Oral daily  senna 1 Tablet(s) Oral two times a day  sodium bicarbonate 1300 milliGRAM(s) Oral three times a day    MEDICATIONS  (PRN):  acetaminophen     Tablet .. 650 milliGRAM(s) Oral every 6 hours PRN Temp greater or equal to 38C (100.4F), Mild Pain (1 - 3)  aluminum hydroxide/magnesium hydroxide/simethicone Suspension 30 milliLiter(s) Oral every 4 hours PRN Dyspepsia  atropine Injectable 1 milliGRAM(s) IV Push once PRN shamika <40 or symptomatic pt  guaiFENesin Oral Liquid (Sugar-Free) 100 milliGRAM(s) Oral every 6 hours PRN Cough  melatonin 3 milliGRAM(s) Oral at bedtime PRN Insomnia  ondansetron Injectable 4 milliGRAM(s) IV Push every 8 hours PRN Nausea and/or Vomiting

## 2024-10-12 NOTE — PROGRESS NOTE ADULT - ASSESSMENT
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 Cough is productive of yellow sputum and has been going on for 3 months with 10 lb weight loss over the last 3 months , progressively worsening weakness, s./p fall.    Septic shock  (POA) due to suspected Multifocal PNA, resolved  Sinus Bradycardia likely due to Fluphenazine, resolved   ASHLEY on CKD-4 / Metabolic acidosis  Schizophrenia stable   H/O Marginal zone lymphoma with recurrence vs progression -> causing Pancytopenia / Anemia / Worsening LAD/HSM   H/O Pancreatic neuroendocrine tumor   NSTMI type 2  Severe Neutropenia  goal 1000 before discharge per Oncology   Anemia now stable                 PLAN:  c/w current care and meds  c/w Filgrastin daily SQ   Monitor ANC daily today 369   Off abx infection resolved   Anemia stable f/u cbc     Social: Family updated by house staff called number in chart no answer / Pt remains stable and improving     Pending: ANC 1000 before d/c now 369     Dispo: Home when ready date TBD

## 2024-10-13 LAB
ANION GAP SERPL CALC-SCNC: 12 MMOL/L — SIGNIFICANT CHANGE UP (ref 7–14)
BASOPHILS # BLD AUTO: 0.01 K/UL — SIGNIFICANT CHANGE UP (ref 0–0.2)
BASOPHILS NFR BLD AUTO: 1 % — SIGNIFICANT CHANGE UP (ref 0–1)
BUN SERPL-MCNC: 30 MG/DL — HIGH (ref 10–20)
CALCIUM SERPL-MCNC: 8.3 MG/DL — LOW (ref 8.4–10.5)
CHLORIDE SERPL-SCNC: 104 MMOL/L — SIGNIFICANT CHANGE UP (ref 98–110)
CO2 SERPL-SCNC: 24 MMOL/L — SIGNIFICANT CHANGE UP (ref 17–32)
CREAT SERPL-MCNC: 2.2 MG/DL — HIGH (ref 0.7–1.5)
EGFR: 32 ML/MIN/1.73M2 — LOW
EOSINOPHIL # BLD AUTO: 0.02 K/UL — SIGNIFICANT CHANGE UP (ref 0–0.7)
EOSINOPHIL NFR BLD AUTO: 1.9 % — SIGNIFICANT CHANGE UP (ref 0–8)
GLUCOSE SERPL-MCNC: 178 MG/DL — HIGH (ref 70–99)
HCT VFR BLD CALC: 28.2 % — LOW (ref 42–52)
HGB BLD-MCNC: 8.9 G/DL — LOW (ref 14–18)
IMM GRANULOCYTES NFR BLD AUTO: 29.5 % — HIGH (ref 0.1–0.3)
LYMPHOCYTES # BLD AUTO: 0.13 K/UL — LOW (ref 1.2–3.4)
LYMPHOCYTES # BLD AUTO: 12.4 % — LOW (ref 20.5–51.1)
MAGNESIUM SERPL-MCNC: 1.7 MG/DL — LOW (ref 1.8–2.4)
MCHC RBC-ENTMCNC: 29.3 PG — SIGNIFICANT CHANGE UP (ref 27–31)
MCHC RBC-ENTMCNC: 31.6 G/DL — LOW (ref 32–37)
MCV RBC AUTO: 92.8 FL — SIGNIFICANT CHANGE UP (ref 80–94)
MONOCYTES # BLD AUTO: 0.29 K/UL — SIGNIFICANT CHANGE UP (ref 0.1–0.6)
MONOCYTES NFR BLD AUTO: 27.6 % — HIGH (ref 1.7–9.3)
NEUTROPHILS # BLD AUTO: 0.29 K/UL — LOW (ref 1.4–6.5)
NEUTROPHILS NFR BLD AUTO: 27.6 % — LOW (ref 42.2–75.2)
NRBC # BLD: 0 /100 WBCS — SIGNIFICANT CHANGE UP (ref 0–0)
PLATELET # BLD AUTO: 125 K/UL — LOW (ref 130–400)
PMV BLD: 11.7 FL — HIGH (ref 7.4–10.4)
POTASSIUM SERPL-MCNC: 3.6 MMOL/L — SIGNIFICANT CHANGE UP (ref 3.5–5)
POTASSIUM SERPL-SCNC: 3.6 MMOL/L — SIGNIFICANT CHANGE UP (ref 3.5–5)
RBC # BLD: 3.04 M/UL — LOW (ref 4.7–6.1)
RBC # FLD: 18.6 % — HIGH (ref 11.5–14.5)
SODIUM SERPL-SCNC: 140 MMOL/L — SIGNIFICANT CHANGE UP (ref 135–146)
WBC # BLD: 1.05 K/UL — LOW (ref 4.8–10.8)
WBC # FLD AUTO: 1.05 K/UL — LOW (ref 4.8–10.8)

## 2024-10-13 PROCEDURE — 99232 SBSQ HOSP IP/OBS MODERATE 35: CPT

## 2024-10-13 RX ORDER — SODIUM CHLORIDE IRRIG SOLUTION 0.9 %
1000 SOLUTION, IRRIGATION IRRIGATION ONCE
Refills: 0 | Status: COMPLETED | OUTPATIENT
Start: 2024-10-13 | End: 2024-10-13

## 2024-10-13 RX ORDER — MAGNESIUM SULFATE 500 MG/ML
2 VIAL (ML) INJECTION ONCE
Refills: 0 | Status: COMPLETED | OUTPATIENT
Start: 2024-10-13 | End: 2024-10-13

## 2024-10-13 RX ADMIN — Medication 1000 MILLILITER(S): at 08:33

## 2024-10-13 RX ADMIN — Medication 1300 MILLIGRAM(S): at 13:02

## 2024-10-13 RX ADMIN — CHLORHEXIDINE GLUCONATE ORAL RINSE 1 APPLICATION(S): 1.2 SOLUTION DENTAL at 05:26

## 2024-10-13 RX ADMIN — Medication 5000 UNIT(S): at 17:16

## 2024-10-13 RX ADMIN — Medication 1 TABLET(S): at 05:25

## 2024-10-13 RX ADMIN — Medication 1 MILLIGRAM(S): at 11:02

## 2024-10-13 RX ADMIN — Medication 25 GRAM(S): at 13:02

## 2024-10-13 RX ADMIN — PANTOPRAZOLE SODIUM 40 MILLIGRAM(S): 40 TABLET, DELAYED RELEASE ORAL at 08:32

## 2024-10-13 RX ADMIN — FILGRASTIM 480 MICROGRAM(S): 300 INJECTION, SOLUTION INTRAVENOUS at 11:03

## 2024-10-13 RX ADMIN — FOLIC ACID 1 MILLIGRAM(S): 1 TABLET ORAL at 11:02

## 2024-10-13 RX ADMIN — Medication 1300 MILLIGRAM(S): at 21:08

## 2024-10-13 RX ADMIN — Medication 5 MILLIGRAM(S): at 11:02

## 2024-10-13 RX ADMIN — Medication 5000 UNIT(S): at 05:24

## 2024-10-13 RX ADMIN — Medication 1300 MILLIGRAM(S): at 05:24

## 2024-10-13 RX ADMIN — PREDNISONE 40 MILLIGRAM(S): 5 TABLET ORAL at 05:24

## 2024-10-13 NOTE — PROGRESS NOTE ADULT - ASSESSMENT
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 Cough is productive of yellow sputum and has been going on for 3 months with 10 lb weight loss over the last 3 months , progressively worsening weakness, s./p fall.    Septic shock  (POA) due to suspected Multifocal PNA, resolved  Sinus Bradycardia likely due to Fluphenazine, resolved   ASHLEY on CKD-4 / Metabolic Acidosis  Schizophrenia stable   H/O Marginal zone lymphoma with recurrence vs progression -> causing Pancytopenia / Anemia / Worsening LAD/HSM   H/O Pancreatic neuroendocrine tumor   NSTMI type 2  Severe Neutropenia  Goal 1000 before discharge per Oncology   Anemia now stable                 PLAN:  c/w current care and meds  c/w Filgrastin daily SQ   Monitor ANC daily today 369   Off abx infection resolved   Anemia stable f/u cbc     Social: Family updated by house staff called number in chart no answer / Pt remains stable and improving     Pending: ANC 1000 before d/c now 365     Dispo: Home when ready date TBD

## 2024-10-13 NOTE — PROGRESS NOTE ADULT - SUBJECTIVE AND OBJECTIVE BOX
ELDA COVARRUBIAS  68y Male    CHIEF COMPLAINT:    Patient is a 68y old  Male who presents with a chief complaint of multifocal pna (09 Oct 2024 08:19)    Overnight   Patient seen and examined. Sitting in a chair. Denies any complaints / very pleasant     ROS: All other systems are negative.    Vital Signs:    T(C): 36.3 (12 Oct 2024 13:45), Max: 36.7 (12 Oct 2024 04:30)  T(F): 97.3 (12 Oct 2024 13:45), Max: 98 (12 Oct 2024 04:30)  HR: 59 (12 Oct 2024 13:45) (59 - 60)  BP: 93/49 (12 Oct 2024 13:45) (93/49 - 101/59)  BP(mean): 64 (12 Oct 2024 13:45) (64 - 73)  RR: 18 (12 Oct 2024 13:45) (18 - 18)  SpO2: 96% (12 Oct 2024 13:45) (96% - 97%)    Parameters below as of 12 Oct 2024 13:45  Patient On (Oxygen Delivery Method): room air    PHYSICAL EXAM:    GENERAL:  NAD  SKIN: No rashes or lesions  HENT: Atraumatic. Normocephalic. PERRL. Moist membranes.  NECK: Supple, No JVD. No lymphadenopathy.  PULMONARY: CTA B/L. No wheezing. No rales  CVS: Normal S1, S2. Rate and Rhythm are regular. No murmurs.  ABDOMEN/GI: Soft, Nontender, Nondistended; BS present  EXTREMITIES: Peripheral pulses intact. No edema B/L LE.  NEUROLOGIC:  No motor or sensory deficit.  PSYCH: Alert & oriented x 3    Consultant(s) Notes Reviewed:  [x ] YES      Care Discussed with Consultants/Other Providers [ x] YES      EKG reviewed    Telemetry reviewed    LABS:                        8.9    1.05  )-----------( 125      ( 13 Oct 2024 09:02 )             28.2     10-13    140  |  104  |  30[H]  ----------------------------<  178[H]  3.6   |  24  |  2.2[H]    Ca    8.3[L]      13 Oct 2024 09:02    Mg     1.7     10-13                        9.2    0.90  )-----------( 140      ( 12 Oct 2024 07:57 )             29.0     10-12    144  |  108  |  32[H]  ----------------------------<  97  3.8   |  26  |  2.0[H]    Ca    8.7      12 Oct 2024 07:57    Mg     2.0     10-12                        7.9    0.69  )-----------( 147      ( 09 Oct 2024 05:41 )             24.8     10-09    145  |  112[H]  |  35[H]  ----------------------------<  95  4.0   |  24  |  1.7[H]    Ca    8.6      09 Oct 2024 05:41    Mg     2.2     10-09    TPro  4.3[L]  /  Alb  3.4[L]  /  TBili  0.7  /  DBili  x   /  AST  9   /  ALT  10  /  AlkPhos  138[H]  10-08    RADIOLOGY & ADDITIONAL TESTS:    < from: CT Abdomen and Pelvis w/ IV Cont (10.08.24 @ 12:12) >  IMPRESSION:      Interval progression of extensive adenopathy within the upper abdomen,   retroperitoneum and pelvis. Resultant hydronephrosis and hydroureter.    Liver lesions. Apparent mass effect upon the left portal vein without   mass effect. Splenomegaly.    Mild thickening again noted surrounding the sigmoid colon. Question   diverticulitis or focal coloniclesion.    Large consolidations within the visualized lower lungs.    Deformity of the urinary bladder as described above, which may be related   to mass effect by adjacent pelvic adenopathy.    Cholelithiasis.    < end of copied text >  Imaging or report Personally Reviewed:  [x ] YES    MEDICATIONS  (STANDING):  benztropine 1 milliGRAM(s) Oral daily  chlorhexidine 2% Cloths 1 Application(s) Topical <User Schedule>  filgrastim-sndz (ZARXIO) Injectable 480 MICROGram(s) SubCutaneous daily  fluPHENAZine 5 milliGRAM(s) Oral daily  folic acid 1 milliGRAM(s) Oral daily  heparin   Injectable 5000 Unit(s) SubCutaneous every 12 hours  influenza  Vaccine (HIGH DOSE) 0.5 milliLiter(s) IntraMuscular once  pantoprazole    Tablet 40 milliGRAM(s) Oral before breakfast  polyethylene glycol 3350 17 Gram(s) Oral daily  predniSONE   Tablet 40 milliGRAM(s) Oral daily  senna 1 Tablet(s) Oral two times a day  sodium bicarbonate 1300 milliGRAM(s) Oral three times a day    MEDICATIONS  (PRN):  acetaminophen     Tablet .. 650 milliGRAM(s) Oral every 6 hours PRN Temp greater or equal to 38C (100.4F), Mild Pain (1 - 3)  aluminum hydroxide/magnesium hydroxide/simethicone Suspension 30 milliLiter(s) Oral every 4 hours PRN Dyspepsia  atropine Injectable 1 milliGRAM(s) IV Push once PRN shamika <40 or symptomatic pt  guaiFENesin Oral Liquid (Sugar-Free) 100 milliGRAM(s) Oral every 6 hours PRN Cough  melatonin 3 milliGRAM(s) Oral at bedtime PRN Insomnia  ondansetron Injectable 4 milliGRAM(s) IV Push every 8 hours PRN Nausea and/or Vomiting

## 2024-10-14 LAB
ANION GAP SERPL CALC-SCNC: 12 MMOL/L — SIGNIFICANT CHANGE UP (ref 7–14)
BASOPHILS # BLD AUTO: 0.02 K/UL — SIGNIFICANT CHANGE UP (ref 0–0.2)
BASOPHILS # BLD AUTO: 0.05 K/UL — SIGNIFICANT CHANGE UP (ref 0–0.2)
BASOPHILS NFR BLD AUTO: 0.6 % — SIGNIFICANT CHANGE UP (ref 0–1)
BASOPHILS NFR BLD AUTO: 0.8 % — SIGNIFICANT CHANGE UP (ref 0–1)
BUN SERPL-MCNC: 29 MG/DL — HIGH (ref 10–20)
CALCIUM SERPL-MCNC: 8.7 MG/DL — SIGNIFICANT CHANGE UP (ref 8.4–10.5)
CHLORIDE SERPL-SCNC: 108 MMOL/L — SIGNIFICANT CHANGE UP (ref 98–110)
CO2 SERPL-SCNC: 24 MMOL/L — SIGNIFICANT CHANGE UP (ref 17–32)
CREAT SERPL-MCNC: 2.3 MG/DL — HIGH (ref 0.7–1.5)
EGFR: 31 ML/MIN/1.73M2 — LOW
EOSINOPHIL # BLD AUTO: 0 K/UL — SIGNIFICANT CHANGE UP (ref 0–0.7)
EOSINOPHIL # BLD AUTO: 0.03 K/UL — SIGNIFICANT CHANGE UP (ref 0–0.7)
EOSINOPHIL NFR BLD AUTO: 0 % — SIGNIFICANT CHANGE UP (ref 0–8)
EOSINOPHIL NFR BLD AUTO: 1 % — SIGNIFICANT CHANGE UP (ref 0–8)
GLUCOSE SERPL-MCNC: 100 MG/DL — HIGH (ref 70–99)
HCT VFR BLD CALC: 28.6 % — LOW (ref 42–52)
HCT VFR BLD CALC: 31.4 % — LOW (ref 42–52)
HGB BLD-MCNC: 10 G/DL — LOW (ref 14–18)
HGB BLD-MCNC: 9.1 G/DL — LOW (ref 14–18)
IMM GRANULOCYTES NFR BLD AUTO: 16.6 % — HIGH (ref 0.1–0.3)
IMM GRANULOCYTES NFR BLD AUTO: 20.6 % — HIGH (ref 0.1–0.3)
LYMPHOCYTES # BLD AUTO: 0.2 K/UL — LOW (ref 1.2–3.4)
LYMPHOCYTES # BLD AUTO: 0.31 K/UL — LOW (ref 1.2–3.4)
LYMPHOCYTES # BLD AUTO: 10 % — LOW (ref 20.5–51.1)
LYMPHOCYTES # BLD AUTO: 3.1 % — LOW (ref 20.5–51.1)
MAGNESIUM SERPL-MCNC: 2 MG/DL — SIGNIFICANT CHANGE UP (ref 1.8–2.4)
MCHC RBC-ENTMCNC: 29.4 PG — SIGNIFICANT CHANGE UP (ref 27–31)
MCHC RBC-ENTMCNC: 29.9 PG — SIGNIFICANT CHANGE UP (ref 27–31)
MCHC RBC-ENTMCNC: 31.8 G/DL — LOW (ref 32–37)
MCHC RBC-ENTMCNC: 31.8 G/DL — LOW (ref 32–37)
MCV RBC AUTO: 92.6 FL — SIGNIFICANT CHANGE UP (ref 80–94)
MCV RBC AUTO: 94 FL — SIGNIFICANT CHANGE UP (ref 80–94)
MONOCYTES # BLD AUTO: 0.55 K/UL — SIGNIFICANT CHANGE UP (ref 0.1–0.6)
MONOCYTES # BLD AUTO: 0.93 K/UL — HIGH (ref 0.1–0.6)
MONOCYTES NFR BLD AUTO: 14.5 % — HIGH (ref 1.7–9.3)
MONOCYTES NFR BLD AUTO: 17.7 % — HIGH (ref 1.7–9.3)
NEUTROPHILS # BLD AUTO: 1.56 K/UL — SIGNIFICANT CHANGE UP (ref 1.4–6.5)
NEUTROPHILS # BLD AUTO: 4.16 K/UL — SIGNIFICANT CHANGE UP (ref 1.4–6.5)
NEUTROPHILS NFR BLD AUTO: 50.1 % — SIGNIFICANT CHANGE UP (ref 42.2–75.2)
NEUTROPHILS NFR BLD AUTO: 65 % — SIGNIFICANT CHANGE UP (ref 42.2–75.2)
NRBC # BLD: 0 /100 WBCS — SIGNIFICANT CHANGE UP (ref 0–0)
NRBC # BLD: 1 /100 WBCS — HIGH (ref 0–0)
PLATELET # BLD AUTO: 122 K/UL — LOW (ref 130–400)
PLATELET # BLD AUTO: 144 K/UL — SIGNIFICANT CHANGE UP (ref 130–400)
PMV BLD: 11.1 FL — HIGH (ref 7.4–10.4)
PMV BLD: 11.9 FL — HIGH (ref 7.4–10.4)
POTASSIUM SERPL-MCNC: 3.9 MMOL/L — SIGNIFICANT CHANGE UP (ref 3.5–5)
POTASSIUM SERPL-SCNC: 3.9 MMOL/L — SIGNIFICANT CHANGE UP (ref 3.5–5)
RBC # BLD: 3.09 M/UL — LOW (ref 4.7–6.1)
RBC # BLD: 3.34 M/UL — LOW (ref 4.7–6.1)
RBC # FLD: 19.5 % — HIGH (ref 11.5–14.5)
RBC # FLD: 19.8 % — HIGH (ref 11.5–14.5)
SODIUM SERPL-SCNC: 144 MMOL/L — SIGNIFICANT CHANGE UP (ref 135–146)
WBC # BLD: 3.11 K/UL — LOW (ref 4.8–10.8)
WBC # BLD: 6.4 K/UL — SIGNIFICANT CHANGE UP (ref 4.8–10.8)
WBC # FLD AUTO: 3.11 K/UL — LOW (ref 4.8–10.8)
WBC # FLD AUTO: 6.4 K/UL — SIGNIFICANT CHANGE UP (ref 4.8–10.8)

## 2024-10-14 PROCEDURE — 99233 SBSQ HOSP IP/OBS HIGH 50: CPT

## 2024-10-14 RX ORDER — SODIUM CHLORIDE IRRIG SOLUTION 0.9 %
1000 SOLUTION, IRRIGATION IRRIGATION ONCE
Refills: 0 | Status: COMPLETED | OUTPATIENT
Start: 2024-10-14 | End: 2024-10-14

## 2024-10-14 RX ADMIN — PANTOPRAZOLE SODIUM 40 MILLIGRAM(S): 40 TABLET, DELAYED RELEASE ORAL at 05:03

## 2024-10-14 RX ADMIN — PREDNISONE 40 MILLIGRAM(S): 5 TABLET ORAL at 05:03

## 2024-10-14 RX ADMIN — FILGRASTIM 480 MICROGRAM(S): 300 INJECTION, SOLUTION INTRAVENOUS at 11:55

## 2024-10-14 RX ADMIN — Medication 1 TABLET(S): at 05:03

## 2024-10-14 RX ADMIN — Medication 1 MILLIGRAM(S): at 11:55

## 2024-10-14 RX ADMIN — FOLIC ACID 1 MILLIGRAM(S): 1 TABLET ORAL at 11:55

## 2024-10-14 RX ADMIN — Medication 5 MILLIGRAM(S): at 11:56

## 2024-10-14 RX ADMIN — Medication 1300 MILLIGRAM(S): at 13:37

## 2024-10-14 RX ADMIN — Medication 1300 MILLIGRAM(S): at 22:02

## 2024-10-14 RX ADMIN — CHLORHEXIDINE GLUCONATE ORAL RINSE 1 APPLICATION(S): 1.2 SOLUTION DENTAL at 05:11

## 2024-10-14 RX ADMIN — Medication 5000 UNIT(S): at 17:51

## 2024-10-14 RX ADMIN — Medication 5000 UNIT(S): at 05:03

## 2024-10-14 RX ADMIN — Medication 1300 MILLIGRAM(S): at 05:04

## 2024-10-14 RX ADMIN — Medication 1000 MILLILITER(S): at 08:45

## 2024-10-14 NOTE — PROGRESS NOTE ADULT - TIME BILLING
time spent on review of labs, imaging studies, old records, obtaining history, personally examining patient, counselling and communicating with patient, entering orders for medications/tests/etc, discussions with other health care providers, documentation in electronic health records, independent interpretation of labs, imaging/procedure results and care coordination.
I have personally seen and examined this patient.    I have reviewed all pertinent clinical information and reviewed all relevant imaging and diagnostic studies personally.   I counseled the patient about diagnostic testing and treatment plan. All questions were answered.   I discussed recommendations with the primary team.
direct pt care and interdisciplinary rounds
direct pt care and interdisciplinary rounds
time spent on review of labs, imaging studies, old records, obtaining history, personally examining patient, counselling and communicating with patient, entering orders for medications/tests/etc, discussions with other health care providers, documentation in electronic health records, independent interpretation of labs, imaging/procedure results and care coordination.
direct pt care and interdisciplinary rounds
direct pt care and interdisciplinary rounds

## 2024-10-14 NOTE — PROGRESS NOTE ADULT - ASSESSMENT
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 Gong), left elbow fracture s/p ORIF @[facility] ~30 years ago, comes in c/o weakness/SOB and cough. Cough is productive of yellow sputum and has been going on for 3 months with 10 lb weight loss over the last 3 months , progressively worsening weakness, s./p fall.    PLAN:   #Septic shock  (POA) due to suspected Multifocal PNA, resolved  - patient off pressors and completed abx treatment      #Sinus Bradycardia likely due to fluphenazine, resolved   #NSTEMI, type 2 in the setting of CKD   #ASHLEY on CKD4 / Metabolic acidosis  #H/O nephrolithiasis, R ureteral revision   - trend Cr (basal creatinine ~2.5)   - RBSU notable for atrophic L kidney and no notable R hydro  - monitor renal function   - Urology eval noted. If Cr goes up, patient will need nephrostomy.     #H/O Schizophrenia  - psych following   - c/w fluphenazine in the setting of severe neutropenia  - trend CBC   - c/w benztropine 1mg qd po     #Hypotension  - orthostatics appreciated   - Lyin/52 (HR 77)   - Sittin/58 (HR 84)   - Standin/64 ()   - start 1L LR bolus   - f/u serum cortisol AM level in setting of adrenal insufficiency    #Leukopenia/Neutropenia  #Anemia  #H/O Marginal zone lymphoma / Pancytopenia / Anemia  #H/O Pancreatic neuroendocrine tumor   - heme/onc following    - Leukopenia/Neutropenia likely from lymphoma with possible worsening secondary to septic shock on admission   - re-start fluphenazine as it causes bone marrow suppression and pancytopenia  - trend CBC   - trend ANC   - maintain Hb >7  - c/w Zarxio (filgrastim) 480mg subcutaneous daily until ANC > 1000 (356 as of 10/12/24)    - per heme/onc, goal ANC > 1000 for discharge and plan for outpatient work up     - CT Scan (from 10/08/24) notable for the following:   - Interval progression of extensive adenopathy within the upper abdomen,   retroperitoneum and pelvis. Resultant hydronephrosis and hydroureter.  - Liver lesions. Apparent mass effect upon the left portal vein without   mass effect. Splenomegaly.  - Mild thickening again noted surrounding the sigmoid colon. Question   diverticulitis or focal colonic lesion.  - Large consolidations within the visualized lower lungs.  - Deformity of the urinary bladder as described above, which may be related   to mass effect by adjacent pelvic adenopathy.  - Cholelithiasis.    #Provider Handoff:   - monitor vitals, orthostatics neg    - f/u serum cortisol AM level   - trend CBC (WBC improving at 3.11)   - trend Cr (2.3, baseline ~2.5)   - calculate and trend ANC daily (d/c to be considered when ANC > 1000)   - f/u add on labs (band neutrophils)   - f/u heme/onc recommendations once patient's ANC >1000   - c/w filgrastim until ANC > 1000  - psych following  - heme/onc following   - heme/onc plan appreciated:   - stable for d/c when ANC > 1000   - plan for outpatient PET scan to determine lymphoma burden   - consider treatment options based on PET scan findings      ASSESSMENT:     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 on monthly somatostatin injections (follows Dr. Gong), left elbow fracture s/p ORIF @[facility] ~30 years ago, comes in c/o weakness/SOB and cough. Cough is productive of yellow sputum and has been going on for 3 months with 10 lb weight loss over the last 3 months , progressively worsening weakness, s./p fall.    PLAN:   #Septic shock  (POA) due to suspected Multifocal PNA, resolved  - patient off pressors and completed abx treatment      #Sinus Bradycardia likely due to fluphenazine, resolved   - per cardio consult,   - EKG: Sinus bradycardia. No acute ST changes   - Patient reported being asymptomatic during the episode   - Limited ambulation at baseline due to underlying malignancy and multiple hospitalizations   - Check TSH  - Avoid AV chemo blocking agents.  - Continue to monitor on tele  - Monitor electrolytes. Keep K > 4 and Mg > 2.2.     #NSTEMI, type 2 in the setting of CKD   #ASHLEY on CKD4 / Metabolic acidosis  #H/O nephrolithiasis, R ureteral revision   - monitor renal function   - trend Cr (basal creatinine ~2.5)   - RBUS notable for atrophic L kidney and no notable R hydro  - Urology eval noted. If Cr goes up, patient will need nephrostomy.   - Recommend pt follow up outpatient for CT findings:   ·	CT Scan (from 10/08/24) notable for the following:   - Interval progression of extensive adenopathy within the upper abdomen,   retroperitoneum and pelvis. Resultant hydronephrosis and hydroureter.  - Liver lesions. Apparent mass effect upon the left portal vein without   mass effect. Splenomegaly.  - Mild thickening again noted surrounding the sigmoid colon. Question   diverticulitis or focal colonic lesion.  - Large consolidations within the visualized lower lungs.  - Deformity of the urinary bladder as described above, which may be related   to mass effect by adjacent pelvic adenopathy.  - Cholelithiasis.  - f/u repeat RBUS     #H/O Schizophrenia  - psych following   - c/w fluphenazine in the setting of severe neutropenia  - trend CBC   - c/w benztropine 1mg qd po     #Hypotension  - orthostatics appreciated   - Lyin/52 (HR 77)   - Sittin/58 (HR 84)   - Standin/64 ()   - start 1L LR bolus   - f/u serum cortisol AM level     #Leukopenia/Neutropenia  #Anemia  #H/O Marginal zone lymphoma / Pancytopenia / Anemia  #H/O Pancreatic neuroendocrine tumor   - heme/onc following    - Leukopenia/Neutropenia likely from lymphoma with possible worsening secondary to septic shock on admission   - re-start fluphenazine as it causes bone marrow suppression and pancytopenia  - trend CBC   - trend ANC   - maintain Hb >7  - c/w Zarxio (filgrastim) 480mg subcutaneous daily until ANC > 1000 (356 as of 10/12/24)    - per heme/onc, goal ANC > 1000 for discharge and plan for outpatient work up   - f/u CT scan findings suggestive of "extensive adenopathy within the upper abdomen,   retroperitoneum and pelvis."       #Provider Handoff:   - trend CBC (WBC improving at 3.11)   - trend Cr (2.3, baseline ~2.5)   - monitor vitals, orthostatics neg    - f/u serum cortisol AM level   - calculate and trend ANC daily (d/c to be considered when ANC > 1000)   - f/u band neutrophils  - f/u heme/onc recommendations once patient's ANC >1000   - c/w filgrastim until ANC > 1000  - psych following  - heme/onc following   - heme/onc plan appreciated:   - stable for d/c when ANC > 1000   - plan for outpatient PET scan to determine lymphoma burden   - consider treatment options based on PET scan findings      ASSESSMENT:     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 on monthly somatostatin injections (follows Dr. Gong), left elbow fracture s/p ORIF @[facility] [place] ~30 years ago, comes in c/o weakness/SOB and cough. Cough is productive of yellow sputum and has been going on for 3 months with 10 lb weight loss over the last 3 months , progressively worsening weakness, s./p fall.    PLAN:   #Septic shock  (POA) due to suspected Multifocal PNA, resolved  - patient off pressors and completed abx treatment      #Sinus Bradycardia likely due to fluphenazine, resolved   - HR was dropping below 40. S/P atropine x3 previously, dopamine drip 10/3 to 10/4, now stable off dopamine   - Telemetry/EKGs reviewed (EKG showed sinus bradycardia w/ no acute ST changes)  - EP/Cardiology following   - TSH within normal limits    - Avoid av node blockers, AVOID MIDODRINE WHICH CAN ALSO CAUSE BRADYCARDIA  - Monitor electrolytes. Keep K > 4 and Mg > 2.2.     #NSTEMI, type 2 in the setting of CKD   #ASHLEY on CKD4 / Metabolic acidosis  #H/O nephrolithiasis, R ureteral revision   - monitor renal function   - trend Cr (basal creatinine ~2.5)   - RBUS notable for atrophic L kidney and no notable R hydro  - Urology eval noted. If Cr goes up, patient will need nephrostomy.   - Recommend pt follow up outpatient for CT findings:   ·	CT Scan (from 10/08/24) notable for the following:   - Interval progression of extensive adenopathy within the upper abdomen,   retroperitoneum and pelvis. Resultant hydronephrosis and hydroureter.  - Liver lesions. Apparent mass effect upon the left portal vein without   mass effect. Splenomegaly.  - Mild thickening again noted surrounding the sigmoid colon. Question   diverticulitis or focal colonic lesion.  - Large consolidations within the visualized lower lungs.  - Deformity of the urinary bladder as described above, which may be related   to mass effect by adjacent pelvic adenopathy.  - Cholelithiasis.  - f/u repeat RBUS     #H/O Schizophrenia  - psych following   - c/w fluphenazine in the setting of severe neutropenia  - trend CBC   - c/w benztropine 1mg qd po     #Hypotension  - orthostatics appreciated   - Lyin/52 (HR 77)   - Sittin/58 (HR 84)   - Standin/64 ()   - start 1L LR bolus   - f/u serum cortisol AM level     #Leukopenia/Neutropenia  #Anemia  #H/O Marginal zone lymphoma / Pancytopenia / Anemia  #H/O Pancreatic neuroendocrine tumor   - heme/onc following    - Leukopenia/Neutropenia likely from lymphoma with possible worsening secondary to septic shock on admission   - re-start fluphenazine as it causes bone marrow suppression and pancytopenia  - trend CBC   - trend ANC   - maintain Hb >7  - c/w Zarxio (filgrastim) 480mg subcutaneous daily until ANC > 1000 (356 as of 10/12/24)    - per heme/onc, goal ANC > 1000 for discharge and plan for outpatient work up   - f/u CT scan findings suggestive of "extensive adenopathy within the upper abdomen,   retroperitoneum and pelvis."       #Provider Handoff:   - trend CBC (WBC improving at 3.11)   - trend Cr (2.3, baseline ~2.5)   - monitor vitals, orthostatics neg    - f/u serum cortisol AM level   - calculate and trend ANC daily (d/c to be considered when ANC > 1000)   - f/u band neutrophils  - f/u heme/onc recommendations once patient's ANC >1000   - c/w filgrastim until ANC > 1000  - psych following  - heme/onc following   - heme/onc plan appreciated:   - stable for d/c when ANC > 1000   - plan for outpatient PET scan to determine lymphoma burden   - consider treatment options based on PET scan findings

## 2024-10-14 NOTE — PROGRESS NOTE ADULT - SUBJECTIVE AND OBJECTIVE BOX
24H events:    Patient is a 68y old Male who presents with a chief complaint of Multifocal PNA (13 Oct 2024 13:55)    Primary diagnosis of GI bleed    Today is hospital day 17d.   This morning patient was seen and examined at bedside, resting comfortably in bed.    No acute or major events overnight. Patient endorses cough x1 day.       PAST MEDICAL & SURGICAL HISTORY  Kidney stones    Schizophrenia    Lymphoma    Shingles    Atrophic kidney    H/O colonoscopy with polypectomy    Liver cyst    History of bladder surgery    H/O neuropathy    History of chemotherapy    Stage 3 chronic kidney disease    Neuroendocrine malignancy    Neuroendocrine tumor status post surgical treatment    Neuroendocrine cancer    Retained urethral stent    H/O umbilical hernia repair    Elbow fracture    H/O cystoscopy      SOCIAL HISTORY:  Social History:      ALLERGIES:  allopurinol (Rash (Moderate))    MEDICATIONS:  STANDING MEDICATIONS  benztropine 1 milliGRAM(s) Oral daily  chlorhexidine 2% Cloths 1 Application(s) Topical <User Schedule>  filgrastim-sndz (ZARXIO) Injectable 480 MICROGram(s) SubCutaneous daily  fluPHENAZine 5 milliGRAM(s) Oral daily  folic acid 1 milliGRAM(s) Oral daily  heparin   Injectable 5000 Unit(s) SubCutaneous every 12 hours  influenza  Vaccine (HIGH DOSE) 0.5 milliLiter(s) IntraMuscular once  lactated ringers Bolus 1000 milliLiter(s) IV Bolus once  pantoprazole    Tablet 40 milliGRAM(s) Oral before breakfast  polyethylene glycol 3350 17 Gram(s) Oral daily  predniSONE   Tablet 40 milliGRAM(s) Oral daily  senna 1 Tablet(s) Oral two times a day  sodium bicarbonate 1300 milliGRAM(s) Oral three times a day    PRN MEDICATIONS  acetaminophen     Tablet .. 650 milliGRAM(s) Oral every 6 hours PRN  aluminum hydroxide/magnesium hydroxide/simethicone Suspension 30 milliLiter(s) Oral every 4 hours PRN  atropine Injectable 1 milliGRAM(s) IV Push once PRN  guaiFENesin Oral Liquid (Sugar-Free) 100 milliGRAM(s) Oral every 6 hours PRN  melatonin 3 milliGRAM(s) Oral at bedtime PRN  ondansetron Injectable 4 milliGRAM(s) IV Push every 8 hours PRN    VITALS:   T(F): 97.6  HR: 60  BP: 93/50  RR: 18  SpO2: 94%    PHYSICAL EXAM:  GENERAL: NAD, lying in bed comfortably  HEENT: Atraumatic, Normocephalic, EOMI, moist mucous membranes, neck supple, no JVD  CHEST/LUNG: Clear to auscultation bilaterally; No rales, rhonchi, wheezing, or rubs. Unlabored respirations  HEART: Regular rate and rhythm; No murmurs, rubs, or gallops  ABDOMEN: BSx4; Soft, nontender, nondistended  EXTREMITIES:  2+ Peripheral Pulses, brisk capillary refill. No clubbing, cyanosis, or edema  NERVOUS SYSTEM:  A&Ox3, no focal deficits   SKIN: No rashes or lesions      LABS:                        9.1    3.11  )-----------( 122      ( 14 Oct 2024 07:12 )             28.6     10-14    144  |  108  |  29[H]  ----------------------------<  100[H]  3.9   |  24  |  2.3[H]    Ca    8.7      14 Oct 2024 07:12  Mg     2.0     10-14      Urinalysis Basic - ( 14 Oct 2024 07:12 )    Color: x / Appearance: x / SG: x / pH: x  Gluc: 100 mg/dL / Ketone: x  / Bili: x / Urobili: x   Blood: x / Protein: x / Nitrite: x   Leuk Esterase: x / RBC: x / WBC x   Sq Epi: x / Non Sq Epi: x / Bacteria: x

## 2024-10-15 ENCOUNTER — TRANSCRIPTION ENCOUNTER (OUTPATIENT)
Age: 69
End: 2024-10-15

## 2024-10-15 ENCOUNTER — APPOINTMENT (OUTPATIENT)
Age: 69
End: 2024-10-15

## 2024-10-15 LAB
ALBUMIN SERPL ELPH-MCNC: 3.2 G/DL — LOW (ref 3.5–5.2)
ALP SERPL-CCNC: 148 U/L — HIGH (ref 30–115)
ALT FLD-CCNC: 7 U/L — SIGNIFICANT CHANGE UP (ref 0–41)
ANION GAP SERPL CALC-SCNC: 11 MMOL/L — SIGNIFICANT CHANGE UP (ref 7–14)
AST SERPL-CCNC: 8 U/L — SIGNIFICANT CHANGE UP (ref 0–41)
BASOPHILS # BLD AUTO: 0.03 K/UL — SIGNIFICANT CHANGE UP (ref 0–0.2)
BASOPHILS NFR BLD AUTO: 0.4 % — SIGNIFICANT CHANGE UP (ref 0–1)
BILIRUB SERPL-MCNC: 0.6 MG/DL — SIGNIFICANT CHANGE UP (ref 0.2–1.2)
BUN SERPL-MCNC: 31 MG/DL — HIGH (ref 10–20)
CALCIUM SERPL-MCNC: 9.1 MG/DL — SIGNIFICANT CHANGE UP (ref 8.4–10.5)
CHLORIDE SERPL-SCNC: 106 MMOL/L — SIGNIFICANT CHANGE UP (ref 98–110)
CO2 SERPL-SCNC: 26 MMOL/L — SIGNIFICANT CHANGE UP (ref 17–32)
CREAT SERPL-MCNC: 2.4 MG/DL — HIGH (ref 0.7–1.5)
EGFR: 29 ML/MIN/1.73M2 — LOW
EOSINOPHIL # BLD AUTO: 0.05 K/UL — SIGNIFICANT CHANGE UP (ref 0–0.7)
EOSINOPHIL NFR BLD AUTO: 0.6 % — SIGNIFICANT CHANGE UP (ref 0–8)
GLUCOSE SERPL-MCNC: 100 MG/DL — HIGH (ref 70–99)
HCT VFR BLD CALC: 29.9 % — LOW (ref 42–52)
HGB BLD-MCNC: 9.4 G/DL — LOW (ref 14–18)
IMM GRANULOCYTES NFR BLD AUTO: 19.3 % — HIGH (ref 0.1–0.3)
LACTATE SERPL-SCNC: 4.3 MMOL/L — CRITICAL HIGH (ref 0.7–2)
LYMPHOCYTES # BLD AUTO: 0.31 K/UL — LOW (ref 1.2–3.4)
LYMPHOCYTES # BLD AUTO: 3.9 % — LOW (ref 20.5–51.1)
MAGNESIUM SERPL-MCNC: 1.9 MG/DL — SIGNIFICANT CHANGE UP (ref 1.8–2.4)
MCHC RBC-ENTMCNC: 29.5 PG — SIGNIFICANT CHANGE UP (ref 27–31)
MCHC RBC-ENTMCNC: 31.4 G/DL — LOW (ref 32–37)
MCV RBC AUTO: 93.7 FL — SIGNIFICANT CHANGE UP (ref 80–94)
MONOCYTES # BLD AUTO: 0.76 K/UL — HIGH (ref 0.1–0.6)
MONOCYTES NFR BLD AUTO: 9.7 % — HIGH (ref 1.7–9.3)
NEUTROPHILS # BLD AUTO: 5.19 K/UL — SIGNIFICANT CHANGE UP (ref 1.4–6.5)
NEUTROPHILS NFR BLD AUTO: 66.1 % — SIGNIFICANT CHANGE UP (ref 42.2–75.2)
NRBC # BLD: 0 /100 WBCS — SIGNIFICANT CHANGE UP (ref 0–0)
PLATELET # BLD AUTO: 118 K/UL — LOW (ref 130–400)
PMV BLD: 11.4 FL — HIGH (ref 7.4–10.4)
POTASSIUM SERPL-MCNC: 3.8 MMOL/L — SIGNIFICANT CHANGE UP (ref 3.5–5)
POTASSIUM SERPL-SCNC: 3.8 MMOL/L — SIGNIFICANT CHANGE UP (ref 3.5–5)
PROT SERPL-MCNC: 3.9 G/DL — LOW (ref 6–8)
RAPID RVP RESULT: DETECTED
RBC # BLD: 3.19 M/UL — LOW (ref 4.7–6.1)
RBC # FLD: 20.1 % — HIGH (ref 11.5–14.5)
RV+EV RNA SPEC QL NAA+PROBE: DETECTED
SARS-COV-2 RNA SPEC QL NAA+PROBE: SIGNIFICANT CHANGE UP
SODIUM SERPL-SCNC: 143 MMOL/L — SIGNIFICANT CHANGE UP (ref 135–146)
WBC # BLD: 7.86 K/UL — SIGNIFICANT CHANGE UP (ref 4.8–10.8)
WBC # FLD AUTO: 7.86 K/UL — SIGNIFICANT CHANGE UP (ref 4.8–10.8)

## 2024-10-15 PROCEDURE — 99233 SBSQ HOSP IP/OBS HIGH 50: CPT

## 2024-10-15 PROCEDURE — 71045 X-RAY EXAM CHEST 1 VIEW: CPT | Mod: 26

## 2024-10-15 PROCEDURE — 76770 US EXAM ABDO BACK WALL COMP: CPT | Mod: 26

## 2024-10-15 PROCEDURE — 99231 SBSQ HOSP IP/OBS SF/LOW 25: CPT

## 2024-10-15 RX ORDER — FLUPHENAZINE HCL 10 MG
10 TABLET ORAL DAILY
Refills: 0 | Status: DISCONTINUED | OUTPATIENT
Start: 2024-10-15 | End: 2024-10-15

## 2024-10-15 RX ORDER — BENZTROPINE MESYLATE 2 MG
2 TABLET ORAL DAILY
Refills: 0 | Status: DISCONTINUED | OUTPATIENT
Start: 2024-10-16 | End: 2024-10-16

## 2024-10-15 RX ORDER — SODIUM CHLORIDE IRRIG SOLUTION 0.9 %
1000 SOLUTION, IRRIGATION IRRIGATION
Refills: 0 | Status: DISCONTINUED | OUTPATIENT
Start: 2024-10-15 | End: 2024-10-16

## 2024-10-15 RX ORDER — FLUPHENAZINE HCL 10 MG
10 TABLET ORAL DAILY
Refills: 0 | Status: DISCONTINUED | OUTPATIENT
Start: 2024-10-16 | End: 2024-10-16

## 2024-10-15 RX ORDER — SODIUM CHLORIDE IRRIG SOLUTION 0.9 %
500 SOLUTION, IRRIGATION IRRIGATION ONCE
Refills: 0 | Status: COMPLETED | OUTPATIENT
Start: 2024-10-15 | End: 2024-10-15

## 2024-10-15 RX ADMIN — GUAIFENESIN 100 MILLIGRAM(S): 100 SOLUTION ORAL at 18:12

## 2024-10-15 RX ADMIN — Medication 1 MILLIGRAM(S): at 11:57

## 2024-10-15 RX ADMIN — Medication 1300 MILLIGRAM(S): at 05:24

## 2024-10-15 RX ADMIN — PREDNISONE 40 MILLIGRAM(S): 5 TABLET ORAL at 05:23

## 2024-10-15 RX ADMIN — PANTOPRAZOLE SODIUM 40 MILLIGRAM(S): 40 TABLET, DELAYED RELEASE ORAL at 05:28

## 2024-10-15 RX ADMIN — Medication 1 TABLET(S): at 17:19

## 2024-10-15 RX ADMIN — Medication 1 TABLET(S): at 05:23

## 2024-10-15 RX ADMIN — Medication 5000 UNIT(S): at 05:23

## 2024-10-15 RX ADMIN — Medication 5 MILLIGRAM(S): at 11:57

## 2024-10-15 RX ADMIN — FOLIC ACID 1 MILLIGRAM(S): 1 TABLET ORAL at 11:57

## 2024-10-15 RX ADMIN — Medication 1300 MILLIGRAM(S): at 22:17

## 2024-10-15 RX ADMIN — Medication 1300 MILLIGRAM(S): at 14:36

## 2024-10-15 RX ADMIN — CHLORHEXIDINE GLUCONATE ORAL RINSE 1 APPLICATION(S): 1.2 SOLUTION DENTAL at 05:22

## 2024-10-15 RX ADMIN — Medication 5000 UNIT(S): at 17:19

## 2024-10-15 RX ADMIN — Medication 75 MILLILITER(S): at 18:49

## 2024-10-15 RX ADMIN — Medication 500 MILLILITER(S): at 16:55

## 2024-10-15 NOTE — DISCHARGE NOTE PROVIDER - CARE PROVIDERS DIRECT ADDRESSES
,[email or web address]

## 2024-10-15 NOTE — BH CONSULTATION LIAISON PROGRESS NOTE - PRN MEDICATIONS SINCE LAST EVAL
no
Aklief Pregnancy And Lactation Text: It is unknown if this medication is safe to use during pregnancy.  It is unknown if this medication is excreted in breast milk.  Breastfeeding women should use the topical cream on the smallest area of the skin for the shortest time needed while breastfeeding.  Do not apply to nipple and areola.

## 2024-10-15 NOTE — BH CONSULTATION LIAISON PROGRESS NOTE - NSBHASSESSMENTFT_PSY_ALL_CORE
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 on monthly somatostatin injections (follows Dr Gong) left elbow fracture s/p ORIF @[facility] ~30 years ago, BIBA for syncope and shortness of breath. Patient admitted and treated for pneumonia and septic shock. Psychiatry is consulted for Schizophrenia with concerns about potential medication side effects of prolixin (bradycardia and neutropenia).    Patient reported history of schizophrenia with notable course of 5 IPP hospitalization although seems to be generally stable and controlled by medications/connection to psychiatry and strong family support with last hospitalization being 15 years ago. He is currently not endorsing any acute psychiatric symptoms. He is agreeable to hold his home Prolixin. Bradycardia now resolved.    Heme/onc had concerns about worsening leukopenia after restarting home fluphenazine. Almost all neuroleptics can potentially contribute to leukopenia--may consider Abilify which may have slightly lower risk in the future if necessary. 10/11-after discussion with heme/onc, okay to resume fluphenazine for now.
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 on monthly somatostatin injections( follows Dr Gong )  left elbow fracture s/p ORIF @[facility] ~30 years ago, BIBA for syncope and shortness of breath. Patient admitted and treated for pneumonia and septic shock. Psychiatry is consulted for Schizophrenia with concerns that his home Prolixin may be contributory to bradycardia.    Patient reported history of schizophrenia with notable course of 5 IPP hospitalization although seems to be generally stable and controlled by medications/connection to psychiatry and strong family support with last hospitalization being 15 years ago. He is currently not endorsing any acute psychiatric symptoms. He is agreeable to resume his home Prolixin and seems to understand the need to slowly re-titrate given his bradycardia.    Bradycardia is typically a rare side effect in antipsychotics although has been described in case reports. Given extended period of psychiatric stability, patient likely has significant benefit from Prolixin. Would resume Prolixin at a low dose for now and monitor ability to tolerate higher dosages over time.    10/3: HR now improved to 60-70s with pressures.
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 on monthly somatostatin injections (follows Dr Gong) left elbow fracture s/p ORIF @[facility] ~30 years ago, BIBA for syncope and shortness of breath. Patient admitted and treated for pneumonia and septic shock. Psychiatry is consulted for Schizophrenia with concerns about potential medication side effects of prolixin (bradycardia and neutropenia).    Patient reported history of schizophrenia with notable course of 5 IPP hospitalization although seems to be generally stable and controlled by medications/connection to psychiatry and strong family support with last hospitalization being 15 years ago. He is currently not endorsing any acute psychiatric symptoms. He is agreeable to hold his home Prolixin. Bradycardia now resolved.    Heme/onc had concerns about worsening leukopenia after restarting home fluphenazine. Almost all neuroleptics can potentially contribute to leukopenia--may consider Abilify which may have slightly lower risk in the future if necessary. 10/11-after discussion with heme/onc, okay to resume fluphenazine for now. 10/15--improvements to HR and WBC--okay to increase to 10 mg daily, will defer to outpatient for further titrations.  
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 on monthly somatostatin injections (follows Dr Gong) left elbow fracture s/p ORIF @[facility] ~30 years ago, BIBA for syncope and shortness of breath. Patient admitted and treated for pneumonia and septic shock. Psychiatry is consulted for Schizophrenia with concerns about potential medication side effects of prolixin (bradycardia and neutropenia).    Patient reported history of schizophrenia with notable course of 5 IPP hospitalization although seems to be generally stable and controlled by medications/connection to psychiatry and strong family support with last hospitalization being 15 years ago. He is currently not endorsing any acute psychiatric symptoms. He is agreeable to hold his home Prolixin. Bradycardia now resolved.    Heme/onc now concerned about worsening leukopenia after restarting home fluphenazine. Discussed plan to hold fluphenazine 1-2 days and monitor for changes via daily CBC. Unfortunately there are reports where almost all neuroleptics can potentially contribute to leukopenia--may consider Abilify which may have slightly lower risk if necessary to switch. Will continue to monitor and coordinate care with heme/onc team and hospitalist team.
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 on monthly somatostatin injections (follows Dr Gong) left elbow fracture s/p ORIF @[facility] ~30 years ago, BIBA for syncope and shortness of breath. Patient admitted and treated for pneumonia and septic shock. Psychiatry is consulted for Schizophrenia with concerns that his home Prolixin may be contributory to bradycardia.    Patient reported history of schizophrenia with notable course of 5 IPP hospitalization although seems to be generally stable and controlled by medications/connection to psychiatry and strong family support with last hospitalization being 15 years ago. He is currently not endorsing any acute psychiatric symptoms. He is agreeable to resume his home Prolixin and seems to understand the need to slowly re-titrate given his bradycardia.    Bradycardia is typically a rare side effect in antipsychotics although has been described in case reports. Given extended period of psychiatric stability, patient likely has significant benefit from Prolixin. Would resume Prolixin at a low dose for now and monitor for ability to tolerate higher dosages over time. Currently bradycardia is much improved and should try to have patient resume home dosage of Prolixin 10 mg daily.
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 on monthly somatostatin injections( follows Dr Gong )  left elbow fracture s/p ORIF @[facility] ~30 years ago, BIBA for syncope and shortness of breath. Patient admitted and treated for pneumonia and septic shock. Psychiatry is consulted for Schizophrenia with concerns that his home Prolixin may be contributory to bradycardia.    Patient reported history of schizophrenia with notable course of 5 IPP hospitalization although seems to be generally stable and controlled by medications/connection to psychiatry and strong family support with last hospitalization being 15 years ago. He is currently not endorsing any acute psychiatric symptoms. He is agreeable to resume his home Prolixin and seems to understand the need to slowly re-titrate given his bradycardia.    Bradycardia is typically a rare side effect in antipsychotics although has been described in case reports. Given extended period of psychiatric stability, patient likely has significant benefit from Prolixin. Would resume Prolixin at a low dose for now and monitor for ability to tolerate higher dosages over time.

## 2024-10-15 NOTE — BH CONSULTATION LIAISON PROGRESS NOTE - MSE UNSTRUCTURED FT
Adequate grooming and hygiene, appearance consistent with chronological age. Alert and oriented to person, place, time and situation. Behavior is appropriate. No involuntary movements. Eye contact is appropriate. Speech is non-pressured and of normal amount, rate, volume and articulation. Mood is neutral with congruent, blunted affect. Flow of thought is linear. Content of thought is unremarkable and no delusional or obsessive material is presented. Slightly religiously preoccupied. Denies thoughts of harm to self or others. Denies auditory or visual hallucinations. Reliable informant with fair insight and judgement. The patient understands the prescribed medication and has given informed consent to treatment.
Adequate grooming and hygiene, appearance consistent with chronological age. Alert and oriented to person, place, time and situation. Behavior is appropriate. No involuntary movements. Eye contact is appropriate. Speech is non-pressured and of normal amount, rate, volume and articulation, abnormal prosody. Mood is neutral with congruent affect. Flow of thought is linear. Content of thought is unremarkable and no delusional or obsessive material is presented. Denies thoughts of harm to self or others. Denies auditory or visual hallucinations. Fair insight/judgement.    Neuro:  EOM intact, no gaze preference or deviation;   Motor--moving all 4 extremities equally and spontaneously; No tremors; no dystonia; no rigidity on extremities; no myoclonus  Reflexes--2/4 throughout    
Adequate grooming and hygiene, appearance consistent with chronological age. Alert and oriented to person, place, time and situation. Behavior is appropriate. No involuntary movements. Eye contact is appropriate. Speech is non-pressured and of normal amount, rate, volume and articulation. Mood is neutral with congruent affect. Flow of thought is linear. Content of thought is unremarkable and no delusional or obsessive material is presented. Denies thoughts of harm to self or others. Denies auditory or visual hallucinations. Fair insight and judgment. 
Adequate grooming and hygiene, appearance consistent with chronological age. Alert and oriented to person, place, time and situation. Behavior is appropriate. No involuntary movements. Eye contact is appropriate. Speech is non-pressured and of normal amount, rate, volume and articulation; abnormal prosody. Mood is neutral with congruent blunted affect. Flow of thought is linear. Content of thought is unremarkable and no delusional or obsessive material is presented. Denies thoughts of harm to self or others. Denies auditory or visual hallucinations. Reliable informant with fair insight and judgement. 
Adequate grooming and hygiene, appearance consistent with chronological age. Alert and oriented to person, place, time and situation. Behavior is appropriate. No involuntary movements. Eye contact is appropriate. Speech is non-pressured and of normal amount, rate, volume and articulation. Mood is neutral with congruent affect. Flow of thought is linear. Content of thought is unremarkable and no delusional or obsessive material is presented. Denies thoughts of harm to self or others. Denies auditory or visual hallucinations. Fair insight and judgement. The patient understands the prescribed medication and has given informed consent to treatment.
Adequate grooming and hygiene, appearance consistent with chronological age. Alert and oriented to person, place, time and situation. Behavior is appropriate. No involuntary movements. Eye contact is appropriate. Speech is non-pressured and of normal amount, rate, volume and articulation. Mood is neutral with congruent affect. Flow of thought is linear. Content of thought is unremarkable and no delusional or obsessive material is presented. Denies thoughts of harm to self or others. Denies auditory or visual hallucinations. Fair insight/judgement. T

## 2024-10-15 NOTE — BH CONSULTATION LIAISON PROGRESS NOTE - NSBHCHARTREVIEWVS_PSY_A_CORE FT
Vital Signs Last 24 Hrs  T(C): 35.3 (04 Oct 2024 08:00), Max: 36.1 (03 Oct 2024 20:00)  T(F): 95.6 (04 Oct 2024 08:00), Max: 97 (03 Oct 2024 20:00)  HR: 68 (04 Oct 2024 12:00) (48 - 72)  BP: 82/53 (04 Oct 2024 12:00) (82/53 - 112/55)  BP(mean): 63 (04 Oct 2024 12:00) (63 - 79)  RR: 18 (04 Oct 2024 12:00) (18 - 18)  SpO2: 97% (04 Oct 2024 12:00) (95% - 97%)    Parameters below as of 04 Oct 2024 12:00  Patient On (Oxygen Delivery Method): room air    
Vital Signs Last 24 Hrs  T(C): 36.5 (11 Oct 2024 05:00), Max: 36.5 (11 Oct 2024 05:00)  T(F): 97.7 (11 Oct 2024 05:00), Max: 97.7 (11 Oct 2024 05:00)  HR: 55 (11 Oct 2024 05:22) (55 - 68)  BP: 95/53 (11 Oct 2024 05:22) (90/50 - 101/62)  BP(mean): 67 (11 Oct 2024 05:22) (67 - 67)  RR: 18 (11 Oct 2024 05:00) (18 - 18)  SpO2: 96% (10 Oct 2024 21:04) (96% - 96%)    Parameters below as of 10 Oct 2024 21:04  Patient On (Oxygen Delivery Method): room air    
Vital Signs Last 24 Hrs  T(C): 36.6 (15 Oct 2024 13:51), Max: 36.7 (14 Oct 2024 20:02)  T(F): 97.9 (15 Oct 2024 13:51), Max: 98 (14 Oct 2024 20:02)  HR: 93 (15 Oct 2024 13:51) (67 - 93)  BP: 94/54 (15 Oct 2024 13:51) (82/67 - 94/54)  BP(mean): --  RR: 18 (15 Oct 2024 13:51) (18 - 18)  SpO2: 98% (15 Oct 2024 13:51) (96% - 98%)    Parameters below as of 15 Oct 2024 13:51  Patient On (Oxygen Delivery Method): room air    
Vital Signs Last 24 Hrs  T(C): 35.7 (03 Oct 2024 04:00), Max: 36.1 (02 Oct 2024 16:00)  T(F): 96.2 (03 Oct 2024 04:00), Max: 96.9 (02 Oct 2024 16:00)  HR: 70 (03 Oct 2024 12:00) (49 - 77)  BP: 93/54 (03 Oct 2024 12:00) (91/55 - 132/63)  BP(mean): 70 (03 Oct 2024 12:00) (68 - 91)  RR: 18 (03 Oct 2024 12:00) (18 - 18)  SpO2: 96% (03 Oct 2024 12:00) (94% - 98%)    Parameters below as of 02 Oct 2024 20:31  Patient On (Oxygen Delivery Method): room air    
Vital Signs Last 24 Hrs  T(C): 36.7 (09 Oct 2024 12:29), Max: 36.7 (09 Oct 2024 05:02)  T(F): 98 (09 Oct 2024 12:29), Max: 98 (09 Oct 2024 05:02)  HR: 69 (09 Oct 2024 12:29) (51 - 69)  BP: 124/66 (09 Oct 2024 12:29) (90/52 - 124/66)  BP(mean): --  RR: 18 (09 Oct 2024 12:29) (18 - 18)  SpO2: --    
Vital Signs Last 24 Hrs  T(C): 36.3 (07 Oct 2024 14:00), Max: 36.5 (07 Oct 2024 05:02)  T(F): 97.3 (07 Oct 2024 14:00), Max: 97.7 (07 Oct 2024 05:02)  HR: 62 (07 Oct 2024 14:00) (62 - 74)  BP: 95/54 (07 Oct 2024 14:00) (91/49 - 98/59)  BP(mean): 67 (07 Oct 2024 14:00) (63 - 67)  RR: 18 (07 Oct 2024 14:00) (18 - 18)  SpO2: 100% (07 Oct 2024 14:00) (99% - 100%)    Parameters below as of 07 Oct 2024 14:00  Patient On (Oxygen Delivery Method): room air

## 2024-10-15 NOTE — BH CONSULTATION LIAISON PROGRESS NOTE - NSBHFUPINTERVALHXFT_PSY_A_CORE
Chart reviewed. Heart rate now in the 60s-70s. Per medicine--sinus bradycardia is improved. No acute behavioral concerns from the patient overnight. Mood is "stable". He has not observed side effects from Prolixin. No suicidal ideation. He is not responding to internal stimuli.
Chart reviewed. No overnight events. Patient requested a prayer and  consult. He is agreeable to the plan to slowly reintroduce Prolixin. No notable acute psychiatric complaints--no delusions, no hallucination elicited. HR now in the 60-70s on telemetry today. 
Chart reviewed. No acute behavioral events overnight. Patient is pleasant and cooperative. No acute complaints. He understands the need to slowly titrate up the Prolixin. No suicidal ideation. He is not responding to internal stimuli. He appreciates the  consult.
Chart reviewed. No overnight events. Patient is calm and cooperative. He expresses understanding of need to hold antipsychotic medication per heme/onc recommendation. No psychotic symptoms elicited during this encounter. Will continue to monitor for hallucinations, delusions, or disorganized behaviors.
Chart reviewed. No acute events overnight. HR recently in the 70s and WBC improving. No acute auditory and visual hallucinations. No delusions elicited. He is agreeable to resume home prolixin dose. 
Chart reviewed. Patient seen at bedside. Patient had questions about his prolixin dose and had concerns he was overmedicated. Patient was reassured and agreeable to resume prolixin 5 mg daily. No SI/HI or psychotic symptoms elicited.

## 2024-10-15 NOTE — PROGRESS NOTE ADULT - SUBJECTIVE AND OBJECTIVE BOX
SUBJECTIVE/OVERNIGHT EVENTS  Today is hospital day 18d. This morning patient was seen and examined at bedside, resting comfortably in bed. Hypotensive overnight, BP 82/67. No other acute or major events overnight.      CODE STATUS:    FAMILY COMMUNICATION  Contact date:  Name of person contacted:  Relationship to patient:  Communication details:    MEDICATIONS  STANDING MEDICATIONS  benztropine 1 milliGRAM(s) Oral daily  chlorhexidine 2% Cloths 1 Application(s) Topical <User Schedule>  filgrastim-sndz (ZARXIO) Injectable 480 MICROGram(s) SubCutaneous daily  fluPHENAZine 5 milliGRAM(s) Oral daily  folic acid 1 milliGRAM(s) Oral daily  heparin   Injectable 5000 Unit(s) SubCutaneous every 12 hours  influenza  Vaccine (HIGH DOSE) 0.5 milliLiter(s) IntraMuscular once  pantoprazole    Tablet 40 milliGRAM(s) Oral before breakfast  polyethylene glycol 3350 17 Gram(s) Oral daily  predniSONE   Tablet 40 milliGRAM(s) Oral daily  senna 1 Tablet(s) Oral two times a day  sodium bicarbonate 1300 milliGRAM(s) Oral three times a day    PRN MEDICATIONS  acetaminophen     Tablet .. 650 milliGRAM(s) Oral every 6 hours PRN  aluminum hydroxide/magnesium hydroxide/simethicone Suspension 30 milliLiter(s) Oral every 4 hours PRN  atropine Injectable 1 milliGRAM(s) IV Push once PRN  guaiFENesin Oral Liquid (Sugar-Free) 100 milliGRAM(s) Oral every 6 hours PRN  melatonin 3 milliGRAM(s) Oral at bedtime PRN  ondansetron Injectable 4 milliGRAM(s) IV Push every 8 hours PRN    VITALS  T(F): 97.7 (10-15-24 @ 05:05), Max: 98 (10-14-24 @ 20:02)  HR: 67 (10-15-24 @ 05:05) (67 - 75)  BP: 82/67 (10-15-24 @ 05:05) (82/67 - 109/58)  RR: 18 (10-15-24 @ 05:05) (18 - 18)  SpO2: 96% (10-15-24 @ 05:05) (96% - 98%)    PHYSICAL EXAM  VITALS:   T(C): 36.5 (10-15-24 @ 05:05), Max: 36.7 (10-14-24 @ 20:02)  HR: 67 (10-15-24 @ 05:05) (67 - 75)  BP: 82/67 (10-15-24 @ 05:05) (82/67 - 109/58)  RR: 18 (10-15-24 @ 05:05) (18 - 18)  SpO2: 96% (10-15-24 @ 05:05) (96% - 98%)    GENERAL: NAD, lying in bed comfortably  HEAD:  Atraumatic, Normocephalic  EYES: EOMI, PERRLA, conjunctiva and sclera clear  ENT: Moist mucous membranes  NECK: Supple, No JVD  CHEST/LUNG: Clear to auscultation bilaterally; No rales, rhonchi, wheezing, or rubs. Unlabored respirations  HEART: Regular rate and rhythm; No murmurs, rubs, or gallops  ABDOMEN: BSx4; Soft, nontender, nondistended  EXTREMITIES:  2+ Peripheral Pulses, brisk capillary refill. No clubbing, cyanosis, or edema  NERVOUS SYSTEM:  A&Ox3, no focal deficits   SKIN: No rashes or lesions    LABS             9.4    7.86  )-----------( 118      ( 10-15-24 @ 06:48 )             29.9     143  |  106  |  31  -------------------------<  100   10-15-24 @ 06:48  3.8  |  26  |  2.4    Ca      9.1     10-15-24 @ 06:48  Mg     1.9     10-15-24 @ 06:48    TPro  3.9  /  Alb  3.2  /  TBili  0.6  /  DBili  x   /  AST  8   /  ALT  7   /  AlkPhos  148  /  GGT  x     10-15-24 @ 06:48        Urinalysis Basic - ( 15 Oct 2024 06:48 )    Color: x / Appearance: x / SG: x / pH: x  Gluc: 100 mg/dL / Ketone: x  / Bili: x / Urobili: x   Blood: x / Protein: x / Nitrite: x   Leuk Esterase: x / RBC: x / WBC x   Sq Epi: x / Non Sq Epi: x / Bacteria: x          Culture - Blood (collected 13 Oct 2024 18:18)  Source: .Blood BLOOD  Preliminary Report (15 Oct 2024 03:01):    No growth at 24 hours      IMAGING SUBJECTIVE/OVERNIGHT EVENTS  Today is hospital day 18d. This morning patient was seen and examined at bedside, resting comfortably in bed. Hypotensive overnight, BP 82/67. No other acute or major events overnight.    CODE STATUS:    FAMILY COMMUNICATION  Contact date:  Name of person contacted:  Relationship to patient:  Communication details:    MEDICATIONS  STANDING MEDICATIONS  benztropine 1 milliGRAM(s) Oral daily  chlorhexidine 2% Cloths 1 Application(s) Topical <User Schedule>  filgrastim-sndz (ZARXIO) Injectable 480 MICROGram(s) SubCutaneous daily  fluPHENAZine 5 milliGRAM(s) Oral daily  folic acid 1 milliGRAM(s) Oral daily  heparin   Injectable 5000 Unit(s) SubCutaneous every 12 hours  influenza  Vaccine (HIGH DOSE) 0.5 milliLiter(s) IntraMuscular once  pantoprazole    Tablet 40 milliGRAM(s) Oral before breakfast  polyethylene glycol 3350 17 Gram(s) Oral daily  predniSONE   Tablet 40 milliGRAM(s) Oral daily  senna 1 Tablet(s) Oral two times a day  sodium bicarbonate 1300 milliGRAM(s) Oral three times a day    PRN MEDICATIONS  acetaminophen     Tablet .. 650 milliGRAM(s) Oral every 6 hours PRN  aluminum hydroxide/magnesium hydroxide/simethicone Suspension 30 milliLiter(s) Oral every 4 hours PRN  atropine Injectable 1 milliGRAM(s) IV Push once PRN  guaiFENesin Oral Liquid (Sugar-Free) 100 milliGRAM(s) Oral every 6 hours PRN  melatonin 3 milliGRAM(s) Oral at bedtime PRN  ondansetron Injectable 4 milliGRAM(s) IV Push every 8 hours PRN    VITALS  T(F): 97.7 (10-15-24 @ 05:05), Max: 98 (10-14-24 @ 20:02)  HR: 67 (10-15-24 @ 05:05) (67 - 75)  BP: 82/67 (10-15-24 @ 05:05) (82/67 - 109/58)  RR: 18 (10-15-24 @ 05:05) (18 - 18)  SpO2: 96% (10-15-24 @ 05:05) (96% - 98%)    PHYSICAL EXAM  VITALS:   T(C): 36.5 (10-15-24 @ 05:05), Max: 36.7 (10-14-24 @ 20:02)  HR: 67 (10-15-24 @ 05:05) (67 - 75)  BP: 82/67 (10-15-24 @ 05:05) (82/67 - 109/58)  RR: 18 (10-15-24 @ 05:05) (18 - 18)  SpO2: 96% (10-15-24 @ 05:05) (96% - 98%)    GENERAL: NAD, lying in bed comfortably  HEAD:  Atraumatic, Normocephalic  EYES: EOMI, PERRLA, conjunctiva and sclera clear  ENT: Moist mucous membranes  NECK: Supple, No JVD  CHEST/LUNG: Clear to auscultation bilaterally; No rales, rhonchi, wheezing, or rubs. Unlabored respirations  HEART: Regular rate and rhythm; No murmurs, rubs, or gallops  ABDOMEN: BSx4; Soft, nontender, nondistended  EXTREMITIES:  2+ Peripheral Pulses, brisk capillary refill. No clubbing, cyanosis, or edema  NERVOUS SYSTEM:  A&Ox3, no focal deficits   SKIN: No rashes or lesions    LABS             9.4    7.86  )-----------( 118      ( 10-15-24 @ 06:48 )             29.9     143  |  106  |  31  -------------------------<  100   10-15-24 @ 06:48  3.8  |  26  |  2.4    Ca      9.1     10-15-24 @ 06:48  Mg     1.9     10-15-24 @ 06:48    TPro  3.9  /  Alb  3.2  /  TBili  0.6  /  DBili  x   /  AST  8   /  ALT  7   /  AlkPhos  148  /  GGT  x     10-15-24 @ 06:48        Urinalysis Basic - ( 15 Oct 2024 06:48 )    Color: x / Appearance: x / SG: x / pH: x  Gluc: 100 mg/dL / Ketone: x  / Bili: x / Urobili: x   Blood: x / Protein: x / Nitrite: x   Leuk Esterase: x / RBC: x / WBC x   Sq Epi: x / Non Sq Epi: x / Bacteria: x          Culture - Blood (collected 13 Oct 2024 18:18)  Source: .Blood BLOOD  Preliminary Report (15 Oct 2024 03:01):    No growth at 24 hours      IMAGING

## 2024-10-15 NOTE — PROGRESS NOTE ADULT - ASSESSMENT
· Assessment	  ASSESSMENT:     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 on monthly somatostatin injections (follows Dr. Gong), left elbow fracture s/p ORIF @[facility] ~30 years ago, comes in c/o weakness/SOB and cough. Productive cough and weight loss has been going on for 3 months , progressively worsening weakness, s./p fall.    PLAN:   #Septic shock  (POA) due to suspected Multifocal PNA, resolved  - patient off pressors and completed abx treatment      #Sinus Bradycardia likely due to fluphenazine, resolved   - HR was dropping below 40. S/P atropine x3 previously, dopamine drip 10/3 to 10/4, now stable off dopamine   - Telemetry/EKGs reviewed (EKG showed sinus bradycardia w/ no acute ST changes)  - EP/Cardiology following   - TSH within normal limits    - Avoid av node blockers, AVOID MIDODRINE WHICH CAN ALSO CAUSE BRADYCARDIA  - Monitor electrolytes. Keep K > 4 and Mg > 2.2.     #NSTEMI, type 2 in the setting of CKD   #ASHLEY on CKD4 / Metabolic acidosis  #H/O nephrolithiasis, R ureteral revision   - monitor renal function   - trend Cr (basal creatinine ~2.5)   - RBUS notable for atrophic L kidney and no notable R hydro  - Urology eval noted. If Cr goes up, patient will need nephrostomy.   - Recommend pt follow up outpatient for CT findings:   CT Scan (from 10/08/24) notable for the following:   - Interval progression of extensive adenopathy within the upper abdomen,   retroperitoneum and pelvis. Resultant hydronephrosis and hydroureter.  - Liver lesions. Apparent mass effect upon the left portal vein without   mass effect. Splenomegaly.  - Mild thickening again noted surrounding the sigmoid colon. Question   diverticulitis or focal colonic lesion.  - Large consolidations within the visualized lower lungs.  - Deformity of the urinary bladder as described above, which may be related   to mass effect by adjacent pelvic adenopathy.  - Cholelithiasis.  - f/u repeat RBUS     #H/O Schizophrenia  - psych following   - c/w fluphenazine in the setting of severe neutropenia  - trend CBC   - c/w benztropine 1mg qd po     #Hypotension  - orthostatics appreciated   - Lyin/52 (HR 77)   - Sittin/58 (HR 84)   - Standin/64 ()   - start 1L LR bolus   - f/u serum cortisol AM level   - Chest XRAY ordered      #Leukopenia/Neutropenia  #Anemia  #H/O Marginal zone lymphoma / Pancytopenia / Anemia  #H/O Pancreatic neuroendocrine tumor   - heme/onc following    - Leukopenia/Neutropenia likely from lymphoma with possible worsening secondary to septic shock on admission   - re-start fluphenazine as it causes bone marrow suppression and pancytopenia  - trend CBC   - trend ANC   - maintain Hb >7  - c/w Zarxio (filgrastim) 480mg subcutaneous daily until ANC > 1000 (356 as of 10/12/24)    - per heme/onc, goal ANC > 1000 for discharge and plan for outpatient work up   - f/u CT scan findings suggestive of "extensive adenopathy within the upper abdomen,   retroperitoneum and pelvis."   - Hematology consult placed- determine filgastrin plan  - Lactate       #Provider Handoff:   - trend CBC (WBC improving at 3.11)   - trend Cr (2.3, baseline ~2.5)   - monitor vitals, orthostatics neg    - f/u serum cortisol AM level   - calculate and trend ANC daily (d/c to be considered when ANC > 1000)   - f/u band neutrophils  - f/u heme/onc recommendations once patient's ANC >1000   - c/w filgrastim until ANC > 1000  - psych following  - heme/onc following   - heme/onc plan appreciated:   - stable for d/c when ANC > 1000   - plan for outpatient PET scan to determine lymphoma burden   - consider treatment options based on PET scan findings    · Assessment	  ASSESSMENT:     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 on monthly somatostatin injections (follows Dr. Gong), left elbow fracture s/p ORIF @[facility] ~30 years ago, comes in c/o weakness/SOB and cough. Productive cough and weight loss has been going on for 3 months , progressively worsening weakness, s./p fall.    PLAN:   #Septic shock  (POA) due to suspected Multifocal PNA, resolved  - patient off pressors and completed abx treatment      #Sinus Bradycardia likely due to fluphenazine, resolved   - HR was dropping below 40. S/P atropine x3 previously, dopamine drip 10/3 to 10/4, now stable off dopamine   - Telemetry/EKGs reviewed (EKG showed sinus bradycardia w/ no acute ST changes)  - EP/Cardiology following   - TSH within normal limits    - Avoid av node blockers, AVOID MIDODRINE WHICH CAN ALSO CAUSE BRADYCARDIA  - Monitor electrolytes. Keep K > 4 and Mg > 2.2.     #NSTEMI, type 2 in the setting of CKD   #ASHLEY on CKD4 / Metabolic acidosis  #H/O nephrolithiasis, R ureteral revision   - monitor renal function   - trend Cr (basal creatinine ~2.5)   - RBUS notable for atrophic L kidney and no notable R hydro  - Urology eval noted. If Cr goes up, patient will need nephrostomy.   - Recommend pt follow up outpatient for CT findings:   CT Scan (from 10/08/24) notable for the following:   - Interval progression of extensive adenopathy within the upper abdomen,   retroperitoneum and pelvis. Resultant hydronephrosis and hydroureter.  - Liver lesions. Apparent mass effect upon the left portal vein without   mass effect. Splenomegaly.  - Mild thickening again noted surrounding the sigmoid colon. Question   diverticulitis or focal colonic lesion.  - Large consolidations within the visualized lower lungs.  - Deformity of the urinary bladder as described above, which may be related   to mass effect by adjacent pelvic adenopathy.  - Cholelithiasis.  - f/u repeat RBUS     #H/O Schizophrenia  - psych following   - trend CBC   - benztropine 1mg qd po and fluphenazine 5mg  - increase fluphenazine to 10mg PO daily and beztropine to 2mg PO daily    #Hypotension  - orthostatics appreciated   - Lyin/52 (HR 77)   - Sittin/58 (HR 84)   - Standin/64 ()   - start 1L LR bolus   - f/u serum cortisol AM level   - Chest XRAY ordered      #Leukopenia/Neutropenia  #Anemia  #H/O Marginal zone lymphoma / Pancytopenia / Anemia  #H/O Pancreatic neuroendocrine tumor   - heme/onc following    - Leukopenia/Neutropenia likely from lymphoma with possible worsening secondary to septic shock on admission   - re-start fluphenazine as it causes bone marrow suppression and pancytopenia  - trend CBC   - trend ANC   - maintain Hb >7  - c/w Zarxio (filgrastim) 480mg subcutaneous daily until ANC > 1000 (356 as of 10/12/24)    - per heme/onc, goal ANC > 1000 for discharge and plan for outpatient work up   - f/u CT scan findings suggestive of "extensive adenopathy within the upper abdomen,   retroperitoneum and pelvis."   - d/c filgastrim ANC approx 6600  - Hematology consult placed- determine filgastrin plan  - f/u Lactate       #Provider Handoff:   - trend CBC (WBC improving at 3.11)   - trend Cr (2.3, baseline ~2.5)   - monitor vitals, orthostatics neg    - f/u serum cortisol AM level   - calculate and trend ANC daily (d/c to be considered when ANC > 1000)   - f/u band neutrophils  - f/u heme/onc recommendations once patient's ANC >1000   - c/w filgrastim until ANC > 1000  - psych following  - heme/onc following   - heme/onc plan appreciated:   - stable for d/c when ANC > 1000   - plan for outpatient PET scan to determine lymphoma burden   - consider treatment options based on PET scan findings    ASSESSMENT:     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 on monthly somatostatin injections (follows Dr. Gong), left elbow fracture s/p ORIF @[facility] ~30 years ago, comes in c/o weakness/SOB and cough. Productive cough and weight loss has been going on for 3 months , progressively worsening weakness, s./p fall.    PLAN:   #Septic shock  (POA) due to suspected Multifocal PNA, resolved  - patient off pressors and completed abx treatment      #Sinus Bradycardia likely due to fluphenazine, resolved   - HR was dropping below 40. S/P atropine x3 previously, dopamine drip 10/3 to 10/4, now stable off dopamine   - Telemetry/EKGs reviewed (EKG showed sinus bradycardia w/ no acute ST changes)  - EP/Cardiology following   - TSH within normal limits    - Avoid av node blockers, AVOID MIDODRINE WHICH CAN ALSO CAUSE BRADYCARDIA  - Monitor electrolytes. Keep K > 4 and Mg > 2.2      #NSTEMI, type 2 in the setting of CKD   #ASHLEY on CKD4 / Metabolic acidosis  #H/O nephrolithiasis, R ureteral revision   - monitor renal function   - trend Cr (basal creatinine ~2.5)   - RBUS notable for atrophic L kidney and no notable R hydro  - Urology eval noted. If Cr goes up, patient will need nephrostomy.   - Recommend pt follow up outpatient for CT findings:   CT Scan (from 10/08/24) notable for the following:   - Interval progression of extensive adenopathy within the upper abdomen,   retroperitoneum and pelvis. Resultant hydronephrosis and hydroureter.  - Liver lesions. Apparent mass effect upon the left portal vein without   mass effect. Splenomegaly.  - Mild thickening again noted surrounding the sigmoid colon. Question   diverticulitis or focal colonic lesion.  - Large consolidations within the visualized lower lungs.  - Deformity of the urinary bladder as described above, which may be related   to mass effect by adjacent pelvic adenopathy.  - Cholelithiasis.  - f/u repeat RBUS   - Creatinine trending up - 2.4  - Trend creatinine  - Bladder scan q6h      #H/O Schizophrenia  - psych following   - trend CBC   - benztropine 1mg qd po and fluphenazine 5mg  - increase fluphenazine to 10mg PO daily and beztropine to 2mg PO daily    #Hypotension  - orthostatics appreciated   - Lyin/52 (HR 77)   - Sittin/58 (HR 84)   - Standin/64 ()   - start 1L LR bolus   - f/u serum cortisol AM level   - Chest XRAY - stable   - f/u Lactate  - f/u BCX       #Leukopenia/Neutropenia  #Anemia  #H/O Marginal zone lymphoma / Pancytopenia / Anemia  #H/O Pancreatic neuroendocrine tumor   - heme/onc following    - Leukopenia/Neutropenia likely from lymphoma with possible worsening secondary to septic shock on admission   - re-start fluphenazine as it causes bone marrow suppression and pancytopenia  - trend CBC   - trend ANC   - maintain Hb >7  - c/w Zarxio (filgrastim) 480mg subcutaneous daily until ANC > 1000 (356 as of 10/12/24)    - per heme/onc, goal ANC > 1000 for discharge and plan for outpatient work up   - f/u CT scan findings suggestive of "extensive adenopathy within the upper abdomen,   retroperitoneum and pelvis."   - d/c filgastrim ANC approx 6600  - Hematology consult placed- determine filgastrin plan      #Provider Handoff:   - trend CBC (WBC improving at 3.11)   - trend Cr (2.3, baseline ~2.5)   - monitor vitals, orthostatics neg    - f/u serum cortisol AM level   - calculate and trend ANC daily (d/c to be considered when ANC > 1000)   - f/u band neutrophils  - f/u heme/onc recommendations once patient's ANC >1000   - c/w filgrastim until ANC > 1000  - psych following  - heme/onc following   - heme/onc plan appreciated:   - stable for d/c when ANC > 1000   - plan for outpatient PET scan to determine lymphoma burden   - consider treatment options based on PET scan findings     Pending: AM cortisol, 4PM Lactate, bcx

## 2024-10-15 NOTE — DISCHARGE NOTE PROVIDER - NSDCFUSCHEDAPPT_GEN_ALL_CORE_FT
Frantz Rodriguez  [facility]  [id number]  Scheduled Appointment: 10/22/2024    Angel Gray  [facility]  PULMMED [id number]  Scheduled Appointment: 10/30/2024

## 2024-10-15 NOTE — DISCHARGE NOTE PROVIDER - HOSPITAL COURSE
68-year-old male with a history of schizophrenia, CKD, marginal zone lymphoma, and pancreatic neuroendocrine tumor presented with weakness, shortness of breath, and cough. He reported a 3-month history of productive cough, weight loss, and worsening weakness culminating in a fall.    Hospital Course:    The patient was diagnosed with septic shock, presumed secondary to multifocal pneumonia. He completed a course of antibiotics and was successfully weaned off pressors.  Cardiovascular: Sinus bradycardia, likely medication-induced (fluphenazine), was managed with atropine and a dopamine drip. He stabilized off dopamine and cardiology is following. NSTEMI, type 2, was diagnosed in the setting of CKD. The patient's CKD4 and history of nephrolithiasis were complicated by ASHLEY and metabolic acidosis. Renal function was closely monitored. Urology was consulted for hydronephrosis and hydroureter seen on CT, with a plan for nephrostomy if creatinine rises.  Leukopenia and anemia were attributed to his lymphoma, potentially exacerbated by septic shock. Fluphenazine was restarted with close monitoring of blood counts. Filgrastim was initiated to raise his ANC above 1000 for discharge. Filgastrim was discontinued as ANC >1000 was achieved. Hematology will follow for outpatient lymphoma management, including a PET scan and treatment decisions. Psychiatry followed for management of schizophrenia. A rapid responsose was called on  for bradycardia. Fluphenazine was held at that time as it can worsen bradycardia. Once the bradycardia resolved fluphenazine was restarted. Pt also continued to receive benztropine.Hypotension was managed with fluid boluses. Orthostatic vital signs were monitored. Morning cortisol levels are pending.    The patient is stable for discharge upon achieving an ANC > 1000. He will follow up with hematology for lymphoma management, including a PET scan and treatment discussion.  Cardiology and nephrology will follow as well. Monitor for signs of infection, bleeding, and electrolyte abnormalities.       #Septic shock  (POA) due to suspected Multifocal PNA, resolved  - patient off pressors and completed abx treatment      #Sinus Bradycardia likely due to fluphenazine, resolved   - HR was dropping below 40. S/P atropine x3 previously, dopamine drip 10/3 to 10/4, now stable off dopamine   - Telemetry/EKGs reviewed (EKG showed sinus bradycardia w/ no acute ST changes)  - EP/Cardiology following   - TSH within normal limits    - Avoid av node blockers, AVOID MIDODRINE WHICH CAN ALSO CAUSE BRADYCARDIA  - Monitor electrolytes. Keep K > 4 and Mg > 2.2.     #NSTEMI, type 2 in the setting of CKD   #ASHLEY on CKD4 / Metabolic acidosis  #H/O nephrolithiasis, R ureteral revision   - monitor renal function   - trend Cr (basal creatinine ~2.5)   - RBUS notable for atrophic L kidney and no notable R hydro  - Urology eval noted. If Cr goes up, patient will need nephrostomy.   - Recommend pt follow up outpatient for CT findings:   CT Scan (from 10/08/24) notable for the following:   - Interval progression of extensive adenopathy within the upper abdomen,   retroperitoneum and pelvis. Resultant hydronephrosis and hydroureter.  - Liver lesions. Apparent mass effect upon the left portal vein without   mass effect. Splenomegaly.  - Mild thickening again noted surrounding the sigmoid colon. Question   diverticulitis or focal colonic lesion.  - Large consolidations within the visualized lower lungs.  - Deformity of the urinary bladder as described above, which may be related   to mass effect by adjacent pelvic adenopathy.  - Cholelithiasis.  - f/u repeat RBUS     #H/O Schizophrenia  - psych following   - c/w fluphenazine in the setting of severe neutropenia  - trend CBC   - c/w benztropine 1mg qd po     #Hypotension  - orthostatics appreciated   - Lyin/52 (HR 77)   - Sittin/58 (HR 84)   - Standin/64 ()   - start 1L LR bolus   - f/u serum cortisol AM level   - Chest XRAY ordered      #Leukopenia/Neutropenia  #Anemia  #H/O Marginal zone lymphoma / Pancytopenia / Anemia  #H/O Pancreatic neuroendocrine tumor   - heme/onc following    - Leukopenia/Neutropenia likely from lymphoma with possible worsening secondary to septic shock on admission   - re-start fluphenazine as it causes bone marrow suppression and pancytopenia  - trend CBC   - trend ANC   - maintain Hb >7  - c/w Zarxio (filgrastim) 480mg subcutaneous daily until ANC > 1000 (356 as of 10/12/24)    - per heme/onc, goal ANC > 1000 for discharge and plan for outpatient work up   - f/u CT scan findings suggestive of "extensive adenopathy within the upper abdomen,   retroperitoneum and pelvis."   - discontinue filgastrim per heme         68-year-old male with a history of schizophrenia, CKD, marginal zone lymphoma, and pancreatic neuroendocrine tumor presented with weakness, shortness of breath, and cough. He reported a 3-month history of productive cough, weight loss, and worsening weakness culminating in a fall.    Hospital Course:    The patient was diagnosed with septic shock, presumed secondary to multifocal pneumonia. He completed a course of antibiotics and was successfully weaned off pressors.  Cardiovascular: Sinus bradycardia, likely medication-induced (fluphenazine), was managed with atropine and a dopamine drip. He stabilized off dopamine and cardiology is following. NSTEMI, type 2, was diagnosed in the setting of CKD. The patient's CKD4 and history of nephrolithiasis were complicated by ASHLEY and metabolic acidosis. Renal function was closely monitored. Urology was consulted for hydronephrosis and hydroureter seen on CT, with a plan for nephrostomy if creatinine rises.  Leukopenia and anemia were attributed to his lymphoma, potentially exacerbated by septic shock. Fluphenazine was restarted with close monitoring of blood counts. Filgrastim was initiated to raise his ANC above 1000 for discharge. Filgastrim was discontinued as ANC >1000 was achieved. Hematology will follow for outpatient lymphoma management, including a PET scan and treatment decisions. Psychiatry followed for management of schizophrenia. A rapid response was called on  for bradycardia. Fluphenazine was held at that time as it can worsen bradycardia. Once the bradycardia resolved fluphenazine was restarted. Pt also continued to receive benztropine. Hypotension was managed with fluid boluses. Orthostatic vital signs were monitored. Morning cortisol levels AM     The patient is stable for discharge upon achieving an ANC > 1000. He will follow up with hematology for lymphoma management, including a PET scan and treatment discussion.  Cardiology and nephrology will follow as well. Monitor for signs of infection, bleeding, and electrolyte abnormalities.     #Septic shock  (POA) due to suspected Multifocal PNA, resolved  - patient off pressors and completed abx treatment      #Sinus Bradycardia likely due to fluphenazine, resolved   - HR was dropped below 40. S/P atropine x3 previously, dopamine drip 10/3 to 10/4, now stable off dopamine   - Telemetry/EKGs reviewed (EKG showed sinus bradycardia w/ no acute ST changes)  - EP/Cardiology followed.    - TSH within normal limits.   - Avoid av node blockers, AVOID MIDODRINE WHICH CAN ALSO CAUSE BRADYCARDIA.   - Electrolytes monitored.     #NSTEMI, type 2 in the setting of CKD   #ASHLEY on CKD4 / Metabolic acidosis  #H/O nephrolithiasis, R ureteral revision   - renal function monitored    - trended Cr (basal creatinine ~2.5)   - RBUS notable for stable, atrophic L kidney and with R hydronephrosis, likely secondary to lymph node involvement   - Urology eval noted. If Cr goes up, patient will need nephrostomy.   - Recommend pt follow up outpatient for CT findings:   -- CT Scan (from 10/08/24) notable for the following:   - Interval progression of extensive adenopathy within the upper abdomen, retroperitoneum and pelvis. Resultant hydronephrosis and hydroureter.  - Liver lesions. Apparent mass effect upon the left portal vein without mass effect. Splenomegaly.  - Mild thickening again noted surrounding the sigmoid colon. Question diverticulitis or focal colonic lesion.  - Large consolidations within the visualized lower lungs.  - Deformity of the urinary bladder as described above, which may be related to mass effect by adjacent pelvic adenopathy.  - Cholelithiasis.    #H/O Schizophrenia  - psych following   - c/w benztropine 2mg and fluphenazine 10mg     #Hypotension, improved   - orthostatics appreciated   - Lyin/52 (HR 77)   - Sittin/58 (HR 84)   - Standin/64 ()   - started on 1L LR bolus   - serum cortisol AM level ordered in the setting of potential carcinoid syndrome (flushing, bradycardia, hypotension)    - chest XRAY stable     #Leukopenia/Neutropenia, improved   #Anemia, improved   #H/O Marginal zone lymphoma / Pancytopenia / Anemia  #H/O Pancreatic neuroendocrine tumor   - heme/onc following    - Leukopenia/Neutropenia likely from lymphoma with possible worsening secondary to septic shock on admission   - re-started fluphenazine as it causes bone marrow suppression and pancytopenia  - CBC stable, H+H 9.4/29.9   - ANC > 6600   - on Zarxio (Filgrastim) 480mg subcutaneous daily until ANC > 1000 (improved to 6600 on 10/16/24)     - per heme/onc, goal ANC > 1000 for discharge and plan for outpatient work up   - f/u CT scan findings suggestive of "extensive adenopathy within the upper abdomen, retroperitoneum and pelvis."   - d/c filgastrim on dc          68-year-old male with a history of schizophrenia, CKD, marginal zone lymphoma, and pancreatic neuroendocrine tumor presented with weakness, shortness of breath, and cough. He reported a 3-month history of productive cough, weight loss, and worsening weakness culminating in a fall.    Hospital Course:    The patient was diagnosed with septic shock, presumed secondary to multifocal pneumonia. He completed a course of antibiotics and was successfully weaned off pressors.  Cardiovascular: Sinus bradycardia, likely medication-induced (fluphenazine), was managed with atropine and a dopamine drip. He stabilized off dopamine and cardiology is following. NSTEMI, type 2, was diagnosed in the setting of CKD. The patient's CKD4 and history of nephrolithiasis were complicated by ASHLEY and metabolic acidosis. Renal function was closely monitored. Urology was consulted for hydronephrosis and hydroureter seen on CT, with a plan for nephrostomy if creatinine rises.  Leukopenia and anemia were attributed to his lymphoma, potentially exacerbated by septic shock. Fluphenazine was restarted with close monitoring of blood counts. Filgrastim was initiated to raise his ANC above 1000 for discharge. Filgastrim was discontinued as ANC >1000 was achieved. Hematology will follow for outpatient lymphoma management, including a PET scan and treatment decisions. Psychiatry followed for management of schizophrenia. A rapid response was called on  for bradycardia. Fluphenazine was held at that time as it can worsen bradycardia. Once the bradycardia resolved fluphenazine was restarted. Pt also continued to receive benztropine. Hypotension was managed with fluid boluses. Orthostatic vital signs were monitored. Morning cortisol levels AM     The patient is stable for discharge upon achieving an ANC > 1000. He will follow up with hematology for lymphoma management, including a PET scan and treatment discussion.  Cardiology and nephrology will follow as well. Monitor for signs of infection, bleeding, and electrolyte abnormalities.     #Septic shock  (POA) due to suspected Multifocal PNA, resolved  - patient off pressors and completed abx treatment      #Sinus Bradycardia likely due to fluphenazine, resolved   - HR was dropped below 40. S/P atropine x3 previously, dopamine drip 10/3 to 10/4, now stable off dopamine   - Telemetry/EKGs reviewed (EKG showed sinus bradycardia w/ no acute ST changes)  - EP/Cardiology followed.    - TSH within normal limits.   - Avoid av node blockers, AVOID MIDODRINE WHICH CAN ALSO CAUSE BRADYCARDIA.   - Electrolytes monitored.     #NSTEMI, type 2 in the setting of CKD   #ASHLEY on CKD4 / Metabolic acidosis  #H/O nephrolithiasis, R ureteral revision   - renal function monitored    - trended Cr (basal creatinine ~2.5)   - RBUS notable for stable, atrophic L kidney and with R hydronephrosis, likely secondary to lymph node involvement   - Urology eval noted. If Cr goes up, patient will need nephrostomy.   - Recommend pt follow up outpatient for CT findings:   -- CT Scan (from 10/08/24) notable for the following:   - Interval progression of extensive adenopathy within the upper abdomen, retroperitoneum and pelvis. Resultant hydronephrosis and hydroureter.  - Liver lesions. Apparent mass effect upon the left portal vein without mass effect. Splenomegaly.  - Mild thickening again noted surrounding the sigmoid colon. Question diverticulitis or focal colonic lesion.  - Large consolidations within the visualized lower lungs.  - Deformity of the urinary bladder as described above, which may be related to mass effect by adjacent pelvic adenopathy.  - Cholelithiasis.    #H/O Schizophrenia  - psych following   - c/w benztropine 2mg and fluphenazine 10mg     #Hypotension, improved   - orthostatics appreciated   - Lyin/52 (HR 77)   - Sittin/58 (HR 84)   - Standin/64 ()   - started on 1L LR bolus   - serum cortisol AM level ordered in the setting of potential carcinoid syndrome (flushing, bradycardia, hypotension)    - chest XRAY stable     #Leukopenia/Neutropenia, improved   #Anemia, improved   #H/O Marginal zone lymphoma / Pancytopenia / Anemia  #H/O Pancreatic neuroendocrine tumor   - heme/onc following    - Leukopenia/Neutropenia likely from lymphoma with possible worsening secondary to septic shock on admission   - re-started fluphenazine as it causes bone marrow suppression and pancytopenia  - CBC stable, H+H 9.4/29.9   - ANC > 6600   - on Zarxio (Filgrastim) 480mg subcutaneous daily until ANC > 1000 (improved to 6600 on 10/16/24)     - per heme/onc, goal ANC > 1000 for discharge and plan for outpatient work up   - f/u CT scan findings suggestive of "extensive adenopathy within the upper abdomen, retroperitoneum and pelvis."   - d/c filgastrim on dc     #Entero/Rhinovirus with cough, improved   - Rapid RVP (+) on 10/14/24   - c/w supportive care   - c/w guaifenesin 100mg oral q6h prn     #Provider Handoff:   - trended CBC (H+H stable, WBC improving at 8.07)   - trended Cr (stable at 2.2, baseline ~2.5)   - hypotension improved with cesar IVF; orthostatics neg    - neutropenia improved with ANC at approx 6600   - c/w supportive care for viral infection   - psych following  - heme/onc following  - plan for outpatient PET scan to determine lymphoma burden   - consider treatment options based on PET scan findings     #Dispo: d/c to home    68-year-old male with a history of schizophrenia, CKD, marginal zone lymphoma, and pancreatic neuroendocrine tumor presented with weakness, shortness of breath, and cough. He reported a 3-month history of productive cough, weight loss, and worsening weakness culminating in a fall.    Hospital Course:    The patient was diagnosed with septic shock, presumed secondary to multifocal pneumonia. He completed a course of antibiotics and was successfully weaned off pressors. During his stay, he developed sinus bradycardia, likely medication-induced (fluphenazine), was managed with atropine and a dopamine drip. He stabilized off dopamine and cardiology was on board.  The hospital course was complicated by ASHLEY and metabolic acidosis. Renal function was closely monitored. Urology was consulted for hydronephrosis and hydroureter seen on CT, with a plan for nephrostomy if creatinine rises, however creatinine stabilized.  Leukopenia and anemia were attributed to his lymphoma, potentially exacerbated by septic shock. Fluphenazine was restarted with close monitoring of blood counts. Filgrastim was initiated to raise his ANC above 1000 for discharge. Filgastrim was discontinued as ANC >1000 was achieved. Hematology will follow for outpatient lymphoma management, including a PET scan and treatment decisions. Psychiatry followed for management of schizophrenia. A rapid response was called on  for bradycardia. Fluphenazine was held at that time as it can worsen bradycardia. Once the bradycardia resolved fluphenazine was restarted. Pt also continued to receive benztropine. Hypotension was managed with fluid boluses. Orthostatic vital signs were monitored.    The patient is stable for discharge upon achieving an ANC > 1000. He will follow up with hematology for lymphoma management, including a PET scan and treatment discussion.  Cardiology and nephrology will follow as well.      #Septic shock  (POA) due to suspected Multifocal PNA, resolved  - patient off pressors and completed abx treatment      #Sinus Bradycardia likely due to fluphenazine, resolved   - HR was dropped below 40. S/P atropine x3 previously, dopamine drip 10/3 to 10/4, now stable off dopamine   - Telemetry/EKGs reviewed (EKG showed sinus bradycardia w/ no acute ST changes)  - EP/Cardiology followed.    - TSH within normal limits.   - Avoid av node blockers, AVOID MIDODRINE WHICH CAN ALSO CAUSE BRADYCARDIA.   - Electrolytes monitored.     #NSTEMI, type 2 in the setting of CKD   #ASHLEY on CKD4 / Metabolic acidosis  #H/O nephrolithiasis, R ureteral revision   - renal function monitored    - trended Cr (basal creatinine ~2.5)   - RBUS notable for stable, atrophic L kidney and with R hydronephrosis, likely secondary to lymph node involvement   - Urology eval noted. If Cr goes up, patient will need nephrostomy.   - Recommend pt follow up outpatient for CT findings:   -- CT Scan (from 10/08/24) notable for the following:   - Interval progression of extensive adenopathy within the upper abdomen, retroperitoneum and pelvis. Resultant hydronephrosis and hydroureter.  - Liver lesions. Apparent mass effect upon the left portal vein without mass effect. Splenomegaly.  - Mild thickening again noted surrounding the sigmoid colon. Question diverticulitis or focal colonic lesion.  - Large consolidations within the visualized lower lungs.  - Deformity of the urinary bladder as described above, which may be related to mass effect by adjacent pelvic adenopathy.  - Cholelithiasis.    #H/O Schizophrenia  - psych following   - c/w benztropine 2mg and fluphenazine 10mg     #Hypotension, improved   - orthostatics appreciated   - Lyin/52 (HR 77)   - Sittin/58 (HR 84)   - Standin/64 ()   - started on 1L LR bolus   - serum cortisol AM level ordered in the setting of potential carcinoid syndrome (flushing, bradycardia, hypotension)    - chest XRAY stable     #Leukopenia/Neutropenia, improved   #Anemia, improved   #H/O Marginal zone lymphoma / Pancytopenia / Anemia  #H/O Pancreatic neuroendocrine tumor   - heme/onc following    - Leukopenia/Neutropenia likely from lymphoma with possible worsening secondary to septic shock on admission   - re-started fluphenazine as it causes bone marrow suppression and pancytopenia  - CBC stable, H+H 9.4/29.9   - ANC > 6600   - on Zarxio (Filgrastim) 480mg subcutaneous daily until ANC > 1000 (improved to 6600 on 10/16/24)     - per heme/onc, goal ANC > 1000 for discharge and plan for outpatient work up   - f/u CT scan findings suggestive of "extensive adenopathy within the upper abdomen, retroperitoneum and pelvis."   - d/c filgastrim on dc     #Entero/Rhinovirus with cough, improved   - Rapid RVP (+) on 10/14/24   - c/w supportive care   - c/w guaifenesin 100mg oral q6h prn     #Provider Handoff:   - trended CBC (H+H stable, WBC improving at 8.07)   - trended Cr (stable at 2.2, baseline ~2.5)   - hypotension improved with cesar IVF; orthostatics neg    - neutropenia improved with ANC at approx 6600   - c/w supportive care for viral infection   - psych following  - heme/onc following  - plan for outpatient PET scan to determine lymphoma burden   - consider treatment options based on PET scan findings     #Dispo: d/c to home

## 2024-10-15 NOTE — BH CONSULTATION LIAISON PROGRESS NOTE - NSBHPTASSESSDT_PSY_A_CORE
11-Oct-2024 08:10
15-Oct-2024 14:16
03-Oct-2024 12:12
07-Oct-2024 17:24
09-Oct-2024 16:24
04-Oct-2024 13:00

## 2024-10-15 NOTE — DISCHARGE NOTE PROVIDER - CARE PROVIDER_API CALL
Nathen Escudero .  Internal Medicine  [address] [place] [address]  Phone: [phone]  Fax: [phone]  Follow Up Time: 2 weeks    Nate Gong)  Hematology  [address] [place] [id number]  Phone: [phone]  Fax: [phone]  Established Patient  Follow Up Time: 2 weeks

## 2024-10-15 NOTE — PROGRESS NOTE ADULT - ATTENDING COMMENTS
Agree with above A/P
68M with metastatic carcinoid tumor and marginal zone lymphoma admitted for septic shock due to suspected Multifocal PNA. Now developed severe neutropenia and anemia.    # severe neutropenia with lymphopenia likely multifactorial from recent septic shock and possibly late neutropenia effect from rituxan   - r/o other drug causes, consider holding Fluphenazine if possible as it can cause agranulocytosis  - cannot r/o marginal zone lymphoma involvement in marrow  - while in hospital, obtain CT AP     # hypoproliferative normocytic anemia   - likely due to CKD, anemia of chronic disease and recent infection  - start folic acid for folate deficiency  - transfuse to keep Hb > 7
IMPRESSION:    Acute hypoxemic respiratory failure  Multifocal pneumonia/ possible organizing [id number]   Possible recurrent aspiration  Anemia  Hypercalcemia low PTH  HO CKD - Cr stable.   HO schizophrenia  HO Marginal zone lymphoma and pancreatic neuroendocrine tumor    Plan as outlined above
IMPRESSION:    Acute hypoxemic respiratory failure  Multifocal pneumonia/ possible organizing [id number]   Possible recurrent aspiration  Anemia  Hypercalcemia low PTH  HO CKD - Cr stable.   HO schizophrenia  HO Marginal zone lymphoma and pancreatic neuroendocrine tumor    Plan as outlined above
IMPRESSION:    Acute hypoxemic respiratory failure resolved   Multifocal pneumonia/ possible organizing pneumnonia   Possible recurrent aspiration  Anemia  Hypercalcemia low PTH  HO CKD - Cr stable.   HO schizophrenia  HO Marginal zone lymphoma and pancreatic neuroendocrine tumor  On Dopamine     Plan as outlined above
IMPRESSION:    Acute hypoxemic respiratory failure resolved   Multifocal pneumonia/ possible organizing pneumonia   Possible recurrent aspiration  Pancytopenia - progressing   Hypercalcemia low PTH  HO CKD - Cr stable.   HO schizophrenia  HO Marginal zone lymphoma and pancreatic neuroendocrine tumor  Asymptomatic shamika cardia 30s- s/p atropine and dopamine now off both, on midodrine TTE 60-65%, no concerns for ischemia     Impression and plan above have been altered and are my own.
The patient was seen. Agree with above.  Worsening of chronic leukopenia is likely multifactorial due to recent sepsis, acute illness, underlying NHL, BM suppression.  Continue current care.   PET/CT as out patient to evaluate lymphoma status. Followup with Dr. Gong.
68M PMH: Schizophrenia, CKD4, Marginal zone lymphoma and pancreatic neuroendocrine tumor on monthly somatostatin injections( follows Dr Gong )  left elbow fracture s/p [facility] @[facility] ~30 years ago, comes in c/o weakness/SOB and cough. Cough is productive of yellow sputum and has been going on for 3 months with 10 lb weight loss over the last 3 months , progressively worsening weakness, s./p fall.    Septic shock  (POA) due to suspected Multifocal PNA, resolved  Sinus Bradycardia likely due to Fluphenazine, resolved   ASHLEY on CKD-4 / Metabolic acidosis  Schizophrenia  H/O Marginal zone lymphoma / Pancytopenia / Anemia  H/O Pancreatic neuroendocrine tumor   NSTMI Type 2   Leukopenia / Neutropenia, worsening  Anemia, worsening    PLAN:   Transferred to 3A day 1  Off Abx infection resolved   Acute Neutropenia 2/2 Psych medication and Suspected Lymphoma recurrence (CT w/ extensive LAD, Hepatosplenomegally)   Need GOC Discussions called Father at [phone] and no answer and message full to allow me to leave msg - will call again daily. However Dr Hodges spoke to him after 3 days of multiple attempts to update him. Will follow. Pt is stable but not ready for d/c.   - will call again tomorrow - Needs GOC and Updates medically   c/w Hemeonc w/up to r/o Multiple Myeloma   c/w Filgrastin 480mcg SQ daily till ANC >1000  keep Hg > 7.0 transfuse if needed   Monitor renal function per  / If Cr increases will need Nephrostomy Tube     Social: Called Son no answer and message machine full/ will try calling again tomorrow f/u GOC     Pending: ANC > 1000 / monitor cbc daily / PT Eval   Dispo: Acute     Time-based billing (NON-critical care).   35 minutes spent on total encounter. The necessity of the time spent during the encounter on this date of service was due to:   direct pt care and interdisciplinary rounds.
Pt seen and examined. case and plan discussed at rounds.    Dx: Severe Neutropenia - improving on filgrastim / resolved septic shock w/ gram negative pna / ahrf now RA   Dx: Currently with hypotension and neutropenia     Plan:   Orthostats done Neg  f/u Blood cxs  check LA level   check cortisol level   f/u RVP swab  f/u Heme  f/u Bands % to calculate ANC   Give 1L LR   follow up vitals     Dispo: possible d/c in 48hrs if rule out infection and BP normalizes
Severe Neutropenia - improving on filgrastim / resolved septic shock w/ gram negative pna / ahrf now RA   Currently with hypotension and neutropenia with Enterovirus Infection (results back today)   ANC > 6K   d/c filgrastim per heme   f/u Heme in clinic for PET Scan to assess LAD/Lymphoma and possible treatment OP   Pt with history of carcenoid syndrome - can cause flushing and hypotension?   - get Endocrine consultation if hypotension persists     Plan:   Orthostats done Neg  f/u Blood cxs  f/u LA level   f/u cortisol level   IF all of above NEG DO NOT GIVE MIDODRINE AS PT DID NOT TOLERATE IT AND HAD REFLEX BRADYCARDIA - instead try abdominal binder / thigh high pressure stockings and salt tablets     Full code     Social: Discussed in detail with Dr Martinez on the phone - his father and updates provided / f/u Dr Gong in clinic     Dispo: possible d/c in 48hrs if rule out infection and BP normalizes     Time-based billing (NON-critical care).   55 minutes spent on total encounter. The necessity of the time spent during the encounter on this date of service was due to:
68M PMH: Schizophrenia, CKD4, Marginal zone lymphoma and pancreatic neuroendocrine tumor on monthly somatostatin injections( follows Dr Gong )  left elbow fracture s/p ORIF @[facility] ~30 years ago, comes in c/o weakness/SOB and cough. Cough is productive of yellow sputum and has been going on for 3 months with 10 lb weight loss over the last 3 months , progressively worsening weakness, s./p fall.    Septic shock  (POA) due to suspected Multifocal PNA, resolved  Sinus Bradycardia likely due to Fluphenazine, resolved   ASHLEY on CKD-4 / Metabolic acidosis  Schizophrenia  H/O Marginal zone lymphoma / Pancytopenia / Anemia  H/O Pancreatic neuroendocrine tumor   NSTMI Type 2   Leukopenia / Neutropenia, worsening  Anemia, worsening      PLAN:   Transferred to 3A day 1  Off Abx infection resolved   Acute Neutropenia 2/2 Psych medication and Suspected Lymphoma recurrence (CT w/ extensive LAD, Hepatosplenomegally)   Need GOC Discussions called son today to return call from the office at [phone] and no answer and message full to allow me to leave msg  - will call again tomorrow - Needs GOC and Updates medically   c/w Hemeonc w/up to r/o Multiple Myeloma   c/w Filgrastin 480mcg SQ daily till ANC >1000  keep Hg > 7.0 transfuse if needed   Monitor renal function per  / If Cr increases will need Nephrostomy Tube     Social: Called Son no answer and message machine full/ will try calling again tomorrow f/u GOC     Pending: ANC > 1000 / monitor cbc daily / PT Eval   Dispo: Acute

## 2024-10-15 NOTE — DISCHARGE NOTE PROVIDER - NSDCMRMEDTOKEN_GEN_ALL_CORE_FT
benztropine 1 mg oral tablet: 1 tab(s) orally once a day (at bedtime)  benztropine 2 mg oral tablet: 1 tab(s) orally once a day  Prolixin 10 mg oral tablet: 1 tab(s) orally once a day  Prolixin 5 mg oral tablet: 1 tab(s) orally once a day (at bedtime)  sodium bicarbonate 650 mg oral tablet: 2 tab(s) orally 3 times a day   benztropine 2 mg oral tablet: 1 tab(s) orally once a day  fluPHENAZine 10 mg oral tablet: 1 tab(s) orally once a day  folic acid 1 mg oral tablet: 1 tab(s) orally once a day  pantoprazole 40 mg oral delayed release tablet: 1 tab(s) orally once a day (before a meal) till 11/20/24  predniSONE 10 mg oral tablet: 1 tab(s) orally once a day take 3 tab daily for 5 days then 2 tabs daily for 5 days then 1 tab daily for 5 days  sodium bicarbonate 650 mg oral tablet: 2 tab(s) orally 3 times a day

## 2024-10-15 NOTE — BH CONSULTATION LIAISON PROGRESS NOTE - CURRENT MEDICATION
MEDICATIONS  (STANDING):  benztropine 1 milliGRAM(s) Oral daily  chlorhexidine 2% Cloths 1 Application(s) Topical <User Schedule>  folic acid 1 milliGRAM(s) Oral daily  heparin   Injectable 5000 Unit(s) SubCutaneous every 12 hours  influenza  Vaccine (HIGH DOSE) 0.5 milliLiter(s) IntraMuscular once  pantoprazole  Injectable 40 milliGRAM(s) IV Push two times a day  polyethylene glycol 3350 17 Gram(s) Oral daily  predniSONE   Tablet 40 milliGRAM(s) Oral daily  senna 1 Tablet(s) Oral two times a day  sodium bicarbonate 1300 milliGRAM(s) Oral three times a day    MEDICATIONS  (PRN):  acetaminophen     Tablet .. 650 milliGRAM(s) Oral every 6 hours PRN Temp greater or equal to 38C (100.4F), Mild Pain (1 - 3)  aluminum hydroxide/magnesium hydroxide/simethicone Suspension 30 milliLiter(s) Oral every 4 hours PRN Dyspepsia  atropine Injectable 1 milliGRAM(s) IV Push once PRN shamika <40 or symptomatic pt  guaiFENesin Oral Liquid (Sugar-Free) 100 milliGRAM(s) Oral every 6 hours PRN Cough  melatonin 3 milliGRAM(s) Oral at bedtime PRN Insomnia  ondansetron Injectable 4 milliGRAM(s) IV Push every 8 hours PRN Nausea and/or Vomiting  
MEDICATIONS  (STANDING):  benztropine 1 milliGRAM(s) Oral daily  chlorhexidine 2% Cloths 1 Application(s) Topical <User Schedule>  filgrastim-sndz (ZARXIO) Injectable 480 MICROGram(s) SubCutaneous daily  folic acid 1 milliGRAM(s) Oral daily  heparin   Injectable 5000 Unit(s) SubCutaneous every 12 hours  influenza  Vaccine (HIGH DOSE) 0.5 milliLiter(s) IntraMuscular once  pantoprazole    Tablet 40 milliGRAM(s) Oral before breakfast  polyethylene glycol 3350 17 Gram(s) Oral daily  predniSONE   Tablet 40 milliGRAM(s) Oral daily  senna 1 Tablet(s) Oral two times a day  sodium bicarbonate 1300 milliGRAM(s) Oral three times a day    MEDICATIONS  (PRN):  acetaminophen     Tablet .. 650 milliGRAM(s) Oral every 6 hours PRN Temp greater or equal to 38C (100.4F), Mild Pain (1 - 3)  aluminum hydroxide/magnesium hydroxide/simethicone Suspension 30 milliLiter(s) Oral every 4 hours PRN Dyspepsia  atropine Injectable 1 milliGRAM(s) IV Push once PRN shamika <40 or symptomatic pt  guaiFENesin Oral Liquid (Sugar-Free) 100 milliGRAM(s) Oral every 6 hours PRN Cough  melatonin 3 milliGRAM(s) Oral at bedtime PRN Insomnia  ondansetron Injectable 4 milliGRAM(s) IV Push every 8 hours PRN Nausea and/or Vomiting  
MEDICATIONS  (STANDING):  benztropine 1 milliGRAM(s) Oral daily  chlorhexidine 2% Cloths 1 Application(s) Topical <User Schedule>  DOPamine Infusion 4.998 MICROgram(s)/kG/Min (17 mL/Hr) IV Continuous <Continuous>  fluPHENAZine 5 milliGRAM(s) Oral daily  folic acid 1 milliGRAM(s) Oral daily  heparin   Injectable 5000 Unit(s) SubCutaneous every 12 hours  methylPREDNISolone sodium succinate Injectable 40 milliGRAM(s) IV Push daily  pantoprazole  Injectable 40 milliGRAM(s) IV Push two times a day  piperacillin/tazobactam IVPB.. 3.375 Gram(s) IV Intermittent every 8 hours  polyethylene glycol 3350 17 Gram(s) Oral daily  senna 1 Tablet(s) Oral two times a day  sodium bicarbonate 1300 milliGRAM(s) Oral three times a day    MEDICATIONS  (PRN):  acetaminophen     Tablet .. 650 milliGRAM(s) Oral every 6 hours PRN Temp greater or equal to 38C (100.4F), Mild Pain (1 - 3)  aluminum hydroxide/magnesium hydroxide/simethicone Suspension 30 milliLiter(s) Oral every 4 hours PRN Dyspepsia  atropine Injectable 1 milliGRAM(s) IV Push once PRN shamika <40 or symptomatic pt  guaiFENesin Oral Liquid (Sugar-Free) 100 milliGRAM(s) Oral every 6 hours PRN Cough  melatonin 3 milliGRAM(s) Oral at bedtime PRN Insomnia  ondansetron Injectable 4 milliGRAM(s) IV Push every 8 hours PRN Nausea and/or Vomiting  
MEDICATIONS  (STANDING):  benztropine 1 milliGRAM(s) Oral daily  chlorhexidine 2% Cloths 1 Application(s) Topical <User Schedule>  fluPHENAZine 5 milliGRAM(s) Oral daily  folic acid 1 milliGRAM(s) Oral daily  heparin   Injectable 5000 Unit(s) SubCutaneous every 12 hours  influenza  Vaccine (HIGH DOSE) 0.5 milliLiter(s) IntraMuscular once  pantoprazole    Tablet 40 milliGRAM(s) Oral before breakfast  polyethylene glycol 3350 17 Gram(s) Oral daily  predniSONE   Tablet 40 milliGRAM(s) Oral daily  senna 1 Tablet(s) Oral two times a day  sodium bicarbonate 1300 milliGRAM(s) Oral three times a day    MEDICATIONS  (PRN):  acetaminophen     Tablet .. 650 milliGRAM(s) Oral every 6 hours PRN Temp greater or equal to 38C (100.4F), Mild Pain (1 - 3)  aluminum hydroxide/magnesium hydroxide/simethicone Suspension 30 milliLiter(s) Oral every 4 hours PRN Dyspepsia  atropine Injectable 1 milliGRAM(s) IV Push once PRN shamika <40 or symptomatic pt  guaiFENesin Oral Liquid (Sugar-Free) 100 milliGRAM(s) Oral every 6 hours PRN Cough  melatonin 3 milliGRAM(s) Oral at bedtime PRN Insomnia  ondansetron Injectable 4 milliGRAM(s) IV Push every 8 hours PRN Nausea and/or Vomiting  
MEDICATIONS  (STANDING):  benztropine 1 milliGRAM(s) Oral daily  chlorhexidine 2% Cloths 1 Application(s) Topical <User Schedule>  DOPamine Infusion 4.998 MICROgram(s)/kG/Min (17 mL/Hr) IV Continuous <Continuous>  fluPHENAZine 2.5 milliGRAM(s) Oral daily  folic acid 1 milliGRAM(s) Oral daily  heparin   Injectable 5000 Unit(s) SubCutaneous every 12 hours  methylPREDNISolone sodium succinate Injectable 40 milliGRAM(s) IV Push daily  pantoprazole  Injectable 40 milliGRAM(s) IV Push two times a day  piperacillin/tazobactam IVPB.. 3.375 Gram(s) IV Intermittent every 8 hours  polyethylene glycol 3350 17 Gram(s) Oral daily  senna 1 Tablet(s) Oral two times a day  sodium bicarbonate 1300 milliGRAM(s) Oral three times a day    MEDICATIONS  (PRN):  acetaminophen     Tablet .. 650 milliGRAM(s) Oral every 6 hours PRN Temp greater or equal to 38C (100.4F), Mild Pain (1 - 3)  aluminum hydroxide/magnesium hydroxide/simethicone Suspension 30 milliLiter(s) Oral every 4 hours PRN Dyspepsia  atropine Injectable 1 milliGRAM(s) IV Push once PRN shamika <40 or symptomatic pt  guaiFENesin Oral Liquid (Sugar-Free) 100 milliGRAM(s) Oral every 6 hours PRN Cough  melatonin 3 milliGRAM(s) Oral at bedtime PRN Insomnia  ondansetron Injectable 4 milliGRAM(s) IV Push every 8 hours PRN Nausea and/or Vomiting  
MEDICATIONS  (STANDING):  benztropine 1 milliGRAM(s) Oral daily  chlorhexidine 2% Cloths 1 Application(s) Topical <User Schedule>  filgrastim-sndz (ZARXIO) Injectable 480 MICROGram(s) SubCutaneous daily  fluPHENAZine 5 milliGRAM(s) Oral daily  folic acid 1 milliGRAM(s) Oral daily  heparin   Injectable 5000 Unit(s) SubCutaneous every 12 hours  influenza  Vaccine (HIGH DOSE) 0.5 milliLiter(s) IntraMuscular once  pantoprazole    Tablet 40 milliGRAM(s) Oral before breakfast  polyethylene glycol 3350 17 Gram(s) Oral daily  predniSONE   Tablet 40 milliGRAM(s) Oral daily  senna 1 Tablet(s) Oral two times a day  sodium bicarbonate 1300 milliGRAM(s) Oral three times a day    MEDICATIONS  (PRN):  acetaminophen     Tablet .. 650 milliGRAM(s) Oral every 6 hours PRN Temp greater or equal to 38C (100.4F), Mild Pain (1 - 3)  aluminum hydroxide/magnesium hydroxide/simethicone Suspension 30 milliLiter(s) Oral every 4 hours PRN Dyspepsia  atropine Injectable 1 milliGRAM(s) IV Push once PRN shamika <40 or symptomatic pt  guaiFENesin Oral Liquid (Sugar-Free) 100 milliGRAM(s) Oral every 6 hours PRN Cough  melatonin 3 milliGRAM(s) Oral at bedtime PRN Insomnia  ondansetron Injectable 4 milliGRAM(s) IV Push every 8 hours PRN Nausea and/or Vomiting

## 2024-10-15 NOTE — DISCHARGE NOTE PROVIDER - PROVIDER TOKENS
PROVIDER:[TOKEN:[[id number]],FOLLOWUP:[2 weeks]],PROVIDER:[TOKEN:[[id number]],FOLLOWUP:[2 weeks],ESTABLISHEDPATIENT:[T]]

## 2024-10-15 NOTE — BH CONSULTATION LIAISON PROGRESS NOTE - NSBHCONSULTFOLLOW_PSY_ALL_CORE
No, psychiatric follow up needed - call with questions...
No, psychiatric follow up needed - call with questions...
Yes...
Yes...
You can access the FollowMyHealth Patient Portal offered by [facility] by registering at the following website: [email or web address]. By joining [employer]s FollowMyHealth portal, you will also be able to view your health information using other applications (apps) compatible with our system.
No, psychiatric follow up needed - call with questions...
Yes...

## 2024-10-15 NOTE — BH CONSULTATION LIAISON PROGRESS NOTE - NSBHATTESTBILLING_PSY_A_CORE
51569-Tqpdxubtas OBS or IP - low complexity OR 25-34 mins
88530-Bacofhoomt OBS or IP - low complexity OR 25-34 mins
24501-Mhlsbvibny OBS or IP - low complexity OR 25-34 mins
37391-Rdosvtqnlo OBS or IP - low complexity OR 25-34 mins
49327-Grnfelianx OBS or IP - low complexity OR 25-34 mins
03545-Qccchhcpap OBS or IP - low complexity OR 25-34 mins

## 2024-10-15 NOTE — BH CONSULTATION LIAISON PROGRESS NOTE - NSBHCONSULTRECOMMENDOTHER_PSY_A_CORE FT
-Increase to Prolixin 10 mg daily.  -Increase Cogentin 2 mg daily.  -Psychiatry will sign off, please reconsult prn

## 2024-10-15 NOTE — BH CONSULTATION LIAISON PROGRESS NOTE - NSBHCONSULTFOLLOWAFTERCARE_PSY_A_CORE FT
Patient will follow up with Dr. Cates with [facility].  
Patient will follow up with Dr. Cates with [facility]. ([facility])
Patient will follow up with Dr. Cates with [facility]. 
Patient will follow up with Dr. Cates with [facility].  
Patient will follow up with Dr. Cates with [facility].

## 2024-10-15 NOTE — DISCHARGE NOTE PROVIDER - NSDCCPCAREPLAN_GEN_ALL_CORE_FT
PRINCIPAL DISCHARGE DIAGNOSIS  Diagnosis: Pneumonia  Assessment and Plan of Treatment: You presented to the hospital with 3-month history of productive cough, weight loss and worsening weakness. You were found to have pneumonia, you were also in septic shock on presentation. You were treated with IV antibiotics and also started on blood pressure support medications to maintain your BP while you were in the MICU. After you were downgraded to the floor, a rapid response was called for you due to low heart rate. The low heart rate was managed with medications to normalize your heart rate and any rate lowering medications were discontinued. You were seen by cardiac electrophysiology team who recommended to monitor.  The patient was diagnosed with septic shock, presumed secondary to multifocal pneumonia. He completed a course of antibiotics and was successfully weaned off pressors.The patient's CKD4 and history of nephrolithiasis were complicated by ASHLEY and metabolic acidosis. Renal function was closely monitored. Urology was consulted for hydronephrosis and hydroureter seen on CT, with a plan for nephrostomy if creatinine rises. Eventually urology did not recommend any intervention.   Leukopenia and anemia were attributed to his lymphoma, potentially exacerbated by septic shock. Fluphenazine was restarted with close monitoring of blood counts. Filgrastim was initiated to raise his ANC above 1000 for discharge. Filgastrim was discontinued as ANC >1000 was achieved. Hematology will follow for outpatient lymphoma management, including a PET scan and treatment decisions. Psychiatry followed for management of schizophrenia. A rapid responsose was called on 9/29 for bradycardia. Fluphenazine was held at that time as it can worsen bradycardia. Once the bradycardia resolved fluphenazine was restarted. Pt also continued to receive benztropine.Hypotension was managed with fluid boluses. Orthostatic vital signs were monitored. Morning cortisol levels are pending.      SECONDARY DISCHARGE DIAGNOSES  Diagnosis: Anemia  Assessment and Plan of Treatment:     Diagnosis: Hypotension  Assessment and Plan of Treatment:     Diagnosis: Sepsis due to pneumonia  Assessment and Plan of Treatment:     Diagnosis: Pneumonia  Assessment and Plan of Treatment:

## 2024-10-15 NOTE — BH CONSULTATION LIAISON PROGRESS NOTE - NSBHCHARTREVIEWINVESTIGATE_PSY_A_CORE FT
< from: 12 Lead ECG (09.29.24 @ 08:07) >    Ventricular Rate 39 BPM    Atrial Rate 39 BPM    P-R Interval 168 ms    QRS Duration 76 ms    Q-T Interval 494 ms    QTC Calculation(Bazett) 397 ms    < end of copied text >    
< from: 12 Lead ECG (10.06.24 @ 14:53) >    Ventricular Rate 69 BPM    Atrial Rate 69 BPM    P-R Interval 164 ms    QRS Duration 90 ms    Q-T Interval 406 ms    QTC Calculation(Bazett) 435 ms    < end of copied text >    
< from: 12 Lead ECG (09.29.24 @ 08:07) >    Ventricular Rate 39 BPM    Atrial Rate 39 BPM    P-R Interval 168 ms    QRS Duration 76 ms    Q-T Interval 494 ms    QTC Calculation(Bazett) 397 ms    < end of copied text >

## 2024-10-15 NOTE — BH CONSULTATION LIAISON PROGRESS NOTE - NSBHCHARTREVIEWLAB_PSY_A_CORE FT
7.9    0.69  )-----------( 147      ( 09 Oct 2024 05:41 )             24.8     10-09    145  |  112[H]  |  35[H]  ----------------------------<  95  4.0   |  24  |  1.7[H]    Ca    8.6      09 Oct 2024 05:41  Mg     2.2     10-09    TPro  4.3[L]  /  Alb  3.4[L]  /  TBili  0.7  /  DBili  x   /  AST  9   /  ALT  10  /  AlkPhos  138[H]  10-08  
                      9.5    0.75  )-----------( 158      ( 09 Oct 2024 17:16 )             30.5   
10-03    137  |  107  |  56[H]  ----------------------------<  113[H]  4.8   |  17  |  2.2[H]    Ca    9.2      03 Oct 2024 05:37  Phos  4.3     10-03    TPro  3.9[L]  /  Alb  3.0[L]  /  TBili  0.6  /  DBili  x   /  AST  8   /  ALT  10  /  AlkPhos  111  10-03                            8.3    2.39  )-----------( 193      ( 03 Oct 2024 05:37 )             27.2   
10-04    139  |  109  |  53[H]  ----------------------------<  109[H]  4.3   |  18  |  2.1[H]    Ca    9.1      04 Oct 2024 05:06  Phos  4.3     10-03    TPro  3.8[L]  /  Alb  3.0[L]  /  TBili  0.5  /  DBili  x   /  AST  8   /  ALT  9   /  AlkPhos  103  10-04                          7.4    1.58  )-----------( 164      ( 04 Oct 2024 05:06 )             23.8   
10-15    143  |  106  |  31[H]  ----------------------------<  100[H]  3.8   |  26  |  2.4[H]    Ca    9.1      15 Oct 2024 06:48  Mg     1.9     10-15    TPro  3.9[L]  /  Alb  3.2[L]  /  TBili  0.6  /  DBili  x   /  AST  8   /  ALT  7   /  AlkPhos  148[H]  10-15    CBC Full  -  ( 15 Oct 2024 06:48 )  WBC Count : 7.86 K/uL  RBC Count : 3.19 M/uL  Hemoglobin : 9.4 g/dL  Hematocrit : 29.9 %  Platelet Count - Automated : 118 K/uL  Mean Cell Volume : 93.7 fL  Mean Cell Hemoglobin : 29.5 pg  Mean Cell Hemoglobin Concentration : 31.4 g/dL  Auto Neutrophil # : 5.19 K/uL  Auto Lymphocyte # : 0.31 K/uL  Auto Monocyte # : 0.76 K/uL  Auto Eosinophil # : 0.05 K/uL  Auto Basophil # : 0.03 K/uL  Auto Neutrophil % : 66.1 %  Auto Lymphocyte % : 3.9 %  Auto Monocyte % : 9.7 %  Auto Eosinophil % : 0.6 %  Auto Basophil % : 0.4 %  
10-07    143  |  111[H]  |  41[H]  ----------------------------<  106[H]  4.0   |  21  |  2.0[H]    Ca    8.4      07 Oct 2024 05:32  Mg     2.2     10-07    TPro  3.8[L]  /  Alb  3.0[L]  /  TBili  0.7  /  DBili  x   /  AST  6   /  ALT  7   /  AlkPhos  118[H]  10-07                          6.9    0.66  )-----------( 135      ( 07 Oct 2024 05:32 )             22.1

## 2024-10-16 ENCOUNTER — TRANSCRIPTION ENCOUNTER (OUTPATIENT)
Age: 69
End: 2024-10-16

## 2024-10-16 VITALS
RESPIRATION RATE: 18 BRPM | TEMPERATURE: 98 F | DIASTOLIC BLOOD PRESSURE: 52 MMHG | HEART RATE: 74 BPM | SYSTOLIC BLOOD PRESSURE: 96 MMHG

## 2024-10-16 LAB
ALBUMIN SERPL ELPH-MCNC: 3 G/DL — LOW (ref 3.5–5.2)
ALBUMIN SERPL ELPH-MCNC: 3.5 G/DL — SIGNIFICANT CHANGE UP (ref 3.5–5.2)
ALP SERPL-CCNC: 139 U/L — HIGH (ref 30–115)
ALP SERPL-CCNC: 172 U/L — HIGH (ref 30–115)
ALT FLD-CCNC: 6 U/L — SIGNIFICANT CHANGE UP (ref 0–41)
ALT FLD-CCNC: 6 U/L — SIGNIFICANT CHANGE UP (ref 0–41)
ANION GAP SERPL CALC-SCNC: 12 MMOL/L — SIGNIFICANT CHANGE UP (ref 7–14)
ANION GAP SERPL CALC-SCNC: 15 MMOL/L — HIGH (ref 7–14)
AST SERPL-CCNC: 12 U/L — SIGNIFICANT CHANGE UP (ref 0–41)
AST SERPL-CCNC: 8 U/L — SIGNIFICANT CHANGE UP (ref 0–41)
BASOPHILS # BLD AUTO: 0.03 K/UL — SIGNIFICANT CHANGE UP (ref 0–0.2)
BASOPHILS NFR BLD AUTO: 0.4 % — SIGNIFICANT CHANGE UP (ref 0–1)
BILIRUB SERPL-MCNC: 0.6 MG/DL — SIGNIFICANT CHANGE UP (ref 0.2–1.2)
BILIRUB SERPL-MCNC: 0.6 MG/DL — SIGNIFICANT CHANGE UP (ref 0.2–1.2)
BUN SERPL-MCNC: 30 MG/DL — HIGH (ref 10–20)
BUN SERPL-MCNC: 32 MG/DL — HIGH (ref 10–20)
CALCIUM SERPL-MCNC: 8.3 MG/DL — LOW (ref 8.4–10.5)
CALCIUM SERPL-MCNC: 8.6 MG/DL — SIGNIFICANT CHANGE UP (ref 8.4–10.5)
CHLORIDE SERPL-SCNC: 104 MMOL/L — SIGNIFICANT CHANGE UP (ref 98–110)
CHLORIDE SERPL-SCNC: 105 MMOL/L — SIGNIFICANT CHANGE UP (ref 98–110)
CO2 SERPL-SCNC: 22 MMOL/L — SIGNIFICANT CHANGE UP (ref 17–32)
CO2 SERPL-SCNC: 28 MMOL/L — SIGNIFICANT CHANGE UP (ref 17–32)
CORTIS AM PEAK SERPL-MCNC: 4.3 UG/DL — LOW (ref 6–18.4)
CREAT SERPL-MCNC: 2.2 MG/DL — HIGH (ref 0.7–1.5)
CREAT SERPL-MCNC: 2.4 MG/DL — HIGH (ref 0.7–1.5)
EGFR: 29 ML/MIN/1.73M2 — LOW
EGFR: 32 ML/MIN/1.73M2 — LOW
EOSINOPHIL # BLD AUTO: 0.02 K/UL — SIGNIFICANT CHANGE UP (ref 0–0.7)
EOSINOPHIL NFR BLD AUTO: 0.2 % — SIGNIFICANT CHANGE UP (ref 0–8)
GLUCOSE SERPL-MCNC: 107 MG/DL — HIGH (ref 70–99)
GLUCOSE SERPL-MCNC: 129 MG/DL — HIGH (ref 70–99)
HCT VFR BLD CALC: 30.1 % — LOW (ref 42–52)
HGB BLD-MCNC: 9.4 G/DL — LOW (ref 14–18)
IMM GRANULOCYTES NFR BLD AUTO: 20.7 % — HIGH (ref 0.1–0.3)
LACTATE SERPL-SCNC: 2.9 MMOL/L — HIGH (ref 0.7–2)
LACTATE SERPL-SCNC: 2.9 MMOL/L — HIGH (ref 0.7–2)
LACTATE SERPL-SCNC: 3.1 MMOL/L — HIGH (ref 0.7–2)
LYMPHOCYTES # BLD AUTO: 0.27 K/UL — LOW (ref 1.2–3.4)
LYMPHOCYTES # BLD AUTO: 3.3 % — LOW (ref 20.5–51.1)
MAGNESIUM SERPL-MCNC: 2 MG/DL — SIGNIFICANT CHANGE UP (ref 1.8–2.4)
MCHC RBC-ENTMCNC: 29.7 PG — SIGNIFICANT CHANGE UP (ref 27–31)
MCHC RBC-ENTMCNC: 31.2 G/DL — LOW (ref 32–37)
MCV RBC AUTO: 95.3 FL — HIGH (ref 80–94)
MONOCYTES # BLD AUTO: 0.57 K/UL — SIGNIFICANT CHANGE UP (ref 0.1–0.6)
MONOCYTES NFR BLD AUTO: 7.1 % — SIGNIFICANT CHANGE UP (ref 1.7–9.3)
NEUTROPHILS # BLD AUTO: 5.51 K/UL — SIGNIFICANT CHANGE UP (ref 1.4–6.5)
NEUTROPHILS NFR BLD AUTO: 68.3 % — SIGNIFICANT CHANGE UP (ref 42.2–75.2)
NRBC # BLD: 0 /100 WBCS — SIGNIFICANT CHANGE UP (ref 0–0)
PLATELET # BLD AUTO: 95 K/UL — LOW (ref 130–400)
PMV BLD: 10.6 FL — HIGH (ref 7.4–10.4)
POTASSIUM SERPL-MCNC: 3.6 MMOL/L — SIGNIFICANT CHANGE UP (ref 3.5–5)
POTASSIUM SERPL-MCNC: 4.1 MMOL/L — SIGNIFICANT CHANGE UP (ref 3.5–5)
POTASSIUM SERPL-SCNC: 3.6 MMOL/L — SIGNIFICANT CHANGE UP (ref 3.5–5)
POTASSIUM SERPL-SCNC: 4.1 MMOL/L — SIGNIFICANT CHANGE UP (ref 3.5–5)
PROT SERPL-MCNC: 3.8 G/DL — LOW (ref 6–8)
PROT SERPL-MCNC: 4.5 G/DL — LOW (ref 6–8)
RBC # BLD: 3.16 M/UL — LOW (ref 4.7–6.1)
RBC # FLD: 20.4 % — HIGH (ref 11.5–14.5)
SODIUM SERPL-SCNC: 141 MMOL/L — SIGNIFICANT CHANGE UP (ref 135–146)
SODIUM SERPL-SCNC: 145 MMOL/L — SIGNIFICANT CHANGE UP (ref 135–146)
WBC # BLD: 8.07 K/UL — SIGNIFICANT CHANGE UP (ref 4.8–10.8)
WBC # FLD AUTO: 8.07 K/UL — SIGNIFICANT CHANGE UP (ref 4.8–10.8)

## 2024-10-16 PROCEDURE — 99239 HOSP IP/OBS DSCHRG MGMT >30: CPT

## 2024-10-16 RX ORDER — CHLORHEXIDINE GLUCONATE ORAL RINSE 1.2 MG/ML
1 SOLUTION DENTAL
Refills: 0 | Status: DISCONTINUED | OUTPATIENT
Start: 2024-10-16 | End: 2024-10-16

## 2024-10-16 RX ORDER — PREDNISONE 5 MG/1
1 TABLET ORAL
Qty: 30 | Refills: 0
Start: 2024-10-16 | End: 2024-10-30

## 2024-10-16 RX ORDER — FLUPHENAZINE HCL 10 MG
1 TABLET ORAL
Qty: 30 | Refills: 0
Start: 2024-10-16 | End: 2024-11-14

## 2024-10-16 RX ORDER — FOLIC ACID 1 MG/1
1 TABLET ORAL
Qty: 30 | Refills: 0
Start: 2024-10-16 | End: 2024-11-14

## 2024-10-16 RX ORDER — SODIUM BICARBONATE 650 MG
2 TABLET ORAL
Qty: 180 | Refills: 0
Start: 2024-10-16 | End: 2024-11-14

## 2024-10-16 RX ORDER — BENZTROPINE MESYLATE 2 MG
1 TABLET ORAL
Qty: 30 | Refills: 0
Start: 2024-10-16 | End: 2024-11-14

## 2024-10-16 RX ORDER — PANTOPRAZOLE SODIUM 40 MG/1
1 TABLET, DELAYED RELEASE ORAL
Qty: 30 | Refills: 0
Start: 2024-10-16 | End: 2024-11-14

## 2024-10-16 RX ADMIN — CHLORHEXIDINE GLUCONATE ORAL RINSE 1 APPLICATION(S): 1.2 SOLUTION DENTAL at 12:30

## 2024-10-16 RX ADMIN — Medication 2 MILLIGRAM(S): at 12:27

## 2024-10-16 RX ADMIN — PREDNISONE 40 MILLIGRAM(S): 5 TABLET ORAL at 06:08

## 2024-10-16 RX ADMIN — Medication 1300 MILLIGRAM(S): at 14:54

## 2024-10-16 RX ADMIN — Medication 1 TABLET(S): at 06:08

## 2024-10-16 RX ADMIN — Medication 5000 UNIT(S): at 17:30

## 2024-10-16 RX ADMIN — FOLIC ACID 1 MILLIGRAM(S): 1 TABLET ORAL at 12:27

## 2024-10-16 RX ADMIN — Medication 5000 UNIT(S): at 06:07

## 2024-10-16 RX ADMIN — Medication 1 TABLET(S): at 17:30

## 2024-10-16 RX ADMIN — Medication 1300 MILLIGRAM(S): at 06:07

## 2024-10-16 RX ADMIN — CHLORHEXIDINE GLUCONATE ORAL RINSE 1 APPLICATION(S): 1.2 SOLUTION DENTAL at 06:11

## 2024-10-16 RX ADMIN — Medication 10 MILLIGRAM(S): at 12:27

## 2024-10-16 RX ADMIN — PANTOPRAZOLE SODIUM 40 MILLIGRAM(S): 40 TABLET, DELAYED RELEASE ORAL at 06:08

## 2024-10-16 NOTE — PROGRESS NOTE ADULT - ASSESSMENT
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 Gong), left elbow fracture s/p ORIF @[facility] ~30 years ago, comes in c/o weakness/SOB and cough. Productive cough and weight loss has been going on for 3 months , progressively worsening weakness, s/p fall.    PLAN:   #Septic shock (POA) due to suspected Multifocal PNA, resolved  - patient off pressors and completed abx treatment      #Sinus Bradycardia likely due to fluphenazine, resolved   - HR was dropping below 40. S/P atropine x3 previously, dopamine drip 10/3 to 10/4, now stable off dopamine   - Telemetry/EKGs reviewed (EKG showed sinus bradycardia w/ no acute ST changes)  - EP/Cardiology following   - TSH within normal limits    - Avoid av node blockers, AVOID MIDODRINE WHICH CAN ALSO CAUSE BRADYCARDIA  - Monitor electrolytes. Keep K > 4 and Mg > 2.2      #NSTEMI, type 2 in the setting of CKD   #ASHLEY on CKD4 / Metabolic acidosis  #H/O nephrolithiasis, R ureteral revision   - monitor renal function   - trend Cr (basal creatinine ~2.5)   - RBUS notable for atrophic L kidney and no notable R hydro  - Urology eval noted. If Cr goes up, patient will need nephrostomy.   - Recommend pt follow up outpatient for CT findings:   CT Scan (from 10/08/24) notable for the following:   - Interval progression of extensive adenopathy within the upper abdomen,   retroperitoneum and pelvis. Resultant hydronephrosis and hydroureter.  - Liver lesions. Apparent mass effect upon the left portal vein without   mass effect. Splenomegaly.  - Mild thickening again noted surrounding the sigmoid colon. Question   diverticulitis or focal colonic lesion.  - Large consolidations within the visualized lower lungs.  - Deformity of the urinary bladder as described above, which may be related   to mass effect by adjacent pelvic adenopathy.  - Cholelithiasis.  - RBUS significant for Right-sided moderate hydronephrosis. Stable atrophy of the left kidney  - Trend creatinine 2.2 down from 2.4 (baseline ~2.5)   - Bladder scan q6h    #H/O Schizophrenia  - psych following   - trend CBC   - increase fluphenazine to 10mg PO daily and beztropine to 2mg PO daily    #Hypotension, improving    - orthostatics appreciated, negative   - Lyin/52 (HR 77)   - Sittin/58 (HR 84)   - Standin/64 ()   - c/w 1L LR at 75mL/hr, stop after 12 hours   - trend serum cortisol AM (4.3)   - trend Lactate (3.1 up from 2.9)   - f/u blood cultures (prelim, no growth)   - CXR stable     #Leukopenia/Neutropenia, improving   #Anemia  #H/O Marginal zone lymphoma / Pancytopenia / Anemia  #H/O Pancreatic neuroendocrine tumor   - heme/onc following    - Leukopenia/Neutropenia likely from lymphoma with possible worsening secondary to septic shock on admission   - re-start fluphenazine as it causes bone marrow suppression and pancytopenia  - trend CBC   - trend ANC   - maintain Hb >7  - c/w Zarxio (filgrastim) 480mg subcutaneous daily until ANC > 1000 (356 as of 10/12/24)    - per heme, d/c filgastrim, ANC approx 6600  - per heme/onc, goal ANC > 1000 for discharge and plan for outpatient work up   - f/u outpatient for CT scan findings suggestive of "extensive adenopathy within the upper abdomen, retroperitoneum and pelvis."     #Entero/Rhinovirus with cough   - Rapid RVP (+) on 10/14/24   - c/w supportive care   - c/w guaifenesin 100mg oral q6h prn     #Provider Handoff:   - trend CBC (WBC improving at 8.07)   - trend Cr (2.2, baseline ~2.5)   - monitor vitals, orthostatics neg    - ANC approx 6600   - c/w supportive care for viral infection   - psych following  - heme/onc following and plan appreciated:   - stable for d/c when ANC > 1000   - plan for outpatient PET scan to determine lymphoma burden   - consider treatment options based on PET scan findings     Dispo:   - Stable for d/c

## 2024-10-16 NOTE — DISCHARGE NOTE NURSING/CASE MANAGEMENT/SOCIAL WORK - NSDCPEFALRISK_GEN_ALL_CORE
For information on Fall & Injury Prevention, visit: [email or web address] OR  [email or web address] OR  https://www.cdc.gov/steadi/patient.html

## 2024-10-16 NOTE — DISCHARGE NOTE NURSING/CASE MANAGEMENT/SOCIAL WORK - FINANCIAL ASSISTANCE
[facility] provides services at a reduced cost to those who are determined to be eligible through [facility]’s financial assistance program. Information regarding [facility]’s financial assistance program can be found by going to [email or web address] or by calling [phone].

## 2024-10-16 NOTE — DISCHARGE NOTE NURSING/CASE MANAGEMENT/SOCIAL WORK - PATIENT PORTAL LINK FT
You can access the FollowMyHealth Patient Portal offered by [facility] by registering at the following website: [email or web address]. By joining [facility]’s FollowMyHealth portal, you will also be able to view your health information using other applications (apps) compatible with our system.

## 2024-10-16 NOTE — PROGRESS NOTE ADULT - PROVIDER SPECIALTY LIST ADULT
Critical Care
Critical Care
Heme/Onc
Heme/Onc
Hospitalist
Internal Medicine
Critical Care
Critical Care
Heme/Onc
Hospitalist
Internal Medicine
Critical Care
Hospitalist
Hospitalist
Internal Medicine
Critical Care
Critical Care
Hospitalist
Internal Medicine
Hospitalist
Hospitalist
Infectious Disease

## 2024-10-16 NOTE — PROGRESS NOTE ADULT - SUBJECTIVE AND OBJECTIVE BOX
24H events:    Patient is a 68y old Male who presents with a chief complaint of multifocal pna (16 Oct 2024 11:34)    Primary diagnosis of Pneumonia          Today is hospital day 19d.   This morning patient was seen and examined at bedside, resting comfortably in bed.    No acute or major events overnight.    Code Status:    Family communication:  Contact date:  Name of person contacted:  Relationship to patient:  Communication details:  What matters most:    PAST MEDICAL & SURGICAL HISTORY  Kidney stones    Schizophrenia    Lymphoma    Shingles    Atrophic kidney    H/O colonoscopy with polypectomy    Liver cyst    History of bladder surgery    H/O neuropathy    History of chemotherapy    Stage 3 chronic kidney disease    Neuroendocrine malignancy    Neuroendocrine tumor status post surgical treatment    Neuroendocrine cancer    Retained urethral stent    H/O umbilical hernia repair    Elbow fracture    H/O cystoscopy      SOCIAL HISTORY:  Social History:      ALLERGIES:  allopurinol (Rash (Moderate))    MEDICATIONS:  STANDING MEDICATIONS  benztropine 2 milliGRAM(s) Oral daily  chlorhexidine 2% Cloths 1 Application(s) Topical <User Schedule>  fluPHENAZine 10 milliGRAM(s) Oral daily  folic acid 1 milliGRAM(s) Oral daily  heparin   Injectable 5000 Unit(s) SubCutaneous every 12 hours  influenza  Vaccine (HIGH DOSE) 0.5 milliLiter(s) IntraMuscular once  lactated ringers. 1000 milliLiter(s) IV Continuous <Continuous>  pantoprazole    Tablet 40 milliGRAM(s) Oral before breakfast  polyethylene glycol 3350 17 Gram(s) Oral daily  predniSONE   Tablet 40 milliGRAM(s) Oral daily  senna 1 Tablet(s) Oral two times a day  sodium bicarbonate 1300 milliGRAM(s) Oral three times a day    PRN MEDICATIONS  acetaminophen     Tablet .. 650 milliGRAM(s) Oral every 6 hours PRN  aluminum hydroxide/magnesium hydroxide/simethicone Suspension 30 milliLiter(s) Oral every 4 hours PRN  atropine Injectable 1 milliGRAM(s) IV Push once PRN  guaiFENesin Oral Liquid (Sugar-Free) 100 milliGRAM(s) Oral every 6 hours PRN  melatonin 3 milliGRAM(s) Oral at bedtime PRN  ondansetron Injectable 4 milliGRAM(s) IV Push every 8 hours PRN    VITALS:   T(F): 97.6  HR: 66  BP: 99/61  RR: 18  SpO2: 95%    PHYSICAL EXAM:  GENERAL:  NAD  SKIN: No rashes or lesions  HEENT: Atraumatic. Normocephalic. PERRL. Moist membranes.  NECK: Supple, No JVD. No lymphadenopathy.  PULMONARY: CTA B/L. No wheezing. No rales  CVS: Normal S1, S2. Rate and Rhythm are regular. No murmurs.  ABDOMEN/GI: Soft, Nontender, Nondistended; BS present  EXTREMITIES: Peripheral pulses intact. No edema B/L LE.  NEUROLOGIC:  No motor or sensory deficit.  PSYCH: Alert & oriented x 3      LABS:                        9.4    8.07  )-----------( 95       ( 16 Oct 2024 08:05 )             30.1     10-16    141  |  104  |  30[H]  ----------------------------<  129[H]  4.1   |  22  |  2.2[H]    Ca    8.6      16 Oct 2024 11:16  Mg     2.0     10-16    TPro  4.5[L]  /  Alb  3.5  /  TBili  0.6  /  DBili  x   /  AST  12  /  ALT  6   /  AlkPhos  172[H]  10-16      Urinalysis Basic - ( 16 Oct 2024 11:16 )    Color: x / Appearance: x / SG: x / pH: x  Gluc: 129 mg/dL / Ketone: x  / Bili: x / Urobili: x   Blood: x / Protein: x / Nitrite: x   Leuk Esterase: x / RBC: x / WBC x   Sq Epi: x / Non Sq Epi: x / Bacteria: x        Lactate, Blood: 3.1 mmol/L *H* (10-16-24 @ 11:16)  Lactate, Blood: 2.9 mmol/L *H* (10-16-24 @ 08:05)  Lactate, Blood: 2.9 mmol/L *H* (10-15-24 @ 23:09)  Lactate, Blood: 4.3 mmol/L *HH* (10-15-24 @ 15:24)      Culture - Blood (collected 13 Oct 2024 18:18)  Source: .Blood BLOOD  Preliminary Report (16 Oct 2024 03:01):    No growth at 48 Hours

## 2024-10-16 NOTE — PROGRESS NOTE ADULT - REASON FOR ADMISSION
multifocal pna

## 2024-10-17 DIAGNOSIS — D3A.8 OTHER BENIGN NEUROENDOCRINE TUMORS: ICD-10-CM

## 2024-10-18 ENCOUNTER — NON-APPOINTMENT (OUTPATIENT)
Age: 69
End: 2024-10-18

## 2024-10-19 DIAGNOSIS — J18.9 PNEUMONIA, UNSPECIFIED ORGANISM: ICD-10-CM

## 2024-10-19 DIAGNOSIS — I21.4 NON-ST ELEVATION (NSTEMI) MYOCARDIAL INFARCTION: ICD-10-CM

## 2024-10-19 DIAGNOSIS — J96.01 ACUTE RESPIRATORY FAILURE WITH HYPOXIA: ICD-10-CM

## 2024-10-19 DIAGNOSIS — N17.9 ACUTE KIDNEY FAILURE, UNSPECIFIED: ICD-10-CM

## 2024-10-19 DIAGNOSIS — I31.39 OTHER PERICARDIAL EFFUSION (NONINFLAMMATORY): ICD-10-CM

## 2024-10-19 DIAGNOSIS — Z92.21 PERSONAL HISTORY OF ANTINEOPLASTIC CHEMOTHERAPY: ICD-10-CM

## 2024-10-19 DIAGNOSIS — F20.9 SCHIZOPHRENIA, UNSPECIFIED: ICD-10-CM

## 2024-10-19 DIAGNOSIS — Z88.5 ALLERGY STATUS TO NARCOTIC AGENT: ICD-10-CM

## 2024-10-19 DIAGNOSIS — C81.90 HODGKIN LYMPHOMA, UNSPECIFIED, UNSPECIFIED SITE: ICD-10-CM

## 2024-10-19 DIAGNOSIS — E11.22 TYPE 2 DIABETES MELLITUS WITH DIABETIC CHRONIC KIDNEY DISEASE: ICD-10-CM

## 2024-10-19 DIAGNOSIS — K92.2 GASTROINTESTINAL HEMORRHAGE, UNSPECIFIED: ICD-10-CM

## 2024-10-19 DIAGNOSIS — D84.9 IMMUNODEFICIENCY, UNSPECIFIED: ICD-10-CM

## 2024-10-19 DIAGNOSIS — J91.8 PLEURAL EFFUSION IN OTHER CONDITIONS CLASSIFIED ELSEWHERE: ICD-10-CM

## 2024-10-19 DIAGNOSIS — D64.9 ANEMIA, UNSPECIFIED: ICD-10-CM

## 2024-10-19 DIAGNOSIS — E87.20 ACIDOSIS, UNSPECIFIED: ICD-10-CM

## 2024-10-19 DIAGNOSIS — A41.9 SEPSIS, UNSPECIFIED ORGANISM: ICD-10-CM

## 2024-10-19 DIAGNOSIS — R65.21 SEVERE SEPSIS WITH SEPTIC SHOCK: ICD-10-CM

## 2024-10-19 DIAGNOSIS — E83.52 HYPERCALCEMIA: ICD-10-CM

## 2024-10-19 DIAGNOSIS — D62 ACUTE POSTHEMORRHAGIC ANEMIA: ICD-10-CM

## 2024-10-19 DIAGNOSIS — N18.4 CHRONIC KIDNEY DISEASE, STAGE 4 (SEVERE): ICD-10-CM

## 2024-10-19 LAB
CULTURE RESULTS: SIGNIFICANT CHANGE UP
SPECIMEN SOURCE: SIGNIFICANT CHANGE UP

## 2024-10-21 ENCOUNTER — APPOINTMENT (OUTPATIENT)
Age: 69
End: 2024-10-21
Payer: MEDICARE

## 2024-10-21 VITALS
HEART RATE: 89 BPM | OXYGEN SATURATION: 94 % | HEIGHT: 73 IN | DIASTOLIC BLOOD PRESSURE: 65 MMHG | TEMPERATURE: 97.3 F | BODY MASS INDEX: 25.22 KG/M2 | SYSTOLIC BLOOD PRESSURE: 103 MMHG | WEIGHT: 190.25 LBS

## 2024-10-21 PROCEDURE — 99214 OFFICE O/P EST MOD 30 MIN: CPT

## 2024-10-22 ENCOUNTER — APPOINTMENT (OUTPATIENT)
Dept: OTOLARYNGOLOGY | Facility: CLINIC | Age: 69
End: 2024-10-22

## 2024-10-22 RX ORDER — ALLOPURINOL 300 MG/1
300 TABLET ORAL DAILY
Qty: 30 | Refills: 0 | Status: ACTIVE | COMMUNITY
Start: 2024-10-22 | End: 1900-01-01

## 2024-10-23 LAB
CORTICOSTEROID BINDING GLOBULIN RESULT: 1.8 MG/DL — SIGNIFICANT CHANGE UP
CORTIS F/TOTAL MFR SERPL: <3.1 % — SIGNIFICANT CHANGE UP
CORTIS SERPL-MCNC: <1 UG/DL — LOW
CORTISOL, FREE RESULT: <0.03 UG/DL — LOW

## 2024-10-30 ENCOUNTER — APPOINTMENT (OUTPATIENT)
Dept: PULMONOLOGY | Facility: CLINIC | Age: 69
End: 2024-10-30
Payer: MEDICARE

## 2024-10-30 VITALS
BODY MASS INDEX: 25.07 KG/M2 | WEIGHT: 190 LBS | SYSTOLIC BLOOD PRESSURE: 110 MMHG | HEART RATE: 80 BPM | RESPIRATION RATE: 14 BRPM | DIASTOLIC BLOOD PRESSURE: 58 MMHG | OXYGEN SATURATION: 95 %

## 2024-10-30 DIAGNOSIS — C85.80 OTHER SPECIFIED TYPES OF NON-HODGKIN LYMPHOMA, UNSPECIFIED SITE: ICD-10-CM

## 2024-10-30 DIAGNOSIS — J18.9 PNEUMONIA, UNSPECIFIED ORGANISM: ICD-10-CM

## 2024-10-30 DIAGNOSIS — D3A.8 OTHER BENIGN NEUROENDOCRINE TUMORS: ICD-10-CM

## 2024-10-30 PROCEDURE — 99214 OFFICE O/P EST MOD 30 MIN: CPT

## 2024-10-30 PROCEDURE — G2211 COMPLEX E/M VISIT ADD ON: CPT

## 2024-10-30 RX ORDER — BUDESONIDE AND FORMOTEROL FUMARATE DIHYDRATE 160; 4.5 UG/1; UG/1
160-4.5 AEROSOL RESPIRATORY (INHALATION) TWICE DAILY
Qty: 1 | Refills: 0 | Status: ACTIVE | COMMUNITY
Start: 2024-10-30 | End: 1900-01-01

## 2024-10-30 RX ORDER — PREDNISONE 20 MG/1
20 TABLET ORAL
Qty: 20 | Refills: 0 | Status: ACTIVE | COMMUNITY
Start: 2024-10-30 | End: 1900-01-01

## 2024-11-04 ENCOUNTER — LABORATORY RESULT (OUTPATIENT)
Age: 69
End: 2024-11-04

## 2024-11-05 LAB
ALBUMIN SERPL ELPH-MCNC: 3.4 G/DL
ALP BLD-CCNC: 123 U/L
ALT SERPL-CCNC: 16 U/L
ANION GAP SERPL CALC-SCNC: 12 MMOL/L
AST SERPL-CCNC: 8 U/L
BILIRUB SERPL-MCNC: 0.4 MG/DL
BUN SERPL-MCNC: 32 MG/DL
CALCIUM SERPL-MCNC: 8.6 MG/DL
CHLORIDE SERPL-SCNC: 111 MMOL/L
CO2 SERPL-SCNC: 17 MMOL/L
CREAT SERPL-MCNC: 2.1 MG/DL
EGFR: 34 ML/MIN/1.73M2
GLUCOSE SERPL-MCNC: 233 MG/DL
HCT VFR BLD CALC: 29.7 %
HGB BLD-MCNC: 9.6 G/DL
LDH SERPL-CCNC: 129 U/L
MCHC RBC-ENTMCNC: 30.1 PG
MCHC RBC-ENTMCNC: 32.3 G/DL
MCV RBC AUTO: 93.1 FL
PLATELET # BLD AUTO: 192 K/UL
PMV BLD: 8.8 FL
POTASSIUM SERPL-SCNC: 4.4 MMOL/L
PROT SERPL-MCNC: 4.6 G/DL
RBC # BLD: 3.19 M/UL
RBC # FLD: 17.3 %
SODIUM SERPL-SCNC: 140 MMOL/L
WBC # FLD AUTO: 0.67 K/UL

## 2024-11-06 ENCOUNTER — APPOINTMENT (OUTPATIENT)
Age: 69
End: 2024-11-06

## 2024-11-06 ENCOUNTER — LABORATORY RESULT (OUTPATIENT)
Age: 69
End: 2024-11-06

## 2024-11-06 LAB
HCT VFR BLD CALC: 28.3 %
HGB BLD-MCNC: 9.3 G/DL
MCHC RBC-ENTMCNC: 30.1 PG
MCHC RBC-ENTMCNC: 32.9 G/DL
MCV RBC AUTO: 91.6 FL
PLATELET # BLD AUTO: 182 K/UL
PMV BLD: 10.5 FL
RBC # BLD: 3.09 M/UL
RBC # FLD: 17.2 %
WBC # FLD AUTO: 0.8 K/UL

## 2024-11-07 LAB — CGA SERPL-MCNC: 1014 NG/ML

## 2024-11-08 ENCOUNTER — APPOINTMENT (OUTPATIENT)
Age: 69
End: 2024-11-08

## 2024-11-08 ENCOUNTER — LABORATORY RESULT (OUTPATIENT)
Age: 69
End: 2024-11-08

## 2024-11-08 LAB
HCT VFR BLD CALC: 26.7 %
HGB BLD-MCNC: 8.9 G/DL
MCHC RBC-ENTMCNC: 30.3 PG
MCHC RBC-ENTMCNC: 33.3 G/DL
MCV RBC AUTO: 90.8 FL
PLATELET # BLD AUTO: 188 K/UL
PMV BLD: 9.6 FL
RBC # BLD: 2.94 M/UL
RBC # FLD: 16.7 %
WBC # FLD AUTO: 1.17 K/UL

## 2024-12-03 ENCOUNTER — APPOINTMENT (OUTPATIENT)
Age: 69
End: 2024-12-03
Payer: MEDICAID

## 2024-12-03 ENCOUNTER — LABORATORY RESULT (OUTPATIENT)
Age: 69
End: 2024-12-03

## 2024-12-03 VITALS
DIASTOLIC BLOOD PRESSURE: 68 MMHG | BODY MASS INDEX: 25.18 KG/M2 | TEMPERATURE: 97.9 F | HEIGHT: 73 IN | RESPIRATION RATE: 16 BRPM | SYSTOLIC BLOOD PRESSURE: 111 MMHG | WEIGHT: 190 LBS | HEART RATE: 100 BPM

## 2024-12-03 LAB
HCT VFR BLD CALC: 32 %
HGB BLD-MCNC: 10.4 G/DL
LDH SERPL-CCNC: 268 U/L
MCHC RBC-ENTMCNC: 29.6 PG
MCHC RBC-ENTMCNC: 32.5 G/DL
MCV RBC AUTO: 91.2 FL
PLATELET # BLD AUTO: 181 K/UL
PMV BLD: 10.1 FL
RBC # BLD: 3.51 M/UL
RBC # FLD: 15.9 %
WBC # FLD AUTO: 5.4 K/UL

## 2024-12-03 PROCEDURE — 99214 OFFICE O/P EST MOD 30 MIN: CPT

## 2024-12-04 LAB
ALBUMIN SERPL ELPH-MCNC: 3.9 G/DL
ALP BLD-CCNC: 185 U/L
ALT SERPL-CCNC: 10 U/L
ANION GAP SERPL CALC-SCNC: 18 MMOL/L
AST SERPL-CCNC: 14 U/L
BILIRUB SERPL-MCNC: 0.4 MG/DL
BUN SERPL-MCNC: 28 MG/DL
CALCIUM SERPL-MCNC: 11 MG/DL
CHLORIDE SERPL-SCNC: 106 MMOL/L
CO2 SERPL-SCNC: 18 MMOL/L
CREAT SERPL-MCNC: 2.1 MG/DL
EGFR: 34 ML/MIN/1.73M2
GLUCOSE SERPL-MCNC: 99 MG/DL
IGG SER QL IEP: 35 MG/DL
POTASSIUM SERPL-SCNC: 4.5 MMOL/L
PROT SERPL-MCNC: 5.2 G/DL
SODIUM SERPL-SCNC: 142 MMOL/L

## 2024-12-05 ENCOUNTER — OUTPATIENT (OUTPATIENT)
Dept: OUTPATIENT SERVICES | Facility: HOSPITAL | Age: 69
LOS: 1 days | End: 2024-12-05
Payer: MEDICARE

## 2024-12-05 DIAGNOSIS — Z98.890 OTHER SPECIFIED POSTPROCEDURAL STATES: Chronic | ICD-10-CM

## 2024-12-05 DIAGNOSIS — Z00.8 ENCOUNTER FOR OTHER GENERAL EXAMINATION: ICD-10-CM

## 2024-12-05 DIAGNOSIS — S42.409A UNSPECIFIED FRACTURE OF LOWER END OF UNSPECIFIED HUMERUS, INITIAL ENCOUNTER FOR CLOSED FRACTURE: Chronic | ICD-10-CM

## 2024-12-05 DIAGNOSIS — Z96.0 PRESENCE OF UROGENITAL IMPLANTS: Chronic | ICD-10-CM

## 2024-12-05 PROCEDURE — 76770 US EXAM ABDO BACK WALL COMP: CPT | Mod: 26

## 2024-12-05 PROCEDURE — 76770 US EXAM ABDO BACK WALL COMP: CPT

## 2024-12-09 LAB — CGA SERPL-MCNC: 1770 NG/ML

## 2024-12-10 ENCOUNTER — APPOINTMENT (OUTPATIENT)
Dept: PULMONOLOGY | Facility: CLINIC | Age: 69
End: 2024-12-10
Payer: MEDICARE

## 2024-12-10 VITALS
HEART RATE: 110 BPM | RESPIRATION RATE: 16 BRPM | WEIGHT: 190 LBS | SYSTOLIC BLOOD PRESSURE: 110 MMHG | BODY MASS INDEX: 25.07 KG/M2 | DIASTOLIC BLOOD PRESSURE: 62 MMHG | OXYGEN SATURATION: 98 %

## 2024-12-10 DIAGNOSIS — J18.9 PNEUMONIA, UNSPECIFIED ORGANISM: ICD-10-CM

## 2024-12-10 DIAGNOSIS — R05.9 COUGH, UNSPECIFIED: ICD-10-CM

## 2024-12-10 DIAGNOSIS — C85.80 OTHER SPECIFIED TYPES OF NON-HODGKIN LYMPHOMA, UNSPECIFIED SITE: ICD-10-CM

## 2024-12-10 DIAGNOSIS — R13.10 DYSPHAGIA, UNSPECIFIED: ICD-10-CM

## 2024-12-10 DIAGNOSIS — R06.02 SHORTNESS OF BREATH: ICD-10-CM

## 2024-12-10 DIAGNOSIS — R91.1 SOLITARY PULMONARY NODULE: ICD-10-CM

## 2024-12-10 PROCEDURE — 71046 X-RAY EXAM CHEST 2 VIEWS: CPT

## 2024-12-10 PROCEDURE — 99214 OFFICE O/P EST MOD 30 MIN: CPT | Mod: 25

## 2024-12-10 RX ORDER — BUDESONIDE AND FORMOTEROL FUMARATE DIHYDRATE 160; 4.5 UG/1; UG/1
160-4.5 AEROSOL RESPIRATORY (INHALATION) TWICE DAILY
Qty: 1 | Refills: 3 | Status: ACTIVE | COMMUNITY
Start: 2024-12-10 | End: 1900-01-01

## 2024-12-10 RX ORDER — AMOXICILLIN AND CLAVULANATE POTASSIUM 875; 125 MG/1; MG/1
875-125 TABLET, COATED ORAL
Qty: 10 | Refills: 0 | Status: ACTIVE | COMMUNITY
Start: 2024-12-10 | End: 1900-01-01

## 2024-12-10 RX ORDER — ALBUTEROL SULFATE 2.5 MG/3ML
(2.5 MG/3ML) SOLUTION RESPIRATORY (INHALATION) EVERY 8 HOURS
Qty: 1 | Refills: 3 | Status: ACTIVE | COMMUNITY
Start: 2024-12-10 | End: 1900-01-01

## 2024-12-16 ENCOUNTER — APPOINTMENT (OUTPATIENT)
Age: 69
End: 2024-12-16

## 2024-12-17 LAB
ALBUMIN SERPL ELPH-MCNC: 3.7 G/DL
ALP BLD-CCNC: 176 U/L
ALT SERPL-CCNC: 11 U/L
ANION GAP SERPL CALC-SCNC: 16 MMOL/L
AST SERPL-CCNC: 18 U/L
BILIRUB SERPL-MCNC: 0.4 MG/DL
BUN SERPL-MCNC: 27 MG/DL
CALCIUM SERPL-MCNC: 10.5 MG/DL
CHLORIDE SERPL-SCNC: 106 MMOL/L
CO2 SERPL-SCNC: 16 MMOL/L
CREAT SERPL-MCNC: 2.2 MG/DL
EGFR: 32 ML/MIN/1.73M2
GLUCOSE SERPL-MCNC: 146 MG/DL
POTASSIUM SERPL-SCNC: 4 MMOL/L
PROT SERPL-MCNC: 5.1 G/DL
SODIUM SERPL-SCNC: 138 MMOL/L

## 2024-12-19 ENCOUNTER — RESULT REVIEW (OUTPATIENT)
Age: 69
End: 2024-12-19

## 2024-12-19 NOTE — ASU PREOP CHECKLIST - RESPIRATORY RATE (BREATHS/MIN)
18 Mohs Method Verbiage: An incision at a 45 degree angle following the standard Mohs approach was done and the specimen was harvested as a microscopic controlled layer.

## 2024-12-19 NOTE — PROCEDURE NOTE - SUPERVISORY STATEMENT
Bronchoscopy and EBUS FNA and FNB of the subcarinal LN done with no issues; Tolerated well; cytology and flow sent

## 2024-12-19 NOTE — PROCEDURE NOTE - NSBRONCHPROCDETAILS_GEN_A_CORE_FT
PROCEDURE PERFORMED:  Bronchoscopy, EBUS, BAL, and washing.    CONSENT: After explanining the risk and benefit the consent was obtained from     The patient had been previously intubated and was on mechanical ventilatory support. He was on sedation, which was continued and adjusted during the procedure. His FiO2 was increased to 100% during the procedure. The  fiberoptic bronchoscope was introduced through an endotracheal tube adaptor and the tip of the endotracheal tube was noted to be in good position above the hannah. EBUS with biopsy was done for the subcarinal node as well as forceps biopsy.    The Right tracheobronchial tree was inspected closely to the level of the subsegmental bronchi. All bronchi are patent with no endobronchial lesions and no mucosal lesions noted.   The Left tracheobronchial tree was also patent and intact with the mucosa normal   The bronchoscope was then introduced to the Left lobe and BAL was obtained.  The procedure was completed and all samples were submitted for appropriate studies  After adequate clearing of secretions was accomplished, the bronchoscope was removed from the patient and the procedure was terminated.   The patient tolerated the procedure well and there were no complications.

## 2024-12-19 NOTE — ASU PATIENT PROFILE, ADULT - MEDICATION ADMINISTRATION INFO, PROFILE
September 3, 2021    Workability Form for Familia Liz, 1989 who is under my care for a concussion that occurred on 8/29/2021.     Date of most recent exam: 9/3/2021  Date of next exam: 12/3/2021     Diagnosis:  Concussion with loss of consciousness, 30 minutes or less  S06.0X1A      Familia Liz will need to be off work and may start again on 9/13/21      Full or partial days missed due to post-concussion symptoms and follow up appointments should be medically excused.      Follow up evaluation and revision of recommendations to occur on 12/3/2021.    Please feel free to contact me at the number below with any questions or concerns.    Sincerely,       Austyn Borja, DO  Physical Medicine and Rehabilitation [facility]  [facility]  Clinic nurse line: [phone]  Fax: [phone]   no concerns

## 2024-12-19 NOTE — ASU PATIENT PROFILE, ADULT - FALL HARM RISK - FALLEN IN PAST
82 y/o M w/ PMH of spinal stenosis, chronic back pain, HTN, BPH, EtOH use (2-3 beers/day) who underwent laminectomy and posterior spinal fusion of L2-S1. The procedure was uncomplicated and the pt was brought to the ICU for monitoring only.     24 hour events: POD1. Pt was found to have hematuria yesterday, however overnight that resolved. His A-line was KS'ed. HD stable and afebrile overnight. No other reported issues from night team.    REVIEW OF SYSTEMS  Constitutional: No fever, chills, fatigue  Neuro: No headache, numbness, weakness  Resp: No cough, wheezing, shortness of breath  CVS: No chest pain, palpitations, leg swelling  GI: No abdominal pain, nausea, vomiting, diarrhea   : No dysuria, frequency, incontinence  Skin: No itching, burning, rashes, or lesions   Msk: No joint pain or swelling  Psych: No depression, anxiety, mood swings    T(F): 98.7 (18 @ 08:30), Max: 98.7 (18 @ 08:30)  HR: 58 (18 @ 09:00) (54 - 91)  BP: 107/60 (18 @ 09:00) (88/46 - 171/99)  RR: 8 (18 @ 09:00) (8 - 23)  SpO2: 89% (18 @ 09:00) (89% - 100%)  Wt(kg): --        CAPILLARY BLOOD GLUCOSE      I&O's Summary     @ 07:01  -   @ 07:00  --------------------------------------------------------  IN: 3655 mL / OUT: 2440 mL / NET: 1215 mL      PHYSICAL EXAM  General:   CNS:   HEENT:   Resp:   CVS:   Abd:   Ext:   Skin:     MEDICATIONS  ceFAZolin   IVPB IV Intermittent    tamsulosin Oral    finasteride Oral      acetaminophen   Tablet Oral PRN  acetaminophen   Tablet. Oral PRN  diazepam    Tablet Oral PRN  HYDROmorphone  Injectable IV Push PRN  ondansetron Injectable IV Push PRN  oxyCODONE    IR Oral PRN  oxyCODONE    IR Oral PRN        aluminum hydroxide/magnesium hydroxide/simethicone Suspension Oral PRN  docusate sodium Oral  famotidine    Tablet Oral PRN  magnesium hydroxide Suspension Oral PRN      calcium carbonate 1250 mG + Vitamin D (OsCal 500 + D) Oral  folic acid Oral  multivitamin Oral  thiamine Oral      benzocaine 15 mG/menthol 3.6 mG Lozenge Oral PRN                            12.3   14.1  )-----------( 204      ( 30 Mar 2018 06:08 )             36.3           138  |  105  |  24<H>  ----------------------------<  139<H>  4.5   |  24  |  1.40<H>    Ca    7.7<L>      30 Mar 2018 06:08  Phos  3.2       Mg     1.8                   Urinalysis Basic - ( 29 Mar 2018 17:52 )    Color: Red / Appearance: Turbid / S.015 / pH: x  Gluc: x / Ketone: Small  / Bili: Negative / Urobili: Negative   Blood: x / Protein: 75 mg/dL / Nitrite: Negative   Leuk Esterase: Trace / RBC: >50 /HPF / WBC 6-10   Sq Epi: x / Non Sq Epi: Occasional / Bacteria: Few            Radiology: ***  Bedside lung ultrasound: ***  Bedside ECHO: ***    CENTRAL LINE: Y/N          DATE INSERTED:              REMOVE: Y/N  DONALDSON: Y/N                        DATE INSERTED:              REMOVE: Y/N  A-LINE: Y/N                       DATE INSERTED:              REMOVE: Y/N    GLOBAL ISSUE/BEST PRACTICE  Analgesia:   Sedation:   CAM-ICU:   HOB elevation: yes  Stress ulcer prophylaxis:   VTE prophylaxis:   Glycemic control:   Nutrition:     CODE STATUS: ***  [facility] discussion: Y 82 y/o M w/ PMH of spinal stenosis, chronic back pain, HTN, BPH, EtOH use (2-3 beers/day) who underwent laminectomy and posterior spinal fusion of L2-S1. The procedure was uncomplicated and the pt was brought to the ICU for monitoring only.     24 hour events: POD1. Pt was found to have hematuria yesterday, however overnight that resolved. His A-line was DC'ed. HD stable and afebrile overnight. No other reported issues from night team.    REVIEW OF SYSTEMS  Constitutional: No fever, chills, fatigue  Neuro: No headache, numbness. +R foot weakness  Resp: No cough, wheezing, shortness of breath  CVS: No chest pain, palpitations, leg swelling  GI: No abdominal pain, nausea, vomiting, diarrhea   : No dysuria, frequency, incontinence  Skin: No itching, burning, rashes, or lesions   Msk: Only experiencing back pain when he moves his leg, painless at rest. No joint pain or swelling    T(F): 98.7 (18 @ 08:30), Max: 98.7 (18 @ 08:30)  HR: 58 (18 @ 09:00) (54 - 91)  BP: 107/60 (18 @ 09:00) (88/46 - 171/99)  RR: 8 (18 @ 09:00) (8 - 23)  SpO2: 89% (18 @ 09:00) (89% - 100%)    CAPILLARY BLOOD GLUCOSE    I&O's Summary     @ 07:01  -   @ 07:00  --------------------------------------------------------  IN: 3655 mL / OUT: 2440 mL / NET: 1215 mL    L HEATHER Drain-140 mL (sanguinous)  R Hemovac-90 mL (sanguinous)    PHYSICAL EXAM  General: Found awake in bed, NAD  CNS: A&O x3, able to make his needs known  HEENT: NCAT  Resp: CTA b/l anteriorly, good air entry, no wheezes or rales  CVS: Regular rate and rhythm, S1S2, no murmurs or rubs appreciated  Abd: Non distended, soft, non tender  Ext: Peripheral pulses present, no edema. R foot dorsiflexion and plantarflexion decreased, 4/5 strength.  Skin: Warm and dry    MEDICATIONS  ceFAZolin   IVPB IV Intermittent    tamsulosin Oral    finasteride Oral    acetaminophen   Tablet Oral PRN  acetaminophen   Tablet. Oral PRN  diazepam    Tablet Oral PRN  HYDROmorphone  Injectable IV Push PRN  ondansetron Injectable IV Push PRN  oxyCODONE    IR Oral PRN  oxyCODONE    IR Oral PRN    aluminum hydroxide/magnesium hydroxide/simethicone Suspension Oral PRN  docusate sodium Oral  famotidine    Tablet Oral PRN  magnesium hydroxide Suspension Oral PRN    calcium carbonate 1250 mG + Vitamin D (OsCal 500 + D) Oral  folic acid Oral  multivitamin Oral  thiamine Oral    benzocaine 15 mG/menthol 3.6 mG Lozenge Oral PRN                        12.3   14.1  )-----------( 204      ( 30 Mar 2018 06:08 )             36.3         138  |  105  |  24<H>  ----------------------------<  139<H>  4.5   |  24  |  1.40<H>    Ca    7.7<L>      30 Mar 2018 06:08  Phos  3.2       Mg     1.8         Urinalysis Basic - ( 29 Mar 2018 17:52 )    Color: Red / Appearance: Turbid / S.015 / pH: x  Gluc: x / Ketone: Small  / Bili: Negative / Urobili: Negative   Blood: x / Protein: 75 mg/dL / Nitrite: Negative   Leuk Esterase: Trace / RBC: >50 /HPF / WBC 6-10   Sq Epi: x / Non Sq Epi: Occasional / Bacteria: Few    CENTRAL LINE: N  DONALDSON: Y                          A-LINE: N                           GLOBAL ISSUE/BEST PRACTICE  Analgesia: Acetaminophen, dilaudid, oxycodone, diazepam  Sedation: n/a  HOB elevation: yes  Stress ulcer prophylaxis: Pepcid  VTE prophylaxis: SCDs  Glycemic control: n/a  Nutrition: Regular    CODE STATUS: Full  GOC discussion: Y No

## 2024-12-19 NOTE — PROCEDURE NOTE - NSBRONCHHISTORY_GEN_A_CORE_FT
68 Y O with history of neuroendocrine tumor in the liver, marginal zone lymphoma, recent admission for PNA, concerning for organizing PNA, seen OP for persistent cough. Patient had also had mediastinal LAD, now admitted for EBUS to evaluate for further evaluation.    No complaints  NPO   no blood thinners

## 2024-12-19 NOTE — ASU PATIENT PROFILE, ADULT - NS TRANSFER EYEGLASSES PAIRS
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 She is actively undergoing local control with radiation therapy which started on 8/28/23, she completed session 11/33 today.    She has continued complaints of a burning sensation with urination that occurs without pattern.  A UA/UC was sent last week and she was empirically treated with 3 day course of Bactrim. Mom feels like symptoms are slightly improved over the past two days. No dysuria today or yesterday evening. She also has complaints of intermittent upper, medial abdominal pain since starting radiation. She is currently taking Hycet 1-2x/day with partial relief of pain obtained. No associated nausea/vomiting or accompanied burning sensation. She continues on twice daily Pepcid.    Had diarrhea yesterday while Irinotecan was flushing--gave a dose of Atropine. Had one more episode of diarrhea around 4p yesterday that was treated with immodium. No further diarrhea.       She remains afebrile. Denies congestion, cough, sore throat, or cold symptoms. Her appetite is somewhat decreased but overall pretty good averaging 2-3 meals per day. Energy level is consistent with baseline. No reports of pain elsewhere, headaches, vision changes, chest pain, shortness of breath, or difficulty breathing. No active skin concerns. No bleeding, unusual bruising, or petechiae.     Current  Treatment Plan:  Protocol: ARST 1431, Regimen A  Cycle:  6  Week: 16  Day: 2     Oncology History:  Date of diagnosis: 05/18/2023  Treatment protocol: ARST 1431  Presentation: :Progressive abdominal pain that woke her up at night, low grade fever, vomiting, poor appetite, hypertension, acute kidney injury secondary to obstructive uropathy, large pelvic mass.  Staging: Stage IV Clinical group 4  Metastatic work-up demonstrated lung mets.  Bone marrow negative.   Cytogenetics: Negative for FOXO1 gene rearrangement.  EWSR1    rearrangement negative.  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 No pertinent surgical history.     Family History:  History reviewed. No pertinent family history.     Social History:  Pediatric History   Patient Parents   • AMAGARCÍA (Mother)   • NURA MCMANUS (Father)     Other Topics Concern   • Not on file   Social History Narrative   • Not on file       Immunizations:  Immunization History   Administered Date(s) Administered   • Hep B, adolescent or pediatric 2016       Review of Systems:  ROS negative except as listed above in HPI    Growth Percentiles:        Physical Exam:  Constitutional: Appears well-developed and well-nourished, active in no distress.   HENT:   Right Ear: Tympanic membrane normal.   Left Ear:  Tympanic membrane normal.   Nose: Nose normal. No nasal discharge.   Mouth/Throat: Mucous membranes are moist. Dentition is normal. No dental caries. No tonsillar exudate. Oropharynx is clear.   Eyes: Conjunctivae and EOM are normal. Pupils are equal, round, and reactive to light. Right eye exhibits no discharge. Left eye exhibits no discharge.   Neck: Neck supple. No neck rigidity.   Cardiovascular: Normal rate, regular rhythm, S1 normal and S2 normal. Pulses are palpable.   No murmur heard.  Pulmonary/Chest: Effort normal and breath sounds normal. There is normal air entry; no wheezes, no rales and no retraction.   Abdominal: Soft. Bowel sounds are normal. No distension and no mass. There is no hepatosplenomegaly. There is no tenderness. There is no rebound and no guarding.   Genitalia:  no discharge; no rashes    Musculoskeletal: Normal range of motion. There is no edema, tenderness or deformity.   Lymphadenopathy:     There is no cervical, axillary or inguinal adenopathy.   Neurological: Patient is alert. No cranial nerve deficit; no motor or sensory deficit. Coordination normal.  Reflexes are normal.   Skin: Skin is warm and moist. Capillary refill takes less than 2 seconds. No petechiae, no purpura and no rash noted. No jaundice.      Allergies:  ALLERGIES:  Amoxicillin and Blood-group specific substance    Medications:  Current Outpatient Medications   Medication Sig Dispense Refill   • Sennosides (Senna) 8.8 MG/5ML Liquid 5 mLs by Nasogastric route at bedtime as needed (Constipation). 150 mL 5   • lactulose (CHRONULAC) 10 GM/15ML solution Take 7.5 mLs by mouth daily as needed (Constipation). 236 mL 3   • nystatin (MYCOSTATIN) 042826 UNIT/ML suspension Take 5 mLs by mouth 4 times daily for 7 days. 140 mL 0   • gabapentin (NEURONTIN) 250 MG/5ML solution 7.5 mLs by Per NG Tube route in the morning and 7.5 mLs at noon and 7.5 mLs in the evening. Indications: Neuropathic Pain. 640 mL 1   •  HYDROcodone-acetaminophen 7.5-325 MG/15ML solution Take 6 mLs by mouth every 6 hours as needed for Pain. 150 mL 0   • loperamide (IMODIUM) 1 MG/7.5ML liquid Take 15 mLs by mouth as needed.     • sucralfate (CARAFATE) 1 GM/10ML suspension Give 10 ml swish and swallow 3x a day. 414 mL 1   • amLODIPine benzoate (KATERZIA) 1 MG/ML oral suspension Take 4 mLs by mouth in the morning and 4 mLs in the evening. 240 mL 3   • oxybutynin (DITROPAN) 5 MG/5ML syrup GIVE \"EMMERSYN\" 5 ML BY MOUTH IN THE MORNING AND AT NOON AND IN THE EVENING 300 mL 1   • famotidine (PEPCID) 40 MG/5ML suspension SHAKE LIQUID AND GIVE \"EMMERSYN\" 1.3 ML BY MOUTH IN THE MORNING AND IN THE EVENING 100 mL 3   • cefixime (SUPRAX) 200 MG/5ML suspension Take 5 mLs by mouth daily. To be taken prior and post Irinotecan courses as advised. 75 mL 3   • lidocaine-prilocaine (EMLA) cream Apply to port site 30-60 minutes prior to needle access 30 g 5   • megestrol (MEGACE) 40 mg/mL suspension Take 2.4 mLs by mouth in the morning and 2.4 mLs in the evening. Take before meals. 240 mL 1   • Loperamide HCl 1 MG/7.5ML Solution Take 15 mLs by mouth 1 time as needed (diarrhea). Take 2 mg (15 ml) at the first sign of diarrhea. Continue taking 1 mg (7.5 ml) every 3 hours until diarrhea slows or the normal pattern of bowel movements return. Do not exceed 8 mg in a 24 hour period. 300 mL 0   • hydrOXYzine (ATARAX) 10 MG/5ML syrup Take 5 mLs by mouth every 6 hours as needed (nausea/vomiting). 118 mL 1   • ondansetron (ZOFRAN ODT) 4 MG disintegrating tablet Place 1 tablet onto the tongue every 8 hours as needed for Nausea. 15 tablet 1   • sulfamethoxazole-trimethoprim (BACTRIM) 200-40 MG/5ML suspension Take 8.8 mLs by mouth 2 times daily on Saturday and Tello. 200 mL 5   • pegfilgrastim (NEULASTA) 6 MG/0.6ML injection Inject 0.25 mLs into the skin every 28 days. Take only when directed by provider at least 24 hours after chemotherapy completed. 1 each 6     Current  Facility-Administered Medications   Medication Dose Route Frequency Provider Last Rate Last Admin   • ondansetron (ZOFRAN) injection 4 mg  0.15 mg/kg (Treatment Plan Recorded) Intravenous Once Jodi Roche CNP       • irinotecan (CAMPTOSAR) 48 mg in dextrose 5 % 50 mL chemo pediatric IVPB  50 mg/m2 (Treatment Plan Recorded) Intravenous Once Jodi Roche CNP       • atropine injection 0.4 mg  0.4 mg Intravenous Once PRN Jose Miguel Balderas MD       • loperamide (IMODIUM) 1 MG/7.5ML liquid 2 mg  2 mg Oral Once PRN Jose Miguel Balderas MD       • loperamide (IMODIUM) 1 MG/7.5ML liquid 2 mg  2 mg Oral Q4H PRN Jose Miguel Balderas MD       • heparin (porcine) 10 UNIT/ML lock flush 30 Units  30 Units Intravenous PRN Kimberly Meng MD         Facility-Administered Medications Ordered in Other Encounters   Medication Dose Route Frequency Provider Last Rate Last Admin   • heparin (porcine) 10 UNIT/ML lock flush 30 Units  30 Units Intravenous PRN Kimberly Meng MD   30 Units at 09/11/23 1552   • atropine injection 0.4 mg  0.4 mg Intravenous Once PRN Jodi Roche CNP   0.4 mg at 09/11/23 1552   • heparin (porcine) 100 UNIT/ML lock flush 500 Units  5 mL Intracatheter Once Jodi Roche CNP       • loperamide (IMODIUM) 1 MG/7.5ML liquid 2 mg  2 mg Oral Q4H PRN Jodi Roche CNP       • loperamide (IMODIUM) 1 MG/7.5ML liquid 2 mg  2 mg Oral Once PRN Jodi Roceh CNP           Home Medication Compliance:   Compliance with home medication regimen:Yes  Compliance Comments: No missed doses.  Compliance information obtained from:  Mother    Karnofsky/Lansky Performance Status:   90 - minor restrictions in strenuous physical activity     Lab and Imaging Studies:      Assessment/Plan:  Reason for Visit:   Chief Complaint   Patient presents with   • Chemotherapy   • Office Visit     Visit Diagnosis:    1. Embryonal rhabdomyosarcoma (CMD)      Nav Glynn is a 9 year old female with  intermediate risk embryonal MAURA of the pelvis with good response to chemotherapy receiving Cycle 6 Week 16 Day 2 of Irinotecan. Currently receiving local control with radiation with intermittent complaints of medial, upper abdominal pain and intermittent dysuria (? Improvement) s/p empric UTI  treatment with Bactrim (UC <10,000CFU) suspect secondary to radiation.       HEME/ONC: MAURA, currently undergoing local control   -IRINO (50 mg/m2): 48 mg IV over 90 min day 2/5.  -CINV: Zofran 4 mg IV prior to chemotherapy with instructions to continue every 6 hrs as needed for N/V.  - Cefexime 5 mls PO QD, started 2 days prior to be continued during and 3 days post completion of IRINO.  - Imodium 2 mg PO at first onset of loose/liquid stool and 2 mg PO q4h PRN late onset diarrhea after initial dose of Loperamide given.  Atropine for early onset diarrhea.  -RT daily this week (Session 11/33 completed today)    -Call for fever, chills,signs of illness, pallor, persistent nausea, worsening abdominal pain/urinary complaints, vomiting, mouth sores, increased bruising, bleeding, easy fatigability or other concerns      FEN/GI/RENAL: CINV, hypertension secondary to tumor compression, weight loss, constipation   - Anti-emetic regimen with Zofran PRN.    - Bowel regimen with Lactulose & Senna-PRN constipation. See above note.   - Megace for appetite stimulant  - Amlodipine 4 mg BID   - Oral candidiasis: Nystatin 5 mls PO q6h x 7 days (started 9/11). Swish and swallow.     ID: Immunosuppression secondary to chemotherapy  - Bactrim for PJP ppx      NEURO: neuropathy, abdominal pain  - PACT following   - Increase Gabapentin to 375 mg PO TID per palliative. Hycet 3 mg PO q6h PRN for breaktrough pain.    RT daily this week for chemo             1 pair

## 2024-12-19 NOTE — ASU PATIENT PROFILE, ADULT - FALL HARM RISK - UNIVERSAL INTERVENTIONS
Bed in lowest position, wheels locked, appropriate side rails in place/Call bell, personal items and telephone in reach/Instruct patient to call for assistance before getting out of bed or chair/Non-slip footwear when patient is out of bed/[place] to call system/Physically safe environment - no spills, clutter or unnecessary equipment/Purposeful Proactive Rounding/Room/bathroom lighting operational, light cord in reach

## 2024-12-19 NOTE — ASU DISCHARGE PLAN (ADULT/PEDIATRIC) - FINANCIAL ASSISTANCE
[facility] provides services at a reduced cost to those who are determined to be eligible through [facility]’s financial assistance program. Information regarding [facility]’s financial assistance program can be found by going to [email or web address] or by calling [phone].

## 2025-01-01 ENCOUNTER — RESULT REVIEW (OUTPATIENT)
Age: 70
End: 2025-01-01

## 2025-01-01 ENCOUNTER — INPATIENT (INPATIENT)
Facility: HOSPITAL | Age: 70
LOS: 0 days | DRG: 558 | End: 2025-01-02
Attending: INTERNAL MEDICINE | Admitting: INTERNAL MEDICINE
Payer: MEDICARE

## 2025-01-01 VITALS — HEIGHT: 72 IN | HEART RATE: 118 BPM | WEIGHT: 199.74 LBS | OXYGEN SATURATION: 89 %

## 2025-01-01 VITALS — RESPIRATION RATE: 131 BRPM | OXYGEN SATURATION: 61 %

## 2025-01-01 DIAGNOSIS — S42.409A UNSPECIFIED FRACTURE OF LOWER END OF UNSPECIFIED HUMERUS, INITIAL ENCOUNTER FOR CLOSED FRACTURE: Chronic | ICD-10-CM

## 2025-01-01 DIAGNOSIS — Z98.890 OTHER SPECIFIED POSTPROCEDURAL STATES: Chronic | ICD-10-CM

## 2025-01-01 DIAGNOSIS — Z96.0 PRESENCE OF UROGENITAL IMPLANTS: Chronic | ICD-10-CM

## 2025-01-01 DIAGNOSIS — M62.82 RHABDOMYOLYSIS: ICD-10-CM

## 2025-01-01 LAB
ALBUMIN SERPL ELPH-MCNC: 3 G/DL — LOW (ref 3.5–5.2)
ALBUMIN SERPL ELPH-MCNC: 3.2 G/DL — LOW (ref 3.5–5.2)
ALBUMIN SERPL ELPH-MCNC: 3.4 G/DL — LOW (ref 3.5–5.2)
ALP SERPL-CCNC: 144 U/L — HIGH (ref 30–115)
ALP SERPL-CCNC: 163 U/L — HIGH (ref 30–115)
ALP SERPL-CCNC: 163 U/L — HIGH (ref 30–115)
ALT FLD-CCNC: 15 U/L — SIGNIFICANT CHANGE UP (ref 0–41)
ALT FLD-CCNC: 17 U/L — SIGNIFICANT CHANGE UP (ref 0–41)
ALT FLD-CCNC: 18 U/L — SIGNIFICANT CHANGE UP (ref 0–41)
ANION GAP SERPL CALC-SCNC: 15 MMOL/L — HIGH (ref 7–14)
ANION GAP SERPL CALC-SCNC: 16 MMOL/L — HIGH (ref 7–14)
ANION GAP SERPL CALC-SCNC: 18 MMOL/L — HIGH (ref 7–14)
ANISOCYTOSIS BLD QL: SLIGHT — SIGNIFICANT CHANGE UP
APPEARANCE UR: ABNORMAL
APTT BLD: 29.6 SEC — SIGNIFICANT CHANGE UP (ref 27–39.2)
APTT BLD: 34.9 SEC — SIGNIFICANT CHANGE UP (ref 27–39.2)
AST SERPL-CCNC: 66 U/L — HIGH (ref 0–41)
AST SERPL-CCNC: 66 U/L — HIGH (ref 0–41)
AST SERPL-CCNC: 80 U/L — HIGH (ref 0–41)
BACTERIA # UR AUTO: ABNORMAL /HPF
BASE EXCESS BLDA CALC-SCNC: -14.5 MMOL/L — LOW (ref -2–3)
BASE EXCESS BLDV CALC-SCNC: -17.9 MMOL/L — LOW (ref -2–3)
BASOPHILS # BLD AUTO: 0 K/UL — SIGNIFICANT CHANGE UP (ref 0–0.2)
BASOPHILS # BLD AUTO: 0.03 K/UL — SIGNIFICANT CHANGE UP (ref 0–0.2)
BASOPHILS # BLD AUTO: 0.05 K/UL — SIGNIFICANT CHANGE UP (ref 0–0.2)
BASOPHILS NFR BLD AUTO: 0 % — SIGNIFICANT CHANGE UP (ref 0–1)
BASOPHILS NFR BLD AUTO: 0.2 % — SIGNIFICANT CHANGE UP (ref 0–1)
BASOPHILS NFR BLD AUTO: 0.4 % — SIGNIFICANT CHANGE UP (ref 0–1)
BILIRUB SERPL-MCNC: 0.3 MG/DL — SIGNIFICANT CHANGE UP (ref 0.2–1.2)
BILIRUB SERPL-MCNC: 0.5 MG/DL — SIGNIFICANT CHANGE UP (ref 0.2–1.2)
BILIRUB SERPL-MCNC: 0.5 MG/DL — SIGNIFICANT CHANGE UP (ref 0.2–1.2)
BILIRUB UR-MCNC: NEGATIVE — SIGNIFICANT CHANGE UP
BUN SERPL-MCNC: 49 MG/DL — HIGH (ref 10–20)
BUN SERPL-MCNC: 53 MG/DL — HIGH (ref 10–20)
BUN SERPL-MCNC: 53 MG/DL — HIGH (ref 10–20)
BURR CELLS BLD QL SMEAR: PRESENT — SIGNIFICANT CHANGE UP
CA-I SERPL-SCNC: 1.33 MMOL/L — SIGNIFICANT CHANGE UP (ref 1.15–1.33)
CALCIUM SERPL-MCNC: 8.4 MG/DL — SIGNIFICANT CHANGE UP (ref 8.4–10.5)
CALCIUM SERPL-MCNC: 8.6 MG/DL — SIGNIFICANT CHANGE UP (ref 8.4–10.5)
CALCIUM SERPL-MCNC: 9.1 MG/DL — SIGNIFICANT CHANGE UP (ref 8.4–10.5)
CAST: 11 /LPF — HIGH (ref 0–4)
CHLORIDE SERPL-SCNC: 102 MMOL/L — SIGNIFICANT CHANGE UP (ref 98–110)
CHLORIDE SERPL-SCNC: 104 MMOL/L — SIGNIFICANT CHANGE UP (ref 98–110)
CHLORIDE SERPL-SCNC: 98 MMOL/L — SIGNIFICANT CHANGE UP (ref 98–110)
CK SERPL-CCNC: 1190 U/L — HIGH (ref 0–225)
CK SERPL-CCNC: 1515 U/L — HIGH (ref 0–225)
CK SERPL-CCNC: 5346 U/L — HIGH (ref 0–225)
CO2 SERPL-SCNC: 13 MMOL/L — LOW (ref 17–32)
CO2 SERPL-SCNC: 16 MMOL/L — LOW (ref 17–32)
CO2 SERPL-SCNC: 16 MMOL/L — LOW (ref 17–32)
COLOR SPEC: YELLOW — SIGNIFICANT CHANGE UP
CREAT SERPL-MCNC: 3.3 MG/DL — HIGH (ref 0.7–1.5)
CREAT SERPL-MCNC: 4.2 MG/DL — CRITICAL HIGH (ref 0.7–1.5)
CREAT SERPL-MCNC: 4.2 MG/DL — CRITICAL HIGH (ref 0.7–1.5)
CRP SERPL-MCNC: 140 MG/L — HIGH
D DIMER BLD IA.RAPID-MCNC: 2039 NG/ML DDU — HIGH
DACRYOCYTES BLD QL SMEAR: SLIGHT — SIGNIFICANT CHANGE UP
DIFF PNL FLD: ABNORMAL
EGFR: 15 ML/MIN/1.73M2 — LOW
EGFR: 15 ML/MIN/1.73M2 — LOW
EGFR: 20 ML/MIN/1.73M2 — LOW
ELLIPTOCYTES BLD QL SMEAR: SIGNIFICANT CHANGE UP
EOSINOPHIL # BLD AUTO: 0 K/UL — SIGNIFICANT CHANGE UP (ref 0–0.7)
EOSINOPHIL # BLD AUTO: 0 K/UL — SIGNIFICANT CHANGE UP (ref 0–0.7)
EOSINOPHIL # BLD AUTO: 0.01 K/UL — SIGNIFICANT CHANGE UP (ref 0–0.7)
EOSINOPHIL NFR BLD AUTO: 0 % — SIGNIFICANT CHANGE UP (ref 0–8)
EOSINOPHIL NFR BLD AUTO: 0 % — SIGNIFICANT CHANGE UP (ref 0–8)
EOSINOPHIL NFR BLD AUTO: 0.1 % — SIGNIFICANT CHANGE UP (ref 0–8)
FINE GRAN CASTS #/AREA URNS AUTO: PRESENT
FLUAV AG NPH QL: SIGNIFICANT CHANGE UP
FLUBV AG NPH QL: SIGNIFICANT CHANGE UP
GAS PNL BLDA: SIGNIFICANT CHANGE UP
GAS PNL BLDV: 127 MMOL/L — LOW (ref 136–145)
GAS PNL BLDV: SIGNIFICANT CHANGE UP
GAS PNL BLDV: SIGNIFICANT CHANGE UP
GIANT PLATELETS BLD QL SMEAR: PRESENT — SIGNIFICANT CHANGE UP
GLUCOSE BLDC GLUCOMTR-MCNC: 147 MG/DL — HIGH (ref 70–99)
GLUCOSE SERPL-MCNC: 184 MG/DL — HIGH (ref 70–99)
GLUCOSE SERPL-MCNC: 202 MG/DL — HIGH (ref 70–99)
GLUCOSE SERPL-MCNC: 241 MG/DL — HIGH (ref 70–99)
GLUCOSE UR QL: NEGATIVE MG/DL — SIGNIFICANT CHANGE UP
GRAM STN FLD: SIGNIFICANT CHANGE UP
HCO3 BLDA-SCNC: 15 MMOL/L — LOW (ref 21–28)
HCO3 BLDV-SCNC: 14 MMOL/L — LOW (ref 22–29)
HCT VFR BLD CALC: 34 % — LOW (ref 42–52)
HCT VFR BLD CALC: 38.2 % — LOW (ref 42–52)
HCT VFR BLD CALC: 45.3 % — SIGNIFICANT CHANGE UP (ref 42–52)
HCT VFR BLDA CALC: 32 % — LOW (ref 39–51)
HGB BLD CALC-MCNC: 10.6 G/DL — LOW (ref 12.6–17.4)
HGB BLD-MCNC: 10.9 G/DL — LOW (ref 14–18)
HGB BLD-MCNC: 11.9 G/DL — LOW (ref 14–18)
HGB BLD-MCNC: 14 G/DL — SIGNIFICANT CHANGE UP (ref 14–18)
HOROWITZ INDEX BLDA+IHG-RTO: 100 — SIGNIFICANT CHANGE UP
IMM GRANULOCYTES NFR BLD AUTO: 0.3 % — SIGNIFICANT CHANGE UP (ref 0.1–0.3)
IMM GRANULOCYTES NFR BLD AUTO: 0.8 % — HIGH (ref 0.1–0.3)
INR BLD: 1.08 RATIO — SIGNIFICANT CHANGE UP (ref 0.65–1.3)
INR BLD: 1.09 RATIO — SIGNIFICANT CHANGE UP (ref 0.65–1.3)
KETONES UR-MCNC: NEGATIVE MG/DL — SIGNIFICANT CHANGE UP
LACTATE BLDV-MCNC: 0.7 MMOL/L — SIGNIFICANT CHANGE UP (ref 0.5–2)
LACTATE SERPL-SCNC: 10.6 MMOL/L — CRITICAL HIGH (ref 0.7–2)
LACTATE SERPL-SCNC: 2.4 MMOL/L — HIGH (ref 0.7–2)
LACTATE SERPL-SCNC: 3.9 MMOL/L — HIGH (ref 0.7–2)
LDH SERPL L TO P-CCNC: 352 U/L — HIGH (ref 50–242)
LDH SERPL L TO P-CCNC: 439 U/L — HIGH (ref 50–242)
LEUKOCYTE ESTERASE UR-ACNC: ABNORMAL
LYMPHOCYTES # BLD AUTO: 0.63 K/UL — LOW (ref 1.2–3.4)
LYMPHOCYTES # BLD AUTO: 1.7 K/UL — SIGNIFICANT CHANGE UP (ref 1.2–3.4)
LYMPHOCYTES # BLD AUTO: 1.94 K/UL — SIGNIFICANT CHANGE UP (ref 1.2–3.4)
LYMPHOCYTES # BLD AUTO: 10.5 % — LOW (ref 20.5–51.1)
LYMPHOCYTES # BLD AUTO: 12.9 % — LOW (ref 20.5–51.1)
LYMPHOCYTES # BLD AUTO: 16.2 % — LOW (ref 20.5–51.1)
MAGNESIUM SERPL-MCNC: 2.5 MG/DL — HIGH (ref 1.8–2.4)
MAGNESIUM SERPL-MCNC: 2.7 MG/DL — HIGH (ref 1.8–2.4)
MANUAL SMEAR VERIFICATION: SIGNIFICANT CHANGE UP
MCHC RBC-ENTMCNC: 28.5 PG — SIGNIFICANT CHANGE UP (ref 27–31)
MCHC RBC-ENTMCNC: 28.6 PG — SIGNIFICANT CHANGE UP (ref 27–31)
MCHC RBC-ENTMCNC: 28.8 PG — SIGNIFICANT CHANGE UP (ref 27–31)
MCHC RBC-ENTMCNC: 30.9 G/DL — LOW (ref 32–37)
MCHC RBC-ENTMCNC: 31.2 G/DL — LOW (ref 32–37)
MCHC RBC-ENTMCNC: 32.1 G/DL — SIGNIFICANT CHANGE UP (ref 32–37)
MCV RBC AUTO: 89.7 FL — SIGNIFICANT CHANGE UP (ref 80–94)
MCV RBC AUTO: 91.8 FL — SIGNIFICANT CHANGE UP (ref 80–94)
MCV RBC AUTO: 92.1 FL — SIGNIFICANT CHANGE UP (ref 80–94)
METAMYELOCYTES # FLD: 11.4 % — HIGH (ref 0–0)
MICROCYTES BLD QL: SLIGHT — SIGNIFICANT CHANGE UP
MONOCYTES # BLD AUTO: 2.1 K/UL — HIGH (ref 0.1–0.6)
MONOCYTES # BLD AUTO: 2.97 K/UL — HIGH (ref 0.1–0.6)
MONOCYTES # BLD AUTO: 3.83 K/UL — HIGH (ref 0.1–0.6)
MONOCYTES NFR BLD AUTO: 24.9 % — HIGH (ref 1.7–9.3)
MONOCYTES NFR BLD AUTO: 29.1 % — HIGH (ref 1.7–9.3)
MONOCYTES NFR BLD AUTO: 35.1 % — HIGH (ref 1.7–9.3)
MRSA PCR RESULT.: POSITIVE
MYELOCYTES NFR BLD: 6.2 % — HIGH (ref 0–0)
NEUTROPHILS # BLD AUTO: 2.05 K/UL — SIGNIFICANT CHANGE UP (ref 1.4–6.5)
NEUTROPHILS # BLD AUTO: 6.87 K/UL — HIGH (ref 1.4–6.5)
NEUTROPHILS # BLD AUTO: 7.57 K/UL — HIGH (ref 1.4–6.5)
NEUTROPHILS NFR BLD AUTO: 30.7 % — LOW (ref 42.2–75.2)
NEUTROPHILS NFR BLD AUTO: 57.5 % — SIGNIFICANT CHANGE UP (ref 42.2–75.2)
NEUTROPHILS NFR BLD AUTO: 57.6 % — SIGNIFICANT CHANGE UP (ref 42.2–75.2)
NEUTS BAND # BLD: 3.5 % — SIGNIFICANT CHANGE UP (ref 0–6)
NITRITE UR-MCNC: NEGATIVE — SIGNIFICANT CHANGE UP
NRBC # BLD: 0 /100 WBCS — SIGNIFICANT CHANGE UP (ref 0–0)
NRBC # BLD: 0 /100 WBCS — SIGNIFICANT CHANGE UP (ref 0–0)
NT-PROBNP SERPL-SCNC: 6462 PG/ML — HIGH (ref 0–300)
PCO2 BLDA: 51 MMHG — HIGH (ref 35–48)
PCO2 BLDV: 69 MMHG — HIGH (ref 42–55)
PH BLDA: 7.08 — CRITICAL LOW (ref 7.35–7.45)
PH BLDV: 6.93 — CRITICAL LOW (ref 7.32–7.43)
PH UR: 5.5 — SIGNIFICANT CHANGE UP (ref 5–8)
PHOSPHATE SERPL-MCNC: 9.1 MG/DL — HIGH (ref 2.1–4.9)
PHOSPHATE SERPL-MCNC: 9.4 MG/DL — HIGH (ref 2.1–4.9)
PLAT MORPH BLD: NORMAL — SIGNIFICANT CHANGE UP
PLATELET # BLD AUTO: 249 K/UL — SIGNIFICANT CHANGE UP (ref 130–400)
PLATELET # BLD AUTO: 372 K/UL — SIGNIFICANT CHANGE UP (ref 130–400)
PLATELET # BLD AUTO: 389 K/UL — SIGNIFICANT CHANGE UP (ref 130–400)
PMV BLD: 10.6 FL — HIGH (ref 7.4–10.4)
PMV BLD: 10.7 FL — HIGH (ref 7.4–10.4)
PMV BLD: 11.7 FL — HIGH (ref 7.4–10.4)
PO2 BLDA: 109 MMHG — HIGH (ref 83–108)
PO2 BLDV: 81 MMHG — HIGH (ref 25–45)
POIKILOCYTOSIS BLD QL AUTO: SIGNIFICANT CHANGE UP
POLYCHROMASIA BLD QL SMEAR: SLIGHT — SIGNIFICANT CHANGE UP
POTASSIUM BLDV-SCNC: 4.6 MMOL/L — SIGNIFICANT CHANGE UP (ref 3.5–5.1)
POTASSIUM SERPL-MCNC: 4.3 MMOL/L — SIGNIFICANT CHANGE UP (ref 3.5–5)
POTASSIUM SERPL-MCNC: 5.5 MMOL/L — HIGH (ref 3.5–5)
POTASSIUM SERPL-MCNC: 5.5 MMOL/L — HIGH (ref 3.5–5)
POTASSIUM SERPL-SCNC: 4.3 MMOL/L — SIGNIFICANT CHANGE UP (ref 3.5–5)
POTASSIUM SERPL-SCNC: 5.5 MMOL/L — HIGH (ref 3.5–5)
POTASSIUM SERPL-SCNC: 5.5 MMOL/L — HIGH (ref 3.5–5)
PROCALCITONIN SERPL-MCNC: 98.3 NG/ML — HIGH (ref 0.02–0.1)
PROT SERPL-MCNC: 4.2 G/DL — LOW (ref 6–8)
PROT SERPL-MCNC: 4.3 G/DL — LOW (ref 6–8)
PROT SERPL-MCNC: 4.7 G/DL — LOW (ref 6–8)
PROT UR-MCNC: 100 MG/DL
PROTHROM AB SERPL-ACNC: 12.8 SEC — SIGNIFICANT CHANGE UP (ref 9.95–12.87)
PROTHROM AB SERPL-ACNC: 12.9 SEC — HIGH (ref 9.95–12.87)
RBC # BLD: 3.79 M/UL — LOW (ref 4.7–6.1)
RBC # BLD: 4.16 M/UL — LOW (ref 4.7–6.1)
RBC # BLD: 4.92 M/UL — SIGNIFICANT CHANGE UP (ref 4.7–6.1)
RBC # FLD: 15.3 % — HIGH (ref 11.5–14.5)
RBC # FLD: 15.7 % — HIGH (ref 11.5–14.5)
RBC # FLD: 15.8 % — HIGH (ref 11.5–14.5)
RBC BLD AUTO: ABNORMAL
RBC CASTS # UR COMP ASSIST: 127 /HPF — HIGH (ref 0–4)
RSV RNA NPH QL NAA+NON-PROBE: SIGNIFICANT CHANGE UP
SAO2 % BLDA: 97.9 % — SIGNIFICANT CHANGE UP (ref 94–98)
SAO2 % BLDV: 90.8 % — HIGH (ref 67–88)
SARS-COV-2 RNA SPEC QL NAA+PROBE: SIGNIFICANT CHANGE UP
SODIUM SERPL-SCNC: 130 MMOL/L — LOW (ref 135–146)
SODIUM SERPL-SCNC: 133 MMOL/L — LOW (ref 135–146)
SODIUM SERPL-SCNC: 135 MMOL/L — SIGNIFICANT CHANGE UP (ref 135–146)
SP GR SPEC: 1.02 — SIGNIFICANT CHANGE UP (ref 1–1.03)
SPECIMEN SOURCE: SIGNIFICANT CHANGE UP
SQUAMOUS # UR AUTO: 6 /HPF — HIGH (ref 0–5)
TROPONIN T, HIGH SENSITIVITY RESULT: 108 NG/L — CRITICAL HIGH (ref 6–21)
TROPONIN T, HIGH SENSITIVITY RESULT: 85 NG/L — CRITICAL HIGH (ref 6–21)
URATE SERPL-MCNC: 14.1 MG/DL — HIGH (ref 3.4–8.8)
UROBILINOGEN FLD QL: 0.2 MG/DL — SIGNIFICANT CHANGE UP (ref 0.2–1)
VANCOMYCIN FLD-MCNC: 8.4 UG/ML — SIGNIFICANT CHANGE UP (ref 5–10)
VARIANT LYMPHS # BLD: 2.6 % — SIGNIFICANT CHANGE UP (ref 0–5)
WBC # BLD: 11.94 K/UL — HIGH (ref 4.8–10.8)
WBC # BLD: 13.17 K/UL — HIGH (ref 4.8–10.8)
WBC # BLD: 5.99 K/UL — SIGNIFICANT CHANGE UP (ref 4.8–10.8)
WBC # FLD AUTO: 11.94 K/UL — HIGH (ref 4.8–10.8)
WBC # FLD AUTO: 13.17 K/UL — HIGH (ref 4.8–10.8)
WBC # FLD AUTO: 5.99 K/UL — SIGNIFICANT CHANGE UP (ref 4.8–10.8)
WBC UR QL: 24 /HPF — HIGH (ref 0–5)

## 2025-01-01 PROCEDURE — 84295 ASSAY OF SERUM SODIUM: CPT

## 2025-01-01 PROCEDURE — 71045 X-RAY EXAM CHEST 1 VIEW: CPT | Mod: 26

## 2025-01-01 PROCEDURE — 82330 ASSAY OF CALCIUM: CPT

## 2025-01-01 PROCEDURE — 83605 ASSAY OF LACTIC ACID: CPT

## 2025-01-01 PROCEDURE — 70450 CT HEAD/BRAIN W/O DYE: CPT | Mod: 26,MC

## 2025-01-01 PROCEDURE — 93306 TTE W/DOPPLER COMPLETE: CPT | Mod: 26

## 2025-01-01 PROCEDURE — 99291 CRITICAL CARE FIRST HOUR: CPT | Mod: 25

## 2025-01-01 PROCEDURE — 0241U: CPT

## 2025-01-01 PROCEDURE — 85730 THROMBOPLASTIN TIME PARTIAL: CPT

## 2025-01-01 PROCEDURE — 80202 ASSAY OF VANCOMYCIN: CPT

## 2025-01-01 PROCEDURE — 84550 ASSAY OF BLOOD/URIC ACID: CPT

## 2025-01-01 PROCEDURE — 87899 AGENT NOS ASSAY W/OPTIC: CPT

## 2025-01-01 PROCEDURE — 72125 CT NECK SPINE W/O DYE: CPT | Mod: 26,MC

## 2025-01-01 PROCEDURE — 84145 PROCALCITONIN (PCT): CPT

## 2025-01-01 PROCEDURE — 83735 ASSAY OF MAGNESIUM: CPT

## 2025-01-01 PROCEDURE — 93010 ELECTROCARDIOGRAM REPORT: CPT

## 2025-01-01 PROCEDURE — 84100 ASSAY OF PHOSPHORUS: CPT

## 2025-01-01 PROCEDURE — 87640 STAPH A DNA AMP PROBE: CPT

## 2025-01-01 PROCEDURE — 99291 CRITICAL CARE FIRST HOUR: CPT

## 2025-01-01 PROCEDURE — 80053 COMPREHEN METABOLIC PANEL: CPT

## 2025-01-01 PROCEDURE — 82550 ASSAY OF CK (CPK): CPT

## 2025-01-01 PROCEDURE — 82803 BLOOD GASES ANY COMBINATION: CPT

## 2025-01-01 PROCEDURE — 71275 CT ANGIOGRAPHY CHEST: CPT | Mod: 26,MC

## 2025-01-01 PROCEDURE — 94003 VENT MGMT INPAT SUBQ DAY: CPT

## 2025-01-01 PROCEDURE — 85610 PROTHROMBIN TIME: CPT

## 2025-01-01 PROCEDURE — 84132 ASSAY OF SERUM POTASSIUM: CPT

## 2025-01-01 PROCEDURE — 86140 C-REACTIVE PROTEIN: CPT

## 2025-01-01 PROCEDURE — 36556 INSERT NON-TUNNEL CV CATH: CPT

## 2025-01-01 PROCEDURE — 87070 CULTURE OTHR SPECIMN AEROBIC: CPT

## 2025-01-01 PROCEDURE — 31500 INSERT EMERGENCY AIRWAY: CPT

## 2025-01-01 PROCEDURE — 93306 TTE W/DOPPLER COMPLETE: CPT

## 2025-01-01 PROCEDURE — 84484 ASSAY OF TROPONIN QUANT: CPT

## 2025-01-01 PROCEDURE — 85025 COMPLETE CBC W/AUTO DIFF WBC: CPT

## 2025-01-01 PROCEDURE — 36415 COLL VENOUS BLD VENIPUNCTURE: CPT

## 2025-01-01 PROCEDURE — 87641 MR-STAPH DNA AMP PROBE: CPT

## 2025-01-01 PROCEDURE — 82962 GLUCOSE BLOOD TEST: CPT

## 2025-01-01 PROCEDURE — 83615 LACTATE (LD) (LDH) ENZYME: CPT

## 2025-01-01 PROCEDURE — 85014 HEMATOCRIT: CPT

## 2025-01-01 PROCEDURE — 99223 1ST HOSP IP/OBS HIGH 75: CPT

## 2025-01-01 PROCEDURE — 86901 BLOOD TYPING SEROLOGIC RH(D): CPT

## 2025-01-01 PROCEDURE — 71045 X-RAY EXAM CHEST 1 VIEW: CPT

## 2025-01-01 PROCEDURE — 85379 FIBRIN DEGRADATION QUANT: CPT

## 2025-01-01 PROCEDURE — 94640 AIRWAY INHALATION TREATMENT: CPT

## 2025-01-01 PROCEDURE — 86850 RBC ANTIBODY SCREEN: CPT

## 2025-01-01 PROCEDURE — 85018 HEMOGLOBIN: CPT

## 2025-01-01 PROCEDURE — 83880 ASSAY OF NATRIURETIC PEPTIDE: CPT

## 2025-01-01 PROCEDURE — 86713 LEGIONELLA ANTIBODY: CPT

## 2025-01-01 PROCEDURE — 74177 CT ABD & PELVIS W/CONTRAST: CPT | Mod: 26,MC

## 2025-01-01 PROCEDURE — 87449 NOS EACH ORGANISM AG IA: CPT

## 2025-01-01 PROCEDURE — 86900 BLOOD TYPING SEROLOGIC ABO: CPT

## 2025-01-01 PROCEDURE — 71045 X-RAY EXAM CHEST 1 VIEW: CPT | Mod: 26,76

## 2025-01-01 PROCEDURE — 87205 SMEAR GRAM STAIN: CPT

## 2025-01-01 RX ORDER — ALBUTEROL SULFATE 90 UG/1
1 INHALANT RESPIRATORY (INHALATION) ONCE
Refills: 0 | Status: COMPLETED | OUTPATIENT
Start: 2025-01-01 | End: 2025-01-01

## 2025-01-01 RX ORDER — IPRATROPIUM BROMIDE 0.2 MG/ML
1 SOLUTION, NON-ORAL INHALATION ONCE
Refills: 0 | Status: COMPLETED | OUTPATIENT
Start: 2025-01-01 | End: 2025-01-01

## 2025-01-01 RX ORDER — FENTANYL 75 UG/H
0.5 PATCH, EXTENDED RELEASE TRANSDERMAL
Qty: 2500 | Refills: 0 | Status: DISCONTINUED | OUTPATIENT
Start: 2025-01-01 | End: 2025-01-01

## 2025-01-01 RX ORDER — MEROPENEM 1 G/20ML
1000 INJECTION, POWDER, FOR SOLUTION INTRAVENOUS ONCE
Refills: 0 | Status: DISCONTINUED | OUTPATIENT
Start: 2025-01-01 | End: 2025-01-01

## 2025-01-01 RX ORDER — SODIUM BICARBONATE 84 MG/ML
1.73 INJECTION, SOLUTION INTRAVENOUS
Qty: 150 | Refills: 0 | Status: DISCONTINUED | OUTPATIENT
Start: 2025-01-01 | End: 2025-01-01

## 2025-01-01 RX ORDER — ACETAMINOPHEN 80 MG/.8ML
650 SOLUTION/ DROPS ORAL EVERY 6 HOURS
Refills: 0 | Status: DISCONTINUED | OUTPATIENT
Start: 2025-01-01 | End: 2025-01-01

## 2025-01-01 RX ORDER — MUPIROCIN 2 %
1 OINTMENT (GRAM) TOPICAL
Refills: 0 | Status: DISCONTINUED | OUTPATIENT
Start: 2025-01-01 | End: 2025-01-01

## 2025-01-01 RX ORDER — MEROPENEM 1 G/20ML
1000 INJECTION, POWDER, FOR SOLUTION INTRAVENOUS ONCE
Refills: 0 | Status: COMPLETED | OUTPATIENT
Start: 2025-01-01 | End: 2025-01-01

## 2025-01-01 RX ORDER — METHYLPREDNISOLONE 4 MG/1
60 TABLET ORAL EVERY 8 HOURS
Refills: 0 | Status: DISCONTINUED | OUTPATIENT
Start: 2025-01-01 | End: 2025-01-01

## 2025-01-01 RX ORDER — SODIUM BICARBONATE 84 MG/ML
0.26 INJECTION, SOLUTION INTRAVENOUS
Qty: 150 | Refills: 0 | Status: DISCONTINUED | OUTPATIENT
Start: 2025-01-01 | End: 2025-01-01

## 2025-01-01 RX ORDER — DEXMEDETOMIDINE HYDROCHLORIDE 4 UG/ML
0.05 INJECTION, SOLUTION INTRAVENOUS
Qty: 400 | Refills: 0 | Status: DISCONTINUED | OUTPATIENT
Start: 2025-01-01 | End: 2025-01-01

## 2025-01-01 RX ORDER — HYDROXOCOBALAMIN 1000MCG/ML
5 VIAL (ML) INTRAMUSCULAR ONCE
Refills: 0 | Status: COMPLETED | OUTPATIENT
Start: 2025-01-01 | End: 2025-01-01

## 2025-01-01 RX ORDER — SODIUM CHLORIDE 9 MG/ML
500 INJECTION, SOLUTION INTRAVENOUS ONCE
Refills: 0 | Status: DISCONTINUED | OUTPATIENT
Start: 2025-01-01 | End: 2025-01-01

## 2025-01-01 RX ORDER — HYDROCORTISONE 100 MG/60ML
50 ENEMA RECTAL EVERY 6 HOURS
Refills: 0 | Status: DISCONTINUED | OUTPATIENT
Start: 2025-01-01 | End: 2025-01-01

## 2025-01-01 RX ORDER — AMIODARONE HYDROCHLORIDE 200 MG/1
150 TABLET ORAL ONCE
Refills: 0 | Status: COMPLETED | OUTPATIENT
Start: 2025-01-01 | End: 2025-01-01

## 2025-01-01 RX ORDER — VANCOMYCIN HYDROCHLORIDE 5 G/100ML
1000 INJECTION, POWDER, LYOPHILIZED, FOR SOLUTION INTRAVENOUS ONCE
Refills: 0 | Status: COMPLETED | OUTPATIENT
Start: 2025-01-01 | End: 2025-01-01

## 2025-01-01 RX ORDER — HYDROCORTISONE 100 MG/60ML
100 ENEMA RECTAL ONCE
Refills: 0 | Status: COMPLETED | OUTPATIENT
Start: 2025-01-01 | End: 2025-01-01

## 2025-01-01 RX ORDER — CALCIUM GLUCONATE 94 MG/ML
2 INJECTION, SOLUTION INTRAVENOUS ONCE
Refills: 0 | Status: COMPLETED | OUTPATIENT
Start: 2025-01-01 | End: 2025-01-01

## 2025-01-01 RX ORDER — LINEZOLID 600 MG/1
600 TABLET ORAL EVERY 12 HOURS
Refills: 0 | Status: DISCONTINUED | OUTPATIENT
Start: 2025-01-01 | End: 2025-01-01

## 2025-01-01 RX ORDER — NOREPINEPHRINE BITARTRATE 1 MG/ML
0.05 INJECTION INTRAVENOUS
Qty: 16 | Refills: 0 | Status: DISCONTINUED | OUTPATIENT
Start: 2025-01-01 | End: 2025-01-01

## 2025-01-01 RX ORDER — SODIUM ZIRCONIUM CYCLOSILICATE 10 G/10G
10 POWDER, FOR SUSPENSION ORAL ONCE
Refills: 0 | Status: COMPLETED | OUTPATIENT
Start: 2025-01-01 | End: 2025-01-01

## 2025-01-01 RX ORDER — SODIUM BICARBONATE 84 MG/ML
150 INJECTION, SOLUTION INTRAVENOUS ONCE
Refills: 0 | Status: COMPLETED | OUTPATIENT
Start: 2025-01-01 | End: 2025-01-01

## 2025-01-01 RX ORDER — PHENYLEPHRINE HCL IN 0.9% NACL 0.4MG/10ML
0.1 SYRINGE (ML) INTRAVENOUS
Qty: 160 | Refills: 0 | Status: DISCONTINUED | OUTPATIENT
Start: 2025-01-01 | End: 2025-01-01

## 2025-01-01 RX ORDER — EPINEPHRINE 0.15 MG/.15ML
0.03 INJECTION, SOLUTION INTRAMUSCULAR
Qty: 8 | Refills: 0 | Status: DISCONTINUED | OUTPATIENT
Start: 2025-01-01 | End: 2025-01-01

## 2025-01-01 RX ORDER — PHENYLEPHRINE HCL IN 0.9% NACL 0.4MG/10ML
0.1 SYRINGE (ML) INTRAVENOUS
Qty: 40 | Refills: 0 | Status: DISCONTINUED | OUTPATIENT
Start: 2025-01-01 | End: 2025-01-01

## 2025-01-01 RX ORDER — MIDAZOLAM HYDROCHLORIDE 1 MG/ML
2 INJECTION INTRAMUSCULAR; INTRAVENOUS ONCE
Refills: 0 | Status: DISCONTINUED | OUTPATIENT
Start: 2025-01-01 | End: 2025-01-01

## 2025-01-01 RX ORDER — KETAMINE HCL 100 MG/ML
150 VIAL (ML) INJECTION ONCE
Refills: 0 | Status: DISCONTINUED | OUTPATIENT
Start: 2025-01-01 | End: 2025-01-01

## 2025-01-01 RX ORDER — DEXTROSE MONOHYDRATE 25 G/50ML
50 INJECTION, SOLUTION INTRAVENOUS ONCE
Refills: 0 | Status: COMPLETED | OUTPATIENT
Start: 2025-01-01 | End: 2025-01-01

## 2025-01-01 RX ORDER — AMIODARONE HYDROCHLORIDE 200 MG/1
0.5 TABLET ORAL
Qty: 450 | Refills: 0 | Status: DISCONTINUED | OUTPATIENT
Start: 2025-01-01 | End: 2025-01-01

## 2025-01-01 RX ORDER — CHLORHEXIDINE GLUCONATE 1.2 MG/ML
15 RINSE ORAL EVERY 12 HOURS
Refills: 0 | Status: DISCONTINUED | OUTPATIENT
Start: 2025-01-01 | End: 2025-01-01

## 2025-01-01 RX ORDER — INSULIN HUMAN 100 [IU]/ML
10 INJECTION, SOLUTION SUBCUTANEOUS ONCE
Refills: 0 | Status: COMPLETED | OUTPATIENT
Start: 2025-01-01 | End: 2025-01-01

## 2025-01-01 RX ORDER — SODIUM BICARBONATE 84 MG/ML
50 INJECTION, SOLUTION INTRAVENOUS ONCE
Refills: 0 | Status: COMPLETED | OUTPATIENT
Start: 2025-01-01 | End: 2025-01-01

## 2025-01-01 RX ORDER — METRONIDAZOLE 250 MG/1
500 TABLET ORAL EVERY 8 HOURS
Refills: 0 | Status: DISCONTINUED | OUTPATIENT
Start: 2025-01-01 | End: 2025-01-01

## 2025-01-01 RX ORDER — VASOPRESSIN 20 UNIT/ML
0.04 AMPUL (ML) INJECTION
Qty: 40 | Refills: 0 | Status: DISCONTINUED | OUTPATIENT
Start: 2025-01-01 | End: 2025-01-01

## 2025-01-01 RX ORDER — ESMOLOL HYDROCHLORIDE 10 MG/ML
50 INJECTION, SOLUTION INTRAVENOUS
Qty: 2500 | Refills: 0 | Status: DISCONTINUED | OUTPATIENT
Start: 2025-01-01 | End: 2025-01-01

## 2025-01-01 RX ORDER — SODIUM CHLORIDE 9 MG/ML
4 INJECTION, SOLUTION INTRAMUSCULAR; INTRAVENOUS; SUBCUTANEOUS ONCE
Refills: 0 | Status: COMPLETED | OUTPATIENT
Start: 2025-01-01 | End: 2025-01-01

## 2025-01-01 RX ORDER — NOREPINEPHRINE BITARTRATE 1 MG/ML
0.05 INJECTION INTRAVENOUS
Qty: 32 | Refills: 0 | Status: DISCONTINUED | OUTPATIENT
Start: 2025-01-01 | End: 2025-01-01

## 2025-01-01 RX ORDER — ADENOSINE 3 MG/ML
6 INJECTION, SOLUTION INTRAVENOUS ONCE
Refills: 0 | Status: COMPLETED | OUTPATIENT
Start: 2025-01-01 | End: 2025-01-01

## 2025-01-01 RX ORDER — CHLORHEXIDINE GLUCONATE 1.2 MG/ML
1 RINSE ORAL
Refills: 0 | Status: DISCONTINUED | OUTPATIENT
Start: 2025-01-01 | End: 2025-01-01

## 2025-01-01 RX ORDER — HEPARIN SODIUM 1000 [USP'U]/ML
5000 INJECTION, SOLUTION INTRAVENOUS; SUBCUTANEOUS EVERY 12 HOURS
Refills: 0 | Status: DISCONTINUED | OUTPATIENT
Start: 2025-01-01 | End: 2025-01-01

## 2025-01-01 RX ORDER — VANCOMYCIN HYDROCHLORIDE 5 G/100ML
1250 INJECTION, POWDER, LYOPHILIZED, FOR SOLUTION INTRAVENOUS EVERY 24 HOURS
Refills: 0 | Status: DISCONTINUED | OUTPATIENT
Start: 2025-01-01 | End: 2025-01-01

## 2025-01-01 RX ORDER — NOREPINEPHRINE BITARTRATE 1 MG/ML
0.05 INJECTION INTRAVENOUS
Qty: 8 | Refills: 0 | Status: DISCONTINUED | OUTPATIENT
Start: 2025-01-01 | End: 2025-01-01

## 2025-01-01 RX ORDER — CISATRACURIUM BESYLATE 2 MG/ML
20 INJECTION INTRAVENOUS ONCE
Refills: 0 | Status: COMPLETED | OUTPATIENT
Start: 2025-01-01 | End: 2025-01-01

## 2025-01-01 RX ORDER — EPINEPHRINE 0.15 MG/.15ML
0.03 INJECTION, SOLUTION INTRAMUSCULAR
Qty: 4 | Refills: 0 | Status: DISCONTINUED | OUTPATIENT
Start: 2025-01-01 | End: 2025-01-01

## 2025-01-01 RX ORDER — INFLUENZA A VIRUS A/WISCONSIN/588/2019 (H1N1) RECOMBINANT HEMAGGLUTININ ANTIGEN, INFLUENZA A VIRUS A/DARWIN/6/2021 (H3N2) RECOMBINANT HEMAGGLUTININ ANTIGEN, INFLUENZA B VIRUS B/AUSTRIA/1359417/2021 RECOMBINANT HEMAGGLUTININ ANTIGEN, AND INFLUENZA B VIRUS B/PHUKET/3073/2013 RECOMBINANT HEMAGGLUTININ ANTIGEN 45; 45; 45; 45 UG/.5ML; UG/.5ML; UG/.5ML; UG/.5ML
0.5 INJECTION INTRAMUSCULAR ONCE
Refills: 0 | Status: DISCONTINUED | OUTPATIENT
Start: 2025-01-01 | End: 2025-01-01

## 2025-01-01 RX ORDER — METRONIDAZOLE 250 MG/1
TABLET ORAL
Refills: 0 | Status: DISCONTINUED | OUTPATIENT
Start: 2025-01-01 | End: 2025-01-01

## 2025-01-01 RX ORDER — MEROPENEM 1 G/20ML
1000 INJECTION, POWDER, FOR SOLUTION INTRAVENOUS EVERY 24 HOURS
Refills: 0 | Status: DISCONTINUED | OUTPATIENT
Start: 2025-01-01 | End: 2025-01-01

## 2025-01-01 RX ORDER — SODIUM BICARBONATE 84 MG/ML
100 INJECTION, SOLUTION INTRAVENOUS ONCE
Refills: 0 | Status: COMPLETED | OUTPATIENT
Start: 2025-01-01 | End: 2025-01-01

## 2025-01-01 RX ORDER — METHYLENE BLUE 10 MG/ML
130 INJECTION, SOLUTION INTRAVENOUS ONCE
Refills: 0 | Status: DISCONTINUED | OUTPATIENT
Start: 2025-01-01 | End: 2025-01-01

## 2025-01-01 RX ORDER — SODIUM CHLORIDE 9 MG/ML
3000 INJECTION, SOLUTION INTRAVENOUS ONCE
Refills: 0 | Status: COMPLETED | OUTPATIENT
Start: 2025-01-01 | End: 2025-01-01

## 2025-01-01 RX ORDER — SODIUM ZIRCONIUM CYCLOSILICATE 10 G/10G
5 POWDER, FOR SUSPENSION ORAL ONCE
Refills: 0 | Status: COMPLETED | OUTPATIENT
Start: 2025-01-01 | End: 2025-01-01

## 2025-01-01 RX ORDER — CISATRACURIUM BESYLATE 2 MG/ML
3 INJECTION INTRAVENOUS
Qty: 200 | Refills: 0 | Status: DISCONTINUED | OUTPATIENT
Start: 2025-01-01 | End: 2025-01-01

## 2025-01-01 RX ORDER — SODIUM BICARBONATE 84 MG/ML
0.08 INJECTION, SOLUTION INTRAVENOUS
Qty: 150 | Refills: 0 | Status: DISCONTINUED | OUTPATIENT
Start: 2025-01-01 | End: 2025-01-01

## 2025-01-01 RX ORDER — SODIUM CHLORIDE 9 MG/ML
500 INJECTION, SOLUTION INTRAVENOUS
Refills: 0 | Status: DISCONTINUED | OUTPATIENT
Start: 2025-01-01 | End: 2025-01-01

## 2025-01-01 RX ORDER — AMIODARONE HYDROCHLORIDE 200 MG/1
1 TABLET ORAL
Qty: 450 | Refills: 0 | Status: DISCONTINUED | OUTPATIENT
Start: 2025-01-01 | End: 2025-01-01

## 2025-01-01 RX ORDER — ROCURONIUM BROMIDE 50 MG/5 ML
100 SYRINGE (ML) INTRAVENOUS ONCE
Refills: 0 | Status: COMPLETED | OUTPATIENT
Start: 2025-01-01 | End: 2025-01-01

## 2025-01-01 RX ORDER — METRONIDAZOLE 250 MG/1
500 TABLET ORAL ONCE
Refills: 0 | Status: DISCONTINUED | OUTPATIENT
Start: 2025-01-01 | End: 2025-01-01

## 2025-01-01 RX ADMIN — SODIUM BICARBONATE 100 MILLIEQUIVALENT(S): 84 INJECTION, SOLUTION INTRAVENOUS at 13:52

## 2025-01-01 RX ADMIN — Medication 1.63 MICROGRAM(S)/KG/MIN: at 09:57

## 2025-01-01 RX ADMIN — Medication 800 GRAM(S): at 19:59

## 2025-01-01 RX ADMIN — SODIUM BICARBONATE 150 MEQ/KG/HR: 84 INJECTION, SOLUTION INTRAVENOUS at 14:47

## 2025-01-01 RX ADMIN — METHYLPREDNISOLONE 60 MILLIGRAM(S): 4 TABLET ORAL at 09:57

## 2025-01-01 RX ADMIN — CHLORHEXIDINE GLUCONATE 15 MILLILITER(S): 1.2 RINSE ORAL at 17:49

## 2025-01-01 RX ADMIN — DEXTROSE MONOHYDRATE 50 MILLILITER(S): 25 INJECTION, SOLUTION INTRAVENOUS at 10:03

## 2025-01-01 RX ADMIN — CISATRACURIUM BESYLATE 15.6 MICROGRAM(S)/KG/MIN: 2 INJECTION INTRAVENOUS at 09:58

## 2025-01-01 RX ADMIN — HEPARIN SODIUM 5000 UNIT(S): 1000 INJECTION, SOLUTION INTRAVENOUS; SUBCUTANEOUS at 05:20

## 2025-01-01 RX ADMIN — METHYLPREDNISOLONE 60 MILLIGRAM(S): 4 TABLET ORAL at 13:21

## 2025-01-01 RX ADMIN — Medication 6 UNIT(S)/MIN: at 09:58

## 2025-01-01 RX ADMIN — ALBUTEROL SULFATE 1 PUFF(S): 90 INHALANT RESPIRATORY (INHALATION) at 03:43

## 2025-01-01 RX ADMIN — EPINEPHRINE 4.88 MICROGRAM(S)/KG/MIN: 0.15 INJECTION, SOLUTION INTRAMUSCULAR at 13:00

## 2025-01-01 RX ADMIN — SODIUM CHLORIDE 4 MILLILITER(S): 9 INJECTION, SOLUTION INTRAMUSCULAR; INTRAVENOUS; SUBCUTANEOUS at 03:45

## 2025-01-01 RX ADMIN — SODIUM BICARBONATE 150 MILLIEQUIVALENT(S): 84 INJECTION, SOLUTION INTRAVENOUS at 13:21

## 2025-01-01 RX ADMIN — SODIUM BICARBONATE 1000 MEQ/KG/HR: 84 INJECTION, SOLUTION INTRAVENOUS at 11:15

## 2025-01-01 RX ADMIN — INSULIN HUMAN 10 UNIT(S): 100 INJECTION, SOLUTION SUBCUTANEOUS at 10:03

## 2025-01-01 RX ADMIN — NOREPINEPHRINE BITARTRATE 4.06 MICROGRAM(S)/KG/MIN: 1 INJECTION INTRAVENOUS at 09:58

## 2025-01-01 RX ADMIN — ACETAMINOPHEN 650 MILLIGRAM(S): 80 SOLUTION/ DROPS ORAL at 11:14

## 2025-01-01 RX ADMIN — HYDROCORTISONE 50 MILLIGRAM(S): 100 ENEMA RECTAL at 05:19

## 2025-01-01 RX ADMIN — NOREPINEPHRINE BITARTRATE 4.06 MICROGRAM(S)/KG/MIN: 1 INJECTION INTRAVENOUS at 23:34

## 2025-01-01 RX ADMIN — FENTANYL 4.25 MICROGRAM(S)/KG/HR: 75 PATCH, EXTENDED RELEASE TRANSDERMAL at 16:58

## 2025-01-01 RX ADMIN — NOREPINEPHRINE BITARTRATE 7.97 MICROGRAM(S)/KG/MIN: 1 INJECTION INTRAVENOUS at 16:40

## 2025-01-01 RX ADMIN — CHLORHEXIDINE GLUCONATE 15 MILLILITER(S): 1.2 RINSE ORAL at 05:20

## 2025-01-01 RX ADMIN — AMIODARONE HYDROCHLORIDE 600 MILLIGRAM(S): 200 TABLET ORAL at 13:52

## 2025-01-01 RX ADMIN — SODIUM BICARBONATE 125 MEQ/KG/HR: 84 INJECTION, SOLUTION INTRAVENOUS at 19:45

## 2025-01-01 RX ADMIN — MEROPENEM 100 MILLIGRAM(S): 1 INJECTION, POWDER, FOR SOLUTION INTRAVENOUS at 23:34

## 2025-01-01 RX ADMIN — SODIUM BICARBONATE 150 MILLIEQUIVALENT(S): 84 INJECTION, SOLUTION INTRAVENOUS at 09:57

## 2025-01-01 RX ADMIN — Medication 1 PUFF(S): at 03:45

## 2025-01-01 RX ADMIN — LINEZOLID 300 MILLIGRAM(S): 600 TABLET ORAL at 17:49

## 2025-01-01 RX ADMIN — SODIUM BICARBONATE 50 MEQ/KG/HR: 84 INJECTION, SOLUTION INTRAVENOUS at 09:57

## 2025-01-01 RX ADMIN — SODIUM BICARBONATE 150 MILLIEQUIVALENT(S): 84 INJECTION, SOLUTION INTRAVENOUS at 11:14

## 2025-01-01 RX ADMIN — Medication 150 MILLIGRAM(S): at 16:30

## 2025-01-01 RX ADMIN — SODIUM ZIRCONIUM CYCLOSILICATE 10 GRAM(S): 10 POWDER, FOR SUSPENSION ORAL at 05:20

## 2025-01-01 RX ADMIN — CHLORHEXIDINE GLUCONATE 1 APPLICATION(S): 1.2 RINSE ORAL at 05:20

## 2025-01-01 RX ADMIN — HEPARIN SODIUM 5000 UNIT(S): 1000 INJECTION, SOLUTION INTRAVENOUS; SUBCUTANEOUS at 17:50

## 2025-01-01 RX ADMIN — Medication 100 MILLIGRAM(S): at 16:30

## 2025-01-01 RX ADMIN — MEROPENEM 100 MILLIGRAM(S): 1 INJECTION, POWDER, FOR SOLUTION INTRAVENOUS at 10:04

## 2025-01-01 RX ADMIN — SODIUM ZIRCONIUM CYCLOSILICATE 5 GRAM(S): 10 POWDER, FOR SUSPENSION ORAL at 09:57

## 2025-01-01 RX ADMIN — Medication 1 APPLICATION(S): at 17:49

## 2025-01-01 RX ADMIN — AMIODARONE HYDROCHLORIDE 600 MILLIGRAM(S): 200 TABLET ORAL at 11:09

## 2025-01-01 RX ADMIN — CISATRACURIUM BESYLATE 20 MILLIGRAM(S): 2 INJECTION INTRAVENOUS at 04:24

## 2025-01-01 RX ADMIN — ADENOSINE 6 MILLIGRAM(S): 3 INJECTION, SOLUTION INTRAVENOUS at 10:50

## 2025-01-01 RX ADMIN — VANCOMYCIN HYDROCHLORIDE 250 MILLIGRAM(S): 5 INJECTION, POWDER, LYOPHILIZED, FOR SOLUTION INTRAVENOUS at 17:28

## 2025-01-01 RX ADMIN — SODIUM CHLORIDE 3000 MILLILITER(S): 9 INJECTION, SOLUTION INTRAVENOUS at 16:25

## 2025-01-01 RX ADMIN — AMIODARONE HYDROCHLORIDE 33.3 MG/MIN: 200 TABLET ORAL at 11:29

## 2025-01-01 RX ADMIN — Medication 100 MILLIGRAM(S): at 17:28

## 2025-01-01 RX ADMIN — SODIUM BICARBONATE 50 MILLIEQUIVALENT(S): 84 INJECTION, SOLUTION INTRAVENOUS at 23:57

## 2025-01-01 RX ADMIN — HYDROCORTISONE 100 MILLIGRAM(S): 100 ENEMA RECTAL at 23:57

## 2025-01-01 RX ADMIN — CALCIUM GLUCONATE 200 GRAM(S): 94 INJECTION, SOLUTION INTRAVENOUS at 10:30

## 2025-01-01 RX ADMIN — ACETAMINOPHEN 650 MILLIGRAM(S): 80 SOLUTION/ DROPS ORAL at 12:00

## 2025-01-01 RX ADMIN — SODIUM BICARBONATE 50 MILLIEQUIVALENT(S): 84 INJECTION, SOLUTION INTRAVENOUS at 19:10

## 2025-01-01 RX ADMIN — MIDAZOLAM HYDROCHLORIDE 2 MILLIGRAM(S): 1 INJECTION INTRAMUSCULAR; INTRAVENOUS at 00:05

## 2025-01-01 RX ADMIN — SODIUM BICARBONATE 50 MILLIEQUIVALENT(S): 84 INJECTION, SOLUTION INTRAVENOUS at 04:29

## 2025-01-01 RX ADMIN — Medication 1 APPLICATION(S): at 05:19

## 2025-01-01 RX ADMIN — SODIUM CHLORIDE 3000 MILLILITER(S): 9 INJECTION, SOLUTION INTRAVENOUS at 18:00

## 2025-01-01 NOTE — ED PROVIDER NOTE - CONSIDERATION OF ADMISSION OBSERVATION
Consideration of Admission/Observation Patient with septic shock with concurrent renal failure and rhabdo - will require admission for emergent dialysis and IV abx

## 2025-01-01 NOTE — ED PROVIDER NOTE - DIFFERENTIAL DIAGNOSIS
Dyspnea, consider ACS vs PE vs dissection vs tamponade vs esophageal rupture vs pneumothorax vs pneumonia vs fluid overload    Abdominal pain r/o UTI/pyelo, kidney stone, obstruction, perforation, pancreatitis, abscess, appendicitis, ischemia, hepatobiliary abnormality or any other emergent intra-abdominal pathology.    Also consider sepsis vs intracranial pathology vs other metabolic derangement Differential Diagnosis

## 2025-01-01 NOTE — PATIENT PROFILE ADULT - ABILITY TO HEAR (WITH HEARING AID OR HEARING APPLIANCE IF NORMALLY USED):
SIMEON received for dc planning. Pt is listed as Workman's Comp claim through his employer, [employer] had a lumbar spinal laminectomy procedure. SW will await therapy recommendations to proceed with any referrals.     Pt[place] Moder[place] Imp[place]: difficulty hearing in some environments or speaker may need to increase volume or speak distinctly

## 2025-01-01 NOTE — ED PROVIDER NOTE - PHYSICAL EXAMINATION
VITAL SIGNS: I have reviewed nursing notes and confirm.  CONSTITUTIONAL: Patient in moderate respiratory distress.   SKIN: Multiple skin abrasions localized to right elbow, left leg  HEAD: Normocephalic; atraumatic.  EYES: PERRL, EOM intact; conjunctiva and sclera clear.  ENT: No nasal discharge small amount of emesis in mouth  NECK: Supple; non tender.  CARD: S1, S2 normal; no murmurs, gallops, or rubs. Regular rate and rhythm.  RESP: Increased work of breathing with audible crackles. B/L crackles.  ABD: Tympanic to percussion. (+) abdominal distension. diffuse TTP.   EXT: normal ROM.  NEURO: Alert, oriented.  PSYCH: Cooperative, appropriate. VITAL SIGNS: I have reviewed nursing notes and confirm.  CONSTITUTIONAL: Patient in moderate respiratory distress. Vomiting  SKIN: Multiple skin abrasions localized to right elbow, left leg  HEAD: Normocephalic; atraumatic.  EYES: PERRL, EOM intact; conjunctiva and sclera clear.  ENT: No nasal discharge small amount of emesis in mouth  NECK: Supple; non tender.  CARD: S1, S2 normal; no murmurs, gallops, or rubs. Regular rate and rhythm.  RESP: Increased work of breathing with audible crackles. B/L crackles.  ABD: Tympanic to percussion. (+) abdominal distension. diffuse TTP.   EXT: normal ROM.  NEURO: Alert, oriented.  PSYCH: Cooperative, appropriate. VITAL SIGNS: I have reviewed nursing notes and confirm.  CONSTITUTIONAL: Patient in moderate respiratory distress. Vomiting  SKIN: Multiple skin abrasions localized to right elbow, left leg  HEAD: Normocephalic; atraumatic.  EYES: PERRL, EOM intact; conjunctiva and sclera clear.  ENT: No nasal discharge small amount of emesis in mouth  NECK: Supple; non tender.  CARD: Tachycardic. S1, S2 normal;   RESP: Increased work of breathing with audible crackles. B/L crackles.  ABD: Tympanic to percussion. (+) abdominal distension. diffuse TTP.   EXT: normal ROM.  NEURO: Alert, oriented.  PSYCH: Cooperative, appropriate.

## 2025-01-01 NOTE — H&P ADULT - NSHPPHYSICALEXAM_GEN_ALL_CORE
PHYSICAL EXAM:  CONSTITUTIONAL: No acute distress, well-developed, well-groomed, AAOx3  HEAD: Atraumatic, normocephalic  EYES: EOM intact, PERRLA, conjunctiva and sclera clear  ENT: Supple, no masses, no thyromegaly, no bruits, no JVD; moist mucous membranes  PULMONARY: Clear to auscultation bilaterally; no wheezes, rales, or rhonchi  CARDIOVASCULAR: Regular rate and rhythm; no murmurs, rubs, or gallops  GASTROINTESTINAL: Soft, non-tender, non-distended; bowel sounds present  MUSCULOSKELETAL: 2+ peripheral pulses; no clubbing, no cyanosis, no edema  NEUROLOGY: non-focal  SKIN: No rashes or lesions; warm and dry PHYSICAL EXAM:  CONSTITUTIONAL: intubated  PULMONARY: Clear to auscultation bilaterally; no wheezes  CARDIOVASCULAR: Regular rate and rhythm;  GASTROINTESTINAL: Soft, non-tender, non-distended;  MUSCULOSKELETAL: 2+ peripheral pulses;  no edema  SKIN: ecchymosis noted on hand and leg

## 2025-01-01 NOTE — ED PROVIDER NOTE - CLINICAL SUMMARY MEDICAL DECISION MAKING FREE TEXT BOX
Patient presented from nursing home with vomiting x 1 week as well as shortness of breath and diffuse abdominal pain.  Patient also had a fall today and was on the ground for at least 6 hours per report.  Positive head trauma but no LOC.  On arrival to ED, patient hypoxic on nonrebreather, tachycardic in the 110s, and acute respiratory distress requiring my immediate attention.  Patient was seen on arrival at which time decision made to intubate for airway protection.  Patient was successfully intubated with improvement in respiratory status.  Initial VBG was obtained and showed severe metabolic acidosis, with CO2 of only 69 and a normal lactate and therefore high concern for possible uremia/acute renal failure.  Subsequent labs confirmed likely rhabdomyolysis with acute renal failure.  Chest x-ray showed right upper lobe opacity and UA showed borderline UTI.  CTA of the chest subsequently obtained and showed bilateral opacities, consistent with likely lymphoma, but no evidence of PE, no emergent intra-abdominal pathologies, CT head negative for emergent intracranial pathology/traumatic injury.  Despite IV fluids, patient remained hypotensive and therefore required subsequent placement with pressors, which subsequently improved BP.  Nephrology was consulted for renal failure and recommended [place] placement which was performed in ED successfully.  Nephrology to dialyze patient and patient to be admitted to the ICU at this time for further care.  In critical condition at time of admission.

## 2025-01-01 NOTE — CHART NOTE - NSCHARTNOTEFT_GEN_A_CORE
Nephrology service on-call note    Page received regarding this 68 yo M NHR with history of CKD, neuroendocrine tumor, marginal zone lymphoma, schizophrenia admitted for vomiting for the last week and worsening SOB associated with abdominal pain.  Was found down on floor after approximately 6 hours of downtime.  Respiratory distress worsened and required intubation on arrival, and noted to be hypotensive and currently on Levophed infusion.  Labwork noted for >5000 CPK with ASHLEY on CKD (sCr 4.2) and AGMA.  Was started on bicarbonate drip, hourly UOP unclear as 200cc removed with zafar placement but has only been in ER for 2 hours at this time.  Given his h/o cancer and severe rhabdomyolysis likely causing his ASHLEY, will plan for CRRT this evening following [place] placement by primary team.  Would check UrAc, Phos, Calcium and repeat Potassium to r/o TLS as possible contribution of current ASHLEY.  Nephrology will follow during admission, full consult note to follow.

## 2025-01-01 NOTE — ED ADULT NURSE REASSESSMENT NOTE - NS ED NURSE REASSESS COMMENT FT1
Received pt from [facility]. Pt intubated and sedated, satting 96% on ventilator. Cardiac monitoring maintained, VSS. No family present at the bedside. Comfort and safety measures maintained

## 2025-01-01 NOTE — ED ADULT NURSE NOTE - CAS DISCH BELONGINGS RETURNED
Not applicable
Patient is a 89 y F, with 24h HHA, ambulates with 2 canes or a walker, PMHx of Afib on eliquis, HFrEDF, HTN, T2DM, OP/OA, HLD. Presented to the ED with complaints of palpitations and questionable sob. patient is a poor historian.   Denies chest pain, urt symptoms, orthopnea, fever, chills, urinary symptoms, constipation, n/v/ diarrhea.   She mentions a cough 2/2 allergies. She endorsed a chronic knee pain.

## 2025-01-01 NOTE — ED PROCEDURE NOTE - CPROC ED TOLERANCE1
Patient tolerated procedure well.
patient intubated
Patient tolerated procedure well.
Patient tolerated procedure well.

## 2025-01-01 NOTE — CHART NOTE - NSCHARTNOTEFT_GEN_A_CORE
Attempted reaching out to family multiple times through all the numbers listed on the chart, could not reach anyone    CVVH will be started on an emergent basis     It is noted in triage note that patient is from NH, no papers in chart from any facility, as per last DC in October, patient was discharged home    Unable to clarify home meds, please do when if/family is reached Attempted reaching out to family multiple times through all the numbers listed on the chart, could not reach anyone    CVVH will be started on an emergent basis (Discussed case with Nephrologist)    It is noted in triage note that patient is from NH, no papers in chart from any facility, as per last DC in October, patient was discharged home    Unable to clarify home meds, please do when if/family is reached

## 2025-01-01 NOTE — ED PROVIDER NOTE - PROGRESS NOTE DETAILS
Nephrology consulted, recommends dialysis, Markus will be placed BLAS: Patient hypotensive on arrival, tachycardic, febrile, hypoxic.  Intubated for airway protection for concern of aspiration pneumonia.  BP remained soft, started on pressors and remains on Levophed.  VBG initially with pH of 7.2 now 7.0 but not hypercapnic.  No WBC or lactate however patient with creatinine of 4.2 up from 2.2 and elevated CK.  Discussed with nephrology attending Dr. Gr who recommend CCRT, will place [place]. Bicarb ordered.  Admitted to ICU for sepsis, acute renal failure in the setting of rhabdomyolysis

## 2025-01-01 NOTE — H&P ADULT - HISTORY OF PRESENT ILLNESS
Case of 69-year-old male with PMHx of marginal zone lymphoma, pancreatic neuroendocrine tumor, CKD, schizophrenia presenting from NH for vomiting x1 week and increasing shortness of breath.    Patient in ED was complaining of diffuse abdominal pain, also mentions fall today and was on ground for 6 hours because he could not get up, with head strike, denies loss of consciousness, denies AC use.  Denied any fevers or other symptoms.    Upon arrival to the ED, vitals were   BP 95/53    SpO2 85% on 15L O2  Temp?    Remained hypotensive, tachycardic, febrile, hypoxic.  Intubated for airway protection for concern of aspiration pneumonia.  BP remained soft, started on pressors    Labs significant for CBC with metamyelocytes, ASHLEY on CKD, elevated AG, lactate 2.4, CK 5k    CTA chest AP  Extensive bilateral predominantly posterior consolidative airspace opacities, increased at right upper and bilateral lower lobes, improved at right middle lobe and left upper lobe. Findings could be attributed to known lymphoma, pulmonary edema and/or pulmonary edema.    No significant change in extensive mediastinal, hilar, retroperitoneal and upper abdominal mesenteric lymphadenopathy. Findings compatible with known lymphoma.    Left hepatic lobe hypodense mass measuring up to 5.3 cm, similar to prior CT. Unchanged additional smaller hepatic dome right hepatic lobe mass Additional previously seen segment 6 mass not well delineated. Findings suggestive of hepatic lymphoma involvement.    Splenomegaly.    Interval resolution of bilateral hydronephrosis.    Chronic sternum fracture, new since 9/27/2024. No apparent underlying bone lesion on prior CT chest, however pathologic fracture is a consideration in absence of trauma history.    No pulmonary embolus.    CT head: No evidence of acute intracranial abnormality.    CT cervical spine: No evidence of acute fracture of the cervical spine. Cervical lymphadenopathy patient with history of lymphoma, increased since May 2024.    Was given Cefepime/vanc + 3L LR + levophed + bicarb drip Case of 69-year-old male with PMHx of marginal zone lymphoma, pancreatic neuroendocrine tumor, CKD, schizophrenia presenting from NH for vomiting x1 week and increasing shortness of breath.    History below is from the ED note, It is noted in triage note that patient is from NH, no papers in chart from any facility, as per last DC in October, patient was discharged home..    Patient in ED was complaining of diffuse abdominal pain, also mentions fall today and was on ground for 6 hours because he could not get up, with head strike, denies loss of consciousness, denies AC use.  Denied any fevers or other symptoms.    Upon arrival to the ED, vitals were   BP 95/53    SpO2 85% on 15L O2  Temp?    Remained hypotensive, tachycardic, was febrile, hypoxic.  Intubated for airway protection for concern of aspiration pneumonia.  BP remained soft, started on pressors    Labs significant for CBC with metamyelocytes, ASHLEY on CKD, elevated AG, lactate 2.4, CK 5k    CTA chest AP  Extensive bilateral predominantly posterior consolidative airspace opacities, increased at right upper and bilateral lower lobes, improved at right middle lobe and left upper lobe. Findings could be attributed to known lymphoma, pulmonary edema and/or pulmonary edema.    No significant change in extensive mediastinal, hilar, retroperitoneal and upper abdominal mesenteric lymphadenopathy. Findings compatible with known lymphoma.    Left hepatic lobe hypodense mass measuring up to 5.3 cm, similar to prior CT. Unchanged additional smaller hepatic dome right hepatic lobe mass Additional previously seen segment 6 mass not well delineated. Findings suggestive of hepatic lymphoma involvement.    Splenomegaly.    Interval resolution of bilateral hydronephrosis.    Chronic sternum fracture, new since 9/27/2024. No apparent underlying bone lesion on prior CT chest, however pathologic fracture is a consideration in absence of trauma history.    No pulmonary embolus.    CT head: No evidence of acute intracranial abnormality.    CT cervical spine: No evidence of acute fracture of the cervical spine. Cervical lymphadenopathy patient with history of lymphoma, increased since May 2024.    Was given Cefepime/vanc + 3L LR + levophed + bicarb drip

## 2025-01-01 NOTE — ED PROCEDURE NOTE - CPROC ED TIME OUT STATEMENT1
“Patient's name, , procedure and correct site were confirmed during the [place] Timeout.”
“Patient's name, , procedure and correct site were confirmed during the [place] Timeout.”
“Patient's name, , procedure and correct site were confirmed during the [place] Timeout.”

## 2025-01-01 NOTE — ED PROVIDER NOTE - CARE PLAN
1 Principal Discharge DX:	Rhabdomyolysis  Secondary Diagnosis:	Acute renal failure  Secondary Diagnosis:	Septic shock  Secondary Diagnosis:	Metabolic acidosis

## 2025-01-01 NOTE — PROCEDURE NOTE - NSANATOMICLLOCATION_GEN_A_CORE
right Glycopyrrolate Counseling:  I discussed with the patient the risks of glycopyrrolate including but not limited to skin rash, drowsiness, dry mouth, difficulty urinating, and blurred vision.

## 2025-01-01 NOTE — ED PROCEDURE NOTE - NS ED ATTENDING STATEMENT MOD
This was a shared visit with the RUSTY. I reviewed and verified the documentation.
Attending with
This was a shared visit with the RUSTY. I reviewed and verified the documentation.
This was a shared visit with the RUSTY. I reviewed and verified the documentation.

## 2025-01-01 NOTE — ED PROVIDER NOTE - OBJECTIVE STATEMENT
69 year old male history of marginal zone lymphoma and neuroendocrine tumor, CKD, schizophrenia BIBEMS from NH for vomiting x1 week and increasing shortness of breath. Patient admits to diffuse abdominal pain, Patient additionally endorsing recent fall today and was on ground for 6 hours because he could not get up, admits to head strike, denies loss of consciousness, denies AC use. Denied any fevers.

## 2025-01-01 NOTE — H&P ADULT - NSHPLABSRESULTS_GEN_ALL_CORE
LABS:                        10.9   5.99  )-----------( 249      ( 2025 17:13 )             34.0         133[L]  |  102  |  53[H]  ----------------------------<  184[H]  4.3   |  13[L]  |  4.2[HH]    Ca    9.1      2025 17:13    TPro  4.7[L]  /  Alb  3.4[L]  /  TBili  0.5  /  DBili  x   /  AST  80[H]  /  ALT  18  /  AlkPhos  163[H]      PT/INR - ( 2025 17:13 )   PT: 12.80 sec;   INR: 1.08 ratio         PTT - ( 2025 17:13 )  PTT:29.6 sec  Urinalysis Basic - ( 2025 19:00 )    Color: Yellow / Appearance: Cloudy / S.018 / pH: x  Gluc: x / Ketone: Negative mg/dL  / Bili: Negative / Urobili: 0.2 mg/dL   Blood: x / Protein: 100 mg/dL / Nitrite: Negative   Leuk Esterase: Trace / RBC: x / WBC x   Sq Epi: x / Non Sq Epi: x / Bacteria: x      ABG - ( 2025 19:49 )  pH, Arterial: 7.08  pH, Blood: x     /  pCO2: 51    /  pO2: 109   / HCO3: 15    / Base Excess: -14.5 /  SaO2: 97.9              Lactate, Blood: 2.4 mmol/L *H* (25 @ 17:13)      Urinalysis with Rflx Culture (collected 2025 19:00)          RADIOLOGY:

## 2025-01-01 NOTE — H&P ADULT - ASSESSMENT
69-year-old male with PMHx of marginal zone lymphoma, pancreatic neuroendocrine tumor, CKD, schizophrenia presenting from NH for vomiting x1 week and increasing shortness of breath.    IMPRESSION:    #Acute hypoxic respiratory failure  #Possible aspiration pneumonia  #ASHLEY on CKD  #Possible rhabdomyolysis  #Metabolic acidosis likely due to ASHLEY  #Hx of marginal zone lymphoma with possible disease progression  #Hx of Pancreatic neuroendocrine tumor  #Schizophrenia    PLAN:    CNS: CTH noted. sedation. c/w home antipsychotics. Monitor white count and QTc while on them    HEENT: Oral care. Aspiration precautions     PULMONARY: HOB @ 45. Monitor Pulse Ox. Keep > 93%. Supplement as needed. Precedex/fentanyl.    CARDIOVASCULAR:   - TTE  - Goal directed fluid resuscitation  - Wean pressors as tolerated     GI: GI prophylaxis. OG feeding    RENAL: CVVH as per nephro. Dose meds accordingly. Avoid nephrotoxic agents     INFECTIOUS DISEASE: will cover with zosyn for possible aspiration. Procal. Strep and legionella    HEMATOLOGICAL: TLS workup. DVT prophylaxis heparin BID. Oncology eval    ENDOCRINE: Monitor FS    MUSCULOSKELETAL: Bed rest               69-year-old male with PMHx of marginal zone lymphoma, pancreatic neuroendocrine tumor, CKD, schizophrenia presenting from NH for vomiting x1 week and increasing shortness of breath.    IMPRESSION:    #Acute hypoxic respiratory failure  #Possible aspiration pneumonia  #ASHLEY on CKD  #Possible rhabdomyolysis  #Metabolic acidosis likely due to ASHLEY  #Hx of marginal zone lymphoma with possible disease progression  #Hx of Pancreatic neuroendocrine tumor  #Schizophrenia    PLAN:    CNS: CTH noted. sedation. c/w home antipsychotics. Monitor white count and QTc while on them    HEENT: Oral care. Aspiration precautions     PULMONARY: HOB @ 45. Monitor Pulse Ox. Keep > 93%. Supplement as needed. Precedex/fentanyl.    CARDIOVASCULAR:   - TTE  - Goal directed fluid resuscitation  - Wean pressors as tolerated     GI: GI prophylaxis. OG feeding    RENAL: CVVH as per nephro. Dose meds accordingly. Avoid nephrotoxic agents     INFECTIOUS DISEASE: will cover with zosyn for possible aspiration. Procal. Strep and legionella    HEMATOLOGICAL: No evidence of TLS, will get full workup. DVT prophylaxis heparin BID. Oncology eval    ENDOCRINE: Monitor FS    MUSCULOSKELETAL: Bed rest     69-year-old male with PMHx of marginal zone lymphoma, pancreatic neuroendocrine tumor, CKD, schizophrenia presenting from NH for vomiting x1 week and increasing shortness of breath.    IMPRESSION:    #Acute hypoxic respiratory failure  #Possible aspiration pneumonia  #ASHLEY on CKD  #Possible rhabdomyolysis  #Metabolic acidosis likely due to ASHLEY  #Hx of marginal zone lymphoma with possible disease progression  #Hx of Pancreatic neuroendocrine tumor  #Schizophrenia    PLAN:    CNS: CTH noted. sedation. c/w home antipsychotics. Monitor white count and QTc while on them    HEENT: Oral care. Aspiration precautions     PULMONARY: HOB @ 45. Monitor Pulse Ox. Keep > 93%. Supplement as needed. Precedex/fentanyl.    CARDIOVASCULAR:   - TTE  - Goal directed fluid resuscitation  - Wean pressors as tolerated     GI: GI prophylaxis. OG feeding    RENAL: CVVH as per nephro. Dose meds accordingly. Avoid nephrotoxic agents     INFECTIOUS DISEASE: will cover with meropenem for possible aspiration. Procal. Strep and legionella. Get AM vanc level    HEMATOLOGICAL: No evidence of TLS, will get full workup. DVT prophylaxis heparin BID. Oncology eval    ENDOCRINE: Monitor FS    MUSCULOSKELETAL: Bed rest     69-year-old male with PMHx of marginal zone lymphoma, pancreatic neuroendocrine tumor, CKD, schizophrenia presenting from NH for vomiting x1 week and increasing shortness of breath.    **UNABLE TO OBTAIN MEDREC** See chart note above    IMPRESSION:    #Acute hypoxic respiratory failure  #Possible aspiration pneumonia  #ASHLEY on CKD- prerenal vs intrarenal due to possible rhabdo  #Metabolic acidosis likely due to ASHLEY  #Hx of marginal zone lymphoma with possible disease progression  #Hx of Pancreatic neuroendocrine tumor  #Schizophrenia    PLAN:    CNS: CTH noted. sedation. c/w home antipsychotics when clarified. Monitor white count and QTc while on them    HEENT: Oral care. Aspiration precautions     PULMONARY: HOB @ 45. Monitor Pulse Ox. Keep > 93%. Supplement as needed. Precedex/fentanyl.    CARDIOVASCULAR:   - TTE  - sp Goal directed fluid resuscitation in ED  - Wean pressors as tolerated   - CVVH as per nephro    GI: GI prophylaxis. OG feeding    RENAL: CVVH as per nephro. Dose meds accordingly. Avoid nephrotoxic agents     INFECTIOUS DISEASE: will cover with meropenem for possible aspiration. Procal. Strep and legionella. Get AM vanc level and dose accordingly    HEMATOLOGICAL: No evidence of TLS, will get full workup. DVT prophylaxis heparin BID. Oncology eval    ENDOCRINE: Monitor FS    MUSCULOSKELETAL: Bed rest    CODE: Full    DISPO: MICU

## 2025-01-02 NOTE — PROGRESS NOTE ADULT - ASSESSMENT
A 69-year-old male with a history of marginal zone lymphoma, pancreatic neuroendocrine tumor, chronic kidney disease, and schizophrenia was brought to the emergency department with complaints of vomiting for one week and increasing shortness of breath. He had a fall and was found on the floor for six hours after going diarrhea overnight, potentially associated with Norovirus exposure within the family. He presented with hypoxemia refractory to oxygen and developed severe lactic acidosis. Due to refractory hypoxemia and suspected aspiration pneumonia, he was intubated and started on broad-spectrum antibiotics. Despite aggressive management, including 4 pressors, multiple bicarbonate pushes, and bicarbonate drip, the patient experienced cardiac arrest on 1/2/24 in the early evening, but after three rounds of CPR and administration of epinephrine and bicarbonate, ROSC was achieved. He remains critically ill in the ICU with severe sepsis, suspected multifocal pneumonia, refractory shock, and an extremely poor prognosis.    **Assessment:**    1. **Acute Hypoxemic Respiratory Failure:**     - Likely secondary to multifocal pneumonia and possible aspiration.     - Intubated for airway protection.     - CT chest showed bilateral consolidative opacities.     - Management with ARDS protocol and steroid therapy (Solumedrol).    2. **Severe Sepsis and Refractory Shock:**     - Source suspected to be pneumonia (nasal MRSA positive) and metastatic cancer     - Persistent hypotension requiring four vasopressors.     - Severe lactic acidosis present.     - On broad-spectrum antibiotics as per ID team     - ECHO done to assess cardiac function, no clinically significant pericardial effusion warranting pericardiocentesis - confirmed with cardiology    3. **Acute Kidney Injury on CKD:**     - Unable to tolerate CVVHD due to hemodynamic instability.     - Follow-up on lytes, monitor urine output.    4. **Rhabdomyolysis:**     - Elevated CK levels - downtrending     - Monitor and trend CPK and renal function.    5. **Marginal Zone Lymphoma:**     - Known lymphadenopathy; no significant change on imaging.     - Recent reactivation with potential mediastinal compression.     - Following with oncology    6. **Recent Falls and Musculoskeletal Concerns:**     - Fall with prolonged downtime and possible head strike.     - Chronic sternum fracture noted.     - Bed rest and offloading recommended.    7. **A-fib with RVR:**      - S/p amiodarone load x2 followed by infusion    **Plan:**    - **Respiratory Support:**    - Continue mechanical ventilation with ARDS protocol settings.    - Wean oxygen as tolerated, maintain SpO2 92-96%.    - Sedation, analgesia and paralysis as appropriate    - **Hemodynamic Support:**    - Wean vasopressors as tolerated.    - Continue bicarbonate infusion to address lactic acidosis.    - **Antibiotic Therapy:**    - Continue current antibiotic regimen    - Monitor cultures and adjust therapy as needed.    - **Renal Management:**    - Trend renal function and electrolytes.    - Monitor for signs of fluid overload, avoid unnecessary fluids.    - **A-fib with RVR:**     - Continue amiodarone but hold for parameters (low HR)    - **Hematology/Oncology:**    - Follow up with oncology team regarding management of lymphoma.      - **General Care:**    - Maintain sedation and paralysis for mechanical ventilation.    - Oral and ET care, GI prophylaxis, DVT prophylaxis.    - Continuous reassessment and communication with family regarding prognosis and goals of care. Family wants full code at this time.

## 2025-01-02 NOTE — CHART NOTE - NSCHARTNOTEFT_GEN_A_CORE
I have called all phone numbers listed in patient's chart and left voicemails requesting urgent call back due to critical patient status No answer. No returned calls.

## 2025-01-02 NOTE — CHART NOTE - NSCHARTNOTEFT_GEN_A_CORE
Patient persistently desatting to 84-89% despite increases in vent settings as well as BVM recruitment maneuvers. Fellow at bedside, will initiate paralysis.

## 2025-01-02 NOTE — CHART NOTE - NSCHARTNOTEFT_GEN_A_CORE
Dr. Jessica returned to bedside with me for repeat POCUS at around 15:20. We reviewed the findings. He re-iterated that the patient is not a good candidate for pericardiocentesis as the anterior effusion is very small (and thus procedure high risk with low benefit), unlikely to be significantly contributing to patient's acute condition, and that the patient has no tamponade pathology.

## 2025-01-02 NOTE — CONSULT NOTE ADULT - SUBJECTIVE AND OBJECTIVE BOX
Patient is a 69y old  Male who presents with a chief complaint of N/V dyspnea    HPI:  Case of 69-year-old male with PMHx of marginal zone lymphoma, pancreatic neuroendocrine tumor, CKD, schizophrenia presenting from NH for vomiting x1 week and increasing shortness of breath.    History below is from the ED note, It is noted in triage note that patient is from NH, no papers in chart from any facility, as per last DC in October, patient was discharged home..    Patient in ED was complaining of diffuse abdominal pain, also mentions fall today and was on ground for 6 hours because he could not get up, with head strike, denies loss of consciousness, denies AC use.  Denied any fevers or other symptoms.    Upon arrival to the ED, vitals were   BP 95/53    SpO2 85% on 15L O2  Temp?    Remained hypotensive, tachycardic, was febrile, hypoxic.  Intubated for airway protection for concern of aspiration pneumonia.  BP remained soft, started on pressors    Labs significant for CBC with metamyelocytes, ASHLEY on CKD, elevated AG, lactate 2.4, CK 5k    CTA chest AP  Extensive bilateral predominantly posterior consolidative airspace opacities, increased at right upper and bilateral lower lobes, improved at right middle lobe and left upper lobe. Findings could be attributed to known lymphoma, pulmonary edema and/or pulmonary edema.    No significant change in extensive mediastinal, hilar, retroperitoneal and upper abdominal mesenteric lymphadenopathy. Findings compatible with known lymphoma.    Left hepatic lobe hypodense mass measuring up to 5.3 cm, similar to prior CT. Unchanged additional smaller hepatic dome right hepatic lobe mass Additional previously seen segment 6 mass not well delineated. Findings suggestive of hepatic lymphoma involvement.    Splenomegaly.    Interval resolution of bilateral hydronephrosis.    Chronic sternum fracture, new since 9/27/2024. No apparent underlying bone lesion on prior CT chest, however pathologic fracture is a consideration in absence of trauma history.    No pulmonary embolus.    CT head: No evidence of acute intracranial abnormality.    Interventional cardiology was called for a finding of pericardial effusion and concern for pericardial tamponade. Patient is intubated, sedated and paralyzed.      PAST MEDICAL & SURGICAL HISTORY:  Kidney stones      Schizophrenia      Lymphoma      Shingles      Atrophic kidney      H/O colonoscopy with polypectomy      Liver cyst      History of bladder surgery      H/O neuropathy      History of chemotherapy      Stage 3 chronic kidney disease      Neuroendocrine malignancy      Neuroendocrine tumor status post surgical treatment      Neuroendocrine cancer      Retained urethral stent      H/O umbilical hernia repair      Elbow fracture      H/O cystoscopy          PREVIOUS DIAGNOSTIC TESTING:      ECHO  FINDINGS:  < from: TTE Echo Complete w/o Contrast w/ Doppler (01.02.25 @ 10:06) >      < end of copied text >  < from: TTE Echo Complete w/o Contrast w/ Doppler (09.30.24 @ 09:20) >      < end of copied text >    STRESS  FINDINGS:    CATHETERIZATION  FINDINGS:    MEDICATIONS  (STANDING):  aDENosine Injectable (ADENOCARD) 6 milliGRAM(s) IV Push once  aMIOdarone Infusion 1 mG/Min (33.3 mL/Hr) IV Continuous <Continuous>  aMIOdarone Infusion 0.5 mG/Min (16.7 mL/Hr) IV Continuous <Continuous>  chlorhexidine 0.12% Liquid 15 milliLiter(s) Oral Mucosa every 12 hours  chlorhexidine 2% Cloths 1 Application(s) Topical <User Schedule>  cisatracurium Infusion 3 MICROgram(s)/kG/Min (15.6 mL/Hr) IV Continuous <Continuous>  dexMEDEtomidine Infusion 0.05 MICROgram(s)/kG/Hr (1.08 mL/Hr) IV Continuous <Continuous>  esmolol  Infusion 50 MICROgram(s)/kG/Min (26 mL/Hr) IV Continuous <Continuous>  fentaNYL   Infusion 0.5 MICROgram(s)/kG/Hr (4.34 mL/Hr) IV Continuous <Continuous>  heparin   Injectable 5000 Unit(s) SubCutaneous every 12 hours  influenza  Vaccine (HIGH DOSE) 0.5 milliLiter(s) IntraMuscular once  linezolid  IVPB 600 milliGRAM(s) IV Intermittent every 12 hours  meropenem  IVPB 1000 milliGRAM(s) IV Intermittent every 24 hours  methylPREDNISolone sodium succinate Injectable 60 milliGRAM(s) IV Push every 8 hours  mupirocin 2% Ointment 1 Application(s) Both Nostrils two times a day  norepinephrine Infusion 0.05 MICROgram(s)/kG/Min (4.06 mL/Hr) IV Continuous <Continuous>  phenylephrine    Infusion 0.1 MICROgram(s)/kG/Min (1.63 mL/Hr) IV Continuous <Continuous>  sodium bicarbonate  Infusion 0.083 mEq/kG/Hr (50 mL/Hr) IV Continuous <Continuous>  sodium bicarbonate  Infusion 1.73 mEq/kG/Hr (1000 mL/Hr) IV Continuous <Continuous>  vasopressin Infusion 0.04 Unit(s)/Min (6 mL/Hr) IV Continuous <Continuous>    MEDICATIONS  (PRN):  acetaminophen     Tablet .. 650 milliGRAM(s) Oral every 6 hours PRN Temp greater or equal to 38C (100.4F)      FAMILY HISTORY:  FH: hypertension    FH: diabetes mellitus        SOCIAL HISTORY:  CIGARETTES:  non-smoker        REVIEW OF SYSTEMS:  Unable to obtain - patient is intubated.        Vital Signs Last 24 Hrs  T(C): 38.8 (02 Jan 2025 11:00), Max: 38.8 (02 Jan 2025 11:00)  T(F): 101.8 (02 Jan 2025 11:00), Max: 101.8 (02 Jan 2025 11:00)  HR: 179 (02 Jan 2025 11:30) (98 - 240)  BP: 108/68 (02 Jan 2025 10:50) (50/28 - 132/71)  BP(mean): 83 (02 Jan 2025 10:50) (35 - 95)  RR: 32 (02 Jan 2025 11:30) (0 - 34)  SpO2: 86% (02 Jan 2025 11:30) (77% - 99%)    Parameters below as of 02 Jan 2025 02:00  Patient On (Oxygen Delivery Method): ventilator            PHYSICAL EXAM:  GENERAL: intubated, sedated  HEAD:  Atraumatic, Normocephalic  EYES: conjunctiva and sclera clear  ENMT: ETT in place  NECK:  No JVD, No bruits  NERVOUS SYSTEM:  sedated  CHEST/LUNG: b/l breath sounds  HEART: Regular rate and rhythm; Normal S1-S2, No murmurs, rubs, or gallops  ABDOMEN: Soft  EXTREMITIES:   No clubbing, cyanosis, mild edema    INTERPRETATION OF TELEMETRY: ST    ECG:  < from: 12 Lead ECG (01.01.25 @ 16:43) >  Sinus tachycardiawith frequent Premature ventricular complexes  Otherwise normal ECG    < end of copied text >    I&O's Detail    01 Jan 2025 07:01  -  02 Jan 2025 07:00  --------------------------------------------------------  IN:    Cisatracurium: 72.8 mL    Dexmedetomidine: 104.5 mL    FentaNYL: 64.9 mL    FentaNYL: 82.7 mL    Norepinephrine: 316.8 mL    Norepinephrine: 317.5 mL    Phenylephrine: 333.9 mL    Sodium Bicarbonate: 375 mL    Sodium Bicarbonate: 400 mL    Vasopressin: 60 mL  Total IN: 2128.1 mL    OUT:    Dexmedetomidine: 0 mL    Gastrostomy Tube (mL): 100 mL    Indwelling Catheter - Urethral (mL): 30 mL    Nasogastric/Oral tube (mL): 150 mL    Other (mL): 558 mL    Phenylephrine: 0 mL  Total OUT: 838 mL    Total NET: 1290.1 mL      02 Jan 2025 07:01  -  02 Jan 2025 11:52  --------------------------------------------------------  IN:    Amiodarone: 66.6 mL    Cisatracurium: 41.6 mL    Dexmedetomidine: 10.8 mL    Dexmedetomidine: 32.4 mL    FentaNYL: 34.8 mL    Norepinephrine: 742 mL    Phenylephrine: 815 mL    Sodium Bicarbonate: 250 mL    Sodium Bicarbonate: 2000 mL    Vasopressin: 6 mL    Vasopressin: 24 mL  Total IN: 4023.2 mL    OUT:    Indwelling Catheter - Urethral (mL): 5 mL  Total OUT: 5 mL    Total NET: 4018.2 mL          LABS:                        14.0   11.94 )-----------( 372      ( 02 Jan 2025 04:20 )             45.3     01-02    130[L]  |  98  |  49[H]  ----------------------------<  202[H]  5.5[H]   |  16[L]  |  3.3[H]    Ca    8.4      02 Jan 2025 04:20  Phos  9.1     01-02  Mg     2.5     01-02    TPro  4.3[L]  /  Alb  3.2[L]  /  TBili  0.5  /  DBili  x   /  AST  66[H]  /  ALT  17  /  AlkPhos  163[H]  01-02        PT/INR - ( 02 Jan 2025 04:20 )   PT: 12.90 sec;   INR: 1.09 ratio         PTT - ( 02 Jan 2025 04:20 )  PTT:34.9 sec  Urinalysis Basic - ( 02 Jan 2025 04:20 )    Color: x / Appearance: x / SG: x / pH: x  Gluc: 202 mg/dL / Ketone: x  / Bili: x / Urobili: x   Blood: x / Protein: x / Nitrite: x   Leuk Esterase: x / RBC: x / WBC x   Sq Epi: x / Non Sq Epi: x / Bacteria: x      I&O's Summary    01 Jan 2025 07:01  -  02 Jan 2025 07:00  --------------------------------------------------------  IN: 2128.1 mL / OUT: 838 mL / NET: 1290.1 mL    02 Jan 2025 07:01  -  02 Jan 2025 11:52  --------------------------------------------------------  IN: 4023.2 mL / OUT: 5 mL / NET: 4018.2 mL          RADIOLOGY & ADDITIONAL STUDIES:
NEPHROLOGY CONSULTATION NOTE    Patient is a 68 yo with hx schizophrenia, lymphoma, neuroendocrine tumor, and CKD Stage 4, atrophic left kidney  Over past days with increased coufg (family contacts ill too).  Came to ER after being on floor for several hours and found by Father.  He was sent to ER and at first complained of abdominal pain and also with hypoxis refractory to oxygen - CT w/o evidence of PE.  He was intubated abd developed lactic acidosis.  CVVHD was attempted but he did not tolerate due to low BP.  Now on abx, 4 pressors and with severe acidosis      PAST MEDICAL & SURGICAL HISTORY:  Kidney stones      Schizophrenia      Lymphoma      Shingles      Atrophic kidney      H/O colonoscopy with polypectomy      Liver cyst      History of bladder surgery      H/O neuropathy      History of chemotherapy      Stage 3 chronic kidney disease      Neuroendocrine malignancy      Neuroendocrine tumor status post surgical treatment      Neuroendocrine cancer      Retained urethral stent      H/O umbilical hernia repair      Elbow fracture      H/O cystoscopy        Allergies:  allopurinol (Rash (Moderate))    Home Medications Reviewed  Hospital Medications:   MEDICATIONS  (STANDING):  aMIOdarone Infusion 1 mG/Min (33.3 mL/Hr) IV Continuous <Continuous>  aMIOdarone Infusion 0.5 mG/Min (16.7 mL/Hr) IV Continuous <Continuous>  chlorhexidine 0.12% Liquid 15 milliLiter(s) Oral Mucosa every 12 hours  chlorhexidine 2% Cloths 1 Application(s) Topical <User Schedule>  cisatracurium Infusion 3 MICROgram(s)/kG/Min (15.6 mL/Hr) IV Continuous <Continuous>  dexMEDEtomidine Infusion 0.05 MICROgram(s)/kG/Hr (1.08 mL/Hr) IV Continuous <Continuous>  EPINEPHrine    Infusion 0.03 MICROgram(s)/kG/Min (4.88 mL/Hr) IV Continuous <Continuous>  fentaNYL   Infusion 0.5 MICROgram(s)/kG/Hr (4.34 mL/Hr) IV Continuous <Continuous>  heparin   Injectable 5000 Unit(s) SubCutaneous every 12 hours  influenza  Vaccine (HIGH DOSE) 0.5 milliLiter(s) IntraMuscular once  linezolid  IVPB 600 milliGRAM(s) IV Intermittent every 12 hours  meropenem  IVPB 1000 milliGRAM(s) IV Intermittent every 24 hours  methylPREDNISolone sodium succinate Injectable 60 milliGRAM(s) IV Push every 8 hours  mupirocin 2% Ointment 1 Application(s) Both Nostrils two times a day  norepinephrine Infusion 0.05 MICROgram(s)/kG/Min (4.06 mL/Hr) IV Continuous <Continuous>  phenylephrine    Infusion 0.1 MICROgram(s)/kG/Min (1.63 mL/Hr) IV Continuous <Continuous>  sodium bicarbonate  Infusion 0.26 mEq/kG/Hr (150 mL/Hr) IV Continuous <Continuous>  vasopressin Infusion 0.04 Unit(s)/Min (6 mL/Hr) IV Continuous <Continuous>      SOCIAL HISTORY:  Denies ETOH,Smoking,   FAMILY HISTORY:  FH: hypertension    FH: diabetes mellitus          REVIEW OF SYSTEMS:  unable to obtain  All other review of systems is negative unless indicated above.    VITALS:  T(F): 101.8 (01-02-25 @ 14:00), Max: 102.2 (01-02-25 @ 12:45)  HR: 126 (01-02-25 @ 14:15)  BP: 99/54 (01-02-25 @ 12:30)  RR: 34 (01-02-25 @ 14:15)  SpO2: 71% (01-02-25 @ 14:15)    01-01 @ 07:01  -  01-02 @ 07:00  --------------------------------------------------------  IN: 2128.1 mL / OUT: 838 mL / NET: 1290.1 mL    01-02 @ 07:01  -  01-02 @ 14:50  --------------------------------------------------------  IN: 6535.5 mL / OUT: 5 mL / NET: 6530.5 mL      Height (cm): 182.9 (01-01 @ 21:15)  Weight (kg): 86.7 (01-01 @ 21:15)  BMI (kg/m2): 25.9 (01-01 @ 21:15)  BSA (m2): 2.09 (01-01 @ 21:15)    01-01-25 @ 07:01  -  01-02-25 @ 07:00  --------------------------------------------------------  IN: 0 mL / OUT: 30 mL / NET: -30 mL    01-02-25 @ 07:01  -  01-02-25 @ 14:50  --------------------------------------------------------  IN: 0 mL / OUT: 5 mL / NET: -5 mL      I&O's Detail    01 Jan 2025 07:01  -  02 Jan 2025 07:00  --------------------------------------------------------  IN:    Cisatracurium: 72.8 mL    Dexmedetomidine: 104.5 mL    FentaNYL: 64.9 mL    FentaNYL: 82.7 mL    Norepinephrine: 316.8 mL    Norepinephrine: 317.5 mL    Phenylephrine: 333.9 mL    Sodium Bicarbonate: 375 mL    Sodium Bicarbonate: 400 mL    Vasopressin: 60 mL  Total IN: 2128.1 mL    OUT:    Dexmedetomidine: 0 mL    Gastrostomy Tube (mL): 100 mL    Indwelling Catheter - Urethral (mL): 30 mL    Nasogastric/Oral tube (mL): 150 mL    Other (mL): 558 mL    Phenylephrine: 0 mL  Total OUT: 838 mL    Total NET: 1290.1 mL      02 Jan 2025 07:01  -  02 Jan 2025 14:50  --------------------------------------------------------  IN:    Amiodarone: 133.2 mL    Cisatracurium: 72.8 mL    Dexmedetomidine: 10.8 mL    Dexmedetomidine: 64.8 mL    Enteral Tube Flush: 50 mL    EPINEPHrine: 650 mL    FentaNYL: 60.9 mL    Norepinephrine: 1960 mL    Phenylephrine: 1141 mL    Sodium Bicarbonate: 2000 mL    Sodium Bicarbonate: 350 mL    Vasopressin: 6 mL    Vasopressin: 36 mL  Total IN: 6535.5 mL    OUT:    Indwelling Catheter - Urethral (mL): 5 mL  Total OUT: 5 mL    Total NET: 6530.5 mL            PHYSICAL EXAM:  vent dependent  HEENT: anicteric sclera, oropharynx clear, MMM  Neck: No JVD  Respiratory: CTAB, no wheezes, rales or rhonchi  Cardiovascular: S1, S2, RRR  Gastrointestinal: BS+, soft, NT/ND  Extremities: No cyanosis or clubbing. No peripheral edema, cyanotic extremities  Neurological: A/O x 3, no focal deficits  Psychiatric: unable to evalaute  : No CVA tenderness. No zafar.   Skin: No rashes  Vascular Access:    LABS:  01-02    130[L]  |  98  |  49[H]  ----------------------------<  202[H]  5.5[H]   |  16[L]  |  3.3[H]    Ca    8.4      02 Jan 2025 04:20  Phos  9.1     01-02  Mg     2.5     01-02    TPro  4.3[L]  /  Alb  3.2[L]  /  TBili  0.5  /  DBili      /  AST  66[H]  /  ALT  17  /  AlkPhos  163[H]  01-02    Creatinine Trend: 3.3 <--, 4.2 <--, 4.2 <--                        14.0   11.94 )-----------( 372      ( 02 Jan 2025 04:20 )             45.3     Urine Studies:  Urinalysis Basic - ( 02 Jan 2025 04:20 )    Color:  / Appearance:  / SG:  / pH:   Gluc: 202 mg/dL / Ketone:   / Bili:  / Urobili:    Blood:  / Protein:  / Nitrite:    Leuk Esterase:  / RBC:  / WBC    Sq Epi:  / Non Sq Epi:  / Bacteria:     ·	seen in MICU  ·	admitted with severe sepsis, shock, lactic acidosis, respiratory failure - suspect pneumonia  ·	on merrem and zyvox per ID  ·	severe lactic acidosis - received bicarbonate pushes  ·	known lymphoma with recent reactivation - LN compressing mediastinum (per d/w Father - Dr Martinez)  ·	did not tolerate CVVHD over night  ·	per family requesting full code status    1) ok with bicarbonate drip  2) pressors and abx per MICU  3) not stable to retry CVVHD  d/w pt Father and explained medical status  d/w medicine team                        
Hematology Consult Note    HPI:  Case of 69-year-old male with PMHx of marginal zone lymphoma, pancreatic neuroendocrine tumor, CKD, schizophrenia presenting from NH for vomiting x1 week and increasing shortness of breath.    History below is from the ED note, It is noted in triage note that patient is from NH, no papers in chart from any facility, as per last DC in October, patient was discharged home..    Patient in ED was complaining of diffuse abdominal pain, also mentions fall today and was on ground for 6 hours because he could not get up, with head strike, denies loss of consciousness, denies AC use.  Denied any fevers or other symptoms.    Upon arrival to the ED, vitals were   BP 95/53    SpO2 85% on 15L O2  Temp?    Remained hypotensive, tachycardic, was febrile, hypoxic.  Intubated for airway protection for concern of aspiration pneumonia.  BP remained soft, started on pressors    Labs significant for CBC with metamyelocytes, ASHLEY on CKD, elevated AG, lactate 2.4, CK 5k    CTA chest AP  Extensive bilateral predominantly posterior consolidative airspace opacities, increased at right upper and bilateral lower lobes, improved at right middle lobe and left upper lobe. Findings could be attributed to known lymphoma, pulmonary edema and/or pulmonary edema.    No significant change in extensive mediastinal, hilar, retroperitoneal and upper abdominal mesenteric lymphadenopathy. Findings compatible with known lymphoma.    Left hepatic lobe hypodense mass measuring up to 5.3 cm, similar to prior CT. Unchanged additional smaller hepatic dome right hepatic lobe mass Additional previously seen segment 6 mass not well delineated. Findings suggestive of hepatic lymphoma involvement.    Splenomegaly.    Interval resolution of bilateral hydronephrosis.    Chronic sternum fracture, new since 9/27/2024. No apparent underlying bone lesion on prior CT chest, however pathologic fracture is a consideration in absence of trauma history.    No pulmonary embolus.    CT head: No evidence of acute intracranial abnormality.    CT cervical spine: No evidence of acute fracture of the cervical spine. Cervical lymphadenopathy patient with history of lymphoma, increased since May 2024.    Was given Cefepime/vanc + 3L LR + levophed + bicarb drip (01 Jan 2025 20:21)      Allergies    allopurinol (Rash (Moderate))    Intolerances            PAST MEDICAL & SURGICAL HISTORY:  Kidney stones      Schizophrenia      Lymphoma      Shingles      Atrophic kidney      H/O colonoscopy with polypectomy      Liver cyst      History of bladder surgery      H/O neuropathy      History of chemotherapy      Stage 3 chronic kidney disease      Neuroendocrine malignancy      Neuroendocrine tumor status post surgical treatment      Neuroendocrine cancer      Retained urethral stent      H/O umbilical hernia repair      Elbow fracture      H/O cystoscopy          FAMILY HISTORY:  FH: hypertension    FH: diabetes mellitus        SOCIAL HISTORY: No EtOH, no tobacco    REVIEW OF SYSTEMS:  pt sedated, intubated, paralysed- unable to obtain ROS       Height (cm): 182.9 (01-01 @ 21:15)  Weight (kg): 86.7 (01-01 @ 21:15)  BMI (kg/m2): 25.9 (01-01 @ 21:15)  BSA (m2): 2.09 (01-01 @ 21:15)    T(F): 100.2 (01-02-25 @ 08:00), Max: 100.2 (01-02-25 @ 08:00)  HR: 144 (01-02-25 @ 10:30)  BP: 116/55 (01-02-25 @ 10:15)  RR: 32 (01-02-25 @ 10:30)  SpO2: 90% (01-02-25 @ 10:30)  Wt(kg): --    Gen: intubated, sedated, paralysed   HEENT: mucosa moist   RS: basal crackles   CVS: tachycardic   EXt: no edema   +zafar                           14.0   11.94 )-----------( 372      ( 02 Jan 2025 04:20 )             45.3       01-02    130[L]  |  98  |  49[H]  ----------------------------<  202[H]  5.5[H]   |  16[L]  |  3.3[H]    Ca    8.4      02 Jan 2025 04:20  Phos  9.1     01-02  Mg     2.5     01-02    TPro  4.3[L]  /  Alb  3.2[L]  /  TBili  0.5  /  DBili  x   /  AST  66[H]  /  ALT  17  /  AlkPhos  163[H]  01-02      Lactate Dehydrogenase, Serum: 439 U/L (01-02 @ 04:20)  Magnesium: 2.5 mg/dL (01-02 @ 04:20)  Phosphorus: 9.1 mg/dL (01-02 @ 04:20)  Magnesium: 2.7 mg/dL (01-01 @ 23:30)  Phosphorus: 9.4 mg/dL (01-01 @ 23:30)  Uric Acid: 14.1 mg/dL (01-01 @ 23:30)  Lactate Dehydrogenase, Serum: 352 U/L (01-01 @ 23:30)      
Patient is a 69y old  Male who presents with a chief complaint of Weakness     HPI:  Case of 69-year-old male with PMHx of marginal zone lymphoma, pancreatic neuroendocrine tumor, CKD, schizophrenia presenting from NH for vomiting x1 week and increasing shortness of breath.    History below is from the ED note, It is noted in triage note that patient is from NH, no papers in chart from any facility, as per last DC in October, patient was discharged home..    Patient in ED was complaining of diffuse abdominal pain, also mentions fall today and was on ground for 6 hours because he could not get up, with head strike, denies loss of consciousness, denies AC use.  Denied any fevers or other symptoms.    Upon arrival to the ED, vitals were   BP 95/53    SpO2 85% on 15L O2  Temp?    Remained hypotensive, tachycardic, was febrile, hypoxic.  Intubated for airway protection for concern of aspiration pneumonia.  BP remained soft, started on pressors    Labs significant for CBC with metamyelocytes, ASHLEY on CKD, elevated AG, lactate 2.4, CK 5k    CTA chest AP  Extensive bilateral predominantly posterior consolidative airspace opacities, increased at right upper and bilateral lower lobes, improved at right middle lobe and left upper lobe. Findings could be attributed to known lymphoma, pulmonary edema and/or pulmonary edema.    No significant change in extensive mediastinal, hilar, retroperitoneal and upper abdominal mesenteric lymphadenopathy. Findings compatible with known lymphoma.    Left hepatic lobe hypodense mass measuring up to 5.3 cm, similar to prior CT. Unchanged additional smaller hepatic dome right hepatic lobe mass Additional previously seen segment 6 mass not well delineated. Findings suggestive of hepatic lymphoma involvement.    Splenomegaly.    Interval resolution of bilateral hydronephrosis.    Chronic sternum fracture, new since 9/27/2024. No apparent underlying bone lesion on prior CT chest, however pathologic fracture is a consideration in absence of trauma history.    No pulmonary embolus.    CT head: No evidence of acute intracranial abnormality.    CT cervical spine: No evidence of acute fracture of the cervical spine. Cervical lymphadenopathy patient with history of lymphoma, increased since May 2024.    Was given Cefepime/vanc + 3L LR + levophed + bicarb drip (01 Jan 2025 20:21)      PAST MEDICAL & SURGICAL HISTORY:  Kidney stones      Schizophrenia      Lymphoma      Shingles      Atrophic kidney      H/O colonoscopy with polypectomy      Liver cyst      History of bladder surgery      H/O neuropathy      History of chemotherapy      Stage 3 chronic kidney disease      Neuroendocrine malignancy      Neuroendocrine tumor status post surgical treatment      Neuroendocrine cancer      Retained urethral stent      H/O umbilical hernia repair      Elbow fracture      H/O cystoscopy          SOCIAL HX:   Smoking        UTo                  ETOH                            Other    FAMILY HISTORY:  FH: hypertension    FH: diabetes mellitus    :  No known cardiovacular family hisotry     Review Of Systems:     All ROS are negative except per HPI       Allergies    allopurinol (Rash (Moderate))    Intolerances          PHYSICAL EXAM    ICU Vital Signs Last 24 Hrs  T(C): 37.1 (02 Jan 2025 04:00), Max: 37.2 (01 Jan 2025 21:15)  T(F): 98.7 (02 Jan 2025 04:00), Max: 99 (01 Jan 2025 21:15)  HR: 130 (02 Jan 2025 08:17) (98 - 130)  BP: 109/55 (02 Jan 2025 08:00) (50/28 - 132/71)  BP(mean): 77 (02 Jan 2025 08:00) (35 - 95)  ABP: 90/59 (02 Jan 2025 08:00) (77/54 - 137/71)  ABP(mean): 70 (02 Jan 2025 08:00) (63 - 95)  RR: 30 (02 Jan 2025 08:00) (0 - 32)  SpO2: 91% (02 Jan 2025 08:17) (77% - 99%)    O2 Parameters below as of 02 Jan 2025 02:00  Patient On (Oxygen Delivery Method): ventilator            CONSTITUTIONAL:  Ill appearing     ENT:   Airway patent,   Mouth with normal mucosa.   ET      CARDIAC:   Normal rate,   Regular rhythm.          RESPIRATORY:   No wheezing  Bilateral crackles   Not tachypneic,  No use of accessory muscles    GASTROINTESTINAL:  Abdomen soft,   Non-tender,   No guarding,   + BS      NEUROLOGICAL:   Sedated  Paralyzed     SKIN:   Skin normal color for race,   No evidence of rash.    01-01-25 @ 07:01  -  01-02-25 @ 07:00  --------------------------------------------------------  IN:    Cisatracurium: 72.8 mL    Dexmedetomidine: 104.5 mL    FentaNYL: 64.9 mL    FentaNYL: 82.7 mL    Norepinephrine: 316.8 mL    Norepinephrine: 317.5 mL    Phenylephrine: 333.9 mL    Sodium Bicarbonate: 375 mL    Sodium Bicarbonate: 400 mL    Vasopressin: 60 mL  Total IN: 2128.1 mL    OUT:    Dexmedetomidine: 0 mL    Gastrostomy Tube (mL): 100 mL    Indwelling Catheter - Urethral (mL): 30 mL    Nasogastric/Oral tube (mL): 150 mL    Other (mL): 558 mL    Phenylephrine: 0 mL  Total OUT: 838 mL    Total NET: 1290.1 mL          LABS:                          14.0   11.94 )-----------( 372      ( 02 Jan 2025 04:20 )             45.3                                               01-02    130[L]  |  98  |  49[H]  ----------------------------<  202[H]  5.5[H]   |  16[L]  |  3.3[H]    Ca    8.4      02 Jan 2025 04:20  Phos  9.1     01-02  Mg     2.5     01-02    TPro  4.3[L]  /  Alb  3.2[L]  /  TBili  0.5  /  DBili  x   /  AST  66[H]  /  ALT  17  /  AlkPhos  163[H]  01-02      PT/INR - ( 02 Jan 2025 04:20 )   PT: 12.90 sec;   INR: 1.09 ratio         PTT - ( 02 Jan 2025 04:20 )  PTT:34.9 sec                                       Urinalysis Basic - ( 02 Jan 2025 04:20 )    Color: x / Appearance: x / SG: x / pH: x  Gluc: 202 mg/dL / Ketone: x  / Bili: x / Urobili: x   Blood: x / Protein: x / Nitrite: x   Leuk Esterase: x / RBC: x / WBC x   Sq Epi: x / Non Sq Epi: x / Bacteria: x                                                  LIVER FUNCTIONS - ( 02 Jan 2025 04:20 )  Alb: 3.2 g/dL / Pro: 4.3 g/dL / ALK PHOS: 163 U/L / ALT: 17 U/L / AST: 66 U/L / GGT: x                                                  Urinalysis with Rflx Culture (collected 01 Jan 2025 19:00)                                                   Mode: AC/ CMV (Assist Control/ Continuous Mandatory Ventilation)  RR (machine): 30  TV (machine): 470  FiO2: 100  PEEP: 12  PS:   ITime: 1  MAP: 20  PIP: 35                                      ABG - ( 02 Jan 2025 06:17 )  pH, Arterial: 7.04  pH, Blood: x     /  pCO2: 48    /  pO2: 68    / HCO3: 13    / Base Excess: -17.6 /  SaO2: 91.5    PPL 33               X-Rays reviewed                                                                                     ECHO        MEDICATIONS  (STANDING):  chlorhexidine 0.12% Liquid 15 milliLiter(s) Oral Mucosa every 12 hours  chlorhexidine 2% Cloths 1 Application(s) Topical <User Schedule>  cisatracurium Infusion 3 MICROgram(s)/kG/Min (15.6 mL/Hr) IV Continuous <Continuous>  CRRT Treatment    <Continuous>  dexMEDEtomidine Infusion 0.05 MICROgram(s)/kG/Hr (1.08 mL/Hr) IV Continuous <Continuous>  fentaNYL   Infusion 0.5 MICROgram(s)/kG/Hr (4.34 mL/Hr) IV Continuous <Continuous>  heparin   Injectable 5000 Unit(s) SubCutaneous every 12 hours  hydrocortisone sodium succinate Injectable 50 milliGRAM(s) IV Push every 6 hours  influenza  Vaccine (HIGH DOSE) 0.5 milliLiter(s) IntraMuscular once  meropenem  IVPB 1000 milliGRAM(s) IV Intermittent once  mupirocin 2% Ointment 1 Application(s) Both Nostrils two times a day  norepinephrine Infusion 0.05 MICROgram(s)/kG/Min (4.06 mL/Hr) IV Continuous <Continuous>  phenylephrine    Infusion 0.1 MICROgram(s)/kG/Min (1.63 mL/Hr) IV Continuous <Continuous>  PureFlow Dialysate RFP-401 (K 4 / Ca 3) 5000 milliLiter(s) (2700 mL/Hr) CRRT <Continuous>  sodium bicarbonate  Infusion 0.083 mEq/kG/Hr (50 mL/Hr) IV Continuous <Continuous>  vasopressin Infusion 0.04 Unit(s)/Min (6 mL/Hr) IV Continuous <Continuous>    MEDICATIONS  (PRN):        
  ELDA COVARRUBIAS  69y, Male  Allergy: allopurinol (Rash (Moderate))      All historical available data reviewed.    HPI:  Case of 69-year-old male with PMHx of marginal zone lymphoma, pancreatic neuroendocrine tumor, CKD, schizophrenia presenting from NH for vomiting x1 week and increasing shortness of breath.    History below is from the ED note, It is noted in triage note that patient is from NH, no papers in chart from any facility, as per last DC in October, patient was discharged home..    Patient in ED was complaining of diffuse abdominal pain, also mentions fall today and was on ground for 6 hours because he could not get up, with head strike, denies loss of consciousness, denies AC use.  Denied any fevers or other symptoms.    Upon arrival to the ED, vitals were   BP 95/53    SpO2 85% on 15L O2  Temp?    Remained hypotensive, tachycardic, was febrile, hypoxic.  Intubated for airway protection for concern of aspiration pneumonia.  BP remained soft, started on pressors    Labs significant for CBC with metamyelocytes, ASHLEY on CKD, elevated AG, lactate 2.4, CK 5k    CTA chest AP  Extensive bilateral predominantly posterior consolidative airspace opacities, increased at right upper and bilateral lower lobes, improved at right middle lobe and left upper lobe. Findings could be attributed to known lymphoma, pulmonary edema and/or pulmonary edema.    No significant change in extensive mediastinal, hilar, retroperitoneal and upper abdominal mesenteric lymphadenopathy. Findings compatible with known lymphoma.    Left hepatic lobe hypodense mass measuring up to 5.3 cm, similar to prior CT. Unchanged additional smaller hepatic dome right hepatic lobe mass Additional previously seen segment 6 mass not well delineated. Findings suggestive of hepatic lymphoma involvement.    Splenomegaly.    Interval resolution of bilateral hydronephrosis.    Chronic sternum fracture, new since 9/27/2024. No apparent underlying bone lesion on prior CT chest, however pathologic fracture is a consideration in absence of trauma history.    No pulmonary embolus.    CT head: No evidence of acute intracranial abnormality.    CT cervical spine: No evidence of acute fracture of the cervical spine. Cervical lymphadenopathy patient with history of lymphoma, increased since May 2024.    Was given Cefepime/vanc + 3L LR + levophed + bicarb drip (01 Jan 2025 20:21)    FAMILY HISTORY:  FH: hypertension    FH: diabetes mellitus      PAST MEDICAL & SURGICAL HISTORY:  Kidney stones      Schizophrenia      Lymphoma      Shingles      Atrophic kidney      H/O colonoscopy with polypectomy      Liver cyst      History of bladder surgery      H/O neuropathy      History of chemotherapy      Stage 3 chronic kidney disease      Neuroendocrine malignancy      Neuroendocrine tumor status post surgical treatment      Neuroendocrine cancer      Retained urethral stent      H/O umbilical hernia repair      Elbow fracture      H/O cystoscopy            VITALS:  T(F): 98.7, Max: 99 (01-01-25 @ 21:15)  HR: 130  BP: 109/55  RR: 30Vital Signs Last 24 Hrs  T(C): 37.1 (02 Jan 2025 04:00), Max: 37.2 (01 Jan 2025 21:15)  T(F): 98.7 (02 Jan 2025 04:00), Max: 99 (01 Jan 2025 21:15)  HR: 130 (02 Jan 2025 08:17) (98 - 130)  BP: 109/55 (02 Jan 2025 08:00) (50/28 - 132/71)  BP(mean): 77 (02 Jan 2025 08:00) (35 - 95)  RR: 30 (02 Jan 2025 08:00) (0 - 32)  SpO2: 91% (02 Jan 2025 08:17) (77% - 99%)    Parameters below as of 02 Jan 2025 02:00  Patient On (Oxygen Delivery Method): ventilator        TESTS & MEASUREMENTS:                        14.0   11.94 )-----------( 372      ( 02 Jan 2025 04:20 )             45.3     01-02    130[L]  |  98  |  49[H]  ----------------------------<  202[H]  5.5[H]   |  16[L]  |  3.3[H]    Ca    8.4      02 Jan 2025 04:20  Phos  9.1     01-02  Mg     2.5     01-02    TPro  4.3[L]  /  Alb  3.2[L]  /  TBili  0.5  /  DBili  x   /  AST  66[H]  /  ALT  17  /  AlkPhos  163[H]  01-02    LIVER FUNCTIONS - ( 02 Jan 2025 04:20 )  Alb: 3.2 g/dL / Pro: 4.3 g/dL / ALK PHOS: 163 U/L / ALT: 17 U/L / AST: 66 U/L / GGT: x             Urinalysis with Rflx Culture (collected 01-01-25 @ 19:00)      Urinalysis Basic - ( 02 Jan 2025 04:20 )    Color: x / Appearance: x / SG: x / pH: x  Gluc: 202 mg/dL / Ketone: x  / Bili: x / Urobili: x   Blood: x / Protein: x / Nitrite: x   Leuk Esterase: x / RBC: x / WBC x   Sq Epi: x / Non Sq Epi: x / Bacteria: x          RADIOLOGY & ADDITIONAL TESTS:  Personal review of radiological diagnostics performed  Echo and EKG results noted when applicable.     MEDICATIONS:  chlorhexidine 0.12% Liquid 15 milliLiter(s) Oral Mucosa every 12 hours  chlorhexidine 2% Cloths 1 Application(s) Topical <User Schedule>  cisatracurium Infusion 3 MICROgram(s)/kG/Min IV Continuous <Continuous>  CRRT Treatment    <Continuous>  dexMEDEtomidine Infusion 0.05 MICROgram(s)/kG/Hr IV Continuous <Continuous>  fentaNYL   Infusion 0.5 MICROgram(s)/kG/Hr IV Continuous <Continuous>  heparin   Injectable 5000 Unit(s) SubCutaneous every 12 hours  hydrocortisone sodium succinate Injectable 50 milliGRAM(s) IV Push every 6 hours  influenza  Vaccine (HIGH DOSE) 0.5 milliLiter(s) IntraMuscular once  meropenem  IVPB 1000 milliGRAM(s) IV Intermittent once  mupirocin 2% Ointment 1 Application(s) Both Nostrils two times a day  norepinephrine Infusion 0.05 MICROgram(s)/kG/Min IV Continuous <Continuous>  phenylephrine    Infusion 0.1 MICROgram(s)/kG/Min IV Continuous <Continuous>  PureFlow Dialysate RFP-401 (K 4 / Ca 3) 5000 milliLiter(s) CRRT <Continuous>  sodium bicarbonate  Infusion 0.083 mEq/kG/Hr IV Continuous <Continuous>  vasopressin Infusion 0.04 Unit(s)/Min IV Continuous <Continuous>      ANTIBIOTICS:  meropenem  IVPB 1000 milliGRAM(s) IV Intermittent once

## 2025-01-02 NOTE — PROGRESS NOTE ADULT - SUBJECTIVE AND OBJECTIVE BOX
SUBJECTIVE/OVERNIGHT EVENTS  Today is hospital day 1d.    MEDICATIONS  STANDING MEDICATIONS  aMIOdarone Infusion 1 mG/Min IV Continuous <Continuous>  aMIOdarone Infusion 0.5 mG/Min IV Continuous <Continuous>  chlorhexidine 0.12% Liquid 15 milliLiter(s) Oral Mucosa every 12 hours  chlorhexidine 2% Cloths 1 Application(s) Topical <User Schedule>  cisatracurium Infusion 3 MICROgram(s)/kG/Min IV Continuous <Continuous>  dexMEDEtomidine Infusion 0.05 MICROgram(s)/kG/Hr IV Continuous <Continuous>  EPINEPHrine    Infusion 0.03 MICROgram(s)/kG/Min IV Continuous <Continuous>  fentaNYL   Infusion 0.5 MICROgram(s)/kG/Hr IV Continuous <Continuous>  heparin   Injectable 5000 Unit(s) SubCutaneous every 12 hours  influenza  Vaccine (HIGH DOSE) 0.5 milliLiter(s) IntraMuscular once  linezolid  IVPB 600 milliGRAM(s) IV Intermittent every 12 hours  meropenem  IVPB 1000 milliGRAM(s) IV Intermittent every 24 hours  methylPREDNISolone sodium succinate Injectable 60 milliGRAM(s) IV Push every 8 hours  mupirocin 2% Ointment 1 Application(s) Both Nostrils two times a day  norepinephrine Infusion 0.05 MICROgram(s)/kG/Min IV Continuous <Continuous>  phenylephrine    Infusion 0.1 MICROgram(s)/kG/Min IV Continuous <Continuous>  sodium bicarbonate  Infusion 0.26 mEq/kG/Hr IV Continuous <Continuous>  vasopressin Infusion 0.04 Unit(s)/Min IV Continuous <Continuous>    PRN MEDICATIONS  acetaminophen     Tablet .. 650 milliGRAM(s) Oral every 6 hours PRN    VITALS  T(F): 98.8 (01-02-25 @ 18:00), Max: 102.2 (01-02-25 @ 12:45)  HR: 85 (01-02-25 @ 18:15) (0 - 240)  BP: 81/50 (01-02-25 @ 18:00) (56/37 - 132/71)  RR: 34 (01-02-25 @ 18:15) (0 - 108)  SpO2: 53% (01-02-25 @ 17:30) (53% - 99%)  POCT Blood Glucose.: 147 mg/dL (01-02-25 @ 09:47)    PHYSICAL EXAM  GENERAL  Intubated. Non-responsive.    HEART  Tachycardic    LUNGS  (  )Unlabored respirations     (  ) tachypnea  ( x ) B/L air entry     (  ) decreased breath sounds in:  (location     )    (  ) no adventitious sound     (  ) crackles     (  ) wheezing      (  ) rhonchi      (specify location:       )  (  ) chest wall tenderness (specify location:       )    ABDOMEN  ( x ) Soft     (  ) tense   |   (  ) nondistended     (  ) distended   |   (  ) +BS     (  ) hypoactive bowel sounds     (  ) hyperactive bowel sounds  (  ) nontender     (  ) RUQ tenderness     (  ) RLQ tenderness     (  ) LLQ tenderness     (  ) epigastric tenderness     (  ) diffuse tenderness  (  ) Splenomegaly      (  ) Hepatomegaly      (  ) Jaundice     (  ) ecchymosis     EXTREMITIES  (  ) Normal     (  ) Rash     ( x ) ecchymosis     (  ) varicose veins      (  ) pitting edema     (  ) non-pitting edema   (  ) ulceration     (  ) gangrene:     (location:     )    NERVOUS SYSTEM  Intubated. Non responsive.    SKIN  (  ) No rashes or lesions     (  ) maculopapular rash     (  ) pustules     (  ) vesicles     (  ) ulcer     (x  ) ecchymosis     (specify location:     )    (x  ) Indwelling Park Catheter      LABS             14.0   11.94 )-----------( 372      ( 01-02-25 @ 04:20 )             45.3     130  |  98  |  49  -------------------------<  202   01-02-25 @ 04:20  5.5  |  16  |  3.3    Ca      8.4     01-02-25 @ 04:20  Phos   9.1     01-02-25 @ 04:20  Mg     2.5     01-02-25 @ 04:20    TPro  4.3  /  Alb  3.2  /  TBili  0.5  /  DBili  x   /  AST  66  /  ALT  17  /  AlkPhos  163  /  GGT  x     01-02-25 @ 04:20    PT/INR - ( 01-02-25 @ 04:20 )   PT: 12.90 sec[H];   INR: 1.09 ratio  PTT - ( 01-02-25 @ 04:20 )  PTT:34.9 sec    Troponin T, High Sensitivity Result: 85 ng/L (01-02-25 @ 04:20)  Pro-Brain Natriuretic Peptide: 6462 pg/mL (01-01-25 @ 23:47)  Troponin T, High Sensitivity Result: 108 ng/L (01-01-25 @ 23:47)    Urinalysis Basic - ( 02 Jan 2025 04:20 )    Color: x / Appearance: x / SG: x / pH: x  Gluc: 202 mg/dL / Ketone: x  / Bili: x / Urobili: x   Blood: x / Protein: x / Nitrite: x   Leuk Esterase: x / RBC: x / WBC x   Sq Epi: x / Non Sq Epi: x / Bacteria: x      ABG - ( 02 Jan 2025 14:24 )  pH, Arterial: 7.01  pH, Blood: x     /  pCO2: 68    /  pO2: 51    / HCO3: 17    / Base Excess: -14.5 /  SaO2: 75.9          Urinalysis with Rflx Culture (collected 01 Jan 2025 19:00)

## 2025-01-02 NOTE — CHART NOTE - NSCHARTNOTESELECT_GEN_ALL_CORE
Code Blue/Event Note
Consent/Event Note
Unable to reach family/Event Note
Event Note
Event Note
POCUS/Event Note

## 2025-01-02 NOTE — CONSULT NOTE ADULT - ATTENDING COMMENTS
Agree with above.     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 His overall very poor prognosis is due to current septic shock. Continue with supportive care.

## 2025-01-02 NOTE — GOALS OF CARE CONVERSATION - ADVANCED CARE PLANNING - CONVERSATION DETAILS
I spoke with the father of the patient, a CT surgeon, about the patient's dire condition and very poor prognosis. We reviewed the patient's labs and echo findings, We discussed code status. Father, who is patient's guardian, would like full code at this time.

## 2025-01-02 NOTE — CONSULT NOTE ADULT - ASSESSMENT
68-year-old male with schizophrenia, CKD, marginal zone lymphoma and pancreatic neuroendocrine tumor on monthly somatostatin injections( follows Dr Gong )  left elbow fracture s/p ORIF @[facility] ~30 years ago, comes in c/o weakness/SOB and cough       #Metastatic carcinoid,  on Sandostatin  --MRI abdomen 7.23:  Liver segment II 4 cm mass and Liver segment V  1.5 cm mass suspicious for Hepatic Lymphomas.  --Biopsy of Liver lesion 8.23: well differentiated Neuroendocrine tumor, Grade 3 Likely  metastatic.   --NM Octreoscan: multiple areas (09.05.23 @ 16:43) >Focal area of increased uptake in the upper abdomen/epigastrium to the left of midline suspicious for neuroendocrine tissue expressing somatostatin receptors.  --Gets lost to follow up and has missed few doses   --Last received Sandostatin on 9/16/24.  --Chromogranin increased to 1770.0  from 1293 in July       # NHL, marginal zone   -He was in a good partial remission following 3 cycles of bendamustine and Rituxan treated in 2019 and 2020. He was unable to tolerate maintenance Rituxan.  -However, he was never in complete remission and it was preferred just to observe since we were dealing with a low grade lymphoma.  --Was lost to follow up.  --PET scan 5.23: 1.  Interval increase in size and decreased metabolic activity of the thoracoabdominal lymphadenopathy, some are new, probably recurrence /residual disease. New mildly metabolic 3 cm hypodensity in the liver segment 2/3, concerning for neoplasm or lymphoma.   --s/p R-CVP for Marginal Zone Lymphoma on 9/20 & 9/21>. Had reaction to Rituxan   --s/p CVP,  last received 12/23   --PET 10.2024 Since prior PET/CT interval worsening of hypermetabolic adenopathy above the diaphragm. Activity along the head of the pancreas likely correlates to previously noted soft tissue along the Tarah Hepatis, stable 2. Focal Increased uptake within the uncinate process of the pancreas without definite CT correlate. However, this also could correlate to previously seen soft tissues, FDG avidity is stable  3. Hepatic lesion stable in FDG avidity although apparently increased in extent. There is also an additional focus of activity centrally within the right lobe of the liver without definite CT correlate. MRI suggested  --S/p bronchoscopy and EBUS by pulm  --Subcarinal lymph node, Needle core biopsy: Persistent/recurrent B-cell lymphoma. No evidence of large cell transformation in the submitted material.  --Ct H/N/C/A/P Cervical lymphadenopathy patient with history of lymphoma, increased since May 2024.b/l consolidative opactity, No significant change in extensive mediastinal, hilar, retroperitoneal and upper abdominal mesenteric lymphadenopathy. Findings compatible with   known lymphoma.Left hepatic lobe hypodense mass measuring up to 5.3 cm, similar to prior   CT.Findings suggestive of hepatic lymphoma involvement.Splenomegaly         #h/ o Hypercalcemia-  prior work up neg PTHRP   Calcium WNL  continue to monitor       #Anemia normocytic   Baseline hb 9-10   Colonoscopy 2023- Cecal polyp, EMR: Tubular adenoma fragments showing foci of high-grade dysplasia and focal   intramucosal adenocarcinoma.   Recommendation was 6M f/up but did not.    Currently in ICU and being treated for AHRF s/p intubation. MV, septic shock 2/2 pneumonia     Recommendations:      PRELIM   68-year-old male with schizophrenia, CKD, marginal zone lymphoma and pancreatic neuroendocrine tumor on monthly somatostatin injections( follows Dr Gong )  left elbow fracture s/p [facility]IF @[facility] ~30 years ago, comes in c/o weakness/SOB and cough       #Metastatic carcinoid,  on Sandostatin  --MRI abdomen 7.23:  Liver segment II 4 cm mass and Liver segment V  1.5 cm mass suspicious for Hepatic Lymphomas.  --Biopsy of Liver lesion 8.23: well differentiated Neuroendocrine tumor, Grade 3 Likely  metastatic.   --NM Octreoscan: multiple areas (09.05.23 @ 16:43) >Focal area of increased uptake in the upper abdomen/epigastrium to the left of midline suspicious for neuroendocrine tissue expressing somatostatin receptors.  --Gets lost to follow up and has missed few doses   --Last received Sandostatin on 9/16/24.  --Chromogranin increased to 1770.0  from 1293 in July       # NHL, marginal zone   -He was in a good partial remission following 3 cycles of bendamustine and Rituxan treated in 2019 and 2020. He was unable to tolerate maintenance Rituxan.  -However, he was never in complete remission and it was preferred just to observe since we were dealing with a low grade lymphoma.  --Was lost to follow up.  --PET scan 5.23: 1.  Interval increase in size and decreased metabolic activity of the thoracoabdominal lymphadenopathy, some are new, probably recurrence /residual disease. New mildly metabolic 3 cm hypodensity in the liver segment 2/3, concerning for neoplasm or lymphoma.   --s/p R-CVP for Marginal Zone Lymphoma on 9/20 & 9/21>. Had reaction to Rituxan   --s/p CVP,  last received 12/23   --PET 10.2024 Since prior PET/CT interval worsening of hypermetabolic adenopathy above the diaphragm. Activity along the head of the pancreas likely correlates to previously noted soft tissue along the Tarah Hepatis, stable 2. Focal Increased uptake within the uncinate process of the pancreas without definite CT correlate. However, this also could correlate to previously seen soft tissues, FDG avidity is stable  3. Hepatic lesion stable in FDG avidity although apparently increased in extent. There is also an additional focus of activity centrally within the right lobe of the liver without definite CT correlate. MRI suggested  --S/p bronchoscopy and EBUS by pulm  --Subcarinal lymph node, Needle core biopsy: Persistent/recurrent B-cell lymphoma. No evidence of large cell transformation in the submitted material.  --Ct H/N/C/A/P Cervical lymphadenopathy patient with history of lymphoma, increased since May 2024.b/l consolidative opactity, No significant change in extensive mediastinal, hilar, retroperitoneal and upper abdominal mesenteric lymphadenopathy. Findings compatible with   known lymphoma.Left hepatic lobe hypodense mass measuring up to 5.3 cm, similar to prior   CT.Findings suggestive of hepatic lymphoma involvement.Splenomegaly     #hyperuricemia  #brittney  #hyperphosphatemia  Elevated LDH    #h/ o Hypercalcemia-  prior work up neg PTHRP   Calcium WNL  continue to monitor       #Anemia normocytic   Baseline hb 9-10   Colonoscopy 2023- Cecal polyp, EMR: Tubular adenoma fragments showing foci of high-grade dysplasia and focal   intramucosal adenocarcinoma.   Recommendation was 6M f/up but did not.    Currently in ICU and being treated for AHRF s/p intubation. MV, septic shock 2/2 pneumonia,     Recommendations:      PRELIM   68-year-old male with schizophrenia, CKD, marginal zone lymphoma and pancreatic neuroendocrine tumor on monthly somatostatin injections( follows Dr Gong )  left elbow fracture s/p ORIF @[facility] ~30 years ago, comes in c/o weakness/SOB and cough       #Metastatic carcinoid,  on Sandostatin  --MRI abdomen 7.23:  Liver segment II 4 cm mass and Liver segment V  1.5 cm mass suspicious for Hepatic Lymphomas.  --Biopsy of Liver lesion 8.23: well differentiated Neuroendocrine tumor, Grade 3 Likely  metastatic.   --NM Octreoscan: multiple areas (09.05.23 @ 16:43) >Focal area of increased uptake in the upper abdomen/epigastrium to the left of midline suspicious for neuroendocrine tissue expressing somatostatin receptors.  --Gets lost to follow up and has missed few doses   --Last received Sandostatin on 9/16/24.  --Chromogranin increased to 1770.0  from 1293 in July       # NHL, marginal zone   -He was in a good partial remission following 3 cycles of bendamustine and Rituxan treated in 2019 and 2020. He was unable to tolerate maintenance Rituxan.  -However, he was never in complete remission and it was preferred just to observe since we were dealing with a low grade lymphoma.  --Was lost to follow up.  --PET scan 5.23: 1.  Interval increase in size and decreased metabolic activity of the thoracoabdominal lymphadenopathy, some are new, probably recurrence /residual disease. New mildly metabolic 3 cm hypodensity in the liver segment 2/3, concerning for neoplasm or lymphoma.   --s/p R-CVP for Marginal Zone Lymphoma on 9/20 & 9/21>. Had reaction to Rituxan   --s/p CVP,  last received 12/23   --PET 10.2024 Since prior PET/CT interval worsening of hypermetabolic adenopathy above the diaphragm. Activity along the head of the pancreas likely correlates to previously noted soft tissue along the Tarah Hepatis, stable 2. Focal Increased uptake within the uncinate process of the pancreas without definite CT correlate. However, this also could correlate to previously seen soft tissues, FDG avidity is stable  3. Hepatic lesion stable in FDG avidity although apparently increased in extent. There is also an additional focus of activity centrally within the right lobe of the liver without definite CT correlate. MRI suggested  --S/p bronchoscopy and EBUS by pulm  --Subcarinal lymph node, Needle core biopsy: Persistent/recurrent B-cell lymphoma. No evidence of large cell transformation in the submitted material.  --Ct H/N/C/A/P Cervical lymphadenopathy patient with history of lymphoma, increased since May 2024.b/l consolidative opacity, No significant change in extensive mediastinal, hilar, retroperitoneal and upper abdominal mesenteric lymphadenopathy. Findings compatible with   known lymphoma.Left hepatic lobe hypodense mass measuring up to 5.3 cm, similar to prior   CT.Findings suggestive of hepatic lymphoma involvement.Splenomegaly     #hyperuricemia  #ashley  #hyperphosphatemia  Elevated LDH    #h/ o Hypercalcemia-  prior work up neg PTHRP   Calcium WNL  continue to monitor       #Anemia normocytic   Baseline hb 9-10   Colonoscopy 2023- Cecal polyp, EMR: Tubular adenoma fragments showing foci of high-grade dysplasia and focal   intramucosal adenocarcinoma.   Recommendation was 6M f/up but did not.    Currently in ICU and being treated for AHRF s/p intubation. MV, septic shock 2/2 pneumonia,     Recommendations:  Elevated uric acid, potassium, phos- from underlying ASHLEY. LEss likely to be TLS since he has low grade lymphoma, and no recent chemo exposure.   He has low grade lymphoma, recent subcarinal biopsy did not show any evidence of transformation to aggressive lymphoma. Thus at the moment his prognosis will be determined by all the ongoing medical conditions including but not limited to sepsic shock, AHRF, renal failure and acidosis.   Continue with management per ICU team.   Can check immunoglobulin levels (IgG, igM), to see if he would benefit from IVIG. However unclear how much benefit he will have from infusion.

## 2025-01-02 NOTE — CONSULT NOTE ADULT - COMMENTS
unable to obtain history secondary to patient's mental status and/or sedation   paralysed  3 pressors  Fio2 100%  on CVVH  L fem catheter

## 2025-01-02 NOTE — DISCHARGE NOTE FOR THE EXPIRED PATIENT - HOSPITAL COURSE
Case of 69-year-old male with PMHx of marginal zone lymphoma, pancreatic neuroendocrine tumor, CKD, schizophrenia presenting from NH for vomiting x1 week and increasing shortness of breath.    History below is from the ED note, It is noted in triage note that patient is from NH, no papers in chart from any facility, as per last DC in October, patient was discharged home..    Patient in ED was complaining of diffuse abdominal pain, also mentions fall today and was on ground for 6 hours because he could not get up, with head strike, denies loss of consciousness, denies AC use.  Denied any fevers or other symptoms.    Upon arrival to the ED, vitals were BP 95/53; ; SpO2 85% on 15L O2; Temp?;  Remained hypotensive, tachycardic, was febrile, hypoxic.  Intubated for airway protection for concern of aspiration pneumonia.  BP remained soft, started on pressors.  Labs significant for CBC with metamyelocytes, ASHLEY on CKD, elevated AG, lactate 2.4, CK 5k  CTA chest AP: Extensive bilateral predominantly posterior consolidative airspace opacities, increased at right upper and bilateral lower lobes, improved at right middle lobe and left upper lobe. Findings could be attributed to known lymphoma, pulmonary edema and/or pulmonary edema. No significant change in extensive mediastinal, hilar, retroperitoneal and upper abdominal mesenteric lymphadenopathy. Findings compatible with known lymphoma. Left hepatic lobe hypodense mass measuring up to 5.3 cm, similar to prior CT. Unchanged additional smaller hepatic dome right hepatic lobe mass Additional previously seen segment 6 mass not well delineated. Findings suggestive of hepatic lymphoma involvement. Splenomegaly. Interval resolution of bilateral hydronephrosis. Chronic sternum fracture, new since 9/27/2024. No apparent underlying bone lesion on prior CT chest, however pathologic fracture is a consideration in absence of trauma history. No pulmonary embolus.  CT head: No evidence of acute intracranial abnormality.  CT cervical spine: No evidence of acute fracture of the cervical spine. Cervical lymphadenopathy patient with history of lymphoma, increased since May 2024.  Was given Cefepime/vanc + 3L LR + levophed + bicarb drip (01 Jan 2025 20:21)    Patient was intubated in the ED for airway protection and hypoxia in the setting of possible aspiration pneumonia. Admitted to ICU, started on pressors and empiric antibiotics for septic shock. Nephro was consulted due to acute renal failure in setting of rhabdomyolysis, recommending CRRT, which was started on an emergent basis. Patient continued to have increasing pressor requirements, was started on vasopressin, phenylephrine, stress dose steroids, bicarb drip in addition to multiple bicarb pushes. ID consulted for antibiotic management. Patient had worsening hypoxia despite increases in vent settings, interventional cardiology consulted due to concern for tamponade. Patient found to have small pericardial effusion small-moderate size with no good windo Case of 69-year-old male with PMHx of marginal zone lymphoma, pancreatic neuroendocrine tumor, CKD, schizophrenia presenting from NH for vomiting x1 week and increasing shortness of breath.    History below is from the ED note, It is noted in triage note that patient is from NH, no papers in chart from any facility, as per last DC in October, patient was discharged home..    Patient in ED was complaining of diffuse abdominal pain, also mentions fall today and was on ground for 6 hours because he could not get up, with head strike, denies loss of consciousness, denies AC use.  Denied any fevers or other symptoms.    Upon arrival to the ED, vitals were BP 95/53; ; SpO2 85% on 15L O2; Temp?;  Remained hypotensive, tachycardic, was febrile, hypoxic.  Intubated for airway protection for concern of aspiration pneumonia.  BP remained soft, started on pressors.  Labs significant for CBC with metamyelocytes, ASHLEY on CKD, elevated AG, lactate 2.4, CK 5k  CTA chest AP: Extensive bilateral predominantly posterior consolidative airspace opacities, increased at right upper and bilateral lower lobes, improved at right middle lobe and left upper lobe. Findings could be attributed to known lymphoma, pulmonary edema and/or pulmonary edema. No significant change in extensive mediastinal, hilar, retroperitoneal and upper abdominal mesenteric lymphadenopathy. Findings compatible with known lymphoma. Left hepatic lobe hypodense mass measuring up to 5.3 cm, similar to prior CT. Unchanged additional smaller hepatic dome right hepatic lobe mass Additional previously seen segment 6 mass not well delineated. Findings suggestive of hepatic lymphoma involvement. Splenomegaly. Interval resolution of bilateral hydronephrosis. Chronic sternum fracture, new since 9/27/2024. No apparent underlying bone lesion on prior CT chest, however pathologic fracture is a consideration in absence of trauma history. No pulmonary embolus.  CT head: No evidence of acute intracranial abnormality.  CT cervical spine: No evidence of acute fracture of the cervical spine. Cervical lymphadenopathy patient with history of lymphoma, increased since May 2024.  Was given Cefepime/vanc + 3L LR + levophed + bicarb drip (01 Jan 2025 20:21)    Patient was intubated in the ED for airway protection and hypoxia in the setting of possible aspiration pneumonia. Admitted to ICU, started on pressors and empiric antibiotics for septic shock. Nephro was consulted due to acute renal failure in setting of rhabdomyolysis, recommending CRRT, which was started on an emergent basis. Patient continued to have increasing pressor requirements, was started on vasopressin, phenylephrine, stress dose steroids, bicarb drip in addition to multiple bicarb pushes. ID consulted for antibiotic management. Patient had worsening hypoxia despite increases in vent settings, was paralyzed to optimize ventilation. Interventional cardiology consulted due to concern for tamponade. Patient found to have small pericardial effusion small-moderate size with no good window, repeat POCUS performed with similar findings, was determined that patient is not a good candidate for procedure and that it is unlikely to be significantly contributing to pathology. Patient's clinical status continued to worsen despite interventions, epinephrine and hydroxocobalamin also added.   At 1719 on 1/2/2025, patient went into cardiac arrest, ROSC achieved after 3 rounds of CPR. Pupils later noted to be fixed and dilated. Sedation held during code blue and not restarted  At 2038 on 1/2/2025, patient again found to be in PEA arrest, ACLS protocol performed, epinephrine given q3 minutes, in addition to calcium, bicarb, and insulin. ROSC was not achieved, time of death 2050. Family was informed by fellow Dr. Rikki Falcon.     ME contacted: [phone]

## 2025-01-02 NOTE — CHART NOTE - NSCHARTNOTEFT_GEN_A_CORE
Code Blue called at 08:38PM     CRP initiated as per ACLS protocol   Epinephrine was given Q3 minutes     Calcium, bicarb, and insulin were given as well    No ROSC was achieved     Time of death 08:50PM    Family informed

## 2025-01-02 NOTE — CONSULT NOTE ADULT - ASSESSMENT
69-year-old male with PMHx of marginal zone lymphoma, pancreatic neuroendocrine tumor, CKD, schizophrenia presenting from NH for vomiting x1 week and increasing shortness of breath.  History below is from the ED note, It is noted in triage note that patient is from NH, no papers in chart from any facility, as per last DC in October, patient was discharged home..  Patient in ED was complaining of diffuse abdominal pain, also mentions fall today and was on ground for 6 hours because he could not get up, with head strike, denies loss of consciousness, denies AC use.  Denied any fevers or other symptoms.    Upon arrival to the ED, vitals : BP 95/53, , SpO2 85% on 15L O2. Temp?  Remained hypotensive, tachycardic, was febrile, hypoxic.  Intubated for airway protection for concern of aspiration pneumonia.    BP remained soft, started on pressors  1/2 CTA chest AP  Extensive bilateral predominantly posterior consolidative airspace opacities, increased at right upper and bilateral lower lobes, improved at right middle lobe and left upper lobe. Findings could be attributed to known lymphoma, pulmonary edema and/or pulmonary edema. No significant change in extensive mediastinal, hilar, retroperitoneal and upper abdominal mesenteric lymphadenopathy. Findings compatible with known lymphoma.  Left hepatic lobe hypodense mass measuring up to 5.3 cm, similar to prior CT. Unchanged additional smaller hepatic dome right hepatic lobe mass Additional previously seen segment 6 mass not well delineated. Findings suggestive of hepatic lymphoma involvement.  Splenomegaly.  Interval resolution of bilateral hydronephrosis.  Chronic sternum fracture, new since 9/27/2024. No apparent underlying bone lesion on prior CT chest, however pathologic fracture is a consideration in absence of trauma history.  No pulmonary embolus.    1/2 CT head: No evidence of acute intracranial abnormality.  CT cervical spine: No evidence of acute fracture of the cervical spine. Cervical lymphadenopathy patient with history of lymphoma, increased since May 2024.    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   Acute illness ( severe septic shock/PNA )  which poses a threat to life or bodily function without treatment   Hypoxic respiratory failure leading to Mechanical Ventilation.  Vital Organ Systems Failure : Renal ( Cr 3.3 ), metabolic and respiratory failure ( on MV )  Severe septic shock with lactate 5.6 ( 3 pressors )  Severe multilobar bacterial PNA RUL/RLL/LLL.  R/o necrotising PNA right  1/1 CT : Extensive bilateral predominantly posterior consolidative airspace opacities, increased at right upper and bilateral lower lobes, improved at right middle lobe and left upper lobe. ( Independent interpretation of test : PNA  1/2 CXR opacities b/l ( CHAD ) . ( Independent interpretation of test : PNA  12/19 BAL ORSA  Probable ORSA PNA ( along with GNRs )  1/1 Nares ORSA positive. Independent analysis of CX results and interpretation   Left hepatic lobe with mass : possible Hepatic Lymphoma  1/1 CT Left hepatic lobe hypodense mass measuring up to 5.3 cm, similar to prior CT. Unchanged additional smaller hepatic dome right hepatic lobe mass Findings suggestive of hepatic lymphoma  Acute pyelonephritis with obstructive uropathy   ASHLEY  Pyuria and hematuria  1/1 CT b/l hydronephrosis    -FU with Hem/Onc  -Urine for strep pneumonia/legionella antigen  -DET gm stain, cultures  -BCx  - evaluation. Need for PCN b/l ?  -UCX  -start Linezolid 600 mg iv q12h  -start Meropenem 1 gm iv q24h ( if on CVVH then change to 1 gm iv q8h )  -d/c other ABx    Prognosis is very poor    Discussion of management/test results/antibiotic regimen  with primary ICU  team.      69-year-old male with PMHx of marginal zone lymphoma, pancreatic neuroendocrine tumor, CKD, schizophrenia presenting from NH for vomiting x1 week and increasing shortness of breath.  History below is from the ED note, It is noted in triage note that patient is from NH, no papers in chart from any facility, as per last DC in October, patient was discharged home..  Patient in ED was complaining of diffuse abdominal pain, also mentions fall today and was on ground for 6 hours because he could not get up, with head strike, denies loss of consciousness, denies AC use.  Denied any fevers or other symptoms.    Upon arrival to the ED, vitals : BP 95/53, , SpO2 85% on 15L O2. Temp?  Remained hypotensive, tachycardic, was febrile, hypoxic.  Intubated for airway protection for concern of aspiration pneumonia.    BP remained soft, started on pressors  1/2 CTA chest AP  Extensive bilateral predominantly posterior consolidative airspace opacities, increased at right upper and bilateral lower lobes, improved at right middle lobe and left upper lobe. Findings could be attributed to known lymphoma, pulmonary edema and/or pulmonary edema. No significant change in extensive mediastinal, hilar, retroperitoneal and upper abdominal mesenteric lymphadenopathy. Findings compatible with known lymphoma.  Left hepatic lobe hypodense mass measuring up to 5.3 cm, similar to prior CT. Unchanged additional smaller hepatic dome right hepatic lobe mass Additional previously seen segment 6 mass not well delineated. Findings suggestive of hepatic lymphoma involvement.  Splenomegaly.  Interval resolution of bilateral hydronephrosis.  Chronic sternum fracture, new since 9/27/2024. No apparent underlying bone lesion on prior CT chest, however pathologic fracture is a consideration in absence of trauma history.  No pulmonary embolus.    1/2 CT head: No evidence of acute intracranial abnormality.  CT cervical spine: No evidence of acute fracture of the cervical spine. Cervical lymphadenopathy patient with history of lymphoma, increased since May 2024.    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   Acute illness ( severe septic shock/PNA )  which poses a threat to life or bodily function without treatment   Hypoxic respiratory failure leading to Mechanical Ventilation.  Vital Organ Systems Failure : Renal ( Cr 3.3 ), metabolic and respiratory failure ( on MV )  Severe septic shock with lactate 5.6 ( 3 pressors )  Severe multilobar bacterial PNA RUL/RLL/LLL.  R/o necrotising PNA right  1/1 CT : Extensive bilateral predominantly posterior consolidative airspace opacities, increased at right upper and bilateral lower lobes, improved at right middle lobe and left upper lobe. ( Independent interpretation of test : PNA  1/2 CXR opacities b/l ( [facility] ) . ( Independent interpretation of test : PNA  12/19 BAL ORSA  Probable ORSA PNA ( along with GNRs )  1/1 Nares ORSA positive. Independent analysis of CX results and interpretation   Left hepatic lobe with mass : possible Hepatic Lymphoma  1/1 CT Left hepatic lobe hypodense mass measuring up to 5.3 cm, similar to prior CT. Unchanged additional smaller hepatic dome right hepatic lobe mass Findings suggestive of hepatic lymphoma  Acute pyelonephritis with obstructive uropathy   ASHLEY  Pyuria and hematuria  1/1 CT b/l hydronephrosis    marginal zone lymphoma  pancreatic neuroendocrine tumor  CKD  schizophrenia     -FU with Hem/Onc  -Urine for strep pneumonia/legionella antigen  -DET gm stain, cultures  -BCx  - evaluation. Need for PCN b/l ?  -UCX  -start Linezolid 600 mg iv q12h  -start Meropenem 1 gm iv q24h ( if on CVVH then change to 1 gm iv q8h )  -d/c other ABx    Prognosis is very poor    Discussion of management/test results/antibiotic regimen  with primary ICU  team.

## 2025-01-02 NOTE — CONSULT NOTE ADULT - ASSESSMENT
IMPRESSION:    Acute hypoxemic respiratory failure  Multifocal pneumonia.  Nasal MRSA positive   Possible recurrent aspiration  ASHLEY on CKD.  Rhabdomyolysis   Refractory shock obstructive ?    HO schizophrenia  HO Marginal zone lymphoma and pancreatic neuroendocrine tumor, hepatic mass  Chronic pericardial effusion, now increased in size.    No definitive sine of tamponade on echo.  Patient is tachycardic  Has IVC collapse on US, despite PPV  Effusion is small to moderate size, with no good window for pericardiocentesis, so procedure would be higher risk for him. Given the size of the effusion would manage conservatively for now.  As a first step would recommend IV bolus and to re-assess with limited echo.  Monitor hemodynamics, Check CVP, consider Cannon-Michael, which may be helpful in directing therapy.  C/w antibiotic coverage, f/u culture  Heme/Onc consult.  Monitor renal function.  HR control  Pressor and vent support as per pulmonary/CCM

## 2025-01-02 NOTE — RAPID RESPONSE TEAM SUMMARY - NSSITUATIONBACKGROUNDRRT_GEN_ALL_CORE
Patient with metastatic cancer, pneumonia, and refractory shock experienced cardiac arrest. ACLS protocol followed.  3 rounds of CPR performed.  bicarb x2 given  epi x3 given  ROSC obtained at 17:30.   Patient with metastatic cancer, pneumonia, and refractory shock despite 4 pressors, lactate >16 experienced cardiac arrest. ACLS protocol followed.  3 rounds of CPR performed.  bicarb x2 given  epi x3 given  ROSC obtained at 17:30.

## 2025-01-02 NOTE — CONSULT NOTE ADULT - ASSESSMENT
IMPRESSION:    Acute hypoxemic respiratory failure  Multifocal pneumonia.  Nasal MRSA positive   Possible recurrent aspiration  ASHLEY on CKD.  Rhabdomyolysis   Refractory shock obstructive ?    HO schizophrenia  HO Marginal zone lymphoma and pancreatic neuroendocrine tumor    PLAN:    CNS: Keep sedated and paralyzed      HEENT: Oral care.  ET care     PULMONARY:  HOB @ 45 degrees.  Keep SaO2 92 TO 96%.  Wean o2 as tolerated. ARDS net MV settings.  CT chest noted.  Solumedrol 60 mg Q8      CARDIOVASCULAR:  Avoid fluid overload. GDFR.  Wean pressors.  ECHO STAT.  DW interventional cardiology.  GDE wiht good pump pericardial effusion and Shepherd sign / tamponade.  Might need swan     GI: GI prophylaxis.  NPO for  today.  Trend LFTs.      RENAL:  Follow up lytes.  Correct as needed. Monitor UO.  trend CPK and UO.  [facility]     INFECTIOUS DISEASE:  Cefepime. Vanc, and Azithromycin.  MRSA positive.  Urine legionella and strep.  DTA.  RVP     HEMATOLOGICAL:  DVT prophylaxis. Dimer noted.  LE duplex      ENDOCRINE:  Follow up FS.  Insulin protocol if needed.  DC HC     MUSCULOSKELETAL: bed rest.  off loading.  Trend CPK     GOC

## 2025-01-03 LAB
CULTURE RESULTS: NO GROWTH — SIGNIFICANT CHANGE UP
LEGIONELLA AG UR QL: NEGATIVE — SIGNIFICANT CHANGE UP
S PNEUM AG UR QL: NEGATIVE — SIGNIFICANT CHANGE UP
SPECIMEN SOURCE: SIGNIFICANT CHANGE UP

## 2025-01-04 LAB
CULTURE RESULTS: SIGNIFICANT CHANGE UP
SPECIMEN SOURCE: SIGNIFICANT CHANGE UP

## 2025-01-07 LAB
CULTURE RESULTS: SIGNIFICANT CHANGE UP
CULTURE RESULTS: SIGNIFICANT CHANGE UP
LEGIONELLA AB SER-ACNC: SIGNIFICANT CHANGE UP
SPECIMEN SOURCE: SIGNIFICANT CHANGE UP
SPECIMEN SOURCE: SIGNIFICANT CHANGE UP

## 2025-01-08 DIAGNOSIS — J96.01 ACUTE RESPIRATORY FAILURE WITH HYPOXIA: ICD-10-CM

## 2025-01-08 DIAGNOSIS — R57.8 OTHER SHOCK: ICD-10-CM

## 2025-01-08 DIAGNOSIS — F20.9 SCHIZOPHRENIA, UNSPECIFIED: ICD-10-CM

## 2025-01-08 DIAGNOSIS — N17.9 ACUTE KIDNEY FAILURE, UNSPECIFIED: ICD-10-CM

## 2025-01-08 DIAGNOSIS — E83.39 OTHER DISORDERS OF PHOSPHORUS METABOLISM: ICD-10-CM

## 2025-01-08 DIAGNOSIS — C88.40 EXTRANODAL MARGINAL ZONE B-CELL LYMPHOMA OF MUCOSA-ASSOCIATED LYMPHOID TISSUE [MALT-LYMPHOMA] NOT HAVING ACHIEVED REMISSION: ICD-10-CM

## 2025-01-08 DIAGNOSIS — A41.9 SEPSIS, UNSPECIFIED ORGANISM: ICD-10-CM

## 2025-01-08 DIAGNOSIS — Z91.81 HISTORY OF FALLING: ICD-10-CM

## 2025-01-08 DIAGNOSIS — Z78.1 PHYSICAL RESTRAINT STATUS: ICD-10-CM

## 2025-01-08 DIAGNOSIS — J69.0 PNEUMONITIS DUE TO INHALATION OF FOOD AND VOMIT: ICD-10-CM

## 2025-01-08 DIAGNOSIS — C7A.00 MALIGNANT CARCINOID TUMOR OF UNSPECIFIED SITE: ICD-10-CM

## 2025-01-08 DIAGNOSIS — I46.9 CARDIAC ARREST, CAUSE UNSPECIFIED: ICD-10-CM

## 2025-01-08 DIAGNOSIS — N13.6 PYONEPHROSIS: ICD-10-CM

## 2025-01-08 DIAGNOSIS — R65.21 SEVERE SEPSIS WITH SEPTIC SHOCK: ICD-10-CM

## 2025-01-08 DIAGNOSIS — N18.4 CHRONIC KIDNEY DISEASE, STAGE 4 (SEVERE): ICD-10-CM

## 2025-01-08 DIAGNOSIS — J15.9 UNSPECIFIED BACTERIAL PNEUMONIA: ICD-10-CM

## 2025-01-08 DIAGNOSIS — I48.91 UNSPECIFIED ATRIAL FIBRILLATION: ICD-10-CM

## 2025-01-08 DIAGNOSIS — Z22.322 CARRIER OR SUSPECTED CARRIER OF METHICILLIN RESISTANT STAPHYLOCOCCUS AUREUS: ICD-10-CM

## 2025-01-08 DIAGNOSIS — I31.4 CARDIAC TAMPONADE: ICD-10-CM

## 2025-01-08 DIAGNOSIS — E87.20 ACIDOSIS, UNSPECIFIED: ICD-10-CM

## 2025-01-14 ENCOUNTER — APPOINTMENT (OUTPATIENT)
Dept: PULMONOLOGY | Facility: CLINIC | Age: 70
End: 2025-01-14

## 2025-01-21 NOTE — PROGRESS NOTE ADULT - ASSESSMENT
Paroxysmal atrial fibrillation rate controlled  Anticoagulation: Continue apixaban   64 yo M w/ PMH of Marginal zone lymphoma s/p 1 cycle chemotherapy w/ Bendamustine and rituxan 3 weeks ago, CKD w/ history of rt urethral stent with multiple kidney stones, paranoid and schizophrenia presents to ED for nausea, vomiting and diarrhea. As per pt, he has been feeling lethargic since 3 weeks ago s/p chemotherapy. Patient have non bloody diarrhea , watery and non bloody vomiting. Patient was diagnosed with disseminated herpes     #non bloody diarrhea with vomiting and nausea   c.diff negative , GI PCR is negative   likely chemotherapy induced diarrhea ( up to 40 % with Bendamustine) and 20 % with  Rituxan , less likely other differential ( viral  )   REC:   IV hydration  Zofran PRN   lactose free, low residue diet   Flexible sigmoidoscopy in am   keep patient NPO after midnight   2 tap water enemas in am

## 2025-03-20 NOTE — DISCHARGE NOTE NURSING/CASE MANAGEMENT/SOCIAL WORK - NSFLUVACAGEDISCH_IMM_ALL_CORE
Note for [employer]onic pacer/ICD implant:  Device investigated:   Medtronic pacer/icd and leads            Method investigated (surgical report, imaging report, vendor contacted, website used etc):  MRI Verify website    Time spent investigating in minutes: 15    Investigation findings:  MRI Conditional implant    Risk vs Benefit performed.  If so, list physician and outcome:  No   Adult

## 2025-05-22 NOTE — ED ADULT NURSE NOTE - PAIN RATING/NUMBER SCALE (0-10): REST
----- Message from Bear sent at 5/22/2025 11:27 AM CDT -----  Contact: Pt  Type:  RX Refill RequestWho Called: PtRefill or New Rx:RefillRX Name and Strength:promethazine (PHENERGAN) 25 MG tablet 20 tablet 1 3/19/2025 - Sig - Route: Take 1 tablet (25 mg total) by mouth every 6 (six) hours as needed for Nausea. - OralHow is the patient currently taking it? (ex. 1XDay):1xdayIs this a 30 day or 90 day RX:30Preferred Pharmacy with phone number:[facility] - [place], [place] - [address] [facility] [phone] [facility] [address] [address]Sqjst: [phone] Fax: [phone] Local or Mail Order:LocalOrdering Provider:Dr. Mills the patient rather a call back or a response via [facility]? [facility] Call Back Number: [phone] Additional Information: Please Call to Advise Thank You  
Please see the attached refill request.  
5